# Patient Record
Sex: FEMALE | Race: WHITE | Employment: OTHER | URBAN - METROPOLITAN AREA
[De-identification: names, ages, dates, MRNs, and addresses within clinical notes are randomized per-mention and may not be internally consistent; named-entity substitution may affect disease eponyms.]

---

## 2017-01-01 ENCOUNTER — EPISODE CHANGES (OUTPATIENT)
Dept: CASE MANAGEMENT | Age: 62
End: 2017-01-01

## 2017-01-03 ENCOUNTER — CARE COORDINATION (OUTPATIENT)
Dept: CASE MANAGEMENT | Age: 62
End: 2017-01-03

## 2017-01-05 ENCOUNTER — CARE COORDINATION (OUTPATIENT)
Dept: CASE MANAGEMENT | Age: 62
End: 2017-01-05

## 2017-01-06 ENCOUNTER — CARE COORDINATION (OUTPATIENT)
Dept: CASE MANAGEMENT | Age: 62
End: 2017-01-06

## 2017-01-09 ENCOUNTER — CARE COORDINATION (OUTPATIENT)
Dept: CASE MANAGEMENT | Age: 62
End: 2017-01-09

## 2017-01-12 ENCOUNTER — TELEPHONE (OUTPATIENT)
Dept: INTERNAL MEDICINE | Age: 62
End: 2017-01-12

## 2017-01-19 ENCOUNTER — CARE COORDINATION (OUTPATIENT)
Dept: INTERNAL MEDICINE | Age: 62
End: 2017-01-19

## 2017-01-23 ENCOUNTER — CARE COORDINATOR VISIT (OUTPATIENT)
Dept: INTERNAL MEDICINE | Age: 62
End: 2017-01-23

## 2017-01-23 ENCOUNTER — OFFICE VISIT (OUTPATIENT)
Dept: INTERNAL MEDICINE | Age: 62
End: 2017-01-23

## 2017-01-23 VITALS — BODY MASS INDEX: 24.37 KG/M2 | DIASTOLIC BLOOD PRESSURE: 70 MMHG | WEIGHT: 165 LBS | SYSTOLIC BLOOD PRESSURE: 110 MMHG

## 2017-01-23 DIAGNOSIS — M15.9 GENERALIZED OSTEOARTHROSIS, INVOLVING MULTIPLE SITES: ICD-10-CM

## 2017-01-23 DIAGNOSIS — E10.9 TYPE 1 DIABETES MELLITUS WITHOUT COMPLICATION (HCC): Primary | ICD-10-CM

## 2017-01-23 DIAGNOSIS — I50.23 ACUTE ON CHRONIC SYSTOLIC CONGESTIVE HEART FAILURE (HCC): ICD-10-CM

## 2017-01-23 DIAGNOSIS — J44.9 CHRONIC OBSTRUCTIVE PULMONARY DISEASE, UNSPECIFIED COPD TYPE (HCC): ICD-10-CM

## 2017-01-23 DIAGNOSIS — I10 ESSENTIAL HYPERTENSION: ICD-10-CM

## 2017-01-23 DIAGNOSIS — M79.7 FIBROMYALGIA: ICD-10-CM

## 2017-01-23 DIAGNOSIS — F41.1 GENERALIZED ANXIETY DISORDER: ICD-10-CM

## 2017-01-23 DIAGNOSIS — K58.2 IRRITABLE BOWEL SYNDROME WITH BOTH CONSTIPATION AND DIARRHEA: Chronic | ICD-10-CM

## 2017-01-23 DIAGNOSIS — I50.22 HEART FAILURE, SYSTOLIC, CHRONIC (HCC): ICD-10-CM

## 2017-01-23 DIAGNOSIS — E03.9 ACQUIRED HYPOTHYROIDISM: ICD-10-CM

## 2017-01-23 DIAGNOSIS — I42.9 CARDIOMYOPATHY (HCC): ICD-10-CM

## 2017-01-23 DIAGNOSIS — R56.9 NEW ONSET SEIZURE (HCC): ICD-10-CM

## 2017-01-23 DIAGNOSIS — J41.8 MIXED SIMPLE AND MUCOPURULENT CHRONIC BRONCHITIS (HCC): ICD-10-CM

## 2017-01-23 PROCEDURE — 3023F SPIROM DOC REV: CPT | Performed by: INTERNAL MEDICINE

## 2017-01-23 PROCEDURE — G8598 ASA/ANTIPLAT THER USED: HCPCS | Performed by: INTERNAL MEDICINE

## 2017-01-23 PROCEDURE — 99214 OFFICE O/P EST MOD 30 MIN: CPT | Performed by: INTERNAL MEDICINE

## 2017-01-23 PROCEDURE — 1036F TOBACCO NON-USER: CPT | Performed by: INTERNAL MEDICINE

## 2017-01-23 PROCEDURE — G8420 CALC BMI NORM PARAMETERS: HCPCS | Performed by: INTERNAL MEDICINE

## 2017-01-23 PROCEDURE — 3045F PR MOST RECENT HEMOGLOBIN A1C LEVEL 7.0-9.0%: CPT | Performed by: INTERNAL MEDICINE

## 2017-01-23 PROCEDURE — G8926 SPIRO NO PERF OR DOC: HCPCS | Performed by: INTERNAL MEDICINE

## 2017-01-23 PROCEDURE — G8484 FLU IMMUNIZE NO ADMIN: HCPCS | Performed by: INTERNAL MEDICINE

## 2017-01-23 PROCEDURE — G8427 DOCREV CUR MEDS BY ELIG CLIN: HCPCS | Performed by: INTERNAL MEDICINE

## 2017-01-23 PROCEDURE — 1111F DSCHRG MED/CURRENT MED MERGE: CPT | Performed by: INTERNAL MEDICINE

## 2017-01-23 PROCEDURE — 3017F COLORECTAL CA SCREEN DOC REV: CPT | Performed by: INTERNAL MEDICINE

## 2017-01-23 PROCEDURE — 3014F SCREEN MAMMO DOC REV: CPT | Performed by: INTERNAL MEDICINE

## 2017-01-23 RX ORDER — LORAZEPAM 2 MG/1
TABLET ORAL
Qty: 180 TABLET | Refills: 0 | OUTPATIENT
Start: 2017-01-23 | End: 2017-04-18 | Stop reason: SDUPTHER

## 2017-01-23 ASSESSMENT — ENCOUNTER SYMPTOMS
BACK PAIN: 1
WHEEZING: 0
NAUSEA: 0
CHEST TIGHTNESS: 0
DIARRHEA: 0
CONSTIPATION: 0
VOMITING: 0
SHORTNESS OF BREATH: 0
COUGH: 0
SINUS PRESSURE: 0
ABDOMINAL PAIN: 0

## 2017-01-23 ASSESSMENT — PATIENT HEALTH QUESTIONNAIRE - PHQ9
2. FEELING DOWN, DEPRESSED OR HOPELESS: 0
SUM OF ALL RESPONSES TO PHQ9 QUESTIONS 1 & 2: 0
SUM OF ALL RESPONSES TO PHQ QUESTIONS 1-9: 0
1. LITTLE INTEREST OR PLEASURE IN DOING THINGS: 0

## 2017-01-24 LAB — LDL CHOLESTEROL DIRECT: 86 MG/DL

## 2017-01-25 ENCOUNTER — TELEPHONE (OUTPATIENT)
Dept: INTERNAL MEDICINE | Age: 62
End: 2017-01-25

## 2017-02-06 ENCOUNTER — TELEPHONE (OUTPATIENT)
Dept: PHARMACY | Facility: CLINIC | Age: 62
End: 2017-02-06

## 2017-02-09 RX ORDER — SODIUM CHLORIDE FOR INHALATION 3 %
15 VIAL, NEBULIZER (ML) INHALATION PRN
Status: ON HOLD | COMMUNITY
End: 2017-08-29 | Stop reason: DRUGHIGH

## 2017-02-09 RX ORDER — GABAPENTIN 400 MG/1
800 CAPSULE ORAL 3 TIMES DAILY
Status: ON HOLD | COMMUNITY
End: 2017-08-29 | Stop reason: SDUPTHER

## 2017-02-09 RX ORDER — RANITIDINE 150 MG/1
150 TABLET ORAL 2 TIMES DAILY
Status: ON HOLD | COMMUNITY
Start: 2016-12-30 | End: 2017-08-29 | Stop reason: ALTCHOICE

## 2017-02-14 ENCOUNTER — TELEPHONE (OUTPATIENT)
Dept: INTERNAL MEDICINE | Age: 62
End: 2017-02-14

## 2017-02-14 RX ORDER — NITROFURANTOIN 25; 75 MG/1; MG/1
100 CAPSULE ORAL 2 TIMES DAILY
Qty: 14 CAPSULE | Refills: 0 | Status: SHIPPED | OUTPATIENT
Start: 2017-02-14 | End: 2017-06-28

## 2017-02-23 ENCOUNTER — TELEPHONE (OUTPATIENT)
Dept: CARDIOLOGY CLINIC | Age: 62
End: 2017-02-23

## 2017-02-23 ENCOUNTER — CARE COORDINATION (OUTPATIENT)
Dept: INTERNAL MEDICINE | Age: 62
End: 2017-02-23

## 2017-02-23 ENCOUNTER — TELEPHONE (OUTPATIENT)
Dept: INTERNAL MEDICINE | Age: 62
End: 2017-02-23

## 2017-02-23 RX ORDER — SIMVASTATIN 80 MG
TABLET ORAL
Qty: 90 TABLET | Refills: 0 | Status: SHIPPED | OUTPATIENT
Start: 2017-02-23 | End: 2017-08-02 | Stop reason: SDUPTHER

## 2017-02-23 RX ORDER — LOSARTAN POTASSIUM 25 MG/1
25 TABLET ORAL DAILY
Qty: 90 TABLET | Refills: 3 | Status: SHIPPED | OUTPATIENT
Start: 2017-02-23 | End: 2018-01-03 | Stop reason: SDUPTHER

## 2017-03-01 RX ORDER — ROPINIROLE 0.5 MG/1
TABLET, FILM COATED ORAL
Qty: 90 TABLET | Refills: 6 | Status: SHIPPED | OUTPATIENT
Start: 2017-03-01 | End: 2017-11-01 | Stop reason: SDUPTHER

## 2017-03-01 RX ORDER — CARVEDILOL 3.12 MG/1
TABLET ORAL
Qty: 60 TABLET | Refills: 5 | Status: SHIPPED | OUTPATIENT
Start: 2017-03-01 | End: 2017-09-06 | Stop reason: SDUPTHER

## 2017-03-06 ENCOUNTER — TELEPHONE (OUTPATIENT)
Dept: CARDIOLOGY CLINIC | Age: 62
End: 2017-03-06

## 2017-03-24 ENCOUNTER — CARE COORDINATION (OUTPATIENT)
Dept: INTERNAL MEDICINE | Age: 62
End: 2017-03-24

## 2017-03-30 ENCOUNTER — CARE COORDINATOR VISIT (OUTPATIENT)
Dept: INTERNAL MEDICINE | Age: 62
End: 2017-03-30

## 2017-03-30 ENCOUNTER — OFFICE VISIT (OUTPATIENT)
Dept: INTERNAL MEDICINE | Age: 62
End: 2017-03-30

## 2017-03-30 VITALS
BODY MASS INDEX: 25 KG/M2 | TEMPERATURE: 97.7 F | SYSTOLIC BLOOD PRESSURE: 100 MMHG | DIASTOLIC BLOOD PRESSURE: 60 MMHG | HEIGHT: 69 IN | WEIGHT: 168.8 LBS

## 2017-03-30 DIAGNOSIS — J47.9 BRONCHIECTASIS WITHOUT COMPLICATION (HCC): ICD-10-CM

## 2017-03-30 DIAGNOSIS — K57.32 DIVERTICULITIS OF LARGE INTESTINE WITHOUT PERFORATION OR ABSCESS WITHOUT BLEEDING: ICD-10-CM

## 2017-03-30 DIAGNOSIS — F41.1 GENERALIZED ANXIETY DISORDER: ICD-10-CM

## 2017-03-30 DIAGNOSIS — R56.9 NEW ONSET SEIZURE (HCC): ICD-10-CM

## 2017-03-30 DIAGNOSIS — E10.9 TYPE 1 DIABETES MELLITUS WITHOUT COMPLICATION (HCC): ICD-10-CM

## 2017-03-30 DIAGNOSIS — I10 ESSENTIAL HYPERTENSION: ICD-10-CM

## 2017-03-30 DIAGNOSIS — J39.8 TRACHEOBRONCHOMALACIA: ICD-10-CM

## 2017-03-30 DIAGNOSIS — Z00.00 MEDICARE ANNUAL WELLNESS VISIT, SUBSEQUENT: Primary | ICD-10-CM

## 2017-03-30 PROCEDURE — G0439 PPPS, SUBSEQ VISIT: HCPCS | Performed by: INTERNAL MEDICINE

## 2017-03-30 RX ORDER — LEVOTHYROXINE SODIUM 0.07 MG/1
TABLET ORAL
Qty: 30 TABLET | Refills: 3 | Status: SHIPPED | OUTPATIENT
Start: 2017-03-30 | End: 2017-04-18 | Stop reason: SDUPTHER

## 2017-03-30 RX ORDER — AMOXICILLIN AND CLAVULANATE POTASSIUM 875; 125 MG/1; MG/1
1 TABLET, FILM COATED ORAL 2 TIMES DAILY
Qty: 20 TABLET | Refills: 0 | Status: SHIPPED | OUTPATIENT
Start: 2017-03-30 | End: 2017-04-09

## 2017-03-30 ASSESSMENT — ENCOUNTER SYMPTOMS
COUGH: 0
SINUS PRESSURE: 0
CONSTIPATION: 0
WHEEZING: 0
ABDOMINAL PAIN: 0
CHEST TIGHTNESS: 0
VOMITING: 0
SHORTNESS OF BREATH: 0
DIARRHEA: 0
NAUSEA: 0
BACK PAIN: 1

## 2017-03-31 ENCOUNTER — HOSPITAL ENCOUNTER (OUTPATIENT)
Dept: OTHER | Age: 62
Discharge: HOME | End: 2017-03-31
Attending: INTERNAL MEDICINE | Admitting: INTERNAL MEDICINE

## 2017-03-31 LAB
A/G RATIO: 1.1 (ref 1.1–2.2)
ALBUMIN SERPL-MCNC: 4.6 G/DL (ref 3.4–5)
ALP BLD-CCNC: 135 U/L (ref 40–129)
ALT SERPL-CCNC: 32 U/L (ref 10–40)
ANION GAP SERPL CALCULATED.3IONS-SCNC: 18 MMOL/L (ref 3–16)
AST SERPL-CCNC: 110 U/L (ref 15–37)
BASOPHILS ABSOLUTE: 0.1 K/UL (ref 0–0.2)
BASOPHILS RELATIVE PERCENT: 1 %
BILIRUB SERPL-MCNC: 0.6 MG/DL (ref 0–1)
BUN BLDV-MCNC: 11 MG/DL (ref 7–20)
CALCIUM SERPL-MCNC: 10 MG/DL (ref 8.3–10.6)
CHLORIDE BLD-SCNC: 98 MMOL/L (ref 99–110)
CHOLESTEROL, TOTAL: 192 MG/DL (ref 0–199)
CO2: 25 MMOL/L (ref 21–32)
CREAT SERPL-MCNC: 0.8 MG/DL (ref 0.6–1.2)
EOSINOPHILS ABSOLUTE: 0.2 K/UL (ref 0–0.6)
EOSINOPHILS RELATIVE PERCENT: 1.3 %
GFR AFRICAN AMERICAN: >60
GFR NON-AFRICAN AMERICAN: >60
GLOBULIN: 4.2 G/DL
GLUCOSE BLD-MCNC: 163 MG/DL (ref 70–99)
HCT VFR BLD CALC: 43.6 % (ref 36–48)
HDLC SERPL-MCNC: 45 MG/DL (ref 40–60)
HEMOGLOBIN: 13.9 G/DL (ref 12–16)
LDL CHOLESTEROL CALCULATED: 93 MG/DL
LYMPHOCYTES ABSOLUTE: 3.6 K/UL (ref 1–5.1)
LYMPHOCYTES RELATIVE PERCENT: 29.5 %
MCH RBC QN AUTO: 26.3 PG (ref 26–34)
MCHC RBC AUTO-ENTMCNC: 32 G/DL (ref 31–36)
MCV RBC AUTO: 82.3 FL (ref 80–100)
MONOCYTES ABSOLUTE: 0.9 K/UL (ref 0–1.3)
MONOCYTES RELATIVE PERCENT: 7.3 %
NEUTROPHILS ABSOLUTE: 7.4 K/UL (ref 1.7–7.7)
NEUTROPHILS RELATIVE PERCENT: 60.9 %
PDW BLD-RTO: 16.3 % (ref 12.4–15.4)
PLATELET # BLD: 377 K/UL (ref 135–450)
PMV BLD AUTO: 8 FL (ref 5–10.5)
POTASSIUM SERPL-SCNC: 3.9 MMOL/L (ref 3.5–5.1)
RBC # BLD: 5.3 M/UL (ref 4–5.2)
SODIUM BLD-SCNC: 141 MMOL/L (ref 136–145)
TOTAL PROTEIN: 8.8 G/DL (ref 6.4–8.2)
TRIGL SERPL-MCNC: 268 MG/DL (ref 0–150)
VLDLC SERPL CALC-MCNC: 54 MG/DL
WBC # BLD: 12.2 K/UL (ref 4–11)

## 2017-04-06 ENCOUNTER — TELEPHONE (OUTPATIENT)
Dept: INTERNAL MEDICINE | Age: 62
End: 2017-04-06

## 2017-04-13 ENCOUNTER — CARE COORDINATION (OUTPATIENT)
Dept: INTERNAL MEDICINE | Age: 62
End: 2017-04-13

## 2017-04-18 ENCOUNTER — OFFICE VISIT (OUTPATIENT)
Dept: INTERNAL MEDICINE | Age: 62
End: 2017-04-18

## 2017-04-18 VITALS
SYSTOLIC BLOOD PRESSURE: 142 MMHG | WEIGHT: 174.2 LBS | DIASTOLIC BLOOD PRESSURE: 80 MMHG | BODY MASS INDEX: 25.8 KG/M2 | HEIGHT: 69 IN

## 2017-04-18 DIAGNOSIS — F41.1 GENERALIZED ANXIETY DISORDER: ICD-10-CM

## 2017-04-18 DIAGNOSIS — E10.9 TYPE 1 DIABETES MELLITUS WITHOUT COMPLICATION (HCC): Primary | ICD-10-CM

## 2017-04-18 PROCEDURE — 3045F PR MOST RECENT HEMOGLOBIN A1C LEVEL 7.0-9.0%: CPT | Performed by: INTERNAL MEDICINE

## 2017-04-18 PROCEDURE — 3014F SCREEN MAMMO DOC REV: CPT | Performed by: INTERNAL MEDICINE

## 2017-04-18 PROCEDURE — 3017F COLORECTAL CA SCREEN DOC REV: CPT | Performed by: INTERNAL MEDICINE

## 2017-04-18 PROCEDURE — G8419 CALC BMI OUT NRM PARAM NOF/U: HCPCS | Performed by: INTERNAL MEDICINE

## 2017-04-18 PROCEDURE — G8598 ASA/ANTIPLAT THER USED: HCPCS | Performed by: INTERNAL MEDICINE

## 2017-04-18 PROCEDURE — 99213 OFFICE O/P EST LOW 20 MIN: CPT | Performed by: INTERNAL MEDICINE

## 2017-04-18 PROCEDURE — 1036F TOBACCO NON-USER: CPT | Performed by: INTERNAL MEDICINE

## 2017-04-18 PROCEDURE — G8427 DOCREV CUR MEDS BY ELIG CLIN: HCPCS | Performed by: INTERNAL MEDICINE

## 2017-04-18 RX ORDER — LEVOTHYROXINE SODIUM 0.07 MG/1
TABLET ORAL
Qty: 90 TABLET | Refills: 3 | Status: SHIPPED | OUTPATIENT
Start: 2017-04-18 | End: 2018-07-12

## 2017-04-18 RX ORDER — LORAZEPAM 2 MG/1
TABLET ORAL
Qty: 180 TABLET | Refills: 0 | Status: SHIPPED | OUTPATIENT
Start: 2017-04-18 | End: 2017-07-18 | Stop reason: SDUPTHER

## 2017-04-18 ASSESSMENT — ENCOUNTER SYMPTOMS
COUGH: 0
DIARRHEA: 0
CHEST TIGHTNESS: 0
CONSTIPATION: 0
WHEEZING: 0
ABDOMINAL PAIN: 0

## 2017-05-02 DIAGNOSIS — E55.9 VITAMIN D DEFICIENCY: ICD-10-CM

## 2017-05-02 DIAGNOSIS — E03.9 UNSPECIFIED HYPOTHYROIDISM: ICD-10-CM

## 2017-06-02 ENCOUNTER — CARE COORDINATION (OUTPATIENT)
Dept: INTERNAL MEDICINE | Age: 62
End: 2017-06-02

## 2017-07-17 ENCOUNTER — CARE COORDINATION (OUTPATIENT)
Dept: INTERNAL MEDICINE | Age: 62
End: 2017-07-17

## 2017-07-18 ENCOUNTER — OFFICE VISIT (OUTPATIENT)
Dept: INTERNAL MEDICINE | Age: 62
End: 2017-07-18

## 2017-07-18 VITALS
WEIGHT: 169 LBS | DIASTOLIC BLOOD PRESSURE: 90 MMHG | SYSTOLIC BLOOD PRESSURE: 146 MMHG | BODY MASS INDEX: 25.03 KG/M2 | HEIGHT: 69 IN

## 2017-07-18 DIAGNOSIS — F41.1 GENERALIZED ANXIETY DISORDER: ICD-10-CM

## 2017-07-18 DIAGNOSIS — H66.92 OTITIS, LEFT: Primary | ICD-10-CM

## 2017-07-18 PROCEDURE — G8598 ASA/ANTIPLAT THER USED: HCPCS | Performed by: INTERNAL MEDICINE

## 2017-07-18 PROCEDURE — G8420 CALC BMI NORM PARAMETERS: HCPCS | Performed by: INTERNAL MEDICINE

## 2017-07-18 PROCEDURE — G8427 DOCREV CUR MEDS BY ELIG CLIN: HCPCS | Performed by: INTERNAL MEDICINE

## 2017-07-18 PROCEDURE — 3014F SCREEN MAMMO DOC REV: CPT | Performed by: INTERNAL MEDICINE

## 2017-07-18 PROCEDURE — 99213 OFFICE O/P EST LOW 20 MIN: CPT | Performed by: INTERNAL MEDICINE

## 2017-07-18 PROCEDURE — 1036F TOBACCO NON-USER: CPT | Performed by: INTERNAL MEDICINE

## 2017-07-18 PROCEDURE — 3017F COLORECTAL CA SCREEN DOC REV: CPT | Performed by: INTERNAL MEDICINE

## 2017-07-18 RX ORDER — AMOXICILLIN 500 MG/1
500 CAPSULE ORAL 3 TIMES DAILY
Qty: 30 CAPSULE | Refills: 0 | Status: SHIPPED | OUTPATIENT
Start: 2017-07-18 | End: 2017-07-28

## 2017-07-18 RX ORDER — LORAZEPAM 2 MG/1
TABLET ORAL
Qty: 180 TABLET | Refills: 0 | OUTPATIENT
Start: 2017-07-18 | End: 2017-10-18 | Stop reason: SDUPTHER

## 2017-08-01 ENCOUNTER — TELEPHONE (OUTPATIENT)
Dept: INTERNAL MEDICINE | Age: 62
End: 2017-08-01

## 2017-08-01 RX ORDER — CIPROFLOXACIN 500 MG/1
500 TABLET, FILM COATED ORAL 2 TIMES DAILY
Qty: 14 TABLET | Refills: 0 | Status: SHIPPED | OUTPATIENT
Start: 2017-08-01 | End: 2017-11-06 | Stop reason: SDUPTHER

## 2017-08-02 RX ORDER — SIMVASTATIN 80 MG
TABLET ORAL
Qty: 90 TABLET | Refills: 0 | Status: SHIPPED | OUTPATIENT
Start: 2017-08-02 | End: 2017-09-06 | Stop reason: SDUPTHER

## 2017-08-21 ENCOUNTER — CARE COORDINATION (OUTPATIENT)
Dept: INTERNAL MEDICINE | Age: 62
End: 2017-08-21

## 2017-08-25 ENCOUNTER — CARE COORDINATION (OUTPATIENT)
Dept: CARE COORDINATION | Age: 62
End: 2017-08-25

## 2017-08-29 PROBLEM — E86.0 DEHYDRATION: Status: ACTIVE | Noted: 2017-08-29

## 2017-08-29 PROBLEM — R11.0 NAUSEA: Status: ACTIVE | Noted: 2017-08-29

## 2017-09-01 ENCOUNTER — CARE COORDINATION (OUTPATIENT)
Dept: CASE MANAGEMENT | Age: 62
End: 2017-09-01

## 2017-09-06 RX ORDER — SIMVASTATIN 80 MG
TABLET ORAL
Qty: 90 TABLET | Refills: 3 | Status: SHIPPED | OUTPATIENT
Start: 2017-09-06 | End: 2018-02-16 | Stop reason: SDUPTHER

## 2017-09-06 RX ORDER — CARVEDILOL 3.12 MG/1
TABLET ORAL
Qty: 60 TABLET | Refills: 0 | Status: SHIPPED | OUTPATIENT
Start: 2017-09-06 | End: 2018-01-03 | Stop reason: SDUPTHER

## 2017-09-08 ENCOUNTER — CARE COORDINATION (OUTPATIENT)
Dept: CASE MANAGEMENT | Age: 62
End: 2017-09-08

## 2017-09-12 ENCOUNTER — CARE COORDINATION (OUTPATIENT)
Dept: CASE MANAGEMENT | Age: 62
End: 2017-09-12

## 2017-09-19 ENCOUNTER — CARE COORDINATION (OUTPATIENT)
Dept: INTERNAL MEDICINE | Age: 62
End: 2017-09-19

## 2017-09-20 ENCOUNTER — TELEPHONE (OUTPATIENT)
Dept: INTERNAL MEDICINE | Age: 62
End: 2017-09-20

## 2017-09-20 DIAGNOSIS — R79.89 ABNORMAL TSH: Primary | ICD-10-CM

## 2017-09-20 RX ORDER — LEVOTHYROXINE SODIUM 0.1 MG/1
100 TABLET ORAL DAILY
Qty: 30 TABLET | Refills: 3 | Status: SHIPPED | OUTPATIENT
Start: 2017-09-20 | End: 2018-02-03 | Stop reason: SDUPTHER

## 2017-10-10 ENCOUNTER — TELEPHONE (OUTPATIENT)
Dept: PHARMACY | Facility: CLINIC | Age: 62
End: 2017-10-10

## 2017-10-10 NOTE — TELEPHONE ENCOUNTER
CLINICAL PHARMACY NOTE - Medication Review    Smita Harris is a 60 y.o. female referred to a clinical pharmacy specialist given history of COPD and/or HF and number of home medications. Reached patient by telephone for medication review. Politely declines complete review of medications - sts her list should be up to date from ED/hospital visit/s and that now is not a good time to review. Offered to call at a more convenient time, but again declines, stating she'll review her list prior to appt to be sure. Denies any medication questions or concerns.      Isabella Banks, PharmD, [address]  Direct: [phone]  Department, toll free: [phone], option 7     =======================================================   For Pharmacy Admin Tracking Only    PHSO: Yes  Time Spent (min): 5

## 2017-10-16 ENCOUNTER — HOSPITAL ENCOUNTER (OUTPATIENT)
Dept: OTHER | Age: 62
Discharge: HOME | End: 2017-10-16
Attending: INTERNAL MEDICINE | Admitting: INTERNAL MEDICINE

## 2017-10-17 LAB — TSH SERPL DL<=0.05 MIU/L-ACNC: 0.49 UIU/ML (ref 0.27–4.2)

## 2017-10-18 ENCOUNTER — CARE COORDINATOR VISIT (OUTPATIENT)
Dept: INTERNAL MEDICINE | Age: 62
End: 2017-10-18

## 2017-10-18 ENCOUNTER — OFFICE VISIT (OUTPATIENT)
Dept: INTERNAL MEDICINE | Age: 62
End: 2017-10-18

## 2017-10-18 VITALS — BODY MASS INDEX: 25.52 KG/M2 | WEIGHT: 172.8 LBS | DIASTOLIC BLOOD PRESSURE: 70 MMHG | SYSTOLIC BLOOD PRESSURE: 140 MMHG

## 2017-10-18 DIAGNOSIS — I10 ESSENTIAL HYPERTENSION: ICD-10-CM

## 2017-10-18 DIAGNOSIS — F41.1 ANXIETY STATE: ICD-10-CM

## 2017-10-18 DIAGNOSIS — F41.9 ANXIETY: ICD-10-CM

## 2017-10-18 DIAGNOSIS — R21 RASH: ICD-10-CM

## 2017-10-18 DIAGNOSIS — Z79.4 TYPE 2 DIABETES MELLITUS WITHOUT COMPLICATION, WITH LONG-TERM CURRENT USE OF INSULIN (HCC): ICD-10-CM

## 2017-10-18 DIAGNOSIS — Z23 NEED FOR INFLUENZA VACCINATION: Primary | ICD-10-CM

## 2017-10-18 DIAGNOSIS — F41.1 GENERALIZED ANXIETY DISORDER: ICD-10-CM

## 2017-10-18 DIAGNOSIS — E11.9 TYPE 2 DIABETES MELLITUS WITHOUT COMPLICATION, WITH LONG-TERM CURRENT USE OF INSULIN (HCC): ICD-10-CM

## 2017-10-18 LAB
A/G RATIO: 1.4 (ref 1.1–2.2)
ALBUMIN SERPL-MCNC: 4.5 G/DL (ref 3.4–5)
ALP BLD-CCNC: 125 U/L (ref 40–129)
ALT SERPL-CCNC: 21 U/L (ref 10–40)
ANION GAP SERPL CALCULATED.3IONS-SCNC: 21 MMOL/L (ref 3–16)
AST SERPL-CCNC: 47 U/L (ref 15–37)
BASOPHILS ABSOLUTE: 0.1 K/UL (ref 0–0.2)
BASOPHILS RELATIVE PERCENT: 0.9 %
BILIRUB SERPL-MCNC: <0.2 MG/DL (ref 0–1)
BUN BLDV-MCNC: 5 MG/DL (ref 7–20)
CALCIUM SERPL-MCNC: 9.5 MG/DL (ref 8.3–10.6)
CHLORIDE BLD-SCNC: 98 MMOL/L (ref 99–110)
CHOLESTEROL, TOTAL: 221 MG/DL (ref 0–199)
CO2: 22 MMOL/L (ref 21–32)
CREAT SERPL-MCNC: 0.6 MG/DL (ref 0.6–1.2)
EOSINOPHILS ABSOLUTE: 0.1 K/UL (ref 0–0.6)
EOSINOPHILS RELATIVE PERCENT: 1.4 %
GFR AFRICAN AMERICAN: >60
GFR NON-AFRICAN AMERICAN: >60
GLOBULIN: 3.2 G/DL
GLUCOSE BLD-MCNC: 222 MG/DL (ref 70–99)
HCT VFR BLD CALC: 38.5 % (ref 36–48)
HDLC SERPL-MCNC: 44 MG/DL (ref 40–60)
HEMOGLOBIN: 12.6 G/DL (ref 12–16)
LDL CHOLESTEROL CALCULATED: ABNORMAL MG/DL
LDL CHOLESTEROL DIRECT: 136 MG/DL
LYMPHOCYTES ABSOLUTE: 2.2 K/UL (ref 1–5.1)
LYMPHOCYTES RELATIVE PERCENT: 25.7 %
MCH RBC QN AUTO: 27.4 PG (ref 26–34)
MCHC RBC AUTO-ENTMCNC: 32.8 G/DL (ref 31–36)
MCV RBC AUTO: 83.7 FL (ref 80–100)
MONOCYTES ABSOLUTE: 0.6 K/UL (ref 0–1.3)
MONOCYTES RELATIVE PERCENT: 6.7 %
NEUTROPHILS ABSOLUTE: 5.6 K/UL (ref 1.7–7.7)
NEUTROPHILS RELATIVE PERCENT: 65.3 %
PDW BLD-RTO: 16.4 % (ref 12.4–15.4)
PLATELET # BLD: 297 K/UL (ref 135–450)
PMV BLD AUTO: 8.5 FL (ref 5–10.5)
POTASSIUM SERPL-SCNC: 3.9 MMOL/L (ref 3.5–5.1)
RBC # BLD: 4.6 M/UL (ref 4–5.2)
SODIUM BLD-SCNC: 141 MMOL/L (ref 136–145)
TOTAL PROTEIN: 7.7 G/DL (ref 6.4–8.2)
TRIGL SERPL-MCNC: 306 MG/DL (ref 0–150)
VLDLC SERPL CALC-MCNC: ABNORMAL MG/DL
WBC # BLD: 8.6 K/UL (ref 4–11)

## 2017-10-18 PROCEDURE — G8417 CALC BMI ABV UP PARAM F/U: HCPCS | Performed by: INTERNAL MEDICINE

## 2017-10-18 PROCEDURE — 90630 INFLUENZA, QUADV, 18-64 YRS, ID, PF, MICRO INJ, 0.1ML (FLUZONE QUADV, PF): CPT | Performed by: INTERNAL MEDICINE

## 2017-10-18 PROCEDURE — G8484 FLU IMMUNIZE NO ADMIN: HCPCS | Performed by: INTERNAL MEDICINE

## 2017-10-18 PROCEDURE — 3014F SCREEN MAMMO DOC REV: CPT | Performed by: INTERNAL MEDICINE

## 2017-10-18 PROCEDURE — G0008 ADMIN INFLUENZA VIRUS VAC: HCPCS | Performed by: INTERNAL MEDICINE

## 2017-10-18 PROCEDURE — 99214 OFFICE O/P EST MOD 30 MIN: CPT | Performed by: INTERNAL MEDICINE

## 2017-10-18 PROCEDURE — 1036F TOBACCO NON-USER: CPT | Performed by: INTERNAL MEDICINE

## 2017-10-18 PROCEDURE — G8598 ASA/ANTIPLAT THER USED: HCPCS | Performed by: INTERNAL MEDICINE

## 2017-10-18 PROCEDURE — G8427 DOCREV CUR MEDS BY ELIG CLIN: HCPCS | Performed by: INTERNAL MEDICINE

## 2017-10-18 PROCEDURE — 3046F HEMOGLOBIN A1C LEVEL >9.0%: CPT | Performed by: INTERNAL MEDICINE

## 2017-10-18 PROCEDURE — 3017F COLORECTAL CA SCREEN DOC REV: CPT | Performed by: INTERNAL MEDICINE

## 2017-10-18 RX ORDER — LORAZEPAM 2 MG/1
TABLET ORAL
Qty: 180 TABLET | Refills: 0 | OUTPATIENT
Start: 2017-10-18 | End: 2018-01-18 | Stop reason: SDUPTHER

## 2017-10-18 NOTE — PROGRESS NOTES
Vaccine Information Sheet, \"Influenza - Inactivated\"  given to Lisa Farah, or parent/legal guardian of  Lisa Farah and verbalized understanding. Patient responses:    Have you ever had a reaction to a flu vaccine? No  Are you able to eat eggs without adverse effects? Yes  Do you have any current illness? No  Have you ever had Guillian [place] Syndrome? No    Flu vaccine given per order. Please see immunization tab.

## 2017-10-18 NOTE — PROGRESS NOTES
Outpatient Follow Up Note    Subjective:   CHIEF COMPLAINT / HPI:     Edith Cook  presents today for check up. She is doing well at this time with only a few problems not controlled. She has a history of Anxiety,Diabetes,Diverticulitis,hyperlipidemia,and generalized osteoarthritis. She has no complaints.        Past Medical History:    Past Medical History:   Diagnosis Date    Anxiety disorder     Chronic pain syndrome     DDD (degenerative disc disease), lumbar     Diabetes (HCC)     Displacement of lumbar intervertebral disc without myelopathy 8/23/2010    Diverticulitis     Fibromyalgia      8/24/2010  8/23/2010    Influenza A 3/10/16    Irritable bowel syndrome 8/23/2010    Other and unspecified hyperlipidemia 8/23/2010    Prosthetic joint implant failure ([facility] [address]     Screening mammogram 12/8/2006    (Both)Negative    Tracheobronchomalacia      8/23/2010    Unspecified essential hypertension 8/24/2010    Unspecified hypothyroidism 8/23/2010       Social History:    History   Smoking Status    Former Smoker    Packs/day: 1.00    Years: 18.00    Types: Cigarettes    Quit date: 12/10/1991   Smokeless Tobacco    Never Used         Family History   Problem Relation Age of Onset    Asthma Mother     Heart Failure Father     Cancer Neg Hx     Diabetes Neg Hx     Emphysema Neg Hx     Hypertension Neg Hx        Current Medications:  Current Outpatient Prescriptions on File Prior to Visit   Medication Sig Dispense Refill    levothyroxine (SYNTHROID) 100 MCG tablet Take 1 tablet by mouth Daily 30 tablet 3    simvastatin (ZOCOR) 80 MG tablet TAKE 1 TABLET BY MOUTH AT BEDTIME 90 tablet 3    carvedilol (COREG) 3.125 MG tablet TAKE 1 TABLET BY MOUTH 2 TIMES DAILY 60 tablet 0    dicyclomine (BENTYL) 10 MG capsule Take 1 capsule by mouth 4 times daily 120 capsule 3    docusate sodium (COLACE) 100 MG capsule Take 1 capsule by mouth daily as needed for Constipation 30 capsule 0    CO2 22 10/18/2017     Lab Results   Component Value Date    TSH 0.49 10/16/2017     Lab Results   Component Value Date    WBC 8.6 10/18/2017    HGB 12.6 10/18/2017    HCT 38.5 10/18/2017    MCV 83.7 10/18/2017     10/18/2017     Assessment Plan :     Essential Hypertension    Blood pressure control has been optimal.  No complaints of headaches. No end organ damage. Encouraged to limit sodium intake and control weight. Hyperlipidemia    Remains on statin  No c/o muscle aches  No chest pain  Last lipid panel was 72  Discussed healthy diet and lifestyle      CongestiveHeart Failure    Denies symptoms of dyspnea or edema  Medications reviewed and remains on medical therapy  Advised to continue meds, low sodium diet and exercise program      Diabetes Type 2    Blood sugar control has been optimal.  Check sugars at home  No complaints of polyuria or polydipsia. Weight control has been stable. Encouraged to check sugar at home, watch weight and exercise. Medications reviewed and refilled  Labs ordered: Renal panel, Hemoglobin A1C.     Max Moore MD  I attest that this note was completed entirely by me

## 2017-10-19 LAB
ESTIMATED AVERAGE GLUCOSE: 151.3 MG/DL
HBA1C MFR BLD: 6.9 %

## 2017-10-19 NOTE — CARE COORDINATION
Ambulatory Care Coordination Note  10/18/2017  CM Risk Score: 8  Vin Mortality Risk Score:      ACC: Dennis Simeon RN     History of Present Illness: COPD, Tracheobronchomalacia, IBS, Osteoarthritis, HTN, Fibromyalgia, Asthma, CHF, CAD, DDD, Arthritis, Anxiety, DM, Pulmonary Nodule,Frequent ED Visits    Summary Note: The pt's blood pressure control has been optimal. No complaints of headaches. The pt denied symptoms of dyspnea or edema; denied any chest pain. Blood sugar control has been good. The pt checks blood sugars regularly at home. No complaints of polyuria or polydipsia. The pt stated her abd pain comes and goes. Pt stated she has been taking the Bentyl, Miralax and Zofran as directed. Pt stated her EGD and Colonoscopy did not reveal anything unusual.  Plan:  RNCC reviewed diet including avoiding foods that trigger abd symptoms and medication adherence to control symptoms. The pt will continue her medication regime for her abd pain. The pt was encouraged to limit sodium intake and control weight. Reinforced healthy diet and lifestyle. The pt was advised to continue all meds and exercise program. Pt was encouraged to continue to check blood sugars and watch carbs. Care Coordination Interventions    Program Enrollment:  Complex Care  Referral from Primary Care Provider:  Yes  Suggested Interventions and Community Resources  Diabetes Education: In Process  Fall Risk Prevention: In Process  Home Health Services:  Completed  Registered Dietician:  Completed  Specialty Services Referral:  In Process  Zone Management Tools: In Process  Other Services or Interventions:  Gastroenterology         Goals Addressed             Most Recent     Conditions and Symptoms   Improving (10/18/2017)             I will schedule office visits, as directed by my provider. I will keep my appointment or reschedule if I have to cancel. I will notify my provider of any barriers to my plan of care.   I will notify my weeks then twice a day for 2 weeks. Historical Provider, MD   albuterol (PROVENTIL) (2.5 MG/3ML) 0.083% nebulizer solution Take 2.5 mg by nebulization as needed for Wheezing    Historical Provider, MD   ondansetron (ZOFRAN ODT) 4 MG disintegrating tablet Take 1 tablet by mouth every 8 hours as needed for Nausea or Vomiting 6/28/17   Trish Bustos NP   sertraline (ZOLOFT) 50 MG tablet TAKE 1 TABLET BY MOUTH DAILY 5/2/17   Marcellus Almonte MD   levothyroxine (SYNTHROID) 75 MCG tablet TAKE 1 TABLET BY MOUTH DAILY 4/18/17 8/29/17  Marcellus Almonte MD   rOPINIRole (REQUIP) 0.5 MG tablet TAKE 1 TABLET BY MOUTH 3 TIMES DAILY. 3/1/17   Marcellus Almonte MD   losartan (COZAAR) 25 MG tablet Take 1 tablet by mouth daily 2/23/17   Jalen Aceves MD   metFORMIN (GLUCOPHAGE) 1000 MG tablet TAKE 1 TABLET BEFORE BREAKFAST AND BEFORE DINNER 2/23/17   Marcellus Almonte MD   montelukast (SINGULAIR) 10 MG tablet Take 10 mg by mouth nightly    Historical Provider, MD   Lancets [facility] Patient test twice day. Dx:E11.9 6/23/16   Marcellus Almonte MD   Glucose Blood (BLOOD GLUCOSE TEST STRIPS) STRP Pt test twice a day dx:E11.9 6/23/16   Marcellus Almonte MD   Blood Glucose Monitoring Suppl [facility] BLOOD GLUCOSE METER) W/DEVICE KIT Use as directed. Dx:E11.9 6/23/16   Marcellus Almonte MD   HYDROcodone-acetaminophen (NORCO) 5-325 MG per tablet Take 1 tablet by mouth every 8 hours as needed for Pain  .     Historical Provider, MD   VENTOLIN  (90 BASE) MCG/ACT inhaler INHALE 2 PUFFS BY MOUTH EVERY 6 HOURS AS NEEDED FOR WHEEZING 12/4/15   Sumanth Fong MD   aspirin 81 MG EC tablet Take 1 tablet by mouth daily 9/24/15   Maty Frye MD   tiZANidine (ZANAFLEX) 2 MG tablet Take 2 mg by mouth 3 times daily as needed     Historical Provider, MD       Future Appointments  Date Time Provider Viky Downs   1/18/2018 9:00 AM Marcellus Almonte MD [facility]     General Assessment    Do you have any symptoms that are causing concern?:  Yes  Progression since Onset:  Intermittent -

## 2017-11-01 DIAGNOSIS — E03.9 HYPOTHYROIDISM: ICD-10-CM

## 2017-11-01 DIAGNOSIS — E55.9 VITAMIN D DEFICIENCY: ICD-10-CM

## 2017-11-01 RX ORDER — ROPINIROLE 0.5 MG/1
TABLET, FILM COATED ORAL
Qty: 90 TABLET | Refills: 6 | Status: SHIPPED | OUTPATIENT
Start: 2017-11-01 | End: 2018-06-25 | Stop reason: SDUPTHER

## 2017-11-06 ENCOUNTER — TELEPHONE (OUTPATIENT)
Dept: INTERNAL MEDICINE | Age: 62
End: 2017-11-06

## 2017-11-06 RX ORDER — CIPROFLOXACIN 500 MG/1
500 TABLET, FILM COATED ORAL 2 TIMES DAILY
Qty: 14 TABLET | Refills: 0 | Status: SHIPPED | OUTPATIENT
Start: 2017-11-06 | End: 2018-02-16 | Stop reason: SDUPTHER

## 2017-11-06 NOTE — TELEPHONE ENCOUNTER
Pt stated has burning when urinate, pain in private area since 11/3/17. Pt stated was in office on Thursday. Pt is requesting something called into pharmacy on file verified.   Please call

## 2017-11-17 ENCOUNTER — CARE COORDINATION (OUTPATIENT)
Dept: CARE COORDINATION | Age: 62
End: 2017-11-17

## 2017-11-18 NOTE — CARE COORDINATION
Ambulatory Care Coordination Note  11/17/2017  CM Risk Score: 8  Vin Mortality Risk Score:      ACC: Anh Small RN     History of Present Illness: COPD, Tracheobronchomalacia, IBS, Osteoarthritis, HTN, Fibromyalgia, Asthma, CHF, CAD, DDD, Arthritis, Anxiety, DM, Pulmonary Nodule,Frequent ED Visits    Summary Note: The pt stated she has been feeling good and has more strength and stamina. The pt stated she has not need her inhaler for quite some time. The pt stated she used her nebulizer once last week but has not used it this week. The pt stated her FBS's have been running 110-120. The pt did not mention any recent abdominal pain. Plan:  RNCC reviewed making healthier choice with holiday eating. The pt will continue to monitor her blood sugars and report any high or too low blood sugars. Handouts mailed to pt:   Managing Your Diabetes Over the Holidays   It's Thankgiving-don't stuff yourself like a [place] ([facility])! Care Coordination Interventions    Program Enrollment:  Complex Care  Referral from Primary Care Provider:  Yes  Suggested Interventions and Community Resources  Diabetes Education: In Process  Fall Risk Prevention: In Process  Home Health Services:  Completed  Registered Dietician:  Completed  Specialty Services Referral:  Completed  Zone Management Tools: In Process  Other Services or Interventions:  Gastroenterology         Goals Addressed             Most Recent     Conditions and Symptoms   On track (11/17/2017)             I will schedule office visits, as directed by my provider. I will keep my appointment or reschedule if I have to cancel. I will notify my provider of any barriers to my plan of care. I will notify my provider of any symptoms that indicate a worsening of my condition.     Barriers: lack of motivation, overwhelmed by complexity of regimen, stress and lack of education  Plan for overcoming my barriers: Provide the pt with education and support  Confidence: 6/10  Anticipated Goal Completion Date: 12/18/17         Self Monitoring   On track (11/17/2017)             Self-Monitored Blood Glucose - I will check my blood sugar Fasting blood sugar  I will notify my provider of any trends of increasing or decreasing blood sugars over a 1 month period. Other Self-Monitoring - I will monitor my abdominal pain and report any bloody stools. - Other: as needed    None Recently Recorded    Barriers: stress  Plan for overcoming my barriers: Assistance from  and family  Confidence: 9/10  Anticipated Goal Completion Date: 08/30/17              Prior to Admission medications    Medication Sig Start Date End Date Taking? Authorizing Provider   triamcinolone (KENALOG) 0.1 % ointment Apply daily to affected area 11/6/17   Anu Carrasquillo MD   sertraline (ZOLOFT) 50 MG tablet TAKE 1 TABLET BY MOUTH DAILY 11/1/17   Anu Carrasquillo MD   rOPINIRole (REQUIP) 0.5 MG tablet TAKE 1 TABLET BY MOUTH 3 TIMES DAILY.  11/1/17   Anu Carrasquillo MD   LORazepam (ATIVAN) 2 MG tablet TAKE 1 TABLET BY MOUTH TWICE DAILY 10/18/17   nAu Carrasquillo MD   levothyroxine (SYNTHROID) 100 MCG tablet Take 1 tablet by mouth Daily 9/20/17   Anu Carrasquillo MD   simvastatin (ZOCOR) 80 MG tablet TAKE 1 TABLET BY MOUTH AT BEDTIME 9/6/17   Anu Carrasquillo MD   carvedilol (COREG) 3.125 MG tablet TAKE 1 TABLET BY MOUTH 2 TIMES DAILY 9/6/17   Aysha Lopez MD   dicyclomine (BENTYL) 10 MG capsule Take 1 capsule by mouth 4 times daily 8/31/17   Robert Esteban CNP   docusate sodium (COLACE) 100 MG capsule Take 1 capsule by mouth daily as needed for Constipation 8/31/17   Robert Esteban CNP   gabapentin (NEURONTIN) 800 MG tablet Take 800 mg by mouth 3 times daily 8/2/17   Historical Provider, MD   omeprazole (PRILOSEC) 40 MG delayed release capsule Take 40 mg by mouth 2 times daily 8/2/17   Historical Provider, MD   loteprednol (LOTEMAX) 0.5 % ophthalmic suspension Place 1 drop into both eyes See Admin Instructions Started on 8/1/17- 1 medications?:  Yes  Does the patient have a nebulizer?:  No  Does the patient use a space with inhaled medications?:  No     No patient-reported symptoms         Symptoms:         ,   Congestive Heart Failure Assessment    Are you currently restricting fluids?:  No Restriction  Do you understand a low sodium diet?:  Yes  Do you understand how to read food labels?:  Yes  How many restaurant meals do you eat per week?:  3-4  Do you salt your food before tasting it?:  No         Symptoms:         ,   Diabetes Assessment    Medic Alert ID:  No  Meal Planning:  Carb counting, Plate Method   How often do you test your blood sugar?:  Daily   Do you have barriers with adherence to non-pharmacologic self-management interventions?  (Nutrition/Exercise/Self-Monitoring):  No   Have you ever had to go to the ED for symptoms of low blood sugar?:  No       No patient-reported symptoms      ,

## 2017-12-04 ENCOUNTER — TELEPHONE (OUTPATIENT)
Dept: CARDIOLOGY CLINIC | Age: 62
End: 2017-12-04

## 2017-12-11 ENCOUNTER — CARE COORDINATION (OUTPATIENT)
Dept: CARE COORDINATION | Age: 62
End: 2017-12-11

## 2017-12-21 ENCOUNTER — CARE COORDINATION (OUTPATIENT)
Dept: CARE COORDINATION | Age: 62
End: 2017-12-21

## 2018-01-03 RX ORDER — LOSARTAN POTASSIUM 25 MG/1
25 TABLET ORAL DAILY
Qty: 90 TABLET | Refills: 0 | Status: SHIPPED | OUTPATIENT
Start: 2018-01-03 | End: 2018-02-16 | Stop reason: SDUPTHER

## 2018-01-03 RX ORDER — CARVEDILOL 3.12 MG/1
TABLET ORAL
Qty: 60 TABLET | Refills: 0 | Status: SHIPPED | OUTPATIENT
Start: 2018-01-03 | End: 2018-02-06 | Stop reason: SDUPTHER

## 2018-01-03 NOTE — TELEPHONE ENCOUNTER
Last ov-12/9/16 with [facility]  Next ov- not scheduled, was due 6/2017  Last labs-10/18/17 CBC, LDL, Lipid, HgbA1c, CMP, TSH  Last EKG-9/1/17

## 2018-01-04 ENCOUNTER — TELEPHONE (OUTPATIENT)
Dept: PHARMACY | Facility: CLINIC | Age: 63
End: 2018-01-04

## 2018-01-09 ENCOUNTER — TELEPHONE (OUTPATIENT)
Dept: INTERNAL MEDICINE | Age: 63
End: 2018-01-09

## 2018-01-11 RX ORDER — LANCETS 30 GAUGE
EACH MISCELLANEOUS
Qty: 200 EACH | Refills: 3 | Status: SHIPPED | OUTPATIENT
Start: 2018-01-11 | End: 2018-11-30 | Stop reason: SDUPTHER

## 2018-01-11 RX ORDER — GLUCOSAMINE HCL/CHONDROITIN SU 500-400 MG
CAPSULE ORAL
Qty: 200 STRIP | Refills: 3 | Status: SHIPPED | OUTPATIENT
Start: 2018-01-11 | End: 2018-11-30 | Stop reason: SDUPTHER

## 2018-01-11 NOTE — TELEPHONE ENCOUNTER
Reviewed and agree with PharmD candidate. Appreciate outreach. Will send letter.     Fabiola Calderon, LedaD, R [facility] Pharmacist  Direct: [phone]  Dept: [phone] (toll free [phone], option 7)     CLINICAL PHARMACY CONSULT: MED RECONCILIATION/REVIEW ADDENDUM    For Pharmacy Admin Tracking Only    PHSO: Yes  Time Spent (min): 5

## 2018-01-18 ENCOUNTER — CARE COORDINATION (OUTPATIENT)
Dept: CARE COORDINATION | Age: 63
End: 2018-01-18

## 2018-01-18 ENCOUNTER — OFFICE VISIT (OUTPATIENT)
Dept: INTERNAL MEDICINE | Age: 63
End: 2018-01-18

## 2018-01-18 VITALS — DIASTOLIC BLOOD PRESSURE: 60 MMHG | BODY MASS INDEX: 26.52 KG/M2 | SYSTOLIC BLOOD PRESSURE: 110 MMHG | WEIGHT: 179.6 LBS

## 2018-01-18 DIAGNOSIS — E11.9 TYPE 2 DIABETES MELLITUS WITHOUT COMPLICATION, WITH LONG-TERM CURRENT USE OF INSULIN (HCC): Primary | ICD-10-CM

## 2018-01-18 DIAGNOSIS — I10 ESSENTIAL HYPERTENSION: ICD-10-CM

## 2018-01-18 DIAGNOSIS — J41.0 SIMPLE CHRONIC BRONCHITIS (HCC): ICD-10-CM

## 2018-01-18 DIAGNOSIS — I50.22 HEART FAILURE, SYSTOLIC, CHRONIC (HCC): ICD-10-CM

## 2018-01-18 DIAGNOSIS — F41.1 GENERALIZED ANXIETY DISORDER: ICD-10-CM

## 2018-01-18 DIAGNOSIS — E03.9 ACQUIRED HYPOTHYROIDISM: ICD-10-CM

## 2018-01-18 DIAGNOSIS — R56.9 NEW ONSET SEIZURE (HCC): ICD-10-CM

## 2018-01-18 DIAGNOSIS — Z79.4 TYPE 2 DIABETES MELLITUS WITHOUT COMPLICATION, WITH LONG-TERM CURRENT USE OF INSULIN (HCC): ICD-10-CM

## 2018-01-18 DIAGNOSIS — E11.9 TYPE 2 DIABETES MELLITUS WITHOUT COMPLICATION, WITH LONG-TERM CURRENT USE OF INSULIN (HCC): ICD-10-CM

## 2018-01-18 DIAGNOSIS — G40.909 SEIZURE DISORDER (HCC): ICD-10-CM

## 2018-01-18 DIAGNOSIS — I25.10 CAD IN NATIVE ARTERY: ICD-10-CM

## 2018-01-18 DIAGNOSIS — Z79.4 TYPE 2 DIABETES MELLITUS WITHOUT COMPLICATION, WITH LONG-TERM CURRENT USE OF INSULIN (HCC): Primary | ICD-10-CM

## 2018-01-18 DIAGNOSIS — R41.3 MEMORY CHANGE: ICD-10-CM

## 2018-01-18 PROBLEM — E86.0 DEHYDRATION: Status: RESOLVED | Noted: 2017-08-29 | Resolved: 2018-01-18

## 2018-01-18 LAB
A/G RATIO: 1.3 (ref 1.1–2.2)
ALBUMIN SERPL-MCNC: 4.2 G/DL (ref 3.4–5)
ALP BLD-CCNC: 117 U/L (ref 40–129)
ALT SERPL-CCNC: 15 U/L (ref 10–40)
ANION GAP SERPL CALCULATED.3IONS-SCNC: 12 MMOL/L (ref 3–16)
AST SERPL-CCNC: 30 U/L (ref 15–37)
BASOPHILS ABSOLUTE: 0.1 K/UL (ref 0–0.2)
BASOPHILS RELATIVE PERCENT: 0.6 %
BILIRUB SERPL-MCNC: 0.3 MG/DL (ref 0–1)
BUN BLDV-MCNC: 8 MG/DL (ref 7–20)
CALCIUM SERPL-MCNC: 9.1 MG/DL (ref 8.3–10.6)
CHLORIDE BLD-SCNC: 101 MMOL/L (ref 99–110)
CHOLESTEROL, TOTAL: 157 MG/DL (ref 0–199)
CO2: 29 MMOL/L (ref 21–32)
CREAT SERPL-MCNC: 0.6 MG/DL (ref 0.6–1.2)
EOSINOPHILS ABSOLUTE: 0.2 K/UL (ref 0–0.6)
EOSINOPHILS RELATIVE PERCENT: 1.8 %
GFR AFRICAN AMERICAN: >60
GFR NON-AFRICAN AMERICAN: >60
GLOBULIN: 3.2 G/DL
GLUCOSE BLD-MCNC: 72 MG/DL (ref 70–99)
HCT VFR BLD CALC: 35.2 % (ref 36–48)
HDLC SERPL-MCNC: 49 MG/DL (ref 40–60)
HEMOGLOBIN: 11.7 G/DL (ref 12–16)
LDL CHOLESTEROL CALCULATED: 68 MG/DL
LYMPHOCYTES ABSOLUTE: 3.3 K/UL (ref 1–5.1)
LYMPHOCYTES RELATIVE PERCENT: 35.4 %
MCH RBC QN AUTO: 26.7 PG (ref 26–34)
MCHC RBC AUTO-ENTMCNC: 33.1 G/DL (ref 31–36)
MCV RBC AUTO: 80.7 FL (ref 80–100)
MONOCYTES ABSOLUTE: 0.7 K/UL (ref 0–1.3)
MONOCYTES RELATIVE PERCENT: 7.4 %
NEUTROPHILS ABSOLUTE: 5.1 K/UL (ref 1.7–7.7)
NEUTROPHILS RELATIVE PERCENT: 54.8 %
PDW BLD-RTO: 17.1 % (ref 12.4–15.4)
PLATELET # BLD: 316 K/UL (ref 135–450)
PMV BLD AUTO: 8.7 FL (ref 5–10.5)
POTASSIUM SERPL-SCNC: 4.7 MMOL/L (ref 3.5–5.1)
RBC # BLD: 4.37 M/UL (ref 4–5.2)
SODIUM BLD-SCNC: 142 MMOL/L (ref 136–145)
TOTAL PROTEIN: 7.4 G/DL (ref 6.4–8.2)
TRIGL SERPL-MCNC: 200 MG/DL (ref 0–150)
TSH REFLEX: 1.57 UIU/ML (ref 0.27–4.2)
VLDLC SERPL CALC-MCNC: 40 MG/DL
WBC # BLD: 9.3 K/UL (ref 4–11)

## 2018-01-18 PROCEDURE — G8926 SPIRO NO PERF OR DOC: HCPCS | Performed by: INTERNAL MEDICINE

## 2018-01-18 PROCEDURE — 3017F COLORECTAL CA SCREEN DOC REV: CPT | Performed by: INTERNAL MEDICINE

## 2018-01-18 PROCEDURE — 1036F TOBACCO NON-USER: CPT | Performed by: INTERNAL MEDICINE

## 2018-01-18 PROCEDURE — 3014F SCREEN MAMMO DOC REV: CPT | Performed by: INTERNAL MEDICINE

## 2018-01-18 PROCEDURE — 3045F PR MOST RECENT HEMOGLOBIN A1C LEVEL 7.0-9.0%: CPT | Performed by: INTERNAL MEDICINE

## 2018-01-18 PROCEDURE — G8417 CALC BMI ABV UP PARAM F/U: HCPCS | Performed by: INTERNAL MEDICINE

## 2018-01-18 PROCEDURE — 99214 OFFICE O/P EST MOD 30 MIN: CPT | Performed by: INTERNAL MEDICINE

## 2018-01-18 PROCEDURE — G8427 DOCREV CUR MEDS BY ELIG CLIN: HCPCS | Performed by: INTERNAL MEDICINE

## 2018-01-18 PROCEDURE — G8599 NO ASA/ANTIPLAT THER USE RNG: HCPCS | Performed by: INTERNAL MEDICINE

## 2018-01-18 PROCEDURE — 3023F SPIROM DOC REV: CPT | Performed by: INTERNAL MEDICINE

## 2018-01-18 PROCEDURE — G8484 FLU IMMUNIZE NO ADMIN: HCPCS | Performed by: INTERNAL MEDICINE

## 2018-01-18 RX ORDER — LORAZEPAM 2 MG/1
TABLET ORAL
Qty: 180 TABLET | Refills: 0 | Status: SHIPPED | OUTPATIENT
Start: 2018-01-18 | End: 2018-04-18 | Stop reason: SDUPTHER

## 2018-01-18 NOTE — PROGRESS NOTES
(COREG) 3.125 MG tablet TAKE 1 TABLET BY MOUTH 2 TIMES DAILY 60 tablet 0    metFORMIN (GLUCOPHAGE) 1000 MG tablet TAKE 1 TABLET BEFORE BREAKFAST AND BEFORE DINNER 180 tablet 0    triamcinolone (KENALOG) 0.1 % ointment Apply daily to affected area 1 Tube 1    sertraline (ZOLOFT) 50 MG tablet TAKE 1 TABLET BY MOUTH DAILY 30 tablet 4    rOPINIRole (REQUIP) 0.5 MG tablet TAKE 1 TABLET BY MOUTH 3 TIMES DAILY. 90 tablet 6    LORazepam (ATIVAN) 2 MG tablet TAKE 1 TABLET BY MOUTH TWICE DAILY 180 tablet 0    levothyroxine (SYNTHROID) 100 MCG tablet Take 1 tablet by mouth Daily 30 tablet 3    simvastatin (ZOCOR) 80 MG tablet TAKE 1 TABLET BY MOUTH AT BEDTIME 90 tablet 3    dicyclomine (BENTYL) 10 MG capsule Take 1 capsule by mouth 4 times daily 120 capsule 3    docusate sodium (COLACE) 100 MG capsule Take 1 capsule by mouth daily as needed for Constipation 30 capsule 0    gabapentin (NEURONTIN) 800 MG tablet Take 800 mg by mouth 3 times daily      omeprazole (PRILOSEC) 40 MG delayed release capsule Take 40 mg by mouth 2 times daily      loteprednol (LOTEMAX) 0.5 % ophthalmic suspension Place 1 drop into both eyes See Admin Instructions Started on 8/1/17- 1 drop 4 times a day for 2 weeks then twice a day for 2 weeks.  albuterol (PROVENTIL) (2.5 MG/3ML) 0.083% nebulizer solution Take 2.5 mg by nebulization as needed for Wheezing      ondansetron (ZOFRAN ODT) 4 MG disintegrating tablet Take 1 tablet by mouth every 8 hours as needed for Nausea or Vomiting 10 tablet 0    levothyroxine (SYNTHROID) 75 MCG tablet TAKE 1 TABLET BY MOUTH DAILY 90 tablet 3    montelukast (SINGULAIR) 10 MG tablet Take 10 mg by mouth nightly      Blood Glucose Monitoring Suppl (ACURA BLOOD GLUCOSE METER) W/DEVICE KIT Use as directed. Dx:E11.9 1 kit 0    HYDROcodone-acetaminophen (NORCO) 5-325 MG per tablet Take 1 tablet by mouth every 8 hours as needed for Pain  .       VENTOLIN  (90 BASE) MCG/ACT inhaler INHALE 2 PUFFS BY MOUTH EVERY 6 HOURS AS NEEDED FOR WHEEZING 3 Inhaler 3    aspirin 81 MG EC tablet Take 1 tablet by mouth daily 30 tablet 3    tiZANidine (ZANAFLEX) 2 MG tablet Take 2 mg by mouth 3 times daily as needed        No current facility-administered medications on file prior to visit. Allergies   Allergen Reactions    Sulfa Antibiotics Anaphylaxis    Bactrim     Prednisone      Cannot tolerate oral steriods    Quinidine        REVIEW OF SYSTEMS:      CONSTITUTIONAL: No major weight gain or loss with less fatigue, weakness in recent past.  HEENT: No new vision difficulties or ringing in the ears. No congestion or sore throat. RESPIRATORY: No new SOB, PND, orthopnea or cough. She has chronic Asthma controlled with medication  CARDIOVASCULAR: No chest pain or palpitations or dyspnea on exertion. GI: No nausea, vomiting, diarrhea, constipation, abdominal pain or changes in bowel habits. : No urinary frequency, urgency, incontinence hematuria or dysuria. SKIN: No cyanosis or skin lesions. MUSCULOSKELETAL: No new muscle or joint pain. NEUROLOGICAL: No syncope or TIA-like symptoms. No change in sensation or strength. PSYCHIATRIC: Mild  anxiety,  insomnia without depression    Objective:   PHYSICAL EXAM:        VITALS:  /60   Wt 179 lb 9.6 oz (81.5 kg)   BMI 26.52 kg/m²   CONSTITUTIONAL: Cooperative, no apparent distress, and appears well nourished / developed  NEUROLOGIC:  Awake and orientated to person, place and time. Sensation normal Motor normal. Memory loss developing slowly  PSYCH: Calm affect. SKIN: Warm and dry. Neck: Supple, no elevation of JVP, normal carotid pulses with no bruits and thyroid normal size. LUNGS:  No increased work of breathing and clear to auscultation, no crackles or wheezing today  CARDIOVASCULAR:  Regular rate and rhythm with no murmurs, gallops, rubs, or abnormal heart sounds. JVP is not elevated  ABDOMEN:  Normal bowel sounds, non-distended and non-tender to sent Thyroid panel. TSH 1.57  Kayode Kapoor MD  I attest that this note was completed entirely by me    5. Memory loss       6.  Seizure Disorder          Not on medication    No recurrent seizures            She is planning to be evaluated by Neurologist.

## 2018-01-19 ENCOUNTER — TELEPHONE (OUTPATIENT)
Dept: INTERNAL MEDICINE | Age: 63
End: 2018-01-19

## 2018-01-19 LAB
ESTIMATED AVERAGE GLUCOSE: 159.9 MG/DL
HBA1C MFR BLD: 7.2 %

## 2018-01-19 NOTE — CARE COORDINATION
you have hyperglycemia symptoms?:  No   Do you have hypoglycemia symptoms?:  No   Last Blood Sugar Value:  180   Blood Sugar Monitoring Regimen:  Once a Day   Blood Sugar Trends:  Fluctuating (Comment: Pt had not been taking her Metformin)      ,

## 2018-02-05 RX ORDER — LEVOTHYROXINE SODIUM 0.1 MG/1
100 TABLET ORAL DAILY
Qty: 30 TABLET | Refills: 3 | Status: SHIPPED | OUTPATIENT
Start: 2018-02-05 | End: 2018-02-16 | Stop reason: ALTCHOICE

## 2018-02-06 RX ORDER — CARVEDILOL 3.12 MG/1
TABLET ORAL
Qty: 60 TABLET | Refills: 0 | Status: SHIPPED | OUTPATIENT
Start: 2018-02-06 | End: 2018-02-16 | Stop reason: SDUPTHER

## 2018-02-16 ENCOUNTER — TELEPHONE (OUTPATIENT)
Dept: INTERNAL MEDICINE | Age: 63
End: 2018-02-16

## 2018-02-16 ENCOUNTER — OFFICE VISIT (OUTPATIENT)
Dept: CARDIOLOGY CLINIC | Age: 63
End: 2018-02-16

## 2018-02-16 VITALS
DIASTOLIC BLOOD PRESSURE: 84 MMHG | OXYGEN SATURATION: 97 % | HEART RATE: 81 BPM | HEIGHT: 69 IN | SYSTOLIC BLOOD PRESSURE: 110 MMHG | WEIGHT: 187 LBS | BODY MASS INDEX: 27.7 KG/M2

## 2018-02-16 DIAGNOSIS — I25.10 CAD IN NATIVE ARTERY: ICD-10-CM

## 2018-02-16 DIAGNOSIS — I10 ESSENTIAL HYPERTENSION: Primary | ICD-10-CM

## 2018-02-16 DIAGNOSIS — I50.23 ACUTE ON CHRONIC SYSTOLIC CONGESTIVE HEART FAILURE (HCC): ICD-10-CM

## 2018-02-16 DIAGNOSIS — R42 DIZZINESS: ICD-10-CM

## 2018-02-16 PROCEDURE — 99214 OFFICE O/P EST MOD 30 MIN: CPT | Performed by: INTERNAL MEDICINE

## 2018-02-16 PROCEDURE — G8484 FLU IMMUNIZE NO ADMIN: HCPCS | Performed by: INTERNAL MEDICINE

## 2018-02-16 PROCEDURE — G8427 DOCREV CUR MEDS BY ELIG CLIN: HCPCS | Performed by: INTERNAL MEDICINE

## 2018-02-16 PROCEDURE — 3014F SCREEN MAMMO DOC REV: CPT | Performed by: INTERNAL MEDICINE

## 2018-02-16 PROCEDURE — 1036F TOBACCO NON-USER: CPT | Performed by: INTERNAL MEDICINE

## 2018-02-16 PROCEDURE — 3017F COLORECTAL CA SCREEN DOC REV: CPT | Performed by: INTERNAL MEDICINE

## 2018-02-16 PROCEDURE — G8599 NO ASA/ANTIPLAT THER USE RNG: HCPCS | Performed by: INTERNAL MEDICINE

## 2018-02-16 PROCEDURE — G8417 CALC BMI ABV UP PARAM F/U: HCPCS | Performed by: INTERNAL MEDICINE

## 2018-02-16 RX ORDER — CIPROFLOXACIN 500 MG/1
500 TABLET, FILM COATED ORAL 2 TIMES DAILY
Qty: 14 TABLET | Refills: 0 | Status: SHIPPED | OUTPATIENT
Start: 2018-02-16 | End: 2018-04-02 | Stop reason: SDUPTHER

## 2018-02-16 RX ORDER — SIMVASTATIN 80 MG
TABLET ORAL
Qty: 90 TABLET | Refills: 3 | Status: SHIPPED | OUTPATIENT
Start: 2018-02-16 | End: 2018-11-30 | Stop reason: SDUPTHER

## 2018-02-16 RX ORDER — LOSARTAN POTASSIUM 25 MG/1
25 TABLET ORAL DAILY
Qty: 90 TABLET | Refills: 3 | Status: SHIPPED | OUTPATIENT
Start: 2018-02-16 | End: 2018-11-26 | Stop reason: SDUPTHER

## 2018-02-16 RX ORDER — CARVEDILOL 3.12 MG/1
TABLET ORAL
Qty: 180 TABLET | Refills: 3 | Status: SHIPPED | OUTPATIENT
Start: 2018-02-16 | End: 2018-03-14

## 2018-02-16 NOTE — PROGRESS NOTES
[facility]   Cardiac Followup    Referring Provider:  Jonah Holden MD     Chief Complaint   Patient presents with    Coronary Artery Disease    Congestive Heart Failure        History of Present Illness:    F/U CMP, CAD, HLD, dizziness, SOB. Today she states she has been feeling well. She did have epsidoes of dizziness in December and went to the ER. She has dizziness at times that is unchanged, no syncope. She is tolerating her medications. Her BP has been labile  at home. She has been having some issues with memory loss and will be following with neurology. Denies chest pains and shortness of breath or syncope. She has been under stress at home. Past Medical History:   has a past medical history of Anxiety disorder; Chronic pain syndrome; DDD (degenerative disc disease), lumbar; Diabetes ([address] Displacement of lumbar intervertebral disc without myelopathy; Diverticulitis; Fibromyalgia; Generalized osteoarthrosis, involving multiple sites; GERD (gastroesophageal reflux disease); Impacted cerumen; Influenza A; Irritable bowel syndrome; Other and unspecified hyperlipidemia; Prosthetic joint implant failure ([address] Screening mammogram; Tracheobronchomalacia; Unspecified asthma(493.90); Unspecified essential hypertension; and Unspecified hypothyroidism. Surgical History:   has a past surgical history that includes Hysterectomy; joint replacement (10/92); joint replacement (  12/92); joint replacement (  6/96); joint replacement (  2006); Colonoscopy (2007); Lung surgery (1/2014); and Cardiac catheterization. Social History:   reports that she quit smoking about 26 years ago. Her smoking use included Cigarettes. She has a 18.00 pack-year smoking history. She has never used smokeless tobacco. She reports that she does not drink alcohol or use drugs. Family History:  family history includes Asthma in her mother; Heart Failure in her father.      Home Medications:  Prior to Admission medications Medication Sig Start Date End Date Taking? Authorizing Provider   carvedilol (COREG) 3.125 MG tablet TAKE 1 TABLET BY MOUTH 2 TIMES DAILY 2/6/18  Yes Indu Stubbs MD   LORazepam (ATIVAN) 2 MG tablet TAKE 1 TABLET BY MOUTH TWICE DAILY. 1/18/18 4/18/18 Yes Anmol Sumner MD   losartan (COZAAR) 25 MG tablet TAKE 1 TABLET BY MOUTH DAILY 1/3/18  Yes Indu Stubbs MD   metFORMIN (GLUCOPHAGE) 1000 MG tablet TAKE 1 TABLET BEFORE BREAKFAST AND BEFORE DINNER 12/11/17  Yes Anmol Sumner MD   triamcinolone (KENALOG) 0.1 % ointment Apply daily to affected area 11/6/17  Yes Anmol Sumner MD   sertraline (ZOLOFT) 50 MG tablet TAKE 1 TABLET BY MOUTH DAILY 11/1/17  Yes Anmol Sumner MD   rOPINIRole (REQUIP) 0.5 MG tablet TAKE 1 TABLET BY MOUTH 3 TIMES DAILY. 11/1/17  Yes Anmol Sumner MD   simvastatin (ZOCOR) 80 MG tablet TAKE 1 TABLET BY MOUTH AT BEDTIME 9/6/17  Yes Anmol Sumner MD   dicyclomine (BENTYL) 10 MG capsule Take 1 capsule by mouth 4 times daily 8/31/17  Yes Jose Raul Workman CNP   docusate sodium (COLACE) 100 MG capsule Take 1 capsule by mouth daily as needed for Constipation 8/31/17  Yes Jose Raul Workman CNP   gabapentin (NEURONTIN) 800 MG tablet Take 800 mg by mouth 3 times daily 8/2/17  Yes Historical Provider, MD   omeprazole (PRILOSEC) 40 MG delayed release capsule Take 40 mg by mouth 2 times daily 8/2/17  Yes Historical Provider, MD   loteprednol (LOTEMAX) 0.5 % ophthalmic suspension Place 1 drop into both eyes See Admin Instructions Started on 8/1/17- 1 drop 4 times a day for 2 weeks then twice a day for 2 weeks.    Yes Historical Provider, MD   albuterol (PROVENTIL) (2.5 MG/3ML) 0.083% nebulizer solution Take 2.5 mg by nebulization as needed for Wheezing   Yes Historical Provider, MD   ondansetron (ZOFRAN ODT) 4 MG disintegrating tablet Take 1 tablet by mouth every 8 hours as needed for Nausea or Vomiting 6/28/17  Yes Hui Acuna NP   levothyroxine (SYNTHROID) 75 MCG tablet TAKE 1 TABLET BY MOUTH DAILY 4/18/17 2/16/18 Yes Priscilla Medrano MD   HYDROcodone-acetaminophen (NORCO) 5-325 MG per tablet Take 1 tablet by mouth every 8 hours as needed for Pain  . Yes Historical Provider, MD   aspirin 81 MG EC tablet Take 1 tablet by mouth daily 9/24/15  Yes Rosalia Hollis MD   tiZANidine (ZANAFLEX) 2 MG tablet Take 2 mg by mouth 3 times daily as needed    Yes Historical Provider, MD   Lancets MISC Dispense whatever insurance will cover. Patient test twice day. Dx:E11.9 1/11/18   Priscilla Medrano MD   Glucose Blood (BLOOD GLUCOSE TEST STRIPS) STRP Dispense whatever insurance will cover. Pt test twice a day dx:E11.9 1/11/18   Priscilla Medrano MD   Blood Glucose Monitoring Suppl CATHRYN Dispense whatever insurance will cover. Dx code:E11.9 1/11/18   Priscilla Medrano MD   Blood Glucose Monitoring Suppl [facility] BLOOD GLUCOSE METER) W/DEVICE KIT Use as directed. Dx:E11.9 6/23/16   Priscilla Medrano MD        Allergies:  Sulfa antibiotics; Bactrim; Prednisone; and Quinidine     Review of Systems:   · Constitutional: there has been no unanticipated weight loss. There's been no change in energy level, sleep pattern, or activity level. · Eyes: No visual changes or diplopia. No scleral icterus. · ENT: No Headaches, hearing loss or vertigo. No mouth sores or sore throat. · Cardiovascular: Reviewed in HPI  · Respiratory: No cough or wheezing, no sputum production. No hematemesis. · Gastrointestinal: No abdominal pain, appetite loss, blood in stools. No change in bowel or bladder habits. · Genitourinary: No dysuria, trouble voiding, or hematuria. · Musculoskeletal:  No gait disturbance, weakness or joint complaints. · Integumentary: No rash or pruritis. · Neurological: No headache, diplopia, change in muscle strength, numbness or tingling. No change in gait, balance, coordination, mood, affect, memory, mentation, behavior. · Psychiatric: No anxiety, no depression. · Endocrine: No malaise, fatigue or temperature intolerance.  No excessive thirst, fluid intake, or urination. No tremor. · Hematologic/Lymphatic: No abnormal bruising or bleeding, blood clots or swollen lymph nodes. · Allergic/Immunologic: No nasal congestion or hives. Physical Examination:    Vitals:    02/16/18 0839   BP: 110/84   Pulse: 81   SpO2: 97%        Constitutional and General Appearance: NAD   Respiratory:  · Normal excursion and expansion without use of accessory muscles  · Resp Auscultation: Normal breath sounds without dullness  Cardiovascular:  · The apical impulses not displaced  · Heart tones are crisp and normal  · Cervical veins are not engorged  · The carotid upstroke is normal in amplitude and contour without delay or bruit  · Normal S1S2, No S3, No Murmur  · Peripheral pulses are symmetrical and full  · There is no clubbing, cyanosis of the extremities. · No edema  · Femoral Arteries: 2+ and equal  · Pedal Pulses: 2+ and equal   Abdomen:  · No masses or tenderness  · Liver/Spleen: No Abnormalities Noted  Neurological/Psychiatric:  · Alert and oriented in all spheres  · Moves all extremities well  · Exhibits normal gait balance and coordination  · No abnormalities of mood, affect, memory, mentation, or behavior are noted    Cath 9/2015  LM, LCx, RCA <20%  LAD 30%  LV: mild anterior hypokinesis. EF 40-45%  Normal right heart pressures  Possible Takotsubo cardiomyopathy vs transient ischemic event. LV function improving  Echo 9/2015  There is akinesis of the anteroseptal wall. Ejection fraction is visually estimated to be 35 %. Diastolic filling parameters suggests diastolic dysfunction . Slight calcification of the mitral valve noted. Mild mitral regurgitation is present. Moderately dilated left atrium with increased left atrial volume. Trivial tricuspid regurgitation. Systolic pulmonary artery pressure (SPAP) is normal and estimated at 13   MmHg (RA pressure 3 mmHg). Event monitor 12/2015  Frequent PVCs  Echo 3/2016  - Left ventricle:  The cavity size was normal. Wall thickness

## 2018-02-16 NOTE — COMMUNICATION BODY
[address]   Cardiac Followup    Referring Provider:  José Miguel Cameron MD     Chief Complaint   Patient presents with    Coronary Artery Disease    Congestive Heart Failure        History of Present Illness:    F/U CMP, CAD, HLD, dizziness, SOB. Today she states she has been feeling well. She did have epsidoes of dizziness in December and went to the ER. She has dizziness at times that is unchanged, no syncope. She is tolerating her medications. Her BP has been labile  at home. She has been having some issues with memory loss and will be following with neurology. Denies chest pains and shortness of breath or syncope. She has been under stress at home. Past Medical History:   has a past medical history of Anxiety disorder; Chronic pain syndrome; DDD (degenerative disc disease), lumbar; Diabetes ([address] Displacement of lumbar intervertebral disc without myelopathy; Diverticulitis; Fibromyalgia; Generalized osteoarthrosis, involving multiple sites; GERD (gastroesophageal reflux disease); Impacted cerumen; Influenza A; Irritable bowel syndrome; Other and unspecified hyperlipidemia; Prosthetic joint implant failure ([address] Screening mammogram; Tracheobronchomalacia; Unspecified asthma(493.90); Unspecified essential hypertension; and Unspecified hypothyroidism. Surgical History:   has a past surgical history that includes Hysterectomy; joint replacement (10/92); joint replacement (  12/92); joint replacement (  6/96); joint replacement (  2006); Colonoscopy (2007); Lung surgery (1/2014); and Cardiac catheterization. Social History:   reports that she quit smoking about 26 years ago. Her smoking use included Cigarettes. She has a 18.00 pack-year smoking history. She has never used smokeless tobacco. She reports that she does not drink alcohol or use drugs. Family History:  family history includes Asthma in her mother; Heart Failure in her father.      Home Medications:  Prior to Admission medications Medication Sig Start Date End Date Taking? Authorizing Provider   carvedilol (COREG) 3.125 MG tablet TAKE 1 TABLET BY MOUTH 2 TIMES DAILY 2/6/18  Yes Laurel Delacruz MD   LORazepam (ATIVAN) 2 MG tablet TAKE 1 TABLET BY MOUTH TWICE DAILY. 1/18/18 4/18/18 Yes Maikel Dunaway MD   losartan (COZAAR) 25 MG tablet TAKE 1 TABLET BY MOUTH DAILY 1/3/18  Yes Laurel Dealcruz MD   metFORMIN (GLUCOPHAGE) 1000 MG tablet TAKE 1 TABLET BEFORE BREAKFAST AND BEFORE DINNER 12/11/17  Yes Maikel Dunaway MD   triamcinolone (KENALOG) 0.1 % ointment Apply daily to affected area 11/6/17  Yes Maikel Dunaway MD   sertraline (ZOLOFT) 50 MG tablet TAKE 1 TABLET BY MOUTH DAILY 11/1/17  Yes Maikel Dunaway MD   rOPINIRole (REQUIP) 0.5 MG tablet TAKE 1 TABLET BY MOUTH 3 TIMES DAILY. 11/1/17  Yes Maikel Dunaway MD   simvastatin (ZOCOR) 80 MG tablet TAKE 1 TABLET BY MOUTH AT BEDTIME 9/6/17  Yes Maikel Dunaway MD   dicyclomine (BENTYL) 10 MG capsule Take 1 capsule by mouth 4 times daily 8/31/17  Yes Dolly Siddiqui CNP   docusate sodium (COLACE) 100 MG capsule Take 1 capsule by mouth daily as needed for Constipation 8/31/17  Yes Dolly Siddiqui CNP   gabapentin (NEURONTIN) 800 MG tablet Take 800 mg by mouth 3 times daily 8/2/17  Yes Historical Provider, MD   omeprazole (PRILOSEC) 40 MG delayed release capsule Take 40 mg by mouth 2 times daily 8/2/17  Yes Historical Provider, MD   loteprednol (LOTEMAX) 0.5 % ophthalmic suspension Place 1 drop into both eyes See Admin Instructions Started on 8/1/17- 1 drop 4 times a day for 2 weeks then twice a day for 2 weeks.    Yes Historical Provider, MD   albuterol (PROVENTIL) (2.5 MG/3ML) 0.083% nebulizer solution Take 2.5 mg by nebulization as needed for Wheezing   Yes Historical Provider, MD   ondansetron (ZOFRAN ODT) 4 MG disintegrating tablet Take 1 tablet by mouth every 8 hours as needed for Nausea or Vomiting 6/28/17  Yes Yvette Awad, JOSE FRANCISCO   levothyroxine (SYNTHROID) 75 MCG tablet TAKE 1 TABLET BY MOUTH DAILY 4/18/17 urination. No tremor. · Hematologic/Lymphatic: No abnormal bruising or bleeding, blood clots or swollen lymph nodes. · Allergic/Immunologic: No nasal congestion or hives. Physical Examination:    Vitals:    02/16/18 0839   BP: 110/84   Pulse: 81   SpO2: 97%        Constitutional and General Appearance: NAD   Respiratory:  · Normal excursion and expansion without use of accessory muscles  · Resp Auscultation: Normal breath sounds without dullness  Cardiovascular:  · The apical impulses not displaced  · Heart tones are crisp and normal  · Cervical veins are not engorged  · The carotid upstroke is normal in amplitude and contour without delay or bruit  · Normal S1S2, No S3, No Murmur  · Peripheral pulses are symmetrical and full  · There is no clubbing, cyanosis of the extremities. · No edema  · Femoral Arteries: 2+ and equal  · Pedal Pulses: 2+ and equal   Abdomen:  · No masses or tenderness  · Liver/Spleen: No Abnormalities Noted  Neurological/Psychiatric:  · Alert and oriented in all spheres  · Moves all extremities well  · Exhibits normal gait balance and coordination  · No abnormalities of mood, affect, memory, mentation, or behavior are noted    Cath 9/2015  LM, LCx, RCA <20%  LAD 30%  LV: mild anterior hypokinesis. EF 40-45%  Normal right heart pressures  Possible Takotsubo cardiomyopathy vs transient ischemic event. LV function improving  Echo 9/2015  There is akinesis of the anteroseptal wall. Ejection fraction is visually estimated to be 35 %. Diastolic filling parameters suggests diastolic dysfunction . Slight calcification of the mitral valve noted. Mild mitral regurgitation is present. Moderately dilated left atrium with increased left atrial volume. Trivial tricuspid regurgitation. Systolic pulmonary artery pressure (SPAP) is normal and estimated at 13   MmHg (RA pressure 3 mmHg). Event monitor 12/2015  Frequent PVCs  Echo 3/2016  - Left ventricle:  The cavity size was normal. Wall thickness

## 2018-02-16 NOTE — LETTER
albuterol (PROVENTIL) (2.5 MG/3ML) 0.083% nebulizer solution Take 2.5 mg by nebulization as needed for Wheezing   Yes Historical Provider, MD   ondansetron (ZOFRAN ODT) 4 MG disintegrating tablet Take 1 tablet by mouth every 8 hours as needed for Nausea or Vomiting 6/28/17  Yes [employer], NP   levothyroxine (SYNTHROID) 75 MCG tablet TAKE 1 TABLET BY MOUTH DAILY 4/18/17 2/16/18 Yes Cisco Taylor MD   HYDROcodone-acetaminophen (NORCO) 5-325 MG per tablet Take 1 tablet by mouth every 8 hours as needed for Pain  . Yes Historical Provider, MD   aspirin 81 MG EC tablet Take 1 tablet by mouth daily 9/24/15  Yes Prudy Remedies, MD   tiZANidine (ZANAFLEX) 2 MG tablet Take 2 mg by mouth 3 times daily as needed    Yes Historical Provider, MD   Lancets MISC Dispense whatever insurance will cover. Patient test twice day. Dx:E11.9 1/11/18   Cisco Taylor MD   Glucose Blood (BLOOD GLUCOSE TEST STRIPS) STRP Dispense whatever insurance will cover. Pt test twice a day dx:E11.9 1/11/18   Cisco Tayolr MD   Blood Glucose Monitoring Suppl CATHRYN Dispense whatever insurance will cover. Dx code:E11.9 1/11/18   Cisco Taylor MD   Blood Glucose Monitoring Suppl [facility] BLOOD GLUCOSE METER) W/DEVICE KIT Use as directed. Dx:E11.9 6/23/16   Cisco Taylor MD        Allergies:  Sulfa antibiotics; Bactrim; Prednisone; and Quinidine     Review of Systems:   · Constitutional: there has been no unanticipated weight loss. There's been no change in energy level, sleep pattern, or activity level. · Eyes: No visual changes or diplopia. No scleral icterus. · ENT: No Headaches, hearing loss or vertigo. No mouth sores or sore throat. · Cardiovascular: Reviewed in HPI  · Respiratory: No cough or wheezing, no sputum production. No hematemesis. · Gastrointestinal: No abdominal pain, appetite loss, blood in stools. No change in bowel or bladder habits. · Genitourinary: No dysuria, trouble voiding, or hematuria.

## 2018-02-21 ENCOUNTER — CARE COORDINATION (OUTPATIENT)
Dept: CARE COORDINATION | Age: 63
End: 2018-02-21

## 2018-02-22 NOTE — CARE COORDINATION
twice a day dx:E11.9 1/11/18   Konstantin Mcnair MD   Blood Glucose Monitoring Suppl CATHRYN Dispense whatever insurance will cover. Dx code:E11.9 1/11/18   Konstantin Mcnair MD   metFORMIN (GLUCOPHAGE) 1000 MG tablet TAKE 1 TABLET BEFORE BREAKFAST AND BEFORE DINNER 12/11/17   Konstantin Mcnair MD   triamcinolone (KENALOG) 0.1 % ointment Apply daily to affected area 11/6/17   Konstantin Mcnair MD   sertraline (ZOLOFT) 50 MG tablet TAKE 1 TABLET BY MOUTH DAILY 11/1/17   Konstantin Mcnair MD   rOPINIRole (REQUIP) 0.5 MG tablet TAKE 1 TABLET BY MOUTH 3 TIMES DAILY. 11/1/17   Konstantin Mcnair MD   dicyclomine (BENTYL) 10 MG capsule Take 1 capsule by mouth 4 times daily 8/31/17   Meghana Potter CNP   docusate sodium (COLACE) 100 MG capsule Take 1 capsule by mouth daily as needed for Constipation 8/31/17   Meghana Potter CNP   gabapentin (NEURONTIN) 800 MG tablet Take 800 mg by mouth 3 times daily 8/2/17   Historical Provider, MD   omeprazole (PRILOSEC) 40 MG delayed release capsule Take 40 mg by mouth 2 times daily 8/2/17   Historical Provider, MD   loteprednol (LOTEMAX) 0.5 % ophthalmic suspension Place 1 drop into both eyes See Admin Instructions Started on 8/1/17- 1 drop 4 times a day for 2 weeks then twice a day for 2 weeks. Historical Provider, MD   albuterol (PROVENTIL) (2.5 MG/3ML) 0.083% nebulizer solution Take 2.5 mg by nebulization as needed for Wheezing    Historical Provider, MD   ondansetron (ZOFRAN ODT) 4 MG disintegrating tablet Take 1 tablet by mouth every 8 hours as needed for Nausea or Vomiting 6/28/17   Jason Woods NP   levothyroxine (SYNTHROID) 75 MCG tablet TAKE 1 TABLET BY MOUTH DAILY 4/18/17 2/16/18  Konstantin Mcnair MD   Blood Glucose Monitoring Suppl [facility] BLOOD GLUCOSE METER) W/DEVICE KIT Use as directed. Dx:E11.9 6/23/16   Konstantin Mcnair MD   HYDROcodone-acetaminophen (NORCO) 5-325 MG per tablet Take 1 tablet by mouth every 8 hours as needed for Pain  .     Historical Provider, MD   aspirin 81 MG EC tablet Take 1 tablet by mouth daily 9/24/15   Rosalia Hollis MD   tiZANidine (ZANAFLEX) 2 MG tablet Take 2 mg by mouth 3 times daily as needed     Historical Provider, MD       Future Appointments  Date Time Provider Viky Juani   4/3/2018 2:30 PM Gilberto Meeks DO [facility] [address]   4/18/2018 11:00 AM Priscilla Medrano MD [facility] [address] [facility]   8/22/2018 11:00 AM Dav Mayo NP [facility]     General Assessment    Do you have any symptoms that are causing concern?:  No      and   COPD Assessment    Does the patient understand envrionmental exposure?:  Yes  Is the patient able to verbalize Rescue vs. Long Acting medications?:  Yes  Does the patient have a nebulizer?:  No  Does the patient use a space with inhaled medications?:  No     No patient-reported symptoms         Symptoms:      Have you had a recent diagnosis of pneumonia either by PCP or at a hospital?:  No     ,   Congestive Heart Failure Assessment    Are you currently restricting fluids?:  No Restriction  Do you understand a low sodium diet?:  Yes  Do you understand how to read food labels?:  Yes  How many restaurant meals do you eat per week?:  3-4  Do you salt your food before tasting it?:  No     No patient-reported symptoms      Symptoms:   None:  Yes      Symptom course:  stable  Weight trend:  stable  Salt intake watch compared to last visit:  stable     ,   Diabetes Assessment    Medic Alert ID:  No  Meal Planning:  Carb counting, Plate Method   How often do you test your blood sugar?:  Daily   Do you have barriers with adherence to non-pharmacologic self-management interventions?  (Nutrition/Exercise/Self-Monitoring):  No   Have you ever had to go to the ED for symptoms of low blood sugar?:  No       No patient-reported symptoms      ,

## 2018-03-14 RX ORDER — CARVEDILOL 3.12 MG/1
TABLET ORAL
Qty: 60 TABLET | Refills: 11 | Status: SHIPPED | OUTPATIENT
Start: 2018-03-14 | End: 2018-11-30 | Stop reason: SDUPTHER

## 2018-03-19 ENCOUNTER — CARE COORDINATION (OUTPATIENT)
Dept: CARE COORDINATION | Age: 63
End: 2018-03-19

## 2018-04-02 ENCOUNTER — NURSE ONLY (OUTPATIENT)
Dept: INTERNAL MEDICINE | Age: 63
End: 2018-04-02

## 2018-04-02 DIAGNOSIS — R31.9 URINARY TRACT INFECTION WITH HEMATURIA, SITE UNSPECIFIED: Primary | ICD-10-CM

## 2018-04-02 DIAGNOSIS — N39.0 URINARY TRACT INFECTION WITH HEMATURIA, SITE UNSPECIFIED: Primary | ICD-10-CM

## 2018-04-02 RX ORDER — CIPROFLOXACIN 500 MG/1
500 TABLET, FILM COATED ORAL 2 TIMES DAILY
Qty: 14 TABLET | Refills: 0 | Status: SHIPPED | OUTPATIENT
Start: 2018-04-02 | End: 2019-02-01 | Stop reason: SDUPTHER

## 2018-04-03 ENCOUNTER — OFFICE VISIT (OUTPATIENT)
Dept: DERMATOLOGY | Age: 63
End: 2018-04-03

## 2018-04-03 DIAGNOSIS — R23.3 PETECHIAE: ICD-10-CM

## 2018-04-03 DIAGNOSIS — L85.3 XEROSIS OF SKIN: ICD-10-CM

## 2018-04-03 DIAGNOSIS — Z87.891 FORMER SMOKER: ICD-10-CM

## 2018-04-03 DIAGNOSIS — D48.9 NEOPLASM OF UNCERTAIN BEHAVIOR: Primary | ICD-10-CM

## 2018-04-03 DIAGNOSIS — Z79.2 PROPHYLACTIC ANTIBIOTIC: Primary | ICD-10-CM

## 2018-04-03 PROCEDURE — G8599 NO ASA/ANTIPLAT THER USE RNG: HCPCS | Performed by: DERMATOLOGY

## 2018-04-03 PROCEDURE — 99202 OFFICE O/P NEW SF 15 MIN: CPT | Performed by: DERMATOLOGY

## 2018-04-03 PROCEDURE — 1036F TOBACCO NON-USER: CPT | Performed by: DERMATOLOGY

## 2018-04-03 PROCEDURE — 3017F COLORECTAL CA SCREEN DOC REV: CPT | Performed by: DERMATOLOGY

## 2018-04-03 PROCEDURE — 11100 PR BIOPSY OF SKIN LESION: CPT | Performed by: DERMATOLOGY

## 2018-04-03 PROCEDURE — G8427 DOCREV CUR MEDS BY ELIG CLIN: HCPCS | Performed by: DERMATOLOGY

## 2018-04-03 PROCEDURE — G8420 CALC BMI NORM PARAMETERS: HCPCS | Performed by: DERMATOLOGY

## 2018-04-03 PROCEDURE — 3014F SCREEN MAMMO DOC REV: CPT | Performed by: DERMATOLOGY

## 2018-04-04 LAB — URINE CULTURE, ROUTINE: NORMAL

## 2018-04-09 ENCOUNTER — TELEPHONE (OUTPATIENT)
Dept: DERMATOLOGY | Age: 63
End: 2018-04-09

## 2018-04-09 DIAGNOSIS — E55.9 VITAMIN D DEFICIENCY: ICD-10-CM

## 2018-04-09 DIAGNOSIS — E03.9 HYPOTHYROIDISM: ICD-10-CM

## 2018-04-18 ENCOUNTER — OFFICE VISIT (OUTPATIENT)
Dept: INTERNAL MEDICINE | Age: 63
End: 2018-04-18

## 2018-04-18 ENCOUNTER — CARE COORDINATION (OUTPATIENT)
Dept: CARE COORDINATION | Age: 63
End: 2018-04-18

## 2018-04-18 VITALS
DIASTOLIC BLOOD PRESSURE: 78 MMHG | BODY MASS INDEX: 27.19 KG/M2 | WEIGHT: 183.6 LBS | HEIGHT: 69 IN | SYSTOLIC BLOOD PRESSURE: 130 MMHG

## 2018-04-18 DIAGNOSIS — E11.9 TYPE 2 DIABETES MELLITUS WITHOUT COMPLICATION, WITH LONG-TERM CURRENT USE OF INSULIN (HCC): ICD-10-CM

## 2018-04-18 DIAGNOSIS — E03.9 ACQUIRED HYPOTHYROIDISM: ICD-10-CM

## 2018-04-18 DIAGNOSIS — F41.1 GENERALIZED ANXIETY DISORDER: Primary | ICD-10-CM

## 2018-04-18 DIAGNOSIS — I50.40 COMBINED SYSTOLIC AND DIASTOLIC CONGESTIVE HEART FAILURE, UNSPECIFIED CONGESTIVE HEART FAILURE CHRONICITY: ICD-10-CM

## 2018-04-18 DIAGNOSIS — I25.10 CAD IN NATIVE ARTERY: ICD-10-CM

## 2018-04-18 DIAGNOSIS — Z79.4 TYPE 2 DIABETES MELLITUS WITHOUT COMPLICATION, WITH LONG-TERM CURRENT USE OF INSULIN (HCC): ICD-10-CM

## 2018-04-18 DIAGNOSIS — I10 ESSENTIAL HYPERTENSION: ICD-10-CM

## 2018-04-18 DIAGNOSIS — E78.2 MIXED HYPERLIPIDEMIA: ICD-10-CM

## 2018-04-18 LAB
ANION GAP SERPL CALCULATED.3IONS-SCNC: 16 MMOL/L (ref 3–16)
BUN BLDV-MCNC: 7 MG/DL (ref 7–20)
CALCIUM SERPL-MCNC: 9.3 MG/DL (ref 8.3–10.6)
CHLORIDE BLD-SCNC: 96 MMOL/L (ref 99–110)
CO2: 25 MMOL/L (ref 21–32)
CREAT SERPL-MCNC: 0.7 MG/DL (ref 0.6–1.2)
GFR AFRICAN AMERICAN: >60
GFR NON-AFRICAN AMERICAN: >60
GLUCOSE BLD-MCNC: 127 MG/DL (ref 70–99)
MAGNESIUM: 1.5 MG/DL (ref 1.8–2.4)
POTASSIUM SERPL-SCNC: 4.4 MMOL/L (ref 3.5–5.1)
SODIUM BLD-SCNC: 137 MMOL/L (ref 136–145)

## 2018-04-18 PROCEDURE — 3014F SCREEN MAMMO DOC REV: CPT | Performed by: INTERNAL MEDICINE

## 2018-04-18 PROCEDURE — G8599 NO ASA/ANTIPLAT THER USE RNG: HCPCS | Performed by: INTERNAL MEDICINE

## 2018-04-18 PROCEDURE — 1036F TOBACCO NON-USER: CPT | Performed by: INTERNAL MEDICINE

## 2018-04-18 PROCEDURE — 2022F DILAT RTA XM EVC RTNOPTHY: CPT | Performed by: INTERNAL MEDICINE

## 2018-04-18 PROCEDURE — 3017F COLORECTAL CA SCREEN DOC REV: CPT | Performed by: INTERNAL MEDICINE

## 2018-04-18 PROCEDURE — G8417 CALC BMI ABV UP PARAM F/U: HCPCS | Performed by: INTERNAL MEDICINE

## 2018-04-18 PROCEDURE — 99214 OFFICE O/P EST MOD 30 MIN: CPT | Performed by: INTERNAL MEDICINE

## 2018-04-18 PROCEDURE — 3045F PR MOST RECENT HEMOGLOBIN A1C LEVEL 7.0-9.0%: CPT | Performed by: INTERNAL MEDICINE

## 2018-04-18 PROCEDURE — G8427 DOCREV CUR MEDS BY ELIG CLIN: HCPCS | Performed by: INTERNAL MEDICINE

## 2018-04-18 RX ORDER — LORAZEPAM 2 MG/1
TABLET ORAL
Qty: 180 TABLET | Refills: 0 | OUTPATIENT
Start: 2018-04-18 | End: 2018-07-18 | Stop reason: SDUPTHER

## 2018-04-19 LAB
ESTIMATED AVERAGE GLUCOSE: 168.6 MG/DL
HBA1C MFR BLD: 7.5 %

## 2018-04-26 RX ORDER — LEVOTHYROXINE SODIUM 0.1 MG/1
100 TABLET ORAL DAILY
Qty: 30 TABLET | Refills: 3 | Status: SHIPPED | OUTPATIENT
Start: 2018-04-26 | End: 2018-08-07 | Stop reason: SDUPTHER

## 2018-05-14 ENCOUNTER — CARE COORDINATION (OUTPATIENT)
Dept: CARE COORDINATION | Age: 63
End: 2018-05-14

## 2018-05-29 ENCOUNTER — CARE COORDINATION (OUTPATIENT)
Dept: CARE COORDINATION | Age: 63
End: 2018-05-29

## 2018-06-08 ENCOUNTER — OFFICE VISIT (OUTPATIENT)
Dept: INTERNAL MEDICINE | Age: 63
End: 2018-06-08

## 2018-06-08 VITALS
BODY MASS INDEX: 26.36 KG/M2 | DIASTOLIC BLOOD PRESSURE: 84 MMHG | SYSTOLIC BLOOD PRESSURE: 146 MMHG | WEIGHT: 178 LBS | HEIGHT: 69 IN

## 2018-06-08 DIAGNOSIS — E10.9 TYPE 1 DIABETES MELLITUS WITHOUT COMPLICATION (HCC): ICD-10-CM

## 2018-06-08 DIAGNOSIS — J41.1 MUCOPURULENT CHRONIC BRONCHITIS (HCC): Primary | ICD-10-CM

## 2018-06-08 DIAGNOSIS — K57.32 DIVERTICULITIS OF LARGE INTESTINE WITHOUT PERFORATION OR ABSCESS WITHOUT BLEEDING: ICD-10-CM

## 2018-06-08 DIAGNOSIS — I50.43 ACUTE ON CHRONIC COMBINED SYSTOLIC AND DIASTOLIC HEART FAILURE (HCC): ICD-10-CM

## 2018-06-08 LAB
A/G RATIO: 1.3 (ref 1.1–2.2)
ALBUMIN SERPL-MCNC: 4.9 G/DL (ref 3.4–5)
ALP BLD-CCNC: 150 U/L (ref 40–129)
ALT SERPL-CCNC: 14 U/L (ref 10–40)
ANION GAP SERPL CALCULATED.3IONS-SCNC: 20 MMOL/L (ref 3–16)
AST SERPL-CCNC: 35 U/L (ref 15–37)
BASOPHILS ABSOLUTE: 0.1 K/UL (ref 0–0.2)
BASOPHILS RELATIVE PERCENT: 0.8 %
BILIRUB SERPL-MCNC: 0.4 MG/DL (ref 0–1)
BUN BLDV-MCNC: 8 MG/DL (ref 7–20)
CALCIUM SERPL-MCNC: 10.3 MG/DL (ref 8.3–10.6)
CHLORIDE BLD-SCNC: 92 MMOL/L (ref 99–110)
CO2: 25 MMOL/L (ref 21–32)
CREAT SERPL-MCNC: 0.8 MG/DL (ref 0.6–1.2)
EOSINOPHILS ABSOLUTE: 0.1 K/UL (ref 0–0.6)
EOSINOPHILS RELATIVE PERCENT: 0.6 %
GFR AFRICAN AMERICAN: >60
GFR NON-AFRICAN AMERICAN: >60
GLOBULIN: 3.9 G/DL
GLUCOSE BLD-MCNC: 147 MG/DL (ref 70–99)
HCT VFR BLD CALC: 40.2 % (ref 36–48)
HEMOGLOBIN: 13.2 G/DL (ref 12–16)
LYMPHOCYTES ABSOLUTE: 3.5 K/UL (ref 1–5.1)
LYMPHOCYTES RELATIVE PERCENT: 20.5 %
MCH RBC QN AUTO: 25.5 PG (ref 26–34)
MCHC RBC AUTO-ENTMCNC: 32.8 G/DL (ref 31–36)
MCV RBC AUTO: 77.7 FL (ref 80–100)
MONOCYTES ABSOLUTE: 1.2 K/UL (ref 0–1.3)
MONOCYTES RELATIVE PERCENT: 6.9 %
NEUTROPHILS ABSOLUTE: 12.3 K/UL (ref 1.7–7.7)
NEUTROPHILS RELATIVE PERCENT: 71.2 %
PDW BLD-RTO: 18.3 % (ref 12.4–15.4)
PLATELET # BLD: 395 K/UL (ref 135–450)
PMV BLD AUTO: 9.1 FL (ref 5–10.5)
POTASSIUM SERPL-SCNC: 3.7 MMOL/L (ref 3.5–5.1)
RBC # BLD: 5.17 M/UL (ref 4–5.2)
SODIUM BLD-SCNC: 137 MMOL/L (ref 136–145)
TOTAL PROTEIN: 8.8 G/DL (ref 6.4–8.2)
WBC # BLD: 17.2 K/UL (ref 4–11)

## 2018-06-08 PROCEDURE — 1036F TOBACCO NON-USER: CPT | Performed by: INTERNAL MEDICINE

## 2018-06-08 PROCEDURE — 2022F DILAT RTA XM EVC RTNOPTHY: CPT | Performed by: INTERNAL MEDICINE

## 2018-06-08 PROCEDURE — 3023F SPIROM DOC REV: CPT | Performed by: INTERNAL MEDICINE

## 2018-06-08 PROCEDURE — 3045F PR MOST RECENT HEMOGLOBIN A1C LEVEL 7.0-9.0%: CPT | Performed by: INTERNAL MEDICINE

## 2018-06-08 PROCEDURE — G8926 SPIRO NO PERF OR DOC: HCPCS | Performed by: INTERNAL MEDICINE

## 2018-06-08 PROCEDURE — G8599 NO ASA/ANTIPLAT THER USE RNG: HCPCS | Performed by: INTERNAL MEDICINE

## 2018-06-08 PROCEDURE — G8427 DOCREV CUR MEDS BY ELIG CLIN: HCPCS | Performed by: INTERNAL MEDICINE

## 2018-06-08 PROCEDURE — G8417 CALC BMI ABV UP PARAM F/U: HCPCS | Performed by: INTERNAL MEDICINE

## 2018-06-08 PROCEDURE — 99214 OFFICE O/P EST MOD 30 MIN: CPT | Performed by: INTERNAL MEDICINE

## 2018-06-08 PROCEDURE — 3017F COLORECTAL CA SCREEN DOC REV: CPT | Performed by: INTERNAL MEDICINE

## 2018-06-08 RX ORDER — METRONIDAZOLE 250 MG/1
250 TABLET ORAL 3 TIMES DAILY
Qty: 30 TABLET | Refills: 0 | Status: SHIPPED | OUTPATIENT
Start: 2018-06-08 | End: 2018-06-18

## 2018-06-09 LAB
ESTIMATED AVERAGE GLUCOSE: 182.9 MG/DL
HBA1C MFR BLD: 8 %

## 2018-06-12 ENCOUNTER — TELEPHONE (OUTPATIENT)
Dept: INTERNAL MEDICINE | Age: 63
End: 2018-06-12

## 2018-06-25 RX ORDER — ROPINIROLE 0.5 MG/1
TABLET, FILM COATED ORAL
Qty: 90 TABLET | Refills: 1 | Status: SHIPPED | OUTPATIENT
Start: 2018-06-25 | End: 2018-09-06 | Stop reason: SDUPTHER

## 2018-07-03 ENCOUNTER — CARE COORDINATION (OUTPATIENT)
Dept: CARE COORDINATION | Age: 63
End: 2018-07-03

## 2018-07-05 RX ORDER — GLIPIZIDE 10 MG/1
10 TABLET, FILM COATED, EXTENDED RELEASE ORAL DAILY
Qty: 30 TABLET | Refills: 3 | Status: SHIPPED | OUTPATIENT
Start: 2018-07-05 | End: 2018-09-14 | Stop reason: SDUPTHER

## 2018-07-05 NOTE — PATIENT INSTRUCTIONS
when cooking. · Don't skip meals. Your blood sugar may drop too low if you skip meals and take insulin or certain medicines for diabetes. · Check with your doctor before you drink alcohol. Alcohol can cause your blood sugar to drop too low. Alcohol can also cause a bad reaction if you take certain diabetes medicines. Follow-up care is a key part of your treatment and safety. Be sure to make and go to all appointments, and call your doctor if you are having problems. It's also a good idea to know your test results and keep a list of the medicines you take. Where can you learn more? Go to [email or web address] and sign in to your [employer] account. Enter A329 in the HackMyPic box to learn more about \"Learning About Diabetes Food Guidelines. \"     If you do not have an account, please click on the \"Sign Up Now\" link. Current as of: December 7, 2017  Content Version: 11.6  © 7661-2159 [email or web address]. Care instructions adapted under license by [facility] ([facility]). If you have questions about a medical condition or this instruction, always ask your healthcare professional. [place] [address] any warranty or liability for your use of this information.

## 2018-07-05 NOTE — CARE COORDINATION
tablet by mouth daily 2/16/18   Maral Brandon MD   simvastatin (ZOCOR) 80 MG tablet TAKE 1 TABLET BY MOUTH AT BEDTIME 2/16/18   Maral Brandon MD   Lancets MISC Dispense whatever insurance will cover. Patient test twice day. Dx:E11.9 1/11/18   Karen Ho MD   Glucose Blood (BLOOD GLUCOSE TEST STRIPS) STRP Dispense whatever insurance will cover. Pt test twice a day dx:E11.9 1/11/18   Karen Ho MD   Blood Glucose Monitoring Suppl CATHRYN Dispense whatever insurance will cover. Dx code:E11.9 1/11/18   Karen Ho MD   triamcinolone (KENALOG) 0.1 % ointment Apply daily to affected area 11/6/17   Karen Ho MD   dicyclomine (BENTYL) 10 MG capsule Take 1 capsule by mouth 4 times daily 8/31/17   BRYCE Ortiz CNP   docusate sodium (COLACE) 100 MG capsule Take 1 capsule by mouth daily as needed for Constipation 8/31/17   BRYCE Ortiz CNP   gabapentin (NEURONTIN) 800 MG tablet Take 800 mg by mouth 3 times daily 8/2/17   Historical Provider, MD   omeprazole (PRILOSEC) 40 MG delayed release capsule Take 40 mg by mouth 2 times daily 8/2/17   Historical Provider, MD   loteprednol (LOTEMAX) 0.5 % ophthalmic suspension Place 1 drop into both eyes See Admin Instructions Started on 8/1/17- 1 drop 4 times a day for 2 weeks then twice a day for 2 weeks. Historical Provider, MD   albuterol (PROVENTIL) (2.5 MG/3ML) 0.083% nebulizer solution Take 2.5 mg by nebulization as needed for Wheezing    Historical Provider, MD   levothyroxine (SYNTHROID) 75 MCG tablet TAKE 1 TABLET BY MOUTH DAILY 4/18/17 3/16/18  Karen Ho MD   Blood Glucose Monitoring Suppl [facility] BLOOD GLUCOSE METER) W/DEVICE KIT Use as directed. Dx:E11.9 6/23/16   Karen Ho MD   HYDROcodone-acetaminophen (NORCO) 5-325 MG per tablet Take 1 tablet by mouth every 8 hours as needed for Pain  .     Historical Provider, MD   aspirin 81 MG EC tablet Take 1 tablet by mouth daily 9/24/15   Ashlyn Estrada MD   tiZANidine (ZANAFLEX) 2 MG tablet Take 2 mg

## 2018-07-11 ENCOUNTER — TELEPHONE (OUTPATIENT)
Dept: INTERNAL MEDICINE | Age: 63
End: 2018-07-11

## 2018-07-12 ENCOUNTER — TELEPHONE (OUTPATIENT)
Dept: PHARMACY | Facility: CLINIC | Age: 63
End: 2018-07-12

## 2018-07-12 NOTE — TELEPHONE ENCOUNTER
0.49     Component      Latest Ref Rng & Units 6/8/2018 4/18/2018 3/16/2018 1/18/2018           2:54 PM 11:40 AM 12:44 PM  2:36 PM   Potassium      3.5 - 5.1 mmol/L 3.7 4.4 3.3 (L) 4.7   Creatinine      0.6 - 1.2 mg/dL 0.8 0.7 0.6 0.6   GFR Non-      >60 >60 >60 >60 >60   GFR       >60 >60 >60 >60 >60   ALT      10 - 40 U/L 14  15 15   AST      15 - 37 U/L 35  26 30   Estimated Creatinine Clearance: 81 mL/min (based on SCr of 0.8 mg/dL). Tobacco History:   History   Smoking Status    Former Smoker    Packs/day: 1.00    Years: 18.00    Types: Cigarettes    Quit date: 12/10/1991   Smokeless Tobacco    Never Used     Immunizations:   Most Recent Immunizations   Administered Date(s) Administered    Influenza Virus Vaccine 09/22/2015    Influenza, Intradermal, Quadrivalent, Preservative Free 10/18/2017    Pneumococcal 13-valent Conjugate ([id number]) 09/17/2015    Pneumococcal Polysaccharide ([id number]) 10/13/2014     Spirometry/PFT results:  12/23/13 - Reduction of lung volumes of uncertain clinical significance, may represent an early mild restrictive process, requires clinical correlation. Remainder of pulmonary function study is normal.    Last Echo:  8/3017 - Left ventricular systolic function is normal with the ejection fraction estimated at 55%. ASSESSMENT/PLAN:   - Heart Failure - EF @55% last echo; compensated HF per 2/2018 cardiology note   BP appears to trend toward high/normal - pt reluctant to change medications d/t prev dizziness  o Reviewed per 2/2018 cardiology note: Check BP at home-if it is high, recheck in 1 hour.  If still high can take an extra Coreg   - Renal Dosing: No renal adjustments necessary.  - Medication Interactions: discussed CNS depression/monitoring (hydrocodone-APAP, lorazepam, tizanidine)  - Other Clinical Pharmacy Opportunities Assessed:  · Medication list updated    No further recommendations at this time; closing encounter without routing. Dallas Banks, PharmD, [address] [facility]  Direct: [phone]  Department, toll free: [phone], option 7     =======================================================   For Pharmacy Admin Tracking Only    PHSO: Yes  Total # of Interventions Recommended: 2  - Updated Order #: 1 Updated Order Reason(s):  Other  Total Interventions Accepted: 1  Time Spent (min): 30

## 2018-07-13 RX ORDER — POLYETHYLENE GLYCOL 3350 17 G/17G
17 POWDER, FOR SOLUTION ORAL DAILY PRN
COMMUNITY

## 2018-07-18 ENCOUNTER — OFFICE VISIT (OUTPATIENT)
Dept: INTERNAL MEDICINE | Age: 63
End: 2018-07-18

## 2018-07-18 ENCOUNTER — CARE COORDINATION (OUTPATIENT)
Dept: CARE COORDINATION | Age: 63
End: 2018-07-18

## 2018-07-18 VITALS
HEART RATE: 70 BPM | BODY MASS INDEX: 26.66 KG/M2 | HEIGHT: 69 IN | DIASTOLIC BLOOD PRESSURE: 60 MMHG | SYSTOLIC BLOOD PRESSURE: 104 MMHG | WEIGHT: 180 LBS

## 2018-07-18 DIAGNOSIS — E11.9 TYPE 2 DIABETES MELLITUS WITHOUT COMPLICATION, WITH LONG-TERM CURRENT USE OF INSULIN (HCC): ICD-10-CM

## 2018-07-18 DIAGNOSIS — I10 ESSENTIAL HYPERTENSION: ICD-10-CM

## 2018-07-18 DIAGNOSIS — E11.9 TYPE 2 DIABETES MELLITUS WITHOUT COMPLICATION, WITH LONG-TERM CURRENT USE OF INSULIN (HCC): Primary | ICD-10-CM

## 2018-07-18 DIAGNOSIS — E03.9 ACQUIRED HYPOTHYROIDISM: ICD-10-CM

## 2018-07-18 DIAGNOSIS — Z79.4 TYPE 2 DIABETES MELLITUS WITHOUT COMPLICATION, WITH LONG-TERM CURRENT USE OF INSULIN (HCC): ICD-10-CM

## 2018-07-18 DIAGNOSIS — F41.1 ANXIETY STATE: ICD-10-CM

## 2018-07-18 DIAGNOSIS — I50.23 ACUTE ON CHRONIC SYSTOLIC CONGESTIVE HEART FAILURE (HCC): ICD-10-CM

## 2018-07-18 DIAGNOSIS — F41.1 GENERALIZED ANXIETY DISORDER: ICD-10-CM

## 2018-07-18 DIAGNOSIS — Z79.4 TYPE 2 DIABETES MELLITUS WITHOUT COMPLICATION, WITH LONG-TERM CURRENT USE OF INSULIN (HCC): Primary | ICD-10-CM

## 2018-07-18 LAB
A/G RATIO: 1.3 (ref 1.1–2.2)
ALBUMIN SERPL-MCNC: 3.9 G/DL (ref 3.4–5)
ALP BLD-CCNC: 115 U/L (ref 40–129)
ALT SERPL-CCNC: 13 U/L (ref 10–40)
ANION GAP SERPL CALCULATED.3IONS-SCNC: 15 MMOL/L (ref 3–16)
AST SERPL-CCNC: 36 U/L (ref 15–37)
BASOPHILS ABSOLUTE: 0.1 K/UL (ref 0–0.2)
BASOPHILS RELATIVE PERCENT: 0.7 %
BILIRUB SERPL-MCNC: <0.2 MG/DL (ref 0–1)
BUN BLDV-MCNC: 7 MG/DL (ref 7–20)
CALCIUM SERPL-MCNC: 9.5 MG/DL (ref 8.3–10.6)
CHLORIDE BLD-SCNC: 101 MMOL/L (ref 99–110)
CHOLESTEROL, TOTAL: 152 MG/DL (ref 0–199)
CO2: 25 MMOL/L (ref 21–32)
CREAT SERPL-MCNC: 0.7 MG/DL (ref 0.6–1.2)
EOSINOPHILS ABSOLUTE: 0.2 K/UL (ref 0–0.6)
EOSINOPHILS RELATIVE PERCENT: 2 %
GFR AFRICAN AMERICAN: >60
GFR NON-AFRICAN AMERICAN: >60
GLOBULIN: 3.1 G/DL
GLUCOSE BLD-MCNC: 141 MG/DL (ref 70–99)
HCT VFR BLD CALC: 33.6 % (ref 36–48)
HDLC SERPL-MCNC: 38 MG/DL (ref 40–60)
HEMOGLOBIN: 10.7 G/DL (ref 12–16)
LDL CHOLESTEROL CALCULATED: 72 MG/DL
LYMPHOCYTES ABSOLUTE: 2.8 K/UL (ref 1–5.1)
LYMPHOCYTES RELATIVE PERCENT: 31 %
MCH RBC QN AUTO: 25.9 PG (ref 26–34)
MCHC RBC AUTO-ENTMCNC: 31.9 G/DL (ref 31–36)
MCV RBC AUTO: 81 FL (ref 80–100)
MONOCYTES ABSOLUTE: 0.7 K/UL (ref 0–1.3)
MONOCYTES RELATIVE PERCENT: 7.5 %
NEUTROPHILS ABSOLUTE: 5.4 K/UL (ref 1.7–7.7)
NEUTROPHILS RELATIVE PERCENT: 58.8 %
PDW BLD-RTO: 18.1 % (ref 12.4–15.4)
PLATELET # BLD: 282 K/UL (ref 135–450)
PMV BLD AUTO: 8.2 FL (ref 5–10.5)
POTASSIUM SERPL-SCNC: 4.6 MMOL/L (ref 3.5–5.1)
RBC # BLD: 4.14 M/UL (ref 4–5.2)
SODIUM BLD-SCNC: 141 MMOL/L (ref 136–145)
TOTAL PROTEIN: 7 G/DL (ref 6.4–8.2)
TRIGL SERPL-MCNC: 210 MG/DL (ref 0–150)
TSH REFLEX: 2.05 UIU/ML (ref 0.27–4.2)
VLDLC SERPL CALC-MCNC: 42 MG/DL
WBC # BLD: 9.1 K/UL (ref 4–11)

## 2018-07-18 PROCEDURE — 1036F TOBACCO NON-USER: CPT | Performed by: INTERNAL MEDICINE

## 2018-07-18 PROCEDURE — G8417 CALC BMI ABV UP PARAM F/U: HCPCS | Performed by: INTERNAL MEDICINE

## 2018-07-18 PROCEDURE — G8427 DOCREV CUR MEDS BY ELIG CLIN: HCPCS | Performed by: INTERNAL MEDICINE

## 2018-07-18 PROCEDURE — 3017F COLORECTAL CA SCREEN DOC REV: CPT | Performed by: INTERNAL MEDICINE

## 2018-07-18 PROCEDURE — 2022F DILAT RTA XM EVC RTNOPTHY: CPT | Performed by: INTERNAL MEDICINE

## 2018-07-18 PROCEDURE — 3045F PR MOST RECENT HEMOGLOBIN A1C LEVEL 7.0-9.0%: CPT | Performed by: INTERNAL MEDICINE

## 2018-07-18 PROCEDURE — G8599 NO ASA/ANTIPLAT THER USE RNG: HCPCS | Performed by: INTERNAL MEDICINE

## 2018-07-18 PROCEDURE — 99214 OFFICE O/P EST MOD 30 MIN: CPT | Performed by: INTERNAL MEDICINE

## 2018-07-18 RX ORDER — LORAZEPAM 2 MG/1
TABLET ORAL
Qty: 180 TABLET | Refills: 0 | OUTPATIENT
Start: 2018-07-18 | End: 2018-10-18 | Stop reason: SDUPTHER

## 2018-07-18 ASSESSMENT — ENCOUNTER SYMPTOMS
BACK PAIN: 1
VOMITING: 0
CHEST TIGHTNESS: 0
NAUSEA: 0
CONSTIPATION: 0
SINUS PAIN: 0
WHEEZING: 0
ABDOMINAL PAIN: 0
SINUS PRESSURE: 0
COUGH: 0
DIARRHEA: 0
SHORTNESS OF BREATH: 0

## 2018-07-18 NOTE — PROGRESS NOTES
5-325 MG per tablet Take 1 tablet by mouth every 8 hours as needed for Pain  .  aspirin 81 MG EC tablet Take 1 tablet by mouth daily 30 tablet 3    tiZANidine (ZANAFLEX) 2 MG tablet Take 2 mg by mouth 3 times daily as needed        No current facility-administered medications for this visit. Review of Systems   Constitutional: Negative for activity change, fatigue (mild) and fever. HENT: Negative for hearing loss, sinus pain and sinus pressure. Eyes: Negative for visual disturbance. Respiratory: Negative for cough, chest tightness, shortness of breath and wheezing. Cardiovascular: Negative for chest pain and palpitations. Gastrointestinal: Negative for abdominal pain, constipation, diarrhea, nausea and vomiting. Genitourinary: Negative for dysuria, frequency and urgency. Musculoskeletal: Positive for arthralgias, back pain and myalgias. Negative for joint swelling. Skin: Negative for rash. Neurological: Negative for light-headedness and numbness. Hematological: Negative for adenopathy. Psychiatric/Behavioral: Negative for decreased concentration and dysphoric mood. The patient is not nervous/anxious. All other systems reviewed and are negative. Objective:   Physical Exam   Constitutional: She is oriented to person, place, and time. She appears well-developed. HENT:   Mouth/Throat: Oropharynx is clear and moist.   Eyes: Right eye exhibits no discharge. Left eye exhibits no discharge. Neck: Neck supple. No thyromegaly present. Cardiovascular: Normal rate, regular rhythm and normal heart sounds. No murmur heard. Pulmonary/Chest: Effort normal and breath sounds normal. She has no wheezes. She has no rales. She exhibits no tenderness. Abdominal: Soft. Bowel sounds are normal. There is no tenderness. There is no rebound. Musculoskeletal: Normal range of motion. She exhibits no edema. Neurological: She is alert and oriented to person, place, and time.    Skin:

## 2018-07-18 NOTE — CARE COORDINATION
ACC Note:  The pt was in to see the PCP but left before seeing the [facility]. The [facility] called and left a message for the pt to see if she will be attending the [address] on 7/26/18.

## 2018-07-19 LAB
ESTIMATED AVERAGE GLUCOSE: 174.3 MG/DL
HBA1C MFR BLD: 7.7 %

## 2018-08-03 ENCOUNTER — CARE COORDINATION (OUTPATIENT)
Dept: CARE COORDINATION | Age: 63
End: 2018-08-03

## 2018-08-03 NOTE — CARE COORDINATION
Ambulatory Care Coordination Note  8/3/2018  CM Risk Score: 8  Vin Mortality Risk Score:      ACC: Malcolm Carney RN     History of Present Illness: COPD, Tracheobronchomalacia, IBS, Osteoarthritis, HTN, Fibromyalgia, Asthma, CHF, CAD, DDD, Arthritis, Anxiety, DM, Pulmonary Nodule,Frequent ED Visits       Summary Note: The pt stated she has been feeling good and denied any chest pain except when she was around her sister-in-law who was smoking. The pt stated the smoke made her chest hurt but not like heart pain. The pt stated her breathing was a little more labored after being around the sister-in-law. The pt stated she does occasionally have some wheezing at night. The pt denied any swelling. The pt stated since the pt was switched from Metformin to Glipizide XL all of her GI symptoms have resolved/ The pt stated the nausea, abd pain and diarrhea have all subsided. The pt and her  both stated her memory has improved too. The pt stated her FBS this AM was 137. The pt stated her 2 hour PP's have been < 180. Plan:  The [facility] reviewed the pt's hypoglycemic agent with the pt. The pt will continue to test her BS's fasting and 2 hours PP. The pt and RNCC discussed how the pt could be around the pt's sister-in-law but avoid the smoke. The pt stated they could play cards outside or she could invite her sister-in-law over to her house b/c her sister-in-law is aware that there is no smoking in the pt's house. The [facility] instructed the pt to make an appointment if the SOB or wheezing increases or persists. Care Coordination Interventions    Program Enrollment:  Complex Care  Referral from Primary Care Provider:  Yes  Suggested Interventions and Community Resources  Diabetes Education: In Process  Fall Risk Prevention: In Process  Home Health Services:  Completed  Medi Set or Pill Pack: In Process  Registered Dietician:  Completed  Specialty Services Referral:  In Process  Zone Management Tools:   In 3.125 MG tablet TAKE 1 TABLET BY MOUTH 2 TIMES DAILY 3/14/18   Cherelle Avalos MD   losartan (COZAAR) 25 MG tablet Take 1 tablet by mouth daily 2/16/18   Cherelle Avalos MD   simvastatin (ZOCOR) 80 MG tablet TAKE 1 TABLET BY MOUTH AT BEDTIME 2/16/18   Cherelle Avalos MD   Lancets MISC Dispense whatever insurance will cover. Patient test twice day. Dx:E11.9 1/11/18   Tanya Sarmiento MD   Glucose Blood (BLOOD GLUCOSE TEST STRIPS) STRP Dispense whatever insurance will cover. Pt test twice a day dx:E11.9 1/11/18   Tanya Sarmiento MD   Blood Glucose Monitoring Suppl CATHRYN Dispense whatever insurance will cover. Dx code:E11.9 1/11/18   Tanya Sarmiento MD   dicyclomine (BENTYL) 10 MG capsule Take 1 capsule by mouth 4 times daily 8/31/17   BRYCE Lazaro CNP   gabapentin (NEURONTIN) 800 MG tablet Take 800 mg by mouth 3 times daily 8/2/17   Historical Provider, MD   omeprazole (PRILOSEC) 40 MG delayed release capsule Take 40 mg by mouth 2 times daily 8/2/17   Historical Provider, MD   albuterol (PROVENTIL) (2.5 MG/3ML) 0.083% nebulizer solution Take 2.5 mg by nebulization as needed for Wheezing    Historical Provider, MD   HYDROcodone-acetaminophen (NORCO) 5-325 MG per tablet Take 1 tablet by mouth every 8 hours as needed for Pain  . Historical Provider, MD   aspirin 81 MG EC tablet Take 1 tablet by mouth daily 9/24/15   Daisy Page MD   tiZANidine (ZANAFLEX) 2 MG tablet Take 2 mg by mouth 3 times daily as needed     Historical Provider, MD       Future Appointments  Date Time Provider Viky Downs   8/22/2018 11:00 AM BRYCE Washington CNP [facility]   10/18/2018 1:00 PM [facility]     ,   Diabetes Assessment    Medic Alert ID:  No  Meal Planning:  Carb counting, Plate Method   How often do you test your blood sugar?:  Daily   Do you have barriers with adherence to non-pharmacologic self-management interventions?  (Nutrition/Exercise/Self-Monitoring):  No   Have you ever had to

## 2018-08-07 DIAGNOSIS — E03.9 HYPOTHYROIDISM: ICD-10-CM

## 2018-08-07 DIAGNOSIS — E55.9 VITAMIN D DEFICIENCY: ICD-10-CM

## 2018-08-07 RX ORDER — LEVOTHYROXINE SODIUM 0.1 MG/1
100 TABLET ORAL DAILY
Qty: 30 TABLET | Refills: 3 | Status: SHIPPED | OUTPATIENT
Start: 2018-08-07 | End: 2018-11-30 | Stop reason: SDUPTHER

## 2018-08-22 ENCOUNTER — OFFICE VISIT (OUTPATIENT)
Dept: CARDIOLOGY CLINIC | Age: 63
End: 2018-08-22

## 2018-08-22 VITALS
DIASTOLIC BLOOD PRESSURE: 66 MMHG | HEIGHT: 69 IN | WEIGHT: 183 LBS | OXYGEN SATURATION: 96 % | SYSTOLIC BLOOD PRESSURE: 104 MMHG | BODY MASS INDEX: 27.11 KG/M2 | HEART RATE: 70 BPM

## 2018-08-22 DIAGNOSIS — E11.9 TYPE 2 DIABETES MELLITUS WITHOUT COMPLICATION, WITHOUT LONG-TERM CURRENT USE OF INSULIN (HCC): ICD-10-CM

## 2018-08-22 DIAGNOSIS — I42.0 DILATED CARDIOMYOPATHY (HCC): Primary | ICD-10-CM

## 2018-08-22 DIAGNOSIS — I10 ESSENTIAL HYPERTENSION: ICD-10-CM

## 2018-08-22 DIAGNOSIS — E78.2 MIXED HYPERLIPIDEMIA: ICD-10-CM

## 2018-08-22 PROCEDURE — G8427 DOCREV CUR MEDS BY ELIG CLIN: HCPCS | Performed by: NURSE PRACTITIONER

## 2018-08-22 PROCEDURE — 1036F TOBACCO NON-USER: CPT | Performed by: NURSE PRACTITIONER

## 2018-08-22 PROCEDURE — 3017F COLORECTAL CA SCREEN DOC REV: CPT | Performed by: NURSE PRACTITIONER

## 2018-08-22 PROCEDURE — G8599 NO ASA/ANTIPLAT THER USE RNG: HCPCS | Performed by: NURSE PRACTITIONER

## 2018-08-22 PROCEDURE — 99213 OFFICE O/P EST LOW 20 MIN: CPT | Performed by: NURSE PRACTITIONER

## 2018-08-22 PROCEDURE — G8417 CALC BMI ABV UP PARAM F/U: HCPCS | Performed by: NURSE PRACTITIONER

## 2018-08-22 PROCEDURE — 3045F PR MOST RECENT HEMOGLOBIN A1C LEVEL 7.0-9.0%: CPT | Performed by: NURSE PRACTITIONER

## 2018-08-22 PROCEDURE — 2022F DILAT RTA XM EVC RTNOPTHY: CPT | Performed by: NURSE PRACTITIONER

## 2018-08-22 NOTE — PROGRESS NOTES
she quit smoking about 26 years ago. Her smoking use included Cigarettes. She has a 18.00 pack-year smoking history. She has never used smokeless tobacco. She reports that she does not drink alcohol or use drugs. Family History:   Family History   Problem Relation Age of Onset    Asthma Mother     Heart Failure Father     Cancer Maternal Grandfather         skin cancer on face    Cancer Daughter         skin cancer-BCC    Diabetes Neg Hx     Emphysema Neg Hx     Hypertension Neg Hx        Home Medications:  Prior to Admission medications    Medication Sig Start Date End Date Taking? Authorizing Provider   levothyroxine (SYNTHROID) 100 MCG tablet TAKE 1 TABLET BY MOUTH DAILY 8/7/18  Yes Karen Ho MD   sertraline (ZOLOFT) 50 MG tablet TAKE 1 TABLET BY MOUTH DAILY 8/7/18  Yes Karen Ho MD   LORazepam (ATIVAN) 2 MG tablet TAKE 1 TABLET BY MOUTH TWICE DAILY. 7/18/18 10/16/18 Yes Karen Ho MD   polyethylene glycol [facility]) powder Take 17 g by mouth daily as needed   Yes Historical Provider, MD   glipiZIDE (GLIPIZIDE XL) 10 MG extended release tablet Take 1 tablet by mouth daily 7/5/18  Yes Karen Ho MD   rOPINIRole (REQUIP) 0.5 MG tablet TAKE 1 TABLET BY MOUTH 3 TIMES DAILY. 6/25/18  Yes Karen Ho MD   carvedilol (COREG) 3.125 MG tablet TAKE 1 TABLET BY MOUTH 2 TIMES DAILY 3/14/18  Yes Maral Brandon MD   losartan (COZAAR) 25 MG tablet Take 1 tablet by mouth daily 2/16/18  Yes Maral Brandon MD   simvastatin (ZOCOR) 80 MG tablet TAKE 1 TABLET BY MOUTH AT BEDTIME 2/16/18  Yes Maral Brandon MD   Lancets MISC Dispense whatever insurance will cover. Patient test twice day. Dx:E11.9 1/11/18  Yes Karen Ho MD   Glucose Blood (BLOOD GLUCOSE TEST STRIPS) STRP Dispense whatever insurance will cover. Pt test twice a day dx:E11.9 1/11/18  Yes Karen Ho MD   Blood Glucose Monitoring Suppl CATHRYN Dispense whatever insurance will cover. Dx code:E11.9 1/11/18  Yes Karen Ho MD   dicyclomine (BENTYL) 10 MG abnormal bruising or bleeding, blood clots or swollen lymph nodes. · Allergic/Immunologic: No nasal congestion or hives. Physical Examination:    Vitals:    08/22/18 1040   BP: 104/66   Pulse: 70   SpO2: 96%   Weight: 183 lb (83 kg)   Height: 5' 9\" (1.753 m)        Constitutional and General Appearance: Warm and dry, no apparent distress, normal coloration  HEENT:  Normocephalic, atraumatic  Respiratory:  · Normal excursion and expansion without use of accessory muscles  · Resp Auscultation: Normal breath sounds without dullness  Cardiovascular:  · The apical impulses not displaced  · Heart tones are crisp and normal  · JVP 8 cm H2O  · Regular rate and rhythm, normal S1S2, no m/g/r  · Peripheral pulses are symmetrical and full  · There is no clubbing, cyanosis of the extremities.   · No edema  · Pedal Pulses: 2+ and equal   Abdomen:  · No masses or tenderness  · Liver/Spleen: No Abnormalities Noted  Neurological/Psychiatric:  · Alert and oriented in all spheres  · Moves all extremities well  · Exhibits normal gait balance and coordination  · No abnormalities of mood, affect, memory, mentation, or behavior are noted    Lab Data:    CBC:   Lab Results   Component Value Date    WBC 9.1 07/18/2018    WBC 17.2 06/08/2018    WBC 12.6 03/16/2018    RBC 4.14 07/18/2018    RBC 5.17 06/08/2018    RBC 5.03 03/16/2018    HGB 10.7 07/18/2018    HGB 13.2 06/08/2018    HGB 12.6 03/16/2018    HCT 33.6 07/18/2018    HCT 40.2 06/08/2018    HCT 39.3 03/16/2018    MCV 81.0 07/18/2018    MCV 77.7 06/08/2018    MCV 78.2 03/16/2018    RDW 18.1 07/18/2018    RDW 18.3 06/08/2018    RDW 16.9 03/16/2018     07/18/2018     06/08/2018     03/16/2018     BMP:  Lab Results   Component Value Date     07/18/2018     06/08/2018     04/18/2018    K 4.6 07/18/2018    K 3.7 06/08/2018    K 4.4 04/18/2018    K 3.3 03/16/2018     07/18/2018    CL 92 06/08/2018    CL 96 04/18/2018    CO2 25 07/18/2018    CO2 insulin ([facility] [address]          Plan:   1. Continue current heart medicines  2. Follow up with Dr. Fouzia Montalvo in 6 months       I appreciate the opportunity of cooperating in the care of this individual.    Salome Munoz CNP, 8/22/2018, 10:48 AM    QUALITY MEASURES  1. Tobacco Cessation Counseling: NA  2. Retake of BP if >140/90:   NA  3. Documentation to PCP/referring for new patient:  Sent to PCP at close of office visit  4. CAD patient on anti-platelet: Yes  5. CAD patient on STATIN therapy:  Yes  6.  Patient with CHF and aFib on anticoagulation:  NA

## 2018-09-07 RX ORDER — ROPINIROLE 0.5 MG/1
TABLET, FILM COATED ORAL
Qty: 90 TABLET | Refills: 1 | Status: SHIPPED | OUTPATIENT
Start: 2018-09-07 | End: 2018-10-18 | Stop reason: SDUPTHER

## 2018-09-13 ENCOUNTER — CARE COORDINATION (OUTPATIENT)
Dept: CARE COORDINATION | Age: 63
End: 2018-09-13

## 2018-09-14 RX ORDER — GLIPIZIDE 10 MG/1
TABLET, FILM COATED, EXTENDED RELEASE ORAL
Qty: 60 TABLET | Refills: 3 | Status: SHIPPED | OUTPATIENT
Start: 2018-09-14 | End: 2018-10-18 | Stop reason: SDUPTHER

## 2018-09-14 NOTE — TELEPHONE ENCOUNTER
Tell her to add Glipizide 10 mg to take twice a day once before breakfast and once before dinner   Monitor Glucose and report.

## 2018-09-14 NOTE — CARE COORDINATION
provider of any barriers to my plan of care. I will notify my provider of any symptoms that indicate a worsening of my condition. Barriers: lack of motivation, overwhelmed by complexity of regimen, stress and lack of education  Plan for overcoming my barriers: Provide the pt with education and support  Confidence: 6/10  Anticipated Goal Completion Date: 08/01/18 Extend to 11/30/18       Self Monitoring   On track (9/13/2018)             Self-Monitored Blood Glucose - I will check my blood sugar Fasting blood sugar  I will notify my provider of any trends of increasing or decreasing blood sugars over a 1 month period. Other Self-Monitoring - I will monitor my abdominal pain and report any bloody stools. - Other: as needed    None Recently Recorded    Barriers: stress  Plan for overcoming my barriers: Assistance from  and family  Confidence: 9/10  Anticipated Goal Completion Date: 08/01/18  Extended Date: 12/13/18 9/13/18: Still adjusting her diabetic oral agent              Prior to Admission medications    Medication Sig Start Date End Date Taking? Authorizing Provider   rOPINIRole (REQUIP) 0.5 MG tablet TAKE 1 TABLET BY MOUTH 3 TIMES DAILY. 9/7/18   Cristel Michaels MD   levothyroxine (SYNTHROID) 100 MCG tablet TAKE 1 TABLET BY MOUTH DAILY 8/7/18   Cristel Michaels MD   sertraline (ZOLOFT) 50 MG tablet TAKE 1 TABLET BY MOUTH DAILY 8/7/18   Cristel Michaels MD   LORazepam (ATIVAN) 2 MG tablet TAKE 1 TABLET BY MOUTH TWICE DAILY.  7/18/18 10/16/18  Cristel Michaels MD   polyethylene glycol [facility]) powder Take 17 g by mouth daily as needed    Historical Provider, MD   glipiZIDE (GLIPIZIDE XL) 10 MG extended release tablet Take 1 tablet by mouth daily 7/5/18   Cristel Michaels MD   carvedilol (COREG) 3.125 MG tablet TAKE 1 TABLET BY MOUTH 2 TIMES DAILY 3/14/18   Steve Nascimento MD   losartan (COZAAR) 25 MG tablet Take 1 tablet by mouth daily 2/16/18   Steve Nascimento MD   simvastatin (ZOCOR) 80 MG tablet TAKE 1 TABLET BY MOUTH AT BEDTIME 2/16/18   Sg Ross MD   Lancets MISC Dispense whatever insurance will cover. Patient test twice day. Dx:E11.9 1/11/18   Felipa Fabian MD   Glucose Blood (BLOOD GLUCOSE TEST STRIPS) STRP Dispense whatever insurance will cover. Pt test twice a day dx:E11.9 1/11/18   Felipa Fabian MD   Blood Glucose Monitoring Suppl CATHRYN Dispense whatever insurance will cover. Dx code:E11.9 1/11/18   Felipa Fabian MD   dicyclomine (BENTYL) 10 MG capsule Take 1 capsule by mouth 4 times daily 8/31/17   BRYCE Cueva - CNP   gabapentin (NEURONTIN) 800 MG tablet Take 800 mg by mouth 3 times daily 8/2/17   Historical Provider, MD   omeprazole (PRILOSEC) 40 MG delayed release capsule Take 40 mg by mouth 2 times daily 8/2/17   Historical Provider, MD   albuterol (PROVENTIL) (2.5 MG/3ML) 0.083% nebulizer solution Take 2.5 mg by nebulization as needed for Wheezing    Historical Provider, MD   HYDROcodone-acetaminophen (NORCO) 5-325 MG per tablet Take 1 tablet by mouth every 8 hours as needed for Pain  . Historical Provider, MD   aspirin 81 MG EC tablet Take 1 tablet by mouth daily 9/24/15   Edmundo Flores MD   tiZANidine (ZANAFLEX) 2 MG tablet Take 2 mg by mouth 3 times daily as needed     Historical Provider, MD       Future Appointments  Date Time Provider [facility]   10/18/2018 1:00 PM Felipa Fabian MD [facility]     ,   Diabetes Assessment    Medic Alert ID:  No  Meal Planning:  Carb counting, Plate Method   How often do you test your blood sugar?:  Daily   Do you have barriers with adherence to non-pharmacologic self-management interventions?  (Nutrition/Exercise/Self-Monitoring):  No   Have you ever had to go to the ED for symptoms of low blood sugar?:  No       No patient-reported symptoms      ,   Congestive Heart Failure Assessment    Are you currently restricting fluids?:  No Restriction  Do you understand a low sodium diet?:  Yes  Do you understand how to read food labels?:  Yes  How many restaurant

## 2018-10-05 RX ORDER — SIMVASTATIN 80 MG
TABLET ORAL
Qty: 90 TABLET | Refills: 3 | Status: SHIPPED | OUTPATIENT
Start: 2018-10-05 | End: 2018-10-18 | Stop reason: SDUPTHER

## 2018-10-05 RX ORDER — GLIPIZIDE 10 MG/1
10 TABLET, FILM COATED, EXTENDED RELEASE ORAL DAILY
Qty: 30 TABLET | Refills: 3 | Status: SHIPPED | OUTPATIENT
Start: 2018-10-05 | End: 2020-09-05

## 2018-10-16 ENCOUNTER — TELEPHONE (OUTPATIENT)
Dept: INTERNAL MEDICINE CLINIC | Age: 63
End: 2018-10-16

## 2018-10-18 ENCOUNTER — CARE COORDINATION (OUTPATIENT)
Dept: CARE COORDINATION | Age: 63
End: 2018-10-18

## 2018-10-18 ENCOUNTER — OFFICE VISIT (OUTPATIENT)
Dept: INTERNAL MEDICINE CLINIC | Age: 63
End: 2018-10-18
Payer: MEDICARE

## 2018-10-18 VITALS
BODY MASS INDEX: 27.99 KG/M2 | HEIGHT: 69 IN | SYSTOLIC BLOOD PRESSURE: 122 MMHG | WEIGHT: 189 LBS | DIASTOLIC BLOOD PRESSURE: 60 MMHG

## 2018-10-18 DIAGNOSIS — Z23 NEED FOR INFLUENZA VACCINATION: ICD-10-CM

## 2018-10-18 DIAGNOSIS — F41.1 GENERALIZED ANXIETY DISORDER: ICD-10-CM

## 2018-10-18 DIAGNOSIS — I50.22 CHRONIC SYSTOLIC CONGESTIVE HEART FAILURE (HCC): ICD-10-CM

## 2018-10-18 DIAGNOSIS — E10.9 TYPE 1 DIABETES MELLITUS WITHOUT COMPLICATION (HCC): Primary | ICD-10-CM

## 2018-10-18 DIAGNOSIS — E10.9 TYPE 1 DIABETES MELLITUS WITHOUT COMPLICATION (HCC): ICD-10-CM

## 2018-10-18 LAB
A/G RATIO: 1.3 (ref 1.1–2.2)
ALBUMIN SERPL-MCNC: 4.3 G/DL (ref 3.4–5)
ALP BLD-CCNC: 140 U/L (ref 40–129)
ALT SERPL-CCNC: 12 U/L (ref 10–40)
ANION GAP SERPL CALCULATED.3IONS-SCNC: 18 MMOL/L (ref 3–16)
AST SERPL-CCNC: 37 U/L (ref 15–37)
BASOPHILS ABSOLUTE: 0.1 K/UL (ref 0–0.2)
BASOPHILS RELATIVE PERCENT: 0.4 %
BILIRUB SERPL-MCNC: <0.2 MG/DL (ref 0–1)
BUN BLDV-MCNC: 7 MG/DL (ref 7–20)
CALCIUM SERPL-MCNC: 9.7 MG/DL (ref 8.3–10.6)
CHLORIDE BLD-SCNC: 95 MMOL/L (ref 99–110)
CHOLESTEROL, TOTAL: 156 MG/DL (ref 0–199)
CO2: 24 MMOL/L (ref 21–32)
CREAT SERPL-MCNC: 0.8 MG/DL (ref 0.6–1.2)
EOSINOPHILS ABSOLUTE: 0.3 K/UL (ref 0–0.6)
EOSINOPHILS RELATIVE PERCENT: 2.2 %
GFR AFRICAN AMERICAN: >60
GFR NON-AFRICAN AMERICAN: >60
GLOBULIN: 3.4 G/DL
GLUCOSE BLD-MCNC: 118 MG/DL (ref 70–99)
HCT VFR BLD CALC: 35.5 % (ref 36–48)
HDLC SERPL-MCNC: 39 MG/DL (ref 40–60)
HEMOGLOBIN: 11.1 G/DL (ref 12–16)
LDL CHOLESTEROL CALCULATED: 69 MG/DL
LYMPHOCYTES ABSOLUTE: 3.9 K/UL (ref 1–5.1)
LYMPHOCYTES RELATIVE PERCENT: 31.7 %
MCH RBC QN AUTO: 24.8 PG (ref 26–34)
MCHC RBC AUTO-ENTMCNC: 31.3 G/DL (ref 31–36)
MCV RBC AUTO: 79.1 FL (ref 80–100)
MONOCYTES ABSOLUTE: 1 K/UL (ref 0–1.3)
MONOCYTES RELATIVE PERCENT: 8.1 %
NEUTROPHILS ABSOLUTE: 7 K/UL (ref 1.7–7.7)
NEUTROPHILS RELATIVE PERCENT: 57.6 %
PDW BLD-RTO: 17.1 % (ref 12.4–15.4)
PLATELET # BLD: 335 K/UL (ref 135–450)
PMV BLD AUTO: 8.9 FL (ref 5–10.5)
POTASSIUM SERPL-SCNC: 4.1 MMOL/L (ref 3.5–5.1)
RBC # BLD: 4.49 M/UL (ref 4–5.2)
SODIUM BLD-SCNC: 137 MMOL/L (ref 136–145)
TOTAL PROTEIN: 7.7 G/DL (ref 6.4–8.2)
TRIGL SERPL-MCNC: 242 MG/DL (ref 0–150)
VLDLC SERPL CALC-MCNC: 48 MG/DL
WBC # BLD: 12.2 K/UL (ref 4–11)

## 2018-10-18 PROCEDURE — 99214 OFFICE O/P EST MOD 30 MIN: CPT | Performed by: INTERNAL MEDICINE

## 2018-10-18 PROCEDURE — G0008 ADMIN INFLUENZA VIRUS VAC: HCPCS | Performed by: INTERNAL MEDICINE

## 2018-10-18 PROCEDURE — 90686 IIV4 VACC NO PRSV 0.5 ML IM: CPT | Performed by: INTERNAL MEDICINE

## 2018-10-18 RX ORDER — LORAZEPAM 2 MG/1
TABLET ORAL
Qty: 180 TABLET | Refills: 0 | Status: SHIPPED | OUTPATIENT
Start: 2018-10-18 | End: 2019-01-24 | Stop reason: SDUPTHER

## 2018-10-18 RX ORDER — GLIPIZIDE 10 MG/1
10 TABLET, FILM COATED, EXTENDED RELEASE ORAL DAILY
Qty: 30 TABLET | Refills: 3 | Status: CANCELLED | OUTPATIENT
Start: 2018-10-18

## 2018-10-18 RX ORDER — ROPINIROLE 0.5 MG/1
TABLET, FILM COATED ORAL
Qty: 90 TABLET | Refills: 3 | Status: SHIPPED | OUTPATIENT
Start: 2018-10-18 | End: 2018-11-30 | Stop reason: SDUPTHER

## 2018-10-18 RX ORDER — GLIPIZIDE 10 MG/1
TABLET, FILM COATED, EXTENDED RELEASE ORAL
Qty: 60 TABLET | Refills: 3 | Status: SHIPPED | OUTPATIENT
Start: 2018-10-18 | End: 2018-11-30 | Stop reason: SDUPTHER

## 2018-10-18 RX ORDER — SIMVASTATIN 80 MG
TABLET ORAL
Qty: 90 TABLET | Refills: 3 | Status: SHIPPED | OUTPATIENT
Start: 2018-10-18 | End: 2018-11-30 | Stop reason: SDUPTHER

## 2018-10-18 ASSESSMENT — PATIENT HEALTH QUESTIONNAIRE - PHQ9
SUM OF ALL RESPONSES TO PHQ QUESTIONS 1-9: 0
1. LITTLE INTEREST OR PLEASURE IN DOING THINGS: 0
SUM OF ALL RESPONSES TO PHQ9 QUESTIONS 1 & 2: 0
SUM OF ALL RESPONSES TO PHQ QUESTIONS 1-9: 0
2. FEELING DOWN, DEPRESSED OR HOPELESS: 0

## 2018-10-19 LAB
ESTIMATED AVERAGE GLUCOSE: 191.5 MG/DL
HBA1C MFR BLD: 8.3 %

## 2018-10-19 NOTE — CARE COORDINATION
Neurology Referral         Goals Addressed             Most Recent     Conditions and Symptoms   Improving (10/18/2018)             I will schedule office visits, as directed by my provider. I will keep my appointment or reschedule if I have to cancel. I will notify my provider of any barriers to my plan of care. I will notify my provider of any symptoms that indicate a worsening of my condition. Barriers: lack of motivation, overwhelmed by complexity of regimen, stress and lack of education  Plan for overcoming my barriers: Provide the pt with education and support  Confidence: 6/10  Anticipated Goal Completion Date: 08/01/18 Extend to 11/30/18       Self Monitoring   Improving (10/18/2018)             Self-Monitored Blood Glucose - I will check my blood sugar Fasting blood sugar  I will notify my provider of any trends of increasing or decreasing blood sugars over a 1 month period. Other Self-Monitoring - I will monitor my abdominal pain and report any bloody stools. - Other: as needed    None Recently Recorded    Barriers: stress  Plan for overcoming my barriers: Assistance from  and family  Confidence: 9/10  Anticipated Goal Completion Date: 08/01/18  Extended Date: 12/13/18 9/13/18: Still adjusting her diabetic oral agent              Prior to Admission medications    Medication Sig Start Date End Date Taking? Authorizing Provider   simvastatin (ZOCOR) 80 MG tablet TAKE 1 TABLET BY MOUTH AT BEDTIME 10/18/18   Marcelino Coronel MD   glipiZIDE (GLIPIZIDE XL) 10 MG extended release tablet take twice a day once before breakfast and once before dinner 10/18/18   Marcelino Coronel MD   LORazepam (ATIVAN) 2 MG tablet TAKE 1 TABLET BY MOUTH TWICE DAILY. 10/18/18 1/16/19  Marcelino Coronel MD   rOPINIRole (REQUIP) 0.5 MG tablet TAKE 1 TABLET BY MOUTH 3 TIMES DAILY.  10/18/18   Marcelino Coronel MD   glipiZIDE (GLUCOTROL XL) 10 MG extended release tablet TAKE 1 TABLET BY MOUTH DAILY 10/5/18   Marcelino Coronel MD   levothyroxine (SYNTHROID) 100 MCG tablet TAKE 1 TABLET BY MOUTH DAILY 8/7/18   Gabrielle Lanza MD   sertraline (ZOLOFT) 50 MG tablet TAKE 1 TABLET BY MOUTH DAILY 8/7/18   Gabrielle Lanza MD   polyethylene glycol (MIRALAX) powder Take 17 g by mouth daily as needed    Historical Provider, MD   carvedilol (COREG) 3.125 MG tablet TAKE 1 TABLET BY MOUTH 2 TIMES DAILY 3/14/18   Kerry Marmolejo MD   losartan (COZAAR) 25 MG tablet Take 1 tablet by mouth daily 2/16/18   Kerry Marmolejo MD   simvastatin (ZOCOR) 80 MG tablet TAKE 1 TABLET BY MOUTH AT BEDTIME 2/16/18   Kerry Marmolejo MD   Lancets MISC Dispense whatever insurance will cover. Patient test twice day. Dx:E11.9 1/11/18   Gabrielle Lanza MD   Glucose Blood (BLOOD GLUCOSE TEST STRIPS) STRP Dispense whatever insurance will cover. Pt test twice a day dx:E11.9 1/11/18   Gabrielle Lanza MD   Blood Glucose Monitoring Suppl CATHRYN Dispense whatever insurance will cover. Dx code:E11.9 1/11/18   Gabrielle Lanza MD   dicyclomine (BENTYL) 10 MG capsule Take 1 capsule by mouth 4 times daily 8/31/17   BRYCE Harper - CNP   gabapentin (NEURONTIN) 800 MG tablet Take 800 mg by mouth 3 times daily 8/2/17   Historical Provider, MD   omeprazole (PRILOSEC) 40 MG delayed release capsule Take 40 mg by mouth 2 times daily 8/2/17   Historical Provider, MD   HYDROcodone-acetaminophen (NORCO) 5-325 MG per tablet Take 1 tablet by mouth every 8 hours as needed for Pain  .     Historical Provider, MD   aspirin 81 MG EC tablet Take 1 tablet by mouth daily 9/24/15   Harshil Bond MD   tiZANidine (ZANAFLEX) 2 MG tablet Take 2 mg by mouth 3 times daily as needed     Historical Provider, MD       Future Appointments  Date Time Provider Viky Downs   1/22/2019 2:00 PM MD [facility]     ,   Diabetes Assessment    Medic Alert ID:  No  Meal Planning:  Carb counting, Plate Method   How often do you test your blood sugar?:  Daily   Do you have barriers with adherence to non-pharmacologic self-management interventions?  (Nutrition/Exercise/Self-Monitoring):  No   Have you ever had to go to the ED for symptoms of low blood sugar?:  No       No patient-reported symptoms      ,   Congestive Heart Failure Assessment    Are you currently restricting fluids?:  No Restriction  Do you understand a low sodium diet?:  Yes  Do you understand how to read food labels?:  Yes  How many restaurant meals do you eat per week?:  3-4  Do you salt your food before tasting it?:  No     No patient-reported symptoms      Symptoms:   None:  Yes      Symptom course:  stable  Weight trend:  stable  Salt intake watch compared to last visit:  stable     ,   COPD Assessment    Does the patient understand envrionmental exposure?:  Yes  Is the patient able to verbalize Rescue vs. Long Acting medications?:  Yes  Does the patient have a nebulizer?:  No  Does the patient use a space with inhaled medications?:  No     No patient-reported symptoms         Symptoms:      Have you had a recent diagnosis of pneumonia either by PCP or at a hospital?:  No      and   General Assessment    Do you have any symptoms that are causing concern?:  Yes  Progression since Onset:  Gradually Worsening  Reported Symptoms:  Other, Pain (Comment: Memory Loss, Anxiety and chronic DDD pain)

## 2018-10-25 ENCOUNTER — CARE COORDINATION (OUTPATIENT)
Dept: CARE COORDINATION | Age: 63
End: 2018-10-25

## 2018-10-31 ENCOUNTER — CARE COORDINATION (OUTPATIENT)
Dept: CARE COORDINATION | Age: 63
End: 2018-10-31

## 2018-11-13 ENCOUNTER — TELEPHONE (OUTPATIENT)
Dept: INTERNAL MEDICINE CLINIC | Age: 63
End: 2018-11-13

## 2018-11-14 ENCOUNTER — OFFICE VISIT (OUTPATIENT)
Dept: INTERNAL MEDICINE CLINIC | Age: 63
End: 2018-11-14
Payer: MEDICARE

## 2018-11-14 VITALS
SYSTOLIC BLOOD PRESSURE: 126 MMHG | HEIGHT: 69 IN | DIASTOLIC BLOOD PRESSURE: 70 MMHG | BODY MASS INDEX: 29.18 KG/M2 | WEIGHT: 197 LBS | TEMPERATURE: 98 F

## 2018-11-14 DIAGNOSIS — H66.92 OTITIS OF LEFT EAR: Primary | ICD-10-CM

## 2018-11-14 DIAGNOSIS — E11.9 TYPE 2 DIABETES MELLITUS WITHOUT COMPLICATION, WITHOUT LONG-TERM CURRENT USE OF INSULIN (HCC): ICD-10-CM

## 2018-11-14 PROCEDURE — G8417 CALC BMI ABV UP PARAM F/U: HCPCS | Performed by: INTERNAL MEDICINE

## 2018-11-14 PROCEDURE — 3045F PR MOST RECENT HEMOGLOBIN A1C LEVEL 7.0-9.0%: CPT | Performed by: INTERNAL MEDICINE

## 2018-11-14 PROCEDURE — 1036F TOBACCO NON-USER: CPT | Performed by: INTERNAL MEDICINE

## 2018-11-14 PROCEDURE — G8599 NO ASA/ANTIPLAT THER USE RNG: HCPCS | Performed by: INTERNAL MEDICINE

## 2018-11-14 PROCEDURE — 99213 OFFICE O/P EST LOW 20 MIN: CPT | Performed by: INTERNAL MEDICINE

## 2018-11-14 PROCEDURE — G8510 SCR DEP NEG, NO PLAN REQD: HCPCS | Performed by: INTERNAL MEDICINE

## 2018-11-14 PROCEDURE — G8427 DOCREV CUR MEDS BY ELIG CLIN: HCPCS | Performed by: INTERNAL MEDICINE

## 2018-11-14 PROCEDURE — G8482 FLU IMMUNIZE ORDER/ADMIN: HCPCS | Performed by: INTERNAL MEDICINE

## 2018-11-14 PROCEDURE — 2022F DILAT RTA XM EVC RTNOPTHY: CPT | Performed by: INTERNAL MEDICINE

## 2018-11-14 PROCEDURE — 3017F COLORECTAL CA SCREEN DOC REV: CPT | Performed by: INTERNAL MEDICINE

## 2018-11-14 RX ORDER — AMOXICILLIN 500 MG/1
500 CAPSULE ORAL 3 TIMES DAILY
Qty: 30 CAPSULE | Refills: 0 | Status: SHIPPED | OUTPATIENT
Start: 2018-11-14 | End: 2018-11-24

## 2018-11-14 ASSESSMENT — PATIENT HEALTH QUESTIONNAIRE - PHQ9
SUM OF ALL RESPONSES TO PHQ9 QUESTIONS 1 & 2: 0
SUM OF ALL RESPONSES TO PHQ QUESTIONS 1-9: 0
2. FEELING DOWN, DEPRESSED OR HOPELESS: 0
1. LITTLE INTEREST OR PLEASURE IN DOING THINGS: 0
SUM OF ALL RESPONSES TO PHQ QUESTIONS 1-9: 0

## 2018-11-15 NOTE — PROGRESS NOTES
polydipsia. Weight control has been stable. Encouraged to check sugar at home, watch weight and exercise.         Cinthya Pérez MD  I attest that this note was completed entirely by me

## 2018-11-26 RX ORDER — LOSARTAN POTASSIUM 25 MG/1
TABLET ORAL
Qty: 90 TABLET | Refills: 0 | Status: SHIPPED | OUTPATIENT
Start: 2018-11-26 | End: 2018-11-30 | Stop reason: SDUPTHER

## 2018-11-30 ENCOUNTER — OFFICE VISIT (OUTPATIENT)
Dept: INTERNAL MEDICINE CLINIC | Age: 63
End: 2018-11-30
Payer: MEDICARE

## 2018-11-30 VITALS
BODY MASS INDEX: 27.99 KG/M2 | SYSTOLIC BLOOD PRESSURE: 138 MMHG | DIASTOLIC BLOOD PRESSURE: 78 MMHG | WEIGHT: 189 LBS | HEIGHT: 69 IN

## 2018-11-30 DIAGNOSIS — H60.502 ACUTE OTITIS EXTERNA OF LEFT EAR, UNSPECIFIED TYPE: Primary | ICD-10-CM

## 2018-11-30 DIAGNOSIS — E03.9 ACQUIRED HYPOTHYROIDISM: ICD-10-CM

## 2018-11-30 DIAGNOSIS — I50.22 CHRONIC SYSTOLIC CONGESTIVE HEART FAILURE (HCC): ICD-10-CM

## 2018-11-30 DIAGNOSIS — E11.9 TYPE 2 DIABETES MELLITUS WITHOUT COMPLICATION, WITHOUT LONG-TERM CURRENT USE OF INSULIN (HCC): ICD-10-CM

## 2018-11-30 DIAGNOSIS — F41.1 ANXIETY STATE: ICD-10-CM

## 2018-11-30 LAB
BILIRUBIN, POC: NORMAL
BLOOD URINE, POC: NORMAL
CLARITY, POC: CLEAR
COLOR, POC: YELLOW
GLUCOSE URINE, POC: NORMAL
KETONES, POC: NORMAL
LEUKOCYTE EST, POC: NORMAL
NITRITE, POC: NORMAL
PH, POC: 7
PROTEIN, POC: NORMAL
SPECIFIC GRAVITY, POC: 1015
UROBILINOGEN, POC: 0.2

## 2018-11-30 PROCEDURE — G8482 FLU IMMUNIZE ORDER/ADMIN: HCPCS | Performed by: INTERNAL MEDICINE

## 2018-11-30 PROCEDURE — G8599 NO ASA/ANTIPLAT THER USE RNG: HCPCS | Performed by: INTERNAL MEDICINE

## 2018-11-30 PROCEDURE — 3017F COLORECTAL CA SCREEN DOC REV: CPT | Performed by: INTERNAL MEDICINE

## 2018-11-30 PROCEDURE — G8427 DOCREV CUR MEDS BY ELIG CLIN: HCPCS | Performed by: INTERNAL MEDICINE

## 2018-11-30 PROCEDURE — 3045F PR MOST RECENT HEMOGLOBIN A1C LEVEL 7.0-9.0%: CPT | Performed by: INTERNAL MEDICINE

## 2018-11-30 PROCEDURE — 2022F DILAT RTA XM EVC RTNOPTHY: CPT | Performed by: INTERNAL MEDICINE

## 2018-11-30 PROCEDURE — 99214 OFFICE O/P EST MOD 30 MIN: CPT | Performed by: INTERNAL MEDICINE

## 2018-11-30 PROCEDURE — G8417 CALC BMI ABV UP PARAM F/U: HCPCS | Performed by: INTERNAL MEDICINE

## 2018-11-30 PROCEDURE — 81002 URINALYSIS NONAUTO W/O SCOPE: CPT | Performed by: INTERNAL MEDICINE

## 2018-11-30 PROCEDURE — 4130F TOPICAL PREP RX AOE: CPT | Performed by: INTERNAL MEDICINE

## 2018-11-30 PROCEDURE — 1036F TOBACCO NON-USER: CPT | Performed by: INTERNAL MEDICINE

## 2018-11-30 RX ORDER — GLUCOSAMINE HCL/CHONDROITIN SU 500-400 MG
CAPSULE ORAL
Qty: 200 STRIP | Refills: 3 | Status: SHIPPED | OUTPATIENT
Start: 2018-11-30 | End: 2019-03-11 | Stop reason: SDUPTHER

## 2018-11-30 RX ORDER — LEVOTHYROXINE SODIUM 0.1 MG/1
100 TABLET ORAL DAILY
Qty: 90 TABLET | Refills: 3 | Status: SHIPPED | OUTPATIENT
Start: 2018-11-30 | End: 2019-02-21 | Stop reason: SDUPTHER

## 2018-11-30 RX ORDER — CARVEDILOL 3.12 MG/1
TABLET ORAL
Qty: 180 TABLET | Refills: 3 | Status: SHIPPED | OUTPATIENT
Start: 2018-11-30 | End: 2019-02-19 | Stop reason: SINTOL

## 2018-11-30 RX ORDER — ROPINIROLE 0.5 MG/1
TABLET, FILM COATED ORAL
Qty: 270 TABLET | Refills: 3 | Status: ON HOLD | OUTPATIENT
Start: 2018-11-30 | End: 2022-01-22 | Stop reason: SDUPTHER

## 2018-11-30 RX ORDER — LOSARTAN POTASSIUM 25 MG/1
TABLET ORAL
Qty: 90 TABLET | Refills: 3 | Status: SHIPPED | OUTPATIENT
Start: 2018-11-30 | End: 2019-02-21

## 2018-11-30 RX ORDER — LANCETS 30 GAUGE
EACH MISCELLANEOUS
Qty: 200 EACH | Refills: 3 | Status: SHIPPED | OUTPATIENT
Start: 2018-11-30 | End: 2019-03-11 | Stop reason: SDUPTHER

## 2018-11-30 RX ORDER — GLIPIZIDE 10 MG/1
TABLET, FILM COATED, EXTENDED RELEASE ORAL
Qty: 180 TABLET | Refills: 3 | Status: SHIPPED | OUTPATIENT
Start: 2018-11-30 | End: 2019-01-24 | Stop reason: SDUPTHER

## 2018-11-30 RX ORDER — SIMVASTATIN 80 MG
TABLET ORAL
Qty: 90 TABLET | Refills: 3 | Status: ON HOLD | OUTPATIENT
Start: 2018-11-30 | End: 2021-09-28

## 2018-11-30 RX ORDER — SIMVASTATIN 80 MG
TABLET ORAL
Qty: 90 TABLET | Refills: 3 | Status: SHIPPED | OUTPATIENT
Start: 2018-11-30 | End: 2019-04-26 | Stop reason: CLARIF

## 2018-11-30 ASSESSMENT — PATIENT HEALTH QUESTIONNAIRE - PHQ9
1. LITTLE INTEREST OR PLEASURE IN DOING THINGS: 0
SUM OF ALL RESPONSES TO PHQ9 QUESTIONS 1 & 2: 0
SUM OF ALL RESPONSES TO PHQ QUESTIONS 1-9: 0
SUM OF ALL RESPONSES TO PHQ QUESTIONS 1-9: 0
2. FEELING DOWN, DEPRESSED OR HOPELESS: 0

## 2018-11-30 NOTE — PROGRESS NOTES
BY MOUTH DAILY 30 tablet 3    polyethylene glycol (MIRALAX) powder Take 17 g by mouth daily as needed      Blood Glucose Monitoring Suppl CATHRYN Dispense whatever insurance will cover. Dx code:E11.9 1 Device 0    dicyclomine (BENTYL) 10 MG capsule Take 1 capsule by mouth 4 times daily 120 capsule 3    gabapentin (NEURONTIN) 800 MG tablet Take 800 mg by mouth 3 times daily      omeprazole (PRILOSEC) 40 MG delayed release capsule Take 40 mg by mouth 2 times daily      HYDROcodone-acetaminophen (NORCO) 5-325 MG per tablet Take 1 tablet by mouth every 8 hours as needed for Pain  .  aspirin 81 MG EC tablet Take 1 tablet by mouth daily 30 tablet 3    tiZANidine (ZANAFLEX) 2 MG tablet Take 2 mg by mouth 3 times daily as needed        No current facility-administered medications on file prior to visit. Allergies   Allergen Reactions    Sulfa Antibiotics Anaphylaxis    Bactrim     Prednisone      Cannot tolerate oral steriods    Quinidine        REVIEW OF SYSTEMS:      CONSTITUTIONAL: No major weight gain or loss, fatigue, weakness, night sweats or fever. HEENT: No new vision difficulties or ringing in the ears. No congestion or sore throat. RESPIRATORY: No new SOB, PND, orthopnea or cough. CARDIOVASCULAR: No chest pain or palpitations. GI: No nausea, vomiting, diarrhea, constipation, abdominal pain or changes in bowel habits. : No urinary frequency, urgency, incontinence hematuria or dysuria. SKIN: No cyanosis or skin lesions. MUSCULOSKELETAL: No new muscle or joint pain. NEUROLOGICAL: No syncope or TIA-like symptoms. No change in sensation or strength. PSYCHIATRIC: No anxiety, pain, insomnia or depression    Objective:   PHYSICAL EXAM:        VITALS:  /78   Ht 5' 9\" (1.753 m)   Wt 189 lb (85.7 kg)   BMI 27.91 kg/m²   CONSTITUTIONAL: Cooperative, no apparent distress, and appears well nourished / developed  NEUROLOGIC:  Awake and orientated to person, place and time.  Sensation normal

## 2018-12-11 ENCOUNTER — CARE COORDINATION (OUTPATIENT)
Dept: CARE COORDINATION | Age: 63
End: 2018-12-11

## 2018-12-11 NOTE — CARE COORDINATION
ACC Note:  The RNCC left a voice message for the pt and her  that the nurse will be out of the office for a few weeks. The [facility] directed the pt or her  to call the office directly with any problems or questions.

## 2019-01-24 ENCOUNTER — OFFICE VISIT (OUTPATIENT)
Dept: INTERNAL MEDICINE CLINIC | Age: 64
End: 2019-01-24
Payer: MEDICARE

## 2019-01-24 ENCOUNTER — CARE COORDINATION (OUTPATIENT)
Dept: INTERNAL MEDICINE CLINIC | Age: 64
End: 2019-01-24

## 2019-01-24 VITALS
HEIGHT: 69 IN | DIASTOLIC BLOOD PRESSURE: 80 MMHG | BODY MASS INDEX: 27.76 KG/M2 | WEIGHT: 187.4 LBS | SYSTOLIC BLOOD PRESSURE: 140 MMHG

## 2019-01-24 DIAGNOSIS — Z12.31 SCREENING MAMMOGRAM, ENCOUNTER FOR: ICD-10-CM

## 2019-01-24 DIAGNOSIS — E11.9 TYPE 2 DIABETES MELLITUS WITHOUT COMPLICATION, WITHOUT LONG-TERM CURRENT USE OF INSULIN (HCC): ICD-10-CM

## 2019-01-24 DIAGNOSIS — F41.1 GENERALIZED ANXIETY DISORDER: Primary | ICD-10-CM

## 2019-01-24 PROCEDURE — G8599 NO ASA/ANTIPLAT THER USE RNG: HCPCS | Performed by: INTERNAL MEDICINE

## 2019-01-24 PROCEDURE — 3046F HEMOGLOBIN A1C LEVEL >9.0%: CPT | Performed by: INTERNAL MEDICINE

## 2019-01-24 PROCEDURE — 99213 OFFICE O/P EST LOW 20 MIN: CPT | Performed by: INTERNAL MEDICINE

## 2019-01-24 PROCEDURE — 3017F COLORECTAL CA SCREEN DOC REV: CPT | Performed by: INTERNAL MEDICINE

## 2019-01-24 PROCEDURE — G8427 DOCREV CUR MEDS BY ELIG CLIN: HCPCS | Performed by: INTERNAL MEDICINE

## 2019-01-24 PROCEDURE — 1036F TOBACCO NON-USER: CPT | Performed by: INTERNAL MEDICINE

## 2019-01-24 PROCEDURE — 2022F DILAT RTA XM EVC RTNOPTHY: CPT | Performed by: INTERNAL MEDICINE

## 2019-01-24 PROCEDURE — G8482 FLU IMMUNIZE ORDER/ADMIN: HCPCS | Performed by: INTERNAL MEDICINE

## 2019-01-24 PROCEDURE — G8417 CALC BMI ABV UP PARAM F/U: HCPCS | Performed by: INTERNAL MEDICINE

## 2019-01-24 RX ORDER — LORAZEPAM 2 MG/1
TABLET ORAL
Qty: 180 TABLET | Refills: 0 | Status: SHIPPED | OUTPATIENT
Start: 2019-01-24 | End: 2019-04-26 | Stop reason: ALTCHOICE

## 2019-01-24 RX ORDER — GLIPIZIDE 10 MG/1
TABLET, FILM COATED, EXTENDED RELEASE ORAL
Qty: 270 TABLET | Refills: 3 | Status: SHIPPED | OUTPATIENT
Start: 2019-01-24 | End: 2020-09-05

## 2019-01-30 ENCOUNTER — TELEPHONE (OUTPATIENT)
Dept: FAMILY MEDICINE CLINIC | Age: 64
End: 2019-01-30

## 2019-02-01 ENCOUNTER — APPOINTMENT (OUTPATIENT)
Dept: GENERAL RADIOLOGY | Age: 64
End: 2019-02-01
Payer: MEDICARE

## 2019-02-01 ENCOUNTER — HOSPITAL ENCOUNTER (EMERGENCY)
Age: 64
Discharge: OTHER INSTITUTION | End: 2019-02-01
Attending: EMERGENCY MEDICINE
Payer: MEDICARE

## 2019-02-01 ENCOUNTER — APPOINTMENT (OUTPATIENT)
Dept: CT IMAGING | Age: 64
End: 2019-02-01
Payer: MEDICARE

## 2019-02-01 ENCOUNTER — TELEPHONE (OUTPATIENT)
Dept: INTERNAL MEDICINE CLINIC | Age: 64
End: 2019-02-01

## 2019-02-01 VITALS
DIASTOLIC BLOOD PRESSURE: 63 MMHG | TEMPERATURE: 97.6 F | HEIGHT: 69 IN | OXYGEN SATURATION: 93 % | RESPIRATION RATE: 16 BRPM | HEART RATE: 49 BPM | BODY MASS INDEX: 27.7 KG/M2 | WEIGHT: 187 LBS | SYSTOLIC BLOOD PRESSURE: 109 MMHG

## 2019-02-01 DIAGNOSIS — E86.0 DEHYDRATION: ICD-10-CM

## 2019-02-01 DIAGNOSIS — N30.00 ACUTE CYSTITIS WITHOUT HEMATURIA: Primary | ICD-10-CM

## 2019-02-01 PROBLEM — N39.0 UTI (URINARY TRACT INFECTION): Status: ACTIVE | Noted: 2019-02-01

## 2019-02-01 LAB
ACETAMINOPHEN LEVEL: <5 UG/ML (ref 10–30)
AMPHETAMINE SCREEN, URINE: ABNORMAL
BACTERIA: ABNORMAL /HPF
BARBITURATE SCREEN URINE: ABNORMAL
BENZODIAZEPINE SCREEN, URINE: ABNORMAL
BILIRUBIN URINE: ABNORMAL
BLOOD, URINE: ABNORMAL
CANNABINOID SCREEN URINE: ABNORMAL
CLARITY: CLEAR
COCAINE METABOLITE SCREEN URINE: ABNORMAL
COLOR: ABNORMAL
EPITHELIAL CELLS, UA: ABNORMAL /HPF
ETHANOL: NORMAL MG/DL (ref 0–0.08)
GLUCOSE BLD-MCNC: 297 MG/DL (ref 70–99)
GLUCOSE URINE: ABNORMAL MG/DL
KETONES, URINE: NEGATIVE MG/DL
LACTIC ACID: 1.7 MMOL/L (ref 0.4–2)
LACTIC ACID: 2.3 MMOL/L (ref 0.4–2)
LEUKOCYTE ESTERASE, URINE: ABNORMAL
Lab: ABNORMAL
METHADONE SCREEN, URINE: ABNORMAL
MICROSCOPIC EXAMINATION: YES
NITRITE, URINE: ABNORMAL
OPIATE SCREEN URINE: POSITIVE
OXYCODONE URINE: ABNORMAL
PERFORMED ON: ABNORMAL
PH UA: 5.5
PH UA: 5.5
PHENCYCLIDINE SCREEN URINE: ABNORMAL
PROPOXYPHENE SCREEN: ABNORMAL
PROTEIN UA: ABNORMAL MG/DL
RBC UA: ABNORMAL /HPF (ref 0–2)
SALICYLATE, SERUM: <0.3 MG/DL (ref 15–30)
SPECIFIC GRAVITY UA: <=1.005
TROPONIN: <0.01 NG/ML
URINE REFLEX TO CULTURE: YES
URINE TYPE: ABNORMAL
UROBILINOGEN, URINE: ABNORMAL E.U./DL
WBC UA: ABNORMAL /HPF (ref 0–5)

## 2019-02-01 PROCEDURE — 87186 SC STD MICRODIL/AGAR DIL: CPT

## 2019-02-01 PROCEDURE — 93005 ELECTROCARDIOGRAM TRACING: CPT | Performed by: EMERGENCY MEDICINE

## 2019-02-01 PROCEDURE — 72125 CT NECK SPINE W/O DYE: CPT

## 2019-02-01 PROCEDURE — 70450 CT HEAD/BRAIN W/O DYE: CPT

## 2019-02-01 PROCEDURE — 99285 EMERGENCY DEPT VISIT HI MDM: CPT

## 2019-02-01 PROCEDURE — 2580000003 HC RX 258: Performed by: EMERGENCY MEDICINE

## 2019-02-01 PROCEDURE — G0480 DRUG TEST DEF 1-7 CLASSES: HCPCS

## 2019-02-01 PROCEDURE — 6360000002 HC RX W HCPCS: Performed by: EMERGENCY MEDICINE

## 2019-02-01 PROCEDURE — 87077 CULTURE AEROBIC IDENTIFY: CPT

## 2019-02-01 PROCEDURE — 96374 THER/PROPH/DIAG INJ IV PUSH: CPT

## 2019-02-01 PROCEDURE — 83605 ASSAY OF LACTIC ACID: CPT

## 2019-02-01 PROCEDURE — 80307 DRUG TEST PRSMV CHEM ANLYZR: CPT

## 2019-02-01 PROCEDURE — 84484 ASSAY OF TROPONIN QUANT: CPT

## 2019-02-01 PROCEDURE — 71045 X-RAY EXAM CHEST 1 VIEW: CPT

## 2019-02-01 PROCEDURE — 36415 COLL VENOUS BLD VENIPUNCTURE: CPT

## 2019-02-01 PROCEDURE — 81001 URINALYSIS AUTO W/SCOPE: CPT

## 2019-02-01 PROCEDURE — 87086 URINE CULTURE/COLONY COUNT: CPT

## 2019-02-01 RX ORDER — CIPROFLOXACIN 500 MG/1
500 TABLET, FILM COATED ORAL 2 TIMES DAILY
Qty: 20 TABLET | Refills: 0 | Status: SHIPPED | OUTPATIENT
Start: 2019-02-01 | End: 2019-02-12 | Stop reason: ALTCHOICE

## 2019-02-01 RX ORDER — 0.9 % SODIUM CHLORIDE 0.9 %
1000 INTRAVENOUS SOLUTION INTRAVENOUS ONCE
Status: COMPLETED | OUTPATIENT
Start: 2019-02-01 | End: 2019-02-01

## 2019-02-01 RX ADMIN — SODIUM CHLORIDE 1000 ML: 9 INJECTION, SOLUTION INTRAVENOUS at 21:59

## 2019-02-01 RX ADMIN — SODIUM CHLORIDE 1000 ML: 9 INJECTION, SOLUTION INTRAVENOUS at 22:01

## 2019-02-01 RX ADMIN — CEFAZOLIN 1 G: 1 INJECTION, POWDER, FOR SOLUTION INTRAMUSCULAR; INTRAVENOUS at 22:26

## 2019-02-01 ASSESSMENT — PAIN SCALES - GENERAL: PAINLEVEL_OUTOF10: 8

## 2019-02-01 NOTE — TELEPHONE ENCOUNTER
Pt would like an antibiotic sent to treat a UTI  [address] [place], [place] - [address] - F [phone]

## 2019-02-02 ENCOUNTER — HOSPITAL ENCOUNTER (INPATIENT)
Age: 64
LOS: 1 days | Discharge: HOME | DRG: 872 | End: 2019-02-03
Attending: INTERNAL MEDICINE | Admitting: INTERNAL MEDICINE
Payer: MEDICARE

## 2019-02-02 PROBLEM — A41.9 SEPSIS (HCC): Status: ACTIVE | Noted: 2019-02-02

## 2019-02-02 LAB
ANION GAP SERPL CALCULATED.3IONS-SCNC: 11 MMOL/L (ref 3–16)
BASOPHILS ABSOLUTE: 0.1 K/UL (ref 0–0.2)
BASOPHILS RELATIVE PERCENT: 0.7 %
BUN BLDV-MCNC: 7 MG/DL (ref 7–20)
CALCIUM SERPL-MCNC: 8.2 MG/DL (ref 8.3–10.6)
CHLORIDE BLD-SCNC: 107 MMOL/L (ref 99–110)
CO2: 22 MMOL/L (ref 21–32)
CREAT SERPL-MCNC: 0.7 MG/DL (ref 0.6–1.2)
EKG ATRIAL RATE: 47 BPM
EKG DIAGNOSIS: NORMAL
EKG P AXIS: 28 DEGREES
EKG P-R INTERVAL: 172 MS
EKG Q-T INTERVAL: 570 MS
EKG QRS DURATION: 96 MS
EKG QTC CALCULATION (BAZETT): 504 MS
EKG R AXIS: 15 DEGREES
EKG T AXIS: 4 DEGREES
EKG VENTRICULAR RATE: 47 BPM
EOSINOPHILS ABSOLUTE: 0.2 K/UL (ref 0–0.6)
EOSINOPHILS RELATIVE PERCENT: 2 %
GFR AFRICAN AMERICAN: >60
GFR NON-AFRICAN AMERICAN: >60
GLUCOSE BLD-MCNC: 141 MG/DL (ref 70–99)
HCT VFR BLD CALC: 30.1 % (ref 36–48)
HEMOGLOBIN: 9.5 G/DL (ref 12–16)
LACTIC ACID, SEPSIS: 1.3 MMOL/L (ref 0.4–1.9)
LACTIC ACID: 1.3 MMOL/L (ref 0.4–2)
LYMPHOCYTES ABSOLUTE: 2.3 K/UL (ref 1–5.1)
LYMPHOCYTES RELATIVE PERCENT: 25.5 %
MAGNESIUM: 1.6 MG/DL (ref 1.8–2.4)
MAGNESIUM: 1.7 MG/DL (ref 1.8–2.4)
MCH RBC QN AUTO: 23.6 PG (ref 26–34)
MCHC RBC AUTO-ENTMCNC: 31.6 G/DL (ref 31–36)
MCV RBC AUTO: 74.9 FL (ref 80–100)
MONOCYTES ABSOLUTE: 0.7 K/UL (ref 0–1.3)
MONOCYTES RELATIVE PERCENT: 7.5 %
NEUTROPHILS ABSOLUTE: 5.8 K/UL (ref 1.7–7.7)
NEUTROPHILS RELATIVE PERCENT: 64.3 %
PDW BLD-RTO: 18.3 % (ref 12.4–15.4)
PLATELET # BLD: 216 K/UL (ref 135–450)
PMV BLD AUTO: 8.2 FL (ref 5–10.5)
POTASSIUM REFLEX MAGNESIUM: 3.5 MMOL/L (ref 3.5–5.1)
RBC # BLD: 4.02 M/UL (ref 4–5.2)
SODIUM BLD-SCNC: 140 MMOL/L (ref 136–145)
WBC # BLD: 9 K/UL (ref 4–11)

## 2019-02-02 PROCEDURE — 96366 THER/PROPH/DIAG IV INF ADDON: CPT

## 2019-02-02 PROCEDURE — 6360000002 HC RX W HCPCS: Performed by: NURSE PRACTITIONER

## 2019-02-02 PROCEDURE — 6360000002 HC RX W HCPCS: Performed by: INTERNAL MEDICINE

## 2019-02-02 PROCEDURE — 96365 THER/PROPH/DIAG IV INF INIT: CPT

## 2019-02-02 PROCEDURE — 96372 THER/PROPH/DIAG INJ SC/IM: CPT

## 2019-02-02 PROCEDURE — 36415 COLL VENOUS BLD VENIPUNCTURE: CPT

## 2019-02-02 PROCEDURE — 2580000003 HC RX 258: Performed by: INTERNAL MEDICINE

## 2019-02-02 PROCEDURE — 83605 ASSAY OF LACTIC ACID: CPT

## 2019-02-02 PROCEDURE — 96367 TX/PROPH/DG ADDL SEQ IV INF: CPT

## 2019-02-02 PROCEDURE — 6370000000 HC RX 637 (ALT 250 FOR IP): Performed by: INTERNAL MEDICINE

## 2019-02-02 PROCEDURE — 85025 COMPLETE CBC W/AUTO DIFF WBC: CPT

## 2019-02-02 PROCEDURE — 1200000000 HC SEMI PRIVATE

## 2019-02-02 PROCEDURE — 83735 ASSAY OF MAGNESIUM: CPT

## 2019-02-02 PROCEDURE — 80048 BASIC METABOLIC PNL TOTAL CA: CPT

## 2019-02-02 PROCEDURE — 93010 ELECTROCARDIOGRAM REPORT: CPT | Performed by: INTERNAL MEDICINE

## 2019-02-02 RX ORDER — GABAPENTIN 800 MG/1
800 TABLET ORAL 3 TIMES DAILY
Status: DISCONTINUED | OUTPATIENT
Start: 2019-02-02 | End: 2019-02-02

## 2019-02-02 RX ORDER — SODIUM CHLORIDE 0.9 % (FLUSH) 0.9 %
10 SYRINGE (ML) INJECTION PRN
Status: DISCONTINUED | OUTPATIENT
Start: 2019-02-02 | End: 2019-02-03 | Stop reason: HOSPADM

## 2019-02-02 RX ORDER — SODIUM CHLORIDE 0.9 % (FLUSH) 0.9 %
10 SYRINGE (ML) INJECTION EVERY 12 HOURS SCHEDULED
Status: DISCONTINUED | OUTPATIENT
Start: 2019-02-02 | End: 2019-02-03 | Stop reason: HOSPADM

## 2019-02-02 RX ORDER — TIZANIDINE 4 MG/1
2 TABLET ORAL 3 TIMES DAILY PRN
Status: DISCONTINUED | OUTPATIENT
Start: 2019-02-02 | End: 2019-02-03 | Stop reason: HOSPADM

## 2019-02-02 RX ORDER — HYDROCODONE BITARTRATE AND ACETAMINOPHEN 5; 325 MG/1; MG/1
1 TABLET ORAL EVERY 8 HOURS PRN
Status: DISCONTINUED | OUTPATIENT
Start: 2019-02-02 | End: 2019-02-02

## 2019-02-02 RX ORDER — LEVOTHYROXINE SODIUM 0.1 MG/1
100 TABLET ORAL DAILY
Status: DISCONTINUED | OUTPATIENT
Start: 2019-02-02 | End: 2019-02-03 | Stop reason: HOSPADM

## 2019-02-02 RX ORDER — CARVEDILOL 6.25 MG/1
3.12 TABLET ORAL 2 TIMES DAILY
Status: DISCONTINUED | OUTPATIENT
Start: 2019-02-02 | End: 2019-02-03 | Stop reason: HOSPADM

## 2019-02-02 RX ORDER — ONDANSETRON 2 MG/ML
4 INJECTION INTRAMUSCULAR; INTRAVENOUS EVERY 6 HOURS PRN
Status: DISCONTINUED | OUTPATIENT
Start: 2019-02-02 | End: 2019-02-03 | Stop reason: HOSPADM

## 2019-02-02 RX ORDER — HYDROCODONE BITARTRATE AND ACETAMINOPHEN 5; 325 MG/1; MG/1
1 TABLET ORAL EVERY 6 HOURS PRN
Status: DISCONTINUED | OUTPATIENT
Start: 2019-02-02 | End: 2019-02-03 | Stop reason: HOSPADM

## 2019-02-02 RX ORDER — PHENAZOPYRIDINE HYDROCHLORIDE 100 MG/1
200 TABLET, FILM COATED ORAL
Status: DISCONTINUED | OUTPATIENT
Start: 2019-02-02 | End: 2019-02-03 | Stop reason: HOSPADM

## 2019-02-02 RX ORDER — SODIUM CHLORIDE 9 MG/ML
INJECTION, SOLUTION INTRAVENOUS
Status: DISPENSED
Start: 2019-02-02 | End: 2019-02-02

## 2019-02-02 RX ORDER — LORAZEPAM 1 MG/1
2 TABLET ORAL 2 TIMES DAILY
Status: DISCONTINUED | OUTPATIENT
Start: 2019-02-02 | End: 2019-02-03 | Stop reason: HOSPADM

## 2019-02-02 RX ORDER — MAGNESIUM SULFATE 1 G/100ML
1 INJECTION INTRAVENOUS ONCE
Status: COMPLETED | OUTPATIENT
Start: 2019-02-02 | End: 2019-02-02

## 2019-02-02 RX ORDER — LOSARTAN POTASSIUM 25 MG/1
25 TABLET ORAL DAILY
Status: DISCONTINUED | OUTPATIENT
Start: 2019-02-02 | End: 2019-02-03 | Stop reason: HOSPADM

## 2019-02-02 RX ORDER — POLYETHYLENE GLYCOL 3350 17 G/17G
17 POWDER, FOR SOLUTION ORAL DAILY PRN
Status: DISCONTINUED | OUTPATIENT
Start: 2019-02-02 | End: 2019-02-03 | Stop reason: HOSPADM

## 2019-02-02 RX ORDER — ASPIRIN 81 MG/1
81 TABLET ORAL DAILY
Status: DISCONTINUED | OUTPATIENT
Start: 2019-02-02 | End: 2019-02-03 | Stop reason: HOSPADM

## 2019-02-02 RX ORDER — ROPINIROLE 0.5 MG/1
0.5 TABLET, FILM COATED ORAL 3 TIMES DAILY
Status: DISCONTINUED | OUTPATIENT
Start: 2019-02-02 | End: 2019-02-03 | Stop reason: HOSPADM

## 2019-02-02 RX ORDER — PANTOPRAZOLE SODIUM 40 MG/1
40 TABLET, DELAYED RELEASE ORAL
Status: DISCONTINUED | OUTPATIENT
Start: 2019-02-02 | End: 2019-02-03 | Stop reason: HOSPADM

## 2019-02-02 RX ORDER — MAGNESIUM SULFATE IN WATER 40 MG/ML
2 INJECTION, SOLUTION INTRAVENOUS ONCE
Status: COMPLETED | OUTPATIENT
Start: 2019-02-02 | End: 2019-02-02

## 2019-02-02 RX ORDER — SIMVASTATIN 40 MG
40 TABLET ORAL NIGHTLY
Status: DISCONTINUED | OUTPATIENT
Start: 2019-02-02 | End: 2019-02-03 | Stop reason: HOSPADM

## 2019-02-02 RX ADMIN — SIMVASTATIN 40 MG: 40 TABLET, FILM COATED ORAL at 01:38

## 2019-02-02 RX ADMIN — HYDROCODONE BITARTRATE AND ACETAMINOPHEN 1 TABLET: 5; 325 TABLET ORAL at 21:32

## 2019-02-02 RX ADMIN — PHENAZOPYRIDINE HYDROCHLORIDE 200 MG: 100 TABLET ORAL at 12:19

## 2019-02-02 RX ADMIN — TIZANIDINE 2 MG: 4 TABLET ORAL at 18:55

## 2019-02-02 RX ADMIN — PHENAZOPYRIDINE HYDROCHLORIDE 200 MG: 100 TABLET ORAL at 17:06

## 2019-02-02 RX ADMIN — LORAZEPAM 2 MG: 1 TABLET ORAL at 01:37

## 2019-02-02 RX ADMIN — ROPINIROLE HYDROCHLORIDE 0.5 MG: 0.5 TABLET, FILM COATED ORAL at 08:41

## 2019-02-02 RX ADMIN — SERTRALINE HYDROCHLORIDE 50 MG: 50 TABLET ORAL at 08:41

## 2019-02-02 RX ADMIN — ROPINIROLE HYDROCHLORIDE 0.5 MG: 0.5 TABLET, FILM COATED ORAL at 21:31

## 2019-02-02 RX ADMIN — LOSARTAN POTASSIUM 25 MG: 25 TABLET, FILM COATED ORAL at 15:26

## 2019-02-02 RX ADMIN — GABAPENTIN 800 MG: 100 CAPSULE ORAL at 21:31

## 2019-02-02 RX ADMIN — ENOXAPARIN SODIUM 40 MG: 40 INJECTION SUBCUTANEOUS at 08:41

## 2019-02-02 RX ADMIN — LORAZEPAM 1 MG: 1 TABLET ORAL at 08:41

## 2019-02-02 RX ADMIN — SODIUM CHLORIDE, PRESERVATIVE FREE 10 ML: 5 INJECTION INTRAVENOUS at 08:42

## 2019-02-02 RX ADMIN — LORAZEPAM 2 MG: 1 TABLET ORAL at 21:32

## 2019-02-02 RX ADMIN — ASPIRIN 81 MG: 81 TABLET, COATED ORAL at 08:41

## 2019-02-02 RX ADMIN — MAGNESIUM SULFATE HEPTAHYDRATE 1 G: 1 INJECTION, SOLUTION INTRAVENOUS at 05:20

## 2019-02-02 RX ADMIN — LEVOTHYROXINE SODIUM 100 MCG: 0.1 TABLET ORAL at 05:17

## 2019-02-02 RX ADMIN — CARVEDILOL 3.12 MG: 6.25 TABLET, FILM COATED ORAL at 21:31

## 2019-02-02 RX ADMIN — SODIUM CHLORIDE, PRESERVATIVE FREE 10 ML: 5 INJECTION INTRAVENOUS at 21:32

## 2019-02-02 RX ADMIN — CEFTRIAXONE SODIUM 1 G: 1 INJECTION, POWDER, FOR SOLUTION INTRAMUSCULAR; INTRAVENOUS at 15:26

## 2019-02-02 RX ADMIN — HYDROCODONE BITARTRATE AND ACETAMINOPHEN 1 TABLET: 5; 325 TABLET ORAL at 15:27

## 2019-02-02 RX ADMIN — PANTOPRAZOLE SODIUM 40 MG: 40 TABLET, DELAYED RELEASE ORAL at 05:17

## 2019-02-02 RX ADMIN — ROPINIROLE HYDROCHLORIDE 0.5 MG: 0.5 TABLET, FILM COATED ORAL at 15:26

## 2019-02-02 RX ADMIN — MAGNESIUM SULFATE HEPTAHYDRATE 2 G: 40 INJECTION, SOLUTION INTRAVENOUS at 18:55

## 2019-02-02 RX ADMIN — SIMVASTATIN 40 MG: 40 TABLET, FILM COATED ORAL at 21:31

## 2019-02-02 RX ADMIN — GABAPENTIN 800 MG: 100 CAPSULE ORAL at 15:27

## 2019-02-02 RX ADMIN — CARVEDILOL 3.12 MG: 6.25 TABLET, FILM COATED ORAL at 12:19

## 2019-02-02 RX ADMIN — GABAPENTIN 800 MG: 100 CAPSULE ORAL at 08:40

## 2019-02-02 RX ADMIN — PHENAZOPYRIDINE HYDROCHLORIDE 200 MG: 100 TABLET ORAL at 08:40

## 2019-02-02 ASSESSMENT — PAIN SCALES - GENERAL
PAINLEVEL_OUTOF10: 0
PAINLEVEL_OUTOF10: 2
PAINLEVEL_OUTOF10: 8
PAINLEVEL_OUTOF10: 10

## 2019-02-02 ASSESSMENT — PAIN DESCRIPTION - PAIN TYPE: TYPE: CHRONIC PAIN

## 2019-02-03 VITALS
TEMPERATURE: 98.7 F | SYSTOLIC BLOOD PRESSURE: 173 MMHG | RESPIRATION RATE: 16 BRPM | HEART RATE: 82 BPM | OXYGEN SATURATION: 94 % | WEIGHT: 186.5 LBS | HEIGHT: 69 IN | BODY MASS INDEX: 27.62 KG/M2 | DIASTOLIC BLOOD PRESSURE: 90 MMHG

## 2019-02-03 LAB
ALBUMIN SERPL-MCNC: 3.2 G/DL (ref 3.4–5)
ANION GAP SERPL CALCULATED.3IONS-SCNC: 10 MMOL/L (ref 3–16)
BUN BLDV-MCNC: 5 MG/DL (ref 7–20)
CALCIUM SERPL-MCNC: 8.2 MG/DL (ref 8.3–10.6)
CHLORIDE BLD-SCNC: 106 MMOL/L (ref 99–110)
CO2: 26 MMOL/L (ref 21–32)
CREAT SERPL-MCNC: 0.7 MG/DL (ref 0.6–1.2)
GFR AFRICAN AMERICAN: >60
GFR NON-AFRICAN AMERICAN: >60
GLUCOSE BLD-MCNC: 124 MG/DL (ref 70–99)
HCT VFR BLD CALC: 28.4 % (ref 36–48)
HEMOGLOBIN: 9 G/DL (ref 12–16)
MAGNESIUM: 2 MG/DL (ref 1.8–2.4)
MCH RBC QN AUTO: 23.5 PG (ref 26–34)
MCHC RBC AUTO-ENTMCNC: 31.7 G/DL (ref 31–36)
MCV RBC AUTO: 74.3 FL (ref 80–100)
PDW BLD-RTO: 18.5 % (ref 12.4–15.4)
PHOSPHORUS: 3.8 MG/DL (ref 2.5–4.9)
PLATELET # BLD: 228 K/UL (ref 135–450)
PMV BLD AUTO: 8.1 FL (ref 5–10.5)
POTASSIUM SERPL-SCNC: 3.8 MMOL/L (ref 3.5–5.1)
RBC # BLD: 3.82 M/UL (ref 4–5.2)
SODIUM BLD-SCNC: 142 MMOL/L (ref 136–145)
WBC # BLD: 9.1 K/UL (ref 4–11)

## 2019-02-03 PROCEDURE — 6360000002 HC RX W HCPCS: Performed by: INTERNAL MEDICINE

## 2019-02-03 PROCEDURE — 85027 COMPLETE CBC AUTOMATED: CPT

## 2019-02-03 PROCEDURE — 2580000003 HC RX 258: Performed by: INTERNAL MEDICINE

## 2019-02-03 PROCEDURE — 96366 THER/PROPH/DIAG IV INF ADDON: CPT

## 2019-02-03 PROCEDURE — 83735 ASSAY OF MAGNESIUM: CPT

## 2019-02-03 PROCEDURE — 96372 THER/PROPH/DIAG INJ SC/IM: CPT

## 2019-02-03 PROCEDURE — 6370000000 HC RX 637 (ALT 250 FOR IP): Performed by: INTERNAL MEDICINE

## 2019-02-03 PROCEDURE — 36415 COLL VENOUS BLD VENIPUNCTURE: CPT

## 2019-02-03 PROCEDURE — 80069 RENAL FUNCTION PANEL: CPT

## 2019-02-03 RX ADMIN — GABAPENTIN 800 MG: 100 CAPSULE ORAL at 08:54

## 2019-02-03 RX ADMIN — CARVEDILOL 3.12 MG: 6.25 TABLET, FILM COATED ORAL at 08:54

## 2019-02-03 RX ADMIN — ENOXAPARIN SODIUM 40 MG: 40 INJECTION SUBCUTANEOUS at 10:40

## 2019-02-03 RX ADMIN — SODIUM CHLORIDE, PRESERVATIVE FREE 10 ML: 5 INJECTION INTRAVENOUS at 08:55

## 2019-02-03 RX ADMIN — LEVOTHYROXINE SODIUM 100 MCG: 0.1 TABLET ORAL at 06:27

## 2019-02-03 RX ADMIN — LOSARTAN POTASSIUM 25 MG: 25 TABLET, FILM COATED ORAL at 08:54

## 2019-02-03 RX ADMIN — CEFTRIAXONE SODIUM 1 G: 1 INJECTION, POWDER, FOR SOLUTION INTRAMUSCULAR; INTRAVENOUS at 08:53

## 2019-02-03 RX ADMIN — PHENAZOPYRIDINE HYDROCHLORIDE 200 MG: 100 TABLET ORAL at 08:54

## 2019-02-03 RX ADMIN — SERTRALINE HYDROCHLORIDE 50 MG: 50 TABLET ORAL at 08:54

## 2019-02-03 RX ADMIN — ROPINIROLE HYDROCHLORIDE 0.5 MG: 0.5 TABLET, FILM COATED ORAL at 08:54

## 2019-02-03 RX ADMIN — HYDROCODONE BITARTRATE AND ACETAMINOPHEN 1 TABLET: 5; 325 TABLET ORAL at 06:27

## 2019-02-03 RX ADMIN — ASPIRIN 81 MG: 81 TABLET, COATED ORAL at 08:54

## 2019-02-03 RX ADMIN — PANTOPRAZOLE SODIUM 40 MG: 40 TABLET, DELAYED RELEASE ORAL at 06:27

## 2019-02-03 RX ADMIN — LORAZEPAM 2 MG: 1 TABLET ORAL at 08:54

## 2019-02-03 ASSESSMENT — PAIN SCALES - GENERAL
PAINLEVEL_OUTOF10: 5
PAINLEVEL_OUTOF10: 7

## 2019-02-04 ENCOUNTER — CARE COORDINATION (OUTPATIENT)
Dept: INTERNAL MEDICINE CLINIC | Age: 64
End: 2019-02-04

## 2019-02-04 ENCOUNTER — CARE COORDINATION (OUTPATIENT)
Dept: CASE MANAGEMENT | Age: 64
End: 2019-02-04

## 2019-02-05 ENCOUNTER — TELEPHONE (OUTPATIENT)
Dept: INTERNAL MEDICINE CLINIC | Age: 64
End: 2019-02-05

## 2019-02-05 LAB
ORGANISM: ABNORMAL
URINE CULTURE, ROUTINE: ABNORMAL
URINE CULTURE, ROUTINE: ABNORMAL

## 2019-02-06 ENCOUNTER — CARE COORDINATION (OUTPATIENT)
Dept: CASE MANAGEMENT | Age: 64
End: 2019-02-06

## 2019-02-08 ENCOUNTER — HOSPITAL ENCOUNTER (EMERGENCY)
Age: 64
Discharge: HOME | End: 2019-02-08
Attending: EMERGENCY MEDICINE
Payer: MEDICARE

## 2019-02-08 ENCOUNTER — TELEPHONE (OUTPATIENT)
Dept: OTHER | Facility: CLINIC | Age: 64
End: 2019-02-08

## 2019-02-08 ENCOUNTER — OFFICE VISIT (OUTPATIENT)
Dept: INTERNAL MEDICINE CLINIC | Age: 64
End: 2019-02-08
Payer: MEDICARE

## 2019-02-08 ENCOUNTER — CARE COORDINATION (OUTPATIENT)
Dept: INTERNAL MEDICINE CLINIC | Age: 64
End: 2019-02-08

## 2019-02-08 VITALS
HEART RATE: 61 BPM | DIASTOLIC BLOOD PRESSURE: 70 MMHG | BODY MASS INDEX: 27.7 KG/M2 | RESPIRATION RATE: 15 BRPM | WEIGHT: 187 LBS | SYSTOLIC BLOOD PRESSURE: 129 MMHG | TEMPERATURE: 97.5 F | HEIGHT: 69 IN | OXYGEN SATURATION: 94 %

## 2019-02-08 VITALS — WEIGHT: 195 LBS | DIASTOLIC BLOOD PRESSURE: 60 MMHG | BODY MASS INDEX: 28.8 KG/M2 | SYSTOLIC BLOOD PRESSURE: 98 MMHG

## 2019-02-08 DIAGNOSIS — I50.43 ACUTE ON CHRONIC COMBINED SYSTOLIC AND DIASTOLIC HEART FAILURE (HCC): ICD-10-CM

## 2019-02-08 DIAGNOSIS — E03.9 ACQUIRED HYPOTHYROIDISM: ICD-10-CM

## 2019-02-08 DIAGNOSIS — N39.0 URINARY TRACT INFECTION WITHOUT HEMATURIA, SITE UNSPECIFIED: ICD-10-CM

## 2019-02-08 DIAGNOSIS — J39.8 TRACHEOBRONCHOMALACIA: ICD-10-CM

## 2019-02-08 DIAGNOSIS — D64.9 ANEMIA, UNSPECIFIED TYPE: ICD-10-CM

## 2019-02-08 DIAGNOSIS — E86.0 DEHYDRATION: Primary | ICD-10-CM

## 2019-02-08 DIAGNOSIS — E10.9 TYPE 1 DIABETES MELLITUS WITHOUT COMPLICATION (HCC): Primary | ICD-10-CM

## 2019-02-08 DIAGNOSIS — J47.9 BRONCHIECTASIS WITHOUT COMPLICATION (HCC): ICD-10-CM

## 2019-02-08 LAB
ANION GAP SERPL CALCULATED.3IONS-SCNC: 15 MMOL/L (ref 3–16)
BACTERIA: ABNORMAL /HPF
BASE EXCESS VENOUS: -1 (ref -3–3)
BASOPHILS ABSOLUTE: 0.1 K/UL (ref 0–0.2)
BASOPHILS RELATIVE PERCENT: 0.8 %
BILIRUBIN URINE: NEGATIVE
BLOOD, URINE: NEGATIVE
BUN BLDV-MCNC: 13 MG/DL (ref 7–20)
CALCIUM SERPL-MCNC: 9 MG/DL (ref 8.3–10.6)
CASTS: ABNORMAL /LPF
CHLORIDE BLD-SCNC: 100 MMOL/L (ref 99–110)
CLARITY: ABNORMAL
CO2: 25 MMOL/L (ref 21–32)
COLOR: YELLOW
CREAT SERPL-MCNC: 0.9 MG/DL (ref 0.6–1.2)
EKG ATRIAL RATE: 50 BPM
EKG DIAGNOSIS: NORMAL
EKG P AXIS: 51 DEGREES
EKG P-R INTERVAL: 158 MS
EKG Q-T INTERVAL: 562 MS
EKG QRS DURATION: 88 MS
EKG QTC CALCULATION (BAZETT): 512 MS
EKG R AXIS: 7 DEGREES
EKG T AXIS: 27 DEGREES
EKG VENTRICULAR RATE: 50 BPM
EOSINOPHILS ABSOLUTE: 0.2 K/UL (ref 0–0.6)
EOSINOPHILS RELATIVE PERCENT: 2.1 %
EPITHELIAL CELLS, UA: ABNORMAL /HPF
GFR AFRICAN AMERICAN: >60
GFR NON-AFRICAN AMERICAN: >60
GLUCOSE BLD-MCNC: 268 MG/DL (ref 70–99)
GLUCOSE URINE: >=1000 MG/DL
HCO3 VENOUS: 24.8 MMOL/L (ref 23–29)
HCT VFR BLD CALC: 29.7 % (ref 36–48)
HEMOGLOBIN: 9.3 G/DL (ref 12–16)
KETONES, URINE: NEGATIVE MG/DL
LACTATE: 1.4 MMOL/L (ref 0.4–2)
LEUKOCYTE ESTERASE, URINE: NEGATIVE
LYMPHOCYTES ABSOLUTE: 2.6 K/UL (ref 1–5.1)
LYMPHOCYTES RELATIVE PERCENT: 29.2 %
MCH RBC QN AUTO: 23.2 PG (ref 26–34)
MCHC RBC AUTO-ENTMCNC: 31.5 G/DL (ref 31–36)
MCV RBC AUTO: 73.7 FL (ref 80–100)
MICROSCOPIC EXAMINATION: YES
MONOCYTES ABSOLUTE: 0.8 K/UL (ref 0–1.3)
MONOCYTES RELATIVE PERCENT: 8.5 %
NEUTROPHILS ABSOLUTE: 5.3 K/UL (ref 1.7–7.7)
NEUTROPHILS RELATIVE PERCENT: 59.4 %
NITRITE, URINE: NEGATIVE
O2 SAT, VEN: 25 %
PCO2, VEN: 43.5 MM HG (ref 40–50)
PDW BLD-RTO: 18.6 % (ref 12.4–15.4)
PERFORMED ON: NORMAL
PH UA: 6
PH VENOUS: 7.36 (ref 7.35–7.45)
PLATELET # BLD: 234 K/UL (ref 135–450)
PMV BLD AUTO: 8.4 FL (ref 5–10.5)
PO2, VEN: 18 MM HG
POC SAMPLE TYPE: NORMAL
POTASSIUM REFLEX MAGNESIUM: 4.6 MMOL/L (ref 3.5–5.1)
PRO-BNP: 460 PG/ML (ref 0–124)
PROTEIN UA: ABNORMAL MG/DL
RBC # BLD: 4.03 M/UL (ref 4–5.2)
RBC UA: ABNORMAL /HPF (ref 0–2)
SODIUM BLD-SCNC: 140 MMOL/L (ref 136–145)
SPECIFIC GRAVITY UA: 1.02
TCO2 CALC VENOUS: 26 MMOL/L
TROPONIN: <0.01 NG/ML
URINE TYPE: ABNORMAL
UROBILINOGEN, URINE: 0.2 E.U./DL
WBC # BLD: 9 K/UL (ref 4–11)
WBC UA: ABNORMAL /HPF (ref 0–5)

## 2019-02-08 PROCEDURE — 99285 EMERGENCY DEPT VISIT HI MDM: CPT

## 2019-02-08 PROCEDURE — 1111F DSCHRG MED/CURRENT MED MERGE: CPT | Performed by: INTERNAL MEDICINE

## 2019-02-08 PROCEDURE — 85025 COMPLETE CBC W/AUTO DIFF WBC: CPT

## 2019-02-08 PROCEDURE — 84439 ASSAY OF FREE THYROXINE: CPT

## 2019-02-08 PROCEDURE — 83880 ASSAY OF NATRIURETIC PEPTIDE: CPT

## 2019-02-08 PROCEDURE — 96361 HYDRATE IV INFUSION ADD-ON: CPT

## 2019-02-08 PROCEDURE — 84484 ASSAY OF TROPONIN QUANT: CPT

## 2019-02-08 PROCEDURE — 2580000003 HC RX 258

## 2019-02-08 PROCEDURE — G8510 SCR DEP NEG, NO PLAN REQD: HCPCS | Performed by: INTERNAL MEDICINE

## 2019-02-08 PROCEDURE — 84443 ASSAY THYROID STIM HORMONE: CPT

## 2019-02-08 PROCEDURE — 80048 BASIC METABOLIC PNL TOTAL CA: CPT

## 2019-02-08 PROCEDURE — 93005 ELECTROCARDIOGRAM TRACING: CPT | Performed by: EMERGENCY MEDICINE

## 2019-02-08 PROCEDURE — 96360 HYDRATION IV INFUSION INIT: CPT

## 2019-02-08 PROCEDURE — 99496 TRANSJ CARE MGMT HIGH F2F 7D: CPT | Performed by: INTERNAL MEDICINE

## 2019-02-08 PROCEDURE — 81001 URINALYSIS AUTO W/SCOPE: CPT

## 2019-02-08 PROCEDURE — 83605 ASSAY OF LACTIC ACID: CPT

## 2019-02-08 PROCEDURE — 2580000003 HC RX 258: Performed by: EMERGENCY MEDICINE

## 2019-02-08 PROCEDURE — 82803 BLOOD GASES ANY COMBINATION: CPT

## 2019-02-08 RX ORDER — SODIUM CHLORIDE, SODIUM LACTATE, POTASSIUM CHLORIDE, AND CALCIUM CHLORIDE .6; .31; .03; .02 G/100ML; G/100ML; G/100ML; G/100ML
1000 INJECTION, SOLUTION INTRAVENOUS ONCE
Status: COMPLETED | OUTPATIENT
Start: 2019-02-08 | End: 2019-02-08

## 2019-02-08 RX ORDER — SODIUM CHLORIDE, SODIUM LACTATE, POTASSIUM CHLORIDE, CALCIUM CHLORIDE 600; 310; 30; 20 MG/100ML; MG/100ML; MG/100ML; MG/100ML
INJECTION, SOLUTION INTRAVENOUS
Status: COMPLETED
Start: 2019-02-08 | End: 2019-02-08

## 2019-02-08 RX ORDER — SODIUM CHLORIDE, SODIUM LACTATE, POTASSIUM CHLORIDE, CALCIUM CHLORIDE 600; 310; 30; 20 MG/100ML; MG/100ML; MG/100ML; MG/100ML
1000 INJECTION, SOLUTION INTRAVENOUS ONCE
Status: COMPLETED | OUTPATIENT
Start: 2019-02-08 | End: 2019-02-08

## 2019-02-08 RX ADMIN — SODIUM CHLORIDE, SODIUM LACTATE, POTASSIUM CHLORIDE, AND CALCIUM CHLORIDE 1000 ML: .6; .31; .03; .02 INJECTION, SOLUTION INTRAVENOUS at 14:04

## 2019-02-08 RX ADMIN — SODIUM CHLORIDE, POTASSIUM CHLORIDE, SODIUM LACTATE AND CALCIUM CHLORIDE 1000 ML: 600; 310; 30; 20 INJECTION, SOLUTION INTRAVENOUS at 15:24

## 2019-02-08 RX ADMIN — SODIUM CHLORIDE, POTASSIUM CHLORIDE, SODIUM LACTATE AND CALCIUM CHLORIDE 1000 ML: 600; 310; 30; 20 INJECTION, SOLUTION INTRAVENOUS at 14:04

## 2019-02-08 ASSESSMENT — PAIN SCALES - GENERAL: PAINLEVEL_OUTOF10: 9

## 2019-02-08 ASSESSMENT — PATIENT HEALTH QUESTIONNAIRE - PHQ9
SUM OF ALL RESPONSES TO PHQ QUESTIONS 1-9: 2
2. FEELING DOWN, DEPRESSED OR HOPELESS: 1
1. LITTLE INTEREST OR PLEASURE IN DOING THINGS: 1
SUM OF ALL RESPONSES TO PHQ QUESTIONS 1-9: 2
SUM OF ALL RESPONSES TO PHQ9 QUESTIONS 1 & 2: 2

## 2019-02-08 ASSESSMENT — PAIN DESCRIPTION - FREQUENCY: FREQUENCY: CONTINUOUS

## 2019-02-08 ASSESSMENT — PAIN DESCRIPTION - LOCATION: LOCATION: BACK

## 2019-02-08 ASSESSMENT — PAIN DESCRIPTION - DESCRIPTORS: DESCRIPTORS: ACHING

## 2019-02-08 ASSESSMENT — PAIN DESCRIPTION - PAIN TYPE: TYPE: CHRONIC PAIN

## 2019-02-09 LAB
T4 FREE: 1.4 NG/DL (ref 0.9–1.8)
TSH REFLEX: 10.12 UIU/ML (ref 0.27–4.2)

## 2019-02-11 ENCOUNTER — CARE COORDINATION (OUTPATIENT)
Dept: INTERNAL MEDICINE CLINIC | Age: 64
End: 2019-02-11

## 2019-02-11 ENCOUNTER — TELEPHONE (OUTPATIENT)
Dept: CARDIOLOGY CLINIC | Age: 64
End: 2019-02-11

## 2019-02-11 ENCOUNTER — CARE COORDINATION (OUTPATIENT)
Dept: CASE MANAGEMENT | Age: 64
End: 2019-02-11

## 2019-02-12 ENCOUNTER — CARE COORDINATION (OUTPATIENT)
Dept: INTERNAL MEDICINE CLINIC | Age: 64
End: 2019-02-12

## 2019-02-12 ENCOUNTER — OFFICE VISIT (OUTPATIENT)
Dept: INTERNAL MEDICINE CLINIC | Age: 64
End: 2019-02-12
Payer: MEDICARE

## 2019-02-12 VITALS
HEIGHT: 69 IN | WEIGHT: 197 LBS | DIASTOLIC BLOOD PRESSURE: 70 MMHG | SYSTOLIC BLOOD PRESSURE: 140 MMHG | BODY MASS INDEX: 29.18 KG/M2

## 2019-02-12 DIAGNOSIS — R56.9 NEW ONSET SEIZURE (HCC): ICD-10-CM

## 2019-02-12 DIAGNOSIS — R56.9 CONVULSIONS, UNSPECIFIED CONVULSION TYPE (HCC): ICD-10-CM

## 2019-02-12 DIAGNOSIS — I50.43 ACUTE ON CHRONIC COMBINED SYSTOLIC AND DIASTOLIC HEART FAILURE (HCC): ICD-10-CM

## 2019-02-12 DIAGNOSIS — J47.9 BRONCHIECTASIS WITHOUT COMPLICATION (HCC): ICD-10-CM

## 2019-02-12 DIAGNOSIS — E10.9 TYPE 1 DIABETES MELLITUS WITHOUT COMPLICATION (HCC): Primary | ICD-10-CM

## 2019-02-12 PROCEDURE — 3017F COLORECTAL CA SCREEN DOC REV: CPT | Performed by: INTERNAL MEDICINE

## 2019-02-12 PROCEDURE — G8598 ASA/ANTIPLAT THER USED: HCPCS | Performed by: INTERNAL MEDICINE

## 2019-02-12 PROCEDURE — 2022F DILAT RTA XM EVC RTNOPTHY: CPT | Performed by: INTERNAL MEDICINE

## 2019-02-12 PROCEDURE — G8427 DOCREV CUR MEDS BY ELIG CLIN: HCPCS | Performed by: INTERNAL MEDICINE

## 2019-02-12 PROCEDURE — 99214 OFFICE O/P EST MOD 30 MIN: CPT | Performed by: INTERNAL MEDICINE

## 2019-02-12 PROCEDURE — 3046F HEMOGLOBIN A1C LEVEL >9.0%: CPT | Performed by: INTERNAL MEDICINE

## 2019-02-12 PROCEDURE — G8417 CALC BMI ABV UP PARAM F/U: HCPCS | Performed by: INTERNAL MEDICINE

## 2019-02-12 PROCEDURE — 1036F TOBACCO NON-USER: CPT | Performed by: INTERNAL MEDICINE

## 2019-02-12 PROCEDURE — 1111F DSCHRG MED/CURRENT MED MERGE: CPT | Performed by: INTERNAL MEDICINE

## 2019-02-12 PROCEDURE — G8482 FLU IMMUNIZE ORDER/ADMIN: HCPCS | Performed by: INTERNAL MEDICINE

## 2019-02-12 RX ORDER — LEVOTHYROXINE SODIUM 0.07 MG/1
75 TABLET ORAL DAILY
Qty: 30 TABLET | Refills: 0 | Status: SHIPPED | OUTPATIENT
Start: 2019-02-12 | End: 2019-02-21

## 2019-02-12 ASSESSMENT — PATIENT HEALTH QUESTIONNAIRE - PHQ9
SUM OF ALL RESPONSES TO PHQ QUESTIONS 1-9: 0
1. LITTLE INTEREST OR PLEASURE IN DOING THINGS: 0
SUM OF ALL RESPONSES TO PHQ9 QUESTIONS 1 & 2: 0
2. FEELING DOWN, DEPRESSED OR HOPELESS: 0
SUM OF ALL RESPONSES TO PHQ QUESTIONS 1-9: 0

## 2019-02-19 ENCOUNTER — OFFICE VISIT (OUTPATIENT)
Dept: CARDIOLOGY CLINIC | Age: 64
End: 2019-02-19
Payer: MEDICARE

## 2019-02-19 VITALS
BODY MASS INDEX: 29.56 KG/M2 | OXYGEN SATURATION: 95 % | DIASTOLIC BLOOD PRESSURE: 72 MMHG | HEART RATE: 56 BPM | SYSTOLIC BLOOD PRESSURE: 110 MMHG | HEIGHT: 69 IN | WEIGHT: 199.6 LBS

## 2019-02-19 DIAGNOSIS — I25.10 CAD IN NATIVE ARTERY: ICD-10-CM

## 2019-02-19 DIAGNOSIS — R55 SYNCOPE, UNSPECIFIED SYNCOPE TYPE: ICD-10-CM

## 2019-02-19 DIAGNOSIS — R42 DIZZINESS: ICD-10-CM

## 2019-02-19 DIAGNOSIS — E78.2 MIXED HYPERLIPIDEMIA: Primary | ICD-10-CM

## 2019-02-19 PROCEDURE — 3017F COLORECTAL CA SCREEN DOC REV: CPT | Performed by: INTERNAL MEDICINE

## 2019-02-19 PROCEDURE — G8427 DOCREV CUR MEDS BY ELIG CLIN: HCPCS | Performed by: INTERNAL MEDICINE

## 2019-02-19 PROCEDURE — 1111F DSCHRG MED/CURRENT MED MERGE: CPT | Performed by: INTERNAL MEDICINE

## 2019-02-19 PROCEDURE — 99214 OFFICE O/P EST MOD 30 MIN: CPT | Performed by: INTERNAL MEDICINE

## 2019-02-19 PROCEDURE — G8598 ASA/ANTIPLAT THER USED: HCPCS | Performed by: INTERNAL MEDICINE

## 2019-02-19 PROCEDURE — 1036F TOBACCO NON-USER: CPT | Performed by: INTERNAL MEDICINE

## 2019-02-19 PROCEDURE — G8482 FLU IMMUNIZE ORDER/ADMIN: HCPCS | Performed by: INTERNAL MEDICINE

## 2019-02-19 PROCEDURE — G8417 CALC BMI ABV UP PARAM F/U: HCPCS | Performed by: INTERNAL MEDICINE

## 2019-02-20 ENCOUNTER — CARE COORDINATION (OUTPATIENT)
Dept: CARE COORDINATION | Age: 64
End: 2019-02-20

## 2019-02-21 ENCOUNTER — OFFICE VISIT (OUTPATIENT)
Dept: INTERNAL MEDICINE CLINIC | Age: 64
End: 2019-02-21
Payer: MEDICARE

## 2019-02-21 VITALS
BODY MASS INDEX: 29.39 KG/M2 | WEIGHT: 199 LBS | HEART RATE: 51 BPM | DIASTOLIC BLOOD PRESSURE: 68 MMHG | TEMPERATURE: 97.7 F | SYSTOLIC BLOOD PRESSURE: 102 MMHG

## 2019-02-21 DIAGNOSIS — E03.9 ACQUIRED HYPOTHYROIDISM: ICD-10-CM

## 2019-02-21 DIAGNOSIS — J47.9 BRONCHIECTASIS WITHOUT COMPLICATION (HCC): ICD-10-CM

## 2019-02-21 DIAGNOSIS — D50.9 MICROCYTIC ANEMIA: ICD-10-CM

## 2019-02-21 DIAGNOSIS — E11.8 TYPE 2 DIABETES MELLITUS WITH COMPLICATION, WITHOUT LONG-TERM CURRENT USE OF INSULIN (HCC): ICD-10-CM

## 2019-02-21 DIAGNOSIS — R53.1 WEAKNESS: ICD-10-CM

## 2019-02-21 DIAGNOSIS — R53.1 WEAKNESS: Primary | ICD-10-CM

## 2019-02-21 DIAGNOSIS — I25.10 CAD IN NATIVE ARTERY: ICD-10-CM

## 2019-02-21 DIAGNOSIS — I50.43 ACUTE ON CHRONIC COMBINED SYSTOLIC AND DIASTOLIC HEART FAILURE (HCC): ICD-10-CM

## 2019-02-21 DIAGNOSIS — F41.1 GENERALIZED ANXIETY DISORDER: ICD-10-CM

## 2019-02-21 PROBLEM — E10.9 TYPE 1 DIABETES MELLITUS WITHOUT COMPLICATION (HCC): Status: ACTIVE | Noted: 2019-02-21

## 2019-02-21 LAB
A/G RATIO: 1.2 (ref 1.1–2.2)
ALBUMIN SERPL-MCNC: 3.8 G/DL (ref 3.4–5)
ALP BLD-CCNC: 158 U/L (ref 40–129)
ALT SERPL-CCNC: 21 U/L (ref 10–40)
ANION GAP SERPL CALCULATED.3IONS-SCNC: 16 MMOL/L (ref 3–16)
AST SERPL-CCNC: 46 U/L (ref 15–37)
BASOPHILS ABSOLUTE: 0.1 K/UL (ref 0–0.2)
BASOPHILS RELATIVE PERCENT: 0.8 %
BILIRUB SERPL-MCNC: 0.3 MG/DL (ref 0–1)
BUN BLDV-MCNC: 9 MG/DL (ref 7–20)
CALCIUM SERPL-MCNC: 9.2 MG/DL (ref 8.3–10.6)
CHLORIDE BLD-SCNC: 90 MMOL/L (ref 99–110)
CO2: 23 MMOL/L (ref 21–32)
CREAT SERPL-MCNC: 1 MG/DL (ref 0.6–1.2)
EOSINOPHILS ABSOLUTE: 0.2 K/UL (ref 0–0.6)
EOSINOPHILS RELATIVE PERCENT: 2.3 %
FERRITIN: 19.8 NG/ML (ref 15–150)
GFR AFRICAN AMERICAN: >60
GFR NON-AFRICAN AMERICAN: 56
GLOBULIN: 3.3 G/DL
GLUCOSE BLD-MCNC: 450 MG/DL (ref 70–99)
HCT VFR BLD CALC: 30.3 % (ref 36–48)
HEMOGLOBIN: 9.4 G/DL (ref 12–16)
IRON SATURATION: 9 % (ref 15–50)
IRON: 32 UG/DL (ref 37–145)
LYMPHOCYTES ABSOLUTE: 2.3 K/UL (ref 1–5.1)
LYMPHOCYTES RELATIVE PERCENT: 22.9 %
MCH RBC QN AUTO: 23.3 PG (ref 26–34)
MCHC RBC AUTO-ENTMCNC: 31.2 G/DL (ref 31–36)
MCV RBC AUTO: 74.6 FL (ref 80–100)
MONOCYTES ABSOLUTE: 0.8 K/UL (ref 0–1.3)
MONOCYTES RELATIVE PERCENT: 7.6 %
NEUTROPHILS ABSOLUTE: 6.7 K/UL (ref 1.7–7.7)
NEUTROPHILS RELATIVE PERCENT: 66.4 %
PDW BLD-RTO: 18.7 % (ref 12.4–15.4)
PLATELET # BLD: 291 K/UL (ref 135–450)
PMV BLD AUTO: 9 FL (ref 5–10.5)
POTASSIUM SERPL-SCNC: 4.6 MMOL/L (ref 3.5–5.1)
RBC # BLD: 4.05 M/UL (ref 4–5.2)
SODIUM BLD-SCNC: 129 MMOL/L (ref 136–145)
T4 FREE: 1.3 NG/DL (ref 0.9–1.8)
TOTAL IRON BINDING CAPACITY: 376 UG/DL (ref 260–445)
TOTAL PROTEIN: 7.1 G/DL (ref 6.4–8.2)
TSH REFLEX: 7.98 UIU/ML (ref 0.27–4.2)
WBC # BLD: 10.1 K/UL (ref 4–11)

## 2019-02-21 PROCEDURE — G8598 ASA/ANTIPLAT THER USED: HCPCS | Performed by: INTERNAL MEDICINE

## 2019-02-21 PROCEDURE — G8427 DOCREV CUR MEDS BY ELIG CLIN: HCPCS | Performed by: INTERNAL MEDICINE

## 2019-02-21 PROCEDURE — 2022F DILAT RTA XM EVC RTNOPTHY: CPT | Performed by: INTERNAL MEDICINE

## 2019-02-21 PROCEDURE — 3017F COLORECTAL CA SCREEN DOC REV: CPT | Performed by: INTERNAL MEDICINE

## 2019-02-21 PROCEDURE — 1111F DSCHRG MED/CURRENT MED MERGE: CPT | Performed by: INTERNAL MEDICINE

## 2019-02-21 PROCEDURE — G8482 FLU IMMUNIZE ORDER/ADMIN: HCPCS | Performed by: INTERNAL MEDICINE

## 2019-02-21 PROCEDURE — 99214 OFFICE O/P EST MOD 30 MIN: CPT | Performed by: INTERNAL MEDICINE

## 2019-02-21 PROCEDURE — 1036F TOBACCO NON-USER: CPT | Performed by: INTERNAL MEDICINE

## 2019-02-21 PROCEDURE — 3046F HEMOGLOBIN A1C LEVEL >9.0%: CPT | Performed by: INTERNAL MEDICINE

## 2019-02-21 PROCEDURE — G8417 CALC BMI ABV UP PARAM F/U: HCPCS | Performed by: INTERNAL MEDICINE

## 2019-02-21 RX ORDER — LEVOTHYROXINE SODIUM 112 UG/1
112 TABLET ORAL DAILY
Qty: 30 TABLET | Refills: 1 | Status: ON HOLD | OUTPATIENT
Start: 2019-02-21 | End: 2021-09-28

## 2019-02-21 ASSESSMENT — ENCOUNTER SYMPTOMS
BLOOD IN STOOL: 0
SHORTNESS OF BREATH: 0
CHEST TIGHTNESS: 0
ABDOMINAL PAIN: 0
VOMITING: 0
NAUSEA: 0

## 2019-02-22 DIAGNOSIS — E03.9 HYPOTHYROIDISM, UNSPECIFIED TYPE: ICD-10-CM

## 2019-02-22 DIAGNOSIS — E87.1 HYPONATREMIA: Primary | ICD-10-CM

## 2019-02-22 LAB
ESTIMATED AVERAGE GLUCOSE: 257.5 MG/DL
HBA1C MFR BLD: 10.6 %

## 2019-02-25 ENCOUNTER — HOSPITAL ENCOUNTER (EMERGENCY)
Age: 64
Discharge: HOME | End: 2019-02-25
Attending: EMERGENCY MEDICINE
Payer: MEDICARE

## 2019-02-25 ENCOUNTER — TELEPHONE (OUTPATIENT)
Dept: CARDIOLOGY CLINIC | Age: 64
End: 2019-02-25

## 2019-02-25 ENCOUNTER — TELEPHONE (OUTPATIENT)
Dept: OTHER | Facility: CLINIC | Age: 64
End: 2019-02-25

## 2019-02-25 ENCOUNTER — APPOINTMENT (OUTPATIENT)
Dept: CT IMAGING | Age: 64
End: 2019-02-25
Payer: MEDICARE

## 2019-02-25 VITALS
OXYGEN SATURATION: 95 % | BODY MASS INDEX: 28.14 KG/M2 | WEIGHT: 190 LBS | DIASTOLIC BLOOD PRESSURE: 84 MMHG | RESPIRATION RATE: 17 BRPM | TEMPERATURE: 98.5 F | HEART RATE: 87 BPM | HEIGHT: 69 IN | SYSTOLIC BLOOD PRESSURE: 173 MMHG

## 2019-02-25 DIAGNOSIS — R10.32 ABDOMINAL PAIN, LEFT LOWER QUADRANT: ICD-10-CM

## 2019-02-25 DIAGNOSIS — R11.2 NAUSEA AND VOMITING, INTRACTABILITY OF VOMITING NOT SPECIFIED, UNSPECIFIED VOMITING TYPE: Primary | ICD-10-CM

## 2019-02-25 LAB
A/G RATIO: 1 (ref 1.1–2.2)
ALBUMIN SERPL-MCNC: 3.9 G/DL (ref 3.4–5)
ALP BLD-CCNC: 174 U/L (ref 40–129)
ALT SERPL-CCNC: 13 U/L (ref 10–40)
ANION GAP SERPL CALCULATED.3IONS-SCNC: 14 MMOL/L (ref 3–16)
AST SERPL-CCNC: 27 U/L (ref 15–37)
BASOPHILS ABSOLUTE: 0.1 K/UL (ref 0–0.2)
BASOPHILS RELATIVE PERCENT: 1.2 %
BILIRUB SERPL-MCNC: 0.4 MG/DL (ref 0–1)
BILIRUBIN URINE: NEGATIVE
BLOOD, URINE: NEGATIVE
BUN BLDV-MCNC: 5 MG/DL (ref 7–20)
CALCIUM SERPL-MCNC: 9 MG/DL (ref 8.3–10.6)
CHLORIDE BLD-SCNC: 98 MMOL/L (ref 99–110)
CLARITY: CLEAR
CO2: 22 MMOL/L (ref 21–32)
COLOR: YELLOW
CREAT SERPL-MCNC: 0.6 MG/DL (ref 0.6–1.2)
EOSINOPHILS ABSOLUTE: 0.1 K/UL (ref 0–0.6)
EOSINOPHILS RELATIVE PERCENT: 0.7 %
GFR AFRICAN AMERICAN: >60
GFR NON-AFRICAN AMERICAN: >60
GLOBULIN: 4 G/DL
GLUCOSE BLD-MCNC: 281 MG/DL (ref 70–99)
GLUCOSE URINE: 500 MG/DL
HCT VFR BLD CALC: 33 % (ref 36–48)
HEMOGLOBIN: 10.4 G/DL (ref 12–16)
KETONES, URINE: NEGATIVE MG/DL
LEUKOCYTE ESTERASE, URINE: NEGATIVE
LIPASE: 27 U/L (ref 13–60)
LYMPHOCYTES ABSOLUTE: 2.4 K/UL (ref 1–5.1)
LYMPHOCYTES RELATIVE PERCENT: 20.6 %
MCH RBC QN AUTO: 22.7 PG (ref 26–34)
MCHC RBC AUTO-ENTMCNC: 31.5 G/DL (ref 31–36)
MCV RBC AUTO: 72.3 FL (ref 80–100)
MICROSCOPIC EXAMINATION: ABNORMAL
MONOCYTES ABSOLUTE: 0.9 K/UL (ref 0–1.3)
MONOCYTES RELATIVE PERCENT: 7.6 %
NEUTROPHILS ABSOLUTE: 8.1 K/UL (ref 1.7–7.7)
NEUTROPHILS RELATIVE PERCENT: 69.9 %
NITRITE, URINE: NEGATIVE
OCCULT BLOOD SCREENING: NORMAL
PDW BLD-RTO: 19 % (ref 12.4–15.4)
PH UA: 6.5
PLATELET # BLD: 288 K/UL (ref 135–450)
PMV BLD AUTO: 8 FL (ref 5–10.5)
POTASSIUM REFLEX MAGNESIUM: 3.9 MMOL/L (ref 3.5–5.1)
PROTEIN UA: NEGATIVE MG/DL
RBC # BLD: 4.57 M/UL (ref 4–5.2)
SODIUM BLD-SCNC: 134 MMOL/L (ref 136–145)
SPECIFIC GRAVITY UA: 1.01
SPECIMEN STATUS: NORMAL
TOTAL PROTEIN: 7.9 G/DL (ref 6.4–8.2)
URINE REFLEX TO CULTURE: ABNORMAL
URINE TYPE: ABNORMAL
UROBILINOGEN, URINE: 0.2 E.U./DL
WBC # BLD: 11.5 K/UL (ref 4–11)

## 2019-02-25 PROCEDURE — 81003 URINALYSIS AUTO W/O SCOPE: CPT

## 2019-02-25 PROCEDURE — 6360000004 HC RX CONTRAST MEDICATION: Performed by: NURSE PRACTITIONER

## 2019-02-25 PROCEDURE — 96374 THER/PROPH/DIAG INJ IV PUSH: CPT

## 2019-02-25 PROCEDURE — 96375 TX/PRO/DX INJ NEW DRUG ADDON: CPT

## 2019-02-25 PROCEDURE — 83690 ASSAY OF LIPASE: CPT

## 2019-02-25 PROCEDURE — 99284 EMERGENCY DEPT VISIT MOD MDM: CPT

## 2019-02-25 PROCEDURE — 74177 CT ABD & PELVIS W/CONTRAST: CPT

## 2019-02-25 PROCEDURE — G0328 FECAL BLOOD SCRN IMMUNOASSAY: HCPCS

## 2019-02-25 PROCEDURE — 80053 COMPREHEN METABOLIC PANEL: CPT

## 2019-02-25 PROCEDURE — 85025 COMPLETE CBC W/AUTO DIFF WBC: CPT

## 2019-02-25 PROCEDURE — 2580000003 HC RX 258: Performed by: NURSE PRACTITIONER

## 2019-02-25 PROCEDURE — 96361 HYDRATE IV INFUSION ADD-ON: CPT

## 2019-02-25 PROCEDURE — 6360000002 HC RX W HCPCS: Performed by: NURSE PRACTITIONER

## 2019-02-25 RX ORDER — 0.9 % SODIUM CHLORIDE 0.9 %
1000 INTRAVENOUS SOLUTION INTRAVENOUS ONCE
Status: COMPLETED | OUTPATIENT
Start: 2019-02-25 | End: 2019-02-25

## 2019-02-25 RX ORDER — ONDANSETRON 2 MG/ML
4 INJECTION INTRAMUSCULAR; INTRAVENOUS ONCE
Status: COMPLETED | OUTPATIENT
Start: 2019-02-25 | End: 2019-02-25

## 2019-02-25 RX ORDER — MORPHINE SULFATE 2 MG/ML
2 INJECTION, SOLUTION INTRAMUSCULAR; INTRAVENOUS ONCE
Status: COMPLETED | OUTPATIENT
Start: 2019-02-25 | End: 2019-02-25

## 2019-02-25 RX ADMIN — SODIUM CHLORIDE 1000 ML: 9 INJECTION, SOLUTION INTRAVENOUS at 13:10

## 2019-02-25 RX ADMIN — ONDANSETRON 4 MG: 2 INJECTION INTRAMUSCULAR; INTRAVENOUS at 13:11

## 2019-02-25 RX ADMIN — IOPAMIDOL 75 ML: 755 INJECTION, SOLUTION INTRAVENOUS at 13:32

## 2019-02-25 RX ADMIN — MORPHINE SULFATE 2 MG: 2 INJECTION, SOLUTION INTRAMUSCULAR; INTRAVENOUS at 13:10

## 2019-02-25 ASSESSMENT — PAIN SCALES - GENERAL
PAINLEVEL_OUTOF10: 6
PAINLEVEL_OUTOF10: 7
PAINLEVEL_OUTOF10: 5

## 2019-02-25 ASSESSMENT — PAIN DESCRIPTION - LOCATION
LOCATION: ABDOMEN
LOCATION: ABDOMEN

## 2019-02-25 ASSESSMENT — PAIN DESCRIPTION - PAIN TYPE: TYPE: ACUTE PAIN

## 2019-02-26 ENCOUNTER — TELEPHONE (OUTPATIENT)
Dept: CARDIOLOGY CLINIC | Age: 64
End: 2019-02-26

## 2019-02-26 ENCOUNTER — CARE COORDINATION (OUTPATIENT)
Dept: CARE COORDINATION | Age: 64
End: 2019-02-26

## 2019-03-04 ENCOUNTER — TELEPHONE (OUTPATIENT)
Dept: INTERNAL MEDICINE CLINIC | Age: 64
End: 2019-03-04

## 2019-03-04 PROBLEM — N39.0 UTI (URINARY TRACT INFECTION): Status: RESOLVED | Noted: 2019-02-01 | Resolved: 2019-03-04

## 2019-03-05 ENCOUNTER — CARE COORDINATION (OUTPATIENT)
Dept: CARE COORDINATION | Age: 64
End: 2019-03-05

## 2019-03-06 PROCEDURE — 93224 XTRNL ECG REC UP TO 48 HRS: CPT | Performed by: INTERNAL MEDICINE

## 2019-03-11 ENCOUNTER — TELEPHONE (OUTPATIENT)
Dept: INTERNAL MEDICINE CLINIC | Age: 64
End: 2019-03-11

## 2019-03-11 DIAGNOSIS — E08.42 DIABETES MELLITUS DUE TO UNDERLYING CONDITION WITH DIABETIC POLYNEUROPATHY, WITHOUT LONG-TERM CURRENT USE OF INSULIN (HCC): Primary | ICD-10-CM

## 2019-03-11 RX ORDER — LANCETS 30 GAUGE
EACH MISCELLANEOUS
Qty: 200 EACH | Refills: 3 | Status: ON HOLD | OUTPATIENT
Start: 2019-03-11 | End: 2022-01-21 | Stop reason: HOSPADM

## 2019-03-11 RX ORDER — GLUCOSAMINE HCL/CHONDROITIN SU 500-400 MG
CAPSULE ORAL
Qty: 200 STRIP | Refills: 3 | Status: SHIPPED | OUTPATIENT
Start: 2019-03-11

## 2019-03-13 DIAGNOSIS — R55 SYNCOPE, UNSPECIFIED SYNCOPE TYPE: ICD-10-CM

## 2019-03-13 RX ORDER — AMLODIPINE BESYLATE 2.5 MG/1
2.5 TABLET ORAL DAILY
Qty: 30 TABLET | Refills: 11 | Status: ON HOLD | OUTPATIENT
Start: 2019-03-13 | End: 2021-09-28

## 2019-03-14 ENCOUNTER — CARE COORDINATION (OUTPATIENT)
Dept: CARE COORDINATION | Age: 64
End: 2019-03-14

## 2019-03-15 ENCOUNTER — TELEPHONE (OUTPATIENT)
Dept: INTERNAL MEDICINE CLINIC | Age: 64
End: 2019-03-15

## 2019-03-15 NOTE — CARE COORDINATION
Ambulatory Care Coordination Note  3/14/2019  CM Risk Score: 10  Vin Mortality Risk Score:      ACC: Sharyn Rushing RN     History of Present Illness: COPD, Tracheobronchomalacia, IBS, Osteoarthritis, HTN, Fibromyalgia, Asthma, CHF, CAD, DDD, Arthritis, Anxiety, DM, Pulmonary Nodule,Frequent ED Visits, UTI, Sepsis, Hypotension, Dehydration, Diverticulosis,Fibromyalgia,GERD,Hyperlipidemia, Anemia, Syncope     Summary Note: The pt's  stated he has been trying to obtain the pt's glucometer ordered to go with th pt's lancets and test strips but he hasn't been able to get the request to the PCP's MA. The  stated the pt was not able to test her BS's so he just purchased a new glucometer and the testing supplies to go with it. The  stated the pt's FBS's have been running 190-200. The  stated the pt's BS's had been higher. The  stated the pt was instructed to check her B/P twice a day and report the the results to the PCP. The  stated the pt's B/P's were running high. ( 169/91, 188/97, 201/106, 190/97)  The  stated he was not able to get the results to the PCP so he called the pt's Cardiologist who added Amlodipine 2.5 mg 1 tab daily. The pt also saw her Gastroenterologist and the pt was found to have anemia d/t a gastric polyp. The  stated the polyp was cauterized but the pt's iron level are still low. The pt was started on an iron supplement. The  stated the iron causes the pt to have abdominal pain and constipation. The  stated the pt is scheduled to have repeat blood work next week and may need an iron infusion. The [facility] assisted the pt's  in obtaining the correct glucometer and testing supplies. The [facility] instructed the  to encourage the pt her to take Iron.  The pt's   was reminded to check the pt's B/P twice a day and call with the results.   Plan:  The PCP's MA will reach out to the pt's  to assist him with the pt's BS testing supplies. The MA will discuss with the  how to report the pt's B/P results. The pt is starting the Amlodipine today. Continue to test the pt's FBS's and 2 hr Post Prandial daily. The [address] instructed the pt's  to continue to monitor the pt's B/P. The pt is scheduled to follow up with the Gastroenterologist.  The pt will follow up with her Cardiologist.  The [address] encouraged the pt's  to call the nurse with any questions or concerns. Care Coordination Interventions    Program Enrollment:  Complex Care  Referral from Primary Care Provider:  Yes  Suggested Interventions and Community Resources  Diabetes Education: In Process  Fall Risk Prevention: In Process  Home Health Services:  Completed  Medi Set or Pill Pack: In Process  Registered Dietician:  Completed  Specialty Services Referral:  In Process (Comment: GI)  Zone Management Tools: In Process (Comment: DM zones mailed to pt.)  Other Services or Interventions: Instructed pt to follow up with her Pulmonologist and Cardiologist. GI f/u         Goals Addressed                 This Visit's Progress     Conditions and Symptoms   Improving     I will schedule office visits, as directed by my provider. I will keep my appointment or reschedule if I have to cancel. I will notify my provider of any barriers to my plan of care. I will notify my provider of any symptoms that indicate a worsening of my condition.     Barriers: lack of motivation, overwhelmed by complexity of regimen, stress and lack of education  Plan for overcoming my barriers: Provide the pt with education and support  Confidence: 6/10  Anticipated Goal Completion Date: 08/01/18 Extend to 06/15/19  Reactivated 2/12/19: Recent hospitalization and ED visit       Self Monitoring   Improving     Self-Monitored Blood Glucose - I will check my blood sugar Fasting blood sugar  I will notify my provider of any trends of increasing or decreasing blood sugars over a 1 month period. Other Self-Monitoring - I will monitor my abdominal pain and report any bloody stools. - Other: as needed    None Recently Recorded    Barriers: stress  Plan for overcoming my barriers: Assistance from  and family  Confidence: 9/10  Anticipated Goal Completion Date: 08/01/18  Extended Date: 05/24/19 9/13/18: Still adjusting her diabetic oral agent  1/24/19: FBS's still in 200's Pt instructed to stop drinking sweet tea. Medication adjusted per PCP. Prior to Admission medications    Medication Sig Start Date End Date Taking? Authorizing Provider   amLODIPine (NORVASC) 2.5 MG tablet Take 1 tablet by mouth daily 3/13/19   Abby Sandy MD   Lancets MISC Dispense whatever insurance will cover. Patient test twice day. Dx:E11.9 3/11/19   Tegan Lara MD   blood glucose monitor strips Dispense whatever insurance will cover. Pt test twice a day dx:E11.9 3/11/19   Tegan Lara MD   Insulin Pen Needle 31G X 5 MM MISC 1 each by Does not apply route daily 3/11/19   Tegan Lraa MD   blood glucose test strips (ASCENSIA AUTODISC VI;ONE TOUCH ULTRA TEST VI) strip 1 each by In Vitro route daily As needed. 3/11/19   Tegan Lara MD   levothyroxine (SYNTHROID) 112 MCG tablet Take 1 tablet by mouth Daily 2/21/19   Tegan Lara MD   SITagliptin (JANUVIA) 50 MG tablet Take 1 tablet by mouth daily 2/21/19   Tegan Lara MD   LORazepam (ATIVAN) 2 MG tablet TAKE 1 TABLET BY MOUTH TWICE DAILY. 1/24/19 4/24/19  Devang Zafar MD   glipiZIDE (GLIPIZIDE XL) 10 MG extended release tablet take three times daily once after  breakfast ,lunch and dinner daily 1/24/19   Devang Zafar MD   simvastatin (ZOCOR) 80 MG tablet TAKE 1 TABLET BY MOUTH AT BEDTIME 11/30/18   Devang Zafar MD   simvastatin (ZOCOR) 80 MG tablet TAKE 1 TABLET BY MOUTH AT BEDTIME 11/30/18   Devang Zafar MD   rOPINIRole (REQUIP) 0.5 MG tablet TAKE 1 TABLET BY MOUTH 3 TIMES DAILY.  11/30/18 Perri Vasquez MD   sertraline (ZOLOFT) 50 MG tablet TAKE 1 TABLET BY MOUTH DAILY 11/30/18   Perri Vasquez MD   glipiZIDE (GLUCOTROL XL) 10 MG extended release tablet TAKE 1 TABLET BY MOUTH DAILY 10/5/18   Perri Vasquez MD   polyethylene glycol [facility]) powder Take 17 g by mouth daily as needed    Historical Provider, MD   Blood Glucose Monitoring Suppl CATHRYN Dispense whatever insurance will cover. Dx code:E11.9 1/11/18   Perri Vasquez MD   dicyclomine (BENTYL) 10 MG capsule Take 1 capsule by mouth 4 times daily 8/31/17   [facility] Staff, APRN - CNP   gabapentin (NEURONTIN) 800 MG tablet Take 800 mg by mouth 3 times daily 8/2/17   Historical Provider, MD   omeprazole (PRILOSEC) 40 MG delayed release capsule Take 40 mg by mouth 2 times daily 8/2/17   Historical Provider, MD   HYDROcodone-acetaminophen (NORCO) 5-325 MG per tablet Take 1 tablet by mouth every 8 hours as needed for Pain  . Historical Provider, MD   aspirin 81 MG EC tablet Take 1 tablet by mouth daily 9/24/15   Candi Pradhan MD   tiZANidine (ZANAFLEX) 2 MG tablet Take 2 mg by mouth 3 times daily as needed     Historical Provider, MD       Future Appointments   Date Time Provider Viky Downs   4/12/2019  1:00 PM Katherine Rodriguez MD [facility]   4/24/2019  1:30 PM Alana Tate MD [facility] [address] [facility]   5/21/2019  1:15 PM Jordan Conley MD [facility]     ,   Diabetes Assessment    Medic Alert ID:  No  Meal Planning:  Carb counting, Plate Method   How often do you test your blood sugar?:  Daily, Other (Comment: 2 hr PP)   Do you have barriers with adherence to non-pharmacologic self-management interventions?  (Nutrition/Exercise/Self-Monitoring):  No   Have you ever had to go to the ED for symptoms of low blood sugar?:  No       Increase or Decrease trend in Blood Sugars   Do you have hyperglycemia symptoms?:  No   Do you have hypoglycemia symptoms?:  No   Last Blood Sugar Value:  200   Blood Sugar Monitoring Regimen:  Morning Fasting, 2 Hours Post Meal      ,   Congestive Heart Failure Assessment    Are you currently restricting fluids?:  No Restriction  Do you understand a low sodium diet?:  Yes  Do you understand how to read food labels?:  Yes  How many restaurant meals do you eat per week?:  3-4  Do you salt your food before tasting it?:  No     No patient-reported symptoms      Symptoms:   None:  Yes      Symptom course:  stable  Weight trend:  stable  Salt intake watch compared to last visit:  stable      and   General Assessment    Do you have any symptoms that are causing concern?:  No

## 2019-03-18 NOTE — CARE COORDINATION
ACC Note:  The RNCC called and left a message for the pt's  to call the nurse in r/t the testing supplies and setting up My Chart. The pt stated she will have her  call. Catina Carrasquillo

## 2019-03-25 ENCOUNTER — CARE COORDINATION (OUTPATIENT)
Dept: CARE COORDINATION | Age: 64
End: 2019-03-25

## 2019-04-25 ENCOUNTER — CARE COORDINATION (OUTPATIENT)
Dept: CARE COORDINATION | Age: 64
End: 2019-04-25

## 2019-04-25 NOTE — CARE COORDINATION
ACC Note:  The RNCC has tried to reach the pt and her  several times. Pt may be seeing a new provider. The [facility] mailed the pt a letter of final outreach.

## 2019-04-25 NOTE — LETTER
4/25/2019    [address] [facility]  [address]  [address] have enjoyed working with you to improve your health. I would like to continue to provide you support. However, I have been unable to reach you at, [phone] (home) [phone] (work) . Please let me know if I can help schedule an office visit for you or answer any questions. If you are seeing another provider please let me know. Thomas Mccollum MD is interested in your health and hopes to hear from you soon. If you would like continued access to your Nurse Care Coordinator, Arianen Carroll RN, you can reach me at [phone]. I will wait to hear from you and will no longer reach out to you. Thomas Mccollum MD and his/her team will continue to provide care and be available for questions.           In good health,           Arianne Carroll RN [facility]  Nurse Care Coordinator  Office # [phone]  Cell # [phone]

## 2019-04-25 NOTE — PROGRESS NOTES
TAKE 1 TABLET BY MOUTH DAILY 11/30/18  Yes Barb Carrillo MD   glipiZIDE (GLUCOTROL XL) 10 MG extended release tablet TAKE 1 TABLET BY MOUTH DAILY 10/5/18  Yes Barb Carrillo MD   polyethylene glycol (MIRALAX) powder Take 17 g by mouth daily as needed   Yes Historical Provider, MD   Blood Glucose Monitoring Suppl CATHRYN Dispense whatever insurance will cover. Dx code:E11.9 1/11/18  Yes Barb Carrillo MD   dicyclomine (BENTYL) 10 MG capsule Take 1 capsule by mouth 4 times daily 8/31/17  Yes BRYCE Torres - CNP   gabapentin (NEURONTIN) 800 MG tablet Take 800 mg by mouth 3 times daily 8/2/17  Yes Historical Provider, MD   omeprazole (PRILOSEC) 40 MG delayed release capsule Take 40 mg by mouth 2 times daily 8/2/17  Yes Historical Provider, MD   HYDROcodone-acetaminophen (NORCO) 5-325 MG per tablet Take 1 tablet by mouth every 8 hours as needed for Pain  . Yes Historical Provider, MD   aspirin 81 MG EC tablet Take 1 tablet by mouth daily 9/24/15  Yes Teresa Tanner MD   tiZANidine (ZANAFLEX) 2 MG tablet Take 2 mg by mouth 3 times daily as needed    Yes Historical Provider, MD          REVIEW OF SYSTEMS:      All 14-point review of systems are completed and  pertinent positives are mentioned in the history of present illness. Other  systems are reviewed and are negative. Physical Examination:    /60 (Site: Right Upper Arm, Position: Standing)   Pulse 76   Ht 5' 9\" (1.753 m)   Wt 188 lb (85.3 kg)   SpO2 98%   BMI 27.76 kg/m²      Constitutional and General Appearance:    alert, cooperative, no distress and appears stated age  HEENT:    PERRLA, no cervical lymphadenopathy. No masses palpable.  Normal oral  mucosa  Respiratory:  · Normal excursion and expansion without use of accessory muscles  · Resp Auscultation: Normal breath sounds without dullness or wheezing  Cardiovascular:  · The apical impulse is not displaced  · Heart tones are crisp and normal. regular S1 and S2.  · Jugular venous pulsation Normal  · The carotid upstroke is normal in amplitude and contour without delay or bruit  · Peripheral pulses are symmetrical and full  Abdomen:  · No masses or tenderness  · Bowel sounds present  Extremities:  ·  No Cyanosis or Clubbing  ·  Lower extremity edema: No  · Skin: Warm and dry  Neurological:  · Alert and oriented. · Moves all extremities well  · No abnormalities of mood, affect, memory, mentation, or behavior are noted      DATA:    ECG:  NSR 65 bpm  ECHO 8/30/17:   Left ventricular systolic function is normal with the ejection fraction   estimated at 55%. No regional wall motion abnormalities. Left ventricular diastolic filling pressure is indeterminate. Mildly dilated left atrium. LV Diastolic Dimension: 2.61 cm LV Systolic Dimension: 2.8 cm   LV Septum Diastolic: 0.9 cm   LV PW Diastolic: 5.09 cm        AO Root Dimension: 2.7 cm                                   AV Cusp Separation: 1.7 cm                                   LA Dimension: 3.2 cm                                   LA Area: 21.45 cm2                                   LA volume/Index: 67.5 ml /36 ml/m2       IMPRESSION:    1. Palpitations    2. Syncope, unspecified syncope type    3. Orthostatic hypotension    4. Type 2 diabetes mellitus with complication, unspecified whether long term insulin use (HCC)    - Positive orthostatics on exam    RECOMMENDATIONS:  1. Above the knee compression stockings  2. Endocrinology referral to manage diabetes medications and thyroid issues  3. Drink plenty of water, cut down on sugary drinks  4. Follow up with Jonel Bustamante NP in 3 months    Encouraged to avoid dehydration by increasing fluid intake. Also increase Na intake. Pay close attention to prodromal symptoms. Educated on and how to avert syncope by lying down and elevating legs. Discussed specific techniques to decrease pooling of blood in legs such as foot exercises or tensing leg muscles when standing. Utilize compression stockings.  20-30mmHg compression stockings recommended     QUALITY MEASURES  1. Tobacco Cessation Counseling: NA  2. Retake of BP if >140/90:   NA  3. Documentation to PCP/referring for new patient:  Sent to PCP at close of office visit  4. CAD patient on anti-platelet: Yes  5. CAD patient on STATIN therapy:  Yes  6. Patient with CHF and aFib on anticoagulation:  NA     Scribe's attestation: This note was scribed in the presence of Dr. Chloe Dickerson by Nadya Ugarte, RN    I, Danial Bundy, personally performed the services described in this documentation, as scribed by the above signed scribe in my presence. It is both accurate and complete to my knowledge. I agree with the details independently gathered by the clinical support staff, while the remaining scribed note accurately describes my personal service to the patient.        All questions and concerns were addressed to the patient/family. Alternatives to my treatment were discussed. ED Howard F[facility]  [facility].   [place], [address]  Phone: [phone]  Fax: [phone]

## 2019-04-26 ENCOUNTER — OFFICE VISIT (OUTPATIENT)
Dept: CARDIOLOGY CLINIC | Age: 64
End: 2019-04-26
Payer: MEDICARE

## 2019-04-26 VITALS
SYSTOLIC BLOOD PRESSURE: 102 MMHG | OXYGEN SATURATION: 98 % | BODY MASS INDEX: 27.85 KG/M2 | DIASTOLIC BLOOD PRESSURE: 60 MMHG | HEART RATE: 76 BPM | HEIGHT: 69 IN | WEIGHT: 188 LBS

## 2019-04-26 DIAGNOSIS — R55 SYNCOPE, UNSPECIFIED SYNCOPE TYPE: ICD-10-CM

## 2019-04-26 DIAGNOSIS — R00.2 PALPITATIONS: Primary | ICD-10-CM

## 2019-04-26 DIAGNOSIS — I95.1 ORTHOSTATIC HYPOTENSION: ICD-10-CM

## 2019-04-26 DIAGNOSIS — E11.8 TYPE 2 DIABETES MELLITUS WITH COMPLICATION, UNSPECIFIED WHETHER LONG TERM INSULIN USE: ICD-10-CM

## 2019-04-26 PROCEDURE — G8427 DOCREV CUR MEDS BY ELIG CLIN: HCPCS | Performed by: INTERNAL MEDICINE

## 2019-04-26 PROCEDURE — 93000 ELECTROCARDIOGRAM COMPLETE: CPT | Performed by: INTERNAL MEDICINE

## 2019-04-26 PROCEDURE — 99204 OFFICE O/P NEW MOD 45 MIN: CPT | Performed by: INTERNAL MEDICINE

## 2019-04-26 PROCEDURE — G8417 CALC BMI ABV UP PARAM F/U: HCPCS | Performed by: INTERNAL MEDICINE

## 2019-04-26 PROCEDURE — 2022F DILAT RTA XM EVC RTNOPTHY: CPT | Performed by: INTERNAL MEDICINE

## 2019-04-26 RX ORDER — LOSARTAN POTASSIUM 25 MG/1
25 TABLET ORAL
COMMUNITY
Start: 2016-06-20 | End: 2019-05-21 | Stop reason: SDUPTHER

## 2019-04-26 NOTE — PATIENT INSTRUCTIONS
1. Wear above the knee compression stockings    2. Drink plenty of water    3. Cut back on tea, sugary drinks    4. Endocrinology referral    5.  Follow up in 3 months with Mike Mcgraw

## 2019-05-01 ENCOUNTER — CARE COORDINATION (OUTPATIENT)
Dept: CARE COORDINATION | Age: 64
End: 2019-05-01

## 2019-05-01 NOTE — CARE COORDINATION
ACC Note:  The pt and her  called to let the [facility] know that the pt has switched to a new provider at [facility]. The pt stated the pt has already seen the new physician and had blood work and memory testing. The  stated the pt's FBS has been averaging 135. The  stated he weaned the pt off the Lorazepam. The pt and  stated they called to tell the nurse good-bye and that they appreciated all she has done for them.

## 2019-05-21 ENCOUNTER — OFFICE VISIT (OUTPATIENT)
Dept: CARDIOLOGY CLINIC | Age: 64
End: 2019-05-21
Payer: MEDICARE

## 2019-05-21 VITALS
WEIGHT: 189 LBS | BODY MASS INDEX: 27.99 KG/M2 | HEART RATE: 60 BPM | SYSTOLIC BLOOD PRESSURE: 102 MMHG | HEIGHT: 69 IN | OXYGEN SATURATION: 98 % | DIASTOLIC BLOOD PRESSURE: 64 MMHG

## 2019-05-21 DIAGNOSIS — R55 SYNCOPE, UNSPECIFIED SYNCOPE TYPE: ICD-10-CM

## 2019-05-21 DIAGNOSIS — I25.10 CAD IN NATIVE ARTERY: ICD-10-CM

## 2019-05-21 DIAGNOSIS — E78.2 MIXED HYPERLIPIDEMIA: Primary | ICD-10-CM

## 2019-05-21 PROCEDURE — G8598 ASA/ANTIPLAT THER USED: HCPCS | Performed by: INTERNAL MEDICINE

## 2019-05-21 PROCEDURE — 1036F TOBACCO NON-USER: CPT | Performed by: INTERNAL MEDICINE

## 2019-05-21 PROCEDURE — 3017F COLORECTAL CA SCREEN DOC REV: CPT | Performed by: INTERNAL MEDICINE

## 2019-05-21 PROCEDURE — G8417 CALC BMI ABV UP PARAM F/U: HCPCS | Performed by: INTERNAL MEDICINE

## 2019-05-21 PROCEDURE — G8427 DOCREV CUR MEDS BY ELIG CLIN: HCPCS | Performed by: INTERNAL MEDICINE

## 2019-05-21 PROCEDURE — 99214 OFFICE O/P EST MOD 30 MIN: CPT | Performed by: INTERNAL MEDICINE

## 2019-05-21 RX ORDER — LOSARTAN POTASSIUM 25 MG/1
25 TABLET ORAL DAILY
Qty: 90 TABLET | Refills: 3 | Status: ON HOLD | OUTPATIENT
Start: 2019-05-21 | End: 2022-01-21 | Stop reason: HOSPADM

## 2019-05-21 NOTE — PATIENT INSTRUCTIONS
Plan:  Continue current medications   Check blood pressure at home weekly  Regular exercise and following a healthy diet encouraged   Follow up with NP in 6 months and me in 1 year

## 2019-08-17 DIAGNOSIS — E11.8 TYPE 2 DIABETES MELLITUS WITH COMPLICATION, WITHOUT LONG-TERM CURRENT USE OF INSULIN (HCC): ICD-10-CM

## 2019-08-19 RX ORDER — SITAGLIPTIN 50 MG/1
TABLET, FILM COATED ORAL
Qty: 90 TABLET | Refills: 0 | Status: SHIPPED | OUTPATIENT
Start: 2019-08-19 | End: 2019-11-15 | Stop reason: SDUPTHER

## 2019-11-15 DIAGNOSIS — E11.8 TYPE 2 DIABETES MELLITUS WITH COMPLICATION, WITHOUT LONG-TERM CURRENT USE OF INSULIN (HCC): ICD-10-CM

## 2019-11-18 RX ORDER — SITAGLIPTIN 50 MG/1
TABLET, FILM COATED ORAL
Qty: 90 TABLET | Refills: 1 | Status: ON HOLD | OUTPATIENT
Start: 2019-11-18 | End: 2021-10-28 | Stop reason: HOSPADM

## 2019-12-26 ENCOUNTER — APPOINTMENT (OUTPATIENT)
Dept: GENERAL RADIOLOGY | Age: 64
End: 2019-12-26
Payer: MEDICARE

## 2019-12-26 ENCOUNTER — HOSPITAL ENCOUNTER (EMERGENCY)
Age: 64
Discharge: HOME | End: 2019-12-26
Attending: EMERGENCY MEDICINE
Payer: MEDICARE

## 2019-12-26 VITALS
HEIGHT: 69 IN | DIASTOLIC BLOOD PRESSURE: 75 MMHG | OXYGEN SATURATION: 98 % | HEART RATE: 70 BPM | TEMPERATURE: 97.9 F | SYSTOLIC BLOOD PRESSURE: 150 MMHG | BODY MASS INDEX: 27.85 KG/M2 | RESPIRATION RATE: 14 BRPM | WEIGHT: 188 LBS

## 2019-12-26 DIAGNOSIS — J18.9 PNEUMONIA DUE TO ORGANISM: Primary | ICD-10-CM

## 2019-12-26 PROCEDURE — 6370000000 HC RX 637 (ALT 250 FOR IP): Performed by: EMERGENCY MEDICINE

## 2019-12-26 PROCEDURE — 99285 EMERGENCY DEPT VISIT HI MDM: CPT

## 2019-12-26 PROCEDURE — 71046 X-RAY EXAM CHEST 2 VIEWS: CPT

## 2019-12-26 PROCEDURE — 93005 ELECTROCARDIOGRAM TRACING: CPT

## 2019-12-26 RX ORDER — ALBUTEROL SULFATE 90 UG/1
2 AEROSOL, METERED RESPIRATORY (INHALATION) 4 TIMES DAILY PRN
Qty: 1 INHALER | Refills: 0 | Status: ON HOLD | OUTPATIENT
Start: 2019-12-26 | End: 2022-01-22 | Stop reason: SDUPTHER

## 2019-12-26 RX ORDER — AZITHROMYCIN 250 MG/1
250 TABLET, FILM COATED ORAL SEE ADMIN INSTRUCTIONS
Qty: 6 TABLET | Refills: 0 | Status: SHIPPED | OUTPATIENT
Start: 2019-12-26 | End: 2019-12-31

## 2019-12-26 RX ORDER — IPRATROPIUM BROMIDE AND ALBUTEROL SULFATE 2.5; .5 MG/3ML; MG/3ML
1 SOLUTION RESPIRATORY (INHALATION) ONCE
Status: COMPLETED | OUTPATIENT
Start: 2019-12-26 | End: 2019-12-26

## 2019-12-26 RX ORDER — BENZONATATE 100 MG/1
100 CAPSULE ORAL ONCE
Status: COMPLETED | OUTPATIENT
Start: 2019-12-26 | End: 2019-12-26

## 2019-12-26 RX ORDER — AZITHROMYCIN 250 MG/1
500 TABLET, FILM COATED ORAL ONCE
Status: COMPLETED | OUTPATIENT
Start: 2019-12-26 | End: 2019-12-26

## 2019-12-26 RX ADMIN — AZITHROMYCIN 500 MG: 250 TABLET, FILM COATED ORAL at 19:45

## 2019-12-26 RX ADMIN — IPRATROPIUM BROMIDE AND ALBUTEROL SULFATE 1 AMPULE: .5; 3 SOLUTION RESPIRATORY (INHALATION) at 19:44

## 2019-12-26 RX ADMIN — IPRATROPIUM BROMIDE AND ALBUTEROL SULFATE 1 AMPULE: .5; 2.5 SOLUTION RESPIRATORY (INHALATION) at 20:33

## 2019-12-26 RX ADMIN — BENZONATATE 100 MG: 100 CAPSULE ORAL at 19:44

## 2019-12-26 ASSESSMENT — PAIN DESCRIPTION - ONSET: ONSET: SUDDEN

## 2019-12-26 ASSESSMENT — PAIN DESCRIPTION - PAIN TYPE: TYPE: ACUTE PAIN

## 2019-12-26 ASSESSMENT — PAIN SCALES - GENERAL: PAINLEVEL_OUTOF10: 6

## 2019-12-26 ASSESSMENT — PAIN DESCRIPTION - DESCRIPTORS: DESCRIPTORS: HEAVINESS

## 2019-12-26 ASSESSMENT — PAIN DESCRIPTION - PROGRESSION: CLINICAL_PROGRESSION: NOT CHANGED

## 2019-12-26 ASSESSMENT — PAIN - FUNCTIONAL ASSESSMENT: PAIN_FUNCTIONAL_ASSESSMENT: ACTIVITIES ARE NOT PREVENTED

## 2019-12-26 ASSESSMENT — PAIN DESCRIPTION - FREQUENCY: FREQUENCY: CONTINUOUS

## 2019-12-26 ASSESSMENT — PAIN DESCRIPTION - LOCATION: LOCATION: CHEST

## 2019-12-26 ASSESSMENT — PAIN DESCRIPTION - ORIENTATION: ORIENTATION: RIGHT

## 2019-12-27 LAB
EKG ATRIAL RATE: 70 BPM
EKG DIAGNOSIS: NORMAL
EKG P AXIS: 60 DEGREES
EKG P-R INTERVAL: 152 MS
EKG Q-T INTERVAL: 446 MS
EKG QRS DURATION: 84 MS
EKG QTC CALCULATION (BAZETT): 481 MS
EKG R AXIS: -3 DEGREES
EKG T AXIS: 45 DEGREES
EKG VENTRICULAR RATE: 70 BPM

## 2019-12-27 PROCEDURE — 93010 ELECTROCARDIOGRAM REPORT: CPT | Performed by: INTERNAL MEDICINE

## 2020-02-21 ENCOUNTER — HOSPITAL ENCOUNTER (OUTPATIENT)
Age: 65
Discharge: HOME | End: 2020-02-21
Payer: MEDICARE

## 2020-02-21 LAB — WHITE BLOOD CELLS (WBC), STOOL: NORMAL

## 2020-02-21 PROCEDURE — 87177 OVA AND PARASITES SMEARS: CPT

## 2020-02-21 PROCEDURE — 87336 ENTAMOEB HIST DISPR AG IA: CPT

## 2020-02-21 PROCEDURE — 87329 GIARDIA AG IA: CPT

## 2020-02-21 PROCEDURE — 87328 CRYPTOSPORIDIUM AG IA: CPT

## 2020-02-21 PROCEDURE — 87209 SMEAR COMPLEX STAIN: CPT

## 2020-02-22 LAB
CRYPTOSPORIDIUM ANTIGEN STOOL: NORMAL
E HISTOLYTICA ANTIGEN STOOL: NORMAL
GIARDIA ANTIGEN STOOL: NORMAL

## 2020-02-25 ENCOUNTER — HOSPITAL ENCOUNTER (OUTPATIENT)
Dept: CT IMAGING | Age: 65
Discharge: HOME | End: 2020-02-25
Payer: MEDICARE

## 2020-02-25 PROCEDURE — 74177 CT ABD & PELVIS W/CONTRAST: CPT

## 2020-02-25 PROCEDURE — 6360000004 HC RX CONTRAST MEDICATION: Performed by: NURSE PRACTITIONER

## 2020-02-25 RX ADMIN — IOPAMIDOL 75 ML: 755 INJECTION, SOLUTION INTRAVENOUS at 15:42

## 2020-02-25 RX ADMIN — IOHEXOL 50 ML: 240 INJECTION, SOLUTION INTRATHECAL; INTRAVASCULAR; INTRAVENOUS; ORAL at 15:42

## 2020-06-08 ENCOUNTER — HOSPITAL ENCOUNTER (OUTPATIENT)
Age: 65
Discharge: HOME | End: 2020-06-08
Payer: MEDICARE

## 2020-06-08 LAB
BASOPHILS ABSOLUTE: 0.1 K/UL (ref 0–0.2)
BASOPHILS RELATIVE PERCENT: 1 %
EOSINOPHILS ABSOLUTE: 0.1 K/UL (ref 0–0.6)
EOSINOPHILS RELATIVE PERCENT: 2.3 %
FERRITIN: 83.4 NG/ML (ref 15–150)
HCT VFR BLD CALC: 38.5 % (ref 36–48)
HEMOGLOBIN: 12.9 G/DL (ref 12–16)
IRON SATURATION: 24 % (ref 15–50)
IRON: 62 UG/DL (ref 37–145)
LYMPHOCYTES ABSOLUTE: 2 K/UL (ref 1–5.1)
LYMPHOCYTES RELATIVE PERCENT: 33.5 %
MCH RBC QN AUTO: 29.9 PG (ref 26–34)
MCHC RBC AUTO-ENTMCNC: 33.7 G/DL (ref 31–36)
MCV RBC AUTO: 88.8 FL (ref 80–100)
MONOCYTES ABSOLUTE: 0.5 K/UL (ref 0–1.3)
MONOCYTES RELATIVE PERCENT: 8.1 %
NEUTROPHILS ABSOLUTE: 3.3 K/UL (ref 1.7–7.7)
NEUTROPHILS RELATIVE PERCENT: 55.1 %
PDW BLD-RTO: 14.8 % (ref 12.4–15.4)
PLATELET # BLD: 146 K/UL (ref 135–450)
PMV BLD AUTO: 8.2 FL (ref 5–10.5)
RBC # BLD: 4.33 M/UL (ref 4–5.2)
TOTAL IRON BINDING CAPACITY: 261 UG/DL (ref 260–445)
WBC # BLD: 6 K/UL (ref 4–11)

## 2020-06-08 PROCEDURE — 83540 ASSAY OF IRON: CPT

## 2020-06-08 PROCEDURE — 83550 IRON BINDING TEST: CPT

## 2020-06-08 PROCEDURE — 82728 ASSAY OF FERRITIN: CPT

## 2020-06-08 PROCEDURE — 85025 COMPLETE CBC W/AUTO DIFF WBC: CPT

## 2020-06-08 PROCEDURE — 36415 COLL VENOUS BLD VENIPUNCTURE: CPT

## 2020-09-04 ENCOUNTER — CARE COORDINATION (OUTPATIENT)
Dept: CASE MANAGEMENT | Age: 65
End: 2020-09-04

## 2020-09-04 PROCEDURE — 1111F DSCHRG MED/CURRENT MED MERGE: CPT | Performed by: INTERNAL MEDICINE

## 2020-09-04 NOTE — CARE COORDINATION
[facility] Transitions Initial Follow Up Call    Call within 2 business days of discharge: Yes    Patient: Aysha Newell Patient : 1955   MRN: [id number]  Reason for Admission: LAPAROSCOPIC HAND ASSISTED SIGMOID RESECTION   LEFT URETERAL CATHETER PLACEMENT  Discharge Date: 19 RARS: No data recorded    Last Discharge [address] [address]       Complaint Diagnosis Description Type Department Provider    19 Shortness of Breath Pneumonia due to organism ED ([facility]) [facility] ED Lavern Aguero MD           Spoke with: Patient    Called and spoke with patient. She states she is feeling tired. C/O nausea. Suggested to patient to take pain medications with food. Patient is having some pain and is taking pain medications for that. Patient had BM since discharge. Urinating good amount pink urine. Instructed to increase fluid intake. Patient expresses understanding. Reviewed medications with patient. [id number] sent to PCP. Taking Oxycodone 5 mg every 4 hours as needed for 4 days. Doxycyline 10 mg 3 times a day. Jardiance 10 mg daily. Has F/U appointment 9/15/2020 with specialist.     Final Call.     SANDRA Aguilera [facility] / [address] [place] [address]:     Non-face-to-face services provided:  Scheduled appointment with Specialist-9/15/2020  Obtained and reviewed discharge summary and/or continuity of care documents  Assessment and support for treatment adherence and medication management-9/3/2020    Care Transitions 24 Hour Call    Schedule Follow Up Appointment with PCP:  Completed  Do you have any ongoing symptoms?:  Yes  Patient-reported symptoms:  Pain, Nausea, Fatigue  Do you have a copy of your discharge instructions?:  Yes  Do you have all of your prescriptions and are they filled?:  Yes  Have you been contacted by a [facility] Pharmacist?:  No  Have you scheduled your follow up appointment?:  Yes  How are you going to get to your appointment?:  Car - family

## 2020-09-05 ENCOUNTER — APPOINTMENT (OUTPATIENT)
Dept: CT IMAGING | Age: 65
End: 2020-09-05
Payer: MEDICARE

## 2020-09-05 ENCOUNTER — APPOINTMENT (OUTPATIENT)
Dept: GENERAL RADIOLOGY | Age: 65
End: 2020-09-05
Payer: MEDICARE

## 2020-09-05 ENCOUNTER — HOSPITAL ENCOUNTER (EMERGENCY)
Age: 65
Discharge: ANOTHER HOSPITAL | End: 2020-09-05
Attending: EMERGENCY MEDICINE
Payer: MEDICARE

## 2020-09-05 VITALS
BODY MASS INDEX: 27.25 KG/M2 | TEMPERATURE: 98.1 F | HEIGHT: 69 IN | SYSTOLIC BLOOD PRESSURE: 178 MMHG | OXYGEN SATURATION: 97 % | RESPIRATION RATE: 20 BRPM | DIASTOLIC BLOOD PRESSURE: 88 MMHG | HEART RATE: 100 BPM | WEIGHT: 184 LBS

## 2020-09-05 LAB
A/G RATIO: 0.8 (ref 1.1–2.2)
ALBUMIN SERPL-MCNC: 3.6 G/DL (ref 3.4–5)
ALP BLD-CCNC: 168 U/L (ref 40–129)
ALT SERPL-CCNC: 11 U/L (ref 10–40)
ANION GAP SERPL CALCULATED.3IONS-SCNC: 15 MMOL/L (ref 3–16)
AST SERPL-CCNC: 27 U/L (ref 15–37)
BASOPHILS ABSOLUTE: 0.1 K/UL (ref 0–0.2)
BASOPHILS RELATIVE PERCENT: 0.5 %
BILIRUB SERPL-MCNC: 0.8 MG/DL (ref 0–1)
BUN BLDV-MCNC: 7 MG/DL (ref 7–20)
CALCIUM SERPL-MCNC: 9.9 MG/DL (ref 8.3–10.6)
CHLORIDE BLD-SCNC: 98 MMOL/L (ref 99–110)
CO2: 23 MMOL/L (ref 21–32)
CREAT SERPL-MCNC: 0.7 MG/DL (ref 0.6–1.2)
EKG ATRIAL RATE: 92 BPM
EKG DIAGNOSIS: NORMAL
EKG P AXIS: 61 DEGREES
EKG P-R INTERVAL: 142 MS
EKG Q-T INTERVAL: 392 MS
EKG QRS DURATION: 84 MS
EKG QTC CALCULATION (BAZETT): 484 MS
EKG R AXIS: -10 DEGREES
EKG T AXIS: 82 DEGREES
EKG VENTRICULAR RATE: 92 BPM
EOSINOPHILS ABSOLUTE: 0 K/UL (ref 0–0.6)
EOSINOPHILS RELATIVE PERCENT: 0.3 %
GFR AFRICAN AMERICAN: >60
GFR NON-AFRICAN AMERICAN: >60
GLOBULIN: 4.3 G/DL
GLUCOSE BLD-MCNC: 198 MG/DL (ref 70–99)
HCT VFR BLD CALC: 42.9 % (ref 36–48)
HEMOGLOBIN: 14.3 G/DL (ref 12–16)
LACTIC ACID: 1.8 MMOL/L (ref 0.4–2)
LIPASE: 17 U/L (ref 13–60)
LYMPHOCYTES ABSOLUTE: 1.8 K/UL (ref 1–5.1)
LYMPHOCYTES RELATIVE PERCENT: 10 %
MCH RBC QN AUTO: 29.5 PG (ref 26–34)
MCHC RBC AUTO-ENTMCNC: 33.4 G/DL (ref 31–36)
MCV RBC AUTO: 88.5 FL (ref 80–100)
MONOCYTES ABSOLUTE: 1.4 K/UL (ref 0–1.3)
MONOCYTES RELATIVE PERCENT: 8.2 %
NEUTROPHILS ABSOLUTE: 14.2 K/UL (ref 1.7–7.7)
NEUTROPHILS RELATIVE PERCENT: 81 %
PDW BLD-RTO: 14.1 % (ref 12.4–15.4)
PLATELET # BLD: 322 K/UL (ref 135–450)
PMV BLD AUTO: 8.8 FL (ref 5–10.5)
POTASSIUM REFLEX MAGNESIUM: 3.6 MMOL/L (ref 3.5–5.1)
RBC # BLD: 4.84 M/UL (ref 4–5.2)
SODIUM BLD-SCNC: 136 MMOL/L (ref 136–145)
TOTAL PROTEIN: 7.9 G/DL (ref 6.4–8.2)
TROPONIN: <0.01 NG/ML
WBC # BLD: 17.5 K/UL (ref 4–11)

## 2020-09-05 PROCEDURE — 74018 RADEX ABDOMEN 1 VIEW: CPT

## 2020-09-05 PROCEDURE — 96372 THER/PROPH/DIAG INJ SC/IM: CPT

## 2020-09-05 PROCEDURE — 6360000002 HC RX W HCPCS: Performed by: EMERGENCY MEDICINE

## 2020-09-05 PROCEDURE — 84484 ASSAY OF TROPONIN QUANT: CPT

## 2020-09-05 PROCEDURE — 96367 TX/PROPH/DG ADDL SEQ IV INF: CPT

## 2020-09-05 PROCEDURE — 96365 THER/PROPH/DIAG IV INF INIT: CPT

## 2020-09-05 PROCEDURE — 2500000003 HC RX 250 WO HCPCS: Performed by: EMERGENCY MEDICINE

## 2020-09-05 PROCEDURE — 96376 TX/PRO/DX INJ SAME DRUG ADON: CPT

## 2020-09-05 PROCEDURE — 99285 EMERGENCY DEPT VISIT HI MDM: CPT

## 2020-09-05 PROCEDURE — 36415 COLL VENOUS BLD VENIPUNCTURE: CPT

## 2020-09-05 PROCEDURE — 83690 ASSAY OF LIPASE: CPT

## 2020-09-05 PROCEDURE — 96375 TX/PRO/DX INJ NEW DRUG ADDON: CPT

## 2020-09-05 PROCEDURE — 93010 ELECTROCARDIOGRAM REPORT: CPT | Performed by: INTERNAL MEDICINE

## 2020-09-05 PROCEDURE — 83605 ASSAY OF LACTIC ACID: CPT

## 2020-09-05 PROCEDURE — 85025 COMPLETE CBC W/AUTO DIFF WBC: CPT

## 2020-09-05 PROCEDURE — 93005 ELECTROCARDIOGRAM TRACING: CPT | Performed by: EMERGENCY MEDICINE

## 2020-09-05 PROCEDURE — 2580000003 HC RX 258: Performed by: EMERGENCY MEDICINE

## 2020-09-05 PROCEDURE — 80053 COMPREHEN METABOLIC PANEL: CPT

## 2020-09-05 RX ORDER — M-VIT,TX,IRON,MINS/CALC/FOLIC 27MG-0.4MG
1 TABLET ORAL DAILY
COMMUNITY

## 2020-09-05 RX ORDER — PROMETHAZINE HYDROCHLORIDE 25 MG/ML
12.5 INJECTION, SOLUTION INTRAMUSCULAR; INTRAVENOUS ONCE
Status: COMPLETED | OUTPATIENT
Start: 2020-09-05 | End: 2020-09-05

## 2020-09-05 RX ORDER — 0.9 % SODIUM CHLORIDE 0.9 %
1000 INTRAVENOUS SOLUTION INTRAVENOUS ONCE
Status: COMPLETED | OUTPATIENT
Start: 2020-09-05 | End: 2020-09-05

## 2020-09-05 RX ORDER — FAMOTIDINE 40 MG/1
40 TABLET, FILM COATED ORAL DAILY
Status: ON HOLD | COMMUNITY
End: 2021-09-28

## 2020-09-05 RX ORDER — METOCLOPRAMIDE HYDROCHLORIDE 5 MG/ML
5 INJECTION INTRAMUSCULAR; INTRAVENOUS ONCE
Status: COMPLETED | OUTPATIENT
Start: 2020-09-05 | End: 2020-09-05

## 2020-09-05 RX ORDER — ONDANSETRON 2 MG/ML
4 INJECTION INTRAMUSCULAR; INTRAVENOUS ONCE
Status: COMPLETED | OUTPATIENT
Start: 2020-09-05 | End: 2020-09-05

## 2020-09-05 RX ORDER — PROMETHAZINE HYDROCHLORIDE 25 MG/ML
12.5 INJECTION, SOLUTION INTRAMUSCULAR; INTRAVENOUS ONCE
Status: DISCONTINUED | OUTPATIENT
Start: 2020-09-05 | End: 2020-09-05

## 2020-09-05 RX ORDER — EMPAGLIFLOZIN 10 MG/1
10 TABLET, FILM COATED ORAL DAILY
Status: ON HOLD | COMMUNITY
End: 2021-10-28 | Stop reason: HOSPADM

## 2020-09-05 RX ADMIN — PROMETHAZINE HYDROCHLORIDE 12.5 MG: 25 INJECTION INTRAMUSCULAR; INTRAVENOUS at 08:56

## 2020-09-05 RX ADMIN — SODIUM CHLORIDE 1000 ML: 9 INJECTION, SOLUTION INTRAVENOUS at 08:29

## 2020-09-05 RX ADMIN — ONDANSETRON HYDROCHLORIDE 4 MG: 2 INJECTION, SOLUTION INTRAMUSCULAR; INTRAVENOUS at 08:29

## 2020-09-05 RX ADMIN — HYDROMORPHONE HYDROCHLORIDE 0.5 MG: 1 INJECTION, SOLUTION INTRAMUSCULAR; INTRAVENOUS; SUBCUTANEOUS at 08:29

## 2020-09-05 RX ADMIN — METOCLOPRAMIDE 5 MG: 5 INJECTION, SOLUTION INTRAMUSCULAR; INTRAVENOUS at 09:29

## 2020-09-05 RX ADMIN — HYDROMORPHONE HYDROCHLORIDE 0.5 MG: 1 INJECTION, SOLUTION INTRAMUSCULAR; INTRAVENOUS; SUBCUTANEOUS at 10:02

## 2020-09-05 RX ADMIN — PIPERACILLIN SODIUM AND TAZOBACTAM SODIUM 3.38 G: 3; .375 INJECTION, POWDER, LYOPHILIZED, FOR SOLUTION INTRAVENOUS at 09:18

## 2020-09-05 RX ADMIN — VANCOMYCIN HYDROCHLORIDE 1250 MG: 1 INJECTION, POWDER, LYOPHILIZED, FOR SOLUTION INTRAVENOUS at 09:59

## 2020-09-05 ASSESSMENT — PAIN DESCRIPTION - ORIENTATION: ORIENTATION: RIGHT;LEFT;LOWER;MID;UPPER

## 2020-09-05 ASSESSMENT — PAIN DESCRIPTION - DESCRIPTORS: DESCRIPTORS: CONSTANT;TIGHTNESS

## 2020-09-05 ASSESSMENT — PAIN SCALES - GENERAL: PAINLEVEL_OUTOF10: 10

## 2020-09-05 ASSESSMENT — PAIN DESCRIPTION - LOCATION: LOCATION: ABDOMEN

## 2020-09-05 NOTE — ED NOTES
Pt denies fever or further c/o's. States small BM this AM. Pt noted with approx 200 cc of light green emesis to emesis bag upon arrival to ER. Pt also noted with erythema and localized edema surrounding her LUQ abd surgical incision. No drng noted. Incision scabbed over and intact. Pt states worsening N/V and abd distention since having abdominal surgery 5 days ago @ [facility]. Pt actively vomiting upon arrival to ER.  Cardiac monitor applied, Dr Juana Rollins to bedside     Cj Lugo RN  09/05/20 1012

## 2020-09-05 NOTE — ED NOTES
Danica Bustamante on the phone at this time.  Call transferred to Dr. Lazarus Lust, WOJCIECH  09/05/20 8770

## 2020-09-05 NOTE — ED NOTES
Karen Clark at [facility] contacted for transfer to [facility] ED.      Ming Roberts RN  09/05/20 3594

## 2020-09-05 NOTE — ED NOTES
Attempted to call report again to [facility] RN, RN unavailable and will return call     Shyam Uribe RN  09/05/20 1007

## 2020-09-05 NOTE — ED NOTES
Attempted to call repot, nurse unavailable and will return call.  Pt resting with OU closed, resps even and unlab     Anila Tijerina RN  09/05/20 1007

## 2020-09-05 NOTE — ED NOTES
Pt to ambulance via cot without incident or c/o's. IV Vanc continued per order and without s/s infiltration upon departure from ER. Attempted for a third time to call report to [facility] without success.  [facility] RN will return call     Glenda Acuña RN  09/05/20 3048

## 2020-09-05 NOTE — ED NOTES
Pt unable to void for ordered UA, states \"I will try again later. \" MD made aware IFVB continued per order and without s/s infiltration.  Labs pending     Gianna Cruz RN  09/05/20 1128

## 2020-09-05 NOTE — ED NOTES
Call from resident with patients surgery team at [facility]. States the attending who completed the patient's surgeon wants the patient transferred to [facility] ED.  Call transfer to Dr. Ivana Carpio RN  09/05/20 4819

## 2020-09-05 NOTE — ED NOTES
[facility] here to transport pt. Pt given 0.5 mg IVP Dilaudid for c/o's abd pain.   remains @ bedside     Brenda Domingo RN  09/05/20 0146

## 2020-09-05 NOTE — ED PROVIDER NOTES
MT. [facility] EMERGENCY DEPARTMENT      CHIEF COMPLAINT  Emesis (c/o frequent N/V and abd distention since colon surgery 5 days agfo) and Abdominal Pain       HISTORY OF PRESENT ILLNESS  Severiano Lank is a 76 y.o. female with a past medical history of diabetes and hypertension who presents to the ED complaining of abdominal pain and vomiting. Patient is s/p sigmoidectomy on 8/31 at [facility] for recurrent sigmoid diverticulitis. She states she has been having nausea since the surgery, but has increased within the past few days. She is now having more episodes of emesis. She describes 15 bilious episodes of emesis yesterday. She also had additional episodes this morning. She called her surgeon office, they had prescribed Zofran for her yesterday. She did have a nonbloody bowel movement this morning. She states she has not been passing gas but does not describe symptoms of obstipation. She describes a fever this morning of 101. She has been taking oxycodone for pain. She denies hematuria or dysuria. She denies chest pain, shortness of breath, cough, or respiratory symptoms. No other complaints, modifying factors or associated symptoms. I have reviewed the following from the nursing documentation.     Past Medical History:   Diagnosis Date    Anxiety disorder     Chronic pain syndrome     DDD (degenerative disc disease), lumbar     Diabetes (HCC)     Displacement of lumbar intervertebral disc without myelopathy 8/23/2010    Diverticulitis     Fibromyalgia     Generalized osteoarthrosis, involving multiple sites 8/24/2010    GERD (gastroesophageal reflux disease)     Impacted cerumen 8/23/2010    Influenza A 3/10/16    Irritable bowel syndrome 8/23/2010    Other and unspecified hyperlipidemia 8/23/2010    Prosthetic joint implant failure ([facility] [address]     Screening mammogram 12/8/2006    (Both)Negative    Tracheobronchomalacia     Unspecified asthma(493.90) 8/23/2010    Unspecified essential Not on file     Relationship status: Not on file    Intimate partner violence     Fear of current or ex partner: Not on file     Emotionally abused: Not on file     Physically abused: Not on file     Forced sexual activity: Not on file   Other Topics Concern    Not on file   Social History Narrative    Not on file     Current Facility-Administered Medications   Medication Dose Route Frequency Provider Last Rate Last Dose    iopamidol (ISOVUE-370) 76 % injection 75 mL  75 mL Intravenous ONCE PRN Malgorzata Prasnal, DO        vancomycin (VANCOCIN) 1,250 mg in dextrose 5 % 250 mL IVPB  15 mg/kg Intravenous Once [facility], .7 mL/hr at 09/05/20 0959 1,250 mg at 09/05/20 9830     Current Outpatient Medications   Medication Sig Dispense Refill    empagliflozin (JARDIANCE) 10 MG tablet Take 10 mg by mouth daily      famotidine (PEPCID) 40 MG tablet Take 40 mg by mouth daily      Multiple Vitamins-Minerals (THERAPEUTIC MULTIVITAMIN-MINERALS) tablet Take 1 tablet by mouth daily      albuterol sulfate  (90 Base) MCG/ACT inhaler Inhale 2 puffs into the lungs 4 times daily as needed for Wheezing 1 Inhaler 0    JANUVIA 50 MG tablet TAKE ONE TABLET BY MOUTH DAILY 90 tablet 1    losartan (COZAAR) 25 MG tablet Take 1 tablet by mouth daily 90 tablet 3    Elastic Bandages & Supports (MEDICAL COMPRESSION THIGH HIGH) MISC 1 Units by Does not apply route daily 2 each 2    amLODIPine (NORVASC) 2.5 MG tablet Take 1 tablet by mouth daily 30 tablet 11    Lancets MISC Dispense whatever insurance will cover. Patient test twice day. Dx:E11.9 200 each 3    blood glucose monitor strips Dispense whatever insurance will cover. Pt test twice a day dx:E11.9 200 strip 3    Insulin Pen Needle 31G X 5 MM MISC 1 each by Does not apply route daily 100 each 3    blood glucose test strips (ASCENSIA AUTODISC VI;ONE TOUCH ULTRA TEST VI) strip 1 each by In Vitro route daily As needed.  100 each 3    levothyroxine (SYNTHROID) 112 MCG tablet Take 1 tablet by mouth Daily (Patient taking differently: Take 100 mcg by mouth Daily ) 30 tablet 1    simvastatin (ZOCOR) 80 MG tablet TAKE 1 TABLET BY MOUTH AT BEDTIME (Patient taking differently: Take 40 mg by mouth nightly TAKE 1 TABLET BY MOUTH AT BEDTIME) 90 tablet 3    rOPINIRole (REQUIP) 0.5 MG tablet TAKE 1 TABLET BY MOUTH 3 TIMES DAILY. 270 tablet 3    sertraline (ZOLOFT) 50 MG tablet TAKE 1 TABLET BY MOUTH DAILY 90 tablet 3    polyethylene glycol (MIRALAX) powder Take 17 g by mouth daily as needed      Blood Glucose Monitoring Suppl CATHRYN Dispense whatever insurance will cover. Dx code:E11.9 1 Device 0    dicyclomine (BENTYL) 10 MG capsule Take 1 capsule by mouth 4 times daily 120 capsule 3    gabapentin (NEURONTIN) 800 MG tablet Take 800 mg by mouth 3 times daily      omeprazole (PRILOSEC) 40 MG delayed release capsule Take 40 mg by mouth 2 times daily      HYDROcodone-acetaminophen (NORCO) 5-325 MG per tablet Take 1 tablet by mouth every 8 hours as needed for Pain  .  aspirin 81 MG EC tablet Take 1 tablet by mouth daily 30 tablet 3    tiZANidine (ZANAFLEX) 2 MG tablet Take 2 mg by mouth 3 times daily as needed        Allergies   Allergen Reactions    Sulfa Antibiotics Anaphylaxis    Bactrim     Prednisone      Cannot tolerate oral steriods    Quinidine        REVIEW OF SYSTEMS  10 systems reviewed, pertinent positives per HPI otherwise noted to be negative. PHYSICAL EXAM  BP (!) 178/88   Pulse 100   Temp 98.1 °F (36.7 °C) (Oral)   Resp 20   Ht 5' 9\" (1.753 m)   Wt 184 lb (83.5 kg)   SpO2 97%   BMI 27.17 kg/m²    Physical exam:  General appearance: awake and cooperative. Moderate pain distress. Ill appearing. Skin: Warm and dry. No rashes or lesions. HENT: Normocephalic. Atraumatic. Mucus membranes are dry. Neck: supple  Eyes: JUSTYNA. EOM intact. Heart: tachycardic rate. Regular rhythm. No murmurs. Lungs: Respirations unlabored. CTAB.  No wheezes, rales, or rhonchi. Good air exchange   Abdomen: abdomen distended but soft. Diffusely tender, but most notably in right lower quadrant; guarding is present. Concern for peritoneal abdomen. Bowel sounds hypoactive. Laparoscopic incision on left abdomen has surrounding erythema without induration or fluctuance; right   Musculoskeletal: No extremity edema. Compartments soft. No deformity. No tenderness in the extremities. All extremities neurovascularly intact. Radial, Dp, and PT pulses +2/4 bilaterally  Neurological: Alert and oriented. No focal deficits. No aphasia or dysarthria. Psychiatric: normal mood and affect. LABS  I have reviewed all labs for this visit. Labs Reviewed   CBC WITH AUTO DIFFERENTIAL - Abnormal; Notable for the following components:       Result Value    WBC 17.5 (*)     Neutrophils Absolute 14.2 (*)     Monocytes Absolute 1.4 (*)     All other components within normal limits    Narrative:     Performed at:  [facility]. [facility]  [address] [place] [address]. [place] [address] [employer]   Phone [phone]   COMPREHENSIVE METABOLIC PANEL W/ REFLEX TO MG FOR LOW K - Abnormal; Notable for the following components:    Chloride 98 (*)     Glucose 198 (*)     Albumin/Globulin Ratio 0.8 (*)     Alkaline Phosphatase 168 (*)     All other components within normal limits    Narrative:     Performed at:  [facility]. [facility]  [address] [place] [address]. [place] [facility] [employer]   Phone [phone]   LIPASE    Narrative:     Performed at:  [facility]. [facility]  [address] [place] [address]. [place] [facility] [employer]   Phone [phone]   LACTIC ACID, PLASMA    Narrative:     Performed at:  [facility]. [facility]  [address] [place] [address]. [place] [facility] [employer]   Phone [phone]   TROPONIN    Narrative:     Performed at:  [facility].  [facility]  [address] [place] [address]. covered her with Zosyn additionally. I did perform a KUB here since the patient continued to have intractable vomiting after 2 doses of Zofran and Phenergan. I spoke with Dr. Zaida Quintero at [facility] ED who is aware of the patient's arrival.   The patient had improvement in her symptoms upon leaving here. She is transferred by squad to [facility]. She is hemodynamically stable. During the patient's ED course, the patient was given:  Medications   iopamidol (ISOVUE-370) 76 % injection 75 mL (has no administration in time range)   vancomycin (VANCOCIN) 1,250 mg in dextrose 5 % 250 mL IVPB (1,250 mg Intravenous New Bag 9/5/20 0959)   0.9 % sodium chloride bolus (0 mLs Intravenous Stopped 9/5/20 0922)   HYDROmorphone (DILAUDID) injection 0.5 mg (0.5 mg Intravenous Given 9/5/20 0829)   ondansetron (ZOFRAN) injection 4 mg (4 mg Intravenous Given 9/5/20 0829)   promethazine (PHENERGAN) injection 12.5 mg (12.5 mg Intramuscular Given 9/5/20 0856)   piperacillin-tazobactam (ZOSYN) 3.375 g in sodium chloride 0.9 % 100 mL IVPB (mini-bag) (0 g Intravenous Stopped 9/5/20 0955)   metoclopramide (REGLAN) injection 5 mg (5 mg Intravenous Given 9/5/20 0929)   HYDROmorphone (DILAUDID) injection 0.5 mg (0.5 mg Intravenous Given 9/5/20 1002)        CLINICAL IMPRESSION  1. Intractable vomiting with nausea    2. Postoperative abdominal pain    3. Abdominal distension        Blood pressure (!) 178/88, pulse 100, temperature 98.1 °F (36.7 °C), temperature source Oral, resp. rate 20, height 5' 9\" (1.753 m), weight 184 lb (83.5 kg), SpO2 97 %, not currently breastfeeding. Patient was given scripts for the following medications. I counseled patient how to take these medications. New Prescriptions    No medications on file       Follow-up with:  No follow-up provider specified. DISCLAIMER: This chart was created using Dragon dictation software.   Efforts were made by me to ensure accuracy, however some errors may be present due to limitations of

## 2020-10-31 ENCOUNTER — HOSPITAL ENCOUNTER (OUTPATIENT)
Age: 65
Discharge: HOME | End: 2020-10-31
Payer: MEDICARE

## 2020-10-31 LAB
ALBUMIN SERPL-MCNC: 4 G/DL (ref 3.4–5)
ANION GAP SERPL CALCULATED.3IONS-SCNC: 10 MMOL/L (ref 3–16)
BUN BLDV-MCNC: 7 MG/DL (ref 7–20)
CALCIUM SERPL-MCNC: 9.2 MG/DL (ref 8.3–10.6)
CHLORIDE BLD-SCNC: 103 MMOL/L (ref 99–110)
CO2: 26 MMOL/L (ref 21–32)
CREAT SERPL-MCNC: 0.7 MG/DL (ref 0.6–1.2)
GFR AFRICAN AMERICAN: >60
GFR NON-AFRICAN AMERICAN: >60
GLUCOSE BLD-MCNC: 169 MG/DL (ref 70–99)
HCT VFR BLD CALC: 38.8 % (ref 36–48)
HEMOGLOBIN: 12.7 G/DL (ref 12–16)
MCH RBC QN AUTO: 27.8 PG (ref 26–34)
MCHC RBC AUTO-ENTMCNC: 32.8 G/DL (ref 31–36)
MCV RBC AUTO: 85 FL (ref 80–100)
PDW BLD-RTO: 15.1 % (ref 12.4–15.4)
PHOSPHORUS: 3.5 MG/DL (ref 2.5–4.9)
PLATELET # BLD: 196 K/UL (ref 135–450)
PMV BLD AUTO: 8.5 FL (ref 5–10.5)
POTASSIUM SERPL-SCNC: 3.8 MMOL/L (ref 3.5–5.1)
RBC # BLD: 4.56 M/UL (ref 4–5.2)
SODIUM BLD-SCNC: 139 MMOL/L (ref 136–145)
WBC # BLD: 7.3 K/UL (ref 4–11)

## 2020-10-31 PROCEDURE — 80069 RENAL FUNCTION PANEL: CPT

## 2020-10-31 PROCEDURE — 36415 COLL VENOUS BLD VENIPUNCTURE: CPT

## 2020-10-31 PROCEDURE — 85027 COMPLETE CBC AUTOMATED: CPT

## 2020-11-11 ENCOUNTER — HOSPITAL ENCOUNTER (OUTPATIENT)
Dept: CT IMAGING | Age: 65
Discharge: HOME | End: 2020-11-11
Payer: MEDICARE

## 2020-11-11 PROCEDURE — 6360000004 HC RX CONTRAST MEDICATION: Performed by: INTERNAL MEDICINE

## 2020-11-11 PROCEDURE — 74170 CT ABD WO CNTRST FLWD CNTRST: CPT

## 2020-11-11 RX ADMIN — IOPAMIDOL 75 ML: 755 INJECTION, SOLUTION INTRAVENOUS at 14:12

## 2021-04-08 ENCOUNTER — HOSPITAL ENCOUNTER (OUTPATIENT)
Dept: GENERAL RADIOLOGY | Age: 66
Discharge: HOME | End: 2021-04-08
Payer: MEDICARE

## 2021-04-08 ENCOUNTER — HOSPITAL ENCOUNTER (OUTPATIENT)
Age: 66
Discharge: HOME | End: 2021-04-08
Payer: MEDICARE

## 2021-04-08 DIAGNOSIS — R05.9 COUGH: ICD-10-CM

## 2021-04-08 PROCEDURE — 71046 X-RAY EXAM CHEST 2 VIEWS: CPT

## 2021-07-15 ENCOUNTER — APPOINTMENT (OUTPATIENT)
Dept: GENERAL RADIOLOGY | Age: 66
End: 2021-07-15
Payer: MEDICARE

## 2021-07-15 ENCOUNTER — HOSPITAL ENCOUNTER (EMERGENCY)
Age: 66
Discharge: HOME | End: 2021-07-16
Payer: MEDICARE

## 2021-07-15 ENCOUNTER — APPOINTMENT (OUTPATIENT)
Dept: CT IMAGING | Age: 66
End: 2021-07-15
Payer: MEDICARE

## 2021-07-15 VITALS
HEIGHT: 69 IN | DIASTOLIC BLOOD PRESSURE: 79 MMHG | TEMPERATURE: 99 F | HEART RATE: 87 BPM | RESPIRATION RATE: 16 BRPM | SYSTOLIC BLOOD PRESSURE: 179 MMHG | BODY MASS INDEX: 26.66 KG/M2 | WEIGHT: 180 LBS | OXYGEN SATURATION: 95 %

## 2021-07-15 DIAGNOSIS — M25.512 ACUTE PAIN OF LEFT SHOULDER: ICD-10-CM

## 2021-07-15 DIAGNOSIS — S32.591A CLOSED FRACTURE OF INFERIOR PUBIC RAMUS, RIGHT, INITIAL ENCOUNTER (HCC): Primary | ICD-10-CM
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
## 2021-07-15 DIAGNOSIS — S80.02XA CONTUSION OF LEFT KNEE, INITIAL ENCOUNTER: ICD-10-CM

## 2021-07-15 PROCEDURE — 73502 X-RAY EXAM HIP UNI 2-3 VIEWS: CPT

## 2021-07-15 PROCEDURE — 73030 X-RAY EXAM OF SHOULDER: CPT

## 2021-07-15 PROCEDURE — 73560 X-RAY EXAM OF KNEE 1 OR 2: CPT

## 2021-07-15 PROCEDURE — 96372 THER/PROPH/DIAG INJ SC/IM: CPT

## 2021-07-15 PROCEDURE — 73700 CT LOWER EXTREMITY W/O DYE: CPT

## 2021-07-15 PROCEDURE — 6370000000 HC RX 637 (ALT 250 FOR IP): Performed by: NURSE PRACTITIONER

## 2021-07-15 PROCEDURE — 6360000002 HC RX W HCPCS: Performed by: NURSE PRACTITIONER

## 2021-07-15 PROCEDURE — 99285 EMERGENCY DEPT VISIT HI MDM: CPT

## 2021-07-15 RX ORDER — OXYCODONE HYDROCHLORIDE AND ACETAMINOPHEN 5; 325 MG/1; MG/1
1-2 TABLET ORAL EVERY 6 HOURS PRN
Qty: 20 TABLET | Refills: 0 | Status: SHIPPED | OUTPATIENT
Start: 2021-07-15 | End: 2021-07-15 | Stop reason: ALTCHOICE

## 2021-07-15 RX ORDER — DOXYCYCLINE HYCLATE 100 MG/1
100 CAPSULE ORAL 2 TIMES DAILY
Status: ON HOLD | COMMUNITY
End: 2021-09-28

## 2021-07-15 RX ORDER — HYDROCODONE BITARTRATE AND ACETAMINOPHEN 5; 325 MG/1; MG/1
1-2 TABLET ORAL EVERY 6 HOURS PRN
Qty: 20 TABLET | Refills: 0 | Status: SHIPPED | OUTPATIENT
Start: 2021-07-15 | End: 2021-07-18

## 2021-07-15 RX ORDER — ONDANSETRON 2 MG/ML
4 INJECTION INTRAMUSCULAR; INTRAVENOUS ONCE
Status: COMPLETED | OUTPATIENT
Start: 2021-07-15 | End: 2021-07-15

## 2021-07-15 RX ORDER — OXYCODONE HYDROCHLORIDE AND ACETAMINOPHEN 5; 325 MG/1; MG/1
1 TABLET ORAL ONCE
Status: COMPLETED | OUTPATIENT
Start: 2021-07-15 | End: 2021-07-15

## 2021-07-15 RX ORDER — CYCLOBENZAPRINE HCL 10 MG
10 TABLET ORAL ONCE
Status: COMPLETED | OUTPATIENT
Start: 2021-07-15 | End: 2021-07-15

## 2021-07-15 RX ADMIN — HYDROMORPHONE HYDROCHLORIDE 1 MG: 1 INJECTION, SOLUTION INTRAMUSCULAR; INTRAVENOUS; SUBCUTANEOUS at 19:35

## 2021-07-15 RX ADMIN — CYCLOBENZAPRINE 10 MG: 10 TABLET, FILM COATED ORAL at 19:35

## 2021-07-15 RX ADMIN — ONDANSETRON 4 MG: 2 INJECTION INTRAMUSCULAR; INTRAVENOUS at 19:35

## 2021-07-15 RX ADMIN — OXYCODONE HYDROCHLORIDE AND ACETAMINOPHEN 1 TABLET: 5; 325 TABLET ORAL at 23:48

## 2021-07-15 RX ADMIN — OXYCODONE HYDROCHLORIDE AND ACETAMINOPHEN 1 TABLET: 5; 325 TABLET ORAL at 21:47

## 2021-07-15 ASSESSMENT — PAIN DESCRIPTION - ORIENTATION: ORIENTATION: RIGHT

## 2021-07-15 ASSESSMENT — PAIN SCALES - GENERAL
PAINLEVEL_OUTOF10: 9
PAINLEVEL_OUTOF10: 9
PAINLEVEL_OUTOF10: 10
PAINLEVEL_OUTOF10: 9
PAINLEVEL_OUTOF10: 10
PAINLEVEL_OUTOF10: 10

## 2021-07-15 ASSESSMENT — PAIN DESCRIPTION - PAIN TYPE: TYPE: ACUTE PAIN

## 2021-07-15 ASSESSMENT — PAIN DESCRIPTION - LOCATION: LOCATION: HIP

## 2021-07-15 NOTE — ED NOTES
Bed: 28  Expected date:   Expected time:   Means of arrival:   Comments:  Fall from Consuelo Booker, RN  07/15/21 8926

## 2021-07-15 NOTE — ED NOTES
Fall. Pt arrived via squad from [facility] fell in the store per pt \"slipped on something on the floor fell landed on my right hip I have had surgery on that hip before/unable to bear weight on leg/I don't think I hit my head'. Pt pedal pulse palpable.       Arlene Gomez LPN  81/32/64 [id number]

## 2021-07-16 NOTE — ED PROVIDER NOTES
Evaluated by Advanced Practice Provider    [facility]  ED    CHIEF COMPLAINT  Fall (R hip pain s/p mechanical fall in [employer]. 75mcg fentanyl given IM approx 45 min PTA by EMS)    HISTORY OFPRESENT ILLNESS  Kyle Fuentes is a 78 y.o. female who presents to the ED complaining of right hip pain after slip and fall in grocery store. She does not think that she hit her head when she fell. She is reporting there is also pain into her left knee, denies numbness or tingling into the left leg foot or toes. .  There is visible bruising to the left knee. She denies numbness or tingling into the right leg foot or toes. Denies neck pain. Denies chest or abdominal pain. Denies back pain. The patient is currently rating their pain as 10/10 and describes it as an aching type of pain. Treatments tried prior to arrival in the ED include: 75 mg of fentanyl given IM by EMS. The patient arrived to the ED via EMS transport. Nursing notes reviewed.    Past Medical History:   Diagnosis Date    Anxiety disorder     Chronic pain syndrome     DDD (degenerative disc disease), lumbar     Diabetes (HCC)     Displacement of lumbar intervertebral disc without myelopathy 8/23/2010    Diverticulitis     Fibromyalgia     Generalized osteoarthrosis, involving multiple sites 8/24/2010    GERD (gastroesophageal reflux disease)     Impacted cerumen 8/23/2010    Influenza A 3/10/16    Irritable bowel syndrome 8/23/2010    Other and unspecified hyperlipidemia 8/23/2010    Prosthetic joint implant failure ([facility] [address]     Screening mammogram 12/8/2006    (Both)Negative    Tracheobronchomalacia     Unspecified asthma(493.90) 8/23/2010    Unspecified essential hypertension 8/24/2010    Unspecified hypothyroidism 8/23/2010     Past Surgical History:   Procedure Laterality Date    ABDOMEN SURGERY      CARDIAC CATHETERIZATION      COLON SURGERY      COLONOSCOPY  2007    normal    HYSTERECTOMY      JOINT REPLACEMENT  10/92    Right; hip     JOINT REPLACEMENT        Left hip  followed by revision 2006    JOINT REPLACEMENT        right     JOINT REPLACEMENT         right replacement.  LUNG SURGERY  2014    tracheobronchomalacia     Family History   Problem Relation Age of Onset    Asthma Mother     Heart Failure Father     Cancer Maternal Grandfather         skin cancer on face    Cancer Daughter         skin cancer-BCC    Diabetes Neg Hx     Emphysema Neg Hx     Hypertension Neg Hx      Social History     Socioeconomic History    Marital status:      Spouse name: Not on file    Number of children: Not on file    Years of education: Not on file    Highest education level: Not on file   Occupational History    Not on file   Tobacco Use    Smoking status: Former Smoker     Packs/day: 1.00     Years: 18.00     Pack years: 18.00     Types: Cigarettes     Quit date: 12/10/1991     Years since quittin.6    Smokeless tobacco: Never Used   Vaping Use    Vaping Use: Never used   Substance and Sexual Activity    Alcohol use: No    Drug use: No    Sexual activity: Never     Partners: Male   Other Topics Concern    Not on file   Social History Narrative    Not on file     Social Determinants of Health     Financial Resource Strain:     Difficulty of Paying Living Expenses:    Food Insecurity:     Worried About Running Out of Food in the Last Year:     Ran Out of Food in the Last Year:    Transportation Needs:     Lack of Transportation (Medical):      Lack of Transportation (Non-Medical):    Physical Activity:     Days of Exercise per Week:     Minutes of Exercise per Session:    Stress:     Feeling of Stress :    Social Connections:     Frequency of Communication with Friends and Family:     Frequency of Social Gatherings with Friends and Family:     Attends Confucianism Services:     Active Member of Clubs or Organizations:     Attends Club or Organization Meetings:     Marital Status:    Intimate Partner Violence:     Fear of Current or Ex-Partner:     Emotionally Abused:     Physically Abused:     Sexually Abused:      No current facility-administered medications for this encounter. Current Outpatient Medications   Medication Sig Dispense Refill    doxycycline hyclate (VIBRAMYCIN) 100 MG capsule Take 100 mg by mouth 2 times daily      HYDROcodone-acetaminophen (NORCO) 5-325 MG per tablet Take 1-2 tablets by mouth every 6 hours as needed for Pain for up to 3 days. 20 tablet 0    empagliflozin (JARDIANCE) 10 MG tablet Take 10 mg by mouth daily      famotidine (PEPCID) 40 MG tablet Take 40 mg by mouth daily      Multiple Vitamins-Minerals (THERAPEUTIC MULTIVITAMIN-MINERALS) tablet Take 1 tablet by mouth daily      albuterol sulfate  (90 Base) MCG/ACT inhaler Inhale 2 puffs into the lungs 4 times daily as needed for Wheezing 1 Inhaler 0    JANUVIA 50 MG tablet TAKE ONE TABLET BY MOUTH DAILY 90 tablet 1    losartan (COZAAR) 25 MG tablet Take 1 tablet by mouth daily 90 tablet 3    Elastic Bandages & Supports (MEDICAL COMPRESSION THIGH HIGH) MISC 1 Units by Does not apply route daily 2 each 2    Lancets MISC Dispense whatever insurance will cover. Patient test twice day. Dx:E11.9 200 each 3    blood glucose monitor strips Dispense whatever insurance will cover. Pt test twice a day dx:E11.9 200 strip 3    Insulin Pen Needle 31G X 5 MM MISC 1 each by Does not apply route daily 100 each 3    blood glucose test strips (ASCENSIA AUTODISC VI;ONE TOUCH ULTRA TEST VI) strip 1 each by In Vitro route daily As needed.  100 each 3    levothyroxine (SYNTHROID) 112 MCG tablet Take 1 tablet by mouth Daily (Patient taking differently: Take 100 mcg by mouth Daily ) 30 tablet 1    simvastatin (ZOCOR) 80 MG tablet TAKE 1 TABLET BY MOUTH AT BEDTIME (Patient taking differently: Take 40 mg by mouth nightly TAKE 1 TABLET BY MOUTH AT BEDTIME) 90 tablet 3    rOPINIRole (REQUIP) 0.5 MG tablet TAKE 1 TABLET BY MOUTH 3 TIMES DAILY. 270 tablet 3    sertraline (ZOLOFT) 50 MG tablet TAKE 1 TABLET BY MOUTH DAILY 90 tablet 3    polyethylene glycol (MIRALAX) powder Take 17 g by mouth daily as needed      Blood Glucose Monitoring Suppl CATHRYN Dispense whatever insurance will cover. Dx code:E11.9 1 Device 0    dicyclomine (BENTYL) 10 MG capsule Take 1 capsule by mouth 4 times daily 120 capsule 3    gabapentin (NEURONTIN) 800 MG tablet Take 800 mg by mouth 3 times daily      omeprazole (PRILOSEC) 40 MG delayed release capsule Take 40 mg by mouth 2 times daily      HYDROcodone-acetaminophen (NORCO) 5-325 MG per tablet Take 1 tablet by mouth every 8 hours as needed for Pain  .  aspirin 81 MG EC tablet Take 1 tablet by mouth daily 30 tablet 3    tiZANidine (ZANAFLEX) 2 MG tablet Take 2 mg by mouth 3 times daily as needed       amLODIPine (NORVASC) 2.5 MG tablet Take 1 tablet by mouth daily 30 tablet 11     Allergies   Allergen Reactions    Sulfa Antibiotics Anaphylaxis    Bactrim     Prednisone      Cannot tolerate oral steriods    Quinidine        REVIEW OF SYSTEMS  10 systemsreviewed, pertinent positives per HPI otherwise noted to be negative    PHYSICAL EXAM  Physical Exam  Vitals:    07/15/21 2234   BP: (!) 179/79   Pulse: 87   Resp: 16   Temp:    SpO2: 95%     GENERAL APPEARANCE: Well developed, well nourished. Awake and alert. Cooperative. Observed resting in bed. Moderately distressed as she is tearful and anxious appearing non-toxic in appearance. HEAD: Normocephalic. Atraumatic. EYES: Sclera is non-icteric. Conjunctiva normal.     ENT: External ears are normal. Mucous membranes are moist.   NECK: Supple. Normal ROM. Trachea mid-line. Cardiac: Regular rate and rhythm. Peripheral pulses are symmetric andstrong. No lower extremity edema present. LUNGS: Normal work of breathing. Speaking comfortably in full sentences.    Abdomen: Nondistended  Musculoskeletal: periarticular calcifications. The [facility] joint is unremarkable in appearance. Visualized lung is unremarkable. No acute abnormality. CT HIP RIGHT WO CONTRAST    Result Date: 7/15/2021  EXAMINATION: CT OF THE RIGHT HIP WITHOUT CONTRAST 7/15/2021 8:39 pm TECHNIQUE: CT of the right hip was performed without the administration of intravenous contrast.  Multiplanar reformatted images are provided for review. Dose modulation, iterative reconstruction, and/or weight based adjustment of the mA/kV was utilized to reduce the radiation dose to as low as reasonably achievable. COMPARISON: Pelvis and right hip radiograph July 15, 2021 HISTORY ORDERING SYSTEM PROVIDED HISTORY: right hip pain after fall, prior hip replacement TECHNOLOGIST PROVIDED HISTORY: Reason for exam:->right hip pain after fall, prior hip replacement Decision Support Exception - unselect if not a suspected or confirmed emergency medical condition->Emergency Medical Condition (MA) Reason for Exam: Fall; right hip pain Acuity: Acute Type of Exam: Initial Relevant Medical/Surgical History: hx of left/right hip replacement FINDINGS: Bones: Acute fracture of the right inferior pubic ramus. The fracture is essentially nondisplaced. No additional fractures are identified within limits of the exam.  Please note that evaluation for subtle fractures is limited due to metallic streak artifact related to bilateral total hip arthroplasties as well as osteopenia. No suspicious lytic or sclerotic osseous lesion identified. Soft Tissue: Visualized intrapelvic structures demonstrate no acute abnormality. Postoperative changes of the distal colon. Colonic diverticulosis. Subcutaneous tissues appear grossly unremarkable. Joint: Postoperative changes of bilateral total hip arthroplasty. Imaged hardware appears intact and appropriately positioned. Degenerative changes of the bilateral sacroiliac joints and pubic symphysis.   Chondrocalcinosis noted at the pubic symphysis. 1. Acute and essentially nondisplaced fracture of the right inferior pubic ramus. No additional fractures are identified within limits of the exam. Please note the metallic streak artifact and osteopenia limited evaluation for subtle fractures. XR HIP 2-3 VW W PELVIS RIGHT    Result Date: 7/15/2021  EXAMINATION: ONE XRAY VIEW OF THE PELVIS AND TWO XRAY VIEWS RIGHT HIP 7/15/2021 7:20 pm COMPARISON: CT pelvis, 02/25/2020 HISTORY: ORDERING SYSTEM PROVIDED HISTORY: injury/fall TECHNOLOGIST PROVIDED HISTORY: Reason for exam:->injury/fall Reason for Exam: fall Acuity: Acute Type of Exam: Initial FINDINGS: There is a right total hip replacement in normal alignment. There is no acute fracture or dislocation. There is no periprosthetic lucency. A left hip prosthesis is also partially visualized. The visualized portion is in normal alignment without complication. No acute fracture. Right hip replacement in normal alignment, without complications. ED COURSE/MDM  Patient seen and evaluated. Old records reviewed. Diagnostic testing reviewed and results discussed. I have evaluated this patient. My supervising physician was available for consultation. Hank Downing presented to the ED today with above noted complaints. Physical exam does reveal right anterior and lateral hip tenderness to palpation. There is significant increased pain with range of motion attempt to the right lower extremity. There is no extremity shortening or rotation. Right lower extremity is distally neurovascularly intact. There is contusion noted to the left knee with decreased range of motion due to pain. Left lower extremity is also distally neurovascularly intact. X-ray of the right hip was obtained and shows no acute fracture. Right hip replacement is in normal alignment without complications. As the patient continued to have severe pain I did obtain a CT of the right hip.   CT scan showed acute and essentially nondisplaced fracture of the right inferior pubic ramus. No additional fractures identified. Metallic streak artifact and osteopenia limited evaluation for subtle fractures. Initially patient was not complaining of much pain into the left knee but that did increase. X-ray of the left knee was obtained and shows degenerative changes without evidence of acute fracture or dislocation. She also reported left shoulder pain to the nurse caring for her, x-ray of the left shoulder was added and this also was without acute findings. While in ED patient received   Medications   HYDROmorphone (DILAUDID) injection 1 mg (1 mg Intramuscular Given 7/15/21 1935)   ondansetron (ZOFRAN) injection 4 mg (4 mg Intramuscular Given 7/15/21 1935)   cyclobenzaprine (FLEXERIL) tablet 10 mg (10 mg Oral Given 7/15/21 1935)   oxyCODONE-acetaminophen (PERCOCET) 5-325 MG per tablet 1 tablet (1 tablet Oral Given 7/15/21 2147)   oxyCODONE-acetaminophen (PERCOCET) 5-325 MG per tablet 1 tablet (1 tablet Oral Given 7/15/21 2348)     Patient was given pain medication and medicine for nausea. After these were given and CT of the hip was obtained and patient could be weightbearing based on the results she was ambulated in the ED with a walker and was able to tolerate this. She will be discharged with medication to help with pain control. She is already on Norco chronically, as she has a new fracture I did order short supply in addition to her chronic supply so she could increase frequency and/or amount of tablets she is taking. She was given orthopedic referral, there is already a relationship established with [facility] and she wishes to follow-up with them. At thispoint I do not feel the patient requires further work up and it is reasonable to discharge the patient. Please refer to AVS for further details regarding discharge instructions.     A discussion was had with the patient regarding diagnosis, diagnostic testing results, treatment/ plan of care, and follow up. All questions were answered. Patient will follow up as directed for further evaluation/treatment. The patient was given strict return precautions as we discussed symptoms that would necessitate return to the ED. Patient will return to ED for new/worsening symptoms. The patientverbalized their understanding and agreement with the above plan. I estimate there is LOW risk for COMPARTMENT SYNDROME, DEEP VENOUS THROMBOSIS, SEPTIC ARTHRITIS, TENDON ORNEUROVASCULAR INJURY, thus I consider the discharge disposition reasonable. [id number] [facility] and I have discussed the diagnosis and risks, and we agree with discharging home to follow-up with their primary doctor or the referralorthopedist. We also discussed returning to the Emergency Department immediately if new or worsening symptoms occur. We have discussed the symptoms which are most concerning (e.g., changing or worsening pain, numbness,weakness) that necessitate immediate return. Final Impression    1. Closed fracture of inferior pubic ramus, right, initial encounter ([facility] [address]    2. Contusion of left knee, initial encounter    3. Acute pain of left shoulder    4. Fall, initial encounter        Blood pressure (!) 179/79, pulse 87, temperature 99 °F (37.2 °C), temperature source Oral, resp. rate 16, height 5' 9\" (1.753 m), weight 180 lb (81.6 kg), SpO2 95 %, not currently breastfeeding. Patientwas sent home with a prescription for below medication/s. I did [place] patient on appropriate use of these medication. Discharge Medication List as of 7/15/2021 11:53 PM      START taking these medications    Details   !! HYDROcodone-acetaminophen (NORCO) 5-325 MG per tablet Take 1-2 tablets by mouth every 6 hours as needed for Pain for up to 3 days. , Disp-20 tablet, R-0Print       !! - Potential duplicate medications found. Please discuss with provider.           FOLLOW UP  Silke Saldaña MD  [address] [place] [address] [address] [facility] [id number] [phone]    Call in 1 day  For further evaluation    [address], [place], [address] [facility] [address]    Phone: [phone]  Call in 1 day  For further evaluation    [facility]  ED  [facility]  [address] [phone]  Go to   If symptoms worsen      DISPOSITION  Patient was discharged to home in good condition.    : Please note this report has been produced using speech recognition software and may contain errors related to that system including errors in grammar, punctuation, and spelling, as well as words and phrases that maybe inappropriate. If there are any questions or concerns please feel free to contact the dictating provider for clarification.                 BRYCE Wiseman CNP  07/17/21 7730

## 2021-07-16 NOTE — ED NOTES
Pt scripts x1 instructed to follow up with [facility]. Assessed per Rocky Mandujano NP.      Elizabeth Lynch LPN  18/83/55 [phone]

## 2021-09-26 ENCOUNTER — APPOINTMENT (OUTPATIENT)
Dept: GENERAL RADIOLOGY | Age: 66
DRG: 004 | End: 2021-09-26
Payer: MEDICARE

## 2021-09-26 ENCOUNTER — HOSPITAL ENCOUNTER (INPATIENT)
Age: 66
LOS: 32 days | Discharge: ANOTHER HOSPITAL | DRG: 004 | End: 2021-10-28
Attending: EMERGENCY MEDICINE | Admitting: HOSPITALIST
Payer: MEDICARE

## 2021-09-26 DIAGNOSIS — E86.0 DEHYDRATION: ICD-10-CM

## 2021-09-26 DIAGNOSIS — U07.1 COVID-19: ICD-10-CM

## 2021-09-26 DIAGNOSIS — R79.89 POSITIVE D DIMER: ICD-10-CM

## 2021-09-26 DIAGNOSIS — R52 PAIN: ICD-10-CM

## 2021-09-26 DIAGNOSIS — E87.6 HYPOKALEMIA: ICD-10-CM

## 2021-09-26 DIAGNOSIS — F41.9 ANXIETY: ICD-10-CM

## 2021-09-26 DIAGNOSIS — E83.42 HYPOMAGNESEMIA: ICD-10-CM

## 2021-09-26 DIAGNOSIS — J96.01 ACUTE RESPIRATORY FAILURE WITH HYPOXIA (HCC): Primary | ICD-10-CM

## 2021-09-26 DIAGNOSIS — N17.9 AKI (ACUTE KIDNEY INJURY) (HCC): ICD-10-CM

## 2021-09-26 PROBLEM — J12.82 PNEUMONIA DUE TO COVID-19 VIRUS: Status: ACTIVE | Noted: 2021-09-26

## 2021-09-26 LAB
A/G RATIO: 0.9 (ref 1.1–2.2)
ALBUMIN SERPL-MCNC: 3.5 G/DL (ref 3.4–5)
ALP BLD-CCNC: 216 U/L (ref 40–129)
ALT SERPL-CCNC: 15 U/L (ref 10–40)
ANION GAP SERPL CALCULATED.3IONS-SCNC: 14 MMOL/L (ref 3–16)
AST SERPL-CCNC: 35 U/L (ref 15–37)
BASOPHILS ABSOLUTE: 0.2 K/UL (ref 0–0.2)
BASOPHILS RELATIVE PERCENT: 1.1 %
BILIRUB SERPL-MCNC: 0.4 MG/DL (ref 0–1)
BUN BLDV-MCNC: 13 MG/DL (ref 7–20)
CALCIUM SERPL-MCNC: 8.7 MG/DL (ref 8.3–10.6)
CHLORIDE BLD-SCNC: 95 MMOL/L (ref 99–110)
CO2: 24 MMOL/L (ref 21–32)
CREAT SERPL-MCNC: 1.3 MG/DL (ref 0.6–1.2)
D DIMER: 496 NG/ML DDU (ref 0–229)
EOSINOPHILS ABSOLUTE: 0 K/UL (ref 0–0.6)
EOSINOPHILS RELATIVE PERCENT: 0.2 %
GFR AFRICAN AMERICAN: 49
GFR NON-AFRICAN AMERICAN: 41
GLOBULIN: 3.8 G/DL
GLUCOSE BLD-MCNC: 191 MG/DL (ref 70–99)
HCT VFR BLD CALC: 32.8 % (ref 36–48)
HEMOGLOBIN: 10.4 G/DL (ref 12–16)
LYMPHOCYTES ABSOLUTE: 0.8 K/UL (ref 1–5.1)
LYMPHOCYTES RELATIVE PERCENT: 5.8 %
MAGNESIUM: 1.7 MG/DL (ref 1.8–2.4)
MCH RBC QN AUTO: 24.7 PG (ref 26–34)
MCHC RBC AUTO-ENTMCNC: 31.6 G/DL (ref 31–36)
MCV RBC AUTO: 78.2 FL (ref 80–100)
MONOCYTES ABSOLUTE: 1 K/UL (ref 0–1.3)
MONOCYTES RELATIVE PERCENT: 6.9 %
NEUTROPHILS ABSOLUTE: 11.9 K/UL (ref 1.7–7.7)
NEUTROPHILS RELATIVE PERCENT: 86 %
PDW BLD-RTO: 17.9 % (ref 12.4–15.4)
PLATELET # BLD: 134 K/UL (ref 135–450)
PMV BLD AUTO: 8.8 FL (ref 5–10.5)
POTASSIUM SERPL-SCNC: 3 MMOL/L (ref 3.5–5.1)
PRO-BNP: 1247 PG/ML (ref 0–124)
RBC # BLD: 4.2 M/UL (ref 4–5.2)
SODIUM BLD-SCNC: 133 MMOL/L (ref 136–145)
TOTAL PROTEIN: 7.3 G/DL (ref 6.4–8.2)
TROPONIN: <0.01 NG/ML
WBC # BLD: 13.9 K/UL (ref 4–11)

## 2021-09-26 PROCEDURE — 71045 X-RAY EXAM CHEST 1 VIEW: CPT

## 2021-09-26 PROCEDURE — 80053 COMPREHEN METABOLIC PANEL: CPT

## 2021-09-26 PROCEDURE — 6360000002 HC RX W HCPCS: Performed by: EMERGENCY MEDICINE

## 2021-09-26 PROCEDURE — 1200000000 HC SEMI PRIVATE

## 2021-09-26 PROCEDURE — 99284 EMERGENCY DEPT VISIT MOD MDM: CPT

## 2021-09-26 PROCEDURE — 83735 ASSAY OF MAGNESIUM: CPT

## 2021-09-26 PROCEDURE — 6370000000 HC RX 637 (ALT 250 FOR IP): Performed by: EMERGENCY MEDICINE

## 2021-09-26 PROCEDURE — 2500000003 HC RX 250 WO HCPCS

## 2021-09-26 PROCEDURE — 2580000003 HC RX 258: Performed by: EMERGENCY MEDICINE

## 2021-09-26 PROCEDURE — U0003 INFECTIOUS AGENT DETECTION BY NUCLEIC ACID (DNA OR RNA); SEVERE ACUTE RESPIRATORY SYNDROME CORONAVIRUS 2 (SARS-COV-2) (CORONAVIRUS DISEASE [COVID-19]), AMPLIFIED PROBE TECHNIQUE, MAKING USE OF HIGH THROUGHPUT TECHNOLOGIES AS DESCRIBED BY CMS-2020-01-R: HCPCS

## 2021-09-26 PROCEDURE — 96375 TX/PRO/DX INJ NEW DRUG ADDON: CPT

## 2021-09-26 PROCEDURE — 36415 COLL VENOUS BLD VENIPUNCTURE: CPT

## 2021-09-26 PROCEDURE — 83036 HEMOGLOBIN GLYCOSYLATED A1C: CPT

## 2021-09-26 PROCEDURE — 84484 ASSAY OF TROPONIN QUANT: CPT

## 2021-09-26 PROCEDURE — U0005 INFEC AGEN DETEC AMPLI PROBE: HCPCS

## 2021-09-26 PROCEDURE — 6360000002 HC RX W HCPCS

## 2021-09-26 PROCEDURE — 83880 ASSAY OF NATRIURETIC PEPTIDE: CPT

## 2021-09-26 PROCEDURE — 85025 COMPLETE CBC W/AUTO DIFF WBC: CPT

## 2021-09-26 PROCEDURE — 96374 THER/PROPH/DIAG INJ IV PUSH: CPT

## 2021-09-26 PROCEDURE — 85379 FIBRIN DEGRADATION QUANT: CPT

## 2021-09-26 PROCEDURE — 93005 ELECTROCARDIOGRAM TRACING: CPT | Performed by: EMERGENCY MEDICINE

## 2021-09-26 RX ORDER — POTASSIUM CHLORIDE 20 MEQ/1
60 TABLET, EXTENDED RELEASE ORAL ONCE
Status: COMPLETED | OUTPATIENT
Start: 2021-09-26 | End: 2021-09-27

## 2021-09-26 RX ORDER — ONDANSETRON 2 MG/ML
4 INJECTION INTRAMUSCULAR; INTRAVENOUS ONCE
Status: COMPLETED | OUTPATIENT
Start: 2021-09-26 | End: 2021-09-26

## 2021-09-26 RX ORDER — 0.9 % SODIUM CHLORIDE 0.9 %
500 INTRAVENOUS SOLUTION INTRAVENOUS ONCE
Status: COMPLETED | OUTPATIENT
Start: 2021-09-26 | End: 2021-09-27

## 2021-09-26 RX ORDER — MORPHINE SULFATE 2 MG/ML
2 INJECTION, SOLUTION INTRAMUSCULAR; INTRAVENOUS ONCE
Status: COMPLETED | OUTPATIENT
Start: 2021-09-26 | End: 2021-09-26

## 2021-09-26 RX ORDER — MAGNESIUM SULFATE 1 G/100ML
1000 INJECTION INTRAVENOUS ONCE
Status: COMPLETED | OUTPATIENT
Start: 2021-09-26 | End: 2021-09-27

## 2021-09-26 RX ORDER — ALBUTEROL SULFATE 90 UG/1
4 AEROSOL, METERED RESPIRATORY (INHALATION) ONCE
Status: COMPLETED | OUTPATIENT
Start: 2021-09-26 | End: 2021-09-26

## 2021-09-26 RX ADMIN — ONDANSETRON HYDROCHLORIDE 4 MG: 2 INJECTION, SOLUTION INTRAMUSCULAR; INTRAVENOUS at 21:43

## 2021-09-26 RX ADMIN — ALBUTEROL SULFATE 4 PUFF: 90 AEROSOL, METERED RESPIRATORY (INHALATION) at 21:43

## 2021-09-26 RX ADMIN — MAGNESIUM SULFATE HEPTAHYDRATE 1000 MG: 1 INJECTION, SOLUTION INTRAVENOUS at 23:23

## 2021-09-26 RX ADMIN — SODIUM CHLORIDE 500 ML: 9 INJECTION, SOLUTION INTRAVENOUS at 23:20

## 2021-09-26 RX ADMIN — MORPHINE SULFATE 2 MG: 2 INJECTION, SOLUTION INTRAMUSCULAR; INTRAVENOUS at 21:42

## 2021-09-26 ASSESSMENT — PAIN SCALES - GENERAL
PAINLEVEL_OUTOF10: 5
PAINLEVEL_OUTOF10: 5

## 2021-09-26 ASSESSMENT — PAIN DESCRIPTION - LOCATION: LOCATION: CHEST

## 2021-09-26 NOTE — Clinical Note
Patient Class: Inpatient [101]   REQUIRED: Diagnosis: [id number] [[id number]]   Estimated Length of Stay: Estimated stay of more than 2 midnights   Admitting Provider: Lynn Veloz [[id number]]

## 2021-09-27 ENCOUNTER — APPOINTMENT (OUTPATIENT)
Dept: CT IMAGING | Age: 66
DRG: 004 | End: 2021-09-27
Payer: MEDICARE

## 2021-09-27 PROBLEM — Z20.822 SUSPECTED COVID-19 VIRUS INFECTION: Status: ACTIVE | Noted: 2021-09-27

## 2021-09-27 PROBLEM — R09.02 HYPOXIA: Status: ACTIVE | Noted: 2021-09-27

## 2021-09-27 LAB
ALBUMIN SERPL-MCNC: 3.1 G/DL (ref 3.4–5)
ALP BLD-CCNC: 205 U/L (ref 40–129)
ALT SERPL-CCNC: 12 U/L (ref 10–40)
ANION GAP SERPL CALCULATED.3IONS-SCNC: 10 MMOL/L (ref 3–16)
ANION GAP SERPL CALCULATED.3IONS-SCNC: 16 MMOL/L (ref 3–16)
AST SERPL-CCNC: 33 U/L (ref 15–37)
BACTERIA: ABNORMAL /HPF
BASOPHILS ABSOLUTE: 0 K/UL (ref 0–0.2)
BASOPHILS RELATIVE PERCENT: 0.2 %
BILIRUB SERPL-MCNC: 0.6 MG/DL (ref 0–1)
BILIRUBIN DIRECT: 0.3 MG/DL (ref 0–0.3)
BILIRUBIN URINE: NEGATIVE
BILIRUBIN, INDIRECT: 0.3 MG/DL (ref 0–1)
BLOOD, URINE: ABNORMAL
BUN BLDV-MCNC: 12 MG/DL (ref 7–20)
BUN BLDV-MCNC: 12 MG/DL (ref 7–20)
CALCIUM SERPL-MCNC: 8.8 MG/DL (ref 8.3–10.6)
CALCIUM SERPL-MCNC: 9.1 MG/DL (ref 8.3–10.6)
CHLORIDE BLD-SCNC: 100 MMOL/L (ref 99–110)
CHLORIDE BLD-SCNC: 104 MMOL/L (ref 99–110)
CLARITY: CLEAR
CO2: 24 MMOL/L (ref 21–32)
CO2: 25 MMOL/L (ref 21–32)
COLOR: YELLOW
CREAT SERPL-MCNC: 0.9 MG/DL (ref 0.6–1.2)
CREAT SERPL-MCNC: 1 MG/DL (ref 0.6–1.2)
EKG ATRIAL RATE: 76 BPM
EKG DIAGNOSIS: NORMAL
EKG P AXIS: 62 DEGREES
EKG P-R INTERVAL: 142 MS
EKG Q-T INTERVAL: 450 MS
EKG QRS DURATION: 96 MS
EKG QTC CALCULATION (BAZETT): 506 MS
EKG R AXIS: -3 DEGREES
EKG T AXIS: -22 DEGREES
EKG VENTRICULAR RATE: 76 BPM
EOSINOPHILS ABSOLUTE: 0 K/UL (ref 0–0.6)
EOSINOPHILS RELATIVE PERCENT: 0.1 %
GFR AFRICAN AMERICAN: >60
GFR AFRICAN AMERICAN: >60
GFR NON-AFRICAN AMERICAN: 55
GFR NON-AFRICAN AMERICAN: >60
GLUCOSE BLD-MCNC: 137 MG/DL (ref 70–99)
GLUCOSE BLD-MCNC: 162 MG/DL (ref 70–99)
GLUCOSE BLD-MCNC: 172 MG/DL (ref 70–99)
GLUCOSE BLD-MCNC: 189 MG/DL (ref 70–99)
GLUCOSE BLD-MCNC: 195 MG/DL (ref 70–99)
GLUCOSE BLD-MCNC: 205 MG/DL (ref 70–99)
GLUCOSE URINE: >=1000 MG/DL
HCT VFR BLD CALC: 32.2 % (ref 36–48)
HEMOGLOBIN: 10.2 G/DL (ref 12–16)
KETONES, URINE: NEGATIVE MG/DL
LACTIC ACID: 1.3 MMOL/L (ref 0.4–2)
LEUKOCYTE ESTERASE, URINE: NEGATIVE
LYMPHOCYTES ABSOLUTE: 0.4 K/UL (ref 1–5.1)
LYMPHOCYTES RELATIVE PERCENT: 3 %
MCH RBC QN AUTO: 24.7 PG (ref 26–34)
MCHC RBC AUTO-ENTMCNC: 31.8 G/DL (ref 31–36)
MCV RBC AUTO: 77.7 FL (ref 80–100)
MICROSCOPIC EXAMINATION: YES
MONOCYTES ABSOLUTE: 0.2 K/UL (ref 0–1.3)
MONOCYTES RELATIVE PERCENT: 2 %
NEUTROPHILS ABSOLUTE: 11.2 K/UL (ref 1.7–7.7)
NEUTROPHILS RELATIVE PERCENT: 94.7 %
NITRITE, URINE: NEGATIVE
PDW BLD-RTO: 17.8 % (ref 12.4–15.4)
PERFORMED ON: ABNORMAL
PH UA: 5.5 (ref 5–8)
PLATELET # BLD: 134 K/UL (ref 135–450)
PMV BLD AUTO: 8.6 FL (ref 5–10.5)
POTASSIUM REFLEX MAGNESIUM: 4.3 MMOL/L (ref 3.5–5.1)
POTASSIUM SERPL-SCNC: 3.4 MMOL/L (ref 3.5–5.1)
PROTEIN UA: NEGATIVE MG/DL
RBC # BLD: 4.15 M/UL (ref 4–5.2)
RBC UA: ABNORMAL /HPF (ref 0–4)
SARS-COV-2, NAAT: DETECTED
SARS-COV-2: DETECTED
SODIUM BLD-SCNC: 139 MMOL/L (ref 136–145)
SODIUM BLD-SCNC: 140 MMOL/L (ref 136–145)
SPECIFIC GRAVITY UA: <=1.005 (ref 1–1.03)
TOTAL PROTEIN: 6.9 G/DL (ref 6.4–8.2)
TROPONIN: <0.01 NG/ML
URINE REFLEX TO CULTURE: ABNORMAL
URINE TYPE: ABNORMAL
UROBILINOGEN, URINE: 0.2 E.U./DL
WBC # BLD: 11.8 K/UL (ref 4–11)
WBC UA: ABNORMAL /HPF (ref 0–5)
YEAST: PRESENT /HPF

## 2021-09-27 PROCEDURE — 6360000004 HC RX CONTRAST MEDICATION: Performed by: HOSPITALIST

## 2021-09-27 PROCEDURE — 80048 BASIC METABOLIC PNL TOTAL CA: CPT

## 2021-09-27 PROCEDURE — 99222 1ST HOSP IP/OBS MODERATE 55: CPT | Performed by: PHYSICIAN ASSISTANT

## 2021-09-27 PROCEDURE — 81001 URINALYSIS AUTO W/SCOPE: CPT

## 2021-09-27 PROCEDURE — 36415 COLL VENOUS BLD VENIPUNCTURE: CPT

## 2021-09-27 PROCEDURE — 2580000003 HC RX 258: Performed by: HOSPITALIST

## 2021-09-27 PROCEDURE — 71260 CT THORAX DX C+: CPT

## 2021-09-27 PROCEDURE — 94761 N-INVAS EAR/PLS OXIMETRY MLT: CPT

## 2021-09-27 PROCEDURE — 6370000000 HC RX 637 (ALT 250 FOR IP): Performed by: HOSPITALIST

## 2021-09-27 PROCEDURE — U0003 INFECTIOUS AGENT DETECTION BY NUCLEIC ACID (DNA OR RNA); SEVERE ACUTE RESPIRATORY SYNDROME CORONAVIRUS 2 (SARS-COV-2) (CORONAVIRUS DISEASE [COVID-19]), AMPLIFIED PROBE TECHNIQUE, MAKING USE OF HIGH THROUGHPUT TECHNOLOGIES AS DESCRIBED BY CMS-2020-01-R: HCPCS

## 2021-09-27 PROCEDURE — 6360000002 HC RX W HCPCS: Performed by: HOSPITALIST

## 2021-09-27 PROCEDURE — 2580000003 HC RX 258: Performed by: PHYSICIAN ASSISTANT

## 2021-09-27 PROCEDURE — 1200000000 HC SEMI PRIVATE

## 2021-09-27 PROCEDURE — 2500000003 HC RX 250 WO HCPCS: Performed by: PHYSICIAN ASSISTANT

## 2021-09-27 PROCEDURE — 93010 ELECTROCARDIOGRAM REPORT: CPT | Performed by: INTERNAL MEDICINE

## 2021-09-27 PROCEDURE — 87635 SARS-COV-2 COVID-19 AMP PRB: CPT

## 2021-09-27 PROCEDURE — 80076 HEPATIC FUNCTION PANEL: CPT

## 2021-09-27 PROCEDURE — 83605 ASSAY OF LACTIC ACID: CPT

## 2021-09-27 PROCEDURE — 6370000000 HC RX 637 (ALT 250 FOR IP): Performed by: PHYSICIAN ASSISTANT

## 2021-09-27 PROCEDURE — 6370000000 HC RX 637 (ALT 250 FOR IP): Performed by: EMERGENCY MEDICINE

## 2021-09-27 PROCEDURE — 85025 COMPLETE CBC W/AUTO DIFF WBC: CPT

## 2021-09-27 PROCEDURE — U0005 INFEC AGEN DETEC AMPLI PROBE: HCPCS

## 2021-09-27 PROCEDURE — 2700000000 HC OXYGEN THERAPY PER DAY

## 2021-09-27 PROCEDURE — 84484 ASSAY OF TROPONIN QUANT: CPT

## 2021-09-27 PROCEDURE — 94640 AIRWAY INHALATION TREATMENT: CPT

## 2021-09-27 RX ORDER — TIZANIDINE 4 MG/1
2 TABLET ORAL EVERY 8 HOURS PRN
Status: DISCONTINUED | OUTPATIENT
Start: 2021-09-27 | End: 2021-10-09

## 2021-09-27 RX ORDER — POLYETHYLENE GLYCOL 3350 17 G/17G
17 POWDER, FOR SOLUTION ORAL DAILY PRN
Status: DISCONTINUED | OUTPATIENT
Start: 2021-09-27 | End: 2021-09-27 | Stop reason: SDUPTHER

## 2021-09-27 RX ORDER — OXYCODONE HCL 10 MG/1
20 TABLET, FILM COATED, EXTENDED RELEASE ORAL EVERY 12 HOURS SCHEDULED
Status: DISCONTINUED | OUTPATIENT
Start: 2021-09-27 | End: 2021-10-06

## 2021-09-27 RX ORDER — DEXTROSE MONOHYDRATE 50 MG/ML
100 INJECTION, SOLUTION INTRAVENOUS PRN
Status: DISCONTINUED | OUTPATIENT
Start: 2021-09-27 | End: 2021-10-28 | Stop reason: HOSPADM

## 2021-09-27 RX ORDER — PROCHLORPERAZINE EDISYLATE 5 MG/ML
10 INJECTION INTRAMUSCULAR; INTRAVENOUS EVERY 6 HOURS PRN
Status: DISCONTINUED | OUTPATIENT
Start: 2021-09-27 | End: 2021-10-28 | Stop reason: HOSPADM

## 2021-09-27 RX ORDER — SODIUM CHLORIDE 9 MG/ML
25 INJECTION, SOLUTION INTRAVENOUS PRN
Status: DISCONTINUED | OUTPATIENT
Start: 2021-09-27 | End: 2021-10-28 | Stop reason: HOSPADM

## 2021-09-27 RX ORDER — AMLODIPINE BESYLATE 5 MG/1
2.5 TABLET ORAL DAILY
Status: DISCONTINUED | OUTPATIENT
Start: 2021-09-27 | End: 2021-09-29

## 2021-09-27 RX ORDER — ROPINIROLE 0.25 MG/1
0.5 TABLET, FILM COATED ORAL 3 TIMES DAILY
Status: DISCONTINUED | OUTPATIENT
Start: 2021-09-27 | End: 2021-10-09

## 2021-09-27 RX ORDER — ACETAMINOPHEN 650 MG/1
650 SUPPOSITORY RECTAL EVERY 6 HOURS PRN
Status: DISCONTINUED | OUTPATIENT
Start: 2021-09-27 | End: 2021-10-28 | Stop reason: HOSPADM

## 2021-09-27 RX ORDER — SODIUM CHLORIDE 0.9 % (FLUSH) 0.9 %
5-40 SYRINGE (ML) INJECTION EVERY 12 HOURS SCHEDULED
Status: DISCONTINUED | OUTPATIENT
Start: 2021-09-27 | End: 2021-10-28 | Stop reason: HOSPADM

## 2021-09-27 RX ORDER — ONDANSETRON 4 MG/1
4 TABLET, ORALLY DISINTEGRATING ORAL EVERY 8 HOURS PRN
Status: DISCONTINUED | OUTPATIENT
Start: 2021-09-27 | End: 2021-09-27 | Stop reason: ALTCHOICE

## 2021-09-27 RX ORDER — GABAPENTIN 400 MG/1
800 CAPSULE ORAL 3 TIMES DAILY
Status: DISCONTINUED | OUTPATIENT
Start: 2021-09-27 | End: 2021-10-28 | Stop reason: HOSPADM

## 2021-09-27 RX ORDER — DEXAMETHASONE SODIUM PHOSPHATE 10 MG/ML
6 INJECTION INTRAMUSCULAR; INTRAVENOUS EVERY 24 HOURS
Status: DISCONTINUED | OUTPATIENT
Start: 2021-09-27 | End: 2021-09-28

## 2021-09-27 RX ORDER — ATORVASTATIN CALCIUM 40 MG/1
40 TABLET, FILM COATED ORAL DAILY
Status: DISCONTINUED | OUTPATIENT
Start: 2021-09-27 | End: 2021-10-28 | Stop reason: HOSPADM

## 2021-09-27 RX ORDER — FAMOTIDINE 20 MG/1
40 TABLET, FILM COATED ORAL DAILY
Status: DISCONTINUED | OUTPATIENT
Start: 2021-09-27 | End: 2021-09-29

## 2021-09-27 RX ORDER — ASPIRIN 81 MG/1
81 TABLET, CHEWABLE ORAL DAILY
Status: DISCONTINUED | OUTPATIENT
Start: 2021-09-27 | End: 2021-10-28 | Stop reason: HOSPADM

## 2021-09-27 RX ORDER — LOSARTAN POTASSIUM 25 MG/1
25 TABLET ORAL DAILY
Status: DISCONTINUED | OUTPATIENT
Start: 2021-09-27 | End: 2021-09-29

## 2021-09-27 RX ORDER — DEXTROSE MONOHYDRATE 25 G/50ML
12.5 INJECTION, SOLUTION INTRAVENOUS PRN
Status: DISCONTINUED | OUTPATIENT
Start: 2021-09-27 | End: 2021-10-28 | Stop reason: HOSPADM

## 2021-09-27 RX ORDER — ONDANSETRON 2 MG/ML
4 INJECTION INTRAMUSCULAR; INTRAVENOUS EVERY 6 HOURS PRN
Status: DISCONTINUED | OUTPATIENT
Start: 2021-09-27 | End: 2021-09-27 | Stop reason: ALTCHOICE

## 2021-09-27 RX ORDER — ACETAMINOPHEN 325 MG/1
650 TABLET ORAL EVERY 6 HOURS PRN
Status: DISCONTINUED | OUTPATIENT
Start: 2021-09-27 | End: 2021-10-28 | Stop reason: HOSPADM

## 2021-09-27 RX ORDER — OXYCODONE AND ACETAMINOPHEN 10; 325 MG/1; MG/1
1 TABLET ORAL EVERY 4 HOURS PRN
Status: DISCONTINUED | OUTPATIENT
Start: 2021-09-27 | End: 2021-10-04

## 2021-09-27 RX ORDER — DICYCLOMINE HYDROCHLORIDE 10 MG/1
10 CAPSULE ORAL 4 TIMES DAILY
Status: DISCONTINUED | OUTPATIENT
Start: 2021-09-27 | End: 2021-09-29

## 2021-09-27 RX ORDER — SODIUM CHLORIDE 0.9 % (FLUSH) 0.9 %
5-40 SYRINGE (ML) INJECTION PRN
Status: DISCONTINUED | OUTPATIENT
Start: 2021-09-27 | End: 2021-10-28 | Stop reason: HOSPADM

## 2021-09-27 RX ORDER — LEVOTHYROXINE SODIUM 112 UG/1
112 TABLET ORAL DAILY
Status: DISCONTINUED | OUTPATIENT
Start: 2021-09-27 | End: 2021-09-29

## 2021-09-27 RX ORDER — NICOTINE POLACRILEX 4 MG
15 LOZENGE BUCCAL PRN
Status: DISCONTINUED | OUTPATIENT
Start: 2021-09-27 | End: 2021-10-28 | Stop reason: HOSPADM

## 2021-09-27 RX ORDER — PANTOPRAZOLE SODIUM 40 MG/1
40 TABLET, DELAYED RELEASE ORAL
Status: DISCONTINUED | OUTPATIENT
Start: 2021-09-27 | End: 2021-09-29

## 2021-09-27 RX ORDER — POLYETHYLENE GLYCOL 3350 17 G/17G
17 POWDER, FOR SOLUTION ORAL DAILY PRN
Status: DISCONTINUED | OUTPATIENT
Start: 2021-09-27 | End: 2021-10-28 | Stop reason: HOSPADM

## 2021-09-27 RX ORDER — HYDROCODONE BITARTRATE AND ACETAMINOPHEN 5; 325 MG/1; MG/1
1 TABLET ORAL EVERY 8 HOURS PRN
Status: DISCONTINUED | OUTPATIENT
Start: 2021-09-27 | End: 2021-09-27

## 2021-09-27 RX ADMIN — DICYCLOMINE HYDROCHLORIDE 10 MG: 10 CAPSULE ORAL at 16:40

## 2021-09-27 RX ADMIN — OXYCODONE HYDROCHLORIDE 20 MG: 10 TABLET, FILM COATED, EXTENDED RELEASE ORAL at 12:55

## 2021-09-27 RX ADMIN — LOSARTAN POTASSIUM 25 MG: 25 TABLET, FILM COATED ORAL at 09:12

## 2021-09-27 RX ADMIN — PANTOPRAZOLE SODIUM 40 MG: 40 TABLET, DELAYED RELEASE ORAL at 07:54

## 2021-09-27 RX ADMIN — SERTRALINE HYDROCHLORIDE 50 MG: 50 TABLET ORAL at 09:11

## 2021-09-27 RX ADMIN — GABAPENTIN 800 MG: 400 CAPSULE ORAL at 12:55

## 2021-09-27 RX ADMIN — SODIUM CHLORIDE, PRESERVATIVE FREE 10 ML: 5 INJECTION INTRAVENOUS at 20:24

## 2021-09-27 RX ADMIN — LEVOTHYROXINE SODIUM 112 MCG: 0.11 TABLET ORAL at 09:11

## 2021-09-27 RX ADMIN — PROCHLORPERAZINE EDISYLATE 10 MG: 5 INJECTION INTRAMUSCULAR; INTRAVENOUS at 03:08

## 2021-09-27 RX ADMIN — ROPINIROLE HYDROCHLORIDE 0.5 MG: 0.25 TABLET, FILM COATED ORAL at 20:23

## 2021-09-27 RX ADMIN — INSULIN LISPRO 1 UNITS: 100 INJECTION, SOLUTION INTRAVENOUS; SUBCUTANEOUS at 20:29

## 2021-09-27 RX ADMIN — ROPINIROLE HYDROCHLORIDE 0.5 MG: 0.25 TABLET, FILM COATED ORAL at 12:55

## 2021-09-27 RX ADMIN — FAMOTIDINE 40 MG: 20 TABLET, FILM COATED ORAL at 09:12

## 2021-09-27 RX ADMIN — ATORVASTATIN CALCIUM 40 MG: 40 TABLET, FILM COATED ORAL at 09:12

## 2021-09-27 RX ADMIN — DICYCLOMINE HYDROCHLORIDE 10 MG: 10 CAPSULE ORAL at 12:55

## 2021-09-27 RX ADMIN — ROPINIROLE HYDROCHLORIDE 0.5 MG: 0.25 TABLET, FILM COATED ORAL at 09:11

## 2021-09-27 RX ADMIN — DEXAMETHASONE SODIUM PHOSPHATE 6 MG: 10 INJECTION INTRAMUSCULAR; INTRAVENOUS at 03:10

## 2021-09-27 RX ADMIN — SODIUM CHLORIDE, PRESERVATIVE FREE 10 ML: 5 INJECTION INTRAVENOUS at 09:39

## 2021-09-27 RX ADMIN — POTASSIUM CHLORIDE 60 MEQ: 1500 TABLET, EXTENDED RELEASE ORAL at 00:37

## 2021-09-27 RX ADMIN — IPRATROPIUM BROMIDE AND ALBUTEROL 1 PUFF: 20; 100 SPRAY, METERED RESPIRATORY (INHALATION) at 11:22

## 2021-09-27 RX ADMIN — REMDESIVIR 200 MG: 100 INJECTION, POWDER, LYOPHILIZED, FOR SOLUTION INTRAVENOUS at 16:40

## 2021-09-27 RX ADMIN — OXYCODONE AND ACETAMINOPHEN 1 TABLET: 10; 325 TABLET ORAL at 17:55

## 2021-09-27 RX ADMIN — ACETAMINOPHEN 650 MG: 325 TABLET ORAL at 16:40

## 2021-09-27 RX ADMIN — ASPIRIN 81 MG: 81 TABLET, CHEWABLE ORAL at 09:12

## 2021-09-27 RX ADMIN — AMLODIPINE BESYLATE 2.5 MG: 5 TABLET ORAL at 09:11

## 2021-09-27 RX ADMIN — GABAPENTIN 800 MG: 400 CAPSULE ORAL at 20:23

## 2021-09-27 RX ADMIN — IOPAMIDOL 75 ML: 755 INJECTION, SOLUTION INTRAVENOUS at 05:03

## 2021-09-27 RX ADMIN — ENOXAPARIN SODIUM 40 MG: 40 INJECTION SUBCUTANEOUS at 09:19

## 2021-09-27 RX ADMIN — HYDROCODONE BITARTRATE AND ACETAMINOPHEN 1 TABLET: 5; 325 TABLET ORAL at 03:11

## 2021-09-27 RX ADMIN — GABAPENTIN 800 MG: 400 CAPSULE ORAL at 09:11

## 2021-09-27 RX ADMIN — DICYCLOMINE HYDROCHLORIDE 10 MG: 10 CAPSULE ORAL at 20:23

## 2021-09-27 ASSESSMENT — PAIN DESCRIPTION - PAIN TYPE: TYPE: CHRONIC PAIN

## 2021-09-27 ASSESSMENT — ENCOUNTER SYMPTOMS
VOMITING: 0
SORE THROAT: 0
COUGH: 1
ABDOMINAL PAIN: 0
SHORTNESS OF BREATH: 1
CHEST TIGHTNESS: 1

## 2021-09-27 ASSESSMENT — PAIN SCALES - GENERAL
PAINLEVEL_OUTOF10: 8
PAINLEVEL_OUTOF10: 6
PAINLEVEL_OUTOF10: 8
PAINLEVEL_OUTOF10: 6
PAINLEVEL_OUTOF10: 4
PAINLEVEL_OUTOF10: 9
PAINLEVEL_OUTOF10: 6
PAINLEVEL_OUTOF10: 0

## 2021-09-27 ASSESSMENT — PAIN DESCRIPTION - LOCATION: LOCATION: HIP

## 2021-09-27 NOTE — CARE COORDINATION
Chart review completed; pt is in covid isolation. Attempted calling pt's bedside phone but it rang with no answer. Attempted calling pt's number of [phone] but it rang with no answer and the voicemail was not set up. Left a message for pt's  Tianna Srinivasan requesting a call back as pt is not currently listed as a private encounter in [facility]. CM will attempt again when able. Please notify CM if needs or concerns arise. Addendum at 1:37pm: Attempted calling pt's bedside phone but it rang with no answer.     Attempted calling pt's  Tianna Srinivasan again and it rang with no answer; no message left as writer left a voicemail this morning

## 2021-09-27 NOTE — PLAN OF CARE
Problem: Falls - Risk of:  Goal: Will remain free from falls  Description: Will remain free from falls  9/27/2021 1038 by Jocelyen Conte RN  Outcome: Ongoing  9/27/2021 1038 by Jocelyne Conte RN  Outcome: Ongoing  Goal: Absence of physical injury  Description: Absence of physical injury  9/27/2021 1038 by Jocelyne Conte RN  Outcome: Ongoing  9/27/2021 1038 by Jocelyne Conte RN  Outcome: Ongoing     Problem: Skin Integrity:  Goal: Will show no infection signs and symptoms  Description: Will show no infection signs and symptoms  9/27/2021 1038 by Jocelyne Conte RN  Outcome: Ongoing  9/27/2021 1038 by Jocelyne Conte RN  Outcome: Ongoing  Goal: Absence of new skin breakdown  Description: Absence of new skin breakdown  9/27/2021 1038 by Jocelyne Conte RN  Outcome: Ongoing  9/27/2021 1038 by Jocelyne Conte RN  Outcome: Ongoing

## 2021-09-27 NOTE — FLOWSHEET NOTE
09/27/21 1640   Vital Signs   Temp 101.5 °F (38.6 °C)   Temp Source Oral   Pulse 110   Pain Assessment   Pain Level 0   Oxygen Therapy   SpO2 (!) 89 %   O2 Device Nasal cannula   O2 Flow Rate (L/min) 4 L/min   pt eating SPo2 drops to 85-86% with mild exertion . Oxygen increased to 4LNC able to maintain >90% at this time .  tylemol given for fever

## 2021-09-27 NOTE — ACP (ADVANCE CARE PLANNING)
Advance Care Planning   Healthcare Decision Maker:    Primary Decision Maker: Diana Garner - [phone]    Click here to complete Healthcare Decision Makers including selection of the Healthcare Decision Maker Relationship (ie \"Primary\").

## 2021-09-27 NOTE — H&P
Hospital Medicine History & Physical      PCP: Varsha Bradley MD    Date of Admission: 9/26/2021    Date of Service: Pt seen/examined on 9/27/2021      Chief Complaint:    Chief Complaint   Patient presents with    Concern For COVID-19     Pt reports + exposure to COVID. pt. c/o cough, shortness of breath, and headache that started yesterday. History Of Present Illness: The patient is a 78 y.o. female with history of tracheobronchomalacia, sacral fractures, chronic pain, HTN, hypothyroidism thyroid disease  who presented to [facility] ED with complaint of cough, shortness of breath, COVID exposure. Her daughter, with whom she lives, tested positive for COVID 19 several days ago. The patient developed symptoms of dyspnea, cough productive of brown green sputum, and fevers about 3-4 days ago. Her symptoms slowly worsened and prompted ER eval.  She denies chest pain, hemoptysis, n/v/d.  ER w/u revealed hypoxia and PNA. Strong suspicion for COVID 19, but no ability to test at [facility] ER.   She was admitted to [facility] for further eval.      Past Medical History:        Diagnosis Date    Anxiety disorder     Chronic pain syndrome     DDD (degenerative disc disease), lumbar     Diabetes ([address]     Displacement of lumbar intervertebral disc without myelopathy 8/23/2010    Diverticulitis     Fibromyalgia     Generalized osteoarthrosis, involving multiple sites 8/24/2010    GERD (gastroesophageal reflux disease)     Impacted cerumen 8/23/2010    Influenza A 3/10/16    Irritable bowel syndrome 8/23/2010    Other and unspecified hyperlipidemia 8/23/2010    Prosthetic joint implant failure ([address]     Screening mammogram 12/8/2006    (Both)Negative    Tracheobronchomalacia     Unspecified asthma(493.90) 8/23/2010    Unspecified essential hypertension 8/24/2010    Unspecified hypothyroidism 8/23/2010       Past Surgical History:        Procedure Laterality Date    ABDOMEN SURGERY      CARDIAC CATHETERIZATION      COLON SURGERY      COLONOSCOPY  2007    normal    HYSTERECTOMY      JOINT REPLACEMENT  10/92    Right; hip     JOINT REPLACEMENT    12/92    Left hip  followed by revision 1/2006    JOINT REPLACEMENT    6/96    right     JOINT REPLACEMENT    2006     right replacement.  LUNG SURGERY  1/2014    tracheobronchomalacia       Medications Prior to Admission:    Prior to Admission medications    Medication Sig Start Date End Date Taking? Authorizing Provider   empagliflozin (JARDIANCE) 10 MG tablet Take 10 mg by mouth daily   Yes Historical Provider, MD   Multiple Vitamins-Minerals (THERAPEUTIC MULTIVITAMIN-MINERALS) tablet Take 1 tablet by mouth daily   Yes Historical Provider, MD   JANUVIA 50 MG tablet TAKE ONE TABLET BY MOUTH DAILY 11/18/19  Yes Mela Gutiérrez MD   losartan (COZAAR) 25 MG tablet Take 1 tablet by mouth daily 5/21/19  Yes Kristi Forrest MD   Elastic Bandages & Supports (MEDICAL COMPRESSION THIGH HIGH) MISC 1 Units by Does not apply route daily 4/26/19  Yes Perlita Sarkar MD   amLODIPine (NORVASC) 2.5 MG tablet Take 1 tablet by mouth daily 3/13/19  Yes Kristi Forrest MD   Insulin Pen Needle 31G X 5 MM MISC 1 each by Does not apply route daily 3/11/19  Yes Mela Gutiérrez MD   levothyroxine (SYNTHROID) 112 MCG tablet Take 1 tablet by mouth Daily  Patient taking differently: Take 100 mcg by mouth Daily  2/21/19  Yes Mela Gutiérrez MD   simvastatin (ZOCOR) 80 MG tablet TAKE 1 TABLET BY MOUTH AT BEDTIME  Patient taking differently: Take 40 mg by mouth nightly TAKE 1 TABLET BY MOUTH AT BEDTIME 11/30/18  Yes Chanda Jean-Baptiste MD   rOPINIRole (REQUIP) 0.5 MG tablet TAKE 1 TABLET BY MOUTH 3 TIMES DAILY.  11/30/18  Yes Chanda Jean-Baptiste MD   sertraline (ZOLOFT) 50 MG tablet TAKE 1 TABLET BY MOUTH DAILY 11/30/18  Yes Chanda Jean-Baptiste MD   polyethylene glycol (MIRALAX) powder Take 17 g by mouth daily as needed   Yes Historical Provider, MD   gabapentin (NEURONTIN) 800 MG tablet Take 800 mg by mouth 3 times daily 8/2/17  Yes Historical Provider, MD   omeprazole (PRILOSEC) 40 MG delayed release capsule Take 40 mg by mouth 2 times daily 8/2/17  Yes Historical Provider, MD   HYDROcodone-acetaminophen (NORCO) 5-325 MG per tablet Take 1 tablet by mouth every 8 hours as needed for Pain  . Yes Historical Provider, MD   aspirin 81 MG EC tablet Take 1 tablet by mouth daily 9/24/15  Yes Carlos Davidson MD   tiZANidine (ZANAFLEX) 2 MG tablet Take 2 mg by mouth 3 times daily as needed    Yes Historical Provider, MD   doxycycline hyclate (VIBRAMYCIN) 100 MG capsule Take 100 mg by mouth 2 times daily    Historical Provider, MD   famotidine (PEPCID) 40 MG tablet Take 40 mg by mouth daily    Historical Provider, MD   albuterol sulfate  (90 Base) MCG/ACT inhaler Inhale 2 puffs into the lungs 4 times daily as needed for Wheezing 12/26/19   Jazmine Lewis MD   Lancets MISC Dispense whatever insurance will cover. Patient test twice day. Dx:E11.9 3/11/19   Ana Cristina Magallanes MD   blood glucose monitor strips Dispense whatever insurance will cover. Pt test twice a day dx:E11.9 3/11/19   Ana Cristina Magallanes MD   blood glucose test strips (ASCENSIA AUTODISC VI;ONE TOUCH ULTRA TEST VI) strip 1 each by In Vitro route daily As needed. 3/11/19   Ana Cristina Magallanes MD   Blood Glucose Monitoring Suppl CATHRYN Dispense whatever insurance will cover. Dx code:E11.9 1/11/18   Fawad Prajapati MD   dicyclomine (BENTYL) 10 MG capsule Take 1 capsule by mouth 4 times daily 8/31/17   BRYCE Jean-Baptiste - CNP       Allergies:  Sulfa antibiotics, Bactrim, Prednisone, and Quinidine    Social History:  The patient currently lives at home with her  and her dtr     TOBACCO:   reports that she quit smoking about 29 years ago. Her smoking use included cigarettes. She has a 18.00 pack-year smoking history. She has never used smokeless tobacco.  ETOH:   reports no history of alcohol use.       Family History: Positive as follows:        Problem Relation Age of Onset    Asthma Mother     Heart Failure Father     Cancer Maternal Grandfather         skin cancer on face    Cancer Daughter         skin cancer-BCC    Diabetes Neg Hx     Emphysema Neg Hx     Hypertension Neg Hx        REVIEW OF SYSTEMS:       Constitutional: +for fever   HENT: Negative for sore throat   Eyes: Negative for redness   Respiratory: +for dyspnea, cough   Cardiovascular: Negative for chest pain   Gastrointestinal: Negative for vomiting, diarrhea   Genitourinary: Negative for hematuria   Musculoskeletal: Negative for arthralgias   Skin: Negative for rash   Neurological: Negative for syncope   Hematological: Negative for adenopathy   Psychiatric/Behavorial: Negative for anxiety    PHYSICAL EXAM:    /74   Pulse 71   Temp 99.5 °F (37.5 °C) (Oral)   Resp 20   Ht 5' 9\" (1.753 m)   Wt 185 lb (83.9 kg)   SpO2 95%   BMI 27.32 kg/m²     Gen: No distress. Alert. Eyes: PERRL. No sclera icterus. No conjunctival injection. ENT: No discharge. Pharynx clear. Neck: No JVD. No Carotid Bruit. Trachea midline. Resp: No accessory muscle use. No crackles. No wheezes. + scattered rhonchi. CV: Regular rate. Regular rhythm. No murmur. No rub. No edema. GI: Non-tender. Non-distended. No masses. No organomegaly. Normal bowel sounds. No hernia. Skin: Warm and dry. No nodule on exposed extremities. No rash on exposed extremities. M/S: No cyanosis. No joint deformity. No clubbing. Neuro: Awake. Grossly nonfocal    Psych: Oriented x 3. No anxiety or agitation.      CBC:   Recent Labs     09/26/21 2116 09/27/21  0630   WBC 13.9* 11.8*   HGB 10.4* 10.2*   HCT 32.8* 32.2*   MCV 78.2* 77.7*   * 134*     BMP:   Recent Labs     09/26/21 2116 09/27/21  0328 09/27/21  0630   * 140 139   K 3.0* 3.4* 4.3   CL 95* 100 104   CO2 24 24 25   BUN 13 12 12   CREATININE 1.3* 1.0 0.9     LIVER PROFILE:   Recent Labs     09/26/21 2116 09/27/21  0630   AST 35 33   ALT 15 12   BILIDIR  --  0.3   BILITOT 0.4 0.6   ALKPHOS 216* 205*     PT/INR: No results for input(s): PROTIME, INR in the last 72 hours. APTT: No results for input(s): APTT in the last 72 hours. UA:  Recent Labs     09/27/21  0320   COLORU Yellow   PHUR 5.5   WBCUA 0-2   RBCUA None seen   YEAST Present*   BACTERIA Rare*   CLARITYU Clear   SPECGRAV <=1.005   LEUKOCYTESUR Negative   UROBILINOGEN 0.2   BILIRUBINUR Negative   BLOODU TRACE-INTACT*   GLUCOSEU >=1000*          CARDIAC ENZYMES  Recent Labs     09/26/21  2116   TROPONINI <0.01       U/A:    Lab Results   Component Value Date    NITRITE neg 11/30/2018    COLORU Yellow 09/27/2021    WBCUA 0-2 09/27/2021    RBCUA None seen 09/27/2021    BACTERIA Rare 09/27/2021    CLARITYU Clear 09/27/2021    SPECGRAV <=1.005 09/27/2021    LEUKOCYTESUR Negative 09/27/2021    BLOODU TRACE-INTACT 09/27/2021    GLUCOSEU >=1000 09/27/2021    GLUCOSEU 250 05/11/2011       ABG    Lab Results   Component Value Date    YVE1EWQ 24.4 12/30/2013    BEART 0.5 12/30/2013    K5DMLBTP 97.4 12/30/2013    PHART 7.437 12/30/2013    UIC5NWF 37.0 12/30/2013    PO2ART 93.6 12/30/2013    JUD7LNT 25.5 12/30/2013       CULTURES  COVID 19, NAAT: PENDING   COVID 19, PCR: PENDING    EKG:   Normal sinus rhythm  Left ventricular hypertrophy with repolarization abnormality  Prolonged QT  ST abnormality inferior leads consider ischemia  Abnormal ECG  When compared with ECG of 05-SEP-2020 08:35,  Premature ventricular complexes are no longer Present  T wave inversion now evident in Inferior leads  Confirmed by DANNA CHILDS, REYNALDO      RADIOLOGY  CT CHEST PULMONARY EMBOLISM W CONTRAST   Final Result   No evidence of pulmonary embolism. Scattered peripheral opacities in the left lung and more consolidative area   in the right lung concerning for pneumonia. Previous right thoracotomy and   upper lobectomy.       One mildly enlarged right paratracheal lymph node is present but no priors   for comparison. Nonenlarged but prominent lymph nodes in the upper abdomen   and juxta diaphragmatic region. XR CHEST PORTABLE   Final Result   Mild cardiomegaly without interstitial edema. Metallic surgical clips from prior right thoracotomy. COPD with anterior segment-right upper lobe alveolar pneumonia. Old pleural thickening was noted along the right lateral chest wall. Pleural   thickening and or trace effusion blunts the right costophrenic angle. ASSESSMENT/PLAN:    #Hypoxia  #Pneumonia- suspected COVID 23  - she presents with cough, fever, dyspnea in the setting of household contact testing + COVID 19  - COVID 19 test pending, will start remdesivir and decadron if positive  - supplemental O2, currently on 2L, wean as able    #Hypokalemia  - replaced and resolved    #Elevated creatinine  - suspect dehydration from illness. Improved with IVF    #CAD  - cont ASA, statin  - EKG with inferior T wave abnormality. She denies CP. Trop neg, check repeat. Monitor on tele. Check EKG tomorrow     #HTN  - BP stable, cont norvasc    #Chronic pain   - cont home oxycodone BID and percocet   - cont gabapentin and requip    #DM2  - use ssi     #Hypothyroidism  - cont synthroid       DVT Prophylaxis: Lovenox   Diet: ADULT DIET; Regular; 3 carb choices (45 gm/meal)  Code Status: Full Code      We discussed code status. Swati Fernando is a full code. In the event of life threatening respiratory failure she is agreeable to intubation.   In the event that she cannot make her own decisions she would like her  Kimberlee Villa to be her primary decision maker- his cell phone number is  [phone]    Suella Bamberger PA-C  9/27/2021 2:08 PM

## 2021-09-27 NOTE — ED PROVIDER NOTES
[address] [facility]        Pt Name: Karen Rosado  MRN: [id number]  [place] 1955  Date of evaluation: 9/26/2021  Provider: Thai Echeverria MD  PCP: Karolina Murray MD      CHIEF COMPLAINT       Chief Complaint   Patient presents with    Concern For COVID-19     Pt reports + exposure to COVID. pt. c/o cough, shortness of breath, and headache that started yesterday. HISTORY OFPRESENT ILLNESS   (Location/Symptom, Timing/Onset, Context/Setting, Quality, Duration, Modifying Factors,Severity)  Note limiting factors. Karen Rosado is a 78 y.o. female presenting today due to concern for developing significant shortness of breath with cough starting 2 days ago and progressively worsening while at home. Her daughter who lives with her tested positive for Covid last week. She did not get her vaccine yet. She has not been tested for Covid since the symptoms started. Her  also has similar symptoms. She was febrile at home along with complaining of some chest pain with breathing. She denies any abnormal leg swelling or history of blood clots. Pain is currently 5 out of 10. She denies any significant headache or abdominal pain. She does complain of generalized fatigue and weakness throughout. No focal numbness or weakness on one side of the body per patient. Due to worsening cough with shortness of breath, she came to the ED with her  for further assessment. Her  states that she has a history of dementia and therefore history is somewhat limited. No reported falls or trauma. REVIEW OF SYSTEMS    (2-9 systems for level 4, 10 or more for level 5)     Review of Systems   Constitutional: Positive for activity change, appetite change, chills, fatigue and fever. Negative for diaphoresis. HENT: Positive for congestion. Negative for sore throat. Eyes: Negative for visual disturbance.    Respiratory: Positive for BEDTIME    TIZANIDINE (ZANAFLEX) 2 MG TABLET    Take 2 mg by mouth 3 times daily as needed        ALLERGIES     Sulfa antibiotics, Bactrim, Prednisone, and Quinidine    FAMILY HISTORY       Family History   Problem Relation Age of Onset    Asthma Mother     Heart Failure Father     Cancer Maternal Grandfather         skin cancer on face    Cancer Daughter         skin cancer-BCC    Diabetes Neg Hx     Emphysema Neg Hx     Hypertension Neg Hx           SOCIAL HISTORY       Social History     Socioeconomic History    Marital status:      Spouse name: None    Number of children: None    Years of education: None    Highest education level: None   Occupational History    None   Tobacco Use    Smoking status: Former Smoker     Packs/day: 1.00     Years: 18.00     Pack years: 18.00     Types: Cigarettes     Quit date: 12/10/1991     Years since quittin.8    Smokeless tobacco: Never Used   Vaping Use    Vaping Use: Never used   Substance and Sexual Activity    Alcohol use: No    Drug use: No    Sexual activity: Never     Partners: Male   Other Topics Concern    None   Social History Narrative    None     Social Determinants of Health     Financial Resource Strain:     Difficulty of Paying Living Expenses:    Food Insecurity:     Worried About Running Out of Food in the Last Year:     Ran Out of Food in the Last Year:    Transportation Needs:     Lack of Transportation (Medical):      Lack of Transportation (Non-Medical):    Physical Activity:     Days of Exercise per Week:     Minutes of Exercise per Session:    Stress:     Feeling of Stress :    Social Connections:     Frequency of Communication with Friends and Family:     Frequency of Social Gatherings with Friends and Family:     Attends [employer] Services:     Active Member of Clubs or Organizations:     Attends Club or Organization Meetings:     Marital Status:    Intimate Partner Violence:     Fear of Current or Ex-Partner:     Emotionally Abused:     Physically Abused:     Sexually Abused:        SCREENINGS                PHYSICAL EXAM    (up to 7 for level 4, 8 or more for level 5)     ED Triage Vitals [09/26/21 2008]   BP Temp Temp Source Pulse Resp SpO2 Height Weight   110/61 98.5 °F (36.9 °C) Oral 79 22 93 % 5' 9\" (1.753 m) 180 lb (81.6 kg)       Physical Exam  Vitals and nursing note reviewed. Constitutional:       General: She is awake. She is not in acute distress. Appearance: Normal appearance. She is well-developed, well-groomed and overweight. She is not ill-appearing, toxic-appearing or diaphoretic. Interventions: She is not intubated. HENT:      Head: Normocephalic and atraumatic. Right Ear: External ear normal.      Left Ear: External ear normal.      Nose: Nose normal.      Mouth/Throat:      Mouth: Mucous membranes are dry. Eyes:      General:         Right eye: No discharge. Left eye: No discharge. Pupils: Pupils are equal, round, and reactive to light. Neck:      Trachea: No tracheal deviation. Cardiovascular:      Rate and Rhythm: Normal rate and regular rhythm. Pulses: Normal pulses. Radial pulses are 2+ on the right side and 2+ on the left side. Pulmonary:      Effort: Pulmonary effort is normal. Tachypnea present. No bradypnea, accessory muscle usage, prolonged expiration, respiratory distress or retractions. She is not intubated. Breath sounds: Normal air entry. No stridor. Examination of the right-upper field reveals wheezing and rhonchi. Examination of the left-upper field reveals wheezing and rhonchi. Examination of the right-middle field reveals wheezing and rhonchi. Examination of the left-middle field reveals wheezing and rhonchi. Examination of the right-lower field reveals decreased breath sounds, wheezing and rhonchi. Examination of the left-lower field reveals decreased breath sounds, wheezing and rhonchi.  Decreased breath sounds, wheezing and rhonchi present. No rales. Chest:      Chest wall: No tenderness. Abdominal:      General: Abdomen is flat. Bowel sounds are normal. There is no distension. Palpations: Abdomen is soft. Abdomen is not rigid. Tenderness: There is no abdominal tenderness. There is no guarding or rebound. Negative signs include Amor's sign and McBurney's sign. Musculoskeletal:         General: No tenderness, deformity or signs of injury. Normal range of motion. Cervical back: Full passive range of motion without pain, normal range of motion and neck supple. No edema, erythema or rigidity. Normal range of motion. Right lower leg: No edema. Left lower leg: No edema. Skin:     General: Skin is warm and dry. Coloration: Skin is not jaundiced or pale. Findings: No bruising, erythema or rash. Neurological:      General: No focal deficit present. Mental Status: She is alert and oriented to person, place, and time. Mental status is at baseline. GCS: GCS eye subscore is 4. GCS verbal subscore is 5. GCS motor subscore is 6. Cranial Nerves: No dysarthria. Sensory: Sensation is intact. No sensory deficit. Motor: Motor function is intact. No weakness, tremor, atrophy, abnormal muscle tone or seizure activity. Psychiatric:         Attention and Perception: Attention normal.         Mood and Affect: Affect normal. Mood is anxious. Speech: Speech normal.         Behavior: Behavior normal. Behavior is cooperative. DIAGNOSTIC RESULTS   :    Labs Reviewed   CBC WITH AUTO DIFFERENTIAL - Abnormal; Notable for the following components:       Result Value    WBC 13.9 (*)     Hemoglobin 10.4 (*)     Hematocrit 32.8 (*)     MCV 78.2 (*)     MCH 24.7 (*)     RDW 17.9 (*)     Platelets 905 (*)     Neutrophils Absolute 11.9 (*)     Lymphocytes Absolute 0.8 (*)     All other components within normal limits    Narrative:     Performed at:  [facility]. [facility]  [address] [place], [place] [address]  [place]   Phone [phone]   COMPREHENSIVE METABOLIC PANEL - Abnormal; Notable for the following components:    Sodium 133 (*)     Potassium 3.0 (*)     Chloride 95 (*)     Glucose 191 (*)     CREATININE 1.3 (*)     GFR Non- 41 (*)     GFR African American 49 (*)     Albumin/Globulin Ratio 0.9 (*)     Alkaline Phosphatase 216 (*)     All other components within normal limits    Narrative:     Performed at:  [facility]. [facility]  [address] [place], [place] [address],  [employer]   Phone [phone] - Abnormal; Notable for the following components:    Pro-BNP 1,247 (*)     All other components within normal limits    Narrative:     Performed at:  [facility]. [facility]  [address] [place] [address]  [place]   Phone [phone]   D-DIMER, QUANTITATIVE - Abnormal; Notable for the following components:    D-Dimer, Quant 496 (*)     All other components within normal limits    Narrative:     Performed at:  [facility]. [facility]  [address] [place], [place] [address] [facility]  [place] [place]   Phone [phone]   MAGNESIUM - Abnormal; Notable for the following components:    Magnesium 1.70 (*)     All other components within normal limits    Narrative:     Performed at:  [facility]. [facility]  [address] [place] [address]  [employer]   Phone [phone]   URINE RT REFLEX TO CULTURE - Abnormal; Notable for the following components:    Glucose, Ur >=1000 (*)     Blood, Urine TRACE-INTACT (*)     All other components within normal limits    Narrative:     Performed at:  [facility]. [facility]  [address] [place], [place] [address],  [place]  [employer]   Phone [phone]   BASIC METABOLIC PANEL - Abnormal; Notable for the following components:    Potassium 3.4 (*) Glucose 137 (*)     GFR Non- 55 (*)     All other components within normal limits    Narrative:     Performed at:  [facility]. [facility]  [address] [place] [address]. [place] [facility]    Phone [phone]   MICROSCOPIC URINALYSIS - Abnormal; Notable for the following components:    Bacteria, UA Rare (*)     Yeast, UA Present (*)     All other components within normal limits    Narrative:     Performed at:  [facility]. [facility]  [address] [place] [address]. [place] [facility]    Phone [phone]   TROPONIN    Narrative:     Performed at:  [facility]. [facility]  [address] [place] [address]. [place]    Phone [phone]   HEMOGLOBIN D6Z   BASIC METABOLIC PANEL W/ REFLEX TO MG FOR LOW K   LACTIC ACID, PLASMA   HEPATIC FUNCTION PANEL   CBC WITH AUTO DIFFERENTIAL   POCT GLUCOSE   POCT GLUCOSE   POCT GLUCOSE       All other labs were within normal range or not returned asof this dictation. EKG: All EKG's are interpreted by the Emergency Department Physician who either signs or Co-signs this chart in the absence of a cardiologist.    The Ekg interpreted by me shows  normal sinus rhythm with a rate of 76  Axis is   Normal  QTc is  prolonged  Intervals and Durations are unremarkable. ST Segments: nonspecific changes  No significant change from prior EKG dated - 9/5/20  No STEMI, worsening prolonged QT noted today compared to old EKG           RADIOLOGY:   Non-plain film images such as CT, Ultrasound and MRI are read by the radiologist. Claudene Repress images are visualized and preliminarily interpreted by the  ED Provider with the belowfindings:        Interpretation per the Radiologist below, if available at the time of this note:    CT CHEST PULMONARY EMBOLISM W CONTRAST   Final Result   No evidence of pulmonary embolism.       Scattered peripheral opacities in the left lung and more consolidative area   in the right lung concerning for pneumonia. Previous right thoracotomy and   upper lobectomy. One mildly enlarged right paratracheal lymph node is present but no priors   for comparison. Nonenlarged but prominent lymph nodes in the upper abdomen   and juxta diaphragmatic region. XR CHEST PORTABLE   Final Result   Mild cardiomegaly without interstitial edema. Metallic surgical clips from prior right thoracotomy. COPD with anterior segment-right upper lobe alveolar pneumonia. Old pleural thickening was noted along the right lateral chest wall. Pleural   thickening and or trace effusion blunts the right costophrenic angle. PROCEDURES   Unless otherwise noted below, none     Procedures    CRITICAL CARE TIME   Time: 35 minutes  Includes repeat examinations, speaking with consultants, lab interpretation, charting, treating for acute respiratory failure requiring nasal cannula due to concern for COVID-19  Excludes separate billable procedures. Patient at risk for serious decompensation if not treated for this life-threatening condition. CONSULTS: Spoke with Dr. Nathanael Munoz for admission.     IP CONSULT TO HOSPITALIST  IP CONSULT TO PHARMACY    EMERGENCY DEPARTMENT COURSE and DIFFERENTIAL DIAGNOSIS/MDM:   Vitals:    Vitals:    09/27/21 0230 09/27/21 0300 09/27/21 0400 09/27/21 0445   BP: (!) 129/58 137/68 (!) 111/49 (!) 125/53   Pulse: 86 94 85 80   Resp: 21 20 18 20   Temp:       TempSrc:       SpO2: 96% 98% 98% 94%   Weight:       Height:           Patient was given the following medications:  Medications   dexamethasone (DECADRON) injection 6 mg (6 mg IntraVENous Given 9/27/21 0310)   dicyclomine (BENTYL) capsule 10 mg (has no administration in time range)   albuterol-ipratropium (COMBIVENT RESPIMAT)  MCG/ACT inhaler 1 puff (has no administration in time range)   famotidine (PEPCID) tablet 40 mg (has no administration in time range)   amLODIPine (NORVASC) tablet 2.5 mg (has no administration in time range)   aspirin chewable tablet 81 mg (has no administration in time range)   gabapentin (NEURONTIN) tablet 800 mg (has no administration in time range)   HYDROcodone-acetaminophen (NORCO) 5-325 MG per tablet 1 tablet (1 tablet Oral Given 9/27/21 0311)   levothyroxine (SYNTHROID) tablet 112 mcg (has no administration in time range)   losartan (COZAAR) tablet 25 mg (has no administration in time range)   pantoprazole (PROTONIX) tablet 40 mg (has no administration in time range)   rOPINIRole (REQUIP) tablet 0.5 mg (has no administration in time range)   sertraline (ZOLOFT) tablet 50 mg (has no administration in time range)   atorvastatin (LIPITOR) tablet 40 mg (has no administration in time range)   tiZANidine (ZANAFLEX) tablet 2 mg (has no administration in time range)   glucose (GLUTOSE) 40 % oral gel 15 g (has no administration in time range)   dextrose 50 % IV solution (has no administration in time range)   glucagon (rDNA) injection 1 mg (has no administration in time range)   dextrose 5 % solution (has no administration in time range)   insulin lispro (HUMALOG) injection vial 0-6 Units (has no administration in time range)   insulin lispro (HUMALOG) injection vial 0-3 Units (has no administration in time range)   sodium chloride flush 0.9 % injection 5-40 mL (has no administration in time range)   sodium chloride flush 0.9 % injection 5-40 mL (has no administration in time range)   0.9 % sodium chloride infusion (has no administration in time range)   enoxaparin (LOVENOX) injection 40 mg (has no administration in time range)   polyethylene glycol (GLYCOLAX) packet 17 g (has no administration in time range)   acetaminophen (TYLENOL) tablet 650 mg (has no administration in time range)     Or   acetaminophen (TYLENOL) suppository 650 mg (has no administration in time range)   prochlorperazine (COMPAZINE) injection 10 mg (10 mg IntraVENous Given 9/27/21 0308) failure with hypoxia ([facility] [address]    2. COVID-19    3. CHARLY (acute kidney injury) ([facility] [address]    4. Hypomagnesemia    5. Hypokalemia    6. Dehydration    7. Positive D dimer          DISPOSITION/PLAN   DISPOSITION Admitted 09/27/2021 02:35:47 AM      PATIENT REFERRED TO:  No follow-up provider specified.     DISCHARGEMEDICATIONS:  New Prescriptions    No medications on file       DISCONTINUED MEDICATIONS:  Discontinued Medications    No medications on file              (Please note that portions of this note were completed with a voicerecognition program.  Efforts were made to edit the dictations but occasionally words are mis-transcribed.)    Shannan Owen MD (electronically signed)            Shannna Owen MD  09/27/21 1 Bess Whiteside MD  09/27/21 9020

## 2021-09-27 NOTE — PROGRESS NOTES
Remdesivir Initiation Note    Patient meets criteria for initiation of remdesivir   Known or suspected COVID-19  Severe disease (SpO2 ? 94% on RA, requiring supplemental O2, or requiring invasive mechanical ventilation)  Acceptable renal function  CrCl ? 30 ml/min based on SCr obtained prior to initiation OR   CrCl < 30 ml/min but the potential benefit of remdesivir outweighs the risk  Acceptable hepatic function (ALT within 10 times ULN)        Liver function tests will be monitored daily while on remdesivir.     Ibis Del Cid Pharm D 9/27/20213:19 PM  .

## 2021-09-27 NOTE — PROGRESS NOTES
Patient admitted to room 308 from [facility] ER. Patient oriented to room, call light, bed rails, phone, lights and bathroom. Patient instructed about the schedule of the day including: vital sign frequency, lab draws, possible tests, frequency of MD and staff rounds, daily weights, I &O's and prescribed diet. no bed alarm in place. Pt is caox3 . 36 Telemetry box in place, patient aware of placement and reason. Bed locked, in lowest position, side rails up 2/4, call light within reach. Recliner Assessment  Patient is able to demonstrate the ability to move from a reclining position to an upright position within the recliner. 4 Eyes Skin Assessment     The patient is being assess for   Admission    I agree that 2 RN's have performed a thorough Head to Toe Skin Assessment on the patient. ALL assessment sites listed below have been assessed. Areas assessed for pressure by both nurses:   [x]   Head, Face, and Ears   [x]   Shoulders, Back, and Chest, Abdomen  [x]   Arms, Elbows, and Hands   [x]   Coccyx, Sacrum, and Ischium  [x]   Legs, Feet, and Heels        Skin Assessed Under all Medical Devices by both nurses:  O2 device tubing              All Mepilex Borders were peeled back and area peeked at by both nurses:  No: no wounds                Co-signer eSignature: {Esignature:[id number]}    Does the Patient have Skin Breakdown related to pressure?   No)         Josafat Prevention initiated:  Yes   Wound Care Orders initiated:  No      [facility] nurse consulted for Pressure Injury (Stage 3,4, Unstageable, DTI, NWPT, Complex wounds)and New or Established Ostomies:  No      Primary Nurse eSignature: Electronically signed by Levy Limon RN on 9/27/21 at 10:42 AM EDT

## 2021-09-28 ENCOUNTER — APPOINTMENT (OUTPATIENT)
Dept: INTERVENTIONAL RADIOLOGY/VASCULAR | Age: 66
DRG: 004 | End: 2021-09-28
Payer: MEDICARE

## 2021-09-28 LAB
A/G RATIO: 0.7 (ref 1.1–2.2)
ALBUMIN SERPL-MCNC: 3 G/DL (ref 3.4–5)
ALP BLD-CCNC: 208 U/L (ref 40–129)
ALT SERPL-CCNC: 15 U/L (ref 10–40)
ANION GAP SERPL CALCULATED.3IONS-SCNC: 14 MMOL/L (ref 3–16)
AST SERPL-CCNC: 58 U/L (ref 15–37)
BASE EXCESS ARTERIAL: 3 MMOL/L (ref -3–3)
BASE EXCESS ARTERIAL: 3.2 MMOL/L (ref -3–3)
BASOPHILS ABSOLUTE: 0 K/UL (ref 0–0.2)
BASOPHILS RELATIVE PERCENT: 0.1 %
BILIRUB SERPL-MCNC: 0.6 MG/DL (ref 0–1)
BUN BLDV-MCNC: 12 MG/DL (ref 7–20)
CALCIUM SERPL-MCNC: 9.1 MG/DL (ref 8.3–10.6)
CARBOXYHEMOGLOBIN ARTERIAL: 0 % (ref 0–1.5)
CARBOXYHEMOGLOBIN ARTERIAL: 0.4 % (ref 0–1.5)
CHLORIDE BLD-SCNC: 98 MMOL/L (ref 99–110)
CO2: 22 MMOL/L (ref 21–32)
CREAT SERPL-MCNC: 0.8 MG/DL (ref 0.6–1.2)
EKG ATRIAL RATE: 98 BPM
EKG DIAGNOSIS: NORMAL
EKG P AXIS: 49 DEGREES
EKG P-R INTERVAL: 160 MS
EKG Q-T INTERVAL: 378 MS
EKG QRS DURATION: 88 MS
EKG QTC CALCULATION (BAZETT): 482 MS
EKG R AXIS: -16 DEGREES
EKG T AXIS: 70 DEGREES
EKG VENTRICULAR RATE: 98 BPM
EOSINOPHILS ABSOLUTE: 0 K/UL (ref 0–0.6)
EOSINOPHILS RELATIVE PERCENT: 0 %
ESTIMATED AVERAGE GLUCOSE: 188.6 MG/DL
GFR AFRICAN AMERICAN: >60
GFR NON-AFRICAN AMERICAN: >60
GLOBULIN: 4.2 G/DL
GLUCOSE BLD-MCNC: 165 MG/DL (ref 70–99)
GLUCOSE BLD-MCNC: 169 MG/DL (ref 70–99)
GLUCOSE BLD-MCNC: 176 MG/DL (ref 70–99)
GLUCOSE BLD-MCNC: 196 MG/DL (ref 70–99)
GLUCOSE BLD-MCNC: 220 MG/DL (ref 70–99)
HBA1C MFR BLD: 8.2 %
HCO3 ARTERIAL: 26.6 MMOL/L (ref 21–29)
HCO3 ARTERIAL: 27.8 MMOL/L (ref 21–29)
HCT VFR BLD CALC: 34.5 % (ref 36–48)
HEMOGLOBIN, ART, EXTENDED: 10.6 G/DL (ref 12–16)
HEMOGLOBIN, ART, EXTENDED: 11.6 G/DL (ref 12–16)
HEMOGLOBIN: 10.6 G/DL (ref 12–16)
INR BLD: 1.47 (ref 0.88–1.12)
LYMPHOCYTES ABSOLUTE: 0.4 K/UL (ref 1–5.1)
LYMPHOCYTES RELATIVE PERCENT: 2.8 %
MCH RBC QN AUTO: 24.4 PG (ref 26–34)
MCHC RBC AUTO-ENTMCNC: 30.7 G/DL (ref 31–36)
MCV RBC AUTO: 79.3 FL (ref 80–100)
METHEMOGLOBIN ARTERIAL: 0.3 %
METHEMOGLOBIN ARTERIAL: 0.3 %
MONOCYTES ABSOLUTE: 0.4 K/UL (ref 0–1.3)
MONOCYTES RELATIVE PERCENT: 2.7 %
NEUTROPHILS ABSOLUTE: 12.6 K/UL (ref 1.7–7.7)
NEUTROPHILS RELATIVE PERCENT: 94.4 %
O2 SAT, ARTERIAL: 89.8 %
O2 SAT, ARTERIAL: 95.6 %
O2 THERAPY: ABNORMAL
O2 THERAPY: ABNORMAL
PCO2 ARTERIAL: 36.8 MMHG (ref 35–45)
PCO2 ARTERIAL: 42.4 MMHG (ref 35–45)
PDW BLD-RTO: 18.8 % (ref 12.4–15.4)
PERFORMED ON: ABNORMAL
PH ARTERIAL: 7.43 (ref 7.35–7.45)
PH ARTERIAL: 7.48 (ref 7.35–7.45)
PLATELET # BLD: 161 K/UL (ref 135–450)
PMV BLD AUTO: 9.3 FL (ref 5–10.5)
PO2 ARTERIAL: 55.6 MMHG (ref 75–108)
PO2 ARTERIAL: 72.4 MMHG (ref 75–108)
POTASSIUM REFLEX MAGNESIUM: 4 MMOL/L (ref 3.5–5.1)
PROCALCITONIN: 2.52 NG/ML (ref 0–0.15)
PROTHROMBIN TIME: 16.9 SEC (ref 9.9–12.7)
RBC # BLD: 4.35 M/UL (ref 4–5.2)
SARS-COV-2: DETECTED
SODIUM BLD-SCNC: 134 MMOL/L (ref 136–145)
TCO2 ARTERIAL: 27.7 MMOL/L
TCO2 ARTERIAL: 29.1 MMOL/L
TOTAL PROTEIN: 7.2 G/DL (ref 6.4–8.2)
WBC # BLD: 13.4 K/UL (ref 4–11)

## 2021-09-28 PROCEDURE — 99223 1ST HOSP IP/OBS HIGH 75: CPT | Performed by: INTERNAL MEDICINE

## 2021-09-28 PROCEDURE — 2500000003 HC RX 250 WO HCPCS: Performed by: PHYSICIAN ASSISTANT

## 2021-09-28 PROCEDURE — 94761 N-INVAS EAR/PLS OXIMETRY MLT: CPT

## 2021-09-28 PROCEDURE — 2700000000 HC OXYGEN THERAPY PER DAY

## 2021-09-28 PROCEDURE — 6370000000 HC RX 637 (ALT 250 FOR IP): Performed by: HOSPITALIST

## 2021-09-28 PROCEDURE — 2580000003 HC RX 258: Performed by: HOSPITALIST

## 2021-09-28 PROCEDURE — 36415 COLL VENOUS BLD VENIPUNCTURE: CPT

## 2021-09-28 PROCEDURE — 36600 WITHDRAWAL OF ARTERIAL BLOOD: CPT

## 2021-09-28 PROCEDURE — 94660 CPAP INITIATION&MGMT: CPT

## 2021-09-28 PROCEDURE — C1751 CATH, INF, PER/CENT/MIDLINE: HCPCS

## 2021-09-28 PROCEDURE — 2580000003 HC RX 258: Performed by: PHYSICIAN ASSISTANT

## 2021-09-28 PROCEDURE — 6370000000 HC RX 637 (ALT 250 FOR IP): Performed by: INTERNAL MEDICINE

## 2021-09-28 PROCEDURE — 99233 SBSQ HOSP IP/OBS HIGH 50: CPT | Performed by: INTERNAL MEDICINE

## 2021-09-28 PROCEDURE — 2580000003 HC RX 258: Performed by: INTERNAL MEDICINE

## 2021-09-28 PROCEDURE — 2000000000 HC ICU R&B

## 2021-09-28 PROCEDURE — 93005 ELECTROCARDIOGRAM TRACING: CPT | Performed by: PHYSICIAN ASSISTANT

## 2021-09-28 PROCEDURE — 6370000000 HC RX 637 (ALT 250 FOR IP): Performed by: PHYSICIAN ASSISTANT

## 2021-09-28 PROCEDURE — 85610 PROTHROMBIN TIME: CPT

## 2021-09-28 PROCEDURE — 80053 COMPREHEN METABOLIC PANEL: CPT

## 2021-09-28 PROCEDURE — 05H933Z INSERTION OF INFUSION DEVICE INTO RIGHT BRACHIAL VEIN, PERCUTANEOUS APPROACH: ICD-10-PCS | Performed by: RADIOLOGY

## 2021-09-28 PROCEDURE — 85025 COMPLETE CBC W/AUTO DIFF WBC: CPT

## 2021-09-28 PROCEDURE — 86140 C-REACTIVE PROTEIN: CPT

## 2021-09-28 PROCEDURE — 6360000002 HC RX W HCPCS: Performed by: HOSPITALIST

## 2021-09-28 PROCEDURE — 36573 INSJ PICC RS&I 5 YR+: CPT

## 2021-09-28 PROCEDURE — 6360000002 HC RX W HCPCS: Performed by: INTERNAL MEDICINE

## 2021-09-28 PROCEDURE — 82803 BLOOD GASES ANY COMBINATION: CPT

## 2021-09-28 PROCEDURE — 2500000003 HC RX 250 WO HCPCS: Performed by: INTERNAL MEDICINE

## 2021-09-28 PROCEDURE — 84145 PROCALCITONIN (PCT): CPT

## 2021-09-28 PROCEDURE — 93010 ELECTROCARDIOGRAM REPORT: CPT | Performed by: INTERNAL MEDICINE

## 2021-09-28 RX ORDER — DEXAMETHASONE SODIUM PHOSPHATE 10 MG/ML
6 INJECTION INTRAMUSCULAR; INTRAVENOUS EVERY 24 HOURS
Status: DISCONTINUED | OUTPATIENT
Start: 2021-09-28 | End: 2021-10-14

## 2021-09-28 RX ORDER — SIMVASTATIN 40 MG
40 TABLET ORAL NIGHTLY
Status: ON HOLD | COMMUNITY
End: 2022-01-22 | Stop reason: SDUPTHER

## 2021-09-28 RX ORDER — OXYCODONE HCL 20 MG/1
20 TABLET, FILM COATED, EXTENDED RELEASE ORAL EVERY 12 HOURS
Status: ON HOLD | COMMUNITY
End: 2021-10-28 | Stop reason: HOSPADM

## 2021-09-28 RX ORDER — LABETALOL HYDROCHLORIDE 5 MG/ML
20 INJECTION, SOLUTION INTRAVENOUS EVERY 4 HOURS PRN
Status: DISCONTINUED | OUTPATIENT
Start: 2021-09-28 | End: 2021-10-16

## 2021-09-28 RX ORDER — SODIUM CHLORIDE 9 MG/ML
INJECTION, SOLUTION INTRAVENOUS CONTINUOUS
Status: DISCONTINUED | OUTPATIENT
Start: 2021-09-28 | End: 2021-09-29

## 2021-09-28 RX ORDER — OXYCODONE AND ACETAMINOPHEN 10; 325 MG/1; MG/1
1 TABLET ORAL EVERY 6 HOURS PRN
Status: ON HOLD | COMMUNITY
End: 2021-10-28 | Stop reason: HOSPADM

## 2021-09-28 RX ORDER — LIDOCAINE HYDROCHLORIDE 10 MG/ML
5 INJECTION, SOLUTION EPIDURAL; INFILTRATION; INTRACAUDAL; PERINEURAL ONCE
Status: DISCONTINUED | OUTPATIENT
Start: 2021-09-28 | End: 2021-10-28 | Stop reason: HOSPADM

## 2021-09-28 RX ORDER — ESTRADIOL 0.1 MG/G
CREAM VAGINAL SEE ADMIN INSTRUCTIONS
Status: ON HOLD | COMMUNITY
End: 2022-03-14

## 2021-09-28 RX ORDER — SODIUM CHLORIDE 0.9 % (FLUSH) 0.9 %
5-40 SYRINGE (ML) INJECTION PRN
Status: DISCONTINUED | OUTPATIENT
Start: 2021-09-28 | End: 2021-10-28

## 2021-09-28 RX ORDER — DEXMEDETOMIDINE HYDROCHLORIDE 4 UG/ML
.2-1.4 INJECTION, SOLUTION INTRAVENOUS CONTINUOUS
Status: DISCONTINUED | OUTPATIENT
Start: 2021-09-28 | End: 2021-10-04

## 2021-09-28 RX ORDER — SODIUM CHLORIDE 9 MG/ML
25 INJECTION, SOLUTION INTRAVENOUS PRN
Status: DISCONTINUED | OUTPATIENT
Start: 2021-09-28 | End: 2021-10-28

## 2021-09-28 RX ORDER — LEVOTHYROXINE SODIUM 0.1 MG/1
100 TABLET ORAL DAILY
Status: ON HOLD | COMMUNITY
End: 2022-01-22 | Stop reason: SDUPTHER

## 2021-09-28 RX ORDER — SODIUM CHLORIDE 0.9 % (FLUSH) 0.9 %
5-40 SYRINGE (ML) INJECTION EVERY 12 HOURS SCHEDULED
Status: DISCONTINUED | OUTPATIENT
Start: 2021-09-28 | End: 2021-10-28

## 2021-09-28 RX ORDER — GLIPIZIDE 10 MG/1
10 TABLET, FILM COATED, EXTENDED RELEASE ORAL 2 TIMES DAILY
Status: ON HOLD | COMMUNITY
End: 2021-10-28 | Stop reason: HOSPADM

## 2021-09-28 RX ADMIN — ROPINIROLE HYDROCHLORIDE 0.5 MG: 0.25 TABLET, FILM COATED ORAL at 20:23

## 2021-09-28 RX ADMIN — TOCILIZUMAB 648 MG: 180 INJECTION, SOLUTION SUBCUTANEOUS at 13:18

## 2021-09-28 RX ADMIN — SODIUM CHLORIDE, PRESERVATIVE FREE 10 ML: 5 INJECTION INTRAVENOUS at 09:10

## 2021-09-28 RX ADMIN — CEFTRIAXONE 2000 MG: 2 INJECTION, POWDER, FOR SOLUTION INTRAMUSCULAR; INTRAVENOUS at 16:37

## 2021-09-28 RX ADMIN — DEXAMETHASONE SODIUM PHOSPHATE 6 MG: 10 INJECTION INTRAMUSCULAR; INTRAVENOUS at 02:56

## 2021-09-28 RX ADMIN — DEXAMETHASONE SODIUM PHOSPHATE 6 MG: 10 INJECTION INTRAMUSCULAR; INTRAVENOUS at 16:30

## 2021-09-28 RX ADMIN — GABAPENTIN 800 MG: 400 CAPSULE ORAL at 20:23

## 2021-09-28 RX ADMIN — OXYCODONE HYDROCHLORIDE 20 MG: 10 TABLET, FILM COATED, EXTENDED RELEASE ORAL at 10:00

## 2021-09-28 RX ADMIN — MUPIROCIN: 20 OINTMENT TOPICAL at 20:24

## 2021-09-28 RX ADMIN — ENOXAPARIN SODIUM 30 MG: 30 INJECTION SUBCUTANEOUS at 20:24

## 2021-09-28 RX ADMIN — OXYCODONE AND ACETAMINOPHEN 1 TABLET: 10; 325 TABLET ORAL at 02:56

## 2021-09-28 RX ADMIN — OXYCODONE AND ACETAMINOPHEN 1 TABLET: 10; 325 TABLET ORAL at 15:06

## 2021-09-28 RX ADMIN — DICYCLOMINE HYDROCHLORIDE 10 MG: 10 CAPSULE ORAL at 20:24

## 2021-09-28 RX ADMIN — MUPIROCIN: 20 OINTMENT TOPICAL at 16:32

## 2021-09-28 RX ADMIN — ACETAMINOPHEN 650 MG: 325 TABLET ORAL at 11:21

## 2021-09-28 RX ADMIN — PROCHLORPERAZINE EDISYLATE 10 MG: 5 INJECTION INTRAMUSCULAR; INTRAVENOUS at 09:08

## 2021-09-28 RX ADMIN — SODIUM CHLORIDE: 9 INJECTION, SOLUTION INTRAVENOUS at 11:11

## 2021-09-28 RX ADMIN — LABETALOL HYDROCHLORIDE 20 MG: 5 INJECTION, SOLUTION INTRAVENOUS at 16:15

## 2021-09-28 RX ADMIN — Medication 0.2 MCG/KG/HR: at 16:41

## 2021-09-28 RX ADMIN — REMDESIVIR 100 MG: 100 INJECTION, POWDER, LYOPHILIZED, FOR SOLUTION INTRAVENOUS at 22:52

## 2021-09-28 RX ADMIN — DEXTROSE MONOHYDRATE 500 MG: 50 INJECTION, SOLUTION INTRAVENOUS at 16:46

## 2021-09-28 RX ADMIN — OXYCODONE HYDROCHLORIDE 20 MG: 10 TABLET, FILM COATED, EXTENDED RELEASE ORAL at 20:24

## 2021-09-28 ASSESSMENT — PAIN DESCRIPTION - ONSET: ONSET: ON-GOING

## 2021-09-28 ASSESSMENT — PAIN SCALES - GENERAL
PAINLEVEL_OUTOF10: 10
PAINLEVEL_OUTOF10: 0
PAINLEVEL_OUTOF10: 0
PAINLEVEL_OUTOF10: 7
PAINLEVEL_OUTOF10: 9
PAINLEVEL_OUTOF10: 9
PAINLEVEL_OUTOF10: 4
PAINLEVEL_OUTOF10: 0
PAINLEVEL_OUTOF10: 7

## 2021-09-28 ASSESSMENT — PAIN DESCRIPTION - DESCRIPTORS: DESCRIPTORS: ACHING

## 2021-09-28 ASSESSMENT — PAIN DESCRIPTION - PAIN TYPE: TYPE: CHRONIC PAIN

## 2021-09-28 ASSESSMENT — PAIN DESCRIPTION - FREQUENCY: FREQUENCY: CONTINUOUS

## 2021-09-28 ASSESSMENT — PAIN DESCRIPTION - PROGRESSION
CLINICAL_PROGRESSION: NOT CHANGED
CLINICAL_PROGRESSION: NOT CHANGED

## 2021-09-28 ASSESSMENT — PAIN DESCRIPTION - LOCATION: LOCATION: HIP;PELVIS

## 2021-09-28 NOTE — PROGRESS NOTES
Pt Spo2 87% without NRB this RN to BS to replace NRB pt is alert SPO2 recovered to 93% with NRB over her 100% 40 .  Verbal order per Dr Conor Thompson to give\" PO pain meds for comfort

## 2021-09-28 NOTE — PROGRESS NOTES
Pt placed on vapo therm at this time. abg sent to lab.  updated via phone. Will continue to monitor.

## 2021-09-28 NOTE — CONSULTS
Pharmacy to dose actemra per Dr. Aurora Nash  Please give 648 mg actemra today. Please perform crp lap as well. Bun=12  Cr =0.8  plt 161  Alt 15  ast 58  Matty BYRNE Ph.  9/28/2021 11:39 AM

## 2021-09-28 NOTE — FLOWSHEET NOTE
09/28/21 0745   Vital Signs   Temp 98.4 °F (36.9 °C)   Temp Source Axillary   Pulse 97   Heart Rate Source Monitor   Resp 18   BP (!) 147/72   BP Location Right upper arm   Patient Position Semi fowlers   Level of Consciousness Alert (0)   MEWS Score 1   Patient Currently in Pain Denies   Pain Assessment   Pain Assessment 0-10   Pain Level 0   Oxygen Therapy   SpO2 93 %   O2 Device Heated high flow cannula   Skin Assessment Clean, dry, & intact   FiO2  100 %   O2 Flow Rate (L/min) 30 L/min   Patient's vapotherm was increased to 40 LPM at 100 %. NRB was removed at this time. RN will make patient NPO at this time. Oral medications will be held due to increased O2 demands.

## 2021-09-28 NOTE — PROGRESS NOTES
This Rn at [facility] pt took NRB off states \" it hurts my nose\" pt moaning and groaning report pain \" a 10\" .  Pt given scheduled pain meds and contacting  for direction of care

## 2021-09-28 NOTE — PROGRESS NOTES
Perfect serve sent to Dr. Tomma Merlin regarding pt condition. nno at this time. Will continue to monitor.

## 2021-09-28 NOTE — PROGRESS NOTES
IM Progress Note    Admit Date:  9/26/2021    Admitted with COVID-19 pneumonia,  acute hypoxic respiratory failure. Progressive significant worsening hypoxemia overnight. Patient was on 2 L of oxygen on admission yesterday, she is on high flow oxygen now. Subjective:  Ms. Livan Adamson seen. She appears very ill and dyspneic. Complains of shortness of breath. She states that she does not feel well at all  She is on high flow oxygen per Vapotherm max dose 40 L FiO2 100%, with additional 100% nonrebreather. Complains of hip pain,  she had a recent hip fracture repair    Objective:   BP (!) 147/72   Pulse 97   Temp 98.4 °F (36.9 °C) (Axillary)   Resp 18   Ht 5' 9\" (1.753 m)   Wt 191 lb (86.6 kg)   SpO2 90%   BMI 28.21 kg/m²     Intake/Output Summary (Last 24 hours) at 9/28/2021 1004  Last data filed at 9/27/2021 2045  Gross per 24 hour   Intake 222 ml   Output 500 ml   Net -278 ml         Physical Exam:  General:  Awake, alert and well-oriented. Is in moderate distress, appears very ill . Looks extremely fatigued  Skin:  Warm and dry  Neck:  JVD absent. Neck supple  Chest: Very diminished breath sounds, with some rhonchi  Cardiovascular:  RRR ,S1S2 normal  Abdomen:  Soft, non tender, non distended, BS +  Extremities:  No edema. Intact peripheral pulses.  Brisk cap refill, < 2 secs  Neuro: non focal      Medications:   Scheduled Meds:   dexamethasone  6 mg IntraVENous Q24H    dicyclomine  10 mg Oral 4x Daily    famotidine  40 mg Oral Daily    amLODIPine  2.5 mg Oral Daily    aspirin  81 mg Oral Daily    gabapentin  800 mg Oral TID    levothyroxine  112 mcg Oral Daily    losartan  25 mg Oral Daily    pantoprazole  40 mg Oral QAM AC    rOPINIRole  0.5 mg Oral TID    sertraline  50 mg Oral Daily    atorvastatin  40 mg Oral Daily    insulin lispro  0-6 Units SubCUTAneous TID WC    insulin lispro  0-3 Units SubCUTAneous Nightly    sodium chloride flush  5-40 mL IntraVENous 2 times per day    enoxaparin  40 mg SubCUTAneous Daily    oxyCODONE  20 mg Oral 2 times per day    remdesivir IVPB  100 mg IntraVENous Q24H       Continuous Infusions:   dextrose      sodium chloride         Data:  CBC:   Recent Labs     09/26/21 2116 09/27/21 0630 09/28/21  0521   WBC 13.9* 11.8* 13.4*   RBC 4.20 4.15 4.35   HGB 10.4* 10.2* 10.6*   HCT 32.8* 32.2* 34.5*   MCV 78.2* 77.7* 79.3*   RDW 17.9* 17.8* 18.8*   * 134* 161     BMP:   Recent Labs     09/27/21 0328 09/27/21 0630 09/28/21  0521    139 134*   K 3.4* 4.3 4.0    104 98*   CO2 24 25 22   BUN 12 12 12   CREATININE 1.0 0.9 0.8     BNP: No results for input(s): BNP in the last 72 hours. PT/INR: No results for input(s): PROTIME, INR in the last 72 hours. APTT: No results for input(s): APTT in the last 72 hours. CARDIAC ENZYMES:   Recent Labs     09/26/21 2116 09/27/21  1457   TROPONINI <0.01 <0.01     FASTING LIPID PANEL:  Lab Results   Component Value Date    CHOL 156 10/18/2018    HDL 39 (L) 10/18/2018    TRIG 242 (H) 10/18/2018     LIVER PROFILE:   Recent Labs     09/26/21 2116 09/27/21 0630 09/28/21  0521   AST 35 33 58*   ALT 15 12 15   BILIDIR  --  0.3  --    BILITOT 0.4 0.6 0.6   ALKPHOS 216* 205* 208*           CULTURES  COVID 19, NAAT: Detected    EKG:   Normal sinus rhythm  Left ventricular hypertrophy with repolarization abnormality  Prolonged QT  ST abnormality inferior leads consider ischemia  Abnormal ECG  When compared with ECG of 05-SEP-2020 08:35,  Premature ventricular complexes are no longer Present  T wave inversion now evident in Inferior leads  Confirmed by Franklin Cross MD, REYNALDO     Radiology  CT CHEST PULMONARY EMBOLISM W CONTRAST   Final Result   No evidence of pulmonary embolism. Scattered peripheral opacities in the left lung and more consolidative area   in the right lung concerning for pneumonia. Previous right thoracotomy and   upper lobectomy.       One mildly enlarged right paratracheal lymph node is present but no priors   for comparison. Nonenlarged but prominent lymph nodes in the upper abdomen   and juxta diaphragmatic region. XR CHEST PORTABLE   Final Result   Mild cardiomegaly without interstitial edema. Metallic surgical clips from prior right thoracotomy. COPD with anterior segment-right upper lobe alveolar pneumonia. Old pleural thickening was noted along the right lateral chest wall. Pleural   thickening and or trace effusion blunts the right costophrenic angle. Assessment:  Principal Problem:    Suspected COVID-19 virus infection  Active Problems:    Hypothyroidism    Essential hypertension    DM2 (diabetes mellitus, type 2) ([facility] [address]    Hypoxia  Resolved Problems:    * No resolved hospital problems. *      Plan:    #COVID-19 pneumonia  #Acute hypoxic respiratory failure   -Unvaccinated patient  - she presented with cough, fever, dyspnea in the setting of household contact testing + COVID 19  -Her Covid testing came back positive on admission  -Initially on 2 L of oxygen on presentation  -Worsening hypoxemia. Pulmonology consulted  -Patient with significant decline overnight since admission.  -On high flow oxygen per Vapotherm 40 L,FiO2 100% ,with additional nonrebreather . Transfer to ICU as soon as a bed available . -Initiated on Remdesivir , continue day #2   -Initiated on Decadron 6 mg daily . for [facility] protocol I am going to increase her Decadron to 20 mg IV daily .  -We will give 1 dose of Tocilizumab  -Lovenox twice daily    #Hypokalemia  - replaced and resolved     #Elevated creatinine  - suspect dehydration from illness. Improved with IVF  Continue IV fluids.     #CAD  - cont ASA, statin  - EKG with inferior T wave abnormality. She denies CP. Trop neg, check repeat. Monitor on tele.   Check EKG tomorrow      #HTN  - BP stable, cont norvasc     #Chronic pain   History of hip fracture  - cont home oxycodone BID and percocet   - cont gabapentin and requip     #DM2  - use ssi      #Hypothyroidism  - cont synthroid         DVT Prophylaxis: Lovenox   N.p.o.  Code Status: Full Code     Patient critical.  Transfer to ICU     Cris Montalvo MD, MD 9/28/2021 10:04 AM

## 2021-09-28 NOTE — PROGRESS NOTES
Medication Reconciliation    List of medications patient is currently taking is complete. Source of medications in list are Lencho at [address] at [place] [address]. Venkata Walton, and medication dispense report in [facility]. No current facility-administered medications on file prior to encounter. Current Outpatient Medications on File Prior to Encounter   Medication Sig Dispense Refill    levothyroxine (SYNTHROID) 100 MCG tablet Take 100 mcg by mouth Daily      simvastatin (ZOCOR) 40 MG tablet Take 40 mg by mouth nightly      budesonide (PULMICORT FLEXHALER) 180 MCG/ACT AEPB inhaler Inhale 1 puff into the lungs 2 times daily Per Yasmine at [place] [address]. [employer]      oxyCODONE-acetaminophen (PERCOCET)  MG per tablet Take 1 tablet by mouth every 6 hours as needed for Pain. Per Howard Montana at [place] [address]. [employer]      oxyCODONE (OXYCONTIN) 20 MG extended release tablet Take 20 mg by mouth every 12 hours. Per Howard Montana at [place] [address]. [employer]      glipiZIDE (GLUCOTROL XL) 10 MG extended release tablet Take 10 mg by mouth 2 times daily Per Howard Montana at [place] [address]. [employer]      estradiol (ESTRACE) 0.1 MG/GM vaginal cream Place vaginally See Admin Instructions Insert a pea-sized amount vaginally every day for 7 days, then twice weekly thereafter. Per Howard Montana at [place] [address]. Venkata Walton. Last dispensed 5/10/21.  empagliflozin (JARDIANCE) 10 MG tablet Take 10 mg by mouth daily      Multiple Vitamins-Minerals (THERAPEUTIC MULTIVITAMIN-MINERALS) tablet Take 1 tablet by mouth daily      JANUVIA 50 MG tablet TAKE ONE TABLET BY MOUTH DAILY 90 tablet 1    losartan (COZAAR) 25 MG tablet Take 1 tablet by mouth daily 90 tablet 3    Elastic Bandages & Supports (MEDICAL COMPRESSION THIGH HIGH) MISC 1 Units by Does not apply route daily 2 each 2    Insulin Pen Needle 31G X 5 MM MISC 1 each by Does not apply route daily 100 each 3    rOPINIRole (REQUIP) 0.5 MG tablet TAKE 1 TABLET BY MOUTH 3 TIMES DAILY.  270 tablet 3    sertraline (ZOLOFT) 50 MG tablet TAKE 1 TABLET BY MOUTH DAILY 90 tablet 3    polyethylene glycol (MIRALAX) powder Take 17 g by mouth daily as needed      gabapentin (NEURONTIN) 800 MG tablet Take 800 mg by mouth 3 times daily      omeprazole (PRILOSEC) 40 MG delayed release capsule Take 40 mg by mouth 2 times daily      aspirin 81 MG EC tablet Take 1 tablet by mouth daily 30 tablet 3    tiZANidine (ZANAFLEX) 2 MG tablet Take 2 mg by mouth 3 times daily as needed       [DISCONTINUED] doxycycline hyclate (VIBRAMYCIN) 100 MG capsule Take 100 mg by mouth 2 times daily      [DISCONTINUED] famotidine (PEPCID) 40 MG tablet Take 40 mg by mouth daily      albuterol sulfate  (90 Base) MCG/ACT inhaler Inhale 2 puffs into the lungs 4 times daily as needed for Wheezing 1 Inhaler 0    [DISCONTINUED] amLODIPine (NORVASC) 2.5 MG tablet Take 1 tablet by mouth daily 30 tablet 11    Lancets MISC Dispense whatever insurance will cover. Patient test twice day. Dx:E11.9 200 each 3    blood glucose monitor strips Dispense whatever insurance will cover. Pt test twice a day dx:E11.9 200 strip 3    blood glucose test strips (ASCENSIA AUTODISC VI;ONE TOUCH ULTRA TEST VI) strip 1 each by In Vitro route daily As needed. 100 each 3    [DISCONTINUED] levothyroxine (SYNTHROID) 112 MCG tablet Take 1 tablet by mouth Daily (Patient taking differently: Take 100 mcg by mouth Daily ) 30 tablet 1    [DISCONTINUED] simvastatin (ZOCOR) 80 MG tablet TAKE 1 TABLET BY MOUTH AT BEDTIME (Patient taking differently: Take 40 mg by mouth nightly TAKE 1 TABLET BY MOUTH AT BEDTIME) 90 tablet 3    Blood Glucose Monitoring Suppl CATHRYN Dispense whatever insurance will cover. Dx code:E11.9 1 Device 0    [DISCONTINUED] dicyclomine (BENTYL) 10 MG capsule Take 1 capsule by mouth 4 times daily 120 capsule 3    [DISCONTINUED] HYDROcodone-acetaminophen (NORCO) 5-325 MG per tablet Take 1 tablet by mouth every 8 hours as needed for Pain  .          Changes after Medication reconciliation performed  Current Facility-Administered Medications   Medication Dose Route Frequency Provider Last Rate Last Admin    0.9 % sodium chloride infusion   IntraVENous Continuous Elana Juarez MD 75 mL/hr at 09/28/21 1111 New Bag at 09/28/21 1111    enoxaparin (LOVENOX) injection 30 mg  30 mg SubCUTAneous BID Elana Juarez MD        dexamethasone (DECADRON) injection 6 mg  6 mg IntraVENous Q24H Lis Ayers MD        cefTRIAXone (ROCEPHIN) 2000 mg IVPB in D5W 50ml minibag  2,000 mg IntraVENous Q24H Lis Ayers MD        azithromycin (ZITHROMAX) 500 mg in D5W 250ml addavial  500 mg IntraVENous Q24H Lis Ayers MD        mupirocin (BACTROBAN) 2 % ointment   Nasal BID Lis Ayers MD        labetalol (NORMODYNE;TRANDATE) injection 20 mg  20 mg IntraVENous Q4H PRN Lis Ayers MD        tocilizumab (ACTEMRA) 648 mg in sodium chloride 0.9 % 100 mL IVPB  648 mg IntraVENous Once Lis Ayers MD 50 mL/hr at 09/28/21 1318 648 mg at 09/28/21 1318    dexmedetomidine (PRECEDEX) 400 mcg in sodium chloride 0.9 % 100 mL infusion  0.2-1.4 mcg/kg/hr IntraVENous Continuous Lis Ayers MD        lidocaine PF 1 % injection 5 mL  5 mL IntraDERmal Once Lis Ayers MD        sodium chloride flush 0.9 % injection 5-40 mL  5-40 mL IntraVENous 2 times per day Lis Ayers MD        sodium chloride flush 0.9 % injection 5-40 mL  5-40 mL IntraVENous PRN Lis Ayers MD        0.9 % sodium chloride infusion  25 mL IntraVENous PRN Lis Ayers MD        dicyclomine (BENTYL) capsule 10 mg  10 mg Oral 4x Daily Johnnie Bravo MD   10 mg at 09/27/21 2023    famotidine (PEPCID) tablet 40 mg  40 mg Oral Daily Johnnie Bravo MD   40 mg at 09/27/21 0912    amLODIPine (NORVASC) tablet 2.5 mg  2.5 mg Oral Daily Johnnie Bravo MD   2.5 mg at 09/27/21 0911    aspirin chewable tablet 81 mg  81 mg Oral Daily Johnnie Bravo MD   81 mg at 09/27/21 0912    gabapentin (NEURONTIN) capsule 800 mg  800 mg Oral TID Johnnie Bravo MD   800 mg at 09/27/21 2023    levothyroxine (SYNTHROID) tablet 112 mcg  112 mcg Oral Daily Talya Ventura MD   112 mcg at 09/27/21 0911    losartan (COZAAR) tablet 25 mg  25 mg Oral Daily Talya Ventura MD   25 mg at 09/27/21 0912    pantoprazole (PROTONIX) tablet 40 mg  40 mg Oral QAM AC Talya Ventura MD   40 mg at 09/27/21 0754    rOPINIRole (REQUIP) tablet 0.5 mg  0.5 mg Oral TID Talya Ventura MD   0.5 mg at 09/27/21 2023    sertraline (ZOLOFT) tablet 50 mg  50 mg Oral Daily Talya Ventura MD   50 mg at 09/27/21 0911    atorvastatin (LIPITOR) tablet 40 mg  40 mg Oral Daily Talya Ventura MD   40 mg at 09/27/21 0912    tiZANidine (ZANAFLEX) tablet 2 mg  2 mg Oral Q8H PRN Talya Ventura MD        glucose (GLUTOSE) 40 % oral gel 15 g  15 g Oral PRN Talya Ventura MD        dextrose 50 % IV solution  12.5 g IntraVENous PRN Talya Ventura MD        glucagon (rDNA) injection 1 mg  1 mg IntraMUSCular PRN Talya Ventura MD        dextrose 5 % solution  100 mL/hr IntraVENous PRN Talya Ventura MD        insulin lispro (HUMALOG) injection vial 0-6 Units  0-6 Units SubCUTAneous TID WC Talya Ventura MD        insulin lispro (HUMALOG) injection vial 0-3 Units  0-3 Units SubCUTAneous Nightly Talya Ventura MD   1 Units at 09/27/21 2029    sodium chloride flush 0.9 % injection 5-40 mL  5-40 mL IntraVENous 2 times per day Talya Ventura MD   10 mL at 09/28/21 0910    sodium chloride flush 0.9 % injection 5-40 mL  5-40 mL IntraVENous PRN Talya Ventura MD        0.9 % sodium chloride infusion  25 mL IntraVENous PRN Talya Ventura MD        polyethylene glycol [facility]) packet 17 g  17 g Oral Daily PRN Talya Ventura MD        acetaminophen (TYLENOL) tablet 650 mg  650 mg Oral Q6H PRN Talya Ventura MD   650 mg at 09/28/21 1121    Or    acetaminophen (TYLENOL) suppository 650 mg  650 mg Rectal Q6H FRANCIA Ventura MD        prochlorperazine (COMPAZINE) injection 10 mg  10 mg IntraVENous Q6H PRN Lizbeth Palencia MD   10 mg at 09/28/21 0908    oxyCODONE-acetaminophen (PERCOCET)  MG per tablet 1 tablet  1 tablet Oral Q4H PRN Keiry Leavitt PA-C   1 tablet at 09/28/21 1506    oxyCODONE (OXYCONTIN) extended release tablet 20 mg  20 mg Oral 2 times per day Keiry Leavitt PA-C   20 mg at 09/28/21 1000    remdesivir 100 mg in sodium chloride 0.9 % 250 mL IVPB  100 mg IntraVENous Q24H Aysha Parrish PA-C        albuterol-ipratropium (COMBIVENT RESPIMAT)  MCG/ACT inhaler 1 puff  1 puff Inhalation Q4H PRN Lizbeth Palencia MD           Medications discontinued from list  Medications Discontinued During This Encounter   Medication Reason    polyethylene glycol (GLYCOLAX) powder 17 g DUPLICATE    ondansetron (ZOFRAN-ODT) disintegrating tablet 4 mg Alternate therapy    ondansetron (ZOFRAN) injection 4 mg Alternate therapy    HYDROcodone-acetaminophen (NORCO) 5-325 MG per tablet 1 tablet     albuterol-ipratropium (COMBIVENT RESPIMAT)  MCG/ACT inhaler 1 puff     dexamethasone (DECADRON) injection 6 mg     enoxaparin (LOVENOX) injection 40 mg     dexamethasone (DECADRON) 20 mg in sodium chloride 0.9 % IVPB     tocilizumab (ACTEMRA) 648 mg in sodium chloride 0.9 % 100 mL IVPB     amLODIPine (NORVASC) 2.5 MG tablet LIST CLEANUP    dicyclomine (BENTYL) 10 MG capsule LIST CLEANUP    doxycycline hyclate (VIBRAMYCIN) 100 MG capsule LIST CLEANUP    famotidine (PEPCID) 40 MG tablet LIST CLEANUP    HYDROcodone-acetaminophen (NORCO) 5-325 MG per tablet LIST CLEANUP    levothyroxine (SYNTHROID) 112 MCG tablet LIST CLEANUP    simvastatin (ZOCOR) 80 MG tablet LIST CLEANUP       Lajuanda Hefty 9/28/2021 3:11 PM     Below information is from [facility] at [address] [facility] at [facility] [address]. Claudean Plenty, and medication dispense report in [employer] [facility]:    Levothyroxine was ordered as 112 mcg QD at admission.  Levothyroxine is being dispensed as 100 mcg QD at [address] per dispense report. Famotidine 40 mg QD was ordered at admission. Neither pharmacy nor the dispense report had recent dispenses of famotidine. Dicyclomine 10 mg TID was ordered at admission. Neither pharmacy nor the dispense report had recent dispenses of dicyclomine. Amlodipine 2.5 mg was ordered at admission. Neither pharmacy nor the dispense report had recent dispenses of amlodipine. Pulmicort 180 1 puff BID was not ordered at admission and was not on the PTA medication list. Per Howard Montana at [facility]. Venkata Walton this is a current medication and the dispense report states last dispense was 9/24/21. Glipizide ER 10 mg BID was not ordered at admission and was not on the PTA medication list. Per Howard Mnotana at [facility]. Lordeonte Walton this is a current medication and the dispense report states last dispense was 9/1/21.       # of Gold Standard Meds: 19  # of admission discrepancies: 6  # of additional meds at admission: 3

## 2021-09-28 NOTE — PROGRESS NOTES
Orders to transfer pt to ICU bed 6 received this RN at [facility] with ICU manager , pt is alert SPo2 93% with 100% FiO2 100 and NRB , warm to touch temp oral 99.5 gave tylenol feels warmer to touch .  Pt understands need to transfer

## 2021-09-28 NOTE — CONSULTS
Patient is being seen at the request of Yoana Bernardo for a consultation for COVID-19 and respiratory failure    HISTORY OF PRESENT ILLNESS:   78years old with history of tracheobronchomalacia presented with dry cough started Friday. Moderate. Associated with shortness of breath. No sputum. Not sure what makes better or worse. Daughter tested positive for COVID-19 several days ago lives with her. Brown green sputum production. Fever. Duration for 3 to 4 days. Not vaccinated. No smoking. No home O2. No recent hospitalization or steroid use. Patient was transferred to ICU for worsening hypoxemia, placed on BiPAP with Precedex drip. PAST MEDICAL HISTORY:  Past Medical History:   Diagnosis Date    Anxiety disorder     Chronic pain syndrome     DDD (degenerative disc disease), lumbar     Diabetes ([address]     Displacement of lumbar intervertebral disc without myelopathy 8/23/2010    Diverticulitis     Fibromyalgia     Generalized osteoarthrosis, involving multiple sites 8/24/2010    GERD (gastroesophageal reflux disease)     Impacted cerumen 8/23/2010    Influenza A 3/10/16    Irritable bowel syndrome 8/23/2010    Other and unspecified hyperlipidemia 8/23/2010    Prosthetic joint implant failure ([address]     Screening mammogram 12/8/2006    (Both)Negative    Tracheobronchomalacia     Unspecified asthma(493.90) 8/23/2010    Unspecified essential hypertension 8/24/2010    Unspecified hypothyroidism 8/23/2010     PAST SURGICAL HISTORY:  Past Surgical History:   Procedure Laterality Date    ABDOMEN SURGERY      CARDIAC CATHETERIZATION      COLON SURGERY      COLONOSCOPY  2007    normal    HYSTERECTOMY      JOINT REPLACEMENT  10/92    Right; hip     JOINT REPLACEMENT    12/92    Left hip  followed by revision 1/2006    JOINT REPLACEMENT    6/96    right     JOINT REPLACEMENT    2006     right replacement.     LUNG SURGERY  1/2014    tracheobronchomalacia       FAMILY HISTORY:  family history includes Asthma in her mother; Cancer in her daughter and maternal grandfather; Heart Failure in her father. SOCIAL HISTORY:   reports that she quit smoking about 29 years ago. Her smoking use included cigarettes. She has a 18.00 pack-year smoking history. She has never used smokeless tobacco.    Scheduled Meds:   dexamethasone  6 mg IntraVENous Q24H    dicyclomine  10 mg Oral 4x Daily    famotidine  40 mg Oral Daily    amLODIPine  2.5 mg Oral Daily    aspirin  81 mg Oral Daily    gabapentin  800 mg Oral TID    levothyroxine  112 mcg Oral Daily    losartan  25 mg Oral Daily    pantoprazole  40 mg Oral QAM AC    rOPINIRole  0.5 mg Oral TID    sertraline  50 mg Oral Daily    atorvastatin  40 mg Oral Daily    insulin lispro  0-6 Units SubCUTAneous TID WC    insulin lispro  0-3 Units SubCUTAneous Nightly    sodium chloride flush  5-40 mL IntraVENous 2 times per day    enoxaparin  40 mg SubCUTAneous Daily    oxyCODONE  20 mg Oral 2 times per day    remdesivir IVPB  100 mg IntraVENous Q24H     Continuous Infusions:   dextrose      sodium chloride       PRN Meds:  tiZANidine, glucose, dextrose, glucagon (rDNA), dextrose, sodium chloride flush, sodium chloride, polyethylene glycol, acetaminophen **OR** acetaminophen, prochlorperazine, oxyCODONE-acetaminophen, albuterol-ipratropium    ALLERGIES:  Patient is allergic to sulfa antibiotics, bactrim, prednisone, and quinidine.     REVIEW OF SYSTEMS:  Constitutional:+  fever  HENT: Negative for sore throat  Eyes: Negative for redness   Respiratory: + dyspnea, cough  Cardiovascular: Negative for chest pain  Gastrointestinal: Negative for vomiting, diarrhea   Genitourinary: Negative for hematuria   Musculoskeletal: +  arthralgias   Skin: Negative for rash  Neurological: Negative for syncope  Hematological: Negative for adenopathy  Psychiatric/Behavorial: Negative for anxiety    PHYSICAL EXAM:  Blood pressure (!) 153/82, pulse 111, temperature 100.8 °F (38.2 °C), temperature source Oral, resp. rate 20, height 5' 9\" (1.753 m), weight 191 lb (86.6 kg), SpO2 93 %, not currently breastfeeding.' on Vapotherm 100%   Gen: + Distress. Ill-appearing  Eyes: PERRL. No sclera icterus. No conjunctival injection. ENT: No discharge. Pharynx clear. Neck: Trachea midline. No obvious mass. Resp: + Accessory muscle use. Bilateral crackles. No wheezes. No rhonchi. No dullness on percussion. CV: Regular rate. Regular rhythm. No murmur or rub. No edema. GI: Non-tender. Non-distended. No hernia. Skin: Warm and dry. No nodule on exposed extremities. Lymph: No cervical LAD. No supraclavicular LAD. M/S: No cyanosis. No joint deformity. No clubbing. Neuro: Awake. Alert. Moves all four extremities. Psych: Oriented x 3. No anxiety. LABS:  CBC:   Recent Labs     09/26/21 2116 09/27/21 0630 09/28/21  0521   WBC 13.9* 11.8* 13.4*   HGB 10.4* 10.2* 10.6*   HCT 32.8* 32.2* 34.5*   MCV 78.2* 77.7* 79.3*   * 134* 161     BMP:   Recent Labs     09/27/21 0328 09/27/21  0630 09/28/21  0521    139 134*   K 3.4* 4.3 4.0    104 98*   CO2 24 25 22   BUN 12 12 12   CREATININE 1.0 0.9 0.8     LIVER PROFILE:   Recent Labs     09/26/21 2116 09/27/21 0630 09/28/21  0521   AST 35 33 58*   ALT 15 12 15   BILIDIR  --  0.3  --    BILITOT 0.4 0.6 0.6   ALKPHOS 216* 205* 208*     PT/INR: No results for input(s): PROTIME, INR in the last 72 hours. APTT: No results for input(s): APTT in the last 72 hours. UA:  Recent Labs     09/27/21  0320   COLORU Yellow   PHUR 5.5   WBCUA 0-2   RBCUA None seen   YEAST Present*   BACTERIA Rare*   CLARITYU Clear   SPECGRAV <=1.005   LEUKOCYTESUR Negative   UROBILINOGEN 0.2   BILIRUBINUR Negative   BLOODU TRACE-INTACT*   GLUCOSEU >=1000*     Recent Labs     09/28/21  0524   PHART 7.477*   BVZ8EMT 36.8   PO2ART 72.4*       CTPA 9/27 imaging was reviewed by me and showed   No evidence of pulmonary embolism.    Scattered peripheral opacities in the left lung and more consolidative area   in the right lung concerning for pneumonia.  Previous right thoracotomy and   upper lobectomy. One mildly enlarged right paratracheal lymph node is present but no priors   for comparison.  Nonenlarged but prominent lymph nodes in the upper abdomen   and juxta diaphragmatic region      ASSESSMENT:  · Acute hypoxemic respiratory failure  · COVID-19 viral pneumonia  · Transaminitis  · Fever T-max 101.5  · Acute kidney injury  · Uncontrolled HTN   · CAD      PLAN:  Vapotherm for life-threatening acute hypoxemic respiratory failure and titrate to maintain SaO2 >92%  Bilevel non-invasive positive pressure ventilation per my orders-14/8  Discussed with patient the high possibility of intubation/mechanical ventilation if fails to improve  Continuous pulse oximetry  Droplet plus isolation (surgical mask, eye protection, gown, glove)  IV antibiotics to include Ceftriaxone/Zithromax  Blood culture and sputum culture  Procalcitonin  Moved to negative pressure room in case needs aerosolized procedure  Dexamethasone 6 mg IV   Remdesivir 200 mg IV on day 1 followed by 100 mg daily for 5 days. Monitor LFTs, renal and CBC daily while on medication. 1 dose of Tocilizumab  Risks, benefits and side effects of above medications were discussed with patient.   Resume Norvasc and losartan   Labetalol 20 mg IV Q 4 hrs PRN for SBP > 160   Lovenox BID   PICC line placement for limited IV access  MRSA prophylaxis: Bactroban  Patient is full code

## 2021-09-28 NOTE — PROGRESS NOTES
ABG drawn x 1 attempt(s) from right radial artery. Patient had positive modified Alexis's Test.  Patient was on 100% (LPM/Fio2) oxygen per [facility] (device). Pressure held x 10 minutes. No bleeding or bruising noted at puncture site. Patient tolerated procedure WELL.

## 2021-09-28 NOTE — PROGRESS NOTES
Pharmacy Consult for Tocilizumab Initiation per Dr. Juanjose Marin per [facility] P&T Committee  **All criteria need to be met to receive   Tocilizumab for COVID-19 patients**   Age    >22 years old   Yes   Ordering Provider    Restricted to ID, intensivists, or pulmonology  Hospitalist may order in ID absence      Yes   Laboratory Results    Confirmed positive COVID-19  CRP >75 mg/L     C-REACTIVE PROTEIN:  No results found for: CRPHS  pending     Clinical Status       Within 7 days of symptom onset (< 7 days) OR   Within 2 days of hospital admission OR  Within 24 hours of mechanical ventilation       yes   Concomitant Therapy    Receiving systemic steroids for treatment of COVID 19     yes   Oxygen Status     <92% OR requiring supplemental oxygen    Yes     Contraindications    1. Invasive active mycobacterial or fungal infection  2. Platelet <877,619 or active bleeding  4. Significant immunosuppression    ?     None     Dosing Recommendations:  (One dose maximum)    Dose when compounding from IV vial:  800 mg: Patient weight >90 kg x 1 dose   600 mg: Patient weight >65 kg to ? 90 kg x 1 dose   400 mg: Patient weight >40 kg to ? 65 kg x 1 dose   8 mg/kg: Patient weight ? 40 kg x 1 dose     Dose when compounding from SQ vial:  810 mg (5 syringes): Patient weight >90 kg x 1 dose   648 mg (4 syringes): Patient weight >65 kg to ? 90 kg x 1 dose   486 mg (3 syringes): Patient weight >40 kg to ? 65 kg x 1 dose   324mg (2 syringes): Patient weight ? 40 kg x 1 dose     Thank you for the consult,  Deni Willis Pharm D 9/28/202112:26 PM  .

## 2021-09-28 NOTE — PROGRESS NOTES
12/92    Left hip  followed by revision 1/2006    JOINT REPLACEMENT    6/96    right     JOINT REPLACEMENT    2006     right replacement.  LUNG SURGERY  1/2014    tracheobronchomalacia       Level of Consciousness: Alert, Oriented, and Cooperative = 0    Level of Activity: Walking with assistance = 1    Respiratory Pattern: Regular Pattern; RR 8-20 = 0    Breath Sounds: Clear = 0    Sputum   ,  ,    Cough: Strong, spontaneous, non-productive = 0    Vital Signs   BP (!) 141/71   Pulse 100   Temp 101 °F (38.3 °C) (Oral)   Resp 18   Ht 5' 9\" (1.753 m)   Wt 185 lb (83.9 kg)   SpO2 90%   BMI 27.32 kg/m²   SPO2 (COPD values may differ): 88-89% on room air or greater than 92% on FiO2 28- 35% = 2    Peak Flow (asthma only): not applicable = 0    RSI: 5-6 = Q4hr PRN (every four hours as needed) for dyspnea        Plan       Goals: medication delivery and improve oxygenation    Patient/caregiver was educated on the proper method of use for Respiratory Care Devices:  Yes      Level of patient/caregiver understanding able to:   ? Verbalize understanding   ? Demonstrate understanding       ? Teach back        ? Needs reinforcement       ? No available caregiver               ? Other:     Response to education:  Good     Is patient being placed on Home Treatment Regimen? Yes     Does the patient have everything they need prior to discharge? NA     Comments: chart reviewed and patient assessed    Plan of Care: change combivent q6 to prn per assessment    Electronically signed by Daniel Ramos RCP on 9/27/2021 at 9:33 PM    Respiratory Protocol Guidelines     1. Assessment and treatment by Respiratory Therapy will be initiated for medication and therapeutic interventions upon initiation of aerosolized medication. 2. Physician will be contacted for respiratory rate (RR) greater than 35 breaths per minute.  Therapy will be held for heart rate (HR) greater than 140 beats per minute, pending direction from Medications:    1. If the patient lacks prior history of lung disease, is not using inhaled anti-inflammatory medication at home, and lacks wheezing by examination or by history for at least 24 hours, contact physician for possible discontinuation.

## 2021-09-28 NOTE — PROGRESS NOTES
Shift assessment complete, scheduled meds given. Call light and bedside table in reach. Will continue to monitor.

## 2021-09-28 NOTE — CARE COORDINATION
need for care provider to bring portable home O2 tank on day of discharge for nursing to connect prior to leaving:   Not Indicated  Verbalized agreement/Understanding:   Yes    Community Service Affiliation  Dialysis:  No    · Agency:  · Location:  · Dialysis Schedule:  · Phone:   · Fax: Other Community Services: (ex:PT/OT,Mental Health,Wound Clinic, [address])  [facility] Clinic    DISCHARGE PLAN: Explained Case Management role/services. Reviewed chart. Role of discharge planner explained and patient's spouse verbalized understanding. Pt  Was transferred to ICU from PCU d/t O2 requirements. Pt was alert and oriented per Tamara Sherwood RN. Per pt's spouse, pt is unvaccinated. He states that they were going to get the vaccine, on Sunday, but then she became ill. Per spouse, pt is from [place] home with spouse and plans to return. Pt sees  Pain Clinic. Pt is not on home O2. CM provided the ICU phone number to the pt's spouse, Santiago Flores, with verbalized understanding.

## 2021-09-29 ENCOUNTER — APPOINTMENT (OUTPATIENT)
Dept: GENERAL RADIOLOGY | Age: 66
DRG: 004 | End: 2021-09-29
Payer: MEDICARE

## 2021-09-29 LAB
A/G RATIO: 0.8 (ref 1.1–2.2)
ALBUMIN SERPL-MCNC: 2.8 G/DL (ref 3.4–5)
ALP BLD-CCNC: 195 U/L (ref 40–129)
ALT SERPL-CCNC: 14 U/L (ref 10–40)
ANION GAP SERPL CALCULATED.3IONS-SCNC: 12 MMOL/L (ref 3–16)
AST SERPL-CCNC: 66 U/L (ref 15–37)
BASE EXCESS ARTERIAL: -1.9 MMOL/L (ref -3–3)
BASE EXCESS ARTERIAL: -1.9 MMOL/L (ref -3–3)
BASE EXCESS ARTERIAL: 0.8 MMOL/L (ref -3–3)
BASOPHILS ABSOLUTE: 0 K/UL (ref 0–0.2)
BASOPHILS RELATIVE PERCENT: 0.1 %
BILIRUB SERPL-MCNC: 0.3 MG/DL (ref 0–1)
BUN BLDV-MCNC: 22 MG/DL (ref 7–20)
C-REACTIVE PROTEIN: 326.7 MG/L (ref 0–5.1)
CALCIUM SERPL-MCNC: 8.5 MG/DL (ref 8.3–10.6)
CARBOXYHEMOGLOBIN ARTERIAL: 0 % (ref 0–1.5)
CARBOXYHEMOGLOBIN ARTERIAL: 0.3 % (ref 0–1.5)
CARBOXYHEMOGLOBIN ARTERIAL: 0.3 % (ref 0–1.5)
CHLORIDE BLD-SCNC: 102 MMOL/L (ref 99–110)
CO2: 25 MMOL/L (ref 21–32)
CREAT SERPL-MCNC: 0.6 MG/DL (ref 0.6–1.2)
EOSINOPHILS ABSOLUTE: 0 K/UL (ref 0–0.6)
EOSINOPHILS RELATIVE PERCENT: 0 %
GFR AFRICAN AMERICAN: >60
GFR NON-AFRICAN AMERICAN: >60
GLOBULIN: 3.5 G/DL
GLUCOSE BLD-MCNC: 148 MG/DL (ref 70–99)
GLUCOSE BLD-MCNC: 152 MG/DL (ref 70–99)
GLUCOSE BLD-MCNC: 155 MG/DL (ref 70–99)
GLUCOSE BLD-MCNC: 176 MG/DL (ref 70–99)
GLUCOSE BLD-MCNC: 185 MG/DL (ref 70–99)
GLUCOSE BLD-MCNC: 186 MG/DL (ref 70–99)
GLUCOSE BLD-MCNC: 188 MG/DL (ref 70–99)
HCO3 ARTERIAL: 24.6 MMOL/L (ref 21–29)
HCO3 ARTERIAL: 26.7 MMOL/L (ref 21–29)
HCO3 ARTERIAL: 26.8 MMOL/L (ref 21–29)
HCT VFR BLD CALC: 35.6 % (ref 36–48)
HEMOGLOBIN, ART, EXTENDED: 11.6 G/DL (ref 12–16)
HEMOGLOBIN, ART, EXTENDED: 11.7 G/DL (ref 12–16)
HEMOGLOBIN, ART, EXTENDED: 12.2 G/DL (ref 12–16)
HEMOGLOBIN: 11.1 G/DL (ref 12–16)
LYMPHOCYTES ABSOLUTE: 0.6 K/UL (ref 1–5.1)
LYMPHOCYTES RELATIVE PERCENT: 3.5 %
MCH RBC QN AUTO: 24.6 PG (ref 26–34)
MCHC RBC AUTO-ENTMCNC: 31.1 G/DL (ref 31–36)
MCV RBC AUTO: 79.3 FL (ref 80–100)
METHEMOGLOBIN ARTERIAL: 0.3 %
MONOCYTES ABSOLUTE: 0.5 K/UL (ref 0–1.3)
MONOCYTES RELATIVE PERCENT: 3.1 %
NEUTROPHILS ABSOLUTE: 15.3 K/UL (ref 1.7–7.7)
NEUTROPHILS RELATIVE PERCENT: 93.3 %
O2 SAT, ARTERIAL: 94.4 %
O2 SAT, ARTERIAL: 94.5 %
O2 SAT, ARTERIAL: 97.4 %
O2 THERAPY: ABNORMAL
PCO2 ARTERIAL: 48.1 MMHG (ref 35–45)
PCO2 ARTERIAL: 49.7 MMHG (ref 35–45)
PCO2 ARTERIAL: 65.3 MMHG (ref 35–45)
PDW BLD-RTO: 18.9 % (ref 12.4–15.4)
PERFORMED ON: ABNORMAL
PH ARTERIAL: 7.23 (ref 7.35–7.45)
PH ARTERIAL: 7.31 (ref 7.35–7.45)
PH ARTERIAL: 7.36 (ref 7.35–7.45)
PLATELET # BLD: 187 K/UL (ref 135–450)
PMV BLD AUTO: 8.9 FL (ref 5–10.5)
PO2 ARTERIAL: 100.9 MMHG (ref 75–108)
PO2 ARTERIAL: 78 MMHG (ref 75–108)
PO2 ARTERIAL: 86 MMHG (ref 75–108)
POTASSIUM REFLEX MAGNESIUM: 4.3 MMOL/L (ref 3.5–5.1)
RBC # BLD: 4.49 M/UL (ref 4–5.2)
SODIUM BLD-SCNC: 139 MMOL/L (ref 136–145)
TCO2 ARTERIAL: 26.2 MMOL/L
TCO2 ARTERIAL: 28.2 MMOL/L
TCO2 ARTERIAL: 28.8 MMOL/L
TOTAL PROTEIN: 6.3 G/DL (ref 6.4–8.2)
WBC # BLD: 16.4 K/UL (ref 4–11)

## 2021-09-29 PROCEDURE — 6370000000 HC RX 637 (ALT 250 FOR IP): Performed by: HOSPITALIST

## 2021-09-29 PROCEDURE — 99233 SBSQ HOSP IP/OBS HIGH 50: CPT | Performed by: INTERNAL MEDICINE

## 2021-09-29 PROCEDURE — 2500000003 HC RX 250 WO HCPCS: Performed by: INTERNAL MEDICINE

## 2021-09-29 PROCEDURE — 94640 AIRWAY INHALATION TREATMENT: CPT

## 2021-09-29 PROCEDURE — 94761 N-INVAS EAR/PLS OXIMETRY MLT: CPT

## 2021-09-29 PROCEDURE — 82803 BLOOD GASES ANY COMBINATION: CPT

## 2021-09-29 PROCEDURE — 80053 COMPREHEN METABOLIC PANEL: CPT

## 2021-09-29 PROCEDURE — 6360000002 HC RX W HCPCS: Performed by: INTERNAL MEDICINE

## 2021-09-29 PROCEDURE — 2580000003 HC RX 258: Performed by: HOSPITALIST

## 2021-09-29 PROCEDURE — 6370000000 HC RX 637 (ALT 250 FOR IP): Performed by: INTERNAL MEDICINE

## 2021-09-29 PROCEDURE — 99291 CRITICAL CARE FIRST HOUR: CPT | Performed by: INTERNAL MEDICINE

## 2021-09-29 PROCEDURE — 2580000003 HC RX 258: Performed by: PHYSICIAN ASSISTANT

## 2021-09-29 PROCEDURE — 36600 WITHDRAWAL OF ARTERIAL BLOOD: CPT

## 2021-09-29 PROCEDURE — 6360000002 HC RX W HCPCS

## 2021-09-29 PROCEDURE — 85025 COMPLETE CBC W/AUTO DIFF WBC: CPT

## 2021-09-29 PROCEDURE — 2500000003 HC RX 250 WO HCPCS: Performed by: PHYSICIAN ASSISTANT

## 2021-09-29 PROCEDURE — 36415 COLL VENOUS BLD VENIPUNCTURE: CPT

## 2021-09-29 PROCEDURE — C9113 INJ PANTOPRAZOLE SODIUM, VIA: HCPCS | Performed by: INTERNAL MEDICINE

## 2021-09-29 PROCEDURE — 2000000000 HC ICU R&B

## 2021-09-29 PROCEDURE — 2700000000 HC OXYGEN THERAPY PER DAY

## 2021-09-29 PROCEDURE — 2580000003 HC RX 258: Performed by: INTERNAL MEDICINE

## 2021-09-29 PROCEDURE — 71045 X-RAY EXAM CHEST 1 VIEW: CPT

## 2021-09-29 PROCEDURE — 94002 VENT MGMT INPAT INIT DAY: CPT

## 2021-09-29 RX ORDER — PROPOFOL 10 MG/ML
5-50 INJECTION, EMULSION INTRAVENOUS
Status: DISCONTINUED | OUTPATIENT
Start: 2021-09-29 | End: 2021-10-04

## 2021-09-29 RX ORDER — KETAMINE HYDROCHLORIDE 50 MG/ML
100 INJECTION, SOLUTION, CONCENTRATE INTRAMUSCULAR; INTRAVENOUS ONCE
Status: COMPLETED | OUTPATIENT
Start: 2021-09-29 | End: 2021-09-29

## 2021-09-29 RX ORDER — ROCURONIUM BROMIDE 10 MG/ML
50 INJECTION, SOLUTION INTRAVENOUS ONCE
Status: COMPLETED | OUTPATIENT
Start: 2021-09-29 | End: 2021-09-29

## 2021-09-29 RX ORDER — MIDAZOLAM HYDROCHLORIDE 1 MG/ML
2 INJECTION INTRAMUSCULAR; INTRAVENOUS
Status: DISCONTINUED | OUTPATIENT
Start: 2021-09-29 | End: 2021-10-04

## 2021-09-29 RX ORDER — MIDAZOLAM HYDROCHLORIDE 1 MG/ML
2 INJECTION INTRAMUSCULAR; INTRAVENOUS ONCE
Status: COMPLETED | OUTPATIENT
Start: 2021-09-29 | End: 2021-09-29

## 2021-09-29 RX ORDER — FENTANYL CITRATE 50 UG/ML
25 INJECTION, SOLUTION INTRAMUSCULAR; INTRAVENOUS
Status: DISCONTINUED | OUTPATIENT
Start: 2021-09-29 | End: 2021-10-04

## 2021-09-29 RX ORDER — CHLORHEXIDINE GLUCONATE 0.12 MG/ML
15 RINSE ORAL 2 TIMES DAILY
Status: DISCONTINUED | OUTPATIENT
Start: 2021-09-29 | End: 2021-10-04

## 2021-09-29 RX ORDER — PROPOFOL 10 MG/ML
INJECTION, EMULSION INTRAVENOUS
Status: COMPLETED
Start: 2021-09-29 | End: 2021-09-29

## 2021-09-29 RX ORDER — PANTOPRAZOLE SODIUM 40 MG/10ML
40 INJECTION, POWDER, LYOPHILIZED, FOR SOLUTION INTRAVENOUS DAILY
Status: DISCONTINUED | OUTPATIENT
Start: 2021-09-29 | End: 2021-10-28 | Stop reason: HOSPADM

## 2021-09-29 RX ORDER — LEVOTHYROXINE SODIUM 0.1 MG/1
100 TABLET ORAL DAILY
Status: DISCONTINUED | OUTPATIENT
Start: 2021-09-30 | End: 2021-10-28 | Stop reason: HOSPADM

## 2021-09-29 RX ORDER — IPRATROPIUM BROMIDE AND ALBUTEROL SULFATE 2.5; .5 MG/3ML; MG/3ML
1 SOLUTION RESPIRATORY (INHALATION) EVERY 4 HOURS
Status: DISCONTINUED | OUTPATIENT
Start: 2021-09-29 | End: 2021-10-04

## 2021-09-29 RX ORDER — FENTANYL CITRATE 50 UG/ML
75 INJECTION, SOLUTION INTRAMUSCULAR; INTRAVENOUS ONCE
Status: COMPLETED | OUTPATIENT
Start: 2021-09-29 | End: 2021-09-29

## 2021-09-29 RX ADMIN — INSULIN LISPRO 1 UNITS: 100 INJECTION, SOLUTION INTRAVENOUS; SUBCUTANEOUS at 23:16

## 2021-09-29 RX ADMIN — MIDAZOLAM HYDROCHLORIDE 2 MG: 1 INJECTION, SOLUTION INTRAMUSCULAR; INTRAVENOUS at 01:44

## 2021-09-29 RX ADMIN — PROPOFOL 50 MCG/KG/MIN: 10 INJECTION, EMULSION INTRAVENOUS at 11:47

## 2021-09-29 RX ADMIN — PROPOFOL 50 MCG/KG/MIN: 10 INJECTION, EMULSION INTRAVENOUS at 04:35

## 2021-09-29 RX ADMIN — PROPOFOL 10 MCG/KG/MIN: 10 INJECTION, EMULSION INTRAVENOUS at 01:10

## 2021-09-29 RX ADMIN — DEXTROSE MONOHYDRATE 10 MCG/MIN: 50 INJECTION, SOLUTION INTRAVENOUS at 02:04

## 2021-09-29 RX ADMIN — CISATRACURIUM BESYLATE 0.5 MCG/KG/MIN: 10 INJECTION, SOLUTION INTRAVENOUS at 11:27

## 2021-09-29 RX ADMIN — Medication 200 MCG/HR: at 06:17

## 2021-09-29 RX ADMIN — SODIUM CHLORIDE, PRESERVATIVE FREE 10 ML: 5 INJECTION INTRAVENOUS at 15:24

## 2021-09-29 RX ADMIN — MIDAZOLAM HYDROCHLORIDE 2 MG/HR: 5 INJECTION, SOLUTION INTRAMUSCULAR; INTRAVENOUS at 17:18

## 2021-09-29 RX ADMIN — PROPOFOL 50 MCG/KG/MIN: 10 INJECTION, EMULSION INTRAVENOUS at 08:04

## 2021-09-29 RX ADMIN — ROCURONIUM BROMIDE 50 MG: 10 INJECTION, SOLUTION INTRAVENOUS at 01:48

## 2021-09-29 RX ADMIN — MIDAZOLAM HYDROCHLORIDE 2 MG: 1 INJECTION, SOLUTION INTRAMUSCULAR; INTRAVENOUS at 15:56

## 2021-09-29 RX ADMIN — Medication 200 MCG/HR: at 17:09

## 2021-09-29 RX ADMIN — CEFTRIAXONE 2000 MG: 2 INJECTION, POWDER, FOR SOLUTION INTRAMUSCULAR; INTRAVENOUS at 14:01

## 2021-09-29 RX ADMIN — PROPOFOL 50 MCG/KG/MIN: 10 INJECTION, EMULSION INTRAVENOUS at 20:02

## 2021-09-29 RX ADMIN — Medication 200 MCG/HR: at 23:01

## 2021-09-29 RX ADMIN — IPRATROPIUM BROMIDE AND ALBUTEROL SULFATE 1 AMPULE: .5; 2.5 SOLUTION RESPIRATORY (INHALATION) at 08:21

## 2021-09-29 RX ADMIN — REMDESIVIR 100 MG: 100 INJECTION, POWDER, LYOPHILIZED, FOR SOLUTION INTRAVENOUS at 23:11

## 2021-09-29 RX ADMIN — Medication 50 MCG/HR: at 01:16

## 2021-09-29 RX ADMIN — PANTOPRAZOLE SODIUM 40 MG: 40 INJECTION, POWDER, FOR SOLUTION INTRAVENOUS at 13:24

## 2021-09-29 RX ADMIN — FENTANYL CITRATE 75 MCG: 0.05 INJECTION, SOLUTION INTRAMUSCULAR; INTRAVENOUS at 01:40

## 2021-09-29 RX ADMIN — IPRATROPIUM BROMIDE AND ALBUTEROL SULFATE 1 AMPULE: .5; 2.5 SOLUTION RESPIRATORY (INHALATION) at 16:34

## 2021-09-29 RX ADMIN — ENOXAPARIN SODIUM 30 MG: 30 INJECTION SUBCUTANEOUS at 20:01

## 2021-09-29 RX ADMIN — MIDAZOLAM HYDROCHLORIDE 2 MG: 1 INJECTION, SOLUTION INTRAMUSCULAR; INTRAVENOUS at 17:10

## 2021-09-29 RX ADMIN — IPRATROPIUM BROMIDE AND ALBUTEROL SULFATE 1 AMPULE: .5; 2.5 SOLUTION RESPIRATORY (INHALATION) at 23:11

## 2021-09-29 RX ADMIN — FENTANYL CITRATE 25 MCG: 0.05 INJECTION, SOLUTION INTRAMUSCULAR; INTRAVENOUS at 01:35

## 2021-09-29 RX ADMIN — MIDAZOLAM 2 MG: 1 INJECTION INTRAMUSCULAR; INTRAVENOUS at 00:57

## 2021-09-29 RX ADMIN — DEXAMETHASONE SODIUM PHOSPHATE 6 MG: 10 INJECTION INTRAMUSCULAR; INTRAVENOUS at 13:24

## 2021-09-29 RX ADMIN — VECURONIUM BROMIDE 0.75 MCG/KG/MIN: 1 INJECTION, POWDER, LYOPHILIZED, FOR SOLUTION INTRAVENOUS at 02:54

## 2021-09-29 RX ADMIN — DEXTROSE MONOHYDRATE 500 MG: 50 INJECTION, SOLUTION INTRAVENOUS at 16:07

## 2021-09-29 RX ADMIN — PROPOFOL 50 MCG/KG/MIN: 10 INJECTION, EMULSION INTRAVENOUS at 22:59

## 2021-09-29 RX ADMIN — CHLORHEXIDINE GLUCONATE 0.12% ORAL RINSE 15 ML: 1.2 LIQUID ORAL at 20:01

## 2021-09-29 RX ADMIN — MUPIROCIN: 20 OINTMENT TOPICAL at 20:04

## 2021-09-29 RX ADMIN — SODIUM CHLORIDE, PRESERVATIVE FREE 10 ML: 5 INJECTION INTRAVENOUS at 20:01

## 2021-09-29 RX ADMIN — Medication 200 MCG/HR: at 11:37

## 2021-09-29 RX ADMIN — SERTRALINE HYDROCHLORIDE 50 MG: 50 TABLET ORAL at 14:02

## 2021-09-29 RX ADMIN — MUPIROCIN: 20 OINTMENT TOPICAL at 14:03

## 2021-09-29 RX ADMIN — ENOXAPARIN SODIUM 30 MG: 30 INJECTION SUBCUTANEOUS at 13:23

## 2021-09-29 RX ADMIN — IPRATROPIUM BROMIDE AND ALBUTEROL SULFATE 1 AMPULE: .5; 2.5 SOLUTION RESPIRATORY (INHALATION) at 12:42

## 2021-09-29 RX ADMIN — KETAMINE HYDROCHLORIDE 100 MG: 50 INJECTION INTRAMUSCULAR; INTRAVENOUS at 00:53

## 2021-09-29 RX ADMIN — ROCURONIUM BROMIDE 50 MG: 10 INJECTION, SOLUTION INTRAVENOUS at 00:57

## 2021-09-29 RX ADMIN — ASPIRIN 81 MG: 81 TABLET, CHEWABLE ORAL at 14:02

## 2021-09-29 RX ADMIN — ATORVASTATIN CALCIUM 40 MG: 40 TABLET, FILM COATED ORAL at 14:02

## 2021-09-29 RX ADMIN — IPRATROPIUM BROMIDE AND ALBUTEROL SULFATE 1 AMPULE: .5; 2.5 SOLUTION RESPIRATORY (INHALATION) at 19:35

## 2021-09-29 RX ADMIN — CHLORHEXIDINE GLUCONATE 0.12% ORAL RINSE 15 ML: 1.2 LIQUID ORAL at 11:29

## 2021-09-29 ASSESSMENT — PAIN SCALES - GENERAL
PAINLEVEL_OUTOF10: 0
PAINLEVEL_OUTOF10: 10
PAINLEVEL_OUTOF10: 10
PAINLEVEL_OUTOF10: 0

## 2021-09-29 ASSESSMENT — PULMONARY FUNCTION TESTS
PIF_VALUE: 37
PIF_VALUE: 35
PIF_VALUE: 39
PIF_VALUE: 33
PIF_VALUE: 40
PIF_VALUE: 34

## 2021-09-29 ASSESSMENT — PAIN DESCRIPTION - PROGRESSION: CLINICAL_PROGRESSION: NOT CHANGED

## 2021-09-29 NOTE — PROGRESS NOTES
Patient falling asleep and oxygen saturations dropping to 83%. Went in patients room, she woke up and said she feels ok. RT called to room to adjust settings on Bi-Pap at this time.      Electronically signed by Jeanie Bosworth, RN on 9/28/2021 at 9:29 PM

## 2021-09-29 NOTE — PROGRESS NOTES
Blood pressure (!) 105/51, pulse 66, temperature 98.4 °F (36.9 °C), temperature source Axillary, resp. rate 24, height 5' 9\" (1.753 m), weight 191 lb (86.6 kg), SpO2 91 %, not currently breastfeeding. Patient on BiPAP with s a setting of 14/12 and 100% FiO2. Patient saturations staying between 89-91% with mask on. No complaints of coughing at this time. Lung sounds noted with crackles on inspiratory. Abdomen soft non tender. BS + x 4 quads. No swelling noted at this time.      Electronically signed by Kennedi Khalil RN on 9/28/2021 at 8:29 PM

## 2021-09-29 NOTE — PROGRESS NOTES
Comprehensive Nutrition Assessment    Type and Reason for Visit:  Initial, Consult (consult for TF order and management)    Nutrition Recommendations/Plan:   1. Continue NPO status until pt is medically cleared to receive nutrition therapy while intubated. 2. TF recommendations - recommend ADULT TUBE FEEDING; Orogastric; Peptide-Based formula - Vital AF 1.2 with a goal rate of 45 ml/hr x 20 hours. Start with 20 ml/hr and increase by 15 ml every 4 hours, as tolerated by patient, until goal rate can be achieved and maintained. Water flushes, 30 ml every 3 hours or per MD guidance. Please administer one proteinex P2Go once daily via feeding tube. If trophic TF requested, recommend ADULT TUBE FEEDING; Orogastric; Peptide-Based - Vital AF 1.2 with a trophic rate of 10 ml/hr x 20 hours or per MD guidance based on need for prone/supine positioning. Water flushes, 30 ml every 3 hours or per MD guidance  3. Monitor vent status, sedation type\ amount (propofol 50 mcg x 24 hours = 673 kcals from lipids), and plan of care  4. Monitor nutrition-related labs, bowel functions, and weight trends    Nutrition Assessment:  pt is nutritionally compromised AEB diagnosis of COVID-19 virus and decreased appetite/po intake PTA; pt is at risk for further compromise d\t increased nutrition needs r\t COVID-19 virus and need for prone\supine positioning for respiratory status + need for EN as sole source of nutrition while intubated; will continue NPO status and provide EN recommendations    Malnutrition Assessment:  Malnutrition Status:   At risk for malnutrition    Context:  Acute Illness     Findings of the 6 clinical characteristics of malnutrition:  Energy Intake:  No significant decrease in energy intake (last meal po 9/27/21)  Weight Loss:  No significant weight loss     Body Fat Loss:  Unable to assess (COVID 19 droplet + precaustions)     Muscle Mass Loss:  Unable to assess (COVID 19 droplet + precaustions)    Fluid Accumulation: will tolerate Vital AF 1.2 at goal rate of 45 ml/hr x 20 hours without GI distress, without s/s of aspiration, and without additional lab/fluid disturbances       Nutrition Monitoring and Evaluation:   Behavioral-Environmental Outcomes:  None Identified   Food/Nutrient Intake Outcomes:  Enteral Nutrition Intake/Tolerance  Physical Signs/Symptoms Outcomes:  Biochemical Data, Weight, Skin, GI Status, Fluid Status or Edema, Hemodynamic Status     Discharge Planning:     Too soon to determine     Electronically signed by Clement Lockhart on 9/29/21 at 4:16 PM EDT    Contact: [phone]

## 2021-09-29 NOTE — SIGNIFICANT EVENT
Continued marked hypoxia post intubation. Required removal from vent multiple times for manual ambu bag. Proned pt  Ramped up sedation in order to paralyze pt. Requiring high doses of multiple agents to sedate adequately. Goal RASS -4--5  Hypotensive once adequately sedated. Levophed started, pressures improving. Vecuronium gtt. Multiple vent adjustments made by me at bedside for life threatening hypoxia/respiratory failure 2/2 COVID PNA. Eventually had to settle for increased Vt of 450 w/ a PEEP 16, Rate 14, 100% FiO2.    sats now 89-92%  See ABG below (obtained after proning)  Will continue to monitor this critically ill pt closely. Please see EMR and staff documentation for details of this prolonged and complex encounter. Will update Dr. Katlin Ignacio in am.    Blood gas, arterial [[id number]] (Abnormal)    Collected: 09/29/21 0204    Updated: 09/29/21 0218    Specimen Source: Blood gases     pH, Arterial 7.313Low     pCO2, Arterial 49.7High  mmHg    pO2, Arterial 78.0 mmHg    HCO3, Arterial 24.6 mmol/L    Base Excess, Arterial -1.9 mmol/L    Hemoglobin, Art, Extended 11.7Low  g/dL    O2 Sat, Arterial 94.4 %    Carboxyhgb, Arterial 0.3 %    Comment:      0.0-1.5   (Smokers 1.5-5.0)        Methemoglobin, Arterial 0.3 %    TCO2, Arterial 26.2 mmol/L    O2 Therapy Unknown   Narrative:     Performed at:   [address]   Phone [phone]     XR CHEST PORTABLE [[id number]]    Collected: 09/29/21 0148    Updated: 09/29/21 0153    Narrative:     EXAMINATION:   ONE XRAY VIEW OF THE CHEST     9/29/2021 1:07 am     COMPARISON:   09/26/2021     HISTORY:   ORDERING SYSTEM PROVIDED HISTORY: new intubation   TECHNOLOGIST PROVIDED HISTORY:   Reason for exam:->new intubation   Reason for Exam: ett placement   Acuity: Acute   Type of Exam: Initial     FINDINGS:   Endotracheal tube placement with the tip approximately 4 cm above the saba. Nasogastric tube courses into the stomach and off the exam.  Right PICC line   is acceptable.  Surgical clips over the right hilum are redemonstrated. Increasing multifocal opacities within both lungs.  Denser consolidation in   the right perihilar and infrahilar lung with air bronchograms.  Trace amount   of pleural effusion are suggested at the costophrenic angles.  There is no   pneumothorax.  Cardiac silhouette is mildly enlarged but stable. Impression:     1.  Endotracheal tube, nasogastric tube, and right PICC line placement. 2.  Increasing multifocal opacities with consolidative area in the right   perihilar lung.  Could be multifocal pneumonia with or without edema. Total critical care time caring for this patient with life threatening, unstable organ failure, including direct patient contact, management of life support systems, review of data including imaging and labs, discussions with other team members and physicians is 65 minutes so far today, excluding procedures.

## 2021-09-29 NOTE — PROGRESS NOTES
ABG drawn x 1attempt(s) from right radial artery. Patient had positive modified Alexis's Test.  Patient was on 100% (LPM/Fio2) oxygen per bipap (device). Pressure held x 10 minutes. No bleeding or bruising noted at puncture site. Patient tolerated procedure well.

## 2021-09-29 NOTE — PROGRESS NOTES
09/29/21 0223   Vent Information   Skin Assessment Clean, dry, & intact   Vent Type 980   Vent Mode AC/VC   Vt Ordered 450 mL   Rate Set 14 bmp   Peak Flow 60 L/min   FiO2  100 %   SpO2 94 %   SpO2/FiO2 ratio 94   Sensitivity 3   PEEP/CPAP 16   Humidification Source Heated wire   Humidification Temp 37   Humidification Temp Measured 36.2   Circuit Condensation Not drained   Vent Patient Data   Peak Inspiratory Pressure 39 cmH2O   Mean Airway Pressure 21 cmH20   Rate Measured 14 br/min   Vt Exhaled 461 mL   Minute Volume 6.46 Liters   I:E Ratio 1:4.2   Plateau Pressure 36 AAX30   Static Compliance 23 mL/cmH2O   Dynamic Compliance 20 mL/cmH2O   Cough/Sputum   Sputum How Obtained Endotracheal;Suctioned   Cough Non-productive   Sputum Amount None   Sputum Color None   Tenacity None   Spontaneous Breathing Trial (SBT) RT Doc   Pulse 100   Breath Sounds   Right Upper Lobe Diminished   Right Middle Lobe Diminished   Right Lower Lobe Diminished   Left Upper Lobe Diminished   Left Lower Lobe Diminished   Additional Respiratory  Assessments   Resp 16   Position Prone   Alarm Settings   High Pressure Alarm 45 cmH2O   Low Minute Volume Alarm 4 L/min   High Respiratory Rate 40 br/min   Low Exhaled Vt  250 mL   Patient Observation   Observations ETT SIZE 8.0, SECURED AT 24 LIP LINE. AMBU BAG AT HEAD OF BED. WATER GOOD.

## 2021-09-29 NOTE — PROGRESS NOTES
0205- Pt placed in prone position. All bony prominences w mepliex in place. AC on vent 14/450/+16/100%. Sat 90%. . BP low 60/42- levo initiated.  Electronically signed by Ana Rosa Awad RN on 9/29/2021 at 2:08 AM

## 2021-09-29 NOTE — PROGRESS NOTES
09/29/21 1941   Vent Information   $Ventilation $Initial Day   Skin Assessment Clean, dry, & intact   Vent Type 980   Vent Mode AC/VC   Vt Ordered 400 mL   Rate Set 28 bmp   Peak Flow 60 L/min   FiO2  70 %   SpO2 97 %   SpO2/FiO2 ratio 138.57   Sensitivity 3   PEEP/CPAP 16   Humidification Source Heated wire   Humidification Temp 37   Humidification Temp Measured 36.6   Circuit Condensation Drained   Vent Patient Data   Peak Inspiratory Pressure 34 cmH2O   Mean Airway Pressure 17 cmH20   Rate Measured 28 br/min   Vt Exhaled 406 mL   Minute Volume 11.4 Liters   I:E Ratio 1:2.8   Plateau Pressure 32 ONP86   Static Compliance 25 mL/cmH2O   Dynamic Compliance 23 mL/cmH2O   Cough/Sputum   Sputum How Obtained Endotracheal;Suctioned   Cough Non-productive   Sputum Amount None   Sputum Color None   Tenacity None   Spontaneous Breathing Trial (SBT) RT Doc   Pulse 81   Breath Sounds   Right Upper Lobe Diminished   Right Middle Lobe Diminished   Right Lower Lobe Diminished   Left Upper Lobe Diminished   Left Lower Lobe Diminished   Additional Respiratory  Assessments   Resp 25   Position Prone   Alarm Settings   High Pressure Alarm 45 cmH2O   Low Minute Volume Alarm 4 L/min   High Respiratory Rate 40 br/min   Low Exhaled Vt  250 mL   Patient Observation   Observations ETT SIZE 8.0, SECURED AT 24 LIP LINE. AMBU BAG AT HEAD OF BED. WATER GOOD. ETT (adult)   No Placement Date or Time found.    Tube Size: 8 mm  Secured at: 24 cm  Measured From: Lips   Secured at 24 cm   Measured From [address] [employer] tape   Site Condition Dry

## 2021-09-29 NOTE — PROCEDURES
Pt was emergently intubated secondary increased O2 demand and pt was tiring out. Pt was sedated with 100 mg ketamine and 50 mg rocuronium. Pt was intubated in a single attempt using a Glide scope with a size 3 MAC in place. The tip of a size 8 ETT was inserted between the cords and then advanced off of the stylette into the air way to 23 cm from the lip. Cuff was inflated and secured. Good CO2 color change and bilateral breath sounds auscultated. Vent settings provided to respiratory staff. OG was passed to 68 cm from the lip. CXR ordered and pending to verify tube placement.

## 2021-09-29 NOTE — PROGRESS NOTES
Decision made to intubate by Dr. Yuko Iqbal. Staff to bedside for intubation. 0053- 100mg of ketamine administered     0056- Pt remains awake and blinking eyes    0057- 2mg of IVP versed given, 50mg of marcin administered. Bagged at 100% by RT. O2 sat 91%    0059- Pt intubated with one attempt, color change noted and bilateral breath sounds heard. 8 @ 23 LL    [place] in place    OG placed @ 68     156/81, 105 bpm, 93%    0103- O2 sat dropping to the 70s. RT remains at bedside. 100% Fio2 & +14 PEEP. Bagged for 2 minutes     0108- Not tolerating current vent settings, RT bagging pt at bedside. Sedation started. O2 sat not sustaining over 90% without being bagged. RT adjusting vent setting. Xray at bedside.

## 2021-09-29 NOTE — PROGRESS NOTES
hip     JOINT REPLACEMENT    12/92    Left hip  followed by revision 1/2006    JOINT REPLACEMENT    6/96    right     JOINT REPLACEMENT    2006     right replacement.  LUNG SURGERY  1/2014    tracheobronchomalacia       Level of Consciousness: Comatose = 4    Level of Activity: Bedridden, unresponsive or quadriplegic = 4    Respiratory Pattern: Regular Pattern; RR 8-20 = 0    Breath Sounds: Diminshed bilaterally and/or crackles = 2    Sputum  Sputum Color: None, Tenacity: None, Sputum How Obtained: Endotracheal, Suctioned  Cough: Strong, spontaneous, non-productive = 0    Vital Signs   BP (!) 60/42   Pulse 100   Temp 98.4 °F (36.9 °C) (Axillary)   Resp 16   Ht 5' 9\" (1.753 m)   Wt 191 lb (86.6 kg)   SpO2 94%   BMI 28.21 kg/m²   SPO2 (COPD values may differ): Less than 86% on room air or greater than 92% on FiO2 greater than 50% = 4    Peak Flow (asthma only): not applicable = 0    RSI: 46-48 = Q4 (every four hours)        Plan       Goals: medication delivery    Patient/caregiver was educated on the proper method of use for Respiratory Care Devices:        Level of patient/caregiver understanding able to:   ? Verbalize understanding   ? Demonstrate understanding       ? Teach back        ? Needs reinforcement       ? No available caregiver               ? Other:     Response to education:       Is patient being placed on Home Treatment Regimen? No     Does the patient have everything they need prior to discharge? NA     Comments: Chart reviewed and patient assessed. Plan of Care: Duoneb Q4H. Electronically signed by Lady Lety RCP on 9/29/2021 at 3:56 AM    Respiratory Protocol Guidelines     1. Assessment and treatment by Respiratory Therapy will be initiated for medication and therapeutic interventions upon initiation of aerosolized medication. 2. Physician will be contacted for respiratory rate (RR) greater than 35 breaths per minute.  Therapy will be held for heart rate (HR) greater than 140 beats per minute, pending direction from physician. 3. Bronchodilators will be administered via Metered Dose Inhaler (MDI) with spacer when the following criteria are met:  a. Alert and cooperative     b. HR < 140 bpm  c. RR < 30 bpm                d. Can demonstrate a 2-3 second inspiratory hold  4. Bronchodilators will be administered via Hand Held Nebulizer [facility]) to patients when ANY of the following criteria are met  a. Incognizant or uncooperative          b. Patients treated with HHN at Home        c. Unable to demonstrate proper use of MDI with spacer     d. RR > 30 bpm   5. Bronchodilators will be delivered via Metered Dose Inhaler (MDI), HHN, Aerogen to intubated patients on mechanical ventilation. 6. Inhalation medication orders will be delivered and/or substituted as outlined below. Aerosolized Medications Ordering and Administration Guidelines:    1. All Medications will be ordered by a physician, and their frequency and/or modality will be adjusted as defined by the patients Respiratory Severity Index (RSI) score. 2. If the patient does not have documented COPD, consider discontinuing anticholinergics when RSI is less than 9.  3. If the bronchospasm worsens (increased RSI), then the bronchodilator frequency can be increased to a maximum of every 4 hours. If greater than every 4 hours is required, the physician will be contacted. 4. If the bronchospasm improves, the frequency of the bronchodilator can be decreased, based on the patient's RSI, but not less than home treatment regimen frequency. 5. Bronchodilator(s) will be discontinued if patient has a RSI less than 9 and has received no scheduled or as needed treatment for 72  Hrs. Patients Ordered on a Mucolytic Agent:    1. Must always be administered with a bronchodilator.     2. Discontinue if patient experiences worsened bronchospasm, or secretions have lessened to the point that the patient is able to clear them with a cough.    Anti-inflammatory and Combination Medications:    1. If the patient lacks prior history of lung disease, is not using inhaled anti-inflammatory medication at home, and lacks wheezing by examination or by history for at least 24 hours, contact physician for possible discontinuation.

## 2021-09-29 NOTE — PROGRESS NOTES
No cervical LAD. No supraclavicular LAD. M/S: No cyanosis. No joint deformity. No clubbing. Neuro: Intubated sedated.   Paralyzed   Psych: Noncommunicative unable to obtain      Scheduled Meds:   chlorhexidine  15 mL Mouth/Throat BID    pantoprazole  40 mg IntraVENous Daily    ipratropium-albuterol  1 ampule Inhalation Q4H    enoxaparin  30 mg SubCUTAneous BID    dexamethasone  6 mg IntraVENous Q24H    cefTRIAXone (ROCEPHIN) IV  2,000 mg IntraVENous Q24H    azithromycin  500 mg IntraVENous Q24H    mupirocin   Nasal BID    lidocaine 1 % injection  5 mL IntraDERmal Once    sodium chloride flush  5-40 mL IntraVENous 2 times per day    dicyclomine  10 mg Oral 4x Daily    famotidine  40 mg Oral Daily    amLODIPine  2.5 mg Oral Daily    aspirin  81 mg Oral Daily    gabapentin  800 mg Oral TID    levothyroxine  112 mcg Oral Daily    losartan  25 mg Oral Daily    rOPINIRole  0.5 mg Oral TID    sertraline  50 mg Oral Daily    atorvastatin  40 mg Oral Daily    insulin lispro  0-6 Units SubCUTAneous TID WC    insulin lispro  0-3 Units SubCUTAneous Nightly    sodium chloride flush  5-40 mL IntraVENous 2 times per day    oxyCODONE  20 mg Oral 2 times per day    remdesivir IVPB  100 mg IntraVENous Q24H       Data:  CBC:   Recent Labs     09/27/21  0630 09/28/21  0521 09/29/21  0410   WBC 11.8* 13.4* 16.4*   HGB 10.2* 10.6* 11.1*   HCT 32.2* 34.5* 35.6*   MCV 77.7* 79.3* 79.3*   * 161 187     BMP:   Recent Labs     09/27/21  0630 09/28/21  0521 09/29/21  0410    134* 139   K 4.3 4.0 4.3    98* 102   CO2 25 22 25   BUN 12 12 22*   CREATININE 0.9 0.8 0.6     LIVER PROFILE:   Recent Labs     09/27/21  0630 09/28/21  0521 09/29/21  0410   AST 33 58* 66*   ALT 12 15 14   BILIDIR 0.3  --   --    BILITOT 0.6 0.6 0.3   ALKPHOS 205* 208* 195*       Microbiology:  9/27 COVID-19 detected    Imaging:  Chest x-ray 9/29 imaging reviewed by me and showed  Satisfactory ETT position  Satisfactory PICC line position   Worse bilateral ASD     CTPA 9/27 imaging was reviewed by me and showed   No evidence of pulmonary embolism. Scattered peripheral opacities in the left lung and more consolidative area   in the right lung concerning for pneumonia.  Previous right thoracotomy and   upper lobectomy. One mildly enlarged right paratracheal lymph node is present but no priors   for comparison.  Nonenlarged but prominent lymph nodes in the upper abdomen   and juxta diaphragmatic region        ASSESSMENT:  · Acute hypoxemic respiratory failure  · COVID-19 viral pneumonia  · Hypotension-probably sedation related  · Transaminitis  · Fever T-max 101.5  · Acute kidney injury  · CAD  · Chronic pain syndrome - narcotics and Neurontin         PLAN:  Mechanical ventilation as per my orders. The ventilator was adjusted by me at the bedside for unstable, life threatening respiratory failure  Change RR 28 and  - ABG in 1 hr   Follow ABG and chest x-ray while on the ventilator  IV Propofol for sedation, target RASS -2, with daily spontaneous awakening trial   Follow TG   Paralytics- change to Nimbex   Prone ventilation  Fentanyl and Versed PRN, gtt as needed  Head of bed 30 degrees or higher at all times  Daily spontaneous breathing trial once PEEP less than 8, FiO2 less than 55%  Droplet plus isolation (surgical mask, eye protection, gown, glove)  IV antibiotics to include Ceftriaxone/Zithromax D#2  Tracheal aspirate   IV Levophed to keep MAP > 65 mmHg or SBP>90  Follow-up cultures   Dexamethasone 6 mg IV D#3  Remdesivir day #3. Monitor LFTs, renal and CBC daily while on medication.   D/C NS  Post 1 dose of Tocilizumab  Hold Norvasc and losartan  Hold Narcotics Requip and Neurontin    Lovenox BID   MRSA prophylaxis: Bactroban  Patient is full code         Total critical care time caring for this patient with life threatening, unstable organ failure, including direct patient contact, management of life support systems, review of data including imaging and labs, discussions with other team members and physicians is 33 minutes, excluding procedures.

## 2021-09-29 NOTE — PLAN OF CARE
Nutrition Problem #1: Inadequate oral intake  Intervention: Food and/or Nutrient Delivery: Continue NPO, Start Tube Feeding  Nutritional Goals: patient will tolerate Vital AF 1.2 at goal rate of 45 ml/hr x 20 hours without GI distress, without s/s of aspiration, and without additional lab/fluid disturbances

## 2021-09-29 NOTE — CARE COORDINATION
INTERDISCIPLINARY PLAN OF CARE CONFERENCE    Date/Time: 9/29/2021 10:28 AM  Completed by: Angel Phoenix MSW, LISW. Case Management      Patient Name:  Maurita Denver  YOB: 1955  Admitting Diagnosis: Dehydration [E86.0]  Hypokalemia [E87.6]  Hypomagnesemia [E83.42]  Positive D dimer [R79.89]  CHARLY (acute kidney injury) ([facility] [N17.9]  Acute respiratory failure with hypoxia ([facility] [facility] [J96.01]  Pneumonia due to COVID-19 virus [U07.1, J12.82]  COVID-19 [U07.1]     Admit Date/Time:  9/26/2021  8:29 PM    Chart reviewed. Interdisciplinary team contacted or reviewed plan related to patient progress and discharge plans. Disciplines included Case Management, Nursing, and Dietitian. Current Status:Ongoing. Covid 19. Vent. Proned. PT/OT recommendation for discharge plan of care: TBD    Expected D/C Disposition:  TBD    Discharge Plan Comments: Chart review completed. Pt transferred to the ICU from PCU yesterday. Pt is on a Vent. Pt is from home with her spouse. CM will follow. Please notify CM if needs or concerns arise.      Home O2 in place on admit: No

## 2021-09-29 NOTE — PROGRESS NOTES
DVT COVID Prophylaxis Monitoring    Recent Labs     09/28/21  0521 09/29/21  0410   CREATININE 0.8 0.6     Recent Labs     09/28/21  0521 09/28/21  1205 09/29/21  0410   HGB   < >  --  11.1*   HCT   < >  --  35.6*   PLT   < >  --  187   INR  --  1.47*  --     < > = values in this interval not displayed. Estimated Creatinine Clearance: 106 mL/min (based on SCr of 0.6 mg/dL). Dose of 30 mg BID remains appropriate. Pharmacy will continue to monitor.      NAIMA [facility] 9/29/2021 7:22 AM

## 2021-09-29 NOTE — PROGRESS NOTES
Remdesivir Day # 3    Recent Labs     09/26/21  2116 09/27/21  0328 09/27/21  0630 09/28/21  0521 09/29/21  0410   WBC 13.9*  --  11.8* 13.4* 16.4*   BUN 13 12 12 12 22*   CREATININE 1.3* 1.0 0.9 0.8 0.6   ALT 15  --  12 15 14   AST 35  --  33 58* 66*       Estimated Creatinine Clearance: 106 mL/min (based on SCr of 0.6 mg/dL). Will continue with dose of 100 mg today and for the next 2 days thereafter. Pharmacy will continue to monitor.      Sadia Martin, PharmD, [facility], 9/29/2021 6:53 AM

## 2021-09-29 NOTE — PROGRESS NOTES
09/28/21 2305   NIV Type   Skin Assessment Clean, dry, & intact   Skin Protection for O2 Device Yes   Equipment Type V60   Mode Bilevel   Mask Type Full face mask   Mask Size Small   Settings/Measurements   IPAP 14 cmH20   CPAP/EPAP 8 cmH2O   Rate Ordered 12   Resp 24   FiO2  100 %   Vt Exhaled 682 mL   Mask Leak (lpm) 34 lpm   Comfort Level Good   Using Accessory Muscles No   SpO2 90   Alarm Settings   Alarms On Y   Oxygen Therapy/Pulse Ox   SpO2 (!) 88 %

## 2021-09-29 NOTE — PROGRESS NOTES
Despite increases in sedation & vent settings pt continues to desat even with bagging. Dr. Moose Nolasco at bedside. Preparing to prone.  Electronically signed by Yonatan Archer RN on 9/29/2021 at 1:46 AM

## 2021-09-29 NOTE — PROGRESS NOTES
ICU Progress Note    Admit Date:  9/26/2021    Admitted with COVID-19 pneumonia,  acute hypoxic respiratory failure. Patient not vaccinated  Progressive significant worsening hypoxemia overnight. Patient was on 2 L of oxygen on admission-> switched to high flow oxygen per Vapotherm with additional 100% nonrebreather on 9/28/2021-> transferred to ICU. Worsening hypoxemia overnight. Intubated and placed on mechanical ventilation early this morning 9/29/2021    Subjective:  Ms. Lakisha Alvarado seen. Patient intubated and proned early this morning for worsening hypoxemia. On FiO2 80% PEEP of 16    Objective:   BP (!) 125/58   Pulse 76   Temp 98 °F (36.7 °C) (Axillary)   Resp 28   Ht 5' 9\" (1.753 m) Comment: obtained 9/29/21  Wt 187 lb 6.3 oz (85 kg)   SpO2 97%   BMI 27.67 kg/m²       Intake/Output Summary (Last 24 hours) at 9/29/2021 1816  Last data filed at 9/29/2021 1750  Gross per 24 hour   Intake 2571 ml   Output 2505 ml   Net 66 ml         Physical Exam:  General: Intubated on mechanical ventilation   Prone positioning  Skin:  Warm and dry  Neck:  JVD absent. Neck supple  Chest: Very diminished breath sounds, with some rhonchi  Cardiovascular:  RRR ,S1S2 normal  Abdomen:  Soft, non tender, non distended, BS +  Extremities:  No edema. Intact peripheral pulses.  Brisk cap refill, < 2 secs  Neuro: non focal      Medications:   Scheduled Meds:   chlorhexidine  15 mL Mouth/Throat BID    pantoprazole  40 mg IntraVENous Daily    ipratropium-albuterol  1 ampule Inhalation Q4H    insulin lispro  0-6 Units SubCUTAneous Q4H    [START ON 9/30/2021] levothyroxine  100 mcg Oral Daily    enoxaparin  30 mg SubCUTAneous BID    dexamethasone  6 mg IntraVENous Q24H    cefTRIAXone (ROCEPHIN) IV  2,000 mg IntraVENous Q24H    azithromycin  500 mg IntraVENous Q24H    mupirocin   Nasal BID    lidocaine 1 % injection  5 mL IntraDERmal Once    sodium chloride flush  5-40 mL IntraVENous 2 times per day    aspirin  81 mg Oral Daily    [Held by provider] gabapentin  800 mg Oral TID    [Held by provider] rOPINIRole  0.5 mg Oral TID    sertraline  50 mg Oral Daily    atorvastatin  40 mg Oral Daily    sodium chloride flush  5-40 mL IntraVENous 2 times per day    [Held by provider] oxyCODONE  20 mg Oral 2 times per day    remdesivir IVPB  100 mg IntraVENous Q24H       Continuous Infusions:   propofol 50 mcg/kg/min (09/29/21 1147)    fentaNYL 200 mcg/hr (09/29/21 1709)    norepinephrine Stopped (09/29/21 1131)    cisatracurium (NIMBEX) infusion Stopped (09/29/21 1400)    midazolam 2 mg/hr (09/29/21 1718)    dexmedetomidine HCl in NaCl Stopped (09/29/21 0110)    sodium chloride      dextrose      sodium chloride         Data:  CBC:   Recent Labs     09/27/21 0630 09/28/21 0521 09/29/21  0410   WBC 11.8* 13.4* 16.4*   RBC 4.15 4.35 4.49   HGB 10.2* 10.6* 11.1*   HCT 32.2* 34.5* 35.6*   MCV 77.7* 79.3* 79.3*   RDW 17.8* 18.8* 18.9*   * 161 187     BMP:   Recent Labs     09/27/21 0630 09/28/21 0521 09/29/21  0410    134* 139   K 4.3 4.0 4.3    98* 102   CO2 25 22 25   BUN 12 12 22*   CREATININE 0.9 0.8 0.6     BNP: No results for input(s): BNP in the last 72 hours. PT/INR:   Recent Labs     09/28/21  1205   PROTIME 16.9*   INR 1.47*     APTT: No results for input(s): APTT in the last 72 hours.   CARDIAC ENZYMES:   Recent Labs     09/26/21  2116 09/27/21  1457   TROPONINI <0.01 <0.01     FASTING LIPID PANEL:  Lab Results   Component Value Date    CHOL 156 10/18/2018    HDL 39 (L) 10/18/2018    TRIG 242 (H) 10/18/2018     LIVER PROFILE:   Recent Labs     09/27/21  0630 09/28/21  0521 09/29/21  0410   AST 33 58* 66*   ALT 12 15 14   BILIDIR 0.3  --   --    BILITOT 0.6 0.6 0.3   ALKPHOS 205* 208* 195*           CULTURES  COVID 19, NAAT: Detected    EKG:   Normal sinus rhythm  Left ventricular hypertrophy with repolarization abnormality  Prolonged QT  ST abnormality inferior leads consider ischemia  Abnormal ECG  When compared with ECG of 05-SEP-2020 08:35,  Premature ventricular complexes are no longer Present  T wave inversion now evident in Inferior leads  Confirmed by Angelica Greene MD, [place]     Radiology  XR CHEST PORTABLE   Final Result   1. Endotracheal tube, nasogastric tube, and right PICC line placement. 2.  Increasing multifocal opacities with consolidative area in the right   perihilar lung. Could be multifocal pneumonia with or without edema. IR PICC WO SQ PORT/PUMP > 5 YEARS   Final Result   1. See above. CT CHEST PULMONARY EMBOLISM W CONTRAST   Final Result   No evidence of pulmonary embolism. Scattered peripheral opacities in the left lung and more consolidative area   in the right lung concerning for pneumonia. Previous right thoracotomy and   upper lobectomy. One mildly enlarged right paratracheal lymph node is present but no priors   for comparison. Nonenlarged but prominent lymph nodes in the upper abdomen   and juxta diaphragmatic region. XR CHEST PORTABLE   Final Result   Mild cardiomegaly without interstitial edema. Metallic surgical clips from prior right thoracotomy. COPD with anterior segment-right upper lobe alveolar pneumonia. Old pleural thickening was noted along the right lateral chest wall. Pleural   thickening and or trace effusion blunts the right costophrenic angle. XR CHEST PORTABLE    (Results Pending)         Assessment:  Principal Problem:    Suspected COVID-19 virus infection  Active Problems:    Hypothyroidism    Essential hypertension    DM2 (diabetes mellitus, type 2) (HCC)    Dehydration    Hypoxia    Acute respiratory failure with hypoxia ([address]    COVID-19    Pneumonia due to COVID-19 virus    CHARLY (acute kidney injury) ([address]    Uncontrolled hypertension    Fever    Hypotension  Resolved Problems:    * No resolved hospital problems.  *      Plan:    #COVID-19 pneumonia  #Acute hypoxic respiratory failure -Unvaccinated patient  - she presented with cough, fever, dyspnea in the setting of household contact testing + COVID 19  -Her Covid testing came back positive on admission  -Initially on 2 L of oxygen on presentation  -Worsening hypoxemia. Pulmonology consulted  -Patient with significant decline overnight since admission.  -placed on high flow oxygen per Vapotherm 40 L,FiO2 100% ,with additional nonrebreather . Transferred to ICU on 9/28.  -Intubated early this morning 9/29/2021, placed on mechanical ventilation, prone positioning. FiO2 80% PEEP of 16. Not on paralytics now. -  on Remdesivir , continue day #3  - on Decadron , day #3   - give 1 dose of Tocilizumab  -Lovenox twice daily    #Hypokalemia  - replaced and resolved     #Elevated creatinine  - suspect dehydration from illness. Improved with IVF  Continue IV fluids.     #CAD  - cont ASA, statin  - EKG with inferior T wave abnormality. She denies CP.   Trop neg, -telemetry monitoring    #History of tracheobronchomalacia  Has had surgical treatment for this     #HTN  - BP stable, cont norvasc     #Chronic pain   History of hip fracture  - cont home oxycodone BID and percocet   - cont gabapentin and requip     #DM2  - use ssi      #Hypothyroidism  - cont synthroid         DVT Prophylaxis: Lovenox   Diet NPO  ADULT TUBE FEEDING; Orogastric; Peptide Based; Continuous; 20; Yes; 15; Q 4 hours; 45; 30; Q 3 hours; Protein; one proteinex P2Go once daily via feeding tube  Code Status: Full Code        Juan Lance MD, MD 9/29/2021 6:16 PM

## 2021-09-30 ENCOUNTER — APPOINTMENT (OUTPATIENT)
Dept: GENERAL RADIOLOGY | Age: 66
DRG: 004 | End: 2021-09-30
Payer: MEDICARE

## 2021-09-30 PROBLEM — E44.1 MILD PROTEIN-CALORIE MALNUTRITION (HCC): Status: ACTIVE | Noted: 2021-09-30

## 2021-09-30 LAB
A/G RATIO: 0.6 (ref 1.1–2.2)
ALBUMIN SERPL-MCNC: 2 G/DL (ref 3.4–5)
ALP BLD-CCNC: 148 U/L (ref 40–129)
ALT SERPL-CCNC: <5 U/L (ref 10–40)
ANION GAP SERPL CALCULATED.3IONS-SCNC: 13 MMOL/L (ref 3–16)
AST SERPL-CCNC: <5 U/L (ref 15–37)
BASE EXCESS ARTERIAL: -0.2 MMOL/L (ref -3–3)
BASOPHILS ABSOLUTE: 0 K/UL (ref 0–0.2)
BASOPHILS RELATIVE PERCENT: 0.4 %
BILIRUB SERPL-MCNC: <0.2 MG/DL (ref 0–1)
BUN BLDV-MCNC: 23 MG/DL (ref 7–20)
CALCIUM SERPL-MCNC: 8.4 MG/DL (ref 8.3–10.6)
CARBOXYHEMOGLOBIN ARTERIAL: 0.3 % (ref 0–1.5)
CHLORIDE BLD-SCNC: 102 MMOL/L (ref 99–110)
CO2: 24 MMOL/L (ref 21–32)
CREAT SERPL-MCNC: <0.5 MG/DL (ref 0.6–1.2)
EOSINOPHILS ABSOLUTE: 0 K/UL (ref 0–0.6)
EOSINOPHILS RELATIVE PERCENT: 0.1 %
GFR AFRICAN AMERICAN: >60
GFR NON-AFRICAN AMERICAN: >60
GLOBULIN: 3.6 G/DL
GLUCOSE BLD-MCNC: 163 MG/DL (ref 70–99)
GLUCOSE BLD-MCNC: 165 MG/DL (ref 70–99)
GLUCOSE BLD-MCNC: 182 MG/DL (ref 70–99)
GLUCOSE BLD-MCNC: 183 MG/DL (ref 70–99)
GLUCOSE BLD-MCNC: 186 MG/DL (ref 70–99)
GLUCOSE BLD-MCNC: 215 MG/DL (ref 70–99)
HCO3 ARTERIAL: 24.9 MMOL/L (ref 21–29)
HCT VFR BLD CALC: 30.7 % (ref 36–48)
HEMOGLOBIN, ART, EXTENDED: 11 G/DL (ref 12–16)
HEMOGLOBIN: 10.2 G/DL (ref 12–16)
LYMPHOCYTES ABSOLUTE: 0.3 K/UL (ref 1–5.1)
LYMPHOCYTES RELATIVE PERCENT: 6.1 %
MAGNESIUM: 1.9 MG/DL (ref 1.8–2.4)
MCH RBC QN AUTO: 26.4 PG (ref 26–34)
MCHC RBC AUTO-ENTMCNC: 33.1 G/DL (ref 31–36)
MCV RBC AUTO: 79.7 FL (ref 80–100)
METHEMOGLOBIN ARTERIAL: 0.3 %
MONOCYTES ABSOLUTE: 0.3 K/UL (ref 0–1.3)
MONOCYTES RELATIVE PERCENT: 5.7 %
NEUTROPHILS ABSOLUTE: 4.6 K/UL (ref 1.7–7.7)
NEUTROPHILS RELATIVE PERCENT: 87.7 %
O2 SAT, ARTERIAL: 94.6 %
O2 THERAPY: ABNORMAL
PCO2 ARTERIAL: 42 MMHG (ref 35–45)
PDW BLD-RTO: 18.6 % (ref 12.4–15.4)
PERFORMED ON: ABNORMAL
PH ARTERIAL: 7.39 (ref 7.35–7.45)
PLATELET # BLD: 123 K/UL (ref 135–450)
PMV BLD AUTO: 9.1 FL (ref 5–10.5)
PO2 ARTERIAL: 73.4 MMHG (ref 75–108)
POTASSIUM REFLEX MAGNESIUM: 3.5 MMOL/L (ref 3.5–5.1)
RBC # BLD: 3.85 M/UL (ref 4–5.2)
SODIUM BLD-SCNC: 139 MMOL/L (ref 136–145)
TCO2 ARTERIAL: 26.1 MMOL/L
TOTAL PROTEIN: 5.6 G/DL (ref 6.4–8.2)
WBC # BLD: 5.3 K/UL (ref 4–11)

## 2021-09-30 PROCEDURE — 94003 VENT MGMT INPAT SUBQ DAY: CPT

## 2021-09-30 PROCEDURE — 87077 CULTURE AEROBIC IDENTIFY: CPT

## 2021-09-30 PROCEDURE — 6370000000 HC RX 637 (ALT 250 FOR IP): Performed by: INTERNAL MEDICINE

## 2021-09-30 PROCEDURE — 6370000000 HC RX 637 (ALT 250 FOR IP): Performed by: HOSPITALIST

## 2021-09-30 PROCEDURE — 2700000000 HC OXYGEN THERAPY PER DAY

## 2021-09-30 PROCEDURE — C9113 INJ PANTOPRAZOLE SODIUM, VIA: HCPCS | Performed by: INTERNAL MEDICINE

## 2021-09-30 PROCEDURE — 87205 SMEAR GRAM STAIN: CPT

## 2021-09-30 PROCEDURE — 6360000002 HC RX W HCPCS: Performed by: INTERNAL MEDICINE

## 2021-09-30 PROCEDURE — 94640 AIRWAY INHALATION TREATMENT: CPT

## 2021-09-30 PROCEDURE — 99291 CRITICAL CARE FIRST HOUR: CPT | Performed by: INTERNAL MEDICINE

## 2021-09-30 PROCEDURE — 99233 SBSQ HOSP IP/OBS HIGH 50: CPT | Performed by: INTERNAL MEDICINE

## 2021-09-30 PROCEDURE — 2000000000 HC ICU R&B

## 2021-09-30 PROCEDURE — 94761 N-INVAS EAR/PLS OXIMETRY MLT: CPT

## 2021-09-30 PROCEDURE — 83735 ASSAY OF MAGNESIUM: CPT

## 2021-09-30 PROCEDURE — 2580000003 HC RX 258: Performed by: HOSPITALIST

## 2021-09-30 PROCEDURE — 2500000003 HC RX 250 WO HCPCS: Performed by: INTERNAL MEDICINE

## 2021-09-30 PROCEDURE — 89220 SPUTUM SPECIMEN COLLECTION: CPT

## 2021-09-30 PROCEDURE — 85025 COMPLETE CBC W/AUTO DIFF WBC: CPT

## 2021-09-30 PROCEDURE — 2580000003 HC RX 258: Performed by: INTERNAL MEDICINE

## 2021-09-30 PROCEDURE — 82803 BLOOD GASES ANY COMBINATION: CPT

## 2021-09-30 PROCEDURE — 87186 SC STD MICRODIL/AGAR DIL: CPT

## 2021-09-30 PROCEDURE — 87070 CULTURE OTHR SPECIMN AEROBIC: CPT

## 2021-09-30 PROCEDURE — 71045 X-RAY EXAM CHEST 1 VIEW: CPT

## 2021-09-30 PROCEDURE — 80053 COMPREHEN METABOLIC PANEL: CPT

## 2021-09-30 RX ADMIN — ENOXAPARIN SODIUM 30 MG: 30 INJECTION SUBCUTANEOUS at 08:44

## 2021-09-30 RX ADMIN — INSULIN LISPRO 1 UNITS: 100 INJECTION, SOLUTION INTRAVENOUS; SUBCUTANEOUS at 12:54

## 2021-09-30 RX ADMIN — MUPIROCIN: 20 OINTMENT TOPICAL at 20:11

## 2021-09-30 RX ADMIN — ATORVASTATIN CALCIUM 40 MG: 40 TABLET, FILM COATED ORAL at 08:44

## 2021-09-30 RX ADMIN — SODIUM CHLORIDE, PRESERVATIVE FREE 10 ML: 5 INJECTION INTRAVENOUS at 08:49

## 2021-09-30 RX ADMIN — DEXTROSE MONOHYDRATE 500 MG: 50 INJECTION, SOLUTION INTRAVENOUS at 11:30

## 2021-09-30 RX ADMIN — MUPIROCIN: 20 OINTMENT TOPICAL at 08:49

## 2021-09-30 RX ADMIN — PROPOFOL 50 MCG/KG/MIN: 10 INJECTION, EMULSION INTRAVENOUS at 18:45

## 2021-09-30 RX ADMIN — LEVOTHYROXINE SODIUM 100 MCG: 100 TABLET ORAL at 08:44

## 2021-09-30 RX ADMIN — PROPOFOL 50 MCG/KG/MIN: 10 INJECTION, EMULSION INTRAVENOUS at 22:05

## 2021-09-30 RX ADMIN — IPRATROPIUM BROMIDE AND ALBUTEROL SULFATE 1 AMPULE: .5; 2.5 SOLUTION RESPIRATORY (INHALATION) at 19:39

## 2021-09-30 RX ADMIN — IPRATROPIUM BROMIDE AND ALBUTEROL SULFATE 1 AMPULE: .5; 2.5 SOLUTION RESPIRATORY (INHALATION) at 10:18

## 2021-09-30 RX ADMIN — INSULIN LISPRO 1 UNITS: 100 INJECTION, SOLUTION INTRAVENOUS; SUBCUTANEOUS at 04:49

## 2021-09-30 RX ADMIN — MIDAZOLAM HYDROCHLORIDE 4 MG/HR: 5 INJECTION, SOLUTION INTRAMUSCULAR; INTRAVENOUS at 20:57

## 2021-09-30 RX ADMIN — POTASSIUM BICARBONATE 40 MEQ: 391 TABLET, EFFERVESCENT ORAL at 12:48

## 2021-09-30 RX ADMIN — CEFTRIAXONE 2000 MG: 2 INJECTION, POWDER, FOR SOLUTION INTRAMUSCULAR; INTRAVENOUS at 13:01

## 2021-09-30 RX ADMIN — PROPOFOL 50 MCG/KG/MIN: 10 INJECTION, EMULSION INTRAVENOUS at 03:17

## 2021-09-30 RX ADMIN — IPRATROPIUM BROMIDE AND ALBUTEROL SULFATE 1 AMPULE: .5; 2.5 SOLUTION RESPIRATORY (INHALATION) at 03:23

## 2021-09-30 RX ADMIN — INSULIN LISPRO 2 UNITS: 100 INJECTION, SOLUTION INTRAVENOUS; SUBCUTANEOUS at 16:26

## 2021-09-30 RX ADMIN — SODIUM CHLORIDE, PRESERVATIVE FREE 10 ML: 5 INJECTION INTRAVENOUS at 08:50

## 2021-09-30 RX ADMIN — PROPOFOL 50 MCG/KG/MIN: 10 INJECTION, EMULSION INTRAVENOUS at 10:24

## 2021-09-30 RX ADMIN — DEXAMETHASONE SODIUM PHOSPHATE 6 MG: 10 INJECTION INTRAMUSCULAR; INTRAVENOUS at 12:49

## 2021-09-30 RX ADMIN — CHLORHEXIDINE GLUCONATE 0.12% ORAL RINSE 15 ML: 1.2 LIQUID ORAL at 20:11

## 2021-09-30 RX ADMIN — Medication 200 MCG/HR: at 22:59

## 2021-09-30 RX ADMIN — INSULIN LISPRO 1 UNITS: 100 INJECTION, SOLUTION INTRAVENOUS; SUBCUTANEOUS at 08:45

## 2021-09-30 RX ADMIN — IPRATROPIUM BROMIDE AND ALBUTEROL SULFATE 1 AMPULE: .5; 2.5 SOLUTION RESPIRATORY (INHALATION) at 23:54

## 2021-09-30 RX ADMIN — Medication 200 MCG/HR: at 04:47

## 2021-09-30 RX ADMIN — INSULIN LISPRO 1 UNITS: 100 INJECTION, SOLUTION INTRAVENOUS; SUBCUTANEOUS at 20:12

## 2021-09-30 RX ADMIN — IPRATROPIUM BROMIDE AND ALBUTEROL SULFATE 1 AMPULE: .5; 2.5 SOLUTION RESPIRATORY (INHALATION) at 15:08

## 2021-09-30 RX ADMIN — CHLORHEXIDINE GLUCONATE 0.12% ORAL RINSE 15 ML: 1.2 LIQUID ORAL at 08:48

## 2021-09-30 RX ADMIN — Medication 200 MCG/HR: at 16:48

## 2021-09-30 RX ADMIN — SODIUM CHLORIDE, PRESERVATIVE FREE 10 ML: 5 INJECTION INTRAVENOUS at 20:10

## 2021-09-30 RX ADMIN — PROPOFOL 50 MCG/KG/MIN: 10 INJECTION, EMULSION INTRAVENOUS at 14:26

## 2021-09-30 RX ADMIN — SODIUM CHLORIDE, PRESERVATIVE FREE 10 ML: 5 INJECTION INTRAVENOUS at 20:12

## 2021-09-30 RX ADMIN — ENOXAPARIN SODIUM 30 MG: 30 INJECTION SUBCUTANEOUS at 20:11

## 2021-09-30 RX ADMIN — IPRATROPIUM BROMIDE AND ALBUTEROL SULFATE 1 AMPULE: .5; 2.5 SOLUTION RESPIRATORY (INHALATION) at 07:46

## 2021-09-30 RX ADMIN — SERTRALINE HYDROCHLORIDE 50 MG: 50 TABLET ORAL at 08:44

## 2021-09-30 RX ADMIN — Medication 200 MCG/HR: at 11:30

## 2021-09-30 RX ADMIN — PANTOPRAZOLE SODIUM 40 MG: 40 INJECTION, POWDER, FOR SOLUTION INTRAVENOUS at 08:44

## 2021-09-30 RX ADMIN — ASPIRIN 81 MG: 81 TABLET, CHEWABLE ORAL at 08:44

## 2021-09-30 ASSESSMENT — PAIN SCALES - GENERAL
PAINLEVEL_OUTOF10: 0

## 2021-09-30 ASSESSMENT — PULMONARY FUNCTION TESTS
PIF_VALUE: 27
PIF_VALUE: 36
PIF_VALUE: 22
PIF_VALUE: 23
PIF_VALUE: 21
PIF_VALUE: 26
PIF_VALUE: 26

## 2021-09-30 NOTE — PROGRESS NOTES
09/30/21 0325   Vent Information   Vent Type 980   Vent Mode AC/VC   Vt Ordered 400 mL   Rate Set 28 bmp   Peak Flow 60 L/min   FiO2  50 %   SpO2 95 %   SpO2/FiO2 ratio 190   Sensitivity 3   PEEP/CPAP 16   Humidification Source Heated wire   Humidification Temp 37   Humidification Temp Measured 36.4   Circuit Condensation Drained   Vent Patient Data   Peak Inspiratory Pressure 36 cmH2O   Mean Airway Pressure 22 cmH20   Rate Measured 28 br/min   Vt Exhaled 406 mL   Minute Volume 11.3 Liters   I:E Ratio 1:2.0   Plateau Pressure 32 QNT33   Cough/Sputum   Sputum How Obtained Endotracheal;Suctioned   Cough Productive   Sputum Amount Small   Sputum Color Creamy   Tenacity Thin   Spontaneous Breathing Trial (SBT) RT Doc   Pulse 67   Breath Sounds   Right Upper Lobe Diminished   Right Middle Lobe Diminished   Right Lower Lobe Diminished   Left Upper Lobe Diminished   Left Lower Lobe Diminished   Additional Respiratory  Assessments   Resp 28   Position Prone   Alarm Settings   High Pressure Alarm 45 cmH2O   Low Minute Volume Alarm 4 L/min   High Respiratory Rate 40 br/min   Low Exhaled Vt  250 mL   Patient Observation   Observations ETT SIZE 8.0, SECURED AT 25 LIP LINE. AMBU BAG AT HEAD OF BED. WATER GOOD. ETT (adult)   No Placement Date or Time found.    Tube Size: 8 mm  Secured at: 24 cm  Measured From: Lips   Secured at 25 cm   Measured From [address] By [employer] tape   Site Condition Dry

## 2021-09-30 NOTE — PROGRESS NOTES
09/29/21 2313   Vent Information   Vent Type 980   Vent Mode AC/VC   Vt Ordered 400 mL   Rate Set 28 bmp   Peak Flow 60 L/min   FiO2  60 %   SpO2 96 %   SpO2/FiO2 ratio 160   Sensitivity 3   PEEP/CPAP 16   Humidification Source Heated wire   Humidification Temp 37   Humidification Temp Measured 37   Circuit Condensation Drained   Vent Patient Data   Peak Inspiratory Pressure 33 cmH2O   Mean Airway Pressure 23 cmH20   Rate Measured 28 br/min   Vt Exhaled 404 mL   Minute Volume 11.3 Liters   I:E Ratio 1:2.0   Plateau Pressure 33 [id number]   Cough/Sputum   Sputum How Obtained Endotracheal;Suctioned   Spontaneous Breathing Trial (SBT) RT Doc   Pulse 79   Additional Respiratory  Assessments   Resp 28

## 2021-09-30 NOTE — PROGRESS NOTES
Blood pressure (!) 116/51, pulse 67, temperature 98 °F (36.7 °C), temperature source Axillary, resp. rate 28, height 5' 9\" (1.753 m), weight 180 lb 14.4 oz (82.1 kg), SpO2 92 %, not currently breastfeeding. Patient currently intubated with ETT at 24LL. Vent settings are 28/400/+50%/16. Patient tolerating vent well at this time. Propofol infusing at 50 mcg/kg/min. Fentanyl infusing at 200 mcg//h. Versed infusing at 2 mg/h. OG intact. Tube feed running at goal per order at 20 ml/H with no difficulties at this time. Gray catheter intact draining yellow clear urine. Patient continues to be NSR on monitor.      Electronically signed by Berenice Bernal RN on 9/30/2021 at 5:23 AM

## 2021-09-30 NOTE — PROGRESS NOTES
ICU Progress Note    Admit Date:  9/26/2021    Admitted with COVID-19 pneumonia,  acute hypoxic respiratory failure. Patient not vaccinated  Progressive significant worsening hypoxemia overnight. Patient was on 2 L of oxygen on admission-> switched to high flow oxygen per Vapotherm with additional 100% nonrebreather on 9/28/2021-> transferred to ICU. Worsening hypoxemia overnight. Intubated and placed on mechanical ventilation early AM on  9/29/2021. Placed in prone positioning    Subjective:  Ms. Marika Licea seen. Was proned overnight. Placed in supine position during the day today, and back to prone positioning now. .  Sats improved. On FiO2 80%-> 55%. PEEP down from 16-> 10    Weaned off of pressors    Objective:   BP (!) 106/53   Pulse 71   Temp 98.3 °F (36.8 °C) (Oral)   Resp 28   Ht 5' 9\" (1.753 m)   Wt 180 lb 14.4 oz (82.1 kg)   SpO2 91%   BMI 26.71 kg/m²       Intake/Output Summary (Last 24 hours) at 9/30/2021 1643  Last data filed at 9/30/2021 1429  Gross per 24 hour   Intake 3496.97 ml   Output 1385 ml   Net 2111.97 ml         Physical Exam:  General: Intubated, sedated on mechanical ventilation   Prone positioning  Skin:  Warm and dry  Neck:  JVD absent. Neck supple  Chest: diminished breath sounds, improved air entry. No wheezes rales or rhonchi  Cardiovascular:  RRR ,S1S2 normal  Abdomen:  Soft, non tender, non distended, BS +  Extremities:  No edema. Intact peripheral pulses.  Brisk cap refill, < 2 secs  Neuro: non focal      Medications:   Scheduled Meds:   chlorhexidine  15 mL Mouth/Throat BID    pantoprazole  40 mg IntraVENous Daily    ipratropium-albuterol  1 ampule Inhalation Q4H    insulin lispro  0-6 Units SubCUTAneous Q4H    levothyroxine  100 mcg Oral Daily    enoxaparin  30 mg SubCUTAneous BID    dexamethasone  6 mg IntraVENous Q24H    cefTRIAXone (ROCEPHIN) IV  2,000 mg IntraVENous Q24H    azithromycin  500 mg IntraVENous Q24H    mupirocin   Nasal BID    lidocaine 1 % injection  5 mL IntraDERmal Once    sodium chloride flush  5-40 mL IntraVENous 2 times per day    aspirin  81 mg Oral Daily    [Held by provider] gabapentin  800 mg Oral TID    [Held by provider] rOPINIRole  0.5 mg Oral TID    sertraline  50 mg Oral Daily    atorvastatin  40 mg Oral Daily    sodium chloride flush  5-40 mL IntraVENous 2 times per day    [Held by provider] oxyCODONE  20 mg Oral 2 times per day    remdesivir IVPB  100 mg IntraVENous Q24H       Continuous Infusions:   propofol 50 mcg/kg/min (09/30/21 1426)    fentaNYL 200 mcg/hr (09/30/21 1130)    norepinephrine Stopped (09/29/21 1131)    cisatracurium (NIMBEX) infusion Stopped (09/29/21 1400)    midazolam 2 mg/hr (09/29/21 1718)    dexmedetomidine HCl in NaCl Stopped (09/29/21 0110)    sodium chloride      dextrose      sodium chloride         Data:  CBC:   Recent Labs     09/28/21  0521 09/29/21  0410 09/30/21  0450   WBC 13.4* 16.4* 5.3   RBC 4.35 4.49 3.85*   HGB 10.6* 11.1* 10.2*   HCT 34.5* 35.6* 30.7*   MCV 79.3* 79.3* 79.7*   RDW 18.8* 18.9* 18.6*    187 123*     BMP:   Recent Labs     09/28/21  0521 09/29/21  0410 09/30/21  0450   * 139 139   K 4.0 4.3 3.5   CL 98* 102 102   CO2 22 25 24   BUN 12 22* 23*   CREATININE 0.8 0.6 <0.5*     BNP: No results for input(s): BNP in the last 72 hours. PT/INR:   Recent Labs     09/28/21  1205   PROTIME 16.9*   INR 1.47*     APTT: No results for input(s): APTT in the last 72 hours. CARDIAC ENZYMES:   No results for input(s): CKMB, CKMBINDEX, TROPONINI in the last 72 hours.     Invalid input(s): CKTOTAL;3  FASTING LIPID PANEL:  Lab Results   Component Value Date    CHOL 156 10/18/2018    HDL 39 (L) 10/18/2018    TRIG 242 (H) 10/18/2018     LIVER PROFILE:   Recent Labs     09/28/21  0521 09/29/21  0410 09/30/21  0450   AST 58* 66* <5*   ALT 15 14 <5*   BILITOT 0.6 0.3 <0.2   ALKPHOS 208* 195* 148*           CULTURES  COVID 19, NAAT: Detected    EKG:   Normal sinus hypertension    DM2 (diabetes mellitus, type 2) (HCC)    Dehydration    Hypoxia    Acute respiratory failure with hypoxia ([facility] [address]    COVID-19    Pneumonia due to COVID-19 virus    CHARLY (acute kidney injury) ([facility] [address]    Uncontrolled hypertension    Fever    Hypotension    Mild protein-calorie malnutrition ([facility] [address]  Resolved Problems:    * No resolved hospital problems. *      Plan:    #COVID-19 pneumonia  #Acute hypoxic respiratory failure   -Unvaccinated patient  - she presented with cough, fever, dyspnea in the setting of household contact testing + COVID 19  - Her Covid testing came back positive on admission  - Initially on 2 L of oxygen on presentation. She started desaturating quickly . worsening hypoxemia requiring high flow oxygen. - Pulmonology consulted  -placed on high flow oxygen per Vapotherm 40 L,FiO2 100% ,with additional nonrebreather . Transferred to ICU on 9/28. Intubated early AM  9/29/2021, placed on mechanical ventilation, prone positioning. Continue to prone overnight  Sats improving , FiO2 down to 55% , PEEP is down from 16-10 .  -  on Remdesivir , continue day #4  - on Decadron , day # 4  - given 1 dose of Tocilizumab  -Lovenox twice daily    #Hypokalemia  - replaced     #Dehydration  -  Improved with IVF  Creatinine normal     #CAD  - cont ASA, statin  - EKG with inferior T wave abnormality. She denies CP.   Trop neg   -telemetry monitoring    #History of tracheobronchomalacia  Has had surgical treatment for this     #HTN  -Blood pressure low now,  required pressors     #Chronic pain   History of hip fracture  - cont home oxycodone BID and percocet   - cont gabapentin and requip     #DM2  - use ssi      #Hypothyroidism  - cont synthroid         DVT Prophylaxis: Lovenox   Diet NPO  ADULT TUBE FEEDING; Orogastric; Peptide Based; Continuous; 20; Yes; 15; Q 4 hours; 45; 30; Q 3 hours; Protein; one proteinex P2Go once daily via feeding tube  Code Status: Full Code        Faviola Salgado MD, MD 9/30/2021 4:43 PM

## 2021-09-30 NOTE — PROGRESS NOTES
Prone pt with no adverse events noted, pt tolerated well, continue to wean vent as tolerated, all lines and tubes in place.

## 2021-09-30 NOTE — PROGRESS NOTES
Blood pressure (!) 123/57, pulse 86, temperature 99.4 °F (37.4 °C), temperature source Axillary, resp. rate 25, height 5' 9\" (1.753 m), weight 187 lb 6.3 oz (85 kg), SpO2 96 %, not currently breastfeeding. Patient currently intubated with ETT at 24LL. Vent settings are 28/400/+70%/16. Patient is tolerating vent well at this time. Lung sounds diminished. Abdomen soft non tender, BS + x 4 quads. No edema noted at this time. Patient is NSR on monitor. OG in place. Gray catheter intact draining yellow clear urine. Propofol infusing at 50 mcg/kg/min. Fentanyl infusing at 200 mcg//h. Versed infusing at 2 mg/h. BUE soft wrist restraints in place due to attempts of pulling at ETT and lines. No signs of injury noted and PROM performed.      Electronically signed by Jelani Chan RN on 9/29/2021 at 8:14 PM

## 2021-09-30 NOTE — PROGRESS NOTES
Pt was turned supine pt tolerated turn well with no adverse events noted, all line remained intact, as well as ETT.

## 2021-09-30 NOTE — PROGRESS NOTES
09/29/21 2313   Vent Information   Vent Type 980   Vent Mode AC/VC   Vt Ordered 400 mL   Rate Set 28 bmp   Peak Flow 60 L/min   FiO2  60 %   SpO2 96 %   SpO2/FiO2 ratio 160   Sensitivity 3   PEEP/CPAP 16   Humidification Source Heated wire   Humidification Temp 37   Humidification Temp Measured 37   Circuit Condensation Drained   Vent Patient Data   Peak Inspiratory Pressure 33 cmH2O   Mean Airway Pressure 23 cmH20   Rate Measured 28 br/min   Vt Exhaled 404 mL   Minute Volume 11.3 Liters   I:E Ratio 1:2.0   Plateau Pressure 33 [id number]   Cough/Sputum   Sputum How Obtained Endotracheal;Suctioned   Cough Productive   Sputum Amount Small   Sputum Color Creamy   Tenacity Thin   Spontaneous Breathing Trial (SBT) RT Doc   Pulse 79   Breath Sounds   Right Upper Lobe Diminished   Right Middle Lobe Diminished   Right Lower Lobe Diminished   Left Upper Lobe Diminished   Left Lower Lobe Diminished   Additional Respiratory  Assessments   Resp 28   Position Prone   Alarm Settings   High Pressure Alarm 45 cmH2O   Low Minute Volume Alarm 4 L/min   High Respiratory Rate 40 br/min   Low Exhaled Vt  250 mL   Patient Observation   Observations ETT SIZE 8.0, SECURED AT 25 LIP LINE. AMBU BAG AT HEAD OF BED. WATER GOOD. ETT (adult)   No Placement Date or Time found.    Tube Size: 8 mm  Secured at: 24 cm  Measured From: Lips   Secured at 25 cm   Measured From [address] By [employer] tape   Site Condition Dry

## 2021-09-30 NOTE — PLAN OF CARE
Nutrition Problem #1: Inadequate oral intake  Intervention: Food and/or Nutrient Delivery: Continue NPO, Continue Current Tube Feeding  Nutritional Goals: patient will tolerate Vital AF 1.2 at goal rate of 45 ml/hr x 20 hours without GI distress, without s/s of aspiration, and without additional lab/fluid disturbances

## 2021-09-30 NOTE — PROGRESS NOTES
09/29/21 2313   Vent Information   Vent Type 980   Vent Mode AC/VC   Vt Ordered 400 mL   Rate Set 28 bmp   Peak Flow 60 L/min   FiO2  60 %   SpO2 96 %   SpO2/FiO2 ratio 160   Sensitivity 3   PEEP/CPAP 16   Humidification Source Heated wire   Humidification Temp 37   Humidification Temp Measured 37   Circuit Condensation Drained   Vent Patient Data   Peak Inspiratory Pressure 33 cmH2O   Mean Airway Pressure 23 cmH20   Rate Measured 28 br/min   Vt Exhaled 404 mL   Minute Volume 11.3 Liters   I:E Ratio 1:2.0   Plateau Pressure 33 [id number]   Cough/Sputum   Sputum How Obtained Endotracheal;Suctioned   Cough Productive   Sputum Amount Small   Sputum Color Creamy   Tenacity Thin   Spontaneous Breathing Trial (SBT) RT Doc   Pulse 79   Breath Sounds   Right Upper Lobe Diminished   Right Middle Lobe Diminished   Right Lower Lobe Diminished   Left Upper Lobe Diminished   Left Lower Lobe Diminished   Additional Respiratory  Assessments   Resp 28   Position Prone   Alarm Settings   High Pressure Alarm 45 cmH2O   Low Minute Volume Alarm 4 L/min   High Respiratory Rate 45 br/min   Low Exhaled Vt  250 mL   Patient Observation   Observations ETT SIZE 8.0, SECURED AT 25 LIP LINE. AMBU BAG AT HEAD OF BED. WATER GOOD. ETT (adult)   No Placement Date or Time found.    Tube Size: 8 mm  Secured at: 24 cm  Measured From: Lips   Secured at 24 cm   Measured From [address] [employer] tape   Site Condition Dry

## 2021-09-30 NOTE — PROGRESS NOTES
· Ideal Body Weight: 145 lbs; % Ideal Body Weight 124.8 %   · BMI: 26.7  · BMI Categories: Overweight (BMI 25.0-29. 9)       Nutrition Diagnosis:   · Inadequate oral intake related to impaired respiratory function, inadequate protein-energy intake, increase demand for energy/nutrients as evidenced by NPO or clear liquid status due to medical condition, intubation, nutrition support - enteral nutrition, lab values      Nutrition Interventions:   Food and/or Nutrient Delivery:  Continue NPO, Continue Current Tube Feeding  Nutrition Education/Counseling:  No recommendation at this time   Coordination of Nutrition Care:  Continue to monitor while inpatient, Interdisciplinary Rounds    Goals:  patient will tolerate Vital AF 1.2 at goal rate of 45 ml/hr x 20 hours without GI distress, without s/s of aspiration, and without additional lab/fluid disturbances       Nutrition Monitoring and Evaluation:   Behavioral-Environmental Outcomes:  None Identified   Food/Nutrient Intake Outcomes:  Enteral Nutrition Intake/Tolerance  Physical Signs/Symptoms Outcomes:  Biochemical Data, GI Status, Hemodynamic Status, Skin     Discharge Planning:     Too soon to determine     Electronically signed by Sudheer Franco RD, LD on 9/30/21 at 3:19 PM EDT    Contact: [phone]

## 2021-09-30 NOTE — PROGRESS NOTES
Pulmonary & Critical Care Medicine ICU Progress Note    CC: Respiratory failure    Events of Last 24 hours:   Propofol 50 mcg/kg/min   Fentanyl 200 mcg/hr   Versed 2 mg/hr   Off Nimbex   NS off   Levophed off   PEEP 14  FiO2 60%   Plateau pressure 26  PaO2/FiO2 86  Proned overnight         Vascular lines: IV: PICC line     MV:   Vent Mode: AC/VC Rate Set: 28 bmp/Vt Ordered: 400 mL/ /FiO2 : 50 %  Recent Labs     21  1320 21  0458   PHART 7.362 7.390   ILS3XMW 48.1* 42.0   PO2ART 100.9 73.4*       IV:   propofol 50 mcg/kg/min (21 031)    fentaNYL 200 mcg/hr (21 0447)    norepinephrine Stopped (21 1131)    cisatracurium (NIMBEX) infusion Stopped (21 1400)    midazolam 2 mg/hr (21 1718)    dexmedetomidine HCl in NaCl Stopped (21 0110)    sodium chloride      dextrose      sodium chloride         Vitals:  Blood pressure (!) 109/51, pulse 62, temperature 98 °F (36.7 °C), temperature source Axillary, resp. rate 28, height 5' 9\" (1.753 m), weight 180 lb 14.4 oz (82.1 kg), SpO2 93 %, not currently breastfeeding. on AC 14  Temp  Av.1 °F (36.7 °C)  Min: 96.8 °F (36 °C)  Max: 99.4 °F (37.4 °C)    Intake/Output Summary (Last 24 hours) at 2021 0718  Last data filed at 2021 0518  Gross per 24 hour   Intake 2136 ml   Output 1730 ml   Net 406 ml     PE:  EXAM:  General: ill appearing. Intubated sedated. Eyes: PERRL. No sclera icterus. No conjunctival injection. ENT: No discharge. Pharynx clear. ET tube in place  Neck: Trachea midline. Normal thyroid. Resp: No accessory muscle use. Few crackles. No wheezing. Few rhonchi. No dullness on percussion. CV: Regular rate. Regular rhythm. No mumur or rub. No edema. GI: Non-tender. Non-distended. No masses. No organomegaly. Normal bowel sounds. No hernia. Skin: Warm and dry. No nodule on exposed extremities. No rash on exposed extremities. Lymph: No cervical LAD. No supraclavicular LAD.    M/S: No cyanosis. No joint deformity. No clubbing. Neuro: Intubated sedated. Responsive to painful stimuli  Psych: Noncommunicative unable to obtain      Scheduled Meds:   chlorhexidine  15 mL Mouth/Throat BID    pantoprazole  40 mg IntraVENous Daily    ipratropium-albuterol  1 ampule Inhalation Q4H    insulin lispro  0-6 Units SubCUTAneous Q4H    levothyroxine  100 mcg Oral Daily    enoxaparin  30 mg SubCUTAneous BID    dexamethasone  6 mg IntraVENous Q24H    cefTRIAXone (ROCEPHIN) IV  2,000 mg IntraVENous Q24H    azithromycin  500 mg IntraVENous Q24H    mupirocin   Nasal BID    lidocaine 1 % injection  5 mL IntraDERmal Once    sodium chloride flush  5-40 mL IntraVENous 2 times per day    aspirin  81 mg Oral Daily    [Held by provider] gabapentin  800 mg Oral TID    [Held by provider] rOPINIRole  0.5 mg Oral TID    sertraline  50 mg Oral Daily    atorvastatin  40 mg Oral Daily    sodium chloride flush  5-40 mL IntraVENous 2 times per day    [Held by provider] oxyCODONE  20 mg Oral 2 times per day    remdesivir IVPB  100 mg IntraVENous Q24H       Data:  CBC:   Recent Labs     09/28/21  0521 09/29/21  0410 09/30/21  0450   WBC 13.4* 16.4* 5.3   HGB 10.6* 11.1* 10.2*   HCT 34.5* 35.6* 30.7*   MCV 79.3* 79.3* 79.7*    187 123*     BMP:   Recent Labs     09/28/21  0521 09/29/21  0410 09/30/21  0450   * 139 139   K 4.0 4.3 3.5   CL 98* 102 102   CO2 22 25 24   BUN 12 22* 23*   CREATININE 0.8 0.6 <0.5*     LIVER PROFILE:   Recent Labs     09/28/21  0521 09/29/21  0410 09/30/21  0450   AST 58* 66* <5*   ALT 15 14 <5*   BILITOT 0.6 0.3 <0.2   ALKPHOS 208* 195* 148*       Microbiology:  9/27 COVID-19 detected    Imaging:  Chest x-ray 9/30 imaging reviewed by me and showed  Satisfactory ETT position  Satisfactory PICC line position   Bilateral ASD - same     CTPA 9/27 imaging was reviewed by me and showed   No evidence of pulmonary embolism.    Scattered peripheral opacities in the left lung and more consolidative area   in the right lung concerning for pneumonia.  Previous right thoracotomy and   upper lobectomy. One mildly enlarged right paratracheal lymph node is present but no priors   for comparison.  Nonenlarged but prominent lymph nodes in the upper abdomen   and juxta diaphragmatic region        ASSESSMENT:  · Acute hypoxemic respiratory failure  · COVID-19 viral pneumonia  · Hypotension-probably sedation related  · Transaminitis  · Fever T-max 101.5  · Acute kidney injury  · CAD  · Chronic pain syndrome - narcotics and Neurontin         PLAN:  Mechanical ventilation as per my orders. The ventilator was adjusted by me at the bedside for unstable, life threatening respiratory failure  Follow ABG and chest x-ray while on the ventilator  IV Propofol for sedation, target RASS -2, with daily spontaneous awakening trial   Follow TG   Nimbex off   Prone ventilation again today   Fentanyl and Versed PRN, gtt as needed  Head of bed 30 degrees or higher at all times  Daily spontaneous breathing trial once PEEP less than 8, FiO2 less than 55%  Droplet plus isolation   IV antibiotics to include Ceftriaxone/Zithromax D#3  IV Levophed to keep MAP > 65 mmHg or SBP>90  Follow-up cultures   Dexamethasone 6 mg IV D#4  Remdesivir day #4. Monitor LFTs, renal and CBC daily while on medication. Post 1 dose of Tocilizumab  Hold Norvasc and losartan  Hold Narcotics Requip and Neurontin    Lovenox BID   MRSA prophylaxis: Bactroban  Patient is full code           Total critical care time caring for this patient with life threatening, unstable organ failure, including direct patient contact, management of life support systems, review of data including imaging and labs, discussions with other team members and physicians is 32 minutes, excluding procedures.

## 2021-10-01 ENCOUNTER — APPOINTMENT (OUTPATIENT)
Dept: GENERAL RADIOLOGY | Age: 66
DRG: 004 | End: 2021-10-01
Payer: MEDICARE

## 2021-10-01 LAB
A/G RATIO: 0.6 (ref 1.1–2.2)
ALBUMIN SERPL-MCNC: 2.4 G/DL (ref 3.4–5)
ALP BLD-CCNC: 169 U/L (ref 40–129)
ALT SERPL-CCNC: <5 U/L (ref 10–40)
ANION GAP SERPL CALCULATED.3IONS-SCNC: 11 MMOL/L (ref 3–16)
AST SERPL-CCNC: 58 U/L (ref 15–37)
BASE EXCESS ARTERIAL: 3.8 MMOL/L (ref -3–3)
BASOPHILS ABSOLUTE: 0 K/UL (ref 0–0.2)
BASOPHILS RELATIVE PERCENT: 0.1 %
BILIRUB SERPL-MCNC: <0.2 MG/DL (ref 0–1)
BUN BLDV-MCNC: 21 MG/DL (ref 7–20)
CALCIUM SERPL-MCNC: 8.4 MG/DL (ref 8.3–10.6)
CARBOXYHEMOGLOBIN ARTERIAL: 0.3 % (ref 0–1.5)
CHLORIDE BLD-SCNC: 103 MMOL/L (ref 99–110)
CO2: 25 MMOL/L (ref 21–32)
CREAT SERPL-MCNC: <0.5 MG/DL (ref 0.6–1.2)
EOSINOPHILS ABSOLUTE: 0 K/UL (ref 0–0.6)
EOSINOPHILS RELATIVE PERCENT: 0.1 %
GFR AFRICAN AMERICAN: >60
GFR NON-AFRICAN AMERICAN: >60
GLOBULIN: 3.9 G/DL
GLUCOSE BLD-MCNC: 139 MG/DL (ref 70–99)
GLUCOSE BLD-MCNC: 171 MG/DL (ref 70–99)
GLUCOSE BLD-MCNC: 198 MG/DL (ref 70–99)
GLUCOSE BLD-MCNC: 213 MG/DL (ref 70–99)
GLUCOSE BLD-MCNC: 218 MG/DL (ref 70–99)
GLUCOSE BLD-MCNC: 221 MG/DL (ref 70–99)
GLUCOSE BLD-MCNC: 233 MG/DL (ref 70–99)
HCO3 ARTERIAL: 28.6 MMOL/L (ref 21–29)
HCT VFR BLD CALC: 33.5 % (ref 36–48)
HEMOGLOBIN, ART, EXTENDED: 11.4 G/DL (ref 12–16)
HEMOGLOBIN: 10.9 G/DL (ref 12–16)
LYMPHOCYTES ABSOLUTE: 0.4 K/UL (ref 1–5.1)
LYMPHOCYTES RELATIVE PERCENT: 7.7 %
MCH RBC QN AUTO: 25.3 PG (ref 26–34)
MCHC RBC AUTO-ENTMCNC: 32.4 G/DL (ref 31–36)
MCV RBC AUTO: 78 FL (ref 80–100)
METHEMOGLOBIN ARTERIAL: 0.3 %
MONOCYTES ABSOLUTE: 0.4 K/UL (ref 0–1.3)
MONOCYTES RELATIVE PERCENT: 8.8 %
NEUTROPHILS ABSOLUTE: 4.1 K/UL (ref 1.7–7.7)
NEUTROPHILS RELATIVE PERCENT: 83.3 %
O2 SAT, ARTERIAL: 98.2 %
O2 THERAPY: ABNORMAL
PCO2 ARTERIAL: 43.6 MMHG (ref 35–45)
PDW BLD-RTO: 18.2 % (ref 12.4–15.4)
PERFORMED ON: ABNORMAL
PH ARTERIAL: 7.43 (ref 7.35–7.45)
PLATELET # BLD: 147 K/UL (ref 135–450)
PMV BLD AUTO: 9.1 FL (ref 5–10.5)
PO2 ARTERIAL: 112.5 MMHG (ref 75–108)
POTASSIUM REFLEX MAGNESIUM: 3.9 MMOL/L (ref 3.5–5.1)
RBC # BLD: 4.29 M/UL (ref 4–5.2)
SODIUM BLD-SCNC: 139 MMOL/L (ref 136–145)
TCO2 ARTERIAL: 29.9 MMOL/L
TOTAL PROTEIN: 6.3 G/DL (ref 6.4–8.2)
TRIGL SERPL-MCNC: 557 MG/DL (ref 0–150)
WBC # BLD: 4.9 K/UL (ref 4–11)

## 2021-10-01 PROCEDURE — 82803 BLOOD GASES ANY COMBINATION: CPT

## 2021-10-01 PROCEDURE — 2580000003 HC RX 258: Performed by: HOSPITALIST

## 2021-10-01 PROCEDURE — 2000000000 HC ICU R&B

## 2021-10-01 PROCEDURE — 6370000000 HC RX 637 (ALT 250 FOR IP): Performed by: INTERNAL MEDICINE

## 2021-10-01 PROCEDURE — 99291 CRITICAL CARE FIRST HOUR: CPT | Performed by: INTERNAL MEDICINE

## 2021-10-01 PROCEDURE — 2500000003 HC RX 250 WO HCPCS: Performed by: PHYSICIAN ASSISTANT

## 2021-10-01 PROCEDURE — 80053 COMPREHEN METABOLIC PANEL: CPT

## 2021-10-01 PROCEDURE — 6360000002 HC RX W HCPCS: Performed by: INTERNAL MEDICINE

## 2021-10-01 PROCEDURE — 2580000003 HC RX 258: Performed by: PHYSICIAN ASSISTANT

## 2021-10-01 PROCEDURE — 71045 X-RAY EXAM CHEST 1 VIEW: CPT

## 2021-10-01 PROCEDURE — C9113 INJ PANTOPRAZOLE SODIUM, VIA: HCPCS | Performed by: INTERNAL MEDICINE

## 2021-10-01 PROCEDURE — 94640 AIRWAY INHALATION TREATMENT: CPT

## 2021-10-01 PROCEDURE — 2500000003 HC RX 250 WO HCPCS: Performed by: INTERNAL MEDICINE

## 2021-10-01 PROCEDURE — 84478 ASSAY OF TRIGLYCERIDES: CPT

## 2021-10-01 PROCEDURE — 94761 N-INVAS EAR/PLS OXIMETRY MLT: CPT

## 2021-10-01 PROCEDURE — 6370000000 HC RX 637 (ALT 250 FOR IP): Performed by: HOSPITALIST

## 2021-10-01 PROCEDURE — 2700000000 HC OXYGEN THERAPY PER DAY

## 2021-10-01 PROCEDURE — 94003 VENT MGMT INPAT SUBQ DAY: CPT

## 2021-10-01 PROCEDURE — 85025 COMPLETE CBC W/AUTO DIFF WBC: CPT

## 2021-10-01 PROCEDURE — 99233 SBSQ HOSP IP/OBS HIGH 50: CPT | Performed by: INTERNAL MEDICINE

## 2021-10-01 PROCEDURE — 2580000003 HC RX 258: Performed by: INTERNAL MEDICINE

## 2021-10-01 RX ORDER — DIAZEPAM 10 MG/1
10 TABLET ORAL 3 TIMES DAILY
Status: DISCONTINUED | OUTPATIENT
Start: 2021-10-01 | End: 2021-10-04

## 2021-10-01 RX ADMIN — ASPIRIN 81 MG: 81 TABLET, CHEWABLE ORAL at 08:53

## 2021-10-01 RX ADMIN — MUPIROCIN: 20 OINTMENT TOPICAL at 21:24

## 2021-10-01 RX ADMIN — IPRATROPIUM BROMIDE AND ALBUTEROL SULFATE 1 AMPULE: .5; 2.5 SOLUTION RESPIRATORY (INHALATION) at 19:22

## 2021-10-01 RX ADMIN — IPRATROPIUM BROMIDE AND ALBUTEROL SULFATE 1 AMPULE: .5; 2.5 SOLUTION RESPIRATORY (INHALATION) at 11:55

## 2021-10-01 RX ADMIN — IPRATROPIUM BROMIDE AND ALBUTEROL SULFATE 1 AMPULE: .5; 2.5 SOLUTION RESPIRATORY (INHALATION) at 03:52

## 2021-10-01 RX ADMIN — Medication 200 MCG/HR: at 21:49

## 2021-10-01 RX ADMIN — DEXAMETHASONE SODIUM PHOSPHATE 6 MG: 10 INJECTION INTRAMUSCULAR; INTRAVENOUS at 13:49

## 2021-10-01 RX ADMIN — DIAZEPAM 10 MG: 10 TABLET ORAL at 21:24

## 2021-10-01 RX ADMIN — IPRATROPIUM BROMIDE AND ALBUTEROL SULFATE 1 AMPULE: .5; 2.5 SOLUTION RESPIRATORY (INHALATION) at 23:29

## 2021-10-01 RX ADMIN — PROPOFOL 50 MCG/KG/MIN: 10 INJECTION, EMULSION INTRAVENOUS at 21:26

## 2021-10-01 RX ADMIN — PROPOFOL 50 MCG/KG/MIN: 10 INJECTION, EMULSION INTRAVENOUS at 17:14

## 2021-10-01 RX ADMIN — INSULIN LISPRO 1 UNITS: 100 INJECTION, SOLUTION INTRAVENOUS; SUBCUTANEOUS at 08:54

## 2021-10-01 RX ADMIN — DEXTROSE MONOHYDRATE 500 MG: 50 INJECTION, SOLUTION INTRAVENOUS at 13:51

## 2021-10-01 RX ADMIN — IPRATROPIUM BROMIDE AND ALBUTEROL SULFATE 1 AMPULE: .5; 2.5 SOLUTION RESPIRATORY (INHALATION) at 07:55

## 2021-10-01 RX ADMIN — INSULIN LISPRO 2 UNITS: 100 INJECTION, SOLUTION INTRAVENOUS; SUBCUTANEOUS at 18:06

## 2021-10-01 RX ADMIN — PROPOFOL 50 MCG/KG/MIN: 10 INJECTION, EMULSION INTRAVENOUS at 06:25

## 2021-10-01 RX ADMIN — INSULIN LISPRO 2 UNITS: 100 INJECTION, SOLUTION INTRAVENOUS; SUBCUTANEOUS at 21:36

## 2021-10-01 RX ADMIN — CHLORHEXIDINE GLUCONATE 0.12% ORAL RINSE 15 ML: 1.2 LIQUID ORAL at 21:23

## 2021-10-01 RX ADMIN — MIDAZOLAM HYDROCHLORIDE 4 MG/HR: 5 INJECTION, SOLUTION INTRAMUSCULAR; INTRAVENOUS at 17:07

## 2021-10-01 RX ADMIN — PANTOPRAZOLE SODIUM 40 MG: 40 INJECTION, POWDER, FOR SOLUTION INTRAVENOUS at 08:53

## 2021-10-01 RX ADMIN — SODIUM CHLORIDE, PRESERVATIVE FREE 10 ML: 5 INJECTION INTRAVENOUS at 21:24

## 2021-10-01 RX ADMIN — PROPOFOL 50 MCG/KG/MIN: 10 INJECTION, EMULSION INTRAVENOUS at 08:53

## 2021-10-01 RX ADMIN — DIAZEPAM 10 MG: 10 TABLET ORAL at 14:14

## 2021-10-01 RX ADMIN — PROPOFOL 50 MCG/KG/MIN: 10 INJECTION, EMULSION INTRAVENOUS at 01:33

## 2021-10-01 RX ADMIN — SERTRALINE HYDROCHLORIDE 50 MG: 50 TABLET ORAL at 08:53

## 2021-10-01 RX ADMIN — ENOXAPARIN SODIUM 30 MG: 30 INJECTION SUBCUTANEOUS at 08:53

## 2021-10-01 RX ADMIN — LEVOTHYROXINE SODIUM 100 MCG: 100 TABLET ORAL at 06:24

## 2021-10-01 RX ADMIN — Medication 200 MCG/HR: at 11:21

## 2021-10-01 RX ADMIN — ENOXAPARIN SODIUM 30 MG: 30 INJECTION SUBCUTANEOUS at 21:24

## 2021-10-01 RX ADMIN — Medication 200 MCG/HR: at 05:55

## 2021-10-01 RX ADMIN — REMDESIVIR 100 MG: 100 INJECTION, POWDER, LYOPHILIZED, FOR SOLUTION INTRAVENOUS at 00:13

## 2021-10-01 RX ADMIN — IPRATROPIUM BROMIDE AND ALBUTEROL SULFATE 1 AMPULE: .5; 2.5 SOLUTION RESPIRATORY (INHALATION) at 14:51

## 2021-10-01 RX ADMIN — INSULIN LISPRO 1 UNITS: 100 INJECTION, SOLUTION INTRAVENOUS; SUBCUTANEOUS at 05:58

## 2021-10-01 RX ADMIN — INSULIN LISPRO 2 UNITS: 100 INJECTION, SOLUTION INTRAVENOUS; SUBCUTANEOUS at 23:34

## 2021-10-01 RX ADMIN — MIDAZOLAM HYDROCHLORIDE 4 MG/HR: 5 INJECTION, SOLUTION INTRAMUSCULAR; INTRAVENOUS at 21:27

## 2021-10-01 RX ADMIN — CEFTRIAXONE 2000 MG: 2 INJECTION, POWDER, FOR SOLUTION INTRAMUSCULAR; INTRAVENOUS at 13:48

## 2021-10-01 RX ADMIN — CHLORHEXIDINE GLUCONATE 0.12% ORAL RINSE 15 ML: 1.2 LIQUID ORAL at 13:50

## 2021-10-01 RX ADMIN — PROPOFOL 50 MCG/KG/MIN: 10 INJECTION, EMULSION INTRAVENOUS at 14:14

## 2021-10-01 RX ADMIN — INSULIN LISPRO 2 UNITS: 100 INJECTION, SOLUTION INTRAVENOUS; SUBCUTANEOUS at 00:08

## 2021-10-01 RX ADMIN — Medication 200 MCG/HR: at 16:03

## 2021-10-01 ASSESSMENT — PULMONARY FUNCTION TESTS
PIF_VALUE: 23
PIF_VALUE: 22
PIF_VALUE: 21
PIF_VALUE: 22
PIF_VALUE: 25
PIF_VALUE: 23
PIF_VALUE: 25
PIF_VALUE: 23

## 2021-10-01 ASSESSMENT — PAIN SCALES - GENERAL
PAINLEVEL_OUTOF10: 0

## 2021-10-01 NOTE — PROGRESS NOTES
10/01/21 1156   Vent Information   Vent Type 980   Vent Mode AC/VC   Vt Ordered 400 mL   Rate Set 28 bmp   Peak Flow 60 L/min   FiO2  50 %   SpO2 93 %   SpO2/FiO2 ratio 186   Sensitivity 3   PEEP/CPAP 8   Humidification Source Heated wire   Humidification Temp 37   Circuit Condensation Drained   Vent Patient Data   Peak Inspiratory Pressure 23 cmH2O   Mean Airway Pressure 14 cmH20   Rate Measured 28 br/min   Vt Exhaled 406 mL   Minute Volume 11.4 Liters   I:E Ratio 1:2   Cough/Sputum   Cough None   Spontaneous Breathing Trial (SBT) RT Doc   Pulse 70   Breath Sounds   Right Upper Lobe Diminished   Right Middle Lobe Diminished   Right Lower Lobe Crackles   Left Upper Lobe Diminished   Left Lower Lobe Diminished;Crackles   Additional Respiratory  Assessments   Resp 28   Position Prone

## 2021-10-01 NOTE — PROGRESS NOTES
End of shift note. VSS during this shift, telemetry continued SR. All lines remain intact. Patient this afternoon was placed to supine position and was not able to tolerate as FI02 had to be increased to 80%. A chest x-ray was obtained while she was prone. After chest x-ray patient was placed back to prone. Versed, fentanyl, and propofol continue for sedation. Tube feed continues and patient tolerates.

## 2021-10-01 NOTE — PROGRESS NOTES
Pulmonary & Critical Care Medicine ICU Progress Note    CC: Respiratory failure    Events of Last 24 hours:   Propofol 50 mcg/kg/min   Fentanyl 200 mcg/hr   Versed 4 mg/hr   Off Nimbex   Levophed off   PEEP 8  FiO2 50%   Plateau pressure 19  PaO2/FiO2 226  Proned overnight         Vascular lines: IV: PICC line     MV:   Vent Mode: AC/VC Rate Set: 28 bmp/Vt Ordered: 400 mL/ /FiO2 : 50 %  Recent Labs     21  0458 10/01/21  0400   PHART 7.390 7.434   [id number] 42.0 43.6   PO2ART 73.4* 112.5*       IV:   propofol 50 mcg/kg/min (10/01/21 0700)    fentaNYL 200 mcg/hr (10/01/21 0700)    norepinephrine Stopped (21 1401)    cisatracurium (NIMBEX) infusion Stopped (21 1355)    midazolam 4 mg/hr (10/01/21 0700)    dexmedetomidine HCl in NaCl Stopped (21 010)    sodium chloride      dextrose      sodium chloride         Vitals:  Blood pressure 138/60, pulse 70, temperature 97.5 °F (36.4 °C), temperature source Axillary, resp. rate 28, height 5' 9\" (1.753 m), weight 181 lb 6.4 oz (82.3 kg), SpO2 93 %, not currently breastfeeding. on AC 14/  Temp  Av.8 °F (36.6 °C)  Min: 96.6 °F (35.9 °C)  Max: 98.9 °F (37.2 °C)    Intake/Output Summary (Last 24 hours) at 10/1/2021 0706  Last data filed at 10/1/2021 0700  Gross per 24 hour   Intake 3015.1 ml   Output 1655 ml   Net 1360.1 ml     PE:  EXAM:  General: ill appearing. Intubated sedated. Eyes: PERRL. No sclera icterus. No conjunctival injection. ENT: No discharge. Pharynx clear. ET tube in place  Neck: Trachea midline. Normal thyroid. Resp: No accessory muscle use. Few crackles. No wheezing. Few rhonchi. No dullness on percussion. CV: Regular rate. Regular rhythm. No mumur or rub. No edema. GI: Non-tender. Non-distended. No masses. No organomegaly. Normal bowel sounds. No hernia. Skin: Warm and dry. No nodule on exposed extremities. No rash on exposed extremities. Lymph: No cervical LAD. No supraclavicular LAD.    M/S: No cyanosis. No joint deformity. No clubbing. Neuro: Intubated sedated. Responsive to painful stimuli. Not following commands. Psych: Noncommunicative unable to obtain      Scheduled Meds:   chlorhexidine  15 mL Mouth/Throat BID    pantoprazole  40 mg IntraVENous Daily    ipratropium-albuterol  1 ampule Inhalation Q4H    insulin lispro  0-6 Units SubCUTAneous Q4H    levothyroxine  100 mcg Oral Daily    enoxaparin  30 mg SubCUTAneous BID    dexamethasone  6 mg IntraVENous Q24H    cefTRIAXone (ROCEPHIN) IV  2,000 mg IntraVENous Q24H    azithromycin  500 mg IntraVENous Q24H    mupirocin   Nasal BID    lidocaine 1 % injection  5 mL IntraDERmal Once    sodium chloride flush  5-40 mL IntraVENous 2 times per day    aspirin  81 mg Oral Daily    [Held by provider] gabapentin  800 mg Oral TID    [Held by provider] rOPINIRole  0.5 mg Oral TID    sertraline  50 mg Oral Daily    atorvastatin  40 mg Oral Daily    sodium chloride flush  5-40 mL IntraVENous 2 times per day    [Held by provider] oxyCODONE  20 mg Oral 2 times per day    remdesivir IVPB  100 mg IntraVENous Q24H       Data:  CBC:   Recent Labs     09/29/21  0410 09/30/21  0450 10/01/21  0400   WBC 16.4* 5.3 4.9   HGB 11.1* 10.2* 10.9*   HCT 35.6* 30.7* 33.5*   MCV 79.3* 79.7* 78.0*    123* 147     BMP:   Recent Labs     09/29/21  0410 09/30/21  0450 10/01/21  0400    139 139   K 4.3 3.5 3.9    102 103   CO2 25 24 25   BUN 22* 23* 21*   CREATININE 0.6 <0.5* <0.5*     LIVER PROFILE:   Recent Labs     09/29/21  0410 09/30/21  0450 10/01/21  0400   AST 66* <5* 58*   ALT 14 <5* <5*   BILITOT 0.3 <0.2 <0.2   ALKPHOS 195* 148* 169*       Microbiology:  9/27 COVID-19 detected    Imaging:  Chest x-ray 9/30 imaging reviewed by me and showed   Satisfactory ETT position  Satisfactory PICC line position   Bilateral ASD - same     CTPA 9/27 imaging was reviewed by me and showed   No evidence of pulmonary embolism.    Scattered peripheral opacities in the left lung and more consolidative area   in the right lung concerning for pneumonia.  Previous right thoracotomy and   upper lobectomy. One mildly enlarged right paratracheal lymph node is present but no priors   for comparison.  Nonenlarged but prominent lymph nodes in the upper abdomen   and juxta diaphragmatic region        ASSESSMENT:  · Acute hypoxemic respiratory failure  · COVID-19 viral pneumonia  · Hypotension-probably sedation related  · Transaminitis  · Fever T-max 101.5  · Acute kidney injury  · CAD  · Chronic pain syndrome - narcotics and Neurontin         PLAN:  Mechanical ventilation as per my orders. The ventilator was adjusted by me at the bedside for unstable, life threatening respiratory failure  Follow ABG and chest x-ray while on the ventilator  Chest x-ray today when supine  IV Propofol/Versed for sedation, target RASS -2, with daily spontaneous awakening trial   D/C Versed   Precedex and wean off Propofol   Follow TG   Nimbex off   Prone ventilation --> supine today   Fentanyl and Versed PRN, gtt as needed  Trial of Valium 10 mg PO TID   Head of bed 30 degrees or higher at all times  Daily spontaneous breathing trial once PEEP less than 8, FiO2 less than 55%  Droplet plus isolation   IV antibiotics to include Ceftriaxone/Zithromax D#4  IV Levophed to keep MAP > 65 mmHg or SBP>90  Follow-up cultures   Dexamethasone 6 mg IV D#5  Remdesivir day #5/5. Monitor LFTs, renal and CBC daily while on medication. Post 1 dose of Tocilizumab  Hold Norvasc and losartan  Hold Narcotics Requip and Neurontin    Lovenox BID   MRSA prophylaxis: Bactroban  Patient is full code           Total critical care time caring for this patient with life threatening, unstable organ failure, including direct patient contact, management of life support systems, review of data including imaging and labs, discussions with other team members and physicians is 33 minutes, excluding procedures.

## 2021-10-01 NOTE — PROGRESS NOTES
ICU Progress Note    Admit Date:  9/26/2021      Interval history:  Admitted with COVID-19 pneumonia,  acute hypoxic respiratory failure. Patient not vaccinated  Progressive significant worsening hypoxemia overnight. Patient was on 2 L of oxygen on admission-> switched to high flow oxygen per Vapotherm with additional 100% nonrebreather on 9/28/2021-> transferred to ICU. Worsening hypoxemia overnight. Intubated and placed on mechanical ventilation early AM on 9/29/2021. Placed in prone positioning. Did not require paralytics. 10/1 Patient desaturated when a supine position attempted today, back on prone ventilation    Subjective:  Ms. Kiarra Dawn seen. Seen in prone position . Remains intubated, sedated ,on mechanical ventilation. .   On FiO2  55%. PEEP 8  Weaned off of pressors    Objective:   /64   Pulse 70   Temp 97.5 °F (36.4 °C) (Axillary)   Resp 28   Ht 5' 9\" (1.753 m)   Wt 181 lb 6.4 oz (82.3 kg)   SpO2 93%   BMI 26.79 kg/m²       Intake/Output Summary (Last 24 hours) at 10/1/2021 1648  Last data filed at 10/1/2021 0700  Gross per 24 hour   Intake 1654.13 ml   Output 1130 ml   Net 524.13 ml         Physical Exam:  General: Intubated, sedated on mechanical ventilation   Prone positioning  Skin:  Warm and dry  Neck:  JVD absent. Neck supple  Chest: diminished breath sounds, improved air entry. No wheezes rales or rhonchi  Cardiovascular:  RRR ,S1S2 normal  Abdomen:  Soft, non tender, non distended, BS +  Extremities:  No edema. Intact peripheral pulses.  Brisk cap refill, < 2 secs  Neuro: non focal      Medications:   Scheduled Meds:   diazePAM  10 mg Oral TID    chlorhexidine  15 mL Mouth/Throat BID    pantoprazole  40 mg IntraVENous Daily    ipratropium-albuterol  1 ampule Inhalation Q4H    insulin lispro  0-6 Units SubCUTAneous Q4H    levothyroxine  100 mcg Oral Daily    enoxaparin  30 mg SubCUTAneous BID    dexamethasone  6 mg IntraVENous Q24H    cefTRIAXone (ROCEPHIN) IV 2,000 mg IntraVENous Q24H    azithromycin  500 mg IntraVENous Q24H    mupirocin   Nasal BID    lidocaine 1 % injection  5 mL IntraDERmal Once    sodium chloride flush  5-40 mL IntraVENous 2 times per day    aspirin  81 mg Oral Daily    [Held by provider] gabapentin  800 mg Oral TID    [Held by provider] rOPINIRole  0.5 mg Oral TID    sertraline  50 mg Oral Daily    atorvastatin  40 mg Oral Daily    sodium chloride flush  5-40 mL IntraVENous 2 times per day    [Held by provider] oxyCODONE  20 mg Oral 2 times per day    remdesivir IVPB  100 mg IntraVENous Q24H       Continuous Infusions:   propofol 50 mcg/kg/min (10/01/21 1414)    fentaNYL 200 mcg/hr (10/01/21 1603)    norepinephrine Stopped (09/29/21 1401)    dexmedetomidine HCl in NaCl Stopped (09/29/21 0107)    sodium chloride      dextrose      sodium chloride         Data:  CBC:   Recent Labs     09/29/21  0410 09/30/21  0450 10/01/21  0400   WBC 16.4* 5.3 4.9   RBC 4.49 3.85* 4.29   HGB 11.1* 10.2* 10.9*   HCT 35.6* 30.7* 33.5*   MCV 79.3* 79.7* 78.0*   RDW 18.9* 18.6* 18.2*    123* 147     BMP:   Recent Labs     09/29/21  0410 09/30/21  0450 10/01/21  0400    139 139   K 4.3 3.5 3.9    102 103   CO2 25 24 25   BUN 22* 23* 21*   CREATININE 0.6 <0.5* <0.5*     BNP: No results for input(s): BNP in the last 72 hours. PT/INR:   No results for input(s): PROTIME, INR in the last 72 hours. APTT: No results for input(s): APTT in the last 72 hours. CARDIAC ENZYMES:   No results for input(s): CKMB, CKMBINDEX, TROPONINI in the last 72 hours.     Invalid input(s): CKTOTAL;3  FASTING LIPID PANEL:  Lab Results   Component Value Date    CHOL 156 10/18/2018    HDL 39 (L) 10/18/2018    TRIG 557 (H) 10/01/2021     LIVER PROFILE:   Recent Labs     09/29/21  0410 09/30/21  0450 10/01/21  0400   AST 66* <5* 58*   ALT 14 <5* <5*   BILITOT 0.3 <0.2 <0.2   ALKPHOS 195* 148* 169*           CULTURES  COVID 19, NAAT: Detected    EKG:   Normal sinus rhythm  Left ventricular hypertrophy with repolarization abnormality  Prolonged QT  ST abnormality inferior leads consider ischemia  Abnormal ECG  When compared with ECG of 05-SEP-2020 08:35,  Premature ventricular complexes are no longer Present  T wave inversion now evident in Inferior leads  Confirmed by DANNA CHILDS, REYNALDO     Radiology  XR CHEST PORTABLE   Final Result   Endotracheal tube with its tip approximately 4.7 cm from the saba in   satisfactory position. Enteric tube in satisfactory position. Patchy bilateral airspace disease is again noted with improved aeration of   the left mid lung. XR CHEST PORTABLE   Final Result   Some improvement in the appearance of the chest with clearing of some of the   acute airspace disease from the mid left lung. Large amount of pneumonia   remains within the right lung and lower left lung. XR CHEST PORTABLE   Final Result   1. Endotracheal tube, nasogastric tube, and right PICC line placement. 2.  Increasing multifocal opacities with consolidative area in the right   perihilar lung. Could be multifocal pneumonia with or without edema. IR PICC WO SQ PORT/PUMP > 5 YEARS   Final Result   1. See above. CT CHEST PULMONARY EMBOLISM W CONTRAST   Final Result   No evidence of pulmonary embolism. Scattered peripheral opacities in the left lung and more consolidative area   in the right lung concerning for pneumonia. Previous right thoracotomy and   upper lobectomy. One mildly enlarged right paratracheal lymph node is present but no priors   for comparison. Nonenlarged but prominent lymph nodes in the upper abdomen   and juxta diaphragmatic region. XR CHEST PORTABLE   Final Result   Mild cardiomegaly without interstitial edema. Metallic surgical clips from prior right thoracotomy. COPD with anterior segment-right upper lobe alveolar pneumonia.       Old pleural thickening was noted along the right lateral chest wall. Pleural   thickening and or trace effusion blunts the right costophrenic angle. XR CHEST PORTABLE    (Results Pending)         Assessment:  Principal Problem:    Suspected COVID-19 virus infection  Active Problems:    Hypothyroidism    Essential hypertension    DM2 (diabetes mellitus, type 2) (HCC)    Dehydration    Hypoxia    Acute respiratory failure with hypoxia ([facility] [address]    COVID-19    Pneumonia due to COVID-19 virus    CHARLY (acute kidney injury) ([facility] [address]    Uncontrolled hypertension    Fever    Hypotension    Mild protein-calorie malnutrition ([facility] [address]  Resolved Problems:    * No resolved hospital problems. *      Plan:    #COVID-19 pneumonia  #Acute hypoxic respiratory failure   -Unvaccinated patient  - she presented with cough, fever, dyspnea in the setting of household contact testing + COVID 19  - Her Covid testing came back positive on admission  - Initially on 2 L of oxygen on presentation. She started desaturating quickly . worsening hypoxemia requiring high flow oxygen. - Pulmonology consulted  -placed on high flow oxygen per Vapotherm 40 L,FiO2 100% ,with additional nonrebreather . Transferred to ICU on 9/28. Intubated early AM  9/29/2021, placed on mechanical ventilation, prone positioning. Continue prone ventilation  Sats improving , FiO2 down to 55% , PEEP is down from 16-10-8.  -  on Remdesivir , continue day #5  - on Decadron , day # 5  - given 1 dose of Tocilizumab  -Lovenox twice daily    #Possible superimposed bacterial pneumonia  -On Rocephin and Zithromax, day 4    #Hypokalemia  - replaced     #Dehydration  -  Improved with IVF  Creatinine normal     #CAD  - cont ASA, statin  - EKG with inferior T wave abnormality. She denies CP.   Trop neg   -telemetry monitoring    #History of tracheobronchomalacia  Has had surgical treatment for this     #HTN  -Blood pressure low now,  required pressors     #Chronic pain   History of hip fracture  - cont home oxycodone BID and percocet   - cont gabapentin and requip     #DM2  - use ssi      #Hypothyroidism  - cont synthroid         DVT Prophylaxis: Lovenox   Diet NPO  ADULT TUBE FEEDING; Orogastric; Peptide Based; Continuous; 20; Yes; 15; Q 4 hours; 45; 30; Q 3 hours; Protein; one proteinex P2Go once daily via feeding tube  Code Status: Full Code        Lynn Mas MD, MD 10/1/2021 4:48 PM

## 2021-10-01 NOTE — PROGRESS NOTES
09/30/21 2356   Vent Information   Vent Type 980   Vent Mode AC/VC   Vt Ordered 400 mL   Rate Set 28 bmp   Peak Flow 60 L/min   FiO2  50 %   SpO2 93 %   SpO2/FiO2 ratio 186   Sensitivity 3   PEEP/CPAP 8   Humidification Source Heated wire   Humidification Temp 37   Humidification Temp Measured 36.9   Circuit Condensation Drained   Vent Patient Data   Peak Inspiratory Pressure 21 cmH2O   Mean Airway Pressure 13 cmH20   Rate Measured 28 br/min   Vt Exhaled 404 mL   Minute Volume 11.3 Liters   I:E Ratio 1:2.0   Plateau Pressure 19 CPK30   Static Compliance 37 mL/cmH2O   Dynamic Compliance 31 mL/cmH2O   Cough/Sputum   Sputum How Obtained Endotracheal;Suctioned   Cough Productive   Sputum Amount Small   Sputum Color Creamy   Tenacity Thick   Spontaneous Breathing Trial (SBT) RT Doc   Pulse 72   Breath Sounds   Right Upper Lobe Diminished   Right Middle Lobe Diminished   Right Lower Lobe Diminished   Left Upper Lobe Diminished   Left Lower Lobe Diminished   Additional Respiratory  Assessments   Resp 28   Position Prone   Alarm Settings   High Pressure Alarm 45 cmH2O   Low Minute Volume Alarm 4 L/min   High Respiratory Rate 40 br/min   Low Exhaled Vt  250 mL   Patient Observation   Observations ETT SIZE 8.0, SECURED AT 25 LIP LINE. AMBU BAG AT HEAD OF BED. WATER GOOD. ETT (adult)   No Placement Date or Time found.    Tube Size: 8 mm  Secured at: 24 cm  Measured From: Lips   Secured at 25 cm   Measured From [address] [facility]   Site Condition Dry

## 2021-10-01 NOTE — PROGRESS NOTES
This note also relates to the following rows which could not be included:  Pulse - Cannot attach notes to unvalidated device data       10/01/21 1922   Vent Information   $Ventilation $Subsequent Day   Vent Type 980   Vent Mode AC/VC   Vt Ordered 400 mL   Rate Set 28 bmp   FiO2  50 %   SpO2 96 %   SpO2/FiO2 ratio 192   Sensitivity 3   PEEP/CPAP 8   Humidification Source Heated wire   Humidification Temp 37   Humidification Temp Measured 37   Circuit Condensation Drained   Vent Patient Data   Peak Inspiratory Pressure 23 cmH2O   Mean Airway Pressure 13 cmH20   Rate Measured 28 br/min   Vt Exhaled 401 mL   Minute Volume 11.3 Liters   I:E Ratio 1:2.5   Plateau Pressure 21 PMQ17   Static Compliance 31 mL/cmH2O   Dynamic Compliance 27 mL/cmH2O   Cough/Sputum   Sputum How Obtained Endotracheal;Suctioned   Cough Non-productive   Sputum Amount None   Breath Sounds   Right Upper Lobe Diminished   Right Middle Lobe Diminished   Right Lower Lobe Crackles   Left Upper Lobe Diminished   Left Lower Lobe Crackles   Additional Respiratory  Assessments   Resp 28   Position Prone   Alarm Settings   High Pressure Alarm 40 cmH2O   Low Minute Volume Alarm 4 L/min   Apnea (secs) 20 secs   High Respiratory Rate 40 br/min   Low Exhaled Vt  250 mL   ETT (adult)   No Placement Date or Time found.    Tube Size: 8 mm  Secured at: 25 cm  Measured From: Lips   Secured at 24 cm   Measured From Lips   ET Placement Right   Secured By [employer] tube carranza   Site Condition Dry

## 2021-10-01 NOTE — PROGRESS NOTES
10/01/21 0354   Vent Information   Vent Type 980   Vent Mode AC/VC   Vt Ordered 400 mL   Rate Set 28 bmp   Peak Flow 60 L/min   FiO2  50 %   SpO2 91 %   SpO2/FiO2 ratio 182   Sensitivity 3   PEEP/CPAP 8   Humidification Source Heated wire   Humidification Temp 37   Humidification Temp Measured 36.9   Circuit Condensation Drained   Vent Patient Data   Peak Inspiratory Pressure 25 cmH2O   Mean Airway Pressure 14 cmH20   Rate Measured 28 br/min   Vt Exhaled 409 mL   Minute Volume 11.5 Liters   I:E Ratio 1:2.0   Plateau Pressure 21 FQJ16   Cough/Sputum   Sputum How Obtained Endotracheal;Suctioned   Cough Non-productive   Sputum Amount None   Sputum Color None   Tenacity None   Spontaneous Breathing Trial (SBT) RT Doc   Pulse 78   Breath Sounds   Right Upper Lobe Diminished   Right Middle Lobe Diminished   Right Lower Lobe Diminished   Left Upper Lobe Diminished   Left Lower Lobe Diminished   Additional Respiratory  Assessments   Resp 28   Position Prone   Alarm Settings   High Pressure Alarm 45 cmH2O   Low Minute Volume Alarm 4 L/min   High Respiratory Rate 40 br/min   Low Exhaled Vt  250 mL   Patient Observation   Observations ETT SIZE 8.0, SECURED AT 25 LIP LINE. AMBU BAG AT HEAD OF BED. WATER GOOD. ETT (adult)   No Placement Date or Time found.    Tube Size: 8 mm  Secured at: 24 cm  Measured From: Lips   Secured at 25 cm   Measured From Lips   ET Placement Left   Secured By [employer] tape   Site Condition Dry

## 2021-10-01 NOTE — PROGRESS NOTES
Shift assessment is complete. ET/OG are intact with tube feed at 20/hr. PICC is dry/intact with propofol at 50/hr, fentanyl 200 mcg, and versed 4 mg. Gray intact and draining yellow clear. Patient remains in prone position at she was placed to prone at 1630. VSS, telemetry SR. Bilateral soft wrist restraints continued for safety of airway. Droplet + for covid +.

## 2021-10-01 NOTE — PROGRESS NOTES
Pt supined and required immediate increase in FiO2 and peep. Currently on FiO2 80% w peep increased to 10cwp. SpO2 89%           10/01/21 1440   Vent Information   Vent Type 980   Vent Mode AC/VC   Vt Ordered 400 mL   Rate Set 28 bmp   Peak Flow 70 L/min   FiO2  80 %   SpO2 (!) 89 %   SpO2/FiO2 ratio 111.25   Sensitivity 3   PEEP/CPAP 10   Humidification Source Heated wire   Humidification Temp Measured 37   Vent Patient Data   Peak Inspiratory Pressure 25 cmH2O   Mean Airway Pressure 16 cmH20   Rate Measured 30 br/min   Vt Exhaled 408 mL   Minute Volume 11.6 Liters   I:E Ratio 1:2.5   Plateau Pressure 20 FLW69   Static Compliance 40 mL/cmH2O   Cough/Sputum   Sputum How Obtained Endotracheal;Suctioned   Cough Non-productive   Spontaneous Breathing Trial (SBT) RT Doc   Pulse 85   Breath Sounds   Right Upper Lobe Diminished   Right Middle Lobe Diminished   Right Lower Lobe Crackles   Left Upper Lobe Diminished   Left Lower Lobe Diminished;Crackles   Additional Respiratory  Assessments   Resp 30   Position Reverse Trendelenburg   Oral Care Completed? Yes   Oral Care Mouth suctioned   Subglottic Suction Done? Yes   Alarm Settings   High Pressure Alarm 40 cmH2O   Low Minute Volume Alarm 4 L/min   Apnea (secs) 20 secs   High Respiratory Rate 40 br/min   Low Exhaled Vt  250 mL   ETT (adult)   No Placement Date or Time found.    Tube Size: 8 mm  Secured at: 24 cm  Measured From: Lips   Secured at 24 cm   Measured From Lips   ET Placement Right   Secured By [employer] tube carranza   Site Condition Dry

## 2021-10-02 ENCOUNTER — APPOINTMENT (OUTPATIENT)
Dept: GENERAL RADIOLOGY | Age: 66
DRG: 004 | End: 2021-10-02
Payer: MEDICARE

## 2021-10-02 LAB
A/G RATIO: 0.6 (ref 1.1–2.2)
ALBUMIN SERPL-MCNC: 2.2 G/DL (ref 3.4–5)
ALP BLD-CCNC: 148 U/L (ref 40–129)
ALT SERPL-CCNC: <5 U/L (ref 10–40)
ANION GAP SERPL CALCULATED.3IONS-SCNC: 8 MMOL/L (ref 3–16)
AST SERPL-CCNC: <5 U/L (ref 15–37)
BASE EXCESS ARTERIAL: 3.9 MMOL/L (ref -3–3)
BASOPHILS ABSOLUTE: 0 K/UL (ref 0–0.2)
BASOPHILS RELATIVE PERCENT: 1 %
BILIRUB SERPL-MCNC: <0.2 MG/DL (ref 0–1)
BUN BLDV-MCNC: 20 MG/DL (ref 7–20)
CALCIUM SERPL-MCNC: 8.1 MG/DL (ref 8.3–10.6)
CARBOXYHEMOGLOBIN ARTERIAL: 0.3 % (ref 0–1.5)
CHLORIDE BLD-SCNC: 101 MMOL/L (ref 99–110)
CO2: 27 MMOL/L (ref 21–32)
CREAT SERPL-MCNC: <0.5 MG/DL (ref 0.6–1.2)
EOSINOPHILS ABSOLUTE: 0 K/UL (ref 0–0.6)
EOSINOPHILS RELATIVE PERCENT: 0 %
GFR AFRICAN AMERICAN: >60
GFR NON-AFRICAN AMERICAN: >60
GLOBULIN: 3.7 G/DL
GLUCOSE BLD-MCNC: 155 MG/DL (ref 70–99)
GLUCOSE BLD-MCNC: 191 MG/DL (ref 70–99)
GLUCOSE BLD-MCNC: 205 MG/DL (ref 70–99)
GLUCOSE BLD-MCNC: 235 MG/DL (ref 70–99)
HCO3 ARTERIAL: 27.6 MMOL/L (ref 21–29)
HCT VFR BLD CALC: 31.7 % (ref 36–48)
HEMOGLOBIN, ART, EXTENDED: 11.2 G/DL (ref 12–16)
HEMOGLOBIN: 10.7 G/DL (ref 12–16)
LYMPHOCYTES ABSOLUTE: 0.4 K/UL (ref 1–5.1)
LYMPHOCYTES RELATIVE PERCENT: 9.2 %
MCH RBC QN AUTO: 26.1 PG (ref 26–34)
MCHC RBC AUTO-ENTMCNC: 33.7 G/DL (ref 31–36)
MCV RBC AUTO: 77.3 FL (ref 80–100)
METHEMOGLOBIN ARTERIAL: 0.3 %
MONOCYTES ABSOLUTE: 0.3 K/UL (ref 0–1.3)
MONOCYTES RELATIVE PERCENT: 7 %
NEUTROPHILS ABSOLUTE: 3.9 K/UL (ref 1.7–7.7)
NEUTROPHILS RELATIVE PERCENT: 82.8 %
O2 SAT, ARTERIAL: 97.2 %
O2 THERAPY: ABNORMAL
PCO2 ARTERIAL: 38.3 MMHG (ref 35–45)
PDW BLD-RTO: 18.1 % (ref 12.4–15.4)
PERFORMED ON: ABNORMAL
PH ARTERIAL: 7.48 (ref 7.35–7.45)
PLATELET # BLD: 130 K/UL (ref 135–450)
PMV BLD AUTO: 8.7 FL (ref 5–10.5)
PO2 ARTERIAL: 87.1 MMHG (ref 75–108)
POTASSIUM REFLEX MAGNESIUM: 3.9 MMOL/L (ref 3.5–5.1)
RBC # BLD: 4.1 M/UL (ref 4–5.2)
SODIUM BLD-SCNC: 136 MMOL/L (ref 136–145)
TCO2 ARTERIAL: 28.8 MMOL/L
TOTAL PROTEIN: 5.9 G/DL (ref 6.4–8.2)
WBC # BLD: 4.7 K/UL (ref 4–11)

## 2021-10-02 PROCEDURE — 85025 COMPLETE CBC W/AUTO DIFF WBC: CPT

## 2021-10-02 PROCEDURE — 6370000000 HC RX 637 (ALT 250 FOR IP): Performed by: INTERNAL MEDICINE

## 2021-10-02 PROCEDURE — 6370000000 HC RX 637 (ALT 250 FOR IP): Performed by: HOSPITALIST

## 2021-10-02 PROCEDURE — 2500000003 HC RX 250 WO HCPCS: Performed by: INTERNAL MEDICINE

## 2021-10-02 PROCEDURE — 6360000002 HC RX W HCPCS: Performed by: INTERNAL MEDICINE

## 2021-10-02 PROCEDURE — 71045 X-RAY EXAM CHEST 1 VIEW: CPT

## 2021-10-02 PROCEDURE — 2700000000 HC OXYGEN THERAPY PER DAY

## 2021-10-02 PROCEDURE — 99291 CRITICAL CARE FIRST HOUR: CPT | Performed by: INTERNAL MEDICINE

## 2021-10-02 PROCEDURE — 82803 BLOOD GASES ANY COMBINATION: CPT

## 2021-10-02 PROCEDURE — 94761 N-INVAS EAR/PLS OXIMETRY MLT: CPT

## 2021-10-02 PROCEDURE — 6360000002 HC RX W HCPCS

## 2021-10-02 PROCEDURE — 2000000000 HC ICU R&B

## 2021-10-02 PROCEDURE — 2580000003 HC RX 258: Performed by: INTERNAL MEDICINE

## 2021-10-02 PROCEDURE — 94640 AIRWAY INHALATION TREATMENT: CPT

## 2021-10-02 PROCEDURE — C9113 INJ PANTOPRAZOLE SODIUM, VIA: HCPCS | Performed by: INTERNAL MEDICINE

## 2021-10-02 PROCEDURE — 2580000003 HC RX 258: Performed by: HOSPITALIST

## 2021-10-02 PROCEDURE — 80053 COMPREHEN METABOLIC PANEL: CPT

## 2021-10-02 PROCEDURE — 94003 VENT MGMT INPAT SUBQ DAY: CPT

## 2021-10-02 RX ORDER — FUROSEMIDE 10 MG/ML
40 INJECTION INTRAMUSCULAR; INTRAVENOUS ONCE
Status: COMPLETED | OUTPATIENT
Start: 2021-10-02 | End: 2021-10-02

## 2021-10-02 RX ORDER — FUROSEMIDE 10 MG/ML
INJECTION INTRAMUSCULAR; INTRAVENOUS
Status: COMPLETED
Start: 2021-10-02 | End: 2021-10-02

## 2021-10-02 RX ADMIN — ENOXAPARIN SODIUM 30 MG: 30 INJECTION SUBCUTANEOUS at 20:04

## 2021-10-02 RX ADMIN — IPRATROPIUM BROMIDE AND ALBUTEROL SULFATE 1 AMPULE: .5; 2.5 SOLUTION RESPIRATORY (INHALATION) at 15:38

## 2021-10-02 RX ADMIN — PROPOFOL 50 MCG/KG/MIN: 10 INJECTION, EMULSION INTRAVENOUS at 01:13

## 2021-10-02 RX ADMIN — FUROSEMIDE 40 MG: 10 INJECTION INTRAMUSCULAR; INTRAVENOUS at 12:26

## 2021-10-02 RX ADMIN — INSULIN LISPRO 2 UNITS: 100 INJECTION, SOLUTION INTRAVENOUS; SUBCUTANEOUS at 16:39

## 2021-10-02 RX ADMIN — IPRATROPIUM BROMIDE AND ALBUTEROL SULFATE 1 AMPULE: .5; 2.5 SOLUTION RESPIRATORY (INHALATION) at 23:40

## 2021-10-02 RX ADMIN — Medication 150 MCG/HR: at 21:06

## 2021-10-02 RX ADMIN — PIPERACILLIN SODIUM AND TAZOBACTAM SODIUM 3375 MG: 3; .375 INJECTION, POWDER, LYOPHILIZED, FOR SOLUTION INTRAVENOUS at 10:19

## 2021-10-02 RX ADMIN — SODIUM CHLORIDE, PRESERVATIVE FREE 10 ML: 5 INJECTION INTRAVENOUS at 10:17

## 2021-10-02 RX ADMIN — Medication 0.5 MCG/KG/HR: at 13:39

## 2021-10-02 RX ADMIN — Medication 200 MCG/HR: at 03:22

## 2021-10-02 RX ADMIN — Medication 200 MCG/HR: at 10:23

## 2021-10-02 RX ADMIN — SODIUM CHLORIDE, PRESERVATIVE FREE 10 ML: 5 INJECTION INTRAVENOUS at 20:07

## 2021-10-02 RX ADMIN — FUROSEMIDE 40 MG: 10 INJECTION, SOLUTION INTRAMUSCULAR; INTRAVENOUS at 12:26

## 2021-10-02 RX ADMIN — PIPERACILLIN SODIUM AND TAZOBACTAM SODIUM 3375 MG: 3; .375 INJECTION, POWDER, LYOPHILIZED, FOR SOLUTION INTRAVENOUS at 18:31

## 2021-10-02 RX ADMIN — MUPIROCIN: 20 OINTMENT TOPICAL at 20:04

## 2021-10-02 RX ADMIN — CHLORHEXIDINE GLUCONATE 0.12% ORAL RINSE 15 ML: 1.2 LIQUID ORAL at 20:03

## 2021-10-02 RX ADMIN — PANTOPRAZOLE SODIUM 40 MG: 40 INJECTION, POWDER, FOR SOLUTION INTRAVENOUS at 10:16

## 2021-10-02 RX ADMIN — MUPIROCIN: 20 OINTMENT TOPICAL at 10:16

## 2021-10-02 RX ADMIN — ENOXAPARIN SODIUM 30 MG: 30 INJECTION SUBCUTANEOUS at 10:16

## 2021-10-02 RX ADMIN — SODIUM CHLORIDE, PRESERVATIVE FREE 10 ML: 5 INJECTION INTRAVENOUS at 20:06

## 2021-10-02 RX ADMIN — INSULIN LISPRO 1 UNITS: 100 INJECTION, SOLUTION INTRAVENOUS; SUBCUTANEOUS at 05:13

## 2021-10-02 RX ADMIN — INSULIN LISPRO 1 UNITS: 100 INJECTION, SOLUTION INTRAVENOUS; SUBCUTANEOUS at 10:20

## 2021-10-02 RX ADMIN — ASPIRIN 81 MG: 81 TABLET, CHEWABLE ORAL at 10:16

## 2021-10-02 RX ADMIN — PROPOFOL 50 MCG/KG/MIN: 10 INJECTION, EMULSION INTRAVENOUS at 05:55

## 2021-10-02 RX ADMIN — CHLORHEXIDINE GLUCONATE 0.12% ORAL RINSE 15 ML: 1.2 LIQUID ORAL at 10:15

## 2021-10-02 RX ADMIN — SERTRALINE HYDROCHLORIDE 50 MG: 50 TABLET ORAL at 10:16

## 2021-10-02 RX ADMIN — Medication 150 MCG/HR: at 13:41

## 2021-10-02 RX ADMIN — DEXAMETHASONE SODIUM PHOSPHATE 6 MG: 10 INJECTION INTRAMUSCULAR; INTRAVENOUS at 14:21

## 2021-10-02 RX ADMIN — IPRATROPIUM BROMIDE AND ALBUTEROL SULFATE 1 AMPULE: .5; 2.5 SOLUTION RESPIRATORY (INHALATION) at 19:33

## 2021-10-02 RX ADMIN — PROPOFOL 50 MCG/KG/MIN: 10 INJECTION, EMULSION INTRAVENOUS at 10:18

## 2021-10-02 RX ADMIN — Medication 1 MCG/KG/HR: at 19:23

## 2021-10-02 RX ADMIN — LEVOTHYROXINE SODIUM 100 MCG: 100 TABLET ORAL at 05:57

## 2021-10-02 RX ADMIN — DIAZEPAM 10 MG: 10 TABLET ORAL at 14:21

## 2021-10-02 RX ADMIN — INSULIN LISPRO 1 UNITS: 100 INJECTION, SOLUTION INTRAVENOUS; SUBCUTANEOUS at 13:28

## 2021-10-02 RX ADMIN — IPRATROPIUM BROMIDE AND ALBUTEROL SULFATE 1 AMPULE: .5; 2.5 SOLUTION RESPIRATORY (INHALATION) at 08:10

## 2021-10-02 RX ADMIN — DIAZEPAM 10 MG: 10 TABLET ORAL at 10:16

## 2021-10-02 RX ADMIN — INSULIN LISPRO 2 UNITS: 100 INJECTION, SOLUTION INTRAVENOUS; SUBCUTANEOUS at 20:03

## 2021-10-02 RX ADMIN — IPRATROPIUM BROMIDE AND ALBUTEROL SULFATE 1 AMPULE: .5; 2.5 SOLUTION RESPIRATORY (INHALATION) at 03:11

## 2021-10-02 RX ADMIN — IPRATROPIUM BROMIDE AND ALBUTEROL SULFATE 1 AMPULE: .5; 2.5 SOLUTION RESPIRATORY (INHALATION) at 11:44

## 2021-10-02 RX ADMIN — DIAZEPAM 10 MG: 10 TABLET ORAL at 20:04

## 2021-10-02 ASSESSMENT — PAIN SCALES - GENERAL
PAINLEVEL_OUTOF10: 0
PAINLEVEL_OUTOF10: 0

## 2021-10-02 ASSESSMENT — PULMONARY FUNCTION TESTS
PIF_VALUE: 32
PIF_VALUE: 23
PIF_VALUE: 35
PIF_VALUE: 23
PIF_VALUE: 23
PIF_VALUE: 32
PIF_VALUE: 22
PIF_VALUE: 25
PIF_VALUE: 26

## 2021-10-02 NOTE — PROGRESS NOTES
Pulmonary & Critical Care Medicine ICU Progress Note    CC: Respiratory failure    Events of Last 24 hours:   Propofol 50 mcg/kg/min   Fentanyl 200 mcg/hr   Versed 4 mg/hr   Off Nimbex   Levophed off   PEEP 8  FiO2 50%   Plateau pressure 22  PaO2/FiO2 175  Proned overnight - did not tolerate supine yesterday         Vascular lines: IV: PICC line     MV:   Vent Mode: AC/VC Rate Set: 28 bmp/Vt Ordered: 400 mL/ /FiO2 : 50 %  Recent Labs     10/01/21  0400 10/02/21  0318   PHART 7.434 7.476*   GHW0BYA 43.6 38.3   PO2ART 112.5* 87.1       IV:   midazolam 4 mg/hr (10/02/21 0513)    propofol 50 mcg/kg/min (10/02/21 0555)    fentaNYL 200 mcg/hr (10/02/21 0513)    norepinephrine Stopped (21 1401)    dexmedetomidine HCl in NaCl Stopped (21 010)    sodium chloride      dextrose      sodium chloride         Vitals:  Blood pressure (!) 154/75, pulse 59, temperature 98.2 °F (36.8 °C), temperature source Axillary, resp. rate 28, height 5' 9\" (1.753 m), weight 180 lb 14.4 oz (82.1 kg), SpO2 94 %, not currently breastfeeding. on AC 28/400  Temp  Av °F (36.7 °C)  Min: 97.5 °F (36.4 °C)  Max: 98.4 °F (36.9 °C)    Intake/Output Summary (Last 24 hours) at 10/2/2021 0706  Last data filed at 10/2/2021 0700  Gross per 24 hour   Intake 2626.92 ml   Output 1760 ml   Net 866.92 ml     PE:  EXAM: limited due to prone ventilation   General: ill appearing. Intubated sedated. Eyes: PERRL. No sclera icterus. + conjunctival injection. ENT: ET tube in place  Neck: Trachea midline  Resp: No accessory muscle use. Few crackles. No wheezing. Few rhonchi. No dullness on percussion. CV: Regular rate. Regular rhythm. No edema. GI: Proned not able to assess   Skin: Warm and dry. No nodule on exposed extremities. No rash on exposed extremities. Lymph: No cervical LAD. No supraclavicular LAD. M/S: No cyanosis. No joint deformity. No clubbing. Neuro: Intubated sedated. Responsive to painful stimuli.  Not following commands. Psych: Noncommunicative unable to obtain      Scheduled Meds:   diazePAM  10 mg Oral TID    chlorhexidine  15 mL Mouth/Throat BID    pantoprazole  40 mg IntraVENous Daily    ipratropium-albuterol  1 ampule Inhalation Q4H    insulin lispro  0-6 Units SubCUTAneous Q4H    levothyroxine  100 mcg Oral Daily    enoxaparin  30 mg SubCUTAneous BID    dexamethasone  6 mg IntraVENous Q24H    cefTRIAXone (ROCEPHIN) IV  2,000 mg IntraVENous Q24H    azithromycin  500 mg IntraVENous Q24H    mupirocin   Nasal BID    lidocaine 1 % injection  5 mL IntraDERmal Once    sodium chloride flush  5-40 mL IntraVENous 2 times per day    aspirin  81 mg Oral Daily    [Held by provider] gabapentin  800 mg Oral TID    [Held by provider] rOPINIRole  0.5 mg Oral TID    sertraline  50 mg Oral Daily    atorvastatin  40 mg Oral Daily    sodium chloride flush  5-40 mL IntraVENous 2 times per day    [Held by provider] oxyCODONE  20 mg Oral 2 times per day       Data:  CBC:   Recent Labs     09/30/21  0450 10/01/21  0400 10/02/21  0318   WBC 5.3 4.9 4.7   HGB 10.2* 10.9* 10.7*   HCT 30.7* 33.5* 31.7*   MCV 79.7* 78.0* 77.3*   * 147 130*     BMP:   Recent Labs     09/30/21  0450 10/01/21  0400 10/02/21  0318    139 136   K 3.5 3.9 3.9    103 101   CO2 24 25 27   BUN 23* 21* 20   CREATININE <0.5* <0.5* <0.5*     LIVER PROFILE:   Recent Labs     09/30/21  0450 10/01/21  0400 10/02/21  0318   AST <5* 58* <5*   ALT <5* <5* <5*   BILITOT <0.2 <0.2 <0.2   ALKPHOS 148* 169* 148*       Microbiology:  9/27 COVID-19 detected  9/30 Resp gram negative       Imaging:  Chest x-ray 10/2 imaging reviewed by me and showed   Satisfactory ETT position  Satisfactory PICC line position   Bilateral ASD -no changes    CTPA 9/27 imaging was reviewed by me and showed   No evidence of pulmonary embolism. Scattered peripheral opacities in the left lung and more consolidative area   in the right lung concerning for pneumonia.  Previous right thoracotomy and   upper lobectomy. One mildly enlarged right paratracheal lymph node is present but no priors   for comparison.  Nonenlarged but prominent lymph nodes in the upper abdomen   and juxta diaphragmatic region        ASSESSMENT:  · Acute hypoxemic respiratory failure  · COVID-19 viral pneumonia  · Gram negative PNA   · Hypotension-probably sedation related  · Transaminitis  · Fever T-max 101.5  · Acute kidney injury  · CAD  · Chronic pain syndrome - narcotics and Neurontin         PLAN:  Mechanical ventilation as per my orders. The ventilator was adjusted by me at the bedside for unstable, life threatening respiratory failure  Follow ABG and chest x-ray while on the ventilator  Check chest x-ray today when supine  IV Propofol/Versed for sedation, target RASS -2, with daily spontaneous awakening trial   Precedex and wean off Propofol   Follow TG   Nimbex off   Prone ventilation --> supine today   Fentanyl and Versed PRN, gtt as needed  Valium 10 mg PO TID   Head of bed 30 degrees or higher at all times  Daily spontaneous breathing trial once PEEP less than 8, FiO2 less than 55%  Droplet plus isolation   IV antibiotics to include Ceftriaxone/Zithromax D#5--> Zosyn   IV Levophed to keep MAP > 65 mmHg or SBP>90  Follow-up cultures   Dexamethasone 6 mg IV D#6  Completed Remdesivir day #5/5  Post 1 dose of Tocilizumab  Hold Norvasc and losartan  Hold Narcotics Requip and Neurontin    Lasix 40 mg IV x 1   Lovenox BID   MRSA prophylaxis: Bactroban  Patient is full code           Total critical care time caring for this patient with life threatening, unstable organ failure, including direct patient contact, management of life support systems, review of data including imaging and labs, discussions with other team members and physicians is 33 minutes, excluding procedures.

## 2021-10-02 NOTE — PROGRESS NOTES
Shift assessment is complete. ET/OG are intact, patient continues prone. Crackles heard in lower lobes. Fentanyl, propofol, and versed continue for sedation with a RASS -3. Tube feed at 20/hr with minimal residuals.  VSS, telemetry SR.

## 2021-10-02 NOTE — PROGRESS NOTES
RESPIRATORY THERAPY ASSESSMENT    Name:  Remy [facility] Record Number:  [id number]  Age: 78 y.o. Gender: female  : 1955  Today's Date:  10/2/2021  Room:  [address] [place] [address]    Assessment     Is the patient being admitted for a COPD or Asthma exacerbation? No   (If yes the patient will be seen every 4 hours for the first 24 hours and then reassessed)    Patient Admission Diagnosis      Allergies  Allergies   Allergen Reactions    Sulfa Antibiotics Anaphylaxis    Bactrim     Prednisone      Cannot tolerate oral steriods    Quinidine        Minimum Predicted Vital Capacity:               Actual Vital Capacity:                    Pulmonary History:No history  Home Oxygen Therapy:  room air  Home Respiratory Therapy:None   Current Respiratory Therapy:  duoneb q4  Treatment Type: Aerosol generator  Medications: Albuterol/Ipratropium    Respiratory Severity Index(RSI)   Patients with orders for inhalation medications, oxygen, or any therapeutic treatment modality will be placed on Respiratory Protocol. They will be assessed with the first treatment and at least every 72 hours thereafter. The following severity scale will be used to determine frequency of treatment intervention.     Smoking History: Smoking History Less than 1ppd or less than 15 pack year = 1    Social History  Social History     Tobacco Use    Smoking status: Former Smoker     Packs/day: 1.00     Years: 18.00     Pack years: 18.00     Types: Cigarettes     Quit date: 12/10/1991     Years since quittin.8    Smokeless tobacco: Never Used   Vaping Use    Vaping Use: Never used   Substance Use Topics    Alcohol use: No    Drug use: No       Recent Surgical History: None = 0  Past Surgical History  Past Surgical History:   Procedure Laterality Date    ABDOMEN SURGERY      CARDIAC CATHETERIZATION      COLON SURGERY      COLONOSCOPY      normal    HYSTERECTOMY      JOINT REPLACEMENT  10/92    Right; hip     JOINT REPLACEMENT    12/92    Left hip  followed by revision 1/2006    JOINT REPLACEMENT    6/96    right     JOINT REPLACEMENT    2006     right replacement.  LUNG SURGERY  1/2014    tracheobronchomalacia       Level of Consciousness: Comatose = 4    Level of Activity: Bedridden, unresponsive or quadriplegic = 4    Respiratory Pattern: Regular Pattern; RR 8-20 = 0    Breath Sounds: Diminshed bilaterally and/or crackles = 2    Sputum  Sputum Color: Creamy, White, Tenacity: Thick, Sputum How Obtained: Endotracheal, Suctioned  Cough: Strong, productive = 1    Vital Signs   /65   Pulse 59   Temp 98.4 °F (36.9 °C) (Axillary)   Resp 28   Ht 5' 9\" (1.753 m)   Wt 181 lb 6.4 oz (82.3 kg)   SpO2 94%   BMI 26.79 kg/m²   SPO2 (COPD values may differ): 86-87% on room air or greater than 92% on FiO2 35- 50% = 3    Peak Flow (asthma only): not applicable = 0    RSI: 78-59 = Q4WA (every four hours while awake) and Q4hrs PRN        Plan       Goals: mobilize retained secretions, volume expansion and improve oxygenation    Patient/caregiver was educated on the proper method of use for Respiratory Care Devices:  Yes      Level of patient/caregiver understanding able to:   ? Verbalize understanding   ? Demonstrate understanding       ? Teach back        ? Needs reinforcement       ? No available caregiver               ? Other:     Response to education:  on vent     Is patient being placed on Home Treatment Regimen? No     Does the patient have everything they need prior to discharge? NA     Comments: pt assessed and chart reviewed    Plan of Care: leaving duoneb q4    Electronically signed by Jennie Millard RCP on 10/2/2021 at 3:36 AM    Respiratory Protocol Guidelines     1. Assessment and treatment by Respiratory Therapy will be initiated for medication and therapeutic interventions upon initiation of aerosolized medication. 2. Physician will be contacted for respiratory rate (RR) greater than 35 breaths per minute. Therapy will be held for heart rate (HR) greater than 140 beats per minute, pending direction from physician. 3. Bronchodilators will be administered via Metered Dose Inhaler (MDI) with spacer when the following criteria are met:  a. Alert and cooperative     b. HR < 140 bpm  c. RR < 30 bpm                d. Can demonstrate a 2-3 second inspiratory hold  4. Bronchodilators will be administered via Hand Held Nebulizer [facility] to patients when ANY of the following criteria are met  a. Incognizant or uncooperative          b. Patients treated with HHN at Home        c. Unable to demonstrate proper use of MDI with spacer     d. RR > 30 bpm   5. Bronchodilators will be delivered via Metered Dose Inhaler (MDI), HHN, Aerogen to intubated patients on mechanical ventilation. 6. Inhalation medication orders will be delivered and/or substituted as outlined below. Aerosolized Medications Ordering and Administration Guidelines:    1. All Medications will be ordered by a physician, and their frequency and/or modality will be adjusted as defined by the patients Respiratory Severity Index (RSI) score. 2. If the patient does not have documented COPD, consider discontinuing anticholinergics when RSI is less than 9.  3. If the bronchospasm worsens (increased RSI), then the bronchodilator frequency can be increased to a maximum of every 4 hours. If greater than every 4 hours is required, the physician will be contacted. 4. If the bronchospasm improves, the frequency of the bronchodilator can be decreased, based on the patient's RSI, but not less than home treatment regimen frequency. 5. Bronchodilator(s) will be discontinued if patient has a RSI less than 9 and has received no scheduled or as needed treatment for 72  Hrs. Patients Ordered on a Mucolytic Agent:    1. Must always be administered with a bronchodilator.     2. Discontinue if patient experiences worsened bronchospasm, or secretions have lessened to the point that the patient is able to clear them with a cough. Anti-inflammatory and Combination Medications:    1. If the patient lacks prior history of lung disease, is not using inhaled anti-inflammatory medication at home, and lacks wheezing by examination or by history for at least 24 hours, contact physician for possible discontinuation.

## 2021-10-02 NOTE — PROGRESS NOTES
This note also relates to the following rows which could not be included:  Resp - Cannot attach notes to unvalidated device data       10/02/21 1934   Vent Information   $Ventilation $Subsequent Day   Vent Type 980   Vent Mode AC/VC   Vt Ordered 400 mL   Rate Set 28 bmp   Peak Flow 70 L/min   FiO2  60 %   SpO2 91 %   SpO2/FiO2 ratio 151.67   Sensitivity 3   PEEP/CPAP 10   Humidification Source Heated wire   Humidification Temp 37   Humidification Temp Measured 37   Circuit Condensation Drained   Vent Patient Data   Peak Inspiratory Pressure 32 cmH2O   Mean Airway Pressure 16 cmH20   Rate Measured 28 br/min   Vt Exhaled 408 mL   Minute Volume 11.4 Liters   I:E Ratio 1:2.8   Plateau Pressure 22 FII34   Static Compliance 34 mL/cmH2O   Dynamic Compliance 19 mL/cmH2O   Cough/Sputum   Sputum How Obtained Endotracheal;Suctioned   Cough Productive   Sputum Amount Small   Sputum Color Creamy   Tenacity Thick   Spontaneous Breathing Trial (SBT) RT Doc   Pulse 55   Breath Sounds   Right Upper Lobe Diminished   Right Middle Lobe Diminished   Right Lower Lobe Diminished   Left Upper Lobe Diminished   Left Lower Lobe Diminished   Additional Respiratory  Assessments   Position Reverse Trendelenburg   Alarm Settings   High Pressure Alarm 40 cmH2O   Low Minute Volume Alarm 4 L/min   Apnea (secs) 20 secs   High Respiratory Rate 40 br/min   Low Exhaled Vt  250 mL   ETT (adult)   No Placement Date or Time found.    Tube Size: 8 mm  Secured at: 25 cm  Measured From: Lips   Secured at 25 cm   Measured From Lips   ET Placement Right   Secured By Commercial tube carranza   Site Condition Dry

## 2021-10-02 NOTE — PROGRESS NOTES
10/02/21 0311   Vent Information   Vent Type 980   Vent Mode AC/VC   Vt Ordered 400 mL   Rate Set 28 bmp   Peak Flow 70 L/min   FiO2  50 %   SpO2 94 %   SpO2/FiO2 ratio 188   Sensitivity 3   PEEP/CPAP 8   Humidification Source Heated wire   Humidification Temp 37   Humidification Temp Measured 37   Circuit Condensation Drained   Vent Patient Data   Peak Inspiratory Pressure 23 cmH2O   Mean Airway Pressure 13 cmH20   Rate Measured 28 br/min   Vt Exhaled 406 mL   Minute Volume 11.4 Liters   I:E Ratio 1:2.5   Plateau Pressure 26 XRL13   Cough/Sputum   Sputum How Obtained Endotracheal;Suctioned   Cough Non-productive   Sputum Amount None   Spontaneous Breathing Trial (SBT) RT Doc   Pulse 59   Breath Sounds   Right Upper Lobe Diminished   Right Middle Lobe Diminished   Right Lower Lobe Diminished   Left Upper Lobe Diminished   Left Lower Lobe Diminished   Additional Respiratory  Assessments   Resp 28   Position Prone   Alarm Settings   High Pressure Alarm 40 cmH2O   Low Minute Volume Alarm 4 L/min   Apnea (secs) 20 secs   High Respiratory Rate 40 br/min   Low Exhaled Vt  250 mL   ETT (adult)   No Placement Date or Time found.    Tube Size: 8 mm  Secured at: 25 cm  Measured From: Lips   Secured at 25 cm   Measured From Lips   ET Placement Right   Secured By Commercial tube carranza   Site Condition Dry

## 2021-10-02 NOTE — PROGRESS NOTES
End of shift note. VSS during this shift. Pt was placed back to supine at 1230, she took some time to recover, but is tolerating now. Versed is now off along with propofol. Fentanyl continues along with precedex. Pt awakens and opens her eyes, but closes them again and appears calm/comfortable. Tube feed continues with no issues. All lines remain intact.

## 2021-10-02 NOTE — PROGRESS NOTES
This note also relates to the following rows which could not be included:  Pulse - Cannot attach notes to unvalidated device data  Resp - Cannot attach notes to unvalidated device data       10/01/21 2330   Vent Information   Vent Type 980   Vent Mode AC/VC   Vt Ordered 400 mL   Rate Set 28 bmp   Peak Flow 70 L/min   FiO2  50 %   SpO2 95 %   SpO2/FiO2 ratio 190   Sensitivity 3   PEEP/CPAP 8   Humidification Source Heated wire   Humidification Temp 37   Humidification Temp Measured 37   Circuit Condensation Drained   Vent Patient Data   Peak Inspiratory Pressure 23 cmH2O   Mean Airway Pressure 13 cmH20   Rate Measured 28 br/min   Vt Exhaled 406 mL   Minute Volume 1.3 Liters   I:E Ratio 1:2.5   Plateau Pressure 21 ZCS01   Cough/Sputum   Sputum How Obtained Endotracheal;Suctioned   Cough Non-productive   Sputum Amount None   Breath Sounds   Right Upper Lobe Diminished   Right Middle Lobe Diminished   Right Lower Lobe Crackles   Left Upper Lobe Diminished   Left Lower Lobe Crackles   Additional Respiratory  Assessments   Position Prone   Alarm Settings   High Pressure Alarm 40 cmH2O   Low Minute Volume Alarm 4 L/min   Apnea (secs) 20 secs   High Respiratory Rate 40 br/min   Low Exhaled Vt  250 mL   ETT (adult)   No Placement Date or Time found.    Tube Size: 8 mm  Secured at: 25 cm  Measured From: Lips   Secured at 25 cm   Measured From Lips   ET Placement Right   Secured By Commercial tube carranza   Site Condition Dry

## 2021-10-02 NOTE — PROGRESS NOTES
enoxaparin  30 mg SubCUTAneous BID    dexamethasone  6 mg IntraVENous Q24H    mupirocin   Nasal BID    lidocaine 1 % injection  5 mL IntraDERmal Once    sodium chloride flush  5-40 mL IntraVENous 2 times per day    aspirin  81 mg Oral Daily    [Held by provider] gabapentin  800 mg Oral TID    [Held by provider] rOPINIRole  0.5 mg Oral TID    sertraline  50 mg Oral Daily    atorvastatin  40 mg Oral Daily    sodium chloride flush  5-40 mL IntraVENous 2 times per day    [Held by provider] oxyCODONE  20 mg Oral 2 times per day       Continuous Infusions:   midazolam Stopped (10/02/21 1601)    propofol Stopped (10/02/21 1340)    fentaNYL 150 mcg/hr (10/02/21 1341)    norepinephrine Stopped (09/29/21 1401)    dexmedetomidine HCl in NaCl 0.8 mcg/kg/hr (10/02/21 1342)    sodium chloride      dextrose      sodium chloride         Data:  CBC:   Recent Labs     09/30/21  0450 10/01/21  0400 10/02/21  0318   WBC 5.3 4.9 4.7   RBC 3.85* 4.29 4.10   HGB 10.2* 10.9* 10.7*   HCT 30.7* 33.5* 31.7*   MCV 79.7* 78.0* 77.3*   RDW 18.6* 18.2* 18.1*   * 147 130*     BMP:   Recent Labs     09/30/21  0450 10/01/21  0400 10/02/21  0318    139 136   K 3.5 3.9 3.9    103 101   CO2 24 25 27   BUN 23* 21* 20   CREATININE <0.5* <0.5* <0.5*     BNP: No results for input(s): BNP in the last 72 hours. PT/INR:   No results for input(s): PROTIME, INR in the last 72 hours. APTT: No results for input(s): APTT in the last 72 hours. CARDIAC ENZYMES:   No results for input(s): CKMB, CKMBINDEX, TROPONINI in the last 72 hours.     Invalid input(s): CKTOTAL;3  FASTING LIPID PANEL:  Lab Results   Component Value Date    CHOL 156 10/18/2018    HDL 39 (L) 10/18/2018    TRIG 557 (H) 10/01/2021     LIVER PROFILE:   Recent Labs     09/30/21  0450 10/01/21  0400 10/02/21  0318   AST <5* 58* <5*   ALT <5* <5* <5*   BILITOT <0.2 <0.2 <0.2   ALKPHOS 148* 169* 148*           CULTURES  COVID 19, NAAT: Detected    EKG:   Normal sinus rhythm  Left ventricular hypertrophy with repolarization abnormality  Prolonged QT  ST abnormality inferior leads consider ischemia  Abnormal ECG  When compared with ECG of 05-SEP-2020 08:35,  Premature ventricular complexes are no longer Present  T wave inversion now evident in Inferior leads  Confirmed by Amelie Alpers MD, [place]     Radiology  XR CHEST PORTABLE   Final Result   Endotracheal tube and orogastric tube unchanged, adequate position. Slightly increased extensive bilateral pulmonary disease over the past 24   hours this may relate to interstitial edema or diffuse infection or a   combination. XR CHEST PORTABLE   Final Result   Endotracheal tube with its tip approximately 4.7 cm from the saba in   satisfactory position. Enteric tube in satisfactory position. Patchy bilateral airspace disease is again noted with improved aeration of   the left mid lung. XR CHEST PORTABLE   Final Result   Some improvement in the appearance of the chest with clearing of some of the   acute airspace disease from the mid left lung. Large amount of pneumonia   remains within the right lung and lower left lung. XR CHEST PORTABLE   Final Result   1. Endotracheal tube, nasogastric tube, and right PICC line placement. 2.  Increasing multifocal opacities with consolidative area in the right   perihilar lung. Could be multifocal pneumonia with or without edema. IR PICC WO SQ PORT/PUMP > 5 YEARS   Final Result   1. See above. CT CHEST PULMONARY EMBOLISM W CONTRAST   Final Result   No evidence of pulmonary embolism. Scattered peripheral opacities in the left lung and more consolidative area   in the right lung concerning for pneumonia. Previous right thoracotomy and   upper lobectomy. One mildly enlarged right paratracheal lymph node is present but no priors   for comparison.   Nonenlarged but prominent lymph nodes in the upper abdomen   and juxta diaphragmatic region. XR CHEST PORTABLE   Final Result   Mild cardiomegaly without interstitial edema. Metallic surgical clips from prior right thoracotomy. COPD with anterior segment-right upper lobe alveolar pneumonia. Old pleural thickening was noted along the right lateral chest wall. Pleural   thickening and or trace effusion blunts the right costophrenic angle. XR CHEST PORTABLE    (Results Pending)   XR CHEST PORTABLE    (Results Pending)         Assessment:  Principal Problem:    Suspected COVID-19 virus infection  Active Problems:    Hypothyroidism    Essential hypertension    DM2 (diabetes mellitus, type 2) (HCC)    Dehydration    Hypoxia    Acute respiratory failure with hypoxia ([address]    COVID-19    Pneumonia due to COVID-19 virus    CHARLY (acute kidney injury) ([address]    Uncontrolled hypertension    Fever    Hypotension    Mild protein-calorie malnutrition (HCC)    Gram-negative pneumonia ([address]  Resolved Problems:    * No resolved hospital problems. *      Plan:    #COVID-19 pneumonia  #Acute hypoxic respiratory failure   Pseudomonas HCAP. -Unvaccinated patient  - she presented with cough, fever, dyspnea in the setting of household contact testing + COVID 19  - Her Covid testing came back positive on admission  - Initially on 2 L of oxygen on presentation. She started desaturating quickly . worsening hypoxemia requiring high flow oxygen. - Pulmonology consulted  -placed on high flow oxygen per Vapotherm 40 L,FiO2 100% ,with additional nonrebreather . Transferred to ICU on 9/28. Intubated early AM  9/29/2021  - s/p Remdesivir  - on Decadron , day # 6  - given 1 dose of Tocilizumab  -Lovenox twice daily      #Possible superimposed bacterial pneumonia  -On Rocephin and Zithromax,   - changed to zosyn with pseudomonas on resp culture - final report pending.        #Hypokalemia  - replaced       #Dehydration  -  Improved with IVF  Creatinine normal       #CAD  - cont ASA, statin  - EKG with inferior T wave abnormality. She denies CP.   Trop neg   -telemetry monitoring      #History of tracheobronchomalacia  Has had surgical treatment for this       #HTN  -Blood pressure low now,  required pressors       #Chronic pain   History of hip fracture  - cont home oxycodone BID and percocet   - cont gabapentin and requip       #DM2  - use ssi        #Hypothyroidism  - cont synthroid         DVT Prophylaxis: Lovenox   Diet NPO  ADULT TUBE FEEDING; Orogastric; Peptide Based; Continuous; 20; Yes; 15; Q 4 hours; 45; 30; Q 3 hours; Protein; one proteinex P2Go once daily via feeding tube  Code Status: Full Code        Sarah Warner MD, MD 10/2/2021 4:04 PM

## 2021-10-03 ENCOUNTER — APPOINTMENT (OUTPATIENT)
Dept: GENERAL RADIOLOGY | Age: 66
DRG: 004 | End: 2021-10-03
Payer: MEDICARE

## 2021-10-03 LAB
A/G RATIO: 0.8 (ref 1.1–2.2)
ALBUMIN SERPL-MCNC: 2.8 G/DL (ref 3.4–5)
ALP BLD-CCNC: 162 U/L (ref 40–129)
ALT SERPL-CCNC: 26 U/L (ref 10–40)
ANION GAP SERPL CALCULATED.3IONS-SCNC: 10 MMOL/L (ref 3–16)
AST SERPL-CCNC: 60 U/L (ref 15–37)
BASE EXCESS ARTERIAL: 2.7 MMOL/L (ref -3–3)
BASOPHILS ABSOLUTE: 0 K/UL (ref 0–0.2)
BASOPHILS RELATIVE PERCENT: 0.3 %
BILIRUB SERPL-MCNC: 0.4 MG/DL (ref 0–1)
BUN BLDV-MCNC: 27 MG/DL (ref 7–20)
CALCIUM SERPL-MCNC: 8.4 MG/DL (ref 8.3–10.6)
CARBOXYHEMOGLOBIN ARTERIAL: 0.3 % (ref 0–1.5)
CHLORIDE BLD-SCNC: 100 MMOL/L (ref 99–110)
CO2: 26 MMOL/L (ref 21–32)
CREAT SERPL-MCNC: <0.5 MG/DL (ref 0.6–1.2)
CULTURE, RESPIRATORY: ABNORMAL
CULTURE, RESPIRATORY: ABNORMAL
EOSINOPHILS ABSOLUTE: 0 K/UL (ref 0–0.6)
EOSINOPHILS RELATIVE PERCENT: 0.1 %
GFR AFRICAN AMERICAN: >60
GFR NON-AFRICAN AMERICAN: >60
GLOBULIN: 3.5 G/DL
GLUCOSE BLD-MCNC: 216 MG/DL (ref 70–99)
GLUCOSE BLD-MCNC: 219 MG/DL (ref 70–99)
GLUCOSE BLD-MCNC: 236 MG/DL (ref 70–99)
GLUCOSE BLD-MCNC: 242 MG/DL (ref 70–99)
GLUCOSE BLD-MCNC: 245 MG/DL (ref 70–99)
GLUCOSE BLD-MCNC: 266 MG/DL (ref 70–99)
GRAM STAIN RESULT: ABNORMAL
HCO3 ARTERIAL: 27.2 MMOL/L (ref 21–29)
HCT VFR BLD CALC: 33.5 % (ref 36–48)
HEMOGLOBIN, ART, EXTENDED: 11.9 G/DL (ref 12–16)
HEMOGLOBIN: 10.8 G/DL (ref 12–16)
LYMPHOCYTES ABSOLUTE: 0.6 K/UL (ref 1–5.1)
LYMPHOCYTES RELATIVE PERCENT: 9.9 %
MCH RBC QN AUTO: 24.8 PG (ref 26–34)
MCHC RBC AUTO-ENTMCNC: 32.1 G/DL (ref 31–36)
MCV RBC AUTO: 77.3 FL (ref 80–100)
METHEMOGLOBIN ARTERIAL: 0.3 %
MONOCYTES ABSOLUTE: 0.4 K/UL (ref 0–1.3)
MONOCYTES RELATIVE PERCENT: 7.2 %
NEUTROPHILS ABSOLUTE: 4.6 K/UL (ref 1.7–7.7)
NEUTROPHILS RELATIVE PERCENT: 82.5 %
O2 SAT, ARTERIAL: 96.8 %
O2 THERAPY: ABNORMAL
ORGANISM: ABNORMAL
PCO2 ARTERIAL: 41.2 MMHG (ref 35–45)
PDW BLD-RTO: 18 % (ref 12.4–15.4)
PERFORMED ON: ABNORMAL
PH ARTERIAL: 7.44 (ref 7.35–7.45)
PLATELET # BLD: 133 K/UL (ref 135–450)
PMV BLD AUTO: 8.9 FL (ref 5–10.5)
PO2 ARTERIAL: 86.7 MMHG (ref 75–108)
POTASSIUM REFLEX MAGNESIUM: 3.6 MMOL/L (ref 3.5–5.1)
RBC # BLD: 4.34 M/UL (ref 4–5.2)
SODIUM BLD-SCNC: 136 MMOL/L (ref 136–145)
TCO2 ARTERIAL: 28.4 MMOL/L
TOTAL PROTEIN: 6.3 G/DL (ref 6.4–8.2)
WBC # BLD: 5.6 K/UL (ref 4–11)

## 2021-10-03 PROCEDURE — 80053 COMPREHEN METABOLIC PANEL: CPT

## 2021-10-03 PROCEDURE — 94640 AIRWAY INHALATION TREATMENT: CPT

## 2021-10-03 PROCEDURE — C9113 INJ PANTOPRAZOLE SODIUM, VIA: HCPCS | Performed by: INTERNAL MEDICINE

## 2021-10-03 PROCEDURE — 94761 N-INVAS EAR/PLS OXIMETRY MLT: CPT

## 2021-10-03 PROCEDURE — 85025 COMPLETE CBC W/AUTO DIFF WBC: CPT

## 2021-10-03 PROCEDURE — 2500000003 HC RX 250 WO HCPCS: Performed by: INTERNAL MEDICINE

## 2021-10-03 PROCEDURE — 2700000000 HC OXYGEN THERAPY PER DAY

## 2021-10-03 PROCEDURE — 6360000002 HC RX W HCPCS: Performed by: INTERNAL MEDICINE

## 2021-10-03 PROCEDURE — 6370000000 HC RX 637 (ALT 250 FOR IP): Performed by: INTERNAL MEDICINE

## 2021-10-03 PROCEDURE — 99291 CRITICAL CARE FIRST HOUR: CPT | Performed by: INTERNAL MEDICINE

## 2021-10-03 PROCEDURE — 94003 VENT MGMT INPAT SUBQ DAY: CPT

## 2021-10-03 PROCEDURE — 82803 BLOOD GASES ANY COMBINATION: CPT

## 2021-10-03 PROCEDURE — 71045 X-RAY EXAM CHEST 1 VIEW: CPT

## 2021-10-03 PROCEDURE — 2580000003 HC RX 258: Performed by: HOSPITALIST

## 2021-10-03 PROCEDURE — 6370000000 HC RX 637 (ALT 250 FOR IP): Performed by: HOSPITALIST

## 2021-10-03 PROCEDURE — 2580000003 HC RX 258: Performed by: INTERNAL MEDICINE

## 2021-10-03 PROCEDURE — 2000000000 HC ICU R&B

## 2021-10-03 RX ORDER — FUROSEMIDE 10 MG/ML
40 INJECTION INTRAMUSCULAR; INTRAVENOUS ONCE
Status: COMPLETED | OUTPATIENT
Start: 2021-10-03 | End: 2021-10-03

## 2021-10-03 RX ORDER — POTASSIUM CHLORIDE 29.8 MG/ML
20 INJECTION INTRAVENOUS
Status: COMPLETED | OUTPATIENT
Start: 2021-10-03 | End: 2021-10-03

## 2021-10-03 RX ADMIN — ASPIRIN 81 MG: 81 TABLET, CHEWABLE ORAL at 07:42

## 2021-10-03 RX ADMIN — PIPERACILLIN SODIUM AND TAZOBACTAM SODIUM 3375 MG: 3; .375 INJECTION, POWDER, LYOPHILIZED, FOR SOLUTION INTRAVENOUS at 18:07

## 2021-10-03 RX ADMIN — INSULIN LISPRO 2 UNITS: 100 INJECTION, SOLUTION INTRAVENOUS; SUBCUTANEOUS at 04:46

## 2021-10-03 RX ADMIN — Medication 150 MCG/HR: at 12:05

## 2021-10-03 RX ADMIN — CHLORHEXIDINE GLUCONATE 0.12% ORAL RINSE 15 ML: 1.2 LIQUID ORAL at 20:43

## 2021-10-03 RX ADMIN — PIPERACILLIN SODIUM AND TAZOBACTAM SODIUM 3375 MG: 3; .375 INJECTION, POWDER, LYOPHILIZED, FOR SOLUTION INTRAVENOUS at 13:53

## 2021-10-03 RX ADMIN — Medication 1.2 MCG/KG/HR: at 13:55

## 2021-10-03 RX ADMIN — SODIUM CHLORIDE, PRESERVATIVE FREE 10 ML: 5 INJECTION INTRAVENOUS at 20:44

## 2021-10-03 RX ADMIN — SODIUM CHLORIDE, PRESERVATIVE FREE 10 ML: 5 INJECTION INTRAVENOUS at 20:43

## 2021-10-03 RX ADMIN — CHLORHEXIDINE GLUCONATE 0.12% ORAL RINSE 15 ML: 1.2 LIQUID ORAL at 07:42

## 2021-10-03 RX ADMIN — INSULIN LISPRO 2 UNITS: 100 INJECTION, SOLUTION INTRAVENOUS; SUBCUTANEOUS at 00:10

## 2021-10-03 RX ADMIN — FUROSEMIDE 40 MG: 10 INJECTION, SOLUTION INTRAMUSCULAR; INTRAVENOUS at 10:47

## 2021-10-03 RX ADMIN — SODIUM CHLORIDE, PRESERVATIVE FREE 10 ML: 5 INJECTION INTRAVENOUS at 07:42

## 2021-10-03 RX ADMIN — Medication 150 MCG/HR: at 04:49

## 2021-10-03 RX ADMIN — DEXAMETHASONE SODIUM PHOSPHATE 6 MG: 10 INJECTION INTRAMUSCULAR; INTRAVENOUS at 13:53

## 2021-10-03 RX ADMIN — IPRATROPIUM BROMIDE AND ALBUTEROL SULFATE 1 AMPULE: .5; 2.5 SOLUTION RESPIRATORY (INHALATION) at 19:30

## 2021-10-03 RX ADMIN — INSULIN LISPRO 2 UNITS: 100 INJECTION, SOLUTION INTRAVENOUS; SUBCUTANEOUS at 12:04

## 2021-10-03 RX ADMIN — ATORVASTATIN CALCIUM 40 MG: 40 TABLET, FILM COATED ORAL at 07:42

## 2021-10-03 RX ADMIN — POTASSIUM CHLORIDE 20 MEQ: 400 INJECTION, SOLUTION INTRAVENOUS at 10:46

## 2021-10-03 RX ADMIN — SODIUM CHLORIDE, PRESERVATIVE FREE 10 ML: 5 INJECTION INTRAVENOUS at 07:43

## 2021-10-03 RX ADMIN — PANTOPRAZOLE SODIUM 40 MG: 40 INJECTION, POWDER, FOR SOLUTION INTRAVENOUS at 07:42

## 2021-10-03 RX ADMIN — IPRATROPIUM BROMIDE AND ALBUTEROL SULFATE 1 AMPULE: .5; 2.5 SOLUTION RESPIRATORY (INHALATION) at 02:57

## 2021-10-03 RX ADMIN — POTASSIUM CHLORIDE 20 MEQ: 400 INJECTION, SOLUTION INTRAVENOUS at 11:58

## 2021-10-03 RX ADMIN — LEVOTHYROXINE SODIUM 100 MCG: 100 TABLET ORAL at 07:42

## 2021-10-03 RX ADMIN — DIAZEPAM 10 MG: 10 TABLET ORAL at 20:42

## 2021-10-03 RX ADMIN — ENOXAPARIN SODIUM 30 MG: 30 INJECTION SUBCUTANEOUS at 20:42

## 2021-10-03 RX ADMIN — IPRATROPIUM BROMIDE AND ALBUTEROL SULFATE 1 AMPULE: .5; 2.5 SOLUTION RESPIRATORY (INHALATION) at 23:42

## 2021-10-03 RX ADMIN — Medication 1.2 MCG/KG/HR: at 22:39

## 2021-10-03 RX ADMIN — INSULIN LISPRO 2 UNITS: 100 INJECTION, SOLUTION INTRAVENOUS; SUBCUTANEOUS at 07:49

## 2021-10-03 RX ADMIN — Medication 1 MCG/KG/HR: at 05:19

## 2021-10-03 RX ADMIN — INSULIN LISPRO 3 UNITS: 100 INJECTION, SOLUTION INTRAVENOUS; SUBCUTANEOUS at 19:40

## 2021-10-03 RX ADMIN — DIAZEPAM 10 MG: 10 TABLET ORAL at 07:42

## 2021-10-03 RX ADMIN — SERTRALINE HYDROCHLORIDE 50 MG: 50 TABLET ORAL at 07:42

## 2021-10-03 RX ADMIN — Medication 150 MCG/HR: at 18:14

## 2021-10-03 RX ADMIN — Medication 1 MCG/KG/HR: at 00:21

## 2021-10-03 RX ADMIN — IPRATROPIUM BROMIDE AND ALBUTEROL SULFATE 1 AMPULE: .5; 2.5 SOLUTION RESPIRATORY (INHALATION) at 07:42

## 2021-10-03 RX ADMIN — INSULIN LISPRO 2 UNITS: 100 INJECTION, SOLUTION INTRAVENOUS; SUBCUTANEOUS at 18:09

## 2021-10-03 RX ADMIN — IPRATROPIUM BROMIDE AND ALBUTEROL SULFATE 1 AMPULE: .5; 2.5 SOLUTION RESPIRATORY (INHALATION) at 11:28

## 2021-10-03 RX ADMIN — IPRATROPIUM BROMIDE AND ALBUTEROL SULFATE 1 AMPULE: .5; 2.5 SOLUTION RESPIRATORY (INHALATION) at 15:38

## 2021-10-03 RX ADMIN — MIDAZOLAM HYDROCHLORIDE 2 MG: 1 INJECTION, SOLUTION INTRAMUSCULAR; INTRAVENOUS at 07:53

## 2021-10-03 RX ADMIN — DIAZEPAM 10 MG: 10 TABLET ORAL at 13:54

## 2021-10-03 RX ADMIN — Medication 1.2 MCG/KG/HR: at 18:08

## 2021-10-03 RX ADMIN — PIPERACILLIN SODIUM AND TAZOBACTAM SODIUM 3375 MG: 3; .375 INJECTION, POWDER, LYOPHILIZED, FOR SOLUTION INTRAVENOUS at 02:21

## 2021-10-03 RX ADMIN — ENOXAPARIN SODIUM 30 MG: 30 INJECTION SUBCUTANEOUS at 07:42

## 2021-10-03 ASSESSMENT — PULMONARY FUNCTION TESTS
PIF_VALUE: 30
PIF_VALUE: 26
PIF_VALUE: 29
PIF_VALUE: 23
PIF_VALUE: 26
PIF_VALUE: 21

## 2021-10-03 ASSESSMENT — PAIN SCALES - GENERAL: PAINLEVEL_OUTOF10: 0

## 2021-10-03 NOTE — PROGRESS NOTES
FiO2 decreased to 55%       10/03/21 0751   Vent Information   Vent Type 840   Vent Mode AC/VC   Vt Ordered 400 mL   Rate Set 28 bmp   Peak Flow 70 L/min   FiO2  55 %   SpO2 100 %   SpO2/FiO2 ratio 181.82   Sensitivity 3   PEEP/CPAP 10   Humidification Source Heated wire   Humidification Temp Measured 37   Vent Patient Data   Peak Inspiratory Pressure 26 cmH2O   Mean Airway Pressure 12 cmH20   Rate Measured 30 br/min   Vt Exhaled 329 mL   Minute Volume 11.9 Liters   I:E Ratio 1:2.5   Plateau Pressure 22 RBP23   Cough/Sputum   Sputum How Obtained Endotracheal;Suctioned   Cough Productive   Sputum Amount Small   Sputum Color Creamy   Spontaneous Breathing Trial (SBT) RT Doc   Pulse 60   Breath Sounds   Right Upper Lobe Diminished   Right Middle Lobe Diminished   Right Lower Lobe Diminished   Left Upper Lobe Diminished   Left Lower Lobe Diminished   Additional Respiratory  Assessments   Resp 28   Position Reverse Trendelenburg   Oral Care Completed? Yes   Oral Care Mouth suctioned   Cuff Pressure (cm H2O) 18 cm H2O   Alarm Settings   High Pressure Alarm 40 cmH2O   Low Minute Volume Alarm 4 L/min   Apnea (secs) 20 secs   High Respiratory Rate 40 br/min   Low Exhaled Vt  250 mL   ETT (adult)   No Placement Date or Time found.    Tube Size: 8 mm  Secured at: 25 cm  Measured From: Lips   Secured at 25 cm   Measured From Lips   ET Placement Right   Secured By Commercial tube carranza

## 2021-10-03 NOTE — PROGRESS NOTES
10/03/21 0257   Vent Information   Vent Type 980   Vent Mode AC/VC   Vt Ordered 400 mL   Rate Set 28 bmp   Peak Flow 70 L/min   FiO2  60 %   SpO2 (!) 89 %   SpO2/FiO2 ratio 148.33   Sensitivity 3   PEEP/CPAP 10   Humidification Source Heated wire   Humidification Temp 37   Humidification Temp Measured 37   Circuit Condensation Drained   Vent Patient Data   Peak Inspiratory Pressure 26 cmH2O   Mean Airway Pressure 16 cmH20   Rate Measured 28 br/min   Vt Exhaled 442 mL   Minute Volume 11.5 Liters   I:E Ratio 1:2.5   Plateau Pressure 25 RSW13   Cough/Sputum   Sputum How Obtained Endotracheal;Suctioned   Cough Productive   Sputum Amount Small   Sputum Color Creamy   Tenacity Thick   Spontaneous Breathing Trial (SBT) RT Doc   Pulse 59   Breath Sounds   Right Upper Lobe Diminished   Right Middle Lobe Diminished   Right Lower Lobe Diminished   Left Upper Lobe Diminished   Left Lower Lobe Diminished   Additional Respiratory  Assessments   Resp 28   Position Prone   ETT (adult)   No Placement Date or Time found.    Tube Size: 8 mm  Secured at: 25 cm  Measured From: Lips   Secured at 25 cm   Measured From Lips   ET Placement Left   Secured By Commercial tube carranza   Site Condition Dry

## 2021-10-03 NOTE — PROGRESS NOTES
End of shift note. VSS during this shift, telemetry SR. All lines remain intact. Sedation continues with fentanyl and precedex. Lasix was again given today with increased urine output. Fi02 and peep were decreased this shift.

## 2021-10-03 NOTE — PROGRESS NOTES
Psych: Noncommunicative unable to obtain      Scheduled Meds:   piperacillin-tazobactam  3,375 mg IntraVENous Q8H    diazePAM  10 mg Oral TID    chlorhexidine  15 mL Mouth/Throat BID    pantoprazole  40 mg IntraVENous Daily    ipratropium-albuterol  1 ampule Inhalation Q4H    insulin lispro  0-6 Units SubCUTAneous Q4H    levothyroxine  100 mcg Oral Daily    enoxaparin  30 mg SubCUTAneous BID    dexamethasone  6 mg IntraVENous Q24H    mupirocin   Nasal BID    lidocaine 1 % injection  5 mL IntraDERmal Once    sodium chloride flush  5-40 mL IntraVENous 2 times per day    aspirin  81 mg Oral Daily    [Held by provider] gabapentin  800 mg Oral TID    [Held by provider] rOPINIRole  0.5 mg Oral TID    sertraline  50 mg Oral Daily    atorvastatin  40 mg Oral Daily    sodium chloride flush  5-40 mL IntraVENous 2 times per day    [Held by provider] oxyCODONE  20 mg Oral 2 times per day       Data:  CBC:   Recent Labs     10/01/21  0400 10/02/21  0318 10/03/21  0429   WBC 4.9 4.7 5.6   HGB 10.9* 10.7* 10.8*   HCT 33.5* 31.7* 33.5*   MCV 78.0* 77.3* 77.3*    130* 133*     BMP:   Recent Labs     10/01/21  0400 10/02/21  0318 10/03/21  0429    136 136   K 3.9 3.9 3.6    101 100   CO2 25 27 26   BUN 21* 20 27*   CREATININE <0.5* <0.5* <0.5*     LIVER PROFILE:   Recent Labs     10/01/21  0400 10/02/21  0318 10/03/21  0429   AST 58* <5* 60*   ALT <5* <5* 26   BILITOT <0.2 <0.2 0.4   ALKPHOS 169* 148* 162*       Microbiology:  9/27 COVID-19 detected  9/30 Resp Pseudomonas fluorescensAbnormal       Imaging:  Chest x-ray 10/3 imaging reviewed by me and showed   Satisfactory ETT position  Satisfactory PICC line position   Bilateral ASD - better     CTPA 9/27 imaging was reviewed by me and showed   No evidence of pulmonary embolism.    Scattered peripheral opacities in the left lung and more consolidative area   in the right lung concerning for pneumonia.  Previous right thoracotomy and   upper lobectomy. One mildly enlarged right paratracheal lymph node is present but no priors   for comparison.  Nonenlarged but prominent lymph nodes in the upper abdomen   and juxta diaphragmatic region        ASSESSMENT:  · Acute hypoxemic respiratory failure  · COVID-19 viral pneumonia  · Pseudomonas PNA   · Hypotension-probably sedation related  · Transaminitis  · Fever T-max 101.5  · Acute kidney injury  · CAD  · Chronic pain syndrome - narcotics and Neurontin         PLAN:  Mechanical ventilation as per my orders. The ventilator was adjusted by me at the bedside for unstable, life threatening respiratory failure  Follow ABG and chest x-ray while on the ventilator  IV precededx for sedation, target RASS -2, with daily spontaneous awakening trial   Nimbex off   Prone ventilation - off   Fentanyl and Versed PRN, gtt as needed  Valium 10 mg PO TID   Head of bed 30 degrees or higher at all times  Daily spontaneous breathing trial once PEEP less than 8, FiO2 less than 55%  Droplet plus isolation   IV antibiotics to include Zosyn D#2. Completed 5 days of Ceftriaxone/Zithromax   IV Levophed to keep MAP > 65 mmHg or SBP>90  Follow-up cultures   Dexamethasone 6 mg IV D#7  Completed Remdesivir day #5/5  Post 1 dose of Tocilizumab  Hold Norvasc and losartan  Hold Narcotics Requip and Neurontin    Repeat Lasix 40 mg IV x 1   KCL  Lovenox BID   MRSA prophylaxis: Bactroban  Patient is full code           Total critical care time caring for this patient with life threatening, unstable organ failure, including direct patient contact, management of life support systems, review of data including imaging and labs, discussions with other team members and physicians is 32 minutes, excluding procedures.

## 2021-10-03 NOTE — PROGRESS NOTES
Peep decraesed to 8cwp         10/03/21 1541   Vent Information   Vent Type 980   Vent Mode AC/VC   Vt Ordered 400 mL   Rate Set 28 bmp   Peak Flow 70 L/min   FiO2  50 %   SpO2 94 %   SpO2/FiO2 ratio 188   Sensitivity 3   PEEP/CPAP 8   Humidification Source Heated wire   Humidification Temp Measured 37   Vent Patient Data   Peak Inspiratory Pressure 23 cmH2O   Mean Airway Pressure 13 cmH20   Rate Measured 31 br/min   Vt Exhaled 406 mL   Minute Volume 14 Liters   I:E Ratio 1:2.5   Cough/Sputum   Sputum How Obtained Endotracheal;Suctioned   Cough Non-productive   Spontaneous Breathing Trial (SBT) RT Doc   Pulse 62   Breath Sounds   Right Upper Lobe Diminished   Right Middle Lobe Diminished   Right Lower Lobe Diminished   Left Upper Lobe Diminished   Left Lower Lobe Diminished   Additional Respiratory  Assessments   Resp 27   Position Reverse Trendelenburg   Oral Care Completed? Yes   Oral Care Mouth suctioned   Subglottic Suction Done? Yes   Alarm Settings   High Pressure Alarm 40 cmH2O   Low Minute Volume Alarm 4 L/min   Apnea (secs) 20 secs   High Respiratory Rate 40 br/min   Low Exhaled Vt  250 mL   ETT (adult)   No Placement Date or Time found.    Tube Size: 8 mm  Secured at: 25 cm  Measured From: Lips   Secured at 25 cm   Measured From [address] [employer] tube carranza   Site Condition Dry

## 2021-10-03 NOTE — PROGRESS NOTES
Shift assessment is complete. ET/OG are intact. DENISE PICC is dry/intact. Gray intact and draining yellow/clear. Precedex and fentanyl continue for sedation, RASS -2, patient easily awakens and follows commands. VSS, telemetry SR. Bilateral wrist restraints continue for safety of airway.

## 2021-10-03 NOTE — PROGRESS NOTES
10/03/21 1931   Vent Information   $Ventilation $Subsequent Day   Vent Type 980   Vent Mode AC/VC   Vt Ordered 400 mL   Rate Set 28 bmp   Peak Flow 70 L/min   FiO2  50 %   SpO2 90 %   SpO2/FiO2 ratio 180   Sensitivity 3   PEEP/CPAP 8   Humidification Source Heated wire   Humidification Temp 37   Humidification Temp Measured 37   Circuit Condensation Drained   Vent Patient Data   Peak Inspiratory Pressure 21 cmH2O   Mean Airway Pressure 13 cmH20   Rate Measured 30 br/min   Vt Exhaled 413 mL   Minute Volume 11.4 Liters   I:E Ratio 1:2.3   Plateau Pressure 19 USB03   Static Compliance 38 mL/cmH2O   Dynamic Compliance 32 mL/cmH2O   Cough/Sputum   Sputum How Obtained Endotracheal;Suctioned   Cough Productive   Sputum Amount Small   Sputum Color Creamy   Tenacity Thick   Spontaneous Breathing Trial (SBT) RT Doc   Pulse 59   Breath Sounds   Right Upper Lobe Diminished   Right Middle Lobe Diminished   Right Lower Lobe Diminished   Left Upper Lobe Diminished   Left Lower Lobe Diminished   Additional Respiratory  Assessments   Resp 28   Position Semi-Jesus's   Alarm Settings   High Pressure Alarm 40 cmH2O   Low Minute Volume Alarm 4 L/min   Apnea (secs) 20 secs   High Respiratory Rate 40 br/min   Low Exhaled Vt  250 mL   ETT (adult)   No Placement Date or Time found.    Tube Size: 8 mm  Secured at: 25 cm  Measured From: Lips   Secured at 25 cm   Measured From Lips   ET Placement Left   Secured By Commercial tube carranza   Site Condition Dry

## 2021-10-03 NOTE — PROGRESS NOTES
FiO2 decreased to 50%           10/03/21 1130   Vent Information   Skin Assessment Clean, dry, & intact   Vent Type 980   Vent Mode AC/VC   Vt Ordered 400 mL   Rate Set 28 bmp   Peak Flow 70 L/min   FiO2  50 %   SpO2 95 %   SpO2/FiO2 ratio 190   Sensitivity 3   PEEP/CPAP 10   Humidification Source Heated wire   Humidification Temp Measured 37   Vent Patient Data   Peak Inspiratory Pressure 30 cmH2O   Mean Airway Pressure 17 cmH20   Rate Measured 32 br/min   Vt Exhaled 415 mL   Minute Volume 14 Liters   I:E Ratio 1:2   Cough/Sputum   Sputum How Obtained Endotracheal;Suctioned   Cough Productive   Sputum Amount Moderate   Sputum Color Creamy; Tan   Tenacity Thick   Breath Sounds   Right Upper Lobe Diminished   Right Middle Lobe Diminished   Right Lower Lobe Diminished   Left Upper Lobe Diminished   Left Lower Lobe Diminished   Additional Respiratory  Assessments   Position Reverse Trendelenburg   Oral Care Completed? Yes   Oral Care Mouth suctioned   Subglottic Suction Done? Yes   Alarm Settings   High Pressure Alarm 40 cmH2O   Low Minute Volume Alarm 4 L/min   Apnea (secs) 20 secs   High Respiratory Rate 40 br/min   Low Exhaled Vt  250 mL   ETT (adult)   No Placement Date or Time found.    Tube Size: 8 mm  Secured at: 25 cm  Measured From: Lips   Secured at 25 cm   Measured From [address] By [employer] tube carranza   Site Condition Dry

## 2021-10-03 NOTE — PROGRESS NOTES
10/02/21 2340   Vent Information   Vent Type 980   Vent Mode AC/VC   Vt Ordered 400 mL   Rate Set 28 bmp   Peak Flow 70 L/min   FiO2  60 %   SpO2 95 %   SpO2/FiO2 ratio 158.33   Sensitivity 3   PEEP/CPAP 10   Humidification Source Heated wire   Humidification Temp 37   Humidification Temp Measured 36.9   Circuit Condensation Drained   Vent Patient Data   Peak Inspiratory Pressure 35 cmH2O   Mean Airway Pressure 17 cmH20   Rate Measured 27 br/min   Vt Exhaled 406 mL   Minute Volume 10.9 Liters   I:E Ratio 1:2.5   Plateau Pressure 23 [id number]   Cough/Sputum   Sputum How Obtained Suctioned;Endotracheal   Cough Productive   Sputum Amount Small   Sputum Color Creamy   Tenacity Thick   Spontaneous Breathing Trial (SBT) RT Doc   Pulse 55   Breath Sounds   Right Upper Lobe Diminished   Right Middle Lobe Diminished   Right Lower Lobe Diminished   Left Upper Lobe Diminished   Left Lower Lobe Diminished   Additional Respiratory  Assessments   Resp 28   Position Semi-Jesus's   Oral Care Completed? Yes   Oral Care Mouth suctioned   Subglottic Suction Done? Yes   Alarm Settings   High Pressure Alarm 40 cmH2O   Low Minute Volume Alarm 4 L/min   Apnea (secs) 20 secs   High Respiratory Rate 40 br/min   Low Exhaled Vt  250 mL   ETT (adult)   No Placement Date or Time found.    Tube Size: 8 mm  Secured at: 25 cm  Measured From: Lips   Secured at 25 cm   Measured From Lips   ET Placement Left   Secured By [facility] tube carranza   Site Condition Dry        10/02/21 2340   Vent Information   Vent Type 980   Vent Mode AC/VC   Vt Ordered 400 mL   Rate Set 28 bmp   Peak Flow 70 L/min   FiO2  60 %   SpO2 95 %   SpO2/FiO2 ratio 158.33   Sensitivity 3   PEEP/CPAP 10   Humidification Source Heated wire   Humidification Temp 37   Humidification Temp Measured 36.9   Circuit Condensation Drained   Vent Patient Data   Peak Inspiratory Pressure 35 cmH2O   Mean Airway Pressure 17 cmH20   Rate Measured 27 br/min   Vt Exhaled 406 mL   Minute Volume 10.9 Liters   I:E Ratio 1:2.5   Plateau Pressure 23 [id number]   Cough/Sputum   Sputum How Obtained Suctioned;Endotracheal   Cough Productive   Sputum Amount Small   Sputum Color Creamy   Tenacity Thick   Spontaneous Breathing Trial (SBT) RT Doc   Pulse 55   Breath Sounds   Right Upper Lobe Diminished   Right Middle Lobe Diminished   Right Lower Lobe Diminished   Left Upper Lobe Diminished   Left Lower Lobe Diminished   Additional Respiratory  Assessments   Resp 28   Position Semi-Jesus's   Oral Care Completed? Yes   Oral Care Mouth suctioned   Subglottic Suction Done? Yes   Alarm Settings   High Pressure Alarm 40 cmH2O   Low Minute Volume Alarm 4 L/min   Apnea (secs) 20 secs   High Respiratory Rate 40 br/min   Low Exhaled Vt  250 mL   ETT (adult)   No Placement Date or Time found.    Tube Size: 8 mm  Secured at: 25 cm  Measured From: Lips   Secured at 25 cm   Measured From Lips   ET Placement Left   Secured By [employer] tube carranza   Site Condition Dry

## 2021-10-03 NOTE — PROGRESS NOTES
ICU Progress Note    Admit Date:  9/26/2021      Interval history:  Admitted with COVID-19 pneumonia,  acute hypoxic respiratory failure. Patient not vaccinated  Progressive significant worsening hypoxemia overnight. Patient was on 2 L of oxygen on admission-> switched to high flow oxygen per Vapotherm with additional 100% nonrebreather on 9/28/2021-> transferred to ICU. Worsening hypoxemia overnight. Intubated and placed on mechanical ventilation early AM on 9/29/2021. Placed in prone positioning. Did not require paralytics. 10/1 Patient desaturated when a supine position attempted today, back on prone ventilation    Ms. Lucas seen. Seen in prone position . Remains intubated, sedated ,on mechanical ventilation. .   On FiO2  55%. PEEP 8  Weaned off of pressors      10/2  resp culture growing pseudomonas - started zosyn. 10/3  PEEP 10, FiO2 50%. RASS -1 - she wakes up to voice and able to follow commands and responds appropriately on the vent today. She is on fentanyl precedex and Zosyn infusing. Objective:   /64   Pulse 75   Temp 98.2 °F (36.8 °C) (Axillary)   Resp 25   Ht 5' 9\" (1.753 m)   Wt 180 lb 14.4 oz (82.1 kg)   SpO2 91%   BMI 26.71 kg/m²       Intake/Output Summary (Last 24 hours) at 10/3/2021 1832  Last data filed at 10/3/2021 1823  Gross per 24 hour   Intake 2621.76 ml   Output 3275 ml   Net -653.24 ml         Physical Exam:  General: Intubated, sedated on mechanical ventilation   Prone positioning  Skin:  Warm and dry  Neck:  JVD absent. Neck supple  Chest: diminished breath sounds, improved air entry. No wheezes rales or rhonchi  Cardiovascular:  RRR ,S1S2 normal  Abdomen:  Soft, non tender, non distended, BS +  Extremities:  No edema. Intact peripheral pulses.  Brisk cap refill, < 2 secs  Neuro: non focal      Medications:   Scheduled Meds:   piperacillin-tazobactam  3,375 mg IntraVENous Q8H    diazePAM  10 mg Oral TID    chlorhexidine  15 mL of pulmonary embolism. Scattered peripheral opacities in the left lung and more consolidative area   in the right lung concerning for pneumonia. Previous right thoracotomy and   upper lobectomy. One mildly enlarged right paratracheal lymph node is present but no priors   for comparison. Nonenlarged but prominent lymph nodes in the upper abdomen   and juxta diaphragmatic region. XR CHEST PORTABLE   Final Result   Mild cardiomegaly without interstitial edema. Metallic surgical clips from prior right thoracotomy. COPD with anterior segment-right upper lobe alveolar pneumonia. Old pleural thickening was noted along the right lateral chest wall. Pleural   thickening and or trace effusion blunts the right costophrenic angle. XR CHEST PORTABLE    (Results Pending)   XR CHEST PORTABLE    (Results Pending)         Assessment:  Principal Problem:    Suspected COVID-19 virus infection  Active Problems:    Hypothyroidism    Essential hypertension    DM2 (diabetes mellitus, type 2) (HCC)    Dehydration    Hypoxia    Acute respiratory failure with hypoxia ([address]    COVID-19    Pneumonia due to COVID-19 virus    CHARLY (acute kidney injury) ([address]    Uncontrolled hypertension    Fever    Hypotension    Mild protein-calorie malnutrition (HCC)    Gram-negative pneumonia ([address]  Resolved Problems:    * No resolved hospital problems. *      Plan:    #COVID-19 pneumonia  #Acute hypoxic respiratory failure   Pseudomonas HCAP. -Unvaccinated patient  - she presented with cough, fever, dyspnea in the setting of household contact testing + COVID 19  - Her Covid testing came back positive on admission  - Initially on 2 L of oxygen on presentation. She started desaturating quickly . worsening hypoxemia requiring high flow oxygen. - Pulmonology consulted  -placed on high flow oxygen per Vapotherm 40 L,FiO2 100% ,with additional nonrebreather . Transferred to ICU on 9/28.  Intubated early AM 9/29/2021  - s/p Remdesivir  - on Decadron , day # 7  - given 1 dose of Tocilizumab  -Lovenox twice daily      #Possible superimposed bacterial pneumonia  -On Rocephin and Zithromax,   - changed to zosyn on 10/2 with pseudomonas on resp culture - final report pending. #Hypokalemia  - replaced       #Dehydration  -  Improved with IVF  Creatinine normal       #CAD  - cont ASA, statin  - EKG with inferior T wave abnormality. She denies CP.   Trop neg   -telemetry monitoring      #History of tracheobronchomalacia  Has had surgical treatment for this       #HTN  -Blood pressure low now,  required pressors       #Chronic pain   History of hip fracture  - cont home oxycodone BID and percocet   - cont gabapentin and requip       #DM2  - use ssi        #Hypothyroidism  - cont synthroid         DVT Prophylaxis: Lovenox   Diet NPO  ADULT TUBE FEEDING; Orogastric; Peptide Based; Continuous; 20; Yes; 15; Q 4 hours; 45; 30; Q 3 hours; Protein; one proteinex P2Go once daily via feeding tube  Code Status: Full Code        Luz Maria Garcia MD, MD 10/3/2021 6:32 PM

## 2021-10-04 ENCOUNTER — APPOINTMENT (OUTPATIENT)
Dept: GENERAL RADIOLOGY | Age: 66
DRG: 004 | End: 2021-10-04
Payer: MEDICARE

## 2021-10-04 LAB
A/G RATIO: 0.9 (ref 1.1–2.2)
ALBUMIN SERPL-MCNC: 3 G/DL (ref 3.4–5)
ALP BLD-CCNC: 156 U/L (ref 40–129)
ALT SERPL-CCNC: 33 U/L (ref 10–40)
ANION GAP SERPL CALCULATED.3IONS-SCNC: 10 MMOL/L (ref 3–16)
AST SERPL-CCNC: 61 U/L (ref 15–37)
BASE EXCESS ARTERIAL: 4.5 MMOL/L (ref -3–3)
BASE EXCESS ARTERIAL: 5.8 MMOL/L (ref -3–3)
BASOPHILS ABSOLUTE: 0 K/UL (ref 0–0.2)
BASOPHILS RELATIVE PERCENT: 0.7 %
BILIRUB SERPL-MCNC: 0.3 MG/DL (ref 0–1)
BUN BLDV-MCNC: 28 MG/DL (ref 7–20)
CALCIUM SERPL-MCNC: 8.5 MG/DL (ref 8.3–10.6)
CARBOXYHEMOGLOBIN ARTERIAL: 0.1 % (ref 0–1.5)
CARBOXYHEMOGLOBIN ARTERIAL: 0.1 % (ref 0–1.5)
CHLORIDE BLD-SCNC: 100 MMOL/L (ref 99–110)
CO2: 26 MMOL/L (ref 21–32)
CREAT SERPL-MCNC: <0.5 MG/DL (ref 0.6–1.2)
EOSINOPHILS ABSOLUTE: 0 K/UL (ref 0–0.6)
EOSINOPHILS RELATIVE PERCENT: 0.3 %
GFR AFRICAN AMERICAN: >60
GFR NON-AFRICAN AMERICAN: >60
GLOBULIN: 3.3 G/DL
GLUCOSE BLD-MCNC: 162 MG/DL (ref 70–99)
GLUCOSE BLD-MCNC: 179 MG/DL (ref 70–99)
GLUCOSE BLD-MCNC: 188 MG/DL (ref 70–99)
GLUCOSE BLD-MCNC: 194 MG/DL (ref 70–99)
GLUCOSE BLD-MCNC: 200 MG/DL (ref 70–99)
GLUCOSE BLD-MCNC: 201 MG/DL (ref 70–99)
GLUCOSE BLD-MCNC: 226 MG/DL (ref 70–99)
HCO3 ARTERIAL: 28.2 MMOL/L (ref 21–29)
HCO3 ARTERIAL: 28.8 MMOL/L (ref 21–29)
HCT VFR BLD CALC: 33.1 % (ref 36–48)
HEMOGLOBIN, ART, EXTENDED: 11.7 G/DL (ref 12–16)
HEMOGLOBIN, ART, EXTENDED: 12.2 G/DL (ref 12–16)
HEMOGLOBIN: 10.9 G/DL (ref 12–16)
LYMPHOCYTES ABSOLUTE: 1.1 K/UL (ref 1–5.1)
LYMPHOCYTES RELATIVE PERCENT: 17.2 %
MCH RBC QN AUTO: 25.1 PG (ref 26–34)
MCHC RBC AUTO-ENTMCNC: 32.8 G/DL (ref 31–36)
MCV RBC AUTO: 76.7 FL (ref 80–100)
METHEMOGLOBIN ARTERIAL: 0.3 %
METHEMOGLOBIN ARTERIAL: 0.3 %
MONOCYTES ABSOLUTE: 0.6 K/UL (ref 0–1.3)
MONOCYTES RELATIVE PERCENT: 9.9 %
NEUTROPHILS ABSOLUTE: 4.6 K/UL (ref 1.7–7.7)
NEUTROPHILS RELATIVE PERCENT: 71.9 %
O2 SAT, ARTERIAL: 92.2 %
O2 SAT, ARTERIAL: 96.1 %
O2 THERAPY: ABNORMAL
O2 THERAPY: ABNORMAL
PCO2 ARTERIAL: 36.1 MMHG (ref 35–45)
PCO2 ARTERIAL: 38.8 MMHG (ref 35–45)
PDW BLD-RTO: 18.3 % (ref 12.4–15.4)
PERFORMED ON: ABNORMAL
PH ARTERIAL: 7.48 (ref 7.35–7.45)
PH ARTERIAL: 7.52 (ref 7.35–7.45)
PLATELET # BLD: 135 K/UL (ref 135–450)
PMV BLD AUTO: 8.6 FL (ref 5–10.5)
PO2 ARTERIAL: 56.3 MMHG (ref 75–108)
PO2 ARTERIAL: 76.3 MMHG (ref 75–108)
POTASSIUM REFLEX MAGNESIUM: 4.1 MMOL/L (ref 3.5–5.1)
RBC # BLD: 4.32 M/UL (ref 4–5.2)
SODIUM BLD-SCNC: 136 MMOL/L (ref 136–145)
TCO2 ARTERIAL: 29.4 MMOL/L
TCO2 ARTERIAL: 29.9 MMOL/L
TOTAL PROTEIN: 6.3 G/DL (ref 6.4–8.2)
TRIGL SERPL-MCNC: 380 MG/DL (ref 0–150)
WBC # BLD: 6.4 K/UL (ref 4–11)

## 2021-10-04 PROCEDURE — 84478 ASSAY OF TRIGLYCERIDES: CPT

## 2021-10-04 PROCEDURE — 2700000000 HC OXYGEN THERAPY PER DAY

## 2021-10-04 PROCEDURE — 6370000000 HC RX 637 (ALT 250 FOR IP): Performed by: HOSPITALIST

## 2021-10-04 PROCEDURE — 2580000003 HC RX 258: Performed by: HOSPITALIST

## 2021-10-04 PROCEDURE — 6370000000 HC RX 637 (ALT 250 FOR IP): Performed by: INTERNAL MEDICINE

## 2021-10-04 PROCEDURE — 2500000003 HC RX 250 WO HCPCS: Performed by: INTERNAL MEDICINE

## 2021-10-04 PROCEDURE — 2580000003 HC RX 258: Performed by: INTERNAL MEDICINE

## 2021-10-04 PROCEDURE — 6360000002 HC RX W HCPCS: Performed by: INTERNAL MEDICINE

## 2021-10-04 PROCEDURE — 82803 BLOOD GASES ANY COMBINATION: CPT

## 2021-10-04 PROCEDURE — 2000000000 HC ICU R&B

## 2021-10-04 PROCEDURE — C9113 INJ PANTOPRAZOLE SODIUM, VIA: HCPCS | Performed by: INTERNAL MEDICINE

## 2021-10-04 PROCEDURE — 94640 AIRWAY INHALATION TREATMENT: CPT

## 2021-10-04 PROCEDURE — 99233 SBSQ HOSP IP/OBS HIGH 50: CPT | Performed by: INTERNAL MEDICINE

## 2021-10-04 PROCEDURE — 99291 CRITICAL CARE FIRST HOUR: CPT | Performed by: INTERNAL MEDICINE

## 2021-10-04 PROCEDURE — 80053 COMPREHEN METABOLIC PANEL: CPT

## 2021-10-04 PROCEDURE — 94660 CPAP INITIATION&MGMT: CPT

## 2021-10-04 PROCEDURE — 85025 COMPLETE CBC W/AUTO DIFF WBC: CPT

## 2021-10-04 PROCEDURE — 71045 X-RAY EXAM CHEST 1 VIEW: CPT

## 2021-10-04 PROCEDURE — 94761 N-INVAS EAR/PLS OXIMETRY MLT: CPT

## 2021-10-04 RX ORDER — FUROSEMIDE 10 MG/ML
40 INJECTION INTRAMUSCULAR; INTRAVENOUS ONCE
Status: COMPLETED | OUTPATIENT
Start: 2021-10-04 | End: 2021-10-04

## 2021-10-04 RX ORDER — MORPHINE SULFATE 4 MG/ML
4 INJECTION, SOLUTION INTRAMUSCULAR; INTRAVENOUS EVERY 6 HOURS PRN
Status: DISCONTINUED | OUTPATIENT
Start: 2021-10-04 | End: 2021-10-05

## 2021-10-04 RX ORDER — DEXMEDETOMIDINE HYDROCHLORIDE 4 UG/ML
.2-1.4 INJECTION, SOLUTION INTRAVENOUS CONTINUOUS
Status: DISCONTINUED | OUTPATIENT
Start: 2021-10-04 | End: 2021-10-17

## 2021-10-04 RX ORDER — AMLODIPINE BESYLATE 2.5 MG/1
2.5 TABLET ORAL DAILY
Status: DISCONTINUED | OUTPATIENT
Start: 2021-10-04 | End: 2021-10-16

## 2021-10-04 RX ORDER — LOSARTAN POTASSIUM 25 MG/1
25 TABLET ORAL DAILY
Status: DISCONTINUED | OUTPATIENT
Start: 2021-10-04 | End: 2021-10-16

## 2021-10-04 RX ADMIN — AMLODIPINE BESYLATE 2.5 MG: 2.5 TABLET ORAL at 10:25

## 2021-10-04 RX ADMIN — SODIUM CHLORIDE, PRESERVATIVE FREE 10 ML: 5 INJECTION INTRAVENOUS at 20:51

## 2021-10-04 RX ADMIN — IPRATROPIUM BROMIDE AND ALBUTEROL 1 PUFF: 20; 100 SPRAY, METERED RESPIRATORY (INHALATION) at 20:17

## 2021-10-04 RX ADMIN — CHLORHEXIDINE GLUCONATE 0.12% ORAL RINSE 15 ML: 1.2 LIQUID ORAL at 07:47

## 2021-10-04 RX ADMIN — SERTRALINE HYDROCHLORIDE 50 MG: 50 TABLET ORAL at 07:47

## 2021-10-04 RX ADMIN — FUROSEMIDE 40 MG: 10 INJECTION, SOLUTION INTRAMUSCULAR; INTRAVENOUS at 09:27

## 2021-10-04 RX ADMIN — ENOXAPARIN SODIUM 30 MG: 30 INJECTION SUBCUTANEOUS at 07:47

## 2021-10-04 RX ADMIN — ASPIRIN 81 MG: 81 TABLET, CHEWABLE ORAL at 07:47

## 2021-10-04 RX ADMIN — DIAZEPAM 10 MG: 10 TABLET ORAL at 07:47

## 2021-10-04 RX ADMIN — LOSARTAN POTASSIUM 25 MG: 25 TABLET, FILM COATED ORAL at 10:25

## 2021-10-04 RX ADMIN — Medication 150 MCG/HR: at 08:14

## 2021-10-04 RX ADMIN — INSULIN LISPRO 2 UNITS: 100 INJECTION, SOLUTION INTRAVENOUS; SUBCUTANEOUS at 18:08

## 2021-10-04 RX ADMIN — ATORVASTATIN CALCIUM 40 MG: 40 TABLET, FILM COATED ORAL at 07:48

## 2021-10-04 RX ADMIN — Medication 1.3 MCG/KG/HR: at 03:40

## 2021-10-04 RX ADMIN — SODIUM CHLORIDE, PRESERVATIVE FREE 10 ML: 5 INJECTION INTRAVENOUS at 07:48

## 2021-10-04 RX ADMIN — LEVOTHYROXINE SODIUM 100 MCG: 100 TABLET ORAL at 07:47

## 2021-10-04 RX ADMIN — SODIUM CHLORIDE, PRESERVATIVE FREE 10 ML: 5 INJECTION INTRAVENOUS at 10:33

## 2021-10-04 RX ADMIN — MIDAZOLAM HYDROCHLORIDE 2 MG: 1 INJECTION, SOLUTION INTRAMUSCULAR; INTRAVENOUS at 02:43

## 2021-10-04 RX ADMIN — PIPERACILLIN SODIUM AND TAZOBACTAM SODIUM 3375 MG: 3; .375 INJECTION, POWDER, LYOPHILIZED, FOR SOLUTION INTRAVENOUS at 01:27

## 2021-10-04 RX ADMIN — IPRATROPIUM BROMIDE AND ALBUTEROL SULFATE 1 AMPULE: .5; 2.5 SOLUTION RESPIRATORY (INHALATION) at 03:35

## 2021-10-04 RX ADMIN — DEXAMETHASONE SODIUM PHOSPHATE 6 MG: 10 INJECTION INTRAMUSCULAR; INTRAVENOUS at 13:40

## 2021-10-04 RX ADMIN — PANTOPRAZOLE SODIUM 40 MG: 40 INJECTION, POWDER, FOR SOLUTION INTRAVENOUS at 07:48

## 2021-10-04 RX ADMIN — INSULIN LISPRO 2 UNITS: 100 INJECTION, SOLUTION INTRAVENOUS; SUBCUTANEOUS at 00:05

## 2021-10-04 RX ADMIN — INSULIN LISPRO 1 UNITS: 100 INJECTION, SOLUTION INTRAVENOUS; SUBCUTANEOUS at 03:59

## 2021-10-04 RX ADMIN — MORPHINE SULFATE 4 MG: 4 INJECTION INTRAVENOUS at 13:40

## 2021-10-04 RX ADMIN — Medication 0.8 MCG/KG/HR: at 20:59

## 2021-10-04 RX ADMIN — ENOXAPARIN SODIUM 30 MG: 30 INJECTION SUBCUTANEOUS at 20:50

## 2021-10-04 RX ADMIN — Medication 150 MCG/HR: at 01:25

## 2021-10-04 RX ADMIN — MORPHINE SULFATE 4 MG: 4 INJECTION INTRAVENOUS at 22:23

## 2021-10-04 RX ADMIN — PIPERACILLIN SODIUM AND TAZOBACTAM SODIUM 3375 MG: 3; .375 INJECTION, POWDER, LYOPHILIZED, FOR SOLUTION INTRAVENOUS at 10:26

## 2021-10-04 RX ADMIN — Medication 0.6 MCG/KG/HR: at 15:00

## 2021-10-04 RX ADMIN — INSULIN LISPRO 1 UNITS: 100 INJECTION, SOLUTION INTRAVENOUS; SUBCUTANEOUS at 08:00

## 2021-10-04 RX ADMIN — IPRATROPIUM BROMIDE AND ALBUTEROL SULFATE 1 AMPULE: .5; 2.5 SOLUTION RESPIRATORY (INHALATION) at 08:04

## 2021-10-04 RX ADMIN — PIPERACILLIN SODIUM AND TAZOBACTAM SODIUM 3375 MG: 3; .375 INJECTION, POWDER, LYOPHILIZED, FOR SOLUTION INTRAVENOUS at 18:38

## 2021-10-04 RX ADMIN — Medication 1.3 MCG/KG/HR: at 07:18

## 2021-10-04 RX ADMIN — INSULIN LISPRO 1 UNITS: 100 INJECTION, SOLUTION INTRAVENOUS; SUBCUTANEOUS at 11:57

## 2021-10-04 RX ADMIN — SODIUM CHLORIDE, PRESERVATIVE FREE 10 ML: 5 INJECTION INTRAVENOUS at 20:50

## 2021-10-04 ASSESSMENT — PULMONARY FUNCTION TESTS
PIF_VALUE: 28
PIF_VALUE: 19

## 2021-10-04 ASSESSMENT — PAIN SCALES - GENERAL
PAINLEVEL_OUTOF10: 10
PAINLEVEL_OUTOF10: 10

## 2021-10-04 NOTE — PROGRESS NOTES
This note also relates to the following rows which could not be included:  Pulse - Cannot attach notes to unvalidated device data       10/03/21 2342   Vent Information   Vent Type 980   Vent Mode AC/VC   Vt Ordered 400 mL   Rate Set 28 bmp   Peak Flow 70 L/min   FiO2  50 %   SpO2 92 %   SpO2/FiO2 ratio 184   Sensitivity 3   PEEP/CPAP 8   Humidification Source Heated wire   Humidification Temp 37   Humidification Temp Measured 37   Circuit Condensation Drained   Vent Patient Data   Peak Inspiratory Pressure 29 cmH2O   Mean Airway Pressure 12 cmH20   Rate Measured 29 br/min   Vt Exhaled 379 mL   Minute Volume 11.6 Liters   I:E Ratio 1:2.5   Plateau Pressure 19 [id number]   Breath Sounds   Right Upper Lobe Diminished   Right Middle Lobe Diminished   Right Lower Lobe Diminished   Left Upper Lobe Diminished   Left Lower Lobe Diminished   Additional Respiratory  Assessments   Resp 29   Position Semi-Jesus's   Alarm Settings   High Pressure Alarm 40 cmH2O   Low Minute Volume Alarm 4 L/min   Apnea (secs) 20 secs   High Respiratory Rate 40 br/min   Low Exhaled Vt  250 mL   ETT (adult)   No Placement Date or Time found.    Tube Size: 8 mm  Secured at: 25 cm  Measured From: Lips   Secured at 25 cm   Measured From Lips   ET Placement Right  (moved to right)   Site Condition Dry

## 2021-10-04 NOTE — PROGRESS NOTES
Pt is awake, calm and following commands. Placed on SBT of 5/5, 60%, per MD and is doing well. sp02 is 90%. Continuing to monitor pt.

## 2021-10-04 NOTE — PROGRESS NOTES
10/04/21 0339   Vent Information   Vent Type 980   Vent Mode AC/VC   Vt Ordered 400 mL   Rate Set 28 bmp   Peak Flow 70 L/min   FiO2  50 %  (increased to 60)   SpO2 (!) 87 %   SpO2/FiO2 ratio 174   Sensitivity 3   PEEP/CPAP 8   Humidification Source Heated wire   Humidification Temp 37   Humidification Temp Measured 37   Circuit Condensation Drained   Vent Patient Data   Peak Inspiratory Pressure 28 cmH2O   Mean Airway Pressure 12 cmH20   Rate Measured 28 br/min   Vt Exhaled 408 mL   Minute Volume 11.4 Liters   I:E Ratio 1:2.5   Plateau Pressure 19 [id number]   Cough/Sputum   Sputum How Obtained Endotracheal;Suctioned   Cough None   Sputum Amount None   Spontaneous Breathing Trial (SBT) RT Doc   Pulse 63   Breath Sounds   Right Upper Lobe Diminished   Right Middle Lobe Diminished   Right Lower Lobe Diminished   Left Upper Lobe Diminished   Left Lower Lobe Diminished   Additional Respiratory  Assessments   Resp 20   Position Semi-Jesus's   Alarm Settings   High Pressure Alarm 40 cmH2O   Low Minute Volume Alarm 4 L/min   Apnea (secs) 20 secs   High Respiratory Rate 40 br/min   Low Exhaled Vt  250 mL   ETT (adult)   No Placement Date or Time found.    Tube Size: 8 mm  Secured at: 25 cm  Measured From: Lips   Secured at 25 cm   Measured From Lips   ET Placement Right   Secured By [employer] tube carranza   Site Condition Dry

## 2021-10-04 NOTE — PROGRESS NOTES
ICU Progress Note    Admit Date:  9/26/2021      Interval history:  Admitted with COVID-19 pneumonia,  acute hypoxic respiratory failure. Patient not vaccinated  Progressive significant worsening hypoxemia overnight. Patient was on 2 L of oxygen on admission-> switched to high flow oxygen per Vapotherm with additional 100% nonrebreather on 9/28/2021-> transferred to ICU. Worsening hypoxemia overnight. Intubated and placed on mechanical ventilation early AM on 9/29/2021. Placed in prone positioning. Did not require paralytics. 10/1 Patient desaturated when a supine position attempted today, back on prone ventilation    Ms. Lucas seen. Seen in prone position . Remains intubated, sedated ,on mechanical ventilation. .   On FiO2  55%. PEEP 8  Weaned off of pressors      10/2  resp culture growing pseudomonas - started zosyn. 10/3  PEEP 10, FiO2 50%. RASS -1 - she wakes up to voice and able to follow commands and responds appropriately on the vent today. She is on fentanyl precedex and Zosyn infusing. 10/4-This is my first encounter with the patient. Chart reviewed briefly. Patient is extubated. She is on 100% Vapotherm. Answers questions appropriately. She is hungry. Objective:   /60   Pulse 56   Temp 98.8 °F (37.1 °C) (Axillary)   Resp 25   Ht 5' 9\" (1.753 m)   Wt 180 lb 14.4 oz (82.1 kg)   SpO2 90%   BMI 26.71 kg/m²       Intake/Output Summary (Last 24 hours) at 10/4/2021 1443  Last data filed at 10/4/2021 0800  Gross per 24 hour   Intake 2491.47 ml   Output 3810 ml   Net -1318.53 ml         Physical Exam:  General: She is awake and alert. She is extubated. On Vapotherm. Ill-appearing. Overall improved and better. Skin:  Warm and dry  Neck:  JVD absent. Neck supple  Chest: diminished breath sounds, improved air entry.    No wheezes rales or rhonchi  Cardiovascular:  RRR ,S1S2 normal  Abdomen:  Soft, non tender, non distended, BS +  Extremities:  No edema.  Intact peripheral pulses. Brisk cap refill, < 2 secs  Neuro: non focal      Medications:   Scheduled Meds:   amLODIPine  2.5 mg Oral Daily    losartan  25 mg Oral Daily    albuterol-ipratropium  1 puff Inhalation BID    piperacillin-tazobactam  3,375 mg IntraVENous Q8H    diazePAM  10 mg Oral TID    pantoprazole  40 mg IntraVENous Daily    insulin lispro  0-6 Units SubCUTAneous Q4H    levothyroxine  100 mcg Oral Daily    enoxaparin  30 mg SubCUTAneous BID    dexamethasone  6 mg IntraVENous Q24H    lidocaine 1 % injection  5 mL IntraDERmal Once    sodium chloride flush  5-40 mL IntraVENous 2 times per day    aspirin  81 mg Oral Daily    [Held by provider] gabapentin  800 mg Oral TID    [Held by provider] rOPINIRole  0.5 mg Oral TID    sertraline  50 mg Oral Daily    atorvastatin  40 mg Oral Daily    sodium chloride flush  5-40 mL IntraVENous 2 times per day    [Held by provider] oxyCODONE  20 mg Oral 2 times per day       Continuous Infusions:   dexmedetomidine HCl in NaCl 0.6 mcg/kg/hr (10/04/21 1111)    sodium chloride      dextrose      sodium chloride         Data:  CBC:   Recent Labs     10/02/21  0318 10/03/21  0429 10/04/21  0350   WBC 4.7 5.6 6.4   RBC 4.10 4.34 4.32   HGB 10.7* 10.8* 10.9*   HCT 31.7* 33.5* 33.1*   MCV 77.3* 77.3* 76.7*   RDW 18.1* 18.0* 18.3*   * 133* 135     BMP:   Recent Labs     10/02/21  0318 10/03/21  0429 10/04/21  0350    136 136   K 3.9 3.6 4.1    100 100   CO2 27 26 26   BUN 20 27* 28*   CREATININE <0.5* <0.5* <0.5*     BNP: No results for input(s): BNP in the last 72 hours. PT/INR:   No results for input(s): PROTIME, INR in the last 72 hours. APTT: No results for input(s): APTT in the last 72 hours. CARDIAC ENZYMES:   No results for input(s): CKMB, CKMBINDEX, TROPONINI in the last 72 hours.     Invalid input(s): CKTOTAL;3  FASTING LIPID PANEL:  Lab Results   Component Value Date    CHOL 156 10/18/2018    HDL 39 (L) 10/18/2018 TRIG 557 (H) 10/01/2021     LIVER PROFILE:   Recent Labs     10/02/21  0318 10/03/21  0429 10/04/21  0350   AST <5* 60* 61*   ALT <5* 26 33   BILITOT <0.2 0.4 0.3   ALKPHOS 148* 162* 156*           CULTURES  COVID 19, NAAT: Detected    Susceptibility    Pseudomonas fluorescens (1)    Antibiotic Interpretation AMANDA Status    cefepime Sensitive <=2 mcg/mL     ciprofloxacin Sensitive <=1 mcg/mL     gentamicin Sensitive <=4 mcg/mL     meropenem Intermediate 8 mcg/mL     piperacillin-tazobactam Sensitive <=16 mcg/mL     tobramycin Sensitive <=4 mcg/           EKG:   Normal sinus rhythm  Left ventricular hypertrophy with repolarization abnormality  Prolonged QT  ST abnormality inferior leads consider ischemia  Abnormal ECG  When compared with ECG of 05-SEP-2020 08:35,  Premature ventricular complexes are no longer Present  T wave inversion now evident in Inferior leads  Confirmed by DANNA CHILDS, [place]     Radiology  XR CHEST PORTABLE   Final Result   No significant interval change in small right pleural effusion or bilateral   heterogeneous opacity which can reflect pulmonary edema or pneumonia. XR CHEST PORTABLE   Final Result   Mild improvement from prior comparison. Mild-to-moderate congestion and/or   infiltrates identified in the lungs. ET tube terminates 7 cm superior to the saba. XR CHEST PORTABLE   Final Result   Endotracheal tube and orogastric tube unchanged, adequate position. Slightly increased extensive bilateral pulmonary disease over the past 24   hours this may relate to interstitial edema or diffuse infection or a   combination. XR CHEST PORTABLE   Final Result   Endotracheal tube with its tip approximately 4.7 cm from the saba in   satisfactory position. Enteric tube in satisfactory position. Patchy bilateral airspace disease is again noted with improved aeration of   the left mid lung.          XR CHEST PORTABLE   Final Result   Some improvement in the appearance of the chest with clearing of some of the   acute airspace disease from the mid left lung. Large amount of pneumonia   remains within the right lung and lower left lung. XR CHEST PORTABLE   Final Result   1. Endotracheal tube, nasogastric tube, and right PICC line placement. 2.  Increasing multifocal opacities with consolidative area in the right   perihilar lung. Could be multifocal pneumonia with or without edema. IR PICC WO SQ PORT/PUMP > 5 YEARS   Final Result   1. See above. CT CHEST PULMONARY EMBOLISM W CONTRAST   Final Result   No evidence of pulmonary embolism. Scattered peripheral opacities in the left lung and more consolidative area   in the right lung concerning for pneumonia. Previous right thoracotomy and   upper lobectomy. One mildly enlarged right paratracheal lymph node is present but no priors   for comparison. Nonenlarged but prominent lymph nodes in the upper abdomen   and juxta diaphragmatic region. XR CHEST PORTABLE   Final Result   Mild cardiomegaly without interstitial edema. Metallic surgical clips from prior right thoracotomy. COPD with anterior segment-right upper lobe alveolar pneumonia. Old pleural thickening was noted along the right lateral chest wall. Pleural   thickening and or trace effusion blunts the right costophrenic angle. XR CHEST PORTABLE    (Results Pending)         Assessment:  Principal Problem:    Suspected COVID-19 virus infection  Active Problems:    Hypothyroidism    Essential hypertension    DM2 (diabetes mellitus, type 2) (HCC)    Dehydration    Hypoxia    Acute respiratory failure with hypoxia ([address]    COVID-19    Pneumonia due to COVID-19 virus    CHARLY (acute kidney injury) ([address]    Uncontrolled hypertension    Fever    Hypotension    Mild protein-calorie malnutrition (HCC)    Gram-negative pneumonia ([address]  Resolved Problems:    * No resolved hospital problems. *      Plan:    #COVID-19 pneumonia  #Acute hypoxic respiratory failure   Pseudomonas HCAP. -Unvaccinated patient  - she presented with cough, fever, dyspnea in the setting of household contact testing + COVID 19  - Her Covid testing came back positive on admission  - Initially on 2 L of oxygen on presentation. She started desaturating quickly . worsening hypoxemia requiring high flow oxygen. - Pulmonology consulted  -placed on high flow oxygen per Vapotherm 40 L,FiO2 100% ,with additional nonrebreather . Transferred to ICU on 9/28. Intubated early AM  9/29/2021. Extubated on 10/4/2021. She is on Vapotherm. Gradually wean oxygen. She was proned and was on Nimbex previously. - s/p Remdesivir  - on Decadron, day # 8  - given 1 dose of Tocilizumab  -Lovenox twice daily      #Possible superimposed bacterial pneumonia  -On Rocephin and Zithromax,   - changed to zosyn on 10/2 with pseudomonas on resp culture -      #Hypokalemia  - replaced       #Dehydration  -  Improved with IVF  Creatinine normal       #CAD  - cont ASA, statin  - EKG with inferior T wave abnormality. She denies CP. Trop neg   -telemetry monitoring      #History of tracheobronchomalacia  Has had surgical treatment for this       #HTN-blood pressure is low and she was Levophed. This will be discontinued.   Resume home medications-Norvasc and losartan.         #Chronic pain   History of hip fracture  - cont home oxycodone BID and percocet   - cont gabapentin and requip       #DM2  - use ssi        #Hypothyroidism  - cont synthroid         DVT Prophylaxis: Lovenox   Diet NPO  ADULT TUBE FEEDING; Orogastric; Peptide Based; Continuous; 20; Yes; 15; Q 4 hours; 45; 30; Q 3 hours; Protein; one proteinex P2Go once daily via feeding tube  Code Status: Full Code        Aashish Bower MD, MD 10/4/2021 2:43 PM

## 2021-10-04 NOTE — PROGRESS NOTES
Shift assessment is complete. ET/OG are intact. Tube feeding and fentanyl stopped for respiratory trial, precedex continues. STEFANO PICC dry/inact. Gray intact and draining yellow/clear. VSS, telemetry SR. Bilateral soft wrist restraints continue for safety of airway.

## 2021-10-04 NOTE — PROGRESS NOTES
End of shift note. Patient was extubated at noon to vapo-therm, oxygen saturations 90% at best. She complained of back pain and morphine prn was given. An hour later, she became very anxious with oxygen saturations in the 70's, moaning. Precedex restarted for agitation.

## 2021-10-04 NOTE — PROGRESS NOTES
Shift assessment completed as documented on flowsheet. Pt sedated RASS -1 and mechanically ventilated, tolerating well. VSS, IV infusing per md order. NSR on monitor, no ectopy, no signs of cardiac discomfort. Abd round soft and nontender. Tube feeding infusing, gastric residual 0. Gray patent to bsd, clear yellow urine. mau wrist restraints in place. No concerns at this time Will continue to monitor.

## 2021-10-04 NOTE — PROGRESS NOTES
Comprehensive Nutrition Assessment    Type and Reason for Visit:  Reassess    Nutrition Recommendations/Plan:   1. Continue TF order - ADULT TUBE FEEDING; Orogastric; Peptide-Based formula - Vital AF 1.2 with a goal rate of 45 ml/hr x 20 hours. Start with 20 ml/hr and increase by 15 ml every 4 hours, as tolerated by patient, until goal rate can be achieved and maintained. Water flushes, 30 ml every 3 hours or per MD guidance. Please administer one proteinex P2Go once daily via feeding tube. 2. Monitor TF rate, intake, and tolerance + water flushes + administration of one proteinex P2Go once daily. 3. Monitor vent status and plan of care. 4. Please obtain an updated weight for this patient - last weight was obtained on 10/2/21.   5. Monitor nutrition-related labs, bowel function, and weight trends.       Nutrition Assessment:  patient remains unchanged from a nutritional standpoint since last RD assessment; patient remains at risk for further compromise d/t increased nutrition needs r/t COVID-19 virus, altered nutrition-related labs, and patient is not receiving adequate nutrition intake at this time; will continue Vital AF 1.2 with a goal rate of 45 ml/hr x 20 hours + 30 ml water flushes every 3 hours + one proteinex P2Go once daily via feeding tube    Malnutrition Assessment:  Malnutrition Status:  Mild malnutrition    Context:  Acute Illness     Findings of the 6 clinical characteristics of malnutrition:  Energy Intake:  7 - 50% or less of estimated energy requirements for 5 or more days  Weight Loss:  7 - Greater than 2% over 1 week (- 7# or 3.5% weight loss x < 1 week)     Body Fat Loss:  Unable to assess (COVID-19 +)     Muscle Mass Loss:  Unable to assess (COVID-19 +)    Fluid Accumulation:  No significant fluid accumulation     Strength:  Not Performed    Estimated Daily Nutrient Needs:  Energy (kcal):  2662-9613 kcals based on 20-23kcal\kg\CBW; Weight Used for Energy Requirements:  Current (BMI 25.0-29. 9)       Nutrition Diagnosis:   · Inadequate oral intake related to impaired respiratory function, inadequate protein-energy intake, increase demand for energy/nutrients as evidenced by NPO or clear liquid status due to medical condition, intubation, nutrition support - enteral nutrition, lab values      Nutrition Interventions:   Food and/or Nutrient Delivery:  Continue NPO, Continue Current Tube Feeding  Nutrition Education/Counseling:  No recommendation at this time   Coordination of Nutrition Care:  Continue to monitor while inpatient, Interdisciplinary Rounds    Goals:  patient will tolerate Vital AF 1.2 at goal rate of 45 ml/hr x 20 hours without GI distress, without s/s of aspiration, and without additional lab/fluid disturbances       Nutrition Monitoring and Evaluation:   Behavioral-Environmental Outcomes:  None Identified   Food/Nutrient Intake Outcomes:  Enteral Nutrition Intake/Tolerance  Physical Signs/Symptoms Outcomes:  Biochemical Data, GI Status, Hemodynamic Status, Weight, Skin     Discharge Planning:     Too soon to determine     Electronically signed by Artem Canela RD, LD on 10/4/21 at 10:39 AM EDT    Contact: [phone]

## 2021-10-04 NOTE — PROGRESS NOTES
RT Inhaler-Nebulizer Bronchodilator Protocol Note    There is a bronchodilator order in the chart from a provider indicating to follow the RT Bronchodilator Protocol and there is an Initiate RT Inhaler-Nebulizer Bronchodilator Protocol order as well (see protocol at bottom of note). CXR Findings:  XR CHEST PORTABLE    Result Date: 10/4/2021  No significant interval change in small right pleural effusion or bilateral heterogeneous opacity which can reflect pulmonary edema or pneumonia. XR CHEST PORTABLE    Result Date: 10/3/2021  Mild improvement from prior comparison. Mild-to-moderate congestion and/or infiltrates identified in the lungs. ET tube terminates 7 cm superior to the saba. The findings from the last RT Protocol Assessment were as follows:   History Pulmonary Disease: (P) Smoker 15 pack years or more  Respiratory Pattern: (P) Dyspnea on exertion or RR 21-25 bpm  Breath Sounds: (P) Slightly diminished and/or crackles  Cough: (P) Strong, spontaneous, non-productive  Indication for Bronchodilator Therapy: (P) Unable to speak in sentences, On home bronchodilators  Bronchodilator Assessment Score: (P) 5    Aerosolized bronchodilator medication orders have been revised according to the RT Inhaler-Nebulizer Bronchodilator Protocol below. Respiratory Therapist to perform RT Therapy Protocol Assessment initially then follow the protocol. Repeat RT Therapy Protocol Assessment PRN for score 0-3 or on second treatment, BID, and PRN for scores above 3. No Indications - adjust the frequency to every 6 hours PRN wheezing or bronchospasm, if no treatments needed after 48 hours then discontinue using Per Protocol order mode. If indication present, adjust the RT bronchodilator orders based on the Bronchodilator Assessment Score as indicated below.   Use Inhaler orders unless patient has one or more of the following: on home nebulizer, not able to hold breath for 10 seconds, is not alert and oriented, cannot activate and use MDI correctly, or respiratory rate 25 breaths per minute or more, then use the equivalent nebulizer order(s) with same Frequency and PRN reasons based on the score. If a patient is on this medication at home then do not decrease Frequency below that used at home. 0-3 - enter or revise RT bronchodilator order(s) to equivalent RT Bronchodilator order with Frequency of every 4 hours PRN for wheezing or increased work of breathing using Per Protocol order mode. 4-6 - enter or revise RT Bronchodilator order(s) to two equivalent RT bronchodilator orders with one order with BID Frequency and one order with Frequency of every 4 hours PRN wheezing or increased work of breathing using Per Protocol order mode. 7-10 - enter or revise RT Bronchodilator order(s) to two equivalent RT bronchodilator orders with one order with TID Frequency and one order with Frequency of every 4 hours PRN wheezing or increased work of breathing using Per Protocol order mode. 11-13 - enter or revise RT Bronchodilator order(s) to one equivalent RT bronchodilator order with QID Frequency and an Albuterol order with Frequency of every 4 hours PRN wheezing or increased work of breathing using Per Protocol order mode. Greater than 13 - enter or revise RT Bronchodilator order(s) to one equivalent RT bronchodilator order with every 4 hours Frequency and an Albuterol order with Frequency of every 2 hours PRN wheezing or increased work of breathing using Per Protocol order mode. RT to enter RT Home Evaluation for COPD & MDI Assessment order using Per Protocol order mode.     Electronically signed by Irina Field RCP on 10/4/2021 at 1:32 PM

## 2021-10-04 NOTE — PROGRESS NOTES
90 ml fentanyl wasted in destroyer    90ml of Fentanyl wasted from Fentanyl gtt in destroyer with JANAE Stacy RN.  Electronically signed by Billie Ibanez RN on 10/4/2021 at 6:40 PM

## 2021-10-04 NOTE — PROGRESS NOTES
Patient complains of back pain rating 10/10, and says \"it keeps me from taking deep breaths\". Morphine given IV per prn md orders for pain. Repositioned patient in the bed for better patient comfort and placed a surgical mask over her vapotherm and mouth.

## 2021-10-04 NOTE — PROGRESS NOTES
Pt extubated and placed on Vapotherm 40L/100%. Pt is 89-91%, but tolerating well at this time. Continuing to monitor pt.

## 2021-10-04 NOTE — PROGRESS NOTES
Patient is crying out and anxious, oxygen saturations are 76%. Precedex restarted and oxygen saturations are 94%.

## 2021-10-04 NOTE — CARE COORDINATION
INTERDISCIPLINARY PLAN OF CARE CONFERENCE    Date/Time: 10/4/2021 11:08 AM  Completed by: Nahomy Carrera RN, Case Management      Patient Name:  Bernadette Solis  YOB: 1955  Admitting Diagnosis: Dehydration [E86.0]  Hypokalemia [E87.6]  Hypomagnesemia [E83.42]  Positive D dimer [R79.89]  CHARLY (acute kidney injury) ([facility] [N17.9]  Acute respiratory failure with hypoxia ([facility] [J96.01]  Pneumonia due to COVID-19 virus [U07.1, J12.82]  COVID-19 [U07.1]     Admit Date/Time:  9/26/2021  8:29 PM    Chart reviewed. Interdisciplinary team contacted or reviewed plan related to patient progress and discharge plans. Disciplines included Case Management, Nursing, and Dietitian. Current Status:inpt,ICU LOC  PT/OT recommendation for discharge plan of care: tbd    Expected D/C Disposition:  tbd    Discharge Plan Comments: Reviewed chart. Pt cont in ICU on Ventilator,C-19+. Pt from ran home with spouse. CM following for d/c needs.     Home O2 in place on admit: No  Pt informed of need to bring portable home O2 tank on day of discharge for nursing to connect prior to leaving:  Not Indicated  Verbalized agreement/Understanding:  Not Indicated

## 2021-10-05 ENCOUNTER — APPOINTMENT (OUTPATIENT)
Dept: GENERAL RADIOLOGY | Age: 66
DRG: 004 | End: 2021-10-05
Payer: MEDICARE

## 2021-10-05 LAB
A/G RATIO: 0.9 (ref 1.1–2.2)
ALBUMIN SERPL-MCNC: 3 G/DL (ref 3.4–5)
ALP BLD-CCNC: 141 U/L (ref 40–129)
ALT SERPL-CCNC: 33 U/L (ref 10–40)
ANION GAP SERPL CALCULATED.3IONS-SCNC: 11 MMOL/L (ref 3–16)
AST SERPL-CCNC: 77 U/L (ref 15–37)
BASE EXCESS ARTERIAL: 3.1 MMOL/L (ref -3–3)
BASOPHILS ABSOLUTE: 0.1 K/UL (ref 0–0.2)
BASOPHILS RELATIVE PERCENT: 0.9 %
BILIRUB SERPL-MCNC: 0.6 MG/DL (ref 0–1)
BUN BLDV-MCNC: 26 MG/DL (ref 7–20)
CALCIUM SERPL-MCNC: 8.6 MG/DL (ref 8.3–10.6)
CARBOXYHEMOGLOBIN ARTERIAL: 0.5 % (ref 0–1.5)
CHLORIDE BLD-SCNC: 102 MMOL/L (ref 99–110)
CO2: 27 MMOL/L (ref 21–32)
CREAT SERPL-MCNC: <0.5 MG/DL (ref 0.6–1.2)
EOSINOPHILS ABSOLUTE: 0.1 K/UL (ref 0–0.6)
EOSINOPHILS RELATIVE PERCENT: 0.7 %
GFR AFRICAN AMERICAN: >60
GFR NON-AFRICAN AMERICAN: >60
GLOBULIN: 3.5 G/DL
GLUCOSE BLD-MCNC: 119 MG/DL (ref 70–99)
GLUCOSE BLD-MCNC: 125 MG/DL (ref 70–99)
GLUCOSE BLD-MCNC: 138 MG/DL (ref 70–99)
GLUCOSE BLD-MCNC: 162 MG/DL (ref 70–99)
GLUCOSE BLD-MCNC: 94 MG/DL (ref 70–99)
GLUCOSE BLD-MCNC: 95 MG/DL (ref 70–99)
GLUCOSE BLD-MCNC: 96 MG/DL (ref 70–99)
HCO3 ARTERIAL: 26.2 MMOL/L (ref 21–29)
HCT VFR BLD CALC: 33 % (ref 36–48)
HEMOGLOBIN, ART, EXTENDED: 11.7 G/DL (ref 12–16)
HEMOGLOBIN: 10.8 G/DL (ref 12–16)
LYMPHOCYTES ABSOLUTE: 1.6 K/UL (ref 1–5.1)
LYMPHOCYTES RELATIVE PERCENT: 15.9 %
MAGNESIUM: 1.7 MG/DL (ref 1.8–2.4)
MCH RBC QN AUTO: 25 PG (ref 26–34)
MCHC RBC AUTO-ENTMCNC: 32.9 G/DL (ref 31–36)
MCV RBC AUTO: 76 FL (ref 80–100)
METHEMOGLOBIN ARTERIAL: 0.3 %
MONOCYTES ABSOLUTE: 0.6 K/UL (ref 0–1.3)
MONOCYTES RELATIVE PERCENT: 5.9 %
NEUTROPHILS ABSOLUTE: 7.5 K/UL (ref 1.7–7.7)
NEUTROPHILS RELATIVE PERCENT: 76.6 %
O2 CONTENT ARTERIAL: 16 ML/DL
O2 SAT, ARTERIAL: 96.6 %
O2 THERAPY: ABNORMAL
PCO2 ARTERIAL: 34.8 MMHG (ref 35–45)
PDW BLD-RTO: 18.1 % (ref 12.4–15.4)
PERFORMED ON: ABNORMAL
PERFORMED ON: NORMAL
PERFORMED ON: NORMAL
PH ARTERIAL: 7.49 (ref 7.35–7.45)
PLATELET # BLD: 136 K/UL (ref 135–450)
PMV BLD AUTO: 8.8 FL (ref 5–10.5)
PO2 ARTERIAL: 78.6 MMHG (ref 75–108)
POTASSIUM REFLEX MAGNESIUM: 3.3 MMOL/L (ref 3.5–5.1)
RBC # BLD: 4.34 M/UL (ref 4–5.2)
SODIUM BLD-SCNC: 140 MMOL/L (ref 136–145)
TCO2 ARTERIAL: 27.2 MMOL/L
TOTAL PROTEIN: 6.5 G/DL (ref 6.4–8.2)
WBC # BLD: 9.8 K/UL (ref 4–11)

## 2021-10-05 PROCEDURE — 2580000003 HC RX 258: Performed by: INTERNAL MEDICINE

## 2021-10-05 PROCEDURE — 6360000002 HC RX W HCPCS: Performed by: INTERNAL MEDICINE

## 2021-10-05 PROCEDURE — 85025 COMPLETE CBC W/AUTO DIFF WBC: CPT

## 2021-10-05 PROCEDURE — 6370000000 HC RX 637 (ALT 250 FOR IP): Performed by: PHYSICIAN ASSISTANT

## 2021-10-05 PROCEDURE — 71045 X-RAY EXAM CHEST 1 VIEW: CPT

## 2021-10-05 PROCEDURE — 2500000003 HC RX 250 WO HCPCS: Performed by: INTERNAL MEDICINE

## 2021-10-05 PROCEDURE — 99233 SBSQ HOSP IP/OBS HIGH 50: CPT | Performed by: INTERNAL MEDICINE

## 2021-10-05 PROCEDURE — 2700000000 HC OXYGEN THERAPY PER DAY

## 2021-10-05 PROCEDURE — 2000000000 HC ICU R&B

## 2021-10-05 PROCEDURE — 6370000000 HC RX 637 (ALT 250 FOR IP): Performed by: HOSPITALIST

## 2021-10-05 PROCEDURE — 2580000003 HC RX 258: Performed by: HOSPITALIST

## 2021-10-05 PROCEDURE — 82803 BLOOD GASES ANY COMBINATION: CPT

## 2021-10-05 PROCEDURE — 83735 ASSAY OF MAGNESIUM: CPT

## 2021-10-05 PROCEDURE — 94640 AIRWAY INHALATION TREATMENT: CPT

## 2021-10-05 PROCEDURE — 80053 COMPREHEN METABOLIC PANEL: CPT

## 2021-10-05 PROCEDURE — 6360000002 HC RX W HCPCS: Performed by: HOSPITALIST

## 2021-10-05 PROCEDURE — 94761 N-INVAS EAR/PLS OXIMETRY MLT: CPT

## 2021-10-05 PROCEDURE — 6370000000 HC RX 637 (ALT 250 FOR IP): Performed by: INTERNAL MEDICINE

## 2021-10-05 PROCEDURE — 99291 CRITICAL CARE FIRST HOUR: CPT | Performed by: INTERNAL MEDICINE

## 2021-10-05 RX ORDER — FUROSEMIDE 10 MG/ML
40 INJECTION INTRAMUSCULAR; INTRAVENOUS ONCE
Status: COMPLETED | OUTPATIENT
Start: 2021-10-05 | End: 2021-10-05

## 2021-10-05 RX ORDER — POTASSIUM CHLORIDE 29.8 MG/ML
20 INJECTION INTRAVENOUS
Status: COMPLETED | OUTPATIENT
Start: 2021-10-05 | End: 2021-10-05

## 2021-10-05 RX ORDER — OXYCODONE HCL 10 MG/1
20 TABLET, FILM COATED, EXTENDED RELEASE ORAL ONCE
Status: COMPLETED | OUTPATIENT
Start: 2021-10-05 | End: 2021-10-05

## 2021-10-05 RX ADMIN — ENOXAPARIN SODIUM 30 MG: 30 INJECTION SUBCUTANEOUS at 20:10

## 2021-10-05 RX ADMIN — FUROSEMIDE 40 MG: 10 INJECTION, SOLUTION INTRAMUSCULAR; INTRAVENOUS at 10:48

## 2021-10-05 RX ADMIN — GABAPENTIN 800 MG: 400 CAPSULE ORAL at 20:09

## 2021-10-05 RX ADMIN — Medication 1.1 MCG/KG/HR: at 15:37

## 2021-10-05 RX ADMIN — SODIUM CHLORIDE, PRESERVATIVE FREE 10 ML: 5 INJECTION INTRAVENOUS at 20:11

## 2021-10-05 RX ADMIN — PIPERACILLIN SODIUM AND TAZOBACTAM SODIUM 3375 MG: 3; .375 INJECTION, POWDER, LYOPHILIZED, FOR SOLUTION INTRAVENOUS at 01:47

## 2021-10-05 RX ADMIN — Medication 1.1 MCG/KG/HR: at 03:06

## 2021-10-05 RX ADMIN — IPRATROPIUM BROMIDE AND ALBUTEROL 1 PUFF: 20; 100 SPRAY, METERED RESPIRATORY (INHALATION) at 19:14

## 2021-10-05 RX ADMIN — Medication 20 MEQ: at 13:11

## 2021-10-05 RX ADMIN — INSULIN LISPRO 1 UNITS: 100 INJECTION, SOLUTION INTRAVENOUS; SUBCUTANEOUS at 20:22

## 2021-10-05 RX ADMIN — OXYCODONE HYDROCHLORIDE 20 MG: 10 TABLET, FILM COATED, EXTENDED RELEASE ORAL at 20:09

## 2021-10-05 RX ADMIN — TIZANIDINE 2 MG: 4 TABLET ORAL at 15:38

## 2021-10-05 RX ADMIN — DEXAMETHASONE SODIUM PHOSPHATE 6 MG: 10 INJECTION INTRAMUSCULAR; INTRAVENOUS at 18:11

## 2021-10-05 RX ADMIN — ENOXAPARIN SODIUM 30 MG: 30 INJECTION SUBCUTANEOUS at 13:15

## 2021-10-05 RX ADMIN — MORPHINE SULFATE 4 MG: 4 INJECTION INTRAVENOUS at 04:37

## 2021-10-05 RX ADMIN — Medication 1.1 MCG/KG/HR: at 13:19

## 2021-10-05 RX ADMIN — PROCHLORPERAZINE EDISYLATE 10 MG: 5 INJECTION INTRAMUSCULAR; INTRAVENOUS at 21:16

## 2021-10-05 RX ADMIN — OXYCODONE HYDROCHLORIDE 20 MG: 10 TABLET, FILM COATED, EXTENDED RELEASE ORAL at 10:49

## 2021-10-05 RX ADMIN — Medication 1.4 MCG/KG/HR: at 18:50

## 2021-10-05 RX ADMIN — SODIUM CHLORIDE, PRESERVATIVE FREE 10 ML: 5 INJECTION INTRAVENOUS at 20:10

## 2021-10-05 RX ADMIN — Medication 1.4 MCG/KG/HR: at 22:33

## 2021-10-05 RX ADMIN — PIPERACILLIN SODIUM AND TAZOBACTAM SODIUM 3375 MG: 3; .375 INJECTION, POWDER, LYOPHILIZED, FOR SOLUTION INTRAVENOUS at 13:15

## 2021-10-05 RX ADMIN — Medication 20 MEQ: at 10:48

## 2021-10-05 RX ADMIN — Medication 1.1 MCG/KG/HR: at 07:38

## 2021-10-05 RX ADMIN — PIPERACILLIN SODIUM AND TAZOBACTAM SODIUM 3375 MG: 3; .375 INJECTION, POWDER, LYOPHILIZED, FOR SOLUTION INTRAVENOUS at 18:12

## 2021-10-05 RX ADMIN — GABAPENTIN 800 MG: 400 CAPSULE ORAL at 15:38

## 2021-10-05 ASSESSMENT — PAIN SCALES - GENERAL
PAINLEVEL_OUTOF10: 10
PAINLEVEL_OUTOF10: 10
PAINLEVEL_OUTOF10: 9

## 2021-10-05 NOTE — PROGRESS NOTES
End of shift note. Patient is now prone and was placed prone at around 1000 am. Before placing her prone she was given her oral medications along with pain medication with applesauce. Patient has remained prone since, tolerating with precedex. Vapo-therm 100% continues with oxygen saturations in the 90's. Lasix was given this shift with good output.

## 2021-10-05 NOTE — PROGRESS NOTES
Pulmonary & Critical Care Medicine ICU Progress Note    CC: Respiratory failure    Events of Last 24 hours:   Extubated yesterday  BiPAP  100% FiO2   More awake   Precedex 1.1 mcg/kg/hr for BiPAP             Vascular lines: IV: PICC line     MV: -10/4  Vent Mode: AC/VC Rate Set: 28 bmp/Vt Ordered: 400 mL/ /FiO2 : 100 %  Recent Labs     10/04/21  0555 10/04/21  1130   PHART 7.480* 7.520*   WLA9HIP 38.8 36.1   PO2ART 76.3 56.3*       IV:   dexmedetomidine HCl in NaCl 1.1 mcg/kg/hr (10/05/21 0306)    sodium chloride      dextrose      sodium chloride         Vitals:  Blood pressure (!) 154/62, pulse 55, temperature 98.7 °F (37.1 °C), temperature source Axillary, resp. rate 22, height 5' 9\" (1.753 m), weight 180 lb 14.4 oz (82.1 kg), SpO2 96 %, not currently breastfeeding. on Vapotherm/BiPAP   Temp  Av.5 °F (36.9 °C)  Min: 98.3 °F (36.8 °C)  Max: 98.7 °F (37.1 °C)    Intake/Output Summary (Last 24 hours) at 10/5/2021 0711  Last data filed at 10/5/2021 0600  Gross per 24 hour   Intake 386.65 ml   Output 3135 ml   Net -2748.35 ml     Gen: + distress. Eyes: PERRL. No sclera icterus. No conjunctival injection. ENT: No discharge. Pharynx clear. Neck: Trachea midline. No obvious mass. Resp: + accessory muscle use. Few crackles. No wheezes. No rhonchi. No dullness on percussion. Good air entry. CV: Regular rate. Regular rhythm. No murmur or rub. No edema. GI: Non-tender. Non-distended. No hernia. Skin: Warm and dry. No nodule on exposed extremities. Lymph: No cervical LAD. No supraclavicular LAD. M/S: No cyanosis. No joint deformity. No clubbing. Neuro: Awake. Alert. Moves all four extremities. Psych: Oriented x 3. No anxiety.            Scheduled Meds:   amLODIPine  2.5 mg Oral Daily    losartan  25 mg Oral Daily    albuterol-ipratropium  1 puff Inhalation BID    piperacillin-tazobactam  3,375 mg IntraVENous Q8H    pantoprazole  40 mg IntraVENous Daily    insulin lispro  0-6 Units SubCUTAneous Q4H    levothyroxine  100 mcg Oral Daily    enoxaparin  30 mg SubCUTAneous BID    dexamethasone  6 mg IntraVENous Q24H    lidocaine 1 % injection  5 mL IntraDERmal Once    sodium chloride flush  5-40 mL IntraVENous 2 times per day    aspirin  81 mg Oral Daily    [Held by provider] gabapentin  800 mg Oral TID    [Held by provider] rOPINIRole  0.5 mg Oral TID    sertraline  50 mg Oral Daily    atorvastatin  40 mg Oral Daily    sodium chloride flush  5-40 mL IntraVENous 2 times per day    [Held by provider] oxyCODONE  20 mg Oral 2 times per day       Data:  CBC:   Recent Labs     10/03/21  0429 10/04/21  0350 10/05/21  0445   WBC 5.6 6.4 9.8   HGB 10.8* 10.9* 10.8*   HCT 33.5* 33.1* 33.0*   MCV 77.3* 76.7* 76.0*   * 135 136     BMP:   Recent Labs     10/03/21  0429 10/04/21  0350 10/05/21  0445    136 140   K 3.6 4.1 3.3*    100 102   CO2 26 26 27   BUN 27* 28* 26*   CREATININE <0.5* <0.5* <0.5*     LIVER PROFILE:   Recent Labs     10/03/21  0429 10/04/21  0350 10/05/21  0445   AST 60* 61* 77*   ALT 26 33 33   BILITOT 0.4 0.3 0.6   ALKPHOS 162* 156* 141*       Microbiology:  9/27 COVID-19 detected  9/30 Resp Pseudomonas fluorescensAbnormal       Imaging:  Chest x-ray 10/5 imaging reviewed by me and showed   Worse bilateral effusion     CTPA 9/27 imaging was reviewed by me and showed   No evidence of pulmonary embolism. Scattered peripheral opacities in the left lung and more consolidative area   in the right lung concerning for pneumonia.  Previous right thoracotomy and   upper lobectomy.    One mildly enlarged right paratracheal lymph node is present but no priors   for comparison.  Nonenlarged but prominent lymph nodes in the upper abdomen   and juxta diaphragmatic region        ASSESSMENT:  · Acute hypoxemic respiratory failure  · COVID-19 viral pneumonia  · Pseudomonas PNA   · Hypotension-probably sedation related  · Transaminitis  · Fever T-max 101.5  · Acute kidney injury  · Electrolytes disorder   · CAD  · Chronic pain syndrome - narcotics and Neurontin         PLAN:  Vapotherm for life-threatening acute hypoxemic respiratory failure and titrate to maintain SaO2 >92%  Bilevel non-invasive positive pressure ventilation per my orders. The ventilator was adjusted by me at the bedside for unstable, life threatening respiratory failure. Wean oxygen as tolerated. Precedex drip if needed   Self proning today   Droplet plus isolation   IV antibiotics to include Zosyn D#4. Completed 5 days of Ceftriaxone/Zithromax   IV Levophed to keep MAP > 65 mmHg or SBP>90  Follow-up cultures   Dexamethasone 6 mg IV D#9  Completed Remdesivir day #5/5  Post 1 dose of Tocilizumab  Resume home oxy and neurontin    Electrolytes replacement   Lasix 40 mg IV x 1   Lovenox BID   MRSA prophylaxis: Bactroban  Patient is full code    Kathryn Milian [phone]. I called and updated yesterday               Total critical care time caring for this patient with life threatening, unstable organ failure, including direct patient contact, management of life support systems, review of data including imaging and labs, discussions with other team members and physicians is 32 minutes, excluding procedures.

## 2021-10-05 NOTE — PROGRESS NOTES
Patient c/o of pain in her back and hips. PRN morphine given to increase comfort. Repositioned as requested. Precedex gtt titrated for BIPAP compliance. Patient tolerated bipap well. Oxygen consistently > 90. Urine output adequate. Patient resting quietly at this time.

## 2021-10-05 NOTE — PROGRESS NOTES
10/04/21 9035   NIV Type   $NIV $Daily Charge   Equipment Type v60   Mode Bilevel   Mask Type Full face mask   Mask Size Medium   Settings/Measurements   IPAP 16 cmH20   CPAP/EPAP 8 cmH2O   Rate Ordered 14   Resp 21   FiO2  75 %   Vt Exhaled 710 mL   Minute Volume 11.6 Liters   Mask Leak (lpm) 11 lpm   Comfort Level Good   Using Accessory Muscles No   SpO2 93   Alarm Settings   Alarms On Y   Press Low Alarm 8 cmH2O   High Pressure Alarm 40 cmH2O   Delay Alarm 20 sec(s)   Resp Rate Low Alarm 12   High Respiratory Rate 40 br/min   Oxygen Therapy/Pulse Ox   SpO2 95 %

## 2021-10-05 NOTE — PLAN OF CARE
Patient Oxygen saturation 87-90 % on Vapotherm 40L/100% with Nonrebreather on for added oxygenation. Order obtained for bipap.       Patient tolerating bipap with precedex titrated for comfort   O2 Sats >90%

## 2021-10-05 NOTE — PROGRESS NOTES
Shift assessment is complete. Pt is alert/oriented. Pt continues on 100% vapo-therm with oxygen saturation in the 80's. She is agitated and precedex continues. DENISE PICC dry/intact. VSS, telemetry SR. Droplet + for covid.

## 2021-10-05 NOTE — PROGRESS NOTES
ICU Progress Note    Admit Date:  9/26/2021      Interval history:  Admitted with COVID-19 pneumonia,  acute hypoxic respiratory failure. Patient not vaccinated  Progressive significant worsening hypoxemia overnight. Patient was on 2 L of oxygen on admission-> switched to high flow oxygen per Vapotherm with additional 100% nonrebreather on 9/28/2021-> transferred to ICU. Worsening hypoxemia overnight. Intubated and placed on mechanical ventilation early AM on 9/29/2021. Placed in prone positioning. Did not require paralytics. 10/1 Patient desaturated when a supine position attempted today, back on prone ventilation    Ms. Lucas seen. Seen in prone position . Remains intubated, sedated ,on mechanical ventilation. .   On FiO2  55%. PEEP 8  Weaned off of pressors      10/2  resp culture growing pseudomonas - started zosyn. 10/3  PEEP 10, FiO2 50%. RASS -1 - she wakes up to voice and able to follow commands and responds appropriately on the vent today. She is on fentanyl precedex and Zosyn infusing. 10/4-This is my first encounter with the patient. Chart reviewed briefly. Patient is extubated. She is on 100% Vapotherm. Answers questions appropriately. She is hungry. 10/5 patient is oxygen demands have increased and she has been placed in the prone position. She is on 100% Vapotherm. Objective:   BP (!) 118/58   Pulse (!) 47   Temp 97.8 °F (36.6 °C) (Axillary)   Resp 18   Ht 5' 9\" (1.753 m)   Wt 180 lb 14.4 oz (82.1 kg)   SpO2 91%   BMI 26.71 kg/m²        Intake/Output Summary (Last 24 hours) at 10/5/2021 1410  Last data filed at 10/5/2021 0600  Gross per 24 hour   Intake 386.65 ml   Output 3015 ml   Net -2628.35 ml         Physical Exam:  General: Patient seen in prone position. 100% Vapotherm. Ill-appearing. Skin:  Warm and dry  Neck:  JVD absent. Neck supple  Chest: diminished breath sounds, improved air entry. No wheezes or rhonchi.   Bibasilar crackles. Cardiovascular:  RRR ,S1S2 normal  Abdomen:  Soft, non tender, non distended, BS +  Extremities:  No edema. Intact peripheral pulses. Brisk cap refill, < 2 secs  Neuro: DEMETRICE      Medications:   Scheduled Meds:   amLODIPine  2.5 mg Oral Daily    losartan  25 mg Oral Daily    albuterol-ipratropium  1 puff Inhalation BID    piperacillin-tazobactam  3,375 mg IntraVENous Q8H    pantoprazole  40 mg IntraVENous Daily    insulin lispro  0-6 Units SubCUTAneous Q4H    levothyroxine  100 mcg Oral Daily    enoxaparin  30 mg SubCUTAneous BID    dexamethasone  6 mg IntraVENous Q24H    lidocaine 1 % injection  5 mL IntraDERmal Once    sodium chloride flush  5-40 mL IntraVENous 2 times per day    aspirin  81 mg Oral Daily    gabapentin  800 mg Oral TID    [Held by provider] rOPINIRole  0.5 mg Oral TID    sertraline  50 mg Oral Daily    atorvastatin  40 mg Oral Daily    sodium chloride flush  5-40 mL IntraVENous 2 times per day    oxyCODONE  20 mg Oral 2 times per day       Continuous Infusions:   dexmedetomidine HCl in NaCl 1.1 mcg/kg/hr (10/05/21 1319)    sodium chloride      dextrose      sodium chloride         Data:  CBC:   Recent Labs     10/03/21  0429 10/04/21  0350 10/05/21  0445   WBC 5.6 6.4 9.8   RBC 4.34 4.32 4.34   HGB 10.8* 10.9* 10.8*   HCT 33.5* 33.1* 33.0*   MCV 77.3* 76.7* 76.0*   RDW 18.0* 18.3* 18.1*   * 135 136     BMP:   Recent Labs     10/03/21  0429 10/04/21  0350 10/05/21  0445    136 140   K 3.6 4.1 3.3*    100 102   CO2 26 26 27   BUN 27* 28* 26*   CREATININE <0.5* <0.5* <0.5*     BNP: No results for input(s): BNP in the last 72 hours. PT/INR:   No results for input(s): PROTIME, INR in the last 72 hours. APTT: No results for input(s): APTT in the last 72 hours. CARDIAC ENZYMES:   No results for input(s): CKMB, CKMBINDEX, TROPONINI in the last 72 hours.     Invalid input(s): CKTOTAL;3  FASTING LIPID PANEL:  Lab Results   Component Value Date    CHOL 156 10/18/2018    HDL 39 (L) 10/18/2018    TRIG 380 (H) 10/04/2021     LIVER PROFILE:   Recent Labs     10/03/21  0429 10/04/21  0350 10/05/21  0445   AST 60* 61* 77*   ALT 26 33 33   BILITOT 0.4 0.3 0.6   ALKPHOS 162* 156* 141*           CULTURES  COVID 19, NAAT: Detected    Susceptibility    Pseudomonas fluorescens (1)    Antibiotic Interpretation AMANDA Status    cefepime Sensitive <=2 mcg/mL     ciprofloxacin Sensitive <=1 mcg/mL     gentamicin Sensitive <=4 mcg/mL     meropenem Intermediate 8 mcg/mL     piperacillin-tazobactam Sensitive <=16 mcg/mL     tobramycin Sensitive <=4 mcg/           EKG:   Normal sinus rhythm  Left ventricular hypertrophy with repolarization abnormality  Prolonged QT  ST abnormality inferior leads consider ischemia  Abnormal ECG  When compared with ECG of 05-SEP-2020 08:35,  Premature ventricular complexes are no longer Present  T wave inversion now evident in Inferior leads  Confirmed by Kerri Yu MD, [place]     Radiology  XR CHEST PORTABLE   Final Result   1. Interval increased bilateral pulmonary opacities with new consolidative   change at the right lung base. These findings could be secondary to   pulmonary edema or infection. 2. Consolidative change at the right lung base could also be secondary to   atelectasis/mucous plug. XR CHEST PORTABLE   Final Result   No significant interval change in small right pleural effusion or bilateral   heterogeneous opacity which can reflect pulmonary edema or pneumonia. XR CHEST PORTABLE   Final Result   Mild improvement from prior comparison. Mild-to-moderate congestion and/or   infiltrates identified in the lungs. ET tube terminates 7 cm superior to the saba. XR CHEST PORTABLE   Final Result   Endotracheal tube and orogastric tube unchanged, adequate position.       Slightly increased extensive bilateral pulmonary disease over the past 24   hours this may relate to interstitial edema or diffuse infection or a combination. XR CHEST PORTABLE   Final Result   Endotracheal tube with its tip approximately 4.7 cm from the saba in   satisfactory position. Enteric tube in satisfactory position. Patchy bilateral airspace disease is again noted with improved aeration of   the left mid lung. XR CHEST PORTABLE   Final Result   Some improvement in the appearance of the chest with clearing of some of the   acute airspace disease from the mid left lung. Large amount of pneumonia   remains within the right lung and lower left lung. XR CHEST PORTABLE   Final Result   1. Endotracheal tube, nasogastric tube, and right PICC line placement. 2.  Increasing multifocal opacities with consolidative area in the right   perihilar lung. Could be multifocal pneumonia with or without edema. IR PICC WO SQ PORT/PUMP > 5 YEARS   Final Result   1. See above. CT CHEST PULMONARY EMBOLISM W CONTRAST   Final Result   No evidence of pulmonary embolism. Scattered peripheral opacities in the left lung and more consolidative area   in the right lung concerning for pneumonia. Previous right thoracotomy and   upper lobectomy. One mildly enlarged right paratracheal lymph node is present but no priors   for comparison. Nonenlarged but prominent lymph nodes in the upper abdomen   and juxta diaphragmatic region. XR CHEST PORTABLE   Final Result   Mild cardiomegaly without interstitial edema. Metallic surgical clips from prior right thoracotomy. COPD with anterior segment-right upper lobe alveolar pneumonia. Old pleural thickening was noted along the right lateral chest wall. Pleural   thickening and or trace effusion blunts the right costophrenic angle.                Assessment:  Principal Problem:    Suspected COVID-19 virus infection  Active Problems:    Hypothyroidism    Essential hypertension    DM2 (diabetes mellitus, type 2) ([address]    Dehydration    Hypoxia    Acute respiratory failure with hypoxia ([facility]    COVID-19    Pneumonia due to COVID-19 virus    CHARLY (acute kidney injury) ([facility] [address]    Uncontrolled hypertension    Fever    Hypotension    Mild protein-calorie malnutrition (H[facility])    Gram-negative pneumonia ([facility] [address]    Pneumonia due to Pseudomonas species ([facility] [address]    Hypomagnesemia    Hypokalemia  Resolved Problems:    * No resolved hospital problems. *      Plan:    #COVID-19 pneumonia  #Acute hypoxic respiratory failure   Pseudomonas HCAP. -Unvaccinated patient  - she presented with cough, fever, dyspnea in the setting of household contact testing + COVID 19  - Her Covid testing came back positive on admission  - Initially on 2 L of oxygen on presentation. She started desaturating quickly . worsening hypoxemia requiring high flow oxygen. - Pulmonology consulted  -placed on high flow oxygen per Vapotherm 40 L,FiO2 100% ,with additional nonrebreather . Transferred to ICU on 9/28. Intubated early AM  9/29/2021. Extubated on 10/4/2021. She is on Vapotherm. Gradually wean oxygen. She was proned and was on Nimbex previously. Placed back in prone position today  - s/p Remdesivir  - on Decadron, day # 9  - given 1 dose of Tocilizumab  -Lovenox twice daily      #Possible superimposed bacterial pneumonia  -On Rocephin and Zithromax,   - changed to zosyn on 10/2 with pseudomonas on resp culture -      #Hypokalemia  - replaced       #Dehydration  -  Improved with IVF  Creatinine normal       #CAD  - cont ASA, statin  - EKG with inferior T wave abnormality. She denies CP. Trop neg   -telemetry monitoring      #History of tracheobronchomalacia  Has had surgical treatment for this       #HTN-blood pressure is low and she was Levophed. This will be discontinued.   Resume home medications-Norvasc and losartan.         #Chronic pain   History of hip fracture  - cont home oxycodone BID and percocet   - cont gabapentin and requip       #DM2  - use ssi        #Hypothyroidism  - cont synthroid         DVT Prophylaxis: Lovenox   Diet NPO  ADULT TUBE FEEDING; Orogastric; Peptide Based; Continuous; 20; Yes; 15; Q 4 hours; 45; 30; Q 3 hours; Protein; one proteinex P2Go once daily via feeding tube  Code Status: Full Code        Michael Castro MD, MD 10/5/2021 2:10 PM

## 2021-10-05 NOTE — PROGRESS NOTES
RT Inhaler-Nebulizer Bronchodilator Protocol Note    There is a bronchodilator order in the chart from a provider indicating to follow the RT Bronchodilator Protocol and there is an Initiate RT Inhaler-Nebulizer Bronchodilator Protocol order as well (see protocol at bottom of note). CXR Findings:  XR CHEST PORTABLE    Result Date: 10/5/2021  1. Interval increased bilateral pulmonary opacities with new consolidative change at the right lung base. These findings could be secondary to pulmonary edema or infection. 2. Consolidative change at the right lung base could also be secondary to atelectasis/mucous plug. XR CHEST PORTABLE    Result Date: 10/4/2021  No significant interval change in small right pleural effusion or bilateral heterogeneous opacity which can reflect pulmonary edema or pneumonia. The findings from the last RT Protocol Assessment were as follows:   History Pulmonary Disease: (P) None or smoker <15 pack years  Respiratory Pattern: (P) Mild dyspnea at rest, irregular pattern, or RR 21-25 bpm  Breath Sounds: (P) Slightly diminished and/or crackles  Cough: (P) Strong, spontaneous, non-productive  Indication for Bronchodilator Therapy: (P) Decreased or absent breath sounds  Bronchodilator Assessment Score: (P) 6    Aerosolized bronchodilator medication orders have been revised according to the RT Inhaler-Nebulizer Bronchodilator Protocol below. Respiratory Therapist to perform RT Therapy Protocol Assessment initially then follow the protocol. Repeat RT Therapy Protocol Assessment PRN for score 0-3 or on second treatment, BID, and PRN for scores above 3. No Indications - adjust the frequency to every 6 hours PRN wheezing or bronchospasm, if no treatments needed after 48 hours then discontinue using Per Protocol order mode. If indication present, adjust the RT bronchodilator orders based on the Bronchodilator Assessment Score as indicated below.   Use Inhaler orders unless patient has one or more of the following: on home nebulizer, not able to hold breath for 10 seconds, is not alert and oriented, cannot activate and use MDI correctly, or respiratory rate 25 breaths per minute or more, then use the equivalent nebulizer order(s) with same Frequency and PRN reasons based on the score. If a patient is on this medication at home then do not decrease Frequency below that used at home. 0-3 - enter or revise RT bronchodilator order(s) to equivalent RT Bronchodilator order with Frequency of every 4 hours PRN for wheezing or increased work of breathing using Per Protocol order mode. 4-6 - enter or revise RT Bronchodilator order(s) to two equivalent RT bronchodilator orders with one order with BID Frequency and one order with Frequency of every 4 hours PRN wheezing or increased work of breathing using Per Protocol order mode. 7-10 - enter or revise RT Bronchodilator order(s) to two equivalent RT bronchodilator orders with one order with TID Frequency and one order with Frequency of every 4 hours PRN wheezing or increased work of breathing using Per Protocol order mode. 11-13 - enter or revise RT Bronchodilator order(s) to one equivalent RT bronchodilator order with QID Frequency and an Albuterol order with Frequency of every 4 hours PRN wheezing or increased work of breathing using Per Protocol order mode. Greater than 13 - enter or revise RT Bronchodilator order(s) to one equivalent RT bronchodilator order with every 4 hours Frequency and an Albuterol order with Frequency of every 2 hours PRN wheezing or increased work of breathing using Per Protocol order mode. RT to enter RT Home Evaluation for COPD & MDI Assessment order using Per Protocol order mode.     Electronically signed by Ivon Nunez RCP on 10/5/2021 at 7:21 PM

## 2021-10-05 NOTE — PROGRESS NOTES
10/05/21 0255   NIV Type   Skin Protection for O2 Device Yes   Equipment Type v60   Mode Bilevel   Mask Type Full face mask   Mask Size Medium   Settings/Measurements   IPAP 16 cmH20   CPAP/EPAP 8 cmH2O   Rate Ordered 14   Resp 25   FiO2  100 %   Vt Exhaled 553 mL   Minute Volume 12.4 Liters   Mask Leak (lpm) 4 lpm   Comfort Level Good   Using Accessory Muscles No   SpO2 95   Oxygen Therapy/Pulse Ox   SpO2 (!) 88 %

## 2021-10-05 NOTE — PROGRESS NOTES
RT Inhaler-Nebulizer Bronchodilator Protocol Note    There is a bronchodilator order in the chart from a provider indicating to follow the RT Bronchodilator Protocol and there is an Initiate RT Inhaler-Nebulizer Bronchodilator Protocol order as well (see protocol at bottom of note). CXR Findings:  XR CHEST PORTABLE    Result Date: 10/4/2021  No significant interval change in small right pleural effusion or bilateral heterogeneous opacity which can reflect pulmonary edema or pneumonia. XR CHEST PORTABLE    Result Date: 10/3/2021  Mild improvement from prior comparison. Mild-to-moderate congestion and/or infiltrates identified in the lungs. ET tube terminates 7 cm superior to the saba. The findings from the last RT Protocol Assessment were as follows:   History Pulmonary Disease: (P) Smoker 15 pack years or more  Respiratory Pattern: (P) Dyspnea on exertion or RR 21-25 bpm  Breath Sounds: (P) Slightly diminished and/or crackles  Cough: (P) Strong, spontaneous, non-productive  Indication for Bronchodilator Therapy: (P) Decreased or absent breath sounds  Bronchodilator Assessment Score: (P) 5    Aerosolized bronchodilator medication orders have been revised according to the RT Inhaler-Nebulizer Bronchodilator Protocol below. Respiratory Therapist to perform RT Therapy Protocol Assessment initially then follow the protocol. Repeat RT Therapy Protocol Assessment PRN for score 0-3 or on second treatment, BID, and PRN for scores above 3. No Indications - adjust the frequency to every 6 hours PRN wheezing or bronchospasm, if no treatments needed after 48 hours then discontinue using Per Protocol order mode. If indication present, adjust the RT bronchodilator orders based on the Bronchodilator Assessment Score as indicated below.   Use Inhaler orders unless patient has one or more of the following: on home nebulizer, not able to hold breath for 10 seconds, is not alert and oriented, cannot activate and use MDI correctly, or respiratory rate 25 breaths per minute or more, then use the equivalent nebulizer order(s) with same Frequency and PRN reasons based on the score. If a patient is on this medication at home then do not decrease Frequency below that used at home. 0-3 - enter or revise RT bronchodilator order(s) to equivalent RT Bronchodilator order with Frequency of every 4 hours PRN for wheezing or increased work of breathing using Per Protocol order mode. 4-6 - enter or revise RT Bronchodilator order(s) to two equivalent RT bronchodilator orders with one order with BID Frequency and one order with Frequency of every 4 hours PRN wheezing or increased work of breathing using Per Protocol order mode. 7-10 - enter or revise RT Bronchodilator order(s) to two equivalent RT bronchodilator orders with one order with TID Frequency and one order with Frequency of every 4 hours PRN wheezing or increased work of breathing using Per Protocol order mode. 11-13 - enter or revise RT Bronchodilator order(s) to one equivalent RT bronchodilator order with QID Frequency and an Albuterol order with Frequency of every 4 hours PRN wheezing or increased work of breathing using Per Protocol order mode. Greater than 13 - enter or revise RT Bronchodilator order(s) to one equivalent RT bronchodilator order with every 4 hours Frequency and an Albuterol order with Frequency of every 2 hours PRN wheezing or increased work of breathing using Per Protocol order mode. RT to enter RT Home Evaluation for COPD & MDI Assessment order using Per Protocol order mode.     Electronically signed by Cony Long RCP on 10/5/2021 at 4:02 AM

## 2021-10-05 NOTE — PROGRESS NOTES
RT Inhaler-Nebulizer Bronchodilator Protocol Note    There is a bronchodilator order in the chart from a provider indicating to follow the RT Bronchodilator Protocol and there is an Initiate RT Inhaler-Nebulizer Bronchodilator Protocol order as well (see protocol at bottom of note). CXR Findings:  XR CHEST PORTABLE    Result Date: 10/5/2021  1. Interval increased bilateral pulmonary opacities with new consolidative change at the right lung base. These findings could be secondary to pulmonary edema or infection. 2. Consolidative change at the right lung base could also be secondary to atelectasis/mucous plug. XR CHEST PORTABLE    Result Date: 10/4/2021  No significant interval change in small right pleural effusion or bilateral heterogeneous opacity which can reflect pulmonary edema or pneumonia. The findings from the last RT Protocol Assessment were as follows:   History Pulmonary Disease: None or smoker <15 pack years  Respiratory Pattern: Mild dyspnea at rest, irregular pattern, or RR 21-25 bpm  Breath Sounds: Slightly diminished and/or crackles  Cough: Strong, spontaneous, non-productive  Indication for Bronchodilator Therapy: Decreased or absent breath sounds  Bronchodilator Assessment Score: 6    Aerosolized bronchodilator medication orders have been revised according to the RT Inhaler-Nebulizer Bronchodilator Protocol below. Respiratory Therapist to perform RT Therapy Protocol Assessment initially then follow the protocol. Repeat RT Therapy Protocol Assessment PRN for score 0-3 or on second treatment, BID, and PRN for scores above 3. No Indications - adjust the frequency to every 6 hours PRN wheezing or bronchospasm, if no treatments needed after 48 hours then discontinue using Per Protocol order mode. If indication present, adjust the RT bronchodilator orders based on the Bronchodilator Assessment Score as indicated below.   Use Inhaler orders unless patient has one or more of the following: on home nebulizer, not able to hold breath for 10 seconds, is not alert and oriented, cannot activate and use MDI correctly, or respiratory rate 25 breaths per minute or more, then use the equivalent nebulizer order(s) with same Frequency and PRN reasons based on the score. If a patient is on this medication at home then do not decrease Frequency below that used at home. 0-3 - enter or revise RT bronchodilator order(s) to equivalent RT Bronchodilator order with Frequency of every 4 hours PRN for wheezing or increased work of breathing using Per Protocol order mode. 4-6 - enter or revise RT Bronchodilator order(s) to two equivalent RT bronchodilator orders with one order with BID Frequency and one order with Frequency of every 4 hours PRN wheezing or increased work of breathing using Per Protocol order mode. 7-10 - enter or revise RT Bronchodilator order(s) to two equivalent RT bronchodilator orders with one order with TID Frequency and one order with Frequency of every 4 hours PRN wheezing or increased work of breathing using Per Protocol order mode. 11-13 - enter or revise RT Bronchodilator order(s) to one equivalent RT bronchodilator order with QID Frequency and an Albuterol order with Frequency of every 4 hours PRN wheezing or increased work of breathing using Per Protocol order mode. Greater than 13 - enter or revise RT Bronchodilator order(s) to one equivalent RT bronchodilator order with every 4 hours Frequency and an Albuterol order with Frequency of every 2 hours PRN wheezing or increased work of breathing using Per Protocol order mode. RT to enter RT Home Evaluation for COPD & MDI Assessment order using Per Protocol order mode.     Electronically signed by Allen Hough RCP on 10/5/2021 at 5:22 PM

## 2021-10-06 ENCOUNTER — APPOINTMENT (OUTPATIENT)
Dept: GENERAL RADIOLOGY | Age: 66
DRG: 004 | End: 2021-10-06
Payer: MEDICARE

## 2021-10-06 LAB
A/G RATIO: 1 (ref 1.1–2.2)
ALBUMIN SERPL-MCNC: 3.3 G/DL (ref 3.4–5)
ALP BLD-CCNC: 160 U/L (ref 40–129)
ALT SERPL-CCNC: 28 U/L (ref 10–40)
ANION GAP SERPL CALCULATED.3IONS-SCNC: 12 MMOL/L (ref 3–16)
AST SERPL-CCNC: 61 U/L (ref 15–37)
BASE EXCESS ARTERIAL: -0.6 MMOL/L (ref -3–3)
BASE EXCESS ARTERIAL: -2.3 MMOL/L (ref -3–3)
BASE EXCESS ARTERIAL: 2.1 MMOL/L (ref -3–3)
BASOPHILS ABSOLUTE: 0 K/UL (ref 0–0.2)
BASOPHILS RELATIVE PERCENT: 0.3 %
BILIRUB SERPL-MCNC: 0.7 MG/DL (ref 0–1)
BUN BLDV-MCNC: 25 MG/DL (ref 7–20)
CALCIUM SERPL-MCNC: 8.8 MG/DL (ref 8.3–10.6)
CARBOXYHEMOGLOBIN ARTERIAL: 0.3 % (ref 0–1.5)
CARBOXYHEMOGLOBIN ARTERIAL: 0.5 % (ref 0–1.5)
CARBOXYHEMOGLOBIN ARTERIAL: 0.9 % (ref 0–1.5)
CHLORIDE BLD-SCNC: 102 MMOL/L (ref 99–110)
CO2: 24 MMOL/L (ref 21–32)
CREAT SERPL-MCNC: <0.5 MG/DL (ref 0.6–1.2)
EOSINOPHILS ABSOLUTE: 0 K/UL (ref 0–0.6)
EOSINOPHILS RELATIVE PERCENT: 0.1 %
GFR AFRICAN AMERICAN: >60
GFR NON-AFRICAN AMERICAN: >60
GLOBULIN: 3.2 G/DL
GLUCOSE BLD-MCNC: 107 MG/DL (ref 70–99)
GLUCOSE BLD-MCNC: 118 MG/DL (ref 70–99)
GLUCOSE BLD-MCNC: 121 MG/DL (ref 70–99)
GLUCOSE BLD-MCNC: 140 MG/DL (ref 70–99)
GLUCOSE BLD-MCNC: 170 MG/DL (ref 70–99)
GLUCOSE BLD-MCNC: 184 MG/DL (ref 70–99)
GLUCOSE BLD-MCNC: 93 MG/DL (ref 70–99)
HCO3 ARTERIAL: 24.5 MMOL/L (ref 21–29)
HCO3 ARTERIAL: 25.1 MMOL/L (ref 21–29)
HCO3 ARTERIAL: 25.5 MMOL/L (ref 21–29)
HCT VFR BLD CALC: 33.1 % (ref 36–48)
HEMOGLOBIN, ART, EXTENDED: 11.3 G/DL (ref 12–16)
HEMOGLOBIN, ART, EXTENDED: 11.4 G/DL (ref 12–16)
HEMOGLOBIN, ART, EXTENDED: 11.8 G/DL (ref 12–16)
HEMOGLOBIN: 10.7 G/DL (ref 12–16)
LYMPHOCYTES ABSOLUTE: 1.1 K/UL (ref 1–5.1)
LYMPHOCYTES RELATIVE PERCENT: 9.3 %
MCH RBC QN AUTO: 24.8 PG (ref 26–34)
MCHC RBC AUTO-ENTMCNC: 32.3 G/DL (ref 31–36)
MCV RBC AUTO: 76.6 FL (ref 80–100)
METHEMOGLOBIN ARTERIAL: 0.3 %
MONOCYTES ABSOLUTE: 0.5 K/UL (ref 0–1.3)
MONOCYTES RELATIVE PERCENT: 3.9 %
NEUTROPHILS ABSOLUTE: 10.4 K/UL (ref 1.7–7.7)
NEUTROPHILS RELATIVE PERCENT: 86.4 %
O2 SAT, ARTERIAL: 95.6 %
O2 SAT, ARTERIAL: 96.8 %
O2 SAT, ARTERIAL: 97.2 %
O2 THERAPY: ABNORMAL
PCO2 ARTERIAL: 35.6 MMHG (ref 35–45)
PCO2 ARTERIAL: 45.3 MMHG (ref 35–45)
PCO2 ARTERIAL: 51.2 MMHG (ref 35–45)
PDW BLD-RTO: 17.7 % (ref 12.4–15.4)
PERFORMED ON: ABNORMAL
PERFORMED ON: NORMAL
PH ARTERIAL: 7.3 (ref 7.35–7.45)
PH ARTERIAL: 7.36 (ref 7.35–7.45)
PH ARTERIAL: 7.47 (ref 7.35–7.45)
PLATELET # BLD: 140 K/UL (ref 135–450)
PMV BLD AUTO: 9.1 FL (ref 5–10.5)
PO2 ARTERIAL: 105.1 MMHG (ref 75–108)
PO2 ARTERIAL: 72.9 MMHG (ref 75–108)
PO2 ARTERIAL: 93.1 MMHG (ref 75–108)
POTASSIUM REFLEX MAGNESIUM: 3.9 MMOL/L (ref 3.5–5.1)
RBC # BLD: 4.32 M/UL (ref 4–5.2)
SODIUM BLD-SCNC: 138 MMOL/L (ref 136–145)
TCO2 ARTERIAL: 26.1 MMOL/L
TCO2 ARTERIAL: 26.5 MMOL/L
TCO2 ARTERIAL: 26.6 MMOL/L
TOTAL PROTEIN: 6.5 G/DL (ref 6.4–8.2)
WBC # BLD: 12 K/UL (ref 4–11)

## 2021-10-06 PROCEDURE — 85025 COMPLETE CBC W/AUTO DIFF WBC: CPT

## 2021-10-06 PROCEDURE — 94640 AIRWAY INHALATION TREATMENT: CPT

## 2021-10-06 PROCEDURE — 6370000000 HC RX 637 (ALT 250 FOR IP): Performed by: HOSPITALIST

## 2021-10-06 PROCEDURE — 74018 RADEX ABDOMEN 1 VIEW: CPT

## 2021-10-06 PROCEDURE — 6370000000 HC RX 637 (ALT 250 FOR IP): Performed by: INTERNAL MEDICINE

## 2021-10-06 PROCEDURE — 0BH17EZ INSERTION OF ENDOTRACHEAL AIRWAY INTO TRACHEA, VIA NATURAL OR ARTIFICIAL OPENING: ICD-10-PCS | Performed by: INTERNAL MEDICINE

## 2021-10-06 PROCEDURE — 99233 SBSQ HOSP IP/OBS HIGH 50: CPT | Performed by: INTERNAL MEDICINE

## 2021-10-06 PROCEDURE — 80053 COMPREHEN METABOLIC PANEL: CPT

## 2021-10-06 PROCEDURE — 0CJS8ZZ INSPECTION OF LARYNX, VIA NATURAL OR ARTIFICIAL OPENING ENDOSCOPIC: ICD-10-PCS | Performed by: INTERNAL MEDICINE

## 2021-10-06 PROCEDURE — 2700000000 HC OXYGEN THERAPY PER DAY

## 2021-10-06 PROCEDURE — 82803 BLOOD GASES ANY COMBINATION: CPT

## 2021-10-06 PROCEDURE — 5A1955Z RESPIRATORY VENTILATION, GREATER THAN 96 CONSECUTIVE HOURS: ICD-10-PCS | Performed by: INTERNAL MEDICINE

## 2021-10-06 PROCEDURE — 6360000002 HC RX W HCPCS: Performed by: INTERNAL MEDICINE

## 2021-10-06 PROCEDURE — 94003 VENT MGMT INPAT SUBQ DAY: CPT

## 2021-10-06 PROCEDURE — 31500 INSERT EMERGENCY AIRWAY: CPT

## 2021-10-06 PROCEDURE — C9113 INJ PANTOPRAZOLE SODIUM, VIA: HCPCS | Performed by: INTERNAL MEDICINE

## 2021-10-06 PROCEDURE — 71045 X-RAY EXAM CHEST 1 VIEW: CPT

## 2021-10-06 PROCEDURE — 2500000003 HC RX 250 WO HCPCS: Performed by: INTERNAL MEDICINE

## 2021-10-06 PROCEDURE — 99291 CRITICAL CARE FIRST HOUR: CPT | Performed by: INTERNAL MEDICINE

## 2021-10-06 PROCEDURE — 2580000003 HC RX 258: Performed by: INTERNAL MEDICINE

## 2021-10-06 PROCEDURE — 94761 N-INVAS EAR/PLS OXIMETRY MLT: CPT

## 2021-10-06 PROCEDURE — 2000000000 HC ICU R&B

## 2021-10-06 RX ORDER — PROPOFOL 10 MG/ML
10 INJECTION, EMULSION INTRAVENOUS CONTINUOUS
Status: DISCONTINUED | OUTPATIENT
Start: 2021-10-06 | End: 2021-10-06

## 2021-10-06 RX ORDER — FENTANYL CITRATE 50 UG/ML
50 INJECTION, SOLUTION INTRAMUSCULAR; INTRAVENOUS
Status: DISCONTINUED | OUTPATIENT
Start: 2021-10-06 | End: 2021-10-28 | Stop reason: HOSPADM

## 2021-10-06 RX ORDER — CHLORHEXIDINE GLUCONATE 0.12 MG/ML
15 RINSE ORAL 2 TIMES DAILY
Status: DISCONTINUED | OUTPATIENT
Start: 2021-10-06 | End: 2021-10-28 | Stop reason: HOSPADM

## 2021-10-06 RX ORDER — MINERAL OIL AND WHITE PETROLATUM 150; 830 MG/G; MG/G
OINTMENT OPHTHALMIC EVERY 4 HOURS
Status: DISCONTINUED | OUTPATIENT
Start: 2021-10-06 | End: 2021-10-11

## 2021-10-06 RX ORDER — IPRATROPIUM BROMIDE AND ALBUTEROL SULFATE 2.5; .5 MG/3ML; MG/3ML
1 SOLUTION RESPIRATORY (INHALATION) EVERY 4 HOURS
Status: DISCONTINUED | OUTPATIENT
Start: 2021-10-06 | End: 2021-10-28 | Stop reason: HOSPADM

## 2021-10-06 RX ORDER — PROPOFOL 10 MG/ML
10 INJECTION, EMULSION INTRAVENOUS CONTINUOUS
Status: DISCONTINUED | OUTPATIENT
Start: 2021-10-06 | End: 2021-10-27

## 2021-10-06 RX ORDER — MIDAZOLAM HYDROCHLORIDE 1 MG/ML
2 INJECTION INTRAMUSCULAR; INTRAVENOUS
Status: DISCONTINUED | OUTPATIENT
Start: 2021-10-06 | End: 2021-10-28 | Stop reason: HOSPADM

## 2021-10-06 RX ORDER — 0.9 % SODIUM CHLORIDE 0.9 %
500 INTRAVENOUS SOLUTION INTRAVENOUS ONCE
Status: COMPLETED | OUTPATIENT
Start: 2021-10-06 | End: 2021-10-06

## 2021-10-06 RX ORDER — ONDANSETRON 2 MG/ML
4 INJECTION INTRAMUSCULAR; INTRAVENOUS EVERY 6 HOURS PRN
Status: DISCONTINUED | OUTPATIENT
Start: 2021-10-06 | End: 2021-10-08

## 2021-10-06 RX ORDER — IPRATROPIUM BROMIDE AND ALBUTEROL SULFATE 2.5; .5 MG/3ML; MG/3ML
1 SOLUTION RESPIRATORY (INHALATION) EVERY 4 HOURS PRN
Status: DISCONTINUED | OUTPATIENT
Start: 2021-10-06 | End: 2021-10-09

## 2021-10-06 RX ORDER — ALBUTEROL SULFATE 90 UG/1
4 AEROSOL, METERED RESPIRATORY (INHALATION) EVERY 4 HOURS PRN
Status: DISCONTINUED | OUTPATIENT
Start: 2021-10-06 | End: 2021-10-06

## 2021-10-06 RX ORDER — OXYCODONE HYDROCHLORIDE 5 MG/1
10 TABLET ORAL EVERY 6 HOURS
Status: DISCONTINUED | OUTPATIENT
Start: 2021-10-06 | End: 2021-10-28 | Stop reason: HOSPADM

## 2021-10-06 RX ORDER — CARBOXYMETHYLCELLULOSE SODIUM 10 MG/ML
1 GEL OPHTHALMIC EVERY 4 HOURS
Status: DISCONTINUED | OUTPATIENT
Start: 2021-10-06 | End: 2021-10-11

## 2021-10-06 RX ADMIN — ATORVASTATIN CALCIUM 40 MG: 40 TABLET, FILM COATED ORAL at 14:12

## 2021-10-06 RX ADMIN — IPRATROPIUM BROMIDE AND ALBUTEROL SULFATE 1 AMPULE: .5; 2.5 SOLUTION RESPIRATORY (INHALATION) at 23:09

## 2021-10-06 RX ADMIN — IPRATROPIUM BROMIDE AND ALBUTEROL SULFATE 1 AMPULE: .5; 2.5 SOLUTION RESPIRATORY (INHALATION) at 20:35

## 2021-10-06 RX ADMIN — SODIUM CHLORIDE, PRESERVATIVE FREE 10 ML: 5 INJECTION INTRAVENOUS at 10:38

## 2021-10-06 RX ADMIN — PROPOFOL 80 MCG/KG/MIN: 10 INJECTION, EMULSION INTRAVENOUS at 12:21

## 2021-10-06 RX ADMIN — Medication 1.4 MCG/KG/HR: at 02:15

## 2021-10-06 RX ADMIN — WHITE PETROLATUM 57.7 %-MINERAL OIL 31.9 % EYE OINTMENT: at 15:44

## 2021-10-06 RX ADMIN — Medication 1.4 MCG/KG/HR: at 23:00

## 2021-10-06 RX ADMIN — ENOXAPARIN SODIUM 30 MG: 30 INJECTION SUBCUTANEOUS at 20:44

## 2021-10-06 RX ADMIN — DEXAMETHASONE SODIUM PHOSPHATE 6 MG: 10 INJECTION INTRAMUSCULAR; INTRAVENOUS at 13:44

## 2021-10-06 RX ADMIN — WHITE PETROLATUM 57.7 %-MINERAL OIL 31.9 % EYE OINTMENT: at 14:14

## 2021-10-06 RX ADMIN — PIPERACILLIN SODIUM AND TAZOBACTAM SODIUM 3375 MG: 3; .375 INJECTION, POWDER, LYOPHILIZED, FOR SOLUTION INTRAVENOUS at 02:14

## 2021-10-06 RX ADMIN — Medication 1.4 MCG/KG/HR: at 18:16

## 2021-10-06 RX ADMIN — Medication 1.4 MCG/KG/HR: at 14:21

## 2021-10-06 RX ADMIN — CHLORHEXIDINE GLUCONATE 0.12% ORAL RINSE 15 ML: 1.2 LIQUID ORAL at 20:43

## 2021-10-06 RX ADMIN — IPRATROPIUM BROMIDE AND ALBUTEROL 1 PUFF: 20; 100 SPRAY, METERED RESPIRATORY (INHALATION) at 07:29

## 2021-10-06 RX ADMIN — SODIUM CHLORIDE, PRESERVATIVE FREE 10 ML: 5 INJECTION INTRAVENOUS at 20:39

## 2021-10-06 RX ADMIN — SODIUM CHLORIDE 500 ML: 9 INJECTION, SOLUTION INTRAVENOUS at 12:04

## 2021-10-06 RX ADMIN — WHITE PETROLATUM 57.7 %-MINERAL OIL 31.9 % EYE OINTMENT: at 20:43

## 2021-10-06 RX ADMIN — OXYCODONE 10 MG: 5 TABLET ORAL at 14:11

## 2021-10-06 RX ADMIN — PIPERACILLIN SODIUM AND TAZOBACTAM SODIUM 3375 MG: 3; .375 INJECTION, POWDER, LYOPHILIZED, FOR SOLUTION INTRAVENOUS at 18:15

## 2021-10-06 RX ADMIN — Medication 50 MCG/HR: at 13:09

## 2021-10-06 RX ADMIN — TIZANIDINE 2 MG: 4 TABLET ORAL at 02:58

## 2021-10-06 RX ADMIN — CISATRACURIUM BESYLATE 2 MCG/KG/MIN: 10 INJECTION, SOLUTION INTRAVENOUS at 14:06

## 2021-10-06 RX ADMIN — PROPOFOL 80 MCG/KG/MIN: 10 INJECTION, EMULSION INTRAVENOUS at 18:18

## 2021-10-06 RX ADMIN — ASPIRIN 81 MG: 81 TABLET, CHEWABLE ORAL at 14:11

## 2021-10-06 RX ADMIN — PROPOFOL 80 MCG/KG/MIN: 10 INJECTION, EMULSION INTRAVENOUS at 16:24

## 2021-10-06 RX ADMIN — PROPOFOL 80 MCG/KG/MIN: 10 INJECTION, EMULSION INTRAVENOUS at 20:52

## 2021-10-06 RX ADMIN — ONDANSETRON HYDROCHLORIDE 4 MG: 2 INJECTION, SOLUTION INTRAMUSCULAR; INTRAVENOUS at 10:18

## 2021-10-06 RX ADMIN — ENOXAPARIN SODIUM 30 MG: 30 INJECTION SUBCUTANEOUS at 12:07

## 2021-10-06 RX ADMIN — Medication 1.4 MCG/KG/HR: at 06:19

## 2021-10-06 RX ADMIN — MIDAZOLAM HYDROCHLORIDE 2 MG: 1 INJECTION, SOLUTION INTRAMUSCULAR; INTRAVENOUS at 12:51

## 2021-10-06 RX ADMIN — SERTRALINE HYDROCHLORIDE 50 MG: 50 TABLET ORAL at 14:11

## 2021-10-06 RX ADMIN — CHLORHEXIDINE GLUCONATE 0.12% ORAL RINSE 15 ML: 1.2 LIQUID ORAL at 14:14

## 2021-10-06 RX ADMIN — PIPERACILLIN SODIUM AND TAZOBACTAM SODIUM 3375 MG: 3; .375 INJECTION, POWDER, LYOPHILIZED, FOR SOLUTION INTRAVENOUS at 10:20

## 2021-10-06 RX ADMIN — FENTANYL CITRATE 50 MCG: 0.05 INJECTION, SOLUTION INTRAMUSCULAR; INTRAVENOUS at 12:51

## 2021-10-06 RX ADMIN — INSULIN LISPRO 1 UNITS: 100 INJECTION, SOLUTION INTRAVENOUS; SUBCUTANEOUS at 20:43

## 2021-10-06 RX ADMIN — CARBOXYMETHYLCELLULOSE SODIUM 1 DROP: 10 GEL OPHTHALMIC at 14:13

## 2021-10-06 RX ADMIN — PROPOFOL 80 MCG/KG/MIN: 10 INJECTION, EMULSION INTRAVENOUS at 13:44

## 2021-10-06 RX ADMIN — MIDAZOLAM HYDROCHLORIDE 2 MG: 1 INJECTION, SOLUTION INTRAMUSCULAR; INTRAVENOUS at 11:50

## 2021-10-06 RX ADMIN — Medication 1.4 MCG/KG/HR: at 10:27

## 2021-10-06 RX ADMIN — DEXTROSE MONOHYDRATE 2 MCG/MIN: 50 INJECTION, SOLUTION INTRAVENOUS at 13:16

## 2021-10-06 RX ADMIN — FENTANYL CITRATE 50 MCG: 0.05 INJECTION, SOLUTION INTRAMUSCULAR; INTRAVENOUS at 11:42

## 2021-10-06 RX ADMIN — LEVOTHYROXINE SODIUM 100 MCG: 100 TABLET ORAL at 14:12

## 2021-10-06 RX ADMIN — PANTOPRAZOLE SODIUM 40 MG: 40 INJECTION, POWDER, FOR SOLUTION INTRAVENOUS at 10:38

## 2021-10-06 RX ADMIN — GABAPENTIN 800 MG: 400 CAPSULE ORAL at 14:12

## 2021-10-06 ASSESSMENT — PAIN SCALES - GENERAL
PAINLEVEL_OUTOF10: 0
PAINLEVEL_OUTOF10: 4
PAINLEVEL_OUTOF10: 6
PAINLEVEL_OUTOF10: 8

## 2021-10-06 ASSESSMENT — PULMONARY FUNCTION TESTS
PIF_VALUE: 26
PIF_VALUE: 30
PIF_VALUE: 30
PIF_VALUE: 27
PIF_VALUE: 34
PIF_VALUE: 30
PIF_VALUE: 30

## 2021-10-06 NOTE — PROGRESS NOTES
RASS -5  Baseline TOF 4/4 twitches @ 5ma   Pt remains dyssynchronous w./ vent, will start nimbex per order.

## 2021-10-06 NOTE — FLOWSHEET NOTE
10/06/21 0800   Vital Signs   Temp 98.7 °F (37.1 °C)   Temp Source Axillary   Pulse 52   Heart Rate Source Monitor   Resp 24   BP (!) 147/59   MAP (mmHg) 84   Level of Consciousness Alert (0)   MEWS Score 2   Pain Assessment   RASS Score 0   Oxygen Therapy   SpO2 92 %   O2 Device Heated high flow cannula   FiO2  100 %   O2 Flow Rate (L/min) 40 L/min   Pt resting in bed, vitals per doc flow. Assessment complete see doc flow. SB 52 on ICU bedside monitor. SPO2 92% on vaspotherm 40L @ 100% and NRB prone with CSPO2 monitoring. Pt A&O x 4 RASS 0. PERRL. PICC WDL PIV x leaking will remove when supine. All po meds held s/t resp status will address in rounds. Gray secured draining clear yellow urine. Pt repositioned for comfort. Noted possible DTI's to bilateral ears, mepilex applied to both. Will continue to closely monitor.

## 2021-10-06 NOTE — FLOWSHEET NOTE
10/06/21 1600   Vital Signs   Temp 97.5 °F (36.4 °C)   Temp Source Axillary   Pulse 55   Heart Rate Source Monitor   Resp 24   /64   MAP (mmHg) 86   Level of Consciousness Unresponsive (3)  (paralyzed)   MEWS Score 2   Oxygen Therapy   SpO2 98 %   O2 Device Ventilator   FiO2  70 %   Pt reassessed, see doc flow. SB 51 on ICU bedside monitor. #7.5 ETT @ 23LL. Vent: AC  RR:28  Vt: 360 FIO2: 55% PEEP 12 SPO2 92% with CSPO2 monitoring. RASS-5. TOF 4/4 @ 7 ma, pt synchronous w/ vent. PERRL. PICC WDL. OG clamped. Gray secured draining clear yellow urine. Pt repositioned for comfort. Will continue to closely monitor.

## 2021-10-06 NOTE — FLOWSHEET NOTE
10/06/21 1250   Vital Signs   Pulse (!) 45   Resp 23   BP (!) 102/52   MAP (mmHg) 67   Oxygen Therapy   SpO2 92 %   FiO2  70 %   Pt resting in bed, vitals per doc flow. Assessment complete see doc flow. SB 45 on ICU bedside monitor. #7.5 ETT @ 23LL. Vent: AC  RR:24  Vt: 360 FIO2: 70% PEEP 14 SPO2 92% with CSPO2 monitoring. RASS-5 however remains dyssynchronous w./ vent, will give prn's and monitor effectiveness. PERRL. PICC WDL. OG placement checked via gastric contents, clamped. Gray secured draining clear yellow urine. Pt repositioned for comfort. Will continue to closely monitor.

## 2021-10-06 NOTE — PROGRESS NOTES
ABG drawn from L radial artery x 1 attempt. Sight prepped per policy and procedure. Modified Alexis's test positive. Pressure held to sight after procedure for five minutes. No hematoma present. Pulse present after procedure.  Await results

## 2021-10-06 NOTE — CARE COORDINATION
INTERDISCIPLINARY PLAN OF CARE CONFERENCE    Date/Time: 10/6/2021 3:02 PM  Completed by:  HOMA Argueta. Case Management      Patient Name:  Gloria Guerrero  YOB: 1955  Admitting Diagnosis: Dehydration [E86.0]  Hypokalemia [E87.6]  Hypomagnesemia [E83.42]  Positive D dimer [R79.89]  CHARLY (acute kidney injury) ([facility] [N17.9]  Acute respiratory failure with hypoxia ([facility] [J96.01]  Pneumonia due to COVID-19 virus [U07.1, J12.82]  COVID-19 [U07.1]     Admit Date/Time:  9/26/2021  8:29 PM    Chart reviewed. Interdisciplinary team contacted or reviewed plan related to patient progress and discharge plans. Disciplines included Case Management, Nursing, and Dietitian. Current Status:Ongoing. Covid +. Restraints. Reintubated today. Precedex  PT/OT recommendation for discharge plan of care: TBD    Expected D/C Disposition:  TBD    Discharge Plan Comments: Chart review completed. Pt remains in the ICU and is on a Vent. Pt is from home with her spouse. PT/OT will be needed when appropriate. CM will continue to follow and assist. Please notify CM if needs or concerns arise.      Home O2 in place on admit: No

## 2021-10-06 NOTE — PROGRESS NOTES
Dr. Chandler Iba updated on pt and all events this shift. Per MD pt to have abg now. See new orders.

## 2021-10-06 NOTE — PROGRESS NOTES
ICU Progress Note    Admit Date:  9/26/2021      Interval history:  Admitted with COVID-19 pneumonia,  acute hypoxic respiratory failure. Patient not vaccinated  Progressive significant worsening hypoxemia overnight. Patient was on 2 L of oxygen on admission-> switched to high flow oxygen per Vapotherm with additional 100% nonrebreather on 9/28/2021-> transferred to ICU. Worsening hypoxemia overnight. Intubated and placed on mechanical ventilation early AM on 9/29/2021. Placed in prone positioning. Did not require paralytics. 10/1 Patient desaturated when a supine position attempted today, back on prone ventilation    Ms. Lucas seen. Seen in prone position . Remains intubated, sedated ,on mechanical ventilation. .   On FiO2  55%. PEEP 8  Weaned off of pressors      10/2  resp culture growing pseudomonas - started zosyn. 10/3  PEEP 10, FiO2 50%. RASS -1 - she wakes up to voice and able to follow commands and responds appropriately on the vent today. She is on fentanyl precedex and Zosyn infusing. 10/4-This is my first encounter with the patient. Chart reviewed briefly. Patient is extubated. She is on 100% Vapotherm. Answers questions appropriately. She is hungry. 10/5 patient is oxygen demands have increased and she has been placed in the prone position. She is on 100% Vapotherm. 10/6-patient is respiratory status worsened. She was on 100% FiO2 on Vapotherm and 100% nonrebreather in the prone position with saturations in the upper 80s. She is on Precedex. Because of worsening respiratory status she was reintubated on 10/6/2021. When I saw her she was sedated on the ventilator.           Objective:   BP (!) 107/52   Pulse (!) 46   Temp 98.4 °F (36.9 °C) (Axillary)   Resp 16   Ht 5' 9\" (1.753 m)   Wt 180 lb 14.4 oz (82.1 kg)   SpO2 93%   BMI 26.71 kg/m²        Intake/Output Summary (Last 24 hours) at 10/6/2021 1401  Last data filed at 10/6/2021 1257  Gross per 24 hour   Intake 924.61 ml   Output 3080 ml   Net -2155.39 ml         Physical Exam:  General: Sedated on the ventilator. Skin:  Warm and dry  Neck:  JVD absent. Neck supple  Chest: diminished breath sounds, improved air entry. No wheezes or rhonchi. Bibasilar crackles. Cardiovascular:  RRR ,S1S2 normal  Abdomen:  Soft, non tender, non distended, BS +  Extremities:  No edema. Intact peripheral pulses.  Brisk cap refill, < 2 secs  Neuro: DEMETRICE      Medications:   Scheduled Meds:   oxyCODONE  10 mg Oral Q6H    influenza virus vaccine  0.5 mL IntraMUSCular Prior to discharge    carboxymethylcellulose PF  1 drop Both Eyes Q4H    And    artificial tears   Both Eyes Q4H    chlorhexidine  15 mL Mouth/Throat BID    amLODIPine  2.5 mg Oral Daily    losartan  25 mg Oral Daily    albuterol-ipratropium  1 puff Inhalation BID    piperacillin-tazobactam  3,375 mg IntraVENous Q8H    pantoprazole  40 mg IntraVENous Daily    insulin lispro  0-6 Units SubCUTAneous Q4H    levothyroxine  100 mcg Oral Daily    enoxaparin  30 mg SubCUTAneous BID    dexamethasone  6 mg IntraVENous Q24H    lidocaine 1 % injection  5 mL IntraDERmal Once    sodium chloride flush  5-40 mL IntraVENous 2 times per day    aspirin  81 mg Oral Daily    gabapentin  800 mg Oral TID    [Held by provider] rOPINIRole  0.5 mg Oral TID    sertraline  50 mg Oral Daily    atorvastatin  40 mg Oral Daily    sodium chloride flush  5-40 mL IntraVENous 2 times per day       Continuous Infusions:   propofol 80 mcg/kg/min (10/06/21 1344)    norepinephrine 0.5 mcg/min (10/06/21 1346)    fentaNYL 50 mcg/hr (10/06/21 1309)    cisatracurium (NIMBEX) infusion      dexmedetomidine HCl in NaCl 1.4 mcg/kg/hr (10/06/21 1027)    sodium chloride      dextrose      sodium chloride         Data:  CBC:   Recent Labs     10/04/21  0350 10/05/21  0445 10/06/21  0520   WBC 6.4 9.8 12.0*   RBC 4.32 4.34 4.32   HGB 10.9* 10.8* 10.7*   HCT 33.1* 33.0* 33.1*   MCV 76.7* 76.0* 76.6*   RDW 18.3* 18.1* 17.7*    136 140     BMP:   Recent Labs     10/04/21  0350 10/05/21  0445 10/06/21  0520    140 138   K 4.1 3.3* 3.9    102 102   CO2 26 27 24   BUN 28* 26* 25*   CREATININE <0.5* <0.5* <0.5*     BNP: No results for input(s): BNP in the last 72 hours. PT/INR:   No results for input(s): PROTIME, INR in the last 72 hours. APTT: No results for input(s): APTT in the last 72 hours. CARDIAC ENZYMES:   No results for input(s): CKMB, CKMBINDEX, TROPONINI in the last 72 hours. Invalid input(s): CKTOTAL;3  FASTING LIPID PANEL:  Lab Results   Component Value Date    CHOL 156 10/18/2018    HDL 39 (L) 10/18/2018    TRIG 380 (H) 10/04/2021     LIVER PROFILE:   Recent Labs     10/04/21  0350 10/05/21  0445 10/06/21  0520   AST 61* 77* 61*   ALT 33 33 28   BILITOT 0.3 0.6 0.7   ALKPHOS 156* 141* 160*           CULTURES  COVID 19, NAAT: Detected    Susceptibility    Pseudomonas fluorescens (1)    Antibiotic Interpretation AMANDA Status    cefepime Sensitive <=2 mcg/mL     ciprofloxacin Sensitive <=1 mcg/mL     gentamicin Sensitive <=4 mcg/mL     meropenem Intermediate 8 mcg/mL     piperacillin-tazobactam Sensitive <=16 mcg/mL     tobramycin Sensitive <=4 mcg/           EKG:   Normal sinus rhythm  Left ventricular hypertrophy with repolarization abnormality  Prolonged QT  ST abnormality inferior leads consider ischemia  Abnormal ECG  When compared with ECG of 05-SEP-2020 08:35,  Premature ventricular complexes are no longer Present  T wave inversion now evident in Inferior leads  Confirmed by DANNA CHILDS, REYNALDO     Radiology  XR ABDOMEN (KUB) (SINGLE AP VIEW)   Final Result   Enteric catheter tip likely in the distal gastric body. XR CHEST PORTABLE   Final Result   1. Endotracheal tube projects in the appropriate position. 2. Enteric tube extends below the diaphragm and out of the field of view.          XR CHEST PORTABLE   Final Result   Increasing diffuse airspace opacification throughout the lungs. Pattern may   represent pulmonary edema or worsening pneumonitis. XR CHEST PORTABLE   Final Result   1. Interval increased bilateral pulmonary opacities with new consolidative   change at the right lung base. These findings could be secondary to   pulmonary edema or infection. 2. Consolidative change at the right lung base could also be secondary to   atelectasis/mucous plug. XR CHEST PORTABLE   Final Result   No significant interval change in small right pleural effusion or bilateral   heterogeneous opacity which can reflect pulmonary edema or pneumonia. XR CHEST PORTABLE   Final Result   Mild improvement from prior comparison. Mild-to-moderate congestion and/or   infiltrates identified in the lungs. ET tube terminates 7 cm superior to the saba. XR CHEST PORTABLE   Final Result   Endotracheal tube and orogastric tube unchanged, adequate position. Slightly increased extensive bilateral pulmonary disease over the past 24   hours this may relate to interstitial edema or diffuse infection or a   combination. XR CHEST PORTABLE   Final Result   Endotracheal tube with its tip approximately 4.7 cm from the saba in   satisfactory position. Enteric tube in satisfactory position. Patchy bilateral airspace disease is again noted with improved aeration of   the left mid lung. XR CHEST PORTABLE   Final Result   Some improvement in the appearance of the chest with clearing of some of the   acute airspace disease from the mid left lung. Large amount of pneumonia   remains within the right lung and lower left lung. XR CHEST PORTABLE   Final Result   1. Endotracheal tube, nasogastric tube, and right PICC line placement. 2.  Increasing multifocal opacities with consolidative area in the right   perihilar lung. Could be multifocal pneumonia with or without edema.          IR PICC WO SQ PORT/PUMP > 5 YEARS   Final Result   1. See above. CT CHEST PULMONARY EMBOLISM W CONTRAST   Final Result   No evidence of pulmonary embolism. Scattered peripheral opacities in the left lung and more consolidative area   in the right lung concerning for pneumonia. Previous right thoracotomy and   upper lobectomy. One mildly enlarged right paratracheal lymph node is present but no priors   for comparison. Nonenlarged but prominent lymph nodes in the upper abdomen   and juxta diaphragmatic region. XR CHEST PORTABLE   Final Result   Mild cardiomegaly without interstitial edema. Metallic surgical clips from prior right thoracotomy. COPD with anterior segment-right upper lobe alveolar pneumonia. Old pleural thickening was noted along the right lateral chest wall. Pleural   thickening and or trace effusion blunts the right costophrenic angle. XR CHEST PORTABLE    (Results Pending)         Assessment:  Principal Problem:    Suspected COVID-19 virus infection  Active Problems:    Hypothyroidism    Essential hypertension    DM2 (diabetes mellitus, type 2) (HCC)    Dehydration    Hypoxia    Acute respiratory failure with hypoxia ([address]    COVID-19    Pneumonia due to COVID-19 virus    CHARLY (acute kidney injury) ([address]    Uncontrolled hypertension    Fever    Hypotension    Mild protein-calorie malnutrition (HCC)    Gram-negative pneumonia ([address]    Pneumonia due to Pseudomonas species ([address]    Hypomagnesemia    Hypokalemia  Resolved Problems:    * No resolved hospital problems. *      Plan:    #COVID-19 pneumonia  #Acute hypoxic respiratory failure   Pseudomonas HCAP. -Unvaccinated patient  - she presented with cough, fever, dyspnea in the setting of household contact testing + COVID 19  - Her Covid testing came back positive on admission  - Initially on 2 L of oxygen on presentation. She started desaturating quickly . worsening hypoxemia requiring high flow oxygen.    - Pulmonology consulted  -placed on high flow oxygen per Vapotherm 40 L,FiO2 100% ,with additional nonrebreather . Transferred to ICU on 9/28. Intubated early AM  9/29/2021. Extubated on 10/4/2021. She is on Vapotherm. Degeneration got worse and she was reintubated on 10/6/2020  - s/p Remdesivir  - on Decadron, day # 10  - given 1 dose of Tocilizumab  -Lovenox twice daily      #Possible superimposed bacterial pneumonia  -On Rocephin and Zithromax,   - changed to zosyn on 10/2 with pseudomonas on resp culture -      #Hypokalemia  - replaced       #Dehydration  -  Improved with IVF  Creatinine normal       #CAD  - cont ASA, statin  - EKG with inferior T wave abnormality. She denies CP. Trop neg   -telemetry monitoring      #History of tracheobronchomalacia  Has had surgical treatment for this       #HTN-blood pressure is low and she was on Levophed. This has been discontinued.   Resume home medications-Norvasc and losartan.         #Chronic pain   History of hip fracture  - cont home oxycodone BID and percocet   - cont gabapentin and requip       #DM2  - use ssi        #Hypothyroidism  - cont synthroid         DVT Prophylaxis: Lovenox   Diet NPO  ADULT TUBE FEEDING; Orogastric; Peptide Based; Continuous; 20; Yes; 15; Q 4 hours; 45; 30; Q 3 hours; Protein; one proteinex P2Go once daily via feeding tube  Code Status: Full Code        Toma Parker MD, MD 10/6/2021 2:01 PM

## 2021-10-06 NOTE — PROGRESS NOTES
Reviewed w/ Dr. Katy Perez to 28 ABG in AM. RT updated    Results for Rachele Dwyer (MRN [id number]) as of 10/6/2021 16:15   Ref.  Range 10/6/2021 15:55   O2 Therapy Unknown Unknown   Hemoglobin, Art, Extended Latest Ref Range: 12.0 - 16.0 g/dL 11.4 (L)   pH, Arterial Latest Ref Range: 7.350 - 7.450  7.298 (L)   pCO2, Arterial Latest Ref Range: 35.0 - 45.0 mmHg 51.2 (H)   pO2, Arterial Latest Ref Range: 75.0 - 108.0 mmHg 105.1   HCO3, Arterial Latest Ref Range: 21.0 - 29.0 mmol/L 24.5   TCO2 (calc), Art Latest Ref Range: Not Established mmol/L 26.1   Base Excess, Arterial Latest Ref Range: -3.0 - 3.0 mmol/L -2.3   O2 Sat, Arterial Latest Ref Range: >92 % 97.2   Methemoglobin, Arterial Latest Ref Range: <1.5 % 0.3   Carboxyhgb, Arterial Latest Ref Range: 0.0 - 1.5 % 0.5

## 2021-10-06 NOTE — PROGRESS NOTES
FiO2 decraesed to 55%         10/06/21 1642   Vent Information   Vt Ordered 360 mL   Rate Set 28 bmp   Peak Flow 65 L/min   Pressure Support 0 cmH20   FiO2  55 %   SpO2 99 %   SpO2/FiO2 ratio 180   Sensitivity 3   PEEP/CPAP 12   I Time/ I Time % 0 s   Vent Patient Data   High Peep/I Pressure 0   Peak Inspiratory Pressure 34 cmH2O   Mean Airway Pressure 19 cmH20   Rate Measured 28 br/min   Vt Exhaled 359 mL   Minute Volume 10.1 Liters   I:E Ratio 1:2.60   Spontaneous Breathing Trial (SBT) RT Doc   Pulse 56   Additional Respiratory  Assessments   Resp 28   Alarm Settings   High Pressure Alarm 45 cmH2O   Low Minute Volume Alarm 2.04 L/min   High Respiratory Rate 40 br/min

## 2021-10-06 NOTE — PROGRESS NOTES
Pt dyssynchronous w./ vent despite propofol gtt @ 80 mcg/kg/min precedex gtt @ 1.4 mcg/kg/hr and both prns. Message to Dr. Ramesh Olsen to update. [address] Per Dr. Ramesh Olsen pt to start fentanyl gtt and nimbex gtt, see new orders.

## 2021-10-06 NOTE — PROCEDURES
ENDOTRACHEAL INTUBATION    INDICATION: Life threatening respiratory failure    TIME OUT: taken    SEDATION: etomidate and versed, succinylcholine    PROCEDURE: Using video laryngoscopy, the vocal cords were well visualized and a 7.5 mm endotracheal tube was place directly through the cords. Good breath sounds auscultated bilaterally without sounds over abdomen. Good return of CO2 on the monitor. CXR is pending.

## 2021-10-06 NOTE — PROGRESS NOTES
Pulmonary & Critical Care Medicine ICU Progress Note    CC: Respiratory failure    Events of Last 24 hours:   Requiring 100% FiO2 by vapotherm plus 100% non rebreather in prone position and still with sats of 89-90%  Precedex infusion    Vascular lines: IV: PICC line     MV: -10/4  Vent Mode: AC/VC Rate Set: 28 bmp/Vt Ordered: 400 mL/ /FiO2 : 100 %  Recent Labs     10/05/21  0740 10/06/21  05   PHART 7.494* 7.473*   PRU9DER 34.8* 35.6   PO2ART 78.6 72.9*       IV:   dexmedetomidine HCl in NaCl 1.4 mcg/kg/hr (10/06/21 0619)    sodium chloride      dextrose      sodium chloride         Vitals:  Blood pressure (!) 117/55, pulse (!) 48, temperature 97.5 °F (36.4 °C), temperature source Axillary, resp. rate 22, height 5' 9\" (1.753 m), weight 180 lb 14.4 oz (82.1 kg), SpO2 94 %, not currently breastfeeding. on Vapotherm/BiPAP   Temp  Av.1 °F (36.7 °C)  Min: 97.5 °F (36.4 °C)  Max: 99 °F (37.2 °C)    Intake/Output Summary (Last 24 hours) at 10/6/2021 0656  Last data filed at 10/5/2021 1805  Gross per 24 hour   Intake 924.61 ml   Output 1740 ml   Net -815.39 ml     General:  ill appearing    Resp: No crackles. No wheezing. CV: S1, S2. No edema  GI: NT, ND, +BS  Skin: Warm and dry. Neuro: PERRL.  Alert and oriented to name and year    Scheduled Meds:   amLODIPine  2.5 mg Oral Daily    losartan  25 mg Oral Daily    albuterol-ipratropium  1 puff Inhalation BID    piperacillin-tazobactam  3,375 mg IntraVENous Q8H    pantoprazole  40 mg IntraVENous Daily    insulin lispro  0-6 Units SubCUTAneous Q4H    levothyroxine  100 mcg Oral Daily    enoxaparin  30 mg SubCUTAneous BID    dexamethasone  6 mg IntraVENous Q24H    lidocaine 1 % injection  5 mL IntraDERmal Once    sodium chloride flush  5-40 mL IntraVENous 2 times per day    aspirin  81 mg Oral Daily    gabapentin  800 mg Oral TID    [Held by provider] rOPINIRole  0.5 mg Oral TID    sertraline  50 mg Oral Daily    atorvastatin  40 mg Oral Daily    sodium chloride flush  5-40 mL IntraVENous 2 times per day    oxyCODONE  20 mg Oral 2 times per day       Data:  CBC:   Recent Labs     10/04/21  0350 10/05/21  0445 10/06/21  0520   WBC 6.4 9.8 12.0*   HGB 10.9* 10.8* 10.7*   HCT 33.1* 33.0* 33.1*   MCV 76.7* 76.0* 76.6*    136 140     BMP:   Recent Labs     10/04/21  0350 10/05/21  0445 10/06/21  0520    140 138   K 4.1 3.3* 3.9    102 102   CO2 26 27 24   BUN 28* 26* 25*   CREATININE <0.5* <0.5* <0.5*     LIVER PROFILE:   Recent Labs     10/04/21  0350 10/05/21  0445 10/06/21  0520   AST 61* 77* 61*   ALT 33 33 28   BILITOT 0.3 0.6 0.7   ALKPHOS 156* 141* 160*       Microbiology:  9/27 COVID-19 detected  9/30 Resp Pseudomonas fluorescens l       Imaging:  Chest x-ray 10/6/21 bilateral airspace disease; increased on the right    CTPA 9/27/21  No evidence of pulmonary embolism. Scattered peripheral opacities in the left lung and more consolidative area   in the right lung concerning for pneumonia.  Previous right thoracotomy and   upper lobectomy. One mildly enlarged right paratracheal lymph node is present but no priors   for comparison.  Nonenlarged but prominent lymph nodes in the upper abdomen and juxta diaphragmatic region      ASSESSMENT:  · Acute hypoxemic respiratory failure   · COVID-19 viral pneumonia  · Pseudomonas PNA   · Hypotension-probably sedation related  · Transaminitis  · Acute kidney injury  · CAD  · Chronic pain syndrome - on opiates and Neurontin   · Possible early dementia, being worked up outpatient   · H/O bronchiectasis and tracheobronchomalacia/EDAC s/p tracheoplasty in 2014      PLAN:  - COVID-19 isolation, droplet plus  -   Has failed vapotherm/100% NRB; requires urgent intubation  Precedex infusion to tolerate NIPPV   Zosyn D#5.  Completed 5 days of Ceftriaxone/Zithromax   IV Levophed to keep MAP > 65 mmHg or SBP>90  Dexamethasone 6 mg IV D#10  Completed Remdesivir & Tocilizumab  Home oxycodone and neurontin    Lovenox for DVT prophylaxis   [facility] [phone]. Total critical care time caring for this patient with life threatening, unstable organ failure, including direct patient contact, management of life support systems, review of data including imaging and labs, discussions with other team members and physicians is 35 minutes, excluding procedures.

## 2021-10-06 NOTE — PROGRESS NOTES
Pt updated on decision to be intubated A&Ox4 agreeable. Pt  Max Cantor called and updated on decision to intubate, states pt currently being \"worked up for dementia by doctors however no formal dx\". Phone brought to room and placed on speaker so family could talk to pt.

## 2021-10-07 ENCOUNTER — APPOINTMENT (OUTPATIENT)
Dept: GENERAL RADIOLOGY | Age: 66
DRG: 004 | End: 2021-10-07
Payer: MEDICARE

## 2021-10-07 LAB
A/G RATIO: 0.5 (ref 1.1–2.2)
ALBUMIN SERPL-MCNC: 2.2 G/DL (ref 3.4–5)
ALP BLD-CCNC: 148 U/L (ref 40–129)
ALT SERPL-CCNC: <5 U/L (ref 10–40)
ANION GAP SERPL CALCULATED.3IONS-SCNC: 13 MMOL/L (ref 3–16)
AST SERPL-CCNC: <5 U/L (ref 15–37)
BASE EXCESS ARTERIAL: -2.8 MMOL/L (ref -3–3)
BASOPHILS ABSOLUTE: 0 K/UL (ref 0–0.2)
BASOPHILS RELATIVE PERCENT: 0.3 %
BILIRUB SERPL-MCNC: 0.4 MG/DL (ref 0–1)
BUN BLDV-MCNC: 18 MG/DL (ref 7–20)
CALCIUM SERPL-MCNC: 8.9 MG/DL (ref 8.3–10.6)
CARBOXYHEMOGLOBIN ARTERIAL: 0.3 % (ref 0–1.5)
CHLORIDE BLD-SCNC: 102 MMOL/L (ref 99–110)
CO2: 22 MMOL/L (ref 21–32)
CREAT SERPL-MCNC: <0.5 MG/DL (ref 0.6–1.2)
EOSINOPHILS ABSOLUTE: 0 K/UL (ref 0–0.6)
EOSINOPHILS RELATIVE PERCENT: 0 %
GFR AFRICAN AMERICAN: >60
GFR NON-AFRICAN AMERICAN: >60
GLOBULIN: 4.1 G/DL
GLUCOSE BLD-MCNC: 113 MG/DL (ref 70–99)
GLUCOSE BLD-MCNC: 114 MG/DL (ref 70–99)
GLUCOSE BLD-MCNC: 136 MG/DL (ref 70–99)
GLUCOSE BLD-MCNC: 155 MG/DL (ref 70–99)
GLUCOSE BLD-MCNC: 183 MG/DL (ref 70–99)
GLUCOSE BLD-MCNC: 187 MG/DL (ref 70–99)
HCO3 ARTERIAL: 23.1 MMOL/L (ref 21–29)
HCT VFR BLD CALC: 33.3 % (ref 36–48)
HEMOGLOBIN, ART, EXTENDED: 11.3 G/DL (ref 12–16)
HEMOGLOBIN: 11.4 G/DL (ref 12–16)
LYMPHOCYTES ABSOLUTE: 1.1 K/UL (ref 1–5.1)
LYMPHOCYTES RELATIVE PERCENT: 8.8 %
MAGNESIUM: 1.9 MG/DL (ref 1.8–2.4)
MCH RBC QN AUTO: 26.5 PG (ref 26–34)
MCHC RBC AUTO-ENTMCNC: 34.2 G/DL (ref 31–36)
MCV RBC AUTO: 77.4 FL (ref 80–100)
METHEMOGLOBIN ARTERIAL: 0.3 %
MONOCYTES ABSOLUTE: 0.4 K/UL (ref 0–1.3)
MONOCYTES RELATIVE PERCENT: 3.3 %
NEUTROPHILS ABSOLUTE: 10.6 K/UL (ref 1.7–7.7)
NEUTROPHILS RELATIVE PERCENT: 87.6 %
O2 CONTENT ARTERIAL: 15 ML/DL
O2 SAT, ARTERIAL: 96.2 %
O2 THERAPY: ABNORMAL
PCO2 ARTERIAL: 44.5 MMHG (ref 35–45)
PDW BLD-RTO: 17.9 % (ref 12.4–15.4)
PERFORMED ON: ABNORMAL
PH ARTERIAL: 7.33 (ref 7.35–7.45)
PLATELET # BLD: 144 K/UL (ref 135–450)
PMV BLD AUTO: 9.1 FL (ref 5–10.5)
PO2 ARTERIAL: 88.5 MMHG (ref 75–108)
POTASSIUM REFLEX MAGNESIUM: 3.8 MMOL/L (ref 3.5–5.1)
RBC # BLD: 4.3 M/UL (ref 4–5.2)
SODIUM BLD-SCNC: 137 MMOL/L (ref 136–145)
TCO2 ARTERIAL: 24.5 MMOL/L
TOTAL PROTEIN: 6.3 G/DL (ref 6.4–8.2)
WBC # BLD: 12.1 K/UL (ref 4–11)

## 2021-10-07 PROCEDURE — 2500000003 HC RX 250 WO HCPCS: Performed by: INTERNAL MEDICINE

## 2021-10-07 PROCEDURE — 2580000003 HC RX 258: Performed by: INTERNAL MEDICINE

## 2021-10-07 PROCEDURE — 94761 N-INVAS EAR/PLS OXIMETRY MLT: CPT

## 2021-10-07 PROCEDURE — 94003 VENT MGMT INPAT SUBQ DAY: CPT

## 2021-10-07 PROCEDURE — 2000000000 HC ICU R&B

## 2021-10-07 PROCEDURE — 80053 COMPREHEN METABOLIC PANEL: CPT

## 2021-10-07 PROCEDURE — 6360000002 HC RX W HCPCS: Performed by: INTERNAL MEDICINE

## 2021-10-07 PROCEDURE — 87077 CULTURE AEROBIC IDENTIFY: CPT

## 2021-10-07 PROCEDURE — 87070 CULTURE OTHR SPECIMN AEROBIC: CPT

## 2021-10-07 PROCEDURE — 94640 AIRWAY INHALATION TREATMENT: CPT

## 2021-10-07 PROCEDURE — 99291 CRITICAL CARE FIRST HOUR: CPT | Performed by: INTERNAL MEDICINE

## 2021-10-07 PROCEDURE — 99233 SBSQ HOSP IP/OBS HIGH 50: CPT | Performed by: INTERNAL MEDICINE

## 2021-10-07 PROCEDURE — 83735 ASSAY OF MAGNESIUM: CPT

## 2021-10-07 PROCEDURE — 6370000000 HC RX 637 (ALT 250 FOR IP): Performed by: INTERNAL MEDICINE

## 2021-10-07 PROCEDURE — 36415 COLL VENOUS BLD VENIPUNCTURE: CPT

## 2021-10-07 PROCEDURE — 2700000000 HC OXYGEN THERAPY PER DAY

## 2021-10-07 PROCEDURE — 87186 SC STD MICRODIL/AGAR DIL: CPT

## 2021-10-07 PROCEDURE — 85025 COMPLETE CBC W/AUTO DIFF WBC: CPT

## 2021-10-07 PROCEDURE — 82803 BLOOD GASES ANY COMBINATION: CPT

## 2021-10-07 PROCEDURE — 71045 X-RAY EXAM CHEST 1 VIEW: CPT

## 2021-10-07 PROCEDURE — 87205 SMEAR GRAM STAIN: CPT

## 2021-10-07 PROCEDURE — C9113 INJ PANTOPRAZOLE SODIUM, VIA: HCPCS | Performed by: INTERNAL MEDICINE

## 2021-10-07 RX ORDER — LINEZOLID 2 MG/ML
600 INJECTION, SOLUTION INTRAVENOUS EVERY 12 HOURS
Status: DISCONTINUED | OUTPATIENT
Start: 2021-10-07 | End: 2021-10-09

## 2021-10-07 RX ADMIN — GABAPENTIN 800 MG: 400 CAPSULE ORAL at 15:02

## 2021-10-07 RX ADMIN — PROPOFOL 80 MCG/KG/MIN: 10 INJECTION, EMULSION INTRAVENOUS at 04:54

## 2021-10-07 RX ADMIN — WHITE PETROLATUM 57.7 %-MINERAL OIL 31.9 % EYE OINTMENT: at 20:41

## 2021-10-07 RX ADMIN — ENOXAPARIN SODIUM 30 MG: 30 INJECTION SUBCUTANEOUS at 08:17

## 2021-10-07 RX ADMIN — Medication 1.4 MCG/KG/HR: at 11:48

## 2021-10-07 RX ADMIN — IPRATROPIUM BROMIDE AND ALBUTEROL SULFATE 1 AMPULE: .5; 2.5 SOLUTION RESPIRATORY (INHALATION) at 23:40

## 2021-10-07 RX ADMIN — INSULIN LISPRO 1 UNITS: 100 INJECTION, SOLUTION INTRAVENOUS; SUBCUTANEOUS at 21:30

## 2021-10-07 RX ADMIN — CHLORHEXIDINE GLUCONATE 0.12% ORAL RINSE 15 ML: 1.2 LIQUID ORAL at 08:17

## 2021-10-07 RX ADMIN — CARBOXYMETHYLCELLULOSE SODIUM 1 DROP: 10 GEL OPHTHALMIC at 20:41

## 2021-10-07 RX ADMIN — IPRATROPIUM BROMIDE AND ALBUTEROL SULFATE 1 AMPULE: .5; 2.5 SOLUTION RESPIRATORY (INHALATION) at 11:25

## 2021-10-07 RX ADMIN — CARBOXYMETHYLCELLULOSE SODIUM 1 DROP: 10 GEL OPHTHALMIC at 08:17

## 2021-10-07 RX ADMIN — PIPERACILLIN SODIUM AND TAZOBACTAM SODIUM 3375 MG: 3; .375 INJECTION, POWDER, LYOPHILIZED, FOR SOLUTION INTRAVENOUS at 16:49

## 2021-10-07 RX ADMIN — IPRATROPIUM BROMIDE AND ALBUTEROL SULFATE 1 AMPULE: .5; 2.5 SOLUTION RESPIRATORY (INHALATION) at 15:55

## 2021-10-07 RX ADMIN — OXYCODONE 10 MG: 5 TABLET ORAL at 20:42

## 2021-10-07 RX ADMIN — Medication 100 MCG/HR: at 20:38

## 2021-10-07 RX ADMIN — DEXAMETHASONE SODIUM PHOSPHATE 6 MG: 10 INJECTION INTRAMUSCULAR; INTRAVENOUS at 11:46

## 2021-10-07 RX ADMIN — Medication 1.4 MCG/KG/HR: at 01:57

## 2021-10-07 RX ADMIN — PROPOFOL 50 MCG/KG/MIN: 10 INJECTION, EMULSION INTRAVENOUS at 20:35

## 2021-10-07 RX ADMIN — PROPOFOL 80 MCG/KG/MIN: 10 INJECTION, EMULSION INTRAVENOUS at 01:58

## 2021-10-07 RX ADMIN — PROPOFOL 80 MCG/KG/MIN: 10 INJECTION, EMULSION INTRAVENOUS at 06:50

## 2021-10-07 RX ADMIN — PROPOFOL 80 MCG/KG/MIN: 10 INJECTION, EMULSION INTRAVENOUS at 09:28

## 2021-10-07 RX ADMIN — SODIUM CHLORIDE, PRESERVATIVE FREE 10 ML: 5 INJECTION INTRAVENOUS at 20:53

## 2021-10-07 RX ADMIN — WHITE PETROLATUM 57.7 %-MINERAL OIL 31.9 % EYE OINTMENT: at 11:47

## 2021-10-07 RX ADMIN — ASPIRIN 81 MG: 81 TABLET, CHEWABLE ORAL at 08:17

## 2021-10-07 RX ADMIN — CARBOXYMETHYLCELLULOSE SODIUM 1 DROP: 10 GEL OPHTHALMIC at 23:50

## 2021-10-07 RX ADMIN — GABAPENTIN 800 MG: 400 CAPSULE ORAL at 11:25

## 2021-10-07 RX ADMIN — CHLORHEXIDINE GLUCONATE 0.12% ORAL RINSE 15 ML: 1.2 LIQUID ORAL at 20:41

## 2021-10-07 RX ADMIN — IPRATROPIUM BROMIDE AND ALBUTEROL SULFATE 1 AMPULE: .5; 2.5 SOLUTION RESPIRATORY (INHALATION) at 03:21

## 2021-10-07 RX ADMIN — IPRATROPIUM BROMIDE AND ALBUTEROL SULFATE 1 AMPULE: .5; 2.5 SOLUTION RESPIRATORY (INHALATION) at 08:15

## 2021-10-07 RX ADMIN — MICONAZOLE NITRATE: 2 POWDER TOPICAL at 15:02

## 2021-10-07 RX ADMIN — PROPOFOL 50 MCG/KG/MIN: 10 INJECTION, EMULSION INTRAVENOUS at 16:46

## 2021-10-07 RX ADMIN — PIPERACILLIN SODIUM AND TAZOBACTAM SODIUM 3375 MG: 3; .375 INJECTION, POWDER, LYOPHILIZED, FOR SOLUTION INTRAVENOUS at 01:56

## 2021-10-07 RX ADMIN — Medication 1.4 MCG/KG/HR: at 09:27

## 2021-10-07 RX ADMIN — INSULIN LISPRO 1 UNITS: 100 INJECTION, SOLUTION INTRAVENOUS; SUBCUTANEOUS at 16:46

## 2021-10-07 RX ADMIN — ATORVASTATIN CALCIUM 40 MG: 40 TABLET, FILM COATED ORAL at 08:17

## 2021-10-07 RX ADMIN — MICONAZOLE NITRATE: 2 POWDER TOPICAL at 20:40

## 2021-10-07 RX ADMIN — LINEZOLID 600 MG: 600 INJECTION, SOLUTION INTRAVENOUS at 08:16

## 2021-10-07 RX ADMIN — GABAPENTIN 800 MG: 400 CAPSULE ORAL at 20:41

## 2021-10-07 RX ADMIN — CISATRACURIUM BESYLATE 1 MCG/KG/MIN: 10 INJECTION, SOLUTION INTRAVENOUS at 06:02

## 2021-10-07 RX ADMIN — INSULIN LISPRO 1 UNITS: 100 INJECTION, SOLUTION INTRAVENOUS; SUBCUTANEOUS at 00:03

## 2021-10-07 RX ADMIN — IPRATROPIUM BROMIDE AND ALBUTEROL SULFATE 1 AMPULE: .5; 2.5 SOLUTION RESPIRATORY (INHALATION) at 19:49

## 2021-10-07 RX ADMIN — PROPOFOL 80 MCG/KG/MIN: 10 INJECTION, EMULSION INTRAVENOUS at 13:21

## 2021-10-07 RX ADMIN — ENOXAPARIN SODIUM 30 MG: 30 INJECTION SUBCUTANEOUS at 20:39

## 2021-10-07 RX ADMIN — LINEZOLID 600 MG: 600 INJECTION, SOLUTION INTRAVENOUS at 20:51

## 2021-10-07 RX ADMIN — PIPERACILLIN SODIUM AND TAZOBACTAM SODIUM 3375 MG: 3; .375 INJECTION, POWDER, LYOPHILIZED, FOR SOLUTION INTRAVENOUS at 11:49

## 2021-10-07 RX ADMIN — PANTOPRAZOLE SODIUM 40 MG: 40 INJECTION, POWDER, FOR SOLUTION INTRAVENOUS at 08:19

## 2021-10-07 RX ADMIN — PROPOFOL 80 MCG/KG/MIN: 10 INJECTION, EMULSION INTRAVENOUS at 00:03

## 2021-10-07 RX ADMIN — Medication 1.4 MCG/KG/HR: at 04:54

## 2021-10-07 RX ADMIN — Medication 50 MCG/HR: at 06:51

## 2021-10-07 ASSESSMENT — PULMONARY FUNCTION TESTS
PIF_VALUE: 27
PIF_VALUE: 30
PIF_VALUE: 30
PIF_VALUE: 24
PIF_VALUE: 25
PIF_VALUE: 25
PIF_VALUE: 27
PIF_VALUE: 25
PIF_VALUE: 28

## 2021-10-07 ASSESSMENT — PAIN SCALES - GENERAL: PAINLEVEL_OUTOF10: 8

## 2021-10-07 NOTE — PROGRESS NOTES
patient has one or more of the following: on home nebulizer, not able to hold breath for 10 seconds, is not alert and oriented, cannot activate and use MDI correctly, or respiratory rate 25 breaths per minute or more, then use the equivalent nebulizer order(s) with same Frequency and PRN reasons based on the score. If a patient is on this medication at home then do not decrease Frequency below that used at home. 0-3 - enter or revise RT bronchodilator order(s) to equivalent RT Bronchodilator order with Frequency of every 4 hours PRN for wheezing or increased work of breathing using Per Protocol order mode. 4-6 - enter or revise RT Bronchodilator order(s) to two equivalent RT bronchodilator orders with one order with BID Frequency and one order with Frequency of every 4 hours PRN wheezing or increased work of breathing using Per Protocol order mode. 7-10 - enter or revise RT Bronchodilator order(s) to two equivalent RT bronchodilator orders with one order with TID Frequency and one order with Frequency of every 4 hours PRN wheezing or increased work of breathing using Per Protocol order mode. 11-13 - enter or revise RT Bronchodilator order(s) to one equivalent RT bronchodilator order with QID Frequency and an Albuterol order with Frequency of every 4 hours PRN wheezing or increased work of breathing using Per Protocol order mode. Greater than 13 - enter or revise RT Bronchodilator order(s) to one equivalent RT bronchodilator order with every 4 hours Frequency and an Albuterol order with Frequency of every 2 hours PRN wheezing or increased work of breathing using Per Protocol order mode. RT to enter RT Home Evaluation for COPD & MDI Assessment order using Per Protocol order mode.     Electronically signed by Kady Jeong RCP on 10/7/2021 at 5:11 PM

## 2021-10-07 NOTE — PROGRESS NOTES
Patient care assumed, assessment completed as charted. Patient continues on the ventilator, #7.5 ET tube in place. A/C 28, , FiO2 55%, PEEP 12. Patient continues in prone positioning, head and arms repositioned accordingly. Right upper arm PICC in place with Propofol infusing at 80mcg/kg/min, Fentanyl at 50mcg/hr, Precedex at 1.4mcg/kg/hr, and Nimbex at 2.5mcg/kg/min. RASS-5 currently, and patient is compliant with the vent. OG and abdi catheter in place. No further needs assessed at this time. Will monitor.

## 2021-10-07 NOTE — PROGRESS NOTES
10/06/21 2035   Vent Information   Vent Mode AC/VC   Vt Ordered 360 mL   Rate Set 28 bmp   Peak Flow 65 L/min   FiO2  55 %   SpO2 97 %   Sensitivity 3   PEEP/CPAP 12   Vent Patient Data   Peak Inspiratory Pressure 30 cmH2O   Mean Airway Pressure 18 cmH20   Rate Measured 28 br/min   Vt Exhaled 364 mL   Minute Volume 10.2 Liters   I:E Ratio 1:2.60   Plateau Pressure 30 UVS31   Static Compliance 20 mL/cmH2O   Dynamic Compliance 20 mL/cmH2O

## 2021-10-07 NOTE — PROGRESS NOTES
Pulmonary & Critical Care Medicine ICU Progress Note    CC: Respiratory failure    Events of Last 24 hours:   Urgently reintubated yesterday for refractory hypoxemia  Required paralysis and proning  Levophed for hypotension    Vascular lines: IV: PICC line     MV: -10/4, emergently re-intubated 10/6/21 for refractory hypoxemia  Vent Mode: AC/VC Rate Set: 28 bmp/Vt Ordered: 360 mL/ /FiO2 : 45 %  Recent Labs     10/06/21  1555 10/07/21  0445   PHART 7.298* 7.333*   [id number] 51.2* 44.5   PO2ART 105.1 88.5       IV:   norepinephrine Stopped (10/06/21 1525)    fentaNYL 50 mcg/hr (10/07/21 0651)    cisatracurium (NIMBEX) infusion 1 mcg/kg/min (10/07/21 0615)    propofol 80 mcg/kg/min (10/07/21 0650)    dexmedetomidine HCl in NaCl 1.4 mcg/kg/hr (10/07/21 0615)    sodium chloride      dextrose      sodium chloride         Vitals:  Blood pressure (!) 155/65, pulse (!) 42, temperature 97.5 °F (36.4 °C), temperature source Axillary, resp. rate 28, height 5' 9\" (1.753 m), weight 190 lb 14.7 oz (86.6 kg), SpO2 97 %, not currently breastfeeding. on ventilator  Temp  Av.9 °F (36.6 °C)  Min: 97.5 °F (36.4 °C)  Max: 98.7 °F (37.1 °C)    Intake/Output Summary (Last 24 hours) at 10/7/2021 0748  Last data filed at 10/7/2021 0615  Gross per 24 hour   Intake 4502 ml   Output 1625 ml   Net 2877 ml     EXAM (COVID isolation):  General: intubated, ill appearing    ENT:   Pharynx with ETT. Neck: Trachea midline  Resp: No accessory muscle use. CV: Regular rate. Regular rhythm. GI:  Non-distended.     Neuro: Sedated  Psych: Unable to obtain because the patient is non-communicative       Scheduled Meds:   oxyCODONE  10 mg Oral Q6H    influenza virus vaccine  0.5 mL IntraMUSCular Prior to discharge    carboxymethylcellulose PF  1 drop Both Eyes Q4H    And    artificial tears   Both Eyes Q4H    chlorhexidine  15 mL Mouth/Throat BID    ipratropium-albuterol  1 ampule Inhalation Q4H    amLODIPine  2.5 mg Oral Daily    losartan  25 mg Oral Daily    piperacillin-tazobactam  3,375 mg IntraVENous Q8H    pantoprazole  40 mg IntraVENous Daily    insulin lispro  0-6 Units SubCUTAneous Q4H    levothyroxine  100 mcg Oral Daily    enoxaparin  30 mg SubCUTAneous BID    dexamethasone  6 mg IntraVENous Q24H    lidocaine 1 % injection  5 mL IntraDERmal Once    sodium chloride flush  5-40 mL IntraVENous 2 times per day    aspirin  81 mg Oral Daily    gabapentin  800 mg Oral TID    [Held by provider] rOPINIRole  0.5 mg Oral TID    sertraline  50 mg Oral Daily    atorvastatin  40 mg Oral Daily    sodium chloride flush  5-40 mL IntraVENous 2 times per day       Data:  CBC:   Recent Labs     10/05/21  0445 10/06/21  0520 10/07/21  0445   WBC 9.8 12.0* 12.1*   HGB 10.8* 10.7* 11.4*   HCT 33.0* 33.1* 33.3*   MCV 76.0* 76.6* 77.4*    140 144     BMP:   Recent Labs     10/05/21  0445 10/06/21  0520 10/07/21  0445    138 137   K 3.3* 3.9 3.8    102 102   CO2 27 24 22   BUN 26* 25* 18   CREATININE <0.5* <0.5* <0.5*     LIVER PROFILE:   Recent Labs     10/05/21  0445 10/06/21  0520 10/07/21  0445   AST 77* 61* <5*   ALT 33 28 <5*   BILITOT 0.6 0.7 0.4   ALKPHOS 141* 160* 148*       Microbiology:  9/27 COVID-19 detected  9/30 Resp Pseudomonas fluorescens l       Imaging:  Chest x-ray 10/6/21 bilateral airspace disease; increased on the right    CTPA 9/27/21  No evidence of pulmonary embolism. Scattered peripheral opacities in the left lung and more consolidative area   in the right lung concerning for pneumonia.  Previous right thoracotomy and   upper lobectomy.    One mildly enlarged right paratracheal lymph node is present but no priors   for comparison.  Nonenlarged but prominent lymph nodes in the upper abdomen and juxta diaphragmatic region      ASSESSMENT:  · Acute hypoxemic respiratory failure   · COVID-19 viral pneumonia  · Pseudomonas PNA   · Hypotension with intubation and sedation  · Transaminitis  · Acute kidney injury  · CAD  · Chronic pain syndrome - on opiates and Neurontin   · Possible early dementia, being worked up outpatient   · H/O bronchiectasis and tracheobronchomalacia/EDAC s/p tracheoplasty in 2014      PLAN:  - COVID-19 isolation, droplet plus  -   Mechanical ventilation as per my orders. The ventilator was adjusted by me at the bedside for unstable, life threatening respiratory failure. IV Propofol and Precedex for sedation, target RASS -5 while paralyzed, with daily paralytic holiday. Fentanyl gtt as needed  Zosyn D#6. Completed 5 days of Ceftriaxone/Zithromax    Send tracheal aspirate   Add Zyvox (hold zoloft)  IV Levophed to keep MAP > 65 mmHg or SBP>90  Dexamethasone 6 mg IV D#11  Completed Remdesivir & Tocilizumab  Home oxycodone and neurontin    Lovenox for DVT prophylaxis   Amadeo Heal [phone]. Total critical care time caring for this patient with life threatening, unstable organ failure, including direct patient contact, management of life support systems, review of data including imaging and labs, discussions with other team members and physicians is 31 minutes, excluding procedures.

## 2021-10-07 NOTE — PROGRESS NOTES
RT Inhaler-Nebulizer Bronchodilator Protocol Note    There is a bronchodilator order in the chart from a provider indicating to follow the RT Bronchodilator Protocol and there is an Initiate RT Inhaler-Nebulizer Bronchodilator Protocol order as well (see protocol at bottom of note). CXR Findings:  XR CHEST PORTABLE    Result Date: 10/6/2021  1. Endotracheal tube projects in the appropriate position. 2. Enteric tube extends below the diaphragm and out of the field of view. XR CHEST PORTABLE    Result Date: 10/6/2021  Increasing diffuse airspace opacification throughout the lungs. Pattern may represent pulmonary edema or worsening pneumonitis. XR CHEST PORTABLE    Result Date: 10/5/2021  1. Interval increased bilateral pulmonary opacities with new consolidative change at the right lung base. These findings could be secondary to pulmonary edema or infection. 2. Consolidative change at the right lung base could also be secondary to atelectasis/mucous plug. The findings from the last RT Protocol Assessment were as follows:   History Pulmonary Disease: (P) Smoker 15 pack years or more  Respiratory Pattern: (P) Severe use of accessory muscle or RR>30 bpm  Breath Sounds: (P) Slightly diminished and/or crackles  Cough: (P) Strong, productive  Indication for Bronchodilator Therapy: (P) Decreased or absent breath sounds  Bronchodilator Assessment Score: (P) 12    Aerosolized bronchodilator medication orders have been revised according to the RT Inhaler-Nebulizer Bronchodilator Protocol below. Respiratory Therapist to perform RT Therapy Protocol Assessment initially then follow the protocol. Repeat RT Therapy Protocol Assessment PRN for score 0-3 or on second treatment, BID, and PRN for scores above 3. No Indications - adjust the frequency to every 6 hours PRN wheezing or bronchospasm, if no treatments needed after 48 hours then discontinue using Per Protocol order mode.      If indication present, adjust the RT bronchodilator orders based on the Bronchodilator Assessment Score as indicated below. Use Inhaler orders unless patient has one or more of the following: on home nebulizer, not able to hold breath for 10 seconds, is not alert and oriented, cannot activate and use MDI correctly, or respiratory rate 25 breaths per minute or more, then use the equivalent nebulizer order(s) with same Frequency and PRN reasons based on the score. If a patient is on this medication at home then do not decrease Frequency below that used at home. 0-3 - enter or revise RT bronchodilator order(s) to equivalent RT Bronchodilator order with Frequency of every 4 hours PRN for wheezing or increased work of breathing using Per Protocol order mode. 4-6 - enter or revise RT Bronchodilator order(s) to two equivalent RT bronchodilator orders with one order with BID Frequency and one order with Frequency of every 4 hours PRN wheezing or increased work of breathing using Per Protocol order mode. 7-10 - enter or revise RT Bronchodilator order(s) to two equivalent RT bronchodilator orders with one order with TID Frequency and one order with Frequency of every 4 hours PRN wheezing or increased work of breathing using Per Protocol order mode. 11-13 - enter or revise RT Bronchodilator order(s) to one equivalent RT bronchodilator order with QID Frequency and an Albuterol order with Frequency of every 4 hours PRN wheezing or increased work of breathing using Per Protocol order mode. Greater than 13 - enter or revise RT Bronchodilator order(s) to one equivalent RT bronchodilator order with every 4 hours Frequency and an Albuterol order with Frequency of every 2 hours PRN wheezing or increased work of breathing using Per Protocol order mode.      Electronically signed by Maulik Rodriguez RCP on 10/6/2021 at 11:50 PM

## 2021-10-07 NOTE — PROGRESS NOTES
End of shift note. VSS during this shift. Pt continues to tolerate supine position Fi02 45%, 10 peep. , 94% Fi02. Nimbex is now off, fentanyl 100 mcg, precedex 0.6, propofol 50 mcg, RASS -2. Tube feed was started at 10/hr. Gray intact and draining.

## 2021-10-07 NOTE — PLAN OF CARE
Problem: Falls - Risk of:  Goal: Will remain free from falls  Description: Will remain free from falls  Outcome: Ongoing  Note: Fall precautions in place. Goal: Absence of physical injury  Description: Absence of physical injury  Outcome: Ongoing     Problem: Skin Integrity:  Goal: Will show no infection signs and symptoms  Description: Will show no infection signs and symptoms  Outcome: Ongoing  Note: Patient turned/repositioned every 2 hours and as needed to maintain and improve skin integrity. Goal: Absence of new skin breakdown  Description: Absence of new skin breakdown  Outcome: Ongoing     Problem: Airway Clearance - Ineffective  Goal: Achieve or maintain patent airway  Outcome: Ongoing     Problem: Gas Exchange - Impaired  Goal: Absence of hypoxia  Outcome: Ongoing  Goal: Promote optimal lung function  Outcome: Ongoing     Problem: Breathing Pattern - Ineffective  Goal: Ability to achieve and maintain a regular respiratory rate  Outcome: Ongoing     Problem:  Body Temperature -  Risk of, Imbalanced  Goal: Ability to maintain a body temperature within defined limits  Outcome: Ongoing  Goal: Will regain or maintain usual level of consciousness  Outcome: Ongoing  Goal: Complications related to the disease process, condition or treatment will be avoided or minimized  Outcome: Ongoing     Problem: Isolation Precautions - Risk of Spread of Infection  Goal: Prevent transmission of infection  Outcome: Ongoing     Problem: Nutrition Deficits  Goal: Optimize nutritional status  Outcome: Ongoing     Problem: Risk for Fluid Volume Deficit  Goal: Maintain normal heart rhythm  Outcome: Ongoing  Goal: Maintain absence of muscle cramping  Outcome: Ongoing  Goal: Maintain normal serum potassium, sodium, calcium, phosphorus, and pH  Outcome: Ongoing     Problem: Loneliness or Risk for Loneliness  Goal: Demonstrate positive use of time alone when socialization is not possible  Outcome: Ongoing     Problem: Fatigue  Goal: Verbalize increase energy and improved vitality  Outcome: Ongoing     Problem: Patient Education: Go to Patient Education Activity  Goal: Patient/Family Education  Outcome: Ongoing     Problem: Pain:  Goal: Pain level will decrease  Description: Pain level will decrease  Outcome: Ongoing  Note: Patient on continuous Fentanyl drip for pain management.    Goal: Control of acute pain  Description: Control of acute pain  Outcome: Ongoing  Goal: Control of chronic pain  Description: Control of chronic pain  Outcome: Ongoing     Problem: Nutrition  Goal: Optimal nutrition therapy  Outcome: Ongoing  Goal: Understanding of nutritional guidelines  Outcome: Ongoing

## 2021-10-07 NOTE — PROGRESS NOTES
ICU Progress Note    Admit Date:  9/26/2021      Interval history:  Admitted with COVID-19 pneumonia,  acute hypoxic respiratory failure. Patient not vaccinated  Progressive significant worsening hypoxemia overnight. Patient was on 2 L of oxygen on admission-> switched to high flow oxygen per Vapotherm with additional 100% nonrebreather on 9/28/2021-> transferred to ICU. Worsening hypoxemia overnight. Intubated and placed on mechanical ventilation early AM on 9/29/2021. Placed in prone positioning. Did not require paralytics. 10/1 Patient desaturated when a supine position attempted today, back on prone ventilation    Ms. Lucas seen. Seen in prone position . Remains intubated, sedated ,on mechanical ventilation. .   On FiO2  55%. PEEP 8  Weaned off of pressors      10/2  resp culture growing pseudomonas - started zosyn. 10/3  PEEP 10, FiO2 50%. RASS -1 - she wakes up to voice and able to follow commands and responds appropriately on the vent today. She is on fentanyl precedex and Zosyn infusing. 10/4-This is my first encounter with the patient. Chart reviewed briefly. Patient is extubated. She is on 100% Vapotherm. Answers questions appropriately. She is hungry. 10/5 patient is oxygen demands have increased and she has been placed in the prone position. She is on 100% Vapotherm. 10/6-patient is respiratory status worsened. She was on 100% FiO2 on Vapotherm and 100% nonrebreather in the prone position with saturations in the upper 80s. She is on Precedex. Because of worsening respiratory status she was reintubated on 10/6/2021. When I saw her she was sedated on the ventilator. 10/7-oxygenation is improved. She was paralyzed and proned. Hypoxemia is better. FiO2 down to 45%. Plans to turn her supine.       Objective:   BP (!) 135/54   Pulse (!) 43   Temp 97.5 °F (36.4 °C) (Axillary)   Resp 18   Ht 5' 9\" (1.753 m)   Wt 190 lb 14.7 oz (86.6 kg)   SpO2 98% BMI 28.19 kg/m²        Intake/Output Summary (Last 24 hours) at 10/7/2021 1542  Last data filed at 10/7/2021 0615  Gross per 24 hour   Intake 4472 ml   Output 1225 ml   Net 3247 ml         Physical Exam:  General: Sedated on the ventilator. She was prone when I was making rounds but she was being turned over to a supine position   Skin:  Warm and dry  Neck:  JVD absent. Neck supple  Chest: diminished breath sounds, improved air entry. No wheezes or rhonchi. Bibasilar crackles. Cardiovascular:  RRR ,S1S2 normal  Abdomen:  Soft, non tender, non distended, BS +  Extremities:  No edema. Intact peripheral pulses.  Brisk cap refill, < 2 secs  Neuro: DEMETRICE      Medications:   Scheduled Meds:   linezolid  600 mg IntraVENous Q12H    miconazole   Topical BID    oxyCODONE  10 mg Oral Q6H    influenza virus vaccine  0.5 mL IntraMUSCular Prior to discharge    carboxymethylcellulose PF  1 drop Both Eyes Q4H    And    artificial tears   Both Eyes Q4H    chlorhexidine  15 mL Mouth/Throat BID    ipratropium-albuterol  1 ampule Inhalation Q4H    amLODIPine  2.5 mg Oral Daily    losartan  25 mg Oral Daily    piperacillin-tazobactam  3,375 mg IntraVENous Q8H    pantoprazole  40 mg IntraVENous Daily    insulin lispro  0-6 Units SubCUTAneous Q4H    levothyroxine  100 mcg Oral Daily    enoxaparin  30 mg SubCUTAneous BID    dexamethasone  6 mg IntraVENous Q24H    lidocaine 1 % injection  5 mL IntraDERmal Once    sodium chloride flush  5-40 mL IntraVENous 2 times per day    aspirin  81 mg Oral Daily    gabapentin  800 mg Oral TID    [Held by provider] rOPINIRole  0.5 mg Oral TID    atorvastatin  40 mg Oral Daily    sodium chloride flush  5-40 mL IntraVENous 2 times per day       Continuous Infusions:   norepinephrine Stopped (10/06/21 1525)    fentaNYL 50 mcg/hr (10/07/21 0651)    cisatracurium (NIMBEX) infusion Stopped (10/07/21 1230)    propofol 50 mcg/kg/min (10/07/21 1355)    dexmedetomidine HCl in NaCl 0.5 mcg/kg/hr (10/07/21 1355)    sodium chloride      dextrose      sodium chloride         Data:  CBC:   Recent Labs     10/05/21  0445 10/06/21  0520 10/07/21  0445   WBC 9.8 12.0* 12.1*   RBC 4.34 4.32 4.30   HGB 10.8* 10.7* 11.4*   HCT 33.0* 33.1* 33.3*   MCV 76.0* 76.6* 77.4*   RDW 18.1* 17.7* 17.9*    140 144     BMP:   Recent Labs     10/05/21  0445 10/06/21  0520 10/07/21  0445    138 137   K 3.3* 3.9 3.8    102 102   CO2 27 24 22   BUN 26* 25* 18   CREATININE <0.5* <0.5* <0.5*     BNP: No results for input(s): BNP in the last 72 hours. PT/INR:   No results for input(s): PROTIME, INR in the last 72 hours. APTT: No results for input(s): APTT in the last 72 hours. CARDIAC ENZYMES:   No results for input(s): CKMB, CKMBINDEX, TROPONINI in the last 72 hours.     Invalid input(s): CKTOTAL;3  FASTING LIPID PANEL:  Lab Results   Component Value Date    CHOL 156 10/18/2018    HDL 39 (L) 10/18/2018    TRIG 380 (H) 10/04/2021     LIVER PROFILE:   Recent Labs     10/05/21  0445 10/06/21  0520 10/07/21  0445   AST 77* 61* <5*   ALT 33 28 <5*   BILITOT 0.6 0.7 0.4   ALKPHOS 141* 160* 148*           CULTURES  COVID 19, NAAT: Detected    Susceptibility    Pseudomonas fluorescens (1)    Antibiotic Interpretation AMANDA Status    cefepime Sensitive <=2 mcg/mL     ciprofloxacin Sensitive <=1 mcg/mL     gentamicin Sensitive <=4 mcg/mL     meropenem Intermediate 8 mcg/mL     piperacillin-tazobactam Sensitive <=16 mcg/mL     tobramycin Sensitive <=4 mcg/           EKG:   Normal sinus rhythm  Left ventricular hypertrophy with repolarization abnormality  Prolonged QT  ST abnormality inferior leads consider ischemia  Abnormal ECG  When compared with ECG of 05-SEP-2020 08:35,  Premature ventricular complexes are no longer Present  T wave inversion now evident in Inferior leads  Confirmed by Julissa Blanc MD, REYNALDO     Radiology  XR CHEST PORTABLE   Final Result   Improved aeration on the left, possibly improving pulmonary edema. Persistent airspace disease in the right base may represent concurrent   pneumonia         XR ABDOMEN (KUB) (SINGLE AP VIEW)   Final Result   Enteric catheter tip likely in the distal gastric body. XR CHEST PORTABLE   Final Result   1. Endotracheal tube projects in the appropriate position. 2. Enteric tube extends below the diaphragm and out of the field of view. XR CHEST PORTABLE   Final Result   Increasing diffuse airspace opacification throughout the lungs. Pattern may   represent pulmonary edema or worsening pneumonitis. XR CHEST PORTABLE   Final Result   1. Interval increased bilateral pulmonary opacities with new consolidative   change at the right lung base. These findings could be secondary to   pulmonary edema or infection. 2. Consolidative change at the right lung base could also be secondary to   atelectasis/mucous plug. XR CHEST PORTABLE   Final Result   No significant interval change in small right pleural effusion or bilateral   heterogeneous opacity which can reflect pulmonary edema or pneumonia. XR CHEST PORTABLE   Final Result   Mild improvement from prior comparison. Mild-to-moderate congestion and/or   infiltrates identified in the lungs. ET tube terminates 7 cm superior to the saba. XR CHEST PORTABLE   Final Result   Endotracheal tube and orogastric tube unchanged, adequate position. Slightly increased extensive bilateral pulmonary disease over the past 24   hours this may relate to interstitial edema or diffuse infection or a   combination. XR CHEST PORTABLE   Final Result   Endotracheal tube with its tip approximately 4.7 cm from the saba in   satisfactory position. Enteric tube in satisfactory position. Patchy bilateral airspace disease is again noted with improved aeration of   the left mid lung.          XR CHEST PORTABLE   Final Result   Some improvement in the appearance of the chest with clearing of some of the   acute airspace disease from the mid left lung. Large amount of pneumonia   remains within the right lung and lower left lung. XR CHEST PORTABLE   Final Result   1. Endotracheal tube, nasogastric tube, and right PICC line placement. 2.  Increasing multifocal opacities with consolidative area in the right   perihilar lung. Could be multifocal pneumonia with or without edema. IR PICC WO SQ PORT/PUMP > 5 YEARS   Final Result   1. See above. CT CHEST PULMONARY EMBOLISM W CONTRAST   Final Result   No evidence of pulmonary embolism. Scattered peripheral opacities in the left lung and more consolidative area   in the right lung concerning for pneumonia. Previous right thoracotomy and   upper lobectomy. One mildly enlarged right paratracheal lymph node is present but no priors   for comparison. Nonenlarged but prominent lymph nodes in the upper abdomen   and juxta diaphragmatic region. XR CHEST PORTABLE   Final Result   Mild cardiomegaly without interstitial edema. Metallic surgical clips from prior right thoracotomy. COPD with anterior segment-right upper lobe alveolar pneumonia. Old pleural thickening was noted along the right lateral chest wall. Pleural   thickening and or trace effusion blunts the right costophrenic angle.          XR CHEST PORTABLE    (Results Pending)         Assessment:  Principal Problem:    Suspected COVID-19 virus infection  Active Problems:    Hypothyroidism    Essential hypertension    DM2 (diabetes mellitus, type 2) (HCC)    Dehydration    Hypoxia    Acute respiratory failure with hypoxia ([address]    COVID-19    Pneumonia due to COVID-19 virus    CHARLY (acute kidney injury) ([address]    Uncontrolled hypertension    Fever    Hypotension    Mild protein-calorie malnutrition (HCC)    Gram-negative pneumonia ([address]    Pneumonia due to Pseudomonas species ([address]    Hypomagnesemia    Hypokalemia  Resolved Problems:    * MD 10/7/2021 3:42 PM

## 2021-10-07 NOTE — PROGRESS NOTES
Shift assessment is complete. Pt continues prone position. (Patient is cushioned to cheeks and all bony prominences) ET/OG are intact. OG is presently clamped. STEFANO PICC is dry/intact with precedex 1.4, propofol 80, and fentanyl 50 mcg. (RASS -4) Pt is paralyzed with nimbex 2 mcg for vent compliance. Gray intact with good urine output. Droplet + continues for covid +. Yes

## 2021-10-07 NOTE — PROGRESS NOTES
Spouse/Bala and Daughter/Olivier are at bedside in protectant gowns standing bedside patient. The have been updated with plan of care.

## 2021-10-07 NOTE — PROGRESS NOTES
10/06/21 2313   Vent Information   Vent Type 980   Vent Mode AC/VC   Vt Ordered 360 mL   Rate Set 28 bmp   Peak Flow 65 L/min   Pressure Support 0 cmH20   FiO2  55 %  (decreased to 45)   SpO2 97 %   SpO2/FiO2 ratio 176.36   Sensitivity 3   PEEP/CPAP 12  (decreased to 10)   I Time/ I Time % 0 s   Humidification Source Heated wire   Humidification Temp 37   Humidification Temp Measured 37.3   Circuit Condensation Drained   Vent Patient Data   High Peep/I Pressure 0   Peak Inspiratory Pressure 26 cmH2O   Mean Airway Pressure 16 cmH20   Rate Measured 27 br/min   Vt Exhaled 365 mL   Minute Volume 9.88 Liters   I:E Ratio 1:2.60   Plateau Pressure 24 [id number]   Cough/Sputum   Sputum How Obtained Endotracheal;Suctioned   Cough Non-productive   Sputum Amount None   Spontaneous Breathing Trial (SBT) RT Doc   Pulse (!) 48   Breath Sounds   Right Upper Lobe Diminished   Right Middle Lobe Diminished   Right Lower Lobe Diminished   Left Upper Lobe Diminished   Left Lower Lobe Diminished   Additional Respiratory  Assessments   Resp 28   Position Prone   Alarm Settings   High Pressure Alarm 45 cmH2O   Low Minute Volume Alarm 2.04 L/min   Apnea (secs) 20 secs   High Respiratory Rate 40 br/min   Low Exhaled Vt  250 mL   ETT (adult)   Placement Date/Time: 10/06/21 1208   Timeout: Patient  Preoxygenation: Yes  Technique: Rapid sequence  Type: Cuffed  Tube Size: 7.5 mm  Laryngoscope: GlideScope  Secured at: 23 cm  Measured From: Lips   Secured at 23 cm   Measured From Lips   ET Placement Right   Secured By Commercial tube carranza   Site Condition Dry

## 2021-10-07 NOTE — PROGRESS NOTES
Patient report given to Isabel Freitas [place]. Patient has rested comfortably on the ventilator overnight. Nimbex continues at 1mcg/kg/min. Care transferred.

## 2021-10-07 NOTE — PROGRESS NOTES
Patient now supine, Fi02 increased to 65%, peep 10, oxygen saturation 98%. Nimbex now off, beginning to decrease sedation.  VSS, telemetry SB.

## 2021-10-07 NOTE — PROGRESS NOTES
10/07/21 1950   Vent Information   $Ventilation $Subsequent Day   Vent Type 980   Vent Mode AC/VC   Vt Ordered 360 mL   Rate Set 28 bmp   Peak Flow 65 L/min   Pressure Support 0 cmH20   FiO2  55 %   SpO2 91 %   SpO2/FiO2 ratio 165.45   Sensitivity 3   PEEP/CPAP 10   I Time/ I Time % 0 s   Humidification Source Heated wire   Humidification Temp 37   Humidification Temp Measured 36.8   Circuit Condensation Drained   Vent Patient Data   High Peep/I Pressure 0   Peak Inspiratory Pressure 25 cmH2O   Mean Airway Pressure 15 cmH20   Rate Measured 29 br/min   Vt Exhaled 368 mL   Minute Volume 10.7 Liters   I:E Ratio 1:2.60   Plateau Pressure 21 [id number]   Static Compliance 33 mL/cmH2O   Dynamic Compliance 25 mL/cmH2O   Cough/Sputum   Sputum How Obtained Endotracheal;Suctioned   Cough Productive   Sputum Amount Small   Sputum Color Creamy   Tenacity Thick   Spontaneous Breathing Trial (SBT) RT Doc   Pulse 51   Breath Sounds   Right Upper Lobe Diminished   Right Middle Lobe Diminished   Right Lower Lobe Diminished   Left Upper Lobe Diminished   Left Lower Lobe Diminished   Additional Respiratory  Assessments   Resp 29   Position Reverse Trendelenburg   Alarm Settings   High Pressure Alarm 45 cmH2O   Low Minute Volume Alarm 4 L/min   Apnea (secs) 20 secs   High Respiratory Rate 40 br/min   Low Exhaled Vt  250 mL   ETT (adult)   Placement Date/Time: 10/06/21 1208   Timeout: Patient  Preoxygenation: Yes  Technique: Rapid sequence  Type: Cuffed  Tube Size: 7.5 mm  Laryngoscope: GlideScope  Secured at: 23 cm  Measured From: Lips   Secured at 23 cm   Measured From Lips   ET Placement Right   Secured By Commercial tube carranza   Site Condition Dry

## 2021-10-08 ENCOUNTER — APPOINTMENT (OUTPATIENT)
Dept: GENERAL RADIOLOGY | Age: 66
DRG: 004 | End: 2021-10-08
Payer: MEDICARE

## 2021-10-08 LAB
A/G RATIO: 0.9 (ref 1.1–2.2)
ALBUMIN SERPL-MCNC: 2.8 G/DL (ref 3.4–5)
ALP BLD-CCNC: 126 U/L (ref 40–129)
ALT SERPL-CCNC: 20 U/L (ref 10–40)
ANION GAP SERPL CALCULATED.3IONS-SCNC: 11 MMOL/L (ref 3–16)
AST SERPL-CCNC: 28 U/L (ref 15–37)
BASE EXCESS ARTERIAL: -2.2 MMOL/L (ref -3–3)
BASOPHILS ABSOLUTE: 0 K/UL (ref 0–0.2)
BASOPHILS RELATIVE PERCENT: 0.6 %
BILIRUB SERPL-MCNC: 0.3 MG/DL (ref 0–1)
BUN BLDV-MCNC: 14 MG/DL (ref 7–20)
CALCIUM SERPL-MCNC: 8.3 MG/DL (ref 8.3–10.6)
CARBOXYHEMOGLOBIN ARTERIAL: 0.3 % (ref 0–1.5)
CHLORIDE BLD-SCNC: 101 MMOL/L (ref 99–110)
CO2: 23 MMOL/L (ref 21–32)
CREAT SERPL-MCNC: <0.5 MG/DL (ref 0.6–1.2)
EOSINOPHILS ABSOLUTE: 0.1 K/UL (ref 0–0.6)
EOSINOPHILS RELATIVE PERCENT: 0.8 %
GFR AFRICAN AMERICAN: >60
GFR NON-AFRICAN AMERICAN: >60
GLOBULIN: 3 G/DL
GLUCOSE BLD-MCNC: 104 MG/DL (ref 70–99)
GLUCOSE BLD-MCNC: 113 MG/DL (ref 70–99)
GLUCOSE BLD-MCNC: 116 MG/DL (ref 70–99)
GLUCOSE BLD-MCNC: 153 MG/DL (ref 70–99)
GLUCOSE BLD-MCNC: 182 MG/DL (ref 70–99)
GLUCOSE BLD-MCNC: 213 MG/DL (ref 70–99)
GLUCOSE BLD-MCNC: 99 MG/DL (ref 70–99)
HCO3 ARTERIAL: 22.9 MMOL/L (ref 21–29)
HCT VFR BLD CALC: 29.8 % (ref 36–48)
HEMOGLOBIN, ART, EXTENDED: 10.9 G/DL (ref 12–16)
HEMOGLOBIN: 10.3 G/DL (ref 12–16)
LYMPHOCYTES ABSOLUTE: 1 K/UL (ref 1–5.1)
LYMPHOCYTES RELATIVE PERCENT: 12.3 %
MCH RBC QN AUTO: 27.3 PG (ref 26–34)
MCHC RBC AUTO-ENTMCNC: 34.7 G/DL (ref 31–36)
MCV RBC AUTO: 78.6 FL (ref 80–100)
METHEMOGLOBIN ARTERIAL: 0.3 %
MONOCYTES ABSOLUTE: 0.5 K/UL (ref 0–1.3)
MONOCYTES RELATIVE PERCENT: 5.8 %
NEUTROPHILS ABSOLUTE: 6.4 K/UL (ref 1.7–7.7)
NEUTROPHILS RELATIVE PERCENT: 80.5 %
O2 CONTENT ARTERIAL: 14 ML/DL
O2 SAT, ARTERIAL: 89.7 %
O2 THERAPY: ABNORMAL
PCO2 ARTERIAL: 40.2 MMHG (ref 35–45)
PDW BLD-RTO: 17.7 % (ref 12.4–15.4)
PERFORMED ON: ABNORMAL
PERFORMED ON: NORMAL
PH ARTERIAL: 7.37 (ref 7.35–7.45)
PLATELET # BLD: 118 K/UL (ref 135–450)
PMV BLD AUTO: 9.7 FL (ref 5–10.5)
PO2 ARTERIAL: 58.3 MMHG (ref 75–108)
POTASSIUM REFLEX MAGNESIUM: 3.6 MMOL/L (ref 3.5–5.1)
RBC # BLD: 3.79 M/UL (ref 4–5.2)
SODIUM BLD-SCNC: 135 MMOL/L (ref 136–145)
TCO2 ARTERIAL: 24.1 MMOL/L
TOTAL PROTEIN: 5.8 G/DL (ref 6.4–8.2)
TRIGL SERPL-MCNC: 1257 MG/DL (ref 0–150)
WBC # BLD: 8 K/UL (ref 4–11)

## 2021-10-08 PROCEDURE — 2700000000 HC OXYGEN THERAPY PER DAY

## 2021-10-08 PROCEDURE — 2000000000 HC ICU R&B

## 2021-10-08 PROCEDURE — 99291 CRITICAL CARE FIRST HOUR: CPT | Performed by: INTERNAL MEDICINE

## 2021-10-08 PROCEDURE — 6370000000 HC RX 637 (ALT 250 FOR IP): Performed by: INTERNAL MEDICINE

## 2021-10-08 PROCEDURE — 6370000000 HC RX 637 (ALT 250 FOR IP): Performed by: HOSPITALIST

## 2021-10-08 PROCEDURE — 71045 X-RAY EXAM CHEST 1 VIEW: CPT

## 2021-10-08 PROCEDURE — 99233 SBSQ HOSP IP/OBS HIGH 50: CPT | Performed by: INTERNAL MEDICINE

## 2021-10-08 PROCEDURE — 2580000003 HC RX 258: Performed by: INTERNAL MEDICINE

## 2021-10-08 PROCEDURE — 94761 N-INVAS EAR/PLS OXIMETRY MLT: CPT

## 2021-10-08 PROCEDURE — 84450 TRANSFERASE (AST) (SGOT): CPT

## 2021-10-08 PROCEDURE — 6360000002 HC RX W HCPCS: Performed by: INTERNAL MEDICINE

## 2021-10-08 PROCEDURE — 80053 COMPREHEN METABOLIC PANEL: CPT

## 2021-10-08 PROCEDURE — 2500000003 HC RX 250 WO HCPCS: Performed by: INTERNAL MEDICINE

## 2021-10-08 PROCEDURE — 82803 BLOOD GASES ANY COMBINATION: CPT

## 2021-10-08 PROCEDURE — 85025 COMPLETE CBC W/AUTO DIFF WBC: CPT

## 2021-10-08 PROCEDURE — 84132 ASSAY OF SERUM POTASSIUM: CPT

## 2021-10-08 PROCEDURE — 84478 ASSAY OF TRIGLYCERIDES: CPT

## 2021-10-08 PROCEDURE — 94003 VENT MGMT INPAT SUBQ DAY: CPT

## 2021-10-08 PROCEDURE — 94640 AIRWAY INHALATION TREATMENT: CPT

## 2021-10-08 PROCEDURE — C9113 INJ PANTOPRAZOLE SODIUM, VIA: HCPCS | Performed by: INTERNAL MEDICINE

## 2021-10-08 RX ORDER — POTASSIUM BICARBONATE 25 MEQ/1
25 TABLET, EFFERVESCENT ORAL ONCE
Status: COMPLETED | OUTPATIENT
Start: 2021-10-08 | End: 2021-10-08

## 2021-10-08 RX ORDER — CASTOR OIL AND BALSAM, PERU 788; 87 MG/G; MG/G
OINTMENT TOPICAL 2 TIMES DAILY
Status: DISCONTINUED | OUTPATIENT
Start: 2021-10-08 | End: 2021-10-28 | Stop reason: HOSPADM

## 2021-10-08 RX ADMIN — WHITE PETROLATUM 57.7 %-MINERAL OIL 31.9 % EYE OINTMENT: at 08:36

## 2021-10-08 RX ADMIN — PROPOFOL 50 MCG/KG/MIN: 10 INJECTION, EMULSION INTRAVENOUS at 19:41

## 2021-10-08 RX ADMIN — CARBOXYMETHYLCELLULOSE SODIUM 1 DROP: 10 GEL OPHTHALMIC at 03:03

## 2021-10-08 RX ADMIN — OXYCODONE 10 MG: 5 TABLET ORAL at 08:34

## 2021-10-08 RX ADMIN — MICONAZOLE NITRATE: 2 POWDER TOPICAL at 09:51

## 2021-10-08 RX ADMIN — PROPOFOL 50 MCG/KG/MIN: 10 INJECTION, EMULSION INTRAVENOUS at 16:04

## 2021-10-08 RX ADMIN — OXYCODONE 10 MG: 5 TABLET ORAL at 22:24

## 2021-10-08 RX ADMIN — SODIUM CHLORIDE, PRESERVATIVE FREE 10 ML: 5 INJECTION INTRAVENOUS at 08:37

## 2021-10-08 RX ADMIN — CARBOXYMETHYLCELLULOSE SODIUM 1 DROP: 10 GEL OPHTHALMIC at 19:44

## 2021-10-08 RX ADMIN — LINEZOLID 600 MG: 600 INJECTION, SOLUTION INTRAVENOUS at 20:51

## 2021-10-08 RX ADMIN — CASTOR OIL AND BALSAM, PERU: 788; 87 OINTMENT TOPICAL at 20:38

## 2021-10-08 RX ADMIN — PROPOFOL 50 MCG/KG/MIN: 10 INJECTION, EMULSION INTRAVENOUS at 12:10

## 2021-10-08 RX ADMIN — LEVOTHYROXINE SODIUM 100 MCG: 100 TABLET ORAL at 08:41

## 2021-10-08 RX ADMIN — DEXAMETHASONE SODIUM PHOSPHATE 6 MG: 10 INJECTION INTRAMUSCULAR; INTRAVENOUS at 12:16

## 2021-10-08 RX ADMIN — WHITE PETROLATUM 57.7 %-MINERAL OIL 31.9 % EYE OINTMENT: at 13:03

## 2021-10-08 RX ADMIN — SODIUM CHLORIDE, PRESERVATIVE FREE 10 ML: 5 INJECTION INTRAVENOUS at 20:38

## 2021-10-08 RX ADMIN — LINEZOLID 600 MG: 600 INJECTION, SOLUTION INTRAVENOUS at 09:50

## 2021-10-08 RX ADMIN — LOSARTAN POTASSIUM 25 MG: 25 TABLET, FILM COATED ORAL at 08:34

## 2021-10-08 RX ADMIN — Medication 100 MCG/HR: at 06:56

## 2021-10-08 RX ADMIN — ENOXAPARIN SODIUM 30 MG: 30 INJECTION SUBCUTANEOUS at 08:35

## 2021-10-08 RX ADMIN — MIDAZOLAM HYDROCHLORIDE 2 MG: 1 INJECTION, SOLUTION INTRAMUSCULAR; INTRAVENOUS at 15:28

## 2021-10-08 RX ADMIN — IPRATROPIUM BROMIDE AND ALBUTEROL SULFATE 1 AMPULE: .5; 2.5 SOLUTION RESPIRATORY (INHALATION) at 23:13

## 2021-10-08 RX ADMIN — WHITE PETROLATUM 57.7 %-MINERAL OIL 31.9 % EYE OINTMENT: at 18:02

## 2021-10-08 RX ADMIN — INSULIN LISPRO 2 UNITS: 100 INJECTION, SOLUTION INTRAVENOUS; SUBCUTANEOUS at 20:14

## 2021-10-08 RX ADMIN — WHITE PETROLATUM 57.7 %-MINERAL OIL 31.9 % EYE OINTMENT: at 03:58

## 2021-10-08 RX ADMIN — WHITE PETROLATUM 57.7 %-MINERAL OIL 31.9 % EYE OINTMENT: at 02:07

## 2021-10-08 RX ADMIN — PIPERACILLIN SODIUM AND TAZOBACTAM SODIUM 3375 MG: 3; .375 INJECTION, POWDER, LYOPHILIZED, FOR SOLUTION INTRAVENOUS at 03:03

## 2021-10-08 RX ADMIN — CARBOXYMETHYLCELLULOSE SODIUM 1 DROP: 10 GEL OPHTHALMIC at 12:09

## 2021-10-08 RX ADMIN — Medication 0.4 MCG/KG/HR: at 15:24

## 2021-10-08 RX ADMIN — ENOXAPARIN SODIUM 30 MG: 30 INJECTION SUBCUTANEOUS at 20:34

## 2021-10-08 RX ADMIN — GABAPENTIN 800 MG: 400 CAPSULE ORAL at 20:34

## 2021-10-08 RX ADMIN — PANTOPRAZOLE SODIUM 40 MG: 40 INJECTION, POWDER, FOR SOLUTION INTRAVENOUS at 08:34

## 2021-10-08 RX ADMIN — IPRATROPIUM BROMIDE AND ALBUTEROL SULFATE 1 AMPULE: .5; 2.5 SOLUTION RESPIRATORY (INHALATION) at 14:52

## 2021-10-08 RX ADMIN — CHLORHEXIDINE GLUCONATE 0.12% ORAL RINSE 15 ML: 1.2 LIQUID ORAL at 20:34

## 2021-10-08 RX ADMIN — CARBOXYMETHYLCELLULOSE SODIUM 1 DROP: 10 GEL OPHTHALMIC at 15:25

## 2021-10-08 RX ADMIN — FENTANYL CITRATE 50 MCG: 0.05 INJECTION, SOLUTION INTRAMUSCULAR; INTRAVENOUS at 06:13

## 2021-10-08 RX ADMIN — CHLORHEXIDINE GLUCONATE 0.12% ORAL RINSE 15 ML: 1.2 LIQUID ORAL at 08:34

## 2021-10-08 RX ADMIN — GABAPENTIN 800 MG: 400 CAPSULE ORAL at 13:03

## 2021-10-08 RX ADMIN — IPRATROPIUM BROMIDE AND ALBUTEROL SULFATE 1 AMPULE: .5; 2.5 SOLUTION RESPIRATORY (INHALATION) at 11:13

## 2021-10-08 RX ADMIN — OXYCODONE 10 MG: 5 TABLET ORAL at 16:02

## 2021-10-08 RX ADMIN — Medication 0.3 MCG/KG/HR: at 00:43

## 2021-10-08 RX ADMIN — ATORVASTATIN CALCIUM 40 MG: 40 TABLET, FILM COATED ORAL at 08:34

## 2021-10-08 RX ADMIN — SODIUM CHLORIDE, PRESERVATIVE FREE 10 ML: 5 INJECTION INTRAVENOUS at 20:37

## 2021-10-08 RX ADMIN — CARBOXYMETHYLCELLULOSE SODIUM 1 DROP: 10 GEL OPHTHALMIC at 08:35

## 2021-10-08 RX ADMIN — IPRATROPIUM BROMIDE AND ALBUTEROL SULFATE 1 AMPULE: .5; 2.5 SOLUTION RESPIRATORY (INHALATION) at 19:23

## 2021-10-08 RX ADMIN — AMLODIPINE BESYLATE 2.5 MG: 2.5 TABLET ORAL at 08:34

## 2021-10-08 RX ADMIN — PIPERACILLIN SODIUM AND TAZOBACTAM SODIUM 3375 MG: 3; .375 INJECTION, POWDER, LYOPHILIZED, FOR SOLUTION INTRAVENOUS at 18:03

## 2021-10-08 RX ADMIN — PROPOFOL 50 MCG/KG/MIN: 10 INJECTION, EMULSION INTRAVENOUS at 08:59

## 2021-10-08 RX ADMIN — Medication 100 MCG/HR: at 18:00

## 2021-10-08 RX ADMIN — ASPIRIN 81 MG: 81 TABLET, CHEWABLE ORAL at 08:34

## 2021-10-08 RX ADMIN — OXYCODONE 10 MG: 5 TABLET ORAL at 03:53

## 2021-10-08 RX ADMIN — POTASSIUM BICARBONATE 25 MEQ: 977.5 TABLET, EFFERVESCENT ORAL at 10:45

## 2021-10-08 RX ADMIN — MICONAZOLE NITRATE: 2 POWDER TOPICAL at 20:39

## 2021-10-08 RX ADMIN — WHITE PETROLATUM 57.7 %-MINERAL OIL 31.9 % EYE OINTMENT: at 20:35

## 2021-10-08 RX ADMIN — INSULIN LISPRO 1 UNITS: 100 INJECTION, SOLUTION INTRAVENOUS; SUBCUTANEOUS at 17:51

## 2021-10-08 RX ADMIN — IPRATROPIUM BROMIDE AND ALBUTEROL SULFATE 1 AMPULE: .5; 2.5 SOLUTION RESPIRATORY (INHALATION) at 03:29

## 2021-10-08 RX ADMIN — PROPOFOL 50 MCG/KG/MIN: 10 INJECTION, EMULSION INTRAVENOUS at 00:39

## 2021-10-08 RX ADMIN — IPRATROPIUM BROMIDE AND ALBUTEROL SULFATE 1 AMPULE: .5; 2.5 SOLUTION RESPIRATORY (INHALATION) at 07:53

## 2021-10-08 RX ADMIN — PROPOFOL 50 MCG/KG/MIN: 10 INJECTION, EMULSION INTRAVENOUS at 04:50

## 2021-10-08 RX ADMIN — PIPERACILLIN SODIUM AND TAZOBACTAM SODIUM 3375 MG: 3; .375 INJECTION, POWDER, LYOPHILIZED, FOR SOLUTION INTRAVENOUS at 10:52

## 2021-10-08 RX ADMIN — INSULIN LISPRO 1 UNITS: 100 INJECTION, SOLUTION INTRAVENOUS; SUBCUTANEOUS at 00:10

## 2021-10-08 RX ADMIN — GABAPENTIN 800 MG: 400 CAPSULE ORAL at 08:35

## 2021-10-08 ASSESSMENT — PULMONARY FUNCTION TESTS
PIF_VALUE: 27
PIF_VALUE: 29
PIF_VALUE: 28
PIF_VALUE: 27
PIF_VALUE: 29
PIF_VALUE: 26
PIF_VALUE: 30
PIF_VALUE: 27
PIF_VALUE: 29
PIF_VALUE: 30
PIF_VALUE: 30
PIF_VALUE: 24
PIF_VALUE: 25
PIF_VALUE: 26
PIF_VALUE: 28
PIF_VALUE: 28
PIF_VALUE: 36
PIF_VALUE: 22
PIF_VALUE: 27

## 2021-10-08 ASSESSMENT — PAIN SCALES - GENERAL
PAINLEVEL_OUTOF10: 0
PAINLEVEL_OUTOF10: 5
PAINLEVEL_OUTOF10: 0
PAINLEVEL_OUTOF10: 0

## 2021-10-08 NOTE — PROGRESS NOTES
Family at bedside and update given.  No change noted in exam 02 has been decreased to 65% Continue current plan of care Yuko Galvez RN

## 2021-10-08 NOTE — PROGRESS NOTES
20-23kcal\kg\CBW; Weight Used for Energy Requirements:  Current     Protein (g):  86-99 g of protein based on 1.3-1.5g\kg\IBW; Weight Used for Protein Requirements:  Ideal        Fluid (ml/day):  4786-6568 ml; Method Used for Fluid Requirements:  1 ml/kcal      Nutrition Related Findings:  was extubated 10/4 then emergentlly reintubated 10/6; 10/7 was proned and oxygenatation and Hyoxemeia improved; will be supine 10.8; WBC wnl; + Bcaterial pnuemonia in addition to + C19 pnuemonia; TF infsuing      Wounds:  None       Current Nutrition Therapies:    Current Tube Feeding (TF) Orders:  · Feeding Route: Orogastric  · Formula: Peptide Based  · Schedule: Continuous  · Additives/Modulars: Protein (one Proteinex 2GO once daily via feeding tube)  · Water Flushes: 30 ml every 3 hours per MD guidance  · Current TF & Flush Orders Provides: Vital AF 1.2 with a goal rate of 20 ml/hr x 20 hours = 400 ml TV, 480 kcals, 30 g protein, and 324 ml free water + 30 ml water flushes every 3 hours + 26 g protein and 104 kcals from one proteinex P2Go once daily (56 g protein and 584 kcals total)  · Goal TF & Flush Orders Provides: Vital AF 1.2 with a goal rate of 45 ml/hr x 20 hours = 900 ml TV, 1080 kcals, 68 g protein, and 730 ml free water + 30 ml every 3 hours + 26 g protein and 104 kcals from one proteinex P2Go once daily via feeding tube (94 g protein and 1184 kcals total)      Anthropometric Measures:  · Height: 5' 9\" (175.3 cm)  · Current Body Weight: 190 lb 14.7 oz (86.6 kg)   · Admission Body Weight: 187 lb 6.3 oz (85 kg) (obtained on 9/29/21; bed scale)    · Usual Body Weight: 187 lb 6.3 oz (85 kg) (obtained on 9/29/21; actual weight)     · Ideal Body Weight: 145 lbs; % Ideal Body Weight 124.8 %   · BMI: 28.2  · Adjusted Body Weight:  ; No Adjustment   · BMI Categories: Overweight (BMI 25.0-29. 9)       Nutrition Diagnosis:   · Inadequate oral intake related to impaired respiratory function, inadequate protein-energy intake, increase demand for energy/nutrients as evidenced by NPO or clear liquid status due to medical condition, intubation, nutrition support - enteral nutrition, lab values      Nutrition Interventions:   Food and/or Nutrient Delivery:  Continue NPO, Continue Current Tube Feeding  Nutrition Education/Counseling:  No recommendation at this time   Coordination of Nutrition Care:  Continue to monitor while inpatient    Goals:  patient will tolerate Vital AF 1.2 at goal rate of 45 ml/hr x 20 hours without GI distress, without s/s of aspiration, and without additional lab/fluid disturbances       Nutrition Monitoring and Evaluation:   Behavioral-Environmental Outcomes:  None Identified   Food/Nutrient Intake Outcomes:  Enteral Nutrition Intake/Tolerance  Physical Signs/Symptoms Outcomes:  Biochemical Data, GI Status, Hemodynamic Status, Skin, Weight     Discharge Planning:     Too soon to determine     Electronically signed by Skyler Dhaliwal RD, LD on 10/8/21 at 12:06 PM EDT    Contact: [phone]

## 2021-10-08 NOTE — FLOWSHEET NOTE
4975-Resting quietly, no respiratory distress noted. Tolerating mechanical ventilation. Responds to commands, Nods with yes answer.

## 2021-10-08 NOTE — PROGRESS NOTES
RT Inhaler-Nebulizer Bronchodilator Protocol Note    There is a bronchodilator order in the chart from a provider indicating to follow the RT Bronchodilator Protocol and there is an Initiate RT Inhaler-Nebulizer Bronchodilator Protocol order as well (see protocol at bottom of note). CXR Findings:  XR CHEST PORTABLE    Result Date: 10/7/2021  Improved aeration on the left, possibly improving pulmonary edema. Persistent airspace disease in the right base may represent concurrent pneumonia     XR CHEST PORTABLE    Result Date: 10/6/2021  1. Endotracheal tube projects in the appropriate position. 2. Enteric tube extends below the diaphragm and out of the field of view. XR CHEST PORTABLE    Result Date: 10/6/2021  Increasing diffuse airspace opacification throughout the lungs. Pattern may represent pulmonary edema or worsening pneumonitis. The findings from the last RT Protocol Assessment were as follows:   History Pulmonary Disease: (P) Smoker 15 pack years or more  Respiratory Pattern: (P) Use of accessory muscles, prolonged exhalation, or RR 26-30 bpm  Breath Sounds: (P) Slightly diminished and/or crackles  Cough: (P) Strong, productive  Indication for Bronchodilator Therapy: (P) Decreased or absent breath sounds, Unable to speak in sentences  Bronchodilator Assessment Score: (P) 10    Aerosolized bronchodilator medication orders have been revised according to the RT Inhaler-Nebulizer Bronchodilator Protocol below. Respiratory Therapist to perform RT Therapy Protocol Assessment initially then follow the protocol. Repeat RT Therapy Protocol Assessment PRN for score 0-3 or on second treatment, BID, and PRN for scores above 3. No Indications - adjust the frequency to every 6 hours PRN wheezing or bronchospasm, if no treatments needed after 48 hours then discontinue using Per Protocol order mode.      If indication present, adjust the RT bronchodilator orders based on the Bronchodilator Assessment Score as indicated below. Use Inhaler orders unless patient has one or more of the following: on home nebulizer, not able to hold breath for 10 seconds, is not alert and oriented, cannot activate and use MDI correctly, or respiratory rate 25 breaths per minute or more, then use the equivalent nebulizer order(s) with same Frequency and PRN reasons based on the score. If a patient is on this medication at home then do not decrease Frequency below that used at home. 0-3 - enter or revise RT bronchodilator order(s) to equivalent RT Bronchodilator order with Frequency of every 4 hours PRN for wheezing or increased work of breathing using Per Protocol order mode. 4-6 - enter or revise RT Bronchodilator order(s) to two equivalent RT bronchodilator orders with one order with BID Frequency and one order with Frequency of every 4 hours PRN wheezing or increased work of breathing using Per Protocol order mode. 7-10 - enter or revise RT Bronchodilator order(s) to two equivalent RT bronchodilator orders with one order with TID Frequency and one order with Frequency of every 4 hours PRN wheezing or increased work of breathing using Per Protocol order mode. 11-13 - enter or revise RT Bronchodilator order(s) to one equivalent RT bronchodilator order with QID Frequency and an Albuterol order with Frequency of every 4 hours PRN wheezing or increased work of breathing using Per Protocol order mode. Greater than 13 - enter or revise RT Bronchodilator order(s) to one equivalent RT bronchodilator order with every 4 hours Frequency and an Albuterol order with Frequency of every 2 hours PRN wheezing or increased work of breathing using Per Protocol order mode. RT to enter RT Home Evaluation for COPD & MDI Assessment order using Per Protocol order mode.     Electronically signed by Genie Fry RCP on 10/8/2021 at 8:00 AM

## 2021-10-08 NOTE — PROGRESS NOTES
10/07/21 2340   Vent Information   Vent Type 980   Vent Mode AC/VC   Vt Ordered 360 mL   Rate Set 28 bmp   Peak Flow 65 L/min   Pressure Support 0 cmH20   FiO2  55 %   SpO2 91 %   SpO2/FiO2 ratio 165.45   Sensitivity 3   PEEP/CPAP 10   I Time/ I Time % 0 s   Humidification Source Heated wire   Humidification Temp 37   Humidification Temp Measured 37   Circuit Condensation Drained   Vent Patient Data   High Peep/I Pressure 0   Peak Inspiratory Pressure 24 cmH2O   Mean Airway Pressure 15 cmH20   Rate Measured 28 br/min   Vt Exhaled 367 mL   Minute Volume 10.3 Liters   I:E Ratio 1:2.60   Plateau Pressure 25 IFS92   Static Compliance 24 mL/cmH2O   Dynamic Compliance 26 mL/cmH2O   Cough/Sputum   Sputum How Obtained Endotracheal;Suctioned   Cough Productive   Sputum Amount Small   Sputum Color Creamy   Tenacity Thick   Spontaneous Breathing Trial (SBT) RT Doc   Pulse (!) 49   Breath Sounds   Right Upper Lobe Diminished   Right Middle Lobe Diminished   Right Lower Lobe Diminished   Left Upper Lobe Diminished   Left Lower Lobe Diminished   Additional Respiratory  Assessments   Resp 12   Position Semi-Jesus's   Alarm Settings   High Pressure Alarm 45 cmH2O   Low Minute Volume Alarm 4 L/min   High Respiratory Rate 40 br/min   Patient Observation   Observations ETT SIZE 7.5, SECURED AT 23 LIP LINE. AMBU BAG AT HEAD OF BED. WATER GOOD.     ETT (adult)   Placement Date/Time: 10/06/21 1208   Timeout: Patient  Preoxygenation: Yes  Technique: Rapid sequence  Type: Cuffed  Tube Size: 7.5 mm  Laryngoscope: GlideScope  Secured at: 23 cm  Measured From: Lips   Secured at 23 cm   Measured From Lips   ET Placement Right   Secured By [employer] tape   Site Condition Dry        10/07/21 2340   Vent Information   Vent Type 980   Vent Mode AC/VC   Vt Ordered 360 mL   Rate Set 28 bmp   Peak Flow 65 L/min   Pressure Support 0 cmH20   FiO2  55 %   SpO2 91 %   SpO2/FiO2 ratio 165.45   Sensitivity 3   PEEP/CPAP 10   I Time/ I Time % 0 s Humidification Source Heated wire   Humidification Temp 37   Humidification Temp Measured 37   Circuit Condensation Drained   Vent Patient Data   High Peep/I Pressure 0   Peak Inspiratory Pressure 24 cmH2O   Mean Airway Pressure 15 cmH20   Rate Measured 28 br/min   Vt Exhaled 367 mL   Minute Volume 10.3 Liters   I:E Ratio 1:2.60   Plateau Pressure 25 GYT20   Static Compliance 24 mL/cmH2O   Dynamic Compliance 26 mL/cmH2O   Cough/Sputum   Sputum How Obtained Endotracheal;Suctioned   Cough Productive   Sputum Amount Small   Sputum Color Creamy   Tenacity Thick   Spontaneous Breathing Trial (SBT) RT Doc   Pulse (!) 49   Breath Sounds   Right Upper Lobe Diminished   Right Middle Lobe Diminished   Right Lower Lobe Diminished   Left Upper Lobe Diminished   Left Lower Lobe Diminished   Additional Respiratory  Assessments   Resp 12   Position Semi-Jesus's   Alarm Settings   High Pressure Alarm 45 cmH2O   Low Minute Volume Alarm 4 L/min   High Respiratory Rate 40 br/min   Patient Observation   Observations ETT SIZE 7.5, SECURED AT 23 LIP LINE. AMBU BAG AT HEAD OF BED. WATER GOOD.     ETT (adult)   Placement Date/Time: 10/06/21 1208   Timeout: Patient  Preoxygenation: Yes  Technique: Rapid sequence  Type: Cuffed  Tube Size: 7.5 mm  Laryngoscope: GlideScope  Secured at: 23 cm  Measured From: Lips   Secured at 23 cm   Measured From Lips   ET Placement Right   Secured By [employer] tape   Site Condition Dry        10/07/21 2340   Vent Information   Vent Type 980   Vent Mode AC/VC   Vt Ordered 360 mL   Rate Set 28 bmp   Peak Flow 65 L/min   Pressure Support 0 cmH20   FiO2  55 %   SpO2 91 %   SpO2/FiO2 ratio 165.45   Sensitivity 3   PEEP/CPAP 10   I Time/ I Time % 0 s   Humidification Source Heated wire   Humidification Temp 37   Humidification Temp Measured 37   Circuit Condensation Drained   Vent Patient Data   High Peep/I Pressure 0   Peak Inspiratory Pressure 24 cmH2O   Mean Airway Pressure 15 cmH20   Rate Measured 28 br/min Vt Exhaled 367 mL   Minute Volume 10.3 Liters   I:E Ratio 1:2.60   Plateau Pressure 25 [id number]   Static Compliance 24 mL/cmH2O   Dynamic Compliance 26 mL/cmH2O   Cough/Sputum   Sputum How Obtained Endotracheal;Suctioned   Cough Productive   Sputum Amount Small   Sputum Color Creamy   Tenacity Thick   Spontaneous Breathing Trial (SBT) RT Doc   Pulse (!) 49   Breath Sounds   Right Upper Lobe Diminished   Right Middle Lobe Diminished   Right Lower Lobe Diminished   Left Upper Lobe Diminished   Left Lower Lobe Diminished   Additional Respiratory  Assessments   Resp 12   Position Semi-Jesus's   Alarm Settings   High Pressure Alarm 45 cmH2O   Low Minute Volume Alarm 4 L/min   High Respiratory Rate 40 br/min   Patient Observation   Observations ETT SIZE 7.5, SECURED AT 23 LIP LINE. AMBU BAG AT HEAD OF BED. WATER GOOD.     ETT (adult)   Placement Date/Time: 10/06/21 1208   Timeout: Patient  Preoxygenation: Yes  Technique: Rapid sequence  Type: Cuffed  Tube Size: 7.5 mm  Laryngoscope: GlideScope  Secured at: 23 cm  Measured From: Lips   Secured at 23 cm   Measured From Lips   ET Placement Right   Secured By [employer] tape   Site Condition Dry        10/07/21 2340   Vent Information   Vent Type 980   Vent Mode AC/VC   Vt Ordered 360 mL   Rate Set 28 bmp   Peak Flow 65 L/min   Pressure Support 0 cmH20   FiO2  55 %   SpO2 91 %   SpO2/FiO2 ratio 165.45   Sensitivity 3   PEEP/CPAP 10   I Time/ I Time % 0 s   Humidification Source Heated wire   Humidification Temp 37   Humidification Temp Measured 37   Circuit Condensation Drained   Vent Patient Data   High Peep/I Pressure 0   Peak Inspiratory Pressure 24 cmH2O   Mean Airway Pressure 15 cmH20   Rate Measured 28 br/min   Vt Exhaled 367 mL   Minute Volume 10.3 Liters   I:E Ratio 1:2.60   Plateau Pressure 25 RBY18   Static Compliance 24 mL/cmH2O   Dynamic Compliance 26 mL/cmH2O   Cough/Sputum   Sputum How Obtained Endotracheal;Suctioned   Cough Productive   Sputum Amount Small   Sputum Color Creamy   Tenacity Thick   Spontaneous Breathing Trial (SBT) RT Doc   Pulse (!) 49   Breath Sounds   Right Upper Lobe Diminished   Right Middle Lobe Diminished   Right Lower Lobe Diminished   Left Upper Lobe Diminished   Left Lower Lobe Diminished   Additional Respiratory  Assessments   Resp 12   Position Semi-Jesus's   Alarm Settings   High Pressure Alarm 45 cmH2O   Low Minute Volume Alarm 4 L/min   High Respiratory Rate 40 br/min   Patient Observation   Observations ETT SIZE 7.5, SECURED AT 23 LIP LINE. AMBU BAG AT HEAD OF BED. WATER GOOD.     ETT (adult)   Placement Date/Time: 10/06/21 1208   Timeout: Patient  Preoxygenation: Yes  Technique: Rapid sequence  Type: Cuffed  Tube Size: 7.5 mm  Laryngoscope: GlideScope  Secured at: 23 cm  Measured From: Lips   Secured at 23 cm   Measured From Lips   ET Placement Right   Secured By [employer] tape   Site Condition Dry        10/07/21 2340   Vent Information   Vent Type 980   Vent Mode AC/VC   Vt Ordered 360 mL   Rate Set 28 bmp   Peak Flow 65 L/min   Pressure Support 0 cmH20   FiO2  55 %   SpO2 91 %   SpO2/FiO2 ratio 165.45   Sensitivity 3   PEEP/CPAP 10   I Time/ I Time % 0 s   Humidification Source Heated wire   Humidification Temp 37   Humidification Temp Measured 37   Circuit Condensation Drained   Vent Patient Data   High Peep/I Pressure 0   Peak Inspiratory Pressure 24 cmH2O   Mean Airway Pressure 15 cmH20   Rate Measured 28 br/min   Vt Exhaled 367 mL   Minute Volume 10.3 Liters   I:E Ratio 1:2.60   Plateau Pressure 25 RBO81   Static Compliance 24 mL/cmH2O   Dynamic Compliance 26 mL/cmH2O   Cough/Sputum   Sputum How Obtained Endotracheal;Suctioned   Cough Productive   Sputum Amount Small   Sputum Color Creamy   Tenacity Thick   Spontaneous Breathing Trial (SBT) RT Doc   Pulse (!) 49   Breath Sounds   Right Upper Lobe Diminished   Right Middle Lobe Diminished   Right Lower Lobe Diminished   Left Upper Lobe Diminished   Left Lower Lobe Diminished   Additional Respiratory  Assessments   Resp 12   Position Semi-Jesus's   Alarm Settings   High Pressure Alarm 45 cmH2O   Low Minute Volume Alarm 4 L/min   High Respiratory Rate 40 br/min   Patient Observation   Observations ETT SIZE 7.5, SECURED AT 23 LIP LINE. AMBU BAG AT HEAD OF BED. WATER GOOD. ETT (adult)   Placement Date/Time: 10/06/21 1208   Timeout: Patient  Preoxygenation: Yes  Technique: Rapid sequence  Type: Cuffed  Tube Size: 7.5 mm  Laryngoscope: GlideScope  Secured at: 23 cm  Measured From: Lips   Secured at 23 cm   Measured From Lips   ET Placement Right   Secured By [employer] tape   Site Condition Dry        10/07/21 2340   Vent Information   Vent Type 980   Vent Mode AC/VC   Vt Ordered 360 mL   Rate Set 28 bmp   Peak Flow 65 L/min   Pressure Support 0 cmH20   FiO2  55 %   SpO2 91 %   SpO2/FiO2 ratio 165.45   Sensitivity 3   PEEP/CPAP 10   I Time/ I Time % 0 s   Humidification Source Heated wire   Humidification Temp 37   Humidification Temp Measured 37   Circuit Condensation Drained   Vent Patient Data   High Peep/I Pressure 0   Peak Inspiratory Pressure 24 cmH2O   Mean Airway Pressure 15 cmH20   Rate Measured 28 br/min   Vt Exhaled 367 mL   Minute Volume 10.3 Liters   I:E Ratio 1:2.60   Plateau Pressure 25 GRD97   Static Compliance 24 mL/cmH2O   Dynamic Compliance 26 mL/cmH2O   Cough/Sputum   Sputum How Obtained Endotracheal;Suctioned   Cough Productive   Sputum Amount Small   Sputum Color Creamy   Tenacity Thick   Spontaneous Breathing Trial (SBT) RT Doc   Pulse (!) 49   Breath Sounds   Right Upper Lobe Diminished   Right Middle Lobe Diminished   Right Lower Lobe Diminished   Left Upper Lobe Diminished   Left Lower Lobe Diminished   Additional Respiratory  Assessments   Resp 12   Position Semi-Jesus's   Alarm Settings   High Pressure Alarm 45 cmH2O   Low Minute Volume Alarm 4 L/min   High Respiratory Rate 40 br/min   Patient Observation   Observations ETT SIZE 7.5, SECURED AT 23 LIP LINE.   AMBU BAG AT HEAD OF BED. WATER GOOD. ETT (adult)   Placement Date/Time: 10/06/21 1208   Timeout: Patient  Preoxygenation: Yes  Technique: Rapid sequence  Type: Cuffed  Tube Size: 7.5 mm  Laryngoscope: GlideScope  Secured at: 23 cm  Measured From: Lips   Secured at 23 cm   Measured From Lips   ET Placement Right   Secured By [employer] tape   Site Condition Dry        10/07/21 2340   Vent Information   Vent Type 980   Vent Mode AC/VC   Vt Ordered 360 mL   Rate Set 28 bmp   Peak Flow 65 L/min   Pressure Support 0 cmH20   FiO2  55 %   SpO2 91 %   SpO2/FiO2 ratio 165.45   Sensitivity 3   PEEP/CPAP 10   I Time/ I Time % 0 s   Humidification Source Heated wire   Humidification Temp 37   Humidification Temp Measured 37   Circuit Condensation Drained   Vent Patient Data   High Peep/I Pressure 0   Peak Inspiratory Pressure 24 cmH2O   Mean Airway Pressure 15 cmH20   Rate Measured 28 br/min   Vt Exhaled 367 mL   Minute Volume 10.3 Liters   I:E Ratio 1:2.60   Plateau Pressure 25 QFI76   Static Compliance 24 mL/cmH2O   Dynamic Compliance 26 mL/cmH2O   Cough/Sputum   Sputum How Obtained Endotracheal;Suctioned   Cough Productive   Sputum Amount Small   Sputum Color Creamy   Tenacity Thick   Spontaneous Breathing Trial (SBT) RT Doc   Pulse (!) 49   Breath Sounds   Right Upper Lobe Diminished   Right Middle Lobe Diminished   Right Lower Lobe Diminished   Left Upper Lobe Diminished   Left Lower Lobe Diminished   Additional Respiratory  Assessments   Resp 12   Position Semi-Jesus's   Alarm Settings   High Pressure Alarm 45 cmH2O   Low Minute Volume Alarm 4 L/min   High Respiratory Rate 40 br/min   Patient Observation   Observations ETT SIZE 7.5, SECURED AT 23 LIP LINE. AMBU BAG AT HEAD OF BED. WATER GOOD.     ETT (adult)   Placement Date/Time: 10/06/21 1208   Timeout: Patient  Preoxygenation: Yes  Technique: Rapid sequence  Type: Cuffed  Tube Size: 7.5 mm  Laryngoscope: GlideScope  Secured at: 23 cm  Measured From: Lips   Secured at 23 cm   Measured From Lips   ET Placement Right   Secured By [employer] tape   Site Condition Dry        10/07/21 2340   Vent Information   Vent Type 980   Vent Mode AC/VC   Vt Ordered 360 mL   Rate Set 28 bmp   Peak Flow 65 L/min   Pressure Support 0 cmH20   FiO2  55 %   SpO2 91 %   SpO2/FiO2 ratio 165.45   Sensitivity 3   PEEP/CPAP 10   I Time/ I Time % 0 s   Humidification Source Heated wire   Humidification Temp 37   Humidification Temp Measured 37   Circuit Condensation Drained   Vent Patient Data   High Peep/I Pressure 0   Peak Inspiratory Pressure 24 cmH2O   Mean Airway Pressure 15 cmH20   Rate Measured 28 br/min   Vt Exhaled 367 mL   Minute Volume 10.3 Liters   I:E Ratio 1:2.60   Plateau Pressure 25 SUZ55   Static Compliance 24 mL/cmH2O   Dynamic Compliance 26 mL/cmH2O   Cough/Sputum   Sputum How Obtained Endotracheal;Suctioned   Cough Productive   Sputum Amount Small   Sputum Color Creamy   Tenacity Thick   Spontaneous Breathing Trial (SBT) RT Doc   Pulse (!) 49   Breath Sounds   Right Upper Lobe Diminished   Right Middle Lobe Diminished   Right Lower Lobe Diminished   Left Upper Lobe Diminished   Left Lower Lobe Diminished   Additional Respiratory  Assessments   Resp 12   Position Semi-Jesus's   Alarm Settings   High Pressure Alarm 45 cmH2O   Low Minute Volume Alarm 4 L/min   High Respiratory Rate 40 br/min   Patient Observation   Observations ETT SIZE 7.5, SECURED AT 23 LIP LINE. AMBU BAG AT HEAD OF BED. WATER GOOD.     ETT (adult)   Placement Date/Time: 10/06/21 1208   Timeout: Patient  Preoxygenation: Yes  Technique: Rapid sequence  Type: Cuffed  Tube Size: 7.5 mm  Laryngoscope: GlideScope  Secured at: 23 cm  Measured From: Lips   Secured at 23 cm   Measured From Lips   ET Placement Right   Secured By [employer] tape   Site Condition Dry        10/07/21 2340   Vent Information   Vent Type 980   Vent Mode AC/VC   Vt Ordered 360 mL   Rate Set 28 bmp   Peak Flow 65 L/min   Pressure Support 0 cmH20   FiO2  55 %   SpO2 91 % SpO2/FiO2 ratio 165.45   Sensitivity 3   PEEP/CPAP 10   I Time/ I Time % 0 s   Humidification Source Heated wire   Humidification Temp 37   Humidification Temp Measured 37   Circuit Condensation Drained   Vent Patient Data   High Peep/I Pressure 0   Peak Inspiratory Pressure 24 cmH2O   Mean Airway Pressure 15 cmH20   Rate Measured 28 br/min   Vt Exhaled 367 mL   Minute Volume 10.3 Liters   I:E Ratio 1:2.60   Plateau Pressure 25 XGT01   Static Compliance 24 mL/cmH2O   Dynamic Compliance 26 mL/cmH2O   Cough/Sputum   Sputum How Obtained Endotracheal;Suctioned   Cough Productive   Sputum Amount Small   Sputum Color Creamy   Tenacity Thick   Spontaneous Breathing Trial (SBT) RT Doc   Pulse (!) 49   Breath Sounds   Right Upper Lobe Diminished   Right Middle Lobe Diminished   Right Lower Lobe Diminished   Left Upper Lobe Diminished   Left Lower Lobe Diminished   Additional Respiratory  Assessments   Resp 12   Position Semi-Jesus's   Alarm Settings   High Pressure Alarm 45 cmH2O   Low Minute Volume Alarm 4 L/min   High Respiratory Rate 40 br/min   Patient Observation   Observations ETT SIZE 7.5, SECURED AT 23 LIP LINE. AMBU BAG AT HEAD OF BED. WATER GOOD.     ETT (adult)   Placement Date/Time: 10/06/21 1208   Timeout: Patient  Preoxygenation: Yes  Technique: Rapid sequence  Type: Cuffed  Tube Size: 7.5 mm  Laryngoscope: GlideScope  Secured at: 23 cm  Measured From: Lips   Secured at 23 cm   Measured From Lips   ET Placement Right   Secured By [employer] tape   Site Condition Dry        10/07/21 2340   Vent Information   Vent Type 980   Vent Mode AC/VC   Vt Ordered 360 mL   Rate Set 28 bmp   Peak Flow 65 L/min   Pressure Support 0 cmH20   FiO2  55 %   SpO2 91 %   SpO2/FiO2 ratio 165.45   Sensitivity 3   PEEP/CPAP 10   I Time/ I Time % 0 s   Humidification Source Heated wire   Humidification Temp 37   Humidification Temp Measured 37   Circuit Condensation Drained   Vent Patient Data   High Peep/I Pressure 0   Peak Inspiratory Pressure 24 cmH2O   Mean Airway Pressure 15 cmH20   Rate Measured 28 br/min   Vt Exhaled 367 mL   Minute Volume 10.3 Liters   I:E Ratio 1:2.60   Plateau Pressure 25 MGJ23   Static Compliance 24 mL/cmH2O   Dynamic Compliance 26 mL/cmH2O   Cough/Sputum   Sputum How Obtained Endotracheal;Suctioned   Cough Productive   Sputum Amount Small   Sputum Color Creamy   Tenacity Thick   Spontaneous Breathing Trial (SBT) RT Doc   Pulse (!) 49   Breath Sounds   Right Upper Lobe Diminished   Right Middle Lobe Diminished   Right Lower Lobe Diminished   Left Upper Lobe Diminished   Left Lower Lobe Diminished   Additional Respiratory  Assessments   Resp 12   Position Semi-Jesus's   Alarm Settings   High Pressure Alarm 45 cmH2O   Low Minute Volume Alarm 4 L/min   High Respiratory Rate 40 br/min   Patient Observation   Observations ETT SIZE 7.5, SECURED AT 23 LIP LINE. AMBU BAG AT HEAD OF BED. WATER GOOD.     ETT (adult)   Placement Date/Time: 10/06/21 1208   Timeout: Patient  Preoxygenation: Yes  Technique: Rapid sequence  Type: Cuffed  Tube Size: 7.5 mm  Laryngoscope: GlideScope  Secured at: 23 cm  Measured From: Lips   Secured at 23 cm   Measured From Lips   ET Placement Right   Secured By [employer] tape   Site Condition Dry        10/07/21 2340   Vent Information   Vent Type 980   Vent Mode AC/VC   Vt Ordered 360 mL   Rate Set 28 bmp   Peak Flow 65 L/min   Pressure Support 0 cmH20   FiO2  55 %   SpO2 91 %   SpO2/FiO2 ratio 165.45   Sensitivity 3   PEEP/CPAP 10   I Time/ I Time % 0 s   Humidification Source Heated wire   Humidification Temp 37   Humidification Temp Measured 37   Circuit Condensation Drained   Vent Patient Data   High Peep/I Pressure 0   Peak Inspiratory Pressure 24 cmH2O   Mean Airway Pressure 15 cmH20   Rate Measured 28 br/min   Vt Exhaled 367 mL   Minute Volume 10.3 Liters   I:E Ratio 1:2.60   Plateau Pressure 25 SNM49   Static Compliance 24 mL/cmH2O   Dynamic Compliance 26 mL/cmH2O   Cough/Sputum   Sputum How Obtained Endotracheal;Suctioned   Cough Productive   Sputum Amount Small   Sputum Color Creamy   Tenacity Thick   Spontaneous Breathing Trial (SBT) RT Doc   Pulse (!) 49   Breath Sounds   Right Upper Lobe Diminished   Right Middle Lobe Diminished   Right Lower Lobe Diminished   Left Upper Lobe Diminished   Left Lower Lobe Diminished   Additional Respiratory  Assessments   Resp 12   Position Semi-Jesus's   Alarm Settings   High Pressure Alarm 45 cmH2O   Low Minute Volume Alarm 4 L/min   High Respiratory Rate 40 br/min   Patient Observation   Observations ETT SIZE 7.5, SECURED AT 23 LIP LINE. AMBU BAG AT HEAD OF BED. WATER GOOD.     ETT (adult)   Placement Date/Time: 10/06/21 1208   Timeout: Patient  Preoxygenation: Yes  Technique: Rapid sequence  Type: Cuffed  Tube Size: 7.5 mm  Laryngoscope: GlideScope  Secured at: 23 cm  Measured From: Lips   Secured at 23 cm   Measured From Lips   ET Placement Right   Secured By [employer] tape   Site Condition Dry        10/07/21 2340   Vent Information   Vent Type 980   Vent Mode AC/VC   Vt Ordered 360 mL   Rate Set 28 bmp   Peak Flow 65 L/min   Pressure Support 0 cmH20   FiO2  55 %   SpO2 91 %   SpO2/FiO2 ratio 165.45   Sensitivity 3   PEEP/CPAP 10   I Time/ I Time % 0 s   Humidification Source Heated wire   Humidification Temp 37   Humidification Temp Measured 37   Circuit Condensation Drained   Vent Patient Data   High Peep/I Pressure 0   Peak Inspiratory Pressure 24 cmH2O   Mean Airway Pressure 15 cmH20   Rate Measured 28 br/min   Vt Exhaled 367 mL   Minute Volume 10.3 Liters   I:E Ratio 1:2.60   Plateau Pressure 25 [id number]   Static Compliance 24 mL/cmH2O   Dynamic Compliance 26 mL/cmH2O   Cough/Sputum   Sputum How Obtained Endotracheal;Suctioned   Cough Productive   Sputum Amount Small   Sputum Color Creamy   Tenacity Thick   Spontaneous Breathing Trial (SBT) RT Doc   Pulse (!) 49   Breath Sounds   Right Upper Lobe Diminished   Right Middle Lobe Diminished   Right Lower Lobe Diminished   Left Upper Lobe Diminished   Left Lower Lobe Diminished   Additional Respiratory  Assessments   Resp 12   Position Semi-Jesus's   Alarm Settings   High Pressure Alarm 45 cmH2O   Low Minute Volume Alarm 4 L/min   High Respiratory Rate 40 br/min   Patient Observation   Observations ETT SIZE 7.5, SECURED AT 23 LIP LINE. AMBU BAG AT HEAD OF BED. WATER GOOD.     ETT (adult)   Placement Date/Time: 10/06/21 1208   Timeout: Patient  Preoxygenation: Yes  Technique: Rapid sequence  Type: Cuffed  Tube Size: 7.5 mm  Laryngoscope: GlideScope  Secured at: 23 cm  Measured From: Lips   Secured at 23 cm   Measured From Lips   ET Placement Right   Secured By [employer] tape   Site Condition Dry        10/07/21 2340   Vent Information   Vent Type 980   Vent Mode AC/VC   Vt Ordered 360 mL   Rate Set 28 bmp   Peak Flow 65 L/min   Pressure Support 0 cmH20   FiO2  55 %   SpO2 91 %   SpO2/FiO2 ratio 165.45   Sensitivity 3   PEEP/CPAP 10   I Time/ I Time % 0 s   Humidification Source Heated wire   Humidification Temp 37   Humidification Temp Measured 37   Circuit Condensation Drained   Vent Patient Data   High Peep/I Pressure 0   Peak Inspiratory Pressure 24 cmH2O   Mean Airway Pressure 15 cmH20   Rate Measured 28 br/min   Vt Exhaled 367 mL   Minute Volume 10.3 Liters   I:E Ratio 1:2.60   Plateau Pressure 25 HCZ31   Static Compliance 24 mL/cmH2O   Dynamic Compliance 26 mL/cmH2O   Cough/Sputum   Sputum How Obtained Endotracheal;Suctioned   Cough Productive   Sputum Amount Small   Sputum Color Creamy   Tenacity Thick   Spontaneous Breathing Trial (SBT) RT Doc   Pulse (!) 49   Breath Sounds   Right Upper Lobe Diminished   Right Middle Lobe Diminished   Right Lower Lobe Diminished   Left Upper Lobe Diminished   Left Lower Lobe Diminished   Additional Respiratory  Assessments   Resp 12   Position Semi-Jesus's   Alarm Settings   High Pressure Alarm 45 cmH2O   Low Minute Volume Alarm 4 L/min   High Respiratory Rate 40 br/min   Patient Observation   Observations ETT SIZE 7.5, SECURED AT 23 LIP LINE. AMBU BAG AT HEAD OF BED. WATER GOOD. ETT (adult)   Placement Date/Time: 10/06/21 1208   Timeout: Patient  Preoxygenation: Yes  Technique: Rapid sequence  Type: Cuffed  Tube Size: 7.5 mm  Laryngoscope: GlideScope  Secured at: 23 cm  Measured From: Lips   Secured at 23 cm   Measured From Lips   ET Placement Right   Secured By [employer] tape   Site Condition Dry        10/07/21 2340   Vent Information   Vent Type 980   Vent Mode AC/VC   Vt Ordered 360 mL   Rate Set 28 bmp   Peak Flow 65 L/min   Pressure Support 0 cmH20   FiO2  55 %   SpO2 91 %   SpO2/FiO2 ratio 165.45   Sensitivity 3   PEEP/CPAP 10   I Time/ I Time % 0 s   Humidification Source Heated wire   Humidification Temp 37   Humidification Temp Measured 37   Circuit Condensation Drained   Vent Patient Data   High Peep/I Pressure 0   Peak Inspiratory Pressure 24 cmH2O   Mean Airway Pressure 15 cmH20   Rate Measured 28 br/min   Vt Exhaled 367 mL   Minute Volume 10.3 Liters   I:E Ratio 1:2.60   Plateau Pressure 25 [id number]   Static Compliance 24 mL/cmH2O   Dynamic Compliance 26 mL/cmH2O   Cough/Sputum   Sputum How Obtained Endotracheal;Suctioned   Cough Productive   Sputum Amount Small   Sputum Color Creamy   Tenacity Thick   Spontaneous Breathing Trial (SBT) RT Doc   Pulse (!) 49   Breath Sounds   Right Upper Lobe Diminished   Right Middle Lobe Diminished   Right Lower Lobe Diminished   Left Upper Lobe Diminished   Left Lower Lobe Diminished   Additional Respiratory  Assessments   Resp 12   Position Semi-Jesus's   Alarm Settings   High Pressure Alarm 45 cmH2O   Low Minute Volume Alarm 4 L/min   High Respiratory Rate 40 br/min   Patient Observation   Observations ETT SIZE 7.5, SECURED AT 23 LIP LINE. AMBU BAG AT HEAD OF BED. WATER GOOD.     ETT (adult)   Placement Date/Time: 10/06/21 1208   Timeout: Patient  Preoxygenation: Yes  Technique: Rapid sequence  Type: Cuffed  Tube Size: 7.5 mm Laryngoscope: GlideScope  Secured at: 23 cm  Measured From: Lips   Secured at 23 cm   Measured From Lips   ET Placement Right   Secured By [email or web address] tape   Site Condition Dry

## 2021-10-08 NOTE — PROGRESS NOTES
Pulmonary & Critical Care Medicine ICU Progress Note    CC: Respiratory failure    Events of Last 24 hours:   Proned yesterday, returned to supine  Slightly negative fluid balance    Vascular lines: IV: PICC line     MV: -10/4, emergently re-intubated 10/6/21 for refractory hypoxemia  Vent Mode: AC/VC Rate Set: 28 bmp/Vt Ordered: 360 mL/ /FiO2 : 55 %  Recent Labs     10/07/21  0445 10/08/21  0450   PHART 7.333* 7.373   FHG2YPE 44.5 40.2   PO2ART 88.5 58.3*       IV:   norepinephrine Stopped (10/06/21 1525)    fentaNYL 100 mcg/hr (10/08/21 0656)    cisatracurium (NIMBEX) infusion Stopped (10/07/21 123)    propofol 50 mcg/kg/min (10/08/21 0450)    dexmedetomidine HCl in NaCl 0.3 mcg/kg/hr (10/08/21 0043)    sodium chloride      dextrose      sodium chloride         Vitals:  Blood pressure (!) 114/52, pulse (!) 47, temperature 97.8 °F (36.6 °C), temperature source Oral, resp. rate 28, height 5' 9\" (1.753 m), weight 190 lb 14.7 oz (86.6 kg), SpO2 (!) 89 %, not currently breastfeeding. on ventilator  Temp  Av.9 °F (35.5 °C)  Min: 95 °F (35 °C)  Max: 97.8 °F (36.6 °C)    Intake/Output Summary (Last 24 hours) at 10/8/2021 0735  Last data filed at 10/8/2021 0600  Gross per 24 hour   Intake 1906.6 ml   Output 2125 ml   Net -218.4 ml     General: intubated, ill appearing    ENT: Pharynx with ETT. Resp: No crackles. No wheezing. CV: S1, S2. Trace edema  GI: NT, ND, +BS  Skin: Warm and dry. Neuro: PERRL. OETV and following commands. Answers y/n questions.   Patellar reflexes are symmetric     Scheduled Meds:   linezolid  600 mg IntraVENous Q12H    miconazole   Topical BID    oxyCODONE  10 mg Oral Q6H    influenza virus vaccine  0.5 mL IntraMUSCular Prior to discharge    carboxymethylcellulose PF  1 drop Both Eyes Q4H    And    artificial tears   Both Eyes Q4H    chlorhexidine  15 mL Mouth/Throat BID    ipratropium-albuterol  1 ampule Inhalation Q4H    amLODIPine  2.5 mg Oral Daily    losartan  25 mg Oral Daily    piperacillin-tazobactam  3,375 mg IntraVENous Q8H    pantoprazole  40 mg IntraVENous Daily    insulin lispro  0-6 Units SubCUTAneous Q4H    levothyroxine  100 mcg Oral Daily    enoxaparin  30 mg SubCUTAneous BID    dexamethasone  6 mg IntraVENous Q24H    lidocaine 1 % injection  5 mL IntraDERmal Once    sodium chloride flush  5-40 mL IntraVENous 2 times per day    aspirin  81 mg Oral Daily    gabapentin  800 mg Oral TID    [Held by provider] rOPINIRole  0.5 mg Oral TID    atorvastatin  40 mg Oral Daily    sodium chloride flush  5-40 mL IntraVENous 2 times per day       Data:  CBC:   Recent Labs     10/06/21  0520 10/07/21  0445 10/08/21  0450   WBC 12.0* 12.1* 8.0   HGB 10.7* 11.4* 10.3*   HCT 33.1* 33.3* 29.8*   MCV 76.6* 77.4* 78.6*    144 118*     BMP:   Recent Labs     10/06/21  0520 10/07/21  0445 10/08/21  0450 10/08/21  0630    137 135*  --    K 3.9 3.8  --  3.6    102 101  --    CO2 24 22 23  --    BUN 25* 18 14  --    CREATININE <0.5* <0.5* <0.5*  --      LIVER PROFILE:   Recent Labs     10/06/21  0520 10/07/21  0445 10/08/21  0450 10/08/21  0630   AST 61* <5*  --  28   ALT 28 <5* 20  --    BILITOT 0.7 0.4 0.3  --    ALKPHOS 160* 148* 126  --        Microbiology:  9/27 COVID-19 detected  9/30 Resp Pseudomonas fluorescens   10/7/2021 tracheal aspirate sent    Imaging:  Chest x-ray 10/7/21 bilateral airspace disease     CTPA 9/27/21  No evidence of pulmonary embolism. Scattered peripheral opacities in the left lung and more consolidative area in the right lung concerning for pneumonia.  Previous right thoracotomy and upper lobectomy.    One mildly enlarged right paratracheal lymph node is present but no priors for comparison.  Nonenlarged but prominent lymph nodes in the upper abdomen and juxta diaphragmatic region      ASSESSMENT:  · Acute hypoxemic respiratory failure   · COVID-19 viral pneumonia  · Pseudomonas PNA   · Hypotension with intubation and sedation  · Transaminitis  · Acute kidney injury  · CAD  · Chronic pain syndrome - on opiates and Neurontin   · Possible early dementia, being worked up outpatient   · H/O bronchiectasis and tracheobronchomalacia/EDAC s/p tracheoplasty in 2014      PLAN:  - COVID-19 isolation, droplet plus  -   Mechanical ventilation as per my orders. The ventilator was adjusted by me at the bedside for unstable, life threatening respiratory failure. Proned 10/6/21-10/7/21   Paralyzed 10/6/21 - 10/7/21  IV Propofol and Precedex for sedation, target RASS -5 while paralyzed, with daily paralytic holiday. Fentanyl gtt    Zosyn D#7, Zyvox D#2. Completed 5 days of Ceftriaxone/Zithromax   Holding zoloft while on zyvox  Dexamethasone 6 mg IV D#12  Completed Remdesivir & Tocilizumab  Home oxycodone and neurontin    Lovenox for DVT prophylaxis   Pinky Lisandra [phone]. Total critical care time caring for this patient with life threatening, unstable organ failure, including direct patient contact, management of life support systems, review of data including imaging and labs, discussions with other team members and physicians is 34  minutes, excluding procedures.

## 2021-10-08 NOTE — CARE COORDINATION
INTERDISCIPLINARY PLAN OF CARE CONFERENCE    Date/Time: 10/8/2021 11:00 AM  Completed by: Myranda Anderson RN, Case Management      Patient Name:  Lex Rowley  YOB: 1955  Admitting Diagnosis: Dehydration [E86.0]  Hypokalemia [E87.6]  Hypomagnesemia [E83.42]  Positive D dimer [R79.89]  CHARLY (acute kidney injury) ([facility] [address] [N17.9]  Acute respiratory failure with hypoxia ([facility] [address] [J96.01]  Pneumonia due to COVID-19 virus [U07.1, J12.82]  COVID-19 [U07.1]     Admit Date/Time:  9/26/2021  8:29 PM    Chart reviewed. Interdisciplinary team contacted or reviewed plan related to patient progress and discharge plans. Disciplines included Case Management, Nursing, and Dietitian. Current Status:ICU C19+ on vent  PT/OT recommendation for discharge plan of care: TBD    Expected D/C Disposition:  TBD    Discharge Plan Comments: Chart reviewed. Pt remains in ICU on vent. Pt is from home with her spouse. PT/OT will be needed when appropriate. Following.     Home O2 in place on admit: No

## 2021-10-08 NOTE — PROGRESS NOTES
10/08/21 0329   Vent Information   Vent Type 980   Vent Mode AC/VC   Vt Ordered 360 mL   Rate Set 28 bmp   Peak Flow 65 L/min   Pressure Support 0 cmH20   FiO2  55 %   SpO2 92 %   SpO2/FiO2 ratio 167.27   Sensitivity 3   PEEP/CPAP 10   I Time/ I Time % 0 s   Humidification Source Heated wire   Humidification Temp 37   Humidification Temp Measured 37   Circuit Condensation Drained   Vent Patient Data   High Peep/I Pressure 0   Peak Inspiratory Pressure 28 cmH2O   Mean Airway Pressure 16 cmH20   Rate Measured 28 br/min   Vt Exhaled 365 mL   Minute Volume 10.2 Liters   I:E Ratio 1:2.60   Plateau Pressure 26 LSF35   Spontaneous Breathing Trial (SBT) RT Doc   Pulse 51   Breath Sounds   Right Upper Lobe Diminished   Right Middle Lobe Diminished   Right Lower Lobe Diminished   Left Upper Lobe Diminished   Left Lower Lobe Diminished   Additional Respiratory  Assessments   Resp 28   Position Semi-Jesus's   Alarm Settings   High Pressure Alarm 45 cmH2O   Low Minute Volume Alarm 4 L/min   Apnea (secs) 20 secs   High Respiratory Rate 40 br/min   Low Exhaled Vt  250 mL   ETT (adult)   Placement Date/Time: 10/06/21 1208   Timeout: Patient  Preoxygenation: Yes  Technique: Rapid sequence  Type: Cuffed  Tube Size: 7.5 mm  Laryngoscope: GlideScope  Secured at: 23 cm  Measured From: Lips   Secured at 23 cm   Measured From Lips   ET Placement Right   Secured By Commercial tube carranza   Site Condition Dry

## 2021-10-08 NOTE — PROGRESS NOTES
Care rounds completed with Dr Jessica Friend and multidisciplinary team.  Reviewed labs, meds, VS (temp/HR/RR), I/O's, assessment, & plan of care for today. See progress note & new orders for details.  Mary Juárez RN

## 2021-10-08 NOTE — PROGRESS NOTES
10/08/21 1923   Vent Information   $Ventilation $Subsequent Day   Skin Assessment Clean, dry, & intact   Vent Type 980   Vent Mode AC/VC   Vt Ordered 390 mL   Rate Set 24 bmp   Peak Flow 65 L/min   Pressure Support 0 cmH20   FiO2  55 %   SpO2 92 %   SpO2/FiO2 ratio 167.27   Sensitivity 3   PEEP/CPAP 12   I Time/ I Time % 0 s   Humidification Source Heated wire   Humidification Temp Measured 37   Circuit Condensation Drained   Vent Patient Data   High Peep/I Pressure 0   Peak Inspiratory Pressure 28 cmH2O   Mean Airway Pressure 18 cmH20   Rate Measured 29 br/min   Vt Exhaled 387 mL   Minute Volume 11.4 Liters   I:E Ratio 1:2.80   Plateau Pressure 27 JSM52   Static Compliance 26 mL/cmH2O   Dynamic Compliance 23 mL/cmH2O   Cough/Sputum   Sputum How Obtained Endotracheal   Cough Productive   Sputum Amount Small   Sputum Color Creamy   Tenacity Thick   Spontaneous Breathing Trial (SBT) RT Doc   Pulse 55   Breath Sounds   Right Upper Lobe Diminished   Right Middle Lobe Diminished   Right Lower Lobe Diminished   Left Upper Lobe Diminished   Left Lower Lobe Diminished   Additional Respiratory  Assessments   Resp 26   Alarm Settings   High Pressure Alarm 45 cmH2O   Low Minute Volume Alarm 4 L/min   Apnea (secs) 20 secs   High Respiratory Rate 40 br/min   Low Exhaled Vt  210 mL   Patient Observation   Observations 7.5 ett 23 at lip

## 2021-10-08 NOTE — PROGRESS NOTES
ICU Progress Note    Admit Date:  9/26/2021      Interval history:  Admitted with COVID-19 pneumonia,  acute hypoxic respiratory failure. Patient not vaccinated  Progressive significant worsening hypoxemia overnight. Patient was on 2 L of oxygen on admission-> switched to high flow oxygen per Vapotherm with additional 100% nonrebreather on 9/28/2021-> transferred to ICU. Worsening hypoxemia overnight. Intubated and placed on mechanical ventilation early AM on 9/29/2021. Placed in prone positioning. Did not require paralytics. 10/1 Patient desaturated when a supine position attempted today, back on prone ventilation    Ms. Lucas seen. Seen in prone position . Remains intubated, sedated ,on mechanical ventilation. .   On FiO2  55%. PEEP 8  Weaned off of pressors      10/2  resp culture growing pseudomonas - started zosyn. 10/3  PEEP 10, FiO2 50%. RASS -1 - she wakes up to voice and able to follow commands and responds appropriately on the vent today. She is on fentanyl precedex and Zosyn infusing. 10/4-This is my first encounter with the patient. Chart reviewed briefly. Patient is extubated. She is on 100% Vapotherm. Answers questions appropriately. She is hungry. 10/5 patient is oxygen demands have increased and she has been placed in the prone position. She is on 100% Vapotherm. 10/6-patient is respiratory status worsened. She was on 100% FiO2 on Vapotherm and 100% nonrebreather in the prone position with saturations in the upper 80s. She is on Precedex. Because of worsening respiratory status she was reintubated on 10/6/2021. When I saw her she was sedated on the ventilator. 10/7-oxygenation is improved. She was paralyzed and proned. Hypoxemia is better. FiO2 down to 45%. Plans to turn her supine. 10/8-patient is in supine position. FiO2 up slightly to 55%. Plans to prone her if her oxygen demands continue to be high. On light sedation.   Can follow simple commands. Objective:   BP (!) 96/45   Pulse (!) 48   Temp 97.6 °F (36.4 °C) (Axillary)   Resp 24   Ht 5' 9\" (1.753 m)   Wt 190 lb 14.7 oz (86.6 kg)   SpO2 90%   BMI 28.19 kg/m²        Intake/Output Summary (Last 24 hours) at 10/8/2021 1408  Last data filed at 10/8/2021 1400  Gross per 24 hour   Intake 2981.6 ml   Output 2400 ml   Net 581.6 ml         Physical Exam:  General: Can follow simple commands. Seen in prone position   skin:  Warm and dry  Neck:  JVD absent. Neck supple  Chest: diminished breath sounds, improved air entry. No wheezes or rhonchi. Bibasilar crackles. Cardiovascular:  RRR ,S1S2 normal  Abdomen:  Soft, non tender, non distended, BS +  Extremities:  No edema. Intact peripheral pulses. Brisk cap refill, < 2 secs  Neuro:  Follows simple commands and moves all 4 extremities      Medications:   Scheduled Meds:   linezolid  600 mg IntraVENous Q12H    miconazole   Topical BID    oxyCODONE  10 mg Oral Q6H    influenza virus vaccine  0.5 mL IntraMUSCular Prior to discharge    carboxymethylcellulose PF  1 drop Both Eyes Q4H    And    artificial tears   Both Eyes Q4H    chlorhexidine  15 mL Mouth/Throat BID    ipratropium-albuterol  1 ampule Inhalation Q4H    amLODIPine  2.5 mg Oral Daily    losartan  25 mg Oral Daily    piperacillin-tazobactam  3,375 mg IntraVENous Q8H    pantoprazole  40 mg IntraVENous Daily    insulin lispro  0-6 Units SubCUTAneous Q4H    levothyroxine  100 mcg Oral Daily    enoxaparin  30 mg SubCUTAneous BID    dexamethasone  6 mg IntraVENous Q24H    lidocaine 1 % injection  5 mL IntraDERmal Once    sodium chloride flush  5-40 mL IntraVENous 2 times per day    aspirin  81 mg Oral Daily    gabapentin  800 mg Oral TID    [Held by provider] rOPINIRole  0.5 mg Oral TID    atorvastatin  40 mg Oral Daily    sodium chloride flush  5-40 mL IntraVENous 2 times per day       Continuous Infusions:   fentaNYL 100 mcg/hr (10/08/21 1400)    propofol 50 mcg/kg/min (10/08/21 1400)    dexmedetomidine HCl in NaCl 0.3 mcg/kg/hr (10/08/21 1400)    sodium chloride      dextrose      sodium chloride         Data:  CBC:   Recent Labs     10/06/21  0520 10/07/21  0445 10/08/21  0450   WBC 12.0* 12.1* 8.0   RBC 4.32 4.30 3.79*   HGB 10.7* 11.4* 10.3*   HCT 33.1* 33.3* 29.8*   MCV 76.6* 77.4* 78.6*   RDW 17.7* 17.9* 17.7*    144 118*     BMP:   Recent Labs     10/06/21  0520 10/07/21  0445 10/08/21  0450 10/08/21  0630    137 135*  --    K 3.9 3.8  --  3.6    102 101  --    CO2 24 22 23  --    BUN 25* 18 14  --    CREATININE <0.5* <0.5* <0.5*  --      BNP: No results for input(s): BNP in the last 72 hours. PT/INR:   No results for input(s): PROTIME, INR in the last 72 hours. APTT: No results for input(s): APTT in the last 72 hours. CARDIAC ENZYMES:   No results for input(s): CKMB, CKMBINDEX, TROPONINI in the last 72 hours.     Invalid input(s): CKTOTAL;3  FASTING LIPID PANEL:  Lab Results   Component Value Date    CHOL 156 10/18/2018    HDL 39 (L) 10/18/2018    TRIG 380 (H) 10/04/2021     LIVER PROFILE:   Recent Labs     10/06/21  0520 10/07/21  0445 10/08/21  0450 10/08/21  0630   AST 61* <5*  --  28   ALT 28 <5* 20  --    BILITOT 0.7 0.4 0.3  --    ALKPHOS 160* 148* 126  --            CULTURES  COVID 19, NAAT: Detected    Susceptibility    Pseudomonas fluorescens (1)    Antibiotic Interpretation AMANDA Status    cefepime Sensitive <=2 mcg/mL     ciprofloxacin Sensitive <=1 mcg/mL     gentamicin Sensitive <=4 mcg/mL     meropenem Intermediate 8 mcg/mL     piperacillin-tazobactam Sensitive <=16 mcg/mL     tobramycin Sensitive <=4 mcg/           EKG:   Normal sinus rhythm  Left ventricular hypertrophy with repolarization abnormality  Prolonged QT  ST abnormality inferior leads consider ischemia  Abnormal ECG  When compared with ECG of 05-SEP-2020 08:35,  Premature ventricular complexes are no longer Present  T wave inversion now evident in Inferior leads  Confirmed by Glenna Dacosta MD, [place]     Radiology  XR CHEST PORTABLE   Final Result   Stable appearance of the chest.  Diffuse ground-glass opacities on the right   and mild left basilar opacities. XR CHEST PORTABLE   Final Result   Improved aeration on the left, possibly improving pulmonary edema. Persistent airspace disease in the right base may represent concurrent   pneumonia         XR ABDOMEN (KUB) (SINGLE AP VIEW)   Final Result   Enteric catheter tip likely in the distal gastric body. XR CHEST PORTABLE   Final Result   1. Endotracheal tube projects in the appropriate position. 2. Enteric tube extends below the diaphragm and out of the field of view. XR CHEST PORTABLE   Final Result   Increasing diffuse airspace opacification throughout the lungs. Pattern may   represent pulmonary edema or worsening pneumonitis. XR CHEST PORTABLE   Final Result   1. Interval increased bilateral pulmonary opacities with new consolidative   change at the right lung base. These findings could be secondary to   pulmonary edema or infection. 2. Consolidative change at the right lung base could also be secondary to   atelectasis/mucous plug. XR CHEST PORTABLE   Final Result   No significant interval change in small right pleural effusion or bilateral   heterogeneous opacity which can reflect pulmonary edema or pneumonia. XR CHEST PORTABLE   Final Result   Mild improvement from prior comparison. Mild-to-moderate congestion and/or   infiltrates identified in the lungs. ET tube terminates 7 cm superior to the saba. XR CHEST PORTABLE   Final Result   Endotracheal tube and orogastric tube unchanged, adequate position. Slightly increased extensive bilateral pulmonary disease over the past 24   hours this may relate to interstitial edema or diffuse infection or a   combination.          XR CHEST PORTABLE   Final Result   Endotracheal tube with its tip approximately 4.7 cm from the saba in   satisfactory position. Enteric tube in satisfactory position. Patchy bilateral airspace disease is again noted with improved aeration of   the left mid lung. XR CHEST PORTABLE   Final Result   Some improvement in the appearance of the chest with clearing of some of the   acute airspace disease from the mid left lung. Large amount of pneumonia   remains within the right lung and lower left lung. XR CHEST PORTABLE   Final Result   1. Endotracheal tube, nasogastric tube, and right PICC line placement. 2.  Increasing multifocal opacities with consolidative area in the right   perihilar lung. Could be multifocal pneumonia with or without edema. IR PICC WO SQ PORT/PUMP > 5 YEARS   Final Result   1. See above. CT CHEST PULMONARY EMBOLISM W CONTRAST   Final Result   No evidence of pulmonary embolism. Scattered peripheral opacities in the left lung and more consolidative area   in the right lung concerning for pneumonia. Previous right thoracotomy and   upper lobectomy. One mildly enlarged right paratracheal lymph node is present but no priors   for comparison. Nonenlarged but prominent lymph nodes in the upper abdomen   and juxta diaphragmatic region. XR CHEST PORTABLE   Final Result   Mild cardiomegaly without interstitial edema. Metallic surgical clips from prior right thoracotomy. COPD with anterior segment-right upper lobe alveolar pneumonia. Old pleural thickening was noted along the right lateral chest wall. Pleural   thickening and or trace effusion blunts the right costophrenic angle.          XR CHEST PORTABLE    (Results Pending)         Assessment:  Principal Problem:    Suspected COVID-19 virus infection  Active Problems:    Hypothyroidism    Essential hypertension    DM2 (diabetes mellitus, type 2) ([address]    Dehydration    Hypoxia    Acute hypoxemic respiratory failure ([address]    COVID-19 Pneumonia due to COVID-19 virus    CHARLY (acute kidney injury) ([facility] [place] [address]    Uncontrolled hypertension    Fever    Hypotension    Mild protein-calorie malnutrition (HCC)    Gram-negative pneumonia ([facility] [place] [address]    Pneumonia due to Pseudomonas species ([facility] [place] [address]    Hypomagnesemia    Hypokalemia  Resolved Problems:    * No resolved hospital problems. *      Plan:    #COVID-19 pneumonia  #Acute hypoxic respiratory failure   Pseudomonas HCAP. -Unvaccinated patient  - she presented with cough, fever, dyspnea in the setting of household contact testing + COVID 19  - Her Covid testing came back positive on admission  - Initially on 2 L of oxygen on presentation. She started desaturating quickly . worsening hypoxemia requiring high flow oxygen. - Pulmonology consulted  -placed on high flow oxygen per Vapotherm 40 L,FiO2 100% ,with additional nonrebreather . Transferred to ICU on 9/28. Intubated early AM  9/29/2021. Extubated on 10/4/2021. She was on Vapotherm. Pulmonary status got worse and she was reintubated on 10/6/2020.   -Seen in supine position today. FiO2 is up slightly. Will be reproned if oxygen demands continue to increase.  - s/p Remdesivir  - on Decadron, day # 12  - given 1 dose of Tocilizumab  -Lovenox twice daily      #Possible superimposed bacterial pneumonia  -On Rocephin and Zithromax,   - changed to zosyn on 10/2 with pseudomonas on resp culture. Pulmonary has started Zyvox. Hold Zoloft when patient is getting Zyvox   Day #2      #Hypokalemia  - replaced       #Dehydration  -  Improved with IVF  Creatinine normal       #CAD  - cont ASA, statin  - EKG with inferior T wave abnormality. She denies CP. Trop neg   -telemetry monitoring      #History of tracheobronchomalacia  Has had surgical treatment for this       #HTN-blood pressure is low and she was on Levophed. This has been discontinued.   Resumed home medications-Norvasc and losartan.         #Chronic pain   History of hip fracture  - cont home oxycodone BID and percocet   - cont gabapentin and requip       #DM2  - use ssi        #Hypothyroidism  - cont synthroid         DVT Prophylaxis: Lovenox   Diet NPO  ADULT TUBE FEEDING; Orogastric; Peptide Based; Continuous; 20; Yes; 15; Q 4 hours; 45; 30; Q 3 hours; Protein; one proteinex P2Go once daily via feeding tube  Code Status: Full Code        Iris Blum MD, MD 10/8/2021 2:08 PM

## 2021-10-08 NOTE — PROGRESS NOTES
Patient is not able to demonstrate the ability to move from a reclining position to an upright position within the recliner due to Pt on vent and bedrest. Suad Virgen RN

## 2021-10-08 NOTE — CONSULTS
[facility] Wound Ostomy Continence Nurse  Consult Note       NAME:  Wiley Rinne [facility]  MEDICAL RECORD NUMBER:  [id number]  AGE: 78 y.o. GENDER: female  : 1955  TODAY'S DATE:  10/8/2021    Subjective Pt is sedated and intubated in the ICU setting   Reason for WOCN Evaluation and Assessment: left ear lobe      Bree Hernandez is a 78 y.o. female referred by:   [x] Physician  [] Nursing  [] Other:     Wound Identification:  Wound Type: pressure  Contributing Factors: chronic pressure and decreased mobility           PAST MEDICAL HISTORY        Diagnosis Date    Anxiety disorder     Chronic pain syndrome     COVID-19 2021    DDD (degenerative disc disease), lumbar     Diabetes ([address]     Displacement of lumbar intervertebral disc without myelopathy 2010    Diverticulitis     Fibromyalgia     Generalized osteoarthrosis, involving multiple sites 2010    GERD (gastroesophageal reflux disease)     Impacted cerumen 2010    Influenza A 03/10/2016    Irritable bowel syndrome 2010    Other and unspecified hyperlipidemia 2010    Prosthetic joint implant failure ([address]     Screening mammogram 2006    (Both)Negative    Tracheobronchomalacia     Unspecified asthma(493.90) 2010    Unspecified essential hypertension 2010    Unspecified hypothyroidism 2010       PAST SURGICAL HISTORY    Past Surgical History:   Procedure Laterality Date    ABDOMEN SURGERY      CARDIAC CATHETERIZATION      COLON SURGERY      COLONOSCOPY      normal    HYSTERECTOMY      JOINT REPLACEMENT  10/92    Right; hip     JOINT REPLACEMENT        Left hip  followed by revision 2006    JOINT REPLACEMENT        right     JOINT REPLACEMENT         right replacement.     LUNG SURGERY  2014    tracheobronchomalacia       FAMILY HISTORY    Family History   Problem Relation Age of Onset    Asthma Mother     Heart Failure Father     Cancer Maternal Grandfather         skin cancer on face    Cancer Daughter         skin cancer-BCC    Diabetes Neg Hx     Emphysema Neg Hx     Hypertension Neg Hx        SOCIAL HISTORY    Social History     Tobacco Use    Smoking status: Former Smoker     Packs/day: 1.00     Years: 18.00     Pack years: 18.00     Types: Cigarettes     Quit date: 12/10/1991     Years since quittin.8    Smokeless tobacco: Never Used   Vaping Use    Vaping Use: Never used   Substance Use Topics    Alcohol use: No    Drug use: No       ALLERGIES    Allergies   Allergen Reactions    Quinidine Anaphylaxis and Nausea And Vomiting    Sulfa Antibiotics Anaphylaxis and Nausea And Vomiting    Prednisone Other (See Comments)     Cannot tolerate oral steriods  Cannot tolerate oral steroids - causes Avascular Necrosis of joints. Cannot tolerate oral steriods    Bactrim     No Known Allergies Rash    Sulfamethoxazole-Trimethoprim Rash       MEDICATIONS    No current facility-administered medications on file prior to encounter. Current Outpatient Medications on File Prior to Encounter   Medication Sig Dispense Refill    levothyroxine (SYNTHROID) 100 MCG tablet Take 100 mcg by mouth Daily      simvastatin (ZOCOR) 40 MG tablet Take 40 mg by mouth nightly      budesonide (PULMICORT FLEXHALER) 180 MCG/ACT AEPB inhaler Inhale 1 puff into the lungs 2 times daily Per [place] [address]. [employer]      oxyCODONE-acetaminophen (PERCOCET)  MG per tablet Take 1 tablet by mouth every 6 hours as needed for Pain. Per [place] [address]. [employer]      oxyCODONE (OXYCONTIN) 20 MG extended release tablet Take 20 mg by mouth every 12 hours. Per [facility] [address] [employer]      glipiZIDE (GLUCOTROL XL) 10 MG extended release tablet Take 10 mg by mouth 2 times daily Per [place] [address].  [employer]      estradiol (ESTRACE) 0.1 MG/GM vaginal cream Place vaginally See Admin Instructions Insert a pea-sized amount vaginally every day for 7 days, then twice weekly thereafter. Per Liudmila Ramos at [place]. Sushil Mathew. Last dispensed 5/10/21.  empagliflozin (JARDIANCE) 10 MG tablet Take 10 mg by mouth daily      Multiple Vitamins-Minerals (THERAPEUTIC MULTIVITAMIN-MINERALS) tablet Take 1 tablet by mouth daily      JANUVIA 50 MG tablet TAKE ONE TABLET BY MOUTH DAILY 90 tablet 1    losartan (COZAAR) 25 MG tablet Take 1 tablet by mouth daily 90 tablet 3    Elastic Bandages & Supports (MEDICAL COMPRESSION THIGH HIGH) MISC 1 Units by Does not apply route daily 2 each 2    Insulin Pen Needle 31G X 5 MM MISC 1 each by Does not apply route daily 100 each 3    rOPINIRole (REQUIP) 0.5 MG tablet TAKE 1 TABLET BY MOUTH 3 TIMES DAILY. 270 tablet 3    sertraline (ZOLOFT) 50 MG tablet TAKE 1 TABLET BY MOUTH DAILY 90 tablet 3    polyethylene glycol (MIRALAX) powder Take 17 g by mouth daily as needed      gabapentin (NEURONTIN) 800 MG tablet Take 800 mg by mouth 3 times daily      omeprazole (PRILOSEC) 40 MG delayed release capsule Take 40 mg by mouth 2 times daily      aspirin 81 MG EC tablet Take 1 tablet by mouth daily 30 tablet 3    tiZANidine (ZANAFLEX) 2 MG tablet Take 2 mg by mouth 3 times daily as needed       albuterol sulfate  (90 Base) MCG/ACT inhaler Inhale 2 puffs into the lungs 4 times daily as needed for Wheezing 1 Inhaler 0    Lancets MISC Dispense whatever insurance will cover. Patient test twice day. Dx:E11.9 200 each 3    blood glucose monitor strips Dispense whatever insurance will cover. Pt test twice a day dx:E11.9 200 strip 3    blood glucose test strips (ASCENSIA AUTODISC VI;ONE TOUCH ULTRA TEST VI) strip 1 each by In Vitro route daily As needed. 100 each 3    Blood Glucose Monitoring Suppl CATHRYN Dispense whatever insurance will cover. Dx code:E11.9 1 Device 0       Objective    BP (!) 121/52   Pulse 57   Temp 98 °F (36.7 °C) (Axillary)   Resp 27   Ht 5' 9\" (1.753 m)   Wt 190 lb 14.7 oz (86.6 kg)   SpO2 94%   BMI 28.19 kg/m²     LABS:  WBC:    Lab Results   Component Value Date    WBC 8.0 10/08/2021     H/H:    Lab Results   Component Value Date    HGB 10.3 10/08/2021    HCT 29.8 10/08/2021     PTT:    Lab Results   Component Value Date    APTT 29.2 12/22/2016   [APTT}  PT/INR:    Lab Results   Component Value Date    PROTIME 16.9 09/28/2021    INR 1.47 09/28/2021     HgBA1c:    Lab Results   Component Value Date    LABA1C 8.2 09/26/2021       Assessment   Josafat Risk Score: Josafat Scale Score: 14    Patient Active Problem List   Diagnosis Code    Displacement of lumbar intervertebral disc without myelopathy M51.26    Hypothyroidism E03.9    Mixed hyperlipidemia E78.2    Anxiety state F41.1    Generalized osteoarthrosis, involving multiple sites M15.9    Essential hypertension I10    Shoulder arthritis M19.019    Vitamin D deficiency E55.9    Chronic pain syndrome G89.4    Fibromyalgia M79.7    DDD (degenerative disc disease), lumbar M51.36    Anxiety disorder F41.9    Prosthetic joint implant failure ([facility]) T84.019A    Pulmonary nodule R91.1    Tracheobronchomalacia J39.8    Bronchiectasis ([facility]) J47.9    DM2 (diabetes mellitus, type 2) ([facility]) A93.5    Systolic congestive heart failure ([facility]) I50.20    Chest pain R07.9    S/P cardiac catheterization Z98.890    PVC (premature ventricular contraction) I49.3    Palpitations R00.2    Syncope and collapse R55    Convulsion ([facility]) R56.9    New onset seizure ([facility]) R56.9    CAD in native artery I25.10    Nausea R11.0    Dehydration E86.0    Memory change R41.3    Acquired hypothyroidism E03.9    Sepsis ([facility]) A41.9    Type 1 diabetes mellitus without complication ([facility]) Y56.5    Orthostatic hypotension I95.1    Suspected COVID-19 virus infection Z20.822    Hypoxia R09.02    Acute respiratory failure with hypoxia ([facility]) J96.01    COVID-19 U07.1    Pneumonia due to COVID-19 virus U07.1, J12.82    CHARLY (acute kidney injury) ([facility] N17.9    Uncontrolled hypertension I10    Fever R50.9    Hypotension I95.9    Mild protein-calorie malnutrition (HCC) E44.1    Gram-negative pneumonia (HCC) J15.6    Pneumonia due to Pseudomonas species ([facility]) J15.1    Hypomagnesemia E83.42    Hypokalemia E87.6    Acute hypoxemic respiratory failure ([facility]) J96.01       left ear lobe:  100% purple discoloration, nonblanchable. Response to treatment:  Well tolerated by patient.          Plan  Venelex to right ear lobe   Plan of Care:      Specialty Bed Required : N/A   [] Low Air Loss   [] Pressure Redistribution  [] Fluid Immersion  [] Bariatric  [] Total Pressure Relief  [] Other:     Current Diet: Diet NPO  ADULT TUBE FEEDING; Orogastric; Peptide Based; Continuous; 20; Yes; 15; Q 4 hours; 45; 30; Q 3 hours; Protein; one proteinex P2Go once daily via feeding tube  Dietician consult:  N/A    Discharge Plan:  Placement for patient upon discharge: TBD  Patient appropriate for Outpatient [address]: No    Referrals:  [x]   [] [facility]  [] Supplies  [] Other    Patient/Caregiver Teaching:  Level of patient/caregiver understanding able to:   [] Indicates understanding       [] Needs reinforcement  [] Unsuccessful      [] Verbal Understanding  [] Demonstrated understanding       [] No evidence of learning  [] Refused teaching         [x] N/A       Electronically signed by Kerri Shah RN, Rushie Gambles on 10/8/2021 at 5:35 PM

## 2021-10-09 ENCOUNTER — APPOINTMENT (OUTPATIENT)
Dept: GENERAL RADIOLOGY | Age: 66
DRG: 004 | End: 2021-10-09
Payer: MEDICARE

## 2021-10-09 LAB
A/G RATIO: 1.1 (ref 1.1–2.2)
ALBUMIN SERPL-MCNC: 3.1 G/DL (ref 3.4–5)
ALP BLD-CCNC: 134 U/L (ref 40–129)
ALT SERPL-CCNC: 23 U/L (ref 10–40)
ANION GAP SERPL CALCULATED.3IONS-SCNC: 8 MMOL/L (ref 3–16)
AST SERPL-CCNC: 31 U/L (ref 15–37)
BASE EXCESS ARTERIAL: 3.6 MMOL/L (ref -3–3)
BASOPHILS ABSOLUTE: 0 K/UL (ref 0–0.2)
BASOPHILS RELATIVE PERCENT: 0.6 %
BILIRUB SERPL-MCNC: 0.3 MG/DL (ref 0–1)
BUN BLDV-MCNC: 17 MG/DL (ref 7–20)
CALCIUM SERPL-MCNC: 8.3 MG/DL (ref 8.3–10.6)
CARBOXYHEMOGLOBIN ARTERIAL: 0.3 % (ref 0–1.5)
CHLORIDE BLD-SCNC: 102 MMOL/L (ref 99–110)
CO2: 25 MMOL/L (ref 21–32)
CREAT SERPL-MCNC: <0.5 MG/DL (ref 0.6–1.2)
CULTURE, RESPIRATORY: ABNORMAL
CULTURE, RESPIRATORY: ABNORMAL
EOSINOPHILS ABSOLUTE: 0.1 K/UL (ref 0–0.6)
EOSINOPHILS RELATIVE PERCENT: 1.6 %
GFR AFRICAN AMERICAN: >60
GFR NON-AFRICAN AMERICAN: >60
GLOBULIN: 2.7 G/DL
GLUCOSE BLD-MCNC: 133 MG/DL (ref 70–99)
GLUCOSE BLD-MCNC: 160 MG/DL (ref 70–99)
GLUCOSE BLD-MCNC: 167 MG/DL (ref 70–99)
GLUCOSE BLD-MCNC: 167 MG/DL (ref 70–99)
GLUCOSE BLD-MCNC: 224 MG/DL (ref 70–99)
GLUCOSE BLD-MCNC: 230 MG/DL (ref 70–99)
GRAM STAIN RESULT: ABNORMAL
HCO3 ARTERIAL: 28.6 MMOL/L (ref 21–29)
HCT VFR BLD CALC: 30.2 % (ref 36–48)
HEMOGLOBIN, ART, EXTENDED: 10.5 G/DL (ref 12–16)
HEMOGLOBIN: 9.7 G/DL (ref 12–16)
LYMPHOCYTES ABSOLUTE: 1.6 K/UL (ref 1–5.1)
LYMPHOCYTES RELATIVE PERCENT: 22.2 %
MAGNESIUM: 1.9 MG/DL (ref 1.8–2.4)
MCH RBC QN AUTO: 25 PG (ref 26–34)
MCHC RBC AUTO-ENTMCNC: 32.1 G/DL (ref 31–36)
MCV RBC AUTO: 77.7 FL (ref 80–100)
METHEMOGLOBIN ARTERIAL: 0.3 %
MONOCYTES ABSOLUTE: 0.6 K/UL (ref 0–1.3)
MONOCYTES RELATIVE PERCENT: 8.4 %
NEUTROPHILS ABSOLUTE: 4.8 K/UL (ref 1.7–7.7)
NEUTROPHILS RELATIVE PERCENT: 67.2 %
O2 SAT, ARTERIAL: 91.9 %
O2 THERAPY: ABNORMAL
ORGANISM: ABNORMAL
PCO2 ARTERIAL: 45.6 MMHG (ref 35–45)
PDW BLD-RTO: 18.1 % (ref 12.4–15.4)
PERFORMED ON: ABNORMAL
PH ARTERIAL: 7.42 (ref 7.35–7.45)
PLATELET # BLD: 129 K/UL (ref 135–450)
PMV BLD AUTO: 9.2 FL (ref 5–10.5)
PO2 ARTERIAL: 61.9 MMHG (ref 75–108)
POTASSIUM REFLEX MAGNESIUM: 3.5 MMOL/L (ref 3.5–5.1)
RBC # BLD: 3.88 M/UL (ref 4–5.2)
SODIUM BLD-SCNC: 135 MMOL/L (ref 136–145)
TCO2 ARTERIAL: 30 MMOL/L
TOTAL PROTEIN: 5.8 G/DL (ref 6.4–8.2)
WBC # BLD: 7.1 K/UL (ref 4–11)

## 2021-10-09 PROCEDURE — 6370000000 HC RX 637 (ALT 250 FOR IP): Performed by: INTERNAL MEDICINE

## 2021-10-09 PROCEDURE — 6360000002 HC RX W HCPCS: Performed by: INTERNAL MEDICINE

## 2021-10-09 PROCEDURE — C9113 INJ PANTOPRAZOLE SODIUM, VIA: HCPCS | Performed by: INTERNAL MEDICINE

## 2021-10-09 PROCEDURE — 85025 COMPLETE CBC W/AUTO DIFF WBC: CPT

## 2021-10-09 PROCEDURE — 2700000000 HC OXYGEN THERAPY PER DAY

## 2021-10-09 PROCEDURE — 99291 CRITICAL CARE FIRST HOUR: CPT | Performed by: INTERNAL MEDICINE

## 2021-10-09 PROCEDURE — 94761 N-INVAS EAR/PLS OXIMETRY MLT: CPT

## 2021-10-09 PROCEDURE — 80053 COMPREHEN METABOLIC PANEL: CPT

## 2021-10-09 PROCEDURE — 2500000003 HC RX 250 WO HCPCS: Performed by: INTERNAL MEDICINE

## 2021-10-09 PROCEDURE — 2580000003 HC RX 258: Performed by: INTERNAL MEDICINE

## 2021-10-09 PROCEDURE — 94003 VENT MGMT INPAT SUBQ DAY: CPT

## 2021-10-09 PROCEDURE — 94640 AIRWAY INHALATION TREATMENT: CPT

## 2021-10-09 PROCEDURE — 83735 ASSAY OF MAGNESIUM: CPT

## 2021-10-09 PROCEDURE — 71045 X-RAY EXAM CHEST 1 VIEW: CPT

## 2021-10-09 PROCEDURE — 82803 BLOOD GASES ANY COMBINATION: CPT

## 2021-10-09 PROCEDURE — 2000000000 HC ICU R&B

## 2021-10-09 RX ORDER — CIPROFLOXACIN 2 MG/ML
400 INJECTION, SOLUTION INTRAVENOUS EVERY 12 HOURS
Status: COMPLETED | OUTPATIENT
Start: 2021-10-09 | End: 2021-10-15

## 2021-10-09 RX ORDER — POTASSIUM BICARBONATE 25 MEQ/1
50 TABLET, EFFERVESCENT ORAL ONCE
Status: COMPLETED | OUTPATIENT
Start: 2021-10-09 | End: 2021-10-09

## 2021-10-09 RX ADMIN — AMLODIPINE BESYLATE 2.5 MG: 2.5 TABLET ORAL at 07:56

## 2021-10-09 RX ADMIN — SODIUM CHLORIDE, PRESERVATIVE FREE 10 ML: 5 INJECTION INTRAVENOUS at 21:28

## 2021-10-09 RX ADMIN — PROPOFOL 40 MCG/KG/MIN: 10 INJECTION, EMULSION INTRAVENOUS at 07:30

## 2021-10-09 RX ADMIN — CASTOR OIL AND BALSAM, PERU: 788; 87 OINTMENT TOPICAL at 21:27

## 2021-10-09 RX ADMIN — IPRATROPIUM BROMIDE AND ALBUTEROL SULFATE 1 AMPULE: .5; 2.5 SOLUTION RESPIRATORY (INHALATION) at 15:42

## 2021-10-09 RX ADMIN — CIPROFLOXACIN 400 MG: 2 INJECTION, SOLUTION INTRAVENOUS at 21:37

## 2021-10-09 RX ADMIN — Medication 100 MCG/HR: at 16:35

## 2021-10-09 RX ADMIN — WHITE PETROLATUM 57.7 %-MINERAL OIL 31.9 % EYE OINTMENT: at 16:43

## 2021-10-09 RX ADMIN — CASTOR OIL AND BALSAM, PERU: 788; 87 OINTMENT TOPICAL at 08:11

## 2021-10-09 RX ADMIN — PROPOFOL 50 MCG/KG/MIN: 10 INJECTION, EMULSION INTRAVENOUS at 10:38

## 2021-10-09 RX ADMIN — WHITE PETROLATUM 57.7 %-MINERAL OIL 31.9 % EYE OINTMENT: at 00:23

## 2021-10-09 RX ADMIN — MIDAZOLAM HYDROCHLORIDE 2 MG: 1 INJECTION, SOLUTION INTRAMUSCULAR; INTRAVENOUS at 15:28

## 2021-10-09 RX ADMIN — GABAPENTIN 800 MG: 400 CAPSULE ORAL at 21:26

## 2021-10-09 RX ADMIN — IPRATROPIUM BROMIDE AND ALBUTEROL SULFATE 1 AMPULE: .5; 2.5 SOLUTION RESPIRATORY (INHALATION) at 23:16

## 2021-10-09 RX ADMIN — PROPOFOL 30 MCG/KG/MIN: 10 INJECTION, EMULSION INTRAVENOUS at 01:07

## 2021-10-09 RX ADMIN — POTASSIUM BICARBONATE 50 MEQ: 977.5 TABLET, EFFERVESCENT ORAL at 10:32

## 2021-10-09 RX ADMIN — WHITE PETROLATUM 57.7 %-MINERAL OIL 31.9 % EYE OINTMENT: at 06:04

## 2021-10-09 RX ADMIN — INSULIN LISPRO 1 UNITS: 100 INJECTION, SOLUTION INTRAVENOUS; SUBCUTANEOUS at 00:25

## 2021-10-09 RX ADMIN — ENOXAPARIN SODIUM 30 MG: 30 INJECTION SUBCUTANEOUS at 21:26

## 2021-10-09 RX ADMIN — LINEZOLID 600 MG: 600 INJECTION, SOLUTION INTRAVENOUS at 07:58

## 2021-10-09 RX ADMIN — INSULIN LISPRO 1 UNITS: 100 INJECTION, SOLUTION INTRAVENOUS; SUBCUTANEOUS at 03:46

## 2021-10-09 RX ADMIN — INSULIN LISPRO 2 UNITS: 100 INJECTION, SOLUTION INTRAVENOUS; SUBCUTANEOUS at 21:35

## 2021-10-09 RX ADMIN — WHITE PETROLATUM 57.7 %-MINERAL OIL 31.9 % EYE OINTMENT: at 12:52

## 2021-10-09 RX ADMIN — CARBOXYMETHYLCELLULOSE SODIUM 1 DROP: 10 GEL OPHTHALMIC at 03:47

## 2021-10-09 RX ADMIN — MICONAZOLE NITRATE: 2 POWDER TOPICAL at 08:11

## 2021-10-09 RX ADMIN — PANTOPRAZOLE SODIUM 40 MG: 40 INJECTION, POWDER, FOR SOLUTION INTRAVENOUS at 07:56

## 2021-10-09 RX ADMIN — Medication 0.3 MCG/KG/HR: at 19:56

## 2021-10-09 RX ADMIN — IPRATROPIUM BROMIDE AND ALBUTEROL SULFATE 1 AMPULE: .5; 2.5 SOLUTION RESPIRATORY (INHALATION) at 11:33

## 2021-10-09 RX ADMIN — IPRATROPIUM BROMIDE AND ALBUTEROL SULFATE 1 AMPULE: .5; 2.5 SOLUTION RESPIRATORY (INHALATION) at 09:01

## 2021-10-09 RX ADMIN — ASPIRIN 81 MG: 81 TABLET, CHEWABLE ORAL at 07:56

## 2021-10-09 RX ADMIN — CIPROFLOXACIN 400 MG: 2 INJECTION, SOLUTION INTRAVENOUS at 10:34

## 2021-10-09 RX ADMIN — CARBOXYMETHYLCELLULOSE SODIUM 1 DROP: 10 GEL OPHTHALMIC at 07:56

## 2021-10-09 RX ADMIN — Medication 100 MCG/HR: at 06:04

## 2021-10-09 RX ADMIN — LEVOTHYROXINE SODIUM 100 MCG: 100 TABLET ORAL at 07:56

## 2021-10-09 RX ADMIN — PROPOFOL 40 MCG/KG/MIN: 10 INJECTION, EMULSION INTRAVENOUS at 16:50

## 2021-10-09 RX ADMIN — ATORVASTATIN CALCIUM 40 MG: 40 TABLET, FILM COATED ORAL at 07:56

## 2021-10-09 RX ADMIN — CARBOXYMETHYLCELLULOSE SODIUM 1 DROP: 10 GEL OPHTHALMIC at 21:50

## 2021-10-09 RX ADMIN — IPRATROPIUM BROMIDE AND ALBUTEROL SULFATE 1 AMPULE: .5; 2.5 SOLUTION RESPIRATORY (INHALATION) at 03:06

## 2021-10-09 RX ADMIN — SODIUM CHLORIDE, PRESERVATIVE FREE 10 ML: 5 INJECTION INTRAVENOUS at 07:57

## 2021-10-09 RX ADMIN — Medication 0.3 MCG/KG/HR: at 03:17

## 2021-10-09 RX ADMIN — OXYCODONE 10 MG: 5 TABLET ORAL at 10:31

## 2021-10-09 RX ADMIN — DEXAMETHASONE SODIUM PHOSPHATE 6 MG: 10 INJECTION INTRAMUSCULAR; INTRAVENOUS at 12:52

## 2021-10-09 RX ADMIN — CHLORHEXIDINE GLUCONATE 0.12% ORAL RINSE 15 ML: 1.2 LIQUID ORAL at 21:27

## 2021-10-09 RX ADMIN — CARBOXYMETHYLCELLULOSE SODIUM 1 DROP: 10 GEL OPHTHALMIC at 15:24

## 2021-10-09 RX ADMIN — OXYCODONE 10 MG: 5 TABLET ORAL at 21:26

## 2021-10-09 RX ADMIN — OXYCODONE 10 MG: 5 TABLET ORAL at 03:42

## 2021-10-09 RX ADMIN — CARBOXYMETHYLCELLULOSE SODIUM 1 DROP: 10 GEL OPHTHALMIC at 23:51

## 2021-10-09 RX ADMIN — INSULIN LISPRO 2 UNITS: 100 INJECTION, SOLUTION INTRAVENOUS; SUBCUTANEOUS at 16:39

## 2021-10-09 RX ADMIN — GABAPENTIN 800 MG: 400 CAPSULE ORAL at 13:25

## 2021-10-09 RX ADMIN — CHLORHEXIDINE GLUCONATE 0.12% ORAL RINSE 15 ML: 1.2 LIQUID ORAL at 07:55

## 2021-10-09 RX ADMIN — INSULIN LISPRO 1 UNITS: 100 INJECTION, SOLUTION INTRAVENOUS; SUBCUTANEOUS at 13:00

## 2021-10-09 RX ADMIN — MICONAZOLE NITRATE: 2 POWDER TOPICAL at 21:28

## 2021-10-09 RX ADMIN — PROPOFOL 40 MCG/KG/MIN: 10 INJECTION, EMULSION INTRAVENOUS at 11:33

## 2021-10-09 RX ADMIN — OXYCODONE 10 MG: 5 TABLET ORAL at 15:29

## 2021-10-09 RX ADMIN — IPRATROPIUM BROMIDE AND ALBUTEROL SULFATE 1 AMPULE: .5; 2.5 SOLUTION RESPIRATORY (INHALATION) at 20:27

## 2021-10-09 RX ADMIN — PROPOFOL 40 MCG/KG/MIN: 10 INJECTION, EMULSION INTRAVENOUS at 21:29

## 2021-10-09 RX ADMIN — GABAPENTIN 800 MG: 400 CAPSULE ORAL at 07:55

## 2021-10-09 RX ADMIN — SODIUM CHLORIDE, PRESERVATIVE FREE 10 ML: 5 INJECTION INTRAVENOUS at 21:27

## 2021-10-09 RX ADMIN — CARBOXYMETHYLCELLULOSE SODIUM 1 DROP: 10 GEL OPHTHALMIC at 00:24

## 2021-10-09 RX ADMIN — WHITE PETROLATUM 57.7 %-MINERAL OIL 31.9 % EYE OINTMENT: at 21:27

## 2021-10-09 RX ADMIN — SODIUM CHLORIDE, PRESERVATIVE FREE 10 ML: 5 INJECTION INTRAVENOUS at 08:11

## 2021-10-09 RX ADMIN — LOSARTAN POTASSIUM 25 MG: 25 TABLET, FILM COATED ORAL at 07:55

## 2021-10-09 RX ADMIN — PIPERACILLIN SODIUM AND TAZOBACTAM SODIUM 3375 MG: 3; .375 INJECTION, POWDER, LYOPHILIZED, FOR SOLUTION INTRAVENOUS at 02:23

## 2021-10-09 RX ADMIN — ENOXAPARIN SODIUM 30 MG: 30 INJECTION SUBCUTANEOUS at 07:56

## 2021-10-09 ASSESSMENT — PULMONARY FUNCTION TESTS
PIF_VALUE: 29
PIF_VALUE: 30
PIF_VALUE: 27
PIF_VALUE: 37
PIF_VALUE: 28
PIF_VALUE: 29
PIF_VALUE: 28
PIF_VALUE: 29
PIF_VALUE: 29
PIF_VALUE: 31
PIF_VALUE: 29
PIF_VALUE: 30
PIF_VALUE: 29
PIF_VALUE: 31
PIF_VALUE: 31
PIF_VALUE: 30
PIF_VALUE: 31
PIF_VALUE: 31
PIF_VALUE: 30

## 2021-10-09 ASSESSMENT — PAIN SCALES - GENERAL
PAINLEVEL_OUTOF10: 5
PAINLEVEL_OUTOF10: 0
PAINLEVEL_OUTOF10: 5

## 2021-10-09 NOTE — PROGRESS NOTES
FiO2 increased to 65%         10/09/21 0903   Vent Information   Vt Ordered 390 mL   Rate Set 24 bmp   Peak Flow 65 L/min   Pressure Support 0 cmH20   FiO2  65 %   SpO2 (!) 89 %   SpO2/FiO2 ratio 136.92   Sensitivity 3   PEEP/CPAP 12   I Time/ I Time % 0 s   Humidification Source Heated wire   Humidification Temp Measured 37   Vent Patient Data   High Peep/I Pressure 0   Peak Inspiratory Pressure 30 cmH2O   Mean Airway Pressure 17 cmH20   Rate Measured 30 br/min   Vt Exhaled 414 mL   Minute Volume 11.4 Liters   I:E Ratio 1:2.80   Plateau Pressure 28 NEB35   Static Compliance 27 mL/cmH2O   Total PEEP 12 cmH20   Cough/Sputum   Sputum How Obtained None   Spontaneous Breathing Trial (SBT) RT Doc   Pulse 56   Breath Sounds   Right Upper Lobe Diminished   Right Middle Lobe Diminished   Right Lower Lobe Diminished   Left Upper Lobe Diminished   Left Lower Lobe Diminished   Additional Respiratory  Assessments   Resp 27   Position Semi-Jesus's   Oral Care Completed? Yes   Oral Care Mouth suctioned   Subglottic Suction Done?  Yes   Cuff Pressure (cm H2O) 27 cm H2O   Alarm Settings   High Pressure Alarm 45 cmH2O   Low Minute Volume Alarm 4 L/min   High Respiratory Rate 40 br/min   ETT (adult)   Placement Date/Time: 10/06/21 1208   Timeout: Patient  Preoxygenation: Yes  Technique: Rapid sequence  Type: Cuffed  Tube Size: 7.5 mm  Laryngoscope: GlideScope  Secured at: 23 cm  Measured From: Lips   Secured at 23 cm   Measured From [address]ranza   Site Condition Dry

## 2021-10-09 NOTE — PROGRESS NOTES
10/09/21 1135   Vent Information   Vt Ordered 390 mL   Rate Set 24 bmp   Peak Flow 65 L/min   Pressure Support 0 cmH20   FiO2  65 %   SpO2 91 %   SpO2/FiO2 ratio 140   Sensitivity 3   PEEP/CPAP 12   I Time/ I Time % 0 s   Humidification Source Heated wire   Humidification Temp Measured 37   Vent Patient Data   High Peep/I Pressure 0   Peak Inspiratory Pressure 31 cmH2O   Mean Airway Pressure 18 cmH20   Rate Measured 24 br/min   Vt Exhaled 398 mL   Minute Volume 9.55 Liters   I:E Ratio 1:2.80   Spontaneous Breathing Trial (SBT) RT Doc   Pulse 55   Breath Sounds   Right Upper Lobe Diminished   Right Middle Lobe Diminished   Right Lower Lobe Diminished   Left Upper Lobe Diminished   Left Lower Lobe Diminished   Additional Respiratory  Assessments   Resp 25   Position Semi-Jesus's   Oral Care Completed? Yes   Oral Care Mouth suctioned   Subglottic Suction Done?  Yes   Cuff Pressure (cm H2O) 27 cm H2O   Alarm Settings   High Pressure Alarm 45 cmH2O   Low Minute Volume Alarm 4 L/min   High Respiratory Rate 40 br/min   ETT (adult)   Placement Date/Time: 10/06/21 1208   Timeout: Patient  Preoxygenation: Yes  Technique: Rapid sequence  Type: Cuffed  Tube Size: 7.5 mm  Laryngoscope: GlideScope  Secured at: 23 cm  Measured From: Lips   Secured at 23 cm   Measured From [address]   Site Condition Dry

## 2021-10-09 NOTE — PROGRESS NOTES
Initial exam completed- See doc flowsheet for assessment findings. Pt resting quietly in bed and denies any complaints of pain or shortness of breath. She wakes easily and follows commands. All lines and monitoring devices are in place . Vitals and SpO2 stable. Call light is within easy reach. Plan of care and goals reviewed.  Sukhwinder Glover RN

## 2021-10-09 NOTE — PROGRESS NOTES
10/09/21 0500   RT Protocol   History Pulmonary Disease 1   Respiratory pattern 6   Breath sounds 2   Cough 1   Bronchodilator Assessment Score 10   RT Inhaler-Nebulizer Bronchodilator Protocol Note    There is a bronchodilator order in the chart from a provider indicating to follow the RT Bronchodilator Protocol and there is an Initiate RT Inhaler-Nebulizer Bronchodilator Protocol order as well (see protocol at bottom of note). CXR Findings:  XR CHEST PORTABLE    Result Date: 10/8/2021  Stable appearance of the chest.  Diffuse ground-glass opacities on the right and mild left basilar opacities. XR CHEST PORTABLE    Result Date: 10/7/2021  Improved aeration on the left, possibly improving pulmonary edema. Persistent airspace disease in the right base may represent concurrent pneumonia       The findings from the last RT Protocol Assessment were as follows:   History Pulmonary Disease: Smoker 15 pack years or more  Respiratory Pattern: Use of accessory muscles, prolonged exhalation, or RR 26-30 bpm  Breath Sounds: Slightly diminished and/or crackles  Cough: Strong, productive  Indication for Bronchodilator Therapy: Decreased or absent breath sounds, Unable to speak in sentences  Bronchodilator Assessment Score: 10    Aerosolized bronchodilator medication orders have been revised according to the RT Inhaler-Nebulizer Bronchodilator Protocol below. Respiratory Therapist to perform RT Therapy Protocol Assessment initially then follow the protocol. Repeat RT Therapy Protocol Assessment PRN for score 0-3 or on second treatment, BID, and PRN for scores above 3. No Indications - adjust the frequency to every 6 hours PRN wheezing or bronchospasm, if no treatments needed after 48 hours then discontinue using Per Protocol order mode. If indication present, adjust the RT bronchodilator orders based on the Bronchodilator Assessment Score as indicated below.   Use Inhaler orders unless patient has one or more of the following: on home nebulizer, not able to hold breath for 10 seconds, is not alert and oriented, cannot activate and use MDI correctly, or respiratory rate 25 breaths per minute or more, then use the equivalent nebulizer order(s) with same Frequency and PRN reasons based on the score. If a patient is on this medication at home then do not decrease Frequency below that used at home. 0-3 - enter or revise RT bronchodilator order(s) to equivalent RT Bronchodilator order with Frequency of every 4 hours PRN for wheezing or increased work of breathing using Per Protocol order mode. 4-6 - enter or revise RT Bronchodilator order(s) to two equivalent RT bronchodilator orders with one order with BID Frequency and one order with Frequency of every 4 hours PRN wheezing or increased work of breathing using Per Protocol order mode. 7-10 - enter or revise RT Bronchodilator order(s) to two equivalent RT bronchodilator orders with one order with TID Frequency and one order with Frequency of every 4 hours PRN wheezing or increased work of breathing using Per Protocol order mode. 11-13 - enter or revise RT Bronchodilator order(s) to one equivalent RT bronchodilator order with QID Frequency and an Albuterol order with Frequency of every 4 hours PRN wheezing or increased work of breathing using Per Protocol order mode. Greater than 13 - enter or revise RT Bronchodilator order(s) to one equivalent RT bronchodilator order with every 4 hours Frequency and an Albuterol order with Frequency of every 2 hours PRN wheezing or increased work of breathing using Per Protocol order mode.          Electronically signed by Rafael Fuller RCP on 10/9/2021 at 5:12 AM

## 2021-10-09 NOTE — PROGRESS NOTES
10/08/21 2314   Vent Information   Vent Type 980   Vent Mode AC/VC   Vt Ordered 390 mL   Rate Set 24 bmp   Peak Flow 65 L/min   Pressure Support 0 cmH20   FiO2  55 %   SpO2 92 %   SpO2/FiO2 ratio 167.27   Sensitivity 3   PEEP/CPAP 12   I Time/ I Time % 0 s   Humidification Source Heated wire   Humidification Temp Measured 37   Circuit Condensation Drained   Vent Patient Data   High Peep/I Pressure 0   Peak Inspiratory Pressure 29 cmH2O   Mean Airway Pressure 18 cmH20   Rate Measured 25 br/min   Vt Exhaled 524 mL   Minute Volume 9.33 Liters   I:E Ratio 1:2.80   Cough/Sputum   Sputum How Obtained Endotracheal   Cough Productive   Sputum Amount Small   Sputum Color Creamy   Tenacity Thick   Spontaneous Breathing Trial (SBT) RT Doc   Pulse 50   Breath Sounds   Right Upper Lobe Diminished   Right Middle Lobe Diminished   Right Lower Lobe Diminished   Left Upper Lobe Diminished   Left Lower Lobe Diminished   Additional Respiratory  Assessments   Resp 24   Alarm Settings   High Pressure Alarm 45 cmH2O   Low Minute Volume Alarm 4 L/min   Apnea (secs) 20 secs   High Respiratory Rate 40 br/min   Low Exhaled Vt  210 mL   Patient Observation   Observations 7.5 ett 23 at lip

## 2021-10-09 NOTE — PROGRESS NOTES
Patient is not able to demonstrate the ability to move from a reclining position to an upright position within the recliner due to Pt on bedrest and vent .  Marquise Hansen RN

## 2021-10-09 NOTE — PROGRESS NOTES
ICU Progress Note    Admit Date:  9/26/2021      Interval history:  Admitted with COVID-19 pneumonia,  acute hypoxic respiratory failure. Patient not vaccinated. Progressive significant worsening hypoxemia overnight. Patient was on 2 L of oxygen on admission-> switched to high flow oxygen per Vapotherm with additional 100% nonrebreather on 9/28/2021-> transferred to ICU. Worsening hypoxemia overnight. Intubated and placed on mechanical ventilation early AM on 9/29/2021. Placed in prone positioning. 10/2 Resp culture growing pseudomonas - started zosyn. 10/4 Patient  extubated. on 100% Vapotherm. Answers questions appropriately. 10/5 oxygen demands have increased and she has been placed in the prone position. She is on 100% Vapotherm. 10/6-patient is respiratory status worsened. She was on 100% FiO2 on Vapotherm and 100% nonrebreather in the prone position with saturations in the upper 80s. She is on Precedex. Because of worsening respiratory status she was reintubated on 10/6/2021.      10/7-oxygenation is improved. She was paralyzed and proned. Hypoxemia is better. FiO2 down to 45%. Plans to turn her supine. 10/8-patient is in supine position. FiO2 up slightly to 55%. Plans to prone her if her oxygen demands continue to be high. On light sedation. Can follow simple commands. Patient was extubated on 10/4/2021, reintubated on 10/6/2021. Persistent hypoxemia    Subjective     10/9  Ms. Shayy seen. Patient is currently on intubated, on mechanical ventilation. FiO2 65% PEEP of 12. .  She is on sedatives but awake responding well and in no distress. Stable in supine position.  .        Objective:   BP (!) 123/50   Pulse 69   Temp 98 °F (36.7 °C) (Axillary)   Resp 18   Ht 5' 9\" (1.753 m)   Wt 190 lb 14.7 oz (86.6 kg)   SpO2 92%   BMI 28.19 kg/m²        Intake/Output Summary (Last 24 hours) at 10/9/2021 1530  Last data filed at 10/9/2021 1400  Gross per 24 hour 17.9* 17.7* 18.1*    118* 129*     BMP:   Recent Labs     10/07/21  0445 10/08/21  0450 10/08/21  0630 10/09/21  0556    135*  --  135*   K 3.8  --  3.6 3.5    101  --  102   CO2 22 23  --  25   BUN 18 14  --  17   CREATININE <0.5* <0.5*  --  <0.5*     BNP: No results for input(s): BNP in the last 72 hours. PT/INR:   No results for input(s): PROTIME, INR in the last 72 hours. APTT: No results for input(s): APTT in the last 72 hours. CARDIAC ENZYMES:   No results for input(s): CKMB, CKMBINDEX, TROPONINI in the last 72 hours. Invalid input(s): CKTOTAL;3  FASTING LIPID PANEL:  Lab Results   Component Value Date    CHOL 156 10/18/2018    HDL 39 (L) 10/18/2018    TRIG 1,257 (H) 10/08/2021     LIVER PROFILE:   Recent Labs     10/07/21  0445 10/08/21  0450 10/08/21  0630 10/09/21  0556   AST <5*  --  28 31   ALT <5* 20  --  23   BILITOT 0.4 0.3  --  0.3   ALKPHOS 148* 126  --  134*           CULTURES  COVID 19, NAAT: Detected    Susceptibility    Pseudomonas fluorescens (1)    Antibiotic Interpretation AMANDA Status    cefepime Sensitive <=2 mcg/mL     ciprofloxacin Sensitive <=1 mcg/mL     gentamicin Sensitive <=4 mcg/mL     meropenem Intermediate 8 mcg/mL     piperacillin-tazobactam Sensitive <=16 mcg/mL     tobramycin Sensitive <=4 mcg/       Respiratory cultures from 10/7/2021  -Light growth Klebsiella    EKG:   Normal sinus rhythm  Left ventricular hypertrophy with repolarization abnormality  Prolonged QT  ST abnormality inferior leads consider ischemia  Abnormal ECG  When compared with ECG of 05-SEP-2020 08:35,  Premature ventricular complexes are no longer Present  T wave inversion now evident in Inferior leads  Confirmed by Patel Valderrama MD, REYNALDO     Radiology  XR CHEST PORTABLE   Final Result   Supportive tubing is in stable position. Stable pattern of bilateral ground-glass opacities and basilar consolidations.          XR CHEST PORTABLE   Final Result   Stable appearance of the chest. airspace disease is again noted with improved aeration of   the left mid lung. XR CHEST PORTABLE   Final Result   Some improvement in the appearance of the chest with clearing of some of the   acute airspace disease from the mid left lung. Large amount of pneumonia   remains within the right lung and lower left lung. XR CHEST PORTABLE   Final Result   1. Endotracheal tube, nasogastric tube, and right PICC line placement. 2.  Increasing multifocal opacities with consolidative area in the right   perihilar lung. Could be multifocal pneumonia with or without edema. IR PICC WO SQ PORT/PUMP > 5 YEARS   Final Result   1. See above. CT CHEST PULMONARY EMBOLISM W CONTRAST   Final Result   No evidence of pulmonary embolism. Scattered peripheral opacities in the left lung and more consolidative area   in the right lung concerning for pneumonia. Previous right thoracotomy and   upper lobectomy. One mildly enlarged right paratracheal lymph node is present but no priors   for comparison. Nonenlarged but prominent lymph nodes in the upper abdomen   and juxta diaphragmatic region. XR CHEST PORTABLE   Final Result   Mild cardiomegaly without interstitial edema. Metallic surgical clips from prior right thoracotomy. COPD with anterior segment-right upper lobe alveolar pneumonia. Old pleural thickening was noted along the right lateral chest wall. Pleural   thickening and or trace effusion blunts the right costophrenic angle.          XR CHEST PORTABLE    (Results Pending)         Assessment:  Principal Problem:    Suspected COVID-19 virus infection  Active Problems:    Hypothyroidism    Essential hypertension    DM2 (diabetes mellitus, type 2) (HCC)    Dehydration    Hypoxia    Acute respiratory failure with hypoxia ([address]    COVID-19    Pneumonia due to COVID-19 virus    CHARLY (acute kidney injury) ([address]    Uncontrolled hypertension    Fever    Hypotension Mild protein-calorie malnutrition (HCC)    Gram-negative pneumonia ([address]    Pneumonia due to Pseudomonas species ([facility]    Hypomagnesemia    Hypokalemia    Acute hypoxemic respiratory failure (HCC)  Resolved Problems:    * No resolved hospital problems. *      Plan:    #COVID-19 pneumonia  #Acute hypoxic respiratory failure   Pseudomonas HCAP. -Unvaccinated patient  - she presented with cough, fever, dyspnea in the setting of household contact testing + COVID 19  - Her Covid testing came back positive on admission  - Initially on 2 L of oxygen on presentation. She started desaturating quickly . worsening hypoxemia requiring high flow oxygen. - Pulmonology consulted  -placed on high flow oxygen per Vapotherm 40 L,FiO2 100% ,with additional nonrebreather . Transferred to ICU on 9/28. Intubated early AM  9/29/2021. Extubated on 10/4/2021. She was on Vapotherm. Pulmonary status got worse and she was reintubated on 10/6/2021.   -Continue mechanical ventilation. Wean O2 as tolerated. .  Currently FiO2 down to 65% on 12 PEEP. S/p prone ventilation again. - s/p Remdesivir  - on Decadron, day # 13  - given 1 dose of Tocilizumab  -Lovenox twice daily    #Possible superimposed bacterial pneumonia  Gram-negative infection  -Was on Rocephin and Zithromax day 5 of 5; - changed to zosyn on 10/2 with pseudomonas on resp culture. Pulmonary has started Zyvox. Hold Zoloft when patient is getting Zyvox . Completed Zosyn day 8 of 8. Continue Zyvox day #3  Respiratory cultures now growing Klebsiella, initiated on Cipro day #1    #Hypokalemia  - replaced    #Dehydration  -  Improved with IVF  Creatinine normal     #CAD  - cont ASA, statin  - EKG with inferior T wave abnormality. She denies CP. Trop neg   -telemetry monitoring    #History of tracheobronchomalacia  - Has had surgical treatment for this     #HTN  -blood pressure is low and she was on Levophed. This has been discontinued.   Resumed home medications-Norvasc and losartan.     #Chronic pain   History of hip fracture  - cont home oxycodone BID and percocet   - cont gabapentin and requip    #DM2  - use ssi     #Hypothyroidism  - cont synthroid         DVT Prophylaxis: Lovenox   Diet NPO  ADULT TUBE FEEDING; Orogastric; Peptide Based; Continuous; 20; Yes; 15; Q 4 hours; 45; 30; Q 3 hours; Protein; one proteinex P2Go once daily via feeding tube  Code Status: Full Code        Faisal Gottlieb MD, MD 10/9/2021 3:30 PM

## 2021-10-09 NOTE — FLOWSHEET NOTE
10/09/21 1456   Encounter Summary   Services provided to: Patient not available   Referral/Consult From: Rounding   Continue Visiting   (10/9 attempted call hb, left VM)   Complexity of Encounter Low   Length of Encounter 15 minutes

## 2021-10-09 NOTE — PROGRESS NOTES
Patient is not able to demonstrate the ability to move from a reclining position to an upright position within the recliner due to Pt on vent and bedrest .

## 2021-10-09 NOTE — PROGRESS NOTES
Pulmonary & Critical Care Medicine ICU Progress Note    CC: Respiratory failure    Events of Last 24 hours: Tolerating supine    Vascular lines: IV: PICC line     MV: -10/4, emergently re-intubated 10/6/21 for refractory hypoxemia  Vent Mode: AC/VC Rate Set: 24 bmp/Vt Ordered: 390 mL/ /FiO2 : 60 %  Recent Labs     10/08/21  0450 10/09/21  0556   PHART 7.373 7.416   GTP5AHO 40.2 45.6*   PO2ART 58.3* 61.9*       IV:   fentaNYL 100 mcg/hr (10/09/21 0605)    propofol 40 mcg/kg/min (10/09/21 0730)    dexmedetomidine HCl in NaCl 0.3 mcg/kg/hr (10/09/21 0605)    sodium chloride      dextrose      sodium chloride         Vitals:  Blood pressure (!) 108/55, pulse 56, temperature 98.4 °F (36.9 °C), temperature source Oral, resp. rate 20, height 5' 9\" (1.753 m), weight 190 lb 14.7 oz (86.6 kg), SpO2 92 %, not currently breastfeeding. on ventilator  Temp  Av.5 °F (36.9 °C)  Min: 98 °F (36.7 °C)  Max: 98.7 °F (37.1 °C)    Intake/Output Summary (Last 24 hours) at 10/9/2021 0851  Last data filed at 10/9/2021 1200  Gross per 24 hour   Intake 3344.34 ml   Output 2105 ml   Net 1239.34 ml     General: intubated, ill appearing    ENT: Pharynx with ETT. Resp: No crackles. No wheezing. CV: S1, S2. Trace edema  GI: NT, ND, +BS  Skin: Warm and dry. Neuro: PERRL. OETV and following commands. Answers y/n questions.    Scheduled Meds:   Venelex   Topical BID    linezolid  600 mg IntraVENous Q12H    miconazole   Topical BID    oxyCODONE  10 mg Oral Q6H    influenza virus vaccine  0.5 mL IntraMUSCular Prior to discharge    carboxymethylcellulose PF  1 drop Both Eyes Q4H    And    artificial tears   Both Eyes Q4H    chlorhexidine  15 mL Mouth/Throat BID    ipratropium-albuterol  1 ampule Inhalation Q4H    amLODIPine  2.5 mg Oral Daily    losartan  25 mg Oral Daily    piperacillin-tazobactam  3,375 mg IntraVENous Q8H    pantoprazole  40 mg IntraVENous Daily    insulin lispro  0-6 Units SubCUTAneous Q4H  levothyroxine  100 mcg Oral Daily    enoxaparin  30 mg SubCUTAneous BID    dexamethasone  6 mg IntraVENous Q24H    lidocaine 1 % injection  5 mL IntraDERmal Once    sodium chloride flush  5-40 mL IntraVENous 2 times per day    aspirin  81 mg Oral Daily    gabapentin  800 mg Oral TID    [Held by provider] rOPINIRole  0.5 mg Oral TID    atorvastatin  40 mg Oral Daily    sodium chloride flush  5-40 mL IntraVENous 2 times per day       Data:  CBC:   Recent Labs     10/07/21  0445 10/08/21  0450 10/09/21  0556   WBC 12.1* 8.0 7.1   HGB 11.4* 10.3* 9.7*   HCT 33.3* 29.8* 30.2*   MCV 77.4* 78.6* 77.7*    118* 129*     BMP:   Recent Labs     10/07/21  0445 10/08/21  0450 10/08/21  0630 10/09/21  0556    135*  --  135*   K 3.8  --  3.6 3.5    101  --  102   CO2 22 23  --  25   BUN 18 14  --  17   CREATININE <0.5* <0.5*  --  <0.5*     LIVER PROFILE:   Recent Labs     10/07/21  0445 10/08/21  0450 10/08/21  0630 10/09/21  0556   AST <5*  --  28 31   ALT <5* 20  --  23   BILITOT 0.4 0.3  --  0.3   ALKPHOS 148* 126  --  134*       Microbiology:  9/27 COVID-19 detected  9/30 Resp Pseudomonas fluorescens   10/7/2021 tracheal aspirate: Klebsiella intermediate to zosyn    Imaging:  Chest x-ray 10/7/21 bilateral airspace disease     CTPA 9/27/21  No evidence of pulmonary embolism. Scattered peripheral opacities in the left lung and more consolidative area in the right lung concerning for pneumonia.  Previous right thoracotomy and upper lobectomy.    One mildly enlarged right paratracheal lymph node is present but no priors for comparison.  Nonenlarged but prominent lymph nodes in the upper abdomen and juxta diaphragmatic region      ASSESSMENT:  · Acute hypoxemic respiratory failure   · COVID-19 viral pneumonia  · Pseudomonas PNA   · Hypotension with intubation and sedation  · Transaminitis  · Acute kidney injury  · CAD  · Chronic pain syndrome - on opiates and Neurontin   · Possible early dementia, being worked up outpatient   · H/O bronchiectasis and tracheobronchomalacia/EDAC s/p tracheoplasty in 2014      PLAN:  - COVID-19 isolation, droplet plus  -   Mechanical ventilation as per my orders. The ventilator was adjusted by me at the bedside for unstable, life threatening respiratory failure. Proned 10/6/21-10/7/21   Paralyzed 10/6/21 - 10/7/21  IV Propofol and Precedex for sedation, target RASS -5 while paralyzed, with daily paralytic holiday. Fentanyl gtt    Stop Zosyn D#8, Zyvox D#3. Completed 5 days of Ceftriaxone/Zithromax   Start Cipro for Klebsiella  Holding zoloft while on zyvox  Dexamethasone 6 mg IV D#13  Completed Remdesivir & Tocilizumab  Home oxycodone and neurontin    Lovenox for DVT prophylaxis   Flo Patient [phone]. Total critical care time caring for this patient with life threatening, unstable organ failure, including direct patient contact, management of life support systems, review of data including imaging and labs, discussions with other team members and physicians is 34  minutes, excluding procedures.

## 2021-10-09 NOTE — PROGRESS NOTES
RT Nebulizer Bronchodilator Protocol Note    There is a bronchodilator order in the chart from a provider indicating to follow the RT Bronchodilator Protocol and there is an Initiate RT Bronchodilator Protocol order as well (see protocol at bottom of note). CXR Findings:  XR CHEST PORTABLE    Result Date: 10/9/2021  Supportive tubing is in stable position. Stable pattern of bilateral ground-glass opacities and basilar consolidations. XR CHEST PORTABLE    Result Date: 10/8/2021  Stable appearance of the chest.  Diffuse ground-glass opacities on the right and mild left basilar opacities. The findings from the last RT Protocol Assessment were as follows:  Smoking: (P) Smoker 15 pack years or more  Respiratory Pattern: (P) Dyspnea on exertion or RR 21-25 bpm  Breath Sounds: (P) Slightly diminished and/or crackles  Cough: (P) Weak, productive  Indication for Bronchodilator Therapy: Decreased or absent breath sounds, Unable to speak in sentences  Bronchodilator Assessment Score: (P) 7    Aerosolized bronchodilator medication orders have been revised according to the RT Nebulizer Bronchodilator Protocol below. Respiratory Therapist to perform RT Therapy Protocol Assessment initially then follow the protocol. Repeat RT Therapy Protocol Assessment PRN for score 0-3 or on second treatment, BID, and PRN for scores above 3. No Indications - adjust the frequency to every 6 hours PRN wheezing or bronchospasm, if no treatments needed after 48 hours then discontinue using Per Protocol order mode. If indication present, adjust the RT bronchodilator orders based on the Bronchodilator Assessment Score as indicated below. If a patient is on this medication at home then do not decrease Frequency below that used at home.     0-3 - enter or revise RT bronchodilator order(s) to equivalent RT Bronchodilator order with Frequency of every 4 hours PRN for wheezing or increased work of breathing using Per Protocol order mode. 4-6 - enter or revise RT Bronchodilator order(s) to two equivalent RT bronchodilator orders with one order with BID Frequency and one order with Frequency of every 4 hours PRN wheezing or increased work of breathing using Per Protocol order mode. 7-10 - enter or revise RT Bronchodilator order(s) to two equivalent RT bronchodilator orders with one order with TID Frequency and one order with Frequency of every 4 hours PRN wheezing or increased work of breathing using Per Protocol order mode. 11-13 - enter or revise RT Bronchodilator order(s) to one equivalent RT bronchodilator order with QID Frequency and an Albuterol order with Frequency of every 4 hours PRN wheezing or increased work of breathing using Per Protocol order mode. Greater than 13 - enter or revise RT Bronchodilator order(s) to one equivalent RT bronchodilator order with every 4 hours Frequency and an Albuterol order with Frequency of every 2 hours PRN wheezing or increased work of breathing using Per Protocol order mode. RT to enter RT Home Evaluation for COPD & MDI Assessment order using Per Protocol order mode.     Electronically signed by Justin Borjas RCP on 10/9/2021 at 1:22 PM

## 2021-10-09 NOTE — PROGRESS NOTES
10/09/21 0306   Vent Information   Vent Type 980   Vent Mode AC/VC   Vt Ordered 390 mL   Rate Set 24 bmp   Peak Flow 65 L/min   Pressure Support 0 cmH20   FiO2  55 %   SpO2 90 %   SpO2/FiO2 ratio 163.64   Sensitivity 3   PEEP/CPAP 12   I Time/ I Time % 0 s   Humidification Source Heated wire   Humidification Temp Measured 37   Circuit Condensation Drained   Vent Patient Data   High Peep/I Pressure 0   Peak Inspiratory Pressure 28 cmH2O   Mean Airway Pressure 18 cmH20   Rate Measured 26 br/min   Vt Exhaled 406 mL   Minute Volume 10 Liters   I:E Ratio 1:2.80   Cough/Sputum   Sputum How Obtained Endotracheal   Cough Productive   Sputum Amount Small   Sputum Color Creamy   Tenacity Thick   Spontaneous Breathing Trial (SBT) RT Doc   Pulse 55   Breath Sounds   Right Upper Lobe Diminished   Right Middle Lobe Diminished   Right Lower Lobe Diminished   Left Upper Lobe Diminished   Left Lower Lobe Diminished   Additional Respiratory  Assessments   Resp 19   Alarm Settings   High Pressure Alarm 45 cmH2O   Low Minute Volume Alarm 4 L/min   Apnea (secs) 20 secs   High Respiratory Rate 40 br/min   Low Exhaled Vt  210 mL   Patient Observation   Observations 7.5 ett 23 at lip

## 2021-10-09 NOTE — PROGRESS NOTES
Care rounds completed with Dr Dyer and multidisciplinary team.  Reviewed labs, meds, VS (temp/HR/RR), I/O's, assessment, & plan of care for today. See progress note & new orders for details.  Yuko Galvez RN

## 2021-10-10 ENCOUNTER — APPOINTMENT (OUTPATIENT)
Dept: GENERAL RADIOLOGY | Age: 66
DRG: 004 | End: 2021-10-10
Payer: MEDICARE

## 2021-10-10 LAB
A/G RATIO: 1.2 (ref 1.1–2.2)
ALBUMIN SERPL-MCNC: 3.2 G/DL (ref 3.4–5)
ALP BLD-CCNC: 147 U/L (ref 40–129)
ALT SERPL-CCNC: 23 U/L (ref 10–40)
ANION GAP SERPL CALCULATED.3IONS-SCNC: 10 MMOL/L (ref 3–16)
AST SERPL-CCNC: 31 U/L (ref 15–37)
BASE EXCESS ARTERIAL: 3.4 MMOL/L (ref -3–3)
BASOPHILS ABSOLUTE: 0 K/UL (ref 0–0.2)
BASOPHILS RELATIVE PERCENT: 0.5 %
BILIRUB SERPL-MCNC: 0.3 MG/DL (ref 0–1)
BUN BLDV-MCNC: 14 MG/DL (ref 7–20)
CALCIUM SERPL-MCNC: 8.7 MG/DL (ref 8.3–10.6)
CARBOXYHEMOGLOBIN ARTERIAL: 0.1 % (ref 0–1.5)
CHLORIDE BLD-SCNC: 105 MMOL/L (ref 99–110)
CO2: 28 MMOL/L (ref 21–32)
CREAT SERPL-MCNC: <0.5 MG/DL (ref 0.6–1.2)
EOSINOPHILS ABSOLUTE: 0.1 K/UL (ref 0–0.6)
EOSINOPHILS RELATIVE PERCENT: 1.4 %
GFR AFRICAN AMERICAN: >60
GFR NON-AFRICAN AMERICAN: >60
GLOBULIN: 2.6 G/DL
GLUCOSE BLD-MCNC: 127 MG/DL (ref 70–99)
GLUCOSE BLD-MCNC: 127 MG/DL (ref 70–99)
GLUCOSE BLD-MCNC: 135 MG/DL (ref 70–99)
GLUCOSE BLD-MCNC: 155 MG/DL (ref 70–99)
GLUCOSE BLD-MCNC: 235 MG/DL (ref 70–99)
GLUCOSE BLD-MCNC: 248 MG/DL (ref 70–99)
HCO3 ARTERIAL: 29.1 MMOL/L (ref 21–29)
HCT VFR BLD CALC: 31.2 % (ref 36–48)
HEMOGLOBIN, ART, EXTENDED: 12.9 G/DL (ref 12–16)
HEMOGLOBIN: 9.9 G/DL (ref 12–16)
LYMPHOCYTES ABSOLUTE: 1.2 K/UL (ref 1–5.1)
LYMPHOCYTES RELATIVE PERCENT: 19.7 %
MCH RBC QN AUTO: 25 PG (ref 26–34)
MCHC RBC AUTO-ENTMCNC: 31.8 G/DL (ref 31–36)
MCV RBC AUTO: 78.6 FL (ref 80–100)
METHEMOGLOBIN ARTERIAL: 0.3 %
MONOCYTES ABSOLUTE: 0.4 K/UL (ref 0–1.3)
MONOCYTES RELATIVE PERCENT: 7 %
NEUTROPHILS ABSOLUTE: 4.5 K/UL (ref 1.7–7.7)
NEUTROPHILS RELATIVE PERCENT: 71.4 %
O2 SAT, ARTERIAL: 93 %
O2 THERAPY: ABNORMAL
PCO2 ARTERIAL: 49 MMHG (ref 35–45)
PDW BLD-RTO: 18.4 % (ref 12.4–15.4)
PERFORMED ON: ABNORMAL
PH ARTERIAL: 7.39 (ref 7.35–7.45)
PLATELET # BLD: 122 K/UL (ref 135–450)
PMV BLD AUTO: 9.4 FL (ref 5–10.5)
PO2 ARTERIAL: 67 MMHG (ref 75–108)
POTASSIUM REFLEX MAGNESIUM: 4 MMOL/L (ref 3.5–5.1)
RBC # BLD: 3.98 M/UL (ref 4–5.2)
SODIUM BLD-SCNC: 143 MMOL/L (ref 136–145)
TCO2 ARTERIAL: 30.6 MMOL/L
TOTAL PROTEIN: 5.8 G/DL (ref 6.4–8.2)
WBC # BLD: 6.3 K/UL (ref 4–11)

## 2021-10-10 PROCEDURE — 6360000002 HC RX W HCPCS: Performed by: INTERNAL MEDICINE

## 2021-10-10 PROCEDURE — 82803 BLOOD GASES ANY COMBINATION: CPT

## 2021-10-10 PROCEDURE — C9113 INJ PANTOPRAZOLE SODIUM, VIA: HCPCS | Performed by: INTERNAL MEDICINE

## 2021-10-10 PROCEDURE — 2500000003 HC RX 250 WO HCPCS: Performed by: INTERNAL MEDICINE

## 2021-10-10 PROCEDURE — 6370000000 HC RX 637 (ALT 250 FOR IP): Performed by: INTERNAL MEDICINE

## 2021-10-10 PROCEDURE — 2000000000 HC ICU R&B

## 2021-10-10 PROCEDURE — 94761 N-INVAS EAR/PLS OXIMETRY MLT: CPT

## 2021-10-10 PROCEDURE — 80053 COMPREHEN METABOLIC PANEL: CPT

## 2021-10-10 PROCEDURE — 94640 AIRWAY INHALATION TREATMENT: CPT

## 2021-10-10 PROCEDURE — 2580000003 HC RX 258: Performed by: INTERNAL MEDICINE

## 2021-10-10 PROCEDURE — 85025 COMPLETE CBC W/AUTO DIFF WBC: CPT

## 2021-10-10 PROCEDURE — 94003 VENT MGMT INPAT SUBQ DAY: CPT

## 2021-10-10 PROCEDURE — 2700000000 HC OXYGEN THERAPY PER DAY

## 2021-10-10 PROCEDURE — 99291 CRITICAL CARE FIRST HOUR: CPT | Performed by: INTERNAL MEDICINE

## 2021-10-10 PROCEDURE — 71045 X-RAY EXAM CHEST 1 VIEW: CPT

## 2021-10-10 RX ADMIN — CARBOXYMETHYLCELLULOSE SODIUM 1 DROP: 10 GEL OPHTHALMIC at 15:53

## 2021-10-10 RX ADMIN — Medication 100 MCG/HR: at 21:34

## 2021-10-10 RX ADMIN — WHITE PETROLATUM 57.7 %-MINERAL OIL 31.9 % EYE OINTMENT: at 20:06

## 2021-10-10 RX ADMIN — CARBOXYMETHYLCELLULOSE SODIUM 1 DROP: 10 GEL OPHTHALMIC at 08:52

## 2021-10-10 RX ADMIN — OXYCODONE 10 MG: 5 TABLET ORAL at 10:42

## 2021-10-10 RX ADMIN — CARBOXYMETHYLCELLULOSE SODIUM 1 DROP: 10 GEL OPHTHALMIC at 20:07

## 2021-10-10 RX ADMIN — GABAPENTIN 800 MG: 400 CAPSULE ORAL at 14:15

## 2021-10-10 RX ADMIN — CIPROFLOXACIN 400 MG: 2 INJECTION, SOLUTION INTRAVENOUS at 10:44

## 2021-10-10 RX ADMIN — CHLORHEXIDINE GLUCONATE 0.12% ORAL RINSE 15 ML: 1.2 LIQUID ORAL at 20:06

## 2021-10-10 RX ADMIN — Medication 0.3 MCG/KG/HR: at 13:41

## 2021-10-10 RX ADMIN — PANTOPRAZOLE SODIUM 40 MG: 40 INJECTION, POWDER, FOR SOLUTION INTRAVENOUS at 08:52

## 2021-10-10 RX ADMIN — WHITE PETROLATUM 57.7 %-MINERAL OIL 31.9 % EYE OINTMENT: at 04:21

## 2021-10-10 RX ADMIN — WHITE PETROLATUM 57.7 %-MINERAL OIL 31.9 % EYE OINTMENT: at 12:58

## 2021-10-10 RX ADMIN — MICONAZOLE NITRATE: 2 POWDER TOPICAL at 08:53

## 2021-10-10 RX ADMIN — INSULIN LISPRO 2 UNITS: 100 INJECTION, SOLUTION INTRAVENOUS; SUBCUTANEOUS at 20:11

## 2021-10-10 RX ADMIN — ASPIRIN 81 MG: 81 TABLET, CHEWABLE ORAL at 08:51

## 2021-10-10 RX ADMIN — WHITE PETROLATUM 57.7 %-MINERAL OIL 31.9 % EYE OINTMENT: at 17:11

## 2021-10-10 RX ADMIN — CASTOR OIL AND BALSAM, PERU: 788; 87 OINTMENT TOPICAL at 08:54

## 2021-10-10 RX ADMIN — SODIUM CHLORIDE, PRESERVATIVE FREE 10 ML: 5 INJECTION INTRAVENOUS at 08:54

## 2021-10-10 RX ADMIN — PROPOFOL 40 MCG/KG/MIN: 10 INJECTION, EMULSION INTRAVENOUS at 01:53

## 2021-10-10 RX ADMIN — Medication 100 MCG/HR: at 13:32

## 2021-10-10 RX ADMIN — PROPOFOL 40 MCG/KG/MIN: 10 INJECTION, EMULSION INTRAVENOUS at 12:08

## 2021-10-10 RX ADMIN — CASTOR OIL AND BALSAM, PERU: 788; 87 OINTMENT TOPICAL at 20:07

## 2021-10-10 RX ADMIN — MIDAZOLAM HYDROCHLORIDE 2 MG: 1 INJECTION, SOLUTION INTRAMUSCULAR; INTRAVENOUS at 17:30

## 2021-10-10 RX ADMIN — CARBOXYMETHYLCELLULOSE SODIUM 1 DROP: 10 GEL OPHTHALMIC at 04:20

## 2021-10-10 RX ADMIN — PROPOFOL 40 MCG/KG/MIN: 10 INJECTION, EMULSION INTRAVENOUS at 21:11

## 2021-10-10 RX ADMIN — SODIUM CHLORIDE, PRESERVATIVE FREE 10 ML: 5 INJECTION INTRAVENOUS at 20:08

## 2021-10-10 RX ADMIN — OXYCODONE 10 MG: 5 TABLET ORAL at 16:02

## 2021-10-10 RX ADMIN — GABAPENTIN 800 MG: 400 CAPSULE ORAL at 20:06

## 2021-10-10 RX ADMIN — CIPROFLOXACIN 400 MG: 2 INJECTION, SOLUTION INTRAVENOUS at 21:37

## 2021-10-10 RX ADMIN — OXYCODONE 10 MG: 5 TABLET ORAL at 20:06

## 2021-10-10 RX ADMIN — MICONAZOLE NITRATE: 2 POWDER TOPICAL at 20:08

## 2021-10-10 RX ADMIN — ENOXAPARIN SODIUM 30 MG: 30 INJECTION SUBCUTANEOUS at 08:52

## 2021-10-10 RX ADMIN — PROPOFOL 40 MCG/KG/MIN: 10 INJECTION, EMULSION INTRAVENOUS at 06:37

## 2021-10-10 RX ADMIN — IPRATROPIUM BROMIDE AND ALBUTEROL SULFATE 1 AMPULE: .5; 2.5 SOLUTION RESPIRATORY (INHALATION) at 03:18

## 2021-10-10 RX ADMIN — ENOXAPARIN SODIUM 30 MG: 30 INJECTION SUBCUTANEOUS at 20:06

## 2021-10-10 RX ADMIN — LEVOTHYROXINE SODIUM 100 MCG: 100 TABLET ORAL at 05:08

## 2021-10-10 RX ADMIN — AMLODIPINE BESYLATE 2.5 MG: 2.5 TABLET ORAL at 08:52

## 2021-10-10 RX ADMIN — WHITE PETROLATUM 57.7 %-MINERAL OIL 31.9 % EYE OINTMENT: at 01:32

## 2021-10-10 RX ADMIN — ATORVASTATIN CALCIUM 40 MG: 40 TABLET, FILM COATED ORAL at 08:51

## 2021-10-10 RX ADMIN — OXYCODONE 10 MG: 5 TABLET ORAL at 04:30

## 2021-10-10 RX ADMIN — WHITE PETROLATUM 57.7 %-MINERAL OIL 31.9 % EYE OINTMENT: at 08:53

## 2021-10-10 RX ADMIN — CHLORHEXIDINE GLUCONATE 0.12% ORAL RINSE 15 ML: 1.2 LIQUID ORAL at 08:52

## 2021-10-10 RX ADMIN — IPRATROPIUM BROMIDE AND ALBUTEROL SULFATE 1 AMPULE: .5; 2.5 SOLUTION RESPIRATORY (INHALATION) at 19:06

## 2021-10-10 RX ADMIN — GABAPENTIN 800 MG: 400 CAPSULE ORAL at 08:51

## 2021-10-10 RX ADMIN — Medication 100 MCG/HR: at 02:36

## 2021-10-10 RX ADMIN — PROPOFOL 40 MCG/KG/MIN: 10 INJECTION, EMULSION INTRAVENOUS at 17:08

## 2021-10-10 RX ADMIN — LOSARTAN POTASSIUM 25 MG: 25 TABLET, FILM COATED ORAL at 08:51

## 2021-10-10 RX ADMIN — SODIUM CHLORIDE, PRESERVATIVE FREE 10 ML: 5 INJECTION INTRAVENOUS at 08:53

## 2021-10-10 RX ADMIN — IPRATROPIUM BROMIDE AND ALBUTEROL SULFATE 1 AMPULE: .5; 2.5 SOLUTION RESPIRATORY (INHALATION) at 07:10

## 2021-10-10 RX ADMIN — SODIUM CHLORIDE, PRESERVATIVE FREE 10 ML: 5 INJECTION INTRAVENOUS at 20:07

## 2021-10-10 RX ADMIN — IPRATROPIUM BROMIDE AND ALBUTEROL SULFATE 1 AMPULE: .5; 2.5 SOLUTION RESPIRATORY (INHALATION) at 10:46

## 2021-10-10 RX ADMIN — INSULIN LISPRO 2 UNITS: 100 INJECTION, SOLUTION INTRAVENOUS; SUBCUTANEOUS at 15:56

## 2021-10-10 RX ADMIN — IPRATROPIUM BROMIDE AND ALBUTEROL SULFATE 1 AMPULE: .5; 2.5 SOLUTION RESPIRATORY (INHALATION) at 23:03

## 2021-10-10 RX ADMIN — DEXAMETHASONE SODIUM PHOSPHATE 6 MG: 10 INJECTION INTRAMUSCULAR; INTRAVENOUS at 12:57

## 2021-10-10 RX ADMIN — INSULIN LISPRO 1 UNITS: 100 INJECTION, SOLUTION INTRAVENOUS; SUBCUTANEOUS at 13:05

## 2021-10-10 RX ADMIN — IPRATROPIUM BROMIDE AND ALBUTEROL SULFATE 1 AMPULE: .5; 2.5 SOLUTION RESPIRATORY (INHALATION) at 15:32

## 2021-10-10 ASSESSMENT — PULMONARY FUNCTION TESTS
PIF_VALUE: 37
PIF_VALUE: 36
PIF_VALUE: 34
PIF_VALUE: 45
PIF_VALUE: 32
PIF_VALUE: 35
PIF_VALUE: 31
PIF_VALUE: 31
PIF_VALUE: 28
PIF_VALUE: 38
PIF_VALUE: 34
PIF_VALUE: 37
PIF_VALUE: 35
PIF_VALUE: 37
PIF_VALUE: 31
PIF_VALUE: 38

## 2021-10-10 ASSESSMENT — PAIN SCALES - GENERAL
PAINLEVEL_OUTOF10: 0
PAINLEVEL_OUTOF10: 0

## 2021-10-10 NOTE — PROGRESS NOTES
FiO2 decreased to 60%         10/10/21 0711   Vent Information   Vt Ordered 390 mL   Rate Set 24 bmp   Peak Flow 65 L/min   Pressure Support 0 cmH20   FiO2  60 %   SpO2 94 %   SpO2/FiO2 ratio 156.67   Sensitivity 3   PEEP/CPAP 12   I Time/ I Time % 0 s   Humidification Source Heated wire   Humidification Temp Measured 37   Vent Patient Data   High Peep/I Pressure 0   Peak Inspiratory Pressure 38 cmH2O   Mean Airway Pressure 19 cmH20   Rate Measured 24 br/min   Vt Exhaled 400 mL   Minute Volume 9.6 Liters   I:E Ratio 1:2.80   Plateau Pressure 35 ILV30   Static Compliance 17 mL/cmH2O   Cough/Sputum   Sputum How Obtained Endotracheal;Suctioned   Cough Productive   Sputum Amount Large   Sputum Color Creamy   Tenacity Thick   Spontaneous Breathing Trial (SBT) RT Doc   Pulse 51   Breath Sounds   Right Upper Lobe Clear   Right Middle Lobe Diminished   Right Lower Lobe Diminished   Left Upper Lobe Clear   Left Lower Lobe Diminished   Additional Respiratory  Assessments   Resp 24   Position Semi-Jesus's   Oral Care Completed? Yes   Oral Care Mouth suctioned   Subglottic Suction Done?  Yes   Alarm Settings   High Pressure Alarm 45 cmH2O   Low Minute Volume Alarm 4 L/min   High Respiratory Rate 40 br/min   ETT (adult)   Placement Date/Time: 10/06/21 1208   Timeout: Patient  Preoxygenation: Yes  Technique: Rapid sequence  Type: Cuffed  Tube Size: 7.5 mm  Laryngoscope: GlideScope  Secured at: 23 cm  Measured From: Lips   Secured at 23 cm   Measured From Lips   ET Placement Right   Secured By Commercial tube carranza

## 2021-10-10 NOTE — PROGRESS NOTES
Care rounds completed with Dr Dyer and multidisciplinary team.  Reviewed labs, meds, VS (temp/HR/RR), I/O's, assessment, & plan of care for today. See progress note & new orders for details.  Suad Virgen RN

## 2021-10-10 NOTE — PROGRESS NOTES
10/10/21 0319   Vent Information   Vent Type 980   Vent Mode AC/VC   Vt Ordered 390 mL   Rate Set 24 bmp   Peak Flow 65 L/min   Pressure Support 0 cmH20   FiO2  65 %   SpO2 93 %   SpO2/FiO2 ratio 143.08   Sensitivity 3   PEEP/CPAP 12   I Time/ I Time % 0 s   Humidification Source Heated wire   Humidification Temp Measured 37   Circuit Condensation Drained   Vent Patient Data   High Peep/I Pressure 0   Peak Inspiratory Pressure 38 cmH2O   Mean Airway Pressure 19 cmH20   Rate Measured 24 br/min   Vt Exhaled 400 mL   Minute Volume 9.59 Liters   I:E Ratio 1:2.80   Cough/Sputum   Sputum How Obtained Endotracheal   Cough Productive   Sputum Amount Small   Sputum Color Creamy   Tenacity Thick   Spontaneous Breathing Trial (SBT) RT Doc   Pulse (!) 48   Breath Sounds   Right Upper Lobe Diminished   Right Middle Lobe Diminished   Right Lower Lobe Diminished   Left Upper Lobe Diminished   Left Lower Lobe Diminished   Additional Respiratory  Assessments   Resp 24   Alarm Settings   High Pressure Alarm 45 cmH2O   Low Minute Volume Alarm 4 L/min   Apnea (secs) 20 secs   High Respiratory Rate 40 br/min   Low Exhaled Vt  210 mL   Patient Observation   Observations 7.5 ett 23 at lip

## 2021-10-10 NOTE — PROGRESS NOTES
10/10/21 1907   Vent Information   $Ventilation $Subsequent Day   Skin Assessment Clean, dry, & intact   Vent Type 980   Vent Mode AC/VC   Vt Ordered 390 mL   Rate Set 24 bmp   Peak Flow 65 L/min   Pressure Support 0 cmH20   FiO2  60 %   SpO2 93 %   SpO2/FiO2 ratio 155   Sensitivity 3   PEEP/CPAP 12   I Time/ I Time % 0 s   Humidification Source Heated wire   Humidification Temp Measured 37   Circuit Condensation Drained   Vent Patient Data   High Peep/I Pressure 0   Peak Inspiratory Pressure 36 cmH2O   Mean Airway Pressure 19 cmH20   Rate Measured 24 br/min   Vt Exhaled 390 mL   Minute Volume 9.36 Liters   I:E Ratio 1:2.80   Plateau Pressure 34 LDE56   Static Compliance 18 mL/cmH2O   Dynamic Compliance 16 mL/cmH2O   Cough/Sputum   Sputum How Obtained Endotracheal   Cough Productive   Sputum Amount Small   Sputum Color Creamy   Tenacity Thick   Spontaneous Breathing Trial (SBT) RT Doc   Pulse 51   Breath Sounds   Right Upper Lobe Diminished   Right Middle Lobe Diminished   Right Lower Lobe Diminished   Left Upper Lobe Diminished   Left Lower Lobe Diminished   Additional Respiratory  Assessments   Resp 24   Alarm Settings   High Pressure Alarm 45 cmH2O   Low Minute Volume Alarm 4 L/min   High Respiratory Rate 40 br/min   Low Exhaled Vt  210 mL   Patient Observation   Observations 7.5 ett 23 at lip

## 2021-10-10 NOTE — PROGRESS NOTES
ICU Progress Note    Admit Date:  9/26/2021      Interval history:  Admitted with COVID-19 pneumonia,  acute hypoxic respiratory failure. Patient not vaccinated. Progressive significant worsening hypoxemia overnight. Patient was on 2 L of oxygen on admission-> switched to high flow oxygen per Vapotherm with additional 100% nonrebreather on 9/28/2021-> transferred to ICU. Worsening hypoxemia overnight. Intubated and placed on mechanical ventilation early AM on 9/29/2021. Placed in prone positioning. 10/2 Resp culture growing pseudomonas - started zosyn. 10/4 Patient  extubated. on 100% Vapotherm. Answers questions appropriately. 10/5 oxygen demands have increased and she has been placed in the prone position. She is on 100% Vapotherm. 10/6-patient is respiratory status worsened. She was on 100% FiO2 on Vapotherm and 100% nonrebreather in the prone position with saturations in the upper 80s. She is on Precedex. Because of worsening respiratory status she was reintubated on 10/6/2021.      10/7-oxygenation is improved. She was paralyzed and proned. Hypoxemia is better. FiO2 down to 45%. Plans to turn her supine. 10/8-patient is in supine position. FiO2 up slightly to 55%. Plans to prone her if her oxygen demands continue to be high. On light sedation. Can follow simple commands. Patient was extubated on 10/4/2021, reintubated on 10/6/2021. Persistent hypoxemia    Subjective     10/10  Ms. Shayy seen. She is doing about the same  Patient is currently on intubated, on mechanical ventilation. Remains on FiO2 65% PEEP of 12. .  She is on sedatives but awake responding well and in no distress. Stable in supine position.  .        Objective:   BP (!) 112/58   Pulse 69   Temp 98 °F (36.7 °C) (Oral)   Resp 13   Ht 5' 9\" (1.753 m)   Wt 190 lb 14.7 oz (86.6 kg)   SpO2 93%   BMI 28.19 kg/m²        Intake/Output Summary (Last 24 hours) at 10/10/2021 3806  Last data filed at 10/10/2021 1343  Gross per 24 hour   Intake 1873.37 ml   Output 3860 ml   Net -1986.63 ml         Physical Exam:  General: Awake , responding well , intubated on mechanical ventilation . Supine position . Skin:  Warm and dry  Neck:  JVD absent. Neck supple  Chest: diminished breath sounds, improved air entry. No wheezes or rhonchi. Bibasilar crackles. Cardiovascular:  RRR ,S1S2 normal  Abdomen:  Soft, non tender, non distended, BS +  Extremities:  No edema. Intact peripheral pulses. Brisk cap refill, < 2 secs  Neuro:  Follows simple commands and moves all 4 extremities      Medications:   Scheduled Meds:   ciprofloxacin  400 mg IntraVENous Q12H    Venelex   Topical BID    miconazole   Topical BID    oxyCODONE  10 mg Oral Q6H    influenza virus vaccine  0.5 mL IntraMUSCular Prior to discharge    carboxymethylcellulose PF  1 drop Both Eyes Q4H    And    artificial tears   Both Eyes Q4H    chlorhexidine  15 mL Mouth/Throat BID    ipratropium-albuterol  1 ampule Inhalation Q4H    amLODIPine  2.5 mg Oral Daily    losartan  25 mg Oral Daily    pantoprazole  40 mg IntraVENous Daily    insulin lispro  0-6 Units SubCUTAneous Q4H    levothyroxine  100 mcg Oral Daily    enoxaparin  30 mg SubCUTAneous BID    dexamethasone  6 mg IntraVENous Q24H    lidocaine 1 % injection  5 mL IntraDERmal Once    sodium chloride flush  5-40 mL IntraVENous 2 times per day    aspirin  81 mg Oral Daily    gabapentin  800 mg Oral TID    atorvastatin  40 mg Oral Daily    sodium chloride flush  5-40 mL IntraVENous 2 times per day       Continuous Infusions:   fentaNYL 100 mcg/hr (10/10/21 1343)    propofol 40 mcg/kg/min (10/10/21 1343)    dexmedetomidine HCl in NaCl 0.3 mcg/kg/hr (10/10/21 1343)    sodium chloride      dextrose      sodium chloride         Data:  CBC:   Recent Labs     10/08/21  0450 10/09/21  0556 10/10/21  0520   WBC 8.0 7.1 6.3   RBC 3.79* 3.88* 3.98*   HGB 10.3* 9.7* 9.9*   HCT 29.8* 30.2* 31.2* MCV 78.6* 77.7* 78.6*   RDW 17.7* 18.1* 18.4*   * 129* 122*     BMP:   Recent Labs     10/08/21  0450 10/08/21  0630 10/09/21  0556 10/10/21  0520   *  --  135* 143   K  --  3.6 3.5 4.0     --  102 105   CO2 23  --  25 28   BUN 14  --  17 14   CREATININE <0.5*  --  <0.5* <0.5*     BNP: No results for input(s): BNP in the last 72 hours. PT/INR:   No results for input(s): PROTIME, INR in the last 72 hours. APTT: No results for input(s): APTT in the last 72 hours. CARDIAC ENZYMES:   No results for input(s): CKMB, CKMBINDEX, TROPONINI in the last 72 hours. Invalid input(s): CKTOTAL;3  FASTING LIPID PANEL:  Lab Results   Component Value Date    CHOL 156 10/18/2018    HDL 39 (L) 10/18/2018    TRIG 1,257 (H) 10/08/2021     LIVER PROFILE:   Recent Labs     10/08/21  0450 10/08/21  0630 10/09/21  0556 10/10/21  0520   AST  --  28 31 31   ALT 20  --  23 23   BILITOT 0.3  --  0.3 0.3   ALKPHOS 126  --  134* 147*           CULTURES  COVID 19, NAAT: Detected    Susceptibility    Pseudomonas fluorescens (1)    Antibiotic Interpretation AMANDA Status    cefepime Sensitive <=2 mcg/mL     ciprofloxacin Sensitive <=1 mcg/mL     gentamicin Sensitive <=4 mcg/mL     meropenem Intermediate 8 mcg/mL     piperacillin-tazobactam Sensitive <=16 mcg/mL     tobramycin Sensitive <=4 mcg/       Respiratory cultures from 10/7/2021  -Light growth Klebsiella    EKG:   Normal sinus rhythm  Left ventricular hypertrophy with repolarization abnormality  Prolonged QT  ST abnormality inferior leads consider ischemia  Abnormal ECG  When compared with ECG of 05-SEP-2020 08:35,  Premature ventricular complexes are no longer Present  T wave inversion now evident in Inferior leads  Confirmed by DANNA CHILDS, REYNALDO     Radiology  XR CHEST PORTABLE   Final Result   Improving aeration of the right lung with grossly stable left basilar   airspace opacities. XR CHEST PORTABLE   Final Result   Supportive tubing is in stable position. Stable pattern of bilateral ground-glass opacities and basilar consolidations. XR CHEST PORTABLE   Final Result   Stable appearance of the chest.  Diffuse ground-glass opacities on the right   and mild left basilar opacities. XR CHEST PORTABLE   Final Result   Improved aeration on the left, possibly improving pulmonary edema. Persistent airspace disease in the right base may represent concurrent   pneumonia         XR ABDOMEN (KUB) (SINGLE AP VIEW)   Final Result   Enteric catheter tip likely in the distal gastric body. XR CHEST PORTABLE   Final Result   1. Endotracheal tube projects in the appropriate position. 2. Enteric tube extends below the diaphragm and out of the field of view. XR CHEST PORTABLE   Final Result   Increasing diffuse airspace opacification throughout the lungs. Pattern may   represent pulmonary edema or worsening pneumonitis. XR CHEST PORTABLE   Final Result   1. Interval increased bilateral pulmonary opacities with new consolidative   change at the right lung base. These findings could be secondary to   pulmonary edema or infection. 2. Consolidative change at the right lung base could also be secondary to   atelectasis/mucous plug. XR CHEST PORTABLE   Final Result   No significant interval change in small right pleural effusion or bilateral   heterogeneous opacity which can reflect pulmonary edema or pneumonia. XR CHEST PORTABLE   Final Result   Mild improvement from prior comparison. Mild-to-moderate congestion and/or   infiltrates identified in the lungs. ET tube terminates 7 cm superior to the saba. XR CHEST PORTABLE   Final Result   Endotracheal tube and orogastric tube unchanged, adequate position. Slightly increased extensive bilateral pulmonary disease over the past 24   hours this may relate to interstitial edema or diffuse infection or a   combination.          XR CHEST PORTABLE   Final Result Endotracheal tube with its tip approximately 4.7 cm from the saba in   satisfactory position. Enteric tube in satisfactory position. Patchy bilateral airspace disease is again noted with improved aeration of   the left mid lung. XR CHEST PORTABLE   Final Result   Some improvement in the appearance of the chest with clearing of some of the   acute airspace disease from the mid left lung. Large amount of pneumonia   remains within the right lung and lower left lung. XR CHEST PORTABLE   Final Result   1. Endotracheal tube, nasogastric tube, and right PICC line placement. 2.  Increasing multifocal opacities with consolidative area in the right   perihilar lung. Could be multifocal pneumonia with or without edema. IR PICC WO SQ PORT/PUMP > 5 YEARS   Final Result   1. See above. CT CHEST PULMONARY EMBOLISM W CONTRAST   Final Result   No evidence of pulmonary embolism. Scattered peripheral opacities in the left lung and more consolidative area   in the right lung concerning for pneumonia. Previous right thoracotomy and   upper lobectomy. One mildly enlarged right paratracheal lymph node is present but no priors   for comparison. Nonenlarged but prominent lymph nodes in the upper abdomen   and juxta diaphragmatic region. XR CHEST PORTABLE   Final Result   Mild cardiomegaly without interstitial edema. Metallic surgical clips from prior right thoracotomy. COPD with anterior segment-right upper lobe alveolar pneumonia. Old pleural thickening was noted along the right lateral chest wall. Pleural   thickening and or trace effusion blunts the right costophrenic angle.          XR CHEST PORTABLE    (Results Pending)         Assessment:  Principal Problem:    COVID-19  Active Problems:    Acute respiratory failure with hypoxia (HCC)    Hypothyroidism    Essential hypertension    DM2 (diabetes mellitus, type 2) (HCC)    Dehydration    Suspected COVID-19 virus infection    Hypoxia    Pneumonia due to COVID-19 virus    CHARLY (acute kidney injury) ([facility] [address]    Uncontrolled hypertension    Fever    Hypotension    Mild protein-calorie malnutrition (HCC)    Gram-negative pneumonia ([facility] [address]    Pneumonia due to Pseudomonas species ([facility] [address]    Hypomagnesemia    Hypokalemia    Acute hypoxemic respiratory failure (HCC)  Resolved Problems:    * No resolved hospital problems. *      Plan:    #COVID-19 pneumonia  #Acute hypoxic respiratory failure   Pseudomonas HCAP. -Unvaccinated patient  - she presented with cough, fever, dyspnea in the setting of household contact testing + COVID 19  - Her Covid testing came back positive on admission  - Initially on 2 L of oxygen on presentation. She started desaturating quickly . worsening hypoxemia requiring high flow oxygen. - Pulmonology consulted  -placed on high flow oxygen per Vapotherm 40 L,FiO2 100% ,with additional nonrebreather . Transferred to ICU on 9/28. Intubated early AM  9/29/2021. Extubated on 10/4/2021. She was on Vapotherm. Pulmonary status got worse and she was reintubated on 10/6/2021.   -Continue mechanical ventilation. Wean O2 as tolerated. .  Currently FiO2 down to 65% on 12 PEEP. S/p prone ventilation again. - s/p Remdesivir  - on Decadron, day # 14.   - given 1 dose of Tocilizumab  -Lovenox twice daily    #Possible superimposed bacterial pneumonia  Gram-negative infection  -Was on Rocephin and Zithromax day 5 of 5; - changed to zosyn on 10/2 with pseudomonas on resp culture. Pulmonary has started Zyvox. Hold Zoloft when patient is getting Zyvox . Completed Zosyn day 8 of 8. Continue Zyvox day #4  Respiratory cultures now growing Klebsiella, initiated on Cipro day #2    #Hypokalemia  - replaced    #Dehydration  -  Improved with IVF  Creatinine normal     #CAD  - cont ASA, statin  - EKG with inferior T wave abnormality. She denies CP.   Trop neg   -telemetry monitoring    #History of tracheobronchomalacia  - Has had surgical treatment for this     #HTN  -blood pressure is low and she was on Levophed. This has been discontinued. Resumed home medications-Norvasc and losartan.     #Chronic pain   History of hip fracture  - cont home oxycodone BID and percocet   - cont gabapentin and requip    #DM2  - use ssi     #Hypothyroidism  - cont synthroid         DVT Prophylaxis: Lovenox   Diet NPO  ADULT TUBE FEEDING; Orogastric; Peptide Based; Continuous; 20; Yes; 15; Q 4 hours; 45; 30; Q 3 hours; Protein; one proteinex P2Go once daily via feeding tube  Code Status: Full Code        Margarita Barrett MD, MD 10/10/2021 4:49 PM

## 2021-10-10 NOTE — PROGRESS NOTES
Initial exam completed- See doc flowsheet for assessment findings. Pt resting quietly in bed wakes easily and shakes head no when asked if she is having any pain or shortness of breath. All lines and monitoring devices are in place . Vitals and SpO2 stable. Call light is within easy reach. Plan of care and goals reviewed.  Odin Krause RN

## 2021-10-10 NOTE — PROGRESS NOTES
10/09/21 2028   Vent Information   $Ventilation $Subsequent Day   Skin Assessment Clean, dry, & intact   Vent Type 980   Vt Ordered 390 mL   Rate Set 24 bmp   Peak Flow 65 L/min   Pressure Support 0 cmH20   FiO2  65 %   SpO2 90 %   SpO2/FiO2 ratio 138.46   Sensitivity 3   PEEP/CPAP 12   I Time/ I Time % 0 s   Humidification Source Heated wire   Humidification Temp Measured 37   Circuit Condensation Drained   Vent Patient Data   High Peep/I Pressure 0   Peak Inspiratory Pressure 31 cmH2O   Mean Airway Pressure 18 cmH20   Rate Measured 24 br/min   Vt Exhaled 395 mL   Minute Volume 9.45 Liters   I:E Ratio 1:2.80   Plateau Pressure 28 [id number]   Static Compliance 24 mL/cmH2O   Dynamic Compliance 21 mL/cmH2O   Cough/Sputum   Sputum How Obtained Endotracheal   Cough Productive   Sputum Amount Small   Sputum Color Creamy   Tenacity Thick   Spontaneous Breathing Trial (SBT) RT Doc   Pulse 58   Breath Sounds   Right Upper Lobe Diminished   Right Middle Lobe Diminished   Right Lower Lobe Diminished   Left Upper Lobe Diminished   Left Lower Lobe Diminished   Additional Respiratory  Assessments   Resp 24   Alarm Settings   High Pressure Alarm 45 cmH2O   Low Minute Volume Alarm 4 L/min   Apnea (secs) 20 secs   High Respiratory Rate 40 br/min   Low Exhaled Vt  210 mL   Patient Observation   Observations 7.5 ett 23 at lip

## 2021-10-10 NOTE — PROGRESS NOTES
10/10/21 0100   RT Protocol   History Pulmonary Disease 1   Respiratory pattern 2   Breath sounds 2   Cough 2   Indications for Bronchodilator Therapy Decreased or absent breath sounds   Bronchodilator Assessment Score 7   RT Inhaler-Nebulizer Bronchodilator Protocol Note    There is a bronchodilator order in the chart from a provider indicating to follow the RT Bronchodilator Protocol and there is an Initiate RT Inhaler-Nebulizer Bronchodilator Protocol order as well (see protocol at bottom of note). CXR Findings:  XR CHEST PORTABLE    Result Date: 10/9/2021  Supportive tubing is in stable position. Stable pattern of bilateral ground-glass opacities and basilar consolidations. XR CHEST PORTABLE    Result Date: 10/8/2021  Stable appearance of the chest.  Diffuse ground-glass opacities on the right and mild left basilar opacities. The findings from the last RT Protocol Assessment were as follows:   History Pulmonary Disease: Smoker 15 pack years or more  Respiratory Pattern: Dyspnea on exertion or RR 21-25 bpm  Breath Sounds: Slightly diminished and/or crackles  Cough: Weak, productive  Indication for Bronchodilator Therapy: Decreased or absent breath sounds  Bronchodilator Assessment Score: 7    Aerosolized bronchodilator medication orders have been revised according to the RT Inhaler-Nebulizer Bronchodilator Protocol below. Respiratory Therapist to perform RT Therapy Protocol Assessment initially then follow the protocol. Repeat RT Therapy Protocol Assessment PRN for score 0-3 or on second treatment, BID, and PRN for scores above 3. No Indications - adjust the frequency to every 6 hours PRN wheezing or bronchospasm, if no treatments needed after 48 hours then discontinue using Per Protocol order mode. If indication present, adjust the RT bronchodilator orders based on the Bronchodilator Assessment Score as indicated below.   Use Inhaler orders unless patient has one or more of the following: on home nebulizer, not able to hold breath for 10 seconds, is not alert and oriented, cannot activate and use MDI correctly, or respiratory rate 25 breaths per minute or more, then use the equivalent nebulizer order(s) with same Frequency and PRN reasons based on the score. If a patient is on this medication at home then do not decrease Frequency below that used at home. 0-3 - enter or revise RT bronchodilator order(s) to equivalent RT Bronchodilator order with Frequency of every 4 hours PRN for wheezing or increased work of breathing using Per Protocol order mode. 4-6 - enter or revise RT Bronchodilator order(s) to two equivalent RT bronchodilator orders with one order with BID Frequency and one order with Frequency of every 4 hours PRN wheezing or increased work of breathing using Per Protocol order mode. 7-10 - enter or revise RT Bronchodilator order(s) to two equivalent RT bronchodilator orders with one order with TID Frequency and one order with Frequency of every 4 hours PRN wheezing or increased work of breathing using Per Protocol order mode. 11-13 - enter or revise RT Bronchodilator order(s) to one equivalent RT bronchodilator order with QID Frequency and an Albuterol order with Frequency of every 4 hours PRN wheezing or increased work of breathing using Per Protocol order mode. Greater than 13 - enter or revise RT Bronchodilator order(s) to one equivalent RT bronchodilator order with every 4 hours Frequency and an Albuterol order with Frequency of every 2 hours PRN wheezing or increased work of breathing using Per Protocol order mode. RT to enter RT Home Evaluation for COPD & MDI Assessment order using Per Protocol order mode.     Electronically signed by Danielle Parrish RCP on 10/10/2021 at 1:37 AM

## 2021-10-10 NOTE — PROGRESS NOTES
10/10/21 1533   Vent Information   Vent Type 980   Vent Mode AC/VC   Vt Ordered 390 mL   Rate Set 24 bmp   Peak Flow 65 L/min   Pressure Support 0 cmH20   FiO2  70 %   SpO2 92 %   SpO2/FiO2 ratio 131.43   Sensitivity 3   PEEP/CPAP 12   I Time/ I Time % 0 s   Humidification Source Heated wire   Humidification Temp 37   Circuit Condensation Drained   Vent Patient Data   High Peep/I Pressure 0   Peak Inspiratory Pressure 37 cmH2O   Mean Airway Pressure 19 cmH20   Rate Measured 24 br/min   Vt Exhaled 399 mL   Minute Volume 9.59 Liters   I:E Ratio 1:2.80   Cough/Sputum   Cough None   Spontaneous Breathing Trial (SBT) RT Doc   Pulse 50   Breath Sounds   Right Upper Lobe Diminished   Right Middle Lobe Diminished   Right Lower Lobe Diminished   Left Upper Lobe Diminished   Left Lower Lobe Diminished   Additional Respiratory  Assessments   Resp 24   Position Semi-Jesus's   Alarm Settings   High Pressure Alarm 45 cmH2O   Low Minute Volume Alarm 4 L/min   Apnea (secs) 20 secs   High Respiratory Rate 40 br/min   ETT (adult)   Placement Date/Time: 10/06/21 1208   Timeout: Patient  Preoxygenation: Yes  Technique: Rapid sequence  Type: Cuffed  Tube Size: 7.5 mm  Laryngoscope: GlideScope  Secured at: 23 cm  Measured From: Lips   Secured at 23 cm   Measured From Lips   Secured By Commercial tube carranza

## 2021-10-10 NOTE — PROGRESS NOTES
Pulmonary & Critical Care Medicine ICU Progress Note    CC: Respiratory failure    Events of Last 24 hours: Tolerating supine  Fent 100  Propofol 40  Precedex 0.3  Vascular lines: IV: PICC line     MV: -10/4, emergently re-intubated 10/6/21 for refractory hypoxemia  Vent Mode: AC/VC Rate Set: 24 bmp/Vt Ordered: 390 mL/ /FiO2 : 60 %  Recent Labs     10/09/21  0556 10/10/21  0520   PHART 7.416 7.392   [id number] 45.6* 49.0*   PO2ART 61.9* 67.0*       IV:   fentaNYL 100 mcg/hr (10/10/21 0236)    propofol 40 mcg/kg/min (10/10/21 2933)    dexmedetomidine HCl in NaCl 0.3 mcg/kg/hr (10/09/21 1956)    sodium chloride      dextrose      sodium chloride         Vitals:  Blood pressure (!) 111/53, pulse 51, temperature 98.4 °F (36.9 °C), temperature source Axillary, resp. rate 24, height 5' 9\" (1.753 m), weight 190 lb 14.7 oz (86.6 kg), SpO2 94 %, not currently breastfeeding. on ventilator  Temp  Av.4 °F (36.9 °C)  Min: 98 °F (36.7 °C)  Max: 98.6 °F (37 °C)    Intake/Output Summary (Last 24 hours) at 10/10/2021 0743  Last data filed at 10/10/2021 0600  Gross per 24 hour   Intake 2088.32 ml   Output 3915 ml   Net -1826.68 ml     EXAM (COVID or R/O):  General: intubated, ill appearing    ENT:   Pharynx with ETT. Neck: Trachea midline  Resp: No accessory muscle use. CV: Regular rate. Regular rhythm. GI:  Non-distended.     Neuro: Sedated  Psych: Unable to obtain because the patient is non-communicative   Scheduled Meds:   ciprofloxacin  400 mg IntraVENous Q12H    Venelex   Topical BID    miconazole   Topical BID    oxyCODONE  10 mg Oral Q6H    influenza virus vaccine  0.5 mL IntraMUSCular Prior to discharge    carboxymethylcellulose PF  1 drop Both Eyes Q4H    And    artificial tears   Both Eyes Q4H    chlorhexidine  15 mL Mouth/Throat BID    ipratropium-albuterol  1 ampule Inhalation Q4H    amLODIPine  2.5 mg Oral Daily    losartan  25 mg Oral Daily    pantoprazole  40 mg IntraVENous Daily  insulin lispro  0-6 Units SubCUTAneous Q4H    levothyroxine  100 mcg Oral Daily    enoxaparin  30 mg SubCUTAneous BID    dexamethasone  6 mg IntraVENous Q24H    lidocaine 1 % injection  5 mL IntraDERmal Once    sodium chloride flush  5-40 mL IntraVENous 2 times per day    aspirin  81 mg Oral Daily    gabapentin  800 mg Oral TID    atorvastatin  40 mg Oral Daily    sodium chloride flush  5-40 mL IntraVENous 2 times per day       Data:  CBC:   Recent Labs     10/08/21  0450 10/09/21  0556 10/10/21  0520   WBC 8.0 7.1 6.3   HGB 10.3* 9.7* 9.9*   HCT 29.8* 30.2* 31.2*   MCV 78.6* 77.7* 78.6*   * 129* 122*     BMP:   Recent Labs     10/08/21  0450 10/08/21  0630 10/09/21  0556 10/10/21  0520   *  --  135* 143   K  --  3.6 3.5 4.0     --  102 105   CO2 23  --  25 28   BUN 14  --  17 14   CREATININE <0.5*  --  <0.5* <0.5*     LIVER PROFILE:   Recent Labs     10/08/21  0450 10/08/21  0630 10/09/21  0556 10/10/21  0520   AST  --  28 31 31   ALT 20  --  23 23   BILITOT 0.3  --  0.3 0.3   ALKPHOS 126  --  134* 147*       Microbiology:  9/27 COVID-19 detected  9/30 Resp Pseudomonas fluorescens   10/7/2021 tracheal aspirate: Klebsiella intermediate to zosyn    Imaging:  Chest x-ray 10/10/21   Improving aeration of the right lung with grossly stable left basilar   airspace opacities. CTPA 9/27/21  No evidence of pulmonary embolism. Scattered peripheral opacities in the left lung and more consolidative area in the right lung concerning for pneumonia.  Previous right thoracotomy and upper lobectomy.    One mildly enlarged right paratracheal lymph node is present but no priors for comparison.  Nonenlarged but prominent lymph nodes in the upper abdomen and juxta diaphragmatic region      ASSESSMENT:  · Acute hypoxemic respiratory failure   · COVID-19 viral pneumonia  · Pseudomonas followed by Klebsiella PNA   · TMP  · Acute kidney injury  · CAD  · Chronic pain syndrome - on opiates and Neurontin · Possible early dementia, being worked up outpatient   · H/O bronchiectasis and tracheobronchomalacia/EDAC s/p tracheoplasty in 2014      PLAN:  COVID-19 isolation, droplet plus    Mechanical ventilation as per my orders. The ventilator was adjusted by me at the bedside for unstable, life threatening respiratory failure. Proned 10/6/21-10/7/21   Paralyzed 10/6/21 - 10/7/21  IV Propofol and Precedex for sedation, target RASS -5 while paralyzed, with daily paralytic holiday. Fentanyl gtt    Completed Zosyn D#8, Zyvox D#3. Completed 5 days of Ceftriaxone/Zithromax   Cipro D#2/7 for Klebsiella in sputum  Holding zoloft while on zyvox  Dexamethasone 6 mg IV D#13  Completed Remdesivir & Tocilizumab  Continuing home oxycodone and neurontin    Lovenox for DVT prophylaxis  Total critical care time caring for this patient with life threatening, unstable organ failure, including direct patient contact, management of life support systems, review of data including imaging and labs, discussions with other team members and physicians is 33  minutes, excluding procedures.

## 2021-10-10 NOTE — PROGRESS NOTES
RT Nebulizer Bronchodilator Protocol Note    There is a bronchodilator order in the chart from a provider indicating to follow the RT Bronchodilator Protocol and there is an Initiate RT Bronchodilator Protocol order as well (see protocol at bottom of note). CXR Findings:  XR CHEST PORTABLE    Result Date: 10/10/2021  Improving aeration of the right lung with grossly stable left basilar airspace opacities. XR CHEST PORTABLE    Result Date: 10/9/2021  Supportive tubing is in stable position. Stable pattern of bilateral ground-glass opacities and basilar consolidations. The findings from the last RT Protocol Assessment were as follows:  Smoking: Smoker 15 pack years or more  Respiratory Pattern: Dyspnea on exertion or RR 21-25 bpm  Breath Sounds: Slightly diminished and/or crackles  Cough: Weak, productive  Indication for Bronchodilator Therapy: Decreased or absent breath sounds  Bronchodilator Assessment Score: 7    Aerosolized bronchodilator medication orders have been revised according to the RT Nebulizer Bronchodilator Protocol below. Respiratory Therapist to perform RT Therapy Protocol Assessment initially then follow the protocol. Repeat RT Therapy Protocol Assessment PRN for score 0-3 or on second treatment, BID, and PRN for scores above 3. No Indications - adjust the frequency to every 6 hours PRN wheezing or bronchospasm, if no treatments needed after 48 hours then discontinue using Per Protocol order mode. If indication present, adjust the RT bronchodilator orders based on the Bronchodilator Assessment Score as indicated below. If a patient is on this medication at home then do not decrease Frequency below that used at home. 0-3 - enter or revise RT bronchodilator order(s) to equivalent RT Bronchodilator order with Frequency of every 4 hours PRN for wheezing or increased work of breathing using Per Protocol order mode.        4-6 - enter or revise RT Bronchodilator order(s) to two equivalent RT bronchodilator orders with one order with BID Frequency and one order with Frequency of every 4 hours PRN wheezing or increased work of breathing using Per Protocol order mode. 7-10 - enter or revise RT Bronchodilator order(s) to two equivalent RT bronchodilator orders with one order with TID Frequency and one order with Frequency of every 4 hours PRN wheezing or increased work of breathing using Per Protocol order mode. 11-13 - enter or revise RT Bronchodilator order(s) to one equivalent RT bronchodilator order with QID Frequency and an Albuterol order with Frequency of every 4 hours PRN wheezing or increased work of breathing using Per Protocol order mode. Greater than 13 - enter or revise RT Bronchodilator order(s) to one equivalent RT bronchodilator order with every 4 hours Frequency and an Albuterol order with Frequency of every 2 hours PRN wheezing or increased work of breathing using Per Protocol order mode. RT to enter RT Home Evaluation for COPD & MDI Assessment order using Per Protocol order mode.     Electronically signed by Shannan Bacon RCP on 10/10/2021 at 1:22 PM

## 2021-10-10 NOTE — PROGRESS NOTES
Patient is not able to demonstrate the ability to move from a reclining position to an upright position within the recliner due to Pt on bedrest and vent .  Darrius Mares RN

## 2021-10-11 ENCOUNTER — APPOINTMENT (OUTPATIENT)
Dept: GENERAL RADIOLOGY | Age: 66
DRG: 004 | End: 2021-10-11
Payer: MEDICARE

## 2021-10-11 LAB
A/G RATIO: 1.1 (ref 1.1–2.2)
ALBUMIN SERPL-MCNC: 3.3 G/DL (ref 3.4–5)
ALP BLD-CCNC: 136 U/L (ref 40–129)
ALT SERPL-CCNC: 23 U/L (ref 10–40)
ANION GAP SERPL CALCULATED.3IONS-SCNC: 9 MMOL/L (ref 3–16)
AST SERPL-CCNC: 30 U/L (ref 15–37)
BASE EXCESS ARTERIAL: 5.3 MMOL/L (ref -3–3)
BASOPHILS ABSOLUTE: 0 K/UL (ref 0–0.2)
BASOPHILS RELATIVE PERCENT: 0.7 %
BILIRUB SERPL-MCNC: 0.3 MG/DL (ref 0–1)
BUN BLDV-MCNC: 14 MG/DL (ref 7–20)
CALCIUM SERPL-MCNC: 9 MG/DL (ref 8.3–10.6)
CARBOXYHEMOGLOBIN ARTERIAL: 0.3 % (ref 0–1.5)
CHLORIDE BLD-SCNC: 104 MMOL/L (ref 99–110)
CO2: 29 MMOL/L (ref 21–32)
CREAT SERPL-MCNC: <0.5 MG/DL (ref 0.6–1.2)
EOSINOPHILS ABSOLUTE: 0.1 K/UL (ref 0–0.6)
EOSINOPHILS RELATIVE PERCENT: 1.8 %
GFR AFRICAN AMERICAN: >60
GFR NON-AFRICAN AMERICAN: >60
GLOBULIN: 2.9 G/DL
GLUCOSE BLD-MCNC: 104 MG/DL (ref 70–99)
GLUCOSE BLD-MCNC: 177 MG/DL (ref 70–99)
GLUCOSE BLD-MCNC: 178 MG/DL (ref 70–99)
GLUCOSE BLD-MCNC: 180 MG/DL (ref 70–99)
GLUCOSE BLD-MCNC: 206 MG/DL (ref 70–99)
GLUCOSE BLD-MCNC: 245 MG/DL (ref 70–99)
GLUCOSE BLD-MCNC: 266 MG/DL (ref 70–99)
HCO3 ARTERIAL: 30.5 MMOL/L (ref 21–29)
HCT VFR BLD CALC: 32.5 % (ref 36–48)
HEMOGLOBIN, ART, EXTENDED: 11 G/DL (ref 12–16)
HEMOGLOBIN: 10.1 G/DL (ref 12–16)
LYMPHOCYTES ABSOLUTE: 1.1 K/UL (ref 1–5.1)
LYMPHOCYTES RELATIVE PERCENT: 19.3 %
MCH RBC QN AUTO: 24.6 PG (ref 26–34)
MCHC RBC AUTO-ENTMCNC: 31 G/DL (ref 31–36)
MCV RBC AUTO: 79.1 FL (ref 80–100)
METHEMOGLOBIN ARTERIAL: 0.3 %
MONOCYTES ABSOLUTE: 0.4 K/UL (ref 0–1.3)
MONOCYTES RELATIVE PERCENT: 7.4 %
NEUTROPHILS ABSOLUTE: 4.2 K/UL (ref 1.7–7.7)
NEUTROPHILS RELATIVE PERCENT: 70.8 %
O2 SAT, ARTERIAL: 91.9 %
O2 THERAPY: ABNORMAL
PCO2 ARTERIAL: 47.6 MMHG (ref 35–45)
PDW BLD-RTO: 18.4 % (ref 12.4–15.4)
PERFORMED ON: ABNORMAL
PH ARTERIAL: 7.42 (ref 7.35–7.45)
PLATELET # BLD: 120 K/UL (ref 135–450)
PMV BLD AUTO: 9.5 FL (ref 5–10.5)
PO2 ARTERIAL: 61.5 MMHG (ref 75–108)
POTASSIUM REFLEX MAGNESIUM: 4 MMOL/L (ref 3.5–5.1)
RBC # BLD: 4.11 M/UL (ref 4–5.2)
SODIUM BLD-SCNC: 142 MMOL/L (ref 136–145)
TCO2 ARTERIAL: 32 MMOL/L
TOTAL PROTEIN: 6.2 G/DL (ref 6.4–8.2)
TRIGL SERPL-MCNC: 368 MG/DL (ref 0–150)
WBC # BLD: 5.9 K/UL (ref 4–11)

## 2021-10-11 PROCEDURE — 99233 SBSQ HOSP IP/OBS HIGH 50: CPT | Performed by: INTERNAL MEDICINE

## 2021-10-11 PROCEDURE — 80053 COMPREHEN METABOLIC PANEL: CPT

## 2021-10-11 PROCEDURE — 2500000003 HC RX 250 WO HCPCS: Performed by: INTERNAL MEDICINE

## 2021-10-11 PROCEDURE — 6370000000 HC RX 637 (ALT 250 FOR IP): Performed by: INTERNAL MEDICINE

## 2021-10-11 PROCEDURE — 71045 X-RAY EXAM CHEST 1 VIEW: CPT

## 2021-10-11 PROCEDURE — 84478 ASSAY OF TRIGLYCERIDES: CPT

## 2021-10-11 PROCEDURE — 2700000000 HC OXYGEN THERAPY PER DAY

## 2021-10-11 PROCEDURE — 94761 N-INVAS EAR/PLS OXIMETRY MLT: CPT

## 2021-10-11 PROCEDURE — 2000000000 HC ICU R&B

## 2021-10-11 PROCEDURE — 82803 BLOOD GASES ANY COMBINATION: CPT

## 2021-10-11 PROCEDURE — 94640 AIRWAY INHALATION TREATMENT: CPT

## 2021-10-11 PROCEDURE — 6360000002 HC RX W HCPCS: Performed by: INTERNAL MEDICINE

## 2021-10-11 PROCEDURE — 2580000003 HC RX 258: Performed by: INTERNAL MEDICINE

## 2021-10-11 PROCEDURE — 85025 COMPLETE CBC W/AUTO DIFF WBC: CPT

## 2021-10-11 PROCEDURE — C9113 INJ PANTOPRAZOLE SODIUM, VIA: HCPCS | Performed by: INTERNAL MEDICINE

## 2021-10-11 PROCEDURE — 99291 CRITICAL CARE FIRST HOUR: CPT | Performed by: INTERNAL MEDICINE

## 2021-10-11 PROCEDURE — 94003 VENT MGMT INPAT SUBQ DAY: CPT

## 2021-10-11 RX ORDER — CARBOXYMETHYLCELLULOSE SODIUM 10 MG/ML
1 GEL OPHTHALMIC EVERY 4 HOURS PRN
Status: DISCONTINUED | OUTPATIENT
Start: 2021-10-11 | End: 2021-10-28 | Stop reason: HOSPADM

## 2021-10-11 RX ORDER — FLUCONAZOLE 100 MG/1
150 TABLET ORAL ONCE
Status: COMPLETED | OUTPATIENT
Start: 2021-10-11 | End: 2021-10-11

## 2021-10-11 RX ORDER — MINERAL OIL AND WHITE PETROLATUM 150; 830 MG/G; MG/G
OINTMENT OPHTHALMIC EVERY 4 HOURS PRN
Status: DISCONTINUED | OUTPATIENT
Start: 2021-10-11 | End: 2021-10-28 | Stop reason: HOSPADM

## 2021-10-11 RX ADMIN — OXYCODONE 10 MG: 5 TABLET ORAL at 10:38

## 2021-10-11 RX ADMIN — CHLORHEXIDINE GLUCONATE 0.12% ORAL RINSE 15 ML: 1.2 LIQUID ORAL at 08:58

## 2021-10-11 RX ADMIN — MIDAZOLAM HYDROCHLORIDE 2 MG: 1 INJECTION, SOLUTION INTRAMUSCULAR; INTRAVENOUS at 20:54

## 2021-10-11 RX ADMIN — IPRATROPIUM BROMIDE AND ALBUTEROL SULFATE 1 AMPULE: .5; 2.5 SOLUTION RESPIRATORY (INHALATION) at 19:29

## 2021-10-11 RX ADMIN — SODIUM CHLORIDE, PRESERVATIVE FREE 10 ML: 5 INJECTION INTRAVENOUS at 20:11

## 2021-10-11 RX ADMIN — PANTOPRAZOLE SODIUM 40 MG: 40 INJECTION, POWDER, FOR SOLUTION INTRAVENOUS at 08:59

## 2021-10-11 RX ADMIN — MICONAZOLE NITRATE: 2 POWDER TOPICAL at 08:58

## 2021-10-11 RX ADMIN — OXYCODONE 10 MG: 5 TABLET ORAL at 20:12

## 2021-10-11 RX ADMIN — Medication 150 MCG/HR: at 18:19

## 2021-10-11 RX ADMIN — IPRATROPIUM BROMIDE AND ALBUTEROL SULFATE 1 AMPULE: .5; 2.5 SOLUTION RESPIRATORY (INHALATION) at 23:03

## 2021-10-11 RX ADMIN — INSULIN LISPRO 2 UNITS: 100 INJECTION, SOLUTION INTRAVENOUS; SUBCUTANEOUS at 00:23

## 2021-10-11 RX ADMIN — CARBOXYMETHYLCELLULOSE SODIUM 1 DROP: 10 GEL OPHTHALMIC at 03:50

## 2021-10-11 RX ADMIN — INSULIN LISPRO 1 UNITS: 100 INJECTION, SOLUTION INTRAVENOUS; SUBCUTANEOUS at 12:47

## 2021-10-11 RX ADMIN — INSULIN LISPRO 1 UNITS: 100 INJECTION, SOLUTION INTRAVENOUS; SUBCUTANEOUS at 09:10

## 2021-10-11 RX ADMIN — IPRATROPIUM BROMIDE AND ALBUTEROL SULFATE 1 AMPULE: .5; 2.5 SOLUTION RESPIRATORY (INHALATION) at 03:16

## 2021-10-11 RX ADMIN — INSULIN LISPRO 3 UNITS: 100 INJECTION, SOLUTION INTRAVENOUS; SUBCUTANEOUS at 20:15

## 2021-10-11 RX ADMIN — SODIUM CHLORIDE, PRESERVATIVE FREE 10 ML: 5 INJECTION INTRAVENOUS at 09:09

## 2021-10-11 RX ADMIN — CIPROFLOXACIN 400 MG: 2 INJECTION, SOLUTION INTRAVENOUS at 10:32

## 2021-10-11 RX ADMIN — GABAPENTIN 800 MG: 400 CAPSULE ORAL at 20:12

## 2021-10-11 RX ADMIN — MIDAZOLAM HYDROCHLORIDE 2 MG: 1 INJECTION, SOLUTION INTRAMUSCULAR; INTRAVENOUS at 15:50

## 2021-10-11 RX ADMIN — PROPOFOL 40 MCG/KG/MIN: 10 INJECTION, EMULSION INTRAVENOUS at 11:34

## 2021-10-11 RX ADMIN — SERTRALINE HYDROCHLORIDE 50 MG: 50 TABLET ORAL at 09:00

## 2021-10-11 RX ADMIN — IPRATROPIUM BROMIDE AND ALBUTEROL SULFATE 1 AMPULE: .5; 2.5 SOLUTION RESPIRATORY (INHALATION) at 12:08

## 2021-10-11 RX ADMIN — CASTOR OIL AND BALSAM, PERU: 788; 87 OINTMENT TOPICAL at 09:16

## 2021-10-11 RX ADMIN — PROPOFOL 50 MCG/KG/MIN: 10 INJECTION, EMULSION INTRAVENOUS at 23:10

## 2021-10-11 RX ADMIN — PROPOFOL 40 MCG/KG/MIN: 10 INJECTION, EMULSION INTRAVENOUS at 06:47

## 2021-10-11 RX ADMIN — PROPOFOL 50 MCG/KG/MIN: 10 INJECTION, EMULSION INTRAVENOUS at 15:30

## 2021-10-11 RX ADMIN — WHITE PETROLATUM 57.7 %-MINERAL OIL 31.9 % EYE OINTMENT: at 05:14

## 2021-10-11 RX ADMIN — ATORVASTATIN CALCIUM 40 MG: 40 TABLET, FILM COATED ORAL at 09:00

## 2021-10-11 RX ADMIN — SODIUM CHLORIDE, PRESERVATIVE FREE 10 ML: 5 INJECTION INTRAVENOUS at 20:58

## 2021-10-11 RX ADMIN — IPRATROPIUM BROMIDE AND ALBUTEROL SULFATE 1 AMPULE: .5; 2.5 SOLUTION RESPIRATORY (INHALATION) at 15:55

## 2021-10-11 RX ADMIN — OXYCODONE 10 MG: 5 TABLET ORAL at 05:13

## 2021-10-11 RX ADMIN — AMLODIPINE BESYLATE 2.5 MG: 2.5 TABLET ORAL at 09:00

## 2021-10-11 RX ADMIN — CHLORHEXIDINE GLUCONATE 0.12% ORAL RINSE 15 ML: 1.2 LIQUID ORAL at 20:55

## 2021-10-11 RX ADMIN — FLUCONAZOLE 150 MG: 100 TABLET ORAL at 10:39

## 2021-10-11 RX ADMIN — LEVOTHYROXINE SODIUM 100 MCG: 100 TABLET ORAL at 05:13

## 2021-10-11 RX ADMIN — ENOXAPARIN SODIUM 30 MG: 30 INJECTION SUBCUTANEOUS at 08:59

## 2021-10-11 RX ADMIN — ASPIRIN 81 MG: 81 TABLET, CHEWABLE ORAL at 08:59

## 2021-10-11 RX ADMIN — MICONAZOLE NITRATE: 2 POWDER TOPICAL at 20:14

## 2021-10-11 RX ADMIN — PROPOFOL 45 MCG/KG/MIN: 10 INJECTION, EMULSION INTRAVENOUS at 01:29

## 2021-10-11 RX ADMIN — CARBOXYMETHYLCELLULOSE SODIUM 1 DROP: 10 GEL OPHTHALMIC at 01:18

## 2021-10-11 RX ADMIN — OXYCODONE 10 MG: 5 TABLET ORAL at 15:52

## 2021-10-11 RX ADMIN — SODIUM CHLORIDE, PRESERVATIVE FREE 10 ML: 5 INJECTION INTRAVENOUS at 09:16

## 2021-10-11 RX ADMIN — Medication 100 MCG/HR: at 07:51

## 2021-10-11 RX ADMIN — IPRATROPIUM BROMIDE AND ALBUTEROL SULFATE 1 AMPULE: .5; 2.5 SOLUTION RESPIRATORY (INHALATION) at 08:52

## 2021-10-11 RX ADMIN — SODIUM CHLORIDE, PRESERVATIVE FREE 10 ML: 5 INJECTION INTRAVENOUS at 09:15

## 2021-10-11 RX ADMIN — SODIUM CHLORIDE, PRESERVATIVE FREE 10 ML: 5 INJECTION INTRAVENOUS at 20:43

## 2021-10-11 RX ADMIN — PROPOFOL 50 MCG/KG/MIN: 10 INJECTION, EMULSION INTRAVENOUS at 19:12

## 2021-10-11 RX ADMIN — CIPROFLOXACIN 400 MG: 2 INJECTION, SOLUTION INTRAVENOUS at 22:47

## 2021-10-11 RX ADMIN — WHITE PETROLATUM 57.7 %-MINERAL OIL 31.9 % EYE OINTMENT: at 01:18

## 2021-10-11 RX ADMIN — GABAPENTIN 800 MG: 400 CAPSULE ORAL at 13:42

## 2021-10-11 RX ADMIN — CASTOR OIL AND BALSAM, PERU: 788; 87 OINTMENT TOPICAL at 20:14

## 2021-10-11 RX ADMIN — DEXAMETHASONE SODIUM PHOSPHATE 6 MG: 10 INJECTION INTRAMUSCULAR; INTRAVENOUS at 12:44

## 2021-10-11 RX ADMIN — Medication 0.3 MCG/KG/HR: at 06:49

## 2021-10-11 RX ADMIN — LOSARTAN POTASSIUM 25 MG: 25 TABLET, FILM COATED ORAL at 08:59

## 2021-10-11 RX ADMIN — ENOXAPARIN SODIUM 30 MG: 30 INJECTION SUBCUTANEOUS at 20:11

## 2021-10-11 RX ADMIN — GABAPENTIN 800 MG: 400 CAPSULE ORAL at 08:59

## 2021-10-11 RX ADMIN — INSULIN LISPRO 2 UNITS: 100 INJECTION, SOLUTION INTRAVENOUS; SUBCUTANEOUS at 16:22

## 2021-10-11 ASSESSMENT — PULMONARY FUNCTION TESTS
PIF_VALUE: 32
PIF_VALUE: 39
PIF_VALUE: 37
PIF_VALUE: 37
PIF_VALUE: 31
PIF_VALUE: 36
PIF_VALUE: 37
PIF_VALUE: 35
PIF_VALUE: 39

## 2021-10-11 ASSESSMENT — PAIN SCALES - GENERAL
PAINLEVEL_OUTOF10: 0
PAINLEVEL_OUTOF10: 0

## 2021-10-11 NOTE — PROGRESS NOTES
10/10/21 2304   Vent Information   Vent Type 980   Vent Mode AC/VC   Vt Ordered 390 mL   Rate Set 24 bmp   Peak Flow 65 L/min   Pressure Support 0 cmH20   FiO2  60 %   SpO2 91 %   SpO2/FiO2 ratio 151.67   Sensitivity 3   PEEP/CPAP 12   I Time/ I Time % 0 s   Humidification Source Heated wire   Humidification Temp Measured 37   Circuit Condensation Drained   Vent Patient Data   High Peep/I Pressure 0   Peak Inspiratory Pressure 37 cmH2O   Mean Airway Pressure 18 cmH20   Rate Measured 24 br/min   Vt Exhaled 396 mL   Minute Volume 9.52 Liters   I:E Ratio 1:2.80   Cough/Sputum   Sputum How Obtained Endotracheal   Cough Productive   Sputum Amount Small   Sputum Color Creamy   Tenacity Thick   Spontaneous Breathing Trial (SBT) RT Doc   Pulse 52   Breath Sounds   Right Upper Lobe Diminished   Right Middle Lobe Diminished   Right Lower Lobe Diminished   Left Upper Lobe Diminished   Left Lower Lobe Diminished   Additional Respiratory  Assessments   Resp 25   Alarm Settings   High Pressure Alarm 45 cmH2O   Low Minute Volume Alarm 4 L/min   Apnea (secs) 20 secs   High Respiratory Rate 40 br/min   Low Exhaled Vt  210 mL   Patient Observation   Observations 7.5 ett 23 at lip

## 2021-10-11 NOTE — PROGRESS NOTES
Pts q4 hour assessment complete, pt requiring increased oxygen requirement, pt awakens easily, but denies pain or SOB, resting with eyes closed upon entering room.

## 2021-10-11 NOTE — PROGRESS NOTES
Pulmonary & Critical Care Medicine ICU Progress Note    CC: Respiratory failure    Events of Last 24 hours: following commands    Fent 100  Propofol 40  Precedex 0.3  Vascular lines: IV: PICC line     MV: -10/4, emergently re-intubated 10/6/21 for refractory hypoxemia  Vent Mode: AC/VC Rate Set: 24 bmp/Vt Ordered: 390 mL/ /FiO2 : 60 %  Recent Labs     10/10/21  0520 10/11/21  0545   PHART 7.392 7.425   MLT9GLC 49.0* 47.6*   PO2ART 67.0* 61.5*       IV:   fentaNYL 100 mcg/hr (10/10/21 2134)    propofol 40 mcg/kg/min (10/11/21 0647)    dexmedetomidine HCl in NaCl 0.3 mcg/kg/hr (10/11/21 0649)    sodium chloride      dextrose      sodium chloride         Vitals:  Blood pressure (!) 122/55, pulse 62, temperature 98.6 °F (37 °C), resp. rate 21, height 5' 9\" (1.753 m), weight 190 lb 14.7 oz (86.6 kg), SpO2 92 %, not currently breastfeeding. on ventilator  Temp  Av.3 °F (36.8 °C)  Min: 98 °F (36.7 °C)  Max: 98.6 °F (37 °C)    Intake/Output Summary (Last 24 hours) at 10/11/2021 0748  Last data filed at 10/11/2021 0700  Gross per 24 hour   Intake 1765.61 ml   Output 2195 ml   Net -429.39 ml     EXAM (COVID or R/O):  General: intubated, ill appearing    ENT:   Pharynx with ETT. Neck: Trachea midline  Resp: No accessory muscle use. CV: Regular rate. Regular rhythm. GI:  Non-distended.     Neuro: Sedated  Psych: Unable to obtain because the patient is non-communicative   Scheduled Meds:   sertraline  50 mg Per NG tube Daily    ciprofloxacin  400 mg IntraVENous Q12H    Venelex   Topical BID    miconazole   Topical BID    oxyCODONE  10 mg Oral Q6H    influenza virus vaccine  0.5 mL IntraMUSCular Prior to discharge    chlorhexidine  15 mL Mouth/Throat BID    ipratropium-albuterol  1 ampule Inhalation Q4H    amLODIPine  2.5 mg Oral Daily    losartan  25 mg Oral Daily    pantoprazole  40 mg IntraVENous Daily    insulin lispro  0-6 Units SubCUTAneous Q4H    levothyroxine  100 mcg Oral Daily    enoxaparin  30 mg SubCUTAneous BID    dexamethasone  6 mg IntraVENous Q24H    lidocaine 1 % injection  5 mL IntraDERmal Once    sodium chloride flush  5-40 mL IntraVENous 2 times per day    aspirin  81 mg Oral Daily    gabapentin  800 mg Oral TID    atorvastatin  40 mg Oral Daily    sodium chloride flush  5-40 mL IntraVENous 2 times per day       Data:  CBC:   Recent Labs     10/09/21  0556 10/10/21  0520 10/11/21  0530   WBC 7.1 6.3 5.9   HGB 9.7* 9.9* 10.1*   HCT 30.2* 31.2* 32.5*   MCV 77.7* 78.6* 79.1*   * 122* 120*     BMP:   Recent Labs     10/09/21  0556 10/10/21  0520 10/11/21  0530   * 143 142   K 3.5 4.0 4.0    105 104   CO2 25 28 29   BUN 17 14 14   CREATININE <0.5* <0.5* <0.5*     LIVER PROFILE:   Recent Labs     10/09/21  0556 10/10/21  0520 10/11/21  0530   AST 31 31 30   ALT 23 23 23   BILITOT 0.3 0.3 0.3   ALKPHOS 134* 147* 136*       Microbiology:  9/27 COVID-19 detected  9/30 Resp Pseudomonas fluorescens   10/7/2021 tracheal aspirate: Klebsiella intermediate to zosyn    Imaging:  Chest x-ray 10/11/21   1. Stable appropriate positions of support apparatus. 2. Slight interval progression of multifocal airspace disease throughout both   lungs, likely a combination of multifocal pneumonia and superimposed   pulmonary edema. 3. Stable cardiomegaly and small bilateral pleural effusions. CTPA 9/27/21  No evidence of pulmonary embolism. Scattered peripheral opacities in the left lung and more consolidative area in the right lung concerning for pneumonia.  Previous right thoracotomy and upper lobectomy.    One mildly enlarged right paratracheal lymph node is present but no priors for comparison.  Nonenlarged but prominent lymph nodes in the upper abdomen and juxta diaphragmatic region      ASSESSMENT:  · Acute hypoxemic respiratory failure   · COVID-19 viral pneumonia  · Pseudomonas followed by Klebsiella PNA   · TMP  · Acute kidney injury  · CAD  · Chronic pain syndrome - on opiates and Neurontin   · Possible early dementia, being worked up outpatient   · H/O bronchiectasis and tracheobronchomalacia/EDAC s/p tracheoplasty in 2014      PLAN:  COVID-19 isolation, droplet plus    Mechanical ventilation as per my orders. The ventilator was adjusted by me at the bedside for unstable, life threatening respiratory failure. Proned 10/6/21-10/7/21   Paralyzed 10/6/21 - 10/7/21  IV Propofol and Precedex for sedation, target RASS -5 while paralyzed, with daily paralytic holiday. Fentanyl gtt    Completed Zosyn D#8, Zyvox D#3. Completed 5 days of Ceftriaxone/Zithromax   Cipro D#3/7 for Klebsiella in sputum  Holding zoloft while on zyvox  Diflucan 150mg x1 for perineal yeast infection  Dexamethasone 6 mg IV D#14, dec to 4mg  Completed Remdesivir & Tocilizumab  Continuing home oxycodone and neurontin    Lovenox for DVT prophylaxis  Total critical care time caring for this patient with life threatening, unstable organ failure, including direct patient contact, management of life support systems, review of data including imaging and labs, discussions with other team members and physicians is 32 minutes, excluding procedures.

## 2021-10-11 NOTE — PROGRESS NOTES
10/11/21 1929   Vent Information   Vent Mode AC/VC   Vt Ordered 390 mL   Rate Set 24 bmp   Peak Flow 65 L/min   FiO2  60 %   SpO2 94 %   Sensitivity 3   PEEP/CPAP 12   Vent Patient Data   Peak Inspiratory Pressure 37 cmH2O   Mean Airway Pressure 19 cmH20   Rate Measured 24 br/min   Vt Exhaled 396 mL   Minute Volume 9.55 Liters   I:E Ratio 1:2.80   Plateau Pressure 33 [id number]   Static Compliance 19 mL/cmH2O   Dynamic Compliance 16 mL/cmH2O   Cough/Sputum   Sputum How Obtained Suctioned;Endotracheal   Cough Productive   Sputum Amount Small   Sputum Color Red;Creamy   Tenacity Thick

## 2021-10-11 NOTE — PROGRESS NOTES
10/11/21 0210   RT Protocol   History Pulmonary Disease 1   Respiratory pattern 2   Breath sounds 2   Cough 2   Indications for Bronchodilator Therapy Decreased or absent breath sounds   Bronchodilator Assessment Score 7   RT Inhaler-Nebulizer Bronchodilator Protocol Note    There is a bronchodilator order in the chart from a provider indicating to follow the RT Bronchodilator Protocol and there is an Initiate RT Inhaler-Nebulizer Bronchodilator Protocol order as well (see protocol at bottom of note). CXR Findings:  XR CHEST PORTABLE    Result Date: 10/10/2021  Improving aeration of the right lung with grossly stable left basilar airspace opacities. XR CHEST PORTABLE    Result Date: 10/9/2021  Supportive tubing is in stable position. Stable pattern of bilateral ground-glass opacities and basilar consolidations. The findings from the last RT Protocol Assessment were as follows:   History Pulmonary Disease: Smoker 15 pack years or more  Respiratory Pattern: Dyspnea on exertion or RR 21-25 bpm  Breath Sounds: Slightly diminished and/or crackles  Cough: Weak, productive  Indication for Bronchodilator Therapy: Decreased or absent breath sounds  Bronchodilator Assessment Score: 7    Aerosolized bronchodilator medication orders have been revised according to the RT Inhaler-Nebulizer Bronchodilator Protocol below. Respiratory Therapist to perform RT Therapy Protocol Assessment initially then follow the protocol. Repeat RT Therapy Protocol Assessment PRN for score 0-3 or on second treatment, BID, and PRN for scores above 3. No Indications - adjust the frequency to every 6 hours PRN wheezing or bronchospasm, if no treatments needed after 48 hours then discontinue using Per Protocol order mode. If indication present, adjust the RT bronchodilator orders based on the Bronchodilator Assessment Score as indicated below.   Use Inhaler orders unless patient has one or more of the following: on home nebulizer, not able to hold breath for 10 seconds, is not alert and oriented, cannot activate and use MDI correctly, or respiratory rate 25 breaths per minute or more, then use the equivalent nebulizer order(s) with same Frequency and PRN reasons based on the score. If a patient is on this medication at home then do not decrease Frequency below that used at home. 0-3 - enter or revise RT bronchodilator order(s) to equivalent RT Bronchodilator order with Frequency of every 4 hours PRN for wheezing or increased work of breathing using Per Protocol order mode. 4-6 - enter or revise RT Bronchodilator order(s) to two equivalent RT bronchodilator orders with one order with BID Frequency and one order with Frequency of every 4 hours PRN wheezing or increased work of breathing using Per Protocol order mode. 7-10 - enter or revise RT Bronchodilator order(s) to two equivalent RT bronchodilator orders with one order with TID Frequency and one order with Frequency of every 4 hours PRN wheezing or increased work of breathing using Per Protocol order mode. 11-13 - enter or revise RT Bronchodilator order(s) to one equivalent RT bronchodilator order with QID Frequency and an Albuterol order with Frequency of every 4 hours PRN wheezing or increased work of breathing using Per Protocol order mode. Greater than 13 - enter or revise RT Bronchodilator order(s) to one equivalent RT bronchodilator order with every 4 hours Frequency and an Albuterol order with Frequency of every 2 hours PRN wheezing or increased work of breathing using Per Protocol order mode.          Electronically signed by Cayden Kong RCP on 10/11/2021 at 2:10 AM

## 2021-10-11 NOTE — PROGRESS NOTES
Pts AM assessment complete, VSS, medications administered, Pt awakens to voice, nods appropriately to questions regarding pain and comfort.

## 2021-10-11 NOTE — CARE COORDINATION
INTERDISCIPLINARY PLAN OF CARE CONFERENCE    Date/Time: 10/11/2021 12:16 PM  Completed by: Dennise Claudio RN, Case Management      Patient Name:  Valarie Castillo  YOB: 1955  Admitting Diagnosis: Dehydration [E86.0]  Hypokalemia [E87.6]  Hypomagnesemia [E83.42]  Positive D dimer [R79.89]  CHARLY (acute kidney injury) ([facility] [address] [N17.9]  Acute respiratory failure with hypoxia ([facility] [address] [J96.01]  Pneumonia due to COVID-19 virus [U07.1, J12.82]  COVID-19 [U07.1]     Admit Date/Time:  9/26/2021  8:29 PM    Chart reviewed. Interdisciplinary team contacted or reviewed plan related to patient progress and discharge plans. Disciplines included Case Management, Nursing, and Dietitian. Current Status:inpt,ICU LOC  PT/OT recommendation for discharge plan of care: tbd    Expected D/C Disposition:  tbd    Discharge Plan Comments: Reviewed chart. Pt in ICU on Ventilator, re-intubated on 10/06/21. Pt from home with spouse. Will need PT/OT when appropriate. Following.     Home O2 in place on admit: No

## 2021-10-11 NOTE — PROGRESS NOTES
10/11/21 0316   Vent Information   Vent Type 980   Vent Mode AC/VC   Vt Ordered 390 mL   Rate Set 24 bmp   Peak Flow 65 L/min   Pressure Support 0 cmH20   FiO2  60 %   SpO2 94 %   SpO2/FiO2 ratio 156.67   Sensitivity 3   PEEP/CPAP 12   I Time/ I Time % 0 s   Humidification Source Heated wire   Humidification Temp 37   Humidification Temp Measured 36.9   Circuit Condensation Drained   Vent Patient Data   High Peep/I Pressure 0   Peak Inspiratory Pressure 36 cmH2O   Mean Airway Pressure 19 cmH20   Rate Measured 25 br/min   Vt Exhaled 400 mL   Minute Volume 10 Liters   I:E Ratio 1:2.80   Plateau Pressure 34 [id number]   Cough/Sputum   Sputum How Obtained Endotracheal;Suctioned   Cough Productive   Sputum Amount Small   Sputum Color Creamy   Tenacity Thick   Spontaneous Breathing Trial (SBT) RT Doc   Pulse 50   Breath Sounds   Right Upper Lobe Diminished   Right Middle Lobe Diminished   Right Lower Lobe Diminished   Left Upper Lobe Diminished   Left Lower Lobe Diminished   Additional Respiratory  Assessments   Resp 25   Position Semi-Jesus's   Alarm Settings   High Pressure Alarm 45 cmH2O   Low Minute Volume Alarm 4 L/min   High Respiratory Rate 40 br/min   Patient Observation   Observations ETT SIZE 7.5, SECURED AT 23 LIP LINE. AMBU BAG AT HEAD OF BED. WATER GOOD.     ETT (adult)   Placement Date/Time: 10/06/21 1208   Timeout: Patient  Preoxygenation: Yes  Technique: Rapid sequence  Type: Cuffed  Tube Size: 7.5 mm  Laryngoscope: GlideScope  Secured at: 23 cm  Measured From: Lips   Secured at 23 cm   Measured From [address]   Site Condition Dry

## 2021-10-12 ENCOUNTER — APPOINTMENT (OUTPATIENT)
Dept: GENERAL RADIOLOGY | Age: 66
DRG: 004 | End: 2021-10-12
Payer: MEDICARE

## 2021-10-12 LAB
A/G RATIO: 1.1 (ref 1.1–2.2)
ALBUMIN SERPL-MCNC: 3.2 G/DL (ref 3.4–5)
ALP BLD-CCNC: 129 U/L (ref 40–129)
ALT SERPL-CCNC: 23 U/L (ref 10–40)
ANION GAP SERPL CALCULATED.3IONS-SCNC: 7 MMOL/L (ref 3–16)
AST SERPL-CCNC: 25 U/L (ref 15–37)
BASE EXCESS ARTERIAL: 7.9 MMOL/L (ref -3–3)
BASOPHILS ABSOLUTE: 0.1 K/UL (ref 0–0.2)
BASOPHILS RELATIVE PERCENT: 0.7 %
BILIRUB SERPL-MCNC: 0.3 MG/DL (ref 0–1)
BUN BLDV-MCNC: 14 MG/DL (ref 7–20)
CALCIUM SERPL-MCNC: 9 MG/DL (ref 8.3–10.6)
CARBOXYHEMOGLOBIN ARTERIAL: 0.2 % (ref 0–1.5)
CHLORIDE BLD-SCNC: 105 MMOL/L (ref 99–110)
CO2: 31 MMOL/L (ref 21–32)
CREAT SERPL-MCNC: <0.5 MG/DL (ref 0.6–1.2)
EOSINOPHILS ABSOLUTE: 0 K/UL (ref 0–0.6)
EOSINOPHILS RELATIVE PERCENT: 0.5 %
GFR AFRICAN AMERICAN: >60
GFR NON-AFRICAN AMERICAN: >60
GLOBULIN: 3 G/DL
GLUCOSE BLD-MCNC: 136 MG/DL (ref 70–99)
GLUCOSE BLD-MCNC: 164 MG/DL (ref 70–99)
GLUCOSE BLD-MCNC: 168 MG/DL (ref 70–99)
GLUCOSE BLD-MCNC: 174 MG/DL (ref 70–99)
GLUCOSE BLD-MCNC: 222 MG/DL (ref 70–99)
GLUCOSE BLD-MCNC: 265 MG/DL (ref 70–99)
GLUCOSE BLD-MCNC: 271 MG/DL (ref 70–99)
HCO3 ARTERIAL: 33.5 MMOL/L (ref 21–29)
HCT VFR BLD CALC: 31.3 % (ref 36–48)
HEMOGLOBIN, ART, EXTENDED: 10 G/DL (ref 12–16)
HEMOGLOBIN: 10.1 G/DL (ref 12–16)
LYMPHOCYTES ABSOLUTE: 1 K/UL (ref 1–5.1)
LYMPHOCYTES RELATIVE PERCENT: 13.5 %
MCH RBC QN AUTO: 25.2 PG (ref 26–34)
MCHC RBC AUTO-ENTMCNC: 32.3 G/DL (ref 31–36)
MCV RBC AUTO: 78.3 FL (ref 80–100)
METHEMOGLOBIN ARTERIAL: 0.3 %
MONOCYTES ABSOLUTE: 0.4 K/UL (ref 0–1.3)
MONOCYTES RELATIVE PERCENT: 4.9 %
NEUTROPHILS ABSOLUTE: 5.8 K/UL (ref 1.7–7.7)
NEUTROPHILS RELATIVE PERCENT: 80.4 %
O2 SAT, ARTERIAL: 95.5 %
O2 THERAPY: ABNORMAL
PCO2 ARTERIAL: 52.2 MMHG (ref 35–45)
PDW BLD-RTO: 19.1 % (ref 12.4–15.4)
PERFORMED ON: ABNORMAL
PH ARTERIAL: 7.42 (ref 7.35–7.45)
PLATELET # BLD: 120 K/UL (ref 135–450)
PMV BLD AUTO: 9.5 FL (ref 5–10.5)
PO2 ARTERIAL: 77.4 MMHG (ref 75–108)
POTASSIUM REFLEX MAGNESIUM: 4.2 MMOL/L (ref 3.5–5.1)
RBC # BLD: 4 M/UL (ref 4–5.2)
SODIUM BLD-SCNC: 143 MMOL/L (ref 136–145)
TCO2 ARTERIAL: 35.1 MMOL/L
TOTAL PROTEIN: 6.2 G/DL (ref 6.4–8.2)
WBC # BLD: 7.2 K/UL (ref 4–11)

## 2021-10-12 PROCEDURE — 6370000000 HC RX 637 (ALT 250 FOR IP): Performed by: INTERNAL MEDICINE

## 2021-10-12 PROCEDURE — 6360000002 HC RX W HCPCS: Performed by: INTERNAL MEDICINE

## 2021-10-12 PROCEDURE — 99233 SBSQ HOSP IP/OBS HIGH 50: CPT | Performed by: INTERNAL MEDICINE

## 2021-10-12 PROCEDURE — 94761 N-INVAS EAR/PLS OXIMETRY MLT: CPT

## 2021-10-12 PROCEDURE — 99291 CRITICAL CARE FIRST HOUR: CPT | Performed by: INTERNAL MEDICINE

## 2021-10-12 PROCEDURE — 2000000000 HC ICU R&B

## 2021-10-12 PROCEDURE — 94003 VENT MGMT INPAT SUBQ DAY: CPT

## 2021-10-12 PROCEDURE — 85025 COMPLETE CBC W/AUTO DIFF WBC: CPT

## 2021-10-12 PROCEDURE — 80053 COMPREHEN METABOLIC PANEL: CPT

## 2021-10-12 PROCEDURE — 2580000003 HC RX 258: Performed by: INTERNAL MEDICINE

## 2021-10-12 PROCEDURE — 2500000003 HC RX 250 WO HCPCS: Performed by: INTERNAL MEDICINE

## 2021-10-12 PROCEDURE — 71045 X-RAY EXAM CHEST 1 VIEW: CPT

## 2021-10-12 PROCEDURE — 2700000000 HC OXYGEN THERAPY PER DAY

## 2021-10-12 PROCEDURE — C9113 INJ PANTOPRAZOLE SODIUM, VIA: HCPCS | Performed by: INTERNAL MEDICINE

## 2021-10-12 PROCEDURE — 94640 AIRWAY INHALATION TREATMENT: CPT

## 2021-10-12 PROCEDURE — 82803 BLOOD GASES ANY COMBINATION: CPT

## 2021-10-12 RX ADMIN — AMLODIPINE BESYLATE 2.5 MG: 2.5 TABLET ORAL at 09:00

## 2021-10-12 RX ADMIN — Medication 0.4 MCG/KG/HR: at 12:23

## 2021-10-12 RX ADMIN — Medication 175 MCG/HR: at 18:39

## 2021-10-12 RX ADMIN — GABAPENTIN 800 MG: 400 CAPSULE ORAL at 21:06

## 2021-10-12 RX ADMIN — ENOXAPARIN SODIUM 30 MG: 30 INJECTION SUBCUTANEOUS at 21:07

## 2021-10-12 RX ADMIN — INSULIN LISPRO 2 UNITS: 100 INJECTION, SOLUTION INTRAVENOUS; SUBCUTANEOUS at 00:48

## 2021-10-12 RX ADMIN — MIDAZOLAM HYDROCHLORIDE 2 MG: 1 INJECTION, SOLUTION INTRAMUSCULAR; INTRAVENOUS at 00:48

## 2021-10-12 RX ADMIN — Medication 175 MCG/HR: at 00:35

## 2021-10-12 RX ADMIN — OXYCODONE 10 MG: 5 TABLET ORAL at 09:00

## 2021-10-12 RX ADMIN — DEXAMETHASONE SODIUM PHOSPHATE 6 MG: 10 INJECTION INTRAMUSCULAR; INTRAVENOUS at 12:24

## 2021-10-12 RX ADMIN — SODIUM CHLORIDE, PRESERVATIVE FREE 10 ML: 5 INJECTION INTRAVENOUS at 09:03

## 2021-10-12 RX ADMIN — ASPIRIN 81 MG: 81 TABLET, CHEWABLE ORAL at 09:00

## 2021-10-12 RX ADMIN — PROPOFOL 50 MCG/KG/MIN: 10 INJECTION, EMULSION INTRAVENOUS at 23:02

## 2021-10-12 RX ADMIN — IPRATROPIUM BROMIDE AND ALBUTEROL SULFATE 1 AMPULE: .5; 2.5 SOLUTION RESPIRATORY (INHALATION) at 03:21

## 2021-10-12 RX ADMIN — CHLORHEXIDINE GLUCONATE 0.12% ORAL RINSE 15 ML: 1.2 LIQUID ORAL at 21:08

## 2021-10-12 RX ADMIN — ENOXAPARIN SODIUM 30 MG: 30 INJECTION SUBCUTANEOUS at 09:00

## 2021-10-12 RX ADMIN — PROPOFOL 50 MCG/KG/MIN: 10 INJECTION, EMULSION INTRAVENOUS at 14:39

## 2021-10-12 RX ADMIN — GABAPENTIN 800 MG: 400 CAPSULE ORAL at 14:33

## 2021-10-12 RX ADMIN — OXYCODONE 10 MG: 5 TABLET ORAL at 03:58

## 2021-10-12 RX ADMIN — OXYCODONE 10 MG: 5 TABLET ORAL at 21:06

## 2021-10-12 RX ADMIN — INSULIN LISPRO 3 UNITS: 100 INJECTION, SOLUTION INTRAVENOUS; SUBCUTANEOUS at 21:04

## 2021-10-12 RX ADMIN — PROPOFOL 50 MCG/KG/MIN: 10 INJECTION, EMULSION INTRAVENOUS at 07:53

## 2021-10-12 RX ADMIN — SODIUM CHLORIDE, PRESERVATIVE FREE 10 ML: 5 INJECTION INTRAVENOUS at 21:09

## 2021-10-12 RX ADMIN — CASTOR OIL AND BALSAM, PERU: 788; 87 OINTMENT TOPICAL at 08:59

## 2021-10-12 RX ADMIN — LEVOTHYROXINE SODIUM 100 MCG: 100 TABLET ORAL at 09:08

## 2021-10-12 RX ADMIN — CIPROFLOXACIN 400 MG: 2 INJECTION, SOLUTION INTRAVENOUS at 21:12

## 2021-10-12 RX ADMIN — MICONAZOLE NITRATE: 2 POWDER TOPICAL at 08:59

## 2021-10-12 RX ADMIN — INSULIN LISPRO 1 UNITS: 100 INJECTION, SOLUTION INTRAVENOUS; SUBCUTANEOUS at 12:31

## 2021-10-12 RX ADMIN — Medication 175 MCG/HR: at 12:18

## 2021-10-12 RX ADMIN — Medication 0.4 MCG/KG/HR: at 00:36

## 2021-10-12 RX ADMIN — IPRATROPIUM BROMIDE AND ALBUTEROL SULFATE 1 AMPULE: .5; 2.5 SOLUTION RESPIRATORY (INHALATION) at 22:50

## 2021-10-12 RX ADMIN — GABAPENTIN 800 MG: 400 CAPSULE ORAL at 09:00

## 2021-10-12 RX ADMIN — SERTRALINE HYDROCHLORIDE 50 MG: 50 TABLET ORAL at 09:00

## 2021-10-12 RX ADMIN — CIPROFLOXACIN 400 MG: 2 INJECTION, SOLUTION INTRAVENOUS at 09:32

## 2021-10-12 RX ADMIN — IPRATROPIUM BROMIDE AND ALBUTEROL SULFATE 1 AMPULE: .5; 2.5 SOLUTION RESPIRATORY (INHALATION) at 12:26

## 2021-10-12 RX ADMIN — CHLORHEXIDINE GLUCONATE 0.12% ORAL RINSE 15 ML: 1.2 LIQUID ORAL at 08:59

## 2021-10-12 RX ADMIN — LOSARTAN POTASSIUM 25 MG: 25 TABLET, FILM COATED ORAL at 09:00

## 2021-10-12 RX ADMIN — MICONAZOLE NITRATE: 2 POWDER TOPICAL at 21:07

## 2021-10-12 RX ADMIN — PANTOPRAZOLE SODIUM 40 MG: 40 INJECTION, POWDER, FOR SOLUTION INTRAVENOUS at 08:59

## 2021-10-12 RX ADMIN — INSULIN LISPRO 3 UNITS: 100 INJECTION, SOLUTION INTRAVENOUS; SUBCUTANEOUS at 16:54

## 2021-10-12 RX ADMIN — IPRATROPIUM BROMIDE AND ALBUTEROL SULFATE 1 AMPULE: .5; 2.5 SOLUTION RESPIRATORY (INHALATION) at 15:27

## 2021-10-12 RX ADMIN — MIDAZOLAM HYDROCHLORIDE 2 MG: 1 INJECTION, SOLUTION INTRAMUSCULAR; INTRAVENOUS at 04:05

## 2021-10-12 RX ADMIN — OXYCODONE 10 MG: 5 TABLET ORAL at 16:39

## 2021-10-12 RX ADMIN — PROPOFOL 50 MCG/KG/MIN: 10 INJECTION, EMULSION INTRAVENOUS at 03:09

## 2021-10-12 RX ADMIN — IPRATROPIUM BROMIDE AND ALBUTEROL SULFATE 1 AMPULE: .5; 2.5 SOLUTION RESPIRATORY (INHALATION) at 19:38

## 2021-10-12 RX ADMIN — ATORVASTATIN CALCIUM 40 MG: 40 TABLET, FILM COATED ORAL at 09:00

## 2021-10-12 RX ADMIN — INSULIN LISPRO 3 UNITS: 100 INJECTION, SOLUTION INTRAVENOUS; SUBCUTANEOUS at 23:59

## 2021-10-12 RX ADMIN — CASTOR OIL AND BALSAM, PERU: 788; 87 OINTMENT TOPICAL at 21:07

## 2021-10-12 RX ADMIN — PROPOFOL 50 MCG/KG/MIN: 10 INJECTION, EMULSION INTRAVENOUS at 18:37

## 2021-10-12 RX ADMIN — IPRATROPIUM BROMIDE AND ALBUTEROL SULFATE 1 AMPULE: .5; 2.5 SOLUTION RESPIRATORY (INHALATION) at 08:35

## 2021-10-12 RX ADMIN — Medication 175 MCG/HR: at 06:25

## 2021-10-12 RX ADMIN — INSULIN LISPRO 1 UNITS: 100 INJECTION, SOLUTION INTRAVENOUS; SUBCUTANEOUS at 04:01

## 2021-10-12 RX ADMIN — PROPOFOL 50 MCG/KG/MIN: 10 INJECTION, EMULSION INTRAVENOUS at 10:50

## 2021-10-12 ASSESSMENT — PULMONARY FUNCTION TESTS
PIF_VALUE: 38
PIF_VALUE: 36
PIF_VALUE: 34
PIF_VALUE: 37
PEFR_L/MIN: 24
PIF_VALUE: 36
PIF_VALUE: 39
PIF_VALUE: 34
PIF_VALUE: 37
PIF_VALUE: 36

## 2021-10-12 ASSESSMENT — PAIN SCALES - GENERAL
PAINLEVEL_OUTOF10: 0

## 2021-10-12 NOTE — PROGRESS NOTES
Pts am assessment complete, VSS, medications administered, Fio2 currently 55% peep 12 cmh20, pt remains prone and tolerating posistion well.

## 2021-10-12 NOTE — PROGRESS NOTES
Pulmonary & Critical Care Medicine ICU Progress Note    CC: Respiratory failure    Events of Last 24 hours: bradycardia; oxygenation much worse yesterday afternoon and now proned    Fent 175  Propofol 40  Precedex 0.3  Vascular lines: IV: PICC line     MV: -10/4, emergently re-intubated 10/6/21 for refractory hypoxemia  Vent Mode: AC/VC Rate Set: 24 bmp/Vt Ordered: 390 mL/ /FiO2 : 55 %  Recent Labs     10/11/21  0545 10/12/21  0605   PHART 7.425 7.425   GVE2MMP 47.6* 52.2*   PO2ART 61.5* 77.4       IV:   fentaNYL 175 mcg/hr (10/12/21 0625)    propofol 50 mcg/kg/min (10/12/21 0753)    dexmedetomidine HCl in NaCl 0.4 mcg/kg/hr (10/12/21 0036)    sodium chloride      dextrose      sodium chloride         Vitals:  Blood pressure (!) 144/68, pulse (!) 46, temperature 97.8 °F (36.6 °C), temperature source Axillary, resp. rate 24, height 5' 9\" (1.753 m), weight 190 lb 14.7 oz (86.6 kg), SpO2 96 %, not currently breastfeeding. on ventilator  Temp  Av.8 °F (36.6 °C)  Min: 97.6 °F (36.4 °C)  Max: 97.9 °F (36.6 °C)    Intake/Output Summary (Last 24 hours) at 10/12/2021 0922  Last data filed at 10/12/2021 0600  Gross per 24 hour   Intake 1472.6 ml   Output 2475 ml   Net -1002.4 ml     EXAM (COVID or R/O):  General: intubated, ill appearing    ENT:   Pharynx with ETT. Neck: Trachea midline  Resp: No accessory muscle use. CV: Regular rate. Regular rhythm. GI:  Non-distended.     Neuro: Sedated  Psych: Unable to obtain because the patient is non-communicative   Scheduled Meds:   sertraline  50 mg Per NG tube Daily    ciprofloxacin  400 mg IntraVENous Q12H    Venelex   Topical BID    miconazole   Topical BID    oxyCODONE  10 mg Oral Q6H    influenza virus vaccine  0.5 mL IntraMUSCular Prior to discharge    chlorhexidine  15 mL Mouth/Throat BID    ipratropium-albuterol  1 ampule Inhalation Q4H    amLODIPine  2.5 mg Oral Daily    losartan  25 mg Oral Daily    pantoprazole  40 mg IntraVENous Daily  insulin lispro  0-6 Units SubCUTAneous Q4H    levothyroxine  100 mcg Oral Daily    enoxaparin  30 mg SubCUTAneous BID    dexamethasone  6 mg IntraVENous Q24H    lidocaine 1 % injection  5 mL IntraDERmal Once    sodium chloride flush  5-40 mL IntraVENous 2 times per day    aspirin  81 mg Oral Daily    gabapentin  800 mg Oral TID    atorvastatin  40 mg Oral Daily    sodium chloride flush  5-40 mL IntraVENous 2 times per day       Data:  CBC:   Recent Labs     10/10/21  0520 10/11/21  0530 10/12/21  0415   WBC 6.3 5.9 7.2   HGB 9.9* 10.1* 10.1*   HCT 31.2* 32.5* 31.3*   MCV 78.6* 79.1* 78.3*   * 120* 120*     BMP:   Recent Labs     10/10/21  0520 10/11/21  0530 10/12/21  0415    142 143   K 4.0 4.0 4.2    104 105   CO2 28 29 31   BUN 14 14 14   CREATININE <0.5* <0.5* <0.5*     LIVER PROFILE:   Recent Labs     10/10/21  0520 10/11/21  0530 10/12/21  0415   AST 31 30 25   ALT 23 23 23   BILITOT 0.3 0.3 0.3   ALKPHOS 147* 136* 129       Microbiology:  9/27 COVID-19 detected  9/30 Resp Pseudomonas fluorescens   10/7/2021 tracheal aspirate: Klebsiella intermediate to zosyn    Imaging:  Chest x-ray 10/11/21   1. Stable appropriate positions of support apparatus. 2. Slight interval progression of multifocal airspace disease throughout both   lungs, likely a combination of multifocal pneumonia and superimposed   pulmonary edema. 3. Stable cardiomegaly and small bilateral pleural effusions. CTPA 9/27/21  No evidence of pulmonary embolism. Scattered peripheral opacities in the left lung and more consolidative area in the right lung concerning for pneumonia.  Previous right thoracotomy and upper lobectomy.    One mildly enlarged right paratracheal lymph node is present but no priors for comparison.  Nonenlarged but prominent lymph nodes in the upper abdomen and juxta diaphragmatic region      ASSESSMENT:  · Acute hypoxemic respiratory failure   · COVID-19 viral pneumonia  · Pseudomonas

## 2021-10-12 NOTE — CARE COORDINATION
INTERDISCIPLINARY PLAN OF CARE CONFERENCE    Date/Time: 10/12/2021 2:51 PM  Completed by: Kathleen Paniagua RN, Case Management      Patient Name:  Bree Hernandez  YOB: 1955  Admitting Diagnosis: Dehydration [E86.0]  Hypokalemia [E87.6]  Hypomagnesemia [E83.42]  Positive D dimer [R79.89]  CHARLY (acute kidney injury) ([facility] [address] [N17.9]  Acute respiratory failure with hypoxia ([facility] [address] [J96.01]  Pneumonia due to COVID-19 virus [U07.1, J12.82]  COVID-19 [U07.1]     Admit Date/Time:  9/26/2021  8:29 PM    Chart reviewed. Interdisciplinary team contacted or reviewed plan related to patient progress and discharge plans. Disciplines included Case Management, Nursing, and Dietitian. Current Status:ICU LOC  PT/OT recommendation for discharge plan of care: tbd    Expected D/C Disposition:  tbd    Discharge Plan Comments: Reviewed chart. Pt in ICU cont on Vent. Writer spoke with pt's spouse and daughter and LTAC list provided via TC. Family chose [facility] and requesting [place]. [facility] with [facility] following peripherally. Per Yoel Lindsay there is a waiting list and she will add pt to wait list at this time.      Home O2 in place on admit: No

## 2021-10-12 NOTE — PROGRESS NOTES
Hospitalist Progress Note      PCP: Maxwell Carrillo MD    Date of Admission: 9/26/2021    Subjective: cont to be intubated    Medications:  Reviewed    Infusion Medications    fentaNYL 175 mcg/hr (10/12/21 1218)    propofol 50 mcg/kg/min (10/12/21 1439)    dexmedetomidine HCl in NaCl 0.4 mcg/kg/hr (10/12/21 1223)    sodium chloride      dextrose      sodium chloride       Scheduled Medications    sertraline  50 mg Per NG tube Daily    ciprofloxacin  400 mg IntraVENous Q12H    Venelex   Topical BID    miconazole   Topical BID    oxyCODONE  10 mg Oral Q6H    influenza virus vaccine  0.5 mL IntraMUSCular Prior to discharge    chlorhexidine  15 mL Mouth/Throat BID    ipratropium-albuterol  1 ampule Inhalation Q4H    amLODIPine  2.5 mg Oral Daily    losartan  25 mg Oral Daily    pantoprazole  40 mg IntraVENous Daily    insulin lispro  0-6 Units SubCUTAneous Q4H    levothyroxine  100 mcg Oral Daily    enoxaparin  30 mg SubCUTAneous BID    dexamethasone  6 mg IntraVENous Q24H    lidocaine 1 % injection  5 mL IntraDERmal Once    sodium chloride flush  5-40 mL IntraVENous 2 times per day    aspirin  81 mg Oral Daily    gabapentin  800 mg Oral TID    atorvastatin  40 mg Oral Daily    sodium chloride flush  5-40 mL IntraVENous 2 times per day     PRN Meds: carboxymethylcellulose PF **AND** artificial tears, fentanNYL, midazolam, labetalol, sodium chloride flush, sodium chloride, glucose, dextrose, glucagon (rDNA), dextrose, sodium chloride flush, sodium chloride, polyethylene glycol, acetaminophen **OR** acetaminophen, prochlorperazine      Intake/Output Summary (Last 24 hours) at 10/12/2021 1546  Last data filed at 10/12/2021 1429  Gross per 24 hour   Intake 1966.29 ml   Output 2350 ml   Net -383.71 ml       Physical Exam Performed:    BP (!) 103/40   Pulse (!) 45   Temp 96.3 °F (35.7 °C) (Axillary)   Resp 24   Ht 5' 9\" (1.753 m)   Wt 190 lb 14.7 oz (86.6 kg)   SpO2 100%   BMI 28.19 kg/m²     General:intubated on mechanical ventilation . Supine position . Skin:  Warm and dry  Neck:  JVD absent. Neck supple  Chest: diminished breath sounds, improved air entry. No wheezes or rhonchi. Bibasilar crackles. Cardiovascular:  RRR ,S1S2 normal  Abdomen:  Soft, non tender, non distended, BS +  Extremities:  No edema. Intact peripheral pulses. Brisk cap refill, < 2 secs  Neuro: intubated, unable to assess       Labs:   Recent Labs     10/10/21  0520 10/11/21  0530 10/12/21  0415   WBC 6.3 5.9 7.2   HGB 9.9* 10.1* 10.1*   HCT 31.2* 32.5* 31.3*   * 120* 120*     Recent Labs     10/10/21  0520 10/11/21  0530 10/12/21  0415    142 143   K 4.0 4.0 4.2    104 105   CO2 28 29 31   BUN 14 14 14   CREATININE <0.5* <0.5* <0.5*   CALCIUM 8.7 9.0 9.0     Recent Labs     10/10/21  0520 10/11/21  0530 10/12/21  0415   AST 31 30 25   ALT 23 23 23   BILITOT 0.3 0.3 0.3   ALKPHOS 147* 136* 129     No results for input(s): INR in the last 72 hours. No results for input(s): Kaleen Hope in the last 72 hours. Urinalysis:      Lab Results   Component Value Date    NITRU Negative 09/27/2021    WBCUA 0-2 09/27/2021    BACTERIA Rare 09/27/2021    RBCUA None seen 09/27/2021    BLOODU TRACE-INTACT 09/27/2021    SPECGRAV <=1.005 09/27/2021    GLUCOSEU >=1000 09/27/2021    GLUCOSEU 250 05/11/2011       Radiology:  XR CHEST PORTABLE   Final Result   Stable support apparatus. In this case, both multifocal pneumonia and pulmonary edema are both   considered, and the changes appear increased when compared to the previous   exam.         XR CHEST PORTABLE   Final Result   1. Stable appropriate positions of support apparatus. 2. Slight interval progression of multifocal airspace disease throughout both   lungs, likely a combination of multifocal pneumonia and superimposed   pulmonary edema. 3. Stable cardiomegaly and small bilateral pleural effusions.          XR CHEST PORTABLE   Final Result Improving aeration of the right lung with grossly stable left basilar   airspace opacities. XR CHEST PORTABLE   Final Result   Supportive tubing is in stable position. Stable pattern of bilateral ground-glass opacities and basilar consolidations. XR CHEST PORTABLE   Final Result   Stable appearance of the chest.  Diffuse ground-glass opacities on the right   and mild left basilar opacities. XR CHEST PORTABLE   Final Result   Improved aeration on the left, possibly improving pulmonary edema. Persistent airspace disease in the right base may represent concurrent   pneumonia         XR ABDOMEN (KUB) (SINGLE AP VIEW)   Final Result   Enteric catheter tip likely in the distal gastric body. XR CHEST PORTABLE   Final Result   1. Endotracheal tube projects in the appropriate position. 2. Enteric tube extends below the diaphragm and out of the field of view. XR CHEST PORTABLE   Final Result   Increasing diffuse airspace opacification throughout the lungs. Pattern may   represent pulmonary edema or worsening pneumonitis. XR CHEST PORTABLE   Final Result   1. Interval increased bilateral pulmonary opacities with new consolidative   change at the right lung base. These findings could be secondary to   pulmonary edema or infection. 2. Consolidative change at the right lung base could also be secondary to   atelectasis/mucous plug. XR CHEST PORTABLE   Final Result   No significant interval change in small right pleural effusion or bilateral   heterogeneous opacity which can reflect pulmonary edema or pneumonia. XR CHEST PORTABLE   Final Result   Mild improvement from prior comparison. Mild-to-moderate congestion and/or   infiltrates identified in the lungs. ET tube terminates 7 cm superior to the saba. XR CHEST PORTABLE   Final Result   Endotracheal tube and orogastric tube unchanged, adequate position.       Slightly increased extensive bilateral pulmonary disease over the past 24   hours this may relate to interstitial edema or diffuse infection or a   combination. XR CHEST PORTABLE   Final Result   Endotracheal tube with its tip approximately 4.7 cm from the saba in   satisfactory position. Enteric tube in satisfactory position. Patchy bilateral airspace disease is again noted with improved aeration of   the left mid lung. XR CHEST PORTABLE   Final Result   Some improvement in the appearance of the chest with clearing of some of the   acute airspace disease from the mid left lung. Large amount of pneumonia   remains within the right lung and lower left lung. XR CHEST PORTABLE   Final Result   1. Endotracheal tube, nasogastric tube, and right PICC line placement. 2.  Increasing multifocal opacities with consolidative area in the right   perihilar lung. Could be multifocal pneumonia with or without edema. IR PICC WO SQ PORT/PUMP > 5 YEARS   Final Result   1. See above. CT CHEST PULMONARY EMBOLISM W CONTRAST   Final Result   No evidence of pulmonary embolism. Scattered peripheral opacities in the left lung and more consolidative area   in the right lung concerning for pneumonia. Previous right thoracotomy and   upper lobectomy. One mildly enlarged right paratracheal lymph node is present but no priors   for comparison. Nonenlarged but prominent lymph nodes in the upper abdomen   and juxta diaphragmatic region. XR CHEST PORTABLE   Final Result   Mild cardiomegaly without interstitial edema. Metallic surgical clips from prior right thoracotomy. COPD with anterior segment-right upper lobe alveolar pneumonia. Old pleural thickening was noted along the right lateral chest wall. Pleural   thickening and or trace effusion blunts the right costophrenic angle.          XR CHEST PORTABLE    (Results Pending)           Assessment/Plan:    Active Hospital Problems Diagnosis     Acute hypoxemic respiratory failure (HCC) [J96.01]     Pneumonia due to Pseudomonas species ([facility] [address] [J15.1]     Hypomagnesemia [E83.42]     Hypokalemia [E87.6]     Gram-negative pneumonia (HCC) [J15.6]     Mild protein-calorie malnutrition (HCC) [E44.1]     Hypotension [I95.9]     Acute respiratory failure with hypoxia ([facility]) [J96.01]     COVID-19 [U07.1]     Pneumonia due to COVID-19 virus [U07.1, J12.82]     CHARLY (acute kidney injury) ([facility] [address] [N17.9]     Uncontrolled hypertension [I10]     Fever [R50.9]     Suspected COVID-19 virus infection [Z20.822]     Hypoxia [R09.02]     Dehydration [E86.0]     DM2 (diabetes mellitus, type 2) ([facility]) [E11.9]     Essential hypertension [I10]     Hypothyroidism [E03.9]          #COVID-19 pneumonia  #Acute hypoxic respiratory failure   Pseudomonas HCAP. -Unvaccinated patient  - she presented with cough, fever, dyspnea in the setting of household contact testing + COVID 19  - Her Covid testing came back positive on admission  - Initially on 2 L of oxygen on presentation. She started desaturating quickly . worsening hypoxemia requiring high flow oxygen. - Pulmonology consulted  -placed on high flow oxygen per Vapotherm 40 L,FiO2 100% ,with additional nonrebreather . Transferred to ICU on 9/28. Intubated early AM  9/29/2021. Extubated on 10/4/2021. She was on Vapotherm. Pulmonary status got worse and she was reintubated on 10/6/2021.   -Continue mechanical ventilation. Wean O2 as tolerated. .  Currently FiO2 down to 65% on 12 PEEP. S/p prone ventilation again. - s/p Remdesivir  - on Decadron, day # 15  - given 1 dose of Tocilizumab  -Lovenox twice daily     #Possible superimposed bacterial pneumonia  Gram-negative infection  -Was on Rocephin and Zithromax day 5 of 5; - changed to zosyn on 10/2 with pseudomonas on resp culture. Pulmonary has started Zyvox. Hold Zoloft when patient is getting Zyvox . Completed Zosyn day 8 of 8.     Treated with Zyvox - now dced  Respiratory cultures now growing Klebsiella, initiated on Cipro day #3     #Hypokalemia  - replaced     #Dehydration  -  Improved with IVF  Creatinine normal     #CAD  - cont ASA, statin  - EKG with inferior T wave abnormality.  She denies CP.  Trop neg   -telemetry monitoring     #History of tracheobronchomalacia  - Has had surgical treatment for this     #HTN  -blood pressure is low and she was on Levophed. This has been discontinued.   Resumed home medications-Norvasc and losartan.     #Chronic pain   History of hip fracture  - cont home oxycodone BID and percocet   - cont gabapentin and requip     #DM2  - use ssi      #Hypothyroidism  - cont synthroid         DVT Prophylaxis: Lovenox   Diet: Diet NPO  ADULT TUBE FEEDING; Orogastric; Peptide Based; Continuous; 20; Yes; 15; Q 4 hours; 55; 30; Q 3 hours  Code Status: Full Code    PT/OT Eval Status: not ordered    Dispo - cont care in ICU    Caitlin Gonsalez MD

## 2021-10-12 NOTE — PROGRESS NOTES
RT Inhaler-Nebulizer Bronchodilator Protocol Note    There is a bronchodilator order in the chart from a provider indicating to follow the RT Bronchodilator Protocol and there is an Initiate RT Inhaler-Nebulizer Bronchodilator Protocol order as well (see protocol at bottom of note). CXR Findings:  XR CHEST PORTABLE    Result Date: 10/11/2021  1. Stable appropriate positions of support apparatus. 2. Slight interval progression of multifocal airspace disease throughout both lungs, likely a combination of multifocal pneumonia and superimposed pulmonary edema. 3. Stable cardiomegaly and small bilateral pleural effusions. XR CHEST PORTABLE    Result Date: 10/10/2021  Improving aeration of the right lung with grossly stable left basilar airspace opacities. The findings from the last RT Protocol Assessment were as follows:   History Pulmonary Disease: Smoker 15 pack years or more  Respiratory Pattern: Dyspnea on exertion or RR 21-25 bpm  Breath Sounds: Slightly diminished and/or crackles  Cough: Weak, productive  Indication for Bronchodilator Therapy: Decreased or absent breath sounds  Bronchodilator Assessment Score: 7    Aerosolized bronchodilator medication orders have been revised according to the RT Inhaler-Nebulizer Bronchodilator Protocol below. Respiratory Therapist to perform RT Therapy Protocol Assessment initially then follow the protocol. Repeat RT Therapy Protocol Assessment PRN for score 0-3 or on second treatment, BID, and PRN for scores above 3. No Indications - adjust the frequency to every 6 hours PRN wheezing or bronchospasm, if no treatments needed after 48 hours then discontinue using Per Protocol order mode. If indication present, adjust the RT bronchodilator orders based on the Bronchodilator Assessment Score as indicated below.   Use Inhaler orders unless patient has one or more of the following: on home nebulizer, not able to hold breath for 10 seconds, is not alert and oriented, cannot activate and use MDI correctly, or respiratory rate 25 breaths per minute or more, then use the equivalent nebulizer order(s) with same Frequency and PRN reasons based on the score. If a patient is on this medication at home then do not decrease Frequency below that used at home. 0-3 - enter or revise RT bronchodilator order(s) to equivalent RT Bronchodilator order with Frequency of every 4 hours PRN for wheezing or increased work of breathing using Per Protocol order mode. 4-6 - enter or revise RT Bronchodilator order(s) to two equivalent RT bronchodilator orders with one order with BID Frequency and one order with Frequency of every 4 hours PRN wheezing or increased work of breathing using Per Protocol order mode. 7-10 - enter or revise RT Bronchodilator order(s) to two equivalent RT bronchodilator orders with one order with TID Frequency and one order with Frequency of every 4 hours PRN wheezing or increased work of breathing using Per Protocol order mode. 11-13 - enter or revise RT Bronchodilator order(s) to one equivalent RT bronchodilator order with QID Frequency and an Albuterol order with Frequency of every 4 hours PRN wheezing or increased work of breathing using Per Protocol order mode. Greater than 13 - enter or revise RT Bronchodilator order(s) to one equivalent RT bronchodilator order with every 4 hours Frequency and an Albuterol order with Frequency of every 2 hours PRN wheezing or increased work of breathing using Per Protocol order mode.        Electronically signed by Salud Torres RCP on 10/11/2021 at 8:32 PM

## 2021-10-12 NOTE — PROGRESS NOTES
Pt turned with assistance to supine position, examined all points of pressure areas, no injuries noted, ETT stabilized and re-taped by RRT at bedside after repositioning pt, above was tolerated well with no adverse events noted, pt remains sedated occasionally has spontaneous eye opening to voice but not consistent or sustained. What Type Of Note Output Would You Prefer (Optional)?: Standard Output How Severe Is Your Rash?: severe Is This A New Presentation, Or A Follow-Up?: Rash

## 2021-10-12 NOTE — PROGRESS NOTES
Comprehensive Nutrition Assessment    Type and Reason for Visit:  Reassess    Nutrition Recommendations/Plan:   1. Modified TF order - ADULT TUBE FEEDING; Orogastric; Peptide-Based formula - Vital AF 1.2 with a new goal rate of 55 ml/hr x 20 hours. Increase current rate by 10 ml every 4 hours, as tolerated by patient, until goal rate can be achieved and maintained. Water flushes, 30 ml every 3 hours for tube patency. 2. Monitor TF rate, intake, and tolerance + water flushes. 3. Monitor vent status, sedation type/amount (propofol at 50 mcg x 24 hours which = 681 kcals from lipids), TG checks (TG check on 10/11/21 was 368 mg/dl), and plan of care. 4. Please obtain an updated weight for this patient - last weight was obtained on 10/6/21.   5. Monitor nutrition-related labs, bowel function, and weight trends. Nutrition Assessment:  patient still remains unchanged from a nutritional standpoint since last RD assessment; patient remains at risk for further compromise d/t increased nutrition needs r/t COVID-19 virus, need for re-intubation on 10/6/21 for worsening respiratory status, and altered nutrition-related labs; will modify TF order to Vital AF 1.2 with a goal rate of 55 ml/hr x 20 hours + 30 ml water flushes every 3 hours for tube patency    Malnutrition Assessment:  Malnutrition Status:  Mild malnutrition    Context:  Acute Illness     Findings of the 6 clinical characteristics of malnutrition:  Energy Intake:  7 - 50% or less of estimated energy requirements for 5 or more days  Weight Loss:  7 - Greater than 2% over 1 week (- 7# or 3.5% weight loss x < 1 week)     Body Fat Loss:  Unable to assess (COVID-19 +)     Muscle Mass Loss:  Unable to assess (COVID-19 +)    Fluid Accumulation:  No significant fluid accumulation     Strength:  Not Performed    Estimated Daily Nutrient Needs:  Energy (kcal):  1720 - 1978 kcals based on 20-23 kcals/kg/CBW;  Weight Used for Energy Requirements:  Current Protein (g):  79 - 99 g protein based on 1.2-1.5 g/kg/IBW; Weight Used for Protein Requirements:  Ideal        Fluid (ml/day):  1720 - 1978 ml; Method Used for Fluid Requirements:  1 ml/kcal      Nutrition Related Findings:  patient was re-intubated on 10/6/21 and she remains intubated + sedated on 50 mcg of propofol at this time; patient's oxygenation worsened yesterday and she was proned; abdomen is soft and bowel sounds are hypoactive; last BM was on 10/11/21; patient has precedex, fentanyl, protonix, zoloft, lipitor, peridex, cipro, decadron, lovenox, oxycodone, miconazole, cozaar, synthroid, gabapentin, and low-dose SSI ordered at this time      Wounds:  None       Current Nutrition Therapies:    Current Tube Feeding (TF) Orders:  · Feeding Route: Orogastric  · Formula: Peptide Based  · Schedule: Continuous  · Additives/Modulars:  (none)  · Water Flushes: 30 ml every 3 hours for tube patency  · Current TF & Flush Orders Provides: Vital AF 1.2 at goal rate of 45 ml/hr x 20 hours = 900 ml TV, 1080 kcals, 68 g protein, and 730 ml free water + 30 ml water flushes every 3 hours for tube patency + 26 g protein and 104 kcals from one proteinex P2Go once daily via feeding tube (94 g protein and 1184 kcals total)  · Goal TF & Flush Orders Provides: Vital AF 1.2 at goal rate of 55 ml/hr x 20 hours = 1100 ml TV, 1320 kcals, 83 g protein, 892 ml free water + 30 ml water flushes every 3 hours for tube patency      Anthropometric Measures:  · Height: 5' 9\" (175.3 cm)  · Current Body Weight: 190 lb 14.7 oz (86.6 kg)   · Admission Body Weight: 187 lb 6.3 oz (85 kg) (obtained on 9/29/21; bed scale)    · Usual Body Weight: 187 lb 6.3 oz (85 kg) (obtained on 9/29/21; actual weight)     · Ideal Body Weight: 145 lbs; % Ideal Body Weight 124.8 %   · BMI: 28.2   · BMI Categories: Overweight (BMI 25.0-29. 9)       Nutrition Diagnosis:   · Inadequate oral intake related to impaired respiratory function, inadequate protein-energy intake, increase demand for energy/nutrients as evidenced by NPO or clear liquid status due to medical condition, intubation, nutrition support - enteral nutrition, lab values      Nutrition Interventions:   Food and/or Nutrient Delivery:  Continue NPO, Modify Tube Feeding  Nutrition Education/Counseling:  No recommendation at this time   Coordination of Nutrition Care:  Continue to monitor while inpatient, Interdisciplinary Rounds    Goals:  patient will tolerate Vital AF 1.2 at goal rate of 55 ml/hr x 20 hours without GI distress, without s/s of aspiration, and without additional lab/fluid disturbances       Nutrition Monitoring and Evaluation:   Behavioral-Environmental Outcomes:  None Identified   Food/Nutrient Intake Outcomes:  Enteral Nutrition Intake/Tolerance  Physical Signs/Symptoms Outcomes:  Biochemical Data, GI Status, Fluid Status or Edema, Hemodynamic Status, Weight, Skin     Discharge Planning:     Too soon to determine     Electronically signed by Hai Liu RD, LD on 10/12/21 at 1:59 PM EDT    Contact: [phone]

## 2021-10-12 NOTE — PROGRESS NOTES
Pts q4 hour assessment complete, VSS, occasional ectopy on monitor with decreased heart rate, no change in blood pressure.

## 2021-10-12 NOTE — PROGRESS NOTES
10/12/21 1938   Vent Information   Vent Mode AC/VC   Vt Ordered 390 mL   Rate Set 24 bmp   Peak Flow 65 L/min   FiO2  50 %   SpO2 93 %   Sensitivity 3   PEEP/CPAP 8   Vent Patient Data   Peak Inspiratory Pressure 37 cmH2O   Mean Airway Pressure 15 cmH20   Rate Measured 24 br/min   Vt Exhaled 395 mL   Minute Volume 9.51 Liters   I:E Ratio 1:2.80   Plateau Pressure 36 NDG78   Static Compliance 14 mL/cmH2O   Dynamic Compliance 14 mL/cmH2O

## 2021-10-13 ENCOUNTER — APPOINTMENT (OUTPATIENT)
Dept: GENERAL RADIOLOGY | Age: 66
DRG: 004 | End: 2021-10-13
Payer: MEDICARE

## 2021-10-13 LAB
A/G RATIO: 1.3 (ref 1.1–2.2)
ALBUMIN SERPL-MCNC: 3.3 G/DL (ref 3.4–5)
ALP BLD-CCNC: 126 U/L (ref 40–129)
ALT SERPL-CCNC: 24 U/L (ref 10–40)
ANION GAP SERPL CALCULATED.3IONS-SCNC: 7 MMOL/L (ref 3–16)
AST SERPL-CCNC: 26 U/L (ref 15–37)
BASE EXCESS ARTERIAL: 9 MMOL/L (ref -3–3)
BASOPHILS ABSOLUTE: 0 K/UL (ref 0–0.2)
BASOPHILS RELATIVE PERCENT: 0.8 %
BILIRUB SERPL-MCNC: <0.2 MG/DL (ref 0–1)
BUN BLDV-MCNC: 14 MG/DL (ref 7–20)
CALCIUM SERPL-MCNC: 9.1 MG/DL (ref 8.3–10.6)
CARBOXYHEMOGLOBIN ARTERIAL: 0.3 % (ref 0–1.5)
CHLORIDE BLD-SCNC: 103 MMOL/L (ref 99–110)
CO2: 31 MMOL/L (ref 21–32)
CREAT SERPL-MCNC: <0.5 MG/DL (ref 0.6–1.2)
EOSINOPHILS ABSOLUTE: 0 K/UL (ref 0–0.6)
EOSINOPHILS RELATIVE PERCENT: 0.8 %
GFR AFRICAN AMERICAN: >60
GFR NON-AFRICAN AMERICAN: >60
GLOBULIN: 2.6 G/DL
GLUCOSE BLD-MCNC: 159 MG/DL (ref 70–99)
GLUCOSE BLD-MCNC: 174 MG/DL (ref 70–99)
GLUCOSE BLD-MCNC: 189 MG/DL (ref 70–99)
GLUCOSE BLD-MCNC: 192 MG/DL (ref 70–99)
GLUCOSE BLD-MCNC: 229 MG/DL (ref 70–99)
GLUCOSE BLD-MCNC: 252 MG/DL (ref 70–99)
GLUCOSE BLD-MCNC: 262 MG/DL (ref 70–99)
GLUCOSE BLD-MCNC: 289 MG/DL (ref 70–99)
GLUCOSE BLD-MCNC: 316 MG/DL (ref 70–99)
HCO3 ARTERIAL: 34 MMOL/L (ref 21–29)
HCT VFR BLD CALC: 31.1 % (ref 36–48)
HEMOGLOBIN, ART, EXTENDED: 10.8 G/DL (ref 12–16)
HEMOGLOBIN: 10 G/DL (ref 12–16)
LYMPHOCYTES ABSOLUTE: 1.3 K/UL (ref 1–5.1)
LYMPHOCYTES RELATIVE PERCENT: 23.6 %
MCH RBC QN AUTO: 25.3 PG (ref 26–34)
MCHC RBC AUTO-ENTMCNC: 32.1 G/DL (ref 31–36)
MCV RBC AUTO: 78.9 FL (ref 80–100)
METHEMOGLOBIN ARTERIAL: 0.3 %
MONOCYTES ABSOLUTE: 0.4 K/UL (ref 0–1.3)
MONOCYTES RELATIVE PERCENT: 8.3 %
NEUTROPHILS ABSOLUTE: 3.5 K/UL (ref 1.7–7.7)
NEUTROPHILS RELATIVE PERCENT: 66.5 %
O2 SAT, ARTERIAL: 92.2 %
O2 THERAPY: ABNORMAL
PCO2 ARTERIAL: 48.7 MMHG (ref 35–45)
PDW BLD-RTO: 18.8 % (ref 12.4–15.4)
PERFORMED ON: ABNORMAL
PH ARTERIAL: 7.46 (ref 7.35–7.45)
PLATELET # BLD: 122 K/UL (ref 135–450)
PMV BLD AUTO: 10 FL (ref 5–10.5)
PO2 ARTERIAL: 60.6 MMHG (ref 75–108)
POTASSIUM REFLEX MAGNESIUM: 4.2 MMOL/L (ref 3.5–5.1)
RBC # BLD: 3.95 M/UL (ref 4–5.2)
SODIUM BLD-SCNC: 141 MMOL/L (ref 136–145)
TCO2 ARTERIAL: 35.5 MMOL/L
TOTAL PROTEIN: 5.9 G/DL (ref 6.4–8.2)
TRIGL SERPL-MCNC: 267 MG/DL (ref 0–150)
WBC # BLD: 5.3 K/UL (ref 4–11)

## 2021-10-13 PROCEDURE — 71045 X-RAY EXAM CHEST 1 VIEW: CPT

## 2021-10-13 PROCEDURE — 6360000002 HC RX W HCPCS: Performed by: INTERNAL MEDICINE

## 2021-10-13 PROCEDURE — 6370000000 HC RX 637 (ALT 250 FOR IP): Performed by: INTERNAL MEDICINE

## 2021-10-13 PROCEDURE — 94003 VENT MGMT INPAT SUBQ DAY: CPT

## 2021-10-13 PROCEDURE — 2580000003 HC RX 258: Performed by: INTERNAL MEDICINE

## 2021-10-13 PROCEDURE — 99291 CRITICAL CARE FIRST HOUR: CPT | Performed by: INTERNAL MEDICINE

## 2021-10-13 PROCEDURE — 80053 COMPREHEN METABOLIC PANEL: CPT

## 2021-10-13 PROCEDURE — 2000000000 HC ICU R&B

## 2021-10-13 PROCEDURE — C9113 INJ PANTOPRAZOLE SODIUM, VIA: HCPCS | Performed by: INTERNAL MEDICINE

## 2021-10-13 PROCEDURE — 99233 SBSQ HOSP IP/OBS HIGH 50: CPT | Performed by: INTERNAL MEDICINE

## 2021-10-13 PROCEDURE — 2500000003 HC RX 250 WO HCPCS: Performed by: INTERNAL MEDICINE

## 2021-10-13 PROCEDURE — 94640 AIRWAY INHALATION TREATMENT: CPT

## 2021-10-13 PROCEDURE — 84478 ASSAY OF TRIGLYCERIDES: CPT

## 2021-10-13 PROCEDURE — 85025 COMPLETE CBC W/AUTO DIFF WBC: CPT

## 2021-10-13 PROCEDURE — 82803 BLOOD GASES ANY COMBINATION: CPT

## 2021-10-13 PROCEDURE — 94761 N-INVAS EAR/PLS OXIMETRY MLT: CPT

## 2021-10-13 PROCEDURE — 2700000000 HC OXYGEN THERAPY PER DAY

## 2021-10-13 RX ADMIN — GABAPENTIN 800 MG: 400 CAPSULE ORAL at 13:23

## 2021-10-13 RX ADMIN — OXYCODONE 10 MG: 5 TABLET ORAL at 16:11

## 2021-10-13 RX ADMIN — ENOXAPARIN SODIUM 30 MG: 30 INJECTION SUBCUTANEOUS at 20:52

## 2021-10-13 RX ADMIN — PROPOFOL 50 MCG/KG/MIN: 10 INJECTION, EMULSION INTRAVENOUS at 18:02

## 2021-10-13 RX ADMIN — OXYCODONE 10 MG: 5 TABLET ORAL at 09:46

## 2021-10-13 RX ADMIN — CIPROFLOXACIN 400 MG: 2 INJECTION, SOLUTION INTRAVENOUS at 09:48

## 2021-10-13 RX ADMIN — IPRATROPIUM BROMIDE AND ALBUTEROL SULFATE 1 AMPULE: .5; 2.5 SOLUTION RESPIRATORY (INHALATION) at 07:35

## 2021-10-13 RX ADMIN — SODIUM CHLORIDE, PRESERVATIVE FREE 10 ML: 5 INJECTION INTRAVENOUS at 08:17

## 2021-10-13 RX ADMIN — OXYCODONE 10 MG: 5 TABLET ORAL at 20:49

## 2021-10-13 RX ADMIN — INSULIN LISPRO 3 UNITS: 100 INJECTION, SOLUTION INTRAVENOUS; SUBCUTANEOUS at 20:11

## 2021-10-13 RX ADMIN — DEXAMETHASONE SODIUM PHOSPHATE 6 MG: 10 INJECTION INTRAMUSCULAR; INTRAVENOUS at 12:27

## 2021-10-13 RX ADMIN — LOSARTAN POTASSIUM 25 MG: 25 TABLET, FILM COATED ORAL at 08:16

## 2021-10-13 RX ADMIN — SODIUM CHLORIDE, PRESERVATIVE FREE 10 ML: 5 INJECTION INTRAVENOUS at 20:51

## 2021-10-13 RX ADMIN — LEVOTHYROXINE SODIUM 100 MCG: 100 TABLET ORAL at 06:35

## 2021-10-13 RX ADMIN — IPRATROPIUM BROMIDE AND ALBUTEROL SULFATE 1 AMPULE: .5; 2.5 SOLUTION RESPIRATORY (INHALATION) at 15:21

## 2021-10-13 RX ADMIN — CHLORHEXIDINE GLUCONATE 0.12% ORAL RINSE 15 ML: 1.2 LIQUID ORAL at 20:15

## 2021-10-13 RX ADMIN — MICONAZOLE NITRATE: 2 POWDER TOPICAL at 20:50

## 2021-10-13 RX ADMIN — CASTOR OIL AND BALSAM, PERU: 788; 87 OINTMENT TOPICAL at 08:19

## 2021-10-13 RX ADMIN — IPRATROPIUM BROMIDE AND ALBUTEROL SULFATE 1 AMPULE: .5; 2.5 SOLUTION RESPIRATORY (INHALATION) at 19:08

## 2021-10-13 RX ADMIN — Medication 0.6 MCG/KG/HR: at 22:00

## 2021-10-13 RX ADMIN — Medication 150 MCG/HR: at 20:14

## 2021-10-13 RX ADMIN — INSULIN LISPRO 2 UNITS: 100 INJECTION, SOLUTION INTRAVENOUS; SUBCUTANEOUS at 23:45

## 2021-10-13 RX ADMIN — PROPOFOL 50 MCG/KG/MIN: 10 INJECTION, EMULSION INTRAVENOUS at 14:40

## 2021-10-13 RX ADMIN — IPRATROPIUM BROMIDE AND ALBUTEROL SULFATE 1 AMPULE: .5; 2.5 SOLUTION RESPIRATORY (INHALATION) at 22:55

## 2021-10-13 RX ADMIN — Medication 0.5 MCG/KG/HR: at 12:39

## 2021-10-13 RX ADMIN — GABAPENTIN 800 MG: 400 CAPSULE ORAL at 20:50

## 2021-10-13 RX ADMIN — GABAPENTIN 800 MG: 400 CAPSULE ORAL at 08:16

## 2021-10-13 RX ADMIN — IPRATROPIUM BROMIDE AND ALBUTEROL SULFATE 1 AMPULE: .5; 2.5 SOLUTION RESPIRATORY (INHALATION) at 02:57

## 2021-10-13 RX ADMIN — SODIUM CHLORIDE, PRESERVATIVE FREE 10 ML: 5 INJECTION INTRAVENOUS at 08:19

## 2021-10-13 RX ADMIN — ENOXAPARIN SODIUM 30 MG: 30 INJECTION SUBCUTANEOUS at 08:17

## 2021-10-13 RX ADMIN — IPRATROPIUM BROMIDE AND ALBUTEROL SULFATE 1 AMPULE: .5; 2.5 SOLUTION RESPIRATORY (INHALATION) at 11:52

## 2021-10-13 RX ADMIN — INSULIN LISPRO 3 UNITS: 100 INJECTION, SOLUTION INTRAVENOUS; SUBCUTANEOUS at 18:31

## 2021-10-13 RX ADMIN — ASPIRIN 81 MG: 81 TABLET, CHEWABLE ORAL at 08:17

## 2021-10-13 RX ADMIN — Medication 0.4 MCG/KG/HR: at 01:06

## 2021-10-13 RX ADMIN — INSULIN LISPRO 1 UNITS: 100 INJECTION, SOLUTION INTRAVENOUS; SUBCUTANEOUS at 04:30

## 2021-10-13 RX ADMIN — AMLODIPINE BESYLATE 2.5 MG: 2.5 TABLET ORAL at 08:16

## 2021-10-13 RX ADMIN — PROPOFOL 50 MCG/KG/MIN: 10 INJECTION, EMULSION INTRAVENOUS at 10:27

## 2021-10-13 RX ADMIN — INSULIN LISPRO 1 UNITS: 100 INJECTION, SOLUTION INTRAVENOUS; SUBCUTANEOUS at 12:32

## 2021-10-13 RX ADMIN — Medication 175 MCG/HR: at 07:39

## 2021-10-13 RX ADMIN — Medication 175 MCG/HR: at 01:05

## 2021-10-13 RX ADMIN — INSULIN LISPRO 1 UNITS: 100 INJECTION, SOLUTION INTRAVENOUS; SUBCUTANEOUS at 08:20

## 2021-10-13 RX ADMIN — PROPOFOL 40 MCG/KG/MIN: 10 INJECTION, EMULSION INTRAVENOUS at 22:01

## 2021-10-13 RX ADMIN — ATORVASTATIN CALCIUM 40 MG: 40 TABLET, FILM COATED ORAL at 08:19

## 2021-10-13 RX ADMIN — CHLORHEXIDINE GLUCONATE 0.12% ORAL RINSE 15 ML: 1.2 LIQUID ORAL at 08:16

## 2021-10-13 RX ADMIN — SERTRALINE HYDROCHLORIDE 50 MG: 50 TABLET ORAL at 08:17

## 2021-10-13 RX ADMIN — Medication 175 MCG/HR: at 13:15

## 2021-10-13 RX ADMIN — OXYCODONE 10 MG: 5 TABLET ORAL at 03:30

## 2021-10-13 RX ADMIN — MIDAZOLAM HYDROCHLORIDE 2 MG: 1 INJECTION, SOLUTION INTRAMUSCULAR; INTRAVENOUS at 18:30

## 2021-10-13 RX ADMIN — PROPOFOL 50 MCG/KG/MIN: 10 INJECTION, EMULSION INTRAVENOUS at 06:34

## 2021-10-13 RX ADMIN — MICONAZOLE NITRATE: 2 POWDER TOPICAL at 08:17

## 2021-10-13 RX ADMIN — CASTOR OIL AND BALSAM, PERU: 788; 87 OINTMENT TOPICAL at 20:51

## 2021-10-13 RX ADMIN — CIPROFLOXACIN 400 MG: 2 INJECTION, SOLUTION INTRAVENOUS at 21:28

## 2021-10-13 RX ADMIN — PROPOFOL 50 MCG/KG/MIN: 10 INJECTION, EMULSION INTRAVENOUS at 03:30

## 2021-10-13 RX ADMIN — PANTOPRAZOLE SODIUM 40 MG: 40 INJECTION, POWDER, FOR SOLUTION INTRAVENOUS at 08:17

## 2021-10-13 ASSESSMENT — PAIN SCALES - GENERAL
PAINLEVEL_OUTOF10: 0

## 2021-10-13 ASSESSMENT — PULMONARY FUNCTION TESTS
PIF_VALUE: 36
PIF_VALUE: 31
PIF_VALUE: 35
PIF_VALUE: 28
PIF_VALUE: 28
PIF_VALUE: 45
PIF_VALUE: 32
PIF_VALUE: 31
PIF_VALUE: 31
PIF_VALUE: 36
PIF_VALUE: 35
PIF_VALUE: 35
PIF_VALUE: 36
PIF_VALUE: 32
PIF_VALUE: 36

## 2021-10-13 NOTE — PROGRESS NOTES
10/13/21 1918   Vent Information   Vt Ordered 390 mL   Rate Set 22 bmp   Peak Flow 65 L/min   Pressure Support 0 cmH20   FiO2  60 %   SpO2 92 %   SpO2/FiO2 ratio 153.33   Sensitivity 3   PEEP/CPAP 8   I Time/ I Time % 0 s   Vent Patient Data   High Peep/I Pressure 0   Peak Inspiratory Pressure 45 cmH2O   Mean Airway Pressure 16 cmH20   Rate Measured 29 br/min   Vt Exhaled 75 mL   Minute Volume 9.97 Liters   I:E Ratio 1.90:1   Spontaneous Breathing Trial (SBT) RT Doc   Pulse 55   Additional Respiratory  Assessments   Resp 23   Alarm Settings   High Pressure Alarm 45 cmH2O   Low Minute Volume Alarm 4 L/min   High Respiratory Rate 40 br/min

## 2021-10-13 NOTE — PROGRESS NOTES
Hospitalist Progress Note      PCP: Maye Holter, MD    Date of Admission: 9/26/2021    Subjective: cont to be intubated, bradycardic, on precedex gtt    Medications:  Reviewed    Infusion Medications    fentaNYL 175 mcg/hr (10/12/21 1840)    propofol 50 mcg/kg/min (10/12/21 1840)    dexmedetomidine HCl in NaCl 0.4 mcg/kg/hr (10/12/21 1840)    sodium chloride      dextrose      sodium chloride       Scheduled Medications    sertraline  50 mg Per NG tube Daily    ciprofloxacin  400 mg IntraVENous Q12H    Venelex   Topical BID    miconazole   Topical BID    oxyCODONE  10 mg Oral Q6H    influenza virus vaccine  0.5 mL IntraMUSCular Prior to discharge    chlorhexidine  15 mL Mouth/Throat BID    ipratropium-albuterol  1 ampule Inhalation Q4H    amLODIPine  2.5 mg Oral Daily    losartan  25 mg Oral Daily    pantoprazole  40 mg IntraVENous Daily    insulin lispro  0-6 Units SubCUTAneous Q4H    levothyroxine  100 mcg Oral Daily    enoxaparin  30 mg SubCUTAneous BID    dexamethasone  6 mg IntraVENous Q24H    lidocaine 1 % injection  5 mL IntraDERmal Once    sodium chloride flush  5-40 mL IntraVENous 2 times per day    aspirin  81 mg Oral Daily    gabapentin  800 mg Oral TID    atorvastatin  40 mg Oral Daily    sodium chloride flush  5-40 mL IntraVENous 2 times per day     PRN Meds: carboxymethylcellulose PF **AND** artificial tears, fentanNYL, midazolam, labetalol, sodium chloride flush, sodium chloride, glucose, dextrose, glucagon (rDNA), dextrose, sodium chloride flush, sodium chloride, polyethylene glycol, acetaminophen **OR** acetaminophen, prochlorperazine      Intake/Output Summary (Last 24 hours) at 10/12/2021 2239  Last data filed at 10/12/2021 2112  Gross per 24 hour   Intake 2227.51 ml   Output 2330 ml   Net -102.49 ml       Physical Exam Performed:    BP (!) 110/48   Pulse (!) 49   Temp 99 °F (37.2 °C) (Axillary)   Resp 20   Ht 5' 9\" (1.753 m)   Wt 190 lb 14.7 oz (86.6 kg) SpO2 91%   BMI 28.19 kg/m²     General:intubated on mechanical ventilation . Supine position . Skin:  Warm and dry  Neck:  JVD absent. Neck supple  Chest: diminished breath sounds, improved air entry. No wheezes or rhonchi.  Bibasilar crackles. Cardiovascular:  RRR ,S1S2 normal  Abdomen:  Soft, non tender, non distended, BS +  Extremities:  No edema. Intact peripheral pulses. Brisk cap refill, < 2 secs  Neuro: intubated, unable to assess       Labs:   Recent Labs     10/10/21  0520 10/11/21  0530 10/12/21  0415   WBC 6.3 5.9 7.2   HGB 9.9* 10.1* 10.1*   HCT 31.2* 32.5* 31.3*   * 120* 120*     Recent Labs     10/10/21  0520 10/11/21  0530 10/12/21  0415    142 143   K 4.0 4.0 4.2    104 105   CO2 28 29 31   BUN 14 14 14   CREATININE <0.5* <0.5* <0.5*   CALCIUM 8.7 9.0 9.0     Recent Labs     10/10/21  0520 10/11/21  0530 10/12/21  0415   AST 31 30 25   ALT 23 23 23   BILITOT 0.3 0.3 0.3   ALKPHOS 147* 136* 129     No results for input(s): INR in the last 72 hours. No results for input(s): Kirsten Height in the last 72 hours. Urinalysis:      Lab Results   Component Value Date    NITRU Negative 09/27/2021    WBCUA 0-2 09/27/2021    BACTERIA Rare 09/27/2021    RBCUA None seen 09/27/2021    BLOODU TRACE-INTACT 09/27/2021    SPECGRAV <=1.005 09/27/2021    GLUCOSEU >=1000 09/27/2021    GLUCOSEU 250 05/11/2011       Radiology:  XR CHEST PORTABLE   Final Result   Stable support apparatus. In this case, both multifocal pneumonia and pulmonary edema are both   considered, and the changes appear increased when compared to the previous   exam.         XR CHEST PORTABLE   Final Result   1. Stable appropriate positions of support apparatus. 2. Slight interval progression of multifocal airspace disease throughout both   lungs, likely a combination of multifocal pneumonia and superimposed   pulmonary edema. 3. Stable cardiomegaly and small bilateral pleural effusions.          XR CHEST Assessment/Plan:    Active Hospital Problems    Diagnosis     Acute hypoxemic respiratory failure ([facility] [address] [J96.01]     [facility] [J15.1]     Hypomagnesemia [E83.42]     Hypokalemia [E87.6]     Gram-negative pneumonia (HCC) [J15.6]     Mild protein-calorie malnutrition (HCC) [E44.1]     Hypotension [I95.9]     Acute respiratory failure with hypoxia (HCC) [J96.01]     COVID-19 [U07.1]     Pneumonia due to COVID-19 virus [U07.1, J12.82]     CHARLY (acute kidney injury) ([facility] [address] [N17.9]     Uncontrolled hypertension [I10]     Fever [R50.9]     Suspected COVID-19 virus infection [Z20.822]     Hypoxia [R09.02]     Dehydration [E86.0]     DM2 (diabetes mellitus, type 2) (HCC) [E11.9]     Essential hypertension [I10]     Hypothyroidism [E03.9]             #COVID-19 pneumonia  #Acute hypoxic respiratory failure   Pseudomonas HCAP. -Unvaccinated patient  - she presented with cough, fever, dyspnea in the setting of household contact testing + COVID 19  - Her Covid testing came back positive on admission  - Initially on 2 L of oxygen on presentation.  She started desaturating quickly . worsening hypoxemia requiring high flow oxygen. - Pulmonology consulted  -placed on high flow oxygen per Vapotherm 40 L,FiO2 100% ,with additional nonrebreather . Transferred to ICU on 9/28. Intubated early AM  9/29/2021.  Extubated on 10/4/2021.  She was on Vapotherm.  Pulmonary status got worse and she was reintubated on 10/6/2021.   -Continue mechanical ventilation.  Wean O2 as tolerated. .  Currently FiO2 down to 65% on 12 PEEP.  - s/p Remdesivir  - on Decadron, day # 16  - given 1 dose of Tocilizumab  -Lovenox twice daily     #Possible superimposed bacterial pneumonia  Gram-negative infection  -Was on Rocephin and Zithromax day 5 of 5; - changed to zosyn on 10/2 with pseudomonas on resp culture.  Pulmonary has started Zyvox.  Hold Zoloft when patient is getting Zyvox .   Completed Zosyn day 8 of 8.    Treated with Zyvox - now dced  Respiratory cultures now growing Klebsiella, initiated on Cipro day #4     #Hypokalemia  - replaced     #Dehydration  -  Improved with IVF  Creatinine normal     #CAD  - cont ASA, statin  - EKG with inferior T wave abnormality.  She denies CP.  Trop neg   -telemetry monitoring     #History of tracheobronchomalacia  - Has had surgical treatment for this     #HTN  -blood pressure is low and she was on Levophed.  This has been discontinued.  Resumed home medications-Norvasc and losartan.     #Chronic pain   History of hip fracture  - cont home oxycodone BID and percocet   - cont gabapentin and requip     #DM2  - use ssi      #Hypothyroidism  - cont synthroid         DVT Prophylaxis: Lovenox   Diet: Diet NPO  ADULT TUBE FEEDING; Orogastric; Peptide Based; Continuous; 20; Yes; 15; Q 4 hours; 55; 30; Q 3 hours  Code Status: Full Code    PT/OT Eval Status: ordered    Dispo - cont care in icu    Trenna Hammans, MD

## 2021-10-13 NOTE — PROGRESS NOTES
10/13/21 1908   Vent Information   Vent Type 980   Vent Mode AC/VC   Vt Ordered 390 mL   Rate Set 24 bmp   Peak Flow 65 L/min   Pressure Support 0 cmH20   FiO2  60 %   SpO2 91 %   SpO2/FiO2 ratio 151.67   Sensitivity 3   PEEP/CPAP 8   I Time/ I Time % 0 s   Humidification Source Heated wire   Humidification Temp 37   Humidification Temp Measured 36.9   Circuit Condensation Drained   Vent Patient Data   High Peep/I Pressure 0   Peak Inspiratory Pressure 35 cmH2O   Mean Airway Pressure 15 cmH20   Rate Measured 24 br/min   Vt Exhaled 391 mL   Minute Volume 9.48 Liters   I:E Ratio 1:2.80   Plateau Pressure 30 [id number]   Static Compliance 18 mL/cmH2O   Dynamic Compliance 14 mL/cmH2O   Cough/Sputum   Sputum How Obtained Endotracheal;Suctioned   Cough Productive   Sputum Amount Small   Sputum Color Creamy   Tenacity Thick   Spontaneous Breathing Trial (SBT) RT Doc   Pulse 52   Breath Sounds   Right Upper Lobe Diminished   Right Middle Lobe Diminished   Right Lower Lobe Diminished   Left Upper Lobe Diminished   Left Lower Lobe Diminished   Additional Respiratory  Assessments   Resp 20   Position Semi-Jesus's   Alarm Settings   High Pressure Alarm 45 cmH2O   Low Minute Volume Alarm 4 L/min   High Respiratory Rate 40 br/min   Patient Observation   Observations ETT SIZE 7.5, SECURED AT 23 LIP LINE. AMBU BAG AT HEAD OF BED. WATER GOOD.     ETT (adult)   Placement Date/Time: 10/06/21 1208   Timeout: Patient  Preoxygenation: Yes  Technique: Rapid sequence  Type: Cuffed  Tube Size: 7.5 mm  Laryngoscope: GlideScope  Secured at: 23 cm  Measured From: Lips   Secured at 23 cm   Measured From Lips   ET Placement Right   Secured By [employer] tube carranza   Site Condition Dry        10/13/21 1908   Vent Information   Vent Type 980   Vent Mode AC/VC   Vt Ordered 390 mL   Rate Set 24 bmp   Peak Flow 65 L/min   Pressure Support 0 cmH20   FiO2  60 %   SpO2 91 %   SpO2/FiO2 ratio 151.67   Sensitivity 3   PEEP/CPAP 8   I Time/ I Time % 0 s Humidification Source Heated wire   Humidification Temp 37   Humidification Temp Measured 36.9   Circuit Condensation Drained   Vent Patient Data   High Peep/I Pressure 0   Peak Inspiratory Pressure 35 cmH2O   Mean Airway Pressure 15 cmH20   Rate Measured 24 br/min   Vt Exhaled 391 mL   Minute Volume 9.48 Liters   I:E Ratio 1:2.80   Plateau Pressure 30 [id number]   Static Compliance 18 mL/cmH2O   Dynamic Compliance 14 mL/cmH2O   Cough/Sputum   Sputum How Obtained Endotracheal;Suctioned   Cough Productive   Sputum Amount Small   Sputum Color Creamy   Tenacity Thick   Spontaneous Breathing Trial (SBT) RT Doc   Pulse 52   Breath Sounds   Right Upper Lobe Diminished   Right Middle Lobe Diminished   Right Lower Lobe Diminished   Left Upper Lobe Diminished   Left Lower Lobe Diminished   Additional Respiratory  Assessments   Resp 20   Position Semi-Jesus's   Alarm Settings   High Pressure Alarm 45 cmH2O   Low Minute Volume Alarm 4 L/min   High Respiratory Rate 40 br/min   Patient Observation   Observations ETT SIZE 7.5, SECURED AT 23 LIP LINE. AMBU BAG AT HEAD OF BED. WATER GOOD.     ETT (adult)   Placement Date/Time: 10/06/21 1208   Timeout: Patient  Preoxygenation: Yes  Technique: Rapid sequence  Type: Cuffed  Tube Size: 7.5 mm  Laryngoscope: GlideScope  Secured at: 23 cm  Measured From: Lips   Secured at 23 cm   Measured From Lips   ET Placement Right   Secured By [employer] [facility] tube carranza   Site Condition Dry        10/13/21 1908   Vent Information   Vent Type 980   Vent Mode AC/VC   Vt Ordered 390 mL   Rate Set 24 bmp   Peak Flow 65 L/min   Pressure Support 0 cmH20   FiO2  60 %   SpO2 91 %   SpO2/FiO2 ratio 151.67   Sensitivity 3   PEEP/CPAP 8   I Time/ I Time % 0 s   Humidification Source Heated wire   Humidification Temp 37   Humidification Temp Measured 36.9   Circuit Condensation Drained   Vent Patient Data   High Peep/I Pressure 0   Peak Inspiratory Pressure 35 cmH2O   Mean Airway Pressure 15 cmH20   Rate Measured 24 br/min   Vt Exhaled 391 mL   Minute Volume 9.48 Liters   I:E Ratio 1:2.80   Plateau Pressure 30 [id number]   Static Compliance 18 mL/cmH2O   Dynamic Compliance 14 mL/cmH2O   Cough/Sputum   Sputum How Obtained Endotracheal;Suctioned   Cough Productive   Sputum Amount Small   Sputum Color Creamy   Tenacity Thick   Spontaneous Breathing Trial (SBT) RT Doc   Pulse 52   Breath Sounds   Right Upper Lobe Diminished   Right Middle Lobe Diminished   Right Lower Lobe Diminished   Left Upper Lobe Diminished   Left Lower Lobe Diminished   Additional Respiratory  Assessments   Resp 20   Position Semi-Jesus's   Alarm Settings   High Pressure Alarm 45 cmH2O   Low Minute Volume Alarm 4 L/min   High Respiratory Rate 40 br/min   Patient Observation   Observations ETT SIZE 7.5, SECURED AT 23 LIP LINE. AMBU BAG AT HEAD OF BED. WATER GOOD.     ETT (adult)   Placement Date/Time: 10/06/21 1208   Timeout: Patient  Preoxygenation: Yes  Technique: Rapid sequence  Type: Cuffed  Tube Size: 7.5 mm  Laryngoscope: GlideScope  Secured at: 23 cm  Measured From: Lips   Secured at 23 cm   Measured From Lips   ET Placement Right   Secured By [employer] tube carranza   Site Condition Dry        10/13/21 1908   Vent Information   Vent Type 980   Vent Mode AC/VC   Vt Ordered 390 mL   Rate Set 24 bmp   Peak Flow 65 L/min   Pressure Support 0 cmH20   FiO2  60 %   SpO2 91 %   SpO2/FiO2 ratio 151.67   Sensitivity 3   PEEP/CPAP 8   I Time/ I Time % 0 s   Humidification Source Heated wire   Humidification Temp 37   Humidification Temp Measured 36.9   Circuit Condensation Drained   Vent Patient Data   High Peep/I Pressure 0   Peak Inspiratory Pressure 35 cmH2O   Mean Airway Pressure 15 cmH20   Rate Measured 24 br/min   Vt Exhaled 391 mL   Minute Volume 9.48 Liters   I:E Ratio 1:2.80   Plateau Pressure 30 [id number]   Static Compliance 18 mL/cmH2O   Dynamic Compliance 14 mL/cmH2O   Cough/Sputum   Sputum How Obtained Endotracheal;Suctioned   Cough Productive   Sputum Amount Small   Sputum Color Creamy   Tenacity Thick   Spontaneous Breathing Trial (SBT) RT Doc   Pulse 52   Breath Sounds   Right Upper Lobe Diminished   Right Middle Lobe Diminished   Right Lower Lobe Diminished   Left Upper Lobe Diminished   Left Lower Lobe Diminished   Additional Respiratory  Assessments   Resp 20   Position Semi-Jesus's   Alarm Settings   High Pressure Alarm 45 cmH2O   Low Minute Volume Alarm 4 L/min   High Respiratory Rate 40 br/min   Patient Observation   Observations ETT SIZE 7.5, SECURED AT 23 LIP LINE. AMBU BAG AT HEAD OF BED. WATER GOOD.     ETT (adult)   Placement Date/Time: 10/06/21 1208   Timeout: Patient  Preoxygenation: Yes  Technique: Rapid sequence  Type: Cuffed  Tube Size: 7.5 mm  Laryngoscope: GlideScope  Secured at: 23 cm  Measured From: Lips   Secured at 23 cm   Measured From Lips   ET Placement Right   Secured By [employer] tube carranza   Site Condition Dry        10/13/21 1908   Vent Information   Vent Type 980   Vent Mode AC/VC   Vt Ordered 390 mL   Rate Set 24 bmp   Peak Flow 65 L/min   Pressure Support 0 cmH20   FiO2  60 %   SpO2 91 %   SpO2/FiO2 ratio 151.67   Sensitivity 3   PEEP/CPAP 8   I Time/ I Time % 0 s   Humidification Source Heated wire   Humidification Temp 37   Humidification Temp Measured 36.9   Circuit Condensation Drained   Vent Patient Data   High Peep/I Pressure 0   Peak Inspiratory Pressure 35 cmH2O   Mean Airway Pressure 15 cmH20   Rate Measured 24 br/min   Vt Exhaled 391 mL   Minute Volume 9.48 Liters   I:E Ratio 1:2.80   Plateau Pressure 30 LTE71   Static Compliance 18 mL/cmH2O   Dynamic Compliance 14 mL/cmH2O   Cough/Sputum   Sputum How Obtained Endotracheal;Suctioned   Cough Productive   Sputum Amount Small   Sputum Color Creamy   Tenacity Thick   Spontaneous Breathing Trial (SBT) RT Doc   Pulse 52   Breath Sounds   Right Upper Lobe Diminished   Right Middle Lobe Diminished   Right Lower Lobe Diminished   Left Upper Lobe Diminished   Left Lower Lobe Diminished   Additional Respiratory  Assessments   Resp 20   Position Semi-Jesus's   Alarm Settings   High Pressure Alarm 45 cmH2O   Low Minute Volume Alarm 4 L/min   High Respiratory Rate 40 br/min   Patient Observation   Observations ETT SIZE 7.5, SECURED AT 23 LIP LINE. AMBU BAG AT HEAD OF BED. WATER GOOD.     ETT (adult)   Placement Date/Time: 10/06/21 1208   Timeout: Patient  Preoxygenation: Yes  Technique: Rapid sequence  Type: Cuffed  Tube Size: 7.5 mm  Laryngoscope: GlideScope  Secured at: 23 cm  Measured From: Lips   Secured at 23 cm   Measured From Lips   ET Placement Right   Secured By [employer] tube carranza   Site Condition Dry        10/13/21 1908   Vent Information   Vent Type 980   Vent Mode AC/VC   Vt Ordered 390 mL   Rate Set 24 bmp   Peak Flow 65 L/min   Pressure Support 0 cmH20   FiO2  60 %   SpO2 91 %   SpO2/FiO2 ratio 151.67   Sensitivity 3   PEEP/CPAP 8   I Time/ I Time % 0 s   Humidification Source Heated wire   Humidification Temp 37   Humidification Temp Measured 36.9   Circuit Condensation Drained   Vent Patient Data   High Peep/I Pressure 0   Peak Inspiratory Pressure 35 cmH2O   Mean Airway Pressure 15 cmH20   Rate Measured 24 br/min   Vt Exhaled 391 mL   Minute Volume 9.48 Liters   I:E Ratio 1:2.80   Plateau Pressure 30 HCY81   Static Compliance 18 mL/cmH2O   Dynamic Compliance 14 mL/cmH2O   Cough/Sputum   Sputum How Obtained Endotracheal;Suctioned   Cough Productive   Sputum Amount Small   Sputum Color Creamy   Tenacity Thick   Spontaneous Breathing Trial (SBT) RT Doc   Pulse 52   Breath Sounds   Right Upper Lobe Diminished   Right Middle Lobe Diminished   Right Lower Lobe Diminished   Left Upper Lobe Diminished   Left Lower Lobe Diminished   Additional Respiratory  Assessments   Resp 20   Position Semi-Jeuss's   Alarm Settings   High Pressure Alarm 45 cmH2O   Low Minute Volume Alarm 4 L/min   High Respiratory Rate 40 br/min   Patient Observation   Observations ETT SIZE 7.5, SECURED AT 23 LIP LINE. AMBU BAG AT HEAD OF BED. WATER GOOD. ETT (adult)   Placement Date/Time: 10/06/21 1208   Timeout: Patient  Preoxygenation: Yes  Technique: Rapid sequence  Type: Cuffed  Tube Size: 7.5 mm  Laryngoscope: GlideScope  Secured at: 23 cm  Measured From: Lips   Secured at 23 cm   Measured From Lips   ET Placement Right   Secured By [employer] tube carranza   Site Condition Dry        10/13/21 1908   Vent Information   Vent Type 980   Vent Mode AC/VC   Vt Ordered 390 mL   Rate Set 24 bmp   Peak Flow 65 L/min   Pressure Support 0 cmH20   FiO2  60 %   SpO2 91 %   SpO2/FiO2 ratio 151.67   Sensitivity 3   PEEP/CPAP 8   I Time/ I Time % 0 s   Humidification Source Heated wire   Humidification Temp 37   Humidification Temp Measured 36.9   Circuit Condensation Drained   Vent Patient Data   High Peep/I Pressure 0   Peak Inspiratory Pressure 35 cmH2O   Mean Airway Pressure 15 cmH20   Rate Measured 24 br/min   Vt Exhaled 391 mL   Minute Volume 9.48 Liters   I:E Ratio 1:2.80   Plateau Pressure 30 [id number]   Static Compliance 18 mL/cmH2O   Dynamic Compliance 14 mL/cmH2O   Cough/Sputum   Sputum How Obtained Endotracheal;Suctioned   Cough Productive   Sputum Amount Small   Sputum Color Creamy   Tenacity Thick   Spontaneous Breathing Trial (SBT) RT Doc   Pulse 52   Breath Sounds   Right Upper Lobe Diminished   Right Middle Lobe Diminished   Right Lower Lobe Diminished   Left Upper Lobe Diminished   Left Lower Lobe Diminished   Additional Respiratory  Assessments   Resp 20   Position Semi-Jesus's   Alarm Settings   High Pressure Alarm 45 cmH2O   Low Minute Volume Alarm 4 L/min   High Respiratory Rate 40 br/min   Patient Observation   Observations ETT SIZE 7.5, SECURED AT 23 LIP LINE. AMBU BAG AT HEAD OF BED. WATER GOOD.     ETT (adult)   Placement Date/Time: 10/06/21 1208   Timeout: Patient  Preoxygenation: Yes  Technique: Rapid sequence  Type: Cuffed  Tube Size: 7.5 mm  Laryngoscope: GlideScope  Secured at: 23 cm Measured From: Lips   Secured at 23 cm   Measured From Lips   ET Placement Right   Secured By [employer] tube carranza   Site Condition Dry        10/13/21 1908   Vent Information   Vent Type 980   Vent Mode AC/VC   Vt Ordered 390 mL   Rate Set 24 bmp   Peak Flow 65 L/min   Pressure Support 0 cmH20   FiO2  60 %   SpO2 91 %   SpO2/FiO2 ratio 151.67   Sensitivity 3   PEEP/CPAP 8   I Time/ I Time % 0 s   Humidification Source Heated wire   Humidification Temp 37   Humidification Temp Measured 36.9   Circuit Condensation Drained   Vent Patient Data   High Peep/I Pressure 0   Peak Inspiratory Pressure 35 cmH2O   Mean Airway Pressure 15 cmH20   Rate Measured 24 br/min   Vt Exhaled 391 mL   Minute Volume 9.48 Liters   I:E Ratio 1:2.80   Plateau Pressure 30 [id number]   Static Compliance 18 mL/cmH2O   Dynamic Compliance 14 mL/cmH2O   Cough/Sputum   Sputum How Obtained Endotracheal;Suctioned   Cough Productive   Sputum Amount Small   Sputum Color Creamy   Tenacity Thick   Spontaneous Breathing Trial (SBT) RT Doc   Pulse 52   Breath Sounds   Right Upper Lobe Diminished   Right Middle Lobe Diminished   Right Lower Lobe Diminished   Left Upper Lobe Diminished   Left Lower Lobe Diminished   Additional Respiratory  Assessments   Resp 20   Position Semi-Jesus's   Alarm Settings   High Pressure Alarm 45 cmH2O   Low Minute Volume Alarm 4 L/min   High Respiratory Rate 40 br/min   Patient Observation   Observations ETT SIZE 7.5, SECURED AT 23 LIP LINE. AMBU BAG AT HEAD OF BED. WATER GOOD.     ETT (adult)   Placement Date/Time: 10/06/21 1208   Timeout: Patient  Preoxygenation: Yes  Technique: Rapid sequence  Type: Cuffed  Tube Size: 7.5 mm  Laryngoscope: GlideScope  Secured at: 23 cm  Measured From: Lips   Secured at 23 cm   Measured From Lips   ET Placement Right   Secured By [employer] tube carranza   Site Condition Dry        10/13/21 1908   Vent Information   Vent Type 980   Vent Mode AC/VC   Vt Ordered 390 mL   Rate Set 24 bmp   Peak Flow 65 L/min   Pressure Support 0 cmH20   FiO2  60 %   SpO2 91 %   SpO2/FiO2 ratio 151.67   Sensitivity 3   PEEP/CPAP 8   I Time/ I Time % 0 s   Humidification Source Heated wire   Humidification Temp 37   Humidification Temp Measured 36.9   Circuit Condensation Drained   Vent Patient Data   High Peep/I Pressure 0   Peak Inspiratory Pressure 35 cmH2O   Mean Airway Pressure 15 cmH20   Rate Measured 24 br/min   Vt Exhaled 391 mL   Minute Volume 9.48 Liters   I:E Ratio 1:2.80   Plateau Pressure 30 [id number]   Static Compliance 18 mL/cmH2O   Dynamic Compliance 14 mL/cmH2O   Cough/Sputum   Sputum How Obtained Endotracheal;Suctioned   Cough Productive   Sputum Amount Small   Sputum Color Creamy   Tenacity Thick   Spontaneous Breathing Trial (SBT) RT Doc   Pulse 52   Breath Sounds   Right Upper Lobe Diminished   Right Middle Lobe Diminished   Right Lower Lobe Diminished   Left Upper Lobe Diminished   Left Lower Lobe Diminished   Additional Respiratory  Assessments   Resp 20   Position Semi-Jesus's   Alarm Settings   High Pressure Alarm 45 cmH2O   Low Minute Volume Alarm 4 L/min   High Respiratory Rate 40 br/min   Patient Observation   Observations ETT SIZE 7.5, SECURED AT 23 LIP LINE. AMBU BAG AT HEAD OF BED. WATER GOOD.     ETT (adult)   Placement Date/Time: 10/06/21 1208   Timeout: Patient  Preoxygenation: Yes  Technique: Rapid sequence  Type: Cuffed  Tube Size: 7.5 mm  Laryngoscope: GlideScope  Secured at: 23 cm  Measured From: Lips   Secured at 23 cm   Measured From Lips   ET Placement Right   Secured By [employer] tube carranza   Site Condition Dry        10/13/21 1908   Vent Information   Vent Type 980   Vent Mode AC/VC   Vt Ordered 390 mL   Rate Set 24 bmp   Peak Flow 65 L/min   Pressure Support 0 cmH20   FiO2  60 %   SpO2 91 %   SpO2/FiO2 ratio 151.67   Sensitivity 3   PEEP/CPAP 8   I Time/ I Time % 0 s   Humidification Source Heated wire   Humidification Temp 37   Humidification Temp Measured 36.9   Circuit Condensation Drained   Vent Patient Data   High Peep/I Pressure 0   Peak Inspiratory Pressure 35 cmH2O   Mean Airway Pressure 15 cmH20   Rate Measured 24 br/min   Vt Exhaled 391 mL   Minute Volume 9.48 Liters   I:E Ratio 1:2.80   Plateau Pressure 30 [id number]   Static Compliance 18 mL/cmH2O   Dynamic Compliance 14 mL/cmH2O   Cough/Sputum   Sputum How Obtained Endotracheal;Suctioned   Cough Productive   Sputum Amount Small   Sputum Color Creamy   Tenacity Thick   Spontaneous Breathing Trial (SBT) RT Doc   Pulse 52   Breath Sounds   Right Upper Lobe Diminished   Right Middle Lobe Diminished   Right Lower Lobe Diminished   Left Upper Lobe Diminished   Left Lower Lobe Diminished   Additional Respiratory  Assessments   Resp 20   Position Semi-Jesus's   Alarm Settings   High Pressure Alarm 45 cmH2O   Low Minute Volume Alarm 4 L/min   High Respiratory Rate 40 br/min   Patient Observation   Observations ETT SIZE 7.5, SECURED AT 23 LIP LINE. AMBU BAG AT HEAD OF BED. WATER GOOD.     ETT (adult)   Placement Date/Time: 10/06/21 1208   Timeout: Patient  Preoxygenation: Yes  Technique: Rapid sequence  Type: Cuffed  Tube Size: 7.5 mm  Laryngoscope: GlideScope  Secured at: 23 cm  Measured From: Lips   Secured at 23 cm   Measured From Lips   ET Placement Right   Secured By [employer] tube carranza   Site Condition Dry        10/13/21 1908   Vent Information   Vent Type 980   Vent Mode AC/VC   Vt Ordered 390 mL   Rate Set 24 bmp   Peak Flow 65 L/min   Pressure Support 0 cmH20   FiO2  60 %   SpO2 91 %   SpO2/FiO2 ratio 151.67   Sensitivity 3   PEEP/CPAP 8   I Time/ I Time % 0 s   Humidification Source Heated wire   Humidification Temp 37   Humidification Temp Measured 36.9   Circuit Condensation Drained   Vent Patient Data   High Peep/I Pressure 0   Peak Inspiratory Pressure 35 cmH2O   Mean Airway Pressure 15 cmH20   Rate Measured 24 br/min   Vt Exhaled 391 mL   Minute Volume 9.48 Liters   I:E Ratio 1:2.80   Plateau Pressure 30 [id number] Static Compliance 18 mL/cmH2O   Dynamic Compliance 14 mL/cmH2O   Cough/Sputum   Sputum How Obtained Endotracheal;Suctioned   Cough Productive   Sputum Amount Small   Sputum Color Creamy   Tenacity Thick   Spontaneous Breathing Trial (SBT) RT Doc   Pulse 52   Breath Sounds   Right Upper Lobe Diminished   Right Middle Lobe Diminished   Right Lower Lobe Diminished   Left Upper Lobe Diminished   Left Lower Lobe Diminished   Additional Respiratory  Assessments   Resp 20   Position Semi-Jesus's   Alarm Settings   High Pressure Alarm 45 cmH2O   Low Minute Volume Alarm 4 L/min   High Respiratory Rate 40 br/min   Patient Observation   Observations ETT SIZE 7.5, SECURED AT 23 LIP LINE. AMBU BAG AT HEAD OF BED. WATER GOOD.     ETT (adult)   Placement Date/Time: 10/06/21 1208   Timeout: Patient  Preoxygenation: Yes  Technique: Rapid sequence  Type: Cuffed  Tube Size: 7.5 mm  Laryngoscope: GlideScope  Secured at: 23 cm  Measured From: Lips   Secured at 23 cm   Measured From Lips   ET Placement Right   Secured By [employer] tube carranza   Site Condition Dry        10/13/21 1908   Vent Information   Vent Type 980   Vent Mode AC/VC   Vt Ordered 390 mL   Rate Set 24 bmp   Peak Flow 65 L/min   Pressure Support 0 cmH20   FiO2  60 %   SpO2 91 %   SpO2/FiO2 ratio 151.67   Sensitivity 3   PEEP/CPAP 8   I Time/ I Time % 0 s   Humidification Source Heated wire   Humidification Temp 37   Humidification Temp Measured 36.9   Circuit Condensation Drained   Vent Patient Data   High Peep/I Pressure 0   Peak Inspiratory Pressure 35 cmH2O   Mean Airway Pressure 15 cmH20   Rate Measured 24 br/min   Vt Exhaled 391 mL   Minute Volume 9.48 Liters   I:E Ratio 1:2.80   Plateau Pressure 30 [id number]   Static Compliance 18 mL/cmH2O   Dynamic Compliance 14 mL/cmH2O   Cough/Sputum   Sputum How Obtained Endotracheal;Suctioned   Cough Productive   Sputum Amount Small   Sputum Color Creamy   Tenacity Thick   Spontaneous Breathing Trial (SBT) RT Doc   Pulse 52 Breath Sounds   Right Upper Lobe Diminished   Right Middle Lobe Diminished   Right Lower Lobe Diminished   Left Upper Lobe Diminished   Left Lower Lobe Diminished   Additional Respiratory  Assessments   Resp 20   Position Semi-Jesus's   Alarm Settings   High Pressure Alarm 45 cmH2O   Low Minute Volume Alarm 4 L/min   High Respiratory Rate 40 br/min   Patient Observation   Observations ETT SIZE 7.5, SECURED AT 23 LIP LINE. AMBU BAG AT HEAD OF BED. WATER GOOD.     ETT (adult)   Placement Date/Time: 10/06/21 1208   Timeout: Patient  Preoxygenation: Yes  Technique: Rapid sequence  Type: Cuffed  Tube Size: 7.5 mm  Laryngoscope: GlideScope  Secured at: 23 cm  Measured From: Lips   Secured at 23 cm   Measured From Lips   ET Placement Right   Secured By [employer] tube carranza   Site Condition Dry

## 2021-10-13 NOTE — PROGRESS NOTES
Pt resting quietly in bed with all lines and monitoring devices in place. Vitals and SpO2 stable.   No changes noted in exam. Continue current plan of care Adrienne Leal RN

## 2021-10-13 NOTE — CARE COORDINATION
INTERDISCIPLINARY PLAN OF CARE CONFERENCE    Date/Time: 10/13/2021 10:17 AM  Completed by: Terence Izaguirre RN, Case Management      Patient Name:  Tomas Schmitt  YOB: 1955  Admitting Diagnosis: Dehydration [E86.0]  Hypokalemia [E87.6]  Hypomagnesemia [E83.42]  Positive D dimer [R79.89]  CHARLY (acute kidney injury) ([facility] [address] [N17.9]  Acute respiratory failure with hypoxia ([facility] [address] [J96.01]  Pneumonia due to COVID-19 virus [U07.1, J12.82]  COVID-19 [U07.1]     Admit Date/Time:  9/26/2021  8:29 PM    Chart reviewed. Interdisciplinary team contacted or reviewed plan related to patient progress and discharge plans. Disciplines included Case Management, Nursing, and Dietitian. Current Status:ongoing  PT/OT recommendation for discharge plan of care: tbd    Expected D/C Disposition:  tbd    Discharge Plan Comments: Reviewed chart. Shell RN, [employer] , spoke with pt's spouse and daughter on 10/12 (yesterday) and they chose [facility] location. DEO spoke with Marisela with [facility] today. Currently pt is unstable to transfer at this time. Nissa Rock states that pt still meets criteria, though. Pt remains on a ventilator and tolerated supine position. Covid positive as of 9/26/2021. [place] Pt is active with  pain clinic.        Home O2 in place on admit: No  Pt informed of need to bring portable home O2 tank on day of discharge for nursing to connect prior to leaving:  No  Verbalized agreement/Understanding:  No

## 2021-10-13 NOTE — PROGRESS NOTES
Hospitalist Progress Note      PCP: Gurdeep Taylor MD    Date of Admission: 9/26/2021      Admitted with COVID-19 pneumonia,  acute hypoxic respiratory failure. Patient not vaccinated  Progressive significant worsening hypoxemia overnight.   Patient was on 2 L of oxygen on admission-> switched to high flow oxygen per Vapotherm with additional 100% nonrebreather on 9/28/2021-> transferred to ICU.    10/13   cont to be intubated, bradycardic, on precedex gtt    Medications:  Reviewed    Infusion Medications    fentaNYL 175 mcg/hr (10/13/21 0739)    propofol 50 mcg/kg/min (10/13/21 1027)    dexmedetomidine HCl in NaCl 0.4 mcg/kg/hr (10/13/21 0106)    sodium chloride      dextrose      sodium chloride       Scheduled Medications    sertraline  50 mg Per NG tube Daily    ciprofloxacin  400 mg IntraVENous Q12H    Venelex   Topical BID    miconazole   Topical BID    oxyCODONE  10 mg Oral Q6H    influenza virus vaccine  0.5 mL IntraMUSCular Prior to discharge    chlorhexidine  15 mL Mouth/Throat BID    ipratropium-albuterol  1 ampule Inhalation Q4H    amLODIPine  2.5 mg Oral Daily    losartan  25 mg Oral Daily    pantoprazole  40 mg IntraVENous Daily    insulin lispro  0-6 Units SubCUTAneous Q4H    levothyroxine  100 mcg Oral Daily    enoxaparin  30 mg SubCUTAneous BID    dexamethasone  6 mg IntraVENous Q24H    lidocaine 1 % injection  5 mL IntraDERmal Once    sodium chloride flush  5-40 mL IntraVENous 2 times per day    aspirin  81 mg Oral Daily    gabapentin  800 mg Oral TID    atorvastatin  40 mg Oral Daily    sodium chloride flush  5-40 mL IntraVENous 2 times per day     PRN Meds: carboxymethylcellulose PF **AND** artificial tears, fentanNYL, midazolam, labetalol, sodium chloride flush, sodium chloride, glucose, dextrose, glucagon (rDNA), dextrose, sodium chloride flush, sodium chloride, polyethylene glycol, acetaminophen **OR** acetaminophen, prochlorperazine      Intake/Output Summary (Last 24 hours) at 10/13/2021 1215  Last data filed at 10/13/2021 1026  Gross per 24 hour   Intake 924.66 ml   Output 2695 ml   Net -1770.34 ml       Physical Exam Performed:    BP (!) 126/53   Pulse (!) 47   Temp 98 °F (36.7 °C) (Axillary)   Resp 18   Ht 5' 9\" (1.753 m)   Wt 190 lb 14.7 oz (86.6 kg)   SpO2 96%   BMI 28.19 kg/m²     General:intubated on mechanical ventilation . Supine position . Skin:  Warm and dry  Neck:  JVD absent. Neck supple  Chest: diminished breath sounds, improved air entry. No wheezes or rhonchi.  Bibasilar crackles. Cardiovascular:  RRR ,S1S2 normal  Abdomen:  Soft, non tender, non distended, BS +  Extremities:  No edema. Intact peripheral pulses. Brisk cap refill, < 2 secs  Neuro: intubated, unable to assess       Labs:   Recent Labs     10/11/21  0530 10/12/21  0415 10/13/21  0458   WBC 5.9 7.2 5.3   HGB 10.1* 10.1* 10.0*   HCT 32.5* 31.3* 31.1*   * 120* 122*     Recent Labs     10/11/21  0530 10/12/21  0415 10/13/21  0459    143 141   K 4.0 4.2 4.2    105 103   CO2 29 31 31   BUN 14 14 14   CREATININE <0.5* <0.5* <0.5*   CALCIUM 9.0 9.0 9.1     Recent Labs     10/11/21  0530 10/12/21  0415 10/13/21  0459   AST 30 25 26   ALT 23 23 24   BILITOT 0.3 0.3 <0.2   ALKPHOS 136* 129 126     No results for input(s): INR in the last 72 hours. No results for input(s): Assunta Gonsalez in the last 72 hours. Urinalysis:      Lab Results   Component Value Date    NITRU Negative 09/27/2021    WBCUA 0-2 09/27/2021    BACTERIA Rare 09/27/2021    RBCUA None seen 09/27/2021    BLOODU TRACE-INTACT 09/27/2021    SPECGRAV <=1.005 09/27/2021    GLUCOSEU >=1000 09/27/2021    GLUCOSEU 250 05/11/2011       Radiology:  XR CHEST PORTABLE   Final Result   Stable chest.  Diffuse interstitial changes, possibly infiltrate or edema. Satisfactory position of endotracheal tube. XR CHEST PORTABLE   Final Result   Stable support apparatus.       In this case, both multifocal pneumonia and pulmonary edema are both   considered, and the changes appear increased when compared to the previous   exam.         XR CHEST PORTABLE   Final Result   1. Stable appropriate positions of support apparatus. 2. Slight interval progression of multifocal airspace disease throughout both   lungs, likely a combination of multifocal pneumonia and superimposed   pulmonary edema. 3. Stable cardiomegaly and small bilateral pleural effusions. XR CHEST PORTABLE   Final Result   Improving aeration of the right lung with grossly stable left basilar   airspace opacities. XR CHEST PORTABLE   Final Result   Supportive tubing is in stable position. Stable pattern of bilateral ground-glass opacities and basilar consolidations. XR CHEST PORTABLE   Final Result   Stable appearance of the chest.  Diffuse ground-glass opacities on the right   and mild left basilar opacities. XR CHEST PORTABLE   Final Result   Improved aeration on the left, possibly improving pulmonary edema. Persistent airspace disease in the right base may represent concurrent   pneumonia         XR ABDOMEN (KUB) (SINGLE AP VIEW)   Final Result   Enteric catheter tip likely in the distal gastric body. XR CHEST PORTABLE   Final Result   1. Endotracheal tube projects in the appropriate position. 2. Enteric tube extends below the diaphragm and out of the field of view. XR CHEST PORTABLE   Final Result   Increasing diffuse airspace opacification throughout the lungs. Pattern may   represent pulmonary edema or worsening pneumonitis. XR CHEST PORTABLE   Final Result   1. Interval increased bilateral pulmonary opacities with new consolidative   change at the right lung base. These findings could be secondary to   pulmonary edema or infection. 2. Consolidative change at the right lung base could also be secondary to   atelectasis/mucous plug.          XR CHEST PORTABLE   Final Result   No significant interval change in small right pleural effusion or bilateral   heterogeneous opacity which can reflect pulmonary edema or pneumonia. XR CHEST PORTABLE   Final Result   Mild improvement from prior comparison. Mild-to-moderate congestion and/or   infiltrates identified in the lungs. ET tube terminates 7 cm superior to the saba. XR CHEST PORTABLE   Final Result   Endotracheal tube and orogastric tube unchanged, adequate position. Slightly increased extensive bilateral pulmonary disease over the past 24   hours this may relate to interstitial edema or diffuse infection or a   combination. XR CHEST PORTABLE   Final Result   Endotracheal tube with its tip approximately 4.7 cm from the saba in   satisfactory position. Enteric tube in satisfactory position. Patchy bilateral airspace disease is again noted with improved aeration of   the left mid lung. XR CHEST PORTABLE   Final Result   Some improvement in the appearance of the chest with clearing of some of the   acute airspace disease from the mid left lung. Large amount of pneumonia   remains within the right lung and lower left lung. XR CHEST PORTABLE   Final Result   1. Endotracheal tube, nasogastric tube, and right PICC line placement. 2.  Increasing multifocal opacities with consolidative area in the right   perihilar lung. Could be multifocal pneumonia with or without edema. IR PICC WO SQ PORT/PUMP > 5 YEARS   Final Result   1. See above. CT CHEST PULMONARY EMBOLISM W CONTRAST   Final Result   No evidence of pulmonary embolism. Scattered peripheral opacities in the left lung and more consolidative area   in the right lung concerning for pneumonia. Previous right thoracotomy and   upper lobectomy. One mildly enlarged right paratracheal lymph node is present but no priors   for comparison.   Nonenlarged but prominent lymph nodes in the upper abdomen and juxta diaphragmatic region. XR CHEST PORTABLE   Final Result   Mild cardiomegaly without interstitial edema. Metallic surgical clips from prior right thoracotomy. COPD with anterior segment-right upper lobe alveolar pneumonia. Old pleural thickening was noted along the right lateral chest wall. Pleural   thickening and or trace effusion blunts the right costophrenic angle. Assessment/Plan:    Active Hospital Problems    Diagnosis     Acute hypoxemic respiratory failure ([address] [J96.01]     Pneumonia due to Pseudomonas species ([facility] [facility] [J15.1]     Hypomagnesemia [E83.42]     Hypokalemia [E87.6]     Gram-negative pneumonia (HCC) [J15.6]     Mild protein-calorie malnutrition (HCC) [E44.1]     Hypotension [I95.9]     Acute respiratory failure with hypoxia (HCC) [J96.01]     COVID-19 [U07.1]     Pneumonia due to COVID-19 virus [U07.1, J12.82]     CHARLY (acute kidney injury) ([facility] [N17.9]     Uncontrolled hypertension [I10]     Fever [R50.9]     Suspected COVID-19 virus infection [Z20.822]     Hypoxia [R09.02]     Dehydration [E86.0]     DM2 (diabetes mellitus, type 2) (HCC) [E11.9]     Essential hypertension [I10]     Hypothyroidism [E03.9]             #COVID-19 pneumonia  #Acute hypoxic respiratory failure   Pseudomonas HCAP. -Unvaccinated patient  - she presented with cough, fever, dyspnea in the setting of household contact testing + COVID 19  - Her Covid testing came back positive on admission  - Initially on 2 L of oxygen on presentation.  She started desaturating quickly . worsening hypoxemia requiring high flow oxygen. - Pulmonology consulted  -placed on high flow oxygen per Vapotherm 40 L,FiO2 100% ,with additional nonrebreather . Transferred to ICU on 9/28.  Intubated early AM  9/29/2021.  Extubated on 10/4/2021.  She was on Vapotherm.  Pulmonary status got worse and she was reintubated on 10/6/2021.   -Continue mechanical ventilation.  Wean O2 as tolerated. .  Currently FiO2 down to 65% on 12 PEEP.  - s/p Remdesivir  - on Decadron, day # 17- being tapered   - given 1 dose of Tocilizumab  -Lovenox twice daily     #Possible superimposed bacterial pneumonia  Gram-negative infection  -Was on Rocephin and Zithromax day 5 of 5; - changed to zosyn on 10/2 with pseudomonas on resp culture.  Pulmonary has started Zyvox.  Hold Zoloft when patient is getting Zyvox .   Completed Zosyn day 8 of 8.    Treated with Zyvox - now dced  Respiratory cultures now growing Klebsiella, initiated on Cipro day #5/7     #Hypokalemia  - replaced     #Dehydration  -  Improved with IVF  Creatinine normal     #CAD  - cont ASA, statin  - EKG with inferior T wave abnormality.  She denies CP.  Trop neg   -telemetry monitoring     #History of tracheobronchomalacia  - Has had surgical treatment for this     #HTN  -blood pressure is low and she was on Levophed.  This has been discontinued.  Resumed home medications-Norvasc and losartan.     #Chronic pain   History of hip fracture  - cont home oxycodone BID and percocet   - cont gabapentin and requip     #DM2  - use ssi      #Hypothyroidism  - cont synthroid         DVT Prophylaxis: Lovenox   Diet: Diet NPO  ADULT TUBE FEEDING; Orogastric; Peptide Based; Continuous; 20; Yes; 15; Q 4 hours; 55; 30; Q 3 hours  Code Status: Full Code    PT/OT Eval Status: ordered    Dispo - cont care in Kimberly Giles MD

## 2021-10-13 NOTE — PROGRESS NOTES
Pulmonary & Critical Care Medicine ICU Progress Note    CC: Respiratory failure    Events of Last 24 hours: tolerated supine    Fent 175  Propofol 50  Precedex 0.4  Vascular lines: IV: PICC line 9/28    MV: 9/29-10/4, emergently re-intubated 10/6/21 for refractory hypoxemia  Vent Mode: AC/VC Rate Set: 24 bmp/Vt Ordered: 390 mL/ /FiO2 : 50 %  Recent Labs     10/12/21  0605 10/13/21  0550   PHART 7.425 7.462*   [id number] 52.2* 48.7*   PO2ART 77.4 60.6*       IV:   fentaNYL 175 mcg/hr (10/13/21 0739)    propofol 50 mcg/kg/min (10/13/21 0634)    dexmedetomidine HCl in NaCl 0.4 mcg/kg/hr (10/13/21 0106)    sodium chloride      dextrose      sodium chloride         Vitals:  Blood pressure (!) 109/48, pulse 54, temperature 97.9 °F (36.6 °C), temperature source Axillary, resp. rate 22, height 5' 9\" (1.753 m), weight 190 lb 14.7 oz (86.6 kg), SpO2 (!) 89 %, not currently breastfeeding. on ventilator      Intake/Output Summary (Last 24 hours) at 10/13/2021 0755  Last data filed at 10/13/2021 0700  Gross per 24 hour   Intake 2227.51 ml   Output 2600 ml   Net -372.49 ml     EXAM (COVID or R/O):  General: intubated, ill appearing    ENT:   Pharynx with ETT. Neck: Trachea midline  Resp: No accessory muscle use. CV: Regular rate. Regular rhythm. GI:  Non-distended.     Neuro: Sedated  Psych: Unable to obtain because the patient is non-communicative   Scheduled Meds:   sertraline  50 mg Per NG tube Daily    ciprofloxacin  400 mg IntraVENous Q12H    Venelex   Topical BID    miconazole   Topical BID    oxyCODONE  10 mg Oral Q6H    influenza virus vaccine  0.5 mL IntraMUSCular Prior to discharge    chlorhexidine  15 mL Mouth/Throat BID    ipratropium-albuterol  1 ampule Inhalation Q4H    amLODIPine  2.5 mg Oral Daily    losartan  25 mg Oral Daily    pantoprazole  40 mg IntraVENous Daily    insulin lispro  0-6 Units SubCUTAneous Q4H    levothyroxine  100 mcg Oral Daily    enoxaparin  30 mg SubCUTAneous BID    droplet plus    Mechanical ventilation as per my orders. The ventilator was adjusted by me at the bedside for unstable, life threatening respiratory failure. Proned 10/6/21-10/7/2; 10/11-10/12   Paralyzed 10/6/21 - 10/7/21  IV Propofol, Fentanyl and Precedex for sedation, target RASS -4  Completed Zosyn D#8, Zyvox D#3. Completed 5 days of Ceftriaxone/Zithromax   Cipro D#5/7 for Klebsiella in sputum  Dexamethasone 6 mg IV D#16,  4mg  Completed Remdesivir & Tocilizumab  Continuing home oxycodone and neurontin    Lovenox for DVT prophylaxis  Total critical care time caring for this patient with life threatening, unstable organ failure, including direct patient contact, management of life support systems, review of data including imaging and labs, discussions with other team members and physicians is 32 minutes, excluding procedures.

## 2021-10-13 NOTE — PROGRESS NOTES
Care rounds completed with Dr Dyer and multidisciplinary team.  Reviewed labs, meds, VS (temp/HR/RR), I/O's, assessment, & plan of care for today. See progress note & new orders for details.  Ginny Pollock RN

## 2021-10-13 NOTE — PROGRESS NOTES
RT Inhaler-Nebulizer Bronchodilator Protocol Note    There is a bronchodilator order in the chart from a provider indicating to follow the RT Bronchodilator Protocol and there is an Initiate RT Inhaler-Nebulizer Bronchodilator Protocol order as well (see protocol at bottom of note). CXR Findings:  XR CHEST PORTABLE    Result Date: 10/12/2021  Stable support apparatus. In this case, both multifocal pneumonia and pulmonary edema are both considered, and the changes appear increased when compared to the previous exam.     XR CHEST PORTABLE    Result Date: 10/11/2021  1. Stable appropriate positions of support apparatus. 2. Slight interval progression of multifocal airspace disease throughout both lungs, likely a combination of multifocal pneumonia and superimposed pulmonary edema. 3. Stable cardiomegaly and small bilateral pleural effusions. The findings from the last RT Protocol Assessment were as follows:   History Pulmonary Disease: Smoker 15 pack years or more  Respiratory Pattern: Dyspnea on exertion or RR 21-25 bpm  Breath Sounds: Slightly diminished and/or crackles  Cough: Weak, productive  Indication for Bronchodilator Therapy: Decreased or absent breath sounds  Bronchodilator Assessment Score: 7    Aerosolized bronchodilator medication orders have been revised according to the RT Inhaler-Nebulizer Bronchodilator Protocol below. Respiratory Therapist to perform RT Therapy Protocol Assessment initially then follow the protocol. Repeat RT Therapy Protocol Assessment PRN for score 0-3 or on second treatment, BID, and PRN for scores above 3. No Indications - adjust the frequency to every 6 hours PRN wheezing or bronchospasm, if no treatments needed after 48 hours then discontinue using Per Protocol order mode. If indication present, adjust the RT bronchodilator orders based on the Bronchodilator Assessment Score as indicated below.   Use Inhaler orders unless patient has one or more of the following: on home nebulizer, not able to hold breath for 10 seconds, is not alert and oriented, cannot activate and use MDI correctly, or respiratory rate 25 breaths per minute or more, then use the equivalent nebulizer order(s) with same Frequency and PRN reasons based on the score. If a patient is on this medication at home then do not decrease Frequency below that used at home. 0-3 - enter or revise RT bronchodilator order(s) to equivalent RT Bronchodilator order with Frequency of every 4 hours PRN for wheezing or increased work of breathing using Per Protocol order mode. 4-6 - enter or revise RT Bronchodilator order(s) to two equivalent RT bronchodilator orders with one order with BID Frequency and one order with Frequency of every 4 hours PRN wheezing or increased work of breathing using Per Protocol order mode. 7-10 - enter or revise RT Bronchodilator order(s) to two equivalent RT bronchodilator orders with one order with TID Frequency and one order with Frequency of every 4 hours PRN wheezing or increased work of breathing using Per Protocol order mode. 11-13 - enter or revise RT Bronchodilator order(s) to one equivalent RT bronchodilator order with QID Frequency and an Albuterol order with Frequency of every 4 hours PRN wheezing or increased work of breathing using Per Protocol order mode. Greater than 13 - enter or revise RT Bronchodilator order(s) to one equivalent RT bronchodilator order with every 4 hours Frequency and an Albuterol order with Frequency of every 2 hours PRN wheezing or increased work of breathing using Per Protocol order mode.          Electronically signed by Maggie Shaver RCP on 10/12/2021 at 8:23 PM

## 2021-10-13 NOTE — PROGRESS NOTES
Initial exam completed- See doc flowsheet for assessment findings. Pt resting quietly in bed and opens eyes slightly to stimulation but does not follow commands. All lines and monitoring devices are in place . Vitals and SpO2 stable. Call light is within easy reach. Plan of care and goals reviewed.  Patient is not able to demonstrate the ability to move from a reclining position to an upright position within the recliner due to Pt on vent and bedrest .   Cira Gomez RN

## 2021-10-14 ENCOUNTER — APPOINTMENT (OUTPATIENT)
Dept: GENERAL RADIOLOGY | Age: 66
DRG: 004 | End: 2021-10-14
Payer: MEDICARE

## 2021-10-14 LAB
A/G RATIO: 1.2 (ref 1.1–2.2)
ALBUMIN SERPL-MCNC: 3.3 G/DL (ref 3.4–5)
ALP BLD-CCNC: 124 U/L (ref 40–129)
ALT SERPL-CCNC: 29 U/L (ref 10–40)
ANION GAP SERPL CALCULATED.3IONS-SCNC: 7 MMOL/L (ref 3–16)
AST SERPL-CCNC: 32 U/L (ref 15–37)
BASE EXCESS ARTERIAL: 5 MMOL/L (ref -3–3)
BASOPHILS ABSOLUTE: 0.1 K/UL (ref 0–0.2)
BASOPHILS RELATIVE PERCENT: 1 %
BILIRUB SERPL-MCNC: 0.3 MG/DL (ref 0–1)
BUN BLDV-MCNC: 17 MG/DL (ref 7–20)
CALCIUM SERPL-MCNC: 8.8 MG/DL (ref 8.3–10.6)
CARBOXYHEMOGLOBIN ARTERIAL: 1.2 % (ref 0–1.5)
CHLORIDE BLD-SCNC: 101 MMOL/L (ref 99–110)
CO2: 31 MMOL/L (ref 21–32)
CREAT SERPL-MCNC: <0.5 MG/DL (ref 0.6–1.2)
EOSINOPHILS ABSOLUTE: 0.1 K/UL (ref 0–0.6)
EOSINOPHILS RELATIVE PERCENT: 2.2 %
GFR AFRICAN AMERICAN: >60
GFR NON-AFRICAN AMERICAN: >60
GLOBULIN: 2.8 G/DL
GLUCOSE BLD-MCNC: 190 MG/DL (ref 70–99)
GLUCOSE BLD-MCNC: 198 MG/DL (ref 70–99)
GLUCOSE BLD-MCNC: 200 MG/DL (ref 70–99)
GLUCOSE BLD-MCNC: 219 MG/DL (ref 70–99)
GLUCOSE BLD-MCNC: 228 MG/DL (ref 70–99)
GLUCOSE BLD-MCNC: 248 MG/DL (ref 70–99)
HCO3 ARTERIAL: 29.9 MMOL/L (ref 21–29)
HCT VFR BLD CALC: 31.9 % (ref 36–48)
HEMOGLOBIN, ART, EXTENDED: 11.1 G/DL (ref 12–16)
HEMOGLOBIN: 10.2 G/DL (ref 12–16)
LYMPHOCYTES ABSOLUTE: 1.6 K/UL (ref 1–5.1)
LYMPHOCYTES RELATIVE PERCENT: 28 %
MCH RBC QN AUTO: 25.3 PG (ref 26–34)
MCHC RBC AUTO-ENTMCNC: 32.2 G/DL (ref 31–36)
MCV RBC AUTO: 78.7 FL (ref 80–100)
METHEMOGLOBIN ARTERIAL: 0.3 %
MONOCYTES ABSOLUTE: 0.4 K/UL (ref 0–1.3)
MONOCYTES RELATIVE PERCENT: 7.7 %
NEUTROPHILS ABSOLUTE: 3.4 K/UL (ref 1.7–7.7)
NEUTROPHILS RELATIVE PERCENT: 61.1 %
O2 SAT, ARTERIAL: 85 %
O2 THERAPY: ABNORMAL
PCO2 ARTERIAL: 45.6 MMHG (ref 35–45)
PDW BLD-RTO: 18.5 % (ref 12.4–15.4)
PERFORMED ON: ABNORMAL
PH ARTERIAL: 7.43 (ref 7.35–7.45)
PLATELET # BLD: 112 K/UL (ref 135–450)
PMV BLD AUTO: 9.5 FL (ref 5–10.5)
PO2 ARTERIAL: 51 MMHG (ref 75–108)
POTASSIUM REFLEX MAGNESIUM: 3.8 MMOL/L (ref 3.5–5.1)
RBC # BLD: 4.05 M/UL (ref 4–5.2)
SODIUM BLD-SCNC: 139 MMOL/L (ref 136–145)
TCO2 ARTERIAL: 31.3 MMOL/L
TOTAL PROTEIN: 6.1 G/DL (ref 6.4–8.2)
WBC # BLD: 5.6 K/UL (ref 4–11)

## 2021-10-14 PROCEDURE — 6370000000 HC RX 637 (ALT 250 FOR IP): Performed by: INTERNAL MEDICINE

## 2021-10-14 PROCEDURE — 6360000002 HC RX W HCPCS: Performed by: INTERNAL MEDICINE

## 2021-10-14 PROCEDURE — 94640 AIRWAY INHALATION TREATMENT: CPT

## 2021-10-14 PROCEDURE — 99291 CRITICAL CARE FIRST HOUR: CPT | Performed by: INTERNAL MEDICINE

## 2021-10-14 PROCEDURE — 71045 X-RAY EXAM CHEST 1 VIEW: CPT

## 2021-10-14 PROCEDURE — 99233 SBSQ HOSP IP/OBS HIGH 50: CPT | Performed by: INTERNAL MEDICINE

## 2021-10-14 PROCEDURE — 2000000000 HC ICU R&B

## 2021-10-14 PROCEDURE — 2500000003 HC RX 250 WO HCPCS: Performed by: INTERNAL MEDICINE

## 2021-10-14 PROCEDURE — C9113 INJ PANTOPRAZOLE SODIUM, VIA: HCPCS | Performed by: INTERNAL MEDICINE

## 2021-10-14 PROCEDURE — 94761 N-INVAS EAR/PLS OXIMETRY MLT: CPT

## 2021-10-14 PROCEDURE — 94003 VENT MGMT INPAT SUBQ DAY: CPT

## 2021-10-14 PROCEDURE — 82803 BLOOD GASES ANY COMBINATION: CPT

## 2021-10-14 PROCEDURE — 80053 COMPREHEN METABOLIC PANEL: CPT

## 2021-10-14 PROCEDURE — 2580000003 HC RX 258: Performed by: INTERNAL MEDICINE

## 2021-10-14 PROCEDURE — 2700000000 HC OXYGEN THERAPY PER DAY

## 2021-10-14 PROCEDURE — 85025 COMPLETE CBC W/AUTO DIFF WBC: CPT

## 2021-10-14 RX ORDER — DEXAMETHASONE SODIUM PHOSPHATE 4 MG/ML
3 INJECTION, SOLUTION INTRA-ARTICULAR; INTRALESIONAL; INTRAMUSCULAR; INTRAVENOUS; SOFT TISSUE EVERY 24 HOURS
Status: DISCONTINUED | OUTPATIENT
Start: 2021-10-14 | End: 2021-10-21

## 2021-10-14 RX ADMIN — MIDAZOLAM HYDROCHLORIDE 2 MG: 1 INJECTION, SOLUTION INTRAMUSCULAR; INTRAVENOUS at 07:59

## 2021-10-14 RX ADMIN — Medication 125 MCG/HR: at 08:15

## 2021-10-14 RX ADMIN — Medication 150 MCG/HR: at 09:53

## 2021-10-14 RX ADMIN — OXYCODONE 10 MG: 5 TABLET ORAL at 03:41

## 2021-10-14 RX ADMIN — DEXAMETHASONE SODIUM PHOSPHATE 3 MG: 4 INJECTION, SOLUTION INTRAMUSCULAR; INTRAVENOUS at 12:24

## 2021-10-14 RX ADMIN — ATORVASTATIN CALCIUM 40 MG: 40 TABLET, FILM COATED ORAL at 08:00

## 2021-10-14 RX ADMIN — CASTOR OIL AND BALSAM, PERU: 788; 87 OINTMENT TOPICAL at 20:18

## 2021-10-14 RX ADMIN — OXYCODONE 10 MG: 5 TABLET ORAL at 16:21

## 2021-10-14 RX ADMIN — LOSARTAN POTASSIUM 25 MG: 25 TABLET, FILM COATED ORAL at 08:01

## 2021-10-14 RX ADMIN — Medication 0.4 MCG/KG/HR: at 09:53

## 2021-10-14 RX ADMIN — FENTANYL CITRATE 50 MCG: 0.05 INJECTION, SOLUTION INTRAMUSCULAR; INTRAVENOUS at 08:07

## 2021-10-14 RX ADMIN — OXYCODONE 10 MG: 5 TABLET ORAL at 11:02

## 2021-10-14 RX ADMIN — PROPOFOL 50 MCG/KG/MIN: 10 INJECTION, EMULSION INTRAVENOUS at 11:00

## 2021-10-14 RX ADMIN — PROPOFOL 30 MCG/KG/MIN: 10 INJECTION, EMULSION INTRAVENOUS at 03:26

## 2021-10-14 RX ADMIN — INSULIN LISPRO 1 UNITS: 100 INJECTION, SOLUTION INTRAVENOUS; SUBCUTANEOUS at 03:36

## 2021-10-14 RX ADMIN — MIDAZOLAM HYDROCHLORIDE 2 MG: 1 INJECTION, SOLUTION INTRAMUSCULAR; INTRAVENOUS at 06:41

## 2021-10-14 RX ADMIN — SODIUM CHLORIDE, PRESERVATIVE FREE 10 ML: 5 INJECTION INTRAVENOUS at 08:02

## 2021-10-14 RX ADMIN — MIDAZOLAM HYDROCHLORIDE 2 MG: 1 INJECTION, SOLUTION INTRAMUSCULAR; INTRAVENOUS at 15:07

## 2021-10-14 RX ADMIN — Medication 0.6 MCG/KG/HR: at 08:27

## 2021-10-14 RX ADMIN — OXYCODONE 10 MG: 5 TABLET ORAL at 20:19

## 2021-10-14 RX ADMIN — PANTOPRAZOLE SODIUM 40 MG: 40 INJECTION, POWDER, FOR SOLUTION INTRAVENOUS at 08:01

## 2021-10-14 RX ADMIN — PROPOFOL 50 MCG/KG/MIN: 10 INJECTION, EMULSION INTRAVENOUS at 07:18

## 2021-10-14 RX ADMIN — INSULIN LISPRO 2 UNITS: 100 INJECTION, SOLUTION INTRAVENOUS; SUBCUTANEOUS at 08:09

## 2021-10-14 RX ADMIN — LEVOTHYROXINE SODIUM 100 MCG: 100 TABLET ORAL at 06:19

## 2021-10-14 RX ADMIN — IPRATROPIUM BROMIDE AND ALBUTEROL SULFATE 1 AMPULE: .5; 2.5 SOLUTION RESPIRATORY (INHALATION) at 11:26

## 2021-10-14 RX ADMIN — SODIUM CHLORIDE, PRESERVATIVE FREE 10 ML: 5 INJECTION INTRAVENOUS at 08:03

## 2021-10-14 RX ADMIN — Medication 175 MCG/HR: at 12:13

## 2021-10-14 RX ADMIN — ASPIRIN 81 MG: 81 TABLET, CHEWABLE ORAL at 08:01

## 2021-10-14 RX ADMIN — MICONAZOLE NITRATE: 2 POWDER TOPICAL at 08:02

## 2021-10-14 RX ADMIN — CASTOR OIL AND BALSAM, PERU: 788; 87 OINTMENT TOPICAL at 08:02

## 2021-10-14 RX ADMIN — GABAPENTIN 800 MG: 400 CAPSULE ORAL at 08:00

## 2021-10-14 RX ADMIN — CHLORHEXIDINE GLUCONATE 0.12% ORAL RINSE 15 ML: 1.2 LIQUID ORAL at 20:18

## 2021-10-14 RX ADMIN — AMLODIPINE BESYLATE 2.5 MG: 2.5 TABLET ORAL at 08:00

## 2021-10-14 RX ADMIN — SODIUM CHLORIDE, PRESERVATIVE FREE 10 ML: 5 INJECTION INTRAVENOUS at 20:18

## 2021-10-14 RX ADMIN — PROPOFOL 50 MCG/KG/MIN: 10 INJECTION, EMULSION INTRAVENOUS at 15:20

## 2021-10-14 RX ADMIN — GABAPENTIN 800 MG: 400 CAPSULE ORAL at 20:19

## 2021-10-14 RX ADMIN — CHLORHEXIDINE GLUCONATE 0.12% ORAL RINSE 15 ML: 1.2 LIQUID ORAL at 08:01

## 2021-10-14 RX ADMIN — ENOXAPARIN SODIUM 30 MG: 30 INJECTION SUBCUTANEOUS at 20:18

## 2021-10-14 RX ADMIN — CIPROFLOXACIN 400 MG: 2 INJECTION, SOLUTION INTRAVENOUS at 11:01

## 2021-10-14 RX ADMIN — SERTRALINE HYDROCHLORIDE 50 MG: 50 TABLET ORAL at 08:01

## 2021-10-14 RX ADMIN — IPRATROPIUM BROMIDE AND ALBUTEROL SULFATE 1 AMPULE: .5; 2.5 SOLUTION RESPIRATORY (INHALATION) at 19:34

## 2021-10-14 RX ADMIN — MICONAZOLE NITRATE: 2 POWDER TOPICAL at 20:17

## 2021-10-14 RX ADMIN — Medication 0.3 MCG/KG/HR: at 10:59

## 2021-10-14 RX ADMIN — IPRATROPIUM BROMIDE AND ALBUTEROL SULFATE 1 AMPULE: .5; 2.5 SOLUTION RESPIRATORY (INHALATION) at 02:58

## 2021-10-14 RX ADMIN — GABAPENTIN 800 MG: 400 CAPSULE ORAL at 14:30

## 2021-10-14 RX ADMIN — IPRATROPIUM BROMIDE AND ALBUTEROL SULFATE 1 AMPULE: .5; 2.5 SOLUTION RESPIRATORY (INHALATION) at 23:29

## 2021-10-14 RX ADMIN — SODIUM CHLORIDE, PRESERVATIVE FREE 10 ML: 5 INJECTION INTRAVENOUS at 20:17

## 2021-10-14 RX ADMIN — Medication 175 MCG/HR: at 18:17

## 2021-10-14 RX ADMIN — PROPOFOL 50.04 MCG/KG/MIN: 10 INJECTION, EMULSION INTRAVENOUS at 22:15

## 2021-10-14 RX ADMIN — CIPROFLOXACIN 400 MG: 2 INJECTION, SOLUTION INTRAVENOUS at 21:21

## 2021-10-14 RX ADMIN — Medication 1.4 MCG/KG/HR: at 04:48

## 2021-10-14 RX ADMIN — IPRATROPIUM BROMIDE AND ALBUTEROL SULFATE 1 AMPULE: .5; 2.5 SOLUTION RESPIRATORY (INHALATION) at 07:38

## 2021-10-14 RX ADMIN — MIDAZOLAM HYDROCHLORIDE 2 MG: 1 INJECTION, SOLUTION INTRAMUSCULAR; INTRAVENOUS at 04:45

## 2021-10-14 RX ADMIN — IPRATROPIUM BROMIDE AND ALBUTEROL SULFATE 1 AMPULE: .5; 2.5 SOLUTION RESPIRATORY (INHALATION) at 14:29

## 2021-10-14 RX ADMIN — Medication 75 MCG/HR: at 03:16

## 2021-10-14 RX ADMIN — PROPOFOL 50 MCG/KG/MIN: 10 INJECTION, EMULSION INTRAVENOUS at 18:15

## 2021-10-14 RX ADMIN — ENOXAPARIN SODIUM 30 MG: 30 INJECTION SUBCUTANEOUS at 08:01

## 2021-10-14 ASSESSMENT — PAIN SCALES - GENERAL
PAINLEVEL_OUTOF10: 0

## 2021-10-14 ASSESSMENT — PULMONARY FUNCTION TESTS
PIF_VALUE: 35
PIF_VALUE: 37
PIF_VALUE: 38
PIF_VALUE: 33
PIF_VALUE: 43
PIF_VALUE: 50
PIF_VALUE: 34
PIF_VALUE: 33
PIF_VALUE: 35
PIF_VALUE: 50
PIF_VALUE: 29
PIF_VALUE: 31
PIF_VALUE: 37
PIF_VALUE: 35
PIF_VALUE: 34
PIF_VALUE: 26

## 2021-10-14 NOTE — PROGRESS NOTES
Pulmonary & Critical Care Medicine ICU Progress Note    CC: Respiratory failure    Events of Last 24 hours: tolerating supine. Requiring increased sedation for vent synchrony    Fent 100  Propofol 50  Precedex 0.8  Vascular lines: IV: PICC line 9/28    MV: 9/29-10/4, emergently re-intubated 10/6/21 for refractory hypoxemia  Vent Mode: AC/VC Rate Set: 22 bmp/Vt Ordered: 390 mL/ /FiO2 : 70 %  Recent Labs     10/13/21  0550 10/14/21  0600   PHART 7.462* 7.434   [id number] 48.7* 45.6*   PO2ART 60.6* 51.0*       IV:   fentaNYL 100 mcg/hr (10/14/21 0545)    propofol 50 mcg/kg/min (10/14/21 0718)    dexmedetomidine HCl in NaCl 0.8 mcg/kg/hr (10/14/21 1707)    sodium chloride      dextrose      sodium chloride         Vitals:  Blood pressure (!) 148/62, pulse (!) 46, temperature 98.9 °F (37.2 °C), temperature source Axillary, resp. rate 16, height 5' 9\" (1.753 m), weight 190 lb 14.7 oz (86.6 kg), SpO2 92 %, not currently breastfeeding. on ventilator      Intake/Output Summary (Last 24 hours) at 10/14/2021 0734  Last data filed at 10/14/2021 0601  Gross per 24 hour   Intake 2869.03 ml   Output 2445 ml   Net 424.03 ml     EXAM (COVID or R/O):  General: intubated, ill appearing    ENT:   Pharynx with ETT. Neck: Trachea midline  Resp: No accessory muscle use. CV: Regular rate. Regular rhythm. GI:  Non-distended.     Neuro: Sedated  Psych: Unable to obtain because the patient is non-communicative   Scheduled Meds:   sertraline  50 mg Per NG tube Daily    ciprofloxacin  400 mg IntraVENous Q12H    Venelex   Topical BID    miconazole   Topical BID    oxyCODONE  10 mg Oral Q6H    influenza virus vaccine  0.5 mL IntraMUSCular Prior to discharge    chlorhexidine  15 mL Mouth/Throat BID    ipratropium-albuterol  1 ampule Inhalation Q4H    amLODIPine  2.5 mg Oral Daily    losartan  25 mg Oral Daily    pantoprazole  40 mg IntraVENous Daily    insulin lispro  0-6 Units SubCUTAneous Q4H    levothyroxine  100 mcg Oral Daily    enoxaparin  30 mg SubCUTAneous BID    dexamethasone  6 mg IntraVENous Q24H    lidocaine 1 % injection  5 mL IntraDERmal Once    sodium chloride flush  5-40 mL IntraVENous 2 times per day    aspirin  81 mg Oral Daily    gabapentin  800 mg Oral TID    atorvastatin  40 mg Oral Daily    sodium chloride flush  5-40 mL IntraVENous 2 times per day       Data:  CBC:   Recent Labs     10/12/21  0415 10/13/21  0458 10/14/21  0555   WBC 7.2 5.3 5.6   HGB 10.1* 10.0* 10.2*   HCT 31.3* 31.1* 31.9*   MCV 78.3* 78.9* 78.7*   * 122* 112*     BMP:   Recent Labs     10/12/21  0415 10/13/21  0459 10/14/21  0555    141 139   K 4.2 4.2 3.8    103 101   CO2 31 31 31   BUN 14 14 17   CREATININE <0.5* <0.5* <0.5*     LIVER PROFILE:   Recent Labs     10/12/21  0415 10/13/21  0459 10/14/21  0555   AST 25 26 32   ALT 23 24 29   BILITOT 0.3 <0.2 0.3   ALKPHOS [phone]       Microbiology:  9/27 COVID-19 detected  9/30 Resp Pseudomonas fluorescens   10/7/2021 tracheal aspirate: Klebsiella intermediate to zosyn    Imaging:  Chest x-ray 10/13/21   Stable chest.  Diffuse interstitial changes, possibly infiltrate or edema.       Satisfactory position of endotracheal tube. CTPA 9/27/21  No evidence of pulmonary embolism. Scattered peripheral opacities in the left lung and more consolidative area in the right lung concerning for pneumonia.  Previous right thoracotomy and upper lobectomy.    One mildly enlarged right paratracheal lymph node is present but no priors for comparison.  Nonenlarged but prominent lymph nodes in the upper abdomen and juxta diaphragmatic region      ASSESSMENT:  · Acute hypoxemic respiratory failure   · COVID-19 viral pneumonia  · Pseudomonas followed by Klebsiella PNA   · TMP  · Acute kidney injury  · CAD  · Chronic pain syndrome - on opiates and Neurontin   · Possible early dementia, being worked up outpatient   · H/O bronchiectasis and tracheobronchomalacia/EDAC s/p

## 2021-10-14 NOTE — PROGRESS NOTES
10/14/21 0258   Vent Information   Vent Type 980   Vent Mode AC/VC   Vt Ordered 390 mL   Rate Set 22 bmp   Peak Flow 65 L/min   Pressure Support 0 cmH20   FiO2  60 %   SpO2 91 %   SpO2/FiO2 ratio 151.67   Sensitivity 3   PEEP/CPAP 8   I Time/ I Time % 0 s   Humidification Source Heated wire   Humidification Temp 37   Humidification Temp Measured 36.9   Circuit Condensation Drained   Vent Patient Data   High Peep/I Pressure 0   Peak Inspiratory Pressure 34 cmH2O   Mean Airway Pressure 15 cmH20   Rate Measured 22 br/min   Vt Exhaled 404 mL   Minute Volume 8.85 Liters   I:E Ratio 1:3.10   Plateau Pressure 30 KYN39   Cough/Sputum   Sputum How Obtained Endotracheal;Suctioned   Cough Productive   Sputum Amount Small   Sputum Color Creamy   Tenacity Thick   Spontaneous Breathing Trial (SBT) RT Doc   Pulse 51   Breath Sounds   Right Upper Lobe Diminished   Right Middle Lobe Diminished   Right Lower Lobe Diminished   Left Upper Lobe Diminished   Left Lower Lobe Diminished   Additional Respiratory  Assessments   Resp 21   Position Semi-Jesus's   Alarm Settings   High Pressure Alarm 45 cmH2O   Low Minute Volume Alarm 4 L/min   High Respiratory Rate 40 br/min   Patient Observation   Observations ETT SIZE 7.5, SECURED AT 23 LIP LINE. AMBU BAG AT HEAD OF BED. WATER GOOD.     ETT (adult)   Placement Date/Time: 10/06/21 1208   Timeout: Patient  Preoxygenation: Yes  Technique: Rapid sequence  Type: Cuffed  Tube Size: 7.5 mm  Laryngoscope: GlideScope  Secured at: 23 cm  Measured From: Lips   Secured at 23 cm   Measured From [address] [employer] tube carranza   Site Condition Dry

## 2021-10-14 NOTE — PROGRESS NOTES
Pt resting quietly in bed with all lines and monitoring devices in place. Vitals and SpO2 stable.  O2 has been titrated down to 55% and she is not stacking vent breaths at present   No changes noted in exam. Continue current plan of care Syed Marino RN

## 2021-10-14 NOTE — PROGRESS NOTES
10/13/21 2255   Vent Information   Vent Type 980   Vent Mode AC/VC   Vt Ordered 390 mL   Rate Set 22 bmp   Peak Flow 65 L/min   Pressure Support 0 cmH20   FiO2  60 %   SpO2 91 %   SpO2/FiO2 ratio 151.67   Sensitivity 3   PEEP/CPAP 8   I Time/ I Time % 0 s   Humidification Source Heated wire   Humidification Temp 37   Humidification Temp Measured 36.9   Circuit Condensation Drained   Vent Patient Data   High Peep/I Pressure 0   Peak Inspiratory Pressure 28 cmH2O   Mean Airway Pressure 14 cmH20   Rate Measured 23 br/min   Vt Exhaled 396 mL   Minute Volume 9.14 Liters   I:E Ratio 1:1.90   Plateau Pressure 21 [id number]   Cough/Sputum   Sputum How Obtained Endotracheal;Suctioned   Cough Productive   Sputum Amount Moderate   Sputum Color Creamy   Tenacity Thick   Spontaneous Breathing Trial (SBT) RT Doc   Pulse 58   Breath Sounds   Right Upper Lobe Diminished   Right Middle Lobe Diminished   Right Lower Lobe Diminished   Left Upper Lobe Diminished   Left Lower Lobe Diminished   Additional Respiratory  Assessments   Resp 19   Position Semi-Jesus's   Alarm Settings   High Pressure Alarm 45 cmH2O   Low Minute Volume Alarm 4 L/min   High Respiratory Rate 40 br/min   Patient Observation   Observations ETT SIZE 7.5, SECURED AT 23 LIP LINE. AMBU BAG AT HEAD OF BED. WATER GOOD.     ETT (adult)   Placement Date/Time: 10/06/21 1208   Timeout: Patient  Preoxygenation: Yes  Technique: Rapid sequence  Type: Cuffed  Tube Size: 7.5 mm  Laryngoscope: GlideScope  Secured at: 23 cm  Measured From: Lips   Secured at 23 cm   Measured From Lips   ET Placement Left   Secured By Commercial tube carranza   Site Condition Dry

## 2021-10-14 NOTE — PROGRESS NOTES
Hospitalist Progress Note      PCP: Lauren Powell MD    Date of Admission: 9/26/2021      Admitted with COVID-19 pneumonia,  acute hypoxic respiratory failure. Patient not vaccinated  Progressive significant worsening hypoxemia overnight.   Patient was on 2 L of oxygen on admission-> switched to high flow oxygen per Vapotherm with additional 100% nonrebreather on 9/28/2021-> transferred to ICU.    10/13   cont to be intubated, bradycardic, on precedex gtt    10/14  Increasing oxygen requirement on the vent today  Bradycardic, on Precedex drip    Medications:  Reviewed    Infusion Medications    fentaNYL 175 mcg/hr (10/14/21 1213)    propofol 50 mcg/kg/min (10/14/21 1100)    dexmedetomidine HCl in NaCl 0.3 mcg/kg/hr (10/14/21 1059)    sodium chloride      dextrose      sodium chloride       Scheduled Medications    dexamethasone  3 mg IntraVENous Q24H    insulin lispro  0-12 Units SubCUTAneous Q4H    sertraline  50 mg Per NG tube Daily    ciprofloxacin  400 mg IntraVENous Q12H    Venelex   Topical BID    miconazole   Topical BID    oxyCODONE  10 mg Oral Q6H    influenza virus vaccine  0.5 mL IntraMUSCular Prior to discharge    chlorhexidine  15 mL Mouth/Throat BID    ipratropium-albuterol  1 ampule Inhalation Q4H    amLODIPine  2.5 mg Oral Daily    losartan  25 mg Oral Daily    pantoprazole  40 mg IntraVENous Daily    levothyroxine  100 mcg Oral Daily    enoxaparin  30 mg SubCUTAneous BID    lidocaine 1 % injection  5 mL IntraDERmal Once    sodium chloride flush  5-40 mL IntraVENous 2 times per day    aspirin  81 mg Oral Daily    gabapentin  800 mg Oral TID    atorvastatin  40 mg Oral Daily    sodium chloride flush  5-40 mL IntraVENous 2 times per day     PRN Meds: carboxymethylcellulose PF **AND** artificial tears, fentanNYL, midazolam, labetalol, sodium chloride flush, sodium chloride, glucose, dextrose, glucagon (rDNA), dextrose, sodium chloride flush, sodium chloride, polyethylene glycol, acetaminophen **OR** acetaminophen, prochlorperazine      Intake/Output Summary (Last 24 hours) at 10/14/2021 1237  Last data filed at 10/14/2021 1213  Gross per 24 hour   Intake 2869.03 ml   Output 2050 ml   Net 819.03 ml       Physical Exam Performed:    BP (!) 109/55   Pulse (!) 49   Temp 96.4 °F (35.8 °C) (Axillary)   Resp 21   Ht 5' 9\" (1.753 m)   Wt 190 lb 14.7 oz (86.6 kg)   SpO2 92%   BMI 28.19 kg/m²     General:intubated on mechanical ventilation . Supine position . Skin:  Warm and dry  Neck:  JVD absent. Neck supple  Chest: diminished breath sounds, improved air entry. No wheezes or rhonchi.  Bibasilar crackles. Cardiovascular:  RRR ,S1S2 normal  Abdomen:  Soft, non tender, non distended, BS +  Extremities:  No edema. Intact peripheral pulses. Brisk cap refill, < 2 secs  Neuro: intubated, unable to assess       Labs:   Recent Labs     10/12/21  0415 10/13/21  0458 10/14/21  0555   WBC 7.2 5.3 5.6   HGB 10.1* 10.0* 10.2*   HCT 31.3* 31.1* 31.9*   * 122* 112*     Recent Labs     10/12/21  0415 10/13/21  0459 10/14/21  0555    141 139   K 4.2 4.2 3.8    103 101   CO2 31 31 31   BUN 14 14 17   CREATININE <0.5* <0.5* <0.5*   CALCIUM 9.0 9.1 8.8     Recent Labs     10/12/21  0415 10/13/21  0459 10/14/21  0555   AST 25 26 32   ALT 23 24 29   BILITOT 0.3 <0.2 0.3   ALKPHOS 129 126 124     No results for input(s): INR in the last 72 hours. No results for input(s): Arabella Shanks in the last 72 hours. Urinalysis:      Lab Results   Component Value Date    NITRU Negative 09/27/2021    WBCUA 0-2 09/27/2021    BACTERIA Rare 09/27/2021    RBCUA None seen 09/27/2021    BLOODU TRACE-INTACT 09/27/2021    SPECGRAV <=1.005 09/27/2021    GLUCOSEU >=1000 09/27/2021    GLUCOSEU 250 05/11/2011       Radiology:  XR CHEST PORTABLE   Final Result   Line and tube as above. Bilateral interstitial opacities are worsened when compared to prior study.          XR CHEST PORTABLE   Final Result   Stable chest.  Diffuse interstitial changes, possibly infiltrate or edema. Satisfactory position of endotracheal tube. XR CHEST PORTABLE   Final Result   Stable support apparatus. In this case, both multifocal pneumonia and pulmonary edema are both   considered, and the changes appear increased when compared to the previous   exam.         XR CHEST PORTABLE   Final Result   1. Stable appropriate positions of support apparatus. 2. Slight interval progression of multifocal airspace disease throughout both   lungs, likely a combination of multifocal pneumonia and superimposed   pulmonary edema. 3. Stable cardiomegaly and small bilateral pleural effusions. XR CHEST PORTABLE   Final Result   Improving aeration of the right lung with grossly stable left basilar   airspace opacities. XR CHEST PORTABLE   Final Result   Supportive tubing is in stable position. Stable pattern of bilateral ground-glass opacities and basilar consolidations. XR CHEST PORTABLE   Final Result   Stable appearance of the chest.  Diffuse ground-glass opacities on the right   and mild left basilar opacities. XR CHEST PORTABLE   Final Result   Improved aeration on the left, possibly improving pulmonary edema. Persistent airspace disease in the right base may represent concurrent   pneumonia         XR ABDOMEN (KUB) (SINGLE AP VIEW)   Final Result   Enteric catheter tip likely in the distal gastric body. XR CHEST PORTABLE   Final Result   1. Endotracheal tube projects in the appropriate position. 2. Enteric tube extends below the diaphragm and out of the field of view. XR CHEST PORTABLE   Final Result   Increasing diffuse airspace opacification throughout the lungs. Pattern may   represent pulmonary edema or worsening pneumonitis. XR CHEST PORTABLE   Final Result   1.  Interval increased bilateral pulmonary opacities with new consolidative change at the right lung base. These findings could be secondary to   pulmonary edema or infection. 2. Consolidative change at the right lung base could also be secondary to   atelectasis/mucous plug. XR CHEST PORTABLE   Final Result   No significant interval change in small right pleural effusion or bilateral   heterogeneous opacity which can reflect pulmonary edema or pneumonia. XR CHEST PORTABLE   Final Result   Mild improvement from prior comparison. Mild-to-moderate congestion and/or   infiltrates identified in the lungs. ET tube terminates 7 cm superior to the saba. XR CHEST PORTABLE   Final Result   Endotracheal tube and orogastric tube unchanged, adequate position. Slightly increased extensive bilateral pulmonary disease over the past 24   hours this may relate to interstitial edema or diffuse infection or a   combination. XR CHEST PORTABLE   Final Result   Endotracheal tube with its tip approximately 4.7 cm from the saba in   satisfactory position. Enteric tube in satisfactory position. Patchy bilateral airspace disease is again noted with improved aeration of   the left mid lung. XR CHEST PORTABLE   Final Result   Some improvement in the appearance of the chest with clearing of some of the   acute airspace disease from the mid left lung. Large amount of pneumonia   remains within the right lung and lower left lung. XR CHEST PORTABLE   Final Result   1. Endotracheal tube, nasogastric tube, and right PICC line placement. 2.  Increasing multifocal opacities with consolidative area in the right   perihilar lung. Could be multifocal pneumonia with or without edema. IR PICC WO SQ PORT/PUMP > 5 YEARS   Final Result   1. See above. CT CHEST PULMONARY EMBOLISM W CONTRAST   Final Result   No evidence of pulmonary embolism.       Scattered peripheral opacities in the left lung and more consolidative area   in the right lung concerning for pneumonia. Previous right thoracotomy and   upper lobectomy. One mildly enlarged right paratracheal lymph node is present but no priors   for comparison. Nonenlarged but prominent lymph nodes in the upper abdomen   and juxta diaphragmatic region. XR CHEST PORTABLE   Final Result   Mild cardiomegaly without interstitial edema. Metallic surgical clips from prior right thoracotomy. COPD with anterior segment-right upper lobe alveolar pneumonia. Old pleural thickening was noted along the right lateral chest wall. Pleural   thickening and or trace effusion blunts the right costophrenic angle. Assessment/Plan:    Active Hospital Problems    Diagnosis     Acute hypoxemic respiratory failure ([address] [J96.01]     Pneumonia due to Pseudomonas species ([address] [J15.1]     Hypomagnesemia [E83.42]     Hypokalemia [E87.6]     Gram-negative pneumonia (HCC) [J15.6]     Mild protein-calorie malnutrition (HCC) [E44.1]     Hypotension [I95.9]     Acute respiratory failure with hypoxia (HCC) [J96.01]     COVID-19 [U07.1]     Pneumonia due to COVID-19 virus [U07.1, J12.82]     CHARLY (acute kidney injury) ([address] [N17.9]     Uncontrolled hypertension [I10]     Fever [R50.9]     Suspected COVID-19 virus infection [Z20.822]     Hypoxia [R09.02]     Dehydration [E86.0]     DM2 (diabetes mellitus, type 2) (HCC) [E11.9]     Essential hypertension [I10]     Hypothyroidism [E03.9]             #COVID-19 pneumonia  #Acute hypoxic respiratory failure   Pseudomonas HCAP. -Unvaccinated patient  - she presented with cough, fever, dyspnea in the setting of household contact testing + COVID 19  - Her Covid testing came back positive on admission  - Initially on 2 L of oxygen on presentation.  She started desaturating quickly . worsening hypoxemia requiring high flow oxygen.    - Pulmonology consulted  -placed on high flow oxygen per Vapotherm 40 L,FiO2 100% ,with additional nonrebreather . Transferred to ICU on 9/28. Intubated early AM  9/29/2021.  Extubated on 10/4/2021.  She was on Vapotherm.  Pulmonary status got worse and she was reintubated on 10/6/2021.   -Continue mechanical ventilation.  Wean O2 as tolerated. .  - s/p Remdesivir  - on Decadron, day # 18- being tapered   - given 1 dose of Tocilizumab  -Lovenox twice daily  Increasing oxygen requirement on the vent     #Possible superimposed bacterial pneumonia  Gram-negative infection  -Was on Rocephin and Zithromax day 5 of 5; - changed to zosyn on 10/2 with pseudomonas on resp culture.  Pulmonary has started Zyvox.  Hold Zoloft when patient is getting Zyvox .   Completed Zosyn day 8 of 8.    Treated with Zyvox - now dced  Respiratory cultures now growing Klebsiella, initiated on Cipro day #6/7     #Hypokalemia  - replaced     #Dehydration  -  Improved with IVF  Creatinine normal     #CAD  - cont ASA, statin  - EKG with inferior T wave abnormality.  She denies CP.  Trop neg   -telemetry monitoring     #History of tracheobronchomalacia  - Has had surgical treatment for this     #HTN  -blood pressure is low and she was on Levophed.  This has been discontinued.  Resumed home medications-Norvasc and losartan.     #Chronic pain   History of hip fracture  - cont home oxycodone BID and percocet   - cont gabapentin and requip     #DM2  - use ssi      #Hypothyroidism  - cont synthroid         DVT Prophylaxis: Lovenox   Diet: Diet NPO  ADULT TUBE FEEDING; Orogastric; Peptide Based; Continuous; 20; Yes; 15; Q 4 hours; 55; 30; Q 3 hours  Code Status: Full Code    PT/OT Eval Status: ordered    Dispo - cont care in Estiven Gould MD

## 2021-10-14 NOTE — PROGRESS NOTES
RT Inhaler-Nebulizer Bronchodilator Protocol Note    There is a bronchodilator order in the chart from a provider indicating to follow the RT Bronchodilator Protocol and there is an Initiate RT Inhaler-Nebulizer Bronchodilator Protocol order as well (see protocol at bottom of note). CXR Findings:  XR CHEST PORTABLE    Result Date: 10/13/2021  Stable chest.  Diffuse interstitial changes, possibly infiltrate or edema. Satisfactory position of endotracheal tube. XR CHEST PORTABLE    Result Date: 10/12/2021  Stable support apparatus. In this case, both multifocal pneumonia and pulmonary edema are both considered, and the changes appear increased when compared to the previous exam.       The findings from the last RT Protocol Assessment were as follows:   History Pulmonary Disease: Chronic pulmonary disease  Respiratory Pattern: Dyspnea on exertion or RR 21-25 bpm  Breath Sounds: Slightly diminished and/or crackles  Cough: Weak, productive  Indication for Bronchodilator Therapy: Unable to speak in sentences, On home bronchodilators  Bronchodilator Assessment Score: 8    Aerosolized bronchodilator medication orders have been revised according to the RT Inhaler-Nebulizer Bronchodilator Protocol below. Respiratory Therapist to perform RT Therapy Protocol Assessment initially then follow the protocol. Repeat RT Therapy Protocol Assessment PRN for score 0-3 or on second treatment, BID, and PRN for scores above 3. No Indications - adjust the frequency to every 6 hours PRN wheezing or bronchospasm, if no treatments needed after 48 hours then discontinue using Per Protocol order mode. If indication present, adjust the RT bronchodilator orders based on the Bronchodilator Assessment Score as indicated below.   Use Inhaler orders unless patient has one or more of the following: on home nebulizer, not able to hold breath for 10 seconds, is not alert and oriented, cannot activate and use MDI correctly, or

## 2021-10-14 NOTE — PROGRESS NOTES
RT Inhaler-Nebulizer Bronchodilator Protocol Note    There is a bronchodilator order in the chart from a provider indicating to follow the RT Bronchodilator Protocol and there is an Initiate RT Inhaler-Nebulizer Bronchodilator Protocol order as well (see protocol at bottom of note). CXR Findings:  XR CHEST PORTABLE    Result Date: 10/13/2021  Stable chest.  Diffuse interstitial changes, possibly infiltrate or edema. Satisfactory position of endotracheal tube. XR CHEST PORTABLE    Result Date: 10/12/2021  Stable support apparatus. In this case, both multifocal pneumonia and pulmonary edema are both considered, and the changes appear increased when compared to the previous exam.       The findings from the last RT Protocol Assessment were as follows:   History Pulmonary Disease: Chronic pulmonary disease  Respiratory Pattern: Dyspnea on exertion or RR 21-25 bpm  Breath Sounds: Slightly diminished and/or crackles  Cough: Strong, productive  Indication for Bronchodilator Therapy: Decreased or absent breath sounds  Bronchodilator Assessment Score: 7    Aerosolized bronchodilator medication orders have been revised according to the RT Inhaler-Nebulizer Bronchodilator Protocol below. Respiratory Therapist to perform RT Therapy Protocol Assessment initially then follow the protocol. Repeat RT Therapy Protocol Assessment PRN for score 0-3 or on second treatment, BID, and PRN for scores above 3. No Indications - adjust the frequency to every 6 hours PRN wheezing or bronchospasm, if no treatments needed after 48 hours then discontinue using Per Protocol order mode. If indication present, adjust the RT bronchodilator orders based on the Bronchodilator Assessment Score as indicated below.   Use Inhaler orders unless patient has one or more of the following: on home nebulizer, not able to hold breath for 10 seconds, is not alert and oriented, cannot activate and use MDI correctly, or respiratory rate 25 breaths per minute or more, then use the equivalent nebulizer order(s) with same Frequency and PRN reasons based on the score. If a patient is on this medication at home then do not decrease Frequency below that used at home. 0-3 - enter or revise RT bronchodilator order(s) to equivalent RT Bronchodilator order with Frequency of every 4 hours PRN for wheezing or increased work of breathing using Per Protocol order mode. 4-6 - enter or revise RT Bronchodilator order(s) to two equivalent RT bronchodilator orders with one order with BID Frequency and one order with Frequency of every 4 hours PRN wheezing or increased work of breathing using Per Protocol order mode. 7-10 - enter or revise RT Bronchodilator order(s) to two equivalent RT bronchodilator orders with one order with TID Frequency and one order with Frequency of every 4 hours PRN wheezing or increased work of breathing using Per Protocol order mode. 11-13 - enter or revise RT Bronchodilator order(s) to one equivalent RT bronchodilator order with QID Frequency and an Albuterol order with Frequency of every 4 hours PRN wheezing or increased work of breathing using Per Protocol order mode. Greater than 13 - enter or revise RT Bronchodilator order(s) to one equivalent RT bronchodilator order with every 4 hours Frequency and an Albuterol order with Frequency of every 2 hours PRN wheezing or increased work of breathing using Per Protocol order mode. RT to enter RT Home Evaluation for COPD & MDI Assessment order using Per Protocol order mode.     Electronically signed by Kiran Anderson RCP on 10/14/2021 at 8:17 AM

## 2021-10-14 NOTE — PROGRESS NOTES
10/14/21 1939   Vent Information   Vent Type 980   Vent Mode AC/VC   Vt Ordered 390 mL   Rate Set 22 bmp   Peak Flow 55 L/min   Pressure Support 0 cmH20   FiO2  55 %   SpO2 90 %   SpO2/FiO2 ratio 163.64   Sensitivity 3   PEEP/CPAP 10   I Time/ I Time % 0 s   Humidification Source Heated wire   Humidification Temp 37   Humidification Temp Measured 36.5   Circuit Condensation Drained   Vent Patient Data   High Peep/I Pressure 0   Peak Inspiratory Pressure 37 cmH2O   Mean Airway Pressure 19 cmH20   Rate Measured 27 br/min   Vt Exhaled 492 mL   Minute Volume 9.18 Liters   I:E Ratio 1:5.90   Plateau Pressure 36 [id number]   Static Compliance 19 mL/cmH2O   Dynamic Compliance 18 mL/cmH2O   Cough/Sputum   Sputum How Obtained Endotracheal;Suctioned   Cough Productive   Sputum Amount Small   Sputum Color Creamy   Tenacity Thick   Spontaneous Breathing Trial (SBT) RT Doc   Pulse 59   Breath Sounds   Right Upper Lobe Diminished   Right Middle Lobe Diminished   Right Lower Lobe Diminished   Left Upper Lobe Diminished   Left Lower Lobe Diminished   Additional Respiratory  Assessments   Resp 16   Position Semi-Jesus's   Alarm Settings   High Pressure Alarm 50 cmH2O   Low Minute Volume Alarm 4 L/min   High Respiratory Rate 40 br/min   Patient Observation   Observations ETT SIZE 7.5, SECURED AT 23 LIP LINE. AMBU BAG AT HAD OF BED. WATER GOOD.     ETT (adult)   Placement Date/Time: 10/06/21 1208   Timeout: Patient  Preoxygenation: Yes  Technique: Rapid sequence  Type: Cuffed  Tube Size: 7.5 mm  Laryngoscope: GlideScope  Secured at: 23 cm  Measured From: Lips   Secured at 23 cm   Measured From [address] [employer] tube carranza   Site Condition Dry        10/14/21 1939   Vent Information   Vent Type 980   Vent Mode AC/VC   Vt Ordered 390 mL   Rate Set 22 bmp   Peak Flow 55 L/min   Pressure Support 0 cmH20   FiO2  55 %   SpO2 90 %   SpO2/FiO2 ratio 163.64   Sensitivity 3   PEEP/CPAP 10   I Time/ I Time % 0 s Humidification Source Heated wire   Humidification Temp 37   Humidification Temp Measured 36.5   Circuit Condensation Drained   Vent Patient Data   High Peep/I Pressure 0   Peak Inspiratory Pressure 37 cmH2O   Mean Airway Pressure 19 cmH20   Rate Measured 27 br/min   Vt Exhaled 492 mL   Minute Volume 9.18 Liters   I:E Ratio 1:5.90   Plateau Pressure 36 [id number]   Static Compliance 19 mL/cmH2O   Dynamic Compliance 18 mL/cmH2O   Cough/Sputum   Sputum How Obtained Endotracheal;Suctioned   Cough Productive   Sputum Amount Small   Sputum Color Creamy   Tenacity Thick   Spontaneous Breathing Trial (SBT) RT Doc   Pulse 59   Breath Sounds   Right Upper Lobe Diminished   Right Middle Lobe Diminished   Right Lower Lobe Diminished   Left Upper Lobe Diminished   Left Lower Lobe Diminished   Additional Respiratory  Assessments   Resp 16   Position Semi-Jesus's   Alarm Settings   High Pressure Alarm 50 cmH2O   Low Minute Volume Alarm 4 L/min   High Respiratory Rate 40 br/min   Patient Observation   Observations ETT SIZE 7.5, SECURED AT 23 LIP LINE. AMBU BAG AT HAD OF BED. WATER GOOD.     ETT (adult)   Placement Date/Time: 10/06/21 1208   Timeout: Patient  Preoxygenation: Yes  Technique: Rapid sequence  Type: Cuffed  Tube Size: 7.5 mm  Laryngoscope: GlideScope  Secured at: 23 cm  Measured From: Lips   Secured at 23 cm   Measured From [address] By Commercial tube carranza   Site Condition Dry        10/14/21 1939   Vent Information   Vent Type 980   Vent Mode AC/VC   Vt Ordered 390 mL   Rate Set 22 bmp   Peak Flow 55 L/min   Pressure Support 0 cmH20   FiO2  55 %   SpO2 90 %   SpO2/FiO2 ratio 163.64   Sensitivity 3   PEEP/CPAP 10   I Time/ I Time % 0 s   Humidification Source Heated wire   Humidification Temp 37   Humidification Temp Measured 36.5   Circuit Condensation Drained   Vent Patient Data   High Peep/I Pressure 0   Peak Inspiratory Pressure 37 cmH2O   Mean Airway Pressure 19 cmH20   Rate Measured 27 br/min   Vt Exhaled 492 mL   Minute Volume 9.18 Liters   I:E Ratio 1:5.90   Plateau Pressure 36 [id number]   Static Compliance 19 mL/cmH2O   Dynamic Compliance 18 mL/cmH2O   Cough/Sputum   Sputum How Obtained Endotracheal;Suctioned   Cough Productive   Sputum Amount Small   Sputum Color Creamy   Tenacity Thick   Spontaneous Breathing Trial (SBT) RT Doc   Pulse 59   Breath Sounds   Right Upper Lobe Diminished   Right Middle Lobe Diminished   Right Lower Lobe Diminished   Left Upper Lobe Diminished   Left Lower Lobe Diminished   Additional Respiratory  Assessments   Resp 16   Position Semi-Jesus's   Alarm Settings   High Pressure Alarm 50 cmH2O   Low Minute Volume Alarm 4 L/min   High Respiratory Rate 40 br/min   Patient Observation   Observations ETT SIZE 7.5, SECURED AT 23 LIP LINE. AMBU BAG AT HAD OF BED. WATER GOOD.     ETT (adult)   Placement Date/Time: 10/06/21 1208   Timeout: Patient  Preoxygenation: Yes  Technique: Rapid sequence  Type: Cuffed  Tube Size: 7.5 mm  Laryngoscope: GlideScope  Secured at: 23 cm  Measured From: Lips   Secured at 23 cm   Measured From [address] By [employer] tube carranza   Site Condition Dry        10/14/21 1939   Vent Information   Vent Type 980   Vent Mode AC/VC   Vt Ordered 390 mL   Rate Set 22 bmp   Peak Flow 55 L/min   Pressure Support 0 cmH20   FiO2  55 %   SpO2 90 %   SpO2/FiO2 ratio 163.64   Sensitivity 3   PEEP/CPAP 10   I Time/ I Time % 0 s   Humidification Source Heated wire   Humidification Temp 37   Humidification Temp Measured 36.5   Circuit Condensation Drained   Vent Patient Data   High Peep/I Pressure 0   Peak Inspiratory Pressure 37 cmH2O   Mean Airway Pressure 19 cmH20   Rate Measured 27 br/min   Vt Exhaled 492 mL   Minute Volume 9.18 Liters   I:E Ratio 1:5.90   Plateau Pressure 36 [id number]   Static Compliance 19 mL/cmH2O   Dynamic Compliance 18 mL/cmH2O   Cough/Sputum   Sputum How Obtained Endotracheal;Suctioned   Cough Productive   Sputum Amount Small   Sputum Color Creamy   Tenacity Thick   Spontaneous Breathing Trial (SBT) RT Doc   Pulse 59   Breath Sounds   Right Upper Lobe Diminished   Right Middle Lobe Diminished   Right Lower Lobe Diminished   Left Upper Lobe Diminished   Left Lower Lobe Diminished   Additional Respiratory  Assessments   Resp 16   Position Semi-Jesus's   Alarm Settings   High Pressure Alarm 50 cmH2O   Low Minute Volume Alarm 4 L/min   High Respiratory Rate 40 br/min   Patient Observation   Observations ETT SIZE 7.5, SECURED AT 23 LIP LINE. AMBU BAG AT HAD OF BED. WATER GOOD.     ETT (adult)   Placement Date/Time: 10/06/21 1208   Timeout: Patient  Preoxygenation: Yes  Technique: Rapid sequence  Type: Cuffed  Tube Size: 7.5 mm  Laryngoscope: GlideScope  Secured at: 23 cm  Measured From: Lips   Secured at 23 cm   Measured From [address] By [employer] tube carranza   Site Condition Dry        10/14/21 1939   Vent Information   Vent Type 980   Vent Mode AC/VC   Vt Ordered 390 mL   Rate Set 22 bmp   Peak Flow 55 L/min   Pressure Support 0 cmH20   FiO2  55 %   SpO2 90 %   SpO2/FiO2 ratio 163.64   Sensitivity 3   PEEP/CPAP 10   I Time/ I Time % 0 s   Humidification Source Heated wire   Humidification Temp 37   Humidification Temp Measured 36.5   Circuit Condensation Drained   Vent Patient Data   High Peep/I Pressure 0   Peak Inspiratory Pressure 37 cmH2O   Mean Airway Pressure 19 cmH20   Rate Measured 27 br/min   Vt Exhaled 492 mL   Minute Volume 9.18 Liters   I:E Ratio 1:5.90   Plateau Pressure 36 [id number]   Static Compliance 19 mL/cmH2O   Dynamic Compliance 18 mL/cmH2O   Cough/Sputum   Sputum How Obtained Endotracheal;Suctioned   Cough Productive   Sputum Amount Small   Sputum Color Creamy   Tenacity Thick   Spontaneous Breathing Trial (SBT) RT Doc   Pulse 59   Breath Sounds   Right Upper Lobe Diminished   Right Middle Lobe Diminished   Right Lower Lobe Diminished   Left Upper Lobe Diminished   Left Lower Lobe Diminished   Additional Respiratory  Assessments   Resp 16   Position Semi-Jesus's   Alarm Settings   High Pressure Alarm 50 cmH2O   Low Minute Volume Alarm 4 L/min   High Respiratory Rate 40 br/min   Patient Observation   Observations ETT SIZE 7.5, SECURED AT 23 LIP LINE. AMBU BAG AT HAD OF BED. WATER GOOD.     ETT (adult)   Placement Date/Time: 10/06/21 1208   Timeout: Patient  Preoxygenation: Yes  Technique: Rapid sequence  Type: Cuffed  Tube Size: 7.5 mm  Laryngoscope: GlideScope  Secured at: 23 cm  Measured From: Lips   Secured at 23 cm   Measured From [address] By [employer] tube carranza   Site Condition Dry        10/14/21 1939   Vent Information   Vent Type 980   Vent Mode AC/VC   Vt Ordered 390 mL   Rate Set 22 bmp   Peak Flow 55 L/min   Pressure Support 0 cmH20   FiO2  55 %   SpO2 90 %   SpO2/FiO2 ratio 163.64   Sensitivity 3   PEEP/CPAP 10   I Time/ I Time % 0 s   Humidification Source Heated wire   Humidification Temp 37   Humidification Temp Measured 36.5   Circuit Condensation Drained   Vent Patient Data   High Peep/I Pressure 0   Peak Inspiratory Pressure 37 cmH2O   Mean Airway Pressure 19 cmH20   Rate Measured 27 br/min   Vt Exhaled 492 mL   Minute Volume 9.18 Liters   I:E Ratio 1:5.90   Plateau Pressure 36 [id number]   Static Compliance 19 mL/cmH2O   Dynamic Compliance 18 mL/cmH2O   Cough/Sputum   Sputum How Obtained Endotracheal;Suctioned   Cough Productive   Sputum Amount Small   Sputum Color Creamy   Tenacity Thick   Spontaneous Breathing Trial (SBT) RT Doc   Pulse 59   Breath Sounds   Right Upper Lobe Diminished   Right Middle Lobe Diminished   Right Lower Lobe Diminished   Left Upper Lobe Diminished   Left Lower Lobe Diminished   Additional Respiratory  Assessments   Resp 16   Position Semi-Jesus's   Alarm Settings   High Pressure Alarm 50 cmH2O   Low Minute Volume Alarm 4 L/min   High Respiratory Rate 40 br/min   Patient Observation   Observations ETT SIZE 7.5, SECURED AT 23 LIP LINE. AMBU BAG AT HAD OF BED. WATER GOOD. ETT (adult)   Placement Date/Time: 10/06/21 1208   Timeout: Patient  Preoxygenation: Yes  Technique: Rapid sequence  Type: Cuffed  Tube Size: 7.5 mm  Laryngoscope: GlideScope  Secured at: 23 cm  Measured From: Lips   Secured at 23 cm   Measured From [address] [employer] tube carranza   Site Condition Dry        10/14/21 1939   Vent Information   Vent Type 980   Vent Mode AC/VC   Vt Ordered 390 mL   Rate Set 22 bmp   Peak Flow 55 L/min   Pressure Support 0 cmH20   FiO2  55 %   SpO2 90 %   SpO2/FiO2 ratio 163.64   Sensitivity 3   PEEP/CPAP 10   I Time/ I Time % 0 s   Humidification Source Heated wire   Humidification Temp 37   Humidification Temp Measured 36.5   Circuit Condensation Drained   Vent Patient Data   High Peep/I Pressure 0   Peak Inspiratory Pressure 37 cmH2O   Mean Airway Pressure 19 cmH20   Rate Measured 27 br/min   Vt Exhaled 492 mL   Minute Volume 9.18 Liters   I:E Ratio 1:5.90   Plateau Pressure 36 [id number]   Static Compliance 19 mL/cmH2O   Dynamic Compliance 18 mL/cmH2O   Cough/Sputum   Sputum How Obtained Endotracheal;Suctioned   Cough Productive   Sputum Amount Small   Sputum Color Creamy   Tenacity Thick   Spontaneous Breathing Trial (SBT) RT Doc   Pulse 59   Breath Sounds   Right Upper Lobe Diminished   Right Middle Lobe Diminished   Right Lower Lobe Diminished   Left Upper Lobe Diminished   Left Lower Lobe Diminished   Additional Respiratory  Assessments   Resp 16   Position Semi-Jesus's   Alarm Settings   High Pressure Alarm 50 cmH2O   Low Minute Volume Alarm 4 L/min   High Respiratory Rate 40 br/min   Patient Observation   Observations ETT SIZE 7.5, SECURED AT 23 LIP LINE. AMBU BAG AT HAD OF BED. WATER GOOD.     ETT (adult)   Placement Date/Time: 10/06/21 1208   Timeout: Patient  Preoxygenation: Yes  Technique: Rapid sequence  Type: Cuffed  Tube Size: 7.5 mm  Laryngoscope: GlideScope  Secured at: 23 cm Measured From: Lips   Secured at 23 cm   Measured From [address] [employer] tube carranza   Site Condition Dry        10/14/21 1939   Vent Information   Vent Type 980   Vent Mode AC/VC   Vt Ordered 390 mL   Rate Set 22 bmp   Peak Flow 55 L/min   Pressure Support 0 cmH20   FiO2  55 %   SpO2 90 %   SpO2/FiO2 ratio 163.64   Sensitivity 3   PEEP/CPAP 10   I Time/ I Time % 0 s   Humidification Source Heated wire   Humidification Temp 37   Humidification Temp Measured 36.5   Circuit Condensation Drained   Vent Patient Data   High Peep/I Pressure 0   Peak Inspiratory Pressure 37 cmH2O   Mean Airway Pressure 19 cmH20   Rate Measured 27 br/min   Vt Exhaled 492 mL   Minute Volume 9.18 Liters   I:E Ratio 1:5.90   Plateau Pressure 36 [id number]   Static Compliance 19 mL/cmH2O   Dynamic Compliance 18 mL/cmH2O   Cough/Sputum   Sputum How Obtained Endotracheal;Suctioned   Cough Productive   Sputum Amount Small   Sputum Color Creamy   Tenacity Thick   Spontaneous Breathing Trial (SBT) RT Doc   Pulse 59   Breath Sounds   Right Upper Lobe Diminished   Right Middle Lobe Diminished   Right Lower Lobe Diminished   Left Upper Lobe Diminished   Left Lower Lobe Diminished   Additional Respiratory  Assessments   Resp 16   Position Semi-Jesus's   Alarm Settings   High Pressure Alarm 50 cmH2O   Low Minute Volume Alarm 4 L/min   High Respiratory Rate 40 br/min   Patient Observation   Observations ETT SIZE 7.5, SECURED AT 23 LIP LINE. AMBU BAG AT HAD OF BED. WATER GOOD.     ETT (adult)   Placement Date/Time: 10/06/21 1208   Timeout: Patient  Preoxygenation: Yes  Technique: Rapid sequence  Type: Cuffed  Tube Size: 7.5 mm  Laryngoscope: GlideScope  Secured at: 23 cm  Measured From: Lips   Secured at 23 cm   Measured From [address] [employer] tube carranza   Site Condition Dry        10/14/21 1939   Vent Information   Vent Type 980   Vent Mode AC/VC   Vt Ordered 390 mL   Rate Set 22 bmp   Peak Flow 55 L/min   Pressure Support 0 cmH20   FiO2  55 %   SpO2 90 %   SpO2/FiO2 ratio 163.64   Sensitivity 3   PEEP/CPAP 10   I Time/ I Time % 0 s   Humidification Source Heated wire   Humidification Temp 37   Humidification Temp Measured 36.5   Circuit Condensation Drained   Vent Patient Data   High Peep/I Pressure 0   Peak Inspiratory Pressure 37 cmH2O   Mean Airway Pressure 19 cmH20   Rate Measured 27 br/min   Vt Exhaled 492 mL   Minute Volume 9.18 Liters   I:E Ratio 1:5.90   Plateau Pressure 36 [id number]   Static Compliance 19 mL/cmH2O   Dynamic Compliance 18 mL/cmH2O   Cough/Sputum   Sputum How Obtained Endotracheal;Suctioned   Cough Productive   Sputum Amount Small   Sputum Color Creamy   Tenacity Thick   Spontaneous Breathing Trial (SBT) RT Doc   Pulse 59   Breath Sounds   Right Upper Lobe Diminished   Right Middle Lobe Diminished   Right Lower Lobe Diminished   Left Upper Lobe Diminished   Left Lower Lobe Diminished   Additional Respiratory  Assessments   Resp 16   Position Semi-Jesus's   Alarm Settings   High Pressure Alarm 50 cmH2O   Low Minute Volume Alarm 4 L/min   High Respiratory Rate 40 br/min   Patient Observation   Observations ETT SIZE 7.5, SECURED AT 23 LIP LINE. AMBU BAG AT HAD OF BED. WATER GOOD.     ETT (adult)   Placement Date/Time: 10/06/21 1208   Timeout: Patient  Preoxygenation: Yes  Technique: Rapid sequence  Type: Cuffed  Tube Size: 7.5 mm  Laryngoscope: GlideScope  Secured at: 23 cm  Measured From: Lips   Secured at 23 cm   Measured From [address] [employer] tube [place]za   Site Condition Dry        10/14/21 1939   Vent Information   Vent Type 980   Vent Mode AC/VC   Vt Ordered 390 mL   Rate Set 22 bmp   Peak Flow 55 L/min   Pressure Support 0 cmH20   FiO2  55 %   SpO2 90 %   SpO2/FiO2 ratio 163.64   Sensitivity 3   PEEP/CPAP 10   I Time/ I Time % 0 s   Humidification Source Heated wire   Humidification Temp 37   Humidification Temp Measured 36.5   Circuit Condensation Drained   Vent Patient Data   High Peep/I Pressure 0   Peak Inspiratory Pressure 37 cmH2O   Mean Airway Pressure 19 cmH20   Rate Measured 27 br/min   Vt Exhaled 492 mL   Minute Volume 9.18 Liters   I:E Ratio 1:5.90   Plateau Pressure 36 [id number]   Static Compliance 19 mL/cmH2O   Dynamic Compliance 18 mL/cmH2O   Cough/Sputum   Sputum How Obtained Endotracheal;Suctioned   Cough Productive   Sputum Amount Small   Sputum Color Creamy   Tenacity Thick   Spontaneous Breathing Trial (SBT) RT Doc   Pulse 59   Breath Sounds   Right Upper Lobe Diminished   Right Middle Lobe Diminished   Right Lower Lobe Diminished   Left Upper Lobe Diminished   Left Lower Lobe Diminished   Additional Respiratory  Assessments   Resp 16   Position Semi-Jesus's   Alarm Settings   High Pressure Alarm 50 cmH2O   Low Minute Volume Alarm 4 L/min   High Respiratory Rate 40 br/min   Patient Observation   Observations ETT SIZE 7.5, SECURED AT 23 LIP LINE. AMBU BAG AT HAD OF BED. WATER GOOD.     ETT (adult)   Placement Date/Time: 10/06/21 1208   Timeout: Patient  Preoxygenation: Yes  Technique: Rapid sequence  Type: Cuffed  Tube Size: 7.5 mm  Laryngoscope: GlideScope  Secured at: 23 cm  Measured From: Lips   Secured at 23 cm   Measured From [address] [employer] tube [place]za   Site Condition Dry        10/14/21 1939   Vent Information   Vent Type 980   Vent Mode AC/VC   Vt Ordered 390 mL   Rate Set 22 bmp   Peak Flow 55 L/min   Pressure Support 0 cmH20   FiO2  55 %   SpO2 90 %   SpO2/FiO2 ratio 163.64   Sensitivity 3   PEEP/CPAP 10   I Time/ I Time % 0 s   Humidification Source Heated wire   Humidification Temp 37   Humidification Temp Measured 36.5   Circuit Condensation Drained   Vent Patient Data   High Peep/I Pressure 0   Peak Inspiratory Pressure 37 cmH2O   Mean Airway Pressure 19 cmH20   Rate Measured 27 br/min   Vt Exhaled 492 mL   Minute Volume 9.18 Liters   I:E Ratio 1:5.90   Plateau Pressure 36 [id number] Static Compliance 19 mL/cmH2O   Dynamic Compliance 18 mL/cmH2O   Cough/Sputum   Sputum How Obtained Endotracheal;Suctioned   Cough Productive   Sputum Amount Small   Sputum Color Creamy   Tenacity Thick   Spontaneous Breathing Trial (SBT) RT Doc   Pulse 59   Breath Sounds   Right Upper Lobe Diminished   Right Middle Lobe Diminished   Right Lower Lobe Diminished   Left Upper Lobe Diminished   Left Lower Lobe Diminished   Additional Respiratory  Assessments   Resp 16   Position Semi-Jesus's   Alarm Settings   High Pressure Alarm 50 cmH2O   Low Minute Volume Alarm 4 L/min   High Respiratory Rate 40 br/min   Patient Observation   Observations ETT SIZE 7.5, SECURED AT 23 LIP LINE. AMBU BAG AT HAD OF BED. WATER GOOD.     ETT (adult)   Placement Date/Time: 10/06/21 1208   Timeout: Patient  Preoxygenation: Yes  Technique: Rapid sequence  Type: Cuffed  Tube Size: 7.5 mm  Laryngoscope: GlideScope  Secured at: 23 cm  Measured From: Lips   Secured at 23 cm   Measured From [address] By [employer] tube [employer]za   Site Condition Dry        10/14/21 1939   Vent Information   Vent Type 980   Vent Mode AC/VC   Vt Ordered 390 mL   Rate Set 22 bmp   Peak Flow 55 L/min   Pressure Support 0 cmH20   FiO2  55 %   SpO2 90 %   SpO2/FiO2 ratio 163.64   Sensitivity 3   PEEP/CPAP 10   I Time/ I Time % 0 s   Humidification Source Heated wire   Humidification Temp 37   Humidification Temp Measured 36.5   Circuit Condensation Drained   Vent Patient Data   High Peep/I Pressure 0   Peak Inspiratory Pressure 37 cmH2O   Mean Airway Pressure 19 cmH20   Rate Measured 27 br/min   Vt Exhaled 492 mL   Minute Volume 9.18 Liters   I:E Ratio 1:5.90   Plateau Pressure 36 SDL90   Static Compliance 19 mL/cmH2O   Dynamic Compliance 18 mL/cmH2O   Cough/Sputum   Sputum How Obtained Endotracheal;Suctioned   Cough Productive   Sputum Amount Small   Sputum Color Creamy   Tenacity Thick   Spontaneous Breathing Trial (SBT) RT Doc   Pulse 59 Breath Sounds   Right Upper Lobe Diminished   Right Middle Lobe Diminished   Right Lower Lobe Diminished   Left Upper Lobe Diminished   Left Lower Lobe Diminished   Additional Respiratory  Assessments   Resp 16   Position Semi-Jesus's   Alarm Settings   High Pressure Alarm 50 cmH2O   Low Minute Volume Alarm 4 L/min   High Respiratory Rate 40 br/min   Patient Observation   Observations ETT SIZE 7.5, SECURED AT 23 LIP LINE. AMBU BAG AT HAD OF BED. WATER GOOD.     ETT (adult)   Placement Date/Time: 10/06/21 1208   Timeout: Patient  Preoxygenation: Yes  Technique: Rapid sequence  Type: Cuffed  Tube Size: 7.5 mm  Laryngoscope: GlideScope  Secured at: 23 cm  Measured From: Lips   Secured at 23 cm   Measured From [address] By [employer] tube carranza   Site Condition Dry        10/14/21 1939   Vent Information   Vent Type 980   Vent Mode AC/VC   Vt Ordered 390 mL   Rate Set 22 bmp   Peak Flow 55 L/min   Pressure Support 0 cmH20   FiO2  55 %   SpO2 90 %   SpO2/FiO2 ratio 163.64   Sensitivity 3   PEEP/CPAP 10   I Time/ I Time % 0 s   Humidification Source Heated wire   Humidification Temp 37   Humidification Temp Measured 36.5   Circuit Condensation Drained   Vent Patient Data   High Peep/I Pressure 0   Peak Inspiratory Pressure 37 cmH2O   Mean Airway Pressure 19 cmH20   Rate Measured 27 br/min   Vt Exhaled 492 mL   Minute Volume 9.18 Liters   I:E Ratio 1:5.90   Plateau Pressure 36 [id number]   Static Compliance 19 mL/cmH2O   Dynamic Compliance 18 mL/cmH2O   Cough/Sputum   Sputum How Obtained Endotracheal;Suctioned   Cough Productive   Sputum Amount Small   Sputum Color Creamy   Tenacity Thick   Spontaneous Breathing Trial (SBT) RT Doc   Pulse 59   Breath Sounds   Right Upper Lobe Diminished   Right Middle Lobe Diminished   Right Lower Lobe Diminished   Left Upper Lobe Diminished   Left Lower Lobe Diminished   Additional Respiratory  Assessments   Resp 16   Position Semi-Jesus's   Alarm Settings High Pressure Alarm 50 cmH2O   Low Minute Volume Alarm 4 L/min   High Respiratory Rate 40 br/min   Patient Observation   Observations ETT SIZE 7.5, SECURED AT 23 LIP LINE. AMBU BAG AT HAD OF BED. WATER GOOD.     ETT (adult)   Placement Date/Time: 10/06/21 1208   Timeout: Patient  Preoxygenation: Yes  Technique: Rapid sequence  Type: Cuffed  Tube Size: 7.5 mm  Laryngoscope: GlideScope  Secured at: 23 cm  Measured From: Lips   Secured at 23 cm   Measured From [address] By [employer] tube carranza   Site Condition Dry

## 2021-10-14 NOTE — PROGRESS NOTES
Initial exam completed- See doc flowsheet for assessment findings. Pt resting quietly in bed and opens eyes to stimulation. She is not synchronous with vent and frequently stacking vent breaths. Pt given PRN versed and fentanyl in attempts to sedate more. Fentanyl gtt increased to 125 mcg. Diprivan gtt at 50 mcg. See MAR  All lines and monitoring devices are in place . Vitals and SpO2 stable. Call light is within easy reach. Plan of care and goals reviewed.  Patient is not able to demonstrate the ability to move from a reclining position to an upright position within the recliner due to Pt on vent and bedrest .  Teagan Crockett RN

## 2021-10-15 LAB
A/G RATIO: 1.2 (ref 1.1–2.2)
ALBUMIN SERPL-MCNC: 3.3 G/DL (ref 3.4–5)
ALP BLD-CCNC: 119 U/L (ref 40–129)
ALT SERPL-CCNC: 26 U/L (ref 10–40)
ANION GAP SERPL CALCULATED.3IONS-SCNC: 6 MMOL/L (ref 3–16)
AST SERPL-CCNC: 26 U/L (ref 15–37)
BASE EXCESS ARTERIAL: 7.7 MMOL/L (ref -3–3)
BASOPHILS ABSOLUTE: 0.1 K/UL (ref 0–0.2)
BASOPHILS RELATIVE PERCENT: 1.4 %
BILIRUB SERPL-MCNC: <0.2 MG/DL (ref 0–1)
BUN BLDV-MCNC: 15 MG/DL (ref 7–20)
CALCIUM SERPL-MCNC: 9.1 MG/DL (ref 8.3–10.6)
CARBOXYHEMOGLOBIN ARTERIAL: 0.3 % (ref 0–1.5)
CHLORIDE BLD-SCNC: 103 MMOL/L (ref 99–110)
CO2: 34 MMOL/L (ref 21–32)
CREAT SERPL-MCNC: <0.5 MG/DL (ref 0.6–1.2)
EOSINOPHILS ABSOLUTE: 0.2 K/UL (ref 0–0.6)
EOSINOPHILS RELATIVE PERCENT: 3.2 %
GFR AFRICAN AMERICAN: >60
GFR NON-AFRICAN AMERICAN: >60
GLOBULIN: 2.7 G/DL
GLUCOSE BLD-MCNC: 137 MG/DL (ref 70–99)
GLUCOSE BLD-MCNC: 162 MG/DL (ref 70–99)
GLUCOSE BLD-MCNC: 173 MG/DL (ref 70–99)
GLUCOSE BLD-MCNC: 177 MG/DL (ref 70–99)
GLUCOSE BLD-MCNC: 180 MG/DL (ref 70–99)
GLUCOSE BLD-MCNC: 184 MG/DL (ref 70–99)
GLUCOSE BLD-MCNC: 186 MG/DL (ref 70–99)
HCO3 ARTERIAL: 34 MMOL/L (ref 21–29)
HCT VFR BLD CALC: 31.6 % (ref 36–48)
HEMOGLOBIN, ART, EXTENDED: 11 G/DL (ref 12–16)
HEMOGLOBIN: 10 G/DL (ref 12–16)
LYMPHOCYTES ABSOLUTE: 1.6 K/UL (ref 1–5.1)
LYMPHOCYTES RELATIVE PERCENT: 27.7 %
MCH RBC QN AUTO: 25.2 PG (ref 26–34)
MCHC RBC AUTO-ENTMCNC: 31.7 G/DL (ref 31–36)
MCV RBC AUTO: 79.6 FL (ref 80–100)
METHEMOGLOBIN ARTERIAL: 0.3 %
MONOCYTES ABSOLUTE: 0.4 K/UL (ref 0–1.3)
MONOCYTES RELATIVE PERCENT: 6.8 %
NEUTROPHILS ABSOLUTE: 3.5 K/UL (ref 1.7–7.7)
NEUTROPHILS RELATIVE PERCENT: 60.9 %
O2 SAT, ARTERIAL: 86.6 %
O2 THERAPY: ABNORMAL
PCO2 ARTERIAL: 56.5 MMHG (ref 35–45)
PDW BLD-RTO: 18.7 % (ref 12.4–15.4)
PERFORMED ON: ABNORMAL
PH ARTERIAL: 7.4 (ref 7.35–7.45)
PLATELET # BLD: 104 K/UL (ref 135–450)
PMV BLD AUTO: 9.7 FL (ref 5–10.5)
PO2 ARTERIAL: 53 MMHG (ref 75–108)
POTASSIUM REFLEX MAGNESIUM: 4.1 MMOL/L (ref 3.5–5.1)
RBC # BLD: 3.96 M/UL (ref 4–5.2)
SODIUM BLD-SCNC: 143 MMOL/L (ref 136–145)
TCO2 ARTERIAL: 35.7 MMOL/L
TOTAL PROTEIN: 6 G/DL (ref 6.4–8.2)
TRIGL SERPL-MCNC: 335 MG/DL (ref 0–150)
WBC # BLD: 5.8 K/UL (ref 4–11)

## 2021-10-15 PROCEDURE — 6360000002 HC RX W HCPCS: Performed by: INTERNAL MEDICINE

## 2021-10-15 PROCEDURE — 94761 N-INVAS EAR/PLS OXIMETRY MLT: CPT

## 2021-10-15 PROCEDURE — 99233 SBSQ HOSP IP/OBS HIGH 50: CPT | Performed by: INTERNAL MEDICINE

## 2021-10-15 PROCEDURE — 6370000000 HC RX 637 (ALT 250 FOR IP): Performed by: INTERNAL MEDICINE

## 2021-10-15 PROCEDURE — 82803 BLOOD GASES ANY COMBINATION: CPT

## 2021-10-15 PROCEDURE — C9113 INJ PANTOPRAZOLE SODIUM, VIA: HCPCS | Performed by: INTERNAL MEDICINE

## 2021-10-15 PROCEDURE — 80053 COMPREHEN METABOLIC PANEL: CPT

## 2021-10-15 PROCEDURE — 2500000003 HC RX 250 WO HCPCS: Performed by: INTERNAL MEDICINE

## 2021-10-15 PROCEDURE — 2580000003 HC RX 258: Performed by: INTERNAL MEDICINE

## 2021-10-15 PROCEDURE — 2000000000 HC ICU R&B

## 2021-10-15 PROCEDURE — 2700000000 HC OXYGEN THERAPY PER DAY

## 2021-10-15 PROCEDURE — 84478 ASSAY OF TRIGLYCERIDES: CPT

## 2021-10-15 PROCEDURE — 85025 COMPLETE CBC W/AUTO DIFF WBC: CPT

## 2021-10-15 PROCEDURE — 94003 VENT MGMT INPAT SUBQ DAY: CPT

## 2021-10-15 PROCEDURE — 99291 CRITICAL CARE FIRST HOUR: CPT | Performed by: INTERNAL MEDICINE

## 2021-10-15 PROCEDURE — 94640 AIRWAY INHALATION TREATMENT: CPT

## 2021-10-15 RX ORDER — 0.9 % SODIUM CHLORIDE 0.9 %
500 INTRAVENOUS SOLUTION INTRAVENOUS ONCE
Status: COMPLETED | OUTPATIENT
Start: 2021-10-15 | End: 2021-10-15

## 2021-10-15 RX ADMIN — MICONAZOLE NITRATE: 2 POWDER TOPICAL at 08:43

## 2021-10-15 RX ADMIN — GABAPENTIN 800 MG: 400 CAPSULE ORAL at 08:41

## 2021-10-15 RX ADMIN — Medication 175 MCG/HR: at 11:59

## 2021-10-15 RX ADMIN — DEXAMETHASONE SODIUM PHOSPHATE 3 MG: 4 INJECTION, SOLUTION INTRAMUSCULAR; INTRAVENOUS at 13:04

## 2021-10-15 RX ADMIN — GABAPENTIN 800 MG: 400 CAPSULE ORAL at 13:16

## 2021-10-15 RX ADMIN — ENOXAPARIN SODIUM 30 MG: 30 INJECTION SUBCUTANEOUS at 08:40

## 2021-10-15 RX ADMIN — SERTRALINE HYDROCHLORIDE 50 MG: 50 TABLET ORAL at 08:41

## 2021-10-15 RX ADMIN — CIPROFLOXACIN 400 MG: 2 INJECTION, SOLUTION INTRAVENOUS at 22:25

## 2021-10-15 RX ADMIN — IPRATROPIUM BROMIDE AND ALBUTEROL SULFATE 1 AMPULE: .5; 2.5 SOLUTION RESPIRATORY (INHALATION) at 15:26

## 2021-10-15 RX ADMIN — OXYCODONE 10 MG: 5 TABLET ORAL at 21:15

## 2021-10-15 RX ADMIN — PROPOFOL 50.04 MCG/KG/MIN: 10 INJECTION, EMULSION INTRAVENOUS at 04:45

## 2021-10-15 RX ADMIN — PANTOPRAZOLE SODIUM 40 MG: 40 INJECTION, POWDER, FOR SOLUTION INTRAVENOUS at 08:41

## 2021-10-15 RX ADMIN — PROPOFOL 50 MCG/KG/MIN: 10 INJECTION, EMULSION INTRAVENOUS at 12:41

## 2021-10-15 RX ADMIN — IPRATROPIUM BROMIDE AND ALBUTEROL SULFATE 1 AMPULE: .5; 2.5 SOLUTION RESPIRATORY (INHALATION) at 23:42

## 2021-10-15 RX ADMIN — CASTOR OIL AND BALSAM, PERU: 788; 87 OINTMENT TOPICAL at 08:41

## 2021-10-15 RX ADMIN — Medication 0.3 MCG/KG/HR: at 04:49

## 2021-10-15 RX ADMIN — SODIUM CHLORIDE 500 ML: 9 INJECTION, SOLUTION INTRAVENOUS at 18:25

## 2021-10-15 RX ADMIN — CHLORHEXIDINE GLUCONATE 0.12% ORAL RINSE 15 ML: 1.2 LIQUID ORAL at 08:40

## 2021-10-15 RX ADMIN — OXYCODONE 10 MG: 5 TABLET ORAL at 04:44

## 2021-10-15 RX ADMIN — PROPOFOL 50.04 MCG/KG/MIN: 10 INJECTION, EMULSION INTRAVENOUS at 01:27

## 2021-10-15 RX ADMIN — Medication 175 MCG/HR: at 05:49

## 2021-10-15 RX ADMIN — SODIUM CHLORIDE, PRESERVATIVE FREE 10 ML: 5 INJECTION INTRAVENOUS at 08:42

## 2021-10-15 RX ADMIN — PROPOFOL 50 MCG/KG/MIN: 10 INJECTION, EMULSION INTRAVENOUS at 22:31

## 2021-10-15 RX ADMIN — CHLORHEXIDINE GLUCONATE 0.12% ORAL RINSE 15 ML: 1.2 LIQUID ORAL at 19:46

## 2021-10-15 RX ADMIN — PROPOFOL 50 MCG/KG/MIN: 10 INJECTION, EMULSION INTRAVENOUS at 09:07

## 2021-10-15 RX ADMIN — NOREPINEPHRINE BITARTRATE 2 MCG/MIN: 1 INJECTION INTRAVENOUS at 18:39

## 2021-10-15 RX ADMIN — ENOXAPARIN SODIUM 30 MG: 30 INJECTION SUBCUTANEOUS at 21:19

## 2021-10-15 RX ADMIN — PROPOFOL 40 MCG/KG/MIN: 10 INJECTION, EMULSION INTRAVENOUS at 18:41

## 2021-10-15 RX ADMIN — LOSARTAN POTASSIUM 25 MG: 25 TABLET, FILM COATED ORAL at 08:41

## 2021-10-15 RX ADMIN — IPRATROPIUM BROMIDE AND ALBUTEROL SULFATE 1 AMPULE: .5; 2.5 SOLUTION RESPIRATORY (INHALATION) at 11:36

## 2021-10-15 RX ADMIN — OXYCODONE 10 MG: 5 TABLET ORAL at 16:05

## 2021-10-15 RX ADMIN — MICONAZOLE NITRATE: 2 POWDER TOPICAL at 21:22

## 2021-10-15 RX ADMIN — Medication 125 MCG/HR: at 20:07

## 2021-10-15 RX ADMIN — OXYCODONE 10 MG: 5 TABLET ORAL at 10:59

## 2021-10-15 RX ADMIN — SODIUM CHLORIDE, PRESERVATIVE FREE 10 ML: 5 INJECTION INTRAVENOUS at 21:20

## 2021-10-15 RX ADMIN — ATORVASTATIN CALCIUM 40 MG: 40 TABLET, FILM COATED ORAL at 08:41

## 2021-10-15 RX ADMIN — LEVOTHYROXINE SODIUM 100 MCG: 100 TABLET ORAL at 05:49

## 2021-10-15 RX ADMIN — IPRATROPIUM BROMIDE AND ALBUTEROL SULFATE 1 AMPULE: .5; 2.5 SOLUTION RESPIRATORY (INHALATION) at 03:25

## 2021-10-15 RX ADMIN — IPRATROPIUM BROMIDE AND ALBUTEROL SULFATE 1 AMPULE: .5; 2.5 SOLUTION RESPIRATORY (INHALATION) at 19:51

## 2021-10-15 RX ADMIN — ASPIRIN 81 MG: 81 TABLET, CHEWABLE ORAL at 08:41

## 2021-10-15 RX ADMIN — GABAPENTIN 800 MG: 400 CAPSULE ORAL at 21:19

## 2021-10-15 RX ADMIN — AMLODIPINE BESYLATE 2.5 MG: 2.5 TABLET ORAL at 08:41

## 2021-10-15 RX ADMIN — CASTOR OIL AND BALSAM, PERU: 788; 87 OINTMENT TOPICAL at 21:22

## 2021-10-15 RX ADMIN — CIPROFLOXACIN 400 MG: 2 INJECTION, SOLUTION INTRAVENOUS at 10:58

## 2021-10-15 RX ADMIN — IPRATROPIUM BROMIDE AND ALBUTEROL SULFATE 1 AMPULE: .5; 2.5 SOLUTION RESPIRATORY (INHALATION) at 07:02

## 2021-10-15 RX ADMIN — Medication 175 MCG/HR: at 00:20

## 2021-10-15 ASSESSMENT — PAIN SCALES - GENERAL
PAINLEVEL_OUTOF10: 0
PAINLEVEL_OUTOF10: 0
PAINLEVEL_OUTOF10: 6
PAINLEVEL_OUTOF10: 0
PAINLEVEL_OUTOF10: 4

## 2021-10-15 ASSESSMENT — PULMONARY FUNCTION TESTS
PIF_VALUE: 33
PIF_VALUE: 46
PIF_VALUE: 36
PIF_VALUE: 36
PIF_VALUE: 38
PIF_VALUE: 35
PIF_VALUE: 47
PIF_VALUE: 35
PIF_VALUE: 27
PIF_VALUE: 37
PIF_VALUE: 26
PIF_VALUE: 31
PIF_VALUE: 38
PIF_VALUE: 39
PIF_VALUE: 48
PIF_VALUE: 45
PIF_VALUE: 35
PIF_VALUE: 20
PIF_VALUE: 35
PIF_VALUE: 39
PIF_VALUE: 33
PIF_VALUE: 37
PIF_VALUE: 33
PIF_VALUE: 34
PIF_VALUE: 24
PIF_VALUE: 41
PIF_VALUE: 48
PIF_VALUE: 12
PIF_VALUE: 44
PIF_VALUE: 32
PIF_VALUE: 37
PIF_VALUE: 50
PIF_VALUE: 34
PIF_VALUE: 44
PIF_VALUE: 43
PIF_VALUE: 37
PIF_VALUE: 22
PIF_VALUE: 34
PIF_VALUE: 42
PIF_VALUE: 31

## 2021-10-15 NOTE — PROGRESS NOTES
10/15/21 1136   Vent Information   Vent Type 980   Vent Mode AC/VC   Vt Ordered 390 mL   Rate Set 22 bmp   Peak Flow 55 L/min   Pressure Support 0 cmH20   FiO2  60 %   SpO2 91 %   SpO2/FiO2 ratio 151.67   Sensitivity 3   PEEP/CPAP 10   I Time/ I Time % 0 s   Humidification Source Heated wire   Humidification Temp 37   Circuit Condensation Drained   Vent Patient Data   High Peep/I Pressure 0   Peak Inspiratory Pressure 38 cmH2O   Mean Airway Pressure 17 cmH20   Rate Measured 22 br/min   Vt Exhaled 371 mL   Minute Volume 8.51 Liters   I:E Ratio 1:2.50   Cough/Sputum   Sputum How Obtained Endotracheal;Suctioned   Sputum Amount Small   Sputum Color Creamy   Tenacity Thick   Spontaneous Breathing Trial (SBT) RT Doc   Pulse 71   Breath Sounds   Right Upper Lobe Diminished   Right Middle Lobe Diminished   Right Lower Lobe Diminished   Left Upper Lobe Diminished   Left Lower Lobe Diminished   Additional Respiratory  Assessments   Resp 21   Position Semi-Jesus's   Alarm Settings   High Pressure Alarm 50 cmH2O   Low Minute Volume Alarm 4 L/min   Apnea (secs) 20 secs   High Respiratory Rate 40 br/min   Patient Observation   Observations ETT SIZE 7.5, SECURED AT 23 LIP LINE. AMBU BAG AT HEAD OF BED. WATER GOOD.     ETT (adult)   Placement Date/Time: 10/06/21 1208   Timeout: Patient  Preoxygenation: Yes  Technique: Rapid sequence  Type: Cuffed  Tube Size: 7.5 mm  Laryngoscope: GlideScope  Secured at: 23 cm  Measured From: Lips   Secured at 23 cm   Measured From Lips   Secured By Commercial tube carranza

## 2021-10-15 NOTE — CARE COORDINATION
INTERDISCIPLINARY PLAN OF CARE CONFERENCE    Date/Time: 10/15/2021 9:36 AM  Completed by:  HOMA Elizabeth. Case Management      Patient Name:  Wilson Mejia  YOB: 1955  Admitting Diagnosis: Dehydration [E86.0]  Hypokalemia [E87.6]  Hypomagnesemia [E83.42]  Positive D dimer [R79.89]  CHARLY (acute kidney injury) ([facility] [address] [N17.9]  Acute respiratory failure with hypoxia ([facility] [address] [J96.01]  Pneumonia due to COVID-19 virus [U07.1, J12.82]  COVID-19 [U07.1]     Admit Date/Time:  9/26/2021  8:29 PM    Chart reviewed. Interdisciplinary team contacted or reviewed plan related to patient progress and discharge plans. Disciplines included Case Management, Nursing, and Dietitian. Current Status:Ongoing. ICU. Covid 19 +. Vent. Restraints. Precedex  PT/OT recommendation for discharge plan of care: TBD    Expected D/C Disposition:  [facility]    Discharge Plan Comments: Chart review completed. Pt remains in the ICU and is on a Vent. Per note from MD on 10/14/2021, pt is requiring more oxygen with the vent    [facility] is following pt. CM will continue to follow and assist. Please notify CM if needs or concerns arise.      Home O2 in place on admit: No

## 2021-10-15 NOTE — PROGRESS NOTES
10/15/21 0325   Vent Information   Vent Type 980   Vent Mode AC/VC   Vt Ordered 390 mL   Rate Set 22 bmp   Peak Flow 55 L/min   Pressure Support 0 cmH20   FiO2  55 %   SpO2 92 %   SpO2/FiO2 ratio 167.27   Sensitivity 3   PEEP/CPAP 10   I Time/ I Time % 0 s   Humidification Source Heated wire   Humidification Temp 37   Humidification Temp Measured 36.7   Circuit Condensation Drained   Vent Patient Data   High Peep/I Pressure 0   Peak Inspiratory Pressure 48 cmH2O   Mean Airway Pressure 20 cmH20   Rate Measured 22 br/min   Vt Exhaled 400 mL   Minute Volume 8.77 Liters   I:E Ratio 1:2.50   Plateau Pressure 34 [id number]   Cough/Sputum   Sputum How Obtained Endotracheal;Suctioned   Cough Productive   Sputum Amount Small   Sputum Color Creamy   Tenacity Thick   Spontaneous Breathing Trial (SBT) RT Doc   Pulse (!) 45   Breath Sounds   Right Upper Lobe Diminished   Right Middle Lobe Diminished   Right Lower Lobe Diminished   Left Upper Lobe Diminished   Left Lower Lobe Diminished   Additional Respiratory  Assessments   Resp 20   Position Semi-Jesus's   Alarm Settings   High Pressure Alarm 50 cmH2O   Low Minute Volume Alarm 4 L/min   High Respiratory Rate 40 br/min   Patient Observation   Observations ETT SIZE 7.5, SECURED AT 23 LIP LINE. AMBU BAG AT HEAD OF BED. WATER GOOD.     ETT (adult)   Placement Date/Time: 10/06/21 1208   Timeout: Patient  Preoxygenation: Yes  Technique: Rapid sequence  Type: Cuffed  Tube Size: 7.5 mm  Laryngoscope: GlideScope  Secured at: 23 cm  Measured From: Lips   Secured at 23 cm   Measured From Lips   ET Placement Left   Secured By Commercial tube carranza   Site Condition Dry

## 2021-10-15 NOTE — PROGRESS NOTES
10/15/21 1951   Vent Information   $Ventilation $Subsequent Day   Skin Assessment Clean, dry, & intact   Vent Type 980   Vent Mode AC/VC   Vt Ordered 360 mL   Rate Set 22 bmp   Peak Flow 44 L/min   Pressure Support 0 cmH20   FiO2  75 %   SpO2 91 %   SpO2/FiO2 ratio 121.33   Sensitivity 3   PEEP/CPAP 10   I Time/ I Time % 0 s   Humidification Source Heated wire   Humidification Temp Measured 37   Circuit Condensation Drained   Vent Patient Data   High Peep/I Pressure 0   Peak Inspiratory Pressure 33 cmH2O   Mean Airway Pressure 17 cmH20   Rate Measured 23 br/min   Vt Exhaled 382 mL   Minute Volume 8.51 Liters   I:E Ratio 1:2.00   Plateau Pressure 29 [id number]   Static Compliance 24 mL/cmH2O   Dynamic Compliance 21 mL/cmH2O   Cough/Sputum   Sputum How Obtained Endotracheal   Cough Productive   Sputum Amount Small   Sputum Color Creamy; Yellow   Tenacity Thick   Spontaneous Breathing Trial (SBT) RT Doc   Pulse 71   Breath Sounds   Right Upper Lobe Diminished   Right Middle Lobe Diminished   Right Lower Lobe Diminished   Left Upper Lobe Diminished   Left Lower Lobe Diminished   Additional Respiratory  Assessments   Resp 23   Alarm Settings   High Pressure Alarm 50 cmH2O   Low Minute Volume Alarm 4 L/min   Apnea (secs) 20 secs   High Respiratory Rate 40 br/min   Low Exhaled Vt  210 mL   Patient Observation   Observations 7.5 ett 23 at lip

## 2021-10-15 NOTE — PROGRESS NOTES
Pt BP 70/33 (46) spoke w/ Dr. Eliza Guevara new order for levophed & 500 cc NS bolus.    Teresa Isabel RN, BSN

## 2021-10-15 NOTE — PROGRESS NOTES
Pulmonary & Critical Care Medicine ICU Progress Note    CC: Respiratory failure    Events of Last 24 hours:   Ventilator dyssynchrony  Net -850 mils  Awake and communicative     Vascular lines: IV: PICC line 9/28    MV: 9/29-10/4, emergently re-intubated 10/6/21 for refractory hypoxemia  Vent Mode: AC/VC Rate Set: 22 bmp/Vt Ordered: 390 mL/ /FiO2 : 55 %  Recent Labs     10/14/21  0600 10/15/21  0345   PHART 7.434 7. 397   UNG4HAJ 45.6* 56.5*   PO2ART 51.0* 53.0*     IV:   fentaNYL 175 mcg/hr (10/15/21 0549)    propofol 50.038 mcg/kg/min (10/15/21 0445)    dexmedetomidine HCl in NaCl 0.3 mcg/kg/hr (10/15/21 0449)    sodium chloride      dextrose      sodium chloride       Vitals:  Blood pressure 137/65, pulse 52, temperature 98.5 °F (36.9 °C), temperature source Axillary, resp. rate 22, height 5' 9\" (1.753 m), weight 189 lb 6 oz (85.9 kg), SpO2 96 %, not currently breastfeeding. on ventilator      Intake/Output Summary (Last 24 hours) at 10/15/2021 0651  Last data filed at 10/15/2021 0600  Gross per 24 hour   Intake 1147.32 ml   Output 1990 ml   Net -842.68 ml     General: intubated, ill appearing    ENT: Pharynx with ETT. Resp: No crackles. No wheezing. CV: S1, S2. + edema  GI: NT, ND, +BS  Skin: Warm and dry. Neuro: PERRL. Sedated, not following commands.  Patellar reflexes are symmetric      Scheduled Meds:   dexamethasone  3 mg IntraVENous Q24H    insulin lispro  0-12 Units SubCUTAneous Q4H    sertraline  50 mg Per NG tube Daily    ciprofloxacin  400 mg IntraVENous Q12H    Venelex   Topical BID    miconazole   Topical BID    oxyCODONE  10 mg Oral Q6H    influenza virus vaccine  0.5 mL IntraMUSCular Prior to discharge    chlorhexidine  15 mL Mouth/Throat BID    ipratropium-albuterol  1 ampule Inhalation Q4H    amLODIPine  2.5 mg Oral Daily    losartan  25 mg Oral Daily    pantoprazole  40 mg IntraVENous Daily    levothyroxine  100 mcg Oral Daily    enoxaparin  30 mg SubCUTAneous BID    lidocaine 1 % injection  5 mL IntraDERmal Once    sodium chloride flush  5-40 mL IntraVENous 2 times per day    aspirin  81 mg Oral Daily    gabapentin  800 mg Oral TID    atorvastatin  40 mg Oral Daily    sodium chloride flush  5-40 mL IntraVENous 2 times per day       Data:  CBC:   Recent Labs     10/13/21  0458 10/14/21  0555 10/15/21  0305   WBC 5.3 5.6 5.8   HGB 10.0* 10.2* 10.0*   HCT 31.1* 31.9* 31.6*   MCV 78.9* 78.7* 79.6*   * 112* 104*     BMP:   Recent Labs     10/13/21  0459 10/14/21  0555 10/15/21  0305    139 143   K 4.2 3.8 4.1    101 103   CO2 31 31 34*   BUN 14 17 15   CREATININE <0.5* <0.5* <0.5*     LIVER PROFILE:   Recent Labs     10/13/21  0459 10/14/21  0555 10/15/21  0305   AST 26 32 26   ALT 24 29 26   BILITOT <0.2 0.3 <0.2   ALKPHOS [phone]       Microbiology:  9/27/21 COVID-19 detected  9/30/21 Resp Pseudomonas fluorescens   10/7/2021 tracheal aspirate: Klebsiella intermediate to zosyn    Imaging:  Chest x-ray 10/14/2021 bilateral airspace disease    CTPA 9/27/21  No evidence of pulmonary embolism. Scattered peripheral opacities in the left lung and more consolidative area in the right lung concerning for pneumonia.  Previous right thoracotomy and upper lobectomy. One mildly enlarged right paratracheal lymph node is present but no priors for comparison.  Nonenlarged but prominent lymph nodes in the upper abdomen and juxta diaphragmatic region      ASSESSMENT:  · Acute hypoxemic respiratory failure   · COVID-19 viral pneumonia  · ARDS  · Pseudomonas followed by Klebsiella PNA   · TMP  · Acute kidney injury  · CAD  · Chronic pain syndrome - on chronic opiates and Neurontin   · Possible early dementia, being worked up outpatient   · H/O bronchiectasis and tracheobronchomalacia/EDAC s/p tracheoplasty in 2014      PLAN:  - COVID-19 isolation, droplet plus  -   Mechanical ventilation as per my orders.   The ventilator was adjusted by me at the bedside for unstable, life threatening respiratory failure. Proned 10/6/21-10/7/2; 10/11-10/12   Paralyzed 10/6/21 - 10/7/21  IV Propofol, Fentanyl and Precedex for sedation, target RASS -4  Completed Zosyn D#8, Zyvox D#3, prior to that 5 days of Ceftriaxone/Zithromax   Cipro D#7/7 for Klebsiella in sputum    Dexamethasone 6 mg IV D#18, @ 3mg  Completed Remdesivir & Tocilizumab  Continuing home oxycodone and neurontin    Lovenox for DVT prophylaxis    Total critical care time caring for this patient with life threatening, unstable organ failure, including direct patient contact, management of life support systems, review of data including imaging and labs, discussions with other team members and physicians is 34 minutes, excluding procedures.

## 2021-10-15 NOTE — PROGRESS NOTES
Pt having low BP 69/34, 70/33, call to Dr Betsy Norris received order for 500ml bolus of NS and stop Precedex, also order for levophed gtt.

## 2021-10-15 NOTE — PROGRESS NOTES
Pts q4 assessment complete, family at bedside, pt occassionally has spontenous eye opening, no needs art present. Writer called [facility] again and spoke to someone who states \"Sirena\" is out sick.  Writer asked who takes care of msgs while Sirena is out and she stated she would check.  Upon return to the phone she stated no resident is allowed to leave to go to a doctor appt.  Writer advised if anyone would keep track of the patients that will eventually need to go back to see the doctors.  Writer advised will send msg back to Kizzy Lira for this update.  Will done it out of inbox.

## 2021-10-15 NOTE — PROGRESS NOTES
Pt is now wide awake moving left arm on bedrail, rass +1, Fentanyl drip to 125 mcg/hr and propofol increased to 40mcg/hr.

## 2021-10-15 NOTE — PROGRESS NOTES
Hospitalist Progress Note      PCP: Dolores Solis MD    Date of Admission: 9/26/2021      Admitted with COVID-19 pneumonia,  acute hypoxic respiratory failure. Patient not vaccinated  Progressive significant worsening hypoxemia overnight. Patient was on 2 L of oxygen on admission-> switched to high flow oxygen per Vapotherm with additional 100% nonrebreather on 9/28/2021-> transferred to ICU.    10/13   cont to be intubated, bradycardic, on precedex gtt    10/14  Increasing oxygen requirement on the vent today  Bradycardic, on Precedex drip    10/15  Ventilator dyssynchrony.     Medications:  Reviewed    Infusion Medications    fentaNYL 175 mcg/hr (10/15/21 1202)    propofol 50 mcg/kg/min (10/15/21 1241)    dexmedetomidine HCl in NaCl 0.3 mcg/kg/hr (10/15/21 1202)    sodium chloride      dextrose      sodium chloride       Scheduled Medications    dexamethasone  3 mg IntraVENous Q24H    insulin lispro  0-12 Units SubCUTAneous Q4H    sertraline  50 mg Per NG tube Daily    ciprofloxacin  400 mg IntraVENous Q12H    Venelex   Topical BID    miconazole   Topical BID    oxyCODONE  10 mg Oral Q6H    influenza virus vaccine  0.5 mL IntraMUSCular Prior to discharge    chlorhexidine  15 mL Mouth/Throat BID    ipratropium-albuterol  1 ampule Inhalation Q4H    amLODIPine  2.5 mg Oral Daily    losartan  25 mg Oral Daily    pantoprazole  40 mg IntraVENous Daily    levothyroxine  100 mcg Oral Daily    enoxaparin  30 mg SubCUTAneous BID    lidocaine 1 % injection  5 mL IntraDERmal Once    sodium chloride flush  5-40 mL IntraVENous 2 times per day    aspirin  81 mg Oral Daily    gabapentin  800 mg Oral TID    atorvastatin  40 mg Oral Daily    sodium chloride flush  5-40 mL IntraVENous 2 times per day     PRN Meds: carboxymethylcellulose PF **AND** artificial tears, fentanNYL, midazolam, labetalol, sodium chloride flush, sodium chloride, glucose, dextrose, glucagon (rDNA), dextrose, sodium chloride flush, sodium chloride, polyethylene glycol, acetaminophen **OR** acetaminophen, prochlorperazine      Intake/Output Summary (Last 24 hours) at 10/15/2021 1243  Last data filed at 10/15/2021 1202  Gross per 24 hour   Intake 2687.62 ml   Output 2565 ml   Net 122.62 ml       Physical Exam Performed:    BP (!) 97/38   Pulse 63   Temp 98 °F (36.7 °C) (Axillary)   Resp 25   Ht 5' 9\" (1.753 m)   Wt 189 lb 6 oz (85.9 kg)   SpO2 (!) 88%   BMI 27.97 kg/m²     General:intubated on mechanical ventilation . Supine position . Skin:  Warm and dry  Neck:  JVD absent. Neck supple  Chest: diminished breath sounds, improved air entry. No wheezes or rhonchi.  Bibasilar crackles. Cardiovascular:  RRR ,S1S2 normal  Abdomen:  Soft, non tender, non distended, BS +  Extremities:  No edema. Intact peripheral pulses. Brisk cap refill, < 2 secs  Neuro: intubated, opens her eyes.       Labs:   Recent Labs     10/13/21  0458 10/14/21  0555 10/15/21  0305   WBC 5.3 5.6 5.8   HGB 10.0* 10.2* 10.0*   HCT 31.1* 31.9* 31.6*   * 112* 104*     Recent Labs     10/13/21  0459 10/14/21  0555 10/15/21  0305    139 143   K 4.2 3.8 4.1    101 103   CO2 31 31 34*   BUN 14 17 15   CREATININE <0.5* <0.5* <0.5*   CALCIUM 9.1 8.8 9.1     Recent Labs     10/13/21  0459 10/14/21  0555 10/15/21  0305   AST 26 32 26   ALT 24 29 26   BILITOT <0.2 0.3 <0.2   ALKPHOS 126 124 119     No results for input(s): INR in the last 72 hours. No results for input(s): Osvaldo Massed in the last 72 hours. Urinalysis:      Lab Results   Component Value Date    NITRU Negative 09/27/2021    WBCUA 0-2 09/27/2021    BACTERIA Rare 09/27/2021    RBCUA None seen 09/27/2021    BLOODU TRACE-INTACT 09/27/2021    SPECGRAV <=1.005 09/27/2021    GLUCOSEU >=1000 09/27/2021    GLUCOSEU 250 05/11/2011       Radiology:  XR CHEST PORTABLE   Final Result   Line and tube as above.       Bilateral interstitial opacities are worsened when compared to prior study. XR CHEST PORTABLE   Final Result   Stable chest.  Diffuse interstitial changes, possibly infiltrate or edema. Satisfactory position of endotracheal tube. XR CHEST PORTABLE   Final Result   Stable support apparatus. In this case, both multifocal pneumonia and pulmonary edema are both   considered, and the changes appear increased when compared to the previous   exam.         XR CHEST PORTABLE   Final Result   1. Stable appropriate positions of support apparatus. 2. Slight interval progression of multifocal airspace disease throughout both   lungs, likely a combination of multifocal pneumonia and superimposed   pulmonary edema. 3. Stable cardiomegaly and small bilateral pleural effusions. XR CHEST PORTABLE   Final Result   Improving aeration of the right lung with grossly stable left basilar   airspace opacities. XR CHEST PORTABLE   Final Result   Supportive tubing is in stable position. Stable pattern of bilateral ground-glass opacities and basilar consolidations. XR CHEST PORTABLE   Final Result   Stable appearance of the chest.  Diffuse ground-glass opacities on the right   and mild left basilar opacities. XR CHEST PORTABLE   Final Result   Improved aeration on the left, possibly improving pulmonary edema. Persistent airspace disease in the right base may represent concurrent   pneumonia         XR ABDOMEN (KUB) (SINGLE AP VIEW)   Final Result   Enteric catheter tip likely in the distal gastric body. XR CHEST PORTABLE   Final Result   1. Endotracheal tube projects in the appropriate position. 2. Enteric tube extends below the diaphragm and out of the field of view. XR CHEST PORTABLE   Final Result   Increasing diffuse airspace opacification throughout the lungs. Pattern may   represent pulmonary edema or worsening pneumonitis. XR CHEST PORTABLE   Final Result   1.  Interval increased bilateral pulmonary opacities with new consolidative   change at the right lung base. These findings could be secondary to   pulmonary edema or infection. 2. Consolidative change at the right lung base could also be secondary to   atelectasis/mucous plug. XR CHEST PORTABLE   Final Result   No significant interval change in small right pleural effusion or bilateral   heterogeneous opacity which can reflect pulmonary edema or pneumonia. XR CHEST PORTABLE   Final Result   Mild improvement from prior comparison. Mild-to-moderate congestion and/or   infiltrates identified in the lungs. ET tube terminates 7 cm superior to the saba. XR CHEST PORTABLE   Final Result   Endotracheal tube and orogastric tube unchanged, adequate position. Slightly increased extensive bilateral pulmonary disease over the past 24   hours this may relate to interstitial edema or diffuse infection or a   combination. XR CHEST PORTABLE   Final Result   Endotracheal tube with its tip approximately 4.7 cm from the saba in   satisfactory position. Enteric tube in satisfactory position. Patchy bilateral airspace disease is again noted with improved aeration of   the left mid lung. XR CHEST PORTABLE   Final Result   Some improvement in the appearance of the chest with clearing of some of the   acute airspace disease from the mid left lung. Large amount of pneumonia   remains within the right lung and lower left lung. XR CHEST PORTABLE   Final Result   1. Endotracheal tube, nasogastric tube, and right PICC line placement. 2.  Increasing multifocal opacities with consolidative area in the right   perihilar lung. Could be multifocal pneumonia with or without edema. IR PICC WO SQ PORT/PUMP > 5 YEARS   Final Result   1. See above. CT CHEST PULMONARY EMBOLISM W CONTRAST   Final Result   No evidence of pulmonary embolism.       Scattered peripheral opacities in the left lung and more consolidative area   in the right lung concerning for pneumonia. Previous right thoracotomy and   upper lobectomy. One mildly enlarged right paratracheal lymph node is present but no priors   for comparison. Nonenlarged but prominent lymph nodes in the upper abdomen   and juxta diaphragmatic region. XR CHEST PORTABLE   Final Result   Mild cardiomegaly without interstitial edema. Metallic surgical clips from prior right thoracotomy. COPD with anterior segment-right upper lobe alveolar pneumonia. Old pleural thickening was noted along the right lateral chest wall. Pleural   thickening and or trace effusion blunts the right costophrenic angle. Assessment/Plan:    Active Hospital Problems    Diagnosis     Acute hypoxemic respiratory failure ([facility] [address] [J96.01]     Pneumonia due to Pseudomonas species ([facility] [address] [J15.1]     Hypomagnesemia [E83.42]     Hypokalemia [E87.6]     Gram-negative pneumonia (HCC) [J15.6]     Mild protein-calorie malnutrition (HCC) [E44.1]     Hypotension [I95.9]     Acute respiratory failure with hypoxia (HCC) [J96.01]     COVID-19 [U07.1]     Pneumonia due to COVID-19 virus [U07.1, J12.82]     CHARLY (acute kidney injury) ([facility] [address] [N17.9]     Uncontrolled hypertension [I10]     Fever [R50.9]     Suspected COVID-19 virus infection [Z20.822]     Hypoxia [R09.02]     Dehydration [E86.0]     DM2 (diabetes mellitus, type 2) (HCC) [E11.9]     Essential hypertension [I10]     Hypothyroidism [E03.9]             #COVID-19 pneumonia  #Acute hypoxic respiratory failure   Pseudomonas HCAP. -Unvaccinated patient  - she presented with cough, fever, dyspnea in the setting of household contact testing + COVID 19  - Her Covid testing came back positive on admission  - Initially on 2 L of oxygen on presentation.  She started desaturating quickly . worsening hypoxemia requiring high flow oxygen.    - Pulmonology consulted  -placed on high flow oxygen per Vapotherm 40 L,FiO2 100% ,with additional nonrebreather . Transferred to ICU on 9/28. Intubated early AM  9/29/2021.  Extubated on 10/4/2021.  She was on Vapotherm.  Pulmonary status got worse and she was reintubated on 10/6/2021.   -Continue mechanical ventilation.  Wean O2 as tolerated. .  - s/p Remdesivir  - on Decadron, day # 18- being tapered   - given 1 dose of Tocilizumab  -Lovenox twice daily  Increasing oxygen requirement on the vent 10/14  Ventilator dyssynchrony.     #Possible superimposed bacterial pneumonia  Gram-negative infection  -Was on Rocephin and Zithromax day 5 of 5; - changed to zosyn on 10/2 with pseudomonas on resp culture.  Pulmonary has started Zyvox.  Hold Zoloft when patient is getting Zyvox .   Completed Zosyn day 8 of 8.    Treated with Zyvox - now dced  Respiratory cultures now growing Klebsiella, initiated on Cipro day #7/7     #Hypokalemia  - replaced     #Dehydration  -  Improved with IVF  Creatinine normal     #CAD  - cont ASA, statin  - EKG with inferior T wave abnormality.  She denies CP.  Trop neg   -telemetry monitoring     #History of tracheobronchomalacia  - Has had surgical treatment for this     #HTN  -blood pressure is low and she was on Levophed.  This has been discontinued.  Resumed home medications-Norvasc and losartan.     #Chronic pain   History of hip fracture  - cont home oxycodone BID and percocet   - cont gabapentin and requip     #DM2  - use ssi      #Hypothyroidism  - cont synthroid         DVT Prophylaxis: Lovenox   Diet: Diet NPO  ADULT TUBE FEEDING; Orogastric; Peptide Based; Continuous; 20; Yes; 15; Q 4 hours; 55; 30; Q 3 hours  Code Status: Full Code    PT/OT Eval Status: ordered    Dispo - cont care in Minoo Rodriguez MD

## 2021-10-15 NOTE — PROGRESS NOTES
Pt having some hypotension BP systolic in the 86D, sao2 86-88, stopped Fentanyl and Propofol as pt was unable to be aroused  With rass -5, family at bedside. pts bp recovered and pt became more awake after 15 min, Fentanyl and Propofol restarted at 75 mcg/hr and 20mcg/hr respectively.

## 2021-10-15 NOTE — PROGRESS NOTES
10/14/21 2334   Vent Information   Vent Type 980   Vent Mode AC/VC   Vt Ordered 390 mL   Rate Set 22 bmp   Peak Flow 55 L/min   Pressure Support 0 cmH20   FiO2  55 %   SpO2 92 %   SpO2/FiO2 ratio 167.27   Sensitivity 3   PEEP/CPAP 10   I Time/ I Time % 0 s   Humidification Source Heated wire   Humidification Temp 37   Humidification Temp Measured 36.9   Circuit Condensation Drained   Vent Patient Data   High Peep/I Pressure 0   Peak Inspiratory Pressure 43 cmH2O   Mean Airway Pressure 18 cmH20   Rate Measured 22 br/min   Vt Exhaled 398 mL   Minute Volume 8.85 Liters   I:E Ratio 1:2.50   Plateau Pressure 37 [id number]   Cough/Sputum   Sputum How Obtained Endotracheal;Suctioned   Cough Productive   Sputum Amount Small   Sputum Color Creamy   Tenacity Thick   Spontaneous Breathing Trial (SBT) RT Doc   Pulse (!) 45   Breath Sounds   Right Upper Lobe Diminished   Right Middle Lobe Diminished   Right Lower Lobe Diminished   Left Upper Lobe Diminished   Left Lower Lobe Diminished   Additional Respiratory  Assessments   Resp 22   Position Semi-Jesus's   Alarm Settings   High Pressure Alarm 50 cmH2O   Low Minute Volume Alarm 4 L/min   High Respiratory Rate 40 br/min   Patient Observation   Observations ETT SIZE 7.5 SECURED AT 23 LIP LINE. AMBU BAG AT HEAD OF BED. WATER GOOD.     ETT (adult)   Placement Date/Time: 10/06/21 1208   Timeout: Patient  Preoxygenation: Yes  Technique: Rapid sequence  Type: Cuffed  Tube Size: 7.5 mm  Laryngoscope: GlideScope  Secured at: 23 cm  Measured From: Lips   Secured at 23 cm   Measured From Lips   ET Placement Right   Secured By Commercial tube carranza   Site Condition Dry

## 2021-10-15 NOTE — PROGRESS NOTES
Pts AM assessment complete, medications passed, pt tolerating current ventilator settings at present.

## 2021-10-16 ENCOUNTER — APPOINTMENT (OUTPATIENT)
Dept: GENERAL RADIOLOGY | Age: 66
DRG: 004 | End: 2021-10-16
Payer: MEDICARE

## 2021-10-16 LAB
A/G RATIO: 1.2 (ref 1.1–2.2)
ALBUMIN SERPL-MCNC: 3.5 G/DL (ref 3.4–5)
ALP BLD-CCNC: 142 U/L (ref 40–129)
ALT SERPL-CCNC: 25 U/L (ref 10–40)
ANION GAP SERPL CALCULATED.3IONS-SCNC: 6 MMOL/L (ref 3–16)
AST SERPL-CCNC: 27 U/L (ref 15–37)
BASE EXCESS ARTERIAL: 8.2 MMOL/L (ref -3–3)
BASOPHILS ABSOLUTE: 0.1 K/UL (ref 0–0.2)
BASOPHILS RELATIVE PERCENT: 1 %
BILIRUB SERPL-MCNC: 0.4 MG/DL (ref 0–1)
BUN BLDV-MCNC: 11 MG/DL (ref 7–20)
CALCIUM SERPL-MCNC: 9.1 MG/DL (ref 8.3–10.6)
CARBOXYHEMOGLOBIN ARTERIAL: 0.2 % (ref 0–1.5)
CHLORIDE BLD-SCNC: 105 MMOL/L (ref 99–110)
CO2: 34 MMOL/L (ref 21–32)
CREAT SERPL-MCNC: <0.5 MG/DL (ref 0.6–1.2)
EOSINOPHILS ABSOLUTE: 0.5 K/UL (ref 0–0.6)
EOSINOPHILS RELATIVE PERCENT: 4.8 %
GFR AFRICAN AMERICAN: >60
GFR NON-AFRICAN AMERICAN: >60
GLOBULIN: 3 G/DL
GLUCOSE BLD-MCNC: 183 MG/DL (ref 70–99)
GLUCOSE BLD-MCNC: 184 MG/DL (ref 70–99)
GLUCOSE BLD-MCNC: 215 MG/DL (ref 70–99)
GLUCOSE BLD-MCNC: 221 MG/DL (ref 70–99)
GLUCOSE BLD-MCNC: 230 MG/DL (ref 70–99)
HCO3 ARTERIAL: 35.4 MMOL/L (ref 21–29)
HCT VFR BLD CALC: 34.8 % (ref 36–48)
HEMOGLOBIN, ART, EXTENDED: 11.9 G/DL (ref 12–16)
HEMOGLOBIN: 11.2 G/DL (ref 12–16)
LYMPHOCYTES ABSOLUTE: 1.4 K/UL (ref 1–5.1)
LYMPHOCYTES RELATIVE PERCENT: 14.4 %
MAGNESIUM: 1.6 MG/DL (ref 1.8–2.4)
MCH RBC QN AUTO: 25.3 PG (ref 26–34)
MCHC RBC AUTO-ENTMCNC: 32.1 G/DL (ref 31–36)
MCV RBC AUTO: 79 FL (ref 80–100)
METHEMOGLOBIN ARTERIAL: 0.3 %
MONOCYTES ABSOLUTE: 0.6 K/UL (ref 0–1.3)
MONOCYTES RELATIVE PERCENT: 6.6 %
NEUTROPHILS ABSOLUTE: 6.9 K/UL (ref 1.7–7.7)
NEUTROPHILS RELATIVE PERCENT: 73.2 %
O2 SAT, ARTERIAL: 95.8 %
O2 THERAPY: ABNORMAL
PCO2 ARTERIAL: 62.5 MMHG (ref 35–45)
PDW BLD-RTO: 19.3 % (ref 12.4–15.4)
PERFORMED ON: ABNORMAL
PH ARTERIAL: 7.37 (ref 7.35–7.45)
PLATELET # BLD: 120 K/UL (ref 135–450)
PMV BLD AUTO: 9.6 FL (ref 5–10.5)
PO2 ARTERIAL: 84.1 MMHG (ref 75–108)
POTASSIUM REFLEX MAGNESIUM: 3.5 MMOL/L (ref 3.5–5.1)
RBC # BLD: 4.4 M/UL (ref 4–5.2)
SODIUM BLD-SCNC: 145 MMOL/L (ref 136–145)
TCO2 ARTERIAL: 37.3 MMOL/L
TOTAL PROTEIN: 6.5 G/DL (ref 6.4–8.2)
WBC # BLD: 9.4 K/UL (ref 4–11)

## 2021-10-16 PROCEDURE — 2580000003 HC RX 258: Performed by: INTERNAL MEDICINE

## 2021-10-16 PROCEDURE — 82803 BLOOD GASES ANY COMBINATION: CPT

## 2021-10-16 PROCEDURE — 6360000002 HC RX W HCPCS: Performed by: INTERNAL MEDICINE

## 2021-10-16 PROCEDURE — 6370000000 HC RX 637 (ALT 250 FOR IP): Performed by: INTERNAL MEDICINE

## 2021-10-16 PROCEDURE — 83735 ASSAY OF MAGNESIUM: CPT

## 2021-10-16 PROCEDURE — 80053 COMPREHEN METABOLIC PANEL: CPT

## 2021-10-16 PROCEDURE — C9113 INJ PANTOPRAZOLE SODIUM, VIA: HCPCS | Performed by: INTERNAL MEDICINE

## 2021-10-16 PROCEDURE — 2500000003 HC RX 250 WO HCPCS: Performed by: INTERNAL MEDICINE

## 2021-10-16 PROCEDURE — 85025 COMPLETE CBC W/AUTO DIFF WBC: CPT

## 2021-10-16 PROCEDURE — 94003 VENT MGMT INPAT SUBQ DAY: CPT

## 2021-10-16 PROCEDURE — 71045 X-RAY EXAM CHEST 1 VIEW: CPT

## 2021-10-16 PROCEDURE — 94761 N-INVAS EAR/PLS OXIMETRY MLT: CPT

## 2021-10-16 PROCEDURE — 2700000000 HC OXYGEN THERAPY PER DAY

## 2021-10-16 PROCEDURE — 94640 AIRWAY INHALATION TREATMENT: CPT

## 2021-10-16 PROCEDURE — 99291 CRITICAL CARE FIRST HOUR: CPT | Performed by: INTERNAL MEDICINE

## 2021-10-16 PROCEDURE — 2000000000 HC ICU R&B

## 2021-10-16 RX ORDER — MAGNESIUM SULFATE IN WATER 40 MG/ML
2000 INJECTION, SOLUTION INTRAVENOUS ONCE
Status: COMPLETED | OUTPATIENT
Start: 2021-10-16 | End: 2021-10-16

## 2021-10-16 RX ORDER — POTASSIUM CHLORIDE 29.8 MG/ML
20 INJECTION INTRAVENOUS PRN
Status: DISCONTINUED | OUTPATIENT
Start: 2021-10-16 | End: 2021-10-28 | Stop reason: HOSPADM

## 2021-10-16 RX ORDER — MAGNESIUM SULFATE 1 G/100ML
1000 INJECTION INTRAVENOUS PRN
Status: DISCONTINUED | OUTPATIENT
Start: 2021-10-16 | End: 2021-10-28 | Stop reason: HOSPADM

## 2021-10-16 RX ORDER — LABETALOL HYDROCHLORIDE 5 MG/ML
10 INJECTION, SOLUTION INTRAVENOUS EVERY 4 HOURS PRN
Status: DISCONTINUED | OUTPATIENT
Start: 2021-10-16 | End: 2021-10-28 | Stop reason: HOSPADM

## 2021-10-16 RX ORDER — LABETALOL HYDROCHLORIDE 5 MG/ML
5 INJECTION, SOLUTION INTRAVENOUS ONCE
Status: COMPLETED | OUTPATIENT
Start: 2021-10-16 | End: 2021-10-16

## 2021-10-16 RX ADMIN — SODIUM CHLORIDE, PRESERVATIVE FREE 10 ML: 5 INJECTION INTRAVENOUS at 19:57

## 2021-10-16 RX ADMIN — MICONAZOLE NITRATE: 2 POWDER TOPICAL at 07:47

## 2021-10-16 RX ADMIN — PROPOFOL 50 MCG/KG/MIN: 10 INJECTION, EMULSION INTRAVENOUS at 09:38

## 2021-10-16 RX ADMIN — DEXAMETHASONE SODIUM PHOSPHATE 3 MG: 4 INJECTION, SOLUTION INTRAMUSCULAR; INTRAVENOUS at 13:37

## 2021-10-16 RX ADMIN — Medication 200 MCG/HR: at 19:56

## 2021-10-16 RX ADMIN — ENOXAPARIN SODIUM 30 MG: 30 INJECTION SUBCUTANEOUS at 19:58

## 2021-10-16 RX ADMIN — LABETALOL HYDROCHLORIDE 5 MG: 5 INJECTION, SOLUTION INTRAVENOUS at 04:44

## 2021-10-16 RX ADMIN — MAGNESIUM SULFATE HEPTAHYDRATE 1000 MG: 1 INJECTION, SOLUTION INTRAVENOUS at 05:44

## 2021-10-16 RX ADMIN — LABETALOL HYDROCHLORIDE 5 MG: 5 INJECTION, SOLUTION INTRAVENOUS at 06:24

## 2021-10-16 RX ADMIN — ENOXAPARIN SODIUM 30 MG: 30 INJECTION SUBCUTANEOUS at 07:48

## 2021-10-16 RX ADMIN — MAGNESIUM SULFATE HEPTAHYDRATE 2000 MG: 40 INJECTION, SOLUTION INTRAVENOUS at 07:57

## 2021-10-16 RX ADMIN — PROPOFOL 50 MCG/KG/MIN: 10 INJECTION, EMULSION INTRAVENOUS at 02:45

## 2021-10-16 RX ADMIN — IPRATROPIUM BROMIDE AND ALBUTEROL SULFATE 1 AMPULE: .5; 2.5 SOLUTION RESPIRATORY (INHALATION) at 11:09

## 2021-10-16 RX ADMIN — OXYCODONE 10 MG: 5 TABLET ORAL at 16:08

## 2021-10-16 RX ADMIN — GABAPENTIN 800 MG: 400 CAPSULE ORAL at 19:57

## 2021-10-16 RX ADMIN — LABETALOL HYDROCHLORIDE 10 MG: 5 INJECTION, SOLUTION INTRAVENOUS at 16:08

## 2021-10-16 RX ADMIN — GABAPENTIN 800 MG: 400 CAPSULE ORAL at 13:37

## 2021-10-16 RX ADMIN — CASTOR OIL AND BALSAM, PERU: 788; 87 OINTMENT TOPICAL at 19:59

## 2021-10-16 RX ADMIN — IPRATROPIUM BROMIDE AND ALBUTEROL SULFATE 1 AMPULE: .5; 2.5 SOLUTION RESPIRATORY (INHALATION) at 15:43

## 2021-10-16 RX ADMIN — SODIUM CHLORIDE, PRESERVATIVE FREE 10 ML: 5 INJECTION INTRAVENOUS at 09:00

## 2021-10-16 RX ADMIN — CHLORHEXIDINE GLUCONATE 0.12% ORAL RINSE 15 ML: 1.2 LIQUID ORAL at 19:58

## 2021-10-16 RX ADMIN — GABAPENTIN 800 MG: 400 CAPSULE ORAL at 07:48

## 2021-10-16 RX ADMIN — SERTRALINE HYDROCHLORIDE 50 MG: 50 TABLET ORAL at 07:48

## 2021-10-16 RX ADMIN — PROPOFOL 50 MCG/KG/MIN: 10 INJECTION, EMULSION INTRAVENOUS at 13:39

## 2021-10-16 RX ADMIN — ASPIRIN 81 MG: 81 TABLET, CHEWABLE ORAL at 07:49

## 2021-10-16 RX ADMIN — IPRATROPIUM BROMIDE AND ALBUTEROL SULFATE 1 AMPULE: .5; 2.5 SOLUTION RESPIRATORY (INHALATION) at 08:32

## 2021-10-16 RX ADMIN — PANTOPRAZOLE SODIUM 40 MG: 40 INJECTION, POWDER, FOR SOLUTION INTRAVENOUS at 07:48

## 2021-10-16 RX ADMIN — OXYCODONE 10 MG: 5 TABLET ORAL at 22:04

## 2021-10-16 RX ADMIN — CHLORHEXIDINE GLUCONATE 0.12% ORAL RINSE 15 ML: 1.2 LIQUID ORAL at 07:47

## 2021-10-16 RX ADMIN — LEVOTHYROXINE SODIUM 100 MCG: 100 TABLET ORAL at 05:42

## 2021-10-16 RX ADMIN — IPRATROPIUM BROMIDE AND ALBUTEROL SULFATE 1 AMPULE: .5; 2.5 SOLUTION RESPIRATORY (INHALATION) at 03:14

## 2021-10-16 RX ADMIN — ATORVASTATIN CALCIUM 40 MG: 40 TABLET, FILM COATED ORAL at 07:48

## 2021-10-16 RX ADMIN — SODIUM CHLORIDE, PRESERVATIVE FREE 10 ML: 5 INJECTION INTRAVENOUS at 13:38

## 2021-10-16 RX ADMIN — PROPOFOL 50 MCG/KG/MIN: 10 INJECTION, EMULSION INTRAVENOUS at 05:30

## 2021-10-16 RX ADMIN — MICONAZOLE NITRATE: 2 POWDER TOPICAL at 19:59

## 2021-10-16 RX ADMIN — IPRATROPIUM BROMIDE AND ALBUTEROL SULFATE 1 AMPULE: .5; 2.5 SOLUTION RESPIRATORY (INHALATION) at 19:17

## 2021-10-16 RX ADMIN — POTASSIUM BICARBONATE 40 MEQ: 391 TABLET, EFFERVESCENT ORAL at 07:49

## 2021-10-16 RX ADMIN — PROPOFOL 50 MCG/KG/MIN: 10 INJECTION, EMULSION INTRAVENOUS at 21:02

## 2021-10-16 RX ADMIN — PROPOFOL 50 MCG/KG/MIN: 10 INJECTION, EMULSION INTRAVENOUS at 17:24

## 2021-10-16 RX ADMIN — MIDAZOLAM HYDROCHLORIDE 2 MG: 1 INJECTION, SOLUTION INTRAMUSCULAR; INTRAVENOUS at 16:08

## 2021-10-16 RX ADMIN — Medication 200 MCG/HR: at 07:58

## 2021-10-16 RX ADMIN — OXYCODONE 10 MG: 5 TABLET ORAL at 04:30

## 2021-10-16 RX ADMIN — Medication 200 MCG/HR: at 14:48

## 2021-10-16 RX ADMIN — CASTOR OIL AND BALSAM, PERU: 788; 87 OINTMENT TOPICAL at 07:47

## 2021-10-16 RX ADMIN — Medication 175 MCG/HR: at 02:45

## 2021-10-16 ASSESSMENT — PULMONARY FUNCTION TESTS
PIF_VALUE: 26
PIF_VALUE: 36
PIF_VALUE: 24
PIF_VALUE: 50
PIF_VALUE: 22
PIF_VALUE: 34
PIF_VALUE: 19
PIF_VALUE: 16
PIF_VALUE: 37
PIF_VALUE: 27
PIF_VALUE: 50
PIF_VALUE: 39
PIF_VALUE: 33
PIF_VALUE: 47
PIF_VALUE: 38
PIF_VALUE: 35
PIF_VALUE: 39
PIF_VALUE: 16
PIF_VALUE: 35
PIF_VALUE: 36
PIF_VALUE: 29
PIF_VALUE: 33
PIF_VALUE: 30
PIF_VALUE: 39
PIF_VALUE: 23
PIF_VALUE: 22
PIF_VALUE: 35
PIF_VALUE: 33
PIF_VALUE: 23
PIF_VALUE: 50
PIF_VALUE: 50
PIF_VALUE: 43
PIF_VALUE: 38
PIF_VALUE: 23
PIF_VALUE: 28
PIF_VALUE: 39
PIF_VALUE: 27
PIF_VALUE: 46
PIF_VALUE: 37
PIF_VALUE: 26
PIF_VALUE: 36
PIF_VALUE: 35
PIF_VALUE: 32
PIF_VALUE: 25
PIF_VALUE: 24
PIF_VALUE: 31
PIF_VALUE: 34
PIF_VALUE: 26
PIF_VALUE: 25
PIF_VALUE: 36
PIF_VALUE: 34
PIF_VALUE: 28
PIF_VALUE: 26
PIF_VALUE: 34
PIF_VALUE: 29
PIF_VALUE: 34

## 2021-10-16 ASSESSMENT — PAIN SCALES - GENERAL
PAINLEVEL_OUTOF10: 0
PAINLEVEL_OUTOF10: 4
PAINLEVEL_OUTOF10: 0
PAINLEVEL_OUTOF10: 10
PAINLEVEL_OUTOF10: 0

## 2021-10-16 NOTE — PROGRESS NOTES
10/16/21 1918   Vent Information   Vent Type 980   Vent Mode AC/VC   Vt Ordered 360 mL   Rate Set 22 bmp   Peak Flow 44 L/min   Pressure Support 0 cmH20   FiO2  75 %   SpO2 94 %   SpO2/FiO2 ratio 125.33   Sensitivity 3   PEEP/CPAP 10   I Time/ I Time % 0 s   Humidification Source Heated wire   Humidification Temp Measured 37   Circuit Condensation Drained   Vent Patient Data   High Peep/I Pressure 0   Peak Inspiratory Pressure 47 cmH2O   Mean Airway Pressure 20 cmH20   Rate Measured 22 br/min   Vt Exhaled 363 mL   Minute Volume 7.97 Liters   I:E Ratio 1:2.00   Plateau Pressure 43 HKN90   Static Compliance 11 mL/cmH2O   Dynamic Compliance 9 mL/cmH2O   Cough/Sputum   Sputum How Obtained Endotracheal   Cough Productive   Sputum Amount Small   Sputum Color Creamy   Tenacity Thick   Spontaneous Breathing Trial (SBT) RT Doc   Pulse 68   Breath Sounds   Right Upper Lobe Diminished   Right Middle Lobe Diminished   Right Lower Lobe Diminished   Left Upper Lobe Diminished   Left Lower Lobe Diminished   Additional Respiratory  Assessments   Resp 20   Alarm Settings   High Pressure Alarm 50 cmH2O   Low Minute Volume Alarm 4 L/min   Apnea (secs) 20 secs   High Respiratory Rate 40 br/min   Low Exhaled Vt  210 mL   Patient Observation   Observations 7.5 ett 23 at lip

## 2021-10-16 NOTE — PROGRESS NOTES
AM assessment completed. AM labs reviewed. Potassium & magnesium being replaced. VSS. Pt off levophed. PICC wnl. Gray in place- strict I/o. Pt in bilateral soft wrist restraints for patient safety.

## 2021-10-16 NOTE — PROGRESS NOTES
Despite increasing patient's sedation. Patient's RASS remains at 0. Following commands, eyes open spontaneously. O2 saturations 90-92%. This RN not comfortable with proning due to patient being too alert and awake. Paged Pulmonology.

## 2021-10-16 NOTE — PROGRESS NOTES
Pt repositioned- VSS. Remains off levo. See MAR for titration of sedatives. Gray in place for strict I/o. In bilateral soft wrist restraints for safety.   Brennan Colvin RN, BSN

## 2021-10-16 NOTE — PROGRESS NOTES
Patient's tube feed stopped. Placed to suction in order to prepare for proning. PO medications given after suctioning out.

## 2021-10-16 NOTE — PROGRESS NOTES
Pulmonary & Critical Care Medicine ICU Progress Note    CC: Respiratory failure    Events of Last 24 hours:   Labile blood pressures  Levophed titrated to off   Awake on high dose sedation  Tolerating TF     Vascular lines: IV: PICC line 9/28    MV: 9/29-10/4, emergently re-intubated 10/6/21 for refractory hypoxemia  Vent Mode: AC/VC Rate Set: 22 bmp/Vt Ordered: 360 mL/ /FiO2 : 75 %  Recent Labs     10/15/21  0345 10/16/21  0530   PHART 7.397 7.371   YNY8LBH 56.5* 62.5*   PO2ART 53.0* 84.1     IV:   norepinephrine Stopped (10/15/21 2100)    fentaNYL 175 mcg/hr (10/16/21 0245)    propofol 50 mcg/kg/min (10/16/21 0530)    dexmedetomidine HCl in NaCl Stopped (10/15/21 1841)    sodium chloride      dextrose      sodium chloride       Vitals:  Blood pressure 138/62, pulse 82, temperature 99 °F (37.2 °C), temperature source Axillary, resp. rate 24, height 5' 9\" (1.753 m), weight 176 lb (79.8 kg), SpO2 94 %, not currently breastfeeding. on ventilator      Intake/Output Summary (Last 24 hours) at 10/16/2021 0653  Last data filed at 10/16/2021 0600  Gross per 24 hour   Intake 3406.14 ml   Output 4200 ml   Net -793.86 ml     EXAM (COVID isolation):  General: intubated, ill appearing    ENT:   Pharynx with ETT. Neck: Trachea midline  Resp: No accessory muscle use. CV: Regular rate. Regular rhythm. GI:  Non-distended.     Neuro: awake despite sedatives, still regards, FC   Psych: Unable to obtain because the patient is poorly communicative        Scheduled Meds:   dexamethasone  3 mg IntraVENous Q24H    insulin lispro  0-12 Units SubCUTAneous Q4H    sertraline  50 mg Per NG tube Daily    Venelex   Topical BID    miconazole   Topical BID    oxyCODONE  10 mg Oral Q6H    influenza virus vaccine  0.5 mL IntraMUSCular Prior to discharge    chlorhexidine  15 mL Mouth/Throat BID    ipratropium-albuterol  1 ampule Inhalation Q4H    amLODIPine  2.5 mg Oral Daily    losartan  25 mg Oral Daily    pantoprazole 40 mg IntraVENous Daily    levothyroxine  100 mcg Oral Daily    enoxaparin  30 mg SubCUTAneous BID    lidocaine 1 % injection  5 mL IntraDERmal Once    sodium chloride flush  5-40 mL IntraVENous 2 times per day    aspirin  81 mg Oral Daily    gabapentin  800 mg Oral TID    atorvastatin  40 mg Oral Daily    sodium chloride flush  5-40 mL IntraVENous 2 times per day       Data:  CBC:   Recent Labs     10/14/21  0555 10/15/21  0305 10/16/21  0400   WBC 5.6 5.8 9.4   HGB 10.2* 10.0* 11.2*   HCT 31.9* 31.6* 34.8*   MCV 78.7* 79.6* 79.0*   * 104* 120*     BMP:   Recent Labs     10/14/21  0555 10/15/21  0305 10/16/21  0400    143 145   K 3.8 4.1 3.5    103 105   CO2 31 34* 34*   BUN 17 15 11   CREATININE <0.5* <0.5* <0.5*     LIVER PROFILE:   Recent Labs     10/14/21  0555 10/15/21  0305 10/16/21  0400   AST 32 26 27   ALT 29 26 25   BILITOT 0.3 <0.2 0.4   ALKPHOS 124 119 142*       Microbiology:  9/27/21 COVID-19 detected  9/30/21 Resp Pseudomonas fluorescens   10/7/2021 tracheal aspirate: Klebsiella intermediate to zosyn    Imaging:  Chest x-ray 10/14/2021 bilateral airspace disease    CTPA 9/27/21  No evidence of pulmonary embolism. Scattered peripheral opacities in the left lung and more consolidative area in the right lung concerning for pneumonia.  Previous right thoracotomy and upper lobectomy.    One mildly enlarged right paratracheal lymph node is present but no priors for comparison.  Nonenlarged but prominent lymph nodes in the upper abdomen and juxta diaphragmatic region      ASSESSMENT:  · Acute hypoxemic respiratory failure   · COVID-19 viral pneumonia  · ARDS  · Pseudomonas followed by Klebsiella PNA   · TMP  · Acute kidney injury  · CAD  · Chronic pain syndrome - on chronic opiates and Neurontin   · Possible early dementia, being worked up outpatient   · H/O bronchiectasis and tracheobronchomalacia/EDAC s/p tracheoplasty in 2014      PLAN:  - COVID-19 isolation, droplet plus  - Mechanical ventilation as per my orders. The ventilator was adjusted by me at the bedside for unstable, life threatening respiratory failure. Proned 10/6/21-10/7/2; 10/11-10/12   Paralyzed 10/6/21 - 10/7/21  IV Propofol, Fentanyl and Precedex for sedation, target RASS -1 to -2  Completed 7 days Cipro, 8 days Zosyn, 3 days Zyvox, prior to that 5 days of Ceftriaxone/Zithromax   Dexamethasone 6 mg IV D#19, @ 3mg  Completed Remdesivir & Tocilizumab  SSI to high dose   Continuing home oxycodone and neurontin    Lovenox for DVT prophylaxis    Total critical care time caring for this patient with life threatening, unstable organ failure, including direct patient contact, management of life support systems, review of data including imaging and labs, discussions with other team members and physicians is 34 minutes, excluding procedures.

## 2021-10-16 NOTE — PLAN OF CARE
Problem: Non-Violent Restraints  Goal: Removal from restraints as soon as assessed to be safe  Outcome: Ongoing  Note: Patient has critical airway, continued use for restraints needed. Patient and spouse educated on purpose of restraints. No evidence of injury. Continued use for restraints. Patients restraints assessed every hour. See Flowsheets.     Goal: No harm/injury to patient while restraints in use  Outcome: Ongoing  Goal: Patient's dignity will be maintained  Outcome: Ongoing

## 2021-10-16 NOTE — PROGRESS NOTES
10/15/21 2342   Vent Information   Vent Type 980   Vent Mode AC/VC   Vt Ordered 360 mL   Rate Set 22 bmp   Peak Flow 44 L/min   Pressure Support 0 cmH20   FiO2  75 %   SpO2 93 %   SpO2/FiO2 ratio 124   Sensitivity 3   PEEP/CPAP 10   I Time/ I Time % 0 s   Humidification Source Heated wire   Humidification Temp Measured 37   Circuit Condensation Drained   Vent Patient Data   High Peep/I Pressure 0   Peak Inspiratory Pressure 32 cmH2O   Mean Airway Pressure 16 cmH20   Rate Measured 22 br/min   Vt Exhaled 362 mL   Minute Volume 8.02 Liters   I:E Ratio 1:2.00   Cough/Sputum   Sputum How Obtained Endotracheal   Cough Productive   Sputum Amount Small   Sputum Color Creamy   Tenacity Thick   Spontaneous Breathing Trial (SBT) RT Doc   Pulse 87   Breath Sounds   Right Upper Lobe Diminished   Right Middle Lobe Diminished   Right Lower Lobe Diminished   Left Upper Lobe Diminished   Left Lower Lobe Diminished   Additional Respiratory  Assessments   Resp 19   Alarm Settings   High Pressure Alarm 50 cmH2O   Low Minute Volume Alarm 4 L/min   Apnea (secs) 20 secs   High Respiratory Rate 40 br/min   Low Exhaled Vt  210 mL   Patient Observation   Observations 7.5 ett 23 at lip

## 2021-10-16 NOTE — PROGRESS NOTES
During CHG bath, patient has small open area above rectum on coccyx. Wound Care consult placed. Sacral Heart in place. Continuing to reposition patient to off load skin.

## 2021-10-16 NOTE — PROGRESS NOTES
Hospitalist Progress Note      PCP: Varsha Bradley MD    Date of Admission: 9/26/2021      Admitted with COVID-19 pneumonia,  acute hypoxic respiratory failure. Patient not vaccinated  Progressive significant worsening hypoxemia overnight. Patient was on 2 L of oxygen on admission-> switched to high flow oxygen per Vapotherm with additional 100% nonrebreather on 9/28/2021-> transferred to ICU.    10/13   cont to be intubated, bradycardic, on precedex gtt    10/14  Increasing oxygen requirement on the vent today  Bradycardic, on Precedex drip    10/15  Ventilator dyssynchrony. 10/16  Remains intubated. Follows commands.          Medications:  Reviewed    Infusion Medications    norepinephrine Stopped (10/15/21 2100)    fentaNYL 200 mcg/hr (10/16/21 1448)    propofol 50 mcg/kg/min (10/16/21 1724)    dexmedetomidine HCl in NaCl Stopped (10/15/21 1841)    sodium chloride      dextrose      sodium chloride       Scheduled Medications    insulin lispro  0-18 Units SubCUTAneous TID WC    insulin lispro  0-9 Units SubCUTAneous Nightly    dexamethasone  3 mg IntraVENous Q24H    sertraline  50 mg Per NG tube Daily    Venelex   Topical BID    miconazole   Topical BID    oxyCODONE  10 mg Oral Q6H    influenza virus vaccine  0.5 mL IntraMUSCular Prior to discharge    chlorhexidine  15 mL Mouth/Throat BID    ipratropium-albuterol  1 ampule Inhalation Q4H    pantoprazole  40 mg IntraVENous Daily    levothyroxine  100 mcg Oral Daily    enoxaparin  30 mg SubCUTAneous BID    lidocaine 1 % injection  5 mL IntraDERmal Once    sodium chloride flush  5-40 mL IntraVENous 2 times per day    aspirin  81 mg Oral Daily    gabapentin  800 mg Oral TID    atorvastatin  40 mg Oral Daily    sodium chloride flush  5-40 mL IntraVENous 2 times per day     PRN Meds: magnesium sulfate, potassium chloride, labetalol, carboxymethylcellulose PF **AND** artificial tears, fentanNYL, midazolam, sodium chloride flush, sodium chloride, glucose, dextrose, glucagon (rDNA), dextrose, sodium chloride flush, sodium chloride, polyethylene glycol, acetaminophen **OR** acetaminophen, prochlorperazine      Intake/Output Summary (Last 24 hours) at 10/16/2021 1812  Last data filed at 10/16/2021 1725  Gross per 24 hour   Intake 1965.5 ml   Output 3550 ml   Net -1584.5 ml       Physical Exam Performed:    BP (!) 167/70   Pulse 89   Temp 98.2 °F (36.8 °C) (Axillary)   Resp 21   Ht 5' 9\" (1.753 m)   Wt 176 lb (79.8 kg)   SpO2 90%   BMI 25.99 kg/m²     General:intubated on mechanical ventilation . Supine position . Skin:  Warm and dry  Neck:  JVD absent. Neck supple  Chest: diminished breath sounds, improved air entry. No wheezes or rhonchi.  Bibasilar crackles. Cardiovascular:  RRR ,S1S2 normal  Abdomen:  Soft, non tender, non distended, BS +  Extremities:  No edema. Intact peripheral pulses. Brisk cap refill, < 2 secs  Neuro: intubated, opens her eyes.       Labs:   Recent Labs     10/14/21  0555 10/15/21  0305 10/16/21  0400   WBC 5.6 5.8 9.4   HGB 10.2* 10.0* 11.2*   HCT 31.9* 31.6* 34.8*   * 104* 120*     Recent Labs     10/14/21  0555 10/15/21  0305 10/16/21  0400    143 145   K 3.8 4.1 3.5    103 105   CO2 31 34* 34*   BUN 17 15 11   CREATININE <0.5* <0.5* <0.5*   CALCIUM 8.8 9.1 9.1     Recent Labs     10/14/21  0555 10/15/21  0305 10/16/21  0400   AST 32 26 27   ALT 29 26 25   BILITOT 0.3 <0.2 0.4   ALKPHOS 124 119 142*     No results for input(s): INR in the last 72 hours. No results for input(s): Camila Highman in the last 72 hours.     Urinalysis:      Lab Results   Component Value Date    NITRU Negative 09/27/2021    WBCUA 0-2 09/27/2021    BACTERIA Rare 09/27/2021    RBCUA None seen 09/27/2021    BLOODU TRACE-INTACT 09/27/2021    SPECGRAV <=1.005 09/27/2021    GLUCOSEU >=1000 09/27/2021    GLUCOSEU 250 05/11/2011       Radiology:  XR CHEST PORTABLE   Final Result   Bilateral pleuroparenchymal disease, with improved aeration of the right lung   compared to prior. XR CHEST PORTABLE   Final Result   Line and tube as above. Bilateral interstitial opacities are worsened when compared to prior study. XR CHEST PORTABLE   Final Result   Stable chest.  Diffuse interstitial changes, possibly infiltrate or edema. Satisfactory position of endotracheal tube. XR CHEST PORTABLE   Final Result   Stable support apparatus. In this case, both multifocal pneumonia and pulmonary edema are both   considered, and the changes appear increased when compared to the previous   exam.         XR CHEST PORTABLE   Final Result   1. Stable appropriate positions of support apparatus. 2. Slight interval progression of multifocal airspace disease throughout both   lungs, likely a combination of multifocal pneumonia and superimposed   pulmonary edema. 3. Stable cardiomegaly and small bilateral pleural effusions. XR CHEST PORTABLE   Final Result   Improving aeration of the right lung with grossly stable left basilar   airspace opacities. XR CHEST PORTABLE   Final Result   Supportive tubing is in stable position. Stable pattern of bilateral ground-glass opacities and basilar consolidations. XR CHEST PORTABLE   Final Result   Stable appearance of the chest.  Diffuse ground-glass opacities on the right   and mild left basilar opacities. XR CHEST PORTABLE   Final Result   Improved aeration on the left, possibly improving pulmonary edema. Persistent airspace disease in the right base may represent concurrent   pneumonia         XR ABDOMEN (KUB) (SINGLE AP VIEW)   Final Result   Enteric catheter tip likely in the distal gastric body. XR CHEST PORTABLE   Final Result   1. Endotracheal tube projects in the appropriate position. 2. Enteric tube extends below the diaphragm and out of the field of view.          XR CHEST PORTABLE   Final Result Increasing diffuse airspace opacification throughout the lungs. Pattern may   represent pulmonary edema or worsening pneumonitis. XR CHEST PORTABLE   Final Result   1. Interval increased bilateral pulmonary opacities with new consolidative   change at the right lung base. These findings could be secondary to   pulmonary edema or infection. 2. Consolidative change at the right lung base could also be secondary to   atelectasis/mucous plug. XR CHEST PORTABLE   Final Result   No significant interval change in small right pleural effusion or bilateral   heterogeneous opacity which can reflect pulmonary edema or pneumonia. XR CHEST PORTABLE   Final Result   Mild improvement from prior comparison. Mild-to-moderate congestion and/or   infiltrates identified in the lungs. ET tube terminates 7 cm superior to the saba. XR CHEST PORTABLE   Final Result   Endotracheal tube and orogastric tube unchanged, adequate position. Slightly increased extensive bilateral pulmonary disease over the past 24   hours this may relate to interstitial edema or diffuse infection or a   combination. XR CHEST PORTABLE   Final Result   Endotracheal tube with its tip approximately 4.7 cm from the saba in   satisfactory position. Enteric tube in satisfactory position. Patchy bilateral airspace disease is again noted with improved aeration of   the left mid lung. XR CHEST PORTABLE   Final Result   Some improvement in the appearance of the chest with clearing of some of the   acute airspace disease from the mid left lung. Large amount of pneumonia   remains within the right lung and lower left lung. XR CHEST PORTABLE   Final Result   1. Endotracheal tube, nasogastric tube, and right PICC line placement. 2.  Increasing multifocal opacities with consolidative area in the right   perihilar lung. Could be multifocal pneumonia with or without edema.          IR PICC WO SQ PORT/PUMP > 5 YEARS   Final Result   1. See above. CT CHEST PULMONARY EMBOLISM W CONTRAST   Final Result   No evidence of pulmonary embolism. Scattered peripheral opacities in the left lung and more consolidative area   in the right lung concerning for pneumonia. Previous right thoracotomy and   upper lobectomy. One mildly enlarged right paratracheal lymph node is present but no priors   for comparison. Nonenlarged but prominent lymph nodes in the upper abdomen   and juxta diaphragmatic region. XR CHEST PORTABLE   Final Result   Mild cardiomegaly without interstitial edema. Metallic surgical clips from prior right thoracotomy. COPD with anterior segment-right upper lobe alveolar pneumonia. Old pleural thickening was noted along the right lateral chest wall. Pleural   thickening and or trace effusion blunts the right costophrenic angle. Assessment/Plan:    Active Hospital Problems    Diagnosis     Acute hypoxemic respiratory failure ([facility] [address] [J96.01]     Pneumonia due to Pseudomonas species ([facility] [address] [J15.1]     Hypomagnesemia [E83.42]     Hypokalemia [E87.6]     Gram-negative pneumonia (HCC) [J15.6]     Mild protein-calorie malnutrition (HCC) [E44.1]     Hypotension [I95.9]     Acute respiratory failure with hypoxia (HCC) [J96.01]     COVID-19 [U07.1]     Pneumonia due to COVID-19 virus [U07.1, J12.82]     CHARLY (acute kidney injury) ([facility] [address] [N17.9]     Uncontrolled hypertension [I10]     Fever [R50.9]     Suspected COVID-19 virus infection [Z20.822]     Hypoxia [R09.02]     Dehydration [E86.0]     DM2 (diabetes mellitus, type 2) (HCC) [E11.9]     Essential hypertension [I10]     Hypothyroidism [E03.9]             #COVID-19 pneumonia  #Acute hypoxic respiratory failure   Pseudomonas HCAP.    -Unvaccinated patient  - she presented with cough, fever, dyspnea in the setting of household contact testing + COVID 19  - Her Covid testing came back positive on admission  - Initially on 2 L of oxygen on presentation.  She started desaturating quickly . worsening hypoxemia requiring high flow oxygen. - Pulmonology consulted  -placed on high flow oxygen per Vapotherm 40 L,FiO2 100% ,with additional nonrebreather . Transferred to ICU on 9/28. Intubated early AM  9/29/2021.  Extubated on 10/4/2021.  She was on Vapotherm.  Pulmonary status got worse and she was reintubated on 10/6/2021.   - s/p Remdesivir  - on Decadron, day # 19- being tapered   - given 1 dose of Tocilizumab  -Lovenox twice daily  Increasing oxygen requirement on the vent 10/14  Ventilator dyssynchrony.       #Possible superimposed bacterial pneumonia  Gram-negative infection  -Was on Rocephin and Zithromax day 5 of 5; - changed to zosyn on 10/2 with pseudomonas on resp culture.  Pulmonary has started Zyvox.  Hold Zoloft when patient is getting Zyvox .   Completed Zosyn day 8 of 8.    Treated with Zyvox - now dced  Respiratory cultures now growing Klebsiella, initiated on Cipro day #7/7       #Hypokalemia  - replaced       #Dehydration  -  Improved with IVF  Creatinine normal       #CAD  - cont ASA, statin  - EKG with inferior T wave abnormality.  She denies CP.  Trop neg   -telemetry monitoring       #History of tracheobronchomalacia  - Has had surgical treatment for this       #HTN  -blood pressure is low and she was on Levophed.  This has been discontinued.  Resumed home medications-Norvasc and losartan.       #Chronic pain   History of hip fracture  - cont home oxycodone BID and percocet   - cont gabapentin and requip       #DM2  - use ssi        #Hypothyroidism  - cont synthroid         DVT Prophylaxis: Lovenox   Diet: Diet NPO  ADULT TUBE FEEDING; Orogastric; Peptide Based; Continuous; 20; Yes; 15; Q 4 hours; 55; 30; Q 3 hours  Code Status: Full Code    PT/OT Eval Status: ordered    Dispo - cont care in icu    Monserrat Nielson MD

## 2021-10-16 NOTE — PROGRESS NOTES
10/16/21 0400   RT Protocol   History Pulmonary Disease 2   Respiratory pattern 2   Breath sounds 2   Cough 1   Indications for Bronchodilator Therapy Decreased or absent breath sounds   Bronchodilator Assessment Score 7   RT Inhaler-Nebulizer Bronchodilator Protocol Note    There is a bronchodilator order in the chart from a provider indicating to follow the RT Bronchodilator Protocol and there is an Initiate RT Inhaler-Nebulizer Bronchodilator Protocol order as well (see protocol at bottom of note). CXR Findings:  XR CHEST PORTABLE    Result Date: 10/14/2021  Line and tube as above. Bilateral interstitial opacities are worsened when compared to prior study. The findings from the last RT Protocol Assessment were as follows:   History Pulmonary Disease: Chronic pulmonary disease  Respiratory Pattern: Dyspnea on exertion or RR 21-25 bpm  Breath Sounds: Slightly diminished and/or crackles  Cough: Strong, productive  Indication for Bronchodilator Therapy: Decreased or absent breath sounds  Bronchodilator Assessment Score: 7    Aerosolized bronchodilator medication orders have been revised according to the RT Inhaler-Nebulizer Bronchodilator Protocol below. Respiratory Therapist to perform RT Therapy Protocol Assessment initially then follow the protocol. Repeat RT Therapy Protocol Assessment PRN for score 0-3 or on second treatment, BID, and PRN for scores above 3. No Indications - adjust the frequency to every 6 hours PRN wheezing or bronchospasm, if no treatments needed after 48 hours then discontinue using Per Protocol order mode. If indication present, adjust the RT bronchodilator orders based on the Bronchodilator Assessment Score as indicated below.   Use Inhaler orders unless patient has one or more of the following: on home nebulizer, not able to hold breath for 10 seconds, is not alert and oriented, cannot activate and use MDI correctly, or respiratory rate 25 breaths per minute or more, then use the equivalent nebulizer order(s) with same Frequency and PRN reasons based on the score. If a patient is on this medication at home then do not decrease Frequency below that used at home. 0-3 - enter or revise RT bronchodilator order(s) to equivalent RT Bronchodilator order with Frequency of every 4 hours PRN for wheezing or increased work of breathing using Per Protocol order mode. 4-6 - enter or revise RT Bronchodilator order(s) to two equivalent RT bronchodilator orders with one order with BID Frequency and one order with Frequency of every 4 hours PRN wheezing or increased work of breathing using Per Protocol order mode. 7-10 - enter or revise RT Bronchodilator order(s) to two equivalent RT bronchodilator orders with one order with TID Frequency and one order with Frequency of every 4 hours PRN wheezing or increased work of breathing using Per Protocol order mode. 11-13 - enter or revise RT Bronchodilator order(s) to one equivalent RT bronchodilator order with QID Frequency and an Albuterol order with Frequency of every 4 hours PRN wheezing or increased work of breathing using Per Protocol order mode. Greater than 13 - enter or revise RT Bronchodilator order(s) to one equivalent RT bronchodilator order with every 4 hours Frequency and an Albuterol order with Frequency of every 2 hours PRN wheezing or increased work of breathing using Per Protocol order mode.      Electronically signed by Yandel Peoples RCP on 10/16/2021 at 4:11 AM

## 2021-10-16 NOTE — PROGRESS NOTES
From report, patient's O2 saturation has been struggling to maintain above goal. Dr. Sammie Keating pulmonology ordered to prone patient. Increased patient's sedation due to patient's current RASS +2 at this time. Notified Respiratory and other RNs for assistance. Plan to prone around 7915-0119. Will place patient's OG to suction to avoid risk of aspiration. Will continue to monitor. Vital signs currently stable.

## 2021-10-16 NOTE — PROGRESS NOTES
10/16/21 0316   Vent Information   Vent Type 980   Vent Mode AC/VC   Vt Ordered 360 mL   Rate Set 22 bmp   Peak Flow 44 L/min   Pressure Support 0 cmH20   FiO2  75 %   SpO2 93 %   SpO2/FiO2 ratio 124   Sensitivity 3   PEEP/CPAP 10   I Time/ I Time % 0 s   Vent Patient Data   High Peep/I Pressure 0   Peak Inspiratory Pressure 35 cmH2O   Mean Airway Pressure 18 cmH20   Rate Measured 26 br/min   Vt Exhaled 311 mL   Minute Volume 9.2 Liters   I:E Ratio 1:1.20   Cough/Sputum   Sputum How Obtained Suctioned;Endotracheal   Sputum Amount Small   Sputum Color Creamy   Tenacity Thick   Spontaneous Breathing Trial (SBT) RT Doc   Pulse 95   Breath Sounds   Right Upper Lobe Diminished   Right Middle Lobe Diminished   Right Lower Lobe Diminished   Left Upper Lobe Diminished   Left Lower Lobe Diminished   Additional Respiratory  Assessments   Resp 23   Alarm Settings   High Pressure Alarm 50 cmH2O   Low Minute Volume Alarm 4 L/min   High Respiratory Rate 40 br/min   ETT (adult)   Placement Date/Time: 10/06/21 1208   Timeout: Patient  Preoxygenation: Yes  Technique: Rapid sequence  Type: Cuffed  Tube Size: 7.5 mm  Laryngoscope: GlideScope  Secured at: 23 cm  Measured From: Lips   ET Placement Left

## 2021-10-16 NOTE — PROGRESS NOTES
RT Inhaler-Nebulizer Bronchodilator Protocol Note    There is a bronchodilator order in the chart from a provider indicating to follow the RT Bronchodilator Protocol and there is an Initiate RT Inhaler-Nebulizer Bronchodilator Protocol order as well (see protocol at bottom of note). CXR Findings:  XR CHEST PORTABLE    Result Date: 10/16/2021  Bilateral pleuroparenchymal disease, with improved aeration of the right lung compared to prior. The findings from the last RT Protocol Assessment were as follows:   History Pulmonary Disease: (P) Chronic pulmonary disease  Respiratory Pattern: (P) Dyspnea on exertion or RR 21-25 bpm  Breath Sounds: (P) Slightly diminished and/or crackles  Cough: (P) Strong, productive  Indication for Bronchodilator Therapy: (P) Decreased or absent breath sounds  Bronchodilator Assessment Score: (P) 7    Aerosolized bronchodilator medication orders have been revised according to the RT Inhaler-Nebulizer Bronchodilator Protocol below. Respiratory Therapist to perform RT Therapy Protocol Assessment initially then follow the protocol. Repeat RT Therapy Protocol Assessment PRN for score 0-3 or on second treatment, BID, and PRN for scores above 3. No Indications - adjust the frequency to every 6 hours PRN wheezing or bronchospasm, if no treatments needed after 48 hours then discontinue using Per Protocol order mode. If indication present, adjust the RT bronchodilator orders based on the Bronchodilator Assessment Score as indicated below. Use Inhaler orders unless patient has one or more of the following: on home nebulizer, not able to hold breath for 10 seconds, is not alert and oriented, cannot activate and use MDI correctly, or respiratory rate 25 breaths per minute or more, then use the equivalent nebulizer order(s) with same Frequency and PRN reasons based on the score.   If a patient is on this medication at home then do not decrease Frequency below that used at home.    0-3 - enter or revise RT bronchodilator order(s) to equivalent RT Bronchodilator order with Frequency of every 4 hours PRN for wheezing or increased work of breathing using Per Protocol order mode. 4-6 - enter or revise RT Bronchodilator order(s) to two equivalent RT bronchodilator orders with one order with BID Frequency and one order with Frequency of every 4 hours PRN wheezing or increased work of breathing using Per Protocol order mode. 7-10 - enter or revise RT Bronchodilator order(s) to two equivalent RT bronchodilator orders with one order with TID Frequency and one order with Frequency of every 4 hours PRN wheezing or increased work of breathing using Per Protocol order mode. 11-13 - enter or revise RT Bronchodilator order(s) to one equivalent RT bronchodilator order with QID Frequency and an Albuterol order with Frequency of every 4 hours PRN wheezing or increased work of breathing using Per Protocol order mode. Greater than 13 - enter or revise RT Bronchodilator order(s) to one equivalent RT bronchodilator order with every 4 hours Frequency and an Albuterol order with Frequency of every 2 hours PRN wheezing or increased work of breathing using Per Protocol order mode. RT to enter RT Home Evaluation for COPD & MDI Assessment order using Per Protocol order mode.     Electronically signed by Mina Ferrer RCP on 10/16/2021 at 11:09 AM

## 2021-10-16 NOTE — PROGRESS NOTES
Patient's HR and blood pressure has been slowly increasing throughout the entire night. Patient's blood pressure increased to above a systolic of 745 (591/00). Heart Rate 110-120 with increased ectopy. PRN labetalol given. Gave patient 5mg to test if patient's vitals would tolerate. Patient's Blood pressure dropped to the 130s and HR became more regular with rate between 70-80s. Did not give the remaining 15mg dose.

## 2021-10-17 ENCOUNTER — APPOINTMENT (OUTPATIENT)
Dept: GENERAL RADIOLOGY | Age: 66
DRG: 004 | End: 2021-10-17
Payer: MEDICARE

## 2021-10-17 LAB
A/G RATIO: 1.3 (ref 1.1–2.2)
ALBUMIN SERPL-MCNC: 3.5 G/DL (ref 3.4–5)
ALP BLD-CCNC: 131 U/L (ref 40–129)
ALT SERPL-CCNC: 22 U/L (ref 10–40)
ANION GAP SERPL CALCULATED.3IONS-SCNC: 6 MMOL/L (ref 3–16)
AST SERPL-CCNC: 26 U/L (ref 15–37)
BASE EXCESS ARTERIAL: 12.2 MMOL/L (ref -3–3)
BASOPHILS ABSOLUTE: 0.1 K/UL (ref 0–0.2)
BASOPHILS RELATIVE PERCENT: 1.3 %
BILIRUB SERPL-MCNC: <0.2 MG/DL (ref 0–1)
BUN BLDV-MCNC: 17 MG/DL (ref 7–20)
CALCIUM SERPL-MCNC: 9.3 MG/DL (ref 8.3–10.6)
CARBOXYHEMOGLOBIN ARTERIAL: 0 % (ref 0–1.5)
CHLORIDE BLD-SCNC: 101 MMOL/L (ref 99–110)
CO2: 35 MMOL/L (ref 21–32)
CREAT SERPL-MCNC: <0.5 MG/DL (ref 0.6–1.2)
EOSINOPHILS ABSOLUTE: 0.4 K/UL (ref 0–0.6)
EOSINOPHILS RELATIVE PERCENT: 4.6 %
GFR AFRICAN AMERICAN: >60
GFR NON-AFRICAN AMERICAN: >60
GLOBULIN: 2.7 G/DL
GLUCOSE BLD-MCNC: 158 MG/DL (ref 70–99)
GLUCOSE BLD-MCNC: 163 MG/DL (ref 70–99)
GLUCOSE BLD-MCNC: 166 MG/DL (ref 70–99)
GLUCOSE BLD-MCNC: 180 MG/DL (ref 70–99)
GLUCOSE BLD-MCNC: 185 MG/DL (ref 70–99)
GLUCOSE BLD-MCNC: 231 MG/DL (ref 70–99)
GLUCOSE BLD-MCNC: 293 MG/DL (ref 70–99)
HCO3 ARTERIAL: 39.4 MMOL/L (ref 21–29)
HCT VFR BLD CALC: 32.4 % (ref 36–48)
HEMOGLOBIN, ART, EXTENDED: 11.2 G/DL (ref 12–16)
HEMOGLOBIN: 10.3 G/DL (ref 12–16)
LYMPHOCYTES ABSOLUTE: 1.9 K/UL (ref 1–5.1)
LYMPHOCYTES RELATIVE PERCENT: 22.6 %
MCH RBC QN AUTO: 25.2 PG (ref 26–34)
MCHC RBC AUTO-ENTMCNC: 31.9 G/DL (ref 31–36)
MCV RBC AUTO: 79 FL (ref 80–100)
METHEMOGLOBIN ARTERIAL: 0.3 %
MONOCYTES ABSOLUTE: 0.6 K/UL (ref 0–1.3)
MONOCYTES RELATIVE PERCENT: 6.7 %
NEUTROPHILS ABSOLUTE: 5.4 K/UL (ref 1.7–7.7)
NEUTROPHILS RELATIVE PERCENT: 64.8 %
O2 SAT, ARTERIAL: 91.2 %
O2 THERAPY: ABNORMAL
PCO2 ARTERIAL: 65.3 MMHG (ref 35–45)
PDW BLD-RTO: 18.6 % (ref 12.4–15.4)
PERFORMED ON: ABNORMAL
PH ARTERIAL: 7.4 (ref 7.35–7.45)
PLATELET # BLD: 98 K/UL (ref 135–450)
PMV BLD AUTO: 9.3 FL (ref 5–10.5)
PO2 ARTERIAL: 62.8 MMHG (ref 75–108)
POTASSIUM REFLEX MAGNESIUM: 3.7 MMOL/L (ref 3.5–5.1)
RBC # BLD: 4.1 M/UL (ref 4–5.2)
SODIUM BLD-SCNC: 142 MMOL/L (ref 136–145)
TCO2 ARTERIAL: 41.4 MMOL/L
TOTAL PROTEIN: 6.2 G/DL (ref 6.4–8.2)
WBC # BLD: 8.3 K/UL (ref 4–11)

## 2021-10-17 PROCEDURE — 6370000000 HC RX 637 (ALT 250 FOR IP): Performed by: INTERNAL MEDICINE

## 2021-10-17 PROCEDURE — 6360000002 HC RX W HCPCS: Performed by: INTERNAL MEDICINE

## 2021-10-17 PROCEDURE — C9113 INJ PANTOPRAZOLE SODIUM, VIA: HCPCS | Performed by: INTERNAL MEDICINE

## 2021-10-17 PROCEDURE — 71045 X-RAY EXAM CHEST 1 VIEW: CPT

## 2021-10-17 PROCEDURE — 2700000000 HC OXYGEN THERAPY PER DAY

## 2021-10-17 PROCEDURE — 94761 N-INVAS EAR/PLS OXIMETRY MLT: CPT

## 2021-10-17 PROCEDURE — 2000000000 HC ICU R&B

## 2021-10-17 PROCEDURE — 80053 COMPREHEN METABOLIC PANEL: CPT

## 2021-10-17 PROCEDURE — 94003 VENT MGMT INPAT SUBQ DAY: CPT

## 2021-10-17 PROCEDURE — 94640 AIRWAY INHALATION TREATMENT: CPT

## 2021-10-17 PROCEDURE — 2580000003 HC RX 258: Performed by: INTERNAL MEDICINE

## 2021-10-17 PROCEDURE — 85025 COMPLETE CBC W/AUTO DIFF WBC: CPT

## 2021-10-17 PROCEDURE — 82803 BLOOD GASES ANY COMBINATION: CPT

## 2021-10-17 PROCEDURE — 2500000003 HC RX 250 WO HCPCS: Performed by: INTERNAL MEDICINE

## 2021-10-17 PROCEDURE — 99291 CRITICAL CARE FIRST HOUR: CPT | Performed by: INTERNAL MEDICINE

## 2021-10-17 RX ORDER — DEXMEDETOMIDINE HYDROCHLORIDE 4 UG/ML
.2-1.4 INJECTION, SOLUTION INTRAVENOUS CONTINUOUS
Status: DISCONTINUED | OUTPATIENT
Start: 2021-10-17 | End: 2021-10-17

## 2021-10-17 RX ADMIN — MIDAZOLAM HYDROCHLORIDE 2 MG: 1 INJECTION, SOLUTION INTRAMUSCULAR; INTRAVENOUS at 05:30

## 2021-10-17 RX ADMIN — FENTANYL CITRATE 50 MCG: 0.05 INJECTION, SOLUTION INTRAMUSCULAR; INTRAVENOUS at 10:12

## 2021-10-17 RX ADMIN — NOREPINEPHRINE BITARTRATE 5 MCG/MIN: 1 INJECTION INTRAVENOUS at 20:37

## 2021-10-17 RX ADMIN — POTASSIUM BICARBONATE 30 MEQ: 782 TABLET, EFFERVESCENT ORAL at 09:40

## 2021-10-17 RX ADMIN — OXYCODONE 10 MG: 5 TABLET ORAL at 04:00

## 2021-10-17 RX ADMIN — IPRATROPIUM BROMIDE AND ALBUTEROL SULFATE 1 AMPULE: .5; 2.5 SOLUTION RESPIRATORY (INHALATION) at 20:16

## 2021-10-17 RX ADMIN — SERTRALINE HYDROCHLORIDE 50 MG: 50 TABLET ORAL at 09:40

## 2021-10-17 RX ADMIN — OXYCODONE 10 MG: 5 TABLET ORAL at 17:59

## 2021-10-17 RX ADMIN — PROPOFOL 40 MCG/KG/MIN: 10 INJECTION, EMULSION INTRAVENOUS at 20:36

## 2021-10-17 RX ADMIN — PROPOFOL 45 MCG/KG/MIN: 10 INJECTION, EMULSION INTRAVENOUS at 11:10

## 2021-10-17 RX ADMIN — PROPOFOL 50 MCG/KG/MIN: 10 INJECTION, EMULSION INTRAVENOUS at 02:13

## 2021-10-17 RX ADMIN — CHLORHEXIDINE GLUCONATE 0.12% ORAL RINSE 15 ML: 1.2 LIQUID ORAL at 20:35

## 2021-10-17 RX ADMIN — Medication 0.2 MCG/KG/HR: at 11:12

## 2021-10-17 RX ADMIN — OXYCODONE 10 MG: 5 TABLET ORAL at 09:40

## 2021-10-17 RX ADMIN — MIDAZOLAM HYDROCHLORIDE 2 MG: 1 INJECTION, SOLUTION INTRAMUSCULAR; INTRAVENOUS at 01:32

## 2021-10-17 RX ADMIN — Medication 150 MCG/HR: at 09:43

## 2021-10-17 RX ADMIN — IPRATROPIUM BROMIDE AND ALBUTEROL SULFATE 1 AMPULE: .5; 2.5 SOLUTION RESPIRATORY (INHALATION) at 14:46

## 2021-10-17 RX ADMIN — Medication 225 MCG/HR: at 19:33

## 2021-10-17 RX ADMIN — MICONAZOLE NITRATE: 2 POWDER TOPICAL at 20:39

## 2021-10-17 RX ADMIN — PANTOPRAZOLE SODIUM 40 MG: 40 INJECTION, POWDER, FOR SOLUTION INTRAVENOUS at 09:40

## 2021-10-17 RX ADMIN — GABAPENTIN 800 MG: 400 CAPSULE ORAL at 20:34

## 2021-10-17 RX ADMIN — Medication 150 MCG/HR: at 02:30

## 2021-10-17 RX ADMIN — GABAPENTIN 800 MG: 400 CAPSULE ORAL at 13:51

## 2021-10-17 RX ADMIN — CHLORHEXIDINE GLUCONATE 0.12% ORAL RINSE 15 ML: 1.2 LIQUID ORAL at 09:40

## 2021-10-17 RX ADMIN — ASPIRIN 81 MG: 81 TABLET, CHEWABLE ORAL at 09:40

## 2021-10-17 RX ADMIN — OXYCODONE 10 MG: 5 TABLET ORAL at 20:34

## 2021-10-17 RX ADMIN — ATORVASTATIN CALCIUM 40 MG: 40 TABLET, FILM COATED ORAL at 09:40

## 2021-10-17 RX ADMIN — ENOXAPARIN SODIUM 30 MG: 30 INJECTION SUBCUTANEOUS at 09:41

## 2021-10-17 RX ADMIN — IPRATROPIUM BROMIDE AND ALBUTEROL SULFATE 1 AMPULE: .5; 2.5 SOLUTION RESPIRATORY (INHALATION) at 10:56

## 2021-10-17 RX ADMIN — NOREPINEPHRINE BITARTRATE 2 MCG/MIN: 1 INJECTION INTRAVENOUS at 12:14

## 2021-10-17 RX ADMIN — IPRATROPIUM BROMIDE AND ALBUTEROL SULFATE 1 AMPULE: .5; 2.5 SOLUTION RESPIRATORY (INHALATION) at 23:36

## 2021-10-17 RX ADMIN — LEVOTHYROXINE SODIUM 100 MCG: 100 TABLET ORAL at 05:30

## 2021-10-17 RX ADMIN — MIDAZOLAM HYDROCHLORIDE 2 MG: 1 INJECTION, SOLUTION INTRAMUSCULAR; INTRAVENOUS at 09:43

## 2021-10-17 RX ADMIN — MIDAZOLAM HYDROCHLORIDE 2 MG: 1 INJECTION, SOLUTION INTRAMUSCULAR; INTRAVENOUS at 04:00

## 2021-10-17 RX ADMIN — IPRATROPIUM BROMIDE AND ALBUTEROL SULFATE 1 AMPULE: .5; 2.5 SOLUTION RESPIRATORY (INHALATION) at 06:59

## 2021-10-17 RX ADMIN — ENOXAPARIN SODIUM 30 MG: 30 INJECTION SUBCUTANEOUS at 20:35

## 2021-10-17 RX ADMIN — IPRATROPIUM BROMIDE AND ALBUTEROL SULFATE 1 AMPULE: .5; 2.5 SOLUTION RESPIRATORY (INHALATION) at 03:29

## 2021-10-17 RX ADMIN — PROPOFOL 50 MCG/KG/MIN: 10 INJECTION, EMULSION INTRAVENOUS at 06:37

## 2021-10-17 RX ADMIN — CASTOR OIL AND BALSAM, PERU: 788; 87 OINTMENT TOPICAL at 20:34

## 2021-10-17 RX ADMIN — DEXAMETHASONE SODIUM PHOSPHATE 3 MG: 4 INJECTION, SOLUTION INTRAMUSCULAR; INTRAVENOUS at 13:51

## 2021-10-17 RX ADMIN — PROPOFOL 40 MCG/KG/MIN: 10 INJECTION, EMULSION INTRAVENOUS at 15:00

## 2021-10-17 RX ADMIN — GABAPENTIN 800 MG: 400 CAPSULE ORAL at 09:40

## 2021-10-17 RX ADMIN — SODIUM CHLORIDE, PRESERVATIVE FREE 10 ML: 5 INJECTION INTRAVENOUS at 20:35

## 2021-10-17 RX ADMIN — Medication 1 MG/HR: at 13:33

## 2021-10-17 ASSESSMENT — PULMONARY FUNCTION TESTS
PIF_VALUE: 37
PIF_VALUE: 39
PIF_VALUE: 13
PIF_VALUE: 37
PIF_VALUE: 37
PIF_VALUE: 38
PIF_VALUE: 43
PIF_VALUE: 50
PIF_VALUE: 42
PIF_VALUE: 44
PIF_VALUE: 28
PIF_VALUE: 48
PIF_VALUE: 29
PIF_VALUE: 35
PIF_VALUE: 24
PIF_VALUE: 39
PIF_VALUE: 37
PIF_VALUE: 43
PIF_VALUE: 38
PIF_VALUE: 39
PIF_VALUE: 39
PIF_VALUE: 42
PIF_VALUE: 38
PIF_VALUE: 36
PIF_VALUE: 50
PIF_VALUE: 15
PIF_VALUE: 16
PIF_VALUE: 50

## 2021-10-17 ASSESSMENT — PAIN SCALES - GENERAL
PAINLEVEL_OUTOF10: 0
PAINLEVEL_OUTOF10: 7
PAINLEVEL_OUTOF10: 6
PAINLEVEL_OUTOF10: 7

## 2021-10-17 NOTE — PROGRESS NOTES
10/17/21 0200   RT Protocol   History Pulmonary Disease 2   Respiratory pattern 2   Breath sounds 2   Cough 1   Indications for Bronchodilator Therapy Decreased or absent breath sounds   Bronchodilator Assessment Score 7   RT Inhaler-Nebulizer Bronchodilator Protocol Note    There is a bronchodilator order in the chart from a provider indicating to follow the RT Bronchodilator Protocol and there is an Initiate RT Inhaler-Nebulizer Bronchodilator Protocol order as well (see protocol at bottom of note). CXR Findings:  XR CHEST PORTABLE    Result Date: 10/16/2021  Bilateral pleuroparenchymal disease, with improved aeration of the right lung compared to prior. The findings from the last RT Protocol Assessment were as follows:   History Pulmonary Disease: Chronic pulmonary disease  Respiratory Pattern: Dyspnea on exertion or RR 21-25 bpm  Breath Sounds: Slightly diminished and/or crackles  Cough: Strong, productive  Indication for Bronchodilator Therapy: Decreased or absent breath sounds  Bronchodilator Assessment Score: 7    Aerosolized bronchodilator medication orders have been revised according to the RT Inhaler-Nebulizer Bronchodilator Protocol below. Respiratory Therapist to perform RT Therapy Protocol Assessment initially then follow the protocol. Repeat RT Therapy Protocol Assessment PRN for score 0-3 or on second treatment, BID, and PRN for scores above 3. No Indications - adjust the frequency to every 6 hours PRN wheezing or bronchospasm, if no treatments needed after 48 hours then discontinue using Per Protocol order mode. If indication present, adjust the RT bronchodilator orders based on the Bronchodilator Assessment Score as indicated below.   Use Inhaler orders unless patient has one or more of the following: on home nebulizer, not able to hold breath for 10 seconds, is not alert and oriented, cannot activate and use MDI correctly, or respiratory rate 25 breaths per minute or more, then use the equivalent nebulizer order(s) with same Frequency and PRN reasons based on the score. If a patient is on this medication at home then do not decrease Frequency below that used at home. 0-3 - enter or revise RT bronchodilator order(s) to equivalent RT Bronchodilator order with Frequency of every 4 hours PRN for wheezing or increased work of breathing using Per Protocol order mode. 4-6 - enter or revise RT Bronchodilator order(s) to two equivalent RT bronchodilator orders with one order with BID Frequency and one order with Frequency of every 4 hours PRN wheezing or increased work of breathing using Per Protocol order mode. 7-10 - enter or revise RT Bronchodilator order(s) to two equivalent RT bronchodilator orders with one order with TID Frequency and one order with Frequency of every 4 hours PRN wheezing or increased work of breathing using Per Protocol order mode. 11-13 - enter or revise RT Bronchodilator order(s) to one equivalent RT bronchodilator order with QID Frequency and an Albuterol order with Frequency of every 4 hours PRN wheezing or increased work of breathing using Per Protocol order mode. Greater than 13 - enter or revise RT Bronchodilator order(s) to one equivalent RT bronchodilator order with every 4 hours Frequency and an Albuterol order with Frequency of every 2 hours PRN wheezing or increased work of breathing using Per Protocol order mode.      Electronically signed by Cesilia Torre RCP on 10/17/2021 at 2:26 AM

## 2021-10-17 NOTE — PROGRESS NOTES
FiO2 increased to 75% for persistant SpO <91%  PiP noted to be higher         10/17/21 1108   Vent Information   Vt Ordered 360 mL   Rate Set 22 bmp   Peak Flow 50 L/min   Pressure Support 0 cmH20   FiO2  75 %   SpO2 91 %   SpO2/FiO2 ratio 121.33   Sensitivity 3   PEEP/CPAP 10   I Time/ I Time % 0 s   Humidification Source Heated wire   Humidification Temp Measured 37   Vent Patient Data   High Peep/I Pressure 0   Peak Inspiratory Pressure 48 cmH2O   Mean Airway Pressure 19 cmH20   Rate Measured 24 br/min   Vt Exhaled 371 mL   Minute Volume 7.91 Liters   I:E Ratio 1:2.50   Plateau Pressure 41 [id number]   Cough/Sputum   Sputum How Obtained Suctioned;Endotracheal   Cough Non-productive   Spontaneous Breathing Trial (SBT) RT Doc   Pulse 75   Breath Sounds   Right Upper Lobe Diminished   Right Middle Lobe Diminished   Right Lower Lobe Diminished   Left Upper Lobe Diminished   Left Lower Lobe Diminished   Additional Respiratory  Assessments   Resp 18   Position Semi-Jesus's   Oral Care Completed?  Yes   Oral Care Mouth suctioned   Alarm Settings   High Pressure Alarm 50 cmH2O   Low Minute Volume Alarm 4 L/min   High Respiratory Rate 40 br/min   ETT (adult)   Placement Date/Time: 10/06/21 1208   Timeout: Patient  Preoxygenation: Yes  Technique: Rapid sequence  Type: Cuffed  Tube Size: 7.5 mm  Laryngoscope: GlideScope  Secured at: 23 cm  Measured From: Lips   Secured at 23 cm   Measured From Lips   ET Placement Right   Secured By Commercial tube carranza   Site Condition Dry

## 2021-10-17 NOTE — PROGRESS NOTES
Pulmonary & Critical Care Medicine ICU Progress Note    CC: Respiratory failure    Events of Last 24 hours:   Ventilator dyssynchrony     Vascular lines: IV: PICC line 9/28    MV: 9/29-10/4, emergently re-intubated 10/6/21 for refractory hypoxemia  Vent Mode: AC/VC Rate Set: 22 bmp/Vt Ordered: 360 mL/ /FiO2 : 75 %  Recent Labs     10/16/21  0530 10/17/21  0545   PHART 7.371 7.398   IYU2HGC 62.5* 65.3*   PO2ART 84.1 62.8*     IV:   norepinephrine Stopped (10/15/21 2100)    fentaNYL 150 mcg/hr (10/17/21 0230)    propofol 50 mcg/kg/min (10/17/21 1498)    dexmedetomidine HCl in NaCl Stopped (10/15/21 1841)    sodium chloride      dextrose      sodium chloride       Vitals:  Blood pressure (!) 163/84, pulse 81, temperature 98.4 °F (36.9 °C), temperature source Oral, resp. rate 24, height 5' 9\" (1.753 m), weight 167 lb 3.2 oz (75.8 kg), SpO2 92 %, not currently breastfeeding. on ventilator      Intake/Output Summary (Last 24 hours) at 10/17/2021 0710  Last data filed at 10/17/2021 0600  Gross per 24 hour   Intake 1690.92 ml   Output 2300 ml   Net -609.08 ml     EXAM (COVID isolation):  General: intubated, ill appearing    ENT:   Pharynx with ETT. Neck: Trachea midline  Resp: No accessory muscle use. CV: Regular rate. Regular rhythm. GI:  Non-distended.     Neuro: anxious       Scheduled Meds:   insulin lispro  0-18 Units SubCUTAneous TID WC    insulin lispro  0-9 Units SubCUTAneous Nightly    dexamethasone  3 mg IntraVENous Q24H    sertraline  50 mg Per NG tube Daily    Venelex   Topical BID    miconazole   Topical BID    oxyCODONE  10 mg Oral Q6H    influenza virus vaccine  0.5 mL IntraMUSCular Prior to discharge    chlorhexidine  15 mL Mouth/Throat BID    ipratropium-albuterol  1 ampule Inhalation Q4H    pantoprazole  40 mg IntraVENous Daily    levothyroxine  100 mcg Oral Daily    enoxaparin  30 mg SubCUTAneous BID    lidocaine 1 % injection  5 mL IntraDERmal Once    sodium chloride flush 5-40 mL IntraVENous 2 times per day    aspirin  81 mg Oral Daily    gabapentin  800 mg Oral TID    atorvastatin  40 mg Oral Daily    sodium chloride flush  5-40 mL IntraVENous 2 times per day       Data:  CBC:   Recent Labs     10/15/21  0305 10/16/21  0400 10/17/21  0540   WBC 5.8 9.4 8.3   HGB 10.0* 11.2* 10.3*   HCT 31.6* 34.8* 32.4*   MCV 79.6* 79.0* 79.0*   * 120* 98*     BMP:   Recent Labs     10/15/21  0305 10/16/21  0400 10/17/21  0540    145 142   K 4.1 3.5 3.7    105 101   CO2 34* 34* 35*   BUN 15 11 17   CREATININE <0.5* <0.5* <0.5*     LIVER PROFILE:   Recent Labs     10/15/21  0305 10/16/21  0400 10/17/21  0540   AST 26 27 26   ALT 26 25 22   BILITOT <0.2 0.4 <0.2   ALKPHOS 119 142* 131*       Microbiology:  9/27/21 COVID-19 detected  9/30/21 Resp Pseudomonas fluorescens   10/7/2021 tracheal aspirate: Klebsiella intermediate to zosyn    Imaging:  Chest x-ray 10/16/2021 bilateral airspace disease    CTPA 9/27/21  No evidence of pulmonary embolism. Scattered peripheral opacities in the left lung and more consolidative area in the right lung concerning for pneumonia.  Previous right thoracotomy and upper lobectomy. One mildly enlarged right paratracheal lymph node is present but no priors for comparison.  Nonenlarged but prominent lymph nodes in the upper abdomen and juxta diaphragmatic region      ASSESSMENT:  · Acute hypoxemic respiratory failure   · COVID-19 viral pneumonia  · ARDS  · Pseudomonas followed by Klebsiella pneumonias  · TMP  · Acute kidney injury  · CAD  · Chronic pain syndrome - on chronic opiates and Neurontin   · Possible early dementia, being worked up outpatient   · H/O bronchiectasis and tracheobronchomalacia/EDAC s/p tracheoplasty in 2014      PLAN:  - COVID-19 isolation, droplet plus  -   Mechanical ventilation as per my orders. The ventilator was adjusted by me at the bedside for unstable, life threatening respiratory failure.    Proned 10/6/21-10/7/2; 10/11-10/12   Paralyzed 10/6/21 - 10/7/21  IV Propofol, Fentanyl for sedation, target RASS -1 to -2  Completed 7 days Cipro, 8 days Zosyn, 3 days Zyvox, prior to that 5 days of Ceftriaxone/Zithromax   Dexamethasone D#20, @ 3mg  Completed Remdesivir & Tocilizumab  H-SSI    Continuing home oxycodone and neurontin    Lovenox for DVT prophylaxis    Total critical care time caring for this patient with life threatening, unstable organ failure, including direct patient contact, management of life support systems, review of data including imaging and labs, discussions with other team members and physicians is 31 minutes, excluding procedures.

## 2021-10-17 NOTE — PROGRESS NOTES
Hospitalist Progress Note      PCP: Maye Holter, MD    Date of Admission: 9/26/2021      Admitted with COVID-19 pneumonia,  acute hypoxic respiratory failure. Patient not vaccinated  Progressive significant worsening hypoxemia overnight. Patient was on 2 L of oxygen on admission-> switched to high flow oxygen per Vapotherm with additional 100% nonrebreather on 9/28/2021-> transferred to ICU.    10/13   cont to be intubated, bradycardic, on precedex gtt    10/14  Increasing oxygen requirement on the vent today  Bradycardic, on Precedex drip    10/15  Ventilator dyssynchrony. 10/16  Remains intubated. Follows commands. 10/17  Intubated, sedated with fentanyl and propofol. Likely looking at tracheostomy and possibly LTAC for slow vent weaning. O2 sats reasonable today.          Medications:  Reviewed    Infusion Medications    midazolam 3 mg/hr (10/17/21 1722)    norepinephrine 6 mcg/min (10/17/21 1722)    fentaNYL 225 mcg/hr (10/17/21 1722)    propofol 40 mcg/kg/min (10/17/21 1722)    sodium chloride      dextrose      sodium chloride       Scheduled Medications    insulin lispro  0-18 Units SubCUTAneous TID WC    insulin lispro  0-9 Units SubCUTAneous Nightly    dexamethasone  3 mg IntraVENous Q24H    sertraline  50 mg Per NG tube Daily    Venelex   Topical BID    miconazole   Topical BID    oxyCODONE  10 mg Oral Q6H    influenza virus vaccine  0.5 mL IntraMUSCular Prior to discharge    chlorhexidine  15 mL Mouth/Throat BID    ipratropium-albuterol  1 ampule Inhalation Q4H    pantoprazole  40 mg IntraVENous Daily    levothyroxine  100 mcg Oral Daily    enoxaparin  30 mg SubCUTAneous BID    lidocaine 1 % injection  5 mL IntraDERmal Once    sodium chloride flush  5-40 mL IntraVENous 2 times per day    aspirin  81 mg Oral Daily    gabapentin  800 mg Oral TID    atorvastatin  40 mg Oral Daily    sodium chloride flush  5-40 mL IntraVENous 2 times per day     PRN Meds: magnesium sulfate, potassium chloride, labetalol, carboxymethylcellulose PF **AND** artificial tears, fentanNYL, midazolam, sodium chloride flush, sodium chloride, glucose, dextrose, glucagon (rDNA), dextrose, sodium chloride flush, sodium chloride, polyethylene glycol, acetaminophen **OR** acetaminophen, prochlorperazine      Intake/Output Summary (Last 24 hours) at 10/17/2021 1828  Last data filed at 10/17/2021 1728  Gross per 24 hour   Intake 2540.96 ml   Output 2400 ml   Net 140.96 ml       Physical Exam Performed:    BP (!) 125/57   Pulse 61   Temp 98.3 °F (36.8 °C) (Axillary)   Resp 24   Ht 5' 9\" (1.753 m)   Wt 167 lb 3.2 oz (75.8 kg)   SpO2 95%   BMI 24.69 kg/m²     General:intubated on mechanical ventilation . Supine position . Skin:  Warm and dry  Neck:  JVD absent. Neck supple  Chest: diminished breath sounds, improved air entry. No wheezes or rhonchi.  Bibasilar crackles. Cardiovascular:  RRR ,S1S2 normal  Abdomen:  Soft, non tender, non distended, BS +  Extremities:  No edema. Intact peripheral pulses. Brisk cap refill, < 2 secs  Neuro: intubated, opens her eyes.       Labs:   Recent Labs     10/15/21  0305 10/16/21  0400 10/17/21  0540   WBC 5.8 9.4 8.3   HGB 10.0* 11.2* 10.3*   HCT 31.6* 34.8* 32.4*   * 120* 98*     Recent Labs     10/15/21  0305 10/16/21  0400 10/17/21  0540    145 142   K 4.1 3.5 3.7    105 101   CO2 34* 34* 35*   BUN 15 11 17   CREATININE <0.5* <0.5* <0.5*   CALCIUM 9.1 9.1 9.3     Recent Labs     10/15/21  0305 10/16/21  0400 10/17/21  0540   AST 26 27 26   ALT 26 25 22   BILITOT <0.2 0.4 <0.2   ALKPHOS 119 142* 131*     No results for input(s): INR in the last 72 hours. No results for input(s): Rick Bennett in the last 72 hours.     Urinalysis:      Lab Results   Component Value Date    NITRU Negative 09/27/2021    WBCUA 0-2 09/27/2021    BACTERIA Rare 09/27/2021    RBCUA None seen 09/27/2021    BLOODU TRACE-INTACT 09/27/2021    SPECGRAV <=1.005 09/27/2021    GLUCOSEU >=1000 09/27/2021    GLUCOSEU 250 05/11/2011       Radiology:  XR CHEST PORTABLE   Final Result   Diffuse bilateral airspace disease appears unchanged on the right and   increased on the left. XR CHEST PORTABLE   Final Result   Bilateral pleuroparenchymal disease, with improved aeration of the right lung   compared to prior. XR CHEST PORTABLE   Final Result   Line and tube as above. Bilateral interstitial opacities are worsened when compared to prior study. XR CHEST PORTABLE   Final Result   Stable chest.  Diffuse interstitial changes, possibly infiltrate or edema. Satisfactory position of endotracheal tube. XR CHEST PORTABLE   Final Result   Stable support apparatus. In this case, both multifocal pneumonia and pulmonary edema are both   considered, and the changes appear increased when compared to the previous   exam.         XR CHEST PORTABLE   Final Result   1. Stable appropriate positions of support apparatus. 2. Slight interval progression of multifocal airspace disease throughout both   lungs, likely a combination of multifocal pneumonia and superimposed   pulmonary edema. 3. Stable cardiomegaly and small bilateral pleural effusions. XR CHEST PORTABLE   Final Result   Improving aeration of the right lung with grossly stable left basilar   airspace opacities. XR CHEST PORTABLE   Final Result   Supportive tubing is in stable position. Stable pattern of bilateral ground-glass opacities and basilar consolidations. XR CHEST PORTABLE   Final Result   Stable appearance of the chest.  Diffuse ground-glass opacities on the right   and mild left basilar opacities. XR CHEST PORTABLE   Final Result   Improved aeration on the left, possibly improving pulmonary edema.    Persistent airspace disease in the right base may represent concurrent   pneumonia         XR ABDOMEN (KUB) (SINGLE AP VIEW)   Final Result Enteric catheter tip likely in the distal gastric body. XR CHEST PORTABLE   Final Result   1. Endotracheal tube projects in the appropriate position. 2. Enteric tube extends below the diaphragm and out of the field of view. XR CHEST PORTABLE   Final Result   Increasing diffuse airspace opacification throughout the lungs. Pattern may   represent pulmonary edema or worsening pneumonitis. XR CHEST PORTABLE   Final Result   1. Interval increased bilateral pulmonary opacities with new consolidative   change at the right lung base. These findings could be secondary to   pulmonary edema or infection. 2. Consolidative change at the right lung base could also be secondary to   atelectasis/mucous plug. XR CHEST PORTABLE   Final Result   No significant interval change in small right pleural effusion or bilateral   heterogeneous opacity which can reflect pulmonary edema or pneumonia. XR CHEST PORTABLE   Final Result   Mild improvement from prior comparison. Mild-to-moderate congestion and/or   infiltrates identified in the lungs. ET tube terminates 7 cm superior to the saba. XR CHEST PORTABLE   Final Result   Endotracheal tube and orogastric tube unchanged, adequate position. Slightly increased extensive bilateral pulmonary disease over the past 24   hours this may relate to interstitial edema or diffuse infection or a   combination. XR CHEST PORTABLE   Final Result   Endotracheal tube with its tip approximately 4.7 cm from the saba in   satisfactory position. Enteric tube in satisfactory position. Patchy bilateral airspace disease is again noted with improved aeration of   the left mid lung. XR CHEST PORTABLE   Final Result   Some improvement in the appearance of the chest with clearing of some of the   acute airspace disease from the mid left lung. Large amount of pneumonia   remains within the right lung and lower left lung. XR CHEST PORTABLE   Final Result   1. Endotracheal tube, nasogastric tube, and right PICC line placement. 2.  Increasing multifocal opacities with consolidative area in the right   perihilar lung. Could be multifocal pneumonia with or without edema. IR PICC WO SQ PORT/PUMP > 5 YEARS   Final Result   1. See above. CT CHEST PULMONARY EMBOLISM W CONTRAST   Final Result   No evidence of pulmonary embolism. Scattered peripheral opacities in the left lung and more consolidative area   in the right lung concerning for pneumonia. Previous right thoracotomy and   upper lobectomy. One mildly enlarged right paratracheal lymph node is present but no priors   for comparison. Nonenlarged but prominent lymph nodes in the upper abdomen   and juxta diaphragmatic region. XR CHEST PORTABLE   Final Result   Mild cardiomegaly without interstitial edema. Metallic surgical clips from prior right thoracotomy. COPD with anterior segment-right upper lobe alveolar pneumonia. Old pleural thickening was noted along the right lateral chest wall. Pleural   thickening and or trace effusion blunts the right costophrenic angle.                  Assessment/Plan:    Active Hospital Problems    Diagnosis     Acute hypoxemic respiratory failure ([address] [J96.01]     Pneumonia due to Pseudomonas species ([address] [J15.1]     Hypomagnesemia [E83.42]     Hypokalemia [E87.6]     Gram-negative pneumonia (HCC) [J15.6]     Mild protein-calorie malnutrition (HCC) [E44.1]     Hypotension [I95.9]     Acute respiratory failure with hypoxia (HCC) [J96.01]     COVID-19 [U07.1]     Pneumonia due to COVID-19 virus [U07.1, J12.82]     CHARLY (acute kidney injury) ([address] [N17.9]     Uncontrolled hypertension [I10]     Fever [R50.9]     Suspected COVID-19 virus infection [Z20.822]     Hypoxia [R09.02]     Dehydration [E86.0]     DM2 (diabetes mellitus, type 2) (HCC) [E11.9]     Essential hypertension [I10]     Hypothyroidism [E03.9]             #COVID-19 pneumonia  #Acute hypoxic respiratory failure   Pseudomonas HCAP. -Unvaccinated patient  - she presented with cough, fever, dyspnea in the setting of household contact testing + COVID 19  - Her Covid testing came back positive on admission  - Initially on 2 L of oxygen on presentation.  She started desaturating quickly . worsening hypoxemia requiring high flow oxygen. - Pulmonology consulted  -placed on high flow oxygen per Vapotherm 40 L,FiO2 100% ,with additional nonrebreather . Transferred to ICU on 9/28. Intubated early AM  9/29/2021.  Extubated on 10/4/2021.  She was on Vapotherm.  Pulmonary status got worse and she was reintubated on 10/6/2021.   - s/p Remdesivir  - on Decadron, day # 19- being tapered   - given 1 dose of Tocilizumab  -Lovenox twice daily  Increasing oxygen requirement on the vent 10/14  Ventilator dyssynchrony.       #Possible superimposed bacterial pneumonia  Gram-negative infection  -Was on Rocephin and Zithromax day 5 of 5; - changed to zosyn on 10/2 with pseudomonas on resp culture.  Pulmonary has started Zyvox.  Hold Zoloft when patient is getting Zyvox .   Completed Zosyn day 8 of 8.    Treated with Zyvox - now dced  Respiratory cultures now growing Klebsiella, initiated on Cipro day #7/7       #Hypokalemia  - replaced       #Dehydration  -  Improved with IVF  Creatinine normal       #CAD  - cont ASA, statin  - EKG with inferior T wave abnormality.  She denies CP.  Trop neg   -telemetry monitoring       #History of tracheobronchomalacia  - Has had surgical treatment for this       #HTN  -blood pressure is low and she was on Levophed.  This has been discontinued.  Resumed home medications-Norvasc and losartan.       #Chronic pain   History of hip fracture  - cont home oxycodone BID and percocet   - cont gabapentin and requip       #DM2  - use ssi        #Hypothyroidism  - cont synthroid         DVT Prophylaxis: Lovenox   Diet: Diet NPO  ADULT TUBE FEEDING; Orogastric; Peptide Based; Continuous; 20; Yes; 15; Q 4 hours; 55; 30; Q 3 hours  Code Status: Full Code    PT/OT Eval Status: ordered    Dispo - cont care in icu    Gris Mondragon MD

## 2021-10-17 NOTE — PROGRESS NOTES
FiO2 decreased to 70%         10/17/21 0700   Vent Information   Vt Ordered 360 mL   Rate Set 22 bmp   Peak Flow 44 L/min   Pressure Support 0 cmH20   FiO2  75 %   SpO2 92 %   SpO2/FiO2 ratio 122.67   Sensitivity 3   PEEP/CPAP 10   I Time/ I Time % 0 s   Humidification Source Heated wire   Humidification Temp Measured 37   Vent Patient Data   High Peep/I Pressure 0   Peak Inspiratory Pressure 24 cmH2O   Mean Airway Pressure 16 cmH20   Rate Measured 23 br/min   Vt Exhaled 404 mL   Minute Volume 8.65 Liters   I:E Ratio 1:2.00   Plateau Pressure 31 BFU91   Static Compliance 12 mL/cmH2O   Cough/Sputum   Sputum How Obtained Suctioned;Endotracheal   Cough Non-productive   Spontaneous Breathing Trial (SBT) RT Doc   Pulse 81   Breath Sounds   Right Upper Lobe Clear;Diminished   Right Middle Lobe Diminished   Right Lower Lobe Diminished   Left Upper Lobe Clear;Diminished   Left Lower Lobe Diminished   Additional Respiratory  Assessments   Resp 24   Position Reverse Trendelenburg   Oral Care Completed? Yes   Oral Care Mouth suctioned   Subglottic Suction Done?  Yes   Alarm Settings   High Pressure Alarm 50 cmH2O   Low Minute Volume Alarm 4 L/min   High Respiratory Rate 40 br/min   ETT (adult)   Placement Date/Time: 10/06/21 1208   Timeout: Patient  Preoxygenation: Yes  Technique: Rapid sequence  Type: Cuffed  Tube Size: 7.5 mm  Laryngoscope: GlideScope  Secured at: 23 cm  Measured From: Lips   Secured at 23 cm   Measured From [facility] [address] [employer] tube carranza   Site Condition Dry

## 2021-10-17 NOTE — PROGRESS NOTES
10/17/21 0332   Vent Information   Vent Type 980   Vent Mode AC/VC   Vt Ordered 360 mL   Rate Set 22 bmp   Peak Flow 44 L/min   Pressure Support 0 cmH20   FiO2  75 %   SpO2 90 %   SpO2/FiO2 ratio 120   Sensitivity 3   PEEP/CPAP 10   I Time/ I Time % 0 s   Vent Patient Data   High Peep/I Pressure 0   Peak Inspiratory Pressure 42 cmH2O   Mean Airway Pressure 18 cmH20   Rate Measured 22 br/min   Vt Exhaled 364 mL   Minute Volume 8.05 Liters   I:E Ratio 1:2.00   Cough/Sputum   Sputum How Obtained Suctioned;Endotracheal   Sputum Amount Small   Sputum Color Creamy   Tenacity Thick   Spontaneous Breathing Trial (SBT) RT Doc   Pulse 73   Breath Sounds   Right Upper Lobe Diminished   Right Middle Lobe Diminished   Right Lower Lobe Diminished   Left Upper Lobe Diminished   Left Lower Lobe Diminished   Additional Respiratory  Assessments   Resp 28   Position Semi-Jesus's   Alarm Settings   High Pressure Alarm 50 cmH2O   Low Minute Volume Alarm 4 L/min   High Respiratory Rate 40 br/min   ETT (adult)   Placement Date/Time: 10/06/21 1208   Timeout: Patient  Preoxygenation: Yes  Technique: Rapid sequence  Type: Cuffed  Tube Size: 7.5 mm  Laryngoscope: GlideScope  Secured at: 23 cm  Measured From: Lips   ET Placement Left  (from Right)

## 2021-10-17 NOTE — PROGRESS NOTES
two equivalent RT bronchodilator orders with one order with BID Frequency and one order with Frequency of every 4 hours PRN wheezing or increased work of breathing using Per Protocol order mode. 7-10 - enter or revise RT Bronchodilator order(s) to two equivalent RT bronchodilator orders with one order with TID Frequency and one order with Frequency of every 4 hours PRN wheezing or increased work of breathing using Per Protocol order mode. 11-13 - enter or revise RT Bronchodilator order(s) to one equivalent RT bronchodilator order with QID Frequency and an Albuterol order with Frequency of every 4 hours PRN wheezing or increased work of breathing using Per Protocol order mode. Greater than 13 - enter or revise RT Bronchodilator order(s) to one equivalent RT bronchodilator order with every 4 hours Frequency and an Albuterol order with Frequency of every 2 hours PRN wheezing or increased work of breathing using Per Protocol order mode. RT to enter RT Home Evaluation for COPD & MDI Assessment order using Per Protocol order mode.     Electronically signed by Fred Swan RCP on 10/17/2021 at 4:23 PM

## 2021-10-18 ENCOUNTER — APPOINTMENT (OUTPATIENT)
Dept: GENERAL RADIOLOGY | Age: 66
DRG: 004 | End: 2021-10-18
Payer: MEDICARE

## 2021-10-18 PROBLEM — E43 SEVERE PROTEIN-CALORIE MALNUTRITION (HCC): Status: ACTIVE | Noted: 2021-09-30

## 2021-10-18 LAB
A/G RATIO: 1.1 (ref 1.1–2.2)
ALBUMIN SERPL-MCNC: 3.5 G/DL (ref 3.4–5)
ALP BLD-CCNC: 130 U/L (ref 40–129)
ALT SERPL-CCNC: 22 U/L (ref 10–40)
ANION GAP SERPL CALCULATED.3IONS-SCNC: 5 MMOL/L (ref 3–16)
AST SERPL-CCNC: 24 U/L (ref 15–37)
BASE EXCESS ARTERIAL: 11.5 MMOL/L (ref -3–3)
BASOPHILS ABSOLUTE: 0.1 K/UL (ref 0–0.2)
BASOPHILS RELATIVE PERCENT: 0.9 %
BILIRUB SERPL-MCNC: <0.2 MG/DL (ref 0–1)
BUN BLDV-MCNC: 15 MG/DL (ref 7–20)
CALCIUM SERPL-MCNC: 9.2 MG/DL (ref 8.3–10.6)
CARBOXYHEMOGLOBIN ARTERIAL: 0.7 % (ref 0–1.5)
CHLORIDE BLD-SCNC: 100 MMOL/L (ref 99–110)
CO2: 36 MMOL/L (ref 21–32)
CREAT SERPL-MCNC: <0.5 MG/DL (ref 0.6–1.2)
EOSINOPHILS ABSOLUTE: 0.3 K/UL (ref 0–0.6)
EOSINOPHILS RELATIVE PERCENT: 4.5 %
GFR AFRICAN AMERICAN: >60
GFR NON-AFRICAN AMERICAN: >60
GLOBULIN: 3.1 G/DL
GLUCOSE BLD-MCNC: 166 MG/DL (ref 70–99)
GLUCOSE BLD-MCNC: 171 MG/DL (ref 70–99)
GLUCOSE BLD-MCNC: 194 MG/DL (ref 70–99)
GLUCOSE BLD-MCNC: 261 MG/DL (ref 70–99)
GLUCOSE BLD-MCNC: 264 MG/DL (ref 70–99)
GLUCOSE BLD-MCNC: 265 MG/DL (ref 70–99)
GLUCOSE BLD-MCNC: 337 MG/DL (ref 70–99)
HCO3 ARTERIAL: 38.6 MMOL/L (ref 21–29)
HCT VFR BLD CALC: 34 % (ref 36–48)
HEMOGLOBIN, ART, EXTENDED: 11.3 G/DL (ref 12–16)
HEMOGLOBIN: 10.4 G/DL (ref 12–16)
LYMPHOCYTES ABSOLUTE: 1.8 K/UL (ref 1–5.1)
LYMPHOCYTES RELATIVE PERCENT: 24 %
MCH RBC QN AUTO: 24.4 PG (ref 26–34)
MCHC RBC AUTO-ENTMCNC: 30.7 G/DL (ref 31–36)
MCV RBC AUTO: 79.6 FL (ref 80–100)
METHEMOGLOBIN ARTERIAL: 0.3 %
MONOCYTES ABSOLUTE: 0.5 K/UL (ref 0–1.3)
MONOCYTES RELATIVE PERCENT: 6.9 %
NEUTROPHILS ABSOLUTE: 4.7 K/UL (ref 1.7–7.7)
NEUTROPHILS RELATIVE PERCENT: 63.7 %
O2 SAT, ARTERIAL: 88.4 %
O2 THERAPY: ABNORMAL
PCO2 ARTERIAL: 64.6 MMHG (ref 35–45)
PDW BLD-RTO: 18.8 % (ref 12.4–15.4)
PERFORMED ON: ABNORMAL
PH ARTERIAL: 7.39 (ref 7.35–7.45)
PLATELET # BLD: 103 K/UL (ref 135–450)
PMV BLD AUTO: 9.3 FL (ref 5–10.5)
PO2 ARTERIAL: 56.7 MMHG (ref 75–108)
POTASSIUM REFLEX MAGNESIUM: 3.9 MMOL/L (ref 3.5–5.1)
RBC # BLD: 4.27 M/UL (ref 4–5.2)
SODIUM BLD-SCNC: 141 MMOL/L (ref 136–145)
TCO2 ARTERIAL: 40.6 MMOL/L
TOTAL PROTEIN: 6.6 G/DL (ref 6.4–8.2)
TRIGL SERPL-MCNC: 239 MG/DL (ref 0–150)
WBC # BLD: 7.4 K/UL (ref 4–11)

## 2021-10-18 PROCEDURE — 2580000003 HC RX 258: Performed by: INTERNAL MEDICINE

## 2021-10-18 PROCEDURE — 2500000003 HC RX 250 WO HCPCS: Performed by: INTERNAL MEDICINE

## 2021-10-18 PROCEDURE — 94640 AIRWAY INHALATION TREATMENT: CPT

## 2021-10-18 PROCEDURE — 2000000000 HC ICU R&B

## 2021-10-18 PROCEDURE — 36592 COLLECT BLOOD FROM PICC: CPT

## 2021-10-18 PROCEDURE — 6370000000 HC RX 637 (ALT 250 FOR IP): Performed by: INTERNAL MEDICINE

## 2021-10-18 PROCEDURE — 99233 SBSQ HOSP IP/OBS HIGH 50: CPT | Performed by: INTERNAL MEDICINE

## 2021-10-18 PROCEDURE — 82803 BLOOD GASES ANY COMBINATION: CPT

## 2021-10-18 PROCEDURE — 71045 X-RAY EXAM CHEST 1 VIEW: CPT

## 2021-10-18 PROCEDURE — 85025 COMPLETE CBC W/AUTO DIFF WBC: CPT

## 2021-10-18 PROCEDURE — 94003 VENT MGMT INPAT SUBQ DAY: CPT

## 2021-10-18 PROCEDURE — 6360000002 HC RX W HCPCS: Performed by: INTERNAL MEDICINE

## 2021-10-18 PROCEDURE — 80053 COMPREHEN METABOLIC PANEL: CPT

## 2021-10-18 PROCEDURE — 84478 ASSAY OF TRIGLYCERIDES: CPT

## 2021-10-18 PROCEDURE — 2700000000 HC OXYGEN THERAPY PER DAY

## 2021-10-18 PROCEDURE — 99291 CRITICAL CARE FIRST HOUR: CPT | Performed by: INTERNAL MEDICINE

## 2021-10-18 PROCEDURE — 94761 N-INVAS EAR/PLS OXIMETRY MLT: CPT

## 2021-10-18 PROCEDURE — C9113 INJ PANTOPRAZOLE SODIUM, VIA: HCPCS | Performed by: INTERNAL MEDICINE

## 2021-10-18 RX ADMIN — CASTOR OIL AND BALSAM, PERU: 788; 87 OINTMENT TOPICAL at 08:10

## 2021-10-18 RX ADMIN — PANTOPRAZOLE SODIUM 40 MG: 40 INJECTION, POWDER, FOR SOLUTION INTRAVENOUS at 08:07

## 2021-10-18 RX ADMIN — PROPOFOL 35 MCG/KG/MIN: 10 INJECTION, EMULSION INTRAVENOUS at 04:53

## 2021-10-18 RX ADMIN — OXYCODONE 10 MG: 5 TABLET ORAL at 20:48

## 2021-10-18 RX ADMIN — SODIUM CHLORIDE, PRESERVATIVE FREE 10 ML: 5 INJECTION INTRAVENOUS at 20:49

## 2021-10-18 RX ADMIN — Medication 200 MCG/HR: at 15:42

## 2021-10-18 RX ADMIN — LEVOTHYROXINE SODIUM 100 MCG: 100 TABLET ORAL at 05:01

## 2021-10-18 RX ADMIN — DEXAMETHASONE SODIUM PHOSPHATE 3 MG: 4 INJECTION, SOLUTION INTRAMUSCULAR; INTRAVENOUS at 12:22

## 2021-10-18 RX ADMIN — IPRATROPIUM BROMIDE AND ALBUTEROL SULFATE 1 AMPULE: .5; 2.5 SOLUTION RESPIRATORY (INHALATION) at 19:18

## 2021-10-18 RX ADMIN — MICONAZOLE NITRATE: 2 POWDER TOPICAL at 20:48

## 2021-10-18 RX ADMIN — IPRATROPIUM BROMIDE AND ALBUTEROL SULFATE 1 AMPULE: .5; 2.5 SOLUTION RESPIRATORY (INHALATION) at 11:32

## 2021-10-18 RX ADMIN — MIDAZOLAM HYDROCHLORIDE 2 MG: 1 INJECTION, SOLUTION INTRAMUSCULAR; INTRAVENOUS at 15:54

## 2021-10-18 RX ADMIN — ENOXAPARIN SODIUM 30 MG: 30 INJECTION SUBCUTANEOUS at 20:49

## 2021-10-18 RX ADMIN — SODIUM CHLORIDE, PRESERVATIVE FREE 10 ML: 5 INJECTION INTRAVENOUS at 08:09

## 2021-10-18 RX ADMIN — OXYCODONE 10 MG: 5 TABLET ORAL at 10:40

## 2021-10-18 RX ADMIN — ASPIRIN 81 MG: 81 TABLET, CHEWABLE ORAL at 08:07

## 2021-10-18 RX ADMIN — Medication 200 MCG/HR: at 21:09

## 2021-10-18 RX ADMIN — PROPOFOL 35 MCG/KG/MIN: 10 INJECTION, EMULSION INTRAVENOUS at 17:42

## 2021-10-18 RX ADMIN — GABAPENTIN 800 MG: 400 CAPSULE ORAL at 08:07

## 2021-10-18 RX ADMIN — OXYCODONE 10 MG: 5 TABLET ORAL at 04:52

## 2021-10-18 RX ADMIN — OXYCODONE 10 MG: 5 TABLET ORAL at 15:27

## 2021-10-18 RX ADMIN — GABAPENTIN 800 MG: 400 CAPSULE ORAL at 20:48

## 2021-10-18 RX ADMIN — ENOXAPARIN SODIUM 30 MG: 30 INJECTION SUBCUTANEOUS at 08:07

## 2021-10-18 RX ADMIN — PROPOFOL 40 MCG/KG/MIN: 10 INJECTION, EMULSION INTRAVENOUS at 00:22

## 2021-10-18 RX ADMIN — MICONAZOLE NITRATE: 2 POWDER TOPICAL at 08:08

## 2021-10-18 RX ADMIN — IPRATROPIUM BROMIDE AND ALBUTEROL SULFATE 1 AMPULE: .5; 2.5 SOLUTION RESPIRATORY (INHALATION) at 03:10

## 2021-10-18 RX ADMIN — PROPOFOL 35 MCG/KG/MIN: 10 INJECTION, EMULSION INTRAVENOUS at 11:02

## 2021-10-18 RX ADMIN — Medication 2 MG/HR: at 21:09

## 2021-10-18 RX ADMIN — CHLORHEXIDINE GLUCONATE 0.12% ORAL RINSE 15 ML: 1.2 LIQUID ORAL at 08:07

## 2021-10-18 RX ADMIN — GABAPENTIN 800 MG: 400 CAPSULE ORAL at 13:23

## 2021-10-18 RX ADMIN — Medication 200 MCG/HR: at 00:23

## 2021-10-18 RX ADMIN — CHLORHEXIDINE GLUCONATE 0.12% ORAL RINSE 15 ML: 1.2 LIQUID ORAL at 20:50

## 2021-10-18 RX ADMIN — Medication 200 MCG/HR: at 05:01

## 2021-10-18 RX ADMIN — IPRATROPIUM BROMIDE AND ALBUTEROL SULFATE 1 AMPULE: .5; 2.5 SOLUTION RESPIRATORY (INHALATION) at 07:37

## 2021-10-18 RX ADMIN — IPRATROPIUM BROMIDE AND ALBUTEROL SULFATE 1 AMPULE: .5; 2.5 SOLUTION RESPIRATORY (INHALATION) at 23:50

## 2021-10-18 RX ADMIN — IPRATROPIUM BROMIDE AND ALBUTEROL SULFATE 1 AMPULE: .5; 2.5 SOLUTION RESPIRATORY (INHALATION) at 15:18

## 2021-10-18 RX ADMIN — ATORVASTATIN CALCIUM 40 MG: 40 TABLET, FILM COATED ORAL at 08:07

## 2021-10-18 RX ADMIN — Medication 200 MCG/HR: at 10:39

## 2021-10-18 RX ADMIN — SERTRALINE HYDROCHLORIDE 50 MG: 50 TABLET ORAL at 08:07

## 2021-10-18 RX ADMIN — CASTOR OIL AND BALSAM, PERU: 788; 87 OINTMENT TOPICAL at 20:48

## 2021-10-18 RX ADMIN — PROPOFOL 45 MCG/KG/MIN: 10 INJECTION, EMULSION INTRAVENOUS at 20:50

## 2021-10-18 ASSESSMENT — PAIN SCALES - GENERAL
PAINLEVEL_OUTOF10: 0

## 2021-10-18 ASSESSMENT — PULMONARY FUNCTION TESTS
PIF_VALUE: 41
PIF_VALUE: 36
PIF_VALUE: 31
PIF_VALUE: 42
PIF_VALUE: 30
PIF_VALUE: 39
PIF_VALUE: 32
PIF_VALUE: 35
PIF_VALUE: 33
PIF_VALUE: 40
PIF_VALUE: 38
PIF_VALUE: 37
PIF_VALUE: 38
PIF_VALUE: 41
PIF_VALUE: 36
PIF_VALUE: 50
PIF_VALUE: 41
PIF_VALUE: 42
PIF_VALUE: 37

## 2021-10-18 NOTE — PROGRESS NOTES
Pulmonary & Critical Care Medicine ICU Progress Note    CC: Respiratory failure    Events of Last 24 hours:    off levophed     Vascular lines: IV: PICC line 9/28    MV: 9/29-10/4, emergently re-intubated 10/6/21 for refractory hypoxemia  Vent Mode: AC/VC Rate Set: 22 bmp/Vt Ordered: 360 mL/ /FiO2 : 65 %  Recent Labs     10/17/21  0545 10/18/21  0500   PHART 7.398 7.394   OHQ6SHT 65.3* 64.6*   PO2ART 62.8* 56.7*     IV:   midazolam 3 mg/hr (10/18/21 0520)    norepinephrine 1 mcg/min (10/18/21 0520)    fentaNYL 200 mcg/hr (10/18/21 0520)    propofol 35 mcg/kg/min (10/18/21 0520)    sodium chloride      dextrose      sodium chloride       Vitals:  Blood pressure (!) 106/57, pulse 70, temperature 98.1 °F (36.7 °C), temperature source Axillary, resp. rate 20, height 5' 9\" (1.753 m), weight 167 lb 1.6 oz (75.8 kg), SpO2 92 %, not currently breastfeeding. on ventilator      Intake/Output Summary (Last 24 hours) at 10/18/2021 0719  Last data filed at 10/18/2021 1120  Gross per 24 hour   Intake 2612.04 ml   Output 2700 ml   Net -87.96 ml     EXAM (COVID isolation):  General: intubated, ill appearing    ENT:   Pharynx with ETT. Neck: Trachea midline  Resp: No accessory muscle use. CV: Regular rate. Regular rhythm. GI:  Non-distended.     Neuro: anxious       Scheduled Meds:   insulin lispro  0-18 Units SubCUTAneous TID WC    insulin lispro  0-9 Units SubCUTAneous Nightly    dexamethasone  3 mg IntraVENous Q24H    sertraline  50 mg Per NG tube Daily    Venelex   Topical BID    miconazole   Topical BID    oxyCODONE  10 mg Oral Q6H    influenza virus vaccine  0.5 mL IntraMUSCular Prior to discharge    chlorhexidine  15 mL Mouth/Throat BID    ipratropium-albuterol  1 ampule Inhalation Q4H    pantoprazole  40 mg IntraVENous Daily    levothyroxine  100 mcg Oral Daily    enoxaparin  30 mg SubCUTAneous BID    lidocaine 1 % injection  5 mL IntraDERmal Once    sodium chloride flush  5-40 mL IntraVENous 2 times per day    aspirin  81 mg Oral Daily    gabapentin  800 mg Oral TID    atorvastatin  40 mg Oral Daily    sodium chloride flush  5-40 mL IntraVENous 2 times per day       Data:  CBC:   Recent Labs     10/16/21  0400 10/17/21  0540 10/18/21  0500   WBC 9.4 8.3 7.4   HGB 11.2* 10.3* 10.4*   HCT 34.8* 32.4* 34.0*   MCV 79.0* 79.0* 79.6*   * 98* 103*     BMP:   Recent Labs     10/16/21  0400 10/17/21  0540 10/18/21  0500    142 141   K 3.5 3.7 3.9    101 100   CO2 34* 35* 36*   BUN 11 17 15   CREATININE <0.5* <0.5* <0.5*     LIVER PROFILE:   Recent Labs     10/16/21  0400 10/17/21  0540 10/18/21  0500   AST 27 26 24   ALT 25 22 22   BILITOT 0.4 <0.2 <0.2   ALKPHOS 142* 131* 130*       Microbiology:  9/27/21 COVID-19 detected  9/30/21 Resp Pseudomonas fluorescens   10/7/2021 tracheal aspirate: Klebsiella intermediate to zosyn    Imaging:  Chest x-ray 10/16/2021 bilateral airspace disease    CTPA 9/27/21  No evidence of pulmonary embolism. Scattered peripheral opacities in the left lung and more consolidative area in the right lung concerning for pneumonia.  Previous right thoracotomy and upper lobectomy. One mildly enlarged right paratracheal lymph node is present but no priors for comparison.  Nonenlarged but prominent lymph nodes in the upper abdomen and juxta diaphragmatic region      ASSESSMENT:  · Acute hypoxemic respiratory failure   · COVID-19 viral pneumonia  · ARDS  · Pseudomonas followed by Klebsiella pneumonias  · Acute kidney injury  · CAD  · Chronic pain syndrome - on chronic opiates and Neurontin   · Possible early dementia, being worked up outpatient   · H/O bronchiectasis and tracheobronchomalacia/EDAC s/p tracheoplasty in 2014   · H/O EDAC (tracheobronchomalacia) s/p tracheoplasty     PLAN:  - COVID-19 isolation, droplet plus  -   Mechanical ventilation as per my orders.   The ventilator was adjusted by me at the bedside for unstable, life threatening respiratory failure. Proned 10/6/21-10/7/2; 10/11-10/12   Paralyzed 10/6/21 - 10/7/21  IV Propofol, Fentanyl & Versed for sedation, target RASS -1 to -2  Completed 7 days Cipro, 8 days Zosyn, 3 days Zyvox, prior to that 5 days of Ceftriaxone/Zithromax   Dexamethasone D#21, @ 3mg  Completed Remdesivir & Tocilizumab  H-SSI    Continuing home oxycodone and neurontin    Lovenox for DVT prophylaxis  I called spouse and discussed planning - he would like to avoid trach if possible due to complicated airway history. If it is necessary, will likely need to be done at [facility]    Total critical care time caring for this patient with life threatening, unstable organ failure, including direct patient contact, management of life support systems, review of data including imaging and labs, discussions with other team members and physicians is 40 minutes, excluding procedures.

## 2021-10-18 NOTE — PROGRESS NOTES
10/18/21 0100   RT Protocol   History Pulmonary Disease 2   Respiratory pattern 2   Breath sounds 2   Cough 1   Indications for Bronchodilator Therapy Decreased or absent breath sounds   Bronchodilator Assessment Score 7   RT Inhaler-Nebulizer Bronchodilator Protocol Note    There is a bronchodilator order in the chart from a provider indicating to follow the RT Bronchodilator Protocol and there is an Initiate RT Inhaler-Nebulizer Bronchodilator Protocol order as well (see protocol at bottom of note). CXR Findings:  XR CHEST PORTABLE    Result Date: 10/17/2021  Diffuse bilateral airspace disease appears unchanged on the right and increased on the left. XR CHEST PORTABLE    Result Date: 10/16/2021  Bilateral pleuroparenchymal disease, with improved aeration of the right lung compared to prior. The findings from the last RT Protocol Assessment were as follows:   History Pulmonary Disease: Chronic pulmonary disease  Respiratory Pattern: Dyspnea on exertion or RR 21-25 bpm  Breath Sounds: Slightly diminished and/or crackles  Cough: Strong, productive  Indication for Bronchodilator Therapy: Decreased or absent breath sounds  Bronchodilator Assessment Score: 7    Aerosolized bronchodilator medication orders have been revised according to the RT Inhaler-Nebulizer Bronchodilator Protocol below. Respiratory Therapist to perform RT Therapy Protocol Assessment initially then follow the protocol. Repeat RT Therapy Protocol Assessment PRN for score 0-3 or on second treatment, BID, and PRN for scores above 3. No Indications - adjust the frequency to every 6 hours PRN wheezing or bronchospasm, if no treatments needed after 48 hours then discontinue using Per Protocol order mode. If indication present, adjust the RT bronchodilator orders based on the Bronchodilator Assessment Score as indicated below.   Use Inhaler orders unless patient has one or more of the following: on home nebulizer, not able to

## 2021-10-18 NOTE — PROGRESS NOTES
RT Inhaler-Nebulizer Bronchodilator Protocol Note    There is a bronchodilator order in the chart from a provider indicating to follow the RT Bronchodilator Protocol and there is an Initiate RT Inhaler-Nebulizer Bronchodilator Protocol order as well (see protocol at bottom of note). CXR Findings:  XR CHEST PORTABLE    Result Date: 10/18/2021  No significant change in the multifocal bilateral infiltrates     XR CHEST PORTABLE    Result Date: 10/17/2021  Diffuse bilateral airspace disease appears unchanged on the right and increased on the left. The findings from the last RT Protocol Assessment were as follows:   History Pulmonary Disease: (P) Chronic pulmonary disease  Respiratory Pattern: (P) Dyspnea on exertion or RR 21-25 bpm  Breath Sounds: (P) Inspiratory and expiratory or bilateral wheezing and/or rhonchi  Cough: (P) Unable to generate effective cough  Indication for Bronchodilator Therapy: Decreased or absent breath sounds  Bronchodilator Assessment Score: (P) 14    Aerosolized bronchodilator medication orders have been revised according to the RT Inhaler-Nebulizer Bronchodilator Protocol below. Respiratory Therapist to perform RT Therapy Protocol Assessment initially then follow the protocol. Repeat RT Therapy Protocol Assessment PRN for score 0-3 or on second treatment, BID, and PRN for scores above 3. No Indications - adjust the frequency to every 6 hours PRN wheezing or bronchospasm, if no treatments needed after 48 hours then discontinue using Per Protocol order mode. If indication present, adjust the RT bronchodilator orders based on the Bronchodilator Assessment Score as indicated below.   Use Inhaler orders unless patient has one or more of the following: on home nebulizer, not able to hold breath for 10 seconds, is not alert and oriented, cannot activate and use MDI correctly, or respiratory rate 25 breaths per minute or more, then use the equivalent nebulizer order(s) with same Frequency and PRN reasons based on the score. If a patient is on this medication at home then do not decrease Frequency below that used at home. 0-3 - enter or revise RT bronchodilator order(s) to equivalent RT Bronchodilator order with Frequency of every 4 hours PRN for wheezing or increased work of breathing using Per Protocol order mode. 4-6 - enter or revise RT Bronchodilator order(s) to two equivalent RT bronchodilator orders with one order with BID Frequency and one order with Frequency of every 4 hours PRN wheezing or increased work of breathing using Per Protocol order mode. 7-10 - enter or revise RT Bronchodilator order(s) to two equivalent RT bronchodilator orders with one order with TID Frequency and one order with Frequency of every 4 hours PRN wheezing or increased work of breathing using Per Protocol order mode. 11-13 - enter or revise RT Bronchodilator order(s) to one equivalent RT bronchodilator order with QID Frequency and an Albuterol order with Frequency of every 4 hours PRN wheezing or increased work of breathing using Per Protocol order mode. Greater than 13 - enter or revise RT Bronchodilator order(s) to one equivalent RT bronchodilator order with every 4 hours Frequency and an Albuterol order with Frequency of every 2 hours PRN wheezing or increased work of breathing using Per Protocol order mode.          Electronically signed by Erick Peterson RCP on 10/18/2021 at 12:00 PM

## 2021-10-18 NOTE — PROGRESS NOTES
10/18/21 0313   Vent Information   Vent Type 980   Vent Mode AC/VC   Vt Ordered 360 mL   Rate Set 22 bmp   Peak Flow 55 L/min   Pressure Support 0 cmH20   FiO2  65 %   SpO2 94 %   SpO2/FiO2 ratio 144.62   Sensitivity 3   PEEP/CPAP 7   I Time/ I Time % 0 s   Vent Patient Data   High Peep/I Pressure 0   Peak Inspiratory Pressure 38 cmH2O   Mean Airway Pressure 14 cmH20   Rate Measured 22 br/min   Vt Exhaled 366 mL   Minute Volume 8.05 Liters   I:E Ratio 1:2.80   Cough/Sputum   Sputum How Obtained Suctioned;Endotracheal   Sputum Amount None   Spontaneous Breathing Trial (SBT) RT Doc   Pulse 52   Breath Sounds   Right Upper Lobe Diminished   Right Middle Lobe Diminished   Right Lower Lobe Diminished   Left Upper Lobe Diminished   Left Lower Lobe Diminished   Additional Respiratory  Assessments   Resp 22   Position Semi-Jesus's   Alarm Settings   High Pressure Alarm 50 cmH2O   Low Minute Volume Alarm 4 L/min   High Respiratory Rate 40 br/min   ETT (adult)   Placement Date/Time: 10/06/21 1208   Timeout: Patient  Preoxygenation: Yes  Technique: Rapid sequence  Type: Cuffed  Tube Size: 7.5 mm  Laryngoscope: GlideScope  Secured at: 23 cm  Measured From: Lips   ET Placement Left

## 2021-10-18 NOTE — PROGRESS NOTES
RT Nebulizer Bronchodilator Protocol Note    There is a bronchodilator order in the chart from a provider indicating to follow the RT Bronchodilator Protocol and there is an Initiate RT Bronchodilator Protocol order as well (see protocol at bottom of note). CXR Findings:  XR CHEST PORTABLE    Result Date: 10/18/2021  No significant change in the multifocal bilateral infiltrates     XR CHEST PORTABLE    Result Date: 10/17/2021  Diffuse bilateral airspace disease appears unchanged on the right and increased on the left. The findings from the last RT Protocol Assessment were as follows:  Smoking: Chronic pulmonary disease  Respiratory Pattern: Dyspnea on exertion or RR 21-25 bpm  Breath Sounds: Inspiratory and expiratory or bilateral wheezing and/or rhonchi  Cough: Unable to generate effective cough  Indication for Bronchodilator Therapy: Decreased or absent breath sounds  Bronchodilator Assessment Score: 14    Aerosolized bronchodilator medication orders have been revised according to the RT Nebulizer Bronchodilator Protocol below. Respiratory Therapist to perform RT Therapy Protocol Assessment initially then follow the protocol. Repeat RT Therapy Protocol Assessment PRN for score 0-3 or on second treatment, BID, and PRN for scores above 3. No Indications - adjust the frequency to every 6 hours PRN wheezing or bronchospasm, if no treatments needed after 48 hours then discontinue using Per Protocol order mode. If indication present, adjust the RT bronchodilator orders based on the Bronchodilator Assessment Score as indicated below. If a patient is on this medication at home then do not decrease Frequency below that used at home. 0-3 - enter or revise RT bronchodilator order(s) to equivalent RT Bronchodilator order with Frequency of every 4 hours PRN for wheezing or increased work of breathing using Per Protocol order mode.        4-6 - enter or revise RT Bronchodilator order(s) to two equivalent RT bronchodilator orders with one order with BID Frequency and one order with Frequency of every 4 hours PRN wheezing or increased work of breathing using Per Protocol order mode. 7-10 - enter or revise RT Bronchodilator order(s) to two equivalent RT bronchodilator orders with one order with TID Frequency and one order with Frequency of every 4 hours PRN wheezing or increased work of breathing using Per Protocol order mode. 11-13 - enter or revise RT Bronchodilator order(s) to one equivalent RT bronchodilator order with QID Frequency and an Albuterol order with Frequency of every 4 hours PRN wheezing or increased work of breathing using Per Protocol order mode. Greater than 13 - enter or revise RT Bronchodilator order(s) to one equivalent RT bronchodilator order with every 4 hours Frequency and an Albuterol order with Frequency of every 2 hours PRN wheezing or increased work of breathing using Per Protocol order mode. RT to enter RT Home Evaluation for COPD & MDI Assessment order using Per Protocol order mode.     Electronically signed by Phoebe Oliver RCP on 10/18/2021 at 7:27 PM

## 2021-10-18 NOTE — PROGRESS NOTES
Comprehensive Nutrition Assessment    Type and Reason for Visit:  Reassess    Nutrition Recommendations/Plan:   1. Modified TF order to ADULT DIET FEEDING; Orogastric; Peptide-Based formula - Vital AF 1.2 with a goal rate of 60 ml/hr x 20 hours. Increase to goal rate within 4 hours, as tolerated by patient, until goal rate can be achieved and maintained. Water flushes, 30 ml every 3 hours for tube patency. 2. Monitor TF rate, intake, and tolerance + water flushes. 3. Monitor vent status, sedation type/amount (propofol at 35 mcg x 24 hours which = 421 kcals from lipids), TG checks (TG check was 335 mg/dl on 10/15/21), and plan of care. 4. Monitor nutrition-related labs, bowel function, and weight trends. Nutrition Assessment:  patient still remains unchanged from a nutritional standpoint since last RD assessment; patient remains at risk for further compromise d/t ongoing intubation and possible trach placement, altered nutrition-related labs, and need for EN as sole source of nutrition while intubated; will modify TF order to Vital AF 1.2 at goal rate of 60 ml/hr x 20 hours + 30 ml water flushes every 3 hours for tube patency    Malnutrition Assessment:  Malnutrition Status:  Severe malnutrition (severe malnutrition in the context of acute illness ro injury < 3 months based on 50% or less of estimated energy requirements for 5 or more days and greater than 5% weight loss over 1 month), per guidelines from Academy of Nutrition and Dietetics (Academy)/American Society for Parenteral and Enteral Nutrition (A.S.P.E.N.) - clinical characteristics that the clinician can  obtain and document to support a diagnosis of malnutrition.    Context:  Acute Illness     Findings of the 6 clinical characteristics of malnutrition:  Energy Intake:  7 - 50% or less of estimated energy requirements for 5 or more days  Weight Loss:  7 - Greater than 5% over 1 month (- 20# or 10.8% weight loss x 1 month)     Body Fat Loss: weight)    · Usual Body Weight: 187 lb 6.3 oz (85 kg) (obtained on 9/29/21; actual weight)     · Ideal Body Weight: 145 lbs; % Ideal Body Weight 115.2 %   · BMI: 24.7  · BMI Categories: Normal Weight (BMI 22.0 to 24.9) age over 72       Nutrition Diagnosis:   · Inadequate oral intake related to impaired respiratory function, inadequate protein-energy intake, increase demand for energy/nutrients as evidenced by NPO or clear liquid status due to medical condition, intubation, nutrition support - enteral nutrition, lab values      Nutrition Interventions:   Food and/or Nutrient Delivery:  Continue NPO, Modify Tube Feeding  Nutrition Education/Counseling:  No recommendation at this time   Coordination of Nutrition Care:  Continue to monitor while inpatient, Interdisciplinary Rounds    Goals:  patient will tolerate Vital AF 1.2 at goal rate of 60 ml/hr x 20 hours without GI distress, without s/s of aspiration, and without additional lab/fluid disturbances       Nutrition Monitoring and Evaluation:   Behavioral-Environmental Outcomes:  None Identified   Food/Nutrient Intake Outcomes:  Enteral Nutrition Intake/Tolerance  Physical Signs/Symptoms Outcomes:  Biochemical Data, Constipation, GI Status, Fluid Status or Edema, Hemodynamic Status, Skin, Weight     Discharge Planning:     Too soon to determine     Electronically signed by Pradip Armendariz RD, LD on 10/18/21 at 3:38 PM EDT    Contact: [phone]

## 2021-10-18 NOTE — PROGRESS NOTES
10/17/21 2337   Vent Information   Vent Type 980   Vent Mode AC/VC   Vt Ordered 360 mL   Rate Set 22 bmp   Peak Flow 55 L/min   Pressure Support 0 cmH20   FiO2  65 %   SpO2 92 %   SpO2/FiO2 ratio 141.54   Sensitivity 3   PEEP/CPAP 7   I Time/ I Time % 0 s   Humidification Source Heated wire   Humidification Temp Measured 37   Circuit Condensation Drained   Vent Patient Data   High Peep/I Pressure 0   Peak Inspiratory Pressure 35 cmH2O   Mean Airway Pressure 14 cmH20   Rate Measured 22 br/min   Vt Exhaled 364 mL   Minute Volume 8 Liters   I:E Ratio 1:2.80   Cough/Sputum   Sputum How Obtained Endotracheal   Cough Productive   Sputum Amount Small   Sputum Color Creamy   Tenacity Thick   Spontaneous Breathing Trial (SBT) RT Doc   Pulse 57   Breath Sounds   Right Upper Lobe Diminished   Right Middle Lobe Diminished   Right Lower Lobe Diminished   Left Upper Lobe Diminished   Left Lower Lobe Diminished   Additional Respiratory  Assessments   Resp 18   Alarm Settings   High Pressure Alarm 50 cmH2O   Low Minute Volume Alarm 4 L/min   Apnea (secs) 20 secs   High Respiratory Rate 40 br/min   Low Exhaled Vt  210 mL   Patient Observation   Observations 7.5 ett 23 at lip

## 2021-10-18 NOTE — PROGRESS NOTES
10/17/21 2017   Vent Information   Vent Type 980   Vent Mode AC/VC   Vt Ordered 360 mL   Rate Set 22 bmp   Peak Flow 55 L/min   Pressure Support 0 cmH20   FiO2  65 %   SpO2 94 %   SpO2/FiO2 ratio 144.62   Sensitivity 3   PEEP/CPAP 7   I Time/ I Time % 0 s   Humidification Source Heated wire   Humidification Temp Measured 37   Circuit Condensation Drained   Vent Patient Data   High Peep/I Pressure 0   Peak Inspiratory Pressure 37 cmH2O   Mean Airway Pressure 14 cmH20   Rate Measured 22 br/min   Vt Exhaled 367 mL   Minute Volume 8.06 Liters   I:E Ratio 1:2.80   Plateau Pressure 34 UGT41   Static Compliance 13 mL/cmH2O   Dynamic Compliance 11 mL/cmH2O   Cough/Sputum   Sputum How Obtained Endotracheal   Cough Productive   Sputum Amount Small   Sputum Color Creamy   Tenacity Thick   Spontaneous Breathing Trial (SBT) RT Doc   Pulse 54   Breath Sounds   Right Upper Lobe Diminished   Right Middle Lobe Diminished   Right Lower Lobe Diminished   Left Upper Lobe Diminished   Left Lower Lobe Diminished   Additional Respiratory  Assessments   Resp 23   Alarm Settings   High Pressure Alarm 50 cmH2O   Low Minute Volume Alarm 4 L/min   Apnea (secs) 20 secs   High Respiratory Rate 40 br/min   Low Exhaled Vt  210 mL   Patient Observation   Observations 7.5 ett 23 at lip

## 2021-10-18 NOTE — CARE COORDINATION
INTERDISCIPLINARY PLAN OF CARE CONFERENCE    Date/Time: 10/18/2021 10:19 AM  Completed by: Madai Larson RN, Case Management      Patient Name:  Puneet Conner  YOB: 1955  Admitting Diagnosis: Dehydration [E86.0]  Hypokalemia [E87.6]  Hypomagnesemia [E83.42]  Positive D dimer [R79.89]  CHARLY (acute kidney injury) ([facility] [address] [N17.9]  Acute respiratory failure with hypoxia ([facility] [address] [J96.01]  Pneumonia due to COVID-19 virus [U07.1, J12.82]  COVID-19 [U07.1]     Admit Date/Time:  9/26/2021  8:29 PM    Chart reviewed. Interdisciplinary team contacted or reviewed plan related to patient progress and discharge plans. Disciplines included Case Management, Nursing, and Dietitian. Current Status:INPT, ICU, Vent, C-19 isolation  PT/OT recommendation for discharge plan of care: TBD    Expected D/C Disposition:  Rehab     Discharge Plan Comments: Pt cont in ICU on vent in Covid-19 isolation. CM spoke with Marisela with [facility] who confirms that she is still following with pt. CM will cont to follow.      Home O2 in place on admit: No  Pt informed of need to bring portable home O2 tank on day of discharge for nursing to connect prior to leaving:  Not Indicated  Verbalized agreement/Understanding:  Not Indicated

## 2021-10-18 NOTE — PLAN OF CARE
Problem: Falls - Risk of:  Goal: Will remain free from falls  Description: Will remain free from falls  Outcome: Ongoing  Note: Fall precautions in place. Goal: Absence of physical injury  Description: Absence of physical injury  Outcome: Ongoing     Problem: Skin Integrity:  Goal: Will show no infection signs and symptoms  Description: Will show no infection signs and symptoms  Outcome: Ongoing  Note: Patient turned/repositioned every 2 hours and as needed to maintain and improve skin integrity. Goal: Absence of new skin breakdown  Description: Absence of new skin breakdown  Outcome: Ongoing     Problem: Airway Clearance - Ineffective  Goal: Achieve or maintain patent airway  Outcome: Ongoing     Problem: Gas Exchange - Impaired  Goal: Absence of hypoxia  Outcome: Ongoing  Goal: Promote optimal lung function  Outcome: Ongoing     Problem: Breathing Pattern - Ineffective  Goal: Ability to achieve and maintain a regular respiratory rate  Outcome: Ongoing     Problem:  Body Temperature -  Risk of, Imbalanced  Goal: Ability to maintain a body temperature within defined limits  Outcome: Ongoing  Goal: Will regain or maintain usual level of consciousness  Outcome: Ongoing  Goal: Complications related to the disease process, condition or treatment will be avoided or minimized  Outcome: Ongoing     Problem: Isolation Precautions - Risk of Spread of Infection  Goal: Prevent transmission of infection  Outcome: Ongoing     Problem: Nutrition Deficits  Goal: Optimize nutritional status  Outcome: Ongoing     Problem: Risk for Fluid Volume Deficit  Goal: Maintain normal heart rhythm  Outcome: Ongoing  Goal: Maintain absence of muscle cramping  Outcome: Ongoing  Goal: Maintain normal serum potassium, sodium, calcium, phosphorus, and pH  Outcome: Ongoing     Problem: Loneliness or Risk for Loneliness  Goal: Demonstrate positive use of time alone when socialization is not possible  Outcome: Ongoing     Problem: Fatigue  Goal: Verbalize increase energy and improved vitality  Outcome: Ongoing     Problem: Patient Education: Go to Patient Education Activity  Goal: Patient/Family Education  Outcome: Ongoing     Problem: Pain:  Goal: Pain level will decrease  Description: Pain level will decrease  Outcome: Ongoing  Goal: Control of acute pain  Description: Control of acute pain  Outcome: Ongoing  Goal: Control of chronic pain  Description: Control of chronic pain  Outcome: Ongoing     Problem: Non-Violent Restraints  Goal: Removal from restraints as soon as assessed to be safe  Outcome: Ongoing  Note: Bilateral soft wrist restraints remain in place for patient safety, and to protect all lines and airways. Goal: No harm/injury to patient while restraints in use  Outcome: Ongoing  Goal: Patient's dignity will be maintained  Outcome: Ongoing     Problem: Nutrition  Goal: Optimal nutrition therapy  Outcome: Ongoing  Note: Patient receiving nutrition via continuous tube feed.   Goal: Understanding of nutritional guidelines  Outcome: Ongoing

## 2021-10-18 NOTE — PLAN OF CARE
Nutrition Problem #1: Inadequate oral intake  Intervention: Food and/or Nutrient Delivery: Continue NPO, Modify Tube Feeding  Nutritional Goals: patient will tolerate Vital AF 1.2 at goal rate of 60 ml/hr x 20 hours without GI distress, without s/s of aspiration, and without additional lab/fluid disturbances

## 2021-10-18 NOTE — PROGRESS NOTES
Hospitalist Progress Note      PCP: Vadim Caputo MD    Date of Admission: 9/26/2021      Admitted with COVID-19 pneumonia,  acute hypoxic respiratory failure. Patient not vaccinated  Progressive significant worsening hypoxemia overnight. Patient was on 2 L of oxygen on admission-> switched to high flow oxygen per Vapotherm with additional 100% nonrebreather on 9/28/2021-> transferred to ICU.    10/13   cont to be intubated, bradycardic, on precedex gtt    10/14  Increasing oxygen requirement on the vent today  Bradycardic, on Precedex drip    10/15  Ventilator dyssynchrony. 10/16  Remains intubated. Follows commands. 10/17  Intubated, sedated with fentanyl and propofol. Likely looking at tracheostomy and possibly LTAC for slow vent weaning. O2 sats reasonable today. 10/18  Intubated and sedated  FiO2 60% and 8 of PEEP. Family at bedside.         Medications:  Reviewed    Infusion Medications    midazolam 2 mg/hr (10/18/21 1235)    norepinephrine Stopped (10/18/21 0616)    fentaNYL 200 mcg/hr (10/18/21 1235)    propofol 35 mcg/kg/min (10/18/21 1235)    sodium chloride      dextrose      sodium chloride       Scheduled Medications    insulin lispro  0-18 Units SubCUTAneous TID WC    insulin lispro  0-9 Units SubCUTAneous Nightly    dexamethasone  3 mg IntraVENous Q24H    sertraline  50 mg Per NG tube Daily    Venelex   Topical BID    miconazole   Topical BID    oxyCODONE  10 mg Oral Q6H    influenza virus vaccine  0.5 mL IntraMUSCular Prior to discharge    chlorhexidine  15 mL Mouth/Throat BID    ipratropium-albuterol  1 ampule Inhalation Q4H    pantoprazole  40 mg IntraVENous Daily    levothyroxine  100 mcg Oral Daily    enoxaparin  30 mg SubCUTAneous BID    lidocaine 1 % injection  5 mL IntraDERmal Once    sodium chloride flush  5-40 mL IntraVENous 2 times per day    aspirin  81 mg Oral Daily    gabapentin  800 mg Oral TID    atorvastatin  40 mg Oral Daily    sodium chloride flush  5-40 mL IntraVENous 2 times per day     PRN Meds: magnesium sulfate, potassium chloride, labetalol, carboxymethylcellulose PF **AND** artificial tears, fentanNYL, midazolam, sodium chloride flush, sodium chloride, glucose, dextrose, glucagon (rDNA), dextrose, sodium chloride flush, sodium chloride, polyethylene glycol, acetaminophen **OR** acetaminophen, prochlorperazine      Intake/Output Summary (Last 24 hours) at 10/18/2021 1412  Last data filed at 10/18/2021 1235  Gross per 24 hour   Intake 2216.92 ml   Output 2500 ml   Net -283.08 ml       Physical Exam Performed:    BP (!) 108/49   Pulse 72   Temp 97.9 °F (36.6 °C) (Axillary)   Resp 21   Ht 5' 9\" (1.753 m)   Wt 167 lb 1.6 oz (75.8 kg)   SpO2 90%   BMI 24.68 kg/m²     General:intubated on mechanical ventilation . Supine position . Skin:  Warm and dry  Neck:  JVD absent. Neck supple  Chest: diminished breath sounds, improved air entry. No wheezes or rhonchi.  Bibasilar crackles. Cardiovascular:  RRR ,S1S2 normal  Abdomen:  Soft, non tender, non distended, BS +  Extremities:  No edema. Intact peripheral pulses. Brisk cap refill, < 2 secs  Neuro: intubated, opens her eyes.       Labs:   Recent Labs     10/16/21  0400 10/17/21  0540 10/18/21  0500   WBC 9.4 8.3 7.4   HGB 11.2* 10.3* 10.4*   HCT 34.8* 32.4* 34.0*   * 98* 103*     Recent Labs     10/16/21  0400 10/17/21  0540 10/18/21  0500    142 141   K 3.5 3.7 3.9    101 100   CO2 34* 35* 36*   BUN 11 17 15   CREATININE <0.5* <0.5* <0.5*   CALCIUM 9.1 9.3 9.2     Recent Labs     10/16/21  0400 10/17/21  0540 10/18/21  0500   AST 27 26 24   ALT 25 22 22   BILITOT 0.4 <0.2 <0.2   ALKPHOS 142* 131* 130*     No results for input(s): INR in the last 72 hours. No results for input(s): Gabbie Horsfall in the last 72 hours.     Urinalysis:      Lab Results   Component Value Date    NITRU Negative 09/27/2021    WBCUA 0-2 09/27/2021    BACTERIA Rare 09/27/2021 RBCUA None seen 09/27/2021    BLOODU TRACE-INTACT 09/27/2021    SPECGRAV <=1.005 09/27/2021    GLUCOSEU >=1000 09/27/2021    GLUCOSEU 250 05/11/2011       Radiology:  XR CHEST PORTABLE   Final Result   No significant change in the multifocal bilateral infiltrates         XR CHEST PORTABLE   Final Result   Diffuse bilateral airspace disease appears unchanged on the right and   increased on the left. XR CHEST PORTABLE   Final Result   Bilateral pleuroparenchymal disease, with improved aeration of the right lung   compared to prior. XR CHEST PORTABLE   Final Result   Line and tube as above. Bilateral interstitial opacities are worsened when compared to prior study. XR CHEST PORTABLE   Final Result   Stable chest.  Diffuse interstitial changes, possibly infiltrate or edema. Satisfactory position of endotracheal tube. XR CHEST PORTABLE   Final Result   Stable support apparatus. In this case, both multifocal pneumonia and pulmonary edema are both   considered, and the changes appear increased when compared to the previous   exam.         XR CHEST PORTABLE   Final Result   1. Stable appropriate positions of support apparatus. 2. Slight interval progression of multifocal airspace disease throughout both   lungs, likely a combination of multifocal pneumonia and superimposed   pulmonary edema. 3. Stable cardiomegaly and small bilateral pleural effusions. XR CHEST PORTABLE   Final Result   Improving aeration of the right lung with grossly stable left basilar   airspace opacities. XR CHEST PORTABLE   Final Result   Supportive tubing is in stable position. Stable pattern of bilateral ground-glass opacities and basilar consolidations. XR CHEST PORTABLE   Final Result   Stable appearance of the chest.  Diffuse ground-glass opacities on the right   and mild left basilar opacities.          XR CHEST PORTABLE   Final Result   Improved aeration on the left, possibly improving pulmonary edema. Persistent airspace disease in the right base may represent concurrent   pneumonia         XR ABDOMEN (KUB) (SINGLE AP VIEW)   Final Result   Enteric catheter tip likely in the distal gastric body. XR CHEST PORTABLE   Final Result   1. Endotracheal tube projects in the appropriate position. 2. Enteric tube extends below the diaphragm and out of the field of view. XR CHEST PORTABLE   Final Result   Increasing diffuse airspace opacification throughout the lungs. Pattern may   represent pulmonary edema or worsening pneumonitis. XR CHEST PORTABLE   Final Result   1. Interval increased bilateral pulmonary opacities with new consolidative   change at the right lung base. These findings could be secondary to   pulmonary edema or infection. 2. Consolidative change at the right lung base could also be secondary to   atelectasis/mucous plug. XR CHEST PORTABLE   Final Result   No significant interval change in small right pleural effusion or bilateral   heterogeneous opacity which can reflect pulmonary edema or pneumonia. XR CHEST PORTABLE   Final Result   Mild improvement from prior comparison. Mild-to-moderate congestion and/or   infiltrates identified in the lungs. ET tube terminates 7 cm superior to the saba. XR CHEST PORTABLE   Final Result   Endotracheal tube and orogastric tube unchanged, adequate position. Slightly increased extensive bilateral pulmonary disease over the past 24   hours this may relate to interstitial edema or diffuse infection or a   combination. XR CHEST PORTABLE   Final Result   Endotracheal tube with its tip approximately 4.7 cm from the saba in   satisfactory position. Enteric tube in satisfactory position. Patchy bilateral airspace disease is again noted with improved aeration of   the left mid lung.          XR CHEST PORTABLE   Final Result   Some improvement in the appearance of the chest with clearing of some of the   acute airspace disease from the mid left lung. Large amount of pneumonia   remains within the right lung and lower left lung. XR CHEST PORTABLE   Final Result   1. Endotracheal tube, nasogastric tube, and right PICC line placement. 2.  Increasing multifocal opacities with consolidative area in the right   perihilar lung. Could be multifocal pneumonia with or without edema. IR PICC WO SQ PORT/PUMP > 5 YEARS   Final Result   1. See above. CT CHEST PULMONARY EMBOLISM W CONTRAST   Final Result   No evidence of pulmonary embolism. Scattered peripheral opacities in the left lung and more consolidative area   in the right lung concerning for pneumonia. Previous right thoracotomy and   upper lobectomy. One mildly enlarged right paratracheal lymph node is present but no priors   for comparison. Nonenlarged but prominent lymph nodes in the upper abdomen   and juxta diaphragmatic region. XR CHEST PORTABLE   Final Result   Mild cardiomegaly without interstitial edema. Metallic surgical clips from prior right thoracotomy. COPD with anterior segment-right upper lobe alveolar pneumonia. Old pleural thickening was noted along the right lateral chest wall. Pleural   thickening and or trace effusion blunts the right costophrenic angle.          XR CHEST PORTABLE    (Results Pending)           Assessment/Plan:    Active Hospital Problems    Diagnosis     Acute hypoxemic respiratory failure ([address] [J96.01]     Pneumonia due to Pseudomonas species ([address] [J15.1]     Hypomagnesemia [E83.42]     Hypokalemia [E87.6]     Gram-negative pneumonia (HCC) [J15.6]     Mild protein-calorie malnutrition (HCC) [E44.1]     Hypotension [I95.9]     Acute respiratory failure with hypoxia ([address] [J96.01]     COVID-19 [U07.1]     Pneumonia due to COVID-19 virus [U07.1, J12.82]     CHARLY (acute kidney injury) ([address] [N17.9]     Uncontrolled hypertension [I10]     Fever [R50.9]     Suspected COVID-19 virus infection [Z20.822]     Hypoxia [R09.02]     Dehydration [E86.0]     DM2 (diabetes mellitus, type 2) [facility] [E11.9]     Essential hypertension [I10]     Hypothyroidism [E03.9]             #COVID-19 pneumonia  #Acute hypoxic respiratory failure   Pseudomonas HCAP. -Unvaccinated patient  - she presented with cough, fever, dyspnea in the setting of household contact testing + COVID 19  - Her Covid testing came back positive on admission  - Initially on 2 L of oxygen on presentation.  She started desaturating quickly . worsening hypoxemia requiring high flow oxygen. - Pulmonology consulted  -placed on high flow oxygen per Vapotherm 40 L,FiO2 100% ,with additional nonrebreather . Transferred to ICU on 9/28. Intubated early AM  9/29/2021.  Extubated on 10/4/2021.  She was on Vapotherm.  Pulmonary status got worse and she was reintubated on 10/6/2021.   - s/p Remdesivir  - on Decadron, day # 21 - being tapered   - given 1 dose of Tocilizumab  -Lovenox twice daily  Increasing oxygen requirement on the vent 10/14  Ventilator dyssynchrony. Continue home oxycodone and Neurontin       #Possible superimposed bacterial pneumonia  Gram-negative infection  -Was on Rocephin and Zithromax day 5 of 5; - changed to zosyn on 10/2 with pseudomonas on resp culture.  Pulmonary has started Zyvox.  Hold Zoloft when patient is getting Zyvox . Completed Zosyn day 8 of 8.    Treated with Zyvox - now dced  Respiratory cultures now growing Klebsiella, initiated on Cipro -completed 7 days.       #Hypokalemia  - replaced       #Dehydration  -  Improved with IVF  Creatinine normal       #CAD  - cont ASA, statin  - EKG with inferior T wave abnormality.  She denies CP.  Trop neg   -telemetry monitoring       #History of tracheobronchomalacia  - Has had surgical treatment for this       #HTN  -blood pressure is low and she was on Levophed.  This has been discontinued.  Resumed home medications-Norvasc and losartan.       #Chronic pain   History of hip fracture  - cont home oxycodone BID and percocet   - cont gabapentin and requip       #DM2  - use ssi        #Hypothyroidism  - cont synthroid         DVT Prophylaxis: Lovenox   Diet: Diet NPO  ADULT TUBE FEEDING; Orogastric; Peptide Based; Continuous; 20; Yes; 15; Q 4 hours; 55; 30; Q 3 hours  Code Status: Full Code    PT/OT Eval Status: ordered  Discussed with family at bedside.  would like to avoid trach if possible due to complicated airway history.   Dispo - cont care in Camila Jean MD

## 2021-10-18 NOTE — PROGRESS NOTES
AM assessment complete, VSS, pts room feels cold, pts skin cool as well, apply warm blankets and adjust room temp for pt comfort.

## 2021-10-18 NOTE — PROGRESS NOTES
10/18/21 0100   RT Protocol   History Pulmonary Disease 2   Respiratory pattern 2   Breath sounds 2   Cough 1   Indications for Bronchodilator Therapy Decreased or absent breath sounds   Bronchodilator Assessment Score 7

## 2021-10-19 ENCOUNTER — APPOINTMENT (OUTPATIENT)
Dept: GENERAL RADIOLOGY | Age: 66
DRG: 004 | End: 2021-10-19
Payer: MEDICARE

## 2021-10-19 LAB
A/G RATIO: 1.1 (ref 1.1–2.2)
ALBUMIN SERPL-MCNC: 3.3 G/DL (ref 3.4–5)
ALP BLD-CCNC: 115 U/L (ref 40–129)
ALT SERPL-CCNC: 19 U/L (ref 10–40)
ANION GAP SERPL CALCULATED.3IONS-SCNC: 4 MMOL/L (ref 3–16)
AST SERPL-CCNC: 22 U/L (ref 15–37)
BASE EXCESS ARTERIAL: 8.8 MMOL/L (ref -3–3)
BASOPHILS ABSOLUTE: 0 K/UL (ref 0–0.2)
BASOPHILS RELATIVE PERCENT: 0.8 %
BILIRUB SERPL-MCNC: <0.2 MG/DL (ref 0–1)
BUN BLDV-MCNC: 16 MG/DL (ref 7–20)
CALCIUM SERPL-MCNC: 9.4 MG/DL (ref 8.3–10.6)
CARBOXYHEMOGLOBIN ARTERIAL: 0.4 % (ref 0–1.5)
CHLORIDE BLD-SCNC: 100 MMOL/L (ref 99–110)
CO2: 38 MMOL/L (ref 21–32)
CREAT SERPL-MCNC: <0.5 MG/DL (ref 0.6–1.2)
EOSINOPHILS ABSOLUTE: 0.3 K/UL (ref 0–0.6)
EOSINOPHILS RELATIVE PERCENT: 5.4 %
GFR AFRICAN AMERICAN: >60
GFR NON-AFRICAN AMERICAN: >60
GLOBULIN: 2.9 G/DL
GLUCOSE BLD-MCNC: 159 MG/DL (ref 70–99)
GLUCOSE BLD-MCNC: 168 MG/DL (ref 70–99)
GLUCOSE BLD-MCNC: 182 MG/DL (ref 70–99)
GLUCOSE BLD-MCNC: 184 MG/DL (ref 70–99)
GLUCOSE BLD-MCNC: 187 MG/DL (ref 70–99)
GLUCOSE BLD-MCNC: 210 MG/DL (ref 70–99)
GLUCOSE BLD-MCNC: 230 MG/DL (ref 70–99)
GLUCOSE BLD-MCNC: 295 MG/DL (ref 70–99)
HCO3 ARTERIAL: 35.6 MMOL/L (ref 21–29)
HCT VFR BLD CALC: 29.4 % (ref 36–48)
HEMOGLOBIN, ART, EXTENDED: 10.3 G/DL (ref 12–16)
HEMOGLOBIN: 9.3 G/DL (ref 12–16)
LYMPHOCYTES ABSOLUTE: 1.5 K/UL (ref 1–5.1)
LYMPHOCYTES RELATIVE PERCENT: 29.6 %
MCH RBC QN AUTO: 25.2 PG (ref 26–34)
MCHC RBC AUTO-ENTMCNC: 31.8 G/DL (ref 31–36)
MCV RBC AUTO: 79.1 FL (ref 80–100)
METHEMOGLOBIN ARTERIAL: 0.3 %
MONOCYTES ABSOLUTE: 0.5 K/UL (ref 0–1.3)
MONOCYTES RELATIVE PERCENT: 9.3 %
NEUTROPHILS ABSOLUTE: 2.8 K/UL (ref 1.7–7.7)
NEUTROPHILS RELATIVE PERCENT: 54.9 %
O2 SAT, ARTERIAL: 89.7 %
O2 THERAPY: ABNORMAL
PCO2 ARTERIAL: 61.9 MMHG (ref 35–45)
PDW BLD-RTO: 18.5 % (ref 12.4–15.4)
PERFORMED ON: ABNORMAL
PH ARTERIAL: 7.38 (ref 7.35–7.45)
PLATELET # BLD: 84 K/UL (ref 135–450)
PMV BLD AUTO: 9.6 FL (ref 5–10.5)
PO2 ARTERIAL: 60 MMHG (ref 75–108)
POTASSIUM REFLEX MAGNESIUM: 3.9 MMOL/L (ref 3.5–5.1)
RBC # BLD: 3.71 M/UL (ref 4–5.2)
SODIUM BLD-SCNC: 142 MMOL/L (ref 136–145)
TCO2 ARTERIAL: 37.5 MMOL/L
TOTAL PROTEIN: 6.2 G/DL (ref 6.4–8.2)
WBC # BLD: 5.1 K/UL (ref 4–11)

## 2021-10-19 PROCEDURE — 94761 N-INVAS EAR/PLS OXIMETRY MLT: CPT

## 2021-10-19 PROCEDURE — 2700000000 HC OXYGEN THERAPY PER DAY

## 2021-10-19 PROCEDURE — 94640 AIRWAY INHALATION TREATMENT: CPT

## 2021-10-19 PROCEDURE — 6370000000 HC RX 637 (ALT 250 FOR IP): Performed by: INTERNAL MEDICINE

## 2021-10-19 PROCEDURE — 2500000003 HC RX 250 WO HCPCS: Performed by: INTERNAL MEDICINE

## 2021-10-19 PROCEDURE — 6360000002 HC RX W HCPCS: Performed by: INTERNAL MEDICINE

## 2021-10-19 PROCEDURE — 2000000000 HC ICU R&B

## 2021-10-19 PROCEDURE — 85025 COMPLETE CBC W/AUTO DIFF WBC: CPT

## 2021-10-19 PROCEDURE — 94003 VENT MGMT INPAT SUBQ DAY: CPT

## 2021-10-19 PROCEDURE — 36592 COLLECT BLOOD FROM PICC: CPT

## 2021-10-19 PROCEDURE — 71045 X-RAY EXAM CHEST 1 VIEW: CPT

## 2021-10-19 PROCEDURE — 80053 COMPREHEN METABOLIC PANEL: CPT

## 2021-10-19 PROCEDURE — 2580000003 HC RX 258: Performed by: INTERNAL MEDICINE

## 2021-10-19 PROCEDURE — C9113 INJ PANTOPRAZOLE SODIUM, VIA: HCPCS | Performed by: INTERNAL MEDICINE

## 2021-10-19 PROCEDURE — 99233 SBSQ HOSP IP/OBS HIGH 50: CPT | Performed by: INTERNAL MEDICINE

## 2021-10-19 PROCEDURE — 82803 BLOOD GASES ANY COMBINATION: CPT

## 2021-10-19 PROCEDURE — 99291 CRITICAL CARE FIRST HOUR: CPT | Performed by: INTERNAL MEDICINE

## 2021-10-19 RX ADMIN — Medication 225 MCG/HR: at 12:21

## 2021-10-19 RX ADMIN — MICONAZOLE NITRATE: 2 POWDER TOPICAL at 08:16

## 2021-10-19 RX ADMIN — IPRATROPIUM BROMIDE AND ALBUTEROL SULFATE 1 AMPULE: .5; 2.5 SOLUTION RESPIRATORY (INHALATION) at 22:35

## 2021-10-19 RX ADMIN — OXYCODONE 10 MG: 5 TABLET ORAL at 21:04

## 2021-10-19 RX ADMIN — SODIUM CHLORIDE, PRESERVATIVE FREE 10 ML: 5 INJECTION INTRAVENOUS at 08:20

## 2021-10-19 RX ADMIN — ENOXAPARIN SODIUM 30 MG: 30 INJECTION SUBCUTANEOUS at 21:05

## 2021-10-19 RX ADMIN — Medication 225 MCG/HR: at 07:42

## 2021-10-19 RX ADMIN — IPRATROPIUM BROMIDE AND ALBUTEROL SULFATE 1 AMPULE: .5; 2.5 SOLUTION RESPIRATORY (INHALATION) at 08:05

## 2021-10-19 RX ADMIN — ENOXAPARIN SODIUM 30 MG: 30 INJECTION SUBCUTANEOUS at 10:26

## 2021-10-19 RX ADMIN — MIDAZOLAM HYDROCHLORIDE 2 MG: 1 INJECTION, SOLUTION INTRAMUSCULAR; INTRAVENOUS at 13:54

## 2021-10-19 RX ADMIN — IPRATROPIUM BROMIDE AND ALBUTEROL SULFATE 1 AMPULE: .5; 2.5 SOLUTION RESPIRATORY (INHALATION) at 03:49

## 2021-10-19 RX ADMIN — OXYCODONE 10 MG: 5 TABLET ORAL at 10:26

## 2021-10-19 RX ADMIN — INSULIN LISPRO 6 UNITS: 100 INJECTION, SOLUTION INTRAVENOUS; SUBCUTANEOUS at 15:32

## 2021-10-19 RX ADMIN — ASPIRIN 81 MG: 81 TABLET, CHEWABLE ORAL at 08:16

## 2021-10-19 RX ADMIN — PROPOFOL 50 MCG/KG/MIN: 10 INJECTION, EMULSION INTRAVENOUS at 21:08

## 2021-10-19 RX ADMIN — LEVOTHYROXINE SODIUM 100 MCG: 100 TABLET ORAL at 05:37

## 2021-10-19 RX ADMIN — MICONAZOLE NITRATE: 2 POWDER TOPICAL at 21:05

## 2021-10-19 RX ADMIN — INSULIN LISPRO 9 UNITS: 100 INJECTION, SOLUTION INTRAVENOUS; SUBCUTANEOUS at 20:15

## 2021-10-19 RX ADMIN — GABAPENTIN 800 MG: 400 CAPSULE ORAL at 21:05

## 2021-10-19 RX ADMIN — DEXAMETHASONE SODIUM PHOSPHATE 3 MG: 4 INJECTION, SOLUTION INTRAMUSCULAR; INTRAVENOUS at 12:00

## 2021-10-19 RX ADMIN — PROPOFOL 40 MCG/KG/MIN: 10 INJECTION, EMULSION INTRAVENOUS at 13:07

## 2021-10-19 RX ADMIN — Medication 225 MCG/HR: at 02:09

## 2021-10-19 RX ADMIN — IPRATROPIUM BROMIDE AND ALBUTEROL SULFATE 1 AMPULE: .5; 2.5 SOLUTION RESPIRATORY (INHALATION) at 19:02

## 2021-10-19 RX ADMIN — MIDAZOLAM HYDROCHLORIDE 2 MG: 1 INJECTION, SOLUTION INTRAMUSCULAR; INTRAVENOUS at 16:47

## 2021-10-19 RX ADMIN — OXYCODONE 10 MG: 5 TABLET ORAL at 15:29

## 2021-10-19 RX ADMIN — GABAPENTIN 800 MG: 400 CAPSULE ORAL at 08:15

## 2021-10-19 RX ADMIN — INSULIN LISPRO 3 UNITS: 100 INJECTION, SOLUTION INTRAVENOUS; SUBCUTANEOUS at 11:58

## 2021-10-19 RX ADMIN — PROPOFOL 40 MCG/KG/MIN: 10 INJECTION, EMULSION INTRAVENOUS at 06:49

## 2021-10-19 RX ADMIN — OXYCODONE 10 MG: 5 TABLET ORAL at 04:24

## 2021-10-19 RX ADMIN — PANTOPRAZOLE SODIUM 40 MG: 40 INJECTION, POWDER, FOR SOLUTION INTRAVENOUS at 08:15

## 2021-10-19 RX ADMIN — GABAPENTIN 800 MG: 400 CAPSULE ORAL at 13:53

## 2021-10-19 RX ADMIN — Medication 225 MCG/HR: at 16:46

## 2021-10-19 RX ADMIN — IPRATROPIUM BROMIDE AND ALBUTEROL SULFATE 1 AMPULE: .5; 2.5 SOLUTION RESPIRATORY (INHALATION) at 11:48

## 2021-10-19 RX ADMIN — CASTOR OIL AND BALSAM, PERU: 788; 87 OINTMENT TOPICAL at 21:05

## 2021-10-19 RX ADMIN — CHLORHEXIDINE GLUCONATE 0.12% ORAL RINSE 15 ML: 1.2 LIQUID ORAL at 20:17

## 2021-10-19 RX ADMIN — Medication 225 MCG/HR: at 21:09

## 2021-10-19 RX ADMIN — SERTRALINE HYDROCHLORIDE 50 MG: 50 TABLET ORAL at 08:16

## 2021-10-19 RX ADMIN — ATORVASTATIN CALCIUM 40 MG: 40 TABLET, FILM COATED ORAL at 08:15

## 2021-10-19 RX ADMIN — IPRATROPIUM BROMIDE AND ALBUTEROL SULFATE 1 AMPULE: .5; 2.5 SOLUTION RESPIRATORY (INHALATION) at 15:06

## 2021-10-19 RX ADMIN — SODIUM CHLORIDE, PRESERVATIVE FREE 10 ML: 5 INJECTION INTRAVENOUS at 08:18

## 2021-10-19 RX ADMIN — CASTOR OIL AND BALSAM, PERU: 788; 87 OINTMENT TOPICAL at 10:43

## 2021-10-19 RX ADMIN — PROPOFOL 50 MCG/KG/MIN: 10 INJECTION, EMULSION INTRAVENOUS at 18:00

## 2021-10-19 RX ADMIN — SODIUM CHLORIDE, PRESERVATIVE FREE 10 ML: 5 INJECTION INTRAVENOUS at 21:04

## 2021-10-19 RX ADMIN — CHLORHEXIDINE GLUCONATE 0.12% ORAL RINSE 15 ML: 1.2 LIQUID ORAL at 08:16

## 2021-10-19 RX ADMIN — PROPOFOL 40 MCG/KG/MIN: 10 INJECTION, EMULSION INTRAVENOUS at 03:22

## 2021-10-19 RX ADMIN — Medication 225 MCG/HR: at 06:51

## 2021-10-19 ASSESSMENT — PULMONARY FUNCTION TESTS
PIF_VALUE: 35
PIF_VALUE: 35
PIF_VALUE: 38
PIF_VALUE: 50
PIF_VALUE: 33
PIF_VALUE: 24
PIF_VALUE: 43
PIF_VALUE: 35
PIF_VALUE: 38
PIF_VALUE: 35
PIF_VALUE: 38
PIF_VALUE: 37
PIF_VALUE: 50
PIF_VALUE: 38
PIF_VALUE: 37
PIF_VALUE: 49
PIF_VALUE: 36
PIF_VALUE: 39
PIF_VALUE: 39
PIF_VALUE: 38
PIF_VALUE: 38
PIF_VALUE: 35
PIF_VALUE: 37
PIF_VALUE: 50
PIF_VALUE: 37
PIF_VALUE: 35

## 2021-10-19 ASSESSMENT — PAIN SCALES - GENERAL
PAINLEVEL_OUTOF10: 0

## 2021-10-19 NOTE — PROGRESS NOTES
Hospitalist Progress Note      PCP: Alma Rosa Bundy MD    Date of Admission: 9/26/2021      Admitted with COVID-19 pneumonia,  acute hypoxic respiratory failure. Patient not vaccinated  Progressive significant worsening hypoxemia overnight. Patient was on 2 L of oxygen on admission-> switched to high flow oxygen per Vapotherm with additional 100% nonrebreather on 9/28/2021-> transferred to ICU. Continues to be on the vent.   60% 8 PEEP  Family not keen on tracheostomy        Medications:  Reviewed    Infusion Medications    midazolam 2 mg/hr (10/19/21 0545)    norepinephrine Stopped (10/18/21 0616)    fentaNYL 225 mcg/hr (10/19/21 1221)    propofol 40 mcg/kg/min (10/19/21 1307)    sodium chloride      dextrose      sodium chloride       Scheduled Medications    insulin lispro  0-18 Units SubCUTAneous Q4H    dexamethasone  3 mg IntraVENous Q24H    sertraline  50 mg Per NG tube Daily    Venelex   Topical BID    miconazole   Topical BID    oxyCODONE  10 mg Oral Q6H    influenza virus vaccine  0.5 mL IntraMUSCular Prior to discharge    chlorhexidine  15 mL Mouth/Throat BID    ipratropium-albuterol  1 ampule Inhalation Q4H    pantoprazole  40 mg IntraVENous Daily    levothyroxine  100 mcg Oral Daily    enoxaparin  30 mg SubCUTAneous BID    lidocaine 1 % injection  5 mL IntraDERmal Once    sodium chloride flush  5-40 mL IntraVENous 2 times per day    aspirin  81 mg Oral Daily    gabapentin  800 mg Oral TID    atorvastatin  40 mg Oral Daily    sodium chloride flush  5-40 mL IntraVENous 2 times per day     PRN Meds: magnesium sulfate, potassium chloride, labetalol, carboxymethylcellulose PF **AND** artificial tears, fentanNYL, midazolam, sodium chloride flush, sodium chloride, glucose, dextrose, glucagon (rDNA), dextrose, sodium chloride flush, sodium chloride, polyethylene glycol, acetaminophen **OR** acetaminophen, prochlorperazine      Intake/Output Summary (Last 24 hours) at 10/19/2021 1448  Last data filed at 10/19/2021 1307  Gross per 24 hour   Intake 1818.42 ml   Output 1630 ml   Net 188.42 ml       Physical Exam Performed:    BP (!) 111/42   Pulse 69   Temp 97.7 °F (36.5 °C) (Axillary)   Resp 24   Ht 5' 9\" (1.753 m)   Wt 168 lb (76.2 kg)   SpO2 (!) 89%   BMI 24.81 kg/m²     General:intubated on mechanical ventilation . Supine position . Skin:  Warm and dry  Neck:  JVD absent. Neck supple  Chest: diminished breath sounds, improved air entry. No wheezes or rhonchi.  Bibasilar crackles. Cardiovascular:  RRR ,S1S2 normal  Abdomen:  Soft, non tender, non distended, BS +  Extremities:  No edema. Intact peripheral pulses. Brisk cap refill, < 2 secs  Neuro: intubated, opens her eyes.       Labs:   Recent Labs     10/17/21  0540 10/18/21  0500 10/19/21  0435   WBC 8.3 7.4 5.1   HGB 10.3* 10.4* 9.3*   HCT 32.4* 34.0* 29.4*   PLT 98* 103* 84*     Recent Labs     10/17/21  0540 10/18/21  0500 10/19/21  0435    141 142   K 3.7 3.9 3.9    100 100   CO2 35* 36* 38*   BUN 17 15 16   CREATININE <0.5* <0.5* <0.5*   CALCIUM 9.3 9.2 9.4     Recent Labs     10/17/21  0540 10/18/21  0500 10/19/21  0435   AST 26 24 22   ALT 22 22 19   BILITOT <0.2 <0.2 <0.2   ALKPHOS 131* 130* 115     No results for input(s): INR in the last 72 hours. No results for input(s): Marisela Holstein in the last 72 hours. Urinalysis:      Lab Results   Component Value Date    NITRU Negative 09/27/2021    WBCUA 0-2 09/27/2021    BACTERIA Rare 09/27/2021    RBCUA None seen 09/27/2021    BLOODU TRACE-INTACT 09/27/2021    SPECGRAV <=1.005 09/27/2021    GLUCOSEU >=1000 09/27/2021    GLUCOSEU 250 05/11/2011       Radiology:  XR CHEST PORTABLE   Final Result   Unchanged multifocal bilateral pneumonia.          XR CHEST PORTABLE   Final Result   No significant change in the multifocal bilateral infiltrates         XR CHEST PORTABLE   Final Result   Diffuse bilateral airspace disease appears unchanged on the right and   increased on the left. XR CHEST PORTABLE   Final Result   Bilateral pleuroparenchymal disease, with improved aeration of the right lung   compared to prior. XR CHEST PORTABLE   Final Result   Line and tube as above. Bilateral interstitial opacities are worsened when compared to prior study. XR CHEST PORTABLE   Final Result   Stable chest.  Diffuse interstitial changes, possibly infiltrate or edema. Satisfactory position of endotracheal tube. XR CHEST PORTABLE   Final Result   Stable support apparatus. In this case, both multifocal pneumonia and pulmonary edema are both   considered, and the changes appear increased when compared to the previous   exam.         XR CHEST PORTABLE   Final Result   1. Stable appropriate positions of support apparatus. 2. Slight interval progression of multifocal airspace disease throughout both   lungs, likely a combination of multifocal pneumonia and superimposed   pulmonary edema. 3. Stable cardiomegaly and small bilateral pleural effusions. XR CHEST PORTABLE   Final Result   Improving aeration of the right lung with grossly stable left basilar   airspace opacities. XR CHEST PORTABLE   Final Result   Supportive tubing is in stable position. Stable pattern of bilateral ground-glass opacities and basilar consolidations. XR CHEST PORTABLE   Final Result   Stable appearance of the chest.  Diffuse ground-glass opacities on the right   and mild left basilar opacities. XR CHEST PORTABLE   Final Result   Improved aeration on the left, possibly improving pulmonary edema. Persistent airspace disease in the right base may represent concurrent   pneumonia         XR ABDOMEN (KUB) (SINGLE AP VIEW)   Final Result   Enteric catheter tip likely in the distal gastric body. XR CHEST PORTABLE   Final Result   1. Endotracheal tube projects in the appropriate position.    2. Enteric tube extends below the diaphragm and out of the field of view. XR CHEST PORTABLE   Final Result   Increasing diffuse airspace opacification throughout the lungs. Pattern may   represent pulmonary edema or worsening pneumonitis. XR CHEST PORTABLE   Final Result   1. Interval increased bilateral pulmonary opacities with new consolidative   change at the right lung base. These findings could be secondary to   pulmonary edema or infection. 2. Consolidative change at the right lung base could also be secondary to   atelectasis/mucous plug. XR CHEST PORTABLE   Final Result   No significant interval change in small right pleural effusion or bilateral   heterogeneous opacity which can reflect pulmonary edema or pneumonia. XR CHEST PORTABLE   Final Result   Mild improvement from prior comparison. Mild-to-moderate congestion and/or   infiltrates identified in the lungs. ET tube terminates 7 cm superior to the saba. XR CHEST PORTABLE   Final Result   Endotracheal tube and orogastric tube unchanged, adequate position. Slightly increased extensive bilateral pulmonary disease over the past 24   hours this may relate to interstitial edema or diffuse infection or a   combination. XR CHEST PORTABLE   Final Result   Endotracheal tube with its tip approximately 4.7 cm from the saba in   satisfactory position. Enteric tube in satisfactory position. Patchy bilateral airspace disease is again noted with improved aeration of   the left mid lung. XR CHEST PORTABLE   Final Result   Some improvement in the appearance of the chest with clearing of some of the   acute airspace disease from the mid left lung. Large amount of pneumonia   remains within the right lung and lower left lung. XR CHEST PORTABLE   Final Result   1. Endotracheal tube, nasogastric tube, and right PICC line placement.       2.  Increasing multifocal opacities with consolidative area in the right perihilar lung. Could be multifocal pneumonia with or without edema. IR PICC WO SQ PORT/PUMP > 5 YEARS   Final Result   1. See above. CT CHEST PULMONARY EMBOLISM W CONTRAST   Final Result   No evidence of pulmonary embolism. Scattered peripheral opacities in the left lung and more consolidative area   in the right lung concerning for pneumonia. Previous right thoracotomy and   upper lobectomy. One mildly enlarged right paratracheal lymph node is present but no priors   for comparison. Nonenlarged but prominent lymph nodes in the upper abdomen   and juxta diaphragmatic region. XR CHEST PORTABLE   Final Result   Mild cardiomegaly without interstitial edema. Metallic surgical clips from prior right thoracotomy. COPD with anterior segment-right upper lobe alveolar pneumonia. Old pleural thickening was noted along the right lateral chest wall. Pleural   thickening and or trace effusion blunts the right costophrenic angle. XR CHEST PORTABLE    (Results Pending)           Assessment/Plan:    Active Hospital Problems    Diagnosis     Acute hypoxemic respiratory failure ([address] [J96.01]     Pneumonia due to Pseudomonas species ([address] [J15.1]     Hypomagnesemia [E83.42]     Hypokalemia [E87.6]     Gram-negative pneumonia (HCC) [J15.6]     Severe protein-calorie malnutrition (HCC) [E43]     Hypotension [I95.9]     Acute respiratory failure with hypoxia (HCC) [J96.01]     COVID-19 [U07.1]     Pneumonia due to COVID-19 virus [U07.1, J12.82]     CHARLY (acute kidney injury) ([address] [N17.9]     Uncontrolled hypertension [I10]     Fever [R50.9]     Suspected COVID-19 virus infection [Z20.822]     Hypoxia [R09.02]     Dehydration [E86.0]     DM2 (diabetes mellitus, type 2) (HCC) [E11.9]     Essential hypertension [I10]     Hypothyroidism [E03.9]             #COVID-19 pneumonia  #Acute hypoxic respiratory failure   Pseudomonas HCAP.    -Unvaccinated patient  -

## 2021-10-19 NOTE — PROGRESS NOTES
Decreased FiO2 60%, SpO2 97%. Pt does double stack breaths often. Increase in peak pressure by 4 and plateau pressures by 2.

## 2021-10-19 NOTE — PROGRESS NOTES
Patient updates given to WOJCIECH Hinkle and Bryan Campoverde RN. Patient resting comfortably on the ventilator at present, Fentanyl continues at 225mcg/hr, Propofol at 40mcg/kg/min, and Versed at 2mg/hr. Care transferred.

## 2021-10-19 NOTE — PROGRESS NOTES
Pulmonary & Critical Care Medicine ICU Progress Note    CC: Respiratory failure    Events of Last 24 hours:    off levophed     Vascular lines: IV: PICC line 9/28    MV: 9/29-10/4, emergently re-intubated 10/6/21 for refractory hypoxemia  Vent Mode: AC/VC Rate Set: 22 bmp/Vt Ordered: 360 mL/ /FiO2 : 60 %  Recent Labs     10/18/21  0500 10/19/21  0435   PHART 7.394 7.378   [id number] 64.6* 61.9*   PO2ART 56.7* 60.0*     IV:   midazolam 2 mg/hr (10/19/21 0545)    norepinephrine Stopped (10/18/21 0616)    fentaNYL 225 mcg/hr (10/19/21 0651)    propofol 40 mcg/kg/min (10/19/21 0649)    sodium chloride      dextrose      sodium chloride       Vitals:  Blood pressure (!) 140/66, pulse 67, temperature 98.9 °F (37.2 °C), temperature source Axillary, resp. rate 20, height 5' 9\" (1.753 m), weight 168 lb (76.2 kg), SpO2 92 %, not currently breastfeeding. on ventilator      Intake/Output Summary (Last 24 hours) at 10/19/2021 0656  Last data filed at 10/19/2021 0545  Gross per 24 hour   Intake 1470.74 ml   Output 1680 ml   Net -209.26 ml     General: intubated, ill appearing    ENT: Pharynx with ETT. Resp: No crackles. No wheezing. CV: S1, S2. Trace edema  GI: NT, ND, +BS  Skin: Warm and dry. Neuro: PERRL. Sedated, not following commands.  Patellar reflexes are symmetric       Scheduled Meds:   insulin lispro  0-18 Units SubCUTAneous TID WC    insulin lispro  0-9 Units SubCUTAneous Nightly    dexamethasone  3 mg IntraVENous Q24H    sertraline  50 mg Per NG tube Daily    Venelex   Topical BID    miconazole   Topical BID    oxyCODONE  10 mg Oral Q6H    influenza virus vaccine  0.5 mL IntraMUSCular Prior to discharge    chlorhexidine  15 mL Mouth/Throat BID    ipratropium-albuterol  1 ampule Inhalation Q4H    pantoprazole  40 mg IntraVENous Daily    levothyroxine  100 mcg Oral Daily    enoxaparin  30 mg SubCUTAneous BID    lidocaine 1 % injection  5 mL IntraDERmal Once    sodium chloride flush  5-40 mL IntraVENous 2 times per day    aspirin  81 mg Oral Daily    gabapentin  800 mg Oral TID    atorvastatin  40 mg Oral Daily    sodium chloride flush  5-40 mL IntraVENous 2 times per day       Data:  CBC:   Recent Labs     10/17/21  0540 10/18/21  0500 10/19/21  0435   WBC 8.3 7.4 5.1   HGB 10.3* 10.4* 9.3*   HCT 32.4* 34.0* 29.4*   MCV 79.0* 79.6* 79.1*   PLT 98* 103* 84*     BMP:   Recent Labs     10/17/21  0540 10/18/21  0500 10/19/21  0435    141 142   K 3.7 3.9 3.9    100 100   CO2 35* 36* 38*   BUN 17 15 16   CREATININE <0.5* <0.5* <0.5*     LIVER PROFILE:   Recent Labs     10/17/21  0540 10/18/21  0500 10/19/21  0435   AST 26 24 22   ALT 22 22 19   BILITOT <0.2 <0.2 <0.2   ALKPHOS 131* 130* 115       Microbiology:  9/27/21 COVID-19 detected  9/30/21 Resp Pseudomonas fluorescens   10/7/2021 tracheal aspirate: Klebsiella intermediate to zosyn    Imaging:  Chest x-ray 10/18/2021 bilateral airspace disease    CTPA 9/27/21  No evidence of pulmonary embolism. Scattered peripheral opacities in the left lung and more consolidative area in the right lung concerning for pneumonia.  Previous right thoracotomy and upper lobectomy. One mildly enlarged right paratracheal lymph node is present but no priors for comparison.  Nonenlarged but prominent lymph nodes in the upper abdomen and juxta diaphragmatic region      ASSESSMENT:  · Acute hypoxemic respiratory failure   · COVID-19 viral pneumonia  · ARDS  · Pseudomonas followed by Klebsiella pneumonias  · Acute kidney injury  · CAD  · Chronic pain syndrome - on chronic opiates and Neurontin   · Possible early dementia, being worked up outpatient   · H/O bronchiectasis and tracheobronchomalacia/EDAC s/p tracheoplasty in 2014   · H/O EDAC (tracheobronchomalacia) s/p tracheoplasty     PLAN:  - COVID-19 isolation, droplet plus  -   Mechanical ventilation as per my orders.   The ventilator was adjusted by me at the bedside for unstable, life threatening respiratory failure. Proned 10/6/21-10/7/2; 10/11-10/12   Paralyzed 10/6/21 - 10/7/21  IV Propofol, Fentanyl & Versed for sedation, target RASS -1 to -2  Completed 7 days Cipro, 8 days Zosyn, 3 days Zyvox, prior to that 5 days of Ceftriaxone/Zithromax   Dexamethasone D#22, @ 2 mg  Completed Remdesivir & Tocilizumab  H-SSI    Continuing home oxycodone and neurontin    Lovenox for DVT prophylaxis  I met with family at bedside     Total critical care time caring for this patient with life threatening, unstable organ failure, including direct patient contact, management of life support systems, review of data including imaging and labs, discussions with other team members and physicians is 35 minutes, excluding procedures.

## 2021-10-19 NOTE — PROGRESS NOTES
Pt increasingly dyschronis  with ventilator, call to Dr Leighton Chen, received orders to titrate Versed gtt per protocol and give prn 2 mg versed as needed and/or increase Propofol gtt to 50 mcg/kg/min.

## 2021-10-19 NOTE — PROGRESS NOTES
ETT advanced two cm per MD request. Tube previously found at [email or web address], now [email or web address].

## 2021-10-19 NOTE — PROGRESS NOTES
Pt has  and daughter visiting, care rounds complete, family speaking with Dr Seb Tamez, no new orders.

## 2021-10-19 NOTE — PROGRESS NOTES
RT Inhaler-Nebulizer Bronchodilator Protocol Note    There is a bronchodilator order in the chart from a provider indicating to follow the RT Bronchodilator Protocol and there is an Initiate RT Inhaler-Nebulizer Bronchodilator Protocol order as well (see protocol at bottom of note). CXR Findings:  XR CHEST PORTABLE    Result Date: 10/19/2021  Unchanged multifocal bilateral pneumonia. XR CHEST PORTABLE    Result Date: 10/18/2021  No significant change in the multifocal bilateral infiltrates       The findings from the last RT Protocol Assessment were as follows:   History Pulmonary Disease: (P) Chronic pulmonary disease  Respiratory Pattern: (P) Dyspnea on exertion or RR 21-25 bpm  Breath Sounds: (P) Slightly diminished and/or crackles  Cough: (P) Unable to generate effective cough  Indication for Bronchodilator Therapy: (P) Decreased or absent breath sounds  Bronchodilator Assessment Score: (P) 10    Aerosolized bronchodilator medication orders have been revised according to the RT Inhaler-Nebulizer Bronchodilator Protocol below. Respiratory Therapist to perform RT Therapy Protocol Assessment initially then follow the protocol. Repeat RT Therapy Protocol Assessment PRN for score 0-3 or on second treatment, BID, and PRN for scores above 3. No Indications - adjust the frequency to every 6 hours PRN wheezing or bronchospasm, if no treatments needed after 48 hours then discontinue using Per Protocol order mode. If indication present, adjust the RT bronchodilator orders based on the Bronchodilator Assessment Score as indicated below. Use Inhaler orders unless patient has one or more of the following: on home nebulizer, not able to hold breath for 10 seconds, is not alert and oriented, cannot activate and use MDI correctly, or respiratory rate 25 breaths per minute or more, then use the equivalent nebulizer order(s) with same Frequency and PRN reasons based on the score.   If a patient is on this medication at home then do not decrease Frequency below that used at home. 0-3 - enter or revise RT bronchodilator order(s) to equivalent RT Bronchodilator order with Frequency of every 4 hours PRN for wheezing or increased work of breathing using Per Protocol order mode. 4-6 - enter or revise RT Bronchodilator order(s) to two equivalent RT bronchodilator orders with one order with BID Frequency and one order with Frequency of every 4 hours PRN wheezing or increased work of breathing using Per Protocol order mode. 7-10 - enter or revise RT Bronchodilator order(s) to two equivalent RT bronchodilator orders with one order with TID Frequency and one order with Frequency of every 4 hours PRN wheezing or increased work of breathing using Per Protocol order mode. 11-13 - enter or revise RT Bronchodilator order(s) to one equivalent RT bronchodilator order with QID Frequency and an Albuterol order with Frequency of every 4 hours PRN wheezing or increased work of breathing using Per Protocol order mode. Greater than 13 - enter or revise RT Bronchodilator order(s) to one equivalent RT bronchodilator order with every 4 hours Frequency and an Albuterol order with Frequency of every 2 hours PRN wheezing or increased work of breathing using Per Protocol order mode. RT to enter RT Home Evaluation for COPD & MDI Assessment order using Per Protocol order mode.     Electronically signed by Ana Anderson RCP on 10/19/2021 at 1:44 PM

## 2021-10-19 NOTE — PLAN OF CARE
Problem: Falls - Risk of:  Goal: Will remain free from falls  Description: Will remain free from falls  10/18/2021 2336 by Claudetta Parkins, RN  Outcome: Ongoing  Note: Fall precautions in place. 10/18/2021 1536 by Steven Carlson RN  Outcome: Met This Shift  Goal: Absence of physical injury  Description: Absence of physical injury  10/18/2021 2336 by Claudetta Parkins, RN  Outcome: Ongoing  10/18/2021 1536 by Steven Carlson RN  Outcome: Met This Shift     Problem: Skin Integrity:  Goal: Will show no infection signs and symptoms  Description: Will show no infection signs and symptoms  10/18/2021 2336 by Claudetta Parkins, RN  Outcome: Ongoing  Note: Patient turned/repositioned every 2 hours and as needed to maintain and improve skin integrity. 10/18/2021 1536 by Steven Carlson RN  Outcome: Met This Shift  Goal: Absence of new skin breakdown  Description: Absence of new skin breakdown  10/18/2021 2336 by Claudetta Parkins, RN  Outcome: Ongoing  10/18/2021 1536 by Steven Carlson RN  Outcome: Met This Shift     Problem: Airway Clearance - Ineffective  Goal: Achieve or maintain patent airway  10/18/2021 2336 by Claudetta Parkins, RN  Outcome: Ongoing  10/18/2021 1536 by Steven Carlson RN  Outcome: Met This Shift     Problem: Gas Exchange - Impaired  Goal: Absence of hypoxia  10/18/2021 2336 by Claudetta Parkins, RN  Outcome: Ongoing  10/18/2021 1536 by Steven Carlson RN  Outcome: Met This Shift  Goal: Promote optimal lung function  10/18/2021 2336 by Claudetta Parkins, RN  Outcome: Ongoing  10/18/2021 1536 by Steven Carlson RN  Outcome: Met This Shift     Problem: Breathing Pattern - Ineffective  Goal: Ability to achieve and maintain a regular respiratory rate  10/18/2021 2336 by Claudetta Parkins, RN  Outcome: Ongoing  10/18/2021 1536 by Steven Carlson RN  Outcome: Met This Shift     Problem:  Body Temperature -  Risk of, Imbalanced  Goal: Ability to maintain a body temperature within defined limits  10/18/2021 2336 by Claudetta Parkins, RN  Outcome: Ongoing  10/18/2021 1536 by Georgina Larios RN  Outcome: Met This Shift  Goal: Will regain or maintain usual level of consciousness  10/18/2021 2336 by Ward French RN  Outcome: Ongoing  10/18/2021 1536 by Georgina Larios RN  Outcome: Met This Shift  Goal: Complications related to the disease process, condition or treatment will be avoided or minimized  10/18/2021 2336 by Ward French RN  Outcome: Ongoing  10/18/2021 1536 by Georgina Larios RN  Outcome: Met This Shift     Problem: Isolation Precautions - Risk of Spread of Infection  Goal: Prevent transmission of infection  10/18/2021 2336 by Ward French RN  Outcome: Ongoing  10/18/2021 1536 by Georgina Larios RN  Outcome: Met This Shift     Problem: Nutrition Deficits  Goal: Optimize nutritional status  10/18/2021 2336 by Ward French RN  Outcome: Ongoing  10/18/2021 1536 by Georgina Larios RN  Outcome: Met This Shift     Problem: Risk for Fluid Volume Deficit  Goal: Maintain normal heart rhythm  10/18/2021 2336 by Ward French RN  Outcome: Ongoing  10/18/2021 1536 by Georgina Larios RN  Outcome: Met This Shift  Goal: Maintain absence of muscle cramping  10/18/2021 2336 by Ward French RN  Outcome: Ongoing  10/18/2021 1536 by Georgina Larios RN  Outcome: Met This Shift  Goal: Maintain normal serum potassium, sodium, calcium, phosphorus, and pH  10/18/2021 2336 by Ward French RN  Outcome: Ongoing  10/18/2021 1536 by Georgina Larios RN  Outcome: Met This Shift     Problem: Loneliness or Risk for Loneliness  Goal: Demonstrate positive use of time alone when socialization is not possible  10/18/2021 2336 by Ward French RN  Outcome: Ongoing  10/18/2021 1536 by Georgina Larios RN  Outcome: Met This Shift     Problem: Fatigue  Goal: Verbalize increase energy and improved vitality  10/18/2021 2336 by Ward French RN  Outcome: Ongoing  10/18/2021 1536 by Georgina Larios RN  Outcome: Met This Shift     Problem: Patient Education: Go to Patient Education Activity  Goal: Patient/Family Education  10/18/2021 2336 by Ilsa Huizar RN  Outcome: Ongoing  10/18/2021 1536 by Ruth Felix RN  Outcome: Met This Shift     Problem: Pain:  Goal: Pain level will decrease  Description: Pain level will decrease  10/18/2021 2336 by Ilsa Huizar RN  Outcome: Ongoing  10/18/2021 1536 by Ruth Felix RN  Outcome: Met This Shift  Goal: Control of acute pain  Description: Control of acute pain  10/18/2021 2336 by Ilsa Huizar RN  Outcome: Ongoing  10/18/2021 1536 by Ruth Felix RN  Outcome: Met This Shift  Goal: Control of chronic pain  Description: Control of chronic pain  10/18/2021 2336 by Ilsa Huizar RN  Outcome: Ongoing  10/18/2021 1536 by Ruth Felix RN  Outcome: Met This Shift     Problem: Non-Violent Restraints  Goal: Removal from restraints as soon as assessed to be safe  10/18/2021 2336 by Ilsa Huizar RN  Outcome: Ongoing  Note: Bilateral soft wrist restraints remain in place for patient safety, and to protect all lines and airways. 10/18/2021 1536 by Ruth Felix RN  Outcome: Met This Shift  Goal: No harm/injury to patient while restraints in use  10/18/2021 2336 by Ilsa Huizar RN  Outcome: Ongoing  10/18/2021 1536 by Ruth Felix RN  Outcome: Met This Shift  Goal: Patient's dignity will be maintained  10/18/2021 2336 by Ilsa Huizar RN  Outcome: Ongoing  10/18/2021 1536 by Ruth Felix RN  Outcome: Met This Shift     Problem: Nutrition  Goal: Optimal nutrition therapy  10/18/2021 2336 by Ilsa Huizar RN  Outcome: Ongoing  Note: Patient receiving nutrition via continuous tube feed.   10/18/2021 1537 by Fish Zuluaga RD, LD  Outcome: Met This Shift  10/18/2021 1536 by Ruth Felix RN  Outcome: Met This Shift  Goal: Understanding of nutritional guidelines  10/18/2021 2336 by Ilsa Huizar RN  Outcome: Ongoing  10/18/2021 1537 by Fish Zuluaga RD, LD  Outcome: Met This Shift  10/18/2021 1536 by Ruth Felix RN  Outcome: Met This Shift

## 2021-10-19 NOTE — PROGRESS NOTES
Pts am assessment complete, VSS, pt awake, able to move feet on command, reposition pt to rt site tolerated move well with no problem, medications administered.

## 2021-10-19 NOTE — PROGRESS NOTES
Patient care assumed, assessment completed as charted. Patient continues on the ventilator, #7.5 at the 23 lip line. A/C 22, , FiO2 65%, PEEP 8. Right upper arm PICC in place with Fentanyl infusing at 200mcg/hr, Propofol at 45mcg/kg/min, and Versed at 2mg/hr. Patient resting comfortably at present. OG in place with tube feed running at 55mL/hr, abdi catheter patent, bilateral soft wrist restraints in place for continued patient safety. No further needs assessed at this time. Will monitor.

## 2021-10-20 ENCOUNTER — APPOINTMENT (OUTPATIENT)
Dept: GENERAL RADIOLOGY | Age: 66
DRG: 004 | End: 2021-10-20
Payer: MEDICARE

## 2021-10-20 LAB
A/G RATIO: 1.2 (ref 1.1–2.2)
ALBUMIN SERPL-MCNC: 3.3 G/DL (ref 3.4–5)
ALP BLD-CCNC: 111 U/L (ref 40–129)
ALT SERPL-CCNC: 18 U/L (ref 10–40)
ANION GAP SERPL CALCULATED.3IONS-SCNC: 4 MMOL/L (ref 3–16)
AST SERPL-CCNC: 22 U/L (ref 15–37)
BASE EXCESS ARTERIAL: 8.6 MMOL/L (ref -3–3)
BASOPHILS ABSOLUTE: 0 K/UL (ref 0–0.2)
BASOPHILS RELATIVE PERCENT: 0.8 %
BILIRUB SERPL-MCNC: <0.2 MG/DL (ref 0–1)
BUN BLDV-MCNC: 15 MG/DL (ref 7–20)
CALCIUM SERPL-MCNC: 9.4 MG/DL (ref 8.3–10.6)
CARBOXYHEMOGLOBIN ARTERIAL: 0.4 % (ref 0–1.5)
CHLORIDE BLD-SCNC: 100 MMOL/L (ref 99–110)
CO2: 39 MMOL/L (ref 21–32)
CREAT SERPL-MCNC: <0.5 MG/DL (ref 0.6–1.2)
EOSINOPHILS ABSOLUTE: 0.3 K/UL (ref 0–0.6)
EOSINOPHILS RELATIVE PERCENT: 7 %
GFR AFRICAN AMERICAN: >60
GFR NON-AFRICAN AMERICAN: >60
GLOBULIN: 2.8 G/DL
GLUCOSE BLD-MCNC: 133 MG/DL (ref 70–99)
GLUCOSE BLD-MCNC: 139 MG/DL (ref 70–99)
GLUCOSE BLD-MCNC: 168 MG/DL (ref 70–99)
GLUCOSE BLD-MCNC: 176 MG/DL (ref 70–99)
GLUCOSE BLD-MCNC: 279 MG/DL (ref 70–99)
GLUCOSE BLD-MCNC: 290 MG/DL (ref 70–99)
HCO3 ARTERIAL: 35.3 MMOL/L (ref 21–29)
HCT VFR BLD CALC: 29.4 % (ref 36–48)
HEMOGLOBIN, ART, EXTENDED: 9.5 G/DL (ref 12–16)
HEMOGLOBIN: 9 G/DL (ref 12–16)
LYMPHOCYTES ABSOLUTE: 1.3 K/UL (ref 1–5.1)
LYMPHOCYTES RELATIVE PERCENT: 27.9 %
MAGNESIUM: 1.7 MG/DL (ref 1.8–2.4)
MCH RBC QN AUTO: 24.5 PG (ref 26–34)
MCHC RBC AUTO-ENTMCNC: 30.5 G/DL (ref 31–36)
MCV RBC AUTO: 80.3 FL (ref 80–100)
METHEMOGLOBIN ARTERIAL: 0.3 %
MONOCYTES ABSOLUTE: 0.4 K/UL (ref 0–1.3)
MONOCYTES RELATIVE PERCENT: 8.9 %
NEUTROPHILS ABSOLUTE: 2.7 K/UL (ref 1.7–7.7)
NEUTROPHILS RELATIVE PERCENT: 55.4 %
O2 SAT, ARTERIAL: 85.5 %
O2 THERAPY: ABNORMAL
PCO2 ARTERIAL: 61.7 MMHG (ref 35–45)
PDW BLD-RTO: 18.6 % (ref 12.4–15.4)
PERFORMED ON: ABNORMAL
PH ARTERIAL: 7.38 (ref 7.35–7.45)
PLATELET # BLD: 79 K/UL (ref 135–450)
PMV BLD AUTO: 9.3 FL (ref 5–10.5)
PO2 ARTERIAL: 52.8 MMHG (ref 75–108)
POTASSIUM REFLEX MAGNESIUM: 3.6 MMOL/L (ref 3.5–5.1)
RBC # BLD: 3.66 M/UL (ref 4–5.2)
SODIUM BLD-SCNC: 143 MMOL/L (ref 136–145)
TCO2 ARTERIAL: 37.2 MMOL/L
TOTAL PROTEIN: 6.1 G/DL (ref 6.4–8.2)
TRIGL SERPL-MCNC: 224 MG/DL (ref 0–150)
WBC # BLD: 4.8 K/UL (ref 4–11)

## 2021-10-20 PROCEDURE — 99233 SBSQ HOSP IP/OBS HIGH 50: CPT | Performed by: INTERNAL MEDICINE

## 2021-10-20 PROCEDURE — 80053 COMPREHEN METABOLIC PANEL: CPT

## 2021-10-20 PROCEDURE — 94761 N-INVAS EAR/PLS OXIMETRY MLT: CPT

## 2021-10-20 PROCEDURE — 6370000000 HC RX 637 (ALT 250 FOR IP): Performed by: INTERNAL MEDICINE

## 2021-10-20 PROCEDURE — 2580000003 HC RX 258: Performed by: INTERNAL MEDICINE

## 2021-10-20 PROCEDURE — 2700000000 HC OXYGEN THERAPY PER DAY

## 2021-10-20 PROCEDURE — 71045 X-RAY EXAM CHEST 1 VIEW: CPT

## 2021-10-20 PROCEDURE — 99291 CRITICAL CARE FIRST HOUR: CPT | Performed by: INTERNAL MEDICINE

## 2021-10-20 PROCEDURE — 94003 VENT MGMT INPAT SUBQ DAY: CPT

## 2021-10-20 PROCEDURE — 6360000002 HC RX W HCPCS: Performed by: INTERNAL MEDICINE

## 2021-10-20 PROCEDURE — C9113 INJ PANTOPRAZOLE SODIUM, VIA: HCPCS | Performed by: INTERNAL MEDICINE

## 2021-10-20 PROCEDURE — 2000000000 HC ICU R&B

## 2021-10-20 PROCEDURE — 2500000003 HC RX 250 WO HCPCS: Performed by: INTERNAL MEDICINE

## 2021-10-20 PROCEDURE — 83735 ASSAY OF MAGNESIUM: CPT

## 2021-10-20 PROCEDURE — 94640 AIRWAY INHALATION TREATMENT: CPT

## 2021-10-20 PROCEDURE — 84478 ASSAY OF TRIGLYCERIDES: CPT

## 2021-10-20 PROCEDURE — 36415 COLL VENOUS BLD VENIPUNCTURE: CPT

## 2021-10-20 PROCEDURE — 82803 BLOOD GASES ANY COMBINATION: CPT

## 2021-10-20 PROCEDURE — 85025 COMPLETE CBC W/AUTO DIFF WBC: CPT

## 2021-10-20 RX ORDER — POTASSIUM BICARBONATE 25 MEQ/1
50 TABLET, EFFERVESCENT ORAL ONCE
Status: COMPLETED | OUTPATIENT
Start: 2021-10-20 | End: 2021-10-20

## 2021-10-20 RX ORDER — MAGNESIUM SULFATE IN WATER 40 MG/ML
2000 INJECTION, SOLUTION INTRAVENOUS ONCE
Status: COMPLETED | OUTPATIENT
Start: 2021-10-20 | End: 2021-10-20

## 2021-10-20 RX ADMIN — IPRATROPIUM BROMIDE AND ALBUTEROL SULFATE 1 AMPULE: .5; 2.5 SOLUTION RESPIRATORY (INHALATION) at 11:07

## 2021-10-20 RX ADMIN — INSULIN LISPRO 9 UNITS: 100 INJECTION, SOLUTION INTRAVENOUS; SUBCUTANEOUS at 21:30

## 2021-10-20 RX ADMIN — SODIUM CHLORIDE, PRESERVATIVE FREE 10 ML: 5 INJECTION INTRAVENOUS at 08:29

## 2021-10-20 RX ADMIN — Medication 200 MCG/HR: at 23:18

## 2021-10-20 RX ADMIN — INSULIN LISPRO 9 UNITS: 100 INJECTION, SOLUTION INTRAVENOUS; SUBCUTANEOUS at 17:29

## 2021-10-20 RX ADMIN — CASTOR OIL AND BALSAM, PERU: 788; 87 OINTMENT TOPICAL at 08:29

## 2021-10-20 RX ADMIN — IPRATROPIUM BROMIDE AND ALBUTEROL SULFATE 1 AMPULE: .5; 2.5 SOLUTION RESPIRATORY (INHALATION) at 02:42

## 2021-10-20 RX ADMIN — OXYCODONE 10 MG: 5 TABLET ORAL at 17:26

## 2021-10-20 RX ADMIN — PROPOFOL 45 MCG/KG/MIN: 10 INJECTION, EMULSION INTRAVENOUS at 08:31

## 2021-10-20 RX ADMIN — IPRATROPIUM BROMIDE AND ALBUTEROL SULFATE 1 AMPULE: .5; 2.5 SOLUTION RESPIRATORY (INHALATION) at 19:25

## 2021-10-20 RX ADMIN — POTASSIUM BICARBONATE 50 MEQ: 977.5 TABLET, EFFERVESCENT ORAL at 09:48

## 2021-10-20 RX ADMIN — IPRATROPIUM BROMIDE AND ALBUTEROL SULFATE 1 AMPULE: .5; 2.5 SOLUTION RESPIRATORY (INHALATION) at 14:56

## 2021-10-20 RX ADMIN — CHLORHEXIDINE GLUCONATE 0.12% ORAL RINSE 15 ML: 1.2 LIQUID ORAL at 08:28

## 2021-10-20 RX ADMIN — PROPOFOL 45 MCG/KG/MIN: 10 INJECTION, EMULSION INTRAVENOUS at 05:12

## 2021-10-20 RX ADMIN — ENOXAPARIN SODIUM 30 MG: 30 INJECTION SUBCUTANEOUS at 21:28

## 2021-10-20 RX ADMIN — PANTOPRAZOLE SODIUM 40 MG: 40 INJECTION, POWDER, FOR SOLUTION INTRAVENOUS at 08:28

## 2021-10-20 RX ADMIN — MICONAZOLE NITRATE: 2 POWDER TOPICAL at 21:29

## 2021-10-20 RX ADMIN — GABAPENTIN 800 MG: 400 CAPSULE ORAL at 14:52

## 2021-10-20 RX ADMIN — Medication 225 MCG/HR: at 02:15

## 2021-10-20 RX ADMIN — SODIUM CHLORIDE, PRESERVATIVE FREE 10 ML: 5 INJECTION INTRAVENOUS at 08:30

## 2021-10-20 RX ADMIN — Medication 200 MCG/HR: at 12:18

## 2021-10-20 RX ADMIN — INSULIN LISPRO 6 UNITS: 100 INJECTION, SOLUTION INTRAVENOUS; SUBCUTANEOUS at 00:00

## 2021-10-20 RX ADMIN — IPRATROPIUM BROMIDE AND ALBUTEROL SULFATE 1 AMPULE: .5; 2.5 SOLUTION RESPIRATORY (INHALATION) at 07:46

## 2021-10-20 RX ADMIN — ATORVASTATIN CALCIUM 40 MG: 40 TABLET, FILM COATED ORAL at 08:29

## 2021-10-20 RX ADMIN — CHLORHEXIDINE GLUCONATE 0.12% ORAL RINSE 15 ML: 1.2 LIQUID ORAL at 21:28

## 2021-10-20 RX ADMIN — Medication 4 MG/HR: at 08:39

## 2021-10-20 RX ADMIN — SERTRALINE HYDROCHLORIDE 50 MG: 50 TABLET ORAL at 08:29

## 2021-10-20 RX ADMIN — OXYCODONE 10 MG: 5 TABLET ORAL at 21:40

## 2021-10-20 RX ADMIN — PROPOFOL 45 MCG/KG/MIN: 10 INJECTION, EMULSION INTRAVENOUS at 13:00

## 2021-10-20 RX ADMIN — MICONAZOLE NITRATE: 2 POWDER TOPICAL at 08:29

## 2021-10-20 RX ADMIN — PROPOFOL 50 MCG/KG/MIN: 10 INJECTION, EMULSION INTRAVENOUS at 01:02

## 2021-10-20 RX ADMIN — GABAPENTIN 800 MG: 400 CAPSULE ORAL at 21:28

## 2021-10-20 RX ADMIN — OXYCODONE 10 MG: 5 TABLET ORAL at 09:48

## 2021-10-20 RX ADMIN — MAGNESIUM SULFATE HEPTAHYDRATE 2000 MG: 40 INJECTION, SOLUTION INTRAVENOUS at 09:51

## 2021-10-20 RX ADMIN — Medication 200 MCG/HR: at 07:24

## 2021-10-20 RX ADMIN — PROPOFOL 40 MCG/KG/MIN: 10 INJECTION, EMULSION INTRAVENOUS at 21:41

## 2021-10-20 RX ADMIN — DEXAMETHASONE SODIUM PHOSPHATE 3 MG: 4 INJECTION, SOLUTION INTRAMUSCULAR; INTRAVENOUS at 12:24

## 2021-10-20 RX ADMIN — ASPIRIN 81 MG: 81 TABLET, CHEWABLE ORAL at 08:28

## 2021-10-20 RX ADMIN — LEVOTHYROXINE SODIUM 100 MCG: 100 TABLET ORAL at 06:04

## 2021-10-20 RX ADMIN — PROPOFOL 45 MCG/KG/MIN: 10 INJECTION, EMULSION INTRAVENOUS at 17:27

## 2021-10-20 RX ADMIN — IPRATROPIUM BROMIDE AND ALBUTEROL SULFATE 1 AMPULE: .5; 2.5 SOLUTION RESPIRATORY (INHALATION) at 23:13

## 2021-10-20 RX ADMIN — GABAPENTIN 800 MG: 400 CAPSULE ORAL at 08:29

## 2021-10-20 RX ADMIN — Medication 200 MCG/HR: at 17:29

## 2021-10-20 RX ADMIN — CASTOR OIL AND BALSAM, PERU: 788; 87 OINTMENT TOPICAL at 21:28

## 2021-10-20 RX ADMIN — ENOXAPARIN SODIUM 30 MG: 30 INJECTION SUBCUTANEOUS at 08:28

## 2021-10-20 RX ADMIN — INSULIN LISPRO 3 UNITS: 100 INJECTION, SOLUTION INTRAVENOUS; SUBCUTANEOUS at 04:15

## 2021-10-20 RX ADMIN — OXYCODONE 10 MG: 5 TABLET ORAL at 04:27

## 2021-10-20 ASSESSMENT — PULMONARY FUNCTION TESTS
PIF_VALUE: 33
PIF_VALUE: 32
PIF_VALUE: 32
PIF_VALUE: 50
PIF_VALUE: 35
PIF_VALUE: 37
PIF_VALUE: 32
PIF_VALUE: 33
PIF_VALUE: 33
PIF_VALUE: 45
PIF_VALUE: 33
PIF_VALUE: 34
PIF_VALUE: 33
PIF_VALUE: 33
PIF_VALUE: 34
PIF_VALUE: 33
PIF_VALUE: 35
PIF_VALUE: 33
PIF_VALUE: 33
PIF_VALUE: 50
PIF_VALUE: 50
PIF_VALUE: 38
PIF_VALUE: 38
PIF_VALUE: 50
PIF_VALUE: 37

## 2021-10-20 ASSESSMENT — PAIN SCALES - GENERAL
PAINLEVEL_OUTOF10: 0

## 2021-10-20 NOTE — PROGRESS NOTES
10/20/21 1926   Vent Information   Vent Mode AC/PC   Pressure Ordered 22   Rate Set 22 bmp   FiO2  55 %   SpO2 92 %   Sensitivity 3   PEEP/CPAP 8   Vent Patient Data   Peak Inspiratory Pressure 33 cmH2O   Mean Airway Pressure 17 cmH20   Rate Measured 24 br/min   Vt Exhaled 355 mL   Minute Volume 7.98 Liters   I:E Ratio 1:2.00   Plateau Pressure 28 BVM83   Static Compliance 18 mL/cmH2O   Dynamic Compliance 14 mL/cmH2O   Cough/Sputum   Sputum How Obtained Suctioned;Endotracheal   Cough Non-productive   Sputum Amount None

## 2021-10-20 NOTE — PROGRESS NOTES
Pt continues to rest quietly and no further changes noted in exam. Vitals and SpO2 stable. All lines and monitoring devices remain in place. Pt repositioned in bed. Continue current plan of care.  Kanchan Alcocer RN

## 2021-10-20 NOTE — PROGRESS NOTES
RT Inhaler-Nebulizer Bronchodilator Protocol Note    There is a bronchodilator order in the chart from a provider indicating to follow the RT Bronchodilator Protocol and there is an Initiate RT Inhaler-Nebulizer Bronchodilator Protocol order as well (see protocol at bottom of note). CXR Findings:  XR CHEST PORTABLE    Result Date: 10/20/2021  Stable support apparatus. Persistent multifocal airspace disease. No substantial change. XR CHEST PORTABLE    Result Date: 10/19/2021  Unchanged multifocal bilateral pneumonia. The findings from the last RT Protocol Assessment were as follows:   History Pulmonary Disease: Chronic pulmonary disease  Respiratory Pattern: Mild dyspnea at rest, irregular pattern, or RR 21-25 bpm  Breath Sounds: Slightly diminished and/or crackles  Cough: Weak, productive  Indication for Bronchodilator Therapy: Decreased or absent breath sounds  Bronchodilator Assessment Score: 10    Aerosolized bronchodilator medication orders have been revised according to the RT Inhaler-Nebulizer Bronchodilator Protocol below. Respiratory Therapist to perform RT Therapy Protocol Assessment initially then follow the protocol. Repeat RT Therapy Protocol Assessment PRN for score 0-3 or on second treatment, BID, and PRN for scores above 3. No Indications - adjust the frequency to every 6 hours PRN wheezing or bronchospasm, if no treatments needed after 48 hours then discontinue using Per Protocol order mode. If indication present, adjust the RT bronchodilator orders based on the Bronchodilator Assessment Score as indicated below. Use Inhaler orders unless patient has one or more of the following: on home nebulizer, not able to hold breath for 10 seconds, is not alert and oriented, cannot activate and use MDI correctly, or respiratory rate 25 breaths per minute or more, then use the equivalent nebulizer order(s) with same Frequency and PRN reasons based on the score.   If a patient is on this medication at home then do not decrease Frequency below that used at home. 0-3 - enter or revise RT bronchodilator order(s) to equivalent RT Bronchodilator order with Frequency of every 4 hours PRN for wheezing or increased work of breathing using Per Protocol order mode. 4-6 - enter or revise RT Bronchodilator order(s) to two equivalent RT bronchodilator orders with one order with BID Frequency and one order with Frequency of every 4 hours PRN wheezing or increased work of breathing using Per Protocol order mode. 7-10 - enter or revise RT Bronchodilator order(s) to two equivalent RT bronchodilator orders with one order with TID Frequency and one order with Frequency of every 4 hours PRN wheezing or increased work of breathing using Per Protocol order mode. 11-13 - enter or revise RT Bronchodilator order(s) to one equivalent RT bronchodilator order with QID Frequency and an Albuterol order with Frequency of every 4 hours PRN wheezing or increased work of breathing using Per Protocol order mode. Greater than 13 - enter or revise RT Bronchodilator order(s) to one equivalent RT bronchodilator order with every 4 hours Frequency and an Albuterol order with Frequency of every 2 hours PRN wheezing or increased work of breathing using Per Protocol order mode. RT to enter RT Home Evaluation for COPD & MDI Assessment order using Per Protocol order mode.     Electronically signed by Yrn Calix RCP on 10/20/2021 at 2:34 PM

## 2021-10-20 NOTE — PROGRESS NOTES
Care rounds completed with Dr Sammie Keating and multidisciplinary team. Bearl Ring changed to Pressure control. Reviewed labs, meds, VS (temp/HR/RR), I/O's, assessment, & plan of care for today. See progress note & new orders for details.  Sukhwinder Glover RN

## 2021-10-20 NOTE — PROGRESS NOTES
Initial exam completed- See doc flowsheet for assessment findings. Pt resting quietly in bed and opens eyes slightly to stimulation but does not follow commands. She is not synchronous with vent and is frequently stacking vent breaths . All lines and monitoring devices are in place . Vitals and SpO2 stable. Call light is within easy reach. Plan of care and goals reviewed.  Patient is not able to demonstrate the ability to move from a reclining position to an upright position within the recliner due to Pt on vent and bedrest .  Dimitri Elias RN

## 2021-10-20 NOTE — PLAN OF CARE
Problem: Non-Violent Restraints  Goal: Removal from restraints as soon as assessed to be safe  Outcome: Ongoing  Note: Patient has critical airway, continued use for restraints needed. Patient and spouse educated on purpose of restraints. No evidence of injury. Continued use for restraints. Patients restraints assessed every hour. See Flowsheets.     Goal: No harm/injury to patient while restraints in use  Outcome: Ongoing

## 2021-10-20 NOTE — CONSULTS
[id number] [facility] Wound Ostomy Continence Nurse  Consult Note       NAME:  James Souza [facility]  MEDICAL RECORD NUMBER:  [id number]  AGE: 78 y.o. GENDER: female  : 1955  TODAY'S DATE:  10/20/2021    Subjective Pt remains intubated and sedated in ICU setting   Reason for WOCN Evaluation and Assessment: follow up on left ear lobe, new consult for sacral DTI      Asher Glover is a 78 y.o. female referred by:   [] Physician  [] Nursing  [] Other:     Wound Identification:  Wound Type: pressure  Contributing Factors: chronic pressure and decreased mobility , COVID +        PAST MEDICAL HISTORY        Diagnosis Date    Anxiety disorder     Chronic pain syndrome     COVID-19 2021    DDD (degenerative disc disease), lumbar     Diabetes [facility] [address] [facility]     Displacement of lumbar intervertebral disc without myelopathy 2010    Diverticulitis     Fibromyalgia     Generalized osteoarthrosis, involving multiple sites 2010    GERD (gastroesophageal reflux disease)     Impacted cerumen 2010    Influenza A 03/10/2016    Irritable bowel syndrome 2010    Other and unspecified hyperlipidemia 2010    Prosthetic joint implant failure ([facility] [address]     Screening mammogram 2006    (Both)Negative    Tracheobronchomalacia     Unspecified asthma(493.90) 2010    Unspecified essential hypertension 2010    Unspecified hypothyroidism 2010       PAST SURGICAL HISTORY    Past Surgical History:   Procedure Laterality Date    ABDOMEN SURGERY      CARDIAC CATHETERIZATION      COLON SURGERY      COLONOSCOPY      normal    HYSTERECTOMY      JOINT REPLACEMENT  10/92    Right; hip     JOINT REPLACEMENT        Left hip  followed by revision 2006    JOINT REPLACEMENT        right     JOINT REPLACEMENT         right replacement.     LUNG SURGERY  2014    tracheobronchomalacia       FAMILY HISTORY    Family History   Problem Relation Age of Onset    Asthma Mother  Heart Failure Father     Cancer Maternal Grandfather         skin cancer on face    Cancer Daughter         skin cancer-BCC    Diabetes Neg Hx     Emphysema Neg Hx     Hypertension Neg Hx        SOCIAL HISTORY    Social History     Tobacco Use    Smoking status: Former Smoker     Packs/day: 1.00     Years: 18.00     Pack years: 18.00     Types: Cigarettes     Quit date: 12/10/1991     Years since quittin.8    Smokeless tobacco: Never Used   Vaping Use    Vaping Use: Never used   Substance Use Topics    Alcohol use: No    Drug use: No       ALLERGIES    Allergies   Allergen Reactions    Quinidine Anaphylaxis and Nausea And Vomiting    Sulfa Antibiotics Anaphylaxis and Nausea And Vomiting    Prednisone Other (See Comments)     Cannot tolerate oral steriods  Cannot tolerate oral steroids - causes Avascular Necrosis of joints. Cannot tolerate oral steriods    Bactrim     No Known Allergies Rash    Sulfamethoxazole-Trimethoprim Rash       MEDICATIONS    No current facility-administered medications on file prior to encounter. Current Outpatient Medications on File Prior to Encounter   Medication Sig Dispense Refill    levothyroxine (SYNTHROID) 100 MCG tablet Take 100 mcg by mouth Daily      simvastatin (ZOCOR) 40 MG tablet Take 40 mg by mouth nightly      budesonide (PULMICORT FLEXHALER) 180 MCG/ACT AEPB inhaler Inhale 1 puff into the lungs 2 times daily Per Yasmine [facility]. [employer]      oxyCODONE-acetaminophen (PERCOCET)  MG per tablet Take 1 tablet by mouth every 6 hours as needed for Pain. Per Marni Hector [facility]. [employer]      oxyCODONE (OXYCONTIN) 20 MG extended release tablet Take 20 mg by mouth every 12 hours. Per Marni Hector [facility]. [employer]      glipiZIDE (GLUCOTROL XL) 10 MG extended release tablet Take 10 mg by mouth 2 times daily Per Marni Hector [facility].  [employer]      estradiol (ESTRACE) 0.1 MG/GM vaginal cream Place vaginally See Admin Instructions Insert a pea-sized amount vaginally every day for 7 days, then twice weekly thereafter. Per Maty Smith at [facility] [address]. Last dispensed 5/10/21.  empagliflozin (JARDIANCE) 10 MG tablet Take 10 mg by mouth daily      Multiple Vitamins-Minerals (THERAPEUTIC MULTIVITAMIN-MINERALS) tablet Take 1 tablet by mouth daily      JANUVIA 50 MG tablet TAKE ONE TABLET BY MOUTH DAILY 90 tablet 1    losartan (COZAAR) 25 MG tablet Take 1 tablet by mouth daily 90 tablet 3    Elastic Bandages & Supports (MEDICAL COMPRESSION THIGH HIGH) MISC 1 Units by Does not apply route daily 2 each 2    Insulin Pen Needle 31G X 5 MM MISC 1 each by Does not apply route daily 100 each 3    rOPINIRole (REQUIP) 0.5 MG tablet TAKE 1 TABLET BY MOUTH 3 TIMES DAILY. 270 tablet 3    sertraline (ZOLOFT) 50 MG tablet TAKE 1 TABLET BY MOUTH DAILY 90 tablet 3    polyethylene glycol (MIRALAX) powder Take 17 g by mouth daily as needed      gabapentin (NEURONTIN) 800 MG tablet Take 800 mg by mouth 3 times daily      omeprazole (PRILOSEC) 40 MG delayed release capsule Take 40 mg by mouth 2 times daily      aspirin 81 MG EC tablet Take 1 tablet by mouth daily 30 tablet 3    tiZANidine (ZANAFLEX) 2 MG tablet Take 2 mg by mouth 3 times daily as needed       albuterol sulfate  (90 Base) MCG/ACT inhaler Inhale 2 puffs into the lungs 4 times daily as needed for Wheezing 1 Inhaler 0    Lancets MISC Dispense whatever insurance will cover. Patient test twice day. Dx:E11.9 200 each 3    blood glucose monitor strips Dispense whatever insurance will cover. Pt test twice a day dx:E11.9 200 strip 3    blood glucose test strips (ASCENSIA AUTODISC VI;ONE TOUCH ULTRA TEST VI) strip 1 each by In Vitro route daily As needed. 100 each 3    Blood Glucose Monitoring Suppl CATHRYN Dispense whatever insurance will cover. Dx code:E11.9 1 Device 0       Objective    BP (!) 114/52   Pulse 75   Temp 98.4 °F (36.9 °C) (Axillary)   Resp 18   Ht 5' 9\" (1.753 m)   Wt 181 lb 3.2 oz (82.2 kg)   SpO2 90% BMI 26.76 kg/m²     LABS:  WBC:    Lab Results   Component Value Date    WBC 4.8 10/20/2021     H/H:    Lab Results   Component Value Date    HGB 9.0 10/20/2021    HCT 29.4 10/20/2021     PTT:    Lab Results   Component Value Date    APTT 29.2 12/22/2016   [APTT}  PT/INR:    Lab Results   Component Value Date    PROTIME 16.9 09/28/2021    INR 1.47 09/28/2021     HgBA1c:    Lab Results   Component Value Date    LABA1C 8.2 09/26/2021       Assessment   Josafat Risk Score: Josafat Scale Score: 14    Patient Active Problem List   Diagnosis Code    Displacement of lumbar intervertebral disc without myelopathy M51.26    Hypothyroidism E03.9    Mixed hyperlipidemia E78.2    Anxiety state F41.1    Generalized osteoarthrosis, involving multiple sites M15.9    Essential hypertension I10    Shoulder arthritis M19.019    Vitamin D deficiency E55.9    Chronic pain syndrome G89.4    Fibromyalgia M79.7    DDD (degenerative disc disease), lumbar M51.36    Anxiety disorder F41.9    Prosthetic joint implant failure ([facility]) T84.019A    Pulmonary nodule R91.1    Tracheobronchomalacia J39.8    Bronchiectasis ([facility]) J47.9    DM2 (diabetes mellitus, type 2) ([facility]) X46.2    Systolic congestive heart failure (HCC) I50.20    Chest pain R07.9    S/P cardiac catheterization Z98.890    PVC (premature ventricular contraction) I49.3    Palpitations R00.2    Syncope and collapse R55    Convulsion ([facility]) R56.9    New onset seizure ([facility]) R56.9    CAD in native artery I25.10    Nausea R11.0    Dehydration E86.0    Memory change R41.3    Acquired hypothyroidism E03.9    Sepsis ([facility]) A41.9    Type 1 diabetes mellitus without complication ([facility]) K18.3    Orthostatic hypotension I95.1    Suspected COVID-19 virus infection Z20.822    Hypoxia R09.02    Acute respiratory failure with hypoxia ([facility]) J96.01    COVID-19 U07.1    Pneumonia due to COVID-19 virus U07.1, J12.82    CHARLY (acute kidney injury) ([facility] [address] N17.9    Uncontrolled hypertension I10    Fever R50.9    Hypotension I95.9    Severe protein-calorie malnutrition (HCC) E43    Gram-negative pneumonia (HCC) J15.6    Pneumonia due to Pseudomonas species ([address] J15.1    Hypomagnesemia E83.42    Hypokalemia E87.6    Acute hypoxemic respiratory failure (HCC) J96.01       Measurements:  Wound 10/19/21 Sacrum Inner Purple discoloration with 2 small blisters to coccyx (Active)   Wound Etiology Deep tissue/Injury 10/20/21 1621   Dressing Status Other (Comment) 10/20/21 1621   Wound Cleansed Soap and water 10/19/21 0435   Dressing/Treatment Foam 10/19/21 0435   Dressing Change Due 10/22/21 10/19/21 0435   Wound Length (cm) 3 cm 10/19/21 0030   Wound Width (cm) 3 cm 10/19/21 0030   Wound Surface Area (cm^2) 9 cm^2 10/19/21 0030   Wound Assessment Dry 10/20/21 1621   Drainage Amount None 10/20/21 1621   Ana-wound Assessment Warm 10/20/21 1621   Number of days: 1     Sacrum:  6x2x0.1cm, 15% pink , moist tissue. 85% dark and pale purple, nonblanchable discoloration. Left ear lobe DTI resolved.              Plan  Venelex to sacrum 2-3 times daily and prn     Plan of Care: Wound 10/19/21 Sacrum Inner Purple discoloration with 2 small blisters to coccyx-Dressing/Treatment: Foam    Specialty Bed Required : Yes on ICU AIDA surface  [] Low Air Loss   [] Pressure Redistribution  [] Fluid Immersion  [] Bariatric  [] Total Pressure Relief  [] Other:     Current Diet: Diet NPO  ADULT TUBE FEEDING; Orogastric; Peptide Based; Continuous; 55; Yes; 5; Q 4 hours; 60; 30; Q 3 hours  Dietician consult:  Yes    Discharge Plan:  Placement for patient upon discharge: TBD    Patient appropriate for Outpatient [address]: TBD    Referrals:  [x]   [] [facility]  [] Supplies  [] Other    Patient/Caregiver Teaching:  Level of patient/caregiver understanding able to:   [] Indicates understanding       [] Needs reinforcement  [] Unsuccessful      [] Verbal Understanding  [] Demonstrated

## 2021-10-20 NOTE — CARE COORDINATION
INTERDISCIPLINARY PLAN OF CARE CONFERENCE    Date/Time: 10/20/2021 11:20 AM  Completed by: Agustin Yin RN, Case Management      Patient Name:  Geroge Mcburney  YOB: 1955  Admitting Diagnosis: Dehydration [E86.0]  Hypokalemia [E87.6]  Hypomagnesemia [E83.42]  Positive D dimer [R79.89]  CHARLY (acute kidney injury) ([facility] [address] [N17.9]  Acute respiratory failure with hypoxia ([facility] [address] [J96.01]  Pneumonia due to COVID-19 virus [U07.1, J12.82]  COVID-19 [U07.1]     Admit Date/Time:  9/26/2021  8:29 PM    Chart reviewed. Interdisciplinary team contacted or reviewed plan related to patient progress and discharge plans. Disciplines included Case Management, Nursing, and Dietitian. Current Status:INPT, ICU, Vent, Covid-19  PT/OT recommendation for discharge plan of care: TBD    Expected D/C Disposition:  TBD     Discharge Plan Comments: CM reviewed chart. Pt cont in ICU on vent an in Covid-19 precautions. CM following. Will need PT/OT eval when appropriate.      Home O2 in place on admit: No

## 2021-10-20 NOTE — PROGRESS NOTES
Pulmonary & Critical Care Medicine ICU Progress Note    CC: Respiratory failure    Events of Last 24 hours:   30 beat run of V. tach    Vascular lines: IV: PICC line 9/28    MV: 9/29-10/4, emergently re-intubated 10/6/21 for refractory hypoxemia  Vent Mode: AC/VC Rate Set: 22 bmp/Vt Ordered: 360 mL/ /FiO2 : 55 %  Recent Labs     10/19/21  0435 10/20/21  0433   PHART 7.378 7.375   [id number] 61.9* 61.7*   PO2ART 60.0* 52.8*     IV:   midazolam 4 mg/hr (10/19/21 1636)    norepinephrine Stopped (10/18/21 0616)    fentaNYL 200 mcg/hr (10/20/21 0219)    propofol 45 mcg/kg/min (10/20/21 0512)    sodium chloride      dextrose      sodium chloride       Vitals:  Blood pressure (!) 110/55, pulse 68, temperature 98.3 °F (36.8 °C), temperature source Axillary, resp. rate 19, height 5' 9\" (1.753 m), weight 181 lb 3.2 oz (82.2 kg), SpO2 92 %, not currently breastfeeding. on ventilator      Intake/Output Summary (Last 24 hours) at 10/20/2021 4408  Last data filed at 10/20/2021 0600  Gross per 24 hour   Intake 2474.36 ml   Output 2550 ml   Net -75.64 ml     General: intubated, ill appearing    ENT: Pharynx with ETT. Resp: No crackles. No wheezing. CV: S1, S2. Trace edema  GI: NT, ND, +BS  Skin: Warm and dry. Neuro: PERRL. Sedated, not following commands.  Patellar reflexes are symmetric       Scheduled Meds:   insulin lispro  0-18 Units SubCUTAneous Q4H    dexamethasone  3 mg IntraVENous Q24H    sertraline  50 mg Per NG tube Daily    Venelex   Topical BID    miconazole   Topical BID    oxyCODONE  10 mg Oral Q6H    influenza virus vaccine  0.5 mL IntraMUSCular Prior to discharge    chlorhexidine  15 mL Mouth/Throat BID    ipratropium-albuterol  1 ampule Inhalation Q4H    pantoprazole  40 mg IntraVENous Daily    levothyroxine  100 mcg Oral Daily    enoxaparin  30 mg SubCUTAneous BID    lidocaine 1 % injection  5 mL IntraDERmal Once    sodium chloride flush  5-40 mL IntraVENous 2 times per day    aspirin 81 mg Oral Daily    gabapentin  800 mg Oral TID    atorvastatin  40 mg Oral Daily    sodium chloride flush  5-40 mL IntraVENous 2 times per day       Data:  CBC:   Recent Labs     10/18/21  0500 10/19/21  0435 10/20/21  0433   WBC 7.4 5.1 4.8   HGB 10.4* 9.3* 9.0*   HCT 34.0* 29.4* 29.4*   MCV 79.6* 79.1* 80.3   * 84* 79*     BMP:   Recent Labs     10/18/21  0500 10/19/21  0435 10/20/21  0433    142 143   K 3.9 3.9 3.6    100 100   CO2 36* 38* 39*   BUN 15 16 15   CREATININE <0.5* <0.5* <0.5*     LIVER PROFILE:   Recent Labs     10/18/21  0500 10/19/21  0435 10/20/21  0433   AST 24 22 22   ALT 22 19 18   BILITOT <0.2 <0.2 <0.2   ALKPHOS 130* 115 111       Microbiology:  9/27/21 COVID-19 detected  9/30/21 Resp Pseudomonas fluorescens   10/7/2021 tracheal aspirate: Klebsiella intermediate to zosyn    Imaging:  Chest x-ray 10/19/2021 bilateral airspace disease    CTPA 9/27/21  No evidence of pulmonary embolism. Scattered peripheral opacities in the left lung and more consolidative area in the right lung concerning for pneumonia.  Previous right thoracotomy and upper lobectomy. One mildly enlarged right paratracheal lymph node is present but no priors for comparison.  Nonenlarged but prominent lymph nodes in the upper abdomen and juxta diaphragmatic region      ASSESSMENT:  · Acute hypoxemic respiratory failure   · COVID-19 viral pneumonia  · ARDS  · Pseudomonas followed by Klebsiella pneumonias  · Acute kidney injury  · CAD  · Chronic pain syndrome - on chronic opiates and Neurontin   · Possible early dementia, being worked up outpatient   · H/O bronchiectasis and tracheobronchomalacia/EDAC s/p tracheoplasty in 2014   · H/O EDAC (tracheobronchomalacia) s/p tracheoplasty     PLAN:  - COVID-19 isolation, droplet plus  -   Mechanical ventilation as per my orders. The ventilator was adjusted by me at the bedside for unstable, life threatening respiratory failure.    Proned 10/6/21-10/7/2; 10/11-10/12   Paralyzed 10/6/21 - 10/7/21  IV Propofol, Fentanyl & Versed for sedation, target RASS -1 to -2  Completed 7 days Cipro, 8 days Zosyn, 3 days Zyvox, prior to that 5 days of Ceftriaxone/Zithromax   Dexamethasone D#23, @ 2 mg  Completed Remdesivir & Tocilizumab  H-SSI    Continuing home oxycodone and neurontin    Lovenox for DVT prophylaxis   If tracheostomy becomes necessary, patient will require transfer to  as she has a history of tracheoplasty and is followed by their airway clinic  I met with family yesterday at bedside. Total critical care time caring for this patient with life threatening, unstable organ failure, including direct patient contact, management of life support systems, review of data including imaging and labs, discussions with other team members and physicians is 32  minutes, excluding procedures.

## 2021-10-20 NOTE — PROGRESS NOTES
Pt continues to rest quietly and no further changes noted in exam. Pt is more synchronous with vent on Pressure control ventilation  Vitals and SpO2 stable. All lines and monitoring devices remain in place. Pt repositioned in bed. Continue current plan of care.  Milagros Sánchez RN

## 2021-10-20 NOTE — PROGRESS NOTES
medication at home then do not decrease Frequency below that used at home. 0-3 - enter or revise RT bronchodilator order(s) to equivalent RT Bronchodilator order with Frequency of every 4 hours PRN for wheezing or increased work of breathing using Per Protocol order mode. 4-6 - enter or revise RT Bronchodilator order(s) to two equivalent RT bronchodilator orders with one order with BID Frequency and one order with Frequency of every 4 hours PRN wheezing or increased work of breathing using Per Protocol order mode. 7-10 - enter or revise RT Bronchodilator order(s) to two equivalent RT bronchodilator orders with one order with TID Frequency and one order with Frequency of every 4 hours PRN wheezing or increased work of breathing using Per Protocol order mode. 11-13 - enter or revise RT Bronchodilator order(s) to one equivalent RT bronchodilator order with QID Frequency and an Albuterol order with Frequency of every 4 hours PRN wheezing or increased work of breathing using Per Protocol order mode. Greater than 13 - enter or revise RT Bronchodilator order(s) to one equivalent RT bronchodilator order with every 4 hours Frequency and an Albuterol order with Frequency of every 2 hours PRN wheezing or increased work of breathing using Per Protocol order mode.          Electronically signed by Lorne Whitney RCP on 10/19/2021 at 8:41 PM

## 2021-10-20 NOTE — PROGRESS NOTES
Hospitalist Progress Note      PCP: Lauren Powell MD    Date of Admission: 9/26/2021      Admitted with COVID-19 pneumonia,  acute hypoxic respiratory failure. Patient not vaccinated  Progressive significant worsening hypoxemia overnight. Patient was on 2 L of oxygen on admission-> switched to high flow oxygen per Vapotherm with additional 100% nonrebreather on 9/28/2021-> transferred to ICU. Ms. Manuel Stanton remains on vent support  No acute issues .  No  fevers  55% 8 PEEP        Medications:  Reviewed    Infusion Medications    midazolam 4 mg/hr (10/19/21 1636)    norepinephrine Stopped (10/18/21 0616)    fentaNYL 200 mcg/hr (10/20/21 0724)    propofol 45 mcg/kg/min (10/20/21 0512)    sodium chloride      dextrose      sodium chloride       Scheduled Medications    insulin lispro  0-18 Units SubCUTAneous Q4H    dexamethasone  3 mg IntraVENous Q24H    sertraline  50 mg Per NG tube Daily    Venelex   Topical BID    miconazole   Topical BID    oxyCODONE  10 mg Oral Q6H    influenza virus vaccine  0.5 mL IntraMUSCular Prior to discharge    chlorhexidine  15 mL Mouth/Throat BID    ipratropium-albuterol  1 ampule Inhalation Q4H    pantoprazole  40 mg IntraVENous Daily    levothyroxine  100 mcg Oral Daily    enoxaparin  30 mg SubCUTAneous BID    lidocaine 1 % injection  5 mL IntraDERmal Once    sodium chloride flush  5-40 mL IntraVENous 2 times per day    aspirin  81 mg Oral Daily    gabapentin  800 mg Oral TID    atorvastatin  40 mg Oral Daily    sodium chloride flush  5-40 mL IntraVENous 2 times per day     PRN Meds: magnesium sulfate, potassium chloride, labetalol, carboxymethylcellulose PF **AND** artificial tears, fentanNYL, midazolam, sodium chloride flush, sodium chloride, glucose, dextrose, glucagon (rDNA), dextrose, sodium chloride flush, sodium chloride, polyethylene glycol, acetaminophen **OR** acetaminophen, prochlorperazine      Intake/Output Summary (Last 24 hours) at 10/20/2021 0728  Last data filed at 10/20/2021 0600  Gross per 24 hour   Intake 2474.36 ml   Output 2550 ml   Net -75.64 ml       Physical Exam Performed:    BP (!) 110/51   Pulse 73   Temp 98.3 °F (36.8 °C) (Axillary)   Resp 22   Ht 5' 9\" (1.753 m)   Wt 181 lb 3.2 oz (82.2 kg)   SpO2 93%   BMI 26.76 kg/m²     General:intubated on mechanical ventilation . Supine position . Skin:  Warm and dry  Neck:  JVD absent. Neck supple  Chest: diminished breath sounds, improved air entry. No wheezes or rhonchi.  Bibasilar crackles. Cardiovascular:  RRR ,S1S2 normal  Abdomen:  Soft, non tender, non distended, BS +  Extremities:  No edema. Intact peripheral pulses. Brisk cap refill, < 2 secs  Neuro: intubated,sedated       Labs:   Recent Labs     10/18/21  0500 10/19/21  0435 10/20/21  0433   WBC 7.4 5.1 4.8   HGB 10.4* 9.3* 9.0*   HCT 34.0* 29.4* 29.4*   * 84* 79*     Recent Labs     10/18/21  0500 10/19/21  0435 10/20/21  0433    142 143   K 3.9 3.9 3.6    100 100   CO2 36* 38* 39*   BUN 15 16 15   CREATININE <0.5* <0.5* <0.5*   CALCIUM 9.2 9.4 9.4     Recent Labs     10/18/21  0500 10/19/21  0435 10/20/21  0433   AST 24 22 22   ALT 22 19 18   BILITOT <0.2 <0.2 <0.2   ALKPHOS 130* 115 111     No results for input(s): INR in the last 72 hours. No results for input(s): Arabella Shanks in the last 72 hours. Urinalysis:      Lab Results   Component Value Date    NITRU Negative 09/27/2021    WBCUA 0-2 09/27/2021    BACTERIA Rare 09/27/2021    RBCUA None seen 09/27/2021    BLOODU TRACE-INTACT 09/27/2021    SPECGRAV <=1.005 09/27/2021    GLUCOSEU >=1000 09/27/2021    GLUCOSEU 250 05/11/2011       Radiology:  XR CHEST PORTABLE   Final Result   Unchanged multifocal bilateral pneumonia.          XR CHEST PORTABLE   Final Result   No significant change in the multifocal bilateral infiltrates         XR CHEST PORTABLE   Final Result   Diffuse bilateral airspace disease appears unchanged on the right and   increased on the left. XR CHEST PORTABLE   Final Result   Bilateral pleuroparenchymal disease, with improved aeration of the right lung   compared to prior. XR CHEST PORTABLE   Final Result   Line and tube as above. Bilateral interstitial opacities are worsened when compared to prior study. XR CHEST PORTABLE   Final Result   Stable chest.  Diffuse interstitial changes, possibly infiltrate or edema. Satisfactory position of endotracheal tube. XR CHEST PORTABLE   Final Result   Stable support apparatus. In this case, both multifocal pneumonia and pulmonary edema are both   considered, and the changes appear increased when compared to the previous   exam.         XR CHEST PORTABLE   Final Result   1. Stable appropriate positions of support apparatus. 2. Slight interval progression of multifocal airspace disease throughout both   lungs, likely a combination of multifocal pneumonia and superimposed   pulmonary edema. 3. Stable cardiomegaly and small bilateral pleural effusions. XR CHEST PORTABLE   Final Result   Improving aeration of the right lung with grossly stable left basilar   airspace opacities. XR CHEST PORTABLE   Final Result   Supportive tubing is in stable position. Stable pattern of bilateral ground-glass opacities and basilar consolidations. XR CHEST PORTABLE   Final Result   Stable appearance of the chest.  Diffuse ground-glass opacities on the right   and mild left basilar opacities. XR CHEST PORTABLE   Final Result   Improved aeration on the left, possibly improving pulmonary edema. Persistent airspace disease in the right base may represent concurrent   pneumonia         XR ABDOMEN (KUB) (SINGLE AP VIEW)   Final Result   Enteric catheter tip likely in the distal gastric body. XR CHEST PORTABLE   Final Result   1. Endotracheal tube projects in the appropriate position.    2. Enteric tube extends below the diaphragm and out of the field of view. XR CHEST PORTABLE   Final Result   Increasing diffuse airspace opacification throughout the lungs. Pattern may   represent pulmonary edema or worsening pneumonitis. XR CHEST PORTABLE   Final Result   1. Interval increased bilateral pulmonary opacities with new consolidative   change at the right lung base. These findings could be secondary to   pulmonary edema or infection. 2. Consolidative change at the right lung base could also be secondary to   atelectasis/mucous plug. XR CHEST PORTABLE   Final Result   No significant interval change in small right pleural effusion or bilateral   heterogeneous opacity which can reflect pulmonary edema or pneumonia. XR CHEST PORTABLE   Final Result   Mild improvement from prior comparison. Mild-to-moderate congestion and/or   infiltrates identified in the lungs. ET tube terminates 7 cm superior to the saba. XR CHEST PORTABLE   Final Result   Endotracheal tube and orogastric tube unchanged, adequate position. Slightly increased extensive bilateral pulmonary disease over the past 24   hours this may relate to interstitial edema or diffuse infection or a   combination. XR CHEST PORTABLE   Final Result   Endotracheal tube with its tip approximately 4.7 cm from the saba in   satisfactory position. Enteric tube in satisfactory position. Patchy bilateral airspace disease is again noted with improved aeration of   the left mid lung. XR CHEST PORTABLE   Final Result   Some improvement in the appearance of the chest with clearing of some of the   acute airspace disease from the mid left lung. Large amount of pneumonia   remains within the right lung and lower left lung. XR CHEST PORTABLE   Final Result   1. Endotracheal tube, nasogastric tube, and right PICC line placement.       2.  Increasing multifocal opacities with consolidative area in the right perihilar lung. Could be multifocal pneumonia with or without edema. IR PICC WO SQ PORT/PUMP > 5 YEARS   Final Result   1. See above. CT CHEST PULMONARY EMBOLISM W CONTRAST   Final Result   No evidence of pulmonary embolism. Scattered peripheral opacities in the left lung and more consolidative area   in the right lung concerning for pneumonia. Previous right thoracotomy and   upper lobectomy. One mildly enlarged right paratracheal lymph node is present but no priors   for comparison. Nonenlarged but prominent lymph nodes in the upper abdomen   and juxta diaphragmatic region. XR CHEST PORTABLE   Final Result   Mild cardiomegaly without interstitial edema. Metallic surgical clips from prior right thoracotomy. COPD with anterior segment-right upper lobe alveolar pneumonia. Old pleural thickening was noted along the right lateral chest wall. Pleural   thickening and or trace effusion blunts the right costophrenic angle. XR CHEST PORTABLE    (Results Pending)   XR CHEST PORTABLE    (Results Pending)         Cultures  Blood- NGTD    Repeat resp - - Klebsiella     Sputum- pseudomonas      Assessment/Plan:        #COVID-19 pneumonia  #Acute hypoxic respiratory failure   Pseudomonas HCAP. -Unvaccinated patient  - she presented with cough, fever, dyspnea in the setting of household contact testing + COVID 19  - Her Covid testing came back positive on admission  - Initially on 2 L of oxygen on presentation. worsening hypoxemia requiring high flow oxygen. And eventually intubated   - Intubated early AM  9/29/2021.  Extubated on 10/4/2021.  She was on Vapotherm.  Pulmonary status got worse and she was reintubated on 10/6/2021.   - s/p Remdesivir  - on Decadron, day # 23 - being tapered   -  -/sp  Tocilizumab  -Lovenox twice daily  - pulmonary managing.  Might need trach      #Possible superimposed bacterial pneumonia  Gram-negative infection    -Was on Rocephin and Zithromax fpr 5 days ; - changed to zosyn on 10/2 with pseudomonas in  resp culture.  Pulmonary has started Zyvox.  Hold Zoloft when patient is getting Zyvox .   Completed Zosyn day 8 of 8.    Respiratory cultures now growing Klebsiella, initiated on Cipro -completed 7 days.       #Hypokalemia  - replaced as needed      #CAD  - cont ASA, statin  - EKG with inferior T wave abnormality.  She denied CP.  Trop neg   -telemetry monitoring       #History of tracheobronchomalacia  - Has had surgical treatment for this       #HTN  Improved hypotension and off levophed  Resumed home medications-Norvasc and losartan.       #Chronic pain   History of hip fracture  - cont home oxycodone BID and percocet   - cont gabapentin and requip       #DM2  - use ssi        #Hypothyroidism  - cont synthroid         DVT Prophylaxis: Lovenox   Diet: Diet NPO  ADULT TUBE FEEDING; Orogastric; Peptide Based; Continuous; 55; Yes; 5; Q 4 hours; 60; 30; Q 3 hours  Code Status: Full Code      Aliyah Reyes MD, 10/20/2021 1:45 PM

## 2021-10-21 ENCOUNTER — APPOINTMENT (OUTPATIENT)
Dept: GENERAL RADIOLOGY | Age: 66
DRG: 004 | End: 2021-10-21
Payer: MEDICARE

## 2021-10-21 LAB
A/G RATIO: 1.3 (ref 1.1–2.2)
ALBUMIN SERPL-MCNC: 3.5 G/DL (ref 3.4–5)
ALP BLD-CCNC: 121 U/L (ref 40–129)
ALT SERPL-CCNC: 19 U/L (ref 10–40)
ANION GAP SERPL CALCULATED.3IONS-SCNC: 5 MMOL/L (ref 3–16)
AST SERPL-CCNC: 23 U/L (ref 15–37)
BASE EXCESS ARTERIAL: 12.2 MMOL/L (ref -3–3)
BASOPHILS ABSOLUTE: 0.1 K/UL (ref 0–0.2)
BASOPHILS RELATIVE PERCENT: 1 %
BILIRUB SERPL-MCNC: 0.3 MG/DL (ref 0–1)
BUN BLDV-MCNC: 18 MG/DL (ref 7–20)
CALCIUM SERPL-MCNC: 9.2 MG/DL (ref 8.3–10.6)
CARBOXYHEMOGLOBIN ARTERIAL: 0.4 % (ref 0–1.5)
CHLORIDE BLD-SCNC: 99 MMOL/L (ref 99–110)
CO2: 38 MMOL/L (ref 21–32)
CREAT SERPL-MCNC: <0.5 MG/DL (ref 0.6–1.2)
EOSINOPHILS ABSOLUTE: 0.4 K/UL (ref 0–0.6)
EOSINOPHILS RELATIVE PERCENT: 6.6 %
GFR AFRICAN AMERICAN: >60
GFR NON-AFRICAN AMERICAN: >60
GLOBULIN: 2.7 G/DL
GLUCOSE BLD-MCNC: 123 MG/DL (ref 70–99)
GLUCOSE BLD-MCNC: 126 MG/DL (ref 70–99)
GLUCOSE BLD-MCNC: 147 MG/DL (ref 70–99)
GLUCOSE BLD-MCNC: 154 MG/DL (ref 70–99)
GLUCOSE BLD-MCNC: 200 MG/DL (ref 70–99)
GLUCOSE BLD-MCNC: 205 MG/DL (ref 70–99)
GLUCOSE BLD-MCNC: 233 MG/DL (ref 70–99)
GLUCOSE BLD-MCNC: 289 MG/DL (ref 70–99)
HCO3 ARTERIAL: 38.6 MMOL/L (ref 21–29)
HCT VFR BLD CALC: 29.8 % (ref 36–48)
HEMOGLOBIN, ART, EXTENDED: 10.3 G/DL (ref 12–16)
HEMOGLOBIN: 9.4 G/DL (ref 12–16)
LYMPHOCYTES ABSOLUTE: 1.4 K/UL (ref 1–5.1)
LYMPHOCYTES RELATIVE PERCENT: 23.1 %
MAGNESIUM: 1.8 MG/DL (ref 1.8–2.4)
MCH RBC QN AUTO: 25.2 PG (ref 26–34)
MCHC RBC AUTO-ENTMCNC: 31.7 G/DL (ref 31–36)
MCV RBC AUTO: 79.6 FL (ref 80–100)
METHEMOGLOBIN ARTERIAL: 0.3 %
MONOCYTES ABSOLUTE: 0.6 K/UL (ref 0–1.3)
MONOCYTES RELATIVE PERCENT: 9.4 %
NEUTROPHILS ABSOLUTE: 3.7 K/UL (ref 1.7–7.7)
NEUTROPHILS RELATIVE PERCENT: 59.9 %
O2 SAT, ARTERIAL: 89.3 %
O2 THERAPY: ABNORMAL
PCO2 ARTERIAL: 61 MMHG (ref 35–45)
PDW BLD-RTO: 18.9 % (ref 12.4–15.4)
PERFORMED ON: ABNORMAL
PH ARTERIAL: 7.42 (ref 7.35–7.45)
PLATELET # BLD: 83 K/UL (ref 135–450)
PMV BLD AUTO: 9.1 FL (ref 5–10.5)
PO2 ARTERIAL: 57 MMHG (ref 75–108)
POTASSIUM REFLEX MAGNESIUM: 3.7 MMOL/L (ref 3.5–5.1)
RBC # BLD: 3.74 M/UL (ref 4–5.2)
SODIUM BLD-SCNC: 142 MMOL/L (ref 136–145)
TCO2 ARTERIAL: 40.5 MMOL/L
TOTAL PROTEIN: 6.2 G/DL (ref 6.4–8.2)
WBC # BLD: 6.2 K/UL (ref 4–11)

## 2021-10-21 PROCEDURE — 2000000000 HC ICU R&B

## 2021-10-21 PROCEDURE — 99233 SBSQ HOSP IP/OBS HIGH 50: CPT | Performed by: INTERNAL MEDICINE

## 2021-10-21 PROCEDURE — 94640 AIRWAY INHALATION TREATMENT: CPT

## 2021-10-21 PROCEDURE — 2500000003 HC RX 250 WO HCPCS: Performed by: INTERNAL MEDICINE

## 2021-10-21 PROCEDURE — 6360000002 HC RX W HCPCS: Performed by: INTERNAL MEDICINE

## 2021-10-21 PROCEDURE — 2580000003 HC RX 258: Performed by: INTERNAL MEDICINE

## 2021-10-21 PROCEDURE — 83735 ASSAY OF MAGNESIUM: CPT

## 2021-10-21 PROCEDURE — 94761 N-INVAS EAR/PLS OXIMETRY MLT: CPT

## 2021-10-21 PROCEDURE — 6370000000 HC RX 637 (ALT 250 FOR IP): Performed by: INTERNAL MEDICINE

## 2021-10-21 PROCEDURE — 94003 VENT MGMT INPAT SUBQ DAY: CPT

## 2021-10-21 PROCEDURE — 71045 X-RAY EXAM CHEST 1 VIEW: CPT

## 2021-10-21 PROCEDURE — 36415 COLL VENOUS BLD VENIPUNCTURE: CPT

## 2021-10-21 PROCEDURE — 82803 BLOOD GASES ANY COMBINATION: CPT

## 2021-10-21 PROCEDURE — 36592 COLLECT BLOOD FROM PICC: CPT

## 2021-10-21 PROCEDURE — C9113 INJ PANTOPRAZOLE SODIUM, VIA: HCPCS | Performed by: INTERNAL MEDICINE

## 2021-10-21 PROCEDURE — 85025 COMPLETE CBC W/AUTO DIFF WBC: CPT

## 2021-10-21 PROCEDURE — 80053 COMPREHEN METABOLIC PANEL: CPT

## 2021-10-21 PROCEDURE — 99291 CRITICAL CARE FIRST HOUR: CPT | Performed by: INTERNAL MEDICINE

## 2021-10-21 PROCEDURE — 2700000000 HC OXYGEN THERAPY PER DAY

## 2021-10-21 RX ORDER — POTASSIUM BICARBONATE 25 MEQ/1
25 TABLET, EFFERVESCENT ORAL ONCE
Status: COMPLETED | OUTPATIENT
Start: 2021-10-21 | End: 2021-10-21

## 2021-10-21 RX ORDER — DEXAMETHASONE SODIUM PHOSPHATE 4 MG/ML
2 INJECTION, SOLUTION INTRA-ARTICULAR; INTRALESIONAL; INTRAMUSCULAR; INTRAVENOUS; SOFT TISSUE EVERY 24 HOURS
Status: DISCONTINUED | OUTPATIENT
Start: 2021-10-21 | End: 2021-10-23

## 2021-10-21 RX ORDER — MAGNESIUM SULFATE 1 G/100ML
1000 INJECTION INTRAVENOUS ONCE
Status: COMPLETED | OUTPATIENT
Start: 2021-10-21 | End: 2021-10-21

## 2021-10-21 RX ADMIN — OXYCODONE 10 MG: 5 TABLET ORAL at 04:20

## 2021-10-21 RX ADMIN — PANTOPRAZOLE SODIUM 40 MG: 40 INJECTION, POWDER, FOR SOLUTION INTRAVENOUS at 08:20

## 2021-10-21 RX ADMIN — SODIUM CHLORIDE, PRESERVATIVE FREE 10 ML: 5 INJECTION INTRAVENOUS at 20:23

## 2021-10-21 RX ADMIN — INSULIN LISPRO 1 UNITS: 100 INJECTION, SOLUTION INTRAVENOUS; SUBCUTANEOUS at 12:00

## 2021-10-21 RX ADMIN — PROPOFOL 35 MCG/KG/MIN: 10 INJECTION, EMULSION INTRAVENOUS at 23:32

## 2021-10-21 RX ADMIN — GABAPENTIN 800 MG: 400 CAPSULE ORAL at 20:30

## 2021-10-21 RX ADMIN — IPRATROPIUM BROMIDE AND ALBUTEROL SULFATE 1 AMPULE: .5; 2.5 SOLUTION RESPIRATORY (INHALATION) at 23:47

## 2021-10-21 RX ADMIN — DEXAMETHASONE SODIUM PHOSPHATE 2 MG: 4 INJECTION, SOLUTION INTRAMUSCULAR; INTRAVENOUS at 13:44

## 2021-10-21 RX ADMIN — OXYCODONE 10 MG: 5 TABLET ORAL at 22:05

## 2021-10-21 RX ADMIN — CASTOR OIL AND BALSAM, PERU: 788; 87 OINTMENT TOPICAL at 08:21

## 2021-10-21 RX ADMIN — PROPOFOL 45 MCG/KG/MIN: 10 INJECTION, EMULSION INTRAVENOUS at 15:04

## 2021-10-21 RX ADMIN — MAGNESIUM SULFATE HEPTAHYDRATE 1000 MG: 1 INJECTION, SOLUTION INTRAVENOUS at 10:39

## 2021-10-21 RX ADMIN — LEVOTHYROXINE SODIUM 100 MCG: 100 TABLET ORAL at 05:29

## 2021-10-21 RX ADMIN — PROPOFOL 45 MCG/KG/MIN: 10 INJECTION, EMULSION INTRAVENOUS at 18:54

## 2021-10-21 RX ADMIN — INSULIN LISPRO 6 UNITS: 100 INJECTION, SOLUTION INTRAVENOUS; SUBCUTANEOUS at 20:18

## 2021-10-21 RX ADMIN — ENOXAPARIN SODIUM 30 MG: 30 INJECTION SUBCUTANEOUS at 20:29

## 2021-10-21 RX ADMIN — GABAPENTIN 800 MG: 400 CAPSULE ORAL at 08:20

## 2021-10-21 RX ADMIN — MICONAZOLE NITRATE: 2 POWDER TOPICAL at 08:22

## 2021-10-21 RX ADMIN — IPRATROPIUM BROMIDE AND ALBUTEROL SULFATE 1 AMPULE: .5; 2.5 SOLUTION RESPIRATORY (INHALATION) at 07:40

## 2021-10-21 RX ADMIN — IPRATROPIUM BROMIDE AND ALBUTEROL SULFATE 1 AMPULE: .5; 2.5 SOLUTION RESPIRATORY (INHALATION) at 11:28

## 2021-10-21 RX ADMIN — SERTRALINE HYDROCHLORIDE 50 MG: 50 TABLET ORAL at 08:20

## 2021-10-21 RX ADMIN — Medication 200 MCG/HR: at 15:06

## 2021-10-21 RX ADMIN — SODIUM CHLORIDE, PRESERVATIVE FREE 10 ML: 5 INJECTION INTRAVENOUS at 08:21

## 2021-10-21 RX ADMIN — Medication 200 MCG/HR: at 10:11

## 2021-10-21 RX ADMIN — CHLORHEXIDINE GLUCONATE 0.12% ORAL RINSE 15 ML: 1.2 LIQUID ORAL at 20:22

## 2021-10-21 RX ADMIN — ASPIRIN 81 MG: 81 TABLET, CHEWABLE ORAL at 08:20

## 2021-10-21 RX ADMIN — ENOXAPARIN SODIUM 30 MG: 30 INJECTION SUBCUTANEOUS at 08:20

## 2021-10-21 RX ADMIN — OXYCODONE 10 MG: 5 TABLET ORAL at 10:31

## 2021-10-21 RX ADMIN — IPRATROPIUM BROMIDE AND ALBUTEROL SULFATE 1 AMPULE: .5; 2.5 SOLUTION RESPIRATORY (INHALATION) at 03:14

## 2021-10-21 RX ADMIN — INSULIN LISPRO 6 UNITS: 100 INJECTION, SOLUTION INTRAVENOUS; SUBCUTANEOUS at 23:27

## 2021-10-21 RX ADMIN — IPRATROPIUM BROMIDE AND ALBUTEROL SULFATE 1 AMPULE: .5; 2.5 SOLUTION RESPIRATORY (INHALATION) at 19:10

## 2021-10-21 RX ADMIN — Medication 150 MCG/HR: at 20:22

## 2021-10-21 RX ADMIN — POTASSIUM BICARBONATE 25 MEQ: 977.5 TABLET, EFFERVESCENT ORAL at 10:31

## 2021-10-21 RX ADMIN — Medication 4 MG/HR: at 12:04

## 2021-10-21 RX ADMIN — CHLORHEXIDINE GLUCONATE 0.12% ORAL RINSE 15 ML: 1.2 LIQUID ORAL at 08:20

## 2021-10-21 RX ADMIN — IPRATROPIUM BROMIDE AND ALBUTEROL SULFATE 1 AMPULE: .5; 2.5 SOLUTION RESPIRATORY (INHALATION) at 15:52

## 2021-10-21 RX ADMIN — INSULIN LISPRO 6 UNITS: 100 INJECTION, SOLUTION INTRAVENOUS; SUBCUTANEOUS at 00:30

## 2021-10-21 RX ADMIN — PROPOFOL 35 MCG/KG/MIN: 10 INJECTION, EMULSION INTRAVENOUS at 01:31

## 2021-10-21 RX ADMIN — GABAPENTIN 800 MG: 400 CAPSULE ORAL at 13:46

## 2021-10-21 RX ADMIN — ATORVASTATIN CALCIUM 40 MG: 40 TABLET, FILM COATED ORAL at 08:20

## 2021-10-21 RX ADMIN — PROPOFOL 45 MCG/KG/MIN: 10 INJECTION, EMULSION INTRAVENOUS at 10:45

## 2021-10-21 RX ADMIN — OXYCODONE 10 MG: 5 TABLET ORAL at 16:51

## 2021-10-21 RX ADMIN — CASTOR OIL AND BALSAM, PERU: 788; 87 OINTMENT TOPICAL at 20:24

## 2021-10-21 RX ADMIN — PROPOFOL 45 MCG/KG/MIN: 10 INJECTION, EMULSION INTRAVENOUS at 06:15

## 2021-10-21 RX ADMIN — INSULIN LISPRO 9 UNITS: 100 INJECTION, SOLUTION INTRAVENOUS; SUBCUTANEOUS at 17:35

## 2021-10-21 RX ADMIN — MICONAZOLE NITRATE: 2 POWDER TOPICAL at 20:22

## 2021-10-21 RX ADMIN — Medication 200 MCG/HR: at 04:53

## 2021-10-21 ASSESSMENT — PULMONARY FUNCTION TESTS
PIF_VALUE: 35
PIF_VALUE: 36
PIF_VALUE: 33
PIF_VALUE: 35
PIF_VALUE: 34
PIF_VALUE: 36
PIF_VALUE: 34
PIF_VALUE: 36
PIF_VALUE: 36
PIF_VALUE: 35
PIF_VALUE: 34
PIF_VALUE: 35
PIF_VALUE: 34
PIF_VALUE: 34
PIF_VALUE: 35
PIF_VALUE: 34
PIF_VALUE: 35
PIF_VALUE: 33
PIF_VALUE: 34
PIF_VALUE: 33
PIF_VALUE: 36
PIF_VALUE: 36
PIF_VALUE: 34
PIF_VALUE: 35
PIF_VALUE: 33
PIF_VALUE: 34

## 2021-10-21 ASSESSMENT — PAIN SCALES - GENERAL
PAINLEVEL_OUTOF10: 0

## 2021-10-21 NOTE — PROGRESS NOTES
9:30 PM  Shift assessment completed, PM meds administered. Intubated on AC/PC 55% & 8 PEEP, SpO2 91%. Sedated with 200 mcg/hr of fentanyl, 4 mg/hr of versed, and 40 mcg/kg/min of propofol. RASS -4 & CPOT 0, occasionally double stacking but not consistently. TF infusing at goal rate, minimal residual.  BP stable, NSR HR in the 60s w/o ectopy, afebrile at 80 F. Pt's  Talat Coto updated. Pt repositioned. 12:45 AM  Reassessment completed. Propofol down to 35 mcg/kg/min, RASS -4. CHG bath & linen change completed, venelex & miconazole administered. Vent settings remain the same. 3:00 AM  Propofol titrated up to 45 mcg/kg/min d/t frequent double stacking. 4:15 AM  Reassessment completed. No big changes. ABG drawn from R radial artery w/o issue. AM labs drawn via PICC w/o issue. 7:08 AM  EOS report given to Mariela Sheth RN. Pt stable at this time.

## 2021-10-21 NOTE — PROGRESS NOTES
Pulmonary & Critical Care Medicine ICU Progress Note    CC: Respiratory failure    Events of Last 24 hours:   Changed to pressure control to improve dyssynchrony  Matched I's and O's    Vascular lines: IV: PICC line 9/28    MV: 9/29-10/4, emergently re-intubated 10/6/21 for refractory hypoxemia  Vent Mode: AC/PC Rate Set: 22 bmp/Vt Ordered: 0 mL/ /FiO2 : 55 %  Recent Labs     10/20/21  0433 10/21/21  0430   PHART 7.375 7.419   GOL5RLI 61.7* 61.0*   PO2ART 52.8* 57.0*     IV:   midazolam 4 mg/hr (10/21/21 0631)    norepinephrine Stopped (10/18/21 0616)    fentaNYL 200 mcg/hr (10/21/21 0631)    propofol 45 mcg/kg/min (10/21/21 0631)    sodium chloride      dextrose      sodium chloride       Vitals:  Blood pressure (!) 105/53, pulse 72, temperature 98.2 °F (36.8 °C), temperature source Axillary, resp. rate 20, height 5' 9\" (1.753 m), weight 175 lb 8 oz (79.6 kg), SpO2 91 %, not currently breastfeeding. on ventilator      Intake/Output Summary (Last 24 hours) at 10/21/2021 0735  Last data filed at 10/21/2021 0631  Gross per 24 hour   Intake 2399.58 ml   Output 2065 ml   Net 334.58 ml     General: intubated, ill appearing    ENT: Pharynx with ETT. Resp: No crackles. No wheezing. CV: S1, S2. Trace edema  GI: NT, ND, +BS  Skin: Warm and dry. Neuro: PERRL. Awake but not following commands.  Patellar reflexes are symmetric       Scheduled Meds:   insulin lispro  0-18 Units SubCUTAneous Q4H    dexamethasone  3 mg IntraVENous Q24H    sertraline  50 mg Per NG tube Daily    Venelex   Topical BID    miconazole   Topical BID    oxyCODONE  10 mg Oral Q6H    influenza virus vaccine  0.5 mL IntraMUSCular Prior to discharge    chlorhexidine  15 mL Mouth/Throat BID    ipratropium-albuterol  1 ampule Inhalation Q4H    pantoprazole  40 mg IntraVENous Daily    levothyroxine  100 mcg Oral Daily    enoxaparin  30 mg SubCUTAneous BID    lidocaine 1 % injection  5 mL IntraDERmal Once    sodium chloride flush 5-40 mL IntraVENous 2 times per day    aspirin  81 mg Oral Daily    gabapentin  800 mg Oral TID    atorvastatin  40 mg Oral Daily    sodium chloride flush  5-40 mL IntraVENous 2 times per day       Data:  CBC:   Recent Labs     10/19/21  0435 10/20/21  0433 10/21/21  0430   WBC 5.1 4.8 6.2   HGB 9.3* 9.0* 9.4*   HCT 29.4* 29.4* 29.8*   MCV 79.1* 80.3 79.6*   PLT 84* 79* 83*     BMP:   Recent Labs     10/19/21  0435 10/20/21  0433 10/21/21  0430    143 142   K 3.9 3.6 3.7    100 99   CO2 38* 39* 38*   BUN 16 15 18   CREATININE <0.5* <0.5* <0.5*     LIVER PROFILE:   Recent Labs     10/19/21  0435 10/20/21  0433 10/21/21  0430   AST 22 22 23   ALT 19 18 19   BILITOT <0.2 <0.2 0.3   ALKPHOS 107 176 943       Microbiology:  9/27/21 COVID-19 detected  9/30/21 Resp Pseudomonas fluorescens   10/7/2021 tracheal aspirate: Klebsiella intermediate to zosyn    Imaging:  Chest x-ray 10/20/2021 bilateral airspace disease    CTPA 9/27/21  No evidence of pulmonary embolism. Scattered peripheral opacities in the left lung and more consolidative area in the right lung concerning for pneumonia.  Previous right thoracotomy and upper lobectomy. One mildly enlarged right paratracheal lymph node is present but no priors for comparison.  Nonenlarged but prominent lymph nodes in the upper abdomen and juxta diaphragmatic region      ASSESSMENT:  · Acute hypoxemic respiratory failure   · COVID-19 viral pneumonia  · ARDS  · Pseudomonas followed by Klebsiella pneumonias  · Acute kidney injury  · CAD  · Chronic pain syndrome - on chronic opiates and Neurontin   · Possible early dementia, being worked up outpatient   · H/O bronchiectasis and tracheobronchomalacia/EDAC s/p tracheoplasty in 2014   · H/O EDAC (tracheobronchomalacia) s/p tracheoplasty     PLAN:  - COVID-19 isolation, droplet plus  -   Mechanical ventilation as per my orders.   The ventilator was adjusted by me at the bedside for unstable, life threatening respiratory failure. Proned 10/6/21-10/7/2; 10/11-10/12   Paralyzed 10/6/21 - 10/7/21  IV Propofol, Fentanyl & Versed for sedation, target RASS -1 to -2  Completed 7 days Cipro, 8 days Zosyn, 3 days Zyvox, prior to that 5 days of Ceftriaxone/Zithromax   Dexamethasone D#24, @ 2 mg  Completed Remdesivir & Tocilizumab  H-SSI    Continuing home oxycodone and neurontin    Lovenox for DVT prophylaxis   If tracheostomy becomes necessary, patient will require transfer to  as she has a history of tracheoplasty and is followed by their airway clinic    Total critical care time caring for this patient with life threatening, unstable organ failure, including direct patient contact, management of life support systems, review of data including imaging and labs, discussions with other team members and physicians is 31  minutes, excluding procedures.

## 2021-10-21 NOTE — PROGRESS NOTES
Care rounds completed with Dr Agueda Min and multidisciplinary team.  is able to update family who are here visiting. Reviewed labs, meds, VS (temp/HR/RR), I/O's, assessment, & plan of care for today. See progress note & new orders for details.  Carlos Mireles RN

## 2021-10-21 NOTE — PROGRESS NOTES
Pt continues to rest quietly and no further changes noted in exam. Vitals and SpO2 stable. SpO2 has improved during the day and SpO2 94-94% currently. All lines and monitoring devices remain in place. Pt repositioned in bed. She wakes with repositioning and nods head to questions. Continue current plan of care.  Odin Krause RN

## 2021-10-21 NOTE — PROGRESS NOTES
RT Inhaler-Nebulizer Bronchodilator Protocol Note    There is a bronchodilator order in the chart from a provider indicating to follow the RT Bronchodilator Protocol and there is an Initiate RT Inhaler-Nebulizer Bronchodilator Protocol order as well (see protocol at bottom of note). CXR Findings:  XR CHEST PORTABLE    Result Date: 10/20/2021  Stable support apparatus. Persistent multifocal airspace disease. No substantial change. XR CHEST PORTABLE    Result Date: 10/19/2021  Unchanged multifocal bilateral pneumonia. The findings from the last RT Protocol Assessment were as follows:   History Pulmonary Disease: Chronic pulmonary disease  Respiratory Pattern: Mild dyspnea at rest, irregular pattern, or RR 21-25 bpm  Breath Sounds: Slightly diminished and/or crackles  Cough: Weak, productive  Indication for Bronchodilator Therapy: Decreased or absent breath sounds, On home bronchodilators  Bronchodilator Assessment Score: 10    Aerosolized bronchodilator medication orders have been revised according to the RT Inhaler-Nebulizer Bronchodilator Protocol below. Respiratory Therapist to perform RT Therapy Protocol Assessment initially then follow the protocol. Repeat RT Therapy Protocol Assessment PRN for score 0-3 or on second treatment, BID, and PRN for scores above 3. No Indications - adjust the frequency to every 6 hours PRN wheezing or bronchospasm, if no treatments needed after 48 hours then discontinue using Per Protocol order mode. If indication present, adjust the RT bronchodilator orders based on the Bronchodilator Assessment Score as indicated below.   Use Inhaler orders unless patient has one or more of the following: on home nebulizer, not able to hold breath for 10 seconds, is not alert and oriented, cannot activate and use MDI correctly, or respiratory rate 25 breaths per minute or more, then use the equivalent nebulizer order(s) with same Frequency and PRN reasons based on the score.  If a patient is on this medication at home then do not decrease Frequency below that used at home. 0-3 - enter or revise RT bronchodilator order(s) to equivalent RT Bronchodilator order with Frequency of every 4 hours PRN for wheezing or increased work of breathing using Per Protocol order mode. 4-6 - enter or revise RT Bronchodilator order(s) to two equivalent RT bronchodilator orders with one order with BID Frequency and one order with Frequency of every 4 hours PRN wheezing or increased work of breathing using Per Protocol order mode. 7-10 - enter or revise RT Bronchodilator order(s) to two equivalent RT bronchodilator orders with one order with TID Frequency and one order with Frequency of every 4 hours PRN wheezing or increased work of breathing using Per Protocol order mode. 11-13 - enter or revise RT Bronchodilator order(s) to one equivalent RT bronchodilator order with QID Frequency and an Albuterol order with Frequency of every 4 hours PRN wheezing or increased work of breathing using Per Protocol order mode. Greater than 13 - enter or revise RT Bronchodilator order(s) to one equivalent RT bronchodilator order with every 4 hours Frequency and an Albuterol order with Frequency of every 2 hours PRN wheezing or increased work of breathing using Per Protocol order mode.          Electronically signed by Priscila Denney RCP on 10/20/2021 at 11:41 PM

## 2021-10-21 NOTE — PROGRESS NOTES
RT Inhaler-Nebulizer Bronchodilator Protocol Note    There is a bronchodilator order in the chart from a provider indicating to follow the RT Bronchodilator Protocol and there is an Initiate RT Inhaler-Nebulizer Bronchodilator Protocol order as well (see protocol at bottom of note). CXR Findings:  XR CHEST PORTABLE    Result Date: 10/21/2021  Multifocal airspace disease with right-sided pleural effusion, similar to prior. No significant change     XR CHEST PORTABLE    Result Date: 10/20/2021  Stable support apparatus. Persistent multifocal airspace disease. No substantial change. The findings from the last RT Protocol Assessment were as follows:   History Pulmonary Disease: (P) Chronic pulmonary disease  Respiratory Pattern: (P) Mild dyspnea at rest, irregular pattern, or RR 21-25 bpm  Breath Sounds: (P) Slightly diminished and/or crackles  Cough: (P) Weak, productive  Indication for Bronchodilator Therapy: (P) Decreased or absent breath sounds  Bronchodilator Assessment Score: (P) 10    Aerosolized bronchodilator medication orders have been revised according to the RT Inhaler-Nebulizer Bronchodilator Protocol below. Respiratory Therapist to perform RT Therapy Protocol Assessment initially then follow the protocol. Repeat RT Therapy Protocol Assessment PRN for score 0-3 or on second treatment, BID, and PRN for scores above 3. No Indications - adjust the frequency to every 6 hours PRN wheezing or bronchospasm, if no treatments needed after 48 hours then discontinue using Per Protocol order mode. If indication present, adjust the RT bronchodilator orders based on the Bronchodilator Assessment Score as indicated below.   Use Inhaler orders unless patient has one or more of the following: on home nebulizer, not able to hold breath for 10 seconds, is not alert and oriented, cannot activate and use MDI correctly, or respiratory rate 25 breaths per minute or more, then use the equivalent nebulizer order(s) with same Frequency and PRN reasons based on the score. If a patient is on this medication at home then do not decrease Frequency below that used at home. 0-3 - enter or revise RT bronchodilator order(s) to equivalent RT Bronchodilator order with Frequency of every 4 hours PRN for wheezing or increased work of breathing using Per Protocol order mode. 4-6 - enter or revise RT Bronchodilator order(s) to two equivalent RT bronchodilator orders with one order with BID Frequency and one order with Frequency of every 4 hours PRN wheezing or increased work of breathing using Per Protocol order mode. 7-10 - enter or revise RT Bronchodilator order(s) to two equivalent RT bronchodilator orders with one order with TID Frequency and one order with Frequency of every 4 hours PRN wheezing or increased work of breathing using Per Protocol order mode. 11-13 - enter or revise RT Bronchodilator order(s) to one equivalent RT bronchodilator order with QID Frequency and an Albuterol order with Frequency of every 4 hours PRN wheezing or increased work of breathing using Per Protocol order mode. Greater than 13 - enter or revise RT Bronchodilator order(s) to one equivalent RT bronchodilator order with every 4 hours Frequency and an Albuterol order with Frequency of every 2 hours PRN wheezing or increased work of breathing using Per Protocol order mode. RT to enter RT Home Evaluation for COPD & MDI Assessment order using Per Protocol order mode.     Electronically signed by Hui Laws RCP on 10/21/2021 at 9:07 AM

## 2021-10-21 NOTE — PROGRESS NOTES
Hospitalist Progress Note      PCP: Dolores Solis MD    Date of Admission: 9/26/2021      Admitted with COVID-19 pneumonia,  acute hypoxic respiratory failure. Patient not vaccinated  Progressive significant worsening hypoxemia overnight. Patient was on 2 L of oxygen on admission-> switched to high flow oxygen per Vapotherm with additional 100% nonrebreather on 9/28/2021-> transferred to ICU. Ms. Vlad Carey remains on vent support  No acute issues .  No  fevers  55% 8 PEEP        Medications:  Reviewed    Infusion Medications    midazolam 4 mg/hr (10/21/21 0631)    norepinephrine Stopped (10/18/21 0616)    fentaNYL 200 mcg/hr (10/21/21 0631)    propofol 45 mcg/kg/min (10/21/21 0631)    sodium chloride      dextrose      sodium chloride       Scheduled Medications    insulin lispro  0-18 Units SubCUTAneous Q4H    dexamethasone  3 mg IntraVENous Q24H    sertraline  50 mg Per NG tube Daily    Venelex   Topical BID    miconazole   Topical BID    oxyCODONE  10 mg Oral Q6H    influenza virus vaccine  0.5 mL IntraMUSCular Prior to discharge    chlorhexidine  15 mL Mouth/Throat BID    ipratropium-albuterol  1 ampule Inhalation Q4H    pantoprazole  40 mg IntraVENous Daily    levothyroxine  100 mcg Oral Daily    enoxaparin  30 mg SubCUTAneous BID    lidocaine 1 % injection  5 mL IntraDERmal Once    sodium chloride flush  5-40 mL IntraVENous 2 times per day    aspirin  81 mg Oral Daily    gabapentin  800 mg Oral TID    atorvastatin  40 mg Oral Daily    sodium chloride flush  5-40 mL IntraVENous 2 times per day     PRN Meds: magnesium sulfate, potassium chloride, labetalol, carboxymethylcellulose PF **AND** artificial tears, fentanNYL, midazolam, sodium chloride flush, sodium chloride, glucose, dextrose, glucagon (rDNA), dextrose, sodium chloride flush, sodium chloride, polyethylene glycol, acetaminophen **OR** acetaminophen, prochlorperazine      Intake/Output Summary (Last 24 hours) at 10/21/2021 0705  Last data filed at 10/21/2021 0631  Gross per 24 hour   Intake 2399.58 ml   Output 2065 ml   Net 334.58 ml       Physical Exam Performed:    BP (!) 108/55   Pulse 70   Temp 98.2 °F (36.8 °C) (Axillary)   Resp 18   Ht 5' 9\" (1.753 m)   Wt 175 lb 8 oz (79.6 kg)   SpO2 90%   BMI 25.92 kg/m²     General:intubated on mechanical ventilation . Supine position . Skin:  Warm and dry  Neck:  JVD absent. Neck supple  Chest: diminished breath sounds, improved air entry. No wheezes or rhonchi.  Bibasilar crackles. Cardiovascular:  RRR ,S1S2 normal  Abdomen:  Soft, non tender, non distended, BS +  Extremities:  No edema. Intact peripheral pulses. Brisk cap refill, < 2 secs  Neuro: intubated,sedated       Labs:   Recent Labs     10/19/21  0435 10/20/21  0433 10/21/21  0430   WBC 5.1 4.8 6.2   HGB 9.3* 9.0* 9.4*   HCT 29.4* 29.4* 29.8*   PLT 84* 79* 83*     Recent Labs     10/19/21  0435 10/20/21  0433 10/21/21  0430    143 142   K 3.9 3.6 3.7    100 99   CO2 38* 39* 38*   BUN 16 15 18   CREATININE <0.5* <0.5* <0.5*   CALCIUM 9.4 9.4 9.2     Recent Labs     10/19/21  0435 10/20/21  0433 10/21/21  0430   AST 22 22 23   ALT 19 18 19   BILITOT <0.2 <0.2 0.3   ALKPHOS 115 111 121     No results for input(s): INR in the last 72 hours. No results for input(s): Kirsten Height in the last 72 hours. Urinalysis:      Lab Results   Component Value Date    NITRU Negative 09/27/2021    WBCUA 0-2 09/27/2021    BACTERIA Rare 09/27/2021    RBCUA None seen 09/27/2021    BLOODU TRACE-INTACT 09/27/2021    SPECGRAV <=1.005 09/27/2021    GLUCOSEU >=1000 09/27/2021    GLUCOSEU 250 05/11/2011       Radiology:  XR CHEST PORTABLE   Final Result   Stable support apparatus. Persistent multifocal airspace disease. No substantial change. XR CHEST PORTABLE   Final Result   Unchanged multifocal bilateral pneumonia.          XR CHEST PORTABLE   Final Result   No significant change in the multifocal bilateral infiltrates         XR CHEST PORTABLE   Final Result   Diffuse bilateral airspace disease appears unchanged on the right and   increased on the left. XR CHEST PORTABLE   Final Result   Bilateral pleuroparenchymal disease, with improved aeration of the right lung   compared to prior. XR CHEST PORTABLE   Final Result   Line and tube as above. Bilateral interstitial opacities are worsened when compared to prior study. XR CHEST PORTABLE   Final Result   Stable chest.  Diffuse interstitial changes, possibly infiltrate or edema. Satisfactory position of endotracheal tube. XR CHEST PORTABLE   Final Result   Stable support apparatus. In this case, both multifocal pneumonia and pulmonary edema are both   considered, and the changes appear increased when compared to the previous   exam.         XR CHEST PORTABLE   Final Result   1. Stable appropriate positions of support apparatus. 2. Slight interval progression of multifocal airspace disease throughout both   lungs, likely a combination of multifocal pneumonia and superimposed   pulmonary edema. 3. Stable cardiomegaly and small bilateral pleural effusions. XR CHEST PORTABLE   Final Result   Improving aeration of the right lung with grossly stable left basilar   airspace opacities. XR CHEST PORTABLE   Final Result   Supportive tubing is in stable position. Stable pattern of bilateral ground-glass opacities and basilar consolidations. XR CHEST PORTABLE   Final Result   Stable appearance of the chest.  Diffuse ground-glass opacities on the right   and mild left basilar opacities. XR CHEST PORTABLE   Final Result   Improved aeration on the left, possibly improving pulmonary edema. Persistent airspace disease in the right base may represent concurrent   pneumonia         XR ABDOMEN (KUB) (SINGLE AP VIEW)   Final Result   Enteric catheter tip likely in the distal gastric body. XR CHEST PORTABLE   Final Result   1. Endotracheal tube projects in the appropriate position. 2. Enteric tube extends below the diaphragm and out of the field of view. XR CHEST PORTABLE   Final Result   Increasing diffuse airspace opacification throughout the lungs. Pattern may   represent pulmonary edema or worsening pneumonitis. XR CHEST PORTABLE   Final Result   1. Interval increased bilateral pulmonary opacities with new consolidative   change at the right lung base. These findings could be secondary to   pulmonary edema or infection. 2. Consolidative change at the right lung base could also be secondary to   atelectasis/mucous plug. XR CHEST PORTABLE   Final Result   No significant interval change in small right pleural effusion or bilateral   heterogeneous opacity which can reflect pulmonary edema or pneumonia. XR CHEST PORTABLE   Final Result   Mild improvement from prior comparison. Mild-to-moderate congestion and/or   infiltrates identified in the lungs. ET tube terminates 7 cm superior to the saba. XR CHEST PORTABLE   Final Result   Endotracheal tube and orogastric tube unchanged, adequate position. Slightly increased extensive bilateral pulmonary disease over the past 24   hours this may relate to interstitial edema or diffuse infection or a   combination. XR CHEST PORTABLE   Final Result   Endotracheal tube with its tip approximately 4.7 cm from the saba in   satisfactory position. Enteric tube in satisfactory position. Patchy bilateral airspace disease is again noted with improved aeration of   the left mid lung. XR CHEST PORTABLE   Final Result   Some improvement in the appearance of the chest with clearing of some of the   acute airspace disease from the mid left lung. Large amount of pneumonia   remains within the right lung and lower left lung. XR CHEST PORTABLE   Final Result   1.   Endotracheal tube, nasogastric tube, and right PICC line placement. 2.  Increasing multifocal opacities with consolidative area in the right   perihilar lung. Could be multifocal pneumonia with or without edema. IR PICC WO SQ PORT/PUMP > 5 YEARS   Final Result   1. See above. CT CHEST PULMONARY EMBOLISM W CONTRAST   Final Result   No evidence of pulmonary embolism. Scattered peripheral opacities in the left lung and more consolidative area   in the right lung concerning for pneumonia. Previous right thoracotomy and   upper lobectomy. One mildly enlarged right paratracheal lymph node is present but no priors   for comparison. Nonenlarged but prominent lymph nodes in the upper abdomen   and juxta diaphragmatic region. XR CHEST PORTABLE   Final Result   Mild cardiomegaly without interstitial edema. Metallic surgical clips from prior right thoracotomy. COPD with anterior segment-right upper lobe alveolar pneumonia. Old pleural thickening was noted along the right lateral chest wall. Pleural   thickening and or trace effusion blunts the right costophrenic angle. XR CHEST PORTABLE    (Results Pending)   XR CHEST PORTABLE    (Results Pending)         Cultures  Blood- NGTD    Repeat resp - - Klebsiella     Sputum- pseudomonas      Assessment/Plan:        #COVID-19 pneumonia  #Acute hypoxic respiratory failure   Pseudomonas HCAP. -Unvaccinated patient  - she presented with cough, fever, dyspnea in the setting of household contact testing + COVID 19  - Her Covid testing came back positive on admission  - Initially on 2 L of oxygen on presentation. worsening hypoxemia requiring high flow oxygen.  And eventually intubated   - Intubated early AM  9/29/2021.  Extubated on 10/4/2021.  She was on Vapotherm.  Pulmonary status got worse and she was reintubated on 10/6/2021.   - s/p Remdesivir  - on Decadron, day # 24 - being tapered   -  sp  Tocilizumab  -Lovenox twice daily  - pulmonary managing. Might need trach      #Possible superimposed bacterial pneumonia  Gram-negative infection    -Was on Rocephin and Zithromax fpr 5 days ; - changed to zosyn on 10/2 with pseudomonas in  resp culture.  Pulmonary has given zyvox for 3 days   Completed Zosyn day 8 of 8.    Respiratory cultures now growing Klebsiella, initiated on Cipro -completed 7 days.  Now off all abx       #Hypokalemia  - replaced as needed      #CAD  - cont ASA, statin  - EKG with inferior T wave abnormality.  She denied CP.  Trop neg   -telemetry monitoring       #History of tracheobronchomalacia  S/p tracheoplasty       #HTN  Improved hypotension and off levophed  Resumed home medications-Norvasc and losartan.       #Chronic pain   History of hip fracture  - cont home oxycodone BID and percocet   - cont gabapentin and requip       #DM2  - use ssi        #Hypothyroidism  - cont synthroid         DVT Prophylaxis: Lovenox   Diet: Diet NPO  ADULT TUBE FEEDING; Orogastric; Peptide Based; Continuous; 55; Yes; 5; Q 4 hours; 60; 30; Q 3 hours  Code Status: Full Code      Julio C Long MD, 10/21/2021 7:05 AM

## 2021-10-21 NOTE — PROGRESS NOTES
Patient is not able to demonstrate the ability to move from a reclining position to an upright position within the recliner due to Pt on bedrest and vent .  Teagan Crockett RN

## 2021-10-22 ENCOUNTER — APPOINTMENT (OUTPATIENT)
Dept: GENERAL RADIOLOGY | Age: 66
DRG: 004 | End: 2021-10-22
Payer: MEDICARE

## 2021-10-22 LAB
A/G RATIO: 1.1 (ref 1.1–2.2)
ALBUMIN SERPL-MCNC: 3.3 G/DL (ref 3.4–5)
ALP BLD-CCNC: 123 U/L (ref 40–129)
ALT SERPL-CCNC: 20 U/L (ref 10–40)
ANION GAP SERPL CALCULATED.3IONS-SCNC: 7 MMOL/L (ref 3–16)
AST SERPL-CCNC: 28 U/L (ref 15–37)
BASE EXCESS ARTERIAL: 11.7 MMOL/L (ref -3–3)
BASOPHILS ABSOLUTE: 0.1 K/UL (ref 0–0.2)
BASOPHILS RELATIVE PERCENT: 1.4 %
BILIRUB SERPL-MCNC: 0.4 MG/DL (ref 0–1)
BUN BLDV-MCNC: 11 MG/DL (ref 7–20)
CALCIUM SERPL-MCNC: 9.5 MG/DL (ref 8.3–10.6)
CARBOXYHEMOGLOBIN ARTERIAL: 0.8 % (ref 0–1.5)
CHLORIDE BLD-SCNC: 100 MMOL/L (ref 99–110)
CO2: 36 MMOL/L (ref 21–32)
CREAT SERPL-MCNC: <0.5 MG/DL (ref 0.6–1.2)
EOSINOPHILS ABSOLUTE: 0.4 K/UL (ref 0–0.6)
EOSINOPHILS RELATIVE PERCENT: 5 %
GFR AFRICAN AMERICAN: >60
GFR NON-AFRICAN AMERICAN: >60
GLOBULIN: 3.1 G/DL
GLUCOSE BLD-MCNC: 112 MG/DL (ref 70–99)
GLUCOSE BLD-MCNC: 117 MG/DL (ref 70–99)
GLUCOSE BLD-MCNC: 154 MG/DL (ref 70–99)
GLUCOSE BLD-MCNC: 181 MG/DL (ref 70–99)
GLUCOSE BLD-MCNC: 227 MG/DL (ref 70–99)
GLUCOSE BLD-MCNC: 276 MG/DL (ref 70–99)
HCO3 ARTERIAL: 36.4 MMOL/L (ref 21–29)
HCT VFR BLD CALC: 30.3 % (ref 36–48)
HEMOGLOBIN, ART, EXTENDED: 10.2 G/DL (ref 12–16)
HEMOGLOBIN: 9.5 G/DL (ref 12–16)
LYMPHOCYTES ABSOLUTE: 1.4 K/UL (ref 1–5.1)
LYMPHOCYTES RELATIVE PERCENT: 18.4 %
MAGNESIUM: 1.7 MG/DL (ref 1.8–2.4)
MCH RBC QN AUTO: 24.6 PG (ref 26–34)
MCHC RBC AUTO-ENTMCNC: 31.3 G/DL (ref 31–36)
MCV RBC AUTO: 78.7 FL (ref 80–100)
METHEMOGLOBIN ARTERIAL: 0.3 %
MONOCYTES ABSOLUTE: 0.7 K/UL (ref 0–1.3)
MONOCYTES RELATIVE PERCENT: 8.9 %
NEUTROPHILS ABSOLUTE: 5.2 K/UL (ref 1.7–7.7)
NEUTROPHILS RELATIVE PERCENT: 66.3 %
O2 SAT, ARTERIAL: 88.5 %
O2 THERAPY: ABNORMAL
PCO2 ARTERIAL: 48.7 MMHG (ref 35–45)
PDW BLD-RTO: 18.6 % (ref 12.4–15.4)
PERFORMED ON: ABNORMAL
PH ARTERIAL: 7.49 (ref 7.35–7.45)
PLATELET # BLD: 84 K/UL (ref 135–450)
PMV BLD AUTO: 9.6 FL (ref 5–10.5)
PO2 ARTERIAL: 51.2 MMHG (ref 75–108)
POTASSIUM REFLEX MAGNESIUM: 3.5 MMOL/L (ref 3.5–5.1)
RBC # BLD: 3.86 M/UL (ref 4–5.2)
SODIUM BLD-SCNC: 143 MMOL/L (ref 136–145)
TCO2 ARTERIAL: 37.9 MMOL/L
TOTAL PROTEIN: 6.4 G/DL (ref 6.4–8.2)
TRIGL SERPL-MCNC: 188 MG/DL (ref 0–150)
WBC # BLD: 7.8 K/UL (ref 4–11)

## 2021-10-22 PROCEDURE — 99233 SBSQ HOSP IP/OBS HIGH 50: CPT | Performed by: INTERNAL MEDICINE

## 2021-10-22 PROCEDURE — 99291 CRITICAL CARE FIRST HOUR: CPT | Performed by: INTERNAL MEDICINE

## 2021-10-22 PROCEDURE — 6370000000 HC RX 637 (ALT 250 FOR IP): Performed by: INTERNAL MEDICINE

## 2021-10-22 PROCEDURE — 2580000003 HC RX 258: Performed by: INTERNAL MEDICINE

## 2021-10-22 PROCEDURE — 6360000002 HC RX W HCPCS: Performed by: INTERNAL MEDICINE

## 2021-10-22 PROCEDURE — 2700000000 HC OXYGEN THERAPY PER DAY

## 2021-10-22 PROCEDURE — 2500000003 HC RX 250 WO HCPCS: Performed by: INTERNAL MEDICINE

## 2021-10-22 PROCEDURE — 94003 VENT MGMT INPAT SUBQ DAY: CPT

## 2021-10-22 PROCEDURE — C9113 INJ PANTOPRAZOLE SODIUM, VIA: HCPCS | Performed by: INTERNAL MEDICINE

## 2021-10-22 PROCEDURE — 85025 COMPLETE CBC W/AUTO DIFF WBC: CPT

## 2021-10-22 PROCEDURE — 84478 ASSAY OF TRIGLYCERIDES: CPT

## 2021-10-22 PROCEDURE — 82803 BLOOD GASES ANY COMBINATION: CPT

## 2021-10-22 PROCEDURE — 2000000000 HC ICU R&B

## 2021-10-22 PROCEDURE — 94761 N-INVAS EAR/PLS OXIMETRY MLT: CPT

## 2021-10-22 PROCEDURE — 80053 COMPREHEN METABOLIC PANEL: CPT

## 2021-10-22 PROCEDURE — 83735 ASSAY OF MAGNESIUM: CPT

## 2021-10-22 PROCEDURE — 71045 X-RAY EXAM CHEST 1 VIEW: CPT

## 2021-10-22 PROCEDURE — 94640 AIRWAY INHALATION TREATMENT: CPT

## 2021-10-22 RX ORDER — MAGNESIUM SULFATE IN WATER 40 MG/ML
2000 INJECTION, SOLUTION INTRAVENOUS ONCE
Status: COMPLETED | OUTPATIENT
Start: 2021-10-22 | End: 2021-10-22

## 2021-10-22 RX ORDER — POTASSIUM BICARBONATE 25 MEQ/1
50 TABLET, EFFERVESCENT ORAL ONCE
Status: COMPLETED | OUTPATIENT
Start: 2021-10-22 | End: 2021-10-22

## 2021-10-22 RX ADMIN — GABAPENTIN 800 MG: 400 CAPSULE ORAL at 08:02

## 2021-10-22 RX ADMIN — CHLORHEXIDINE GLUCONATE 0.12% ORAL RINSE 15 ML: 1.2 LIQUID ORAL at 20:15

## 2021-10-22 RX ADMIN — PROPOFOL 40 MCG/KG/MIN: 10 INJECTION, EMULSION INTRAVENOUS at 14:24

## 2021-10-22 RX ADMIN — SODIUM CHLORIDE, PRESERVATIVE FREE 10 ML: 5 INJECTION INTRAVENOUS at 08:04

## 2021-10-22 RX ADMIN — IPRATROPIUM BROMIDE AND ALBUTEROL SULFATE 1 AMPULE: .5; 2.5 SOLUTION RESPIRATORY (INHALATION) at 07:51

## 2021-10-22 RX ADMIN — CASTOR OIL AND BALSAM, PERU: 788; 87 OINTMENT TOPICAL at 08:02

## 2021-10-22 RX ADMIN — IPRATROPIUM BROMIDE AND ALBUTEROL SULFATE 1 AMPULE: .5; 2.5 SOLUTION RESPIRATORY (INHALATION) at 19:12

## 2021-10-22 RX ADMIN — INSULIN LISPRO 3 UNITS: 100 INJECTION, SOLUTION INTRAVENOUS; SUBCUTANEOUS at 23:15

## 2021-10-22 RX ADMIN — CASTOR OIL AND BALSAM, PERU: 788; 87 OINTMENT TOPICAL at 20:16

## 2021-10-22 RX ADMIN — POTASSIUM BICARBONATE 50 MEQ: 977.5 TABLET, EFFERVESCENT ORAL at 09:59

## 2021-10-22 RX ADMIN — IPRATROPIUM BROMIDE AND ALBUTEROL SULFATE 1 AMPULE: .5; 2.5 SOLUTION RESPIRATORY (INHALATION) at 03:03

## 2021-10-22 RX ADMIN — INSULIN LISPRO 9 UNITS: 100 INJECTION, SOLUTION INTRAVENOUS; SUBCUTANEOUS at 16:54

## 2021-10-22 RX ADMIN — Medication 150 MCG/HR: at 23:35

## 2021-10-22 RX ADMIN — IPRATROPIUM BROMIDE AND ALBUTEROL SULFATE 1 AMPULE: .5; 2.5 SOLUTION RESPIRATORY (INHALATION) at 12:01

## 2021-10-22 RX ADMIN — CHLORHEXIDINE GLUCONATE 0.12% ORAL RINSE 15 ML: 1.2 LIQUID ORAL at 08:02

## 2021-10-22 RX ADMIN — INSULIN LISPRO 6 UNITS: 100 INJECTION, SOLUTION INTRAVENOUS; SUBCUTANEOUS at 19:23

## 2021-10-22 RX ADMIN — OXYCODONE 10 MG: 5 TABLET ORAL at 16:42

## 2021-10-22 RX ADMIN — INSULIN LISPRO 3 UNITS: 100 INJECTION, SOLUTION INTRAVENOUS; SUBCUTANEOUS at 13:16

## 2021-10-22 RX ADMIN — GABAPENTIN 800 MG: 400 CAPSULE ORAL at 13:15

## 2021-10-22 RX ADMIN — SODIUM CHLORIDE, PRESERVATIVE FREE 10 ML: 5 INJECTION INTRAVENOUS at 20:17

## 2021-10-22 RX ADMIN — GABAPENTIN 800 MG: 400 CAPSULE ORAL at 20:16

## 2021-10-22 RX ADMIN — ATORVASTATIN CALCIUM 40 MG: 40 TABLET, FILM COATED ORAL at 08:02

## 2021-10-22 RX ADMIN — PROPOFOL 40 MCG/KG/MIN: 10 INJECTION, EMULSION INTRAVENOUS at 19:21

## 2021-10-22 RX ADMIN — PROPOFOL 40 MCG/KG/MIN: 10 INJECTION, EMULSION INTRAVENOUS at 05:24

## 2021-10-22 RX ADMIN — SERTRALINE HYDROCHLORIDE 50 MG: 50 TABLET ORAL at 08:02

## 2021-10-22 RX ADMIN — Medication 150 MCG/HR: at 11:02

## 2021-10-22 RX ADMIN — Medication 150 MCG/HR: at 03:37

## 2021-10-22 RX ADMIN — MAGNESIUM SULFATE HEPTAHYDRATE 2000 MG: 40 INJECTION, SOLUTION INTRAVENOUS at 10:04

## 2021-10-22 RX ADMIN — OXYCODONE 10 MG: 5 TABLET ORAL at 03:40

## 2021-10-22 RX ADMIN — PROPOFOL 40 MCG/KG/MIN: 10 INJECTION, EMULSION INTRAVENOUS at 09:59

## 2021-10-22 RX ADMIN — OXYCODONE 10 MG: 5 TABLET ORAL at 22:16

## 2021-10-22 RX ADMIN — ENOXAPARIN SODIUM 30 MG: 30 INJECTION SUBCUTANEOUS at 08:02

## 2021-10-22 RX ADMIN — IPRATROPIUM BROMIDE AND ALBUTEROL SULFATE 1 AMPULE: .5; 2.5 SOLUTION RESPIRATORY (INHALATION) at 16:51

## 2021-10-22 RX ADMIN — ENOXAPARIN SODIUM 30 MG: 30 INJECTION SUBCUTANEOUS at 20:15

## 2021-10-22 RX ADMIN — MICONAZOLE NITRATE: 2 POWDER TOPICAL at 08:02

## 2021-10-22 RX ADMIN — Medication 3 MG/HR: at 12:19

## 2021-10-22 RX ADMIN — PANTOPRAZOLE SODIUM 40 MG: 40 INJECTION, POWDER, FOR SOLUTION INTRAVENOUS at 08:02

## 2021-10-22 RX ADMIN — MICONAZOLE NITRATE: 2 POWDER TOPICAL at 20:16

## 2021-10-22 RX ADMIN — IPRATROPIUM BROMIDE AND ALBUTEROL SULFATE 1 AMPULE: .5; 2.5 SOLUTION RESPIRATORY (INHALATION) at 23:23

## 2021-10-22 RX ADMIN — LEVOTHYROXINE SODIUM 100 MCG: 100 TABLET ORAL at 06:06

## 2021-10-22 RX ADMIN — OXYCODONE 10 MG: 5 TABLET ORAL at 09:59

## 2021-10-22 RX ADMIN — SODIUM CHLORIDE, PRESERVATIVE FREE 10 ML: 5 INJECTION INTRAVENOUS at 08:03

## 2021-10-22 RX ADMIN — Medication 150 MCG/HR: at 17:47

## 2021-10-22 RX ADMIN — DEXAMETHASONE SODIUM PHOSPHATE 2 MG: 4 INJECTION, SOLUTION INTRAMUSCULAR; INTRAVENOUS at 12:29

## 2021-10-22 RX ADMIN — ASPIRIN 81 MG: 81 TABLET, CHEWABLE ORAL at 08:02

## 2021-10-22 ASSESSMENT — PULMONARY FUNCTION TESTS
PIF_VALUE: 28
PIF_VALUE: 34
PIF_VALUE: 33
PIF_VALUE: 27
PIF_VALUE: 30
PIF_VALUE: 27
PIF_VALUE: 34
PIF_VALUE: 27
PIF_VALUE: 33
PIF_VALUE: 27
PIF_VALUE: 28
PIF_VALUE: 31
PIF_VALUE: 29
PIF_VALUE: 33
PIF_VALUE: 27

## 2021-10-22 ASSESSMENT — PAIN SCALES - GENERAL
PAINLEVEL_OUTOF10: 0

## 2021-10-22 NOTE — PROGRESS NOTES
Pulmonary & Critical Care Medicine ICU Progress Note    CC: Respiratory failure    Events of Last 24 hours:   Using pressure control 36/18 to improve dyssynchrony  Fent 150  Propofol 40  Versed 3    Vascular lines: IV: PICC line 9/28    MV: 9/29-10/4, emergently re-intubated 10/6/21 for refractory hypoxemia  Vent Mode: AC/PC Rate Set: 22 bmp/Vt Ordered: 0 mL/ /FiO2 : 55 %  Recent Labs     10/21/21  0430 10/22/21  0500   PHART 7.419 7.492*   VFZ5UME 61.0* 48.7*   PO2ART 57.0* 51.2*     IV:   midazolam 3 mg/hr (10/21/21 1932)    norepinephrine Stopped (10/18/21 0616)    fentaNYL 150 mcg/hr (10/22/21 0337)    propofol 40 mcg/kg/min (10/22/21 0524)    sodium chloride      dextrose      sodium chloride       Vitals:  Blood pressure 134/75, pulse 89, temperature 98.5 °F (36.9 °C), temperature source Axillary, resp. rate 23, height 5' 9\" (1.753 m), weight 175 lb 8 oz (79.6 kg), SpO2 93 %, not currently breastfeeding. on ventilator      Intake/Output Summary (Last 24 hours) at 10/22/2021 0753  Last data filed at 10/22/2021 0530  Gross per 24 hour   Intake 1473.43 ml   Output 1865 ml   Net -391.57 ml     General: intubated, ill appearing    ENT: Pharynx with ETT. Resp: No crackles. No wheezing. CV: S1, S2. Trace edema  GI: NT, ND, +BS  Skin: Warm and dry. Neuro: PERRL. Awake but not following commands.        Scheduled Meds:   dexamethasone  2 mg IntraVENous Q24H    insulin lispro  0-18 Units SubCUTAneous Q4H    sertraline  50 mg Per NG tube Daily    Venelex   Topical BID    miconazole   Topical BID    oxyCODONE  10 mg Oral Q6H    influenza virus vaccine  0.5 mL IntraMUSCular Prior to discharge    chlorhexidine  15 mL Mouth/Throat BID    ipratropium-albuterol  1 ampule Inhalation Q4H    pantoprazole  40 mg IntraVENous Daily    levothyroxine  100 mcg Oral Daily    enoxaparin  30 mg SubCUTAneous BID    lidocaine 1 % injection  5 mL IntraDERmal Once    sodium chloride flush  5-40 mL IntraVENous 2 times per day    aspirin  81 mg Oral Daily    gabapentin  800 mg Oral TID    atorvastatin  40 mg Oral Daily    sodium chloride flush  5-40 mL IntraVENous 2 times per day       Data:  CBC:   Recent Labs     10/20/21  0433 10/21/21  0430 10/22/21  0455   WBC 4.8 6.2 7.8   HGB 9.0* 9.4* 9.5*   HCT 29.4* 29.8* 30.3*   MCV 80.3 79.6* 78.7*   PLT 79* 83* 84*     BMP:   Recent Labs     10/20/21  0433 10/21/21  0430 10/22/21  0455    142 143   K 3.6 3.7 3.5    99 100   CO2 39* 38* 36*   BUN 15 18 11   CREATININE <0.5* <0.5* <0.5*     LIVER PROFILE:   Recent Labs     10/20/21  0433 10/21/21  0430 10/22/21  0455   AST 22 23 28   ALT 18 19 20   BILITOT <0.2 0.3 0.4   ALKPHOS [phone]       Microbiology:  9/27/21 COVID-19 detected  9/30/21 Resp Pseudomonas fluorescens   10/7/2021 tracheal aspirate: Klebsiella intermediate to zosyn    Imaging:  Chest x-ray 10/21/2021   Multifocal airspace disease with right-sided pleural effusion, similar to   prior.  No significant change       CTPA 9/27/21  No evidence of pulmonary embolism. Scattered peripheral opacities in the left lung and more consolidative area in the right lung concerning for pneumonia.  Previous right thoracotomy and upper lobectomy. One mildly enlarged right paratracheal lymph node is present but no priors for comparison.  Nonenlarged but prominent lymph nodes in the upper abdomen and juxta diaphragmatic region      ASSESSMENT:  · Acute hypoxemic respiratory failure   · COVID-19 viral pneumonia  · ARDS  · Pseudomonas followed by Klebsiella pneumonias  · Acute kidney injury  · CAD  · Chronic pain syndrome - on chronic opiates and Neurontin   · Possible early dementia, being worked up outpatient   · H/O bronchiectasis and tracheobronchomalacia/EDAC s/p tracheoplasty in 2014   · H/O EDAC (tracheobronchomalacia) s/p tracheoplasty     PLAN:  COVID-19 isolation, droplet plus    Mechanical ventilation as per my orders.   The ventilator was adjusted by me at the bedside for unstable, life threatening respiratory failure. Decrease Driving pressure to decrease TV   Proned 10/6/21-10/7/2; 10/11-10/12   Paralyzed 10/6/21 - 10/7/21  IV Propofol, Fentanyl & Versed for sedation, target RASS -1 to -2  Completed 7 days Cipro, 8 days Zosyn, 3 days Zyvox, prior to that 5 days of Ceftriaxone/Zithromax   Dexamethasone D#25, @ 2 mg  Completed Remdesivir & Tocilizumab  H-SSI    Continuing home oxycodone and neurontin    Lovenox for DVT prophylaxis   D/w family today  If tracheostomy becomes necessary, patient will require ENT or  transfer to  as she has a history of tracheoplasty and is followed by their airway clinic  Total critical care time caring for this patient with life threatening, unstable organ failure, including direct patient contact, management of life support systems, review of data including imaging and labs, discussions with other team members and physicians is 31  minutes, excluding procedures.

## 2021-10-22 NOTE — PROGRESS NOTES
Inspiratory pressure decraesed to 18cwp         10/22/21 0919   Vent Information   Vt Ordered 0 mL   Rate Set 22 bmp   Peak Flow 0 L/min   Pressure Support 0 cmH20   FiO2  55 %   SpO2 91 %   SpO2/FiO2 ratio 165.45   Sensitivity 3   PEEP/CPAP 8   I Time/ I Time % 0 s   Vent Patient Data   High Peep/I Pressure 18   Peak Inspiratory Pressure 28 cmH2O   Mean Airway Pressure 16 cmH20   Rate Measured 24 br/min   Vt Exhaled 460 mL   Minute Volume 9.77 Liters   I:E Ratio 1:2.00   Spontaneous Breathing Trial (SBT) RT Doc   Pulse 94   Additional Respiratory  Assessments   Resp 22   Alarm Settings   High Pressure Alarm 40 cmH2O   Low Minute Volume Alarm 4 L/min   High Respiratory Rate 40 br/min

## 2021-10-22 NOTE — PROGRESS NOTES
Initial exam completed- See doc flowsheet for assessment findings. Pt resting quietly in bed and wakes easy and nods head to questions . All lines and monitoring devices are in place . Vitals and SpO2 stable. Call light is within easy reach. Plan of care and goals reviewed.  Rehana Castelan RN

## 2021-10-22 NOTE — PROGRESS NOTES
RT Inhaler-Nebulizer Bronchodilator Protocol Note    There is a bronchodilator order in the chart from a provider indicating to follow the RT Bronchodilator Protocol and there is an Initiate RT Inhaler-Nebulizer Bronchodilator Protocol order as well (see protocol at bottom of note). CXR Findings:  XR CHEST PORTABLE    Result Date: 10/22/2021  Persistent multifocal bilateral pulmonary opacity, slightly improved at the right base as compared to prior. XR CHEST PORTABLE    Result Date: 10/21/2021  Multifocal airspace disease with right-sided pleural effusion, similar to prior. No significant change       The findings from the last RT Protocol Assessment were as follows:   History Pulmonary Disease: Chronic pulmonary disease  Respiratory Pattern: Mild dyspnea at rest, irregular pattern, or RR 21-25 bpm  Breath Sounds: Slightly diminished and/or crackles  Cough: Weak, productive  Indication for Bronchodilator Therapy: Decreased or absent breath sounds  Bronchodilator Assessment Score: 10    Aerosolized bronchodilator medication orders have been revised according to the RT Inhaler-Nebulizer Bronchodilator Protocol below. Respiratory Therapist to perform RT Therapy Protocol Assessment initially then follow the protocol. Repeat RT Therapy Protocol Assessment PRN for score 0-3 or on second treatment, BID, and PRN for scores above 3. No Indications - adjust the frequency to every 6 hours PRN wheezing or bronchospasm, if no treatments needed after 48 hours then discontinue using Per Protocol order mode. If indication present, adjust the RT bronchodilator orders based on the Bronchodilator Assessment Score as indicated below.   Use Inhaler orders unless patient has one or more of the following: on home nebulizer, not able to hold breath for 10 seconds, is not alert and oriented, cannot activate and use MDI correctly, or respiratory rate 25 breaths per minute or more, then use the equivalent nebulizer order(s) with same Frequency and PRN reasons based on the score. If a patient is on this medication at home then do not decrease Frequency below that used at home. 0-3 - enter or revise RT bronchodilator order(s) to equivalent RT Bronchodilator order with Frequency of every 4 hours PRN for wheezing or increased work of breathing using Per Protocol order mode. 4-6 - enter or revise RT Bronchodilator order(s) to two equivalent RT bronchodilator orders with one order with BID Frequency and one order with Frequency of every 4 hours PRN wheezing or increased work of breathing using Per Protocol order mode. 7-10 - enter or revise RT Bronchodilator order(s) to two equivalent RT bronchodilator orders with one order with TID Frequency and one order with Frequency of every 4 hours PRN wheezing or increased work of breathing using Per Protocol order mode. 11-13 - enter or revise RT Bronchodilator order(s) to one equivalent RT bronchodilator order with QID Frequency and an Albuterol order with Frequency of every 4 hours PRN wheezing or increased work of breathing using Per Protocol order mode. Greater than 13 - enter or revise RT Bronchodilator order(s) to one equivalent RT bronchodilator order with every 4 hours Frequency and an Albuterol order with Frequency of every 2 hours PRN wheezing or increased work of breathing using Per Protocol order mode. RT to enter RT Home Evaluation for COPD & MDI Assessment order using Per Protocol order mode.     Electronically signed by Espinoza Padilla RCP on 10/22/2021 at 7:17 PM

## 2021-10-22 NOTE — CONSULTS
 CARDIAC CATHETERIZATION      COLON SURGERY      COLONOSCOPY      normal    HYSTERECTOMY      JOINT REPLACEMENT  10/92    Right; hip     JOINT REPLACEMENT        Left hip  followed by revision 2006    JOINT REPLACEMENT        right     JOINT REPLACEMENT         right replacement.  LUNG SURGERY  2014    tracheobronchomalacia       Social:   Social History     Tobacco Use    Smoking status: Former Smoker     Packs/day: 1.00     Years: 18.00     Pack years: 18.00     Types: Cigarettes     Quit date: 12/10/1991     Years since quittin.8    Smokeless tobacco: Never Used   Substance Use Topics    Alcohol use: No     Family:   Family History   Problem Relation Age of Onset    Asthma Mother     Heart Failure Father     Cancer Maternal Grandfather         skin cancer on face    Cancer Daughter         skin cancer-BCC    Diabetes Neg Hx     Emphysema Neg Hx     Hypertension Neg Hx      No current facility-administered medications on file prior to encounter. Current Outpatient Medications on File Prior to Encounter   Medication Sig Dispense Refill    levothyroxine (SYNTHROID) 100 MCG tablet Take 100 mcg by mouth Daily      simvastatin (ZOCOR) 40 MG tablet Take 40 mg by mouth nightly      budesonide (PULMICORT FLEXHALER) 180 MCG/ACT AEPB inhaler Inhale 1 puff into the lungs 2 times daily Per Yasmine at [facility]. [employer]      oxyCODONE-acetaminophen (PERCOCET)  MG per tablet Take 1 tablet by mouth every 6 hours as needed for Pain. Per Srinivasa Kearney at [facility]. [employer]      oxyCODONE (OXYCONTIN) 20 MG extended release tablet Take 20 mg by mouth every 12 hours. Per Srinivasa Kearney at [facility]. [employer]      glipiZIDE (GLUCOTROL XL) 10 MG extended release tablet Take 10 mg by mouth 2 times daily Per Srinivasa Kearney at [facility].  [employer]      estradiol (ESTRACE) 0.1 MG/GM vaginal cream Place vaginally See Admin Instructions Insert a pea-sized amount vaginally every day for 7 days, then twice weekly thereafter. Per [employer] at [facility] [address]. Last dispensed 5/10/21.  empagliflozin (JARDIANCE) 10 MG tablet Take 10 mg by mouth daily      Multiple Vitamins-Minerals (THERAPEUTIC MULTIVITAMIN-MINERALS) tablet Take 1 tablet by mouth daily      JANUVIA 50 MG tablet TAKE ONE TABLET BY MOUTH DAILY 90 tablet 1    losartan (COZAAR) 25 MG tablet Take 1 tablet by mouth daily 90 tablet 3    Elastic Bandages & Supports (MEDICAL COMPRESSION THIGH HIGH) MISC 1 Units by Does not apply route daily 2 each 2    Insulin Pen Needle 31G X 5 MM MISC 1 each by Does not apply route daily 100 each 3    rOPINIRole (REQUIP) 0.5 MG tablet TAKE 1 TABLET BY MOUTH 3 TIMES DAILY. 270 tablet 3    sertraline (ZOLOFT) 50 MG tablet TAKE 1 TABLET BY MOUTH DAILY 90 tablet 3    polyethylene glycol (MIRALAX) powder Take 17 g by mouth daily as needed      gabapentin (NEURONTIN) 800 MG tablet Take 800 mg by mouth 3 times daily      omeprazole (PRILOSEC) 40 MG delayed release capsule Take 40 mg by mouth 2 times daily      aspirin 81 MG EC tablet Take 1 tablet by mouth daily 30 tablet 3    tiZANidine (ZANAFLEX) 2 MG tablet Take 2 mg by mouth 3 times daily as needed       albuterol sulfate  (90 Base) MCG/ACT inhaler Inhale 2 puffs into the lungs 4 times daily as needed for Wheezing 1 Inhaler 0    Lancets MISC Dispense whatever insurance will cover. Patient test twice day. Dx:E11.9 200 each 3    blood glucose monitor strips Dispense whatever insurance will cover. Pt test twice a day dx:E11.9 200 strip 3    blood glucose test strips (ASCENSIA AUTODISC VI;ONE TOUCH ULTRA TEST VI) strip 1 each by In Vitro route daily As needed. 100 each 3    Blood Glucose Monitoring Suppl CATHRYN Dispense whatever insurance will cover. Dx code:E11.9 1 Device 0      Infusions:    midazolam 3 mg/hr (10/22/21 1413)    norepinephrine Stopped (10/18/21 0616)    fentaNYL 150 mcg/hr (10/22/21 1413)    propofol 40 mcg/kg/min (10/22/21 1424)    sodium chloride  dextrose      sodium chloride       PRN Medications: magnesium sulfate, potassium chloride, labetalol, carboxymethylcellulose PF **AND** artificial tears, fentanNYL, midazolam, sodium chloride flush, sodium chloride, glucose, dextrose, glucagon (rDNA), dextrose, sodium chloride flush, sodium chloride, polyethylene glycol, acetaminophen **OR** acetaminophen, prochlorperazine  Allergies: Allergies   Allergen Reactions    Quinidine Anaphylaxis and Nausea And Vomiting    Sulfa Antibiotics Anaphylaxis and Nausea And Vomiting    Prednisone Other (See Comments)     Cannot tolerate oral steriods  Cannot tolerate oral steroids - causes Avascular Necrosis of joints. Cannot tolerate oral steriods    Bactrim     No Known Allergies Rash    Sulfamethoxazole-Trimethoprim Rash       ROS: Unobtainable as patient is intubated and sedated. Physical Exam   BP (!) 117/53   Pulse 81   Temp 99.4 °F (37.4 °C) (Oral)   Resp 23   Ht 5' 9\" (1.753 m)   Wt 175 lb 8 oz (79.6 kg)   SpO2 (!) 89%   BMI 25.92 kg/m²         Due to the current efforts to prevent transmission of COVID-19 and also the need to preserve PPE for other caregivers, a face-to-face encounter with the patient was not performed. That being said, all relevant records and diagnostic tests were reviewed, including laboratory results and imaging. Please reference any relevant documentation elsewhere. Care will be coordinated with the primary service.       Lab and Imaging Review   Labs:  CBC:   Recent Labs     10/20/21  0433 10/21/21  0430 10/22/21  0455   WBC 4.8 6.2 7.8   HGB 9.0* 9.4* 9.5*   HCT 29.4* 29.8* 30.3*   MCV 80.3 79.6* 78.7*   PLT 79* 83* 84*     BMP:   Recent Labs     10/20/21  0433 10/21/21  0430 10/22/21  0455    142 143   K 3.6 3.7 3.5    99 100   CO2 39* 38* 36*   BUN 15 18 11   CREATININE <0.5* <0.5* <0.5*     LIVER PROFILE:   Recent Labs     10/20/21  0433 10/21/21  0430 10/22/21  0455   AST 22 23 28   ALT 18 19 20   PROT 6.1* PORTABLE   Final Result   Improving aeration of the right lung with grossly stable left basilar   airspace opacities. XR CHEST PORTABLE   Final Result   Supportive tubing is in stable position. Stable pattern of bilateral ground-glass opacities and basilar consolidations. XR CHEST PORTABLE   Final Result   Stable appearance of the chest.  Diffuse ground-glass opacities on the right   and mild left basilar opacities. XR CHEST PORTABLE   Final Result   Improved aeration on the left, possibly improving pulmonary edema. Persistent airspace disease in the right base may represent concurrent   pneumonia         XR ABDOMEN (KUB) (SINGLE AP VIEW)   Final Result   Enteric catheter tip likely in the distal gastric body. XR CHEST PORTABLE   Final Result   1. Endotracheal tube projects in the appropriate position. 2. Enteric tube extends below the diaphragm and out of the field of view. XR CHEST PORTABLE   Final Result   Increasing diffuse airspace opacification throughout the lungs. Pattern may   represent pulmonary edema or worsening pneumonitis. XR CHEST PORTABLE   Final Result   1. Interval increased bilateral pulmonary opacities with new consolidative   change at the right lung base. These findings could be secondary to   pulmonary edema or infection. 2. Consolidative change at the right lung base could also be secondary to   atelectasis/mucous plug. XR CHEST PORTABLE   Final Result   No significant interval change in small right pleural effusion or bilateral   heterogeneous opacity which can reflect pulmonary edema or pneumonia. XR CHEST PORTABLE   Final Result   Mild improvement from prior comparison. Mild-to-moderate congestion and/or   infiltrates identified in the lungs. ET tube terminates 7 cm superior to the saba. XR CHEST PORTABLE   Final Result   Endotracheal tube and orogastric tube unchanged, adequate position. Slightly increased extensive bilateral pulmonary disease over the past 24   hours this may relate to interstitial edema or diffuse infection or a   combination. XR CHEST PORTABLE   Final Result   Endotracheal tube with its tip approximately 4.7 cm from the saba in   satisfactory position. Enteric tube in satisfactory position. Patchy bilateral airspace disease is again noted with improved aeration of   the left mid lung. XR CHEST PORTABLE   Final Result   Some improvement in the appearance of the chest with clearing of some of the   acute airspace disease from the mid left lung. Large amount of pneumonia   remains within the right lung and lower left lung. XR CHEST PORTABLE   Final Result   1. Endotracheal tube, nasogastric tube, and right PICC line placement. 2.  Increasing multifocal opacities with consolidative area in the right   perihilar lung. Could be multifocal pneumonia with or without edema. IR PICC WO SQ PORT/PUMP > 5 YEARS   Final Result   1. See above. CT CHEST PULMONARY EMBOLISM W CONTRAST   Final Result   No evidence of pulmonary embolism. Scattered peripheral opacities in the left lung and more consolidative area   in the right lung concerning for pneumonia. Previous right thoracotomy and   upper lobectomy. One mildly enlarged right paratracheal lymph node is present but no priors   for comparison. Nonenlarged but prominent lymph nodes in the upper abdomen   and juxta diaphragmatic region. XR CHEST PORTABLE   Final Result   Mild cardiomegaly without interstitial edema. Metallic surgical clips from prior right thoracotomy. COPD with anterior segment-right upper lobe alveolar pneumonia. Old pleural thickening was noted along the right lateral chest wall. Pleural   thickening and or trace effusion blunts the right costophrenic angle.          XR CHEST PORTABLE    (Results Pending)     Attending Supervising [de-identified] Attestation Statement  The patient is a 78 y.o. female. I have performed a history and physical examination of the patient. I discussed the case with my physician assistant Daphne Welsh PA-C    I reviewed the patient's Past Medical History, Past Surgical History, Medications, and Allergies. Physical Exam:  Vitals:    10/22/21 1500 10/22/21 1600 10/22/21 1645 10/22/21 1700   BP: (!) 144/70 (!) 146/64  131/62   Pulse: 97 95  83   Resp: 22 19 22   Temp:   99.4 °F (37.4 °C)    TempSrc:   Axillary    SpO2: 90% 91%  97%   Weight:       Height:           Due to the current efforts to prevent transmission of COVID-19 and also the need to preserve PPE for other caregivers, a face-to-face encounter with the patient was not performed. That being said, all relevant records and diagnostic tests were reviewed, including laboratory results and imaging. Please reference any relevant documentation elsewhere. Care will be coordinated with the primary service. Assessment:     78year old female with a history of anxiety, chronic pain syndrome, diabetes, DDD, fibromyalgia, GERD, OA, HTN, asthma, hypothyroidism, and tracheobronchomalacia admitted with COVID-19 pneumonia c/b respiratory failure requiring mechanical ventilation. Plan:   1. Continue supportive care  2. Continue TFs per OG as tolerated  3. Will tentatively plan for EGD with PEG placement on Monday afternoon  4. ENT tentatively planning tracheostomy placement on Monday as well  5. NPO after midnight on Sunday  6. Will follow peripherally     Altaf Awad PA-C  3:31 PM 10/22/2021                      78year old female with a history of DM, HTN, hypothyroidism, anxiety, chronic pain syndrome, DDD, fibromyalgia, GERD, OA, asthma and tracheobronchomalacia admitted with acute respiratory failure secondary to COVID-19 pneumonia. Hospital course complicated by prolonged mechanical ventilation    Continue supportive care.  Continue broad spectrum antibiotics. Continue TFs at goal rate per OG. Will reassess on Monday and determine timing of EGD with PEG tube.     Kirsty Gomes MD          [phone]  [phone]

## 2021-10-22 NOTE — PROGRESS NOTES
Comprehensive Nutrition Assessment    Type and Reason for Visit:  Reassess    Nutrition Recommendations/Plan:   1. Continue TF order - ADULT TUBE FEEDING; Orogastric; Peptide-Based formula - Vital AF 1.2 with a goal rate of 60 ml/hr x 20 hours + water flushes of 30 ml every 3 hours for tube patency. 2. Monitor TF rate, intake, and tolerance + water flushes. 3. Monitor vent status, sedation type/amount (propofol at 40 mcg x 24 hours which = 507 kcals from lipids), TG checks (TG check on 10/20/21 was 224 mg/dl), plan of care (trach placement is planned for 10/25/21). 4. Monitor nutrition-related labs, bowel function, and weight trends. Nutrition Assessment:  patient continue to improve from a nutritional standpoint AEB she is tolerating TF at goal rate + goal rate was increased since propofol rate is lower, however, patient remains at risk for further compromise d/t inability to successfully wean off vent + plan for trach tube placement on 10/25/21, increased nutrition needs r/t COVID-19 virus, and altered nutrition-related labs; will continue Vital AF 1.2 at goal rate of 60 ml/hr x 20 hours + 30 ml water flushes every 3 hours for tube patency    Malnutrition Assessment:  Malnutrition Status:  Severe malnutrition (severe malnutrition in the context of acute illness or injury < 3 months based on 50% or less of estimated energy requirements for 5 or more days and greater than 5% weight loss over 1 month), per guidelines from Academy of Nutrition and Dietetics (Academy)/American Society for Parenteral and Enteral Nutrition (A.S.P.E.N.) - clinical characteristics that the clinician can  obtain and document to support a diagnosis of malnutrition.    Context:  Acute Illness     Findings of the 6 clinical characteristics of malnutrition:  Energy Intake:  7 - 50% or less of estimated energy requirements for 5 or more days  Weight Loss:  7 - Greater than 5% over 1 month (- 20# or 10.8% weight loss x 1 month) Body Fat Loss:  Unable to assess (COVID-19 +)     Muscle Mass Loss:  Unable to assess (COVID-19 +)    Fluid Accumulation:  No significant fluid accumulation     Strength:  Not Performed    Estimated Daily Nutrient Needs:  Energy (kcal):  1748 - 1900 kcals based on 23-25 kcals/kg/CBW; Weight Used for Energy Requirements:  Current     Protein (g):  91 - 106 g protein based on 1.2-1.4 g/kg/CBW; Weight Used for Protein Requirements:  Current        Fluid (ml/day):  1748 - 1900 ml; Method Used for Fluid Requirements:  1 ml/kcal      Nutrition Related Findings:  patient remains intubated and sedated on 40 mcg propofol at this time; plan is for patient to have trach placed on 10/25/21; patient reponds to pain; TF is infusing at 55 ml/hr and goal rate was changed to 60 ml/hr on 10/18/21; + BM on 10/20/21; abdomen is round, soft, and bowel sounds are active;  Mag and h/h are low; patient has decadron, peridex, lipitor, lovenox, gabapentin, high-dose SSI, synthroid, miconazole, oxycodone, protonix, zoloft, fentanyl, versed in D5, and levo in D5 ordered at this time      Wounds:  Deep Tissue Injury, Pressure Injury (DTI on sacrum; small blisters on coccyx)       Current Nutrition Therapies:    Current Tube Feeding (TF) Orders:  · Feeding Route: Orogastric  · Formula: Peptide Based  · Schedule: Continuous  · Additives/Modulars:  (none)  · Water Flushes: 30 ml every 3 hours for tube patency  · Current TF & Flush Orders Provides: Vital AF 1.2 at goal rate of 55 ml/hr x 20 hours = 1100 ml TV, 1320 kcals, 83 g protein, and 892 ml free water + 30 ml water flushes every 3 hours for tube patency  · Goal TF & Flush Orders Provides: Vital AF 1.2 witha  goal rate of 60 ml/hr x 20 hours = 1200 ml TV, 1440 kcals, 90 g protein, and 973 ml free water + 30 ml water flushes every 3 hours for tube patency      Anthropometric Measures:  · Height: 5' 9\" (175.3 cm)  · Current Body Weight: 175 lb 8 oz (79.6 kg) (obtained on 10/21/21) · Admission Body Weight: 187 lb 6.3 oz (85 kg) (obtained on 9/29/21; actual weight)    · Usual Body Weight: 187 lb 6.3 oz (85 kg) (obtained on 9/29/21; actual weight)     · Ideal Body Weight: 145 lbs; % Ideal Body Weight 121 %   · BMI: 25.9  · BMI Categories: Overweight (BMI 25.0-29. 9)       Nutrition Diagnosis:   · Severe malnutrition related to impaired respiratory function, inadequate protein-energy intake, increase demand for energy/nutrients as evidenced by NPO or clear liquid status due to medical condition, intubation, nutrition support - enteral nutrition, lab values      Nutrition Interventions:   Food and/or Nutrient Delivery:  Continue NPO, Continue Current Tube Feeding  Nutrition Education/Counseling:  No recommendation at this time   Coordination of Nutrition Care:  Continue to monitor while inpatient, Interdisciplinary Rounds    Goals:  patient will tolerate Vital AF 1.2 at goal rate of 60 ml/hr x 20 hours without GI distress, without s/s of aspiration, and without additional lab/fluid disturbances       Nutrition Monitoring and Evaluation:   Behavioral-Environmental Outcomes:  None Identified   Food/Nutrient Intake Outcomes:  Enteral Nutrition Intake/Tolerance  Physical Signs/Symptoms Outcomes:  Biochemical Data, GI Status, Fluid Status or Edema, Hemodynamic Status, Skin, Weight     Discharge Planning:     Too soon to determine     Electronically signed by Susie Koenig RD, LD on 10/22/21 at 1:42 PM EDT    Contact: [phone]

## 2021-10-22 NOTE — PROGRESS NOTES
Care rounds completed with Dr Dyer and multidisciplinary team.  Reviewed labs, meds, VS (temp/HR/RR), I/O's, assessment, & plan of care for today. See progress note & new orders for details.  Bre Chou RN

## 2021-10-22 NOTE — PROGRESS NOTES
10/22/21 0303   Vent Information   Vent Type 980   Vent Mode AC/PC   Pressure Ordered 22   Rate Set 22 bmp   FiO2  55 %   SpO2 94 %   SpO2/FiO2 ratio 170.91   PEEP/CPAP 8   I Time/ I Time % 0.9 s   Humidification Source Heated wire   Humidification Temp 37   Humidification Temp Measured 36   Circuit Condensation Drained   Vent Patient Data   Peak Inspiratory Pressure 33 cmH2O   Mean Airway Pressure 18 cmH20   Rate Measured 23 br/min   Vt Exhaled 484 mL   Minute Volume 10.3 Liters   I:E Ratio 1:2   Plateau Pressure 32 BOD73   Cough/Sputum   Sputum How Obtained Suctioned;Endotracheal   Cough Non-productive   Spontaneous Breathing Trial (SBT) RT Doc   Pulse 85   Breath Sounds   Right Upper Lobe Diminished   Right Middle Lobe Diminished   Right Lower Lobe Diminished   Left Upper Lobe Diminished   Left Lower Lobe Diminished   Additional Respiratory  Assessments   Resp 23   Position Semi-Jesus's   Oral Care Completed? Yes   Oral Care Mouth suctioned   Subglottic Suction Done?  Yes   Alarm Settings   High Pressure Alarm 40 cmH2O   Low Minute Volume Alarm 4 L/min   Apnea (secs) 20 secs   High Respiratory Rate 40 br/min   Low Exhaled Vt  250 mL   ETT (adult)   Placement Date/Time: 10/06/21 1208   Timeout: Patient  Preoxygenation: Yes  Technique: Rapid sequence  Type: Cuffed  Tube Size: 7.5 mm  Laryngoscope: GlideScope  Secured at: 23 cm  Measured From: Lips   Secured at 25 cm   Measured From Lips   ET Placement Right   Secured By Commercial tube carranza   Site Condition Dry

## 2021-10-22 NOTE — PROGRESS NOTES
Dr Pablito Del Cid in unit and updates family at bedside regarding plan for trach and PEG tube placement tentatively on Monday.  Dr Pablito Del Cid aware that Dr Nichelle Coleman has been updated and reviewed chart Suad Virgen RN

## 2021-10-22 NOTE — PROGRESS NOTES
Otolaryngology consult called to office, spoke with [facility], 10/22/21 @ 0928Hallie Salas

## 2021-10-22 NOTE — PLAN OF CARE
Free from any injury. Remains on mechanical ventilation. SPO2 maintained in the 90's. Normothermic. Tube feeds tolerated without N/V/D. Turned and repositioned for comfort and mobility and tho prevent further skin breakdown. Continue witht the current plan of care.

## 2021-10-22 NOTE — PROGRESS NOTES
RT Nebulizer Bronchodilator Protocol Note    There is a bronchodilator order in the chart from a provider indicating to follow the RT Bronchodilator Protocol and there is an Initiate RT Bronchodilator Protocol order as well (see protocol at bottom of note). CXR Findings:  XR CHEST PORTABLE    Result Date: 10/22/2021  Persistent multifocal bilateral pulmonary opacity, slightly improved at the right base as compared to prior. XR CHEST PORTABLE    Result Date: 10/21/2021  Multifocal airspace disease with right-sided pleural effusion, similar to prior. No significant change       The findings from the last RT Protocol Assessment were as follows:  Smoking: (P) Chronic pulmonary disease  Respiratory Pattern: (P) Regular pattern and RR 12-20 bpm  Breath Sounds: (P) Slightly diminished and/or crackles  Cough: (P) Weak, non-productive  Indication for Bronchodilator Therapy: (P) Decreased or absent breath sounds  Bronchodilator Assessment Score: (P) 7    Aerosolized bronchodilator medication orders have been revised according to the RT Nebulizer Bronchodilator Protocol below. Respiratory Therapist to perform RT Therapy Protocol Assessment initially then follow the protocol. Repeat RT Therapy Protocol Assessment PRN for score 0-3 or on second treatment, BID, and PRN for scores above 3. No Indications - adjust the frequency to every 6 hours PRN wheezing or bronchospasm, if no treatments needed after 48 hours then discontinue using Per Protocol order mode. If indication present, adjust the RT bronchodilator orders based on the Bronchodilator Assessment Score as indicated below. If a patient is on this medication at home then do not decrease Frequency below that used at home. 0-3 - enter or revise RT bronchodilator order(s) to equivalent RT Bronchodilator order with Frequency of every 4 hours PRN for wheezing or increased work of breathing using Per Protocol order mode.        4-6 - enter or revise RT Bronchodilator order(s) to two equivalent RT bronchodilator orders with one order with BID Frequency and one order with Frequency of every 4 hours PRN wheezing or increased work of breathing using Per Protocol order mode. 7-10 - enter or revise RT Bronchodilator order(s) to two equivalent RT bronchodilator orders with one order with TID Frequency and one order with Frequency of every 4 hours PRN wheezing or increased work of breathing using Per Protocol order mode. 11-13 - enter or revise RT Bronchodilator order(s) to one equivalent RT bronchodilator order with QID Frequency and an Albuterol order with Frequency of every 4 hours PRN wheezing or increased work of breathing using Per Protocol order mode. Greater than 13 - enter or revise RT Bronchodilator order(s) to one equivalent RT bronchodilator order with every 4 hours Frequency and an Albuterol order with Frequency of every 2 hours PRN wheezing or increased work of breathing using Per Protocol order mode. RT to enter RT Home Evaluation for COPD & MDI Assessment order using Per Protocol order mode.     Electronically signed by Suzi Hicks RCP on 10/22/2021 at 5:22 PM

## 2021-10-22 NOTE — PROGRESS NOTES
[facility]  HEAD AND NECK - ENT  PROGRESS  NOTE    Date of Service: 10/22/2021      ASSESSMENT: Lex Rowley is a 78 y.o. female who was admitted with Covid pneumonia and requiring mechanical ventilation. She has a history of a tracheoplasty and is needing a trach due to prolonged ventilation. Per review of chart it appears patient had tracheoplasty in 2014 and [address]. She has been doing well from a breathing standpoint prior to her Covid infection. CT PE was reviewed and showed normal anatomy in the upper trachea and no contraindications to tracheostomy placement. The area of malacia and repair appears to be lower in the trachea. Family would like to give her several more days to try to wean from the vent. PLAN/RECOMMENDATIONS:   We will tentatively plan for a tracheostomy placement on Monday, October 25. Please have the patient n.p.o. and hold anticoagulation at midnight on Sunday  Please have surgical consent signed by patient's family. I will evaluate the patient on the morning of surgery.     [employer], DO

## 2021-10-22 NOTE — PLAN OF CARE
Nutrition Problem #1: Severe malnutrition  Intervention: Food and/or Nutrient Delivery: Continue NPO, Continue Current Tube Feeding  Nutritional Goals: patient will tolerate Vital AF 1.2 at goal rate of 60 ml/hr x 20 hours without GI distress, without s/s of aspiration, and without additional lab/fluid disturbances

## 2021-10-22 NOTE — PROGRESS NOTES
Patient is not able to demonstrate the ability to move from a reclining position to an upright position within the recliner due to Pt on vent and bedrest . Danial Cranker RN

## 2021-10-22 NOTE — PROGRESS NOTES
10/21/21 2347   Vent Information   Vent Type 980   Vent Mode AC/PC   Pressure Ordered 22   Rate Set 22 bmp   FiO2  55 %   SpO2 93 %   SpO2/FiO2 ratio 169.09   PEEP/CPAP 8   I Time/ I Time % 0.9 s   Humidification Source Heated wire   Humidification Temp 37   Humidification Temp Measured 37   Circuit Condensation Drained   Vent Patient Data   Peak Inspiratory Pressure 33 cmH2O   Mean Airway Pressure 17 cmH20   Rate Measured 24 br/min   Vt Exhaled 422 mL   Minute Volume 8.46 Liters   I:E Ratio 1:2   Plateau Pressure 30 [id number]   Cough/Sputum   Sputum How Obtained Suctioned;Endotracheal   Breath Sounds   Right Upper Lobe Diminished   Right Middle Lobe Diminished   Right Lower Lobe Diminished   Left Upper Lobe Diminished   Left Lower Lobe Diminished   Additional Respiratory  Assessments   Resp 22   Position Semi-Jesus's   Oral Care Completed? Yes   Oral Care Mouth suctioned   Subglottic Suction Done?  Yes   Alarm Settings   High Pressure Alarm 40 cmH2O   Low Minute Volume Alarm 4 L/min   Apnea (secs) 20 secs   High Respiratory Rate 40 br/min   Low Exhaled Vt  250 mL   ETT (adult)   Placement Date/Time: 10/06/21 1208   Timeout: Patient  Preoxygenation: Yes  Technique: Rapid sequence  Type: Cuffed  Tube Size: 7.5 mm  Laryngoscope: GlideScope  Secured at: 23 cm  Measured From: Lips   Secured at 25 cm   Measured From [address] [employer] tube carranza   Site Condition Dry

## 2021-10-22 NOTE — PROGRESS NOTES
RT Inhaler-Nebulizer Bronchodilator Protocol Note    There is a bronchodilator order in the chart from a provider indicating to follow the RT Bronchodilator Protocol and there is an Initiate RT Inhaler-Nebulizer Bronchodilator Protocol order as well (see protocol at bottom of note). CXR Findings:  XR CHEST PORTABLE    Result Date: 10/21/2021  Multifocal airspace disease with right-sided pleural effusion, similar to prior. No significant change     XR CHEST PORTABLE    Result Date: 10/20/2021  Stable support apparatus. Persistent multifocal airspace disease. No substantial change. The findings from the last RT Protocol Assessment were as follows:   History Pulmonary Disease: Chronic pulmonary disease  Respiratory Pattern: Dyspnea on exertion or RR 21-25 bpm  Breath Sounds: Slightly diminished and/or crackles  Cough: Weak, productive  Indication for Bronchodilator Therapy: Decreased or absent breath sounds  Bronchodilator Assessment Score: 8    Aerosolized bronchodilator medication orders have been revised according to the RT Inhaler-Nebulizer Bronchodilator Protocol below. Respiratory Therapist to perform RT Therapy Protocol Assessment initially then follow the protocol. Repeat RT Therapy Protocol Assessment PRN for score 0-3 or on second treatment, BID, and PRN for scores above 3. No Indications - adjust the frequency to every 6 hours PRN wheezing or bronchospasm, if no treatments needed after 48 hours then discontinue using Per Protocol order mode. If indication present, adjust the RT bronchodilator orders based on the Bronchodilator Assessment Score as indicated below.   Use Inhaler orders unless patient has one or more of the following: on home nebulizer, not able to hold breath for 10 seconds, is not alert and oriented, cannot activate and use MDI correctly, or respiratory rate 25 breaths per minute or more, then use the equivalent nebulizer order(s) with same Frequency and PRN reasons based on the score. If a patient is on this medication at home then do not decrease Frequency below that used at home. 0-3 - enter or revise RT bronchodilator order(s) to equivalent RT Bronchodilator order with Frequency of every 4 hours PRN for wheezing or increased work of breathing using Per Protocol order mode. 4-6 - enter or revise RT Bronchodilator order(s) to two equivalent RT bronchodilator orders with one order with BID Frequency and one order with Frequency of every 4 hours PRN wheezing or increased work of breathing using Per Protocol order mode. 7-10 - enter or revise RT Bronchodilator order(s) to two equivalent RT bronchodilator orders with one order with TID Frequency and one order with Frequency of every 4 hours PRN wheezing or increased work of breathing using Per Protocol order mode. 11-13 - enter or revise RT Bronchodilator order(s) to one equivalent RT bronchodilator order with QID Frequency and an Albuterol order with Frequency of every 4 hours PRN wheezing or increased work of breathing using Per Protocol order mode. Greater than 13 - enter or revise RT Bronchodilator order(s) to one equivalent RT bronchodilator order with every 4 hours Frequency and an Albuterol order with Frequency of every 2 hours PRN wheezing or increased work of breathing using Per Protocol order mode. RT to enter RT Home Evaluation for COPD & MDI Assessment order using Per Protocol order mode.     Electronically signed by Sonali Treadwell RCP on 10/22/2021 at 12:25 AM

## 2021-10-22 NOTE — PROGRESS NOTES
Hospitalist Progress Note      PCP: Maye Holter, MD    Date of Admission: 9/26/2021      Admitted with COVID-19 pneumonia,  acute hypoxic respiratory failure. Patient not vaccinated  Progressive significant worsening hypoxemia overnight. Patient was on 2 L of oxygen on admission-> switched to high flow oxygen per Vapotherm with additional 100% nonrebreather on 9/28/2021-> transferred to ICU. Ms. Marika Licea remains on vent support  No acute issues .  No  fevers  55% 8 PEEP    Planning for trach and PEG next week     Medications:  Reviewed    Infusion Medications    midazolam 3 mg/hr (10/21/21 1932)    norepinephrine Stopped (10/18/21 0616)    fentaNYL 150 mcg/hr (10/22/21 0337)    propofol 40 mcg/kg/min (10/22/21 0524)    sodium chloride      dextrose      sodium chloride       Scheduled Medications    dexamethasone  2 mg IntraVENous Q24H    insulin lispro  0-18 Units SubCUTAneous Q4H    sertraline  50 mg Per NG tube Daily    Venelex   Topical BID    miconazole   Topical BID    oxyCODONE  10 mg Oral Q6H    influenza virus vaccine  0.5 mL IntraMUSCular Prior to discharge    chlorhexidine  15 mL Mouth/Throat BID    ipratropium-albuterol  1 ampule Inhalation Q4H    pantoprazole  40 mg IntraVENous Daily    levothyroxine  100 mcg Oral Daily    enoxaparin  30 mg SubCUTAneous BID    lidocaine 1 % injection  5 mL IntraDERmal Once    sodium chloride flush  5-40 mL IntraVENous 2 times per day    aspirin  81 mg Oral Daily    gabapentin  800 mg Oral TID    atorvastatin  40 mg Oral Daily    sodium chloride flush  5-40 mL IntraVENous 2 times per day     PRN Meds: magnesium sulfate, potassium chloride, labetalol, carboxymethylcellulose PF **AND** artificial tears, fentanNYL, midazolam, sodium chloride flush, sodium chloride, glucose, dextrose, glucagon (rDNA), dextrose, sodium chloride flush, sodium chloride, polyethylene glycol, acetaminophen **OR** acetaminophen, prochlorperazine      Intake/Output Summary (Last 24 hours) at 10/22/2021 0701  Last data filed at 10/22/2021 0530  Gross per 24 hour   Intake 1473.43 ml   Output 1865 ml   Net -391.57 ml       Physical Exam Performed:    /60   Pulse 87   Temp 98.5 °F (36.9 °C) (Axillary)   Resp 21   Ht 5' 9\" (1.753 m)   Wt 175 lb 8 oz (79.6 kg)   SpO2 94%   BMI 25.92 kg/m²     General:intubated on mechanical ventilation . Supine position . Skin:  Warm and dry  Neck:  JVD absent. Neck supple  Chest: diminished breath sounds, improved air entry. No wheezes or rhonchi.  Bibasilar crackles. Cardiovascular:  RRR ,S1S2 normal  Abdomen:  Soft, non tender, non distended, BS +  Extremities:  No edema. Intact peripheral pulses. Brisk cap refill, < 2 secs  Neuro: intubated,sedated       Labs:   Recent Labs     10/20/21  0433 10/21/21  0430 10/22/21  0455   WBC 4.8 6.2 7.8   HGB 9.0* 9.4* 9.5*   HCT 29.4* 29.8* 30.3*   PLT 79* 83* 84*     Recent Labs     10/20/21  0433 10/21/21  0430 10/22/21  0455    142 143   K 3.6 3.7 3.5    99 100   CO2 39* 38* 36*   BUN 15 18 11   CREATININE <0.5* <0.5* <0.5*   CALCIUM 9.4 9.2 9.5     Recent Labs     10/20/21  0433 10/21/21  0430 10/22/21  0455   AST 22 23 28   ALT 18 19 20   BILITOT <0.2 0.3 0.4   ALKPHOS 111 121 123     No results for input(s): INR in the last 72 hours. No results for input(s): Curlie Lat in the last 72 hours. Urinalysis:      Lab Results   Component Value Date    NITRU Negative 09/27/2021    WBCUA 0-2 09/27/2021    BACTERIA Rare 09/27/2021    RBCUA None seen 09/27/2021    BLOODU TRACE-INTACT 09/27/2021    SPECGRAV <=1.005 09/27/2021    GLUCOSEU >=1000 09/27/2021    GLUCOSEU 250 05/11/2011       Radiology:  XR CHEST PORTABLE   Final Result   Multifocal airspace disease with right-sided pleural effusion, similar to   prior. No significant change         XR CHEST PORTABLE   Final Result   Stable support apparatus.       Persistent multifocal airspace disease. No substantial change. XR CHEST PORTABLE   Final Result   Unchanged multifocal bilateral pneumonia. XR CHEST PORTABLE   Final Result   No significant change in the multifocal bilateral infiltrates         XR CHEST PORTABLE   Final Result   Diffuse bilateral airspace disease appears unchanged on the right and   increased on the left. XR CHEST PORTABLE   Final Result   Bilateral pleuroparenchymal disease, with improved aeration of the right lung   compared to prior. XR CHEST PORTABLE   Final Result   Line and tube as above. Bilateral interstitial opacities are worsened when compared to prior study. XR CHEST PORTABLE   Final Result   Stable chest.  Diffuse interstitial changes, possibly infiltrate or edema. Satisfactory position of endotracheal tube. XR CHEST PORTABLE   Final Result   Stable support apparatus. In this case, both multifocal pneumonia and pulmonary edema are both   considered, and the changes appear increased when compared to the previous   exam.         XR CHEST PORTABLE   Final Result   1. Stable appropriate positions of support apparatus. 2. Slight interval progression of multifocal airspace disease throughout both   lungs, likely a combination of multifocal pneumonia and superimposed   pulmonary edema. 3. Stable cardiomegaly and small bilateral pleural effusions. XR CHEST PORTABLE   Final Result   Improving aeration of the right lung with grossly stable left basilar   airspace opacities. XR CHEST PORTABLE   Final Result   Supportive tubing is in stable position. Stable pattern of bilateral ground-glass opacities and basilar consolidations. XR CHEST PORTABLE   Final Result   Stable appearance of the chest.  Diffuse ground-glass opacities on the right   and mild left basilar opacities.          XR CHEST PORTABLE   Final Result   Improved aeration on the left, possibly improving pulmonary edema. Persistent airspace disease in the right base may represent concurrent   pneumonia         XR ABDOMEN (KUB) (SINGLE AP VIEW)   Final Result   Enteric catheter tip likely in the distal gastric body. XR CHEST PORTABLE   Final Result   1. Endotracheal tube projects in the appropriate position. 2. Enteric tube extends below the diaphragm and out of the field of view. XR CHEST PORTABLE   Final Result   Increasing diffuse airspace opacification throughout the lungs. Pattern may   represent pulmonary edema or worsening pneumonitis. XR CHEST PORTABLE   Final Result   1. Interval increased bilateral pulmonary opacities with new consolidative   change at the right lung base. These findings could be secondary to   pulmonary edema or infection. 2. Consolidative change at the right lung base could also be secondary to   atelectasis/mucous plug. XR CHEST PORTABLE   Final Result   No significant interval change in small right pleural effusion or bilateral   heterogeneous opacity which can reflect pulmonary edema or pneumonia. XR CHEST PORTABLE   Final Result   Mild improvement from prior comparison. Mild-to-moderate congestion and/or   infiltrates identified in the lungs. ET tube terminates 7 cm superior to the saba. XR CHEST PORTABLE   Final Result   Endotracheal tube and orogastric tube unchanged, adequate position. Slightly increased extensive bilateral pulmonary disease over the past 24   hours this may relate to interstitial edema or diffuse infection or a   combination. XR CHEST PORTABLE   Final Result   Endotracheal tube with its tip approximately 4.7 cm from the saba in   satisfactory position. Enteric tube in satisfactory position. Patchy bilateral airspace disease is again noted with improved aeration of   the left mid lung.          XR CHEST PORTABLE   Final Result   Some improvement in the appearance of the chest with clearing of some of the   acute airspace disease from the mid left lung. Large amount of pneumonia   remains within the right lung and lower left lung. XR CHEST PORTABLE   Final Result   1. Endotracheal tube, nasogastric tube, and right PICC line placement. 2.  Increasing multifocal opacities with consolidative area in the right   perihilar lung. Could be multifocal pneumonia with or without edema. IR PICC WO SQ PORT/PUMP > 5 YEARS   Final Result   1. See above. CT CHEST PULMONARY EMBOLISM W CONTRAST   Final Result   No evidence of pulmonary embolism. Scattered peripheral opacities in the left lung and more consolidative area   in the right lung concerning for pneumonia. Previous right thoracotomy and   upper lobectomy. One mildly enlarged right paratracheal lymph node is present but no priors   for comparison. Nonenlarged but prominent lymph nodes in the upper abdomen   and juxta diaphragmatic region. XR CHEST PORTABLE   Final Result   Mild cardiomegaly without interstitial edema. Metallic surgical clips from prior right thoracotomy. COPD with anterior segment-right upper lobe alveolar pneumonia. Old pleural thickening was noted along the right lateral chest wall. Pleural   thickening and or trace effusion blunts the right costophrenic angle. XR CHEST PORTABLE    (Results Pending)   XR CHEST PORTABLE    (Results Pending)         Cultures  Blood- NGTD    Repeat resp - - Klebsiella     Sputum- pseudomonas      Assessment/Plan:        #COVID-19 pneumonia  #Acute hypoxic respiratory failure   Pseudomonas HCAP. -Unvaccinated patient  - she presented with cough, fever, dyspnea in the setting of household contact testing + COVID 19  - Her Covid testing came back positive on admission  - Initially on 2 L of oxygen on presentation. worsening hypoxemia requiring high flow oxygen.  And eventually intubated   - Intubated early AM  9/29/2021.  Extubated on 10/4/2021De Adrienne was on Vapotherm.  Pulmonary status got worse and she was reintubated on 10/6/2021.   - s/p Remdesivir  - on Decadron, day # 25 - being tapered   -  sp  Tocilizumab  -Lovenox twice daily  - pulmonary managing. Might need trach and PEG . Scheduled next week     #Possible superimposed bacterial pneumonia  Gram-negative infection    -Was on Rocephin and Zithromax fpr 5 days ; - changed to zosyn on 10/2 with pseudomonas in  resp culture.  Pulmonary has given zyvox for 3 days   Completed Zosyn for 8 days  Respiratory cultures now growing Klebsiella, initiated on Cipro -completed 7 days.  Now off all abx       #Hypokalemia  - replaced as needed      #CAD  - cont ASA, statin  - EKG with inferior T wave abnormality.  She denied CP.  Trop neg   -telemetry monitoring       #History of tracheobronchomalacia  S/p tracheoplasty       #HTN  Improved hypotension and off levophed  Resumed home medications-Norvasc and losartan.       #Chronic pain   History of hip fracture  - cont home oxycodone BID and percocet   - cont gabapentin and requip       #DM2  - use ssi        #Hypothyroidism  - cont synthroid         DVT Prophylaxis: Lovenox   Diet: Diet NPO  ADULT TUBE FEEDING; Orogastric; Peptide Based; Continuous; 55; Yes; 5; Q 4 hours; 60; 30; Q 3 hours  Code Status: Full Code      Brittni Collado MD, 10/22/2021 7:01 AM

## 2021-10-22 NOTE — PROGRESS NOTES
Pt continues to rest quietly and no further changes noted in exam. Vitals and SpO2 stable. All lines and monitoring devices remain in place. Pt repositioned in bed. Continue current plan of care.  Jo Ann Vega RN

## 2021-10-23 ENCOUNTER — APPOINTMENT (OUTPATIENT)
Dept: GENERAL RADIOLOGY | Age: 66
DRG: 004 | End: 2021-10-23
Payer: MEDICARE

## 2021-10-23 LAB
A/G RATIO: 1.2 (ref 1.1–2.2)
ALBUMIN SERPL-MCNC: 3.2 G/DL (ref 3.4–5)
ALP BLD-CCNC: 133 U/L (ref 40–129)
ALT SERPL-CCNC: 27 U/L (ref 10–40)
ANION GAP SERPL CALCULATED.3IONS-SCNC: 8 MMOL/L (ref 3–16)
ANISOCYTOSIS: ABNORMAL
AST SERPL-CCNC: 39 U/L (ref 15–37)
ATYPICAL LYMPHOCYTE RELATIVE PERCENT: 3 % (ref 0–6)
BANDED NEUTROPHILS RELATIVE PERCENT: 2 % (ref 0–7)
BASE EXCESS ARTERIAL: 12.2 MMOL/L (ref -3–3)
BASOPHILIC STIPPLING: ABNORMAL
BASOPHILS ABSOLUTE: 0 K/UL (ref 0–0.2)
BASOPHILS RELATIVE PERCENT: 0 %
BILIRUB SERPL-MCNC: 0.3 MG/DL (ref 0–1)
BUN BLDV-MCNC: 13 MG/DL (ref 7–20)
CALCIUM SERPL-MCNC: 9.1 MG/DL (ref 8.3–10.6)
CARBOXYHEMOGLOBIN ARTERIAL: 0.1 % (ref 0–1.5)
CHLORIDE BLD-SCNC: 99 MMOL/L (ref 99–110)
CO2: 35 MMOL/L (ref 21–32)
CREAT SERPL-MCNC: <0.5 MG/DL (ref 0.6–1.2)
EOSINOPHILS ABSOLUTE: 0.2 K/UL (ref 0–0.6)
EOSINOPHILS RELATIVE PERCENT: 2 %
GFR AFRICAN AMERICAN: >60
GFR NON-AFRICAN AMERICAN: >60
GLOBULIN: 2.7 G/DL
GLUCOSE BLD-MCNC: 138 MG/DL (ref 70–99)
GLUCOSE BLD-MCNC: 154 MG/DL (ref 70–99)
GLUCOSE BLD-MCNC: 159 MG/DL (ref 70–99)
GLUCOSE BLD-MCNC: 168 MG/DL (ref 70–99)
GLUCOSE BLD-MCNC: 168 MG/DL (ref 70–99)
GLUCOSE BLD-MCNC: 222 MG/DL (ref 70–99)
GLUCOSE BLD-MCNC: 244 MG/DL (ref 70–99)
HCO3 ARTERIAL: 37.2 MMOL/L (ref 21–29)
HCT VFR BLD CALC: 27.9 % (ref 36–48)
HEMOGLOBIN, ART, EXTENDED: 10.1 G/DL (ref 12–16)
HEMOGLOBIN: 8.9 G/DL (ref 12–16)
LYMPHOCYTES ABSOLUTE: 1.2 K/UL (ref 1–5.1)
LYMPHOCYTES RELATIVE PERCENT: 13 %
MAGNESIUM: 1.6 MG/DL (ref 1.8–2.4)
MCH RBC QN AUTO: 25.3 PG (ref 26–34)
MCHC RBC AUTO-ENTMCNC: 31.8 G/DL (ref 31–36)
MCV RBC AUTO: 79.6 FL (ref 80–100)
METHEMOGLOBIN ARTERIAL: 0.3 %
MONOCYTES ABSOLUTE: 0.7 K/UL (ref 0–1.3)
MONOCYTES RELATIVE PERCENT: 9 %
NEUTROPHILS ABSOLUTE: 5.6 K/UL (ref 1.7–7.7)
NEUTROPHILS RELATIVE PERCENT: 71 %
O2 SAT, ARTERIAL: 87.3 %
O2 THERAPY: ABNORMAL
PCO2 ARTERIAL: 51 MMHG (ref 35–45)
PDW BLD-RTO: 19.1 % (ref 12.4–15.4)
PERFORMED ON: ABNORMAL
PH ARTERIAL: 7.48 (ref 7.35–7.45)
PHOSPHORUS: 3.8 MG/DL (ref 2.5–4.9)
PLATELET # BLD: 95 K/UL (ref 135–450)
PLATELET SLIDE REVIEW: ABNORMAL
PMV BLD AUTO: 9.4 FL (ref 5–10.5)
PO2 ARTERIAL: 49.9 MMHG (ref 75–108)
POIKILOCYTES: ABNORMAL
POLYCHROMASIA: ABNORMAL
POTASSIUM REFLEX MAGNESIUM: 3.5 MMOL/L (ref 3.5–5.1)
RBC # BLD: 3.51 M/UL (ref 4–5.2)
SLIDE REVIEW: ABNORMAL
SODIUM BLD-SCNC: 142 MMOL/L (ref 136–145)
TCO2 ARTERIAL: 38.8 MMOL/L
TOTAL PROTEIN: 5.9 G/DL (ref 6.4–8.2)
WBC # BLD: 7.7 K/UL (ref 4–11)

## 2021-10-23 PROCEDURE — 2580000003 HC RX 258: Performed by: INTERNAL MEDICINE

## 2021-10-23 PROCEDURE — 6370000000 HC RX 637 (ALT 250 FOR IP): Performed by: INTERNAL MEDICINE

## 2021-10-23 PROCEDURE — 85025 COMPLETE CBC W/AUTO DIFF WBC: CPT

## 2021-10-23 PROCEDURE — 84100 ASSAY OF PHOSPHORUS: CPT

## 2021-10-23 PROCEDURE — 2700000000 HC OXYGEN THERAPY PER DAY

## 2021-10-23 PROCEDURE — 94761 N-INVAS EAR/PLS OXIMETRY MLT: CPT

## 2021-10-23 PROCEDURE — 83735 ASSAY OF MAGNESIUM: CPT

## 2021-10-23 PROCEDURE — 6360000002 HC RX W HCPCS: Performed by: INTERNAL MEDICINE

## 2021-10-23 PROCEDURE — 82803 BLOOD GASES ANY COMBINATION: CPT

## 2021-10-23 PROCEDURE — 94640 AIRWAY INHALATION TREATMENT: CPT

## 2021-10-23 PROCEDURE — 99233 SBSQ HOSP IP/OBS HIGH 50: CPT | Performed by: INTERNAL MEDICINE

## 2021-10-23 PROCEDURE — 71045 X-RAY EXAM CHEST 1 VIEW: CPT

## 2021-10-23 PROCEDURE — 80053 COMPREHEN METABOLIC PANEL: CPT

## 2021-10-23 PROCEDURE — C9113 INJ PANTOPRAZOLE SODIUM, VIA: HCPCS | Performed by: INTERNAL MEDICINE

## 2021-10-23 PROCEDURE — 2500000003 HC RX 250 WO HCPCS: Performed by: INTERNAL MEDICINE

## 2021-10-23 PROCEDURE — 99291 CRITICAL CARE FIRST HOUR: CPT | Performed by: INTERNAL MEDICINE

## 2021-10-23 PROCEDURE — 2000000000 HC ICU R&B

## 2021-10-23 PROCEDURE — 94003 VENT MGMT INPAT SUBQ DAY: CPT

## 2021-10-23 RX ORDER — DEXAMETHASONE SODIUM PHOSPHATE 4 MG/ML
1 INJECTION, SOLUTION INTRA-ARTICULAR; INTRALESIONAL; INTRAMUSCULAR; INTRAVENOUS; SOFT TISSUE EVERY 24 HOURS
Status: DISCONTINUED | OUTPATIENT
Start: 2021-10-23 | End: 2021-10-27

## 2021-10-23 RX ADMIN — PROPOFOL 50 MCG/KG/MIN: 10 INJECTION, EMULSION INTRAVENOUS at 18:21

## 2021-10-23 RX ADMIN — Medication 150 MCG/HR: at 06:08

## 2021-10-23 RX ADMIN — SERTRALINE HYDROCHLORIDE 50 MG: 50 TABLET ORAL at 08:04

## 2021-10-23 RX ADMIN — INSULIN LISPRO 6 UNITS: 100 INJECTION, SOLUTION INTRAVENOUS; SUBCUTANEOUS at 17:23

## 2021-10-23 RX ADMIN — ATORVASTATIN CALCIUM 40 MG: 40 TABLET, FILM COATED ORAL at 08:04

## 2021-10-23 RX ADMIN — IPRATROPIUM BROMIDE AND ALBUTEROL SULFATE 1 AMPULE: .5; 2.5 SOLUTION RESPIRATORY (INHALATION) at 15:17

## 2021-10-23 RX ADMIN — OXYCODONE 10 MG: 5 TABLET ORAL at 17:18

## 2021-10-23 RX ADMIN — CARBOXYMETHYLCELLULOSE SODIUM 1 DROP: 10 GEL OPHTHALMIC at 08:05

## 2021-10-23 RX ADMIN — CASTOR OIL AND BALSAM, PERU: 788; 87 OINTMENT TOPICAL at 19:44

## 2021-10-23 RX ADMIN — SODIUM CHLORIDE, PRESERVATIVE FREE 10 ML: 5 INJECTION INTRAVENOUS at 08:05

## 2021-10-23 RX ADMIN — IPRATROPIUM BROMIDE AND ALBUTEROL SULFATE 1 AMPULE: .5; 2.5 SOLUTION RESPIRATORY (INHALATION) at 19:32

## 2021-10-23 RX ADMIN — MICONAZOLE NITRATE: 2 POWDER TOPICAL at 08:05

## 2021-10-23 RX ADMIN — GABAPENTIN 800 MG: 400 CAPSULE ORAL at 17:18

## 2021-10-23 RX ADMIN — IPRATROPIUM BROMIDE AND ALBUTEROL SULFATE 1 AMPULE: .5; 2.5 SOLUTION RESPIRATORY (INHALATION) at 07:19

## 2021-10-23 RX ADMIN — OXYCODONE 10 MG: 5 TABLET ORAL at 12:50

## 2021-10-23 RX ADMIN — OXYCODONE 10 MG: 5 TABLET ORAL at 04:23

## 2021-10-23 RX ADMIN — GABAPENTIN 800 MG: 400 CAPSULE ORAL at 20:49

## 2021-10-23 RX ADMIN — Medication 175 MCG/HR: at 19:31

## 2021-10-23 RX ADMIN — GABAPENTIN 800 MG: 400 CAPSULE ORAL at 08:04

## 2021-10-23 RX ADMIN — CHLORHEXIDINE GLUCONATE 0.12% ORAL RINSE 15 ML: 1.2 LIQUID ORAL at 08:04

## 2021-10-23 RX ADMIN — PROPOFOL 50 MCG/KG/MIN: 10 INJECTION, EMULSION INTRAVENOUS at 14:19

## 2021-10-23 RX ADMIN — INSULIN LISPRO 3 UNITS: 100 INJECTION, SOLUTION INTRAVENOUS; SUBCUTANEOUS at 08:12

## 2021-10-23 RX ADMIN — MINERAL OIL AND WHITE PETROLATUM: 150; 830 OINTMENT OPHTHALMIC at 08:05

## 2021-10-23 RX ADMIN — PROPOFOL 40 MCG/KG/MIN: 10 INJECTION, EMULSION INTRAVENOUS at 21:38

## 2021-10-23 RX ADMIN — SODIUM CHLORIDE, PRESERVATIVE FREE 10 ML: 5 INJECTION INTRAVENOUS at 19:32

## 2021-10-23 RX ADMIN — SODIUM CHLORIDE, PRESERVATIVE FREE 10 ML: 5 INJECTION INTRAVENOUS at 20:49

## 2021-10-23 RX ADMIN — PROPOFOL 40.03 MCG/KG/MIN: 10 INJECTION, EMULSION INTRAVENOUS at 05:36

## 2021-10-23 RX ADMIN — ENOXAPARIN SODIUM 30 MG: 30 INJECTION SUBCUTANEOUS at 08:04

## 2021-10-23 RX ADMIN — IPRATROPIUM BROMIDE AND ALBUTEROL SULFATE 1 AMPULE: .5; 2.5 SOLUTION RESPIRATORY (INHALATION) at 12:12

## 2021-10-23 RX ADMIN — INSULIN LISPRO 3 UNITS: 100 INJECTION, SOLUTION INTRAVENOUS; SUBCUTANEOUS at 12:52

## 2021-10-23 RX ADMIN — LEVOTHYROXINE SODIUM 100 MCG: 100 TABLET ORAL at 05:38

## 2021-10-23 RX ADMIN — PROPOFOL 40 MCG/KG/MIN: 10 INJECTION, EMULSION INTRAVENOUS at 00:27

## 2021-10-23 RX ADMIN — Medication 175 MCG/HR: at 13:28

## 2021-10-23 RX ADMIN — INSULIN LISPRO 6 UNITS: 100 INJECTION, SOLUTION INTRAVENOUS; SUBCUTANEOUS at 20:50

## 2021-10-23 RX ADMIN — Medication 2 MG/HR: at 23:17

## 2021-10-23 RX ADMIN — CHLORHEXIDINE GLUCONATE 0.12% ORAL RINSE 15 ML: 1.2 LIQUID ORAL at 19:44

## 2021-10-23 RX ADMIN — PROPOFOL 50 MCG/KG/MIN: 10 INJECTION, EMULSION INTRAVENOUS at 11:05

## 2021-10-23 RX ADMIN — ENOXAPARIN SODIUM 30 MG: 30 INJECTION SUBCUTANEOUS at 19:43

## 2021-10-23 RX ADMIN — PANTOPRAZOLE SODIUM 40 MG: 40 INJECTION, POWDER, FOR SOLUTION INTRAVENOUS at 08:04

## 2021-10-23 RX ADMIN — IPRATROPIUM BROMIDE AND ALBUTEROL SULFATE 1 AMPULE: .5; 2.5 SOLUTION RESPIRATORY (INHALATION) at 02:57

## 2021-10-23 RX ADMIN — DEXAMETHASONE SODIUM PHOSPHATE 1 MG: 4 INJECTION, SOLUTION INTRAMUSCULAR; INTRAVENOUS at 12:50

## 2021-10-23 RX ADMIN — MICONAZOLE NITRATE: 2 POWDER TOPICAL at 19:44

## 2021-10-23 RX ADMIN — OXYCODONE 10 MG: 5 TABLET ORAL at 21:39

## 2021-10-23 RX ADMIN — CASTOR OIL AND BALSAM, PERU: 788; 87 OINTMENT TOPICAL at 08:05

## 2021-10-23 RX ADMIN — ASPIRIN 81 MG: 81 TABLET, CHEWABLE ORAL at 08:04

## 2021-10-23 RX ADMIN — IPRATROPIUM BROMIDE AND ALBUTEROL SULFATE 1 AMPULE: .5; 2.5 SOLUTION RESPIRATORY (INHALATION) at 22:56

## 2021-10-23 ASSESSMENT — PAIN SCALES - GENERAL
PAINLEVEL_OUTOF10: 0

## 2021-10-23 ASSESSMENT — PULMONARY FUNCTION TESTS
PIF_VALUE: 32
PIF_VALUE: 27
PIF_VALUE: 29
PIF_VALUE: 25
PIF_VALUE: 28
PIF_VALUE: 27
PIF_VALUE: 26
PIF_VALUE: 26
PIF_VALUE: 28
PIF_VALUE: 28

## 2021-10-23 NOTE — PLAN OF CARE
Free from any injury. Safety maintained. SPO2 maintained in the low 90's. Remains intubated and sedated. Turned and repositioned q 2 hrs for comfort and mobility and to prevent further skin breakdown. Tolerating tube feeds without N/V/D. Continue with the current plan of care.

## 2021-10-23 NOTE — PROGRESS NOTES
AM assessment completed. AM labs reviewed. o2 sat 90's. Fentanyl, Propofol, Versed infusing. Gray in place for strict I/o. PICC wnl. AM meds administered. TF at goal. No new orders.   Anil Tillman RN, BSN

## 2021-10-23 NOTE — PROGRESS NOTES
Hospitalist Progress Note      PCP: David Whitfield MD    Date of Admission: 9/26/2021    Admitted with COVID-19 pneumonia,  acute hypoxic respiratory failure. Patient not vaccinated  Progressive significant worsening hypoxemia overnight. Patient was on 2 L of oxygen on admission-> switched to high flow oxygen per Vapotherm with additional 100% nonrebreather on 9/28/2021-> transferred to ICU. Intubated on 9/29/2021 for worsening hypoxemia. Required proning. Extubated on 10/4/2021. Reintubated on 10/6/2021 for worsening hypoxemia.     10/23  Remains on mechanical ventilation  FiO2 55% PEEP of 8  Planning for trach and PEG on Monday    Medications:  Reviewed    Infusion Medications    midazolam 3 mg/hr (10/22/21 1413)    norepinephrine Stopped (10/18/21 0616)    fentaNYL 175 mcg/hr (10/23/21 1328)    propofol 50 mcg/kg/min (10/23/21 1419)    sodium chloride      dextrose      sodium chloride       Scheduled Medications    dexamethasone  1 mg IntraVENous Q24H    insulin lispro  0-18 Units SubCUTAneous Q4H    sertraline  50 mg Per NG tube Daily    Venelex   Topical BID    miconazole   Topical BID    oxyCODONE  10 mg Oral Q6H    influenza virus vaccine  0.5 mL IntraMUSCular Prior to discharge    chlorhexidine  15 mL Mouth/Throat BID    ipratropium-albuterol  1 ampule Inhalation Q4H    pantoprazole  40 mg IntraVENous Daily    levothyroxine  100 mcg Oral Daily    enoxaparin  30 mg SubCUTAneous BID    lidocaine 1 % injection  5 mL IntraDERmal Once    sodium chloride flush  5-40 mL IntraVENous 2 times per day    aspirin  81 mg Oral Daily    gabapentin  800 mg Oral TID    atorvastatin  40 mg Oral Daily    sodium chloride flush  5-40 mL IntraVENous 2 times per day     PRN Meds: magnesium sulfate, potassium chloride, labetalol, carboxymethylcellulose PF **AND** artificial tears, fentanNYL, midazolam, sodium chloride flush, sodium chloride, glucose, dextrose, glucagon (rDNA), dextrose, sodium chloride flush, sodium chloride, polyethylene glycol, acetaminophen **OR** acetaminophen, prochlorperazine      Intake/Output Summary (Last 24 hours) at 10/23/2021 1723  Last data filed at 10/23/2021 1200  Gross per 24 hour   Intake 1227.38 ml   Output 1430 ml   Net -202.62 ml       Physical Exam Performed:    BP (!) 119/57   Pulse 79   Temp 99 °F (37.2 °C) (Axillary)   Resp 22   Ht 5' 9\" (1.753 m)   Wt 175 lb 8 oz (79.6 kg)   SpO2 (!) 89%   BMI 25.92 kg/m²   Patient in droplet plus precautions  General:intubated on mechanical ventilation . Skin:  Warm and dry  Neck:  JVD absent. Neck supple  Chest: diminished breath sounds, improved air entry. No wheezes or rhonchi.  Bibasilar crackles. Cardiovascular:  RRR ,S1S2 normal  Abdomen:  Soft, non tender, non distended, BS +  Extremities:  No edema. Intact peripheral pulses. Brisk cap refill, < 2 secs  Neuro: intubated,sedated       Labs:   Recent Labs     10/21/21  0430 10/22/21  0455 10/23/21  0555   WBC 6.2 7.8 7.7   HGB 9.4* 9.5* 8.9*   HCT 29.8* 30.3* 27.9*   PLT 83* 84* 95*     Recent Labs     10/21/21  0430 10/22/21  0455 10/23/21  0555    143 142   K 3.7 3.5 3.5   CL 99 100 99   CO2 38* 36* 35*   BUN 18 11 13   CREATININE <0.5* <0.5* <0.5*   CALCIUM 9.2 9.5 9.1   PHOS  --   --  3.8     Recent Labs     10/21/21  0430 10/22/21  0455 10/23/21  0555   AST 23 28 39*   ALT 19 20 27   BILITOT 0.3 0.4 0.3   ALKPHOS 121 123 133*     No results for input(s): INR in the last 72 hours. No results for input(s): Patrisha Meigs in the last 72 hours.     Urinalysis:      Lab Results   Component Value Date    NITRU Negative 09/27/2021    WBCUA 0-2 09/27/2021    BACTERIA Rare 09/27/2021    RBCUA None seen 09/27/2021    BLOODU TRACE-INTACT 09/27/2021    SPECGRAV <=1.005 09/27/2021    GLUCOSEU >=1000 09/27/2021    GLUCOSEU 250 05/11/2011     Cultures  Blood- NGTD  Sputum- pseudomonas; Repeat resp Cx Klebsiella       Radiology:  XR CHEST PORTABLE   Final Result Supportive tubing projects in normal position. Bilateral interstitial-alveolar opacities, likely combination of pulmonary   edema pneumonia. Although they appear more prominent this may be secondary   to the rotation on the present study. Continued surveillance is recommended. Suspected small right pleural effusion. XR CHEST PORTABLE   Final Result   Persistent multifocal bilateral pulmonary opacity, slightly improved at the   right base as compared to prior. XR CHEST PORTABLE   Final Result   Multifocal airspace disease with right-sided pleural effusion, similar to   prior. No significant change         XR CHEST PORTABLE   Final Result   Stable support apparatus. Persistent multifocal airspace disease. No substantial change. XR CHEST PORTABLE   Final Result   Unchanged multifocal bilateral pneumonia. XR CHEST PORTABLE   Final Result   No significant change in the multifocal bilateral infiltrates         XR CHEST PORTABLE   Final Result   Diffuse bilateral airspace disease appears unchanged on the right and   increased on the left. XR CHEST PORTABLE   Final Result   Bilateral pleuroparenchymal disease, with improved aeration of the right lung   compared to prior. XR CHEST PORTABLE   Final Result   Line and tube as above. Bilateral interstitial opacities are worsened when compared to prior study. XR CHEST PORTABLE   Final Result   Stable chest.  Diffuse interstitial changes, possibly infiltrate or edema. Satisfactory position of endotracheal tube. XR CHEST PORTABLE   Final Result   Stable support apparatus. In this case, both multifocal pneumonia and pulmonary edema are both   considered, and the changes appear increased when compared to the previous   exam.         XR CHEST PORTABLE   Final Result   1. Stable appropriate positions of support apparatus.    2. Slight interval progression of multifocal airspace disease throughout both   lungs, likely a combination of multifocal pneumonia and superimposed   pulmonary edema. 3. Stable cardiomegaly and small bilateral pleural effusions. XR CHEST PORTABLE   Final Result   Improving aeration of the right lung with grossly stable left basilar   airspace opacities. XR CHEST PORTABLE   Final Result   Supportive tubing is in stable position. Stable pattern of bilateral ground-glass opacities and basilar consolidations. XR CHEST PORTABLE   Final Result   Stable appearance of the chest.  Diffuse ground-glass opacities on the right   and mild left basilar opacities. XR CHEST PORTABLE   Final Result   Improved aeration on the left, possibly improving pulmonary edema. Persistent airspace disease in the right base may represent concurrent   pneumonia         XR ABDOMEN (KUB) (SINGLE AP VIEW)   Final Result   Enteric catheter tip likely in the distal gastric body. XR CHEST PORTABLE   Final Result   1. Endotracheal tube projects in the appropriate position. 2. Enteric tube extends below the diaphragm and out of the field of view. XR CHEST PORTABLE   Final Result   Increasing diffuse airspace opacification throughout the lungs. Pattern may   represent pulmonary edema or worsening pneumonitis. XR CHEST PORTABLE   Final Result   1. Interval increased bilateral pulmonary opacities with new consolidative   change at the right lung base. These findings could be secondary to   pulmonary edema or infection. 2. Consolidative change at the right lung base could also be secondary to   atelectasis/mucous plug. XR CHEST PORTABLE   Final Result   No significant interval change in small right pleural effusion or bilateral   heterogeneous opacity which can reflect pulmonary edema or pneumonia. XR CHEST PORTABLE   Final Result   Mild improvement from prior comparison.   Mild-to-moderate congestion and/or   infiltrates identified in the lungs. ET tube terminates 7 cm superior to the saba. XR CHEST PORTABLE   Final Result   Endotracheal tube and orogastric tube unchanged, adequate position. Slightly increased extensive bilateral pulmonary disease over the past 24   hours this may relate to interstitial edema or diffuse infection or a   combination. XR CHEST PORTABLE   Final Result   Endotracheal tube with its tip approximately 4.7 cm from the saba in   satisfactory position. Enteric tube in satisfactory position. Patchy bilateral airspace disease is again noted with improved aeration of   the left mid lung. XR CHEST PORTABLE   Final Result   Some improvement in the appearance of the chest with clearing of some of the   acute airspace disease from the mid left lung. Large amount of pneumonia   remains within the right lung and lower left lung. XR CHEST PORTABLE   Final Result   1. Endotracheal tube, nasogastric tube, and right PICC line placement. 2.  Increasing multifocal opacities with consolidative area in the right   perihilar lung. Could be multifocal pneumonia with or without edema. IR PICC WO SQ PORT/PUMP > 5 YEARS   Final Result   1. See above. CT CHEST PULMONARY EMBOLISM W CONTRAST   Final Result   No evidence of pulmonary embolism. Scattered peripheral opacities in the left lung and more consolidative area   in the right lung concerning for pneumonia. Previous right thoracotomy and   upper lobectomy. One mildly enlarged right paratracheal lymph node is present but no priors   for comparison. Nonenlarged but prominent lymph nodes in the upper abdomen   and juxta diaphragmatic region. XR CHEST PORTABLE   Final Result   Mild cardiomegaly without interstitial edema. Metallic surgical clips from prior right thoracotomy. COPD with anterior segment-right upper lobe alveolar pneumonia.       Old pleural thickening was noted along the right lateral chest wall. Pleural   thickening and or trace effusion blunts the right costophrenic angle. XR CHEST PORTABLE    (Results Pending)               Assessment/Plan:        #COVID-19 pneumonia  #Acute hypoxic respiratory failure   Pseudomonas HCAP. -Unvaccinated patient  - she presented with cough, fever, dyspnea in the setting of household contact testing + COVID 19  - Her Covid testing came back positive on admission  - Initially on 2 L of oxygen on presentation. worsening hypoxemia requiring high flow oxygen. And eventually intubated   - Intubated early AM  9/29/2021.  Extubated on 10/4/2021.  She was on Vapotherm.  Pulmonary status got worse and she was reintubated on 10/6/2021.   - s/p Remdesivir  - on Decadron, day # 26 - being tapered. On 1mg daily   - s/p  Tocilizumab  - Lovenox twice daily  - pulmonary managing. Plan need trach and PEG  On Monday     #Possible superimposed bacterial pneumonia  Gram-negative infection  -Was on Rocephin and Zithromax for 5 days ; - changed to zosyn on 10/2 with pseudomonas in  resp culture.  Pulmonary has given zyvox for 3 days   Completed Zosyn for 8 days  Respiratory cultures now growing Klebsiella, initiated on Cipro -completed 7 days.  Now off all abx     #Hypokalemia  - replaced as needed    #CAD  - cont ASA, statin  - EKG with inferior T wave abnormality.  She denied CP.  Trop neg   -telemetry monitoring     #History of tracheobronchomalacia  S/p tracheoplasty     #HTN  - Improved hypotension and off levophed  Resumed home medications-Norvasc and losartan.     #Chronic pain   History of hip fracture  - cont home oxycodone BID and percocet   - cont gabapentin and requip     #DM2  - use ssi      #Hypothyroidism  - cont synthroid         DVT Prophylaxis: Lovenox   Diet: Diet NPO  ADULT TUBE FEEDING; Orogastric; Peptide Based; Continuous; 55; Yes; 5; Q 4 hours; 60; 30; Q 3 hours  Code Status: Full Code      Jose Stanley MD, 10/23/2021 5:23 PM

## 2021-10-23 NOTE — PROGRESS NOTES
10/22/21 2323   Vent Information   Vent Type 980   Vent Mode AC/PC   Vt Ordered 0 mL   Pressure Ordered 18   Rate Set 22 bmp   Peak Flow 0 L/min   Pressure Support 0 cmH20   FiO2  55 %   SpO2 92 %   SpO2/FiO2 ratio 167.27   Sensitivity 3   PEEP/CPAP 8   I Time/ I Time % 0 s   Humidification Source Heated wire   Humidification Temp Measured 37   Circuit Condensation Drained   Vent Patient Data   High Peep/I Pressure 18   Peak Inspiratory Pressure 27 cmH2O   Mean Airway Pressure 16 cmH20   Rate Measured 24 br/min   Vt Exhaled 351 mL   Minute Volume 8.91 Liters   I:E Ratio 1:2.00   Cough/Sputum   Sputum How Obtained Endotracheal   Cough Non-productive   Sputum Amount None   Spontaneous Breathing Trial (SBT) RT Doc   Pulse 73   Breath Sounds   Right Upper Lobe Diminished   Right Middle Lobe Diminished   Right Lower Lobe Diminished   Left Upper Lobe Diminished   Left Lower Lobe Diminished   Additional Respiratory  Assessments   Resp 19   Alarm Settings   High Pressure Alarm 40 cmH2O   Low Minute Volume Alarm 4 L/min   Apnea (secs) 20 secs   High Respiratory Rate 40 br/min   Low Exhaled Vt  250 mL   Patient Observation   Observations 7.5 ett 25 at lip

## 2021-10-23 NOTE — PROGRESS NOTES
Pulmonary & Critical Care Medicine ICU Progress Note    CC: Respiratory failure    Events of Last 24 hours:   Using pressure control 36/18 to improve dyssynchrony  Fent 150  Propofol 40  Versed 3    Vascular lines: IV: PICC line 9/28    MV: 9/29-10/4, emergently re-intubated 10/6/21 for refractory hypoxemia  Vent Mode: AC/PC Rate Set: 22 bmp/Vt Ordered: 0 mL/ /FiO2 : 55 %  Recent Labs     10/22/21  0500 10/23/21  0555   PHART 7.492* 7.481*   WKS6WMB 48.7* 51.0*   PO2ART 51.2* 49.9*     IV:   midazolam 3 mg/hr (10/22/21 1413)    norepinephrine Stopped (10/18/21 0616)    fentaNYL 150 mcg/hr (10/23/21 5064)    propofol 40.031 mcg/kg/min (10/23/21 0536)    sodium chloride      dextrose      sodium chloride       Vitals:  Blood pressure 102/60, pulse 83, temperature 99.3 °F (37.4 °C), temperature source Axillary, resp. rate 23, height 5' 9\" (1.753 m), weight 175 lb 8 oz (79.6 kg), SpO2 90 %, not currently breastfeeding. on ventilator      Intake/Output Summary (Last 24 hours) at 10/23/2021 0759  Last data filed at 10/23/2021 0542  Gross per 24 hour   Intake 1657.22 ml   Output 1755 ml   Net -97.78 ml     General: intubated, ill appearing    ENT: Pharynx with ETT. Resp: No crackles. No wheezing. CV: S1, S2. Trace edema  GI: NT, ND, +BS  Skin: Warm and dry. Neuro: PERRL. Awake but not following commands.        Scheduled Meds:   dexamethasone  2 mg IntraVENous Q24H    insulin lispro  0-18 Units SubCUTAneous Q4H    sertraline  50 mg Per NG tube Daily    Venelex   Topical BID    miconazole   Topical BID    oxyCODONE  10 mg Oral Q6H    influenza virus vaccine  0.5 mL IntraMUSCular Prior to discharge    chlorhexidine  15 mL Mouth/Throat BID    ipratropium-albuterol  1 ampule Inhalation Q4H    pantoprazole  40 mg IntraVENous Daily    levothyroxine  100 mcg Oral Daily    enoxaparin  30 mg SubCUTAneous BID    lidocaine 1 % injection  5 mL IntraDERmal Once    sodium chloride flush  5-40 mL IntraVENous 2 times per day    aspirin  81 mg Oral Daily    gabapentin  800 mg Oral TID    atorvastatin  40 mg Oral Daily    sodium chloride flush  5-40 mL IntraVENous 2 times per day       Data:  CBC:   Recent Labs     10/21/21  0430 10/22/21  0455 10/23/21  0555   WBC 6.2 7.8 7.7   HGB 9.4* 9.5* 8.9*   HCT 29.8* 30.3* 27.9*   MCV 79.6* 78.7* 79.6*   PLT 83* 84* 95*     BMP:   Recent Labs     10/21/21  0430 10/22/21  0455 10/23/21  0555    143 142   K 3.7 3.5 3.5   CL 99 100 99   CO2 38* 36* 35*   PHOS  --   --  3.8   BUN 18 11 13   CREATININE <0.5* <0.5* <0.5*     LIVER PROFILE:   Recent Labs     10/21/21  0430 10/22/21  0455 10/23/21  0555   AST 23 28 39*   ALT 19 20 27   BILITOT 0.3 0.4 0.3   ALKPHOS 121 123 133*       Microbiology:  9/27/21 COVID-19 detected  9/30/21 Resp Pseudomonas fluorescens   10/7/2021 tracheal aspirate: Klebsiella intermediate to zosyn    Imaging:  Chest x-ray 10/22/2021   Persistent multifocal bilateral pulmonary opacity, slightly improved at the   right base as compared to prior. CTPA 9/27/21  No evidence of pulmonary embolism. Scattered peripheral opacities in the left lung and more consolidative area in the right lung concerning for pneumonia.  Previous right thoracotomy and upper lobectomy. One mildly enlarged right paratracheal lymph node is present but no priors for comparison.  Nonenlarged but prominent lymph nodes in the upper abdomen and juxta diaphragmatic region      ASSESSMENT:  · Acute hypoxemic respiratory failure   · COVID-19 viral pneumonia  · ARDS  · Pseudomonas followed by Klebsiella pneumonias  · Acute kidney injury  · CAD  · Chronic pain syndrome - on chronic opiates and Neurontin   · Possible early dementia, being worked up outpatient   · H/O bronchiectasis and tracheobronchomalacia/EDAC s/p tracheoplasty in 2014   · H/O EDAC (tracheobronchomalacia) s/p tracheoplasty     PLAN:  COVID-19 isolation, droplet plus    Mechanical ventilation as per my orders. The ventilator was adjusted by me at the bedside for unstable, life threatening respiratory failure. Decrease Driving pressure to decrease TV   Proned 10/6/21-10/7/2; 10/11-10/12   Paralyzed 10/6/21 - 10/7/21  IV Propofol, Fentanyl & Versed for sedation, target RASS -1 to -2  Completed 7 days Cipro, 8 days Zosyn, 3 days Zyvox, prior to that 5 days of Ceftriaxone/Zithromax   Dexamethasone D#26, @ 1 mg  Completed Remdesivir & Tocilizumab  H-SSI    Continuing home oxycodone and neurontin    Lovenox for DVT prophylaxis   D/w family today  If tracheostomy becomes necessary, patient will require ENT or  transfer to  as she has a history of tracheoplasty and is followed by their airway clinic  Total critical care time caring for this patient with life threatening, unstable organ failure, including direct patient contact, management of life support systems, review of data including imaging and labs, discussions with other team members and physicians is 31  minutes, excluding procedures.

## 2021-10-23 NOTE — PROGRESS NOTES
RT Inhaler-Nebulizer Bronchodilator Protocol Note    There is a bronchodilator order in the chart from a provider indicating to follow the RT Bronchodilator Protocol and there is an Initiate RT Inhaler-Nebulizer Bronchodilator Protocol order as well (see protocol at bottom of note). CXR Findings:  XR CHEST PORTABLE    Result Date: 10/23/2021  Supportive tubing projects in normal position. Bilateral interstitial-alveolar opacities, likely combination of pulmonary edema pneumonia. Although they appear more prominent this may be secondary to the rotation on the present study. Continued surveillance is recommended. Suspected small right pleural effusion. XR CHEST PORTABLE    Result Date: 10/22/2021  Persistent multifocal bilateral pulmonary opacity, slightly improved at the right base as compared to prior. The findings from the last RT Protocol Assessment were as follows:   History Pulmonary Disease: Chronic pulmonary disease  Respiratory Pattern: Mild dyspnea at rest, irregular pattern, or RR 21-25 bpm  Breath Sounds: Slightly diminished and/or crackles  Cough: Weak, productive  Indication for Bronchodilator Therapy: Decreased or absent breath sounds  Bronchodilator Assessment Score: 10    Aerosolized bronchodilator medication orders have been revised according to the RT Inhaler-Nebulizer Bronchodilator Protocol below. Respiratory Therapist to perform RT Therapy Protocol Assessment initially then follow the protocol. Repeat RT Therapy Protocol Assessment PRN for score 0-3 or on second treatment, BID, and PRN for scores above 3. No Indications - adjust the frequency to every 6 hours PRN wheezing or bronchospasm, if no treatments needed after 48 hours then discontinue using Per Protocol order mode. If indication present, adjust the RT bronchodilator orders based on the Bronchodilator Assessment Score as indicated below.   Use Inhaler orders unless patient has one or more of the following: on home nebulizer, not able to hold breath for 10 seconds, is not alert and oriented, cannot activate and use MDI correctly, or respiratory rate 25 breaths per minute or more, then use the equivalent nebulizer order(s) with same Frequency and PRN reasons based on the score. If a patient is on this medication at home then do not decrease Frequency below that used at home. 0-3 - enter or revise RT bronchodilator order(s) to equivalent RT Bronchodilator order with Frequency of every 4 hours PRN for wheezing or increased work of breathing using Per Protocol order mode. 4-6 - enter or revise RT Bronchodilator order(s) to two equivalent RT bronchodilator orders with one order with BID Frequency and one order with Frequency of every 4 hours PRN wheezing or increased work of breathing using Per Protocol order mode. 7-10 - enter or revise RT Bronchodilator order(s) to two equivalent RT bronchodilator orders with one order with TID Frequency and one order with Frequency of every 4 hours PRN wheezing or increased work of breathing using Per Protocol order mode. 11-13 - enter or revise RT Bronchodilator order(s) to one equivalent RT bronchodilator order with QID Frequency and an Albuterol order with Frequency of every 4 hours PRN wheezing or increased work of breathing using Per Protocol order mode. Greater than 13 - enter or revise RT Bronchodilator order(s) to one equivalent RT bronchodilator order with every 4 hours Frequency and an Albuterol order with Frequency of every 2 hours PRN wheezing or increased work of breathing using Per Protocol order mode. RT to enter RT Home Evaluation for COPD & MDI Assessment order using Per Protocol order mode.     Electronically signed by Jayme Howard RCP on 10/23/2021 at 5:57 PM

## 2021-10-23 NOTE — PROGRESS NOTES
10/23/21 1933   Vent Information   Vent Type 980   Vent Mode AC/VC   Vt Ordered 0 mL   Rate Set 20 bmp   Peak Flow 0 L/min   Pressure Support 0 cmH20   FiO2  55 %   SpO2 90 %   SpO2/FiO2 ratio 163.64   Sensitivity 3   PEEP/CPAP 8   I Time/ I Time % 0 s   Humidification Source Heated wire   Humidification Temp 37   Humidification Temp Measured 37   Circuit Condensation Drained   Vent Patient Data   High Peep/I Pressure 16   Peak Inspiratory Pressure 26 cmH2O   Mean Airway Pressure 15 cmH20   Rate Measured 23 br/min   Vt Exhaled 282 mL   Minute Volume 6.14 Liters   I:E Ratio 1:2.30   Plateau Pressure 22 ADE49   Static Compliance 20 mL/cmH2O   Dynamic Compliance 16 mL/cmH2O   Cough/Sputum   Sputum How Obtained Endotracheal;Suctioned   Cough Non-productive   Sputum Amount None   Spontaneous Breathing Trial (SBT) RT Doc   Pulse 82   Breath Sounds   Right Upper Lobe Diminished   Right Middle Lobe Diminished   Right Lower Lobe Diminished   Left Upper Lobe Diminished   Left Lower Lobe Diminished   Additional Respiratory  Assessments   Resp 17   Position Semi-Jesus's   Alarm Settings   High Pressure Alarm 40 cmH2O   Low Minute Volume Alarm 4 L/min   Apnea (secs) 20 secs   High Respiratory Rate 40 br/min   Low Exhaled Vt  250 mL   ETT (adult)   Placement Date/Time: 10/06/21 1208   Timeout: Patient  Preoxygenation: Yes  Technique: Rapid sequence  Type: Cuffed  Tube Size: 7.5 mm  Laryngoscope: GlideScope  Secured at: 23 cm  Measured From: Lips   Secured at 25 cm   Measured From Lips   ET Placement Right   Secured By Commercial tube carranza   Site Condition Dry

## 2021-10-23 NOTE — PROGRESS NOTES
High risk vesicant drug infusing:  __________    Multiple incompatible medications infusing:  _________    CVP Monitoring:  _________    Extremely difficult IV access challenge:  ___x_____    Continued need for central line access:  ______x____    Addressed with physician:  _x_______    RIGHT PATIENT, RIGHT TIME, RIGHT LINE

## 2021-10-24 ENCOUNTER — APPOINTMENT (OUTPATIENT)
Dept: GENERAL RADIOLOGY | Age: 66
DRG: 004 | End: 2021-10-24
Payer: MEDICARE

## 2021-10-24 LAB
A/G RATIO: 0.9 (ref 1.1–2.2)
ALBUMIN SERPL-MCNC: 3.2 G/DL (ref 3.4–5)
ALP BLD-CCNC: 159 U/L (ref 40–129)
ALT SERPL-CCNC: 32 U/L (ref 10–40)
ANION GAP SERPL CALCULATED.3IONS-SCNC: 11 MMOL/L (ref 3–16)
AST SERPL-CCNC: 44 U/L (ref 15–37)
BASE EXCESS ARTERIAL: 11.4 MMOL/L (ref -3–3)
BASOPHILS ABSOLUTE: 0.1 K/UL (ref 0–0.2)
BASOPHILS RELATIVE PERCENT: 1 %
BILIRUB SERPL-MCNC: 0.5 MG/DL (ref 0–1)
BUN BLDV-MCNC: 13 MG/DL (ref 7–20)
CALCIUM SERPL-MCNC: 9.3 MG/DL (ref 8.3–10.6)
CARBOXYHEMOGLOBIN ARTERIAL: 0.9 % (ref 0–1.5)
CHLORIDE BLD-SCNC: 98 MMOL/L (ref 99–110)
CO2: 32 MMOL/L (ref 21–32)
CREAT SERPL-MCNC: <0.5 MG/DL (ref 0.6–1.2)
EOSINOPHILS ABSOLUTE: 0.4 K/UL (ref 0–0.6)
EOSINOPHILS RELATIVE PERCENT: 4 %
GFR AFRICAN AMERICAN: >60
GFR NON-AFRICAN AMERICAN: >60
GLOBULIN: 3.4 G/DL
GLUCOSE BLD-MCNC: 135 MG/DL (ref 70–99)
GLUCOSE BLD-MCNC: 152 MG/DL (ref 70–99)
GLUCOSE BLD-MCNC: 195 MG/DL (ref 70–99)
GLUCOSE BLD-MCNC: 210 MG/DL (ref 70–99)
GLUCOSE BLD-MCNC: 217 MG/DL (ref 70–99)
GLUCOSE BLD-MCNC: 224 MG/DL (ref 70–99)
GLUCOSE BLD-MCNC: 227 MG/DL (ref 70–99)
HCO3 ARTERIAL: 37.7 MMOL/L (ref 21–29)
HCT VFR BLD CALC: 30.6 % (ref 36–48)
HEMOGLOBIN, ART, EXTENDED: 10.8 G/DL (ref 12–16)
HEMOGLOBIN: 9.7 G/DL (ref 12–16)
LYMPHOCYTES ABSOLUTE: 1.2 K/UL (ref 1–5.1)
LYMPHOCYTES RELATIVE PERCENT: 12.9 %
MAGNESIUM: 1.4 MG/DL (ref 1.8–2.4)
MCH RBC QN AUTO: 25.1 PG (ref 26–34)
MCHC RBC AUTO-ENTMCNC: 31.7 G/DL (ref 31–36)
MCV RBC AUTO: 79.4 FL (ref 80–100)
METHEMOGLOBIN ARTERIAL: 0.3 %
MONOCYTES ABSOLUTE: 0.6 K/UL (ref 0–1.3)
MONOCYTES RELATIVE PERCENT: 6.7 %
NEUTROPHILS ABSOLUTE: 7.1 K/UL (ref 1.7–7.7)
NEUTROPHILS RELATIVE PERCENT: 75.4 %
O2 SAT, ARTERIAL: 84.3 %
O2 THERAPY: ABNORMAL
PCO2 ARTERIAL: 58.7 MMHG (ref 35–45)
PDW BLD-RTO: 19.9 % (ref 12.4–15.4)
PERFORMED ON: ABNORMAL
PH ARTERIAL: 7.42 (ref 7.35–7.45)
PLATELET # BLD: 111 K/UL (ref 135–450)
PMV BLD AUTO: 9.6 FL (ref 5–10.5)
PO2 ARTERIAL: 48.8 MMHG (ref 75–108)
POTASSIUM REFLEX MAGNESIUM: 3.5 MMOL/L (ref 3.5–5.1)
RBC # BLD: 3.86 M/UL (ref 4–5.2)
SODIUM BLD-SCNC: 141 MMOL/L (ref 136–145)
TCO2 ARTERIAL: 39.5 MMOL/L
TOTAL PROTEIN: 6.6 G/DL (ref 6.4–8.2)
WBC # BLD: 9.4 K/UL (ref 4–11)

## 2021-10-24 PROCEDURE — 6360000002 HC RX W HCPCS: Performed by: INTERNAL MEDICINE

## 2021-10-24 PROCEDURE — 2000000000 HC ICU R&B

## 2021-10-24 PROCEDURE — 6370000000 HC RX 637 (ALT 250 FOR IP): Performed by: INTERNAL MEDICINE

## 2021-10-24 PROCEDURE — 83735 ASSAY OF MAGNESIUM: CPT

## 2021-10-24 PROCEDURE — 94640 AIRWAY INHALATION TREATMENT: CPT

## 2021-10-24 PROCEDURE — 94761 N-INVAS EAR/PLS OXIMETRY MLT: CPT

## 2021-10-24 PROCEDURE — 2580000003 HC RX 258: Performed by: INTERNAL MEDICINE

## 2021-10-24 PROCEDURE — 99233 SBSQ HOSP IP/OBS HIGH 50: CPT | Performed by: INTERNAL MEDICINE

## 2021-10-24 PROCEDURE — 2500000003 HC RX 250 WO HCPCS: Performed by: INTERNAL MEDICINE

## 2021-10-24 PROCEDURE — C9113 INJ PANTOPRAZOLE SODIUM, VIA: HCPCS | Performed by: INTERNAL MEDICINE

## 2021-10-24 PROCEDURE — 2700000000 HC OXYGEN THERAPY PER DAY

## 2021-10-24 PROCEDURE — 82803 BLOOD GASES ANY COMBINATION: CPT

## 2021-10-24 PROCEDURE — 85025 COMPLETE CBC W/AUTO DIFF WBC: CPT

## 2021-10-24 PROCEDURE — 94003 VENT MGMT INPAT SUBQ DAY: CPT

## 2021-10-24 PROCEDURE — 71045 X-RAY EXAM CHEST 1 VIEW: CPT

## 2021-10-24 PROCEDURE — 80053 COMPREHEN METABOLIC PANEL: CPT

## 2021-10-24 PROCEDURE — 99291 CRITICAL CARE FIRST HOUR: CPT | Performed by: INTERNAL MEDICINE

## 2021-10-24 RX ORDER — FUROSEMIDE 10 MG/ML
20 INJECTION INTRAMUSCULAR; INTRAVENOUS ONCE
Status: COMPLETED | OUTPATIENT
Start: 2021-10-24 | End: 2021-10-24

## 2021-10-24 RX ADMIN — DEXAMETHASONE SODIUM PHOSPHATE 1 MG: 4 INJECTION, SOLUTION INTRAMUSCULAR; INTRAVENOUS at 14:01

## 2021-10-24 RX ADMIN — MICONAZOLE NITRATE: 2 POWDER TOPICAL at 07:27

## 2021-10-24 RX ADMIN — FUROSEMIDE 20 MG: 10 INJECTION, SOLUTION INTRAMUSCULAR; INTRAVENOUS at 14:01

## 2021-10-24 RX ADMIN — GABAPENTIN 800 MG: 400 CAPSULE ORAL at 14:01

## 2021-10-24 RX ADMIN — MAGNESIUM SULFATE HEPTAHYDRATE 1000 MG: 1 INJECTION, SOLUTION INTRAVENOUS at 07:39

## 2021-10-24 RX ADMIN — IPRATROPIUM BROMIDE AND ALBUTEROL SULFATE 1 AMPULE: .5; 2.5 SOLUTION RESPIRATORY (INHALATION) at 07:43

## 2021-10-24 RX ADMIN — MICONAZOLE NITRATE: 2 POWDER TOPICAL at 20:19

## 2021-10-24 RX ADMIN — SERTRALINE HYDROCHLORIDE 50 MG: 50 TABLET ORAL at 07:26

## 2021-10-24 RX ADMIN — Medication 175 MCG/HR: at 06:46

## 2021-10-24 RX ADMIN — POLYETHYLENE GLYCOL (3350) 17 G: 17 POWDER, FOR SOLUTION ORAL at 07:35

## 2021-10-24 RX ADMIN — SODIUM CHLORIDE, PRESERVATIVE FREE 10 ML: 5 INJECTION INTRAVENOUS at 07:31

## 2021-10-24 RX ADMIN — INSULIN LISPRO 6 UNITS: 100 INJECTION, SOLUTION INTRAVENOUS; SUBCUTANEOUS at 20:16

## 2021-10-24 RX ADMIN — OXYCODONE 10 MG: 5 TABLET ORAL at 22:41

## 2021-10-24 RX ADMIN — Medication 200 MCG/HR: at 23:48

## 2021-10-24 RX ADMIN — CHLORHEXIDINE GLUCONATE 0.12% ORAL RINSE 15 ML: 1.2 LIQUID ORAL at 07:26

## 2021-10-24 RX ADMIN — SODIUM CHLORIDE, PRESERVATIVE FREE 10 ML: 5 INJECTION INTRAVENOUS at 20:20

## 2021-10-24 RX ADMIN — LEVOTHYROXINE SODIUM 100 MCG: 100 TABLET ORAL at 05:38

## 2021-10-24 RX ADMIN — INSULIN LISPRO 3 UNITS: 100 INJECTION, SOLUTION INTRAVENOUS; SUBCUTANEOUS at 08:00

## 2021-10-24 RX ADMIN — GABAPENTIN 800 MG: 400 CAPSULE ORAL at 07:26

## 2021-10-24 RX ADMIN — ATORVASTATIN CALCIUM 40 MG: 40 TABLET, FILM COATED ORAL at 07:26

## 2021-10-24 RX ADMIN — ENOXAPARIN SODIUM 30 MG: 30 INJECTION SUBCUTANEOUS at 07:26

## 2021-10-24 RX ADMIN — IPRATROPIUM BROMIDE AND ALBUTEROL SULFATE 1 AMPULE: .5; 2.5 SOLUTION RESPIRATORY (INHALATION) at 23:03

## 2021-10-24 RX ADMIN — SODIUM CHLORIDE, PRESERVATIVE FREE 10 ML: 5 INJECTION INTRAVENOUS at 20:21

## 2021-10-24 RX ADMIN — CASTOR OIL AND BALSAM, PERU: 788; 87 OINTMENT TOPICAL at 07:27

## 2021-10-24 RX ADMIN — ASPIRIN 81 MG: 81 TABLET, CHEWABLE ORAL at 07:26

## 2021-10-24 RX ADMIN — MAGNESIUM SULFATE HEPTAHYDRATE 1000 MG: 1 INJECTION, SOLUTION INTRAVENOUS at 08:57

## 2021-10-24 RX ADMIN — OXYCODONE 10 MG: 5 TABLET ORAL at 04:27

## 2021-10-24 RX ADMIN — INSULIN LISPRO 6 UNITS: 100 INJECTION, SOLUTION INTRAVENOUS; SUBCUTANEOUS at 14:03

## 2021-10-24 RX ADMIN — IPRATROPIUM BROMIDE AND ALBUTEROL SULFATE 1 AMPULE: .5; 2.5 SOLUTION RESPIRATORY (INHALATION) at 10:32

## 2021-10-24 RX ADMIN — Medication 175 MCG/HR: at 01:24

## 2021-10-24 RX ADMIN — OXYCODONE 10 MG: 5 TABLET ORAL at 15:58

## 2021-10-24 RX ADMIN — IPRATROPIUM BROMIDE AND ALBUTEROL SULFATE 1 AMPULE: .5; 2.5 SOLUTION RESPIRATORY (INHALATION) at 16:30

## 2021-10-24 RX ADMIN — IPRATROPIUM BROMIDE AND ALBUTEROL SULFATE 1 AMPULE: .5; 2.5 SOLUTION RESPIRATORY (INHALATION) at 19:19

## 2021-10-24 RX ADMIN — GABAPENTIN 800 MG: 400 CAPSULE ORAL at 20:19

## 2021-10-24 RX ADMIN — PROPOFOL 50 MCG/KG/MIN: 10 INJECTION, EMULSION INTRAVENOUS at 19:49

## 2021-10-24 RX ADMIN — MIDAZOLAM HYDROCHLORIDE 2 MG: 1 INJECTION, SOLUTION INTRAMUSCULAR; INTRAVENOUS at 16:14

## 2021-10-24 RX ADMIN — INSULIN LISPRO 3 UNITS: 100 INJECTION, SOLUTION INTRAVENOUS; SUBCUTANEOUS at 00:23

## 2021-10-24 RX ADMIN — PANTOPRAZOLE SODIUM 40 MG: 40 INJECTION, POWDER, FOR SOLUTION INTRAVENOUS at 07:26

## 2021-10-24 RX ADMIN — Medication 175 MCG/HR: at 12:56

## 2021-10-24 RX ADMIN — CHLORHEXIDINE GLUCONATE 0.12% ORAL RINSE 15 ML: 1.2 LIQUID ORAL at 20:20

## 2021-10-24 RX ADMIN — ENOXAPARIN SODIUM 30 MG: 30 INJECTION SUBCUTANEOUS at 20:18

## 2021-10-24 RX ADMIN — PROPOFOL 50 MCG/KG/MIN: 10 INJECTION, EMULSION INTRAVENOUS at 07:28

## 2021-10-24 RX ADMIN — Medication 200 MCG/HR: at 18:29

## 2021-10-24 RX ADMIN — MAGNESIUM SULFATE HEPTAHYDRATE 1000 MG: 1 INJECTION, SOLUTION INTRAVENOUS at 10:27

## 2021-10-24 RX ADMIN — CASTOR OIL AND BALSAM, PERU: 788; 87 OINTMENT TOPICAL at 20:19

## 2021-10-24 RX ADMIN — PROPOFOL 50 MCG/KG/MIN: 10 INJECTION, EMULSION INTRAVENOUS at 11:48

## 2021-10-24 RX ADMIN — PROPOFOL 40 MCG/KG/MIN: 10 INJECTION, EMULSION INTRAVENOUS at 02:43

## 2021-10-24 RX ADMIN — PROPOFOL 50 MCG/KG/MIN: 10 INJECTION, EMULSION INTRAVENOUS at 15:59

## 2021-10-24 RX ADMIN — INSULIN LISPRO 6 UNITS: 100 INJECTION, SOLUTION INTRAVENOUS; SUBCUTANEOUS at 16:14

## 2021-10-24 RX ADMIN — OXYCODONE 10 MG: 5 TABLET ORAL at 10:25

## 2021-10-24 RX ADMIN — POTASSIUM BICARBONATE 20 MEQ: 391 TABLET, EFFERVESCENT ORAL at 10:25

## 2021-10-24 RX ADMIN — PROPOFOL 50 MCG/KG/MIN: 10 INJECTION, EMULSION INTRAVENOUS at 23:51

## 2021-10-24 RX ADMIN — INSULIN LISPRO 6 UNITS: 100 INJECTION, SOLUTION INTRAVENOUS; SUBCUTANEOUS at 23:46

## 2021-10-24 RX ADMIN — IPRATROPIUM BROMIDE AND ALBUTEROL SULFATE 1 AMPULE: .5; 2.5 SOLUTION RESPIRATORY (INHALATION) at 03:18

## 2021-10-24 ASSESSMENT — PULMONARY FUNCTION TESTS
PIF_VALUE: 30
PIF_VALUE: 28
PIF_VALUE: 30
PIF_VALUE: 35
PIF_VALUE: 34
PIF_VALUE: 30
PIF_VALUE: 33
PIF_VALUE: 31
PIF_VALUE: 33
PIF_VALUE: 29
PIF_VALUE: 31
PIF_VALUE: 29
PIF_VALUE: 29
PIF_VALUE: 31

## 2021-10-24 ASSESSMENT — PAIN SCALES - GENERAL
PAINLEVEL_OUTOF10: 0

## 2021-10-24 NOTE — PROGRESS NOTES
10/24/21 0319   Vent Information   $Ventilation $Subsequent Day   Vent Type 980   Vent Mode AC/VC   Vt Ordered 0 mL   Pressure Ordered 20   Rate Set 20 bmp   Peak Flow 0 L/min   Pressure Support 0 cmH20   FiO2  55 %   SpO2 92 %   SpO2/FiO2 ratio 167.27   Sensitivity 3   PEEP/CPAP 8   I Time/ I Time % 0.9 s   Humidification Source Heated wire   Humidification Temp 37   Humidification Temp Measured 37   Circuit Condensation Drained   Vent Patient Data   High Peep/I Pressure 20   Peak Inspiratory Pressure 30 cmH2O   Mean Airway Pressure 16 cmH20   Rate Measured 22 br/min   Vt Exhaled 403 mL   Minute Volume 9.33 Liters   I:E Ratio 1:2.30   Plateau Pressure 22 [id number]   Cough/Sputum   Sputum How Obtained Endotracheal;Suctioned   Cough Productive   Sputum Amount Moderate   Sputum Color Creamy   Tenacity Thick   Spontaneous Breathing Trial (SBT) RT Doc   Pulse 98   Breath Sounds   Right Upper Lobe Diminished   Right Middle Lobe Diminished   Right Lower Lobe Diminished   Left Upper Lobe Diminished   Left Lower Lobe Diminished   Additional Respiratory  Assessments   Resp 19   Position Semi-Jesus's   Alarm Settings   High Pressure Alarm 40 cmH2O   Low Minute Volume Alarm 4 L/min   Apnea (secs) 20 secs   High Respiratory Rate 40 br/min   Low Exhaled Vt  250 mL   ETT (adult)   Placement Date/Time: 10/06/21 1208   Timeout: Patient  Preoxygenation: Yes  Technique: Rapid sequence  Type: Cuffed  Tube Size: 7.5 mm  Laryngoscope: GlideScope  Secured at: 23 cm  Measured From: Lips   Secured at 25 cm   Measured From Lips   ET Placement Right   Secured By Commercial tube carranza   Site Condition Dry

## 2021-10-24 NOTE — PROGRESS NOTES
Assessment unchanged. Pt o2 sat 88-90's on 55% fio2. Pt repositioned q2h- heels elevated in boots. TF at goal- minimal residuals. No new orders. Will continue to monitor.   Elliot Pollock RN, BSN

## 2021-10-24 NOTE — PROGRESS NOTES
Hospitalist Progress Note      PCP: Kory De La Torre MD    Date of Admission: 9/26/2021    Admitted with COVID-19 pneumonia,  acute hypoxic respiratory failure. Patient not vaccinated  Progressive significant worsening hypoxemia overnight. Patient was on 2 L of oxygen on admission-> switched to high flow oxygen per Vapotherm with additional 100% nonrebreather on 9/28/2021-> transferred to ICU. Intubated on 9/29/2021 for worsening hypoxemia. Required proning. Extubated on 10/4/2021. Reintubated on 10/6/2021 for worsening hypoxemia. 10/24  Remains on mechanical ventilation  She is with desaturations all day.   Improved now  FiO2 55% PEEP of 8  Planning for trach and PEG on Monday    Medications:  Reviewed    Infusion Medications    midazolam 3 mg/hr (10/24/21 1446)    norepinephrine Stopped (10/18/21 0616)    fentaNYL 200 mcg/hr (10/24/21 1447)    propofol 50 mcg/kg/min (10/24/21 6329)    sodium chloride      dextrose      sodium chloride       Scheduled Medications    potassium bicarb-citric acid  20 mEq Oral Daily    dexamethasone  1 mg IntraVENous Q24H    insulin lispro  0-18 Units SubCUTAneous Q4H    sertraline  50 mg Per NG tube Daily    Venelex   Topical BID    miconazole   Topical BID    oxyCODONE  10 mg Oral Q6H    influenza virus vaccine  0.5 mL IntraMUSCular Prior to discharge    chlorhexidine  15 mL Mouth/Throat BID    ipratropium-albuterol  1 ampule Inhalation Q4H    pantoprazole  40 mg IntraVENous Daily    levothyroxine  100 mcg Oral Daily    enoxaparin  30 mg SubCUTAneous BID    lidocaine 1 % injection  5 mL IntraDERmal Once    sodium chloride flush  5-40 mL IntraVENous 2 times per day    aspirin  81 mg Oral Daily    gabapentin  800 mg Oral TID    atorvastatin  40 mg Oral Daily    sodium chloride flush  5-40 mL IntraVENous 2 times per day     PRN Meds: magnesium sulfate, potassium chloride, labetalol, carboxymethylcellulose PF **AND** artificial tears, fentanNYL, midazolam, sodium chloride flush, sodium chloride, glucose, dextrose, glucagon (rDNA), dextrose, sodium chloride flush, sodium chloride, polyethylene glycol, acetaminophen **OR** acetaminophen, prochlorperazine      Intake/Output Summary (Last 24 hours) at 10/24/2021 1758  Last data filed at 10/24/2021 1600  Gross per 24 hour   Intake 2889.01 ml   Output 2545 ml   Net 344.01 ml       Physical Exam Performed:    BP (!) 108/51   Pulse 91   Temp 99.6 °F (37.6 °C) (Axillary)   Resp 22   Ht 5' 9\" (1.753 m)   Wt 175 lb 8 oz (79.6 kg)   SpO2 91%   BMI 25.92 kg/m²   Patient in droplet plus precautions  General:intubated on mechanical ventilation . Skin:  Warm and dry  Neck:  JVD absent. Neck supple  Chest: diminished breath sounds, improved air entry. No wheezes or rhonchi.  Bibasilar crackles. Cardiovascular:  RRR ,S1S2 normal  Abdomen:  Soft, non tender, non distended, BS +  Extremities:  No edema. Intact peripheral pulses. Brisk cap refill, < 2 secs  Neuro: intubated,sedated       Labs:   Recent Labs     10/22/21  0455 10/23/21  0555 10/24/21  0501   WBC 7.8 7.7 9.4   HGB 9.5* 8.9* 9.7*   HCT 30.3* 27.9* 30.6*   PLT 84* 95* 111*     Recent Labs     10/22/21  0455 10/23/21  0555 10/24/21  0501    142 141   K 3.5 3.5 3.5    99 98*   CO2 36* 35* 32   BUN 11 13 13   CREATININE <0.5* <0.5* <0.5*   CALCIUM 9.5 9.1 9.3   PHOS  --  3.8  --      Recent Labs     10/22/21  0455 10/23/21  0555 10/24/21  0501   AST 28 39* 44*   ALT 20 27 32   BILITOT 0.4 0.3 0.5   ALKPHOS 123 133* 159*     No results for input(s): INR in the last 72 hours. No results for input(s): Camila Highman in the last 72 hours.     Urinalysis:      Lab Results   Component Value Date    NITRU Negative 09/27/2021    WBCUA 0-2 09/27/2021    BACTERIA Rare 09/27/2021    RBCUA None seen 09/27/2021    BLOODU TRACE-INTACT 09/27/2021    SPECGRAV <=1.005 09/27/2021    GLUCOSEU >=1000 09/27/2021    GLUCOSEU 250 05/11/2011     Cultures  Blood- NGTD  Sputum- pseudomonas; Repeat resp Cx Klebsiella       Radiology:  XR CHEST PORTABLE   Final Result   No significant interval change. XR CHEST PORTABLE   Final Result   Supportive tubing projects in normal position. Bilateral interstitial-alveolar opacities, likely combination of pulmonary   edema pneumonia. Although they appear more prominent this may be secondary   to the rotation on the present study. Continued surveillance is recommended. Suspected small right pleural effusion. XR CHEST PORTABLE   Final Result   Persistent multifocal bilateral pulmonary opacity, slightly improved at the   right base as compared to prior. XR CHEST PORTABLE   Final Result   Multifocal airspace disease with right-sided pleural effusion, similar to   prior. No significant change         XR CHEST PORTABLE   Final Result   Stable support apparatus. Persistent multifocal airspace disease. No substantial change. XR CHEST PORTABLE   Final Result   Unchanged multifocal bilateral pneumonia. XR CHEST PORTABLE   Final Result   No significant change in the multifocal bilateral infiltrates         XR CHEST PORTABLE   Final Result   Diffuse bilateral airspace disease appears unchanged on the right and   increased on the left. XR CHEST PORTABLE   Final Result   Bilateral pleuroparenchymal disease, with improved aeration of the right lung   compared to prior. XR CHEST PORTABLE   Final Result   Line and tube as above. Bilateral interstitial opacities are worsened when compared to prior study. XR CHEST PORTABLE   Final Result   Stable chest.  Diffuse interstitial changes, possibly infiltrate or edema. Satisfactory position of endotracheal tube. XR CHEST PORTABLE   Final Result   Stable support apparatus.       In this case, both multifocal pneumonia and pulmonary edema are both   considered, and the changes appear increased when compared to the previous   exam.         XR CHEST PORTABLE   Final Result   1. Stable appropriate positions of support apparatus. 2. Slight interval progression of multifocal airspace disease throughout both   lungs, likely a combination of multifocal pneumonia and superimposed   pulmonary edema. 3. Stable cardiomegaly and small bilateral pleural effusions. XR CHEST PORTABLE   Final Result   Improving aeration of the right lung with grossly stable left basilar   airspace opacities. XR CHEST PORTABLE   Final Result   Supportive tubing is in stable position. Stable pattern of bilateral ground-glass opacities and basilar consolidations. XR CHEST PORTABLE   Final Result   Stable appearance of the chest.  Diffuse ground-glass opacities on the right   and mild left basilar opacities. XR CHEST PORTABLE   Final Result   Improved aeration on the left, possibly improving pulmonary edema. Persistent airspace disease in the right base may represent concurrent   pneumonia         XR ABDOMEN (KUB) (SINGLE AP VIEW)   Final Result   Enteric catheter tip likely in the distal gastric body. XR CHEST PORTABLE   Final Result   1. Endotracheal tube projects in the appropriate position. 2. Enteric tube extends below the diaphragm and out of the field of view. XR CHEST PORTABLE   Final Result   Increasing diffuse airspace opacification throughout the lungs. Pattern may   represent pulmonary edema or worsening pneumonitis. XR CHEST PORTABLE   Final Result   1. Interval increased bilateral pulmonary opacities with new consolidative   change at the right lung base. These findings could be secondary to   pulmonary edema or infection. 2. Consolidative change at the right lung base could also be secondary to   atelectasis/mucous plug.          XR CHEST PORTABLE   Final Result   No significant interval change in small right pleural effusion or bilateral   heterogeneous opacity which can edema.      Metallic surgical clips from prior right thoracotomy. COPD with anterior segment-right upper lobe alveolar pneumonia. Old pleural thickening was noted along the right lateral chest wall. Pleural   thickening and or trace effusion blunts the right costophrenic angle. XR CHEST PORTABLE    (Results Pending)               Assessment/Plan:        #COVID-19 pneumonia  #Acute hypoxic respiratory failure   Pseudomonas HCAP. -Unvaccinated patient  - she presented with cough, fever, dyspnea in the setting of household contact testing + COVID 19  - Her Covid testing came back positive on admission  - Initially on 2 L of oxygen on presentation. worsening hypoxemia requiring high flow oxygen. And eventually intubated   - Intubated early AM  9/29/2021.  Extubated on 10/4/2021.  She was on Vapotherm.  Pulmonary status got worse and she was reintubated on 10/6/2021. Continued on mechanical ventilation.  - s/p Remdesivir  - on Decadron, day # 27 - being tapered. On 1mg daily   - s/p  Tocilizumab  - Lovenox twice daily  - pulmonary managing. Plan need trach and PEG  On Monday     #Possible superimposed bacterial pneumonia  Gram-negative infection  -Was on Rocephin and Zithromax for 5 days ; - changed to zosyn on 10/2 with pseudomonas in  resp culture.  Pulmonary has given zyvox for 3 days   Completed Zosyn for 8 days  Respiratory cultures now growing Klebsiella, initiated on Cipro -completed 7 days.  Now off all abx     #Hypokalemia  #Hypomagnesemia  - replaced as needed    #CAD  - cont ASA, statin  - EKG with inferior T wave abnormality.  She denied CP.  Trop neg   -telemetry monitoring     #History of tracheobronchomalacia  S/p tracheoplasty     #HTN  - Improved hypotension and off levophed  Resumed home medications-Norvasc and losartan.     #Chronic pain   History of hip fracture  - cont home oxycodone BID and percocet   - cont gabapentin and requip     #DM2  - use ssi      #Hypothyroidism  - cont synthroid         DVT Prophylaxis: Lovenox   Diet: Diet NPO  ADULT TUBE FEEDING; Orogastric; Peptide Based; Continuous; 55; Yes; 5; Q 4 hours; 60; 30; Q 3 hours  Code Status: Full Code      Lynn Mas MD, 10/24/2021 5:58 PM

## 2021-10-24 NOTE — PROGRESS NOTES
10/23/21 2256   Vent Information   Vent Type 980   Vent Mode AC/PC   Pressure Ordered 20   Rate Set 20 bmp   FiO2  55 %   SpO2 91 %   SpO2/FiO2 ratio 165.45   Sensitivity 3   PEEP/CPAP 8   I Time/ I Time % 0.9 s   Humidification Source Heated wire   Humidification Temp 37   Humidification Temp Measured 37   Circuit Condensation Drained   Vent Patient Data   Peak Inspiratory Pressure 29 cmH2O   Mean Airway Pressure 16 cmH20   Rate Measured 22 br/min   Vt Exhaled 433 mL   Minute Volume 7.35 Liters   I:E Ratio 1:2.3   Plateau Pressure 24 [id number]   Cough/Sputum   Sputum How Obtained Suctioned;Endotracheal   Cough Non-productive   Breath Sounds   Right Upper Lobe Diminished   Right Middle Lobe Diminished   Right Lower Lobe Diminished   Left Upper Lobe Diminished   Left Lower Lobe Diminished   Additional Respiratory  Assessments   Resp 24   Position Semi-Jesus's   Oral Care Completed? Yes   Oral Care Mouth suctioned   Subglottic Suction Done?  Yes   Alarm Settings   High Pressure Alarm 40 cmH2O   Low Minute Volume Alarm 4 L/min   Apnea (secs) 20 secs   High Respiratory Rate 40 br/min   Low Exhaled Vt  250 mL   ETT (adult)   Placement Date/Time: 10/06/21 1208   Timeout: Patient  Preoxygenation: Yes  Technique: Rapid sequence  Type: Cuffed  Tube Size: 7.5 mm  Laryngoscope: GlideScope  Secured at: 23 cm  Measured From: Lips   Secured at 25 cm   Measured From Lips   ET Placement Left   Secured By Commercial tube carranza   Site Condition Dry

## 2021-10-24 NOTE — PROGRESS NOTES
10/24/21 1919   Vent Information   Vent Type 980   Vent Mode AC/PC   Pressure Ordered 20   Rate Set 20 bmp   FiO2  70 %   SpO2 91 %   SpO2/FiO2 ratio 130   Sensitivity 3   PEEP/CPAP 8   I Time/ I Time % 1 s   Humidification Source Heated wire   Humidification Temp 37   Humidification Temp Measured 37   Circuit Condensation Drained   Vent Patient Data   Peak Inspiratory Pressure 29 cmH2O   Mean Airway Pressure 16 cmH20   Rate Measured 21 br/min   Vt Exhaled 346 mL   Minute Volume 8.82 Liters   I:E Ratio 1:2   Plateau Pressure 28 [id number]   Static Compliance 17 mL/cmH2O   Dynamic Compliance 17 mL/cmH2O   Cough/Sputum   Sputum How Obtained Suctioned;Endotracheal   Cough Non-productive   Breath Sounds   Right Upper Lobe Diminished   Right Middle Lobe Diminished   Right Lower Lobe Diminished   Left Upper Lobe Diminished   Left Lower Lobe Diminished   Additional Respiratory  Assessments   Resp 21   Position Semi-Jesus's   Oral Care Completed?  Yes   Oral Care Mouth suctioned   Alarm Settings   High Pressure Alarm 40 cmH2O   Low Minute Volume Alarm 4 L/min   Apnea (secs) 20 secs   High Respiratory Rate 40 br/min   Low Exhaled Vt  250 mL   ETT (adult)   Placement Date/Time: 10/06/21 1208   Timeout: Patient  Preoxygenation: Yes  Technique: Rapid sequence  Type: Cuffed  Tube Size: 7.5 mm  Laryngoscope: GlideScope  Secured at: 23 cm  Measured From: Lips   Secured at 24 cm   Measured From Lips   ET Placement Left   Secured By Commercial tube carranza   Site Condition Dry

## 2021-10-24 NOTE — PLAN OF CARE
Free from any falls or injury. Continue on tube feeding with water bolus, tolerating well without N/V/D. Turned and repositioned q 2 hrs for confort and mobility and to prevent further skin breakdown. Sedated, on mechanical ventilation. SPO2 maintained in the 90's. Planned surgery on Monday for PEG and trache. Continue with the current plan of care.

## 2021-10-24 NOTE — PROGRESS NOTES
AM assessment completed. AM labs reviewed- magnesium& potassium replaced. Gray in place for strict I/o. PICC wnl.    Rick Tovar RN, BSN

## 2021-10-24 NOTE — PROGRESS NOTES
Pulmonary & Critical Care Medicine ICU Progress Note    CC: Respiratory failure    Events of Last 24 hours:   Using pressure control 36/18 to improve dyssynchrony  Fent 175  Propofol 50  Versed 2    Vascular lines: IV: PICC line 9/28    MV: 9/29-10/4, emergently re-intubated 10/6/21 for refractory hypoxemia  Vent Mode: AC/VC Rate Set: 20 bmp/Vt Ordered: 0 mL/ /FiO2 : 55 %  Recent Labs     10/23/21  0555 10/24/21  0501   PHART 7.481* 7.425   [id number] 51.0* 58.7*   PO2ART 49.9* 48.8*     IV:   midazolam 2 mg/hr (10/23/21 2317)    norepinephrine Stopped (10/18/21 0616)    fentaNYL 175 mcg/hr (10/24/21 0646)    propofol 50 mcg/kg/min (10/24/21 0728)    sodium chloride      dextrose      sodium chloride       Vitals:  Blood pressure (!) 140/56, pulse 99, temperature 98.4 °F (36.9 °C), temperature source Axillary, resp. rate 17, height 5' 9\" (1.753 m), weight 175 lb 8 oz (79.6 kg), SpO2 (!) 88 %, not currently breastfeeding. on ventilator      Intake/Output Summary (Last 24 hours) at 10/24/2021 0755  Last data filed at 10/24/2021 0530  Gross per 24 hour   Intake 2296.01 ml   Output 1700 ml   Net 596.01 ml     General: intubated, ill appearing    ENT: Pharynx with ETT. Resp: No crackles. No wheezing. CV: S1, S2. Trace edema  GI: NT, ND, +BS  Skin: Warm and dry. Neuro: PERRL. Awake but not following commands.        Scheduled Meds:   dexamethasone  1 mg IntraVENous Q24H    insulin lispro  0-18 Units SubCUTAneous Q4H    sertraline  50 mg Per NG tube Daily    Venelex   Topical BID    miconazole   Topical BID    oxyCODONE  10 mg Oral Q6H    influenza virus vaccine  0.5 mL IntraMUSCular Prior to discharge    chlorhexidine  15 mL Mouth/Throat BID    ipratropium-albuterol  1 ampule Inhalation Q4H    pantoprazole  40 mg IntraVENous Daily    levothyroxine  100 mcg Oral Daily    enoxaparin  30 mg SubCUTAneous BID    lidocaine 1 % injection  5 mL IntraDERmal Once    sodium chloride flush  5-40 mL IntraVENous 2 times per day    aspirin  81 mg Oral Daily    gabapentin  800 mg Oral TID    atorvastatin  40 mg Oral Daily    sodium chloride flush  5-40 mL IntraVENous 2 times per day       Data:  CBC:   Recent Labs     10/22/21  0455 10/23/21  0555 10/24/21  0501   WBC 7.8 7.7 9.4   HGB 9.5* 8.9* 9.7*   HCT 30.3* 27.9* 30.6*   MCV 78.7* 79.6* 79.4*   PLT 84* 95* 111*     BMP:   Recent Labs     10/22/21  0455 10/23/21  0555 10/24/21  0501    142 141   K 3.5 3.5 3.5    99 98*   CO2 36* 35* 32   PHOS  --  3.8  --    BUN 11 13 13   CREATININE <0.5* <0.5* <0.5*     LIVER PROFILE:   Recent Labs     10/22/21  0455 10/23/21  0555 10/24/21  0501   AST 28 39* 44*   ALT 20 27 32   BILITOT 0.4 0.3 0.5   ALKPHOS 123 133* 159*       Microbiology:  9/27/21 COVID-19 detected  9/30/21 Resp Pseudomonas fluorescens   10/7/2021 tracheal aspirate: Klebsiella intermediate to zosyn    Imaging:  Chest x-ray 10/23/2021 stable bilt asd    CTPA 9/27/21  No evidence of pulmonary embolism. Scattered peripheral opacities in the left lung and more consolidative area in the right lung concerning for pneumonia.  Previous right thoracotomy and upper lobectomy. One mildly enlarged right paratracheal lymph node is present but no priors for comparison.  Nonenlarged but prominent lymph nodes in the upper abdomen and juxta diaphragmatic region      ASSESSMENT:  · Acute hypoxemic respiratory failure   · COVID-19 viral pneumonia  · ARDS  · Pseudomonas followed by Klebsiella pneumonias  · Acute kidney injury  · CAD  · Chronic pain syndrome - on chronic opiates and Neurontin   · Possible early dementia, being worked up outpatient   · H/O bronchiectasis and tracheobronchomalacia/EDAC s/p tracheoplasty in 2014   · H/O EDAC (tracheobronchomalacia) s/p tracheoplasty     PLAN:  COVID-19 isolation, droplet plus    Mechanical ventilation as per my orders.   The ventilator was adjusted by me at the bedside for unstable, life threatening respiratory failure. Proned 10/6/21-10/7/2; 10/11-10/12   Paralyzed 10/6/21 - 10/7/21  IV Propofol, Fentanyl & Versed for sedation, target RASS -1 to -2  Completed 7 days Cipro, 8 days Zosyn, 3 days Zyvox, prior to that 5 days of Ceftriaxone/Zithromax   Dexamethasone D#27, @ 1 mg  Completed Remdesivir & Tocilizumab  H-SSI    Continuing home oxycodone and neurontin    Lovenox for DVT prophylaxis   D/w family who desires to continue care with trach/PEG  GI planning PEG on monday  ENT planning trach on Monday. ENT reviewed CT and tracheoplasty was below level of where trach will be placed and will not interfere. Total critical care time caring for this patient with life threatening, unstable organ failure, including direct patient contact, management of life support systems, review of data including imaging and labs, discussions with other team members and physicians is 31 minutes, excluding procedures.

## 2021-10-24 NOTE — PROGRESS NOTES
RT Inhaler-Nebulizer Bronchodilator Protocol Note    There is a bronchodilator order in the chart from a provider indicating to follow the RT Bronchodilator Protocol and there is an Initiate RT Inhaler-Nebulizer Bronchodilator Protocol order as well (see protocol at bottom of note). CXR Findings:  XR CHEST PORTABLE    Result Date: 10/23/2021  Supportive tubing projects in normal position. Bilateral interstitial-alveolar opacities, likely combination of pulmonary edema pneumonia. Although they appear more prominent this may be secondary to the rotation on the present study. Continued surveillance is recommended. Suspected small right pleural effusion. XR CHEST PORTABLE    Result Date: 10/22/2021  Persistent multifocal bilateral pulmonary opacity, slightly improved at the right base as compared to prior. The findings from the last RT Protocol Assessment were as follows:   History Pulmonary Disease: Chronic pulmonary disease  Respiratory Pattern: Mild dyspnea at rest, irregular pattern, or RR 21-25 bpm  Breath Sounds: Slightly diminished and/or crackles  Cough: Weak, productive  Indication for Bronchodilator Therapy: Decreased or absent breath sounds  Bronchodilator Assessment Score: 10    Aerosolized bronchodilator medication orders have been revised according to the RT Inhaler-Nebulizer Bronchodilator Protocol below. Respiratory Therapist to perform RT Therapy Protocol Assessment initially then follow the protocol. Repeat RT Therapy Protocol Assessment PRN for score 0-3 or on second treatment, BID, and PRN for scores above 3. No Indications - adjust the frequency to every 6 hours PRN wheezing or bronchospasm, if no treatments needed after 48 hours then discontinue using Per Protocol order mode. If indication present, adjust the RT bronchodilator orders based on the Bronchodilator Assessment Score as indicated below.   Use Inhaler orders unless patient has one or more of the following: on home nebulizer, not able to hold breath for 10 seconds, is not alert and oriented, cannot activate and use MDI correctly, or respiratory rate 25 breaths per minute or more, then use the equivalent nebulizer order(s) with same Frequency and PRN reasons based on the score. If a patient is on this medication at home then do not decrease Frequency below that used at home. 0-3 - enter or revise RT bronchodilator order(s) to equivalent RT Bronchodilator order with Frequency of every 4 hours PRN for wheezing or increased work of breathing using Per Protocol order mode. 4-6 - enter or revise RT Bronchodilator order(s) to two equivalent RT bronchodilator orders with one order with BID Frequency and one order with Frequency of every 4 hours PRN wheezing or increased work of breathing using Per Protocol order mode. 7-10 - enter or revise RT Bronchodilator order(s) to two equivalent RT bronchodilator orders with one order with TID Frequency and one order with Frequency of every 4 hours PRN wheezing or increased work of breathing using Per Protocol order mode. 11-13 - enter or revise RT Bronchodilator order(s) to one equivalent RT bronchodilator order with QID Frequency and an Albuterol order with Frequency of every 4 hours PRN wheezing or increased work of breathing using Per Protocol order mode. Greater than 13 - enter or revise RT Bronchodilator order(s) to one equivalent RT bronchodilator order with every 4 hours Frequency and an Albuterol order with Frequency of every 2 hours PRN wheezing or increased work of breathing using Per Protocol order mode. RT to enter RT Home Evaluation for COPD & MDI Assessment order using Per Protocol order mode.     Electronically signed by Marichuy Finnegan RCP on 10/24/2021 at 3:39 AM

## 2021-10-25 ENCOUNTER — APPOINTMENT (OUTPATIENT)
Dept: GENERAL RADIOLOGY | Age: 66
DRG: 004 | End: 2021-10-25
Payer: MEDICARE

## 2021-10-25 ENCOUNTER — ANESTHESIA EVENT (OUTPATIENT)
Dept: ENDOSCOPY | Age: 66
DRG: 004 | End: 2021-10-25
Payer: MEDICARE

## 2021-10-25 ENCOUNTER — ANESTHESIA (OUTPATIENT)
Dept: ENDOSCOPY | Age: 66
DRG: 004 | End: 2021-10-25
Payer: MEDICARE

## 2021-10-25 VITALS — SYSTOLIC BLOOD PRESSURE: 131 MMHG | OXYGEN SATURATION: 88 % | DIASTOLIC BLOOD PRESSURE: 57 MMHG

## 2021-10-25 LAB
A/G RATIO: 0.9 (ref 1.1–2.2)
ALBUMIN SERPL-MCNC: 3.1 G/DL (ref 3.4–5)
ALP BLD-CCNC: 153 U/L (ref 40–129)
ALT SERPL-CCNC: 28 U/L (ref 10–40)
ANION GAP SERPL CALCULATED.3IONS-SCNC: 6 MMOL/L (ref 3–16)
AST SERPL-CCNC: 33 U/L (ref 15–37)
BASE EXCESS ARTERIAL: 8.7 MMOL/L (ref -3–3)
BASOPHILS ABSOLUTE: 0 K/UL (ref 0–0.2)
BASOPHILS RELATIVE PERCENT: 0.6 %
BILIRUB SERPL-MCNC: 0.6 MG/DL (ref 0–1)
BUN BLDV-MCNC: 19 MG/DL (ref 7–20)
CALCIUM SERPL-MCNC: 9.4 MG/DL (ref 8.3–10.6)
CARBOXYHEMOGLOBIN ARTERIAL: 0.7 % (ref 0–1.5)
CHLORIDE BLD-SCNC: 95 MMOL/L (ref 99–110)
CO2: 36 MMOL/L (ref 21–32)
CREAT SERPL-MCNC: <0.5 MG/DL (ref 0.6–1.2)
EOSINOPHILS ABSOLUTE: 0.4 K/UL (ref 0–0.6)
EOSINOPHILS RELATIVE PERCENT: 4.6 %
GFR AFRICAN AMERICAN: >60
GFR NON-AFRICAN AMERICAN: >60
GLOBULIN: 3.4 G/DL
GLUCOSE BLD-MCNC: 101 MG/DL (ref 70–99)
GLUCOSE BLD-MCNC: 108 MG/DL (ref 70–99)
GLUCOSE BLD-MCNC: 127 MG/DL (ref 70–99)
GLUCOSE BLD-MCNC: 146 MG/DL (ref 70–99)
GLUCOSE BLD-MCNC: 175 MG/DL (ref 70–99)
GLUCOSE BLD-MCNC: 251 MG/DL (ref 70–99)
HCO3 ARTERIAL: 35.4 MMOL/L (ref 21–29)
HCT VFR BLD CALC: 27 % (ref 36–48)
HEMOGLOBIN, ART, EXTENDED: 9.7 G/DL (ref 12–16)
HEMOGLOBIN: 8.6 G/DL (ref 12–16)
LYMPHOCYTES ABSOLUTE: 1.4 K/UL (ref 1–5.1)
LYMPHOCYTES RELATIVE PERCENT: 15.6 %
MAGNESIUM: 1.7 MG/DL (ref 1.8–2.4)
MCH RBC QN AUTO: 25.4 PG (ref 26–34)
MCHC RBC AUTO-ENTMCNC: 31.8 G/DL (ref 31–36)
MCV RBC AUTO: 79.9 FL (ref 80–100)
METHEMOGLOBIN ARTERIAL: 0.3 %
MONOCYTES ABSOLUTE: 0.8 K/UL (ref 0–1.3)
MONOCYTES RELATIVE PERCENT: 8.9 %
NEUTROPHILS ABSOLUTE: 6.1 K/UL (ref 1.7–7.7)
NEUTROPHILS RELATIVE PERCENT: 70.3 %
O2 SAT, ARTERIAL: 86.9 %
O2 THERAPY: ABNORMAL
PCO2 ARTERIAL: 62.2 MMHG (ref 35–45)
PDW BLD-RTO: 19.6 % (ref 12.4–15.4)
PERFORMED ON: ABNORMAL
PH ARTERIAL: 7.37 (ref 7.35–7.45)
PLATELET # BLD: 130 K/UL (ref 135–450)
PMV BLD AUTO: 10.1 FL (ref 5–10.5)
PO2 ARTERIAL: 55.1 MMHG (ref 75–108)
POTASSIUM REFLEX MAGNESIUM: 3.3 MMOL/L (ref 3.5–5.1)
RBC # BLD: 3.38 M/UL (ref 4–5.2)
SODIUM BLD-SCNC: 137 MMOL/L (ref 136–145)
TCO2 ARTERIAL: 37.3 MMOL/L
TOTAL PROTEIN: 6.5 G/DL (ref 6.4–8.2)
TRIGL SERPL-MCNC: 159 MG/DL (ref 0–150)
WBC # BLD: 8.7 K/UL (ref 4–11)

## 2021-10-25 PROCEDURE — 99291 CRITICAL CARE FIRST HOUR: CPT | Performed by: INTERNAL MEDICINE

## 2021-10-25 PROCEDURE — 80053 COMPREHEN METABOLIC PANEL: CPT

## 2021-10-25 PROCEDURE — 6370000000 HC RX 637 (ALT 250 FOR IP): Performed by: INTERNAL MEDICINE

## 2021-10-25 PROCEDURE — 6360000002 HC RX W HCPCS: Performed by: INTERNAL MEDICINE

## 2021-10-25 PROCEDURE — 2580000003 HC RX 258: Performed by: ANESTHESIOLOGY

## 2021-10-25 PROCEDURE — 2700000000 HC OXYGEN THERAPY PER DAY

## 2021-10-25 PROCEDURE — 0B110F4 BYPASS TRACHEA TO CUTANEOUS WITH TRACHEOSTOMY DEVICE, OPEN APPROACH: ICD-10-PCS | Performed by: STUDENT IN AN ORGANIZED HEALTH CARE EDUCATION/TRAINING PROGRAM

## 2021-10-25 PROCEDURE — 71045 X-RAY EXAM CHEST 1 VIEW: CPT

## 2021-10-25 PROCEDURE — 83735 ASSAY OF MAGNESIUM: CPT

## 2021-10-25 PROCEDURE — 2580000003 HC RX 258: Performed by: STUDENT IN AN ORGANIZED HEALTH CARE EDUCATION/TRAINING PROGRAM

## 2021-10-25 PROCEDURE — 2709999900 HC NON-CHARGEABLE SUPPLY: Performed by: INTERNAL MEDICINE

## 2021-10-25 PROCEDURE — 3600000013 HC SURGERY LEVEL 3 ADDTL 15MIN: Performed by: STUDENT IN AN ORGANIZED HEALTH CARE EDUCATION/TRAINING PROGRAM

## 2021-10-25 PROCEDURE — 3600000003 HC SURGERY LEVEL 3 BASE: Performed by: STUDENT IN AN ORGANIZED HEALTH CARE EDUCATION/TRAINING PROGRAM

## 2021-10-25 PROCEDURE — 0DH63UZ INSERTION OF FEEDING DEVICE INTO STOMACH, PERCUTANEOUS APPROACH: ICD-10-PCS | Performed by: INTERNAL MEDICINE

## 2021-10-25 PROCEDURE — 94761 N-INVAS EAR/PLS OXIMETRY MLT: CPT

## 2021-10-25 PROCEDURE — 84478 ASSAY OF TRIGLYCERIDES: CPT

## 2021-10-25 PROCEDURE — 94640 AIRWAY INHALATION TREATMENT: CPT

## 2021-10-25 PROCEDURE — 99231 SBSQ HOSP IP/OBS SF/LOW 25: CPT | Performed by: STUDENT IN AN ORGANIZED HEALTH CARE EDUCATION/TRAINING PROGRAM

## 2021-10-25 PROCEDURE — 2580000003 HC RX 258: Performed by: NURSE ANESTHETIST, CERTIFIED REGISTERED

## 2021-10-25 PROCEDURE — 3700000001 HC ADD 15 MINUTES (ANESTHESIA): Performed by: INTERNAL MEDICINE

## 2021-10-25 PROCEDURE — 7100000010 HC PHASE II RECOVERY - FIRST 15 MIN: Performed by: INTERNAL MEDICINE

## 2021-10-25 PROCEDURE — 3700000000 HC ANESTHESIA ATTENDED CARE: Performed by: STUDENT IN AN ORGANIZED HEALTH CARE EDUCATION/TRAINING PROGRAM

## 2021-10-25 PROCEDURE — 2580000003 HC RX 258: Performed by: INTERNAL MEDICINE

## 2021-10-25 PROCEDURE — 85025 COMPLETE CBC W/AUTO DIFF WBC: CPT

## 2021-10-25 PROCEDURE — C9113 INJ PANTOPRAZOLE SODIUM, VIA: HCPCS | Performed by: INTERNAL MEDICINE

## 2021-10-25 PROCEDURE — 82803 BLOOD GASES ANY COMBINATION: CPT

## 2021-10-25 PROCEDURE — 6360000002 HC RX W HCPCS: Performed by: ANESTHESIOLOGY

## 2021-10-25 PROCEDURE — 3609013300 HC EGD TUBE PLACEMENT: Performed by: INTERNAL MEDICINE

## 2021-10-25 PROCEDURE — 3700000000 HC ANESTHESIA ATTENDED CARE: Performed by: INTERNAL MEDICINE

## 2021-10-25 PROCEDURE — 2000000000 HC ICU R&B

## 2021-10-25 PROCEDURE — 94003 VENT MGMT INPAT SUBQ DAY: CPT

## 2021-10-25 PROCEDURE — 99233 SBSQ HOSP IP/OBS HIGH 50: CPT | Performed by: INTERNAL MEDICINE

## 2021-10-25 PROCEDURE — 3E0G76Z INTRODUCTION OF NUTRITIONAL SUBSTANCE INTO UPPER GI, VIA NATURAL OR ARTIFICIAL OPENING: ICD-10-PCS | Performed by: INTERNAL MEDICINE

## 2021-10-25 PROCEDURE — 3700000001 HC ADD 15 MINUTES (ANESTHESIA): Performed by: STUDENT IN AN ORGANIZED HEALTH CARE EDUCATION/TRAINING PROGRAM

## 2021-10-25 PROCEDURE — 2709999900 HC NON-CHARGEABLE SUPPLY: Performed by: STUDENT IN AN ORGANIZED HEALTH CARE EDUCATION/TRAINING PROGRAM

## 2021-10-25 PROCEDURE — 31600 PLANNED TRACHEOSTOMY: CPT | Performed by: STUDENT IN AN ORGANIZED HEALTH CARE EDUCATION/TRAINING PROGRAM

## 2021-10-25 PROCEDURE — 2500000003 HC RX 250 WO HCPCS: Performed by: INTERNAL MEDICINE

## 2021-10-25 PROCEDURE — 2500000003 HC RX 250 WO HCPCS: Performed by: NURSE ANESTHETIST, CERTIFIED REGISTERED

## 2021-10-25 RX ORDER — MAGNESIUM HYDROXIDE 1200 MG/15ML
LIQUID ORAL CONTINUOUS PRN
Status: COMPLETED | OUTPATIENT
Start: 2021-10-25 | End: 2021-10-25

## 2021-10-25 RX ORDER — DIAZEPAM 10 MG/1
10 TABLET ORAL EVERY 8 HOURS
Status: DISCONTINUED | OUTPATIENT
Start: 2021-10-25 | End: 2021-10-26

## 2021-10-25 RX ORDER — FUROSEMIDE 10 MG/ML
20 INJECTION INTRAMUSCULAR; INTRAVENOUS ONCE
Status: COMPLETED | OUTPATIENT
Start: 2021-10-25 | End: 2021-10-25

## 2021-10-25 RX ORDER — SODIUM CHLORIDE, SODIUM LACTATE, POTASSIUM CHLORIDE, CALCIUM CHLORIDE 600; 310; 30; 20 MG/100ML; MG/100ML; MG/100ML; MG/100ML
INJECTION, SOLUTION INTRAVENOUS CONTINUOUS PRN
Status: DISCONTINUED | OUTPATIENT
Start: 2021-10-25 | End: 2021-10-25 | Stop reason: SDUPTHER

## 2021-10-25 RX ORDER — CEFAZOLIN SODIUM 1 G/3ML
INJECTION, POWDER, FOR SOLUTION INTRAMUSCULAR; INTRAVENOUS
Status: DISPENSED
Start: 2021-10-25 | End: 2021-10-26

## 2021-10-25 RX ORDER — MAGNESIUM SULFATE IN WATER 40 MG/ML
2000 INJECTION, SOLUTION INTRAVENOUS ONCE
Status: COMPLETED | OUTPATIENT
Start: 2021-10-25 | End: 2021-10-25

## 2021-10-25 RX ORDER — ROCURONIUM BROMIDE 10 MG/ML
INJECTION, SOLUTION INTRAVENOUS PRN
Status: DISCONTINUED | OUTPATIENT
Start: 2021-10-25 | End: 2021-10-25 | Stop reason: SDUPTHER

## 2021-10-25 RX ADMIN — PANTOPRAZOLE SODIUM 40 MG: 40 INJECTION, POWDER, FOR SOLUTION INTRAVENOUS at 08:45

## 2021-10-25 RX ADMIN — Medication 200 MCG/HR: at 15:24

## 2021-10-25 RX ADMIN — DEXTROSE MONOHYDRATE 1000 MG: 5 INJECTION INTRAVENOUS at 20:34

## 2021-10-25 RX ADMIN — MAGNESIUM SULFATE HEPTAHYDRATE 2000 MG: 40 INJECTION, SOLUTION INTRAVENOUS at 12:08

## 2021-10-25 RX ADMIN — LEVOTHYROXINE SODIUM 100 MCG: 100 TABLET ORAL at 06:27

## 2021-10-25 RX ADMIN — FUROSEMIDE 20 MG: 10 INJECTION, SOLUTION INTRAMUSCULAR; INTRAVENOUS at 10:45

## 2021-10-25 RX ADMIN — Medication 200 MCG/HR: at 04:58

## 2021-10-25 RX ADMIN — CHLORHEXIDINE GLUCONATE 0.12% ORAL RINSE 15 ML: 1.2 LIQUID ORAL at 20:35

## 2021-10-25 RX ADMIN — IPRATROPIUM BROMIDE AND ALBUTEROL SULFATE 1 AMPULE: .5; 2.5 SOLUTION RESPIRATORY (INHALATION) at 10:52

## 2021-10-25 RX ADMIN — CHLORHEXIDINE GLUCONATE 0.12% ORAL RINSE 15 ML: 1.2 LIQUID ORAL at 08:45

## 2021-10-25 RX ADMIN — INSULIN LISPRO 3 UNITS: 100 INJECTION, SOLUTION INTRAVENOUS; SUBCUTANEOUS at 20:45

## 2021-10-25 RX ADMIN — CASTOR OIL AND BALSAM, PERU: 788; 87 OINTMENT TOPICAL at 08:45

## 2021-10-25 RX ADMIN — Medication 200 MCG/HR: at 10:49

## 2021-10-25 RX ADMIN — OXYCODONE 10 MG: 5 TABLET ORAL at 16:54

## 2021-10-25 RX ADMIN — ENOXAPARIN SODIUM 30 MG: 30 INJECTION SUBCUTANEOUS at 20:36

## 2021-10-25 RX ADMIN — IPRATROPIUM BROMIDE AND ALBUTEROL SULFATE 1 AMPULE: .5; 2.5 SOLUTION RESPIRATORY (INHALATION) at 03:03

## 2021-10-25 RX ADMIN — IPRATROPIUM BROMIDE AND ALBUTEROL SULFATE 1 AMPULE: .5; 2.5 SOLUTION RESPIRATORY (INHALATION) at 23:12

## 2021-10-25 RX ADMIN — SODIUM CHLORIDE, PRESERVATIVE FREE 10 ML: 5 INJECTION INTRAVENOUS at 08:46

## 2021-10-25 RX ADMIN — Medication 3 MG/HR: at 06:28

## 2021-10-25 RX ADMIN — PROPOFOL 30 MCG/KG/MIN: 10 INJECTION, EMULSION INTRAVENOUS at 21:31

## 2021-10-25 RX ADMIN — POTASSIUM CHLORIDE 20 MEQ: 400 INJECTION, SOLUTION INTRAVENOUS at 08:57

## 2021-10-25 RX ADMIN — IPRATROPIUM BROMIDE AND ALBUTEROL SULFATE 1 AMPULE: .5; 2.5 SOLUTION RESPIRATORY (INHALATION) at 19:20

## 2021-10-25 RX ADMIN — INSULIN LISPRO 9 UNITS: 100 INJECTION, SOLUTION INTRAVENOUS; SUBCUTANEOUS at 16:55

## 2021-10-25 RX ADMIN — PROPOFOL 50 MCG/KG/MIN: 10 INJECTION, EMULSION INTRAVENOUS at 09:56

## 2021-10-25 RX ADMIN — PROPOFOL 40 MCG/KG/MIN: 10 INJECTION, EMULSION INTRAVENOUS at 17:05

## 2021-10-25 RX ADMIN — ROCURONIUM BROMIDE 30 MG: 10 INJECTION, SOLUTION INTRAVENOUS at 13:40

## 2021-10-25 RX ADMIN — CASTOR OIL AND BALSAM, PERU: 788; 87 OINTMENT TOPICAL at 20:35

## 2021-10-25 RX ADMIN — GABAPENTIN 800 MG: 400 CAPSULE ORAL at 20:36

## 2021-10-25 RX ADMIN — OXYCODONE 10 MG: 5 TABLET ORAL at 20:36

## 2021-10-25 RX ADMIN — POTASSIUM CHLORIDE 20 MEQ: 400 INJECTION, SOLUTION INTRAVENOUS at 09:57

## 2021-10-25 RX ADMIN — OXYCODONE 10 MG: 5 TABLET ORAL at 03:34

## 2021-10-25 RX ADMIN — DEXAMETHASONE SODIUM PHOSPHATE 1 MG: 4 INJECTION, SOLUTION INTRAMUSCULAR; INTRAVENOUS at 12:05

## 2021-10-25 RX ADMIN — MICONAZOLE NITRATE: 2 POWDER TOPICAL at 08:45

## 2021-10-25 RX ADMIN — IPRATROPIUM BROMIDE AND ALBUTEROL SULFATE 1 AMPULE: .5; 2.5 SOLUTION RESPIRATORY (INHALATION) at 15:30

## 2021-10-25 RX ADMIN — DIAZEPAM 10 MG: 10 TABLET ORAL at 16:53

## 2021-10-25 RX ADMIN — MICONAZOLE NITRATE: 2 POWDER TOPICAL at 20:36

## 2021-10-25 RX ADMIN — SODIUM CHLORIDE, POTASSIUM CHLORIDE, SODIUM LACTATE AND CALCIUM CHLORIDE: 600; 310; 30; 20 INJECTION, SOLUTION INTRAVENOUS at 13:03

## 2021-10-25 RX ADMIN — PROPOFOL 50 MCG/KG/MIN: 10 INJECTION, EMULSION INTRAVENOUS at 03:25

## 2021-10-25 RX ADMIN — Medication 175 MCG/HR: at 21:25

## 2021-10-25 RX ADMIN — IPRATROPIUM BROMIDE AND ALBUTEROL SULFATE 1 AMPULE: .5; 2.5 SOLUTION RESPIRATORY (INHALATION) at 07:31

## 2021-10-25 RX ADMIN — PROPOFOL 50 MCG/KG/MIN: 10 INJECTION, EMULSION INTRAVENOUS at 06:27

## 2021-10-25 RX ADMIN — ROCURONIUM BROMIDE 50 MG: 10 INJECTION, SOLUTION INTRAVENOUS at 13:24

## 2021-10-25 RX ADMIN — CEFAZOLIN SODIUM 1000 MG: 1 INJECTION, POWDER, FOR SOLUTION INTRAMUSCULAR; INTRAVENOUS at 14:50

## 2021-10-25 ASSESSMENT — PAIN SCALES - GENERAL
PAINLEVEL_OUTOF10: 0

## 2021-10-25 ASSESSMENT — PULMONARY FUNCTION TESTS
PIF_VALUE: 40
PIF_VALUE: 42
PIF_VALUE: 14
PIF_VALUE: 41
PIF_VALUE: 36
PIF_VALUE: 41
PIF_VALUE: 38
PIF_VALUE: 35
PIF_VALUE: 41
PIF_VALUE: 38
PIF_VALUE: 30
PIF_VALUE: 41
PIF_VALUE: 0
PIF_VALUE: 36
PIF_VALUE: 29
PIF_VALUE: 38
PIF_VALUE: 38
PIF_VALUE: 29
PIF_VALUE: 0
PIF_VALUE: 38
PIF_VALUE: 40
PIF_VALUE: 38
PIF_VALUE: 40
PIF_VALUE: 34
PIF_VALUE: 0
PIF_VALUE: 38
PIF_VALUE: 1
PIF_VALUE: 38
PIF_VALUE: 34
PIF_VALUE: 41
PIF_VALUE: 33
PIF_VALUE: 41
PIF_VALUE: 0
PIF_VALUE: 44
PIF_VALUE: 38
PIF_VALUE: 42
PIF_VALUE: 28
PIF_VALUE: 0
PIF_VALUE: 38
PIF_VALUE: 38
PIF_VALUE: 30
PIF_VALUE: 40
PIF_VALUE: 35
PIF_VALUE: 38
PIF_VALUE: 40
PIF_VALUE: 41
PIF_VALUE: 38
PIF_VALUE: 38
PIF_VALUE: 41
PIF_VALUE: 0
PIF_VALUE: 38
PIF_VALUE: 38
PIF_VALUE: 40
PIF_VALUE: 39
PIF_VALUE: 38
PIF_VALUE: 0

## 2021-10-25 ASSESSMENT — ENCOUNTER SYMPTOMS: SHORTNESS OF BREATH: 1

## 2021-10-25 NOTE — PROGRESS NOTES
10/25/21 1921   Vent Information   Vent Mode AC/PC   Pressure Ordered 20   Rate Set 20 bmp   FiO2  70 %   SpO2 94 %   Sensitivity 3   PEEP/CPAP 8   Vent Patient Data   Peak Inspiratory Pressure 33 cmH2O   Mean Airway Pressure 16 cmH20   Rate Measured 22 br/min   Vt Exhaled 331 mL   Minute Volume 7.56 Liters   I:E Ratio 1:1.9   Plateau Pressure 28 [id number]   Static Compliance 17 mL/cmH2O   Dynamic Compliance 13 mL/cmH2O   Cough/Sputum   Sputum How Obtained Suctioned;Tracheal   Cough Productive   Sputum Amount Small   Sputum Color Yellow;Creamy   Tenacity Thick

## 2021-10-25 NOTE — PROGRESS NOTES
Initial exam completed- See doc flowsheet for assessment findings. Pt resting quietly in bed with eyes closed and she briefly opens eyes to stimulation but does not follow commands. All lines and monitoring devices are in place . Vitals and SpO2 stable. Call light is within easy reach. Plan of care and goals reviewed. At 0730 Dr Dolores Don was up to evaluate pt and update given. Carmen Mckoy will be done pending Dr Laurence Soler approval around [id number] today.   Porsha Davis RN

## 2021-10-25 NOTE — ANESTHESIA PRE PROCEDURE
Department of Anesthesiology  Preprocedure Note       Name:  Desiree Botello   Age:  78 y.o.  :  1955                                          MRN:  [id number]         Date:  10/25/2021      Surgeon: Kiesha Perez):  Jah Rivera MD    Procedure: Procedure(s):  EGD/PEG W/ANES. ON VENT, COVID +    Medications prior to admission:   Prior to Admission medications    Medication Sig Start Date End Date Taking? Authorizing Provider   levothyroxine (SYNTHROID) 100 MCG tablet Take 100 mcg by mouth Daily   Yes Historical Provider, MD   simvastatin (ZOCOR) 40 MG tablet Take 40 mg by mouth nightly   Yes Historical Provider, MD   budesonide (PULMICORT FLEXHALER) 180 MCG/ACT AEPB inhaler Inhale 1 puff into the lungs 2 times daily Per [facility]. Ayanna Rivas   Yes Historical Provider, MD   oxyCODONE-acetaminophen (PERCOCET)  MG per tablet Take 1 tablet by mouth every 6 hours as needed for Pain. Per [facility]. Ayanna Rivas   Yes Historical Provider, MD   oxyCODONE (OXYCONTIN) 20 MG extended release tablet Take 20 mg by mouth every 12 hours. Per [facility]. Ayanna Rivas   Yes Historical Provider, MD   glipiZIDE (GLUCOTROL XL) 10 MG extended release tablet Take 10 mg by mouth 2 times daily Per [facility]. Ayanna Rivas   Yes Historical Provider, MD   estradiol (ESTRACE) 0.1 MG/GM vaginal cream Place vaginally See Admin Instructions Insert a pea-sized amount vaginally every day for 7 days, then twice weekly thereafter. Per [facility]. Ayanna Rivas. Last dispensed 5/10/21.    Yes Historical Provider, MD   empagliflozin (JARDIANCE) 10 MG tablet Take 10 mg by mouth daily   Yes Historical Provider, MD   Multiple Vitamins-Minerals (THERAPEUTIC MULTIVITAMIN-MINERALS) tablet Take 1 tablet by mouth daily   Yes Historical Provider, MD   JANUVIA 50 MG tablet TAKE ONE TABLET BY MOUTH DAILY 19  Yes Domingo Naranjo MD   losartan (COZAAR) 25 MG tablet Take 1 tablet by mouth daily 19  Yes Nguyen Velasco MD Sarah Trevino MD 25 mL/hr at 10/25/21 1208 2,000 mg at 10/25/21 1208    potassium bicarb-citric acid (EFFER-K) effervescent tablet 20 mEq  20 mEq Oral Daily Gricelda Dyer MD   20 mEq at 10/24/21 1025    dexamethasone (DECADRON) injection 1 mg  1 mg IntraVENous Q24H Beatrice Villanueva MD   1 mg at 10/25/21 1205    insulin lispro (HUMALOG) injection vial 0-18 Units  0-18 Units SubCUTAneous Q4H Braydon Deleon MD   6 Units at 10/24/21 2346    midazolam (VERSED) 1 mg/mL in D5W infusion  1-10 mg/hr IntraVENous Continuous Braydon Deleon MD 3 mL/hr at 10/25/21 0628 3 mg/hr at 10/25/21 0628    magnesium sulfate 1000 mg in dextrose 5% 100 mL IVPB  1,000 mg IntraVENous PRN Luke Cornejo MD   Stopped at 10/24/21 1104    potassium chloride 20 mEq/50 mL IVPB (Central Line)  20 mEq IntraVENous PRN Luke Cornejo MD 50 mL/hr at 10/25/21 0957 20 mEq at 10/25/21 0957    labetalol (NORMODYNE;TRANDATE) injection 10 mg  10 mg IntraVENous Q4H PRN Braydon Deleon MD   10 mg at 10/16/21 1608    norepinephrine (LEVOPHED) 16 mg in dextrose 5 % 250 mL infusion  2 mcg/min IntraVENous Continuous Braydon Deleon MD   Stopped at 10/18/21 0616    carboxymethylcellulose PF (THERATEARS) 1 % ophthalmic gel 1 drop  1 drop Both Eyes Q4H PRN Avinash Monique MD   1 drop at 10/23/21 0805    And    lubrifresh P.M. (artificial tears) ophthalmic ointment   Both Eyes Q4H PRN Avinash Monique MD   Given at 10/23/21 0805    sertraline (ZOLOFT) tablet 50 mg  50 mg Per NG tube Daily Beatrice Villanueva MD   50 mg at 10/24/21 6195    Venelex ointment   Topical BID Sandee Allen MD   Given at 10/25/21 0845    miconazole (MICOTIN) 2 % powder   Topical BID Braydon Deleon MD   Given at 10/25/21 0845    oxyCODONE (ROXICODONE) immediate release tablet 10 mg  10 mg Oral Q6H Braydon Deleon MD   10 mg at 10/25/21 0334    influenza quadrivalent split vaccine (FLUZONE;FLUARIX;FLULAVAL;AFLURIA) injection 0.5 mL  0.5 mL IntraMUSCular Prior to discharge [place] Danielle Curry MD        fentaNYL (SUBLIMAZE) injection 50 mcg  50 mcg IntraVENous Q1H PRN Jaycob Davidson MD   50 mcg at 10/17/21 1012    chlorhexidine (PERIDEX) 0.12 % solution 15 mL  15 mL Mouth/Throat BID Jaycob Davidson MD   15 mL at 10/25/21 0845    midazolam (VERSED) injection 2 mg  2 mg IntraVENous Q1H PRN Jaycob Davidson MD   2 mg at 10/24/21 1614    fentaNYL (SUBLIMAZE) 1,000 mcg in sodium chloride 0.9% 100 mL infusion  12.5-300 mcg/hr IntraVENous Continuous Jaycob Davidson MD 20 mL/hr at 10/25/21 1049 200 mcg/hr at 10/25/21 1049    ipratropium-albuterol (Raj Kathryn) nebulizer solution 1 ampule  1 ampule Inhalation Q4H Jaycob Davidson MD   1 ampule at 10/25/21 1052    propofol injection  10 mcg/kg/min IntraVENous Continuous Jaycob Davidson MD 26 mL/hr at 10/25/21 0956 50 mcg/kg/min at 10/25/21 0956    pantoprazole (PROTONIX) injection 40 mg  40 mg IntraVENous Daily Litzy Du MD   40 mg at 10/25/21 0845    levothyroxine (SYNTHROID) tablet 100 mcg  100 mcg Oral Daily Rubi Gregory MD   100 mcg at 10/25/21 3632    enoxaparin (LOVENOX) injection 30 mg  30 mg SubCUTAneous BID Meghna Villareal MD   30 mg at 10/24/21 2018    lidocaine PF 1 % injection 5 mL  5 mL IntraDERmal Once Rubi Gregory MD        sodium chloride flush 0.9 % injection 5-40 mL  5-40 mL IntraVENous 2 times per day Rubi Gregory MD   10 mL at 10/25/21 0846    sodium chloride flush 0.9 % injection 5-40 mL  5-40 mL IntraVENous PRN Rubi Gregory MD        0.9 % sodium chloride infusion  25 mL IntraVENous PRN Rubi Gregory MD        aspirin chewable tablet 81 mg  81 mg Oral Daily Meghna Villareal MD   81 mg at 10/24/21 0726    gabapentin (NEURONTIN) capsule 800 mg  800 mg Oral TID Meghna Villareal MD   800 mg at 10/24/21 2019    atorvastatin (LIPITOR) tablet 40 mg  40 mg Oral Daily Meghna Villareal MD   40 mg at 10/24/21 0726    glucose (GLUTOSE) 40 % oral gel 15 g  15 g Oral PRN Meghna Villareal MD        dextrose 50 % IV solution  12.5 g IntraVENous PRN Jose Stanley MD        glucagon (rDNA) injection 1 mg  1 mg IntraMUSCular PRN Jose Stanley MD        dextrose 5 % solution  100 mL/hr IntraVENous PRN Jose Stanley MD        sodium chloride flush 0.9 % injection 5-40 mL  5-40 mL IntraVENous 2 times per day Jose Stanley MD   10 mL at 10/25/21 0846    sodium chloride flush 0.9 % injection 5-40 mL  5-40 mL IntraVENous PRN Jose Stanley MD   10 mL at 10/11/21 0915    0.9 % sodium chloride infusion  25 mL IntraVENous PRN Jose Stanley MD        polyethylene glycol (GLYCOLAX) packet 17 g  17 g Oral Daily PRN Jose Stanley MD   17 g at 10/24/21 0735    acetaminophen (TYLENOL) tablet 650 mg  650 mg Oral Q6H PRN Jose Stanley MD   650 mg at 09/28/21 1121    Or    acetaminophen (TYLENOL) suppository 650 mg  650 mg Rectal Q6H PRN Jose Stanley MD        prochlorperazine (COMPAZINE) injection 10 mg  10 mg IntraVENous Q6H PRN Jose Stanley MD   10 mg at 10/05/21 2116       Allergies: Allergies   Allergen Reactions    Quinidine Anaphylaxis and Nausea And Vomiting    Sulfa Antibiotics Anaphylaxis and Nausea And Vomiting    Prednisone Other (See Comments)     Cannot tolerate oral steriods  Cannot tolerate oral steroids - causes Avascular Necrosis of joints.   Cannot tolerate oral steriods    Bactrim     No Known Allergies Rash    Sulfamethoxazole-Trimethoprim Rash       Problem List:    Patient Active Problem List   Diagnosis Code    Displacement of lumbar intervertebral disc without myelopathy M51.26    Hypothyroidism E03.9    Mixed hyperlipidemia E78.2    Anxiety state F41.1    Generalized osteoarthrosis, involving multiple sites M15.9    Essential hypertension I10    Shoulder arthritis M19.019    Vitamin D deficiency E55.9    Chronic pain syndrome G89.4    Fibromyalgia M79.7    DDD (degenerative disc disease), lumbar M51.36    Anxiety disorder F41.9    Prosthetic joint implant failure ([facility] [address] T84.019A    Pulmonary nodule R91.1  Tracheobronchomalacia J39.8    Bronchiectasis (HCC) J47.9    DM2 (diabetes mellitus, type 2) ([facility]) V49.3    Systolic congestive heart failure (HCC) I50.20    Chest pain R07.9    S/P cardiac catheterization Z98.890    PVC (premature ventricular contraction) I49.3    Palpitations R00.2    Syncope and collapse R55    Convulsion (HCC) R56.9    New onset seizure ([facility]) R56.9    CAD in native artery I25.10    Nausea R11.0    Dehydration E86.0    Memory change R41.3    Acquired hypothyroidism E03.9    Sepsis ([facility]) A41.9    Type 1 diabetes mellitus without complication ([facility]) B48.2    Orthostatic hypotension I95.1    Suspected COVID-19 virus infection Z20.822    Hypoxia R09.02    Acute respiratory failure with hypoxia ([facility]) J96.01    COVID-19 U07.1    Pneumonia due to COVID-19 virus U07.1, J12.82    CHARLY (acute kidney injury) ([facility] [address] N17.9    Uncontrolled hypertension I10    Fever R50.9    Hypotension I95.9    Severe protein-calorie malnutrition ([facility]) E43    Gram-negative pneumonia ([facility]) J15.6    Pneumonia due to Pseudomonas species ([facility]) J15.1    Hypomagnesemia E83.42    Hypokalemia E87.6    Acute hypoxemic respiratory failure ([facility]) J96.01       Past Medical History:        Diagnosis Date    Anxiety disorder     Chronic pain syndrome     COVID-19 09/27/2021    DDD (degenerative disc disease), lumbar     Diabetes ([facility] [address]     Displacement of lumbar intervertebral disc without myelopathy 08/23/2010    Diverticulitis     Fibromyalgia     Generalized osteoarthrosis, involving multiple sites 08/24/2010    GERD (gastroesophageal reflux disease)     Impacted cerumen 08/23/2010    Influenza A 03/10/2016    Irritable bowel syndrome 08/23/2010    Other and unspecified hyperlipidemia 08/23/2010    Prosthetic joint implant failure ([facility] [address]     Screening mammogram 12/08/2006    (Both)Negative    Tracheobronchomalacia     Unspecified asthma(493.90) 08/23/2010    Unspecified essential hypertension 2010    Unspecified hypothyroidism 2010       Past Surgical History:        Procedure Laterality Date    ABDOMEN SURGERY      CARDIAC CATHETERIZATION      COLON SURGERY      COLONOSCOPY      normal    HYSTERECTOMY      JOINT REPLACEMENT  10/92    Right; hip     JOINT REPLACEMENT        Left hip  followed by revision 2006    JOINT REPLACEMENT        right     JOINT REPLACEMENT         right replacement.  LUNG SURGERY  2014    tracheobronchomalacia       Social History:    Social History     Tobacco Use    Smoking status: Former Smoker     Packs/day: 1.00     Years: 18.00     Pack years: 18.00     Types: Cigarettes     Quit date: 12/10/1991     Years since quittin.8    Smokeless tobacco: Never Used   Substance Use Topics    Alcohol use: No                                Counseling given: Not Answered      Vital Signs (Current):   Vitals:    10/25/21 1000 10/25/21 1053 10/25/21 1100 10/25/21 1200   BP: 130/62  (!) 148/70 (!) 148/57   Pulse: 89 101 92 106   Resp:    Temp:   97.4 °F (36.3 °C)    TempSrc:   Axillary    SpO2: 92% 99% 92% 92%   Weight:       Height:                                                  BP Readings from Last 3 Encounters:   10/25/21 (!) 148/57   07/15/21 (!) 179/79   20 (!) 178/88       NPO Status:                                                                                 BMI:   Wt Readings from Last 3 Encounters:   10/21/21 175 lb 8 oz (79.6 kg)   07/15/21 180 lb (81.6 kg)   20 184 lb (83.5 kg)     Body mass index is 25.92 kg/m².     CBC:   Lab Results   Component Value Date    WBC 8.7 10/25/2021    RBC 3.38 10/25/2021    HGB 8.6 10/25/2021    HCT 27.0 10/25/2021    MCV 79.9 10/25/2021    RDW 19.6 10/25/2021     10/25/2021       CMP:   Lab Results   Component Value Date     10/25/2021    K 3.3 10/25/2021    CL 95 10/25/2021    CO2 36 10/25/2021    BUN 19 10/25/2021    CREATININE <0.5 10/25/2021    GFRAA >60 10/25/2021    GFRAA >60 11/05/2012    AGRATIO 0.9 10/25/2021    LABGLOM >60 10/25/2021    GLUCOSE 101 10/25/2021    PROT 6.5 10/25/2021    PROT 7.7 11/05/2012    CALCIUM 9.4 10/25/2021    BILITOT 0.6 10/25/2021    ALKPHOS 153 10/25/2021    AST 33 10/25/2021    ALT 28 10/25/2021       POC Tests:   Recent Labs     10/25/21  1154   POCGLU 146*       Coags:   Lab Results   Component Value Date    PROTIME 16.9 09/28/2021    INR 1.47 09/28/2021    APTT 29.2 12/22/2016       HCG (If Applicable): No results found for: PREGTESTUR, PREGSERUM, HCG, HCGQUANT     ABGs:   Lab Results   Component Value Date    PHART 7.373 10/25/2021    PO2ART 55.1 10/25/2021    PYZ4PVL 62.2 10/25/2021    PQM4VID 35.4 10/25/2021    BEART 8.7 10/25/2021    T3GGORRN 86.9 10/25/2021        Type & Screen (If Applicable):  No results found for: LABABO, LABRH    Drug/Infectious Status (If Applicable):  No results found for: HIV, HEPCAB    COVID-19 Screening (If Applicable):   Lab Results   Component Value Date    COVID19 DETECTED 09/27/2021    COVID19 Detected 09/27/2021           Anesthesia Evaluation  Patient summary reviewed and Nursing notes reviewed no history of anesthetic complications:   Airway: Mallampati: II  TM distance: >3 FB   Neck ROM: full  Mouth opening: > = 3 FB Dental:          Pulmonary:   (+) pneumonia (COVID 19): unresolved,  shortness of breath:                             Cardiovascular:    (+) hypertension:, CAD:, CHF:,                   Neuro/Psych:   (+) seizures:, neuromuscular disease:, psychiatric history:            GI/Hepatic/Renal: Neg GI/Hepatic/Renal ROS  (+) GERD:,      (-) liver disease and no renal disease       Endo/Other:    (+) DiabetesType II DM, , hypothyroidism::., .                 Abdominal:             Vascular: negative vascular ROS. Other Findings: Intubated from the ICU. Anesthesia Plan      general     ASA 4       Induction: intravenous.       Anesthetic plan and risks discussed with Unable to obtain due to emergent nature. Plan discussed with CRNA.                   Dillon Rosas MD   10/25/2021

## 2021-10-25 NOTE — PROGRESS NOTES
Carmen Hooper to take pt belongings home which include 2 rings, cell phone and her walking cane Froilan Corona

## 2021-10-25 NOTE — PROGRESS NOTES
PEG completed and pt tolerated well.  Per GI OK to use tube for meds and flushes today and start tube feeds in am Lucy Barraza RN

## 2021-10-25 NOTE — PROGRESS NOTES
Report given to surgery staff and pt taken to OR by surgery team and RT.  Pt in stable condition Carlos Mireles RN

## 2021-10-25 NOTE — PROGRESS NOTES
10/25/21 0303   Vent Information   Vent Type 980   Vent Mode AC/PC   Pressure Ordered 20   Rate Set 20 bmp   FiO2  70 %   SpO2 92 %   SpO2/FiO2 ratio 131.43   Sensitivity 3   PEEP/CPAP 8   I Time/ I Time % 1 s   Humidification Source Heated wire   Humidification Temp 37   Humidification Temp Measured 37   Circuit Condensation Drained   Vent Patient Data   Peak Inspiratory Pressure 28 cmH2O   Mean Airway Pressure 17 cmH20   Rate Measured 23 br/min   Vt Exhaled 453 mL   Minute Volume 7.62 Liters   I:E Ratio 1:2   Plateau Pressure 32 TXM03   Cough/Sputum   Sputum How Obtained Suctioned;Endotracheal   Cough Productive   Sputum Amount Small   Sputum Color Creamy   Tenacity Thick   Breath Sounds   Right Upper Lobe Diminished   Right Middle Lobe Diminished   Right Lower Lobe Diminished   Left Upper Lobe Diminished   Left Lower Lobe Diminished   Additional Respiratory  Assessments   Position Semi-Jesus's   Oral Care Completed? Yes   Oral Care Mouth suctioned   Subglottic Suction Done?  Yes   Alarm Settings   High Pressure Alarm 40 cmH2O   Low Minute Volume Alarm 4 L/min   Apnea (secs) 20 secs   High Respiratory Rate 40 br/min   Low Exhaled Vt  250 mL   ETT (adult)   Placement Date/Time: 10/06/21 1208   Timeout: Patient  Preoxygenation: Yes  Technique: Rapid sequence  Type: Cuffed  Tube Size: 7.5 mm  Laryngoscope: GlideScope  Secured at: 23 cm  Measured From: Lips   Secured at 24 cm   Measured From [address] [employer] tube carranza   Site Condition Dry

## 2021-10-25 NOTE — BRIEF OP NOTE
Orogastric Right mouth (Removed)   Surrounding Skin Dry; Intact 10/02/21 0400   Securement device Yes 10/02/21 0400   Status Chimney 10/02/21 0400   Placement Verified by External Catheter Length 10/02/21 0400   NG/OG/NJ/NE External Measurement (cm) 65 cm 10/02/21 0400   Drainage Appearance Tan 10/02/21 0400   Tube Feeding High Protein 10/02/21 0400   Tube Feeding Status Continuous 10/02/21 0400   Rate/Schedule 20 mL/hr 10/02/21 0400   Tube Feeding Intake (mL) 1029 ml 10/03/21 1500   Free Water Flush (mL) 211 mL 10/03/21 1500   Free Water Rate 30mL q/4 hours 10/02/21 0400   Residual Volume (ml) 0 ml 10/04/21 0755   Output (mL) 400 ml 09/29/21 1401       [REMOVED] External Urinary Catheter (Removed)   Catheter changed  Yes 09/28/21 1145   Output (mL) 200 mL 09/29/21 0017   Suction 40 mmgHg continuous 09/28/21 1600   Placement Replaced 09/28/21 1400   Skin Assessment No Injury 09/28/21 1600       Findings:  Successful placement of 20F PEG tube. External bolster at 2cm jj. Recommendation  1. Start Meds and valenzuela per PEG in 4h  2. Start TFs per PEG tomorrow AM  3. Keep HOB elevated during PEG use  4. Keep PEG site clean and dry  5. Flush PEG with 60mL free water every 6h and after use  6.  Will follow    Electronically signed by Blake Hays MD on 10/25/2021 at 5:43 PM

## 2021-10-25 NOTE — ANESTHESIA POSTPROCEDURE EVALUATION
Department of Anesthesiology  Postprocedure Note    Patient: Oxana Pain  MRN: [id number]  YOB: 1955  Date of evaluation: 10/25/2021  Time:  5:24 PM     Procedure Summary     Date: 10/25/21 Room / Location: [place] [address] / [facility]; [address] [place] [address]    Anesthesia Start: 8692 Anesthesia Stop: 3414    Procedures:       TRACHEOTOMY (N/A Neck)      EGD/PEG W/ANES. ON VENT, COVID + (N/A ) Diagnosis:       (COVID)      (?)    Surgeons: Carrie Amaya MD; Tyson Christina DO Responsible Provider: Kirsten Novoa MD    Anesthesia Type: general ASA Status: 4          Anesthesia Type: No value filed. Abdi Phase I: Abdi Score: 4    Abdi Phase II: Abdi Score: 4    Last vitals: Reviewed and per EMR flowsheets.        Anesthesia Post Evaluation    Patient location during evaluation: ICU  Patient participation: complete - patient cannot participate  Level of consciousness: sedated and ventilated  Airway patency: patent  Nausea & Vomiting: no nausea  Complications: no  Cardiovascular status: blood pressure returned to baseline  Respiratory status: ventilator  Hydration status: euvolemic

## 2021-10-25 NOTE — PROGRESS NOTES
Pt returns from surgery and report taken. Pt reconnected to ICU monitors and vent. Respirations unlabored but tidal volumes low on previous settings ranging 230- 250 ml RT updated and they will eval patient. Pt remains NPO for PEG tube placement this afternoon.   Darrius Mares RN

## 2021-10-25 NOTE — CONSULTS
 Suspected COVID-19 virus infection    Hypoxia    Acute respiratory failure with hypoxia ([address]    COVID-19    Pneumonia due to COVID-19 virus    CHARLY [facility]acute kidney injury) ([facility]    Uncontrolled hypertension    Fever    Hypotension    Severe protein-calorie malnutrition (HCC)    Gram-negative pneumonia ([facility]    Pneumonia due to Pseudomonas species ([facility]    Hypomagnesemia    Hypokalemia    Acute hypoxemic respiratory failure ([facility]     Past Surgical History:   Procedure Laterality Date    ABDOMEN SURGERY      CARDIAC CATHETERIZATION      COLON SURGERY      COLONOSCOPY      normal    HYSTERECTOMY      JOINT REPLACEMENT  10/92    Right; hip     JOINT REPLACEMENT        Left hip  followed by revision 2006    JOINT REPLACEMENT        right     JOINT REPLACEMENT         right replacement.     LUNG SURGERY  2014    tracheobronchomalacia     Family History   Problem Relation Age of Onset    Asthma Mother     Heart Failure Father     Cancer Maternal Grandfather         skin cancer on face    Cancer Daughter         skin cancer-BCC    Diabetes Neg Hx     Emphysema Neg Hx     Hypertension Neg Hx      Social History     Socioeconomic History    Marital status:      Spouse name: Not on file    Number of children: Not on file    Years of education: Not on file    Highest education level: Not on file   Occupational History    Not on file   Tobacco Use    Smoking status: Former Smoker     Packs/day: 1.00     Years: 18.00     Pack years: 18.00     Types: Cigarettes     Quit date: 12/10/1991     Years since quittin.8    Smokeless tobacco: Never Used   Vaping Use    Vaping Use: Never used   Substance and Sexual Activity    Alcohol use: No    Drug use: No    Sexual activity: Never     Partners: Male   Other Topics Concern    Not on file   Social History Narrative    Not on file     Social Determinants of Health     Financial Resource Strain:     Difficulty of Paying Living Expenses:    Food Insecurity:     Worried About Running Out of Food in the Last Year:     [address] in the Last Year:    Transportation Needs:     Lack of Transportation (Medical):  Lack of Transportation (Non-Medical):    Physical Activity:     Days of Exercise per Week:     Minutes of Exercise per Session:    Stress:     Feeling of Stress :    Social Connections:     Frequency of Communication with Friends and Family:     Frequency of Social Gatherings with Friends and Family:     Attends [employer] Services:     Active Member of Clubs or Organizations:     Attends Club or Organization Meetings:     Marital Status:    Intimate Partner Violence:     Fear of Current or Ex-Partner:     Emotionally Abused:     Physically Abused:     Sexually Abused:        DRUG/FOOD ALLERGIES: Quinidine, Sulfa antibiotics, Prednisone, Bactrim, No known allergies, and Sulfamethoxazole-trimethoprim    CURRENT MEDICATIONS  Prior to Admission medications    Medication Sig Start Date End Date Taking? Authorizing Provider   levothyroxine (SYNTHROID) 100 MCG tablet Take 100 mcg by mouth Daily   Yes Historical Provider, MD   simvastatin (ZOCOR) 40 MG tablet Take 40 mg by mouth nightly   Yes Historical Provider, MD   budesonide (PULMICORT FLEXHALER) 180 MCG/ACT AEPB inhaler Inhale 1 puff into the lungs 2 times daily Per [place] [address]. Stephen Diaz   Yes Historical Provider, MD   oxyCODONE-acetaminophen (PERCOCET)  MG per tablet Take 1 tablet by mouth every 6 hours as needed for Pain. Per [place] [address]. Stephen Diaz   Yes Historical Provider, MD   oxyCODONE (OXYCONTIN) 20 MG extended release tablet Take 20 mg by mouth every 12 hours. Per [place] [address]. Stephen Diaz   Yes Historical Provider, MD   glipiZIDE (GLUCOTROL XL) 10 MG extended release tablet Take 10 mg by mouth 2 times daily Per [place] [address].  Stephen Diaz   Yes Historical Provider, MD   estradiol (ESTRACE) 0.1 MG/GM vaginal cream Place vaginally See Admin Instructions Insert a pea-sized amount vaginally every day for 7 days, then twice weekly thereafter. Per Marcia Martin at [place]. Meenakshi Cooper. Last dispensed 5/10/21. Yes Historical Provider, MD   empagliflozin (JARDIANCE) 10 MG tablet Take 10 mg by mouth daily   Yes Historical Provider, MD   Multiple Vitamins-Minerals (THERAPEUTIC MULTIVITAMIN-MINERALS) tablet Take 1 tablet by mouth daily   Yes Historical Provider, MD   JANUVIA 50 MG tablet TAKE ONE TABLET BY MOUTH DAILY 11/18/19  Yes Pretty Hinson MD   losartan (COZAAR) 25 MG tablet Take 1 tablet by mouth daily 5/21/19  Yes Celso Maria MD   Elastic Bandages & Supports (MEDICAL COMPRESSION THIGH HIGH) MISC 1 Units by Does not apply route daily 4/26/19  Yes Darrin Jonas MD   Insulin Pen Needle 31G X 5 MM MISC 1 each by Does not apply route daily 3/11/19  Yes Pretty Hinson MD   rOPINIRole (REQUIP) 0.5 MG tablet TAKE 1 TABLET BY MOUTH 3 TIMES DAILY. 11/30/18  Yes Sophia French MD   sertraline (ZOLOFT) 50 MG tablet TAKE 1 TABLET BY MOUTH DAILY 11/30/18  Yes Sophia French MD   polyethylene glycol [facility]) powder Take 17 g by mouth daily as needed   Yes Historical Provider, MD   gabapentin (NEURONTIN) 800 MG tablet Take 800 mg by mouth 3 times daily 8/2/17  Yes Historical Provider, MD   omeprazole (PRILOSEC) 40 MG delayed release capsule Take 40 mg by mouth 2 times daily 8/2/17  Yes Historical Provider, MD   aspirin 81 MG EC tablet Take 1 tablet by mouth daily 9/24/15  Yes Candice Dockery MD   tiZANidine (ZANAFLEX) 2 MG tablet Take 2 mg by mouth 3 times daily as needed    Yes Historical Provider, MD   albuterol sulfate  (90 Base) MCG/ACT inhaler Inhale 2 puffs into the lungs 4 times daily as needed for Wheezing 12/26/19   MD Frida Vaca MISC Dispense whatever insurance will cover. Patient test twice day. Dx:E11.9 3/11/19   Pretty Hinson MD   blood glucose monitor strips Dispense whatever insurance will cover. Pt test twice a day dx: E11.9 3/11/19   Keith Stokes MD   blood glucose test strips (ASCENSIA AUTODISC VI;ONE TOUCH ULTRA TEST VI) strip 1 each by In Vitro route daily As needed. 3/11/19   Keith Stokes MD   Blood Glucose Monitoring Suppl CATHRYN Dispense whatever insurance will cover. Dx code:E11.9 1/11/18   Radha Maxwell MD       REVIEW OF SYSTEMS  The following systems were reviewed and revealed the following in addition to any already discussed in the HPI:    Unable to obtain intubated and sedated on vent      PHYSICAL EXAM  /62   Pulse 89   Temp 98.2 °F (36.8 °C) (Axillary)   Resp 20   Ht 5' 9\" (1.753 m)   Wt 175 lb 8 oz (79.6 kg)   SpO2 92%   BMI 25.92 kg/m²     GENERAL: intubated and sedated  EYES: EOMI, Anti-icteric  NOSE: No epistaxis, nasal mucosa within normal limits, no purulent drainage  EARS: Normal external canal appearance, EAC patent bilaterally  ORAL CAVITY: ET tube in pace  NECK: no thyromegaly, trachea is midline, no lymphadenopathy, no neck masses, no crepitus  CHEST: on vent      RADIOLOGY  Summary of findings:  CT chest pulmonary embolism w contrast  No evidence of pulmonary embolism.       Scattered peripheral opacities in the left lung and more consolidative area   in the right lung concerning for pneumonia.  Previous right thoracotomy and   upper lobectomy.       One mildly enlarged right paratracheal lymph node is present but no priors   for comparison.  Nonenlarged but prominent lymph nodes in the upper abdomen   and juxta diaphragmatic region. laryngeal anatomy appear normal    PROCEDURE      ASSESSMENT/PLAN  Nevin Arndt is a very pleasant 78 y.o. female with acute respiratory failure requiring prolonged intubation due to COVID 19. ENT was consulted for trach placement. Risks, benefits, and alteratives to surgery were discussed with patients  and daughter. Risks include but are not limited to loss of airway and life, infection, bleeding, and the risks of general anesthesia.  The family is aware of this and is willing to move forward with tracheostomy placement today. Consent signed at the bedside. She will have a PEG tube placed later in the day by GI. Medical Decision Making:   The following items were considered in medical decision making:  Independent review of images  Review / order clinical lab tests  Review / order radiology tests  Decision to obtain old records

## 2021-10-25 NOTE — PROGRESS NOTES
Hospitalist Progress Note      PCP: Cheko Agudelo MD    Date of Admission: 9/26/2021    Admitted with COVID-19 pneumonia,  acute hypoxic respiratory failure. Patient not vaccinated  Progressive significant worsening hypoxemia overnight. Patient was on 2 L of oxygen on admission-> switched to high flow oxygen per Vapotherm with additional 100% nonrebreather on 9/28/2021-> transferred to ICU. Intubated on 9/29/2021 for worsening hypoxemia. Required proning. Extubated on 10/4/2021. Reintubated on 10/6/2021 for worsening hypoxemia. 10/24  Remains on mechanical ventilation  She is with desaturations all day. Improved now  FiO2 55% PEEP of 8  Planning for trach and PEG on Monday    10/25  S/p tracheostomy and PEG placement today  Currently sedated FiO2 70% PEEP of 8.   tube feeds to be started in a.m.     Medications:  Reviewed    Infusion Medications    midazolam 3 mg/hr (10/25/21 1405)    norepinephrine Stopped (10/18/21 0616)    fentaNYL 200 mcg/hr (10/25/21 1524)    propofol 40 mcg/kg/min (10/25/21 1705)    sodium chloride      dextrose      sodium chloride       Scheduled Medications    diazePAM  10 mg Oral Q8H    ceFAZolin        dextrose        potassium bicarb-citric acid  20 mEq Oral Daily    dexamethasone  1 mg IntraVENous Q24H    insulin lispro  0-18 Units SubCUTAneous Q4H    sertraline  50 mg Per NG tube Daily    Venelex   Topical BID    miconazole   Topical BID    oxyCODONE  10 mg Oral Q6H    influenza virus vaccine  0.5 mL IntraMUSCular Prior to discharge    chlorhexidine  15 mL Mouth/Throat BID    ipratropium-albuterol  1 ampule Inhalation Q4H    pantoprazole  40 mg IntraVENous Daily    levothyroxine  100 mcg Oral Daily    enoxaparin  30 mg SubCUTAneous BID    lidocaine 1 % injection  5 mL IntraDERmal Once    sodium chloride flush  5-40 mL IntraVENous 2 times per day    aspirin  81 mg Oral Daily    gabapentin  800 mg Oral TID    atorvastatin  40 mg Oral Daily    sodium chloride flush  5-40 mL IntraVENous 2 times per day     PRN Meds: magnesium sulfate, potassium chloride, labetalol, carboxymethylcellulose PF **AND** artificial tears, fentanNYL, midazolam, sodium chloride flush, sodium chloride, glucose, dextrose, glucagon (rDNA), dextrose, sodium chloride flush, sodium chloride, polyethylene glycol, acetaminophen **OR** acetaminophen, prochlorperazine      Intake/Output Summary (Last 24 hours) at 10/25/2021 1810  Last data filed at 10/25/2021 1705  Gross per 24 hour   Intake 2428.45 ml   Output 2385 ml   Net 43.45 ml       Physical Exam Performed:    /61   Pulse 97   Temp 100.1 °F (37.8 °C) (Axillary)   Resp 20   Ht 5' 9\" (1.753 m)   Wt 175 lb 8 oz (79.6 kg)   SpO2 96%   BMI 25.92 kg/m²   Patient in droplet plus precautions  General: Sedated, on mechanical ventilation . Tracheostomy+  Skin:  Warm and dry  Neck:   trach +  Chest: diminished breath sounds,   No wheezes or rhonchi. Cardiovascular:  RRR ,S1S2 normal  Abdomen:  Soft, non tender, non distended, BS +. PEG +  Extremities:  No edema. Intact peripheral pulses. Brisk cap refill, < 2 secs  Neuro: sedated       Labs:   Recent Labs     10/23/21  0555 10/24/21  0501 10/25/21  0500   WBC 7.7 9.4 8.7   HGB 8.9* 9.7* 8.6*   HCT 27.9* 30.6* 27.0*   PLT 95* 111* 130*     Recent Labs     10/23/21  0555 10/24/21  0501 10/25/21  0500    141 137   K 3.5 3.5 3.3*   CL 99 98* 95*   CO2 35* 32 36*   BUN 13 13 19   CREATININE <0.5* <0.5* <0.5*   CALCIUM 9.1 9.3 9.4   PHOS 3.8  --   --      Recent Labs     10/23/21  0555 10/24/21  0501 10/25/21  0500   AST 39* 44* 33   ALT 27 32 28   BILITOT 0.3 0.5 0.6   ALKPHOS 133* 159* 153*     No results for input(s): INR in the last 72 hours. No results for input(s): Saloni Dross in the last 72 hours.     Urinalysis:      Lab Results   Component Value Date    NITRU Negative 09/27/2021    WBCUA 0-2 09/27/2021    BACTERIA Rare 09/27/2021    RBCUA None seen 09/27/2021 BLOODU TRACE-INTACT 09/27/2021    SPECGRAV <=1.005 09/27/2021    GLUCOSEU >=1000 09/27/2021    GLUCOSEU 250 05/11/2011     Cultures  Blood- NGTD  Sputum- pseudomonas; Repeat resp Cx Klebsiella       Radiology:  XR CHEST PORTABLE   Final Result   Patchy airspace opacities bilaterally, may be related to pulmonary edema   versus pneumonia. Stable cardiomegaly         XR CHEST PORTABLE   Final Result   No significant interval change. XR CHEST PORTABLE   Final Result   Supportive tubing projects in normal position. Bilateral interstitial-alveolar opacities, likely combination of pulmonary   edema pneumonia. Although they appear more prominent this may be secondary   to the rotation on the present study. Continued surveillance is recommended. Suspected small right pleural effusion. XR CHEST PORTABLE   Final Result   Persistent multifocal bilateral pulmonary opacity, slightly improved at the   right base as compared to prior. XR CHEST PORTABLE   Final Result   Multifocal airspace disease with right-sided pleural effusion, similar to   prior. No significant change         XR CHEST PORTABLE   Final Result   Stable support apparatus. Persistent multifocal airspace disease. No substantial change. XR CHEST PORTABLE   Final Result   Unchanged multifocal bilateral pneumonia. XR CHEST PORTABLE   Final Result   No significant change in the multifocal bilateral infiltrates         XR CHEST PORTABLE   Final Result   Diffuse bilateral airspace disease appears unchanged on the right and   increased on the left. XR CHEST PORTABLE   Final Result   Bilateral pleuroparenchymal disease, with improved aeration of the right lung   compared to prior. XR CHEST PORTABLE   Final Result   Line and tube as above. Bilateral interstitial opacities are worsened when compared to prior study.          XR CHEST PORTABLE   Final Result   Stable chest.  Diffuse interstitial changes, possibly infiltrate or edema. Satisfactory position of endotracheal tube. XR CHEST PORTABLE   Final Result   Stable support apparatus. In this case, both multifocal pneumonia and pulmonary edema are both   considered, and the changes appear increased when compared to the previous   exam.         XR CHEST PORTABLE   Final Result   1. Stable appropriate positions of support apparatus. 2. Slight interval progression of multifocal airspace disease throughout both   lungs, likely a combination of multifocal pneumonia and superimposed   pulmonary edema. 3. Stable cardiomegaly and small bilateral pleural effusions. XR CHEST PORTABLE   Final Result   Improving aeration of the right lung with grossly stable left basilar   airspace opacities. XR CHEST PORTABLE   Final Result   Supportive tubing is in stable position. Stable pattern of bilateral ground-glass opacities and basilar consolidations. XR CHEST PORTABLE   Final Result   Stable appearance of the chest.  Diffuse ground-glass opacities on the right   and mild left basilar opacities. XR CHEST PORTABLE   Final Result   Improved aeration on the left, possibly improving pulmonary edema. Persistent airspace disease in the right base may represent concurrent   pneumonia         XR ABDOMEN (KUB) (SINGLE AP VIEW)   Final Result   Enteric catheter tip likely in the distal gastric body. XR CHEST PORTABLE   Final Result   1. Endotracheal tube projects in the appropriate position. 2. Enteric tube extends below the diaphragm and out of the field of view. XR CHEST PORTABLE   Final Result   Increasing diffuse airspace opacification throughout the lungs. Pattern may   represent pulmonary edema or worsening pneumonitis. XR CHEST PORTABLE   Final Result   1. Interval increased bilateral pulmonary opacities with new consolidative   change at the right lung base.   These findings could be secondary to   pulmonary edema or infection. 2. Consolidative change at the right lung base could also be secondary to   atelectasis/mucous plug. XR CHEST PORTABLE   Final Result   No significant interval change in small right pleural effusion or bilateral   heterogeneous opacity which can reflect pulmonary edema or pneumonia. XR CHEST PORTABLE   Final Result   Mild improvement from prior comparison. Mild-to-moderate congestion and/or   infiltrates identified in the lungs. ET tube terminates 7 cm superior to the saba. XR CHEST PORTABLE   Final Result   Endotracheal tube and orogastric tube unchanged, adequate position. Slightly increased extensive bilateral pulmonary disease over the past 24   hours this may relate to interstitial edema or diffuse infection or a   combination. XR CHEST PORTABLE   Final Result   Endotracheal tube with its tip approximately 4.7 cm from the saba in   satisfactory position. Enteric tube in satisfactory position. Patchy bilateral airspace disease is again noted with improved aeration of   the left mid lung. XR CHEST PORTABLE   Final Result   Some improvement in the appearance of the chest with clearing of some of the   acute airspace disease from the mid left lung. Large amount of pneumonia   remains within the right lung and lower left lung. XR CHEST PORTABLE   Final Result   1. Endotracheal tube, nasogastric tube, and right PICC line placement. 2.  Increasing multifocal opacities with consolidative area in the right   perihilar lung. Could be multifocal pneumonia with or without edema. IR PICC WO SQ PORT/PUMP > 5 YEARS   Final Result   1. See above. CT CHEST PULMONARY EMBOLISM W CONTRAST   Final Result   No evidence of pulmonary embolism. Scattered peripheral opacities in the left lung and more consolidative area   in the right lung concerning for pneumonia.   Previous right thoracotomy and   upper lobectomy. One mildly enlarged right paratracheal lymph node is present but no priors   for comparison. Nonenlarged but prominent lymph nodes in the upper abdomen   and juxta diaphragmatic region. XR CHEST PORTABLE   Final Result   Mild cardiomegaly without interstitial edema. Metallic surgical clips from prior right thoracotomy. COPD with anterior segment-right upper lobe alveolar pneumonia. Old pleural thickening was noted along the right lateral chest wall. Pleural   thickening and or trace effusion blunts the right costophrenic angle. XR CHEST PORTABLE    (Results Pending)               Assessment/Plan:        #COVID-19 pneumonia  #Acute hypoxic respiratory failure   Pseudomonas HCAP. -Unvaccinated patient  - she presented with cough, fever, dyspnea in the setting of household contact testing + COVID 19  - Her Covid testing came back positive on admission  - Initially on 2 L of oxygen on presentation.   -Pulmonology consulted  - worsening hypoxemia requiring high flow oxygen. And eventually intubated   - Intubated early AM  9/29/2021.  Extubated on 10/4/2021.  She was on Vapotherm.  Pulmonary status got worse and she was reintubated on 10/6/2021. Continued on mechanical ventilation. S/p tracheostomy and PEG placement today 10/25/2021  - s/p Remdesivir  - on Decadron, day # 28 - being tapered. On 1mg daily   - s/p  Tocilizumab  - Lovenox twice daily  - pulmonary managing.        #Possible superimposed bacterial pneumonia  Gram-negative infection  -Was on Rocephin and Zithromax for 5 days ; - changed to zosyn on 10/2 with pseudomonas in  resp culture.  Pulmonary has given zyvox for 3 days   - Completed Zosyn for 8 days  - Respiratory cultures now growing Klebsiella, initiated on Cipro. completed 7 days.  Now off all abx.       #Hypokalemia  #Hypomagnesemia  - replaced as needed    #CAD  - cont ASA, statin  - EKG with inferior T wave abnormality. Janki Patelh denied CP.  Trop neg   -telemetry monitoring     #History of tracheobronchomalacia  S/p tracheoplasty     #HTN  - Improved hypotension and off levophed  Resumed home medications-Norvasc and losartan.     #Chronic pain   History of hip fracture  - cont home oxycodone BID and percocet   - cont gabapentin and requip     #DM2  - use ssi      #Hypothyroidism  - cont synthroid         DVT Prophylaxis: Lovenox   Diet: Diet NPO  ADULT TUBE FEEDING; Orogastric; Peptide Based; Continuous; 55; Yes; 5; Q 4 hours; 60; 30; Q 3 hours  Code Status: Full Code      Margarita Barrett MD, 10/25/2021 6:10 PM

## 2021-10-25 NOTE — H&P
History and Physical / Pre-Sedation Assessment    Patient:  Inna Bueno   :   1955     Intended Procedure:  EGD    HPI: 78year old female with a history of DM, HTN, hypothyroidism, anxiety, chronic pain syndrome, DDD, fibromyalgia, GERD, OA, asthma and tracheobronchomalacia admitted with acute respiratory failure secondary to COVID-19 pneumonia. Hospital course complicated by prolonged mechanical ventilation in need of PEG tube for nutritional support    Past Medical History:   Diagnosis Date    Anxiety disorder     Chronic pain syndrome     COVID-19 2021    DDD (degenerative disc disease), lumbar     Diabetes ([address]     Displacement of lumbar intervertebral disc without myelopathy 2010    Diverticulitis     Fibromyalgia     Generalized osteoarthrosis, involving multiple sites 2010    GERD (gastroesophageal reflux disease)     Impacted cerumen 2010    Influenza A 03/10/2016    Irritable bowel syndrome 2010    Other and unspecified hyperlipidemia 2010    [facility]st[facility] joint implant failure [facility] [address]     Screening mammogram 2006    (Both)Negative    Tracheobronchomalacia     Unspecified asthma(493.90) 2010    Unspecified essential hypertension 2010    Unspecified hypothyroidism 2010     Past Surgical History:   Procedure Laterality Date    ABDOMEN SURGERY      CARDIAC CATHETERIZATION      COLON SURGERY      COLONOSCOPY      normal    HYSTERECTOMY      JOINT REPLACEMENT  10/92    Right; hip     JOINT REPLACEMENT        Left hip  followed by revision 2006    JOINT REPLACEMENT        right     JOINT REPLACEMENT         right replacement.  LUNG SURGERY  2014    tracheobronchomalacia       Medications reviewed  Prior to Admission medications    Medication Sig Start Date End Date Taking?  Authorizing Provider   levothyroxine (SYNTHROID) 100 MCG tablet Take 100 mcg by mouth Daily   Yes Historical Provider, MD simvastatin (ZOCOR) 40 MG tablet Take 40 mg by mouth nightly   Yes Historical Provider, MD   budesonide (PULMICORT FLEXHALER) 180 MCG/ACT AEPB inhaler Inhale 1 puff into the lungs 2 times daily Per [place] [address]. [place]   Yes Historical Provider, MD   oxyCODONE-acetaminophen (PERCOCET)  MG per tablet Take 1 tablet by mouth every 6 hours as needed for Pain. Per [place] [address]. [place]   Yes Historical Provider, MD   oxyCODONE (OXYCONTIN) 20 MG extended release tablet Take 20 mg by mouth every 12 hours. Per [place] [address]. [place]   Yes Historical Provider, MD   glipiZIDE (GLUCOTROL XL) 10 MG extended release tablet Take 10 mg by mouth 2 times daily Per [place] [address]. [place]   Yes Historical Provider, MD   estradiol (ESTRACE) 0.1 MG/GM vaginal cream Place vaginally See Admin Instructions Insert a pea-sized amount vaginally every day for 7 days, then twice weekly thereafter. Per [place] [address]. [place]. Last dispensed 5/10/21. Yes Historical Provider, MD   empagliflozin (JARDIANCE) 10 MG tablet Take 10 mg by mouth daily   Yes Historical Provider, MD   Multiple Vitamins-Minerals (THERAPEUTIC MULTIVITAMIN-MINERALS) tablet Take 1 tablet by mouth daily   Yes Historical Provider, MD   JANUVIA 50 MG tablet TAKE ONE TABLET BY MOUTH DAILY 11/18/19  Yes Sameera Mendes MD   losartan (COZAAR) 25 MG tablet Take 1 tablet by mouth daily 5/21/19  Yes Josefa Olivier MD   Elastic Bandages & Supports (MEDICAL COMPRESSION THIGH HIGH) MISC 1 Units by Does not apply route daily 4/26/19  Yes Cong Wright MD   Insulin Pen Needle 31G X 5 MM MISC 1 each by Does not apply route daily 3/11/19  Yes Sameera Mendes MD   rOPINIRole (REQUIP) 0.5 MG tablet TAKE 1 TABLET BY MOUTH 3 TIMES DAILY.  11/30/18  Yes Lisandro Oneil MD   sertraline (ZOLOFT) 50 MG tablet TAKE 1 TABLET BY MOUTH DAILY 11/30/18  Yes Lisandro Oneil MD   polyethylene glycol [place]) powder Take 17 g by mouth daily as needed   Yes Historical Provider, MD gabapentin (NEURONTIN) 800 MG tablet Take 800 mg by mouth 3 times daily 8/2/17  Yes Historical Provider, MD   omeprazole (PRILOSEC) 40 MG delayed release capsule Take 40 mg by mouth 2 times daily 8/2/17  Yes Historical Provider, MD   aspirin 81 MG EC tablet Take 1 tablet by mouth daily 9/24/15  Yes Peter Stubbs MD   tiZANidine (ZANAFLEX) 2 MG tablet Take 2 mg by mouth 3 times daily as needed    Yes Historical Provider, MD   albuterol sulfate  (90 Base) MCG/ACT inhaler Inhale 2 puffs into the lungs 4 times daily as needed for Wheezing 12/26/19   Washington Naranjo MD   Lancets MISC Dispense whatever insurance will cover. Patient test twice day. Dx:E11.9 3/11/19   Augusto Payne MD   blood glucose monitor strips Dispense whatever insurance will cover. Pt test twice a day dx:E11.9 3/11/19   Augusto Payne MD   blood glucose test strips (ASCENSIA AUTODISC VI;ONE TOUCH ULTRA TEST VI) strip 1 each by In Vitro route daily As needed. 3/11/19   Augusto Payne MD   Blood Glucose Monitoring Suppl CATHRYN Dispense whatever insurance will cover. Dx code:E11.9 1/11/18   Daina Oliveros MD        Allergies: Allergies   Allergen Reactions    Quinidine Anaphylaxis and Nausea And Vomiting    Sulfa Antibiotics Anaphylaxis and Nausea And Vomiting    Prednisone Other (See Comments)     Cannot tolerate oral steriods  Cannot tolerate oral steroids - causes Avascular Necrosis of joints. Cannot tolerate oral steriods    Bactrim     No Known Allergies Rash    Sulfamethoxazole-Trimethoprim Rash       Nurses notes reviewed and agreed.     Physical Exam:  Vital Signs: BP (!) 143/62   Pulse 96   Temp 97.4 °F (36.3 °C) (Axillary)   Resp 20   Ht 5' 9\" (1.753 m)   Wt 175 lb 8 oz (79.6 kg)   SpO2 97%   BMI 25.92 kg/m²    Airway: Mallampati: II (soft palate, uvula, fauces visible)  Pulmonary:Normal  Cardiac:Normal  Abdomen:Normal    Pre-Procedure Assessment / Plan:  ASA: Class 3 - A patient with severe systemic disease that limits activity but is not incapacitating  Level of Sedation Plan: Moderate sedation  Post Procedure plan: Return to same level of care    I assessed the patient and find that the patient is in satisfactory condition to proceed with the planned procedure and sedation plan. I have explained the risk, benefits, and alternatives to the procedure; the patient's caregiver understands and agrees to proceed.        Gianna Garcia MD  10/25/2021

## 2021-10-25 NOTE — PROGRESS NOTES
10/24/21 2303   Vent Information   Vent Type 980   Vent Mode AC/PC   Pressure Ordered 20   Rate Set 20 bmp   FiO2  70 %   SpO2 91 %   SpO2/FiO2 ratio 130   Sensitivity 3   PEEP/CPAP 8   Humidification Source Heated wire   Humidification Temp Measured 37   Circuit Condensation Drained   Vent Patient Data   Peak Inspiratory Pressure 30 cmH2O   Mean Airway Pressure 16 cmH20   Rate Measured 21 br/min   Vt Exhaled 438 mL   Minute Volume 7.9 Liters   I:E Ratio 1:2   Cough/Sputum   Sputum How Obtained Endotracheal   Cough Non-productive   Sputum Amount None   Spontaneous Breathing Trial (SBT) RT Doc   Pulse 82   Breath Sounds   Right Upper Lobe Diminished   Right Middle Lobe Diminished   Right Lower Lobe Diminished   Left Upper Lobe Diminished   Left Lower Lobe Diminished   Additional Respiratory  Assessments   Resp 16   Alarm Settings   High Pressure Alarm 40 cmH2O   Low Minute Volume Alarm 4 L/min   Apnea (secs) 20 secs   High Respiratory Rate 40 br/min   Low Exhaled Vt  250 mL   Patient Observation   Observations 7.5 ett 25 at lip

## 2021-10-25 NOTE — PROGRESS NOTES
Pulmonary & Critical Care Medicine ICU Progress Note    CC: Respiratory failure    Events of Last 24 hours:   Using PCV 20/8 to improve dyssynchrony  PEEP 8  FiO2 70%  Fentanyl 200 mcg/hr   Propofol 50 mcg/kg/min   Versed 3 mg/hr       Vascular lines: IV: PICC line 9/28    MV: 9/29-10/4, emergently re-intubated 10/6/21 for refractory hypoxemia      Vent Mode: AC/PC Rate Set: 20 bmp/Vt Ordered: 0 mL/ /FiO2 : 70 %  Recent Labs     10/24/21  0501 10/25/21  0515   PHART 7.425 7.373   SOV8UGW 58.7* 62.2*   PO2ART 48.8* 55.1*     IV:   midazolam 3 mg/hr (10/25/21 0628)    norepinephrine Stopped (10/18/21 0616)    fentaNYL 200 mcg/hr (10/25/21 0458)    propofol 50 mcg/kg/min (10/25/21 0627)    sodium chloride      dextrose      sodium chloride       Vitals:  Blood pressure (!) 115/58, pulse 83, temperature 98.9 °F (37.2 °C), temperature source Oral, resp. rate 9, height 5' 9\" (1.753 m), weight 175 lb 8 oz (79.6 kg), SpO2 91 %, not currently breastfeeding. on ventilator      Intake/Output Summary (Last 24 hours) at 10/25/2021 0738  Last data filed at 10/25/2021 0327  Gross per 24 hour   Intake 1874.98 ml   Output 2815 ml   Net -940.02 ml     EXAM:  General: ill appearing. Intubated sedated. Eyes: PERRL. No sclera icterus. No conjunctival injection. ENT: No discharge. Pharynx clear. ET tube in place  Neck: Trachea midline. Normal thyroid. Resp: No accessory muscle use.+ crackles. No wheezing. No rhonchi. No dullness on percussion. CV: Regular rate. Regular rhythm. No mumur or rub. No edema. GI: Non-tender. Non-distended. No masses. No organomegaly. Normal bowel sounds. No hernia. Skin: Warm and dry. No nodule on exposed extremities. No rash on exposed extremities. Lymph: No cervical LAD. No supraclavicular LAD. M/S: No cyanosis. No joint deformity. No clubbing. Neuro: Intubated sedated. Responsive to painful stimuli. Did not follow commands.    Psych: Noncommunicative unable to obtain Scheduled Meds:   potassium bicarb-citric acid  20 mEq Oral Daily    dexamethasone  1 mg IntraVENous Q24H    insulin lispro  0-18 Units SubCUTAneous Q4H    sertraline  50 mg Per NG tube Daily    Venelex   Topical BID    miconazole   Topical BID    oxyCODONE  10 mg Oral Q6H    influenza virus vaccine  0.5 mL IntraMUSCular Prior to discharge    chlorhexidine  15 mL Mouth/Throat BID    ipratropium-albuterol  1 ampule Inhalation Q4H    pantoprazole  40 mg IntraVENous Daily    levothyroxine  100 mcg Oral Daily    enoxaparin  30 mg SubCUTAneous BID    lidocaine 1 % injection  5 mL IntraDERmal Once    sodium chloride flush  5-40 mL IntraVENous 2 times per day    aspirin  81 mg Oral Daily    gabapentin  800 mg Oral TID    atorvastatin  40 mg Oral Daily    sodium chloride flush  5-40 mL IntraVENous 2 times per day       Data:  CBC:   Recent Labs     10/23/21  0555 10/24/21  0501 10/25/21  0500   WBC 7.7 9.4 8.7   HGB 8.9* 9.7* 8.6*   HCT 27.9* 30.6* 27.0*   MCV 79.6* 79.4* 79.9*   PLT 95* 111* 130*     BMP:   Recent Labs     10/23/21  0555 10/24/21  0501 10/25/21  0500    141 137   K 3.5 3.5 3.3*   CL 99 98* 95*   CO2 35* 32 36*   PHOS 3.8  --   --    BUN 13 13 19   CREATININE <0.5* <0.5* <0.5*     LIVER PROFILE:   Recent Labs     10/23/21  0555 10/24/21  0501 10/25/21  0500   AST 39* 44* 33   ALT 27 32 28   BILITOT 0.3 0.5 0.6   ALKPHOS 133* 159* 153*       Microbiology:  9/27/21 COVID-19 detected  9/30/21 Resp Pseudomonas fluorescens   10/7/2021 tracheal aspirate: Klebsiella intermediate to zosyn    Imaging:  Chest x-ray 10/25/2021  images reviewed by me and showed:   Satisfactory ETT position- 25 cm   Satisfactory PICC line position   Stable bilateral ASD       CTPA 9/27/21  No evidence of pulmonary embolism. Scattered peripheral opacities in the left lung and more consolidative area in the right lung concerning for pneumonia.  Previous right thoracotomy and upper lobectomy.    One mildly enlarged right paratracheal lymph node is present but no priors for comparison.  Nonenlarged but prominent lymph nodes in the upper abdomen and juxta diaphragmatic region      ASSESSMENT:  · Acute hypoxemic respiratory failure  · COVID-19 viral pneumonia  · ARDS  · Pseudomonas followed by Klebsiella pneumonias  · Acute kidney injury  · Electrolytes disorder   · CAD  · Chronic pain syndrome - on chronic opiates and Neurontin   · Possible early dementia, being worked up outpatient   · H/O bronchiectasis and tracheobronchomalacia/EDAC s/p tracheoplasty in 2014   · H/O EDAC (tracheobronchomalacia) s/p tracheoplasty     PLAN:  COVID-19 isolation, droplet plus    Mechanical ventilation as per my orders. The ventilator was adjusted by me at the bedside for unstable, life threatening respiratory failure. Proned 10/6/21-10/7/2; 10/11-10/12   Paralyzed 10/6/21 - 10/7/21  IV Propofol, Fentanyl & Versed for sedation, target RASS -1 to -2  Completed 7 days Cipro, 8 days Zosyn, 3 days Zyvox, prior to that 5 days of Ceftriaxone/Zithromax   Valium 10 TID   Dexamethasone D#28, @ 1 mg  Completed Remdesivir & Tocilizumab  H-SSI    Continuing home oxycodone and neurontin    Electrolytes replacement   Lasix 20 mg IV x 1   Tuber feeds at target   Lovenox for DVT prophylaxis   Plan for Trach and PEG today. Discussed with anesthesia and ENT, okay to move forward with tracheostomy today. ENT reviewed CT and tracheoplasty was below level of where trach will be placed and will not interfere. D/W  [phone]. Multiple good questions were answered. Total critical care time caring for this patient with life threatening, unstable organ failure, including direct patient contact, management of life support systems, review of data including imaging and labs, discussions with other team members and physicians is 32 minutes, excluding procedures.

## 2021-10-25 NOTE — PROGRESS NOTES
Pt tolerated PEG placement without issue. Ancef 1 Gm infused prior to cut. PEG #20 fr at 2 on the skin level. ATB ointment to site and gauze dressing applied. VSS.

## 2021-10-25 NOTE — OP NOTE
Operative Note      Patient: Brennon Santacruz  YOB: 1955  MRN: [id number]    Date of Procedure: 10/25/2021    Pre-Op Diagnosis: Acute respiratory failure with prolonged intubation secondary to Covid    Post-Op Diagnosis: Same       Procedure(s):  Tracheostomy    Surgeon(s):  [employer],     Assistant:   * No surgical staff found *    Anesthesia: None    Estimated Blood Loss (mL): Minimal    Complications: None    Specimens:   * No specimens in log *    Implants:  * No implants in log *      Drains:   NG/OG/NJ/NE Tube Orogastric 16 fr Center mouth (Active)   Surrounding Skin Dry; Intact 10/24/21 2300   Securement device Yes 10/24/21 2300   Status Other (Comment) 10/24/21 2300   Placement Verified by External Catheter Length;by Gastric Contents 10/24/21 2300   NG/OG/NJ/NE External Measurement (cm) 65 cm 10/24/21 2300   Drainage Appearance Milky 10/24/21 2300   Tube Feeding Other Tube Feeding (must specify product in comment) 10/24/21 2300   Tube Feeding Status Continuous 10/24/21 2300   Rate/Schedule 55 mL/hr 10/24/21 2300   Tube Feeding Supplement (Product) Protein Modular 10/24/21 2300   Tube Feeding Supplement Amount (mL) 677 10/23/21 1821   Tube Feeding Intake (mL) 422 ml 10/24/21 2300   Free Water Flush (mL) 50 mL 10/24/21 2300   Free Water Rate 30 mlq3h 10/24/21 2300   Residual Volume (ml) 45 ml 10/22/21 1413   Output (mL) 0 ml 10/24/21 2300       Urethral Catheter 16 fr (Active)   Catheter Indications Need for fluid volume management of the critically ill patient in a critical care setting 10/25/21 1200   Securement Device Date Changed 10/24/21 10/24/21 2300   Site Assessment Pink;Moist 10/25/21 1200   Urine Color Yellow 10/25/21 1200   Urine Appearance Clear 10/25/21 1200   Urine Odor Other (Comment) 10/25/21 1200   Output (mL) 80 mL 10/25/21 1400       [REMOVED] NG/OG/NJ/NE Tube Orogastric Right mouth (Removed)   Surrounding Skin Dry; Intact 10/02/21 0400   Securement device Yes 10/02/21 0400 Status Chimney 10/02/21 0400   Placement Verified by External Catheter Length 10/02/21 0400   NG/OG/NJ/NE External Measurement (cm) 65 cm 10/02/21 0400   Drainage Appearance Tan 10/02/21 0400   Tube Feeding High Protein 10/02/21 0400   Tube Feeding Status Continuous 10/02/21 0400   Rate/Schedule 20 mL/hr 10/02/21 0400   Tube Feeding Intake (mL) 1029 ml 10/03/21 1500   Free Water Flush (mL) 211 mL 10/03/21 1500   Free Water Rate 30mL q/4 hours 10/02/21 0400   Residual Volume (ml) 0 ml 10/04/21 0755   Output (mL) 400 ml 09/29/21 1401       [REMOVED] External Urinary Catheter (Removed)   Catheter changed  Yes 09/28/21 1145   Output (mL) 200 mL 09/29/21 0017   Suction 40 mmgHg continuous 09/28/21 1600   Placement Replaced 09/28/21 1400   Skin Assessment No Injury 09/28/21 1600       Indications:  Lukasz Lane is a now 78 y.o. female with a history of acute respiratory failure with prolonged duration. She is Covid positive. Risk benefits and alternatives procedure were described and patients family was amenable and decided to proceed with tracheostomy. Description:   After verification of informed consent, the patient was brought to the operating room and placed in the supine position. General anesthesia was induced. The cricoid and sternal notch were marked out. A horizontal incision below the cricoid was marked with a marking pen. This was injected with 1% lidocaine with 1:100,000 epinephrine. The patient was prepped and draped in a sterile fashion. A proper timeout was performed. A 15 blade was used to make a horizontal neck incision through the skin to the subcutaneous fat. Bovie electrocautery on coagulation 20 was used to dissect down through the subcutaneous fat and through the platysma. Once through the platysma, vertical oriented dissection plane was made down to the strap muscles. The midline raphe was identified and split using bovie electrocautery. The thyroid was encountered.  The thyroid was split midline using bovie electrocautery. Blunt dissection with Kittner's was used to clean off the trachea. The trachea was identified and palpated. The 2nd and 3rd tracheal rings were identified. A 6-0 Shiley cuffed tracheostomy tube was brought onto the field and tested. An 15-blade was used to make a horizontal incision between the 2nd and 3rd tracheal rings, entering the trachea. At this time, the anesthesia team pulled back on the ET tube so it was above the level of dissection. Tracheal spreaders were introduced into the incision for dilation. The 6-0 Shiley tracheostomy tube was then inserted using the obturator. The obturator was removed and the inner cannula was inserted. The tracheostomy was hooked up to the ventilation circuit. This was found to be in good position. The balloon was inflated. The tracheostomy was sutured in place with 0 Vicryl suture in 4 quadrants. A neck strap was attached to the tracheostomy tube. Care of the patient was turned back over to the anesthesia team. This concluded my portion of the procedure. There were no complications with the procedure. The patient tolerated the procedure well. She was taken back to the ICU and the same condition as prior to the procedure.       Electronically signed by Rick Love DO on 10/25/2021 at 2:39 PM

## 2021-10-25 NOTE — CARE COORDINATION
INTERDISCIPLINARY PLAN OF CARE CONFERENCE    Date/Time: 10/25/2021 11:20 AM  Completed by: Paula Sexton RN, Case Management      Patient Name:  Geroge Mcburney  YOB: 1955  Admitting Diagnosis: Dehydration [E86.0]  Hypokalemia [E87.6]  Hypomagnesemia [E83.42]  Positive D dimer [R79.89]  CHARLY (acute kidney injury) ([facility] [address] [N17.9]  Acute respiratory failure with hypoxia ([facility] [address] [J96.01]  Pneumonia due to COVID-19 virus [U07.1, J12.82]  COVID-19 [U07.1]     Admit Date/Time:  9/26/2021  8:29 PM    Chart reviewed. Interdisciplinary team contacted or reviewed plan related to patient progress and discharge plans. Disciplines included Case Management, Nursing, and Dietitian. Current Status:inpt, ICU LOC  PT/OT recommendation for discharge plan of care: tbd    Expected D/C Disposition:  tbd    Discharge Plan Comments: Reviewed chart. Pt in ICU cont on Ventilator,C-19+. Plan for Trach/PEG today. Following.     Home O2 in place on admit: No  Pt informed of need to bring portable home O2 tank on day of discharge for nursing to connect prior to leaving:  Not Indicated  Verbalized agreement/Understanding:  Not Indicated

## 2021-10-25 NOTE — PROGRESS NOTES
Patient is not able to demonstrate the ability to move from a reclining position to an upright position within the recliner due to Pt on bedrest and vent .  Milagros Sánchez RN

## 2021-10-26 ENCOUNTER — APPOINTMENT (OUTPATIENT)
Dept: GENERAL RADIOLOGY | Age: 66
DRG: 004 | End: 2021-10-26
Payer: MEDICARE

## 2021-10-26 LAB
A/G RATIO: 1.1 (ref 1.1–2.2)
ALBUMIN SERPL-MCNC: 3.3 G/DL (ref 3.4–5)
ALP BLD-CCNC: 170 U/L (ref 40–129)
ALT SERPL-CCNC: 22 U/L (ref 10–40)
ANION GAP SERPL CALCULATED.3IONS-SCNC: 11 MMOL/L (ref 3–16)
AST SERPL-CCNC: 27 U/L (ref 15–37)
BASE EXCESS ARTERIAL: 8.2 MMOL/L (ref -3–3)
BASOPHILS ABSOLUTE: 0.1 K/UL (ref 0–0.2)
BASOPHILS RELATIVE PERCENT: 0.7 %
BILIRUB SERPL-MCNC: 0.6 MG/DL (ref 0–1)
BUN BLDV-MCNC: 13 MG/DL (ref 7–20)
CALCIUM SERPL-MCNC: 9.3 MG/DL (ref 8.3–10.6)
CARBOXYHEMOGLOBIN ARTERIAL: 1 % (ref 0–1.5)
CHLORIDE BLD-SCNC: 97 MMOL/L (ref 99–110)
CO2: 33 MMOL/L (ref 21–32)
CREAT SERPL-MCNC: <0.5 MG/DL (ref 0.6–1.2)
EOSINOPHILS ABSOLUTE: 0.3 K/UL (ref 0–0.6)
EOSINOPHILS RELATIVE PERCENT: 3.2 %
GFR AFRICAN AMERICAN: >60
GFR NON-AFRICAN AMERICAN: >60
GLOBULIN: 3.1 G/DL
GLUCOSE BLD-MCNC: 107 MG/DL (ref 70–99)
GLUCOSE BLD-MCNC: 111 MG/DL (ref 70–99)
GLUCOSE BLD-MCNC: 114 MG/DL (ref 70–99)
GLUCOSE BLD-MCNC: 146 MG/DL (ref 70–99)
GLUCOSE BLD-MCNC: 168 MG/DL (ref 70–99)
GLUCOSE BLD-MCNC: 186 MG/DL (ref 70–99)
HCO3 ARTERIAL: 33.9 MMOL/L (ref 21–29)
HCT VFR BLD CALC: 28.8 % (ref 36–48)
HEMOGLOBIN, ART, EXTENDED: 9.9 G/DL (ref 12–16)
HEMOGLOBIN: 8.9 G/DL (ref 12–16)
LYMPHOCYTES ABSOLUTE: 1.2 K/UL (ref 1–5.1)
LYMPHOCYTES RELATIVE PERCENT: 12.6 %
MAGNESIUM: 1.9 MG/DL (ref 1.8–2.4)
MCH RBC QN AUTO: 24.7 PG (ref 26–34)
MCHC RBC AUTO-ENTMCNC: 31 G/DL (ref 31–36)
MCV RBC AUTO: 79.5 FL (ref 80–100)
METHEMOGLOBIN ARTERIAL: 0.3 %
MONOCYTES ABSOLUTE: 0.7 K/UL (ref 0–1.3)
MONOCYTES RELATIVE PERCENT: 7.6 %
NEUTROPHILS ABSOLUTE: 7.1 K/UL (ref 1.7–7.7)
NEUTROPHILS RELATIVE PERCENT: 75.9 %
O2 SAT, ARTERIAL: 87.6 %
O2 THERAPY: ABNORMAL
PCO2 ARTERIAL: 53.4 MMHG (ref 35–45)
PDW BLD-RTO: 20.2 % (ref 12.4–15.4)
PERFORMED ON: ABNORMAL
PH ARTERIAL: 7.42 (ref 7.35–7.45)
PLATELET # BLD: 152 K/UL (ref 135–450)
PMV BLD AUTO: 9.3 FL (ref 5–10.5)
PO2 ARTERIAL: 53.3 MMHG (ref 75–108)
POTASSIUM REFLEX MAGNESIUM: 3.4 MMOL/L (ref 3.5–5.1)
RBC # BLD: 3.62 M/UL (ref 4–5.2)
SODIUM BLD-SCNC: 141 MMOL/L (ref 136–145)
TCO2 ARTERIAL: 35.5 MMOL/L
TOTAL PROTEIN: 6.4 G/DL (ref 6.4–8.2)
WBC # BLD: 9.3 K/UL (ref 4–11)

## 2021-10-26 PROCEDURE — 6370000000 HC RX 637 (ALT 250 FOR IP): Performed by: INTERNAL MEDICINE

## 2021-10-26 PROCEDURE — C9113 INJ PANTOPRAZOLE SODIUM, VIA: HCPCS | Performed by: INTERNAL MEDICINE

## 2021-10-26 PROCEDURE — 94761 N-INVAS EAR/PLS OXIMETRY MLT: CPT

## 2021-10-26 PROCEDURE — 2580000003 HC RX 258: Performed by: INTERNAL MEDICINE

## 2021-10-26 PROCEDURE — 2500000003 HC RX 250 WO HCPCS: Performed by: INTERNAL MEDICINE

## 2021-10-26 PROCEDURE — 85025 COMPLETE CBC W/AUTO DIFF WBC: CPT

## 2021-10-26 PROCEDURE — 82803 BLOOD GASES ANY COMBINATION: CPT

## 2021-10-26 PROCEDURE — 2000000000 HC ICU R&B

## 2021-10-26 PROCEDURE — 2700000000 HC OXYGEN THERAPY PER DAY

## 2021-10-26 PROCEDURE — 99231 SBSQ HOSP IP/OBS SF/LOW 25: CPT | Performed by: OTOLARYNGOLOGY

## 2021-10-26 PROCEDURE — 6360000002 HC RX W HCPCS: Performed by: INTERNAL MEDICINE

## 2021-10-26 PROCEDURE — 71045 X-RAY EXAM CHEST 1 VIEW: CPT

## 2021-10-26 PROCEDURE — 36592 COLLECT BLOOD FROM PICC: CPT

## 2021-10-26 PROCEDURE — 99233 SBSQ HOSP IP/OBS HIGH 50: CPT | Performed by: INTERNAL MEDICINE

## 2021-10-26 PROCEDURE — 99291 CRITICAL CARE FIRST HOUR: CPT | Performed by: INTERNAL MEDICINE

## 2021-10-26 PROCEDURE — 94003 VENT MGMT INPAT SUBQ DAY: CPT

## 2021-10-26 PROCEDURE — 80053 COMPREHEN METABOLIC PANEL: CPT

## 2021-10-26 PROCEDURE — 83735 ASSAY OF MAGNESIUM: CPT

## 2021-10-26 PROCEDURE — 94640 AIRWAY INHALATION TREATMENT: CPT

## 2021-10-26 RX ORDER — FUROSEMIDE 10 MG/ML
40 INJECTION INTRAMUSCULAR; INTRAVENOUS ONCE
Status: COMPLETED | OUTPATIENT
Start: 2021-10-26 | End: 2021-10-26

## 2021-10-26 RX ORDER — DIAZEPAM 5 MG/1
5 TABLET ORAL ONCE
Status: COMPLETED | OUTPATIENT
Start: 2021-10-26 | End: 2021-10-26

## 2021-10-26 RX ADMIN — CHLORHEXIDINE GLUCONATE 0.12% ORAL RINSE 15 ML: 1.2 LIQUID ORAL at 07:50

## 2021-10-26 RX ADMIN — POTASSIUM CHLORIDE 20 MEQ: 400 INJECTION, SOLUTION INTRAVENOUS at 08:00

## 2021-10-26 RX ADMIN — ATORVASTATIN CALCIUM 40 MG: 40 TABLET, FILM COATED ORAL at 07:50

## 2021-10-26 RX ADMIN — OXYCODONE 10 MG: 5 TABLET ORAL at 21:08

## 2021-10-26 RX ADMIN — GABAPENTIN 800 MG: 400 CAPSULE ORAL at 07:50

## 2021-10-26 RX ADMIN — INSULIN LISPRO 3 UNITS: 100 INJECTION, SOLUTION INTRAVENOUS; SUBCUTANEOUS at 23:53

## 2021-10-26 RX ADMIN — SERTRALINE HYDROCHLORIDE 50 MG: 50 TABLET ORAL at 07:50

## 2021-10-26 RX ADMIN — INSULIN LISPRO 3 UNITS: 100 INJECTION, SOLUTION INTRAVENOUS; SUBCUTANEOUS at 20:11

## 2021-10-26 RX ADMIN — FENTANYL CITRATE 50 MCG: 0.05 INJECTION, SOLUTION INTRAMUSCULAR; INTRAVENOUS at 02:02

## 2021-10-26 RX ADMIN — GABAPENTIN 800 MG: 400 CAPSULE ORAL at 15:06

## 2021-10-26 RX ADMIN — MIDAZOLAM HYDROCHLORIDE 2 MG: 1 INJECTION, SOLUTION INTRAMUSCULAR; INTRAVENOUS at 17:16

## 2021-10-26 RX ADMIN — IPRATROPIUM BROMIDE AND ALBUTEROL SULFATE 1 AMPULE: .5; 2.5 SOLUTION RESPIRATORY (INHALATION) at 12:25

## 2021-10-26 RX ADMIN — Medication 175 MCG/HR: at 10:07

## 2021-10-26 RX ADMIN — SODIUM CHLORIDE, PRESERVATIVE FREE 10 ML: 5 INJECTION INTRAVENOUS at 07:52

## 2021-10-26 RX ADMIN — ASPIRIN 81 MG: 81 TABLET, CHEWABLE ORAL at 07:51

## 2021-10-26 RX ADMIN — CASTOR OIL AND BALSAM, PERU: 788; 87 OINTMENT TOPICAL at 21:09

## 2021-10-26 RX ADMIN — OXYCODONE 10 MG: 5 TABLET ORAL at 04:19

## 2021-10-26 RX ADMIN — CASTOR OIL AND BALSAM, PERU: 788; 87 OINTMENT TOPICAL at 07:49

## 2021-10-26 RX ADMIN — MIDAZOLAM HYDROCHLORIDE 2 MG: 1 INJECTION, SOLUTION INTRAMUSCULAR; INTRAVENOUS at 01:42

## 2021-10-26 RX ADMIN — GABAPENTIN 800 MG: 400 CAPSULE ORAL at 21:09

## 2021-10-26 RX ADMIN — MICONAZOLE NITRATE: 2 POWDER TOPICAL at 21:10

## 2021-10-26 RX ADMIN — MICONAZOLE NITRATE: 2 POWDER TOPICAL at 07:49

## 2021-10-26 RX ADMIN — SODIUM CHLORIDE, PRESERVATIVE FREE 10 ML: 5 INJECTION INTRAVENOUS at 21:10

## 2021-10-26 RX ADMIN — LEVOTHYROXINE SODIUM 100 MCG: 100 TABLET ORAL at 05:36

## 2021-10-26 RX ADMIN — POTASSIUM BICARBONATE 20 MEQ: 391 TABLET, EFFERVESCENT ORAL at 07:51

## 2021-10-26 RX ADMIN — IPRATROPIUM BROMIDE AND ALBUTEROL SULFATE 1 AMPULE: .5; 2.5 SOLUTION RESPIRATORY (INHALATION) at 19:10

## 2021-10-26 RX ADMIN — Medication 175 MCG/HR: at 04:16

## 2021-10-26 RX ADMIN — DIAZEPAM 5 MG: 5 TABLET ORAL at 12:08

## 2021-10-26 RX ADMIN — DIAZEPAM 10 MG: 10 TABLET ORAL at 00:56

## 2021-10-26 RX ADMIN — IPRATROPIUM BROMIDE AND ALBUTEROL SULFATE 1 AMPULE: .5; 2.5 SOLUTION RESPIRATORY (INHALATION) at 15:13

## 2021-10-26 RX ADMIN — ENOXAPARIN SODIUM 30 MG: 30 INJECTION SUBCUTANEOUS at 07:51

## 2021-10-26 RX ADMIN — OXYCODONE 10 MG: 5 TABLET ORAL at 17:41

## 2021-10-26 RX ADMIN — IPRATROPIUM BROMIDE AND ALBUTEROL SULFATE 1 AMPULE: .5; 2.5 SOLUTION RESPIRATORY (INHALATION) at 03:38

## 2021-10-26 RX ADMIN — DIAZEPAM 10 MG: 10 TABLET ORAL at 07:50

## 2021-10-26 RX ADMIN — POTASSIUM CHLORIDE 20 MEQ: 400 INJECTION, SOLUTION INTRAVENOUS at 07:00

## 2021-10-26 RX ADMIN — CHLORHEXIDINE GLUCONATE 0.12% ORAL RINSE 15 ML: 1.2 LIQUID ORAL at 21:08

## 2021-10-26 RX ADMIN — IPRATROPIUM BROMIDE AND ALBUTEROL SULFATE 1 AMPULE: .5; 2.5 SOLUTION RESPIRATORY (INHALATION) at 07:10

## 2021-10-26 RX ADMIN — DIAZEPAM 15 MG: 10 TABLET ORAL at 17:19

## 2021-10-26 RX ADMIN — Medication 175 MCG/HR: at 15:12

## 2021-10-26 RX ADMIN — PROPOFOL 30 MCG/KG/MIN: 10 INJECTION, EMULSION INTRAVENOUS at 03:44

## 2021-10-26 RX ADMIN — ENOXAPARIN SODIUM 30 MG: 30 INJECTION SUBCUTANEOUS at 21:09

## 2021-10-26 RX ADMIN — DEXAMETHASONE SODIUM PHOSPHATE 1 MG: 4 INJECTION, SOLUTION INTRAMUSCULAR; INTRAVENOUS at 15:06

## 2021-10-26 RX ADMIN — INSULIN LISPRO 3 UNITS: 100 INJECTION, SOLUTION INTRAVENOUS; SUBCUTANEOUS at 17:16

## 2021-10-26 RX ADMIN — IPRATROPIUM BROMIDE AND ALBUTEROL SULFATE 1 AMPULE: .5; 2.5 SOLUTION RESPIRATORY (INHALATION) at 22:51

## 2021-10-26 RX ADMIN — PANTOPRAZOLE SODIUM 40 MG: 40 INJECTION, POWDER, FOR SOLUTION INTRAVENOUS at 07:51

## 2021-10-26 RX ADMIN — SODIUM CHLORIDE, PRESERVATIVE FREE 10 ML: 5 INJECTION INTRAVENOUS at 21:09

## 2021-10-26 RX ADMIN — OXYCODONE 10 MG: 5 TABLET ORAL at 12:08

## 2021-10-26 RX ADMIN — Medication 175 MCG/HR: at 21:08

## 2021-10-26 RX ADMIN — FUROSEMIDE 40 MG: 10 INJECTION, SOLUTION INTRAMUSCULAR; INTRAVENOUS at 12:08

## 2021-10-26 ASSESSMENT — PULMONARY FUNCTION TESTS
PIF_VALUE: 32
PIF_VALUE: 29
PIF_VALUE: 32
PIF_VALUE: 29
PIF_VALUE: 32
PIF_VALUE: 29
PIF_VALUE: 33
PIF_VALUE: 28
PIF_VALUE: 29
PIF_VALUE: 30
PIF_VALUE: 29
PIF_VALUE: 31

## 2021-10-26 ASSESSMENT — PAIN SCALES - GENERAL
PAINLEVEL_OUTOF10: 0
PAINLEVEL_OUTOF10: 6

## 2021-10-26 NOTE — PROGRESS NOTES
PROGRESS NOTE  S:66 yrs Patient  admitted on 9/26/2021 with Dehydration [E86.0]  Hypokalemia [E87.6]  Hypomagnesemia [E83.42]  Positive D dimer [R79.89]  CHARLY (acute kidney injury) ([facility] [address] [N17.9]  Acute respiratory failure with hypoxia ([facility] [address] [J96.01]  Pneumonia due to COVID-19 virus [U07.1, J12.82]  COVID-19 [U07.1] . Today she is s/p trach and PEG placement. Nurse reports no signs of bleeding. Exam:   Vitals:    10/26/21 1200   BP: (!) 175/86   Pulse: 102   Resp: 17   Temp:    SpO2: 90%       Due to the current efforts to prevent transmission of COVID-19 and also the need to preserve PPE for other caregivers, a face-to-face encounter with the patient was not performed. That being said, all relevant records and diagnostic tests were reviewed, including laboratory results and imaging. Please reference any relevant documentation elsewhere. Care will be coordinated with the primary service. Medications: Reviewed    Labs:  CBC:   Recent Labs     10/24/21  0501 10/25/21  0500 10/26/21  0536   WBC 9.4 8.7 9.3   HGB 9.7* 8.6* 8.9*   HCT 30.6* 27.0* 28.8*   MCV 79.4* 79.9* 79.5*   * 130* 152     BMP:   Recent Labs     10/24/21  0501 10/25/21  0500 10/26/21  0536    137 141   K 3.5 3.3* 3.4*   CL 98* 95* 97*   CO2 32 36* 33*   BUN 13 19 13   CREATININE <0.5* <0.5* <0.5*     LIVER PROFILE:   Recent Labs     10/24/21  0501 10/25/21  0500 10/26/21  0536   AST 44* 33 27   ALT 32 28 22   PROT 6.6 6.5 6.4   BILITOT 0.5 0.6 0.6   ALKPHOS 159* 153* 170*     PT/INR: No results for input(s): INR in the last 72 hours. Invalid input(s): PT      IMAGING:  XR CHEST PORTABLE   Final Result   Moderate to severe bilateral pneumonias similar in appearance to yesterday. Removal of NG tube. XR CHEST PORTABLE   Final Result   Patchy airspace opacities bilaterally, may be related to pulmonary edema   versus pneumonia.       Stable cardiomegaly         XR CHEST PORTABLE Final Result   No significant interval change. XR CHEST PORTABLE   Final Result   Supportive tubing projects in normal position. Bilateral interstitial-alveolar opacities, likely combination of pulmonary   edema pneumonia. Although they appear more prominent this may be secondary   to the rotation on the present study. Continued surveillance is recommended. Suspected small right pleural effusion. XR CHEST PORTABLE   Final Result   Persistent multifocal bilateral pulmonary opacity, slightly improved at the   right base as compared to prior. XR CHEST PORTABLE   Final Result   Multifocal airspace disease with right-sided pleural effusion, similar to   prior. No significant change         XR CHEST PORTABLE   Final Result   Stable support apparatus. Persistent multifocal airspace disease. No substantial change. XR CHEST PORTABLE   Final Result   Unchanged multifocal bilateral pneumonia. XR CHEST PORTABLE   Final Result   No significant change in the multifocal bilateral infiltrates         XR CHEST PORTABLE   Final Result   Diffuse bilateral airspace disease appears unchanged on the right and   increased on the left. XR CHEST PORTABLE   Final Result   Bilateral pleuroparenchymal disease, with improved aeration of the right lung   compared to prior. XR CHEST PORTABLE   Final Result   Line and tube as above. Bilateral interstitial opacities are worsened when compared to prior study. XR CHEST PORTABLE   Final Result   Stable chest.  Diffuse interstitial changes, possibly infiltrate or edema. Satisfactory position of endotracheal tube. XR CHEST PORTABLE   Final Result   Stable support apparatus. In this case, both multifocal pneumonia and pulmonary edema are both   considered, and the changes appear increased when compared to the previous   exam.         XR CHEST PORTABLE   Final Result   1.  Stable appropriate positions of support apparatus. 2. Slight interval progression of multifocal airspace disease throughout both   lungs, likely a combination of multifocal pneumonia and superimposed   pulmonary edema. 3. Stable cardiomegaly and small bilateral pleural effusions. XR CHEST PORTABLE   Final Result   Improving aeration of the right lung with grossly stable left basilar   airspace opacities. XR CHEST PORTABLE   Final Result   Supportive tubing is in stable position. Stable pattern of bilateral ground-glass opacities and basilar consolidations. XR CHEST PORTABLE   Final Result   Stable appearance of the chest.  Diffuse ground-glass opacities on the right   and mild left basilar opacities. XR CHEST PORTABLE   Final Result   Improved aeration on the left, possibly improving pulmonary edema. Persistent airspace disease in the right base may represent concurrent   pneumonia         XR ABDOMEN (KUB) (SINGLE AP VIEW)   Final Result   Enteric catheter tip likely in the distal gastric body. XR CHEST PORTABLE   Final Result   1. Endotracheal tube projects in the appropriate position. 2. Enteric tube extends below the diaphragm and out of the field of view. XR CHEST PORTABLE   Final Result   Increasing diffuse airspace opacification throughout the lungs. Pattern may   represent pulmonary edema or worsening pneumonitis. XR CHEST PORTABLE   Final Result   1. Interval increased bilateral pulmonary opacities with new consolidative   change at the right lung base. These findings could be secondary to   pulmonary edema or infection. 2. Consolidative change at the right lung base could also be secondary to   atelectasis/mucous plug. XR CHEST PORTABLE   Final Result   No significant interval change in small right pleural effusion or bilateral   heterogeneous opacity which can reflect pulmonary edema or pneumonia.          XR CHEST PORTABLE   Final Result   Mild improvement from prior comparison. Mild-to-moderate congestion and/or   infiltrates identified in the lungs. ET tube terminates 7 cm superior to the saba. XR CHEST PORTABLE   Final Result   Endotracheal tube and orogastric tube unchanged, adequate position. Slightly increased extensive bilateral pulmonary disease over the past 24   hours this may relate to interstitial edema or diffuse infection or a   combination. XR CHEST PORTABLE   Final Result   Endotracheal tube with its tip approximately 4.7 cm from the saba in   satisfactory position. Enteric tube in satisfactory position. Patchy bilateral airspace disease is again noted with improved aeration of   the left mid lung. XR CHEST PORTABLE   Final Result   Some improvement in the appearance of the chest with clearing of some of the   acute airspace disease from the mid left lung. Large amount of pneumonia   remains within the right lung and lower left lung. XR CHEST PORTABLE   Final Result   1. Endotracheal tube, nasogastric tube, and right PICC line placement. 2.  Increasing multifocal opacities with consolidative area in the right   perihilar lung. Could be multifocal pneumonia with or without edema. IR PICC WO SQ PORT/PUMP > 5 YEARS   Final Result   1. See above. CT CHEST PULMONARY EMBOLISM W CONTRAST   Final Result   No evidence of pulmonary embolism. Scattered peripheral opacities in the left lung and more consolidative area   in the right lung concerning for pneumonia. Previous right thoracotomy and   upper lobectomy. One mildly enlarged right paratracheal lymph node is present but no priors   for comparison. Nonenlarged but prominent lymph nodes in the upper abdomen   and juxta diaphragmatic region. XR CHEST PORTABLE   Final Result   Mild cardiomegaly without interstitial edema. Metallic surgical clips from prior right thoracotomy.       COPD with anterior of PEG tube for nutritional support. Recommendation:  1. Continue supportive care  2. Keep PEG site clean and dry  3. Keep HOB elevated during PEG use  4. Flush PEG with 60 ml free water q6h and after use  5. Begin TFs per PEG following dietician recommendations  6. Call with questions     Rosey Enriquez PA-C  12:29 PM 10/26/2021                      78year old female with a history of DM, HTN, Hypothyroidism, anxiety, chronic pain syndrome, DDD, fibromyalgia, GERD, OA, and tracheobronchomalacia admitted with acute respiratory failure secondary to COVID-19 pneumonia. Hospital course complicated by prolonged mechanical ventilation s/p tracheostomy and PEG tube placement    Continue supportive care. Start TFs per PEG. Keep HOB elevated during PEG use. Flush PEG with 60mL free water every 6h and after use.      Daryle Duke, MD          [phone]  [phone]

## 2021-10-26 NOTE — PROGRESS NOTES
10/26/21 1910   Vent Information   Vent Mode AC/PC   Pressure Ordered 20   Rate Set 20 bmp   FiO2  60 %   SpO2 92 %   Sensitivity 3   PEEP/CPAP 8   Vent Patient Data   Peak Inspiratory Pressure 29 cmH2O   Mean Airway Pressure 17 cmH20   Rate Measured 23 br/min   Vt Exhaled 453 mL   Minute Volume 9.6 Liters   I:E Ratio 1:2.30   Plateau Pressure 34 IYT15   Static Compliance 17 mL/cmH2O   Dynamic Compliance 22 mL/cmH2O   Cough/Sputum   Sputum How Obtained Suctioned;Tracheal   Cough Productive   Sputum Amount Small   Sputum Color Creamy   Tenacity Thick

## 2021-10-26 NOTE — PROGRESS NOTES
RT Inhaler-Nebulizer Bronchodilator Protocol Note    There is a bronchodilator order in the chart from a provider indicating to follow the RT Bronchodilator Protocol and there is an Initiate RT Inhaler-Nebulizer Bronchodilator Protocol order as well (see protocol at bottom of note). CXR Findings:  XR CHEST PORTABLE    Result Date: 10/25/2021  Patchy airspace opacities bilaterally, may be related to pulmonary edema versus pneumonia. Stable cardiomegaly     XR CHEST PORTABLE    Result Date: 10/24/2021  No significant interval change. The findings from the last RT Protocol Assessment were as follows:   History Pulmonary Disease: Chronic pulmonary disease  Respiratory Pattern: Mild dyspnea at rest, irregular pattern, or RR 21-25 bpm  Breath Sounds: Slightly diminished and/or crackles  Cough: Weak, productive  Indication for Bronchodilator Therapy: Decreased or absent breath sounds, On home bronchodilators  Bronchodilator Assessment Score: 10    Aerosolized bronchodilator medication orders have been revised according to the RT Inhaler-Nebulizer Bronchodilator Protocol below. Respiratory Therapist to perform RT Therapy Protocol Assessment initially then follow the protocol. Repeat RT Therapy Protocol Assessment PRN for score 0-3 or on second treatment, BID, and PRN for scores above 3. No Indications - adjust the frequency to every 6 hours PRN wheezing or bronchospasm, if no treatments needed after 48 hours then discontinue using Per Protocol order mode. If indication present, adjust the RT bronchodilator orders based on the Bronchodilator Assessment Score as indicated below.   Use Inhaler orders unless patient has one or more of the following: on home nebulizer, not able to hold breath for 10 seconds, is not alert and oriented, cannot activate and use MDI correctly, or respiratory rate 25 breaths per minute or more, then use the equivalent nebulizer order(s) with same Frequency and PRN reasons based on the score. If a patient is on this medication at home then do not decrease Frequency below that used at home. 0-3 - enter or revise RT bronchodilator order(s) to equivalent RT Bronchodilator order with Frequency of every 4 hours PRN for wheezing or increased work of breathing using Per Protocol order mode. 4-6 - enter or revise RT Bronchodilator order(s) to two equivalent RT bronchodilator orders with one order with BID Frequency and one order with Frequency of every 4 hours PRN wheezing or increased work of breathing using Per Protocol order mode. 7-10 - enter or revise RT Bronchodilator order(s) to two equivalent RT bronchodilator orders with one order with TID Frequency and one order with Frequency of every 4 hours PRN wheezing or increased work of breathing using Per Protocol order mode. 11-13 - enter or revise RT Bronchodilator order(s) to one equivalent RT bronchodilator order with QID Frequency and an Albuterol order with Frequency of every 4 hours PRN wheezing or increased work of breathing using Per Protocol order mode. Greater than 13 - enter or revise RT Bronchodilator order(s) to one equivalent RT bronchodilator order with every 4 hours Frequency and an Albuterol order with Frequency of every 2 hours PRN wheezing or increased work of breathing using Per Protocol order mode.        Electronically signed by Lyudmila Montes De Oca RCP on 10/25/2021 at 8:43 PM

## 2021-10-26 NOTE — PROGRESS NOTES
Pulmonary & Critical Care Medicine ICU Progress Note    CC: Respiratory failure    Events of Last 24 hours:   Still on PCV 20/8 in order to improve dyssynchrony  PEEP 8  FiO2 60%  Fentanyl 175 mcg/hr   Propofol 30 mcg/kg/min   Versed off since last night       Vascular lines: IV: PICC line 9/28    MV: 9/29-10/4, emergently re-intubated 10/6/21 for refractory hypoxemia      Vent Mode: AC/PC Rate Set: 20 bmp/Vt Ordered: 0 mL/ /FiO2 : 60 %  Recent Labs     10/25/21  0515 10/26/21  0536   PHART 7.373 7.420   WLE5NKC 62.2* 53.4*   PO2ART 55.1* 53.3*     IV:   midazolam Stopped (10/25/21 2308)    norepinephrine Stopped (10/18/21 0616)    fentaNYL 175 mcg/hr (10/26/21 5503)    propofol 30 mcg/kg/min (10/26/21 2316)    sodium chloride      dextrose      sodium chloride       Vitals:  Blood pressure (!) 150/76, pulse 92, temperature 97.5 °F (36.4 °C), temperature source Axillary, resp. rate 18, height 5' 9\" (1.753 m), weight 175 lb 14.4 oz (79.8 kg), SpO2 92 %, not currently breastfeeding. on ventilator      Intake/Output Summary (Last 24 hours) at 10/26/2021 0717  Last data filed at 10/26/2021 6542  Gross per 24 hour   Intake 1949.47 ml   Output 1915 ml   Net 34.47 ml     EXAM:  General: ill appearing. Intubated sedated. Eyes: PERRL. No sclera icterus. No conjunctival injection. ENT: No discharge. Pharynx clear. ET tube in place  Neck: Trachea midline. Normal thyroid. Resp: No accessory muscle use. Few crackles. No wheezing. No rhonchi. No dullness on percussion. CV: Regular rate. Regular rhythm. No mumur or rub. No edema. GI: Non-tender. Non-distended. No masses. No organomegaly. Normal bowel sounds. No hernia. Skin: Warm and dry. No nodule on exposed extremities. No rash on exposed extremities. Lymph: No cervical LAD. No supraclavicular LAD. M/S: No cyanosis. No joint deformity. No clubbing. Neuro: Intubated sedated. Responsive to painful stimuli. Did not follow commands.    Psych: Noncommunicative unable to obtain         Scheduled Meds:   diazePAM  10 mg Oral Q8H    potassium bicarb-citric acid  20 mEq Oral Daily    dexamethasone  1 mg IntraVENous Q24H    insulin lispro  0-18 Units SubCUTAneous Q4H    sertraline  50 mg Per NG tube Daily    Venelex   Topical BID    miconazole   Topical BID    oxyCODONE  10 mg Oral Q6H    influenza virus vaccine  0.5 mL IntraMUSCular Prior to discharge    chlorhexidine  15 mL Mouth/Throat BID    ipratropium-albuterol  1 ampule Inhalation Q4H    pantoprazole  40 mg IntraVENous Daily    levothyroxine  100 mcg Oral Daily    enoxaparin  30 mg SubCUTAneous BID    lidocaine 1 % injection  5 mL IntraDERmal Once    sodium chloride flush  5-40 mL IntraVENous 2 times per day    aspirin  81 mg Oral Daily    gabapentin  800 mg Oral TID    atorvastatin  40 mg Oral Daily    sodium chloride flush  5-40 mL IntraVENous 2 times per day       Data:  CBC:   Recent Labs     10/24/21  0501 10/25/21  0500 10/26/21  0536   WBC 9.4 8.7 9.3   HGB 9.7* 8.6* 8.9*   HCT 30.6* 27.0* 28.8*   MCV 79.4* 79.9* 79.5*   * 130* 152     BMP:   Recent Labs     10/24/21  0501 10/25/21  0500 10/26/21  0536    137 141   K 3.5 3.3* 3.4*   CL 98* 95* 97*   CO2 32 36* 33*   BUN 13 19 13   CREATININE <0.5* <0.5* <0.5*     LIVER PROFILE:   Recent Labs     10/24/21  0501 10/25/21  0500 10/26/21  0536   AST 44* 33 27   ALT 32 28 22   BILITOT 0.5 0.6 0.6   ALKPHOS 159* 153* 170*       Microbiology:  9/27/21 COVID-19 detected  9/30/21 Resp Pseudomonas fluorescens   10/7/2021 tracheal aspirate: Klebsiella intermediate to zosyn    Imaging:  Chest x-ray 10/26/2021  images reviewed by me and showed:   Iselae Rand in place   Satisfactory PICC line position   Bilateral ASD - worse       CTPA 9/27/21  No evidence of pulmonary embolism.    Scattered peripheral opacities in the left lung and more consolidative area in the right lung concerning for pneumonia.  Previous right thoracotomy and upper lobectomy. One mildly enlarged right paratracheal lymph node is present but no priors for comparison.  Nonenlarged but prominent lymph nodes in the upper abdomen and juxta diaphragmatic region      ASSESSMENT:  · Acute hypoxemic respiratory failure  · COVID-19 viral pneumonia  · ARDS  · Pseudomonas followed by Klebsiella pneumonias  · Acute kidney injury  · Electrolytes disorder   · Tracheostomy Shilet 6 cuffed and PEG tube placement 10/25/2021  · CAD  · Chronic pain syndrome - on chronic opiates and Neurontin   · Possible early dementia, being worked up outpatient   · H/O bronchiectasis and tracheobronchomalacia/EDAC s/p tracheoplasty in 2014   · H/O EDAC (tracheobronchomalacia) s/p tracheoplasty     PLAN:  COVID-19 isolation, droplet plus    Mechanical ventilation as per my orders. The ventilator was adjusted by me at the bedside for unstable, life threatening respiratory failure. Proned 10/6/21-10/7/2; 10/11-10/12  Paralyzed 10/6/21 - 10/7/21  IV Propofol, Fentanyl  for sedation, target RASS -1 to -2  Wean off Propofol   Increase Valium 15 TID. Continuing home oxycodone and neurontin     Completed 7 days Cipro, 8 days Zosyn, 3 days Zyvox, prior to that 5 days of Ceftriaxone/Zithromax   Dexamethasone D#29, @ 1 mg  Completed Remdesivir & Tocilizumab  Electrolytes replacement   Repeat Lasix 20 mg IV x 1   Tuber feeds to restart today   Lovenox for DVT prophylaxis    [id number]. Updated yesterday. Multiple good questions were answered. LTAC evaluation           Total critical care time caring for this patient with life threatening, unstable organ failure, including direct patient contact, management of life support systems, review of data including imaging and labs, discussions with other team members and physicians is 33 minutes, excluding procedures.

## 2021-10-26 NOTE — PROGRESS NOTES
Pt alarming ventialtor- double stacking breaths & pulling low TV. PRN versed given.    Luli Brady RN, BSN

## 2021-10-26 NOTE — PROGRESS NOTES
10/26/21 1513   Vent Information   Vent Type 980   Vent Mode AC/PC   Vt Ordered 0 mL   Rate Set 20 bmp   Peak Flow 0 L/min   Pressure Support 0 cmH20   FiO2  60 %   SpO2 (!) 89 %   SpO2/FiO2 ratio 148.33   Sensitivity 3   PEEP/CPAP 8   I Time/ I Time % 0 s   Vent Patient Data   High Peep/I Pressure 20   Peak Inspiratory Pressure 33 cmH2O   Mean Airway Pressure 19 cmH20   Rate Measured 29 br/min   Vt Exhaled 333 mL   Minute Volume 11.1 Liters   I:E Ratio 1.10:1   Cough/Sputum   Sputum How Obtained Suctioned;Tracheal   Sputum Amount Small   Sputum Color Creamy   Tenacity Thick   Spontaneous Breathing Trial (SBT) RT Doc   Pulse 111   Breath Sounds   Right Upper Lobe Diminished   Right Middle Lobe Diminished   Right Lower Lobe Diminished   Left Upper Lobe Diminished   Left Lower Lobe Diminished   Additional Respiratory  Assessments   Resp 27   Position Semi-Jesus's   Oral Care Mouth suctioned   Alarm Settings   High Pressure Alarm 40 cmH2O   Low Minute Volume Alarm 4 L/min   High Respiratory Rate 40 br/min

## 2021-10-26 NOTE — PROGRESS NOTES
Dr. Stepan See at bedside    -lasix 40 mg  -stop propofol gtt  -increase valium    Called pts daughter Jake Grimes & updated on pt status.     Cammy Apgar RN, BSN

## 2021-10-26 NOTE — PROGRESS NOTES
AM assessment completed. AM labs reviewed. VSS. Pt on Fentanyl & Propofol gtt for sedation. No restraints needed. Pt PEG WNL- AM meds given. PICC WNL. No new orders.    Lloyd Landa RN, BSN

## 2021-10-26 NOTE — PROGRESS NOTES
precautions  General: Sedated, on mechanical ventilation . Tracheostomy+  Skin:  Warm and dry  Neck:   trach +  Chest: diminished breath sounds,   No wheezes or rhonchi. Cardiovascular:  RRR ,S1S2 normal  Abdomen:  Soft, non tender, non distended, BS +. PEG +  Extremities:  No edema. Intact peripheral pulses. Brisk cap refill, < 2 secs  Neuro: sedated    Labs:   Recent Labs     10/24/21  0501 10/25/21  0500 10/26/21  0536   WBC 9.4 8.7 9.3   HGB 9.7* 8.6* 8.9*   HCT 30.6* 27.0* 28.8*   * 130* 152     Recent Labs     10/24/21  0501 10/25/21  0500 10/26/21  0536    137 141   K 3.5 3.3* 3.4*   CL 98* 95* 97*   CO2 32 36* 33*   BUN 13 19 13   CREATININE <0.5* <0.5* <0.5*   CALCIUM 9.3 9.4 9.3     Recent Labs     10/24/21  0501 10/25/21  0500 10/26/21  0536   AST 44* 33 27   ALT 32 28 22   BILITOT 0.5 0.6 0.6   ALKPHOS 159* 153* 170*     No results for input(s): INR in the last 72 hours. No results for input(s): Rick Shorts in the last 72 hours. Urinalysis:      Lab Results   Component Value Date    NITRU Negative 09/27/2021    WBCUA 0-2 09/27/2021    BACTERIA Rare 09/27/2021    RBCUA None seen 09/27/2021    BLOODU TRACE-INTACT 09/27/2021    SPECGRAV <=1.005 09/27/2021    GLUCOSEU >=1000 09/27/2021    GLUCOSEU 250 05/11/2011       Radiology:  XR CHEST PORTABLE   Final Result   Moderate to severe bilateral pneumonias similar in appearance to yesterday. Removal of NG tube. XR CHEST PORTABLE   Final Result   Patchy airspace opacities bilaterally, may be related to pulmonary edema   versus pneumonia. Stable cardiomegaly         XR CHEST PORTABLE   Final Result   No significant interval change. XR CHEST PORTABLE   Final Result   Supportive tubing projects in normal position. Bilateral interstitial-alveolar opacities, likely combination of pulmonary   edema pneumonia. Although they appear more prominent this may be secondary   to the rotation on the present study. Continued surveillance is recommended. Suspected small right pleural effusion. XR CHEST PORTABLE   Final Result   Persistent multifocal bilateral pulmonary opacity, slightly improved at the   right base as compared to prior. XR CHEST PORTABLE   Final Result   Multifocal airspace disease with right-sided pleural effusion, similar to   prior. No significant change         XR CHEST PORTABLE   Final Result   Stable support apparatus. Persistent multifocal airspace disease. No substantial change. XR CHEST PORTABLE   Final Result   Unchanged multifocal bilateral pneumonia. XR CHEST PORTABLE   Final Result   No significant change in the multifocal bilateral infiltrates         XR CHEST PORTABLE   Final Result   Diffuse bilateral airspace disease appears unchanged on the right and   increased on the left. XR CHEST PORTABLE   Final Result   Bilateral pleuroparenchymal disease, with improved aeration of the right lung   compared to prior. XR CHEST PORTABLE   Final Result   Line and tube as above. Bilateral interstitial opacities are worsened when compared to prior study. XR CHEST PORTABLE   Final Result   Stable chest.  Diffuse interstitial changes, possibly infiltrate or edema. Satisfactory position of endotracheal tube. XR CHEST PORTABLE   Final Result   Stable support apparatus. In this case, both multifocal pneumonia and pulmonary edema are both   considered, and the changes appear increased when compared to the previous   exam.         XR CHEST PORTABLE   Final Result   1. Stable appropriate positions of support apparatus. 2. Slight interval progression of multifocal airspace disease throughout both   lungs, likely a combination of multifocal pneumonia and superimposed   pulmonary edema. 3. Stable cardiomegaly and small bilateral pleural effusions.          XR CHEST PORTABLE   Final Result   Improving aeration of the right lung with grossly stable left basilar   airspace opacities. XR CHEST PORTABLE   Final Result   Supportive tubing is in stable position. Stable pattern of bilateral ground-glass opacities and basilar consolidations. XR CHEST PORTABLE   Final Result   Stable appearance of the chest.  Diffuse ground-glass opacities on the right   and mild left basilar opacities. XR CHEST PORTABLE   Final Result   Improved aeration on the left, possibly improving pulmonary edema. Persistent airspace disease in the right base may represent concurrent   pneumonia         XR ABDOMEN (KUB) (SINGLE AP VIEW)   Final Result   Enteric catheter tip likely in the distal gastric body. XR CHEST PORTABLE   Final Result   1. Endotracheal tube projects in the appropriate position. 2. Enteric tube extends below the diaphragm and out of the field of view. XR CHEST PORTABLE   Final Result   Increasing diffuse airspace opacification throughout the lungs. Pattern may   represent pulmonary edema or worsening pneumonitis. XR CHEST PORTABLE   Final Result   1. Interval increased bilateral pulmonary opacities with new consolidative   change at the right lung base. These findings could be secondary to   pulmonary edema or infection. 2. Consolidative change at the right lung base could also be secondary to   atelectasis/mucous plug. XR CHEST PORTABLE   Final Result   No significant interval change in small right pleural effusion or bilateral   heterogeneous opacity which can reflect pulmonary edema or pneumonia. XR CHEST PORTABLE   Final Result   Mild improvement from prior comparison. Mild-to-moderate congestion and/or   infiltrates identified in the lungs. ET tube terminates 7 cm superior to the saba. XR CHEST PORTABLE   Final Result   Endotracheal tube and orogastric tube unchanged, adequate position.       Slightly increased extensive bilateral pulmonary disease over the distress syndrome) ([facility] [J80]     Pneumonia due to Klebsiella pneumoniae ([facility] [J15.0]     Electrolyte disorder [E87.8]     Acute hypoxemic respiratory failure ([facility] [J96.01]     Pneumonia due to Pseudomonas species ([facility] [J15.1]     Hypomagnesemia [E83.42]     Hypokalemia [E87.6]     Gram-negative pneumonia (HCC) [J15.6]     Severe protein-calorie malnutrition (HCC) [E43]     Hypotension [I95.9]     Acute respiratory failure with hypoxia ([facility]) [J96.01]     COVID-19 [U07.1]     Pneumonia due to COVID-19 virus [U07.1, J12.82]     CHARLY (acute kidney injury) ([facility] [N17.9]     Uncontrolled hypertension [I10]     Fever [R50.9]     Suspected COVID-19 virus infection [Z20.822]     Hypoxia [R09.02]     Dehydration [E86.0]     DM2 (diabetes mellitus, type 2) (HCC) [E11.9]     Essential hypertension [I10]     Hypothyroidism [E03.9]        #COVID-19 pneumonia  #Acute hypoxic respiratory failure   Pseudomonas HCAP. -Unvaccinated patient  - she presented with cough, fever, dyspnea in the setting of household contact testing + COVID 19  - Her Covid testing came back positive on admission  - Initially on 2 L of oxygen on presentation.   -Pulmonology consulted  - worsening hypoxemia requiring high flow oxygen. And eventually intubated   - Intubated early AM  9/29/2021.  Extubated on 10/4/2021.  She was on Vapotherm.  Pulmonary status got worse and she was reintubated on 10/6/2021. Continued on mechanical ventilation. S/p tracheostomy and PEG placement 10/25/2021  - s/p Remdesivir  - on Decadron, day # 28 - being tapered.  On 1mg daily   - s/p  Tocilizumab  - Lovenox twice daily  - pulmonary managing.         #Possible superimposed bacterial pneumonia  Gram-negative infection  -Was on Rocephin and Zithromax for 5 days ; - changed to zosyn on 10/2 with pseudomonas in  resp culture.  Pulmonary has given zyvox for 3 days   - Completed Zosyn for 8 days  - Respiratory cultures now growing Klebsiella, initiated on Cipro. completed 7 days. Now off all abx.         #Hypokalemia  #Hypomagnesemia  - replaced as needed     #CAD  - cont ASA, statin  - EKG with inferior T wave abnormality.  She denied CP.  Trop neg   -telemetry monitoring     #History of tracheobronchomalacia  S/p tracheoplasty      #HTN  - Improved hypotension and off levophed  Resumed home medications-Norvasc and losartan.     #Chronic pain   History of hip fracture  - cont home oxycodone BID and percocet   - cont gabapentin and requip     #DM2  - use ssi      #Hypothyroidism  - cont synthroid         DVT Prophylaxis: Lovenox  Diet: Diet NPO  ADULT TUBE FEEDING; Orogastric; Peptide Based; Continuous; 55; Yes; 5; Q 4 hours; 60; 30; Q 3 hours  Code Status: Full Code    PT/OT Eval Status: ordered    Dispo - cont care, s/p trach and PEG on 10/25/21    Lyric Lerma MD

## 2021-10-26 NOTE — PROGRESS NOTES
9:08 PM  Shift assessment completed, PM meds administered. #6 Shiley trach on 70% & 8 PEEP, SpO2 94%. RASS -4, sedated with propofol, fentanyl, and versed - decreasing sedation as pt tolerates, no signs of asynchrony right now. No TF until AM d/t new PEG tube. BP & HR stable, no consistent ectopy - NSR.    9:58 PM   Pt's  Vinicius Garcia updated. 1:19 AM  Reassessment completed. Sedation titrated down to 25 mcg/kg/min of propofol and 150 mcg/hr of fentanyl, versed off. Pt opening eyes and responding to physical stimulation but not making eye contact or following commands. FiO2 down to 60% per RT, SpO2 92%  Pt had medium soft brown BM, CHG bath provided. 2:19 AM   PRN versed and fentanyl administered for frequent double stacking after patient care. Propofol up to 30 and fentanyl up to 175.    4:35 AM  Reassessment completed. Labs collected via PICC. ABG collected via R radial artery. 7:25 AM  EOS report given to WOJCIECH Hinkle. Pt stable at this time.

## 2021-10-26 NOTE — CARE COORDINATION
INTERDISCIPLINARY PLAN OF CARE CONFERENCE    Date/Time: 10/26/2021 3:00 PM  Completed by: Wagner Harkins RN, Case Management      Patient Name:  Lex Rowley  YOB: 1955  Admitting Diagnosis: Dehydration [E86.0]  Hypokalemia [E87.6]  Hypomagnesemia [E83.42]  Positive D dimer [R79.89]  CHARLY (acute kidney injury) ([facility] [N17.9]  Acute respiratory failure with hypoxia ([facility] [J96.01]  Pneumonia due to COVID-19 virus [U07.1, J12.82]  COVID-19 [U07.1]     Admit Date/Time:  9/26/2021  8:29 PM    Chart reviewed. Interdisciplinary team contacted or reviewed plan related to patient progress and discharge plans. Disciplines included Case Management, Nursing, and Dietitian. Current Status:inpt, ICU LOC  PT/OT recommendation for discharge plan of care: tbd    Expected D/C Disposition:  LTACH    Discharge Plan Comments: Reviewed chart. ICU,C-19+,Trach/PEG placement on 10/25/21. Writer left  for [facility] with [facility] r/t bed availability. Following.     Home O2 in place on admit: per LTAC

## 2021-10-26 NOTE — PROGRESS NOTES
ENT Progress Note    Subjective: Patient is postoperative day 1 status post tracheostomy per Dr. Nat corey for acute hypoxic respiratory failure with prolonged sedation in setting of COVID-19.     Objective:  -Size 6-0 cuffed Shiley tracheostomy tube in place with sutures and ties, no active hemorrhage from stoma, Shaun flap stitch inferiorly noted; split gauze replaced under tracheostomy tube flange    Assessment/Plan:  Six 8year-old female postoperative day 1 status post tracheostomy tube, ventilating well without granulation tissue hemorrhage around the tracheostomy tube stoma  -Continue routine tracheostomy tube care  -Keep obturator taped to head of bed  -Place split gauze under tracheostomy tube flange when saturated  -Will cut sutures/change ties on POD#5-7  -Plan for tracheostomy tube changed to uncuffed type once patient can be weaned from mechanical ventilation

## 2021-10-26 NOTE — OP NOTE
[address]                 [address] [facility], [place]                                OPERATIVE REPORT    PATIENT NAME: Tian Desai                    :        1955  MED REC NO:   [id number]                          ROOM:       3006  ACCOUNT NO:   [de-identified]                           ADMIT DATE: 2021  PROVIDER:     Vannessa Price MD    DATE OF PROCEDURE:  10/25/2021    PRE-PROCEDURE DIAGNOSES:  1. Dysphagia. 2.  COVID-19 pneumonia. 3.  Chronic respiratory failure status post tracheostomy placement. PROCEDURE:  EGD with PEG-tube placement. POSTPROCEDURE DIAGNOSES:  1.  Mild esophagitis. 2.  10-cm segment of Mcintyre's. 3.  Otherwise normal EGD. 4.  Successful placement of 20-[employer] EndoVive PEG  tube. 5.  External bolster at 2 cm jj. PROCEDURE INDICATIONS:  This is a 74-year-old female with a history of  diabetes, hypertension, hypothyroidism, anxiety, chronic pain syndrome,  degenerative disease, fibromyalgia, GERD, osteoarthritis, arthritis, and  tracheobronchomalacia, admitted with acute respiratory failure secondary  to COVID-19 pneumonia. Hospital course complicated by prolonged  mechanical ventilation in need of tracheostomy and now PEG-tube for  nutritional support. MEDICATIONS:  The patient already sedated with Versed and fentanyl and  Ancef 1 gm given. PROCEDURE DETAILS:  Informed consent obtained after discussing risks,  benefits, and alternatives with the patient's caregiver. Full history  and physical was performed. The patient was classified as ASA class  III. Medications were given i.e., Ancef was given prior to the  incision. Cardiopulmonary status was continuously monitored throughout  the procedure. The patient was placed in supine position.   Once  adequately sedated, a standard upper gastroscope was inserted in the  mouth and advanced under direct visualization to second portion of the  duodenum. The entire mucosa of the esophagus, stomach (retroflexed and  forward views), duodenum (bulb, sweep, and second portion) were examined  carefully during withdrawal.  The patient tolerated the procedure well  without any difficulties. FINDINGS:  ESOPHAGUS:  There was a 10-cm segment of Mcintyre's noted  circumferentially from 28 to 38 cm from entry. There is a small hiatal  hernia from 38 to 40 cm from entry. Biopsies were not taken at this  time. STOMACH:  The entire stomach appeared normal both on forward and  retroflexed views. A suitable spot for PEG-tube placement was  identified using transillumination method and palpation method. Once  identified, the skin was prepped and cleaned in sterile manner. Once  clean, 5 mL of lidocaine was injected into the subcutaneous tissue for  local anesthesia. A 1-cm incision was made over the site. Finder  trocar loaded with a needle was inserted in the incision site and  observed entering into the gastric lumen. The trocar was grasped using  a snare in the gastric lumen and the needle was exchanged in favor of a  wire. The wire was then grasped using the snare and pulled out of the  patient's mouth. Tip of the PEG-tube was attached to the tip of the  wire. Traction was applied at the abdominal wall near the incision site  and the wire was pulled as it pulled the PEG-tube back into the stomach  and out through the incision site. The external bolster was placed at  2-cm jj. Dressing was applied. DUODENUM:  Examined portion of the duodenum appeared normal.    SUMMARY:  1.  10-cm segment of Mcintyre's. 2.  2-cm hiatal hernia. 3.  Successful placement of 20-[employer]. 4.  External bolster at 2-cm jj. RECOMMENDATIONS:  1. Discharge the patient to home when standard parameters are met. 2.  Continue PPI daily. 3.  Start meds and water flushes in four hours.   Can start tube feeds  tomorrow morning. 4.  Keep the head of the bed elevated during PEG use. 5.  Flush 60 mL of free water every six hours and after use to maintain  patency. 6.  We will follow up tomorrow. ESTIMATED BLOOD LOSS:  Less than 5 mL.         Linette Lambert MD    D: 10/26/2021 11:27:29       T: 10/26/2021 11:31:22     [email or web address]  Job#: [id number]     Doc#: [id number]    CC:  MD Elvis Quispe Md

## 2021-10-27 ENCOUNTER — APPOINTMENT (OUTPATIENT)
Dept: GENERAL RADIOLOGY | Age: 66
DRG: 004 | End: 2021-10-27
Payer: MEDICARE

## 2021-10-27 LAB
A/G RATIO: 1 (ref 1.1–2.2)
ALBUMIN SERPL-MCNC: 3.2 G/DL (ref 3.4–5)
ALP BLD-CCNC: 191 U/L (ref 40–129)
ALT SERPL-CCNC: 19 U/L (ref 10–40)
ANION GAP SERPL CALCULATED.3IONS-SCNC: 9 MMOL/L (ref 3–16)
AST SERPL-CCNC: 29 U/L (ref 15–37)
BASE EXCESS ARTERIAL: 10 MMOL/L (ref -3–3)
BASE EXCESS ARTERIAL: 9.4 MMOL/L (ref -3–3)
BASOPHILS ABSOLUTE: 0.1 K/UL (ref 0–0.2)
BASOPHILS RELATIVE PERCENT: 0.7 %
BILIRUB SERPL-MCNC: 0.7 MG/DL (ref 0–1)
BUN BLDV-MCNC: 10 MG/DL (ref 7–20)
CALCIUM SERPL-MCNC: 9.6 MG/DL (ref 8.3–10.6)
CARBOXYHEMOGLOBIN ARTERIAL: 0.6 % (ref 0–1.5)
CARBOXYHEMOGLOBIN ARTERIAL: 0.7 % (ref 0–1.5)
CHLORIDE BLD-SCNC: 98 MMOL/L (ref 99–110)
CO2: 35 MMOL/L (ref 21–32)
CREAT SERPL-MCNC: <0.5 MG/DL (ref 0.6–1.2)
EOSINOPHILS ABSOLUTE: 0.3 K/UL (ref 0–0.6)
EOSINOPHILS RELATIVE PERCENT: 2.8 %
GFR AFRICAN AMERICAN: >60
GFR NON-AFRICAN AMERICAN: >60
GLOBULIN: 3.3 G/DL
GLUCOSE BLD-MCNC: 116 MG/DL (ref 70–99)
GLUCOSE BLD-MCNC: 118 MG/DL (ref 70–99)
GLUCOSE BLD-MCNC: 148 MG/DL (ref 70–99)
GLUCOSE BLD-MCNC: 173 MG/DL (ref 70–99)
GLUCOSE BLD-MCNC: 178 MG/DL (ref 70–99)
GLUCOSE BLD-MCNC: 197 MG/DL (ref 70–99)
GLUCOSE BLD-MCNC: 235 MG/DL (ref 70–99)
HCO3 ARTERIAL: 34.9 MMOL/L (ref 21–29)
HCO3 ARTERIAL: 37 MMOL/L (ref 21–29)
HCT VFR BLD CALC: 29.7 % (ref 36–48)
HEMOGLOBIN, ART, EXTENDED: 10.3 G/DL (ref 12–16)
HEMOGLOBIN, ART, EXTENDED: 11.7 G/DL (ref 12–16)
HEMOGLOBIN: 9.4 G/DL (ref 12–16)
LYMPHOCYTES ABSOLUTE: 1.4 K/UL (ref 1–5.1)
LYMPHOCYTES RELATIVE PERCENT: 12.4 %
MAGNESIUM: 1.5 MG/DL (ref 1.8–2.4)
MCH RBC QN AUTO: 25.1 PG (ref 26–34)
MCHC RBC AUTO-ENTMCNC: 31.6 G/DL (ref 31–36)
MCV RBC AUTO: 79.4 FL (ref 80–100)
METHEMOGLOBIN ARTERIAL: 0.3 %
METHEMOGLOBIN ARTERIAL: 0.3 %
MONOCYTES ABSOLUTE: 1.1 K/UL (ref 0–1.3)
MONOCYTES RELATIVE PERCENT: 10 %
NEUTROPHILS ABSOLUTE: 8.1 K/UL (ref 1.7–7.7)
NEUTROPHILS RELATIVE PERCENT: 74.1 %
O2 SAT, ARTERIAL: 85.6 %
O2 SAT, ARTERIAL: 88.1 %
O2 THERAPY: ABNORMAL
O2 THERAPY: ABNORMAL
PCO2 ARTERIAL: 49 MMHG (ref 35–45)
PCO2 ARTERIAL: 66.7 MMHG (ref 35–45)
PDW BLD-RTO: 19.6 % (ref 12.4–15.4)
PERFORMED ON: ABNORMAL
PH ARTERIAL: 7.36 (ref 7.35–7.45)
PH ARTERIAL: 7.47 (ref 7.35–7.45)
PLATELET # BLD: 204 K/UL (ref 135–450)
PMV BLD AUTO: 8.9 FL (ref 5–10.5)
PO2 ARTERIAL: 51.4 MMHG (ref 75–108)
PO2 ARTERIAL: 53.9 MMHG (ref 75–108)
POTASSIUM REFLEX MAGNESIUM: 3.2 MMOL/L (ref 3.5–5.1)
RBC # BLD: 3.74 M/UL (ref 4–5.2)
SODIUM BLD-SCNC: 142 MMOL/L (ref 136–145)
TCO2 ARTERIAL: 36.4 MMOL/L
TCO2 ARTERIAL: 39.1 MMOL/L
TOTAL PROTEIN: 6.5 G/DL (ref 6.4–8.2)
TRIGL SERPL-MCNC: 211 MG/DL (ref 0–150)
WBC # BLD: 11 K/UL (ref 4–11)

## 2021-10-27 PROCEDURE — 6370000000 HC RX 637 (ALT 250 FOR IP): Performed by: INTERNAL MEDICINE

## 2021-10-27 PROCEDURE — 6360000002 HC RX W HCPCS: Performed by: INTERNAL MEDICINE

## 2021-10-27 PROCEDURE — 2700000000 HC OXYGEN THERAPY PER DAY

## 2021-10-27 PROCEDURE — 2000000000 HC ICU R&B

## 2021-10-27 PROCEDURE — 2500000003 HC RX 250 WO HCPCS: Performed by: INTERNAL MEDICINE

## 2021-10-27 PROCEDURE — 82803 BLOOD GASES ANY COMBINATION: CPT

## 2021-10-27 PROCEDURE — 80053 COMPREHEN METABOLIC PANEL: CPT

## 2021-10-27 PROCEDURE — 94761 N-INVAS EAR/PLS OXIMETRY MLT: CPT

## 2021-10-27 PROCEDURE — 94640 AIRWAY INHALATION TREATMENT: CPT

## 2021-10-27 PROCEDURE — 85025 COMPLETE CBC W/AUTO DIFF WBC: CPT

## 2021-10-27 PROCEDURE — 2580000003 HC RX 258: Performed by: INTERNAL MEDICINE

## 2021-10-27 PROCEDURE — 99233 SBSQ HOSP IP/OBS HIGH 50: CPT | Performed by: INTERNAL MEDICINE

## 2021-10-27 PROCEDURE — 83735 ASSAY OF MAGNESIUM: CPT

## 2021-10-27 PROCEDURE — 99291 CRITICAL CARE FIRST HOUR: CPT | Performed by: INTERNAL MEDICINE

## 2021-10-27 PROCEDURE — 84478 ASSAY OF TRIGLYCERIDES: CPT

## 2021-10-27 PROCEDURE — C9113 INJ PANTOPRAZOLE SODIUM, VIA: HCPCS | Performed by: INTERNAL MEDICINE

## 2021-10-27 PROCEDURE — 94003 VENT MGMT INPAT SUBQ DAY: CPT

## 2021-10-27 RX ORDER — HYDRALAZINE HYDROCHLORIDE 20 MG/ML
10 INJECTION INTRAMUSCULAR; INTRAVENOUS EVERY 4 HOURS PRN
Status: DISCONTINUED | OUTPATIENT
Start: 2021-10-27 | End: 2021-10-28 | Stop reason: HOSPADM

## 2021-10-27 RX ORDER — FUROSEMIDE 10 MG/ML
40 INJECTION INTRAMUSCULAR; INTRAVENOUS ONCE
Status: COMPLETED | OUTPATIENT
Start: 2021-10-27 | End: 2021-10-27

## 2021-10-27 RX ORDER — DEXAMETHASONE SODIUM PHOSPHATE 4 MG/ML
0.5 INJECTION, SOLUTION INTRA-ARTICULAR; INTRALESIONAL; INTRAMUSCULAR; INTRAVENOUS; SOFT TISSUE EVERY 24 HOURS
Status: DISCONTINUED | OUTPATIENT
Start: 2021-10-27 | End: 2021-10-28

## 2021-10-27 RX ADMIN — ENOXAPARIN SODIUM 30 MG: 30 INJECTION SUBCUTANEOUS at 07:58

## 2021-10-27 RX ADMIN — ASPIRIN 81 MG: 81 TABLET, CHEWABLE ORAL at 07:58

## 2021-10-27 RX ADMIN — MICONAZOLE NITRATE: 2 POWDER TOPICAL at 21:51

## 2021-10-27 RX ADMIN — HYDRALAZINE HYDROCHLORIDE 10 MG: 20 INJECTION, SOLUTION INTRAMUSCULAR; INTRAVENOUS at 10:50

## 2021-10-27 RX ADMIN — PANTOPRAZOLE SODIUM 40 MG: 40 INJECTION, POWDER, FOR SOLUTION INTRAVENOUS at 07:58

## 2021-10-27 RX ADMIN — POTASSIUM BICARBONATE 20 MEQ: 391 TABLET, EFFERVESCENT ORAL at 07:59

## 2021-10-27 RX ADMIN — IPRATROPIUM BROMIDE AND ALBUTEROL SULFATE 1 AMPULE: .5; 2.5 SOLUTION RESPIRATORY (INHALATION) at 07:21

## 2021-10-27 RX ADMIN — SODIUM CHLORIDE, PRESERVATIVE FREE 10 ML: 5 INJECTION INTRAVENOUS at 08:20

## 2021-10-27 RX ADMIN — IPRATROPIUM BROMIDE AND ALBUTEROL SULFATE 1 AMPULE: .5; 2.5 SOLUTION RESPIRATORY (INHALATION) at 23:25

## 2021-10-27 RX ADMIN — INSULIN LISPRO 3 UNITS: 100 INJECTION, SOLUTION INTRAVENOUS; SUBCUTANEOUS at 08:16

## 2021-10-27 RX ADMIN — GABAPENTIN 800 MG: 400 CAPSULE ORAL at 21:49

## 2021-10-27 RX ADMIN — OXYCODONE 10 MG: 5 TABLET ORAL at 04:45

## 2021-10-27 RX ADMIN — POTASSIUM CHLORIDE 20 MEQ: 400 INJECTION, SOLUTION INTRAVENOUS at 09:38

## 2021-10-27 RX ADMIN — METOPROLOL TARTRATE 25 MG: 25 TABLET, FILM COATED ORAL at 21:49

## 2021-10-27 RX ADMIN — LEVOTHYROXINE SODIUM 100 MCG: 100 TABLET ORAL at 06:51

## 2021-10-27 RX ADMIN — IPRATROPIUM BROMIDE AND ALBUTEROL SULFATE 1 AMPULE: .5; 2.5 SOLUTION RESPIRATORY (INHALATION) at 19:10

## 2021-10-27 RX ADMIN — POTASSIUM CHLORIDE 20 MEQ: 400 INJECTION, SOLUTION INTRAVENOUS at 08:23

## 2021-10-27 RX ADMIN — INSULIN LISPRO 3 UNITS: 100 INJECTION, SOLUTION INTRAVENOUS; SUBCUTANEOUS at 16:15

## 2021-10-27 RX ADMIN — MAGNESIUM SULFATE HEPTAHYDRATE 1000 MG: 1 INJECTION, SOLUTION INTRAVENOUS at 09:24

## 2021-10-27 RX ADMIN — CASTOR OIL AND BALSAM, PERU: 788; 87 OINTMENT TOPICAL at 08:21

## 2021-10-27 RX ADMIN — INSULIN LISPRO 6 UNITS: 100 INJECTION, SOLUTION INTRAVENOUS; SUBCUTANEOUS at 12:27

## 2021-10-27 RX ADMIN — METOPROLOL TARTRATE 25 MG: 25 TABLET, FILM COATED ORAL at 10:37

## 2021-10-27 RX ADMIN — ENOXAPARIN SODIUM 30 MG: 30 INJECTION SUBCUTANEOUS at 21:49

## 2021-10-27 RX ADMIN — CHLORHEXIDINE GLUCONATE 0.12% ORAL RINSE 15 ML: 1.2 LIQUID ORAL at 07:58

## 2021-10-27 RX ADMIN — SODIUM CHLORIDE, PRESERVATIVE FREE 10 ML: 5 INJECTION INTRAVENOUS at 21:50

## 2021-10-27 RX ADMIN — Medication 175 MCG/HR: at 08:15

## 2021-10-27 RX ADMIN — INSULIN LISPRO 3 UNITS: 100 INJECTION, SOLUTION INTRAVENOUS; SUBCUTANEOUS at 21:57

## 2021-10-27 RX ADMIN — SODIUM CHLORIDE, PRESERVATIVE FREE 10 ML: 5 INJECTION INTRAVENOUS at 21:51

## 2021-10-27 RX ADMIN — FUROSEMIDE 40 MG: 10 INJECTION, SOLUTION INTRAMUSCULAR; INTRAVENOUS at 10:21

## 2021-10-27 RX ADMIN — IPRATROPIUM BROMIDE AND ALBUTEROL SULFATE 1 AMPULE: .5; 2.5 SOLUTION RESPIRATORY (INHALATION) at 15:54

## 2021-10-27 RX ADMIN — IPRATROPIUM BROMIDE AND ALBUTEROL SULFATE 1 AMPULE: .5; 2.5 SOLUTION RESPIRATORY (INHALATION) at 11:00

## 2021-10-27 RX ADMIN — Medication 175 MCG/HR: at 03:20

## 2021-10-27 RX ADMIN — DIAZEPAM 15 MG: 10 TABLET ORAL at 16:17

## 2021-10-27 RX ADMIN — GABAPENTIN 800 MG: 400 CAPSULE ORAL at 07:58

## 2021-10-27 RX ADMIN — DIAZEPAM 15 MG: 10 TABLET ORAL at 02:04

## 2021-10-27 RX ADMIN — DIAZEPAM 15 MG: 10 TABLET ORAL at 07:58

## 2021-10-27 RX ADMIN — SERTRALINE HYDROCHLORIDE 50 MG: 50 TABLET ORAL at 07:58

## 2021-10-27 RX ADMIN — OXYCODONE 10 MG: 5 TABLET ORAL at 10:37

## 2021-10-27 RX ADMIN — MAGNESIUM SULFATE HEPTAHYDRATE 1000 MG: 1 INJECTION, SOLUTION INTRAVENOUS at 08:29

## 2021-10-27 RX ADMIN — ATORVASTATIN CALCIUM 40 MG: 40 TABLET, FILM COATED ORAL at 08:20

## 2021-10-27 RX ADMIN — DEXAMETHASONE SODIUM PHOSPHATE 0.52 MG: 4 INJECTION, SOLUTION INTRAMUSCULAR; INTRAVENOUS at 13:22

## 2021-10-27 RX ADMIN — MICONAZOLE NITRATE: 2 POWDER TOPICAL at 08:20

## 2021-10-27 RX ADMIN — IPRATROPIUM BROMIDE AND ALBUTEROL SULFATE 1 AMPULE: .5; 2.5 SOLUTION RESPIRATORY (INHALATION) at 03:33

## 2021-10-27 RX ADMIN — LABETALOL HYDROCHLORIDE 10 MG: 5 INJECTION, SOLUTION INTRAVENOUS at 02:03

## 2021-10-27 RX ADMIN — OXYCODONE 10 MG: 5 TABLET ORAL at 15:44

## 2021-10-27 RX ADMIN — CASTOR OIL AND BALSAM, PERU: 788; 87 OINTMENT TOPICAL at 21:52

## 2021-10-27 RX ADMIN — OXYCODONE 10 MG: 5 TABLET ORAL at 21:49

## 2021-10-27 RX ADMIN — HYDRALAZINE HYDROCHLORIDE 10 MG: 20 INJECTION, SOLUTION INTRAMUSCULAR; INTRAVENOUS at 21:48

## 2021-10-27 RX ADMIN — GABAPENTIN 800 MG: 400 CAPSULE ORAL at 13:24

## 2021-10-27 ASSESSMENT — PULMONARY FUNCTION TESTS
PIF_VALUE: 34
PIF_VALUE: 33
PIF_VALUE: 30
PIF_VALUE: 33
PIF_VALUE: 34
PIF_VALUE: 33
PIF_VALUE: 25
PIF_VALUE: 22
PIF_VALUE: 34
PIF_VALUE: 33
PIF_VALUE: 32
PIF_VALUE: 33
PIF_VALUE: 29
PIF_VALUE: 32
PIF_VALUE: 31
PIF_VALUE: 29
PIF_VALUE: 29
PIF_VALUE: 35
PIF_VALUE: 34
PIF_VALUE: 34
PIF_VALUE: 23
PIF_VALUE: 32
PIF_VALUE: 33
PIF_VALUE: 32
PIF_VALUE: 33

## 2021-10-27 ASSESSMENT — PAIN SCALES - GENERAL
PAINLEVEL_OUTOF10: 0

## 2021-10-27 NOTE — PROGRESS NOTES
Pulmonary & Critical Care Medicine ICU Progress Note    CC: Respiratory failure    Events of Last 24 hours:   Still on PCV 20/8 in order to improve dyssynchrony  PEEP 8  FiO2 60%  Fentanyl 125 mcg/hr   Propofol off since yesterday       Vascular lines: IV: PICC line 9/28    MV: 9/29-10/4, emergently re-intubated 10/6/21 for refractory hypoxemia      Vent Mode: AC/PC Rate Set: 20 bmp/Vt Ordered: 0 mL/ /FiO2 : 60 %  Recent Labs     10/26/21  0536 10/27/21  0456   PHART 7.420 7.470*   QZD0DMN 53.4* 49.0*   PO2ART 53.3* 51.4*     IV:   fentaNYL 175 mcg/hr (10/27/21 0600)    propofol Stopped (10/26/21 1053)    sodium chloride      dextrose      sodium chloride       Vitals:  Blood pressure (!) 182/89, pulse 100, temperature 99.5 °F (37.5 °C), temperature source Axillary, resp. rate (!) 32, height 5' 9\" (1.753 m), weight 175 lb 14.4 oz (79.8 kg), SpO2 91 %, not currently breastfeeding. on ventilator      Intake/Output Summary (Last 24 hours) at 10/27/2021 0721  Last data filed at 10/27/2021 0658  Gross per 24 hour   Intake 1403.62 ml   Output 2275 ml   Net -871.38 ml     EXAM:  General: ill appearing. Intubated sedated. Eyes: PERRL. No sclera icterus. No conjunctival injection. ENT: No discharge. Pharynx clear. ET tube in place  Neck: Trachea midline. Normal thyroid. Resp: No accessory muscle use. Few crackles. No wheezing. No rhonchi. No dullness on percussion. CV: Regular rate. Regular rhythm. No mumur or rub. No edema. GI: Non-tender. Non-distended. No masses. No organomegaly. Normal bowel sounds. No hernia. Skin: Warm and dry. No nodule on exposed extremities. No rash on exposed extremities. Lymph: No cervical LAD. No supraclavicular LAD. M/S: No cyanosis. No joint deformity. No clubbing. Neuro: Intubated. Alert and wake. Responsive to painful stimuli.   Profound weakness with LE movements minimal.   Psych: Noncommunicative unable to obtain         Scheduled Meds:   diazePAM  15 mg Oral Q8H    potassium bicarb-citric acid  20 mEq Oral Daily    dexamethasone  1 mg IntraVENous Q24H    insulin lispro  0-18 Units SubCUTAneous Q4H    sertraline  50 mg Per NG tube Daily    Venelex   Topical BID    miconazole   Topical BID    oxyCODONE  10 mg Oral Q6H    influenza virus vaccine  0.5 mL IntraMUSCular Prior to discharge    chlorhexidine  15 mL Mouth/Throat BID    ipratropium-albuterol  1 ampule Inhalation Q4H    pantoprazole  40 mg IntraVENous Daily    levothyroxine  100 mcg Oral Daily    enoxaparin  30 mg SubCUTAneous BID    lidocaine 1 % injection  5 mL IntraDERmal Once    sodium chloride flush  5-40 mL IntraVENous 2 times per day    aspirin  81 mg Oral Daily    gabapentin  800 mg Oral TID    atorvastatin  40 mg Oral Daily    sodium chloride flush  5-40 mL IntraVENous 2 times per day       Data:  CBC:   Recent Labs     10/25/21  0500 10/26/21  0536 10/27/21  0451   WBC 8.7 9.3 11.0   HGB 8.6* 8.9* 9.4*   HCT 27.0* 28.8* 29.7*   MCV 79.9* 79.5* 79.4*   * 152 204     BMP:   Recent Labs     10/25/21  0500 10/26/21  0536 10/27/21  0451    141 142   K 3.3* 3.4* 3.2*   CL 95* 97* 98*   CO2 36* 33* 35*   BUN 19 13 10   CREATININE <0.5* <0.5* <0.5*     LIVER PROFILE:   Recent Labs     10/25/21  0500 10/26/21  0536 10/27/21  0451   AST 33 27 29   ALT 28 22 19   BILITOT 0.6 0.6 0.7   ALKPHOS 153* 170* 191*       Microbiology:  9/27/21 COVID-19 detected  9/30/21 Resp Pseudomonas fluorescens   10/7/2021 tracheal aspirate: Klebsiella intermediate to zosyn    Imaging:  Chest x-ray 10/26/2021  images reviewed by me and showed:   Carmen Mckoy in place   Satisfactory PICC line position   Bilateral ASD - worse       CTPA 9/27/21  No evidence of pulmonary embolism. Scattered peripheral opacities in the left lung and more consolidative area in the right lung concerning for pneumonia.  Previous right thoracotomy and upper lobectomy.    One mildly enlarged right paratracheal lymph node is present but no priors for comparison.  Nonenlarged but prominent lymph nodes in the upper abdomen and juxta diaphragmatic region      ASSESSMENT:  · Acute hypoxemic respiratory failure  · COVID-19 viral pneumonia  · ARDS  · Critical illness neuropathy and myopathy   · Pseudomonas followed by Klebsiella pneumonias  · Acute kidney injury  · Electrolytes disorder   · Tracheostomy Shilet 6 cuffed and PEG tube placement 10/25/2021  · CAD  · Chronic pain syndrome - on chronic opiates and Neurontin   · Possible early dementia, being worked up outpatient   · H/O bronchiectasis and tracheobronchomalacia/EDAC s/p tracheoplasty in 2014   · H/O EDAC (tracheobronchomalacia) s/p tracheoplasty     PLAN:  COVID-19 isolation, droplet plus    Mechanical ventilation as per my orders. The ventilator was adjusted by me at the bedside for unstable, life threatening respiratory failure. Trial of PS 10/5 as tolerated during the day and PCV at night -ABG in 2 hours  Proned 10/6/21-10/7/2; 10/11-10/12  Paralyzed 10/6/21 - 10/7/21  Wynonia Kin off Fentanyl today   Off Propofol   Valium 15 TID. Continuing home oxycodone and neurontin     Completed 7 days Cipro, 8 days Zosyn, 3 days Zyvox, prior to that 5 days of Ceftriaxone/Zithromax   Dexamethasone D#30, @ 0.5 mg  Completed Remdesivir & Tocilizumab  Electrolytes replacement   Repeat Lasix 20 mg IV x 1   Tuber feeds on   Lopressor 25 BID and Hydralazine PRN  Lovenox for DVT prophylaxis    [id number]. LTAC evaluation - on waiting list           Total critical care time caring for this patient with life threatening, unstable organ failure, including direct patient contact, management of life support systems, review of data including imaging and labs, discussions with other team members and physicians is 32 minutes, excluding procedures.

## 2021-10-27 NOTE — PROGRESS NOTES
This note also relates to the following rows which could not be included:  Resp - Cannot attach notes to unvalidated device data       10/27/21 1911   Vent Information   Vent Type 980   Vent Mode AC/PC   Vt Ordered 0 mL   Pressure Ordered 20   Rate Set 20 bmp   Peak Flow 0 L/min   Pressure Support 0 cmH20   FiO2  60 %   SpO2 95 %   SpO2/FiO2 ratio 158.33   Sensitivity 3   PEEP/CPAP 8   I Time/ I Time % 0.9 s   Humidification Source Heated wire   Humidification Temp 37   Humidification Temp Measured 37   Circuit Condensation Drained   Vent Patient Data   High Peep/I Pressure 32   Peak Inspiratory Pressure 33 cmH2O   Mean Airway Pressure 17 cmH20   Rate Measured 25 br/min   Vt Exhaled 437 mL   Minute Volume 10.5 Liters   I:E Ratio 1:1.60   Plateau Pressure 18 TVB14   Static Compliance 44 mL/cmH2O   Dynamic Compliance 17 mL/cmH2O   Cough/Sputum   Sputum How Obtained Suctioned;Tracheal   Cough Productive   Sputum Amount Small   Sputum Color Creamy   Tenacity Thick   Spontaneous Breathing Trial (SBT) RT Doc   Pulse 92   Breath Sounds   Right Upper Lobe Diminished   Right Middle Lobe Diminished   Right Lower Lobe Diminished   Left Upper Lobe Diminished   Left Lower Lobe Diminished   Additional Respiratory  Assessments   Position Semi-Jesus's   Alarm Settings   High Pressure Alarm 40 cmH2O   Low Minute Volume Alarm 4 L/min   Apnea (secs) 20 secs   High Respiratory Rate 40 br/min   Low Exhaled Vt  250 mL   Surgical Airway (trach) Martin Cuffed   Placement Date/Time: 10/25/21 1321   Placed By: In surgery  Surgical Airway Type: Tracheostomy  Brand: Martin  Style: Cuffed  Size (mm): 6   Status Secured   Ties Assessment Clean;Dry

## 2021-10-27 NOTE — PROGRESS NOTES
AM assessment complete, VSS, labs reviewed, pt is awake responds to commands to extend and flex feet, pt does not move her arms at present, arms swollen and bruised, skin warm and dry.

## 2021-10-27 NOTE — PROGRESS NOTES
ABG drawn from rt radial after Allens test done with good collateral flow to ext, pressure held after x 5 min,  all bleeding ceased at site, sample to lab.

## 2021-10-27 NOTE — PROGRESS NOTES
Spoke with Dr Cesar Rivera regarding ABG results, pt to be placed back on previous settings, respiratory Sammiracle Vera) notified.

## 2021-10-27 NOTE — PROGRESS NOTES
Shift change, bedside report given to [facility] RN/Arin RN. Care has been transferred at this time.

## 2021-10-27 NOTE — PROGRESS NOTES
Received call from pts daughter, updated as to the pts status regarding ventilator and sedation being off.

## 2021-10-27 NOTE — PROGRESS NOTES
Pt awake alert, calm, Fentanyl stopped, will reassess. Q4 hour assessment complete, pt appears comfortable. Pt had small BM, cleaned with wipes, protective barrier cream applied to area.

## 2021-10-27 NOTE — PROGRESS NOTES
PROGRESS NOTE  S:66 yrs Patient  admitted on 9/26/2021 with Dehydration [E86.0]  Hypokalemia [E87.6]  Hypomagnesemia [E83.42]  Positive D dimer [R79.89]  CHARLY (acute kidney injury) ([facility] [address] [N17.9]  Acute respiratory failure with hypoxia ([facility] [address] [J96.01]  Pneumonia due to COVID-19 virus [U07.1, J12.82]  COVID-19 [U07.1] . Today nurse reports she is tolerating TFs per PEG. PEG site is clean and dry. Exam:   Vitals:    10/27/21 1600   BP: 134/61   Pulse: 87   Resp:    Temp:    SpO2: 95%       Due to the current efforts to prevent transmission of COVID-19 and also the need to preserve PPE for other caregivers, a face-to-face encounter with the patient was not performed. That being said, all relevant records and diagnostic tests were reviewed, including laboratory results and imaging. Please reference any relevant documentation elsewhere. Care will be coordinated with the primary service. Medications: Reviewed    Labs:  CBC:   Recent Labs     10/25/21  0500 10/26/21  0536 10/27/21  0451   WBC 8.7 9.3 11.0   HGB 8.6* 8.9* 9.4*   HCT 27.0* 28.8* 29.7*   MCV 79.9* 79.5* 79.4*   * 152 204     BMP:   Recent Labs     10/25/21  0500 10/26/21  0536 10/27/21  0451    141 142   K 3.3* 3.4* 3.2*   CL 95* 97* 98*   CO2 36* 33* 35*   BUN 19 13 10   CREATININE <0.5* <0.5* <0.5*     LIVER PROFILE:   Recent Labs     10/25/21  0500 10/26/21  0536 10/27/21  0451   AST 33 27 29   ALT 28 22 19   PROT 6.5 6.4 6.5   BILITOT 0.6 0.6 0.7   ALKPHOS 153* 170* 191*     PT/INR: No results for input(s): INR in the last 72 hours. Invalid input(s): PT    IMAGING:  XR CHEST PORTABLE   Final Result   Moderate to severe bilateral pneumonias similar in appearance to yesterday. Removal of NG tube. XR CHEST PORTABLE   Final Result   Patchy airspace opacities bilaterally, may be related to pulmonary edema   versus pneumonia.       Stable cardiomegaly         XR CHEST PORTABLE   Final Result No significant interval change. XR CHEST PORTABLE   Final Result   Supportive tubing projects in normal position. Bilateral interstitial-alveolar opacities, likely combination of pulmonary   edema pneumonia. Although they appear more prominent this may be secondary   to the rotation on the present study. Continued surveillance is recommended. Suspected small right pleural effusion. XR CHEST PORTABLE   Final Result   Persistent multifocal bilateral pulmonary opacity, slightly improved at the   right base as compared to prior. XR CHEST PORTABLE   Final Result   Multifocal airspace disease with right-sided pleural effusion, similar to   prior. No significant change         XR CHEST PORTABLE   Final Result   Stable support apparatus. Persistent multifocal airspace disease. No substantial change. XR CHEST PORTABLE   Final Result   Unchanged multifocal bilateral pneumonia. XR CHEST PORTABLE   Final Result   No significant change in the multifocal bilateral infiltrates         XR CHEST PORTABLE   Final Result   Diffuse bilateral airspace disease appears unchanged on the right and   increased on the left. XR CHEST PORTABLE   Final Result   Bilateral pleuroparenchymal disease, with improved aeration of the right lung   compared to prior. XR CHEST PORTABLE   Final Result   Line and tube as above. Bilateral interstitial opacities are worsened when compared to prior study. XR CHEST PORTABLE   Final Result   Stable chest.  Diffuse interstitial changes, possibly infiltrate or edema. Satisfactory position of endotracheal tube. XR CHEST PORTABLE   Final Result   Stable support apparatus. In this case, both multifocal pneumonia and pulmonary edema are both   considered, and the changes appear increased when compared to the previous   exam.         XR CHEST PORTABLE   Final Result   1.  Stable appropriate positions of support apparatus. 2. Slight interval progression of multifocal airspace disease throughout both   lungs, likely a combination of multifocal pneumonia and superimposed   pulmonary edema. 3. Stable cardiomegaly and small bilateral pleural effusions. XR CHEST PORTABLE   Final Result   Improving aeration of the right lung with grossly stable left basilar   airspace opacities. XR CHEST PORTABLE   Final Result   Supportive tubing is in stable position. Stable pattern of bilateral ground-glass opacities and basilar consolidations. XR CHEST PORTABLE   Final Result   Stable appearance of the chest.  Diffuse ground-glass opacities on the right   and mild left basilar opacities. XR CHEST PORTABLE   Final Result   Improved aeration on the left, possibly improving pulmonary edema. Persistent airspace disease in the right base may represent concurrent   pneumonia         XR ABDOMEN (KUB) (SINGLE AP VIEW)   Final Result   Enteric catheter tip likely in the distal gastric body. XR CHEST PORTABLE   Final Result   1. Endotracheal tube projects in the appropriate position. 2. Enteric tube extends below the diaphragm and out of the field of view. XR CHEST PORTABLE   Final Result   Increasing diffuse airspace opacification throughout the lungs. Pattern may   represent pulmonary edema or worsening pneumonitis. XR CHEST PORTABLE   Final Result   1. Interval increased bilateral pulmonary opacities with new consolidative   change at the right lung base. These findings could be secondary to   pulmonary edema or infection. 2. Consolidative change at the right lung base could also be secondary to   atelectasis/mucous plug. XR CHEST PORTABLE   Final Result   No significant interval change in small right pleural effusion or bilateral   heterogeneous opacity which can reflect pulmonary edema or pneumonia.          XR CHEST PORTABLE   Final Result   Mild improvement from prior comparison. Mild-to-moderate congestion and/or   infiltrates identified in the lungs. ET tube terminates 7 cm superior to the saba. XR CHEST PORTABLE   Final Result   Endotracheal tube and orogastric tube unchanged, adequate position. Slightly increased extensive bilateral pulmonary disease over the past 24   hours this may relate to interstitial edema or diffuse infection or a   combination. XR CHEST PORTABLE   Final Result   Endotracheal tube with its tip approximately 4.7 cm from the saba in   satisfactory position. Enteric tube in satisfactory position. Patchy bilateral airspace disease is again noted with improved aeration of   the left mid lung. XR CHEST PORTABLE   Final Result   Some improvement in the appearance of the chest with clearing of some of the   acute airspace disease from the mid left lung. Large amount of pneumonia   remains within the right lung and lower left lung. XR CHEST PORTABLE   Final Result   1. Endotracheal tube, nasogastric tube, and right PICC line placement. 2.  Increasing multifocal opacities with consolidative area in the right   perihilar lung. Could be multifocal pneumonia with or without edema. IR PICC WO SQ PORT/PUMP > 5 YEARS   Final Result   1. See above. CT CHEST PULMONARY EMBOLISM W CONTRAST   Final Result   No evidence of pulmonary embolism. Scattered peripheral opacities in the left lung and more consolidative area   in the right lung concerning for pneumonia. Previous right thoracotomy and   upper lobectomy. One mildly enlarged right paratracheal lymph node is present but no priors   for comparison. Nonenlarged but prominent lymph nodes in the upper abdomen   and juxta diaphragmatic region. XR CHEST PORTABLE   Final Result   Mild cardiomegaly without interstitial edema. Metallic surgical clips from prior right thoracotomy.       COPD with anterior segment-right upper lobe alveolar pneumonia. Old pleural thickening was noted along the right lateral chest wall. Pleural   thickening and or trace effusion blunts the right costophrenic angle. XR CHEST PORTABLE    (Results Pending)     Attending Supervising [de-identified] Attestation Statement  The patient is a 78 y.o. female. I have performed a history and physical examination of the patient. I discussed the case with my physician assistant Pradip Howell PA-C    I reviewed the patient's Past Medical History, Past Surgical History, Medications, and Allergies. Physical Exam:  Vitals:    10/27/21 1800 10/27/21 1900 10/27/21 1910 10/27/21 1911   BP: (!) 163/95 (!) 159/79     Pulse: 94 94  92   Resp: 24 26 25    Temp:       TempSrc:       SpO2: 94% 95% 94% 95%   Weight:       Height:         Due to the current efforts to prevent transmission of COVID-19 and also the need to preserve PPE for other caregivers, a face-to-face encounter with the patient was not performed. That being said, all relevant records and diagnostic tests were reviewed, including laboratory results and imaging. Please reference any relevant documentation elsewhere. Care will be coordinated with the primary service. Impression: 78year old female with a history of DM, HTN, Hypothyroidism, anxiety, chronic pain syndrome, DDD, fibromyalgia, GERD, OA, and tracheobronchomalacia admitted with acute respiratory failure secondary to COVID-19 pneumonia. Hospital course complicated by prolonged mechanical ventilation s/p tracheostomy and PEG tube placement. Recommendation:  1. Continue supportive care  2. Keep PEG site clean and dry  3. Keep HOB elevated during PEG use  4. Flush PEG with 60 ml free water q6h and after use  5. Begin TFs per PEG following dietician recommendations  6. Will sign off  7.  Call with questions       Tammy Manzo PA-C  4:30 PM 10/27/2021                      78year old female with a history of DM, HTN, Hypothyroidism, anxiety, chronic pain syndrome, DDD, fibromyalgia, GERD, OA, and tracheobronchomalacia admitted with acute respiratory failure secondary to COVID-19 pneumonia. Hospital course complicated by prolonged mechanical ventilation s/p tracheostomy and PEG tube placement    Continue supportive care. Advance TF to goal rate. Keep HOB elevated during PEG use. Flush PEG every 6h and after use.       Jamila Leal MD          [phone]  [phone]

## 2021-10-27 NOTE — PROGRESS NOTES
RT Inhaler-Nebulizer Bronchodilator Protocol Note    There is a bronchodilator order in the chart from a provider indicating to follow the RT Bronchodilator Protocol and there is an Initiate RT Inhaler-Nebulizer Bronchodilator Protocol order as well (see protocol at bottom of note). CXR Findings:  XR CHEST PORTABLE    Result Date: 10/26/2021  Moderate to severe bilateral pneumonias similar in appearance to yesterday. Removal of NG tube. XR CHEST PORTABLE    Result Date: 10/25/2021  Patchy airspace opacities bilaterally, may be related to pulmonary edema versus pneumonia. Stable cardiomegaly       The findings from the last RT Protocol Assessment were as follows:   History Pulmonary Disease: (P) Chronic pulmonary disease  Respiratory Pattern: (P) Mild dyspnea at rest, irregular pattern, or RR 21-25 bpm  Breath Sounds: (P) Slightly diminished and/or crackles  Cough: (P) Strong, productive  Indication for Bronchodilator Therapy: (P) On home bronchodilators, Decreased or absent breath sounds  Bronchodilator Assessment Score: (P) 9    Aerosolized bronchodilator medication orders have been revised according to the RT Inhaler-Nebulizer Bronchodilator Protocol below. Respiratory Therapist to perform RT Therapy Protocol Assessment initially then follow the protocol. Repeat RT Therapy Protocol Assessment PRN for score 0-3 or on second treatment, BID, and PRN for scores above 3. No Indications - adjust the frequency to every 6 hours PRN wheezing or bronchospasm, if no treatments needed after 48 hours then discontinue using Per Protocol order mode. If indication present, adjust the RT bronchodilator orders based on the Bronchodilator Assessment Score as indicated below.   Use Inhaler orders unless patient has one or more of the following: on home nebulizer, not able to hold breath for 10 seconds, is not alert and oriented, cannot activate and use MDI correctly, or respiratory rate 25 breaths per minute or more, then use the equivalent nebulizer order(s) with same Frequency and PRN reasons based on the score. If a patient is on this medication at home then do not decrease Frequency below that used at home. 0-3 - enter or revise RT bronchodilator order(s) to equivalent RT Bronchodilator order with Frequency of every 4 hours PRN for wheezing or increased work of breathing using Per Protocol order mode. 4-6 - enter or revise RT Bronchodilator order(s) to two equivalent RT bronchodilator orders with one order with BID Frequency and one order with Frequency of every 4 hours PRN wheezing or increased work of breathing using Per Protocol order mode. 7-10 - enter or revise RT Bronchodilator order(s) to two equivalent RT bronchodilator orders with one order with TID Frequency and one order with Frequency of every 4 hours PRN wheezing or increased work of breathing using Per Protocol order mode. 11-13 - enter or revise RT Bronchodilator order(s) to one equivalent RT bronchodilator order with QID Frequency and an Albuterol order with Frequency of every 4 hours PRN wheezing or increased work of breathing using Per Protocol order mode. Greater than 13 - enter or revise RT Bronchodilator order(s) to one equivalent RT bronchodilator order with every 4 hours Frequency and an Albuterol order with Frequency of every 2 hours PRN wheezing or increased work of breathing using Per Protocol order mode.          Electronically signed by Orlando Chase RCP on 10/26/2021 at 9:12 PM

## 2021-10-28 ENCOUNTER — APPOINTMENT (OUTPATIENT)
Dept: GENERAL RADIOLOGY | Age: 66
DRG: 004 | End: 2021-10-28
Payer: MEDICARE

## 2021-10-28 VITALS
WEIGHT: 175.9 LBS | BODY MASS INDEX: 26.05 KG/M2 | DIASTOLIC BLOOD PRESSURE: 81 MMHG | OXYGEN SATURATION: 91 % | SYSTOLIC BLOOD PRESSURE: 156 MMHG | HEIGHT: 69 IN | TEMPERATURE: 98.6 F | HEART RATE: 99 BPM | RESPIRATION RATE: 34 BRPM

## 2021-10-28 LAB
A/G RATIO: 1 (ref 1.1–2.2)
ALBUMIN SERPL-MCNC: 3.4 G/DL (ref 3.4–5)
ALP BLD-CCNC: 210 U/L (ref 40–129)
ALT SERPL-CCNC: 21 U/L (ref 10–40)
ANION GAP SERPL CALCULATED.3IONS-SCNC: 12 MMOL/L (ref 3–16)
AST SERPL-CCNC: 33 U/L (ref 15–37)
BASE EXCESS ARTERIAL: 10.9 MMOL/L (ref -3–3)
BASOPHILS ABSOLUTE: 0 K/UL (ref 0–0.2)
BASOPHILS RELATIVE PERCENT: 0 %
BILIRUB SERPL-MCNC: 0.7 MG/DL (ref 0–1)
BUN BLDV-MCNC: 12 MG/DL (ref 7–20)
CALCIUM SERPL-MCNC: 9.8 MG/DL (ref 8.3–10.6)
CARBOXYHEMOGLOBIN ARTERIAL: 0.5 % (ref 0–1.5)
CHLORIDE BLD-SCNC: 97 MMOL/L (ref 99–110)
CO2: 34 MMOL/L (ref 21–32)
CREAT SERPL-MCNC: <0.5 MG/DL (ref 0.6–1.2)
EOSINOPHILS ABSOLUTE: 0 K/UL (ref 0–0.6)
EOSINOPHILS RELATIVE PERCENT: 0 %
GFR AFRICAN AMERICAN: >60
GFR NON-AFRICAN AMERICAN: >60
GLOBULIN: 3.4 G/DL
GLUCOSE BLD-MCNC: 183 MG/DL (ref 70–99)
GLUCOSE BLD-MCNC: 184 MG/DL (ref 70–99)
GLUCOSE BLD-MCNC: 185 MG/DL (ref 70–99)
GLUCOSE BLD-MCNC: 187 MG/DL (ref 70–99)
GLUCOSE BLD-MCNC: 189 MG/DL (ref 70–99)
GLUCOSE BLD-MCNC: 203 MG/DL (ref 70–99)
HCO3 ARTERIAL: 34.8 MMOL/L (ref 21–29)
HCT VFR BLD CALC: 30.7 % (ref 36–48)
HEMOGLOBIN, ART, EXTENDED: 10.7 G/DL (ref 12–16)
HEMOGLOBIN: 9.5 G/DL (ref 12–16)
LYMPHOCYTES ABSOLUTE: 2 K/UL (ref 1–5.1)
LYMPHOCYTES RELATIVE PERCENT: 18 %
MAGNESIUM: 1.6 MG/DL (ref 1.8–2.4)
MCH RBC QN AUTO: 24.5 PG (ref 26–34)
MCHC RBC AUTO-ENTMCNC: 31.1 G/DL (ref 31–36)
MCV RBC AUTO: 78.8 FL (ref 80–100)
METHEMOGLOBIN ARTERIAL: 0.3 %
MONOCYTES ABSOLUTE: 1.1 K/UL (ref 0–1.3)
MONOCYTES RELATIVE PERCENT: 10 %
NEUTROPHILS ABSOLUTE: 8.1 K/UL (ref 1.7–7.7)
NEUTROPHILS RELATIVE PERCENT: 72 %
O2 SAT, ARTERIAL: 92.3 %
O2 THERAPY: ABNORMAL
PCO2 ARTERIAL: 43.6 MMHG (ref 35–45)
PDW BLD-RTO: 19.8 % (ref 12.4–15.4)
PERFORMED ON: ABNORMAL
PH ARTERIAL: 7.52 (ref 7.35–7.45)
PLATELET # BLD: 255 K/UL (ref 135–450)
PMV BLD AUTO: 8.6 FL (ref 5–10.5)
PO2 ARTERIAL: 57.3 MMHG (ref 75–108)
POTASSIUM REFLEX MAGNESIUM: 3.2 MMOL/L (ref 3.5–5.1)
RBC # BLD: 3.9 M/UL (ref 4–5.2)
SODIUM BLD-SCNC: 143 MMOL/L (ref 136–145)
TCO2 ARTERIAL: 36.1 MMOL/L
TOTAL PROTEIN: 6.8 G/DL (ref 6.4–8.2)
WBC # BLD: 11.2 K/UL (ref 4–11)

## 2021-10-28 PROCEDURE — 80053 COMPREHEN METABOLIC PANEL: CPT

## 2021-10-28 PROCEDURE — 6370000000 HC RX 637 (ALT 250 FOR IP): Performed by: INTERNAL MEDICINE

## 2021-10-28 PROCEDURE — 85025 COMPLETE CBC W/AUTO DIFF WBC: CPT

## 2021-10-28 PROCEDURE — 2580000003 HC RX 258: Performed by: INTERNAL MEDICINE

## 2021-10-28 PROCEDURE — 6360000002 HC RX W HCPCS: Performed by: INTERNAL MEDICINE

## 2021-10-28 PROCEDURE — G0008 ADMIN INFLUENZA VIRUS VAC: HCPCS | Performed by: INTERNAL MEDICINE

## 2021-10-28 PROCEDURE — 2700000000 HC OXYGEN THERAPY PER DAY

## 2021-10-28 PROCEDURE — 90686 IIV4 VACC NO PRSV 0.5 ML IM: CPT | Performed by: INTERNAL MEDICINE

## 2021-10-28 PROCEDURE — C9113 INJ PANTOPRAZOLE SODIUM, VIA: HCPCS | Performed by: INTERNAL MEDICINE

## 2021-10-28 PROCEDURE — 83735 ASSAY OF MAGNESIUM: CPT

## 2021-10-28 PROCEDURE — 71045 X-RAY EXAM CHEST 1 VIEW: CPT

## 2021-10-28 PROCEDURE — 94761 N-INVAS EAR/PLS OXIMETRY MLT: CPT

## 2021-10-28 PROCEDURE — 82803 BLOOD GASES ANY COMBINATION: CPT

## 2021-10-28 PROCEDURE — 99291 CRITICAL CARE FIRST HOUR: CPT | Performed by: INTERNAL MEDICINE

## 2021-10-28 PROCEDURE — 94640 AIRWAY INHALATION TREATMENT: CPT

## 2021-10-28 PROCEDURE — 99239 HOSP IP/OBS DSCHRG MGMT >30: CPT | Performed by: INTERNAL MEDICINE

## 2021-10-28 PROCEDURE — 94003 VENT MGMT INPAT SUBQ DAY: CPT

## 2021-10-28 RX ORDER — NICOTINE POLACRILEX 4 MG
15 LOZENGE BUCCAL PRN
Status: DISCONTINUED | OUTPATIENT
Start: 2021-10-28 | End: 2021-10-28 | Stop reason: SDUPTHER

## 2021-10-28 RX ORDER — HYDRALAZINE HYDROCHLORIDE 20 MG/ML
10 INJECTION INTRAMUSCULAR; INTRAVENOUS EVERY 4 HOURS PRN
Status: ON HOLD | DISCHARGE
Start: 2021-10-28 | End: 2022-01-09

## 2021-10-28 RX ORDER — DEXTROSE MONOHYDRATE 50 MG/ML
100 INJECTION, SOLUTION INTRAVENOUS PRN
Status: DISCONTINUED | OUTPATIENT
Start: 2021-10-28 | End: 2021-10-28 | Stop reason: SDUPTHER

## 2021-10-28 RX ORDER — FUROSEMIDE 10 MG/ML
40 INJECTION INTRAMUSCULAR; INTRAVENOUS ONCE
Status: COMPLETED | OUTPATIENT
Start: 2021-10-28 | End: 2021-10-28

## 2021-10-28 RX ORDER — OXYCODONE HYDROCHLORIDE 10 MG/1
10 TABLET ORAL EVERY 6 HOURS
Qty: 10 TABLET | Refills: 0 | Status: SHIPPED | OUTPATIENT
Start: 2021-10-28 | End: 2021-10-31

## 2021-10-28 RX ORDER — DIAZEPAM 5 MG/1
15 TABLET ORAL EVERY 8 HOURS
Qty: 20 TABLET | Refills: 0 | Status: SHIPPED | OUTPATIENT
Start: 2021-10-28 | End: 2021-10-31

## 2021-10-28 RX ORDER — DEXTROSE MONOHYDRATE 25 G/50ML
12.5 INJECTION, SOLUTION INTRAVENOUS PRN
Status: DISCONTINUED | OUTPATIENT
Start: 2021-10-28 | End: 2021-10-28 | Stop reason: SDUPTHER

## 2021-10-28 RX ORDER — NICOTINE POLACRILEX 4 MG
15 LOZENGE BUCCAL PRN
Refills: 1 | Status: ON HOLD | DISCHARGE
Start: 2021-10-28 | End: 2022-01-09

## 2021-10-28 RX ORDER — CARBOXYMETHYLCELLULOSE SODIUM 10 MG/ML
1 GEL OPHTHALMIC EVERY 4 HOURS PRN
Status: ON HOLD | DISCHARGE
Start: 2021-10-28 | End: 2022-03-14

## 2021-10-28 RX ORDER — IPRATROPIUM BROMIDE AND ALBUTEROL SULFATE 2.5; .5 MG/3ML; MG/3ML
3 SOLUTION RESPIRATORY (INHALATION) EVERY 4 HOURS
DISCHARGE
Start: 2021-10-28

## 2021-10-28 RX ADMIN — MICONAZOLE NITRATE: 2 POWDER TOPICAL at 08:54

## 2021-10-28 RX ADMIN — INSULIN LISPRO 3 UNITS: 100 INJECTION, SOLUTION INTRAVENOUS; SUBCUTANEOUS at 08:32

## 2021-10-28 RX ADMIN — FUROSEMIDE 40 MG: 10 INJECTION, SOLUTION INTRAMUSCULAR; INTRAVENOUS at 11:52

## 2021-10-28 RX ADMIN — OXYCODONE 10 MG: 5 TABLET ORAL at 08:38

## 2021-10-28 RX ADMIN — METOPROLOL TARTRATE 25 MG: 25 TABLET, FILM COATED ORAL at 08:37

## 2021-10-28 RX ADMIN — IPRATROPIUM BROMIDE AND ALBUTEROL SULFATE 1 AMPULE: .5; 2.5 SOLUTION RESPIRATORY (INHALATION) at 03:12

## 2021-10-28 RX ADMIN — POTASSIUM BICARBONATE 20 MEQ: 391 TABLET, EFFERVESCENT ORAL at 08:37

## 2021-10-28 RX ADMIN — DIAZEPAM 15 MG: 10 TABLET ORAL at 08:38

## 2021-10-28 RX ADMIN — INFLUENZA A VIRUS A/VICTORIA/2570/2019 IVR-215 (H1N1) ANTIGEN (PROPIOLACTONE INACTIVATED), INFLUENZA A VIRUS A/CAMBODIA/E0826360/2020 IVR-224 (H3N2) ANTIGEN (PROPIOLACTONE INACTIVATED), INFLUENZA B VIRUS B/VICTORIA/705/2018 BVR-11 ANTIGEN (PROPIOLACTONE INACTIVATED), INFLUENZA B VIRUS B/PHUKET/3073/2013 BVR-1B ANTIGEN (PROPIOLACTONE INACTIVATED) 0.5 ML: 15; 15; 15; 15 INJECTION, SUSPENSION INTRAMUSCULAR at 12:24

## 2021-10-28 RX ADMIN — INSULIN LISPRO 6 UNITS: 100 INJECTION, SOLUTION INTRAVENOUS; SUBCUTANEOUS at 11:53

## 2021-10-28 RX ADMIN — SODIUM CHLORIDE, PRESERVATIVE FREE 10 ML: 5 INJECTION INTRAVENOUS at 09:23

## 2021-10-28 RX ADMIN — ENOXAPARIN SODIUM 30 MG: 30 INJECTION SUBCUTANEOUS at 08:35

## 2021-10-28 RX ADMIN — SODIUM CHLORIDE, PRESERVATIVE FREE 10 ML: 5 INJECTION INTRAVENOUS at 08:54

## 2021-10-28 RX ADMIN — CHLORHEXIDINE GLUCONATE 0.12% ORAL RINSE 15 ML: 1.2 LIQUID ORAL at 00:51

## 2021-10-28 RX ADMIN — MAGNESIUM SULFATE HEPTAHYDRATE 1000 MG: 1 INJECTION, SOLUTION INTRAVENOUS at 09:28

## 2021-10-28 RX ADMIN — POTASSIUM CHLORIDE 20 MEQ: 400 INJECTION, SOLUTION INTRAVENOUS at 09:30

## 2021-10-28 RX ADMIN — GABAPENTIN 800 MG: 400 CAPSULE ORAL at 14:02

## 2021-10-28 RX ADMIN — OXYCODONE 10 MG: 5 TABLET ORAL at 05:03

## 2021-10-28 RX ADMIN — IPRATROPIUM BROMIDE AND ALBUTEROL SULFATE 1 AMPULE: .5; 2.5 SOLUTION RESPIRATORY (INHALATION) at 07:45

## 2021-10-28 RX ADMIN — PANTOPRAZOLE SODIUM 40 MG: 40 INJECTION, POWDER, FOR SOLUTION INTRAVENOUS at 08:37

## 2021-10-28 RX ADMIN — GABAPENTIN 800 MG: 400 CAPSULE ORAL at 08:37

## 2021-10-28 RX ADMIN — CASTOR OIL AND BALSAM, PERU: 788; 87 OINTMENT TOPICAL at 08:55

## 2021-10-28 RX ADMIN — MAGNESIUM SULFATE HEPTAHYDRATE 1000 MG: 1 INJECTION, SOLUTION INTRAVENOUS at 08:30

## 2021-10-28 RX ADMIN — IPRATROPIUM BROMIDE AND ALBUTEROL SULFATE 1 AMPULE: .5; 2.5 SOLUTION RESPIRATORY (INHALATION) at 12:41

## 2021-10-28 RX ADMIN — LEVOTHYROXINE SODIUM 100 MCG: 100 TABLET ORAL at 06:50

## 2021-10-28 RX ADMIN — ASPIRIN 81 MG: 81 TABLET, CHEWABLE ORAL at 08:38

## 2021-10-28 RX ADMIN — DIAZEPAM 15 MG: 10 TABLET ORAL at 00:51

## 2021-10-28 RX ADMIN — SERTRALINE HYDROCHLORIDE 50 MG: 50 TABLET ORAL at 08:39

## 2021-10-28 RX ADMIN — INSULIN LISPRO 3 UNITS: 100 INJECTION, SOLUTION INTRAVENOUS; SUBCUTANEOUS at 00:51

## 2021-10-28 RX ADMIN — CHLORHEXIDINE GLUCONATE 0.12% ORAL RINSE 15 ML: 1.2 LIQUID ORAL at 08:36

## 2021-10-28 RX ADMIN — ATORVASTATIN CALCIUM 40 MG: 40 TABLET, FILM COATED ORAL at 08:38

## 2021-10-28 RX ADMIN — INSULIN LISPRO 3 UNITS: 100 INJECTION, SOLUTION INTRAVENOUS; SUBCUTANEOUS at 05:04

## 2021-10-28 RX ADMIN — POTASSIUM CHLORIDE 20 MEQ: 400 INJECTION, SOLUTION INTRAVENOUS at 08:27

## 2021-10-28 ASSESSMENT — PULMONARY FUNCTION TESTS
PIF_VALUE: 36
PIF_VALUE: 34
PIF_VALUE: 35
PIF_VALUE: 35
PIF_VALUE: 36
PIF_VALUE: 35
PIF_VALUE: 36
PIF_VALUE: 34
PIF_VALUE: 36
PIF_VALUE: 34
PIF_VALUE: 37
PIF_VALUE: 35
PIF_VALUE: 34
PIF_VALUE: 35
PIF_VALUE: 35

## 2021-10-28 ASSESSMENT — PAIN SCALES - GENERAL
PAINLEVEL_OUTOF10: 0
PAINLEVEL_OUTOF10: 0

## 2021-10-28 NOTE — PROGRESS NOTES
RT Inhaler-Nebulizer Bronchodilator Protocol Note    There is a bronchodilator order in the chart from a provider indicating to follow the RT Bronchodilator Protocol and there is an Initiate RT Inhaler-Nebulizer Bronchodilator Protocol order as well (see protocol at bottom of note). CXR Findings:  XR CHEST PORTABLE    Result Date: 10/26/2021  Moderate to severe bilateral pneumonias similar in appearance to yesterday. Removal of NG tube. The findings from the last RT Protocol Assessment were as follows:   History Pulmonary Disease: Chronic pulmonary disease  Respiratory Pattern: Severe use of accessory muscle or RR>30 bpm  Breath Sounds: Slightly diminished and/or crackles  Cough: Strong, productive  Indication for Bronchodilator Therapy: On home bronchodilators  Bronchodilator Assessment Score: 13    Aerosolized bronchodilator medication orders have been revised according to the RT Inhaler-Nebulizer Bronchodilator Protocol below. Respiratory Therapist to perform RT Therapy Protocol Assessment initially then follow the protocol. Repeat RT Therapy Protocol Assessment PRN for score 0-3 or on second treatment, BID, and PRN for scores above 3. No Indications - adjust the frequency to every 6 hours PRN wheezing or bronchospasm, if no treatments needed after 48 hours then discontinue using Per Protocol order mode. If indication present, adjust the RT bronchodilator orders based on the Bronchodilator Assessment Score as indicated below. Use Inhaler orders unless patient has one or more of the following: on home nebulizer, not able to hold breath for 10 seconds, is not alert and oriented, cannot activate and use MDI correctly, or respiratory rate 25 breaths per minute or more, then use the equivalent nebulizer order(s) with same Frequency and PRN reasons based on the score. If a patient is on this medication at home then do not decrease Frequency below that used at home.     0-3 - enter or revise RT bronchodilator order(s) to equivalent RT Bronchodilator order with Frequency of every 4 hours PRN for wheezing or increased work of breathing using Per Protocol order mode. 4-6 - enter or revise RT Bronchodilator order(s) to two equivalent RT bronchodilator orders with one order with BID Frequency and one order with Frequency of every 4 hours PRN wheezing or increased work of breathing using Per Protocol order mode. 7-10 - enter or revise RT Bronchodilator order(s) to two equivalent RT bronchodilator orders with one order with TID Frequency and one order with Frequency of every 4 hours PRN wheezing or increased work of breathing using Per Protocol order mode. 11-13 - enter or revise RT Bronchodilator order(s) to one equivalent RT bronchodilator order with QID Frequency and an Albuterol order with Frequency of every 4 hours PRN wheezing or increased work of breathing using Per Protocol order mode. Greater than 13 - enter or revise RT Bronchodilator order(s) to one equivalent RT bronchodilator order with every 4 hours Frequency and an Albuterol order with Frequency of every 2 hours PRN wheezing or increased work of breathing using Per Protocol order mode.      Electronically signed by Isis Pacheco RCP on 10/28/2021 at 1:29 AM

## 2021-10-28 NOTE — PROGRESS NOTES
Comprehensive Nutrition Assessment    Type and Reason for Visit:  Reassess    Nutrition Recommendations/Plan:   1. Modified TF order to ADULT TUBE FEEDING; PEG tube; Other formula - Glucerna 1.2 with a goal rate of 75 ml/hr x 20 hours. Start with 40 ml/hr and increase by 15 ml every 4 hours, as tolerated by patient, until goal rate can be achieved and maintained. Water flushes, 30 ml every 3 hours for tube patency. 2. Monitor TF formula change, rate, intake, and tolerance + water flushes. 3. Monitor respiratory status, mental status, and plan of care. 4. Please obtain an updated weight for this patient - last weight was obtained on 10/26/21.   5. Monitor nutrition-related labs, bowel function, and weight trends. Nutrition Assessment:  patient remains unchanged from a nutritional standpoint since last RD assessment except that patient had a trach/PEG placed on 10/25/21 so TF is infusing via PEG tube at this time; she remains at risk for further compromise d/t need for trach/PEG placement, need for EN as sole source of nutrition, and altered nutrition-related labs; will modify TF order to Glucerna 1.2 with a goal rate of 75 ml/hr x 20 hours + 30 ml water flushes every 3 hours for tube patency    Malnutrition Assessment:  Malnutrition Status:  Severe malnutrition (severe malnutrition in the context of acute illness or injury < 3 months based on 50% or less of estimated energy requirements for 5 or more days and greater than 5% weight loss over 1 month), per guidelines from Academy of Nutrition and Dietetics (Academy)/American Society for Parenteral and Enteral Nutrition (A.S.P.E.N.) - clinical characteristics that the clinician can  obtain and document to support a diagnosis of malnutrition.    Context:  Acute Illness     Findings of the 6 clinical characteristics of malnutrition:  Energy Intake:  7 - 50% or less of estimated energy requirements for 5 or more days  Weight Loss:  7 - Greater than 5% over 1 month (- 12# or 6.1% weight loss x 1 month (since 9/29/21))     Body Fat Loss:  Unable to assess (COVID-19 +)     Muscle Mass Loss:  Unable to assess (COVID-19 +)    Fluid Accumulation:  No significant fluid accumulation     Strength:  Not Performed    Estimated Daily Nutrient Needs:  Energy (kcal):  1748 - 1900 kcals based on 23-25 kcals/kg/CBW; Weight Used for Energy Requirements:  Current     Protein (g):  92 - 106 g protein based on 1.4-1.6 g/kg/IBW (+ wounds); Weight Used for Protein Requirements:  Current        Fluid (ml/day):  1748 - 1900 ml; Method Used for Fluid Requirements:  1 ml/kcal      Nutrition Related Findings:  patient had trach/PEG placed on 10/25/21; she is alert and responds to commands; + small BM on 10/26/21; TF infusing at 40 ml/hr, per flow sheet data; goal rate fo TF is 60 ml/hr x 20 hours; h/h, Mag, K, and Cl are low; alk phos is elevated; patient has zoloft, protonix, oxycodone, synthroid, high-dose SSI, fentanyl, decadron, lipitor, valium, lovenox, and gabapentin ordered at this time      Wounds:  Deep Tissue Injury, Pressure Injury (DTI on sacrum; small blisters on coccyx)       Current Nutrition Therapies:    Current Tube Feeding (TF) Orders:  · Feeding Route: PEG  · Formula:  Other Tube Feeding (Comment) (Glucerna 1.2)  · Schedule: Continuous  · Additives/Modulars:  (none)  · Water Flushes: 30 ml every 3 hours for tube patency  · Current TF & Flush Orders Provides: Vital AF 1.2 at 40 ml/hr x 20 hours (per flow sheet data) = 800 ml TV, 960 kcals, 60 g protein, and 649 ml free water + 30 ml water flushes every 3 hours for tube patency  · Goal TF & Flush Orders Provides: Glucerna 1.2 with a goal rate of 75 ml/hr x 20 hours = 1500 ml TV, 1800 kcals, 90 g protein, and 1208 ml free water + 30 ml water flushes every 3 hours for tube patency      Anthropometric Measures:  · Height: 5' 9\" (175.3 cm)  · Current Body Weight: 175 lb 14.4 oz (79.8 kg) (obtained on 10/26/21)   · Admission Body Weight: 187 lb 6.3 oz (85 kg) (obtained on 9/29/21; actual weight)    · Usual Body Weight: 187 lb 6.3 oz (85 kg) (obtained on 9/29/21; actual weight)     · Ideal Body Weight: 145 lbs; % Ideal Body Weight 121.3 %   · BMI: 26   · BMI Categories: Overweight (BMI 25.0-29. 9)       Nutrition Diagnosis:   · Severe malnutrition related to impaired respiratory function, inadequate protein-energy intake, increase demand for energy/nutrients as evidenced by NPO or clear liquid status due to medical condition, nutrition support - enteral nutrition, lab values, constipation      Nutrition Interventions:   Food and/or Nutrient Delivery:  Continue NPO, Modify Tube Feeding  Nutrition Education/Counseling:  No recommendation at this time   Coordination of Nutrition Care:  Continue to monitor while inpatient, Interdisciplinary Rounds    Goals:  patient will tolerate Glucerna 1.2 at goal rate of 75 ml/hr x 20 hours without GI distress, without s/s of aspiration, and without additional lab/fluid disturbances       Nutrition Monitoring and Evaluation:   Behavioral-Environmental Outcomes:  None Identified   Food/Nutrient Intake Outcomes:  Enteral Nutrition Intake/Tolerance  Physical Signs/Symptoms Outcomes:  Biochemical Data, Constipation, GI Status, Fluid Status or Edema, Hemodynamic Status, Skin, Weight     Discharge Planning:    Enteral Nutrition     Electronically signed by Kylah Sevilla RD, LD on 10/28/21 at 9:42 AM EDT    Contact: [phone]

## 2021-10-28 NOTE — PLAN OF CARE
Nutrition Problem #1: Severe malnutrition  Intervention: Food and/or Nutrient Delivery: Continue NPO, Modify Tube Feeding  Nutritional Goals: patient will tolerate Glucerna 1.2 at goal rate of 75 ml/hr x 20 hours without GI distress, without s/s of aspiration, and without additional lab/fluid disturbances

## 2021-10-28 NOTE — PROGRESS NOTES
10/28/21 0313   Vent Information   $Ventilation $Subsequent Day   Skin Assessment Clean, dry, & intact   Vent Type 980   Vent Mode AC/PC   Vt Ordered 0 mL   Pressure Ordered 20   Rate Set 20 bmp   Peak Flow 0 L/min   Pressure Support 0 cmH20   FiO2  60 %   SpO2 92 %   SpO2/FiO2 ratio 153.33   Sensitivity 3   PEEP/CPAP 8   I Time/ I Time % 0 s   Humidification Source Heated wire   Humidification Temp Measured 37   Circuit Condensation Drained   Vent Patient Data   High Peep/I Pressure 20   Peak Inspiratory Pressure 35 cmH2O   Mean Airway Pressure 19 cmH20   Rate Measured 33 br/min   Vt Exhaled 436 mL   Minute Volume 13.2 Liters   I:E Ratio 1:1.80   Cough/Sputum   Sputum How Obtained Tracheal   Cough Productive   Sputum Amount Moderate   Sputum Color Creamy   Tenacity Thick   Spontaneous Breathing Trial (SBT) RT Doc   Pulse 103   Breath Sounds   Right Upper Lobe Diminished   Right Middle Lobe Diminished   Right Lower Lobe Diminished   Left Upper Lobe Diminished   Left Lower Lobe Diminished   Additional Respiratory  Assessments   Resp (!) 31   Alarm Settings   High Pressure Alarm 40 cmH2O   Low Minute Volume Alarm 4 L/min   Apnea (secs) 20 secs   High Respiratory Rate 40 br/min   Low Exhaled Vt  250 mL   Patient Observation   Observations madelyn 6 dct

## 2021-10-28 NOTE — PROGRESS NOTES
Pt appears calm , awake at times, no distress noted, family at bedside, reposition pt, no stools evident medication administered.

## 2021-10-28 NOTE — PROGRESS NOTES
Report called to LTAC at [address] [place] [address] Jed Michelet Saravia) spoke with Nurse Nathalia Morin), informed her patient was on their way to her facility, all questions answered and informed her to call for any other information needed.

## 2021-10-28 NOTE — CARE COORDINATION
DISCHARGE ORDER  Date/Time 10/28/2021 10:53 AM  Completed by:  HOMA Bellamy. Case Management    Patient Name: Perry Orozco    : 1955       Admit order Date and Status: 2021 Inpatient   Noted discharge order. (verify MD's last order for status of admission/Traditional Medicare 3 [place] Inpatient qualifying stay required for SNF)    Confirmed discharge plan with:              Patient:  RN will notify pt; pt in Covid isolation and Vent/Trach              When pt confirms DC plan does any support person need to be contacted by CM Yes if yes who pt's  Sameera Arellano and daughter Ronny Fothergill at [phone]                   Discharge to Facility: [address] [place] [address] phone number for staff giving report: [phone]   Pre-certification completed: n/a pt has traditional Medicare  Discharge orders and Continuity of Care faxed to facility:  [phone]      Transportation:               Medical Transport explained with choice list offered to pt/family. Choice:Yes(no preference)  Agency used: [address] [place] [address] for Trach/Vent/Heart Monitor with a Medic via phone call to [facility] at [phone]. [facility] aware Peep is 8, SpO2 is 93% and FiO2 is 60%. [facility] confirmed it was an ALS transport with a Medic; confirmation is today's date   time:   3:00pm      Pt/family/Nursing/Facility aware of  time:  Yes Names: pt's  Jeri Gould, daughter Lachelle Shipman at [address] [place] [address], Dr. Reece Penn, Dr. Mallika Teague RN, Unit [place] in ICU  Ambulance form completed:  yes      Comments: Chart review completed. Scheduled an ALS transport for a Vent/Trach/Heart Monitor with [facility] via [address]. She is aware per WOJCIECH Hinkle pt has no drips, sedatives and her tube feeds will be stopped for transport.  [facility] aware pt has 2 artificial hips and knee with history of a hip fracture per pt's daughter; requested them to transfer pt easy per daughter's request.    Spoke with pt's  Jordan Yin and daughter Arnaud Whittaker via phone call to  whom both are at bedside. They are agreeable to pt transferring to [facility] today and denied needs or questions. Olivier Freeman with [facility] will call pt's  shortly and to ask about visitors, etc.     CM spoke with WOJCIECH Hinkle and asked her to contact Respiratory Dept to be present at time of  for transfer of care when squad comes to pick pt up and they can give report and help with ventilator settings. RN verbalized understanding and states will contact Respiratory Dept. Pt is being d/c'd to [facility] today. Pt's O2 sats are 93% on 60% FiO2 with a Peep of 8. Discharge timeout done with Arin JEFFREY. All discharge needs and concerns addressed. Discharging nurse to complete TYLOR, reconcile AVS, and place final copy with patient's discharge packet. Discharging RN to ensure that written prescriptions for Level II medications are sent with patient to the facility as per protocol. Addendum at 4:00pm: Spoke with Arin JEFFREY who stated transport just arrived. Notified Marisela at [facility] of the delay and she confirmed fax was received.

## 2021-10-28 NOTE — CARE COORDINATION
INTERDISCIPLINARY PLAN OF CARE CONFERENCE    Date/Time: 10/28/2021 9:35 AM  Completed by:  HOMA Ram. Case Management      Patient Name:  Galilea Dunn  YOB: 1955  Admitting Diagnosis: Dehydration [E86.0]  Hypokalemia [E87.6]  Hypomagnesemia [E83.42]  Positive D dimer [R79.89]  CHARLY (acute kidney injury) ([facility] [N17.9]  Acute respiratory failure with hypoxia ([facility] [J96.01]  Pneumonia due to COVID-19 virus [U07.1, J12.82]  COVID-19 [U07.1]     Admit Date/Time:  9/26/2021  8:29 PM    Chart reviewed. Interdisciplinary team contacted or reviewed plan related to patient progress and discharge plans. Disciplines included Case Management, Nursing, and Dietitian. Current Status:Ongoing   PT/OT recommendation for discharge plan of care: n/a    Expected D/C Disposition:  [facility]    Discharge Plan Comments: Chart review completed. Spoke with Marisela with [facility] who stated she is checking bed openings if pt is ready for transfer. Spoke with pt's RN Martín García who stated he spoke with pt's  yesterday as they called for an update. RN stated the CM stated she checks in with pt once a month. RN stated pt is off Fentanyl and they completed a xray today. RN aware [facility] is checking beds. Home O2 in place on admit: No    Addendum at 10:26am: Spoke with Marisela at [facility] who stated pt can move to [facility] today as she is stable on a vent; prefers day shift. Spoke with Dr. Ben Burns who stated pt can move to [facility] today. Notified Dr. Serafin Pineda. Spoke with Arin JEFFREY who stated pt needs a heart monitor and vent transport but no drips or sedatives.

## 2021-10-28 NOTE — PROGRESS NOTES
Hospitalist Progress Note      PCP: Brenton Claros MD    Date of Admission: 9/26/2021    Subjective: more awake, BP uncontrolled    Medications:  Reviewed    Infusion Medications    fentaNYL Stopped (10/27/21 1703)    sodium chloride      dextrose      sodium chloride       Scheduled Medications    dexamethasone  0.52 mg IntraVENous Q24H    metoprolol tartrate  25 mg Oral BID    diazePAM  15 mg Oral Q8H    potassium bicarb-citric acid  20 mEq Oral Daily    insulin lispro  0-18 Units SubCUTAneous Q4H    sertraline  50 mg Per NG tube Daily    Venelex   Topical BID    miconazole   Topical BID    oxyCODONE  10 mg Oral Q6H    influenza virus vaccine  0.5 mL IntraMUSCular Prior to discharge    chlorhexidine  15 mL Mouth/Throat BID    ipratropium-albuterol  1 ampule Inhalation Q4H    pantoprazole  40 mg IntraVENous Daily    levothyroxine  100 mcg Oral Daily    enoxaparin  30 mg SubCUTAneous BID    lidocaine 1 % injection  5 mL IntraDERmal Once    sodium chloride flush  5-40 mL IntraVENous 2 times per day    aspirin  81 mg Oral Daily    gabapentin  800 mg Oral TID    atorvastatin  40 mg Oral Daily    sodium chloride flush  5-40 mL IntraVENous 2 times per day     PRN Meds: hydrALAZINE, magnesium sulfate, potassium chloride, labetalol, carboxymethylcellulose PF **AND** artificial tears, fentanNYL, midazolam, sodium chloride flush, sodium chloride, glucose, dextrose, glucagon (rDNA), dextrose, sodium chloride flush, sodium chloride, polyethylene glycol, acetaminophen **OR** acetaminophen, prochlorperazine      Intake/Output Summary (Last 24 hours) at 10/28/2021 0253  Last data filed at 10/27/2021 2359  Gross per 24 hour   Intake 1661.15 ml   Output 1845 ml   Net -183.85 ml       Physical Exam Performed:    BP (!) 161/74   Pulse 93   Temp 98.8 °F (37.1 °C) (Axillary)   Resp (!) 33   Ht 5' 9\" (1.753 m)   Wt 175 lb 14.4 oz (79.8 kg)   SpO2 93%   BMI 25.98 kg/m²     Patient in droplet plus precautions  General: Sedated, on mechanical ventilation . Tracheostomy+  Skin:  Warm and dry  Neck:   trach +  Chest: diminished breath sounds,   No wheezes or rhonchi. Cardiovascular:  RRR ,S1S2 normal  Abdomen:  Soft, non tender, non distended, BS +. PEG +  Extremities:  No edema. Intact peripheral pulses. Brisk cap refill, < 2 secs  Neuro: sedated    Labs:   Recent Labs     10/25/21  0500 10/26/21  0536 10/27/21  0451   WBC 8.7 9.3 11.0   HGB 8.6* 8.9* 9.4*   HCT 27.0* 28.8* 29.7*   * 152 204     Recent Labs     10/25/21  0500 10/26/21  0536 10/27/21  0451    141 142   K 3.3* 3.4* 3.2*   CL 95* 97* 98*   CO2 36* 33* 35*   BUN 19 13 10   CREATININE <0.5* <0.5* <0.5*   CALCIUM 9.4 9.3 9.6     Recent Labs     10/25/21  0500 10/26/21  0536 10/27/21  0451   AST 33 27 29   ALT 28 22 19   BILITOT 0.6 0.6 0.7   ALKPHOS 153* 170* 191*     No results for input(s): INR in the last 72 hours. No results for input(s): [facility] in the last 72 hours. Urinalysis:      Lab Results   Component Value Date    NITRU Negative 09/27/2021    WBCUA 0-2 09/27/2021    BACTERIA Rare 09/27/2021    RBCUA None seen 09/27/2021    BLOODU TRACE-INTACT 09/27/2021    SPECGRAV <=1.005 09/27/2021    GLUCOSEU >=1000 09/27/2021    GLUCOSEU 250 05/11/2011       Radiology:  XR CHEST PORTABLE   Final Result   Moderate to severe bilateral pneumonias similar in appearance to yesterday. Removal of NG tube. XR CHEST PORTABLE   Final Result   Patchy airspace opacities bilaterally, may be related to pulmonary edema   versus pneumonia. Stable cardiomegaly         XR CHEST PORTABLE   Final Result   No significant interval change. XR CHEST PORTABLE   Final Result   Supportive tubing projects in normal position. Bilateral interstitial-alveolar opacities, likely combination of pulmonary   edema pneumonia. Although they appear more prominent this may be secondary   to the rotation on the present study. with grossly stable left basilar   airspace opacities. XR CHEST PORTABLE   Final Result   Supportive tubing is in stable position. Stable pattern of bilateral ground-glass opacities and basilar consolidations. XR CHEST PORTABLE   Final Result   Stable appearance of the chest.  Diffuse ground-glass opacities on the right   and mild left basilar opacities. XR CHEST PORTABLE   Final Result   Improved aeration on the left, possibly improving pulmonary edema. Persistent airspace disease in the right base may represent concurrent   pneumonia         XR ABDOMEN (KUB) (SINGLE AP VIEW)   Final Result   Enteric catheter tip likely in the distal gastric body. XR CHEST PORTABLE   Final Result   1. Endotracheal tube projects in the appropriate position. 2. Enteric tube extends below the diaphragm and out of the field of view. XR CHEST PORTABLE   Final Result   Increasing diffuse airspace opacification throughout the lungs. Pattern may   represent pulmonary edema or worsening pneumonitis. XR CHEST PORTABLE   Final Result   1. Interval increased bilateral pulmonary opacities with new consolidative   change at the right lung base. These findings could be secondary to   pulmonary edema or infection. 2. Consolidative change at the right lung base could also be secondary to   atelectasis/mucous plug. XR CHEST PORTABLE   Final Result   No significant interval change in small right pleural effusion or bilateral   heterogeneous opacity which can reflect pulmonary edema or pneumonia. XR CHEST PORTABLE   Final Result   Mild improvement from prior comparison. Mild-to-moderate congestion and/or   infiltrates identified in the lungs. ET tube terminates 7 cm superior to the saba. XR CHEST PORTABLE   Final Result   Endotracheal tube and orogastric tube unchanged, adequate position.       Slightly increased extensive bilateral pulmonary disease over the past 24   hours this may relate to interstitial edema or diffuse infection or a   combination. XR CHEST PORTABLE   Final Result   Endotracheal tube with its tip approximately 4.7 cm from the saba in   satisfactory position. Enteric tube in satisfactory position. Patchy bilateral airspace disease is again noted with improved aeration of   the left mid lung. XR CHEST PORTABLE   Final Result   Some improvement in the appearance of the chest with clearing of some of the   acute airspace disease from the mid left lung. Large amount of pneumonia   remains within the right lung and lower left lung. XR CHEST PORTABLE   Final Result   1. Endotracheal tube, nasogastric tube, and right PICC line placement. 2.  Increasing multifocal opacities with consolidative area in the right   perihilar lung. Could be multifocal pneumonia with or without edema. IR PICC WO SQ PORT/PUMP > 5 YEARS   Final Result   1. See above. CT CHEST PULMONARY EMBOLISM W CONTRAST   Final Result   No evidence of pulmonary embolism. Scattered peripheral opacities in the left lung and more consolidative area   in the right lung concerning for pneumonia. Previous right thoracotomy and   upper lobectomy. One mildly enlarged right paratracheal lymph node is present but no priors   for comparison. Nonenlarged but prominent lymph nodes in the upper abdomen   and juxta diaphragmatic region. XR CHEST PORTABLE   Final Result   Mild cardiomegaly without interstitial edema. Metallic surgical clips from prior right thoracotomy. COPD with anterior segment-right upper lobe alveolar pneumonia. Old pleural thickening was noted along the right lateral chest wall. Pleural   thickening and or trace effusion blunts the right costophrenic angle.          XR CHEST PORTABLE    (Results Pending)   XR CHEST PORTABLE    (Results Pending)           Assessment/Plan:    Active Hospital Problems Diagnosis     ARDS (adult respiratory distress syndrome) ([facility]) [J80]     Pneumonia due to Klebsiella pneumoniae ([facility] [J15.0]     Electrolyte disorder [E87.8]     Acute hypoxemic respiratory failure ([facility] [J96.01]     Pneumonia due to Pseudomonas species ([facility] [J15.1]     Hypomagnesemia [E83.42]     Hypokalemia [E87.6]     Gram-negative pneumonia ([facility]) [J15.6]     Severe protein-calorie malnutrition ([facility]) [E43]     Hypotension [I95.9]     Acute respiratory failure with hypoxia ([facility] [J96.01]     COVID-19 [U07.1]     Pneumonia due to COVID-19 virus [U07.1, J12.82]     CHARLY (acute kidney injury) ([facility] [N17.9]     Uncontrolled hypertension [I10]     Fever [R50.9]     Suspected COVID-19 virus infection [Z20.822]     Hypoxia [R09.02]     Dehydration [E86.0]     DM2 (diabetes mellitus, type 2) ([facility]) [E11.9]     Essential hypertension [I10]     Hypothyroidism [E03.9]          #COVID-19 pneumonia  #Acute hypoxic respiratory failure   Pseudomonas HCAP. -Unvaccinated patient  - she presented with cough, fever, dyspnea in the setting of household contact testing + COVID 19  - Her Covid testing came back positive on admission  - Initially on 2 L of oxygen on presentation.   -Pulmonology consulted  - worsening hypoxemia requiring high flow oxygen. And eventually intubated   - Intubated early AM  9/29/2021.  Extubated on 10/4/2021.  She was on Vapotherm.  Pulmonary status got worse and she was reintubated on 10/6/2021.  Continued on mechanical ventilation. S/p tracheostomy and PEG placement 10/25/2021  - s/p Remdesivir  - on Decadron, day # 28 - being tapered.  On 1mg daily   - s/p  Tocilizumab  - Lovenox twice daily  - pulmonary managing.         #Possible superimposed bacterial pneumonia  Gram-negative infection  -Was on Rocephin and Zithromax for 5 days ; - changed to zosyn on 10/2 with pseudomonas in  resp culture.  Pulmonary has given zyvox for 3 days   - Completed Zosyn for 8 days  -The Roberta cultures now growing Klebsiella, initiated on Cipro. completed 7 days. Now off all abx.       #Hypokalemia  #Hypomagnesemia  - replaced as needed     #CAD  - cont ASA, statin  - EKG with inferior T wave abnormality.  She denied CP.  Trop neg   -telemetry monitoring     #History of tracheobronchomalacia  S/p tracheoplasty      #HTN - uncontrolled  - Improved hypotension and off levophed  Resumed home medications-Norvasc and losartan.   Restarting metoprolol     #Chronic pain   History of hip fracture  - cont home oxycodone BID and percocet   - cont gabapentin and requip     #DM2  - use ssi      #Hypothyroidism  - cont synthroid         DVT Prophylaxis: Lovenox  Diet: Diet NPO  ADULT TUBE FEEDING; Orogastric; Peptide Based; Continuous; 55; Yes; 5; Q 4 hours; 60; 30; Q 3 hours  Code Status: Full Code    PT/OT Eval Status: ordered, needs LTAC    Dispo - cont care in ICU    Timothy Barcenas MD

## 2021-10-28 NOTE — DISCHARGE INSTR - COC
Continuity of Care Form    Patient Name: Bebe De La Torre   :  1955  MRN:  [id number]    Admit date:  2021  Discharge date:  10/28/2021    Code Status Order: Full Code   Advance Directives:      Admitting Physician:  Jaquan Wood MD  PCP: Zulma Abbasi MD    Discharging Nurse: Ginny March RN  [address] Unit/Room#: 9431/6891-00  Discharging Unit Phone Number: [phone]    Emergency Contact:   Extended Emergency Contact Information  Primary Emergency Contact: Bala Lucas  Address:  [address] [facility] [address] Phone: [phone]  Mobile Phone: [phone]  Relation: Spouse   needed? No  Secondary Emergency Contact: Jaylin Candelaria  [place] Phone: [phone]  Relation: Child   needed? No    Past Surgical History:  Past Surgical History:   Procedure Laterality Date    ABDOMEN SURGERY      CARDIAC CATHETERIZATION      COLON SURGERY      COLONOSCOPY      normal    HYSTERECTOMY      JOINT REPLACEMENT  10/92    Right; hip     JOINT REPLACEMENT        Left hip  followed by revision 2006    JOINT REPLACEMENT        right     JOINT REPLACEMENT         right replacement.  LUNG SURGERY  2014    tracheobronchomalacia    TRACHEOSTOMY N/A 10/25/2021    TRACHEOTOMY performed by Evangelist Reagan DO at [address] N/A 10/25/2021    EGD/PEG W/ANES.  ON VENT, COVID + performed by Bob Russ MD at [address] [place] [address]       Immunization History:   Immunization History   Administered Date(s) Administered    Influenza Virus Vaccine 09/10/2013, 10/21/2013, 10/21/2014, 2015, 2016, 10/18/2017, 10/18/2018    Influenza Whole 10/13/2014    Influenza, High Dose (Fluzone 65 yrs and older) 2019, 2019, 10/06/2020    Influenza, High-dose, Quadv, 65 yrs +, IM (Fluzone) 10/06/2020    Influenza, Intradermal, Quadrivalent, Preservative Free 2016, 10/18/2017    Influenza, Quadv, IM, PF (6 mo and older Fluzone, Flulaval, Fluarix, and 3 yrs and older Afluria) 10/18/2018    Influenza, Quadv, adjuvanted, 65 yrs +, IM, PF (Fluad) 11/03/2020    Pneumococcal Conjugate 13-valent ([id number]) 09/17/2015    Pneumococcal Conjugate Vaccine 10/21/2013, 10/13/2014, 11/14/2019    Pneumococcal Polysaccharide ([id number]) 10/21/2013, 10/13/2014, 10/13/2014, 11/14/2019    Tdap (Boostrix, Adacel) 04/29/2019       Active Problems:  Patient Active Problem List   Diagnosis Code    Displacement of lumbar intervertebral disc without myelopathy M51.26    Hypothyroidism E03.9    Mixed hyperlipidemia E78.2    Anxiety state F41.1    Generalized osteoarthrosis, involving multiple sites M15.9    Essential hypertension I10    Shoulder arthritis M19.019    Vitamin D deficiency E55.9    Chronic pain syndrome G89.4    Fibromyalgia M79.7    DDD (degenerative disc disease), lumbar M51.36    Anxiety disorder F41.9    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    Pulmonary nodule R91.1    Tracheobronchomalacia J39.8    Bronchiectasis ([facility]) J47.9    DM2 (diabetes mellitus, type 2) ([facility]) T61.2    Systolic congestive heart failure ([facility]) I50.20    Chest pain R07.9    S/P cardiac catheterization Z98.890    PVC (premature ventricular contraction) I49.3    Palpitations R00.2    Syncope and collapse R55    Convulsion ([facility]) R56.9    New onset seizure ([facility]) R56.9    CAD in native artery I25.10    Nausea R11.0    Dehydration E86.0    Memory change R41.3    Acquired hypothyroidism E03.9    Sepsis ([facility]) A41.9    Type 1 diabetes mellitus without complication ([facility]) X43.4    Orthostatic hypotension I95.1    Suspected COVID-19 virus infection Z20.822    Hypoxia R09.02    Acute respiratory failure with hypoxia ([facility]) J96.01    COVID-19 U07.1    Pneumonia due to COVID-19 virus U07.1, J12.82    CHARLY (acute kidney injury) ([facility] N17.9    Uncontrolled hypertension I10 9 cm^2 10/27/21 1624   Change in Wound Size % (l*w) 0 10/27/21 1624   Wound Assessment Dry 10/28/21 0450   Drainage Amount None 10/28/21 0450   Odor None 10/27/21 0400   Ana-wound Assessment Blanchable erythema 10/28/21 0450   Number of days: 9        Elimination:  Continence:   · Bowel: Yes  · Bladder: Yes  Urinary Catheter: 09/29/2021   Colostomy/Ileostomy/Ileal Conduit: No       Date of Last BM: 10/28/2021    Intake/Output Summary (Last 24 hours) at 10/28/2021 1201  Last data filed at 10/28/2021 0820  Gross per 24 hour   Intake 1081.34 ml   Output 1875 ml   Net -793.66 ml     I/O last 3 completed shifts: In: 4415 [I.V.:399; NG/GT:1011; IV Piggyback:100]  Out: 1845 [JFNSJ:6383]    Safety Concerns:      At Risk for Falls    Impairments/Disabilities:      None    Nutrition Therapy:  Current Nutrition Therapy:   - Tube Feedings:  {[facility] Tube Feedings:[id number]}    Routes of Feeding: Gastrostomy Tube  Liquids: No Liquids  Daily Fluid Restriction: no  Last Modified Barium Swallow with Video (Video Swallowing Test): not done    Treatments at the Time of Hospital Discharge:   Respiratory Treatments: ***  Oxygen Therapy:  pt on trach with vent  Ventilator:    - Ventilator Settings:    Vt Ordered: 0 mL  Rate Set: 20 bmp  FiO2 : 60 %    PEEP/CPAP: 8  Pressure Support: 0 cmH20    Rehab Therapies: {THERAPEUTIC INTERVENTION:[id number]}  Weight Bearing Status/Restrictions: No weight bearing restirctions  Other Medical Equipment (for information only, NOT a DME order):  hospital bed  Other Treatments: ***    Patient's personal belongings (please select all that are sent with patient):  None    RN SIGNATURE:  {Esignature:[id number]:::0}    CASE MANAGEMENT/SOCIAL WORK SECTION    Inpatient Status Date: 09/26/2021    Readmission Risk Assessment Score:  Readmission Risk              Risk of Unplanned Readmission:  40           Discharging to Facility/ Agency   · Name: [address] [place] [address]   · Address: [address] [place] [address] [facility] [address]  · Phone: [phone]  · Fax: [phone]    / signature: Electronically signed by Danish Cowan on 10/28/21 at 12:10 PM EDT    PHYSICIAN SECTION    Prognosis: Good    Condition at Discharge: Stable    Rehab Potential (if transferring to Rehab): Good    Recommended Labs or Other Treatments After Discharge:    Diet NPO  ADULT TUBE FEEDING; Orogastric; Other Tube Feeding (specify); Glucerna 1.2; Continuous; 40; Yes; 15; Q 4 hours; 75; 30; Q 3 hours      Physician Certification: I certify the above information and transfer of Tomas Schmitt  is necessary for the continuing treatment of the diagnosis listed and that she requires LTAC for greater 30 days.      Update Admission H&P: Changes in H&P as follows -  see follow up recent paolo notes/ DC summary     PHYSICIAN SIGNATURE:  Electronically signed by Meghna Villareal MD on 10/28/21 at 12:01 PM EDT

## 2021-10-28 NOTE — PROGRESS NOTES
Pt awake alert, calm, Fentanyl still off, . Q4 hour assessment complete, pt appears comfortable. Pt had small BM, cleaned with wipes, protective barrier cream applied to area. VSS,no other needs at present.

## 2021-10-28 NOTE — DISCHARGE SUMMARY
pressures are better today. .  Off of all drips,  off of fentanyl drip since yesterday 10/27/21  Currently on scheduled Valium and oxy . Doing very well    Plan is to  Discharge to [facility] today       #COVID-19 pneumonia  #Acute hypoxic respiratory failure   # Pseudomonas HCAP. -Unvaccinated patient  - she presented with cough, fever, dyspnea in the setting of household contact testing + COVID 19  - Her Covid testing came back positive on admission  - Initially on 2 L of oxygen on presentation.   -Pulmonology consulted  - worsening hypoxemia requiring high flow oxygen. And eventually intubated   - Intubated early AM  9/29/2021.  Extubated on 10/4/2021.  She was on Vapotherm.  Pulmonary status got worse and she was reintubated on 10/6/2021.  Continued on mechanical ventilation. S/p tracheostomy and PEG placement 10/25/2021  - s/p Remdesivir  - on Decadron, day # 29 - being tapered. On 1mg daily . Can DC after dose today   - s/p Tocilizumab  - Lovenox twice daily  - pulmonary managing. Ongoing PS trials      #Possible superimposed bacterial pneumonia  Gram-negative infection  -Was on Rocephin and Zithromax for 5 days ; - changed to zosyn on 10/2 with pseudomonas in  resp culture.  Pulmonary has given zyvox for 3 days   - Completed Zosyn for 8 days  - Respiratory cultures now growing Klebsiella, initiated on Cipro. completed 7 days. Now off all abx.     #Hypokalemia  #Hypomagnesemia  - replaced as needed     #CAD  - cont'd ASA, statin  - EKG with inferior T wave abnormality.  She denied CP.  Trop neg   -telemetry monitoring     #History of tracheobronchomalacia  S/p tracheoplasty      #HTN - uncontrolled  - Improved hypotension and off levophed.  -Resumed home medications-Norvasc and losartan.   Restarted metoprolol     #Chronic pain   History of hip fracture  - cont'd home oxycodone BID and percocet   - cont'd gabapentin and requip     #DM2  - used ssi      #Hypothyroidism  - cont'd synthroid         DVT Prophylaxis: Lovenox  Diet: Diet NPO  ADULT TUBE FEEDING; Orogastric; Other Tube Feeding (specify); Glucerna 1.2; Continuous; 40; Yes; 15; Q 4 hours; 75; 30; Q 3 hours  Code Status: Full Code     PT/OT - continued      DC to LTAC today      Procedures (Please Review Full Report for Details)  Intubation x2    Consults    Pulmonology   Wound care  GI  Otolaryngology       Physical Exam at Discharge:    BP (!) 142/82   Pulse 102   Temp 98.6 °F (37 °C) (Axillary)   Resp (!) 35   Ht 5' 9\" (1.753 m)   Wt 175 lb 14.4 oz (79.8 kg)   SpO2 91%   BMI 25.98 kg/m²   Patient in droplet plus precautions  General:  on mechanical ventilation . Tracheostomy+   She is awake and responding well , she is smiling appears to be oriented to person  Skin:  Warm and dry  Neck:   trach +  Chest: diminished breath sounds,   No wheezes or rhonchi. Cardiovascular:  RRR ,S1S2 normal  Abdomen:  Soft, non tender, non distended, BS +. PEG +  Extremities:  No edema. Intact peripheral pulses.  Brisk cap refill, < 2 secs  Neuro: Nonfocal      CBC: Recent Labs     10/26/21  0536 10/27/21  0451 10/28/21  0449   WBC 9.3 11.0 11.2*   HGB 8.9* 9.4* 9.5*   HCT 28.8* 29.7* 30.7*   MCV 79.5* 79.4* 78.8*    204 255     BMP:   Recent Labs     10/26/21  0536 10/27/21  0451 10/28/21  0449    142 143   K 3.4* 3.2* 3.2*   CL 97* 98* 97*   CO2 33* 35* 34*   BUN 13 10 12   CREATININE <0.5* <0.5* <0.5*     LIVER PROFILE:   Recent Labs     10/26/21  0536 10/27/21  0451 10/28/21  0449   AST 27 29 33   ALT 22 19 21   BILITOT 0.6 0.7 0.7   ALKPHOS 170* 191* 210*       U/A:    Lab Results   Component Value Date    NITRITE neg 11/30/2018    COLORU Yellow 09/27/2021    WBCUA 0-2 09/27/2021    RBCUA None seen 09/27/2021    BACTERIA Rare 09/27/2021    CLARITYU Clear 09/27/2021    SPECGRAV <=1.005 09/27/2021    LEUKOCYTESUR Negative 09/27/2021    BLOODU TRACE-INTACT 09/27/2021    GLUCOSEU >=1000 09/27/2021    GLUCOSEU 250 05/11/2011       ABG    Lab change in the multifocal bilateral infiltrates         XR CHEST PORTABLE   Final Result   Diffuse bilateral airspace disease appears unchanged on the right and   increased on the left. XR CHEST PORTABLE   Final Result   Bilateral pleuroparenchymal disease, with improved aeration of the right lung   compared to prior. XR CHEST PORTABLE   Final Result   Line and tube as above. Bilateral interstitial opacities are worsened when compared to prior study. XR CHEST PORTABLE   Final Result   Stable chest.  Diffuse interstitial changes, possibly infiltrate or edema. Satisfactory position of endotracheal tube. XR CHEST PORTABLE   Final Result   Stable support apparatus. In this case, both multifocal pneumonia and pulmonary edema are both   considered, and the changes appear increased when compared to the previous   exam.         XR CHEST PORTABLE   Final Result   1. Stable appropriate positions of support apparatus. 2. Slight interval progression of multifocal airspace disease throughout both   lungs, likely a combination of multifocal pneumonia and superimposed   pulmonary edema. 3. Stable cardiomegaly and small bilateral pleural effusions. XR CHEST PORTABLE   Final Result   Improving aeration of the right lung with grossly stable left basilar   airspace opacities. XR CHEST PORTABLE   Final Result   Supportive tubing is in stable position. Stable pattern of bilateral ground-glass opacities and basilar consolidations. XR CHEST PORTABLE   Final Result   Stable appearance of the chest.  Diffuse ground-glass opacities on the right   and mild left basilar opacities. XR CHEST PORTABLE   Final Result   Improved aeration on the left, possibly improving pulmonary edema.    Persistent airspace disease in the right base may represent concurrent   pneumonia         XR ABDOMEN (KUB) (SINGLE AP VIEW)   Final Result   Enteric catheter tip likely in the distal gastric body. XR CHEST PORTABLE   Final Result   1. Endotracheal tube projects in the appropriate position. 2. Enteric tube extends below the diaphragm and out of the field of view. XR CHEST PORTABLE   Final Result   Increasing diffuse airspace opacification throughout the lungs. Pattern may   represent pulmonary edema or worsening pneumonitis. XR CHEST PORTABLE   Final Result   1. Interval increased bilateral pulmonary opacities with new consolidative   change at the right lung base. These findings could be secondary to   pulmonary edema or infection. 2. Consolidative change at the right lung base could also be secondary to   atelectasis/mucous plug. XR CHEST PORTABLE   Final Result   No significant interval change in small right pleural effusion or bilateral   heterogeneous opacity which can reflect pulmonary edema or pneumonia. XR CHEST PORTABLE   Final Result   Mild improvement from prior comparison. Mild-to-moderate congestion and/or   infiltrates identified in the lungs. ET tube terminates 7 cm superior to the saba. XR CHEST PORTABLE   Final Result   Endotracheal tube and orogastric tube unchanged, adequate position. Slightly increased extensive bilateral pulmonary disease over the past 24   hours this may relate to interstitial edema or diffuse infection or a   combination. XR CHEST PORTABLE   Final Result   Endotracheal tube with its tip approximately 4.7 cm from the saba in   satisfactory position. Enteric tube in satisfactory position. Patchy bilateral airspace disease is again noted with improved aeration of   the left mid lung. XR CHEST PORTABLE   Final Result   Some improvement in the appearance of the chest with clearing of some of the   acute airspace disease from the mid left lung. Large amount of pneumonia   remains within the right lung and lower left lung.          XR CHEST PORTABLE   Final Result 1.  Endotracheal tube, nasogastric tube, and right PICC line placement. 2.  Increasing multifocal opacities with consolidative area in the right   perihilar lung. Could be multifocal pneumonia with or without edema. IR PICC WO SQ PORT/PUMP > 5 YEARS   Final Result   1. See above. CT CHEST PULMONARY EMBOLISM W CONTRAST   Final Result   No evidence of pulmonary embolism. Scattered peripheral opacities in the left lung and more consolidative area   in the right lung concerning for pneumonia. Previous right thoracotomy and   upper lobectomy. One mildly enlarged right paratracheal lymph node is present but no priors   for comparison. Nonenlarged but prominent lymph nodes in the upper abdomen   and juxta diaphragmatic region. XR CHEST PORTABLE   Final Result   Mild cardiomegaly without interstitial edema. Metallic surgical clips from prior right thoracotomy. COPD with anterior segment-right upper lobe alveolar pneumonia. Old pleural thickening was noted along the right lateral chest wall. Pleural   thickening and or trace effusion blunts the right costophrenic angle. XR CHEST PORTABLE    (Results Pending)         Discharge Medications     Medication List      START taking these medications    carboxymethylcellulose PF 1 % Gel ophthalmic gel  Commonly known as: THERATEARS  Place 1 drop into both eyes every 4 hours as needed for Dry Eyes     diazePAM 5 MG tablet  Commonly known as: VALIUM  Take 3 tablets by mouth every 8 hours for 3 days.      enoxaparin 30 MG/0.3ML injection  Commonly known as: LOVENOX  Inject 0.3 mLs into the skin 2 times daily     glucose 40 % Gel  Commonly known as: GLUTOSE  Take 37.5 mLs by mouth as needed (hypoglycemia)     hydrALAZINE 20 MG/ML injection  Commonly known as: APRESOLINE  Infuse 0.5 mLs intravenously every 4 hours as needed (prn sbp > 160)     insulin lispro 100 UNIT/ML injection vial  Commonly known as: HUMALOG  Inject 0-18 Units into the skin every 4 hours     ipratropium-albuterol 0.5-2.5 (3) MG/3ML Soln nebulizer solution  Commonly known as: DUONEB  Inhale 3 mLs into the lungs every 4 hours     metoprolol tartrate 25 MG tablet  Commonly known as: LOPRESSOR  Take 1 tablet by mouth 2 times daily     Omeprazole Powd  40 mg per PER PEG daily     oxyCODONE HCl 10 MG immediate release tablet  Commonly known as: OXY-IR  Take 1 tablet by mouth every 6 hours for 3 days. Replaces: oxyCODONE 20 MG extended release tablet        CONTINUE taking these medications    albuterol sulfate  (90 Base) MCG/ACT inhaler  Inhale 2 puffs into the lungs 4 times daily as needed for Wheezing     aspirin 81 MG EC tablet  Take 1 tablet by mouth daily     blood glucose monitor kit and supplies  Dispense whatever insurance will cover. Dx code:E11.9     * blood glucose test strips  Dispense whatever insurance will cover. Pt test twice a day dx:E11.9     * blood glucose test strips strip  Commonly known as: ASCENSIA AUTODISC VI;ONE TOUCH ULTRA TEST VI  1 each by In Vitro route daily As needed. estradiol 0.1 MG/GM vaginal cream  Commonly known as: ESTRACE     gabapentin 800 MG tablet  Commonly known as: NEURONTIN     Insulin Pen Needle 31G X 5 MM Misc  1 each by Does not apply route daily     Lancets Misc  Dispense whatever insurance will cover. Patient test twice day. Dx:E11.9     levothyroxine 100 MCG tablet  Commonly known as: SYNTHROID     losartan 25 MG tablet  Commonly known as: COZAAR  Take 1 tablet by mouth daily     Medical Compression Thigh High Misc  1 Units by Does not apply route daily     MiraLax 17 GM/SCOOP powder  Generic drug: polyethylene glycol     Pulmicort Flexhaler 180 MCG/ACT Aepb inhaler  Generic drug: budesonide     rOPINIRole 0.5 MG tablet  Commonly known as: REQUIP  TAKE 1 TABLET BY MOUTH 3 TIMES DAILY.      sertraline 50 MG tablet  Commonly known as: ZOLOFT  TAKE 1 TABLET BY MOUTH DAILY     simvastatin 40 MG tablet  Commonly known as: ZOCOR     therapeutic multivitamin-minerals tablet         * This list has 2 medication(s) that are the same as other medications prescribed for you. Read the directions carefully, and ask your doctor or other care provider to review them with you. STOP taking these medications    glipiZIDE 10 MG extended release tablet  Commonly known as: GLUCOTROL XL     Januvia 50 MG tablet  Generic drug: SITagliptin     Jardiance 10 MG tablet  Generic drug: empagliflozin     omeprazole 40 MG delayed release capsule  Commonly known as: PRILOSEC     oxyCODONE 20 MG extended release tablet  Commonly known as: OXYCONTIN  Replaced by: oxyCODONE HCl 10 MG immediate release tablet     oxyCODONE-acetaminophen  MG per tablet  Commonly known as: PERCOCET     tiZANidine 2 MG tablet  Commonly known as: Reba Pope           Where to Get Your Medications      You can get these medications from any pharmacy    Bring a paper prescription for each of these medications  · diazePAM 5 MG tablet  · oxyCODONE HCl 10 MG immediate release tablet     Information about where to get these medications is not yet available    Ask your nurse or doctor about these medications  · carboxymethylcellulose PF 1 % Gel ophthalmic gel  · enoxaparin 30 MG/0.3ML injection  · glucose 40 % Gel  · hydrALAZINE 20 MG/ML injection  · insulin lispro 100 UNIT/ML injection vial  · ipratropium-albuterol 0.5-2.5 (3) MG/3ML Soln nebulizer solution  · metoprolol tartrate 25 MG tablet  · Omeprazole Powd           Discharged in stable condition to [facility] [place] [facility]  Total time 45 minutes. > 50%  dominated by  coordination of care. Follow Up:   Follow up with MD at [address] [place] [address], MD   10/28/21

## 2021-10-28 NOTE — PROGRESS NOTES
Pulmonary & Critical Care Medicine ICU Progress Note    CC: Respiratory failure    Events of Last 24 hours:   Still on PCV 20/8 in order to improve dyssynchrony  Tolerated 2 hours of pressure support yesterday  PEEP 8  FiO2 60%  Fentanyl and Propofol off       Vascular lines: IV: PICC line 9/28    MV:   9/29-10/4, emergently re-intubated 10/6/21 for refractory hypoxemia  10/25 tracheostomy    Vent Mode: AC/PC Rate Set: 20 bmp/Vt Ordered: 0 mL/ /FiO2 : 60 %  Recent Labs     10/27/21  1133 10/28/21  0453   PHART 7.362 7.520*   HXM3XTH 66.7* 43.6   PO2ART 53.9* 57.3*     IV:   fentaNYL Stopped (10/27/21 1703)    sodium chloride      dextrose      sodium chloride       Vitals:  Blood pressure (!) 166/77, pulse 98, temperature 98.7 °F (37.1 °C), temperature source Axillary, resp. rate 30, height 5' 9\" (1.753 m), weight 175 lb 14.4 oz (79.8 kg), SpO2 93 %, not currently breastfeeding. on ventilator      Intake/Output Summary (Last 24 hours) at 10/28/2021 0713  Last data filed at 10/28/2021 0651  Gross per 24 hour   Intake 1510.02 ml   Output 1845 ml   Net -334.98 ml     EXAM:  General: ill appearing. Intubated sedated. Eyes: PERRL. No sclera icterus. No conjunctival injection. ENT: No discharge. Pharynx clear. ET tube in place  Neck: Trachea midline. Normal thyroid. Resp: No accessory muscle use. Few crackles. No wheezing. Few rhonchi. No dullness on percussion. CV: Regular rate. Regular rhythm. No mumur or rub. No edema. GI: Non-tender. Non-distended. No masses. No organomegaly. Normal bowel sounds. No hernia. Skin: Warm and dry. No nodule on exposed extremities. No rash on exposed extremities. Lymph: No cervical LAD. No supraclavicular LAD. M/S: No cyanosis. No joint deformity. No clubbing. Neuro: Intubated. Alert and wake. Responsive to painful stimuli.   Profound weakness with LE movements minimal.   Psych: Noncommunicative unable to obtain         Scheduled Meds:   dexamethasone  0.52 mg IntraVENous Q24H    metoprolol tartrate  25 mg Oral BID    diazePAM  15 mg Oral Q8H    potassium bicarb-citric acid  20 mEq Oral Daily    insulin lispro  0-18 Units SubCUTAneous Q4H    sertraline  50 mg Per NG tube Daily    Venelex   Topical BID    miconazole   Topical BID    oxyCODONE  10 mg Oral Q6H    influenza virus vaccine  0.5 mL IntraMUSCular Prior to discharge    chlorhexidine  15 mL Mouth/Throat BID    ipratropium-albuterol  1 ampule Inhalation Q4H    pantoprazole  40 mg IntraVENous Daily    levothyroxine  100 mcg Oral Daily    enoxaparin  30 mg SubCUTAneous BID    lidocaine 1 % injection  5 mL IntraDERmal Once    sodium chloride flush  5-40 mL IntraVENous 2 times per day    aspirin  81 mg Oral Daily    gabapentin  800 mg Oral TID    atorvastatin  40 mg Oral Daily    sodium chloride flush  5-40 mL IntraVENous 2 times per day       Data:  CBC:   Recent Labs     10/26/21  0536 10/27/21  0451 10/28/21  0449   WBC 9.3 11.0 11.2*   HGB 8.9* 9.4* 9.5*   HCT 28.8* 29.7* 30.7*   MCV 79.5* 79.4* 78.8*    204 255     BMP:   Recent Labs     10/26/21  0536 10/27/21  0451 10/28/21  0449    142 143   K 3.4* 3.2* 3.2*   CL 97* 98* 97*   CO2 33* 35* 34*   BUN 13 10 12   CREATININE <0.5* <0.5* <0.5*     LIVER PROFILE:   Recent Labs     10/26/21  0536 10/27/21  0451 10/28/21  0449   AST 27 29 33   ALT 22 19 21   BILITOT 0.6 0.7 0.7   ALKPHOS 170* 191* 210*       Microbiology:  9/27/21 COVID-19 detected  9/30/21 Resp Pseudomonas fluorescens   10/7/2021 tracheal aspirate: Klebsiella intermediate to zosyn    Imaging:  Chest x-ray 10/27/2021  images reviewed by me and showed:   Trach in place   Satisfactory PICC line position   Bilateral ASD   ? L PTX- not seen on repeat decubitus xray       CTPA 9/27/21  No evidence of pulmonary embolism.    Scattered peripheral opacities in the left lung and more consolidative area in the right lung concerning for pneumonia.  Previous right thoracotomy and upper

## 2021-11-27 PROBLEM — E86.0 DEHYDRATION: Status: RESOLVED | Noted: 2017-08-29 | Resolved: 2021-11-27

## 2022-01-03 ENCOUNTER — HOSPITAL ENCOUNTER (INPATIENT)
Age: 67
LOS: 19 days | Discharge: HOME HEALTH | DRG: 091 | End: 2022-01-22
Attending: PHYSICAL MEDICINE & REHABILITATION | Admitting: PHYSICAL MEDICINE & REHABILITATION
Payer: MEDICARE

## 2022-01-03 DIAGNOSIS — G72.81 CRITICAL ILLNESS MYOPATHY: Primary | ICD-10-CM

## 2022-01-03 PROBLEM — U09.9 POST COVID-19 CONDITION, UNSPECIFIED: Status: ACTIVE | Noted: 2022-01-03

## 2022-01-03 LAB
GLUCOSE BLD-MCNC: 148 MG/DL (ref 70–99)
GLUCOSE BLD-MCNC: 186 MG/DL (ref 70–99)
PERFORMED ON: ABNORMAL
PERFORMED ON: ABNORMAL

## 2022-01-03 PROCEDURE — 6360000002 HC RX W HCPCS: Performed by: PHYSICAL MEDICINE & REHABILITATION

## 2022-01-03 PROCEDURE — 6370000000 HC RX 637 (ALT 250 FOR IP): Performed by: PHYSICAL MEDICINE & REHABILITATION

## 2022-01-03 PROCEDURE — 2580000003 HC RX 258: Performed by: PHYSICAL MEDICINE & REHABILITATION

## 2022-01-03 PROCEDURE — 2700000000 HC OXYGEN THERAPY PER DAY

## 2022-01-03 PROCEDURE — 94640 AIRWAY INHALATION TREATMENT: CPT

## 2022-01-03 PROCEDURE — 94761 N-INVAS EAR/PLS OXIMETRY MLT: CPT

## 2022-01-03 PROCEDURE — 1280000000 HC REHAB R&B

## 2022-01-03 RX ORDER — IPRATROPIUM BROMIDE AND ALBUTEROL SULFATE 2.5; .5 MG/3ML; MG/3ML
1 SOLUTION RESPIRATORY (INHALATION) 2 TIMES DAILY
Status: DISCONTINUED | OUTPATIENT
Start: 2022-01-04 | End: 2022-01-22 | Stop reason: HOSPADM

## 2022-01-03 RX ORDER — ACETAMINOPHEN 325 MG/1
650 TABLET ORAL EVERY 4 HOURS PRN
Status: DISCONTINUED | OUTPATIENT
Start: 2022-01-03 | End: 2022-01-22 | Stop reason: HOSPADM

## 2022-01-03 RX ORDER — DEXTROSE MONOHYDRATE 25 G/50ML
12.5 INJECTION, SOLUTION INTRAVENOUS PRN
Status: DISCONTINUED | OUTPATIENT
Start: 2022-01-03 | End: 2022-01-22 | Stop reason: HOSPADM

## 2022-01-03 RX ORDER — TRAZODONE HYDROCHLORIDE 50 MG/1
50 TABLET ORAL NIGHTLY PRN
Status: DISCONTINUED | OUTPATIENT
Start: 2022-01-03 | End: 2022-01-22 | Stop reason: HOSPADM

## 2022-01-03 RX ORDER — INSULIN GLARGINE 100 [IU]/ML
7 INJECTION, SOLUTION SUBCUTANEOUS NIGHTLY
Status: DISCONTINUED | OUTPATIENT
Start: 2022-01-03 | End: 2022-01-22 | Stop reason: HOSPADM

## 2022-01-03 RX ORDER — DEXTROSE MONOHYDRATE 50 MG/ML
100 INJECTION, SOLUTION INTRAVENOUS PRN
Status: DISCONTINUED | OUTPATIENT
Start: 2022-01-03 | End: 2022-01-22 | Stop reason: HOSPADM

## 2022-01-03 RX ORDER — SPIRONOLACTONE 25 MG/1
25 TABLET ORAL DAILY
Status: DISCONTINUED | OUTPATIENT
Start: 2022-01-04 | End: 2022-01-22 | Stop reason: HOSPADM

## 2022-01-03 RX ORDER — M-VIT,TX,IRON,MINS/CALC/FOLIC 27MG-0.4MG
1 TABLET ORAL DAILY
Status: DISCONTINUED | OUTPATIENT
Start: 2022-01-04 | End: 2022-01-22 | Stop reason: HOSPADM

## 2022-01-03 RX ORDER — NICOTINE POLACRILEX 4 MG
15 LOZENGE BUCCAL PRN
Status: DISCONTINUED | OUTPATIENT
Start: 2022-01-03 | End: 2022-01-22 | Stop reason: HOSPADM

## 2022-01-03 RX ORDER — IPRATROPIUM BROMIDE AND ALBUTEROL SULFATE 2.5; .5 MG/3ML; MG/3ML
1 SOLUTION RESPIRATORY (INHALATION)
Status: DISCONTINUED | OUTPATIENT
Start: 2022-01-03 | End: 2022-01-03

## 2022-01-03 RX ORDER — HYDRALAZINE HYDROCHLORIDE 25 MG/1
50 TABLET, FILM COATED ORAL EVERY 6 HOURS PRN
Status: DISCONTINUED | OUTPATIENT
Start: 2022-01-03 | End: 2022-01-22 | Stop reason: HOSPADM

## 2022-01-03 RX ORDER — POLYETHYLENE GLYCOL 3350 17 G/17G
17 POWDER, FOR SOLUTION ORAL DAILY PRN
Status: DISCONTINUED | OUTPATIENT
Start: 2022-01-03 | End: 2022-01-22 | Stop reason: HOSPADM

## 2022-01-03 RX ORDER — FUROSEMIDE 40 MG/1
40 TABLET ORAL DAILY
Status: DISCONTINUED | OUTPATIENT
Start: 2022-01-04 | End: 2022-01-22 | Stop reason: HOSPADM

## 2022-01-03 RX ORDER — METOCLOPRAMIDE 10 MG/1
5 TABLET ORAL 2 TIMES DAILY
Status: DISCONTINUED | OUTPATIENT
Start: 2022-01-03 | End: 2022-01-22 | Stop reason: HOSPADM

## 2022-01-03 RX ORDER — ASPIRIN 81 MG/1
81 TABLET, CHEWABLE ORAL DAILY
Status: DISCONTINUED | OUTPATIENT
Start: 2022-01-04 | End: 2022-01-22 | Stop reason: HOSPADM

## 2022-01-03 RX ORDER — CHLORHEXIDINE GLUCONATE 0.12 MG/ML
15 RINSE ORAL 2 TIMES DAILY
Status: DISCONTINUED | OUTPATIENT
Start: 2022-01-03 | End: 2022-01-22 | Stop reason: HOSPADM

## 2022-01-03 RX ORDER — ROPINIROLE 0.5 MG/1
0.5 TABLET, FILM COATED ORAL 3 TIMES DAILY
Status: DISCONTINUED | OUTPATIENT
Start: 2022-01-03 | End: 2022-01-22 | Stop reason: HOSPADM

## 2022-01-03 RX ORDER — LORAZEPAM 0.5 MG/1
0.5 TABLET ORAL EVERY 4 HOURS PRN
Status: DISCONTINUED | OUTPATIENT
Start: 2022-01-03 | End: 2022-01-22 | Stop reason: HOSPADM

## 2022-01-03 RX ORDER — DICYCLOMINE HCL 20 MG
20 TABLET ORAL
Status: DISCONTINUED | OUTPATIENT
Start: 2022-01-03 | End: 2022-01-22 | Stop reason: HOSPADM

## 2022-01-03 RX ORDER — BUDESONIDE 0.5 MG/2ML
0.5 INHALANT ORAL 2 TIMES DAILY
Status: DISCONTINUED | OUTPATIENT
Start: 2022-01-03 | End: 2022-01-22 | Stop reason: HOSPADM

## 2022-01-03 RX ORDER — OXYCODONE HYDROCHLORIDE 5 MG/1
10 TABLET ORAL EVERY 4 HOURS PRN
Status: DISCONTINUED | OUTPATIENT
Start: 2022-01-03 | End: 2022-01-22 | Stop reason: HOSPADM

## 2022-01-03 RX ORDER — OXYCODONE HYDROCHLORIDE 5 MG/1
5 TABLET ORAL EVERY 4 HOURS PRN
Status: DISCONTINUED | OUTPATIENT
Start: 2022-01-03 | End: 2022-01-22 | Stop reason: HOSPADM

## 2022-01-03 RX ORDER — ONDANSETRON 4 MG/1
8 TABLET, ORALLY DISINTEGRATING ORAL EVERY 8 HOURS PRN
Status: DISCONTINUED | OUTPATIENT
Start: 2022-01-03 | End: 2022-01-22 | Stop reason: HOSPADM

## 2022-01-03 RX ORDER — LEVOTHYROXINE SODIUM 0.1 MG/1
100 TABLET ORAL DAILY
Status: DISCONTINUED | OUTPATIENT
Start: 2022-01-04 | End: 2022-01-22 | Stop reason: HOSPADM

## 2022-01-03 RX ADMIN — ROPINIROLE HYDROCHLORIDE 0.5 MG: 0.5 TABLET, FILM COATED ORAL at 16:53

## 2022-01-03 RX ADMIN — INSULIN GLARGINE 7 UNITS: 100 INJECTION, SOLUTION SUBCUTANEOUS at 20:14

## 2022-01-03 RX ADMIN — INSULIN LISPRO 2 UNITS: 100 INJECTION, SOLUTION INTRAVENOUS; SUBCUTANEOUS at 16:51

## 2022-01-03 RX ADMIN — CEFEPIME HYDROCHLORIDE 1000 MG: 1 INJECTION, POWDER, FOR SOLUTION INTRAMUSCULAR; INTRAVENOUS at 17:08

## 2022-01-03 RX ADMIN — DICYCLOMINE HYDROCHLORIDE 20 MG: 20 TABLET ORAL at 16:53

## 2022-01-03 RX ADMIN — ROPINIROLE HYDROCHLORIDE 0.5 MG: 0.5 TABLET, FILM COATED ORAL at 20:06

## 2022-01-03 RX ADMIN — IPRATROPIUM BROMIDE AND ALBUTEROL SULFATE 1 AMPULE: .5; 2.5 SOLUTION RESPIRATORY (INHALATION) at 19:06

## 2022-01-03 RX ADMIN — METOCLOPRAMIDE 5 MG: 10 TABLET ORAL at 20:06

## 2022-01-03 RX ADMIN — BUDESONIDE 500 MCG: 0.5 SUSPENSION RESPIRATORY (INHALATION) at 19:11

## 2022-01-03 RX ADMIN — DICYCLOMINE HYDROCHLORIDE 20 MG: 20 TABLET ORAL at 20:06

## 2022-01-03 RX ADMIN — INSULIN LISPRO 1 UNITS: 100 INJECTION, SOLUTION INTRAVENOUS; SUBCUTANEOUS at 20:13

## 2022-01-03 RX ADMIN — LORAZEPAM 0.5 MG: 0.5 TABLET ORAL at 20:10

## 2022-01-03 RX ADMIN — OXYCODONE 10 MG: 5 TABLET ORAL at 20:06

## 2022-01-03 ASSESSMENT — PAIN SCALES - GENERAL
PAINLEVEL_OUTOF10: 3
PAINLEVEL_OUTOF10: 8
PAINLEVEL_OUTOF10: 6

## 2022-01-03 ASSESSMENT — PAIN DESCRIPTION - LOCATION
LOCATION: SHOULDER
LOCATION: ABDOMEN

## 2022-01-03 ASSESSMENT — PAIN DESCRIPTION - ORIENTATION: ORIENTATION: RIGHT

## 2022-01-03 ASSESSMENT — PAIN DESCRIPTION - PROGRESSION: CLINICAL_PROGRESSION: NOT CHANGED

## 2022-01-03 ASSESSMENT — PAIN DESCRIPTION - PAIN TYPE
TYPE: CHRONIC PAIN
TYPE: ACUTE PAIN

## 2022-01-03 ASSESSMENT — PAIN DESCRIPTION - ONSET: ONSET: ON-GOING

## 2022-01-03 ASSESSMENT — PAIN DESCRIPTION - FREQUENCY: FREQUENCY: CONTINUOUS

## 2022-01-03 ASSESSMENT — PAIN - FUNCTIONAL ASSESSMENT: PAIN_FUNCTIONAL_ASSESSMENT: PREVENTS OR INTERFERES SOME ACTIVE ACTIVITIES AND ADLS

## 2022-01-03 ASSESSMENT — PAIN DESCRIPTION - DESCRIPTORS
DESCRIPTORS: ACHING;SORE
DESCRIPTORS: CRAMPING

## 2022-01-03 NOTE — PLAN OF CARE
ARU PATIENT TREATMENT PLAN  [facility], [address]   [phone]    Zeinab Odom    : 1955  Acct #: [[id number]]  MRN: [id number]   PHYSICIAN:  Bria Hernandez MD  Primary Problem    Patient Active Problem List   Diagnosis    Displacement of lumbar intervertebral disc without myelopathy    Hypothyroidism    Mixed hyperlipidemia    Anxiety state    Generalized osteoarthrosis, involving multiple sites    Essential hypertension    Shoulder arthritis    Vitamin D deficiency    Chronic pain syndrome    Fibromyalgia    DDD (degenerative disc disease), lumbar    Anxiety disorder    Prosthetic joint implant failure (HCC)    Pulmonary nodule    Tracheobronchomalacia    Bronchiectasis ([facility]    DM2 (diabetes mellitus, type 2) ([facility]    Systolic congestive heart failure ([facility])    Chest pain    S/P cardiac catheterization    PVC (premature ventricular contraction)    Palpitations    Syncope and collapse    Convulsion ([facility]    New onset seizure ([address]    CAD in native artery    Nausea    Memory change    Acquired hypothyroidism    Sepsis ([facility]    Type 1 diabetes mellitus without complication ([facility]    Orthostatic hypotension    Suspected COVID-19 virus infection    Hypoxia    Acute respiratory failure with hypoxia ([address]    COVID-19    Pneumonia due to COVID-19 virus    CHARLY (acute kidney injury) ([address]    Uncontrolled hypertension    Fever    Hypotension    Severe protein-calorie malnutrition (HCC)    Gram-negative pneumonia (HCC)    Pneumonia due to Pseudomonas species ([facility]    Hypomagnesemia    Hypokalemia    Acute hypoxemic respiratory failure (HCC)    ARDS (adult respiratory distress syndrome) ([facility]    Pneumonia due to Klebsiella pneumoniae ([facility])    Electrolyte disorder    Critical illness myopathy    Critical illness neuropathy ([address]    Post covid-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 Admitted s/p COVID 19 dxg w/ intubation and proning, trach and peg placement resulting in Decreased functional mobility ; Decreased ADL status; Decreased strength;Decreased safe awareness;Decreased cognition;Decreased endurance;Decreased balance;Decreased coordination;Decreased posture;Decreased fine motor control;Decreased high-level IADLs  Barriers: weakness, balance, endurance     Participation: Good     CARE PLAN     NURSING:  Madison Rodriguez while on this unit will:     [x] Be continent of bowel and bladder     [x] Have an adequate number of bowel movements  [x] Urinate with no urinary retention >300ml in bladder  [] Complete bladder protocol with abdi removal  [x] Maintain O2 SATs at 92___%  [] Have pain managed while on ARU       [x] Be pain free by discharge   [x] Have no skin breakdown while on ARU  [] Have improved skin integrity via wound measurements  [] Have no signs/symptoms of infection at the wound site  [x] Be free from injury during hospitalization   [x] Complete education with patient/family with understanding demonstrated for:  [x] Adjustment   [x] Other:   Nursing interventions may include bowel/bladder training, education for medical assistive devices, medication education, O2 saturation management, energy conservation, stress management techniques, fall prevention, alarms protocol, seating and positioning, skin/wound care, pressure relief instruction,dressing changes,  infection protection, DVT prophylaxis, and/or assistance with in room safety with transfers to bed, toilet, wheelchair, shower as well as bathroom activities and hygiene.      Patient/caregiver education for:   [x] Disease/sustained injury/management      [x] Medication Use   [] Surgical intervention   [x] Safety   [x] Body mechanics and or joint protection   [x] Health maintenance         PHYSICAL THERAPY:  Goals:                  Short term goals  Time Frame for Short term goals: 1/7/2022  Short term goal 1: Pt demo indep bed mobility. Short term goal 2: Pt demo bed<>chair with mod assist of 1 and FWW or SPT. Short term goal 3: Patient demonstrates gait to bathroom 20 feet with assist of 2 and FWW. Short term goal 4: Patient demonstrates up and down 1 step with bilateral rails and assist of 2. Short term goal 5: Patient demonstrates  indep in DBE and 5 minutes of standing activity with spO2 90% or greater to improve endurance for household walking. Long term goals  Time Frame for Long term goals : 1/24/2022  Long term goal 1: Patient demonstrates gait 150 feet with SBA and sPO2 90% or greater able to manage O2 tubing on L/R turns on carpet up to 10 feetwith  safe to assist pt. Long term goal 2: Patient demonstrates up and down 12 steps with SBA and bilateral rails to return to community and home (egress/entry) mobility with  safe to assist pt. Long term goal 3: Patient demonstrates indep bed<>chair, sit<>stand and WC<>car with FWW  Long term goal 4: Patient demonstrates indep WC propulsion 150 feet with L/R turns indep. Long term goal 5: Patient demonstrates 50 feet of walking with L/R turns and carpet HELEN able to indep manage O2 tubing for short distance in home walking. These goals were reviewed with this patient at the time of assessment and Rosita Kumar is in agreement.      Plan of Care: Pt to be seen 5 out of 7 days per week, 60 mins (exact) per day for 21days (exact)                   Current Treatment Recommendations: Strengthening,Neuromuscular Re-education,Home Exercise Program,Cognitive/Perceptual Training,Safety Education & Petty Snuffer Training,Patient/Caregiver Education & Training,Functional Mobility Training,Wheelchair Mobility Training,Gait Training,Transfer Training,Stair training,Equipment Evaluation, Education, & procurement      OCCUPATIONAL THERAPY:  Goals:             Short term goals  Time Frame for Short term goals: 10 days (1/13)  Short term goal 1: Pt will perform at least 3min of standing functional activities with AD PRN. Short term goal 2: Pt will perform functional/toilet t/fs with with CGA and AD PRN. Short term goal 3: Pt will toilet with CGA and AD PRN. Short term goal 4: Pt will LB dress with min A and AE/AD PRN. Short term goal 5: Pt will total body bathe with min A and AD PRN. :  Long term goals  Time Frame for Long term goals : 21 days (1/24)  Long term goal 1: Pt will perform at least 5 min of standing functional activities. Long term goal 2: Pt will total body dress with mod I.  Long term goal 3: Pt will total body bathe with mod I.  Long term goal 4: Pt will perform a simple IADL task with supervision and AD PRN. Long term goal 5: Pt will toilet with mod I. :    These goals were reviewed with this patient at the time of assessment and Serena Boggs is in agreement    Plan of Care:  Pt to be seen 5 out of 7 days per week, 60   mins (exact) per day for 21 days (exact)  Current Treatment Recommendations: Strengthening,Endurance Training,Balance Training,Functional Mobility Training,Safety Education & Training,Equipment Evaluation, Education, & procurement,Self-Care / ADL,Patient/Caregiver Education & Training,Home Management Training      SPEECH THERAPY:   Goals:             Short-term Goals  Timeframe for Short-term Goals: 10 days (01/13/22)  Goal 1: The patient will complete pharyngeal/laryngeal strengthening exercises for improved swallowing function 10/10 given min cues. Dysphagia Goals: The patient will tolerate recommended diet without observed clinical signs of aspiration,The patient/caregiver will demonstrate understanding of compensatory strategies for improved swallowing safety. ,The patient will tolerate thin liquids without signs and symptoms of aspiration 10/10 via cup. Timeframe for Long-term Goals: 14 days (01/17/22)  Goal 1: Pt will tolerate safest and least restrictive diet without any clinical s/s of aspiration. Short-term Goals  Timeframe for Short-term Goals: 14 days (01/17/22)  Goal 1: Pt will complete recall tasks with 80% acc given min cues for use of compensatory strategies. Goal 2: Pt will complete executive function tasks (money, math, time, etc) with 80% acc given min cues. Goal 3: Pt will complete verbal and visual reasoning tasks with 80% acc given min cues. Goal 4: Pt will complete problem solving and thought organization tasks with 80% acc given min cues. Goal 5: Pt will complete vocal strengthening exercises for improved vocal function 10/10 given min cues. Timeframe for Short-term Goals: 14 days (01/17/22)   Timeframe for Long-term Goals: 21 days (01/24/22)  Goal 1: Pt will improve overall cognitive linguistic skills to increase safety and independence to return to OF. Patient/family involved in developing goals and treatment plan: yes, pt and pt's   These goals were reviewed with this patient at the time of assessment and Simin Wolf is in agreement    Plan of Care: Pt to be seen 5 out of 7 days per week, 60 mins (exact) per day for 21days (exact)             Dysphagia: Therapeutic Interventions: Diet tolerance monitoring,Patient/Family education,Therapeutic PO trials with SLP           Speech/Language/Cognition: Compensatory strategy training and carryover, recall/STM, problem solving, reasoning, exec function, thought organization, attention. CASE MANAGEMENT:  Goals:   Assist patient/family with discharge planning, patient/family counseling,   and coordination with insurance during ARU stay.     QIM / IRF ABRAHAM SCORES:  ITEM CURRENT SCORE GOAL   Eating CARE Score: 4 Discharge Goal: Independent   Oral Hygiene CARE Score: 88 Discharge Goal: Independent Toileting Hygiene CARE Score: 4 Discharge Goal: Independent   Shower/Bathe Self CARE Score: 2 Discharge Goal: Independent   Upper Body Dressing CARE Score: 3 Discharge Goal: Independent   Lower Body Dressing CARE Score: 2 Discharge Goal: Independent   On/Off Footwear CARE Score: 2 Discharge Goal: Independent   Roll Left & Right CARE Score: 6 Discharge Goal: Independent   Sit to Lying  CARE Score: 4 Discharge Goal: Independent   Lying to Sitting EOB CARE Score: 4 Discharge Goal: Independent   Sit to Stand CARE Score: 2 Discharge Goal: Independent   Chair/Bed to Chair Transfer CARE Score: 2 Discharge Goal: Independent   Toilet Transfer CARE Score: 2 Discharge Goal: Independent   Car Transfer CARE Score: 2 Discharge Goal: Independent   Walk 10 Feet CARE Score: 1 Discharge Goal: Independent   Walk 50 Feet, 2 Turns CARE Score: 88 Discharge Goal: Independent   Walk 150 Feet CARE Score: 88 Discharge Goal: Supervision or touching assistance   Walk 10 Feet, Uneven Surface CARE Score: 88 Discharge Goal: Independent   1 Step (Curb) CARE Score: 88 Discharge Goal: Supervision or touching assistance   4 Steps CARE Score: 88 Discharge Goal: Supervision or touching assistance   12 Steps CARE Score: 88 Discharge Goal: Supervision or touching assistance    Object CARE Score: 88 Discharge Goal: Independent   Wheel 50 feet, 2 turns CARE Score: 3 Discharge Goal: Independent   Wheel 150 Feet CARE Score: 2 Discharge Goal: Independent          Simin Wolf will be seen a minimum of 3 hours of therapy per day, a minimum of 5 out of 7 days per week. [] In this rare instance due to the nature of this patient's medical involvement, this patient will be seen 15 hours per week (900 minutes within a 7 day period).     Treatments may include therapeutic exercises, gait training, neuromuscular re-ed, transfer training, community reintegration, bed mobility, w/c mobility and training, self care, home mgmt, cognitive training, energy conservation,dysphagia tx, speech/language/communication therapy, group therapy, and patient/family education. In addition, dietician/nutritionist may monitor calorie count as well as intake and collaboratively work with SLP on dietary upgrades. Neuropsychology/Psychology may evaluate and provide necessary support. Medical issues being managed closely and that require 24 hour availability of a physician:   [x] Swallowing Precautions  [x] Bowel/Bladder Fx  [] Weight bearing precautions   [] Wound Care    [x] Pain Mgmt   [x] Infection Protection   [x] DVT Prophylaxis   [x] Fall Precautions  [x] Fluid/Electrolyte/Nutrition Balance   [x] Voice Protection   [x] Respiratory  [] Other:    Medical Prognosis: [x] Good  [] Fair    [] Guarded   Total expected IRF days 21  Anticipated discharge destination:    [] Home Independently   [] Home Modified Independent  [x] Home with supervision    []SNF     [] Other                                           Physician anticipated functional outcomes:  Home w/ supervision and home health services. IPOC brief synthesis: 78year old female admitted to ARU to address strengthening, endurance, balance, gait, ADLs, assistive/adaptive device needs, patient/family training, speech, language, cognition, swallow. This plan has been reviewed with Clarence Lara on 1/4  in a language the patient understands. Clarence Lara has had the opportunity to include input with the therapy team.      I have reviewed this initial plan of care and agree with its contents:    Title   Name    Date    Time    Physician: Olive Cervantes.  Jelani Walters MD 1/5/2022, 8:06 AM     Case Mgmt:Rayna Nikki Meyer     OT: Zhane Plata OTR/L    PT:[id number]    RN: Sandhya Contreras RN BSN    ST: Shaquille Brewer, [facility] SLP     : Janeen Amaro, OTR/L    Other:

## 2022-01-03 NOTE — PROGRESS NOTES
Patient admitted to room 151, oriented to room, bed, call light, schedules, menu. Room safety and fall precautions reviewed. VSS. Meds given per MAR. Family at bed side. Patient will call for assistance, bed alarm on.

## 2022-01-04 PROBLEM — E44.0 MODERATE MALNUTRITION (HCC): Status: ACTIVE | Noted: 2022-01-04

## 2022-01-04 LAB
A/G RATIO: 0.8 (ref 1.1–2.2)
ALBUMIN SERPL-MCNC: 3.7 G/DL (ref 3.4–5)
ALP BLD-CCNC: 198 U/L (ref 40–129)
ALT SERPL-CCNC: 17 U/L (ref 10–40)
ANION GAP SERPL CALCULATED.3IONS-SCNC: 13 MMOL/L (ref 3–16)
AST SERPL-CCNC: 20 U/L (ref 15–37)
BILIRUB SERPL-MCNC: 0.3 MG/DL (ref 0–1)
BUN BLDV-MCNC: 9 MG/DL (ref 7–20)
CALCIUM SERPL-MCNC: 10.7 MG/DL (ref 8.3–10.6)
CHLORIDE BLD-SCNC: 91 MMOL/L (ref 99–110)
CO2: 28 MMOL/L (ref 21–32)
CREAT SERPL-MCNC: <0.5 MG/DL (ref 0.6–1.2)
GFR AFRICAN AMERICAN: >60
GFR NON-AFRICAN AMERICAN: >60
GLUCOSE BLD-MCNC: 123 MG/DL (ref 70–99)
GLUCOSE BLD-MCNC: 154 MG/DL (ref 70–99)
GLUCOSE BLD-MCNC: 204 MG/DL (ref 70–99)
GLUCOSE BLD-MCNC: 209 MG/DL (ref 70–99)
GLUCOSE BLD-MCNC: 219 MG/DL (ref 70–99)
HCT VFR BLD CALC: 35.2 % (ref 36–48)
HEMOGLOBIN: 10.9 G/DL (ref 12–16)
MCH RBC QN AUTO: 24.2 PG (ref 26–34)
MCHC RBC AUTO-ENTMCNC: 31 G/DL (ref 31–36)
MCV RBC AUTO: 78 FL (ref 80–100)
PDW BLD-RTO: 19.2 % (ref 12.4–15.4)
PERFORMED ON: ABNORMAL
PLATELET # BLD: 261 K/UL (ref 135–450)
PMV BLD AUTO: 8.5 FL (ref 5–10.5)
POTASSIUM REFLEX MAGNESIUM: 3.9 MMOL/L (ref 3.5–5.1)
RBC # BLD: 4.51 M/UL (ref 4–5.2)
SODIUM BLD-SCNC: 132 MMOL/L (ref 136–145)
TOTAL PROTEIN: 8.5 G/DL (ref 6.4–8.2)
WBC # BLD: 19.9 K/UL (ref 4–11)

## 2022-01-04 PROCEDURE — 97163 PT EVAL HIGH COMPLEX 45 MIN: CPT

## 2022-01-04 PROCEDURE — 92610 EVALUATE SWALLOWING FUNCTION: CPT

## 2022-01-04 PROCEDURE — 97167 OT EVAL HIGH COMPLEX 60 MIN: CPT

## 2022-01-04 PROCEDURE — 6360000002 HC RX W HCPCS: Performed by: PHYSICAL MEDICINE & REHABILITATION

## 2022-01-04 PROCEDURE — 97116 GAIT TRAINING THERAPY: CPT

## 2022-01-04 PROCEDURE — 92523 SPEECH SOUND LANG COMPREHEN: CPT

## 2022-01-04 PROCEDURE — 80053 COMPREHEN METABOLIC PANEL: CPT

## 2022-01-04 PROCEDURE — 97112 NEUROMUSCULAR REEDUCATION: CPT

## 2022-01-04 PROCEDURE — 92526 ORAL FUNCTION THERAPY: CPT

## 2022-01-04 PROCEDURE — 2580000003 HC RX 258: Performed by: PHYSICAL MEDICINE & REHABILITATION

## 2022-01-04 PROCEDURE — 97535 SELF CARE MNGMENT TRAINING: CPT

## 2022-01-04 PROCEDURE — 1280000000 HC REHAB R&B

## 2022-01-04 PROCEDURE — 94640 AIRWAY INHALATION TREATMENT: CPT

## 2022-01-04 PROCEDURE — 6370000000 HC RX 637 (ALT 250 FOR IP): Performed by: PHYSICAL MEDICINE & REHABILITATION

## 2022-01-04 PROCEDURE — 97530 THERAPEUTIC ACTIVITIES: CPT

## 2022-01-04 PROCEDURE — 94761 N-INVAS EAR/PLS OXIMETRY MLT: CPT

## 2022-01-04 PROCEDURE — 85027 COMPLETE CBC AUTOMATED: CPT

## 2022-01-04 PROCEDURE — 2700000000 HC OXYGEN THERAPY PER DAY

## 2022-01-04 RX ADMIN — INSULIN LISPRO 2 UNITS: 100 INJECTION, SOLUTION INTRAVENOUS; SUBCUTANEOUS at 16:54

## 2022-01-04 RX ADMIN — SPIRONOLACTONE 25 MG: 25 TABLET, FILM COATED ORAL at 09:42

## 2022-01-04 RX ADMIN — LORAZEPAM 0.5 MG: 0.5 TABLET ORAL at 14:22

## 2022-01-04 RX ADMIN — OXYCODONE 10 MG: 5 TABLET ORAL at 20:40

## 2022-01-04 RX ADMIN — CEFEPIME HYDROCHLORIDE 1000 MG: 1 INJECTION, POWDER, FOR SOLUTION INTRAMUSCULAR; INTRAVENOUS at 09:53

## 2022-01-04 RX ADMIN — DICYCLOMINE HYDROCHLORIDE 20 MG: 20 TABLET ORAL at 06:05

## 2022-01-04 RX ADMIN — INSULIN GLARGINE 7 UNITS: 100 INJECTION, SOLUTION SUBCUTANEOUS at 20:43

## 2022-01-04 RX ADMIN — IPRATROPIUM BROMIDE AND ALBUTEROL SULFATE 1 AMPULE: .5; 2.5 SOLUTION RESPIRATORY (INHALATION) at 19:18

## 2022-01-04 RX ADMIN — INSULIN LISPRO 2 UNITS: 100 INJECTION, SOLUTION INTRAVENOUS; SUBCUTANEOUS at 20:42

## 2022-01-04 RX ADMIN — BUDESONIDE 500 MCG: 0.5 SUSPENSION RESPIRATORY (INHALATION) at 19:26

## 2022-01-04 RX ADMIN — OXYCODONE 10 MG: 5 TABLET ORAL at 00:48

## 2022-01-04 RX ADMIN — METOCLOPRAMIDE 5 MG: 10 TABLET ORAL at 20:40

## 2022-01-04 RX ADMIN — LORAZEPAM 0.5 MG: 0.5 TABLET ORAL at 00:48

## 2022-01-04 RX ADMIN — CEFEPIME HYDROCHLORIDE 1000 MG: 1 INJECTION, POWDER, FOR SOLUTION INTRAMUSCULAR; INTRAVENOUS at 17:00

## 2022-01-04 RX ADMIN — METOCLOPRAMIDE 5 MG: 10 TABLET ORAL at 09:42

## 2022-01-04 RX ADMIN — Medication 15 ML: at 20:42

## 2022-01-04 RX ADMIN — FUROSEMIDE 40 MG: 40 TABLET ORAL at 09:42

## 2022-01-04 RX ADMIN — OXYCODONE 10 MG: 5 TABLET ORAL at 14:22

## 2022-01-04 RX ADMIN — ROPINIROLE HYDROCHLORIDE 0.5 MG: 0.5 TABLET, FILM COATED ORAL at 20:40

## 2022-01-04 RX ADMIN — CEFEPIME HYDROCHLORIDE 1000 MG: 1 INJECTION, POWDER, FOR SOLUTION INTRAMUSCULAR; INTRAVENOUS at 00:45

## 2022-01-04 RX ADMIN — DICYCLOMINE HYDROCHLORIDE 20 MG: 20 TABLET ORAL at 20:40

## 2022-01-04 RX ADMIN — DICYCLOMINE HYDROCHLORIDE 20 MG: 20 TABLET ORAL at 11:59

## 2022-01-04 RX ADMIN — LORAZEPAM 0.5 MG: 0.5 TABLET ORAL at 20:40

## 2022-01-04 RX ADMIN — ROPINIROLE HYDROCHLORIDE 0.5 MG: 0.5 TABLET, FILM COATED ORAL at 09:42

## 2022-01-04 RX ADMIN — Medication 15 ML: at 09:43

## 2022-01-04 RX ADMIN — BUDESONIDE 500 MCG: 0.5 SUSPENSION RESPIRATORY (INHALATION) at 07:49

## 2022-01-04 RX ADMIN — ASPIRIN 81 MG 81 MG: 81 TABLET ORAL at 09:42

## 2022-01-04 RX ADMIN — ROPINIROLE HYDROCHLORIDE 0.5 MG: 0.5 TABLET, FILM COATED ORAL at 14:22

## 2022-01-04 RX ADMIN — IPRATROPIUM BROMIDE AND ALBUTEROL SULFATE 1 AMPULE: .5; 2.5 SOLUTION RESPIRATORY (INHALATION) at 07:49

## 2022-01-04 RX ADMIN — OXYCODONE 10 MG: 5 TABLET ORAL at 06:05

## 2022-01-04 RX ADMIN — LEVOTHYROXINE SODIUM 100 MCG: 0.1 TABLET ORAL at 06:05

## 2022-01-04 RX ADMIN — DICYCLOMINE HYDROCHLORIDE 20 MG: 20 TABLET ORAL at 16:52

## 2022-01-04 RX ADMIN — LORAZEPAM 0.5 MG: 0.5 TABLET ORAL at 10:11

## 2022-01-04 RX ADMIN — MULTIPLE VITAMINS W/ MINERALS TAB 1 TABLET: TAB at 09:42

## 2022-01-04 RX ADMIN — LORAZEPAM 0.5 MG: 0.5 TABLET ORAL at 06:05

## 2022-01-04 RX ADMIN — ENOXAPARIN SODIUM 40 MG: 40 INJECTION SUBCUTANEOUS at 09:41

## 2022-01-04 RX ADMIN — OXYCODONE 10 MG: 5 TABLET ORAL at 10:11

## 2022-01-04 RX ADMIN — INSULIN LISPRO 4 UNITS: 100 INJECTION, SOLUTION INTRAVENOUS; SUBCUTANEOUS at 11:59

## 2022-01-04 ASSESSMENT — PAIN DESCRIPTION - ORIENTATION
ORIENTATION: RIGHT

## 2022-01-04 ASSESSMENT — PAIN DESCRIPTION - LOCATION
LOCATION: ABDOMEN;SHOULDER
LOCATION: ABDOMEN
LOCATION: SHOULDER;ABDOMEN

## 2022-01-04 ASSESSMENT — PAIN DESCRIPTION - PAIN TYPE
TYPE: ACUTE PAIN
TYPE: CHRONIC PAIN
TYPE: ACUTE PAIN

## 2022-01-04 ASSESSMENT — PAIN - FUNCTIONAL ASSESSMENT
PAIN_FUNCTIONAL_ASSESSMENT: PREVENTS OR INTERFERES SOME ACTIVE ACTIVITIES AND ADLS
PAIN_FUNCTIONAL_ASSESSMENT: PREVENTS OR INTERFERES SOME ACTIVE ACTIVITIES AND ADLS

## 2022-01-04 ASSESSMENT — PAIN SCALES - GENERAL
PAINLEVEL_OUTOF10: 0
PAINLEVEL_OUTOF10: 0
PAINLEVEL_OUTOF10: 9
PAINLEVEL_OUTOF10: 8
PAINLEVEL_OUTOF10: 9
PAINLEVEL_OUTOF10: 4
PAINLEVEL_OUTOF10: 0
PAINLEVEL_OUTOF10: 9
PAINLEVEL_OUTOF10: 9
PAINLEVEL_OUTOF10: 5
PAINLEVEL_OUTOF10: 8
PAINLEVEL_OUTOF10: 5
PAINLEVEL_OUTOF10: 8

## 2022-01-04 ASSESSMENT — PAIN SCALES - WONG BAKER
WONGBAKER_NUMERICALRESPONSE: 0
WONGBAKER_NUMERICALRESPONSE: 0

## 2022-01-04 ASSESSMENT — PAIN DESCRIPTION - DESCRIPTORS
DESCRIPTORS: ACHING

## 2022-01-04 ASSESSMENT — PAIN DESCRIPTION - ONSET
ONSET: ON-GOING

## 2022-01-04 ASSESSMENT — PAIN DESCRIPTION - FREQUENCY
FREQUENCY: CONTINUOUS

## 2022-01-04 ASSESSMENT — PAIN DESCRIPTION - PROGRESSION
CLINICAL_PROGRESSION: NOT CHANGED
CLINICAL_PROGRESSION: NOT CHANGED

## 2022-01-04 NOTE — PROGRESS NOTES
01/03/22 1915   RT Protocol   History Pulmonary Disease 2   Respiratory pattern 0   Breath sounds 2   Cough 0   Indications for Bronchodilator Therapy None   Bronchodilator Assessment Score 4

## 2022-01-04 NOTE — PATIENT CARE CONFERENCE
[address]  Inpatient Rehabilitation  Weekly Team Conference Note    Date: 2022  Patient Name: Cristian Villarreal        MRN: [id number]    : 1955  (78 y.o.)  Gender: female   Referring Practitioner: Shelton Fonseca MD  Diagnosis: Post COVID 19critical illness myopathy. Interventions to be utilized toward barriers to discharge, per discipline:  [address] observed barriers to dc: Decreased endurance  Nursing interventions:Assist with all ADL's, toileting and medication management  Family Education: yes  Fall Risk:  Yes      Physical therapy observed barriers to dc:    Baseline: indep    Current level: requires assist of 2 with gait and max assist for transfers. Barriers to DC: impaired endurance (cardiorespiratory), LE and core weakness in the presence of 10/2021 pelvic fx and h/o TJR revisions in remote past, impaired indep with bed mobility, transfers, gait, steps, wC propulsion, impaired cognition. Needs in order to achieve dc home/next level of care: decrease burden of care with household mobility to  1 with  able to provide needed assist.       Physical therapy interventions:     Current Treatment Recommendations: Strengthening,Neuromuscular Re-education,Home Exercise Program,Cognitive/Perceptual Training,Safety Education & Monna Fennel Training,Patient/Caregiver Education & Training,Functional Mobility Training,Wheelchair Mobility Training,Gait Training,Transfer Training,Stair training,Equipment Evaluation, Education, & procurement      PHYSICAL THERAPY  PT Equipment Recommendations  Equipment Needed: No  Other: has needed equpiment    Assessment: Per2022 H&P,\"Ms Lucas is a 78year old female admitted from [address] [place] [address]. She initially presented with shortness of breath and was diagnosed with covid pneumonia. She required intubation and proning; extubated 10/4, reintubated 10/6.  She did require tracheostomy and peg placement. She has no baseline oxygen requirement. She endorses exertional dyspnea but denies chest pain. \"  Patient evaluated for mobility with PT and noted to have poor endruance with patient easily hypoxic, sob with tachcardia with minimal activity and requiring supported sitting or supine rest to recover to SpO2 90% or greater on 2.5 L O2 via nc. Patient able to tolerate 10 feet of walking with assist of 2, and extensive (max) assist with transfers, CAG with sup<>sit, indep with rolling and up to total assist due to fatigue with WC propulsion and LE weakness noted. Pt noted to have pelvic Fx 10/2021 with h/o TJR revisions in past.  Pt appeared anxious during tx, engaged throghout session in activities with redirection needed due to confusion and encouragment for participation. Pt with dyspnea with minimal exertion; given DBE and use of IS with mobility. Pt appropriate for skilled PT to progress towards PLOF HELEN (used cane as PLOF). Occupational therapy observed barriers to dc:    Baseline: independent all ADLs and mobility, lives with family   Current level: mod x2 for sit pivots, max A LB ADLs, mod A UB ADLs, dropping O2 sats, HR increasing   Barriers to DC: activity tolerance, weakness, anxiety, limited family assistance   Needs in order to achieve dc home/next level of care: min A for ADLs, and SPV for transfers    Occupational Therapy interventions:  Current Treatment Recommendations: Strengthening,Endurance Training,Balance Training,Functional Mobility Training,Safety Education & Training,Equipment Evaluation, Education, & procurement,Self-Care / ADL,Patient/Caregiver Education & Training,Home Management Training      OCCUPATIONAL THERAPY  Assessment: Pt is a 69yo female with deficits in the areas listed above with post COVID 19 condition. Pt is typically mod I with ADLS/IADLs and functional mobility at baseline.  Today, Pt performed LB ADLS with mod-max A and UB dressing with min A and UB bathing with max A. Pt requiring increased time and assistance for all ADLS today d/t decreased endurance/activity tolerance and increased anxiety. Pt would continue to benefit from skilled OT services to increase endurance, strength, and independence in ADLS/IADLS and functional mobility. Speech therapy observed barriers to dc:    Baseline: pt lives at home with , dtr, and two grandchildren. Manages own finances,  assists with meds. Pt and  share other household tasks, not an active    Current level: mild mod cognitive linguistic deficit, mild pharyngeal dysphagia, moderate dysphonia    Barriers to DC: prolonged intubation affecting vocal quality/respiratory support    Needs in order to achieve dc home/next level of care: may need 24 hour assist, carryover of compensatory strategies, improved vocal quality     Speech Therapy interventions:  Dysphagia: diet tolerance monitoring, pharyngeal/laryngeal strengthening exercises, safe swallow strategies   Speech/Language/Cognition: Compensatory strategy training and carryover, recall/STM, problem solving, reasoning, exec function, thought organization, attention. SPEECH THERAPY:  Pt was administered the SLUMS scoring a 18/30. A score of 27/30 is considered WFL. Pt demonstrated areas of strength with orientation, functional recall, executive functioning, and attention. Pt demonstrated areas of difficulty with short term recall, thought organization, auditory comprehension, and problem solving. Pt would benefit from ongoing ST services to improve overall cognitive linguistic skills to increase safety and independence to return to PLOF. Pt currently edentulous, stating she has dentures at home but has not worn them in years. Pt reported tolerating a regular solid diet with thin liquids PTA without difficulties. Pt arrived to the ARU unit on a regular solid diet with thin liquids via provale cup.    Pt observed with regular and soft solids presenting with adequate mastication, A-P transit, minimal residue in oral cavity that pt was able to clear with use of liquid wash. Pt observed with TL via provale cup presenting with timely swallow initiation, minimally reduced laryngeal elevation, no overt s/s of aspiration. Pt also presents with moderate dysphonia characterized by weak, breathy, and intermittent whisper like vocal quality. Pt may benefit from ENT consult in future. NUTRITION  Weight: 160 lb 12.8 oz (72.9 kg) / Body mass index is 23.75 kg/m². Diet Order: ADULT DIET; Regular  ADULT ORAL NUTRITION SUPPLEMENT; Lunch, Dinner; Low Calorie/High Protein Oral Supplement  PO Meals Eaten (%): 1 - 25%  Education: No recommendation at this time       CASE MANAGEMENT  Assessment: Assessment on going. Patietn with recent prolonged hospital day 32 days and then transition to [facility]. Prior lived at home with spouse. Patient with  and [employer], potential new 02 needs at discharge. Patient is connected with  pain clinic. Interdisciplinary Goals:   1.) Pt will complete toileting with SBA.  2.) Patient demonstrates DBE with emphasis on chest wall mobility post transfers to encourage increased respiratory endurance with transfers. 3.) Pt will tolerate LRD without any clinical s/s of aspiration       Discharge Plan   Estimated discharge date: 1/21/2022  Destination: home health  Pass:No  Services at Discharge: [address], Occupational Therapy, Speech Therapy and [address] at Discharge: TBD pending progress.      Team Members Present at Conference:  : Manuel Eli [place]    Occupational Therapist: Buster Diamond, OTR/L  Physical [address]  Speech Therapist: Delio Bloom, [facility] SLP   Nurse: James Staley, WOJCIECH  Dietician: Ovi Granados, RDN, LD  : Melanie Pringle, OTR/L  Psychiatry: N/A    Family members present at conference: No      I led this team conference and I approve the established interdisciplinary plan of care as documented within the medical record of David Alexis. MD: Gianna Lewis.  Nate Dunlap MD 1/5/2022, 11:20 AM

## 2022-01-04 NOTE — PROGRESS NOTES
4 Eyes Skin Assessment     The patient is being assess for   Admission    I agree that 2 RN's have performed a thorough Head to Toe Skin Assessment on the patient. ALL assessment sites listed below have been assessed. Areas assessed for pressure by both nurses:   [x]   Head, Face, and Ears   [x]   Shoulders, Back, and Chest, Abdomen  [x]   Arms, Elbows, and Hands   [x]   Coccyx, Sacrum, and Ischium  [x]   Legs, Feet, and Heels    Very slow to leilani redness on coccyx/inner buttocks. Skin Assessed Under all Medical Devices by both nurses:  N/A            All Mepilex Borders were peeled back and area peeked at by both nurses:  N/A  Please list where Mepilex Borders are located: N/A             **SHARE this note so that the co-signing nurse is able to place an eSignature**    Co-signer eSignature: Electronically signed by Christine Martel RN on 1/3/22 at 11:11 PM EST    Does the Patient have Skin Breakdown related to pressure?   No              Josafat Prevention initiated:  Yes   Wound Care Orders initiated:  NA      [facility] nurse consulted for Pressure Injury (Stage 3,4, Unstageable, DTI, NWPT, Complex wounds)and New or Established Ostomies:  NA      Primary Nurse eSignature: Electronically signed by Shantal Liang RN on 1/3/22 at 11:09 PM EST

## 2022-01-04 NOTE — PROGRESS NOTES
Speech Language Pathology  Facility/Department: [facility]  Initial Speech/Language/Cognitive Assessment    NAME: Osvaldo Hatch  : 1955   MRN: [id number]  ADMISSION DATE: 1/3/2022  ADMITTING DIAGNOSIS: has Displacement of lumbar intervertebral disc without myelopathy; Hypothyroidism; Mixed hyperlipidemia; Anxiety state; Generalized osteoarthrosis, involving multiple sites; Essential hypertension; Shoulder arthritis; Vitamin D deficiency; Chronic pain syndrome; Fibromyalgia; DDD (degenerative disc disease), lumbar; Anxiety disorder; Prosthetic joint implant failure ([address] Pulmonary nodule; Tracheobronchomalacia; Bronchiectasis ([address] DM2 (diabetes mellitus, type 2) ([address] Systolic congestive heart failure ([address] Chest pain; S/P cardiac catheterization; PVC (premature ventricular contraction); Palpitations; Syncope and collapse; Convulsion ([address] New onset seizure ([address] CAD in native artery; Nausea; Memory change; Acquired hypothyroidism; Sepsis ([address] Type 1 diabetes mellitus without complication ([address] Orthostatic hypotension; Suspected COVID-19 virus infection; Hypoxia; Acute respiratory failure with hypoxia ([address] COVID-19; Pneumonia due to COVID-19 virus; CHARLY (acute kidney injury) ([address] Uncontrolled hypertension; Fever; Hypotension; Severe protein-calorie malnutrition ([address] Gram-negative pneumonia ([address] Pneumonia due to Pseudomonas species ([address] Hypomagnesemia; Hypokalemia; Acute hypoxemic respiratory failure (HCC); ARDS (adult respiratory distress syndrome) ([address] Pneumonia due to Klebsiella pneumoniae ([address]  Electrolyte disorder; Critical illness myopathy; Critical illness neuropathy ([address] and Post covid-19 condition, unspecified on their problem list.  DATE ONSET: 21    Date of Eval: 2022   Evaluating Therapist: LUCHO Nunes    RECENT RESULTS  CT OF HEAD/MRI:N/A    Primary Complaint:  Pt reported previous memory deficits PTA, but increased confusion since being extubated and being in the hospital since Sept.     Pt's goals: \"to walk on my own and go home\"      Pain:  Pain Assessment  Pain Assessment: 0-10  Pain Level: 0    Assessment:  Cognitive Diagnosis: Mild-mod cognitive linguistic deficit  Speech Diagnosis: Moderate Dysphonia   Per H&P: \"Ms Lucas is a 78year old female admitted from [address] [place] [address]. She initially presented with shortness of breath and was diagnosed with covid pneumonia. She required intubation and proning; extubated 10/4, reintubated 10/6. She did require tracheostomy and peg placement. She has no baseline oxygen requirement. She endorses exertional dyspnea but denies chest pain. \"    Pt alert and oriented, cooperative and agreeable to participate in evaluation. Pt lives at home with her , daughter, and two grandchildren. Pt is currently on disability, not an active . Pt manages household finances,  manages her medications. Pt and  share cooking, cleaning, laundry. Pt's  is responsible for grocery shopping. Pt was administered the SLUMS scoring a 18/30. A score of 27/30 is considered WFL. Pt demonstrated areas of strength with orientation, functional recall, executive functioning, and attention. Pt demonstrated areas of difficulty with short term recall, thought organization, auditory comprehension, and problem solving. Pt would benefit from ongoing ST services to improve overall cognitive linguistic skills to increase safety and independence to return to OF. Recommendations:  Requires SLP Intervention: Yes  Duration/Frequency of Treatment: 5x/wk during LOS  D/C Recommendations: To be determined       Plan:   Goals:  Short-term Goals  Timeframe for Short-term Goals: 14 days (01/17/22)  Goal 1: Pt will complete recall tasks with 80% acc given min cues for use of compensatory strategies. Goal 2: Pt will complete executive function tasks (money, math, time, etc) with 80% acc given min cues.   Goal 3: Pt will complete verbal and visual reasoning tasks with 80% acc given min cues. Goal 4: Pt will complete problem solving and thought organization tasks with 80% acc given min cues. Goal 5: Pt will complete vocal strengthening exercises for improved vocal function 10/10 given min cues. Long-term Goals  Timeframe for Long-term Goals: 21 days (01/24/22)  Goal 1: Pt will improve overall cognitive linguistic skills to increase safety and independence to return to PLOF. Patient/family involved in developing goals and treatment plan: yes, pt and pt's     Subjective:  General  Chart Reviewed: Yes  Patient assessed for rehabilitation services?: Yes  Family / Caregiver Present: Yes ( Katherine Nguyen)  Subjective  Subjective: Pt alert and oriented, cooperative and agreeable to participate in evaluation.  present at bedside. 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 Vision  Vision: Impaired  Vision Exceptions: Wears glasses at all times  Hearing  Hearing: Within functional limits           Objective:  Oral/Motor  Oral Motor: Within functional limits    Auditory Comprehension  Comprehension: Within Functional Limits    Expression  Primary Mode of Expression: Verbal    Verbal Expression  Verbal Expression: Within functional limits    Written Expression  Dominant Hand: Right  Written Expression: Within Functional Limits    Motor Speech  Motor Speech:  Within Functional Limits    Pragmatics/Social Functioning  Pragmatics: Within functional limits    Cognition:   Orientation  Overall Orientation Status: Within Normal Limits  Attention  Attention: Within Functional Limits  Memory  Memory: Exceptions to [facility]  Short-term Memory: Moderate  Working Memory: Mild  Problem Solving  Problem Solving: Exceptions to [facility]  Managing Finances: To be assessed in therapy  Verbal Reasoning Skills: Mild  Numeric Reasoning  Numeric Reasoning: Exceptions to [facility]   Calculations: To be assessed in therapy  Money Management: To be assessed in therapy  Time: To be assessed in therapy  Abstract Reasoning  Abstract Reasoning: Within Functional Limits  Safety/Judgement  Safety/Judgement: Within Functional Limits    Additional Assessments:  Voice Evaluation  Vocal Quality: Exceptions to [facility]  Breath Support: Inadequate for speech  Dysphonic: Moderate  Vocal Intensity: Moderately decreased  Maximum Phonation Time: less than 2 seconds          Prognosis:  Speech Therapy Prognosis  Prognosis: Good  Individuals consulted  Consulted and agree with results and recommendations: Patient; Family member  Family member consulted:  Jennifer Gottlieb    Education:  Patient Education: Edu provided re: role of SLP, results of assessment, recommendations  Patient Education Response: Verbalizes understanding;Needs reinforcement  Safety Devices in place: Yes  Type of devices: All fall risk precautions in place; Left in bed;Bed alarm in place;Call light within reach;Nurse notified; Other (comment) ( at bedside)    Therapy Time:   Individual   Time In 1255   Time Out 1330   Minutes Viviane 80 MKeke RODRIGUEZ CCC-SLP  Speech-Language Pathologist  [phone]

## 2022-01-04 NOTE — PLAN OF CARE
Problem: Nutrition  Goal: Optimal nutrition therapy  Outcome: Ongoing  Note: Nutrition Problem #1: Moderate malnutrition  Intervention: Food and/or Nutrient Delivery: Continue Current Diet,Start Oral Nutrition Supplement  Nutritional Goals: Pt will consume greater than 50% of meals and ONS this admission w/o further weight loss

## 2022-01-04 NOTE — PROGRESS NOTES
Occupational Therapy   Occupational Therapy Initial Assessment/Treatment  Date: 2022   Patient Name: Serena Boggs  MRN: [id number]     : 1955    Date of Service: 2022    Discharge Recommendations:  24 hour supervision or assist,Home with Home health OT  OT Equipment Recommendations  Other: CTA: possible sock aid    Assessment   Performance deficits / Impairments: Decreased functional mobility ; Decreased ADL status; Decreased strength;Decreased safe awareness;Decreased cognition;Decreased endurance;Decreased balance;Decreased coordination;Decreased posture;Decreased fine motor control;Decreased high-level IADLs    Assessment: Pt is a 67yo female with deficits in the areas listed above with post COVID 19 condition. Pt is typically mod I with ADLS/IADLs and functional mobility at baseline. Today, Pt performed LB ADLS with mod-max A and UB dressing with min A and UB bathing with max A. Pt requiring increased time and assistance for all ADLS today d/t decreased endurance/activity tolerance and increased anxiety. Pt would continue to benefit from skilled OT services to increase endurance, strength, and independence in ADLS/IADLS and functional mobility. Treatment Diagnosis: weakness, decreased endurance. Prognosis: Good  Decision Making: High Complexity  OT Education: OT Role;Plan of Care;ADL Adaptive Strategies;Transfer Training;Energy Conservation  Patient Education: disease specific: goals, PLB, safety during ADLS and t/fs, energy conservation during ADLS, structure of ARU. Pt verbalized understanding but will need reinforcement. Barriers to Learning: confusion, anxiety. REQUIRES OT FOLLOW UP: Yes  Activity Tolerance  Activity Tolerance: Patient limited by fatigue  Activity Tolerance: Pt with increased fear/anxiety throughourt session requiring increased education, TUS, and time to complete activities. O2 stats decreasing with mobility/activity requiring increased rest time.  Pt decreasing to 74-80% on 2.5L with mobility/activity but increasing back to >90% with rest and PLB. Pt HR increasing with brief activity to 120-124 requiring increased time to recover back to 110-115. Initial: /65, HR 94, O2 92% on 2.5L. Safety Devices  Safety Devices in place: Yes  Type of devices: Nurse notified;Gait belt;Call light within reach; Bed alarm in place; Left in bed           Patient Diagnosis(es): There were no encounter diagnoses. has a past medical history of Anxiety disorder, Chronic pain syndrome, COVID-19, DDD (degenerative disc disease), lumbar, Diabetes ([address] Displacement of lumbar intervertebral disc without myelopathy, Diverticulitis, Fibromyalgia, Generalized osteoarthrosis, involving multiple sites, GERD (gastroesophageal reflux disease), Impacted cerumen, Influenza A, Irritable bowel syndrome, Other and unspecified hyperlipidemia, Prosthetic joint implant failure ([address] Screening mammogram, Tracheobronchomalacia, Unspecified asthma([address]), Unspecified essential hypertension, and Unspecified hypothyroidism. has a past surgical history that includes Hysterectomy; joint replacement (10/92); joint replacement (  12/92); joint replacement (  6/96); joint replacement (  2006); Colonoscopy (2007); Lung surgery (1/2014); Cardiac catheterization; Abdomen surgery; Colon surgery; Upper gastrointestinal endoscopy (N/A, 10/25/2021); and tracheostomy (N/A, 10/25/2021). Treatment Diagnosis: weakness, decreased endurance. Restrictions  Restrictions/Precautions  Restrictions/Precautions: Fall Risk  Position Activity Restriction  Other position/activity restrictions: Notify physician for pulse less than 50 or greater than 120, respiratory rate less than 12 or greater than 25, oral temperature greater than 101.3 F (38.5 C) , urinary output less than 120 mL in four hours, systolic BP less than 90 or greater than 305, diastolic BP less than 50 or greater than 100.  2.5L of O2    Subjective General  Chart Reviewed: Yes  Patient assessed for rehabilitation services?: Yes  Additional Pertinent Hx: Recent pelvic fractures  Response to previous treatment: Patient with no complaints from previous session  Family / Caregiver Present: No  Referring Practitioner: Georgie Aceves MD  Diagnosis: Post Covid 19 condition  Subjective  Subjective: Pt anxious but agreeable to therapy. Patient Currently in Pain: Yes  Pain Assessment  Pain Assessment: 0-10  Pain Level: 8  Pain Type: Acute pain  Pain Location: Shoulder; Abdomen  Pain Orientation: Right  Pain Descriptors: Aching  Vital Signs  Temp: 97.9 °F (36.6 °C)  Temp Source: Oral  Pulse: 84  Heart Rate Source: Monitor  Resp: 20  BP: (!) 186/79  BP Location: Right upper arm  Patient Position: Semi fowlers  Level of Consciousness: Alert (0)  MEWS Score: 1  Patient Currently in Pain: Yes  Oxygen Therapy  SpO2: 93 %  Pulse Oximeter Device Mode: Intermittent  Pulse Oximeter Device Location: Right;Finger  O2 Device: Nasal cannula  O2 Flow Rate (L/min): 2.5 L/min  Social/Functional History  Social/Functional History  Lives With: Spouse,Daughter,Other (comment)  Type of Home: House  Home Layout: One level  Home Access: Stairs to enter with rails  Entrance Stairs - Number of Steps: 1  Entrance Stairs - Rails: None  Bathroom Shower/Tub: Tub/Shower unit  Bathroom Toilet: Handicap height  Bathroom Equipment: Tub transfer bench,Hand-held shower,Grab bars in shower,Grab bars around toilet  Home Equipment: Rolling walker,4 wheeled walker,Cane,Wheelchair-manual,Reacher,Grab bars  ADL Assistance: Independent  Homemaking Assistance: Independent  Homemaking Responsibilities: Yes  Meal Prep Responsibility: Primary  Laundry Responsibility: Primary  Cleaning Responsibility: Primary  Bill Paying/Finance Responsibility: Primary  Shopping Responsibility: Secondary ( completes.)  Ambulation Assistance: Independent (Pt uses cane to walk.)  Transfer Assistance: Independent  Active : No  Patient's  Info:   Occupation: Retired  Type of occupation:  in a store  Leisure & Hobbies: color, crafts. Additional Comments: Pt reports 1 fall in the past 6 months with a pelvic fracture (October of 2021), prior to fall used a cane. Objective   Vision: Impaired  Vision Exceptions: Wears glasses at all times  Hearing: Within functional limits    Orientation  Overall Orientation Status: Within Functional Limits  Observation/Palpation  Posture: Fair  Observation: B/L UE tremors  Balance  Sitting Balance: Contact guard assistance (SBA-CGA. )  Standing Balance: Dependent/Total (mod Ax2 with increased fatigue with cane. Initially mod Ax1 with cane.)  Standing Balance  Time: ~90s, ~1min, ~20-30sx6  Activity: stand pivot t/fs to/from w/c, standing at EOB, and LB dressing. Comment: [employer] initially progressing to cane and HHA. increased fatigue with t/fs and standing activity. O2 stats decreasing, HR increasing. Functional Mobility  Functional - Mobility Device: Other (Cane initially progressing to cane and HHA.)  Activity: Other (side steps to/from shower to w/c)  Assist Level: Dependent/Total  Functional Mobility Comments: mod Ax2, O2 stats decreasing, HR increasing. Toilet Transfers  Toilet - Technique: Stand pivot  Equipment Used: Standard toilet (use of w/c arm to push up.)  Toilet Transfer: Maximum assistance  Shower Transfers  Shower - Transfer From: Other (cane and HHA)  Shower - Transfer Type: To and From  Shower - Transfer To: Transfer tub bench  Shower - Technique: Lateral;To right  Shower Transfers: 2 Person assistance; Moderate assistance  Wheelchair Bed Transfers  Wheelchair/Bed - Technique: Stand pivot  Equipment Used: Wheelchair  Level of Asssistance: Moderate assistance  ADL  Feeding: Setup;Stand by assistance (coughing noted with drinking.)  UE Bathing: Maximum assistance;Verbal cueing; Increased time to complete (seated on TTB.)  LE Bathing: Maximum assistance (seated on TTB.)  UE Dressing: Minimal assistance; Increased time to complete;Verbal cueing (SBA to [facility]. Min A to don shirt.)  LE Dressing: Maximum assistance; Increased time to complete;Verbal cueing (mod A to don socks, total A to doff socks. Total to don pants and brief.)  Toileting: Moderate assistance; Increased time to complete (mod A for balance in stance, CGA while seated.)  Additional Comments: Pt required increased time for all ADL activities this date d/t decreased O2 stats with activity and increased HR (see activity tolerance). Tone RUE  RUE Tone: Normotonic  Tone LUE  LUE Tone: Normotonic  Coordination  Movements Are Fluid And Coordinated: No  Coordination and Movement description: Fine motor impairments;Gross motor impairments;Tremors;Decreased speed;Left UE;Right UE     Bed mobility  Supine to Sit: Stand by assistance (HOB elevated.)  Sit to Supine: Stand by assistance (HOB elevated.)  Scootin Person assistance; Moderate assistance (to scoot up in bed.)  Transfers  Sit to stand: Moderate assistance (with cane.)  Stand to sit: Moderate assistance (with cane)  Transfer Comments: vc's for hand placement and strategies for sit to stand t/f. Cognition  Overall Cognitive Status: Exceptions  Arousal/Alertness: Appropriate responses to stimuli  Following Commands: Follows one step commands with increased time; Follows one step commands with repetition  Attention Span: Attends with cues to redirect  Memory: Decreased short term memory  Safety Judgement: Decreased awareness of need for assistance;Decreased awareness of need for safety  Problem Solving: Decreased awareness of errors;Assistance required to generate solutions  Insights: Decreased awareness of deficits  Initiation: Requires cues for some  Sequencing: Requires cues for some  Perception  Overall Perceptual Status: Impaired  Perseveration: Perseverates during conversation (pt focusing on her confusion throughout session today.) Sensation  Overall Sensation Status: WNL        LUE AROM (degrees)  LUE AROM : WFL  Left Hand AROM (degrees)  Left Hand AROM: WFL  RUE AROM (degrees)  RUE AROM : WFL  Right Hand AROM (degrees)  Right Hand AROM: WFL  LUE Strength  Gross LUE Strength: Exceptions to [facility]  L Elbow Flex: 3+/5  L Hand General: 4+/5  RUE Strength  Gross RUE Strength: Exceptions to [facility]  R Elbow Flex: 4/5  R Hand General: 4+/5                   Plan   Plan  Times per week: 5/7 days a week  Times per day: Daily  Plan weeks: 3 weeks  Current Treatment Recommendations: Strengthening,Endurance Training,Balance Training,Functional Mobility Training,Safety Education & Training,Equipment Evaluation, Education, & procurement,Self-Care / ADL,Patient/Caregiver Education & Training,Home Management Training      Goals  Short term goals  Time Frame for Short term goals: 10 days (1/13)  Short term goal 1: Pt will perform at least 3min of standing functional activities with AD PRN. Short term goal 2: Pt will perform functional/toilet t/fs with with CGA and AD PRN. Short term goal 3: Pt will toilet with CGA and AD PRN. Short term goal 4: Pt will LB dress with min A and AE/AD PRN. Short term goal 5: Pt will total body bathe with min A and AD PRN. Long term goals  Time Frame for Long term goals : 21 days (1/24)  Long term goal 1: Pt will perform at least 5 min of standing functional activities. Long term goal 2: Pt will total body dress with mod I.  Long term goal 3: Pt will total body bathe with mod I.  Long term goal 4: Pt will perform a simple IADL task with supervision and AD PRN. Long term goal 5: Pt will toilet with mod I.   Patient Goals   Patient goals : \"to be able to stand up and walk again\"       Therapy Time   Individual Concurrent Group Co-treatment   Time In 0800         Time Out 0939         Minutes 99         Timed Code Treatment Minutes: 84 Minutes (15min eval)       CHEKO Velasquez/JOSH

## 2022-01-04 NOTE — PROGRESS NOTES
Physical Therapy    Facility/Department: [facility]  Initial Assessment    NAME: Rosita Kumar  : 1955  MRN: [id number]    Date of Service: 2022    Discharge Recommendations:  Patient would benefit from continued therapy after discharge,24 hour supervision or assist   PT Equipment Recommendations  Equipment Needed: No  Other: has needed equpiment    Assessment   Body structures, Functions, Activity limitations: Decreased functional mobility ; Decreased balance;Decreased strength;Decreased safe awareness;Decreased high-level IADLs;Decreased endurance  Assessment: Per2022 H&P,\"Ms Lucas is a 78year old female admitted from [address] [place] [address]. She initially presented with shortness of breath and was diagnosed with covid pneumonia. She required intubation and proning; extubated 10/4, reintubated 10/6. She did require tracheostomy and peg placement. She has no baseline oxygen requirement. She endorses exertional dyspnea but denies chest pain. \"  Patient evaluated for mobility with PT and noted to have poor endruance with patient easily hypoxic, sob with tachcardia with minimal activity and requiring supported sitting or supine rest to recover to SpO2 90% or greater on 2.5 L O2 via nc. Patient able to tolerate 10 feet of walking with assist of 2, and extensive (max) assist with transfers, CAG with sup<>sit, indep with rolling and up to total assist due to fatigue with WC propulsion and LE weakness noted. Pt noted to have pelvic Fx 10/2021 with h/o TJR revisions in past.  Pt appeared anxious during tx, engaged throghout session in activities with redirection needed due to confusion and encouragment for participation. Pt with dyspnea with minimal exertion; given DBE and use of IS with mobility. Pt appropriate for skilled PT to progress towards PLOF HELEN (used cane as PLOF). Prognosis: Excellent  Decision Making: High Complexity  PT Education: Goals; General Safety;Gait Training;PT Role;Plan of Care;Home Exercise Program;Transfer Training;Functional Mobility Training;Disease Specific Education  Patient Education: Educated pt in safety technique for mobility with gait with FWW and transfers including DBE with education on chest wall mobility use of IS and facilitated intercostals with PT hands on anterolateral rib angles with DBE with pt needing redirection but with improved chest wall expansion with education and facilitation and with encouragement increased agreement with mobility. Barriers to Learning: impaired cognition. REQUIRES PT FOLLOW UP: Yes  Activity Tolerance  Activity Tolerance: Other;Patient limited by endurance; Patient limited by fatigue  Activity Tolerance: patient reported confusion and needed redirection with 1 step instruction for mobility tasks with patient tearful at times with patient reporting due to her impaired cognition now as compared to PLOF. Pt stated, \"I get confused, I used to do all kinds of things. \"  Pt offered emotional support, supportive listening and encouragement for current efforts. Pt with sup to sit with SpO2 on2.5 L via nc dropping to from 82 to 84% across transfers with need for supproted seated or supine rest to recover. Pt at end of session supine HOBE call light in reach SpO2 94% on 2.5L O2 via nc and HR 87bpm with pt resting comfortably in bed. Patient Diagnosis(es): There were no encounter diagnoses.      has a past medical history of Anxiety disorder, Chronic pain syndrome, COVID-19, DDD (degenerative disc disease), lumbar, Diabetes ([address] Displacement of lumbar intervertebral disc without myelopathy, Diverticulitis, Fibromyalgia, Generalized osteoarthrosis, involving multiple sites, GERD (gastroesophageal reflux disease), Impacted cerumen, Influenza A, Irritable bowel syndrome, Other and unspecified hyperlipidemia, Prosthetic joint implant failure ([address] Screening mammogram, Tracheobronchomalacia, Unspecified asthma(493[address]90), Unspecified essential hypertension, and Unspecified hypothyroidism. has a past surgical history that includes Hysterectomy; joint replacement (10/92); joint replacement (  12/92); joint replacement (  6/96); joint replacement (  2006); Colonoscopy (2007); Lung surgery (1/2014); Cardiac catheterization; Abdomen surgery; Colon surgery; Upper gastrointestinal endoscopy (N/A, 10/25/2021); and tracheostomy (N/A, 10/25/2021). Restrictions  Restrictions/Precautions  Restrictions/Precautions: Fall Risk  Position Activity Restriction  Other position/activity restrictions: Notify physician for pulse less than 50 or greater than 120, respiratory rate less than 12 or greater than 25, oral temperature greater than 101.3 F (38.5 C) , urinary output less than 120 mL in four hours, systolic BP less than 90 or greater than 436, diastolic BP less than 50 or greater than 100. 2.5L of O2  Vision/Hearing  Vision: Impaired  Vision Exceptions: Wears glasses at all times  Hearing: Within functional limits     Subjective  General  Patient assessed for rehabilitation services?: Yes  Referring Practitioner: Errol Senior MD  Referral Date : 01/03/22  Diagnosis: Post COVID 19critical illness myopathy. Follows Commands: Impaired  Other (Comment): needs frequent redirection  Pain Screening  Patient Currently in Pain: Yes  Pain Assessment  Pain Assessment: 0-10  Pain Level: 5  Pain Location: Shoulder; Abdomen  Pain Orientation: Right  Pain Descriptors: Aching  Vital Signs  Pulse: 84  BP: (!) 186/79  Level of Consciousness: Alert (0)  Patient Currently in Pain: Yes  Oxygen Therapy  SpO2: 93 %  Pulse Oximeter Device Mode: Intermittent  Pulse Oximeter Device Location: Right;Finger  O2 Device: Nasal cannula  O2 Flow Rate (L/min): 2.5 L/min       Orientation  Orientation  Overall Orientation Status: Within Normal Limits  Social/Functional History  Social/Functional History  Lives With: Spouse,Daughter,Other (comment)  Type of Home: House  Home Layout: One level  Home Access: Stairs to enter with rails  Entrance Stairs - Number of Steps: 1  Entrance Stairs - Rails: None  Bathroom Shower/Tub: Tub/Shower unit  Bathroom Toilet: Handicap height  Bathroom Equipment: Tub transfer bench,Hand-held shower,Grab bars in shower,Grab bars around toilet  Home Equipment: Rolling walker,4 wheeled walker,Cane,Wheelchair-manual,Reacher,Grab bars  ADL Assistance: Independent  Homemaking Assistance: Independent  Homemaking Responsibilities: Yes  Meal Prep Responsibility: Primary  Laundry Responsibility: Primary  Cleaning Responsibility: Primary  Bill Paying/Finance Responsibility: Primary  Shopping Responsibility: Secondary ( completes.)  Ambulation Assistance: Independent (Pt uses cane to walk.)  Transfer Assistance: Independent  Active : No  Patient's  Info:   Occupation: Retired  Type of occupation:  in a store  [address] [place] [address] Avenue: [employer]. Additional Comments: Pt reports 1 fall in the past 6 months with a pelvic fracture (October of 2021), prior to fall used a cane. Cognition   Cognition  Overall Cognitive Status: Exceptions  Arousal/Alertness: Appropriate responses to stimuli  Following Commands: Follows one step commands with increased time; Follows one step commands with repetition  Attention Span: Attends with cues to redirect  Memory: Decreased short term memory  Safety Judgement: Decreased awareness of need for assistance;Decreased awareness of need for safety  Problem Solving: Decreased awareness of errors;Assistance required to generate solutions  Insights: Decreased awareness of deficits  Initiation: Requires cues for some  Sequencing: Requires cues for some    Objective     Observation/Palpation  Observation: shallow breathing noted wtih minimal rib exursion.     AROM RLE (degrees)  RLE AROM: WFL  AROM LLE (degrees)  LLE AROM : WFL  Strength RLE  R Hip Flexion: 3/5  R Hip ABduction: NT  R Knee Flexion: 3/5  R Knee Extension: 3/5  R Ankle Dorsiflexion: 4-/5  R Ankle Plantar flexion: 4-/5  R Ankle Inversion: 4-/5  R Ankle Eversion: 4-/5  Strength LLE  Strength LLE: Exception  L Hip Flexion: 3/5  L Hip Extension: NT  L Knee Flexion: 3/5  L Knee Extension: 3/5  L Ankle Dorsiflexion: 4-/5  L Ankle Plantar Flexion: 4-/5  L Ankle Inversion: 4-/5  L Ankle Eversion: 4-/5     Sensation  Overall Sensation Status: WNL  Bed mobility  Rolling to Left: Independent  Rolling to Right: Independent  Supine to Sit: Stand by assistance;Contact guard assistance (variable assist due to fatigue with shakiness and unsteadiness noted with transient hypoxia unsupported sitting and with mobility.)  Sit to Supine: Contact guard assistance;Stand by assistance  Scooting: Stand by assistance;Contact guard assistance  Comment: patient unsteady/shaky with movement with sup<>sit desaturated to 82% on 2.5 L via nc, with cues for DBE and hands on ribs with cues to breath into therapists hands after explaining bucket and pump handle rib action with 5 deep breaths over 30 seconds increased SpO2 to 95-93% on 2.5 L via nc. Transfers  Sit to Stand: Maximum Assistance; Moderate Assistance (sit<>stand from chair, bed and WC with gait belt, pt holding onto therapist elbows and LE blocked and turnk/balance assist throughout mobilty, gait belt low.)  Stand to sit: Maximum Assistance; Moderate Assistance  Bed to Chair: Moderate assistance;Maximum assistance (SpO2 immediate post transfer bed>chair 85% with IS  on IS x5 with cues for chest wall mobility rebound to 92% on 2.5 L O2 via nc in 1 minutes supported sitting rest.)  Car Transfer: Maximum Assistance  Comment: pt required LE blocked lift and lower assist with all transfers with patient holding onto therapist arms and therapist blocking legs with slow rate of transfer to accomodate impaired balance and LE weakness  Ambulation  Ambulation?: Yes  Ambulation 1  Surface: carpet  Device: Rolling Walker  Other Apparatus: Wheelchair follow  Assistance: 2 Person assistance  Quality of Gait: ataxia with heavy WB on UE and mod assist for balance with second person WC follow, short step length with low swing clearance BLE and swing not completely passing stance BLE. Gait Deviations: Slow Marisol; Increased RONAN; Decreased step height  Distance: 10 feet x1 with 1 left turn and pt too fatigued to complete second turn due to hypoxia  Comments: post walk on 3L O2 via nc portable O2 immediate post walk sPO2 85%  bpm and pt visibly sob with cues for DBE and facilitation at lateral rib angles CA by PT rebound to 92% SpO2 in 30 seconds with HR 86bpm  Stairs/Curb  Stairs?: No (unsafe to attempt due to assist of 2 and level of assist with transfers, hypoxia with lower level tasks.)  Wheelchair Activities  Wheelchair Size: 20 inch  Wheelchair Type: Standard  Wheelchair Cushion: Standard  Level of Assistance for pressure relief activities: Independent  Wheelchair Parts Management: Yes  Left Brakes Level of Assistance: Stand by assistance  Right Brakes Level of Assistance: Stand by Assist (cues for both brakes to engage and remove.)  Propulsion 1  Propulsion: Manual  Level: Level Tile  Method: RLE;LLE;LUE;RUE  Description/ Details: patient able to wheel first 90 feet wtih  min assist to steer and SpO2 92% post propulsion with pt sob and stating fatigue for additional 60 feet required total assist.  Distance: 150 feet total     Balance  Posture: Fair (kyphotic posture  with mild forward head in unsupported sitting)  Sitting - Static: Fair;-  Sitting - Dynamic: Fair;-  Standing - Static: Poor  Standing - Dynamic: Poor  Comments: needs sBA to CGA for sitting unsupported        Plan   Plan  Times per week: 5/7 days week  Times per day: Daily  Plan weeks: 21 days  Current Treatment Recommendations: Strengthening,Neuromuscular Re-education,Home Exercise Program,Cognitive/Perceptual Training,Safety Education & Garnette Iha Training,Patient/Caregiver Education & Training,Functional Mobility Training,Wheelchair Mobility Training,Gait Training,Transfer Training,Stair training,Equipment Evaluation, Education, & procurement (will incorporate dual tasking with mobility to progress ability to attend to dynami stimuli with mobility.)  Safety Devices  Type of devices: All fall risk precautions in place,Bed alarm in place,Nurse notified,Call light within reach,Gait belt,Patient at risk for falls,Left in bed (pt requested multiple times during session to return to bed at end of session. PT agrees to use IS x5 throghout remainder of the day.)      Goals  Short term goals  Time Frame for Short term goals: 1/7/2022  Short term goal 1: Pt demo indep bed mobility. Short term goal 2: Pt demo bed<>chair with mod assist of 1 and FWW or SPT. Short term goal 3: Patient demonstrates gait to bathroom 20 feet with assist of 2 and FWW. Short term goal 4: Patient demonstrates up and down 1 step with bilateral rails and assist of 2. Short term goal 5: Patient demonstrates  indep in DBE and 5 minutes of standing activity with spO2 90% or greater to improve endurance for household walking. Long term goals  Time Frame for Long term goals : 1/24/2022  Long term goal 1: Patient demonstrates gait 150 feet with SBA and sPO2 90% or greater able to manage O2 tubing on L/R turns on carpet up to 10 feetwith  safe to assist pt. Long term goal 2: Patient demonstrates up and down 12 steps with SBA and bilateral rails to return to community and home (egress/entry) mobility with  safe to assist pt. Long term goal 3: Patient demonstrates indep bed<>chair, sit<>stand and WC<>car with FWW  Long term goal 4: Patient demonstrates indep WC propulsion 150 feet with L/R turns indep. Long term goal 5: Patient demonstrates 50 feet of walking with L/R turns and carpet HELEN able to indep manage O2 tubing for short distance in home walking.   Patient Goals   Patient goals : \"to walk to the bathroom.        Therapy Time   Individual Concurrent Group Co-treatment   Time In 1002         Time Out 1117         Minutes 75         Timed Code Treatment Minutes: 60 Minutes (+15 minute eval)       Rehana Bacon PT

## 2022-01-04 NOTE — PROGRESS NOTES
Speech Language Pathology  Facility/Department: [facility]   CLINICAL BEDSIDE SWALLOW EVALUATION    NAME: Janet Avalos  : 1955  MRN: [id number]    ADMISSION DATE: 1/3/2022  ADMITTING DIAGNOSIS: has Displacement of lumbar intervertebral disc without myelopathy; Hypothyroidism; Mixed hyperlipidemia; Anxiety state; Generalized osteoarthrosis, involving multiple sites; Essential hypertension; Shoulder arthritis; Vitamin D deficiency; Chronic pain syndrome; Fibromyalgia; DDD (degenerative disc disease), lumbar; Anxiety disorder; Prosthetic joint implant failure ([address] Pulmonary nodule; Tracheobronchomalacia; Bronchiectasis ([address] DM2 (diabetes mellitus, type 2) ([address] Systolic congestive heart failure ([address] Chest pain; S/P cardiac catheterization; PVC (premature ventricular contraction); Palpitations; Syncope and collapse; Convulsion ([address] New onset seizure ([address] CAD in native artery; Nausea; Memory change; Acquired hypothyroidism; Sepsis ([address] Type 1 diabetes mellitus without complication ([address] Orthostatic hypotension; Suspected COVID-19 virus infection; Hypoxia; Acute respiratory failure with hypoxia ([address] COVID-19; Pneumonia due to COVID-19 virus; CHARLY (acute kidney injury) ([address] Uncontrolled hypertension; Fever; Hypotension; Severe protein-calorie malnutrition ([address] Gram-negative pneumonia ([address] Pneumonia due to Pseudomonas species ([address] Hypomagnesemia; Hypokalemia; Acute hypoxemic respiratory failure (HCC); ARDS (adult respiratory distress syndrome) ([address] Pneumonia due to Klebsiella pneumoniae ([address]  Electrolyte disorder; Critical illness myopathy; Critical illness neuropathy ([address] and Post covid-19 condition, unspecified on their problem list.  ONSET DATE: 21    Recent Chest Xray/CT of Chest: 21  Impression  Unchanged multifocal bilateral pneumonia      Date of Eval: 2022  Evaluating Therapist: Laurence Castelan SLP    Current Diet level:  Current Diet : Regular  Current Liquid Diet : Thin (via provale cup)      Primary Complaint  Patient Complaint: Pt with complaints regarding her vocal quality. no complaints related to chewing or swallowing. Pain:  Pain Assessment  Pain Assessment: 0-10  Pain Level: 0    Vitals/labs:   SpO2: 96% on 2.5L O2 via NC  RR: 20  BP: 134/70  HR: 92        Reason for Referral  Jamarcus Macario was referred for a bedside swallow evaluation to assess the efficiency of her swallow function, identify signs and symptoms of aspiration and make recommendations regarding safe dietary consistencies, effective compensatory strategies, and safe eating environment. Impression  Dysphagia Diagnosis: Mild pharyngeal stage dysphagia  Dysphagia Outcome Severity Scale: Level 5: Mild dysphagia- Distant supervision. May need one diet consistency restricted     Pt alert and oriented, cooperative and agreeable to participate in evaluation. Pt's  present at bedside. Pt currently on 2.5L O2 via NC. Per pt and  report, pt was intubated for a very prolonged amount of time, attempted extubation a few times, and had a trach and PEG placed. Very limited information/records available in Epic at time of swallowing evaluation. Pt's  also reported that pt had a FEES completed (no results of FEES in Epic)     Pt currently edentulous, stating she has dentures at home but has not worn them in years. Pt reported tolerating a regular solid diet with thin liquids PTA without difficulties. Pt arrived to the ARU unit on a regular solid diet with thin liquids via provale cup. Pt observed with regular and soft solids presenting with adequate mastication, A-P transit, minimal residue in oral cavity that pt was able to clear with use of liquid wash. Pt observed with TL via provale cup presenting with timely swallow initiation, minimally reduced laryngeal elevation, no overt s/s of aspiration.  Pt also presents with moderate dysphonia characterized by weak, breathy, and intermittent whisper like vocal quality. Pt may benefit from ENT consult in future. SLP recommending pt remain on IDDSI Level 7 regular solids, thin liquids via provale cup, meds whole with small single sips of water, one pill at a time. Treatment Plan  Requires SLP Intervention: Yes  Duration/Frequency of Treatment: 5x/wk during LOS  D/C Recommendations: To be determined       Recommended Diet and Intervention  Diet Solids Recommendation: Regular  Liquid Consistency Recommendation: Thin  Recommended Form of Meds: Whole with water  Recommendations: Dysphagia treatment  Therapeutic Interventions: Diet tolerance monitoring;Patient/Family education; Therapeutic PO trials with SLP    Compensatory Swallowing Strategies  Compensatory Swallowing Strategies: Alternate solids and liquids;Eat/Feed slowly; No straws;Upright as possible for all oral intake;Remain upright for 30-45 minutes after meals;Small bites/sips    Treatment/Goals  Short-term Goals  Timeframe for Short-term Goals: 10 days (01/13/22)  Goal 1: The patient will complete pharyngeal/laryngeal strengthening exercises for improved swallowing function 10/10 given min cues. Long-term Goals  Timeframe for Long-term Goals: 14 days (01/17/22)  Goal 1: Pt will tolerate safest and least restrictive diet without any clinical s/s of aspiration. Dysphagia Goals: The patient will tolerate recommended diet without observed clinical signs of aspiration; The patient/caregiver will demonstrate understanding of compensatory strategies for improved swallowing safety. ;The patient will tolerate thin liquids without signs and symptoms of aspiration 10/10 via cup. General  Chart Reviewed: Yes  Subjective  Subjective: Pt alert and oriented, cooperative and agreeable to participate in evaluation.  at bedside. Behavior/Cognition: Alert; Cooperative;Pleasant mood  Respiratory Status: O2 via nasual cannula  O2 Device: Nasal cannula  Liters of Oxygen:  (2.5)  Communication Observation: Functional  Follows Directions: Simple  Dentition: Edentulous  Patient Positioning: Upright in bed  Baseline Vocal Quality: Dysphonic  Volitional Cough: Weak;Wet  Prior Dysphagia History: Pt presents with a signficant history of dysphagia. Very limited [facility] in [facility]. Per pt and pt's , pt had a FEES completed, was trached multiple times. Will obtain records. Consistencies Administered: Reg solid; Dysphagia Minced and Moist (Dysphagia II); Thin - cup    Vision/Hearing  Vision  Vision: Impaired  Vision Exceptions: Wears glasses at all times  Hearing  Hearing: Within functional limits    Oral Motor Deficits  Oral/Motor  Oral Motor: Within functional limits    Oral Phase Dysfunction  Oral Phase  Oral Phase: WNL     Indicators of Pharyngeal Phase Dysfunction   Pharyngeal Phase  Pharyngeal Phase: Exceptions  Indicators of Pharyngeal Phase Dysfunction  Decreased Laryngeal Elevation: All    Prognosis  Prognosis  Prognosis for safe diet advancement: good  Individuals consulted  Consulted and agree with results and recommendations: Patient; Family member  Family member consulted:  Janel Alcazar    Education  Patient Education: Edu provided re: role of SLP, current diet recs, safe swallow strategies during all meals  Patient Education Response: Verbalizes understanding;Needs reinforcement  Safety Devices in place: Yes  Type of devices: All fall risk precautions in place; Left in bed;Bed alarm in place;Call light within reach;Nurse notified; Other (comment)       Therapy Time  SLP Individual Minutes  Time In: 1230  Time Out: 2729 [facility] [address]  Minutes: 1431 [facility] MKeke RODRIGUEZ [facility]-SLP  Speech-Language Pathologist  [id number]

## 2022-01-04 NOTE — CONSULTS
Comprehensive Nutrition Assessment    Type and Reason for Visit:  Initial,Consult,Positive Nutrition Screen (decreased appetite, wounds, poor dentition)    Nutrition Recommendations/Plan:   1. Continue general diet - monitor need for carb control if blood sugars consistently over 200 mg/dL  2. Add Ensure HP - pt requesting chocolate   3. Encourage PO intakes   4. Allow family to bring in food for pt  5. May require assistance with chopping foods if family not present   6. Monitor nutrition adequacy, pertinent labs, bowel habits, wt changes, and clinical progress    Nutrition Assessment:  79 yo female admitted to the ARU s/p COVID. Pt required trach and PEG placement. Consulted for oral nutrition supplements. Pt nutritionally compromised AEB weight loss and decreased nutrition PTA. Pt eating lunch at time of visit. Pt is a picky eater, family brought in food for lunch. Pt reports appetite decreased. States about 40lb weight loss, RD noted 15lb per EMR. Pt favorable to ONS, will add to promote adequate nutrition and maintain current weight and LBM. Denies any difficulty chewing or swallowing. Will continue to monitor. Malnutrition Assessment:  Malnutrition Status: Moderate malnutrition    Context:  Acute Illness     Findings of the 6 clinical characteristics of malnutrition:  Energy Intake:  1 - 75% or less of estimated energy requirements for 7 or more days  Weight Loss:  7 - Greater than 7.5% over 3 months       Estimated Daily Nutrient Needs:  Energy (kcal):  4424-5767 kcal; Weight Used for Energy Requirements:  Current (73 kg)     Protein (g):  73-88 g; Weight Used for Protein Requirements:  Current (1.0-1.2 g/kg)        Fluid (ml/day):   ; Method Used for Fluid Requirements:  1 ml/kcal      Nutrition Related Findings:  8.6% weight loss in 3 months. Active BS. Last BM on 1/3. A1c of 8.2% on 9/26/21. Current Nutrition Therapies:    ADULT DIET;  Regular    Anthropometric Measures:  · Height: 5' 9\" (175.3 cm)  · Current Body Weight: 160 lb (72.6 kg)   · Usual Body Weight: 175 lb (79.4 kg) (bed scale on 9/26/21)     · Ideal Body Weight: 145 lbs; % Ideal Body Weight 110.3 %   · BMI: 23.6  · BMI Categories: Normal Weight (BMI 18.5-24. 9)       Nutrition Diagnosis:   · Moderate malnutrition related to inadequate protein-energy intake as evidenced by weight loss 7.5% in 3 months,poor intake prior to admission      Nutrition Interventions:   Food and/or Nutrient Delivery:  Continue Current Diet,Start Oral Nutrition Supplement  Nutrition Education/Counseling:  No recommendation at this time   Coordination of Nutrition Care:  Continue to monitor while inpatient    Goals:  Pt will consume greater than 50% of meals and ONS this admission w/o further weight loss       Nutrition Monitoring and Evaluation:   Behavioral-Environmental Outcomes:  None Identified   Food/Nutrient Intake Outcomes:  Food and Nutrient Intake,Supplement Intake  Physical Signs/Symptoms Outcomes:  Nutrition Focused Physical Findings,Weight,Biochemical Data     Discharge Planning:    Continue current diet,Continue Oral Nutrition Supplement     Electronically signed by Moy Corado, MS, RD, LD on 1/4/22 at 3:35 PM EST    Contact: [phone]

## 2022-01-05 LAB
BILIRUBIN URINE: NEGATIVE
BLOOD, URINE: NEGATIVE
CLARITY: CLEAR
COLOR: YELLOW
EPITHELIAL CELLS, UA: ABNORMAL /HPF (ref 0–5)
GLUCOSE BLD-MCNC: 136 MG/DL (ref 70–99)
GLUCOSE BLD-MCNC: 165 MG/DL (ref 70–99)
GLUCOSE BLD-MCNC: 210 MG/DL (ref 70–99)
GLUCOSE BLD-MCNC: 210 MG/DL (ref 70–99)
GLUCOSE URINE: NEGATIVE MG/DL
HYALINE CASTS: ABNORMAL /LPF (ref 0–2)
KETONES, URINE: NEGATIVE MG/DL
LEUKOCYTE ESTERASE, URINE: ABNORMAL
MICROSCOPIC EXAMINATION: YES
MUCUS: ABNORMAL /LPF
NITRITE, URINE: NEGATIVE
PERFORMED ON: ABNORMAL
PH UA: 6 (ref 5–8)
PROTEIN UA: NEGATIVE MG/DL
RBC UA: ABNORMAL /HPF (ref 0–4)
SPECIFIC GRAVITY UA: 1.01 (ref 1–1.03)
URINE TYPE: ABNORMAL
UROBILINOGEN, URINE: 0.2 E.U./DL
WBC UA: ABNORMAL /HPF (ref 0–5)
YEAST: PRESENT /HPF

## 2022-01-05 PROCEDURE — 92507 TX SP LANG VOICE COMM INDIV: CPT

## 2022-01-05 PROCEDURE — 6370000000 HC RX 637 (ALT 250 FOR IP): Performed by: PHYSICAL MEDICINE & REHABILITATION

## 2022-01-05 PROCEDURE — 97530 THERAPEUTIC ACTIVITIES: CPT

## 2022-01-05 PROCEDURE — 97110 THERAPEUTIC EXERCISES: CPT

## 2022-01-05 PROCEDURE — 94761 N-INVAS EAR/PLS OXIMETRY MLT: CPT

## 2022-01-05 PROCEDURE — 87186 SC STD MICRODIL/AGAR DIL: CPT

## 2022-01-05 PROCEDURE — 81001 URINALYSIS AUTO W/SCOPE: CPT

## 2022-01-05 PROCEDURE — 2700000000 HC OXYGEN THERAPY PER DAY

## 2022-01-05 PROCEDURE — 94640 AIRWAY INHALATION TREATMENT: CPT

## 2022-01-05 PROCEDURE — 92526 ORAL FUNCTION THERAPY: CPT

## 2022-01-05 PROCEDURE — 97116 GAIT TRAINING THERAPY: CPT

## 2022-01-05 PROCEDURE — 87077 CULTURE AEROBIC IDENTIFY: CPT

## 2022-01-05 PROCEDURE — 2580000003 HC RX 258: Performed by: PHYSICAL MEDICINE & REHABILITATION

## 2022-01-05 PROCEDURE — 97129 THER IVNTJ 1ST 15 MIN: CPT

## 2022-01-05 PROCEDURE — 6360000002 HC RX W HCPCS: Performed by: PHYSICAL MEDICINE & REHABILITATION

## 2022-01-05 PROCEDURE — 87086 URINE CULTURE/COLONY COUNT: CPT

## 2022-01-05 PROCEDURE — 1280000000 HC REHAB R&B

## 2022-01-05 RX ADMIN — LORAZEPAM 0.5 MG: 0.5 TABLET ORAL at 20:32

## 2022-01-05 RX ADMIN — ROPINIROLE HYDROCHLORIDE 0.5 MG: 0.5 TABLET, FILM COATED ORAL at 20:32

## 2022-01-05 RX ADMIN — MULTIPLE VITAMINS W/ MINERALS TAB 1 TABLET: TAB at 08:23

## 2022-01-05 RX ADMIN — FUROSEMIDE 40 MG: 40 TABLET ORAL at 08:23

## 2022-01-05 RX ADMIN — INSULIN LISPRO 2 UNITS: 100 INJECTION, SOLUTION INTRAVENOUS; SUBCUTANEOUS at 20:30

## 2022-01-05 RX ADMIN — Medication 15 ML: at 20:35

## 2022-01-05 RX ADMIN — OXYCODONE 10 MG: 5 TABLET ORAL at 12:36

## 2022-01-05 RX ADMIN — INSULIN LISPRO 4 UNITS: 100 INJECTION, SOLUTION INTRAVENOUS; SUBCUTANEOUS at 11:43

## 2022-01-05 RX ADMIN — ASPIRIN 81 MG 81 MG: 81 TABLET ORAL at 08:24

## 2022-01-05 RX ADMIN — LEVOTHYROXINE SODIUM 100 MCG: 0.1 TABLET ORAL at 05:47

## 2022-01-05 RX ADMIN — ENOXAPARIN SODIUM 40 MG: 40 INJECTION SUBCUTANEOUS at 08:25

## 2022-01-05 RX ADMIN — CEFEPIME HYDROCHLORIDE 1000 MG: 1 INJECTION, POWDER, FOR SOLUTION INTRAMUSCULAR; INTRAVENOUS at 08:33

## 2022-01-05 RX ADMIN — CEFEPIME HYDROCHLORIDE 1000 MG: 1 INJECTION, POWDER, FOR SOLUTION INTRAMUSCULAR; INTRAVENOUS at 02:06

## 2022-01-05 RX ADMIN — METOCLOPRAMIDE 5 MG: 10 TABLET ORAL at 08:24

## 2022-01-05 RX ADMIN — METOCLOPRAMIDE 5 MG: 10 TABLET ORAL at 20:32

## 2022-01-05 RX ADMIN — ROPINIROLE HYDROCHLORIDE 0.5 MG: 0.5 TABLET, FILM COATED ORAL at 14:16

## 2022-01-05 RX ADMIN — Medication 15 ML: at 08:26

## 2022-01-05 RX ADMIN — OXYCODONE 10 MG: 5 TABLET ORAL at 20:32

## 2022-01-05 RX ADMIN — ONDANSETRON 8 MG: 4 TABLET, ORALLY DISINTEGRATING ORAL at 08:23

## 2022-01-05 RX ADMIN — LORAZEPAM 0.5 MG: 0.5 TABLET ORAL at 12:36

## 2022-01-05 RX ADMIN — DICYCLOMINE HYDROCHLORIDE 20 MG: 20 TABLET ORAL at 20:32

## 2022-01-05 RX ADMIN — INSULIN GLARGINE 7 UNITS: 100 INJECTION, SOLUTION SUBCUTANEOUS at 20:32

## 2022-01-05 RX ADMIN — OXYCODONE 10 MG: 5 TABLET ORAL at 08:23

## 2022-01-05 RX ADMIN — IPRATROPIUM BROMIDE AND ALBUTEROL SULFATE 1 AMPULE: .5; 2.5 SOLUTION RESPIRATORY (INHALATION) at 19:32

## 2022-01-05 RX ADMIN — ROPINIROLE HYDROCHLORIDE 0.5 MG: 0.5 TABLET, FILM COATED ORAL at 08:24

## 2022-01-05 RX ADMIN — IPRATROPIUM BROMIDE AND ALBUTEROL SULFATE 1 AMPULE: .5; 2.5 SOLUTION RESPIRATORY (INHALATION) at 08:17

## 2022-01-05 RX ADMIN — LORAZEPAM 0.5 MG: 0.5 TABLET ORAL at 16:44

## 2022-01-05 RX ADMIN — BUDESONIDE 500 MCG: 0.5 SUSPENSION RESPIRATORY (INHALATION) at 19:39

## 2022-01-05 RX ADMIN — INSULIN LISPRO 2 UNITS: 100 INJECTION, SOLUTION INTRAVENOUS; SUBCUTANEOUS at 17:48

## 2022-01-05 RX ADMIN — CEFEPIME HYDROCHLORIDE 1000 MG: 1 INJECTION, POWDER, FOR SOLUTION INTRAMUSCULAR; INTRAVENOUS at 17:54

## 2022-01-05 RX ADMIN — OXYCODONE 10 MG: 5 TABLET ORAL at 02:08

## 2022-01-05 RX ADMIN — SPIRONOLACTONE 25 MG: 25 TABLET, FILM COATED ORAL at 08:24

## 2022-01-05 RX ADMIN — LORAZEPAM 0.5 MG: 0.5 TABLET ORAL at 08:22

## 2022-01-05 RX ADMIN — DICYCLOMINE HYDROCHLORIDE 20 MG: 20 TABLET ORAL at 11:43

## 2022-01-05 RX ADMIN — OXYCODONE 10 MG: 5 TABLET ORAL at 16:44

## 2022-01-05 RX ADMIN — DICYCLOMINE HYDROCHLORIDE 20 MG: 20 TABLET ORAL at 16:44

## 2022-01-05 RX ADMIN — BUDESONIDE 500 MCG: 0.5 SUSPENSION RESPIRATORY (INHALATION) at 08:17

## 2022-01-05 RX ADMIN — DICYCLOMINE HYDROCHLORIDE 20 MG: 20 TABLET ORAL at 05:46

## 2022-01-05 ASSESSMENT — PAIN SCALES - GENERAL
PAINLEVEL_OUTOF10: 4
PAINLEVEL_OUTOF10: 8
PAINLEVEL_OUTOF10: 9
PAINLEVEL_OUTOF10: 8
PAINLEVEL_OUTOF10: 4
PAINLEVEL_OUTOF10: 0
PAINLEVEL_OUTOF10: 9
PAINLEVEL_OUTOF10: 0
PAINLEVEL_OUTOF10: 0
PAINLEVEL_OUTOF10: 5
PAINLEVEL_OUTOF10: 8
PAINLEVEL_OUTOF10: 9
PAINLEVEL_OUTOF10: 8

## 2022-01-05 ASSESSMENT — PAIN DESCRIPTION - DESCRIPTORS
DESCRIPTORS: ACHING;THROBBING
DESCRIPTORS: ACHING
DESCRIPTORS: ACHING

## 2022-01-05 ASSESSMENT — PAIN DESCRIPTION - ORIENTATION
ORIENTATION: RIGHT

## 2022-01-05 ASSESSMENT — PAIN DESCRIPTION - PAIN TYPE
TYPE: ACUTE PAIN

## 2022-01-05 ASSESSMENT — PAIN DESCRIPTION - LOCATION
LOCATION: SHOULDER
LOCATION: ABDOMEN;SHOULDER
LOCATION: ABDOMEN;SHOULDER
LOCATION: SHOULDER

## 2022-01-05 ASSESSMENT — PAIN DESCRIPTION - FREQUENCY
FREQUENCY: CONTINUOUS
FREQUENCY: INTERMITTENT

## 2022-01-05 NOTE — PROGRESS NOTES
Speech Language Pathology  [facility]: ACUTE REHAB UNIT  SPEECH-LANGUAGE PATHOLOGY      [x] Daily  [] Weekly Care Conference Note  [] Discharge    Patient:Esperanza Nick      YLY:[birth year]  FIU:[id number]  Rehab Dx/Hx: Post covid-19 condition, unspecified [U09.9]    Precautions: falls and aspirations  Home situation: lives at home with , dtr, two grandchildren; not an active ; manages own meds; shares household tasks with ,  manages finances   ST Dx: [] Aphasia  [] Dysarthria  [] Apraxia   [x] Oropharyngeal dysphagia [x] Cognitive Impairment  [x] Other: voice tx   Date of Admit: 1/3/2022  Room #: 0151/0151-01    Current functional status (updated daily):         Pt being seen for : [x] Speech/Language Treatment  [x] Dysphagia Treatment [x] Cognitive Treatment  [] Other:  Communication: []WFL  [] Aphasia  [] Dysarthria  [] Apraxia  [] Pragmatic Impairment [] Non-verbal  [] Hearing Loss  [x] Other: dysphonia  Cognition: [] WFL  [x] Mild  [x] Moderate  [] Severe [] Unable to Assess  [] Other:  Memory: [] WFL  [x] Mild  [x] Moderate  [] Severe [] Unable to Assess  [] Other:  Behavior: [x] Alert  [x] Cooperative  [x]  Pleasant  [] Confused  [] Agitated  [] Uncooperative  [] Distractible [] Motivated  [] Self-Limiting [] Anxious  [] Other:  Endurance:  [x] Adequate for participation in SLP sessions  [] Reduced overall  [] Lethargic  [] Other:  Safety: [x] No concerns at this time  [] Reduced insight into deficits  []  Reduced safety awareness [] Not following call light procedures  [] Unable to Assess  [] Other:    Current Diet Order:ADULT DIET; Regular  ADULT ORAL NUTRITION SUPPLEMENT; Lunch, Dinner; Low Calorie/High Protein Oral Supplement  Swallowing Precautions: Alternate solids and liquids;Eat/Feed slowly; No straws;Upright as possible for all oral intake;Remain upright for 30-45 minutes after meals;Small bites/sips           Date: 1/5/2022      Tx session 1  [id number] Tx session 2  All tx needs met in session 1    Total Timed Code Min 15    Total Treatment Minutes 60    Individual Treatment Minutes 60    Group Treatment Minutes 0 0   Co-Treat Minutes 0 0   Variance/Reason:  0    Pain 9- right shoulder     Pain Intervention [x] RN notified  [x] Repositioned  [] Intervention offered and patient declined  [] N/A  [] Other: [] RN notified  [] Repositioned  [] Intervention offered and patient declined  [] N/A  [] Other:   Subjective     Pt alert and oriented, cooperative and agreeable to participate in therapy. Pt seen sitting upright in bed. Objective:  Goals     Dysphagia Goals:  Short-term Goals  Timeframe for Short-term Goals: 10 days (01/13/22)    Goal 1: The patient will complete pharyngeal/laryngeal strengthening exercises for improved swallowing function 10/10 given min cues. Effortful swallow: x10  Delores: x10 mod cues  Isometric tongue tip hold for 5 secs: x10  Resistive jaw opening hold for 5 secs: x10  Jaw jut hold for 5 secs: x10     Goal 2. The patient will tolerate recommended diet without observed clinical signs of aspiration. Pt observed with TL via regular cup x4: pt presenting with immediate cough and wet vocal quality on 4/4 sips. Pt observed with TL via provale cup x5 tolerating without any overt s/s of aspiration. Pt kindly declined solid trials. Goal 3. The patient/caregiver will demonstrate understanding of compensatory strategies for improved swallowing safety. SLP reviewed and discussed strict use of provale cup, small single sips, upright sitting position, slow rate. SLP provided extensive education regarding risk of aspiration, PNA, etc.     Pt verbalized understanding but will require ongoing education and reinforcement. Goal 4. The patient will tolerate thin liquids without signs and symptoms of aspiration 10/10 via cup. Pt observed with TL via regular cup x4: pt presenting with immediate cough and wet vocal quality on 4/4 sips. Speech/Lang/Cog goals:  Short-term Goals  Timeframe for Short-term Goals: 14 days (01/17/22)    Goal 1: Pt will complete recall tasks with 80% acc given min cues for use of compensatory strategies. Functional recall/word progression task:  -pt given three words and asked to repeat them as they occur in order  -ex: Sept, May, Nov= May, Sept, Nov  -95% acc indep improving to 100% acc given min cues     Goal 2: Pt will complete executive function tasks (money, math, time, etc) with 80% acc given min cues. Did not target. Goal 3: Pt will complete verbal and visual reasoning tasks with 80% acc given min cues. Did not target. Goal 4: Pt will complete problem solving and thought organization tasks with 80% acc given min cues. Did not target. Goal 5: Pt will complete vocal strengthening exercises for improved vocal function 10/10 given min cues. Sustained /ah/: average of 1.2 seconds on five trials; dysphonic vocal quality     Cup bubble (put a straw into glass of water and blow for as long as able): average of 5 seconds on five trials    Diaphragmatic breathing: x10 mod cues       Other areas targeted: N/A    Education:   edu provided re: role of SLP, safe swallow strategies, strict use of provale cup, pharyngeal/laryngeal strengthening exercises, voice therapy       Safety Devices: [x] Call light within reach  [] Chair alarm activated  [x] Bed alarm activated  [] Other: [] Call light within reach  [] Chair alarm activated  [] Bed alarm activated  [] Other:    Assessment: Pt pleasant and cooperative, agreeable to participate. Pt very anxious constantly stating \"I cant do it\" \"I don't know whats going on\" throughout duration of tx session. SLP provided extensive education regarding role of SLP, POC. Pt observed with TL via regular cup presenting with immediate s/s of penetration/aspiration on 4/4 trials. Recommending pt continue with TL via provale cup only. Education provided re risk of aspiration. Introduced pharyngeal/laryngeal strengthening exercises and vocal exercises. Pt with significantly reduced endurance, requiring increased encouragement to complete exercises. Pt completed functional recall task with 95% acc indep, demonstrating appropriate use of repetition and visualization compensatory strategies. Continue goals listed above. Plan: Continue as per plan of care. Additional Information:     Barriers toward progress: Confusion and Cognitive deficit  Discharge recommendations:  [] Home independently  [] Home with assistance [x]  24 hour supervision  [] ECF [] Other:  Continued Tx Upon Discharge: ? [x] Yes [] No [] TBD based on progress while on ARU [] Vital Stim indicated [] Other:   Estimated discharge date: TBD    Interventions used this date:  [x] Speech/Language Treatment  [] Instruction in HEP [] Group [x] Dysphagia Treatment [x] Cognitive Treatment   [] Other: Total Time Breakdown / Charges    Time in Time out Total Time / units   Cognitive Tx 1045 1100 15 min/ 1 unit    Speech Tx 1030 1045 15 min/ 1 unit    Dysphagia Tx 1000 1030 30 min/ 1 unit        Electronically Signed by     Merlyn Arellano. A CCC-SLP  Speech-Language Pathologist  [id number]

## 2022-01-05 NOTE — PROGRESS NOTES
Physical Therapy  Facility/Department: [facility]  Daily Treatment Note  NAME: David Alexis  : 1955  MRN: [id number]    Date of Service: 2022    Discharge Recommendations:  Patient would benefit from continued therapy after discharge,24 hour supervision or assist        Assessment  PAtient seen for split session BLE therex for strengthening and recumbent stepper requireing cues and set up assist as noted. Pt seen for transfer, bed mobility and gait train with fading assist as needed with simple cues, frequent brief rest needed due to hypoxia with mobility with rebound with DBE and assisted with pillow cough on 3L via nc. PT with assist of 2 walked 20 feet and 13 feet this day with max assist of 1 to 2 for transfers and Supervision with household WC propulsion. Activity Tolerance  Activity Tolerance: patient in bed semifowlers hypoxic on 2L via nc titrated up to3L with SpO2 and DBE 90% with transfer bed to WC O2 dropped to 83% with IS and seated rest increased to 96% . HR 6bpm.     Patient Diagnosis(es): There were no encounter diagnoses. has a past medical history of Anxiety disorder, Chronic pain syndrome, COVID-19, DDD (degenerative disc disease), lumbar, Diabetes ([address] Displacement of lumbar intervertebral disc without myelopathy, Diverticulitis, Fibromyalgia, Generalized osteoarthrosis, involving multiple sites, GERD (gastroesophageal reflux disease), Impacted cerumen, Influenza A, Irritable bowel syndrome, Other and unspecified hyperlipidemia, Prosthetic joint implant failure ([address] Screening mammogram, Tracheobronchomalacia, Unspecified asthma(493.90), Unspecified essential hypertension, and Unspecified hypothyroidism. has a past surgical history that includes Hysterectomy; joint replacement (10/92); joint replacement (  ); joint replacement (  ); joint replacement (  ); Colonoscopy (); Lung surgery (2014); Cardiac catheterization; Abdomen surgery;  Colon surgery; Upper gastrointestinal endoscopy (N/A, 10/25/2021); and tracheostomy (N/A, 10/25/2021). Restrictions  Restrictions/Precautions  Restrictions/Precautions: Fall Risk  Position Activity Restriction  Other position/activity restrictions: Notify physician for pulse less than 50 or greater than 120, respiratory rate less than 12 or greater than 25, oral temperature greater than 101.3 F (38.5 C) , urinary output less than 120 mL in four hours, systolic BP less than 90 or greater than 472, diastolic BP less than 50 or greater than 100. 2.5L of O2  Subjective   Pain Screening  Patient Currently in Pain: Yes  Pain Assessment  Pain Level: 8  Pain Type: Acute pain  Pain Location: Shoulder  Vital Signs  Pulse: 88  BP: 122/74  BP Location: Right upper arm  Patient Position: Semi fowlers  Level of Consciousness: Alert (0)  Patient Currently in Pain: Yes  Oxygen Therapy  SpO2: (!) 83 % (increased to 3L with nursing present during titration with after 1 minute DBE and 3L with cough increased to 90%)  Pulse Oximeter Device Mode: Intermittent  Pulse Oximeter Device Location: Right;Finger  O2 Device: Nasal cannula  O2 Flow Rate (L/min): 2 L/min       Orientation  Orientation  Overall Orientation Status: Within Normal Limits  Cognition      Objective    BED MOBILITY SUP>SIT from semirecumbent SBA  TRANSFERS:    Bed>chair Max assist of 1 SPT trunk asssit and slow rate to allow forward weight shift  Sit<> parallel bars from to WC mod to max assist x 5   GAIT in parallel bars  10 feet each forwards and backwards(with assist of 2 WC follow and min asssist 97% 98 bpm post walking on 3L via nc swing not completely passing stance, tremulous BLE, shuffling gait with patient tearful with walking backwards with pt frequently asking for redirection to task. Emotional support and simple cueing added.    WC propulsion 211 feet with dual tasking naming objects and visual scanning pt propelling with BLE   Exercises  Heelslides: x10 BLE multiple cues and tactile cues  Gluteal Sets: x10 hooklying  Hip Abduction: x10 BLE simultaneous wtih cues to redirect  Ankle Pumps: x30 BLE  Comments: cues and encouragement due to pt crying when overwhelmed. Education:   Educated patient in safety with transfers and encouragement with all mobility  with patient verbalizing understanding and requiring max encouragment  And increased assist due to emotional lability and pt reported fatigue in BLE.needs frequent redirection vc, tc with LE therex. Pt appears easily overwhelemed with increased processing time, redirection and simple cues needed. Disposition: with SLP in [place] [address] at end of session on 3L via nc. SECOND SESSION:  Seated at rest in [place] [address] on arrival:    on 2.5 L via nc 95% sPO2 95%, HR 96bpm, BP /82      Subjective:  Pain in right shoulder rates as aching 8/10. THEREX:   Recumbent stepper  Steps/minue Level 1 16 with intermittent HR tested and pulseoximetry across 7.5 minutes with SpO2 3L via nc 96-98% -101 bpm     Transfer WC>recumbent stepper SPT max of 1 and stepper to [place] [address] patient stating, \"I can't do it with  assisting CGA to min assist and  (PT) MAx assist.    BED MOBILITY:  Sup<sit indep  Rolling indep    TRANSFERS:  Sit<>stand with max assist of 1 and SPT WC>bsc>bed max of 2 with nursing total assist to manage clothing and assist with pericare. Sit<>stand from [place] [address] for walking max of 1. All transfers needed to flex knees enough to allow LE WB and assist with lift lower and forward weight shift. GAIT:  Patient walks up to 13 feet with FWW and WC follow second assist with gait noted as shuffling with stance not passing swing. BLE    Pt incontinent of urine during tx with pt noting she does not have knowledge of going or feeling wet.   PT urinated in [facility] at end of session with clean brief and pants put on with max assist of 2 in standing  WC PRopulsion Pt demo S for WC brakes and pt demo WC propulsion 77 feet with BUE and BLE  With s.  Education:   Educated patient in safety with transfers and encouragement with all mobility  with patient verbalizing understanding and requiring max encouragment  And increased assist due to emotional lability and pt reported fatigue in BLE. Pt's  present during tx encouraging and very supportive of his wife. Disposition:Patient with call light in reach and alarm in place at end of session with patient in bed 3L O2 via nc on wall mount O2. Goals  Short term goals  Time Frame for Short term goals: 1/7/2022  Short term goal 1: Pt demo indep bed mobility. Short term goal 2: Pt demo bed<>chair with mod assist of 1 and FWW or SPT. Short term goal 3: Patient demonstrates gait to bathroom 20 feet with assist of 2 and FWW. Short term goal 4: Patient demonstrates up and down 1 step with bilateral rails and assist of 2. Short term goal 5: Patient demonstrates  indep in DBE and 5 minutes of standing activity with spO2 90% or greater to improve endurance for household walking. Long term goals  Time Frame for Long term goals : 1/24/2022  Long term goal 1: Patient demonstrates gait 150 feet with SBA and sPO2 90% or greater able to manage O2 tubing on L/R turns on carpet up to 10 feetwith  safe to assist pt. Long term goal 2: Patient demonstrates up and down 12 steps with SBA and bilateral rails to return to community and home (egress/entry) mobility with  safe to assist pt. Long term goal 3: Patient demonstrates indep bed<>chair, sit<>stand and WC<>car with FWW  Long term goal 4: Patient demonstrates indep WC propulsion 150 feet with L/R turns indep. Long term goal 5: Patient demonstrates 50 feet of walking with L/R turns and carpet HELEN able to indep manage O2 tubing for short distance in home walking. Patient Goals   Patient goals : \"to walk to the bathroom.     Plan    Plan  Times per week: 5/7 days week  Times per day: Daily  Plan weeks: 21 days  Current Treatment Recommendations: Strengthening,Neuromuscular Re-education,Home Exercise Program,Cognitive/Perceptual Training,Safety Education & Training,Balance Training,Endurance Training,Patient/Caregiver Education & Training,Functional Mobility Training,Wheelchair Mobility Training,Gait Training,Transfer Training,Stair training,Equipment Evaluation, Education, & procurement  Safety Devices  Type of devices: All fall risk precautions in place,Bed alarm in place,Nurse notified,Call light within reach,Gait belt,Patient at risk for falls,Left in bed (pt requested multiple times during session to return to bed at end of session.   PT agrees to use IS x5 throghout remainder of the day.)     Therapy Time   Individual Concurrent Group Co-treatment   Time In 0900         Time Out 1000         Minutes 60         Timed Code Treatment Minutes: 60 Minutes    Second Session Therapy Time:   Individual Concurrent Group Co-treatment   Time In 1435         Time Out 1510         Minutes 35           Timed Code Treatment Minutes:  35    Total Treatment Minutes:  95      Dian Barrientos, PT

## 2022-01-05 NOTE — PLAN OF CARE
Problem: Pain:  Goal: Pain level will decrease  Description: Pain level will decrease  1/5/2022 0932 by Asa Doherty RN  Outcome: Ongoing  1/5/2022 0319 by Marvin Corona RN  Outcome: Ongoing

## 2022-01-05 NOTE — PROGRESS NOTES
[id number] [facility]  1/5/2022  [id number]    Chief Complaint: Post covid-19 condition, unspecified    Subjective:   Resting in bed; first day of therapy went well; no new c/o. ROS: No n/v, cp, sob, f/c  Objective:  Patient Vitals for the past 24 hrs:   BP Temp Temp src Pulse Resp SpO2 Height   01/04/22 2030 116/70 98.1 °F (36.7 °C) Oral 94 18 91 %    01/04/22 1918     18 94 %    01/04/22 1521       5' 9\" (1.753 m)   01/04/22 1007 (!) 186/79   84  93 %    01/04/22 0930 (!) 146/86 97.9 °F (36.6 °C) Oral 92 20 92 %      Gen: No distress, pleasant. HEENT: Normocephalic, atraumatic. CV: Regular rate and rhythm. Resp: No respiratory distress. Abd: Soft, nontender   Ext: No edema. Neuro: Alert, oriented, appropriately interactive. Wt Readings from Last 3 Encounters:   01/04/22 160 lb 12.8 oz (72.9 kg)   10/26/21 175 lb 14.4 oz (79.8 kg)   07/15/21 180 lb (81.6 kg)       Laboratory data:   Lab Results   Component Value Date    WBC 19.9 (H) 01/04/2022    HGB 10.9 (L) 01/04/2022    HCT 35.2 (L) 01/04/2022    MCV 78.0 (L) 01/04/2022     01/04/2022       Lab Results   Component Value Date     01/04/2022    K 3.9 01/04/2022    CL 91 01/04/2022    CO2 28 01/04/2022    BUN 9 01/04/2022    CREATININE <0.5 01/04/2022    GLUCOSE 209 01/04/2022    CALCIUM 10.7 01/04/2022        Therapy progress:  PT  Position Activity Restriction  Other position/activity restrictions: Notify physician for pulse less than 50 or greater than 120, respiratory rate less than 12 or greater than 25, oral temperature greater than 101.3 F (38.5 C) , urinary output less than 120 mL in four hours, systolic BP less than 90 or greater than 813, diastolic BP less than 50 or greater than 100.  2.5L of O2  Objective     Sit to Stand: Maximum Assistance,Moderate Assistance (sit<>stand from chair, bed and WC with gait belt, pt holding onto therapist elbows and LE blocked and turnk/balance assist throughout mobilty, gait belt low.)  Stand to sit: Maximum Assistance,Moderate Assistance  Bed to Chair: Moderate assistance,Maximum assistance (SpO2 immediate post transfer bed>chair 85% with IS  on IS x5 with cues for chest wall mobility rebound to 92% on 2.5 L O2 via nc in 1 minutes supported sitting rest.)  Device: Rolling Walker  Other Apparatus: Wheelchair follow  Assistance: 2 Person assistance  Distance: 10 feet x1 with 1 left turn and pt too fatigued to complete second turn due to hypoxia  OT  PT Equipment Recommendations  Equipment Needed: No  Other: has needed equpiment  Toilet - Technique: Stand pivot  Equipment Used: Standard toilet (use of w/c arm to push up.)  Assessment        SLP  Current Diet : Regular  Current Liquid Diet : Thin (via provale cup)  Diet Solids Recommendation: Regular  Liquid Consistency Recommendation: Thin    Body mass index is 23.75 kg/m². Rehabilitation Diagnosis:  Neurologic, 3.8, Neuromuscular Disorders, e.g. Critical Illness Myopathy, Other Myopathy     Assessment and Plan:  S/p covid pneumonia with superimposed bacterial pneumonia  - required trach/peg, proning  - s/p remdesivir, tocilizumab  - remains on cefepime, last day today.     CAD  - asa, statin     HTN  - follow BP       DM  - lantus, SSI     Hypothyroidism  - synthroid    Leukocytosis  - check UA     Bowels: Schedule stool softener. Follow bowel movements. Enema or suppository if needed.      Bladder: Check PVR x 3. [facility] if PVR > 200ml or if any volume is > 500 ml.      Sleep: Trazodone provided prn.      Follow up appointments: PCP  GENEVA: 1/21 home w/ home health  DME: TBD    Interdisciplinary team conference was held today with entire rehab treatment team including PT, OT, SLP, Dietician, RN, and SW. Discussion focused on progress toward rehab goals and discharge planning. Barriers: weakness, balance, endurance. Total treatment time >35 min with greater than 50% spent in care coordination. Tru Simmons MD 1/5/2022, 8:09 AM

## 2022-01-06 ENCOUNTER — APPOINTMENT (OUTPATIENT)
Dept: GENERAL RADIOLOGY | Age: 67
DRG: 091 | End: 2022-01-06
Attending: PHYSICAL MEDICINE & REHABILITATION
Payer: MEDICARE

## 2022-01-06 LAB
ANION GAP SERPL CALCULATED.3IONS-SCNC: 13 MMOL/L (ref 3–16)
BASOPHILS ABSOLUTE: 0.1 K/UL (ref 0–0.2)
BASOPHILS RELATIVE PERCENT: 0.6 %
BUN BLDV-MCNC: 9 MG/DL (ref 7–20)
CALCIUM SERPL-MCNC: 10.4 MG/DL (ref 8.3–10.6)
CHLORIDE BLD-SCNC: 93 MMOL/L (ref 99–110)
CO2: 29 MMOL/L (ref 21–32)
CREAT SERPL-MCNC: <0.5 MG/DL (ref 0.6–1.2)
EOSINOPHILS ABSOLUTE: 0.4 K/UL (ref 0–0.6)
EOSINOPHILS RELATIVE PERCENT: 3.6 %
GFR AFRICAN AMERICAN: >60
GFR NON-AFRICAN AMERICAN: >60
GLUCOSE BLD-MCNC: 118 MG/DL (ref 70–99)
GLUCOSE BLD-MCNC: 129 MG/DL (ref 70–99)
GLUCOSE BLD-MCNC: 135 MG/DL (ref 70–99)
GLUCOSE BLD-MCNC: 218 MG/DL (ref 70–99)
GLUCOSE BLD-MCNC: 241 MG/DL (ref 70–99)
HCT VFR BLD CALC: 31.3 % (ref 36–48)
HEMOGLOBIN: 10.1 G/DL (ref 12–16)
LYMPHOCYTES ABSOLUTE: 1.7 K/UL (ref 1–5.1)
LYMPHOCYTES RELATIVE PERCENT: 17.6 %
MCH RBC QN AUTO: 24.5 PG (ref 26–34)
MCHC RBC AUTO-ENTMCNC: 32.2 G/DL (ref 31–36)
MCV RBC AUTO: 75.9 FL (ref 80–100)
MONOCYTES ABSOLUTE: 0.7 K/UL (ref 0–1.3)
MONOCYTES RELATIVE PERCENT: 7 %
NEUTROPHILS ABSOLUTE: 7.1 K/UL (ref 1.7–7.7)
NEUTROPHILS RELATIVE PERCENT: 71.2 %
PDW BLD-RTO: 19.6 % (ref 12.4–15.4)
PERFORMED ON: ABNORMAL
PLATELET # BLD: 209 K/UL (ref 135–450)
PMV BLD AUTO: 8.5 FL (ref 5–10.5)
POTASSIUM REFLEX MAGNESIUM: 3.8 MMOL/L (ref 3.5–5.1)
RBC # BLD: 4.12 M/UL (ref 4–5.2)
SODIUM BLD-SCNC: 135 MMOL/L (ref 136–145)
WBC # BLD: 9.9 K/UL (ref 4–11)

## 2022-01-06 PROCEDURE — 97116 GAIT TRAINING THERAPY: CPT

## 2022-01-06 PROCEDURE — 71045 X-RAY EXAM CHEST 1 VIEW: CPT

## 2022-01-06 PROCEDURE — 92507 TX SP LANG VOICE COMM INDIV: CPT

## 2022-01-06 PROCEDURE — 97110 THERAPEUTIC EXERCISES: CPT

## 2022-01-06 PROCEDURE — 6360000002 HC RX W HCPCS: Performed by: PHYSICAL MEDICINE & REHABILITATION

## 2022-01-06 PROCEDURE — 2580000003 HC RX 258: Performed by: PHYSICAL MEDICINE & REHABILITATION

## 2022-01-06 PROCEDURE — 85025 COMPLETE CBC W/AUTO DIFF WBC: CPT

## 2022-01-06 PROCEDURE — 94640 AIRWAY INHALATION TREATMENT: CPT

## 2022-01-06 PROCEDURE — 92526 ORAL FUNCTION THERAPY: CPT

## 2022-01-06 PROCEDURE — 80048 BASIC METABOLIC PNL TOTAL CA: CPT

## 2022-01-06 PROCEDURE — 97129 THER IVNTJ 1ST 15 MIN: CPT

## 2022-01-06 PROCEDURE — 36415 COLL VENOUS BLD VENIPUNCTURE: CPT

## 2022-01-06 PROCEDURE — 94761 N-INVAS EAR/PLS OXIMETRY MLT: CPT

## 2022-01-06 PROCEDURE — 97530 THERAPEUTIC ACTIVITIES: CPT

## 2022-01-06 PROCEDURE — 99231 SBSQ HOSP IP/OBS SF/LOW 25: CPT | Performed by: STUDENT IN AN ORGANIZED HEALTH CARE EDUCATION/TRAINING PROGRAM

## 2022-01-06 PROCEDURE — 6370000000 HC RX 637 (ALT 250 FOR IP): Performed by: PHYSICAL MEDICINE & REHABILITATION

## 2022-01-06 PROCEDURE — 1280000000 HC REHAB R&B

## 2022-01-06 PROCEDURE — 0CJS8ZZ INSPECTION OF LARYNX, VIA NATURAL OR ARTIFICIAL OPENING ENDOSCOPIC: ICD-10-PCS | Performed by: STUDENT IN AN ORGANIZED HEALTH CARE EDUCATION/TRAINING PROGRAM

## 2022-01-06 PROCEDURE — 2700000000 HC OXYGEN THERAPY PER DAY

## 2022-01-06 PROCEDURE — 31575 DIAGNOSTIC LARYNGOSCOPY: CPT | Performed by: STUDENT IN AN ORGANIZED HEALTH CARE EDUCATION/TRAINING PROGRAM

## 2022-01-06 RX ADMIN — ROPINIROLE HYDROCHLORIDE 0.5 MG: 0.5 TABLET, FILM COATED ORAL at 07:54

## 2022-01-06 RX ADMIN — LORAZEPAM 0.5 MG: 0.5 TABLET ORAL at 06:19

## 2022-01-06 RX ADMIN — IPRATROPIUM BROMIDE AND ALBUTEROL SULFATE 1 AMPULE: .5; 2.5 SOLUTION RESPIRATORY (INHALATION) at 07:38

## 2022-01-06 RX ADMIN — Medication 15 ML: at 08:03

## 2022-01-06 RX ADMIN — LORAZEPAM 0.5 MG: 0.5 TABLET ORAL at 01:32

## 2022-01-06 RX ADMIN — ROPINIROLE HYDROCHLORIDE 0.5 MG: 0.5 TABLET, FILM COATED ORAL at 22:02

## 2022-01-06 RX ADMIN — OXYCODONE 10 MG: 5 TABLET ORAL at 10:39

## 2022-01-06 RX ADMIN — IPRATROPIUM BROMIDE AND ALBUTEROL SULFATE 1 AMPULE: .5; 2.5 SOLUTION RESPIRATORY (INHALATION) at 20:18

## 2022-01-06 RX ADMIN — METOCLOPRAMIDE 5 MG: 10 TABLET ORAL at 07:57

## 2022-01-06 RX ADMIN — DICYCLOMINE HYDROCHLORIDE 20 MG: 20 TABLET ORAL at 16:46

## 2022-01-06 RX ADMIN — MULTIPLE VITAMINS W/ MINERALS TAB 1 TABLET: TAB at 07:55

## 2022-01-06 RX ADMIN — BUDESONIDE 500 MCG: 0.5 SUSPENSION RESPIRATORY (INHALATION) at 07:38

## 2022-01-06 RX ADMIN — INSULIN LISPRO 4 UNITS: 100 INJECTION, SOLUTION INTRAVENOUS; SUBCUTANEOUS at 16:51

## 2022-01-06 RX ADMIN — CEFEPIME HYDROCHLORIDE 1000 MG: 1 INJECTION, POWDER, FOR SOLUTION INTRAMUSCULAR; INTRAVENOUS at 08:05

## 2022-01-06 RX ADMIN — CEFEPIME HYDROCHLORIDE 1000 MG: 1 INJECTION, POWDER, FOR SOLUTION INTRAMUSCULAR; INTRAVENOUS at 00:17

## 2022-01-06 RX ADMIN — BUDESONIDE 500 MCG: 0.5 SUSPENSION RESPIRATORY (INHALATION) at 20:18

## 2022-01-06 RX ADMIN — INSULIN LISPRO 4 UNITS: 100 INJECTION, SOLUTION INTRAVENOUS; SUBCUTANEOUS at 11:48

## 2022-01-06 RX ADMIN — LEVOTHYROXINE SODIUM 100 MCG: 0.1 TABLET ORAL at 06:19

## 2022-01-06 RX ADMIN — SPIRONOLACTONE 25 MG: 25 TABLET, FILM COATED ORAL at 07:57

## 2022-01-06 RX ADMIN — OXYCODONE 10 MG: 5 TABLET ORAL at 22:01

## 2022-01-06 RX ADMIN — METOCLOPRAMIDE 5 MG: 10 TABLET ORAL at 22:02

## 2022-01-06 RX ADMIN — DICYCLOMINE HYDROCHLORIDE 20 MG: 20 TABLET ORAL at 10:39

## 2022-01-06 RX ADMIN — LORAZEPAM 0.5 MG: 0.5 TABLET ORAL at 16:50

## 2022-01-06 RX ADMIN — LORAZEPAM 0.5 MG: 0.5 TABLET ORAL at 22:02

## 2022-01-06 RX ADMIN — ENOXAPARIN SODIUM 40 MG: 40 INJECTION SUBCUTANEOUS at 07:59

## 2022-01-06 RX ADMIN — DICYCLOMINE HYDROCHLORIDE 20 MG: 20 TABLET ORAL at 07:26

## 2022-01-06 RX ADMIN — OXYCODONE 10 MG: 5 TABLET ORAL at 16:49

## 2022-01-06 RX ADMIN — DICYCLOMINE HYDROCHLORIDE 20 MG: 20 TABLET ORAL at 22:02

## 2022-01-06 RX ADMIN — OXYCODONE 5 MG: 5 TABLET ORAL at 06:19

## 2022-01-06 RX ADMIN — LORAZEPAM 0.5 MG: 0.5 TABLET ORAL at 10:39

## 2022-01-06 RX ADMIN — ROPINIROLE HYDROCHLORIDE 0.5 MG: 0.5 TABLET, FILM COATED ORAL at 13:54

## 2022-01-06 RX ADMIN — ASPIRIN 81 MG 81 MG: 81 TABLET ORAL at 07:58

## 2022-01-06 RX ADMIN — INSULIN GLARGINE 7 UNITS: 100 INJECTION, SOLUTION SUBCUTANEOUS at 22:03

## 2022-01-06 RX ADMIN — FUROSEMIDE 40 MG: 40 TABLET ORAL at 07:56

## 2022-01-06 RX ADMIN — CEFEPIME HYDROCHLORIDE 1000 MG: 1 INJECTION, POWDER, FOR SOLUTION INTRAMUSCULAR; INTRAVENOUS at 16:42

## 2022-01-06 RX ADMIN — OXYCODONE 10 MG: 5 TABLET ORAL at 01:32

## 2022-01-06 ASSESSMENT — PAIN DESCRIPTION - LOCATION
LOCATION: SHOULDER
LOCATION: ABDOMEN;SHOULDER

## 2022-01-06 ASSESSMENT — ENCOUNTER SYMPTOMS
SHORTNESS OF BREATH: 0
VOMITING: 0
RHINORRHEA: 0
EYE PAIN: 0
NAUSEA: 0
COUGH: 0
TROUBLE SWALLOWING: 1
VOICE CHANGE: 1

## 2022-01-06 ASSESSMENT — PAIN DESCRIPTION - PROGRESSION: CLINICAL_PROGRESSION: NOT CHANGED

## 2022-01-06 ASSESSMENT — PAIN DESCRIPTION - PAIN TYPE
TYPE: ACUTE PAIN
TYPE: ACUTE PAIN

## 2022-01-06 ASSESSMENT — PAIN SCALES - GENERAL
PAINLEVEL_OUTOF10: 9
PAINLEVEL_OUTOF10: 8
PAINLEVEL_OUTOF10: 7
PAINLEVEL_OUTOF10: 8
PAINLEVEL_OUTOF10: 9

## 2022-01-06 ASSESSMENT — PAIN - FUNCTIONAL ASSESSMENT: PAIN_FUNCTIONAL_ASSESSMENT: PREVENTS OR INTERFERES SOME ACTIVE ACTIVITIES AND ADLS

## 2022-01-06 ASSESSMENT — PAIN DESCRIPTION - FREQUENCY
FREQUENCY: CONTINUOUS
FREQUENCY: CONTINUOUS

## 2022-01-06 ASSESSMENT — PAIN DESCRIPTION - ORIENTATION
ORIENTATION: RIGHT
ORIENTATION: RIGHT

## 2022-01-06 ASSESSMENT — PAIN DESCRIPTION - DESCRIPTORS
DESCRIPTORS: ACHING
DESCRIPTORS: ACHING;SORE

## 2022-01-06 ASSESSMENT — PAIN DESCRIPTION - ONSET: ONSET: ON-GOING

## 2022-01-06 NOTE — CONSULTS
Gloria      Patient Name: [address] [place] [address] Record Number:  [id number]  Primary Care Physician:  Azeem Arambula MD  Date of Consultation: 1/6/2022    HISTORY OF PRESENT ILLNESS  Emile Waller is a(n) 78 y.o. female who presented to the acute rehab unit after long-term hospitalization for Covid and sequelae. She underwent tracheostomy on October 25th 2021. She has been decannulated for approximately 1 week. 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 ENT has been consulted due to her breathy voice and inability to get complete closure of her vocal folds.       Patient Active Problem List   Diagnosis    Displacement of lumbar intervertebral disc without myelopathy    Hypothyroidism    Mixed hyperlipidemia    Anxiety state    Generalized osteoarthrosis, involving multiple sites    Essential hypertension    Shoulder arthritis    Vitamin D deficiency    Chronic pain syndrome    Fibromyalgia    DDD (degenerative disc disease), lumbar    Anxiety disorder    Prosthetic joint implant failure (HCC)    Pulmonary nodule    Tracheobronchomalacia    Bronchiectasis (HCC)    DM2 (diabetes mellitus, type 2) ([facility])    Systolic congestive heart failure ([facility])    Chest pain    S/P cardiac catheterization    PVC (premature ventricular contraction)    Palpitations    Syncope and collapse    Convulsion ([facility])    New onset seizure ([address]    CAD in native artery    Nausea    Memory change    Acquired hypothyroidism    Sepsis (HCC)    Type 1 diabetes mellitus without complication ([facility])    Orthostatic hypotension    Suspected COVID-19 virus infection    Hypoxia    Acute respiratory failure with hypoxia ([facility])    COVID-19    Pneumonia due to COVID-19 virus    CHARLY (acute kidney injury) ([address]    Uncontrolled hypertension    Fever    Hypotension    Severe protein-calorie malnutrition ([address]    Gram-negative pneumonia ([address]    Pneumonia due to Pseudomonas species ([address]    Hypomagnesemia    Hypokalemia    Acute hypoxemic respiratory failure (HCC)    ARDS (adult respiratory distress syndrome) (HCC)    Pneumonia due to Klebsiella pneumoniae ([address]    Electrolyte disorder    Critical illness myopathy    Critical illness neuropathy ([address]    Post covid-19 condition, unspecified    Moderate malnutrition ([address]     Past Surgical History:   Procedure Laterality Date    ABDOMEN SURGERY      CARDIAC CATHETERIZATION      COLON SURGERY      COLONOSCOPY      normal    HYSTERECTOMY      JOINT REPLACEMENT  10/92    Right; hip     JOINT REPLACEMENT        Left hip  followed by revision 2006    JOINT REPLACEMENT        right     JOINT REPLACEMENT         right replacement.  LUNG SURGERY  2014    tracheobronchomalacia    TRACHEOSTOMY N/A 10/25/2021    TRACHEOTOMY performed by Joaquín Brothers DO at [place] [id number] N/A 10/25/2021    EGD/PEG W/ANES.  ON VENT, COVID + performed by Jared Oneil MD at [facility]     Family History   Problem Relation Age of Onset    Asthma Mother     Heart Failure Father     Cancer Maternal Grandfather         skin cancer on face    Cancer Daughter         skin cancer-BCC    Diabetes Neg Hx     Emphysema Neg Hx     Hypertension Neg Hx      Social History     Socioeconomic History    Marital status:      Spouse name: Not on file    Number of children: Not on file    Years of education: Not on file    Highest education level: Not on file   Occupational History    Not on file   Tobacco Use    Smoking status: Former Smoker     Packs/day: 1.00     Years: 18.00     Pack years: 18.00     Types: Cigarettes     Quit date: 12/10/1991     Years since quittin.0    Smokeless tobacco: Never Used   Vaping Use    Vaping Use: Never used   Substance and Sexual Activity    Alcohol use: No    Drug use: No    Sexual activity: Never     Partners: Male   Other Topics Concern    Not on file   Social History Narrative    Not on file     Social Determinants of Health     Financial Resource Strain:     Difficulty of Paying Living Expenses: Not on file   Food Insecurity:     Worried About Running Out of Food in the Last Year: Not on file    Narayan of Food in the Last Year: Not on file   Transportation Needs:     Lack of Transportation (Medical): Not on file    Lack of Transportation (Non-Medical): Not on file   Physical Activity:     Days of Exercise per Week: Not on file    Minutes of Exercise per Session: Not on file   Stress:     Feeling of Stress : Not on file   Social Connections:     Frequency of Communication with Friends and Family: Not on file    Frequency of Social Gatherings with Friends and Family: Not on file    Attends [employer] Services: Not on file    Active Member of [address] [place] [address] [employer] or Organizations: Not on file    Attends Club or Organization Meetings: Not on file    Marital Status: Not on file   Intimate Partner Violence:     Fear of Current or Ex-Partner: Not on file    Emotionally Abused: Not on file    Physically Abused: Not on file    Sexually Abused: Not on file   Housing Stability:     Unable to Pay for Housing in the Last Year: Not on file    Number of [place] in the Last Year: Not on file    Unstable Housing in the Last Year: Not on file       DRUG/FOOD ALLERGIES: Prednisone, Quinidine, Sulfa antibiotics, Bactrim, Clonidine, and Sulfamethoxazole-trimethoprim    CURRENT MEDICATIONS  Prior to Admission medications    Medication Sig Start Date End Date Taking?  Authorizing Provider   ipratropium-albuterol (DUONEB) 0.5-2.5 (3) MG/3ML SOLN nebulizer solution Inhale 3 mLs into the lungs every 4 hours 10/28/21   Jocelyne Perez MD   enoxaparin (LOVENOX) 30 MG/0.3ML injection Inject 0.3 mLs into the skin 2 times daily 10/28/21   Jocelyne Perez MD   insulin lispro (HUMALOG) 100 UNIT/ML injection vial Inject 0-18 Units into the skin every 4 hours 10/28/21   Aneudy Carlin MD   glucose (GLUTOSE) 40 % GEL Take 37.5 mLs by mouth as needed (hypoglycemia) 10/28/21   Aneudy Carlin MD   hydrALAZINE (APRESOLINE) 20 MG/ML injection Infuse 0.5 mLs intravenously every 4 hours as needed (prn sbp > 160) 10/28/21   Aneudy Carlin MD   metoprolol tartrate (LOPRESSOR) 25 MG tablet Take 1 tablet by mouth 2 times daily 10/28/21   Aneudy Carlin MD   carboxymethylcellulose PF (THERATEARS) 1 % GEL ophthalmic gel Place 1 drop into both eyes every 4 hours as needed for Dry Eyes 10/28/21   Aneudy Carlin MD   Omeprazole POWD 40 mg per PER PEG daily 10/28/21   Aneudy Carlin MD   levothyroxine (SYNTHROID) 100 MCG tablet Take 100 mcg by mouth Daily    Historical Provider, MD   simvastatin (ZOCOR) 40 MG tablet Take 40 mg by mouth nightly    Historical Provider, MD   budesonide (PULMICORT FLEXHALER) 180 MCG/ACT AEPB inhaler Inhale 1 puff into the lungs 2 times daily Per Nicole Barba at [place]. Froilan Manning    Historical Provider, MD   estradiol (ESTRACE) 0.1 MG/GM vaginal cream Place vaginally See Admin Instructions Insert a pea-sized amount vaginally every day for 7 days, then twice weekly thereafter. Per Nicole Barba at [place]. Froilan Manning. Last dispensed 5/10/21. Historical Provider, MD   Multiple Vitamins-Minerals (THERAPEUTIC MULTIVITAMIN-MINERALS) tablet Take 1 tablet by mouth daily    Historical Provider, MD   albuterol sulfate  (90 Base) MCG/ACT inhaler Inhale 2 puffs into the lungs 4 times daily as needed for Wheezing 12/26/19   Peace Felder MD   losartan (COZAAR) 25 MG tablet Take 1 tablet by mouth daily 5/21/19   Chapincito Delcid MD   Elastic Bandages & Supports (MEDICAL COMPRESSION THIGH HIGH) MISC 1 Units by Does not apply route daily 4/26/19   Nanda Betancourt MD   Lancets MISC Dispense whatever insurance will cover. Patient test twice day.  Dx:E11.9 3/11/19   Jean-Paul Michel MD   blood glucose monitor strips Dispense whatever insurance will cover. Pt test twice a day dx:E11.9 3/11/19   Martha Hebert MD   Insulin Pen Needle 31G X 5 MM MISC 1 each by Does not apply route daily 3/11/19   Martha Hebert MD   blood glucose test strips (ASCENSIA AUTODISC VI;ONE TOUCH ULTRA TEST VI) strip 1 each by In Vitro route daily As needed. 3/11/19   Laxmi Gaitan MD   rOPINIRole (REQUIP) 0.5 MG tablet TAKE 1 TABLET BY MOUTH 3 TIMES DAILY. 11/30/18   Ileana Millan MD   sertraline (ZOLOFT) 50 MG tablet TAKE 1 TABLET BY MOUTH DAILY 11/30/18   Ileana Millan MD   polyethylene glycol [facility]) powder Take 17 g by mouth daily as needed    Historical Provider, MD   Blood Glucose Monitoring Suppl CATHRYN Dispense whatever insurance will cover. Dx code:E11.9 1/11/18   Ileana Millan MD   gabapentin (NEURONTIN) 800 MG tablet Take 800 mg by mouth 3 times daily 8/2/17   Historical Provider, MD   aspirin 81 MG EC tablet Take 1 tablet by mouth daily 9/24/15   Marco A Little MD       REVIEW OF SYSTEMS  The following systems were reviewed and revealed the following in addition to any already discussed in the HPI:    Review of Systems   Constitutional: Negative for fatigue and fever. HENT: Positive for trouble swallowing and voice change. Negative for congestion, ear pain, postnasal drip, rhinorrhea and sneezing. Eyes: Negative for pain and visual disturbance. Respiratory: Negative for cough and shortness of breath. Cardiovascular: Negative for chest pain. Gastrointestinal: Negative for nausea and vomiting. Endocrine: Negative. Genitourinary: Negative. Musculoskeletal: Negative for neck pain and neck stiffness. Skin: Negative for rash. Neurological: Negative for dizziness and headaches.           PHYSICAL EXAM  /76   Pulse 99   Temp 97.8 °F (36.6 °C) (Oral)   Resp 20   Ht 5' 9\" (1.753 m)   Wt 160 lb 12.8 oz (72.9 kg)   SpO2 95%   BMI 23.75 kg/m²     GENERAL: No Acute Distress, Alert and Oriented, no hoarseness  EYES: EOMI, Anti-icteric  NOSE: No epistaxis, nasal mucosa within normal limits, no purulent drainage  EARS: Normal external canal appearance, EAC patent bilaterally  FACE: 1/6 House-Brackmann Scale, symmetric, sensation equal bilaterally  ORAL CAVITY: No masses or lesions palpated, uvula is midline, moist mucous membranes,  NECK: Normal range of motion, no thyromegaly, trachea is midline, no lymphadenopathy, no neck masses, no crepitus  CHEST: Normal respiratory effort, no retractions, breathing comfortably  SKIN: No rashes, normal appearing skin, no evidence of skin lesions/tumors    RADIOLOGY  Summary of findings:      PROCEDURE  Flexible Laryngoscopy    Pre op: Hoarseness. Post op: Left vocal fold paralysis  Procedure : Flexible Laryngoscopy  Surgeon: Nupur Astorga DO  Anesthesia: Afrin with 4% lidocaine  Indication: Laryngeal mirror examination was not tolerated due to gag reflex  Description:  After verbal consent was obtained, the scope was passed along the floor of the right naris to the level of the larynx. There was no evidence of concerning masses or lesions of the base of tongue, vallecula, epiglottis, aryepiglottic folds, arytenoids, false vocal folds, true vocal folds, or pyriform sinuses. The right true vocal fold exhibited complete motion however the left true vocal fold was paralyzed in the paramedian position. There is also questionable arytenoid dislocation as it is in the anterior position. The scope was removed. The patient tolerated the procedure without difficulty. There were no complications. Pertinent findings: Left vocal fold paralysis with possible arytenoid dislocation      ASSESSMENT/PLAN  Sweta Anderson is a very pleasant 78 y.o. female with is in the acute rehab unit for therapy after prolonged hospitalization secondary to Covid. She had a tracheostomy removed approximately week ago. Since that point time she has had hoarseness and dysphonia.   She underwent evaluation of swallow and was noted to have a left true vocal fold paralysis. Her voice and swallowing could be improved with an injection laryngoplasty. This was discussed with the patient however she wishes it to be discussed with her . He was not present at the time of the exam.  A phone call was placed to the patient's home but there was no answer. We will try to contact the  again and potentially set up a direct laryngoscopy with vocal fold injection in the near future. Medical Decision Making:   The following items were considered in medical decision making:  Independent review of images  Review / order clinical lab tests  Review / order radiology tests  Decision to obtain old records

## 2022-01-06 NOTE — PROGRESS NOTES
Speech Language Pathology  [facility]: ACUTE REHAB UNIT  SPEECH-LANGUAGE PATHOLOGY      [x] Daily  [] Weekly Care Conference Note  [] Discharge    Patient:Esperanza Jung      [facility]:[id number]/[id number]  [facility]:[id number]  Rehab Dx/Hx: Post covid-19 condition, unspecified [U09.9]    Precautions: falls and aspirations  Home situation: lives at home with , dtr, two grandchildren; not an active ; manages own meds; shares household tasks with ,  manages finances   ST Dx: [] Aphasia  [] Dysarthria  [] Apraxia   [x] Oropharyngeal dysphagia [x] Cognitive Impairment  [x] Other: voice tx   Date of Admit: 1/3/2022  Room #: 0151/0151-01    Current functional status (updated daily):         Pt being seen for : [x] Speech/Language Treatment  [x] Dysphagia Treatment [x] Cognitive Treatment  [] Other:  Communication: []WFL  [] Aphasia  [] Dysarthria  [] Apraxia  [] Pragmatic Impairment [] Non-verbal  [] Hearing Loss  [x] Other: dysphonia  Cognition: [] WFL  [x] Mild  [x] Moderate  [] Severe [] Unable to Assess  [] Other:  Memory: [] WFL  [x] Mild  [x] Moderate  [] Severe [] Unable to Assess  [] Other:  Behavior: [x] Alert  [x] Cooperative  [x]  Pleasant  [] Confused  [] Agitated  [] Uncooperative  [] Distractible [] Motivated  [] Self-Limiting [] Anxious  [] Other:  Endurance:  [x] Adequate for participation in SLP sessions  [] Reduced overall  [] Lethargic  [] Other:  Safety: [x] No concerns at this time  [] Reduced insight into deficits  []  Reduced safety awareness [] Not following call light procedures  [] Unable to Assess  [] Other:    Current Diet Order:ADULT DIET; Regular  ADULT ORAL NUTRITION SUPPLEMENT; Lunch, Dinner; Low Calorie/High Protein Oral Supplement  Swallowing Precautions: Alternate solids and liquids;Eat/Feed slowly; No straws;Upright as possible for all oral intake;Remain upright for 30-45 minutes after meals;Small bites/sips           Date: 1/6/2022      Tx session 1  [id number] Tx session 2  All tx needs met in session 1    Total Timed Code Min 15    Total Treatment Minutes 60    Individual Treatment Minutes 60    Group Treatment Minutes 0 0   Co-Treat Minutes 0 0   Variance/Reason:  0    Pain 9- right shoulder    Pain Intervention [] RN notified  [x] Repositioned  [x] Intervention offered and patient declined  [] N/A  [] Other: [] RN notified  [] Repositioned  [] Intervention offered and patient declined  [] N/A  [] Other:   Subjective     Pt alert and oriented, cooperative and agreeable to participate in therapy. Pt seen sitting upright in bedside chair. Objective:  Goals     Dysphagia Goals:  Short-term Goals  Timeframe for Short-term Goals: 10 days (01/13/22)    Goal 1: The patient will complete pharyngeal/laryngeal strengthening exercises for improved swallowing function 10/10 given min cues. -Effortful swallow: x20  -Delores: x20 min cues  -Isometric tongue tip hold for 5 secs: x20  -Resistive jaw opening hold for 5 secs: x20  -Jaw jut hold for 5 secs: x20     Goal 2. The patient will tolerate recommended diet without observed clinical signs of aspiration. Pt observed with TL via provale cup tolerating without any overt s/s of aspiration. SLP noticed that pt had empty drink containers on her lunch tray that were not poured into her provale cup. SLP informed RN and PCA that ALL drinks must be consumed via this cup. Goal 3. The patient/caregiver will demonstrate understanding of compensatory strategies for improved swallowing safety. See goal 2 above. Discussed strict use of provale cup, small bites/sips, upright sitting position. Pt verbalized understanding but will benefit from ongoing education and reinforcement. Goal 4. The patient will tolerate thin liquids without signs and symptoms of aspiration 10/10 via cup. Did not target.      Speech/Lang/Cog goals:  Short-term Goals  Timeframe for Short-term Goals: 14 days (01/17/22)    Goal 1: Pt will complete recall tasks with 80% acc given min cues for use of compensatory strategies. Functional recall of a picture scene:  -75% acc indep    Short term recall of the same picture scene after a 5 min delay:  -75% acc indep    Recall also indirectly targeted during completion of strengthening exercises. Pt required mod cues to recall in between exercises what to do next. Goal 2: Pt will complete executive function tasks (money, math, time, etc) with 80% acc given min cues. General money questions:  -ex: does five dimes equal one dollar?  -87% acc indep improving to 100% acc given min cues       Goal 3: Pt will complete verbal and visual reasoning tasks with 80% acc given min cues. Did not target. Goal 4: Pt will complete problem solving and thought organization tasks with 80% acc given min cues. Did not target. Goal 5: Pt will complete vocal strengthening exercises for improved vocal function 10/10 given min cues. Sustained /ah/:   -average of 1 second on first five trials  -average of 1 second on second trial  -significant dysphonic and wet vocal quality     Cup bubble (put a straw into glass of water and blow for as long as able):   -average of 5.8 seconds on first five trials  -average of 5.8 seconds on second five trials     Diaphragmatic breathing: x10 mod cues       Other areas targeted: Obtained results of FEES from select:  -\"patient unable to achieve full glottic closure with breath hold and sustained phonation. Patient with decreased mobility of L true vocal fold\"     SLP informed MD of FEES results, MD gave ok to place ENT consult, RN notified and asked to place consult.        Education:   edu provided re: ENT consult, safe swallow strategies, strengthening exercises for voice and swallow      Safety Devices: [x] Call light within reach  [x] Chair alarm activated  [] Bed alarm activated  [] Other: [] Call light within reach  [] Chair alarm activated  [] Bed alarm activated  [] Other:    Assessment: Pt pleasant and cooperative, agreeable to participate. Pt appeared less anxious today, able to complete strengthening exercises with improved accuracy. Pt continues to present with a congested, wet, dysphonic vocal quality. Extensive education provided re: rationale of liquids consumed via provale cup only. ENT consult placed for dysphonia and results of previous FEES stating \"decreased mobility of L true vocal fold\" MD in agreement, RN placed consult. Pt completed pharyngeal/laryngeal and vocal strengthening exercises today. Pt also did well with cognitive tx tasks provided. Pt remains strongly motivated to improve. Continue goals listed above. Plan: Continue as per plan of care. Additional Information:     Barriers toward progress: Confusion and Cognitive deficit  Discharge recommendations:  [] Home independently  [] Home with assistance [x]  24 hour supervision  [] ECF [] Other:  Continued Tx Upon Discharge: ? [x] Yes [] No [] TBD based on progress while on ARU [] Vital Stim indicated [] Other:   Estimated discharge date: 01/21/22    Interventions used this date:  [x] Speech/Language Treatment  [] Instruction in HEP [] Group [x] Dysphagia Treatment [x] Cognitive Treatment   [] Other: Total Time Breakdown / Charges    Time in Time out Total Time / units   Cognitive Tx 1315 1330 15 min/ 1 unit    Speech Tx 1300 1315 15 min/ 1 unit    Dysphagia Tx 1230 1300 30 min/ 1 unit        Electronically Signed by     Carlos Corcoran. A CCC-SLP  Speech-Language Pathologist  [id number]

## 2022-01-06 NOTE — PROGRESS NOTES
Physical Therapy  Facility/Department: [facility]  Daily Treatment Note  NAME: Isidra White  : 1955  MRN: [id number]    Date of Service: 2022    Discharge Recommendations:  Patient would benefit from continued therapy after discharge,24 hour supervision or assist        Assessment  Pt seen for split session therapy therex for LE and core exercises with cues needed for counting and exercise angles and redirection to task/set up assist with resisted. PT seen for theract for progression with bed mobility and transfers with fading assist as appropriate. Pt seen for gait with use of FWW and assist of 2 progression to 60 feet with FWW. See updated goals. Patient  required titration to 4L via nc during session with nursing aware. Limited strength, limited endurance, sob impaire current levels of indep. Activity Tolerance  Activity Tolerance: patient in bed semifowlers  2.5L viaSpO2 and DBE initially 83% after 1 minute DBE SpO2 92%, HR 99bpm, SpO2 117/71. After transfer BSC to bed pt sob wtih RR 36,  bpm SpO2 63% titrated O2 from 2.5 to 3L via nc with 1 minuet seated rest PLB cued 83% SpO2 after 90seconds  SpO2 86% and  bpm, after 2 minutes on 3L via nc LmL597%, increased to 3.5L via nc with spO2 30 seconds later 87% with cues for PLB DBE and hands on patients anterolateral rib angles to cue for increased chest wall expansion , increased to 4L via nc 60 seconds later SpO2 89%  bpm, and 30 seconds after that SpO2 on 4L via nc 90% HR 99bpm     Patient Diagnosis(es): There were no encounter diagnoses.      has a past medical history of Anxiety disorder, Chronic pain syndrome, COVID-19, DDD (degenerative disc disease), lumbar, Diabetes ([facility] [address] Displacement of lumbar intervertebral disc without myelopathy, Diverticulitis, Fibromyalgia, Generalized osteoarthrosis, involving multiple sites, GERD (gastroesophageal reflux disease), Impacted cerumen, Influenza A, Irritable bowel syndrome, Other and unspecified hyperlipidemia, Prosthetic joint implant failure ([facility] [address] Screening mammogram, Tracheobronchomalacia, Unspecified asthma(493.90), Unspecified essential hypertension, and Unspecified hypothyroidism. has a past surgical history that includes Hysterectomy; joint replacement (10/92); joint replacement (  12/92); joint replacement (  6/96); joint replacement (  2006); Colonoscopy (2007); Lung surgery (1/2014); Cardiac catheterization; Abdomen surgery; Colon surgery; Upper gastrointestinal endoscopy (N/A, 10/25/2021); and tracheostomy (N/A, 10/25/2021). Restrictions  Restrictions/Precautions  Restrictions/Precautions: Fall Risk,General Precautions  Position Activity Restriction  Other position/activity restrictions: L PICC, Notify physician for pulse less than 50 or greater than 120, respiratory rate less than 12 or greater than 25, oral temperature greater than 101.3 F (38.5 C) , urinary output less than 120 mL in four hours, systolic BP less than 90 or greater than 092, diastolic BP less than 50 or greater than 100. 4L of O2  Subjective  Patient reports continued shoulder pain. Does not rate pain            Orientation person, place, year, President. Cognition needs cues to redirect, impaired dual tasking and greater than 1 step instruction ability. Needs frequent encouragement and simple 1 step instruction. Objective   Bed mobility  Rolling to Right: Independent  Supine to Sit: Independent  Transfers  Sit to Stand: Maximum Assistance with PT   Stand to sit: Maximum Assistance with PT   Bed to Chair: Maximum assistance (BSC) SPT   Comment: without stedy given boost posteriorly from hips and with trunk support with Patient holding onto therapists forearms.   Patient with stedy required bottom boost from loer surface and CGA with cues to use grab bar from stedy seatd x3 from stedy seat to stand and bed>chair with max assist of 1 for lift and lower with uces for use of grab bar, desaturation of SpO2 to75% after transfers wtih  bpm , after 1 minute seated rest SpO2 90% and Hr 111 bpm.           Exercises  Heelslides: seated yellow band x30 BLE  Gluteal Sets: seated x10 BLE  Knee Long Arc Quad: x10 BLE 0#, 3# x15 BLE pillow under knee to preload quad  Ankle Pumps: ankle DF/PF in sitting x30  Comments: with redirection with occasional cues for Deep breathing with spO2 monitored during exercises with spO2 85% to 95% on 4L via nc seated up in chair  Other exercises  Other exercises?: Yes  Other exercises 1: pillow between knees seated adductor isometric x10  Other exercises 2: seated chest pulls x10 yellow BUE  Other exercises 3: seated lat pulldown elbows extended for core x10 yellow BUE  Other exercises 4: seated rows  for core x10 yellow BUE   Disposition:Patient with call light in reach and alarm in place at end of session with patient in chair on 4L O2 via wall mount      SECOND SESSION:    Subjective:  Endorses Right shoulder pain without rating    BED MOBILITY:  Sup<sit HELEN  Rolling L indep    TRANSFERS:  Sit<>stand with stedy max of 1 to stand and mod to sit, sit<>Stand from WCx2 with return to EOB second trial with max to stand and mod to sit with bottom boost and forward weight shift assist and trunk assist with use of FWW, cues for LE position for sufficient  BLE knee flexion  At rest   HR   GAIT:  Patient walks 35 feet and 60 feet with WC follow second assist and first person min to mod assist to advance WC and for trunk balance increased assist needed with increased fatigue. After 35 feet  bpm , SpO2 4L via nc 96% with seated rest needed due to sob. After 60 feet on 4L O2 via nc  HR 101bpm, SpO2 93% with seated rest due to sob/fatigue with patient returned to bed with call light in reach and bed alarm engaged.      Education:   Educated patient in safety with transfers and faciltiated lift/lower with transfers and trunk stability and walker propulsion with gait and educated in therex during first session for core and LE strength  with patient needing frequent redirection and assist for all transfers and gait due to impaired endurance and functional strength. Goals  Short term goals  Time Frame for Short term goals: 1/7/2022  Short term goal 1: Pt demo indep bed mobility. Short term goal 2: Pt demo bed<>chair with mod assist of 1 and FWW or SPT. Short term goal 3: Patient demonstrates gait to bathroom 20 feet with assist of 2 and FWW. GOAL MET 1/6/2021 pt demo gait 35 to 60 feet with FWW and assist of 2. Short term goal 4: Patient demonstrates up and down 1 step with bilateral rails and assist of 2. Short term goal 5: Patient demonstrates  indep in DBE and 5 minutes of standing activity with spO2 90% or greater to improve endurance for household walking. Long term goals  Time Frame for Long term goals : 1/24/2022  Long term goal 1: Patient demonstrates gait 150 feet with SBA and sPO2 90% or greater able to manage O2 tubing on L/R turns on carpet up to 10 feetwith  safe to assist pt. Long term goal 2: Patient demonstrates up and down 12 steps with SBA and bilateral rails to return to community and home (egress/entry) mobility with  safe to assist pt. Long term goal 3: Patient demonstrates indep bed<>chair, sit<>stand and WC<>car with FWW  Long term goal 4: Patient demonstrates indep WC propulsion 150 feet with L/R turns indep. Long term goal 5: Patient demonstrates 50 feet of walking with L/R turns and carpet HELEN able to indep manage O2 tubing for short distance in home walking. Patient Goals   Patient goals : \"to walk to the bathroom.     Plan    Plan  Times per week: 5/7 days week  Times per day: Daily  Plan weeks: 21 days  Current Treatment Recommendations: Strengthening,Neuromuscular Re-education,Home Exercise Program,Cognitive/Perceptual Training,Safety Education & Training,Balance Training,Endurance Training,Patient/Caregiver Education & Training,Functional Mobility Training,Wheelchair Mobility Training,Gait Training,Transfer Training,Stair training,Equipment Evaluation, Education, & procurement  Safety Devices  Type of devices:  All fall risk precautions in place,Bed alarm in place,Nurse notified,Call light within reach,Gait belt,Patient at risk for falls,Left in bed     Therapy Time   Individual Concurrent Group Co-treatment   Time In 0800         Time Out 0900         Minutes 60         Timed Code Treatment Minutes: [address] Time:   Individual Concurrent Group Co-treatment   Time In 0930         Time Out 1000         Minutes 30           Timed Code Treatment Minutes:  30    Total Treatment Minutes:  90      Brooke Mendoza, PT

## 2022-01-06 NOTE — PROGRESS NOTES
[address] [facility]  1/6/2022  [id number]    Chief Complaint: Post covid-19 condition, unspecified    Subjective:   Resting in bed; family requesting cxr due to cough. ROS: No n/v, cp, sob, f/c  Objective:  Patient Vitals for the past 24 hrs:   BP Temp Temp src Pulse Resp SpO2   01/06/22 0745 124/71 97.8 °F (36.6 °C) Oral 89 20 97 %   01/06/22 0738     20 92 %   01/05/22 2015 125/73 97.9 °F (36.6 °C) Oral 95 20 90 %   01/05/22 1932      95 %   01/05/22 1235 132/76   95  94 %     Gen: No distress, pleasant. HEENT: Normocephalic, atraumatic. CV: Regular rate and rhythm. Resp: No respiratory distress. Abd: Soft, nontender   Ext: No edema. Neuro: Alert, oriented, appropriately interactive. Wt Readings from Last 3 Encounters:   01/04/22 160 lb 12.8 oz (72.9 kg)   10/26/21 175 lb 14.4 oz (79.8 kg)   07/15/21 180 lb (81.6 kg)       Laboratory data:   Lab Results   Component Value Date    WBC 9.9 01/06/2022    HGB 10.1 (L) 01/06/2022    HCT 31.3 (L) 01/06/2022    MCV 75.9 (L) 01/06/2022     01/06/2022       Lab Results   Component Value Date     01/06/2022    K 3.8 01/06/2022    CL 93 01/06/2022    CO2 29 01/06/2022    BUN 9 01/06/2022    CREATININE <0.5 01/06/2022    GLUCOSE 118 01/06/2022    CALCIUM 10.4 01/06/2022        Therapy progress:  PT  Position Activity Restriction  Other position/activity restrictions: L PICC, Notify physician for pulse less than 50 or greater than 120, respiratory rate less than 12 or greater than 25, oral temperature greater than 101.3 F (38.5 C) , urinary output less than 120 mL in four hours, systolic BP less than 90 or greater than 468, diastolic BP less than 50 or greater than 100.  2.5L of O2  Objective     Sit to Stand: Maximum Assistance  Stand to sit: Maximum Assistance  Bed to Chair: Maximum assistance [facility])  Device: Rolling Walker  Other Apparatus: Wheelchair follow  Assistance: 2 Person assistance  Distance: 10 feet x1 with 1 left turn and pt too fatigued to complete second turn due to hypoxia  OT  PT Equipment Recommendations  Equipment Needed: No  Other: has needed equpiment  Toilet - Technique: Stand pivot  Equipment Used: Standard toilet (use of w/c arm to push up.)  Assessment        SLP  Current Diet : Regular  Current Liquid Diet : Thin (via provale cup)  Diet Solids Recommendation: Regular  Liquid Consistency Recommendation: Thin    Body mass index is 23.75 kg/m². Rehabilitation Diagnosis:  Neurologic, 3.8, Neuromuscular Disorders, e.g. Critical Illness Myopathy, Other Myopathy     Assessment and Plan:  S/p covid pneumonia with superimposed bacterial pneumonia  - required trach/peg, proning  - s/p remdesivir, tocilizumab  - completed cefepime     CAD  - asa, statin     HTN  - follow BP       DM  - lantus, SSI     Hypothyroidism  - synthroid    Leukocytosis  - completed cefepime; check cxr, await culture from urine     Reduced L TVF mobility per FEES  - slp    Dysphagia (pharyngeal), dysphonia  - slp    S/p trach  - stoma care    Bowels: Schedule stool softener. Follow bowel movements. Enema or suppository if needed.      Bladder: Check PVR x 3. [address] if PVR > 200ml or if any volume is > 500 ml.      Sleep: Trazodone provided prn.      Follow up appointments: PCP  GENEVA: 1/21 home w/ home health  DME: JACKIE Sharma MD 1/6/2022, 9:32 AM

## 2022-01-06 NOTE — PROGRESS NOTES
Occupational Therapy  Facility/Department: [facility]  Daily Treatment Note  NAME: Mckenzie Ding  : 1955  MRN: [id number]    Date of Service: 2022    Discharge Recommendations:  24 hour supervision or assist,Home with Home health OT,S Level 1       Assessment   Performance deficits / Impairments: Decreased functional mobility ; Decreased ADL status; Decreased strength;Decreased safe awareness;Decreased cognition;Decreased endurance;Decreased balance;Decreased coordination;Decreased posture;Decreased fine motor control;Decreased high-level IADLs  Assessment: Pt agreeable to OT session. Pt performed functional transfers with mod A for sit>stand from SBA/CGA in stance with RW. Pt completed stand pivot without AD and mod A and stand step transfer up to ~10 feet with CGA in stance. Pt completed sit<>stands x4 with mod A each time to stand for 30-45 seconds to place rings on hooks with fair tolerance and O2 sats ranging from 80%-90% after stand on 4L. Pt appeared more SOB this date and required 4L O2 instead of 3L O2 from previous date. Pt completed simple math with good problem solving and BUE ther ex with good strength. Continue POC. Prognosis: Good  OT Education: OT Role;Plan of Care;ADL Adaptive Strategies;Transfer Training;Energy Conservation;Precautions  Patient Education: disease specific: goals, PLB, safety during ADLS and t/fs, energy conservation during ADLS, structure of ARU. Pt verbalized understanding but will need reinforcement. Barriers to Learning: confusion, anxiety. REQUIRES OT FOLLOW UP: Yes  Activity Tolerance  Activity Tolerance: Patient limited by fatigue;Treatment limited secondary to medical complications (free text)  Activity Tolerance: O2 sats dropping to 70s on 3L after stand pivot, recovering to 90% on 4L in less than 1 minute. O2 sats dropping to ~80% 1 time on 4L, but remaining in high 80s after standing and recovering to mid 90s for remainder of session.   Safety Devices  Safety Devices in place: Yes  Type of devices: Nurse notified;Gait belt;Call light within reach; Left in chair;Patient at risk for falls; Chair alarm in place         Patient Diagnosis(es): There were no encounter diagnoses. has a past medical history of Anxiety disorder, Chronic pain syndrome, COVID-19, DDD (degenerative disc disease), lumbar, Diabetes ([facility] [address] Displacement of lumbar intervertebral disc without myelopathy, Diverticulitis, Fibromyalgia, Generalized osteoarthrosis, involving multiple sites, GERD (gastroesophageal reflux disease), Impacted cerumen, Influenza A, Irritable bowel syndrome, Other and unspecified hyperlipidemia, Prosthetic joint implant failure ([facility] [address] Screening mammogram, Tracheobronchomalacia, Unspecified asthma(493.90), Unspecified essential hypertension, and Unspecified hypothyroidism. has a past surgical history that includes Hysterectomy; joint replacement (10/92); joint replacement (  12/92); joint replacement (  6/96); joint replacement (  2006); Colonoscopy (2007); Lung surgery (1/2014); Cardiac catheterization; Abdomen surgery; Colon surgery; Upper gastrointestinal endoscopy (N/A, 10/25/2021); and tracheostomy (N/A, 10/25/2021).     Restrictions  Restrictions/Precautions  Restrictions/Precautions: Fall Risk,General Precautions  Position Activity Restriction  Other position/activity restrictions: L PICC, Notify physician for pulse less than 50 or greater than 120, respiratory rate less than 12 or greater than 25, oral temperature greater than 101.3 F (38.5 C) , urinary output less than 120 mL in four hours, systolic BP less than 90 or greater than 959, diastolic BP less than 50 or greater than 100. 4L of O2  Subjective   General  Chart Reviewed: Yes,Orders,Imaging,Labs  Patient assessed for rehabilitation services?: Yes  Additional Pertinent Hx: Recent pelvic fractures  Response to previous treatment: Patient with no complaints from previous session  Family / Caregiver Present: No  Referring Practitioner: Yo Whitt MD  Diagnosis: Post Covid 19 condition  Subjective  Subjective: Pt in bed, resting, easily arousable, pleasant, agreeable to OT session. Pain Assessment  Pain Assessment: 0-10  Pain Level: 9  Pain Type: Acute pain  Pain Location: Shoulder  Pain Orientation: Right  Pain Descriptors: Aching  Pain Frequency: Continuous  Non-Pharmaceutical Pain Intervention(s): Ambulation/Increased Activity; Emotional support;Repositioned  Response to Pain Intervention: Patient Satisfied  Vital Signs  Pulse: 99  Heart Rate Source: Monitor  BP: 126/76  BP Location: Right upper arm  Patient Position: High fowlers  Patient Currently in Pain: Yes  Oxygen Therapy  SpO2: 95 %  Pulse Oximeter Device Mode: Intermittent  Pulse Oximeter Device Location: Right;Finger  O2 Device: Nasal cannula  O2 Flow Rate (L/min): 4 L/min   Orientation  Orientation  Overall Orientation Status: Within Functional Limits  Objective             Balance  Sitting Balance: Supervision  Standing Balance: Moderate assistance (briefly when standing, SBA once in stance at RW)  Standing Balance  Time: 30 seconds, 30 seconds, 45 seconds, 35 seconds, 35 seconds, 30 seconds  Activity: stand pivot bed>w/c, standing to RW to place rings on hooks  Comment: with RW  Functional Mobility  Functional - Mobility Device: Wheelchair  Activity: To/From therapy gym;Retrieve items;Transport items  Assist Level: Supervision  Bed mobility  Supine to Sit: Modified independent  Transfers  Stand Pivot Transfers: Moderate assistance  Sit to stand: Moderate assistance  Stand to sit: Minimal assistance        Coordination  Gross Motor: good coordination for grasping rings and placing on hooks to complete math problems, good simple addition              Cognition  Overall Cognitive Status: Exceptions  Arousal/Alertness: Appropriate responses to stimuli  Following Commands: Follows one step commands with increased time; Follows one step commands with repetition  Attention Span: Appears intact  Memory: Decreased short term memory  Safety Judgement: Decreased awareness of need for assistance;Decreased awareness of need for safety  Problem Solving: Assistance required to generate solutions;Assistance required to identify errors made;Assistance required to correct errors made  Insights: Decreased awareness of deficits  Initiation: Does not require cues  Sequencing: Requires cues for some     Perception  Overall Perceptual Status: WFL  Unilateral Attention: Appears intact  Initiation: Appears intact  Motor Planning: Appears intact  Perseveration: Not present              Type of ROM/Therapeutic Exercise  Type of ROM/Therapeutic Exercise: Free weights  Comment: 2#  Exercises  Shoulder Flexion: AROM LUE to 120*, RUE to 90* x15  Elbow Flexion: x15 with 2# weight  Elbow Extension: x15 with 2# weight  Supination: x15 with 2# weight  Pronation: x15 with 2# weight  Wrist Flexion: x15 with 2# weight  Wrist Extension: x15 with 2# weight                    Plan   Plan  Times per week: 5/7 days a week  Times per day: Daily  Plan weeks: 3 weeks  Current Treatment Recommendations: Strengthening,Endurance Training,Balance Training,Functional Mobility Training,Safety Education & Training,Equipment Evaluation, Education, & procurement,Self-Care / ADL,Patient/Caregiver Education & Training,Home Management Training,ROM    Goals  Short term goals  Time Frame for Short term goals: 10 days (1/13)  Short term goal 1: Pt will perform at least 3min of standing functional activities with AD PRN. Short term goal 2: Pt will perform functional/toilet t/fs with CGA and AD PRN. Short term goal 3: Pt will toilet with CGA and AD PRN. Short term goal 4: Pt will LB dress with min A and AE/AD PRN. Short term goal 5: Pt will total body bathe with min A and AD PRN.   Long term goals  Time Frame for Long term goals : 21 days (1/24)  Long term goal 1: Pt will perform at least 5 min of standing functional activities. Long term goal 2: Pt will total body dress with mod I.  Long term goal 3: Pt will total body bathe with mod I.  Long term goal 4: Pt will perform a simple IADL task with supervision and AD PRN. Long term goal 5: Pt will toilet with mod I.   Patient Goals   Patient goals : \"to be able to stand up and walk again\"       Therapy Time   Individual Concurrent Group Co-treatment   Time In 1030         Time Out 1130         Minutes 60         Timed Code Treatment Minutes: 16 Nelly Tomlin OT

## 2022-01-07 LAB
GLUCOSE BLD-MCNC: 108 MG/DL (ref 70–99)
GLUCOSE BLD-MCNC: 124 MG/DL (ref 70–99)
GLUCOSE BLD-MCNC: 189 MG/DL (ref 70–99)
GLUCOSE BLD-MCNC: 286 MG/DL (ref 70–99)
PERFORMED ON: ABNORMAL

## 2022-01-07 PROCEDURE — 6360000002 HC RX W HCPCS: Performed by: PHYSICAL MEDICINE & REHABILITATION

## 2022-01-07 PROCEDURE — 6370000000 HC RX 637 (ALT 250 FOR IP): Performed by: PHYSICAL MEDICINE & REHABILITATION

## 2022-01-07 PROCEDURE — 97530 THERAPEUTIC ACTIVITIES: CPT

## 2022-01-07 PROCEDURE — 97116 GAIT TRAINING THERAPY: CPT

## 2022-01-07 PROCEDURE — 2700000000 HC OXYGEN THERAPY PER DAY

## 2022-01-07 PROCEDURE — 2580000003 HC RX 258: Performed by: PHYSICAL MEDICINE & REHABILITATION

## 2022-01-07 PROCEDURE — 92526 ORAL FUNCTION THERAPY: CPT

## 2022-01-07 PROCEDURE — 97129 THER IVNTJ 1ST 15 MIN: CPT

## 2022-01-07 PROCEDURE — 97110 THERAPEUTIC EXERCISES: CPT

## 2022-01-07 PROCEDURE — 94640 AIRWAY INHALATION TREATMENT: CPT

## 2022-01-07 PROCEDURE — 97535 SELF CARE MNGMENT TRAINING: CPT

## 2022-01-07 PROCEDURE — 1280000000 HC REHAB R&B

## 2022-01-07 PROCEDURE — 94761 N-INVAS EAR/PLS OXIMETRY MLT: CPT

## 2022-01-07 PROCEDURE — 92507 TX SP LANG VOICE COMM INDIV: CPT

## 2022-01-07 RX ADMIN — IPRATROPIUM BROMIDE AND ALBUTEROL SULFATE 1 AMPULE: .5; 2.5 SOLUTION RESPIRATORY (INHALATION) at 20:45

## 2022-01-07 RX ADMIN — CEFEPIME HYDROCHLORIDE 1000 MG: 1 INJECTION, POWDER, FOR SOLUTION INTRAMUSCULAR; INTRAVENOUS at 18:14

## 2022-01-07 RX ADMIN — OXYCODONE 10 MG: 5 TABLET ORAL at 09:02

## 2022-01-07 RX ADMIN — INSULIN GLARGINE 7 UNITS: 100 INJECTION, SOLUTION SUBCUTANEOUS at 21:11

## 2022-01-07 RX ADMIN — ONDANSETRON 8 MG: 4 TABLET, ORALLY DISINTEGRATING ORAL at 09:02

## 2022-01-07 RX ADMIN — ROPINIROLE HYDROCHLORIDE 0.5 MG: 0.5 TABLET, FILM COATED ORAL at 09:02

## 2022-01-07 RX ADMIN — LORAZEPAM 0.5 MG: 0.5 TABLET ORAL at 14:10

## 2022-01-07 RX ADMIN — ROPINIROLE HYDROCHLORIDE 0.5 MG: 0.5 TABLET, FILM COATED ORAL at 21:10

## 2022-01-07 RX ADMIN — DICYCLOMINE HYDROCHLORIDE 20 MG: 20 TABLET ORAL at 17:00

## 2022-01-07 RX ADMIN — ROPINIROLE HYDROCHLORIDE 0.5 MG: 0.5 TABLET, FILM COATED ORAL at 14:10

## 2022-01-07 RX ADMIN — DICYCLOMINE HYDROCHLORIDE 20 MG: 20 TABLET ORAL at 21:10

## 2022-01-07 RX ADMIN — LEVOTHYROXINE SODIUM 100 MCG: 0.1 TABLET ORAL at 05:49

## 2022-01-07 RX ADMIN — ENOXAPARIN SODIUM 40 MG: 40 INJECTION SUBCUTANEOUS at 09:03

## 2022-01-07 RX ADMIN — IPRATROPIUM BROMIDE AND ALBUTEROL SULFATE 1 AMPULE: .5; 2.5 SOLUTION RESPIRATORY (INHALATION) at 09:04

## 2022-01-07 RX ADMIN — OXYCODONE 10 MG: 5 TABLET ORAL at 23:45

## 2022-01-07 RX ADMIN — BUDESONIDE 500 MCG: 0.5 SUSPENSION RESPIRATORY (INHALATION) at 20:49

## 2022-01-07 RX ADMIN — MULTIPLE VITAMINS W/ MINERALS TAB 1 TABLET: TAB at 09:03

## 2022-01-07 RX ADMIN — SPIRONOLACTONE 25 MG: 25 TABLET, FILM COATED ORAL at 09:02

## 2022-01-07 RX ADMIN — INSULIN LISPRO 2 UNITS: 100 INJECTION, SOLUTION INTRAVENOUS; SUBCUTANEOUS at 12:23

## 2022-01-07 RX ADMIN — LORAZEPAM 0.5 MG: 0.5 TABLET ORAL at 02:11

## 2022-01-07 RX ADMIN — INSULIN LISPRO 6 UNITS: 100 INJECTION, SOLUTION INTRAVENOUS; SUBCUTANEOUS at 17:01

## 2022-01-07 RX ADMIN — CEFEPIME HYDROCHLORIDE 1000 MG: 1 INJECTION, POWDER, FOR SOLUTION INTRAMUSCULAR; INTRAVENOUS at 09:13

## 2022-01-07 RX ADMIN — LORAZEPAM 0.5 MG: 0.5 TABLET ORAL at 18:18

## 2022-01-07 RX ADMIN — OXYCODONE 10 MG: 5 TABLET ORAL at 02:11

## 2022-01-07 RX ADMIN — ASPIRIN 81 MG 81 MG: 81 TABLET ORAL at 09:02

## 2022-01-07 RX ADMIN — BUDESONIDE 500 MCG: 0.5 SUSPENSION RESPIRATORY (INHALATION) at 09:04

## 2022-01-07 RX ADMIN — DICYCLOMINE HYDROCHLORIDE 20 MG: 20 TABLET ORAL at 12:22

## 2022-01-07 RX ADMIN — DICYCLOMINE HYDROCHLORIDE 20 MG: 20 TABLET ORAL at 05:49

## 2022-01-07 RX ADMIN — OXYCODONE 10 MG: 5 TABLET ORAL at 18:18

## 2022-01-07 RX ADMIN — Medication 15 ML: at 09:06

## 2022-01-07 RX ADMIN — FUROSEMIDE 40 MG: 40 TABLET ORAL at 09:02

## 2022-01-07 RX ADMIN — LORAZEPAM 0.5 MG: 0.5 TABLET ORAL at 09:02

## 2022-01-07 RX ADMIN — CEFEPIME HYDROCHLORIDE 1000 MG: 1 INJECTION, POWDER, FOR SOLUTION INTRAMUSCULAR; INTRAVENOUS at 01:28

## 2022-01-07 RX ADMIN — LORAZEPAM 0.5 MG: 0.5 TABLET ORAL at 23:45

## 2022-01-07 RX ADMIN — METOCLOPRAMIDE 5 MG: 10 TABLET ORAL at 09:02

## 2022-01-07 RX ADMIN — OXYCODONE 10 MG: 5 TABLET ORAL at 14:10

## 2022-01-07 RX ADMIN — METOCLOPRAMIDE 5 MG: 10 TABLET ORAL at 21:10

## 2022-01-07 RX ADMIN — Medication 15 ML: at 21:10

## 2022-01-07 ASSESSMENT — PAIN DESCRIPTION - PROGRESSION
CLINICAL_PROGRESSION: NOT CHANGED
CLINICAL_PROGRESSION: NOT CHANGED
CLINICAL_PROGRESSION_2: NOT CHANGED
CLINICAL_PROGRESSION: NOT CHANGED
CLINICAL_PROGRESSION: NOT CHANGED

## 2022-01-07 ASSESSMENT — PAIN SCALES - GENERAL
PAINLEVEL_OUTOF10: 6
PAINLEVEL_OUTOF10: 9
PAINLEVEL_OUTOF10: 6
PAINLEVEL_OUTOF10: 8
PAINLEVEL_OUTOF10: 7
PAINLEVEL_OUTOF10: 8
PAINLEVEL_OUTOF10: 7
PAINLEVEL_OUTOF10: 10
PAINLEVEL_OUTOF10: 7

## 2022-01-07 ASSESSMENT — PAIN DESCRIPTION - FREQUENCY
FREQUENCY: CONTINUOUS

## 2022-01-07 ASSESSMENT — PAIN DESCRIPTION - PAIN TYPE
TYPE_2: ACUTE PAIN
TYPE: ACUTE PAIN
TYPE: CHRONIC PAIN
TYPE: ACUTE PAIN

## 2022-01-07 ASSESSMENT — PAIN DESCRIPTION - ONSET
ONSET: ON-GOING
ONSET_2: ON-GOING
ONSET: ON-GOING

## 2022-01-07 ASSESSMENT — PAIN DESCRIPTION - LOCATION
LOCATION: SHOULDER
LOCATION: SHOULDER
LOCATION: ABDOMEN
LOCATION_2: SHOULDER
LOCATION: SHOULDER
LOCATION: ABDOMEN
LOCATION: SHOULDER
LOCATION: SHOULDER;ABDOMEN

## 2022-01-07 ASSESSMENT — PAIN DESCRIPTION - DESCRIPTORS
DESCRIPTORS: ACHING
DESCRIPTORS_2: ACHING
DESCRIPTORS: ACHING

## 2022-01-07 ASSESSMENT — PAIN DESCRIPTION - ORIENTATION
ORIENTATION: LOWER
ORIENTATION: RIGHT
ORIENTATION: RIGHT
ORIENTATION_2: RIGHT
ORIENTATION: RIGHT

## 2022-01-07 ASSESSMENT — PAIN DESCRIPTION - INTENSITY: RATING_2: 7

## 2022-01-07 ASSESSMENT — PAIN DESCRIPTION - DURATION: DURATION_2: CONTINUOUS

## 2022-01-07 ASSESSMENT — PAIN - FUNCTIONAL ASSESSMENT
PAIN_FUNCTIONAL_ASSESSMENT: PREVENTS OR INTERFERES SOME ACTIVE ACTIVITIES AND ADLS

## 2022-01-07 NOTE — PLAN OF CARE
Problem: Pain:  Goal: Control of acute pain  Description: Control of acute pain  1/6/2022 2307 by Ana Dove RN  Outcome: Ongoing   Pain is currently being managed with PO medications, see MAR. Staff assist with repositioning when needed and pillow support is provided for comfort.

## 2022-01-07 NOTE — PROGRESS NOTES
Physical Therapy  Facility/Department: [facility]  Daily Treatment Note  NAME: Zeinab Odom  : 1955  MRN: [id number]    Date of Service: 2022    Discharge Recommendations:  Patient would benefit from continued therapy after discharge,24 hour supervision or assist        Assessment     Patient seen for split session to work on gait in parallel bars and in room to improve indep and endurance with progression to in room walking to bathroom with up to assist of 1. Pt however, forgot after first 30 minute session that she had walked to the bathroom and back and was crying, appearing overwhelmed stating, \"I can't walk or get up I just can't. \"  When reviewing goals met in early am session pt became more tearful stating she did not remember despite updated notes on walking to the bathroom on patient's dry erase board that Pt had congratulated Patient during first session as to having met. Pt given emotional support and encouragement with patient able to continue with session. PT requiring variable level of assist with transfers up to total assist due to use of stedy or max with use of hip sling with sit<>stand from chair without stedy. Pt performs LE therex with vc and intermittent task redirection and set up assist for resisted LE therex. Continue therapy with goal of progressing to household indep with walking and transfers. See updated goals. Patient Diagnosis(es): There were no encounter diagnoses.      has a past medical history of Anxiety disorder, Chronic pain syndrome, COVID-19, DDD (degenerative disc disease), lumbar, Diabetes ([address] Displacement of lumbar intervertebral disc without myelopathy, Diverticulitis, Fibromyalgia, Generalized osteoarthrosis, involving multiple sites, GERD (gastroesophageal reflux disease), Impacted cerumen, Influenza A, Irritable bowel syndrome, Other and unspecified hyperlipidemia, Prosthetic joint implant failure ([address] Screening mammogram, Tracheobronchomalacia, Unspecified asthma(493.90), Unspecified essential hypertension, and Unspecified hypothyroidism. has a past surgical history that includes Hysterectomy; joint replacement (10/92); joint replacement (  12/92); joint replacement (  6/96); joint replacement (  2006); Colonoscopy (2007); Lung surgery (1/2014); Cardiac catheterization; Abdomen surgery; Colon surgery; Upper gastrointestinal endoscopy (N/A, 10/25/2021); and tracheostomy (N/A, 10/25/2021). Restrictions  Restrictions/Precautions  Restrictions/Precautions: Fall Risk,General Precautions  Position Activity Restriction  Other position/activity restrictions: L PICC, Notify physician for pulse less than 50 or greater than 120, respiratory rate less than 12 or greater than 25, oral temperature greater than 101.3 F (38.5 C) , urinary output less than 120 mL in four hours, systolic BP less than 90 or greater than 964, diastolic BP less than 50 or greater than 100. 4L of O2  Subjective   Pain Screening  Patient Currently in Pain: Yes  Pain Assessment  Pain Assessment: 0-10  Pain Level: 7  Pain Type: Acute pain  Pain Location: Shoulder; Abdomen  Pain Orientation: Right  Pain Descriptors: Aching  Functional Pain Assessment: Prevents or interferes some active activities and ADLs  Vital Signs  Pulse: 86  BP: 131/75  Patient Position: Semi fowlers  Level of Consciousness: Alert (0)  Patient Currently in Pain: Yes  Oxygen Therapy  SpO2: 95 %  Pulse Oximeter Device Mode: Intermittent  Pulse Oximeter Device Location: Right  O2 Device: Nasal cannula  O2 Flow Rate (L/min): 4 L/min       Orientation person, place     Cognition requiers frequent redirection to task and gets tearful if appears overwhlemed by greater tahn 1 step instruction. Objective   Bed mobility  Rolling to Right: Independent  Supine to Sit: Modified independent (rail)  Sit to Supine: Stand by assistance  Transfers  Sit to Stand:  Moderate Assistance  Stand to sit: Minimal Assistance  Bed to Chair: Moderate assistance  Comment: transfer with FWW bed>stand>toilet with grab bar>stand to bedside chair . Ambulation  Ambulation?: Yes  More Ambulation?: Yes  Ambulation 1  Surface: level tile  Device: Rolling Walker  Assistance: Minimal assistance  Quality of Gait: heavy WB on FWW, shuffling gait  Gait Deviations: Slow Lorna; Increased RONAN; Decreased step height  Distance: 15 feet x2 with L/right turn each  Comments: on 4 L after first walk pt tearful and spO2 87% with rebound with cue for Deep breathing to 91% in 10 seconds, after second walk SpO2 91% andHR 89bpm with pt up in chair Cues given for slow rate to prevent dypsnea, assisted to navigate walker minimally and manage O2 tubing         Exercises  Heelslides: semirecumbent  Gluteal Sets: hooklying x10  Hip Abduction: x30 BLE simultaneous wtih cues to redirect  Ankle Pumps: supine x30 BLE       Education:   Educated patient in leaning forward with transfers and push up from and reaching back to transfer surface with simple 1 step instruction and intermittent redirection required  Disposition:Patient with call light in reach and alarm in place at end of session with patient in chair on 4L O2 via nc wall unit O2. SECOND SESSION:    Subjective:  Pain in right shoulder 8/10   Bed mobility: sit to supine indep. TRANSFERS:  Cues to lean forward and push up reach back to transfer surface with encouragement needed with transfers. Sit<>stand Stedy from chair max assist and to [facility] mod assist  Sit<>stand  From stedy seat 2x5   Sit<> parallel bars with hip sling x3 max to stand and mod to sit. WC PRopulsion with ext x4:  300 feet with 3 rest breaks 1minute or less  due to fatigue with SpO2 on 4L 97-98% HR 97bpm to 100bpm.   GAIT: cued for upright posture with gait, with min to CGA for balance given, slow lorna gait with forward trunk lean and heavy reliance on UE. PT managed O2 tubing.    Patient walks in parallel bars with min assist  25 feet with 5 turns  20 feet  With 2 each L/R turns Post walk sob seated rest  Immediate [id number]%  bpm  20 feet  With 2 each L/R turnsPost walk sob seated rest  Immediate [id number]%  bpm  Needed 2-3 minutes seated rest to recover on 4L O2 before able to trial walk again   Education:   Educated patient in forward lean with transfers and to push up reach back with hands with transfers, monitored vitals with WC propulsion, facilitated balance with gait in FWW as noted above with pt noted to have impaired STM as pt unable to recall entire first session of therapy 30 minutes after completing it. Pt given re-orientation as to recent progress with patient tearful first 1/2 of second session. Disposition:Patient with call light in reach and alarm in place at end of session with patient in bed on4L O2 via nc wall mount. Goals  Short term goals  Time Frame for Short term goals: 1/7/2022  Short term goal 1: Pt demo indep bed mobility. Short term goal 2: Pt demo bed<>chair with mod assist of 1 and FWW or SPT. Short term goal 3: Patient demonstrates gait to bathroom 20 feet with assist of 2 and FWW. GOAL MET 1/6/2021 pt demo gait 35 to 60 feet with FWW and assist of 2. Short term goal 4: Patient demonstrates up and down 1 step with bilateral rails and assist of 2. Short term goal 5: Patient demonstrates  indep in DBE and 5 minutes of standing activity with spO2 90% or greater to improve endurance for household walking. Long term goals  Time Frame for Long term goals : 1/24/2022  Long term goal 1: Patient demonstrates gait 150 feet with SBA and sPO2 90% or greater able to manage O2 tubing on L/R turns on carpet up to 10 feetwith  safe to assist pt. Long term goal 2: Patient demonstrates up and down 12 steps with SBA and bilateral rails to return to community and home (egress/entry) mobility with  safe to assist pt.   Long term goal 3: Patient demonstrates indep bed<>chair, sit<>stand and WC<>car with FWW  Long term goal 4: Patient demonstrates indep WC propulsion 150 feet with L/R turns indep. Long term goal 5: Patient demonstrates 50 feet of walking with L/R turns and carpet HELEN able to indep manage O2 tubing for short distance in home walking. Patient Goals   Patient goals : \"to walk to the bathroom. Plan    Plan  Times per week: 5/7 days week  Times per day: Daily  Plan weeks: 21 days  Current Treatment Recommendations: Strengthening,Neuromuscular Re-education,Home Exercise Program,Cognitive/Perceptual Training,Safety Education & Jaison Buzzard Training,Patient/Caregiver Education & Training,Functional Mobility Training,Wheelchair Mobility Training,Gait Training,Transfer Training,Stair training,Equipment Evaluation, Education, & procurement  Safety Devices  Type of devices:  All fall risk precautions in place,Bed alarm in place,Nurse notified,Call light within reach,Gait belt,Patient at risk for falls,Left in bed     Therapy Time   Individual Concurrent Group Co-treatment   Time In 0800         Time Out 0830         Minutes 30         Timed Code Treatment Minutes: 30 Minutes    Second Session Therapy Time:   Individual Concurrent Group Co-treatment   Time In [phone] (patient getting breathing tx at start of therapy and getting medication at scheduled start of session 9am)         Time Out 1010         Minutes 60           Timed Code Treatment Minutes:  60    Total Treatment Minutes:  1161 Nick Dunlap PT

## 2022-01-07 NOTE — PROGRESS NOTES
[address] [facility]  1/7/2022  [id number]    Chief Complaint: Post covid-19 condition, unspecified    Subjective:   Resting in bed; cxr relatively unchanged; wbc normalized yesterday; no new c/o. ROS: No n/v, cp, sob, f/c  Objective:  Patient Vitals for the past 24 hrs:   BP Temp Temp src Pulse Resp SpO2   01/07/22 0808 131/75   86  95 %   01/06/22 2145 129/77 97.4 °F (36.3 °C) Oral 100 19 93 %   01/06/22 2018      94 %   01/06/22 1035 126/76   99  95 %     Gen: No distress, pleasant. HEENT: Normocephalic, atraumatic. CV: Regular rate and rhythm. Resp: No respiratory distress. Abd: Soft, nontender   Ext: No edema. Neuro: Alert, oriented, appropriately interactive. Wt Readings from Last 3 Encounters:   01/04/22 160 lb 12.8 oz (72.9 kg)   10/26/21 175 lb 14.4 oz (79.8 kg)   07/15/21 180 lb (81.6 kg)       Laboratory data:   Lab Results   Component Value Date    WBC 9.9 01/06/2022    HGB 10.1 (L) 01/06/2022    HCT 31.3 (L) 01/06/2022    MCV 75.9 (L) 01/06/2022     01/06/2022       Lab Results   Component Value Date     01/06/2022    K 3.8 01/06/2022    CL 93 01/06/2022    CO2 29 01/06/2022    BUN 9 01/06/2022    CREATININE <0.5 01/06/2022    GLUCOSE 118 01/06/2022    CALCIUM 10.4 01/06/2022        Therapy progress:  PT  Position Activity Restriction  Other position/activity restrictions: L PICC, Notify physician for pulse less than 50 or greater than 120, respiratory rate less than 12 or greater than 25, oral temperature greater than 101.3 F (38.5 C) , urinary output less than 120 mL in four hours, systolic BP less than 90 or greater than 741, diastolic BP less than 50 or greater than 100. 4L of O2  Objective     Sit to Stand:  Moderate Assistance  Stand to sit: Minimal Assistance  Bed to Chair: Moderate assistance  Device: Rolling Walker  Other Apparatus: Wheelchair follow  Assistance: Minimal assistance  Distance: 15 feet x2 with L/right turn each  OT  PT Equipment Recommendations  Equipment Needed: No  Other: has needed equpiment  Toilet - Technique: Stand pivot  Equipment Used: Standard toilet (use of w/c arm to push up.)  Assessment        SLP  Current Diet : Regular  Current Liquid Diet : Thin (via provale cup)  Diet Solids Recommendation: Regular  Liquid Consistency Recommendation: Thin    Body mass index is 23.75 kg/m². Rehabilitation Diagnosis:  Neurologic, 3.8, Neuromuscular Disorders, e.g. Critical Illness Myopathy, Other Myopathy     Assessment and Plan:  S/p covid pneumonia with superimposed bacterial pneumonia  - required trach/peg, proning  - s/p remdesivir, tocilizumab  - completed cefepime     CAD  - asa, statin     HTN  - follow BP       DM  - lantus, SSI     Hypothyroidism  - synthroid    Leukocytosis  - resolved  - completed cefepime; await culture and sensitivities from urine     Reduced L TVF mobility per FEES  - slp    Dysphagia (pharyngeal), dysphonia  - slp    S/p trach  - stoma care    Bowels: Schedule stool softener. Follow bowel movements. Enema or suppository if needed.      Bladder: Check PVR x 3. [facility] if PVR > 200ml or if any volume is > 500 ml.      Sleep: Trazodone provided prn.      Follow up appointments: PCP  GENEVA: 1/21 home w/ home health  DME: JACKIE Durbin MD 1/7/2022, 9:00 AM

## 2022-01-07 NOTE — PROGRESS NOTES
x        Speech Language Pathology  [facility]: ACUTE REHAB UNIT  SPEECH-LANGUAGE PATHOLOGY      [x] Daily  [] Weekly Care Conference Note  [] Discharge    [address]/[facility]      AKX:9/21/8544  [facility] [id number]  Rehab Dx/Hx: Post covid-19 condition, unspecified [U09.9]    Precautions: falls and aspirations  Home situation: lives at home with , dtr, two grandchildren; not an active ; manages own meds; shares household tasks with ,  manages finances   ST Dx: [] Aphasia  [] Dysarthria  [] Apraxia   [x] Oropharyngeal dysphagia [x] Cognitive Impairment  [x] Other: voice tx   Date of Admit: 1/3/2022  Room #: 0151/0151-01    Current functional status (updated daily):         Pt being seen for : [x] Speech/Language Treatment  [x] Dysphagia Treatment [x] Cognitive Treatment  [] Other:  Communication: []WFL  [] Aphasia  [] Dysarthria  [] Apraxia  [] Pragmatic Impairment [] Non-verbal  [] Hearing Loss  [x] Other: dysphonia  Cognition: [] WFL  [x] Mild  [x] Moderate  [] Severe [] Unable to Assess  [] Other:  Memory: [] WFL  [x] Mild  [x] Moderate  [] Severe [] Unable to Assess  [] Other:  Behavior: [x] Alert  [x] Cooperative  [x]  Pleasant  [] Confused  [] Agitated  [] Uncooperative  [] Distractible [] Motivated  [] Self-Limiting [] Anxious  [] Other:  Endurance:  [x] Adequate for participation in SLP sessions  [] Reduced overall  [] Lethargic  [] Other:  Safety: [x] No concerns at this time  [] Reduced insight into deficits  []  Reduced safety awareness [] Not following call light procedures  [] Unable to Assess  [] Other:    Current Diet Order:ADULT DIET; Regular  ADULT ORAL NUTRITION SUPPLEMENT; Lunch, Dinner; Low Calorie/High Protein Oral Supplement  Swallowing Precautions: Alternate solids and liquids;Eat/Feed slowly; No straws;Upright as possible for all oral intake;Remain upright for 30-45 minutes after meals;Small bites/sips           Date: 1/7/2022      Tx session 1  1280-1730 Tx session 2  All tx needs met in session 1    Total Timed Code Min 15    Total Treatment Minutes 60    Individual Treatment Minutes 60    Group Treatment Minutes 0 0   Co-Treat Minutes 0 0   Variance/Reason:  0    Pain 9- right shoulder    Pain Intervention [] RN notified  [] Repositioned  [x] Intervention offered and patient declined  [] N/A  [] Other: [] RN notified  [] Repositioned  [] Intervention offered and patient declined  [] N/A  [] Other:   Subjective     Seen at bedside, appropriately asked for rest breaks as needed then easily resumed therapeutic tasks      Objective:  Goals     Dysphagia Goals:  Short-term Goals  Timeframe for Short-term Goals: 10 days (01/13/22)    Goal 1: The patient will complete pharyngeal/laryngeal strengthening exercises for improved swallowing function 10/10 given min cues. -Effortful swallow: x25, in sets of 5  -Isometric tongue tip hold for 5 secs: x20 in sets of 10      Goal 2. The patient will tolerate recommended diet without observed clinical signs of aspiration. See goal 3 below      Goal 3. The patient/caregiver will demonstrate understanding of compensatory strategies for improved swallowing safety. Pt with Provale cup at her bedside. Reports she is \"fine with\" using it. She was not able to explain the rationale related to aspiration precautions, only that \"I was told to- and it's ok\"      Goal 4. The patient will tolerate thin liquids without signs and symptoms of aspiration 10/10 via cup. Pt took sips of thin water from Provale cup, cough after 2 of 12 sips. Baseline cough present intermittently, not definitively related to the swallow. Speech/Lang/Cog goals:  Short-term Goals  Timeframe for Short-term Goals: 14 days (01/17/22)         Goal 1: Pt will complete recall tasks with 80% acc given min cues for use of compensatory strategies. Recall indirectly targeted during completion of voice and swallow exercises.  Pt required min-mod cues to recall in between exercises what to do next or to repeat the same exercise. Goal 2: Pt will complete executive function tasks (money, math, time, etc) with 80% acc given min cues. Not addressed this session      Goal 3: Pt will complete verbal and visual reasoning tasks with 80% acc given min cues. Pt followed complex (2-3 step) written directions with 60% acc given min cues, increasing to 80% given mod cues. Goal 4: Pt will complete problem solving and thought organization tasks with 80% acc given min cues. Not directly targeted this session. Goal 5: Pt will complete vocal strengthening exercises for improved vocal function 10/10 given min cues. Min cues and models for gentle voicing provided throughout, completed in 3 sets of 3-5 reps each  Yawn-sigh- gentle voicing achieved with exhalation/sigh  Sustained /a/ at elevated pitch: max of 2 seconds  Gentle pitch glides ascending and descending: severely reduced voicing at higher pitch, achieving 1-2 second breathy voice with descending pitch glide      Other areas targeted:       Education:   edu provided re: safe swallow strategies, strengthening exercises for voice and swallow      Safety Devices: [x] Call light within reach  [x] Chair alarm activated  [] Bed alarm activated  [] Other: [] Call light within reach  [] Chair alarm activated  [] Bed alarm activated  [] Other:    Assessment: Pt pleasant and cooperative, agreeable to participate. Pt continues to present with a congested, wet, dysphonic vocal quality with intermittent aphonia. She is following recommended precaution to use the Provale cup for swallowing thin liquids to limit her bolus size. Pt completed pharyngeal/laryngeal and vocal strengthening exercises today, needing rest breaks to complete them. Pt required moderate cues to comprehend complex written language. Pt continues to demonstrate self-motivation to improve in all areas of ST-related goals. Plan: Continue as per plan of care.       Additional Information:     Barriers toward progress: Confusion and Cognitive deficit  Discharge recommendations:  [] Home independently  [] Home with assistance [x]  24 hour supervision  [] ECF [] Other:  Continued Tx Upon Discharge: ? [x] Yes [] No [] TBD based on progress while on ARU [] Vital Stim indicated [] Other:   Estimated discharge date: 01/21/22    Interventions used this date:  [x] Speech/Language Treatment  [] Instruction in HEP [] Group [x] Dysphagia Treatment [x] Cognitive Treatment   [] Other: Total Time Breakdown / Charges    Time in Time out Total Time / units   Cognitive Tx 1030 1045 15 min/ 1 unit   Speech Tx 1115 1130 15 min/1 unit   Dysphagia Tx 1045 1115 30 min/ 1 unit       Electronically Signed by     Deon Haque.  Hunter HEALY CCC-SLP  Speech Language Pathologist

## 2022-01-07 NOTE — PROGRESS NOTES
Occupational Therapy  Facility/Department[facility]  Daily Treatment Note  NAME: Everett Pride  : 1955  MRN: [id number]    Date of Service: 2022    Discharge Recommendations:  24 hour supervision or assist,Home with Home health OT,S Level 1  OT Equipment Recommendations  Equipment Needed: Yes  Mobility Devices: ADL Assistive Devices    Assessment   Performance deficits / Impairments: Decreased functional mobility ; Decreased ADL status; Decreased strength;Decreased safe awareness;Decreased cognition;Decreased endurance;Decreased balance;Decreased coordination;Decreased posture;Decreased fine motor control;Decreased high-level IADLs  Assessment: Pt agreeable to OT session. Pt performed functional transfers with min/mod A to complete sit>stand but CGA in stance with RW. Pt demonstrated improved standing tolerance to stand up to 3.5 minutes with O2 sats remaining above 90% on 4L. Pt O2 sats dropping to mid 80s with standing task earlier in session, but recovering to 90% in less than 30 seconds. Pt completed BUE ther ex with good strength with increase in weight to 3#. Pt demonstrated good use of a/e and completed donning socks with setup and assist to stand only for pants. Continue POC. Prognosis: Good  OT Education: OT Role;Plan of Care;ADL Adaptive Strategies;Transfer Training;Energy Conservation;Precautions; Equipment  Patient Education: disease specific: goals, PLB, safety during ADLS and t/fs, energy conservation during ADLS, structure of ARU, use of a/e. Pt verbalized understanding but will need reinforcement. Barriers to Learning: confusion, anxiety. REQUIRES OT FOLLOW UP: Yes  Activity Tolerance  Activity Tolerance: Patient limited by fatigue;Treatment limited secondary to medical complications (free text)  Activity Tolerance: O2 sats dropping to mid 80s after mobility on 4L, recovering to 90% within 30 seconds.   Safety Devices  Safety Devices in place: Yes  Type of devices: Nurse notified;Gait belt;Call light within reach; Left in chair;Patient at risk for falls; Chair alarm in place         Patient Diagnosis(es): There were no encounter diagnoses. has a past medical history of Anxiety disorder, Chronic pain syndrome, COVID-19, DDD (degenerative disc disease), lumbar, Diabetes ([facility] [address] Displacement of lumbar intervertebral disc without myelopathy, Diverticulitis, Fibromyalgia, Generalized osteoarthrosis, involving multiple sites, GERD (gastroesophageal reflux disease), Impacted cerumen, Influenza A, Irritable bowel syndrome, Other and unspecified hyperlipidemia, Prosthetic joint implant failure ([facility] [address] Screening mammogram, Tracheobronchomalacia, Unspecified asthma(493.90), Unspecified essential hypertension, and Unspecified hypothyroidism. has a past surgical history that includes Hysterectomy; joint replacement (10/92); joint replacement (  12/92); joint replacement (  6/96); joint replacement (  2006); Colonoscopy (2007); Lung surgery (1/2014); Cardiac catheterization; Abdomen surgery; Colon surgery; Upper gastrointestinal endoscopy (N/A, 10/25/2021); and tracheostomy (N/A, 10/25/2021).     Restrictions  Restrictions/Precautions  Restrictions/Precautions: Fall Risk,General Precautions  Position Activity Restriction  Other position/activity restrictions: L PICC, Notify physician for pulse less than 50 or greater than 120, respiratory rate less than 12 or greater than 25, oral temperature greater than 101.3 F (38.5 C) , urinary output less than 120 mL in four hours, systolic BP less than 90 or greater than 771, diastolic BP less than 50 or greater than 100. 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 Pain Assessment  Pain Assessment: 0-10  Pain Level: 8  Pain Type: Acute pain  Pain Location: Shoulder  Pain Orientation: Right  Pain Descriptors: Aching  Pain Frequency: Continuous  Non-Pharmaceutical Pain Intervention(s): Ambulation/Increased Activity; Emotional support;Repositioned  Response to Pain Intervention: Patient Satisfied  Vital Signs  Pulse: 79  Heart Rate Source: Monitor  BP: 135/78  BP Location: Right upper arm  Patient Position: High fowlers  Patient Currently in Pain: Yes  Oxygen Therapy  SpO2: 100 %  Pulse Oximeter Device Mode: Intermittent  Pulse Oximeter Device Location: Right;Finger  O2 Device: Nasal cannula  O2 Flow Rate (L/min): 4 L/min   Orientation  Orientation  Overall Orientation Status: Within Functional Limits  Objective    ADL  LE Dressing: Minimal assistance (assist to stand, able to thread BLEs into pants with use of reacher and pull over hips in stance with CGA, setup for socks with use of sock aid)  Toileting: Maximum assistance (to change brief in bed, incontinent of urine in brief, max A to change, pt able to complete hygiene in front with setup)        Balance  Sitting Balance: Supervision  Standing Balance: Contact guard assistance (with RW)  Standing Balance  Time: 30 seconds x2, 1:30, 2:25, 3:30, 30 seconds  Activity: LB dressing, stand step transfer bed>w/c, collection and tossing of bean bags x3  Comment: with RW  Functional Mobility  Functional - Mobility Device: Rolling Walker  Activity: Retrieve items  Assist Level: Contact guard assistance  Functional Mobility Comments: anxiety with mobility  Bed mobility  Bridging: Supervision  Supine to Sit: Modified independent  Sit to Supine: Supervision  Transfers  Stand Step Transfers: Moderate assistance  Stand Pivot Transfers: Moderate assistance  Sit to stand:  Moderate assistance  Stand to sit: Contact guard assistance Coordination  Gross Motor: good coordination for use of a/e, good coordination for tossing bean bags to target              Cognition  Overall Cognitive Status: Exceptions  Arousal/Alertness: Appropriate responses to stimuli  Following Commands: Follows one step commands with increased time; Follows one step commands with repetition  Attention Span: Appears intact  Memory: Decreased short term memory  Safety Judgement: Decreased awareness of need for assistance;Decreased awareness of need for safety  Problem Solving: Assistance required to generate solutions;Assistance required to identify errors made;Assistance required to correct errors made  Insights: Decreased awareness of deficits  Initiation: Does not require cues  Sequencing: Requires cues for some     Perception  Overall Perceptual Status: WFL              Type of ROM/Therapeutic Exercise  Type of ROM/Therapeutic Exercise: Free weights  Comment: 3#  Exercises  Shoulder Flexion: 1# LUE to 120*, RUE to 90* x15  Elbow Flexion: x15 3#  Elbow Extension: x15 3#  Supination: x15 3#  Pronation: x15 3#  Wrist Flexion: x15 3#  Wrist Extension: x15 3#                    Plan   Plan  Times per week: 5/7 days a week  Times per day: Daily  Plan weeks: 3 weeks  Current Treatment Recommendations: Strengthening,Endurance Training,Balance Training,Functional Mobility Training,Safety Education & Training,Equipment Evaluation, Education, & procurement,Self-Care / ADL,Patient/Caregiver Education & Training,Home Management Training,ROM    Goals  Short term goals  Time Frame for Short term goals: 10 days (1/13)  Short term goal 1: Pt will perform at least 3min of standing functional activities with AD PRN. GOAL MET 1/07/22 Pt completed 3 minutes of standing tasks with CGA with RW. Short term goal 2: Pt will perform functional/toilet t/fs with CGA and AD PRN. Short term goal 3: Pt will toilet with CGA and AD PRN.   Short term goal 4: Pt will LB dress with min A and AE/AD PRN.  Short term goal 5: Pt will total body bathe with min A and AD PRN. Long term goals  Time Frame for Long term goals : 21 days (1/24)  Long term goal 1: Pt will perform at least 5 min of standing functional activities. Long term goal 2: Pt will total body dress with mod I.  Long term goal 3: Pt will total body bathe with mod I.  Long term goal 4: Pt will perform a simple IADL task with supervision and AD PRN. Long term goal 5: Pt will toilet with mod I.   Patient Goals   Patient goals : \"to be able to stand up and walk again\"       Therapy Time   Individual Concurrent Group Co-treatment   Time In 1230         Time Out 1340         Minutes 70         Timed Code Treatment Minutes: [address] [facility]

## 2022-01-08 LAB
GLUCOSE BLD-MCNC: 133 MG/DL (ref 70–99)
GLUCOSE BLD-MCNC: 140 MG/DL (ref 70–99)
GLUCOSE BLD-MCNC: 157 MG/DL (ref 70–99)
GLUCOSE BLD-MCNC: 282 MG/DL (ref 70–99)
ORGANISM: ABNORMAL
PERFORMED ON: ABNORMAL
URINE CULTURE, ROUTINE: ABNORMAL

## 2022-01-08 PROCEDURE — 92526 ORAL FUNCTION THERAPY: CPT

## 2022-01-08 PROCEDURE — 6360000002 HC RX W HCPCS: Performed by: PHYSICAL MEDICINE & REHABILITATION

## 2022-01-08 PROCEDURE — 94640 AIRWAY INHALATION TREATMENT: CPT

## 2022-01-08 PROCEDURE — 97129 THER IVNTJ 1ST 15 MIN: CPT

## 2022-01-08 PROCEDURE — 6370000000 HC RX 637 (ALT 250 FOR IP): Performed by: PHYSICAL MEDICINE & REHABILITATION

## 2022-01-08 PROCEDURE — 92507 TX SP LANG VOICE COMM INDIV: CPT

## 2022-01-08 PROCEDURE — 97110 THERAPEUTIC EXERCISES: CPT

## 2022-01-08 PROCEDURE — 97530 THERAPEUTIC ACTIVITIES: CPT

## 2022-01-08 PROCEDURE — 2580000003 HC RX 258: Performed by: PHYSICAL MEDICINE & REHABILITATION

## 2022-01-08 PROCEDURE — 94761 N-INVAS EAR/PLS OXIMETRY MLT: CPT

## 2022-01-08 PROCEDURE — 2700000000 HC OXYGEN THERAPY PER DAY

## 2022-01-08 PROCEDURE — 97116 GAIT TRAINING THERAPY: CPT

## 2022-01-08 PROCEDURE — 1280000000 HC REHAB R&B

## 2022-01-08 RX ADMIN — OXYCODONE 10 MG: 5 TABLET ORAL at 18:21

## 2022-01-08 RX ADMIN — METOCLOPRAMIDE 5 MG: 10 TABLET ORAL at 22:38

## 2022-01-08 RX ADMIN — OXYCODONE 10 MG: 5 TABLET ORAL at 22:39

## 2022-01-08 RX ADMIN — LEVOTHYROXINE SODIUM 100 MCG: 0.1 TABLET ORAL at 04:44

## 2022-01-08 RX ADMIN — OXYCODONE 10 MG: 5 TABLET ORAL at 09:39

## 2022-01-08 RX ADMIN — LORAZEPAM 0.5 MG: 0.5 TABLET ORAL at 04:44

## 2022-01-08 RX ADMIN — ASPIRIN 81 MG 81 MG: 81 TABLET ORAL at 07:40

## 2022-01-08 RX ADMIN — LORAZEPAM 0.5 MG: 0.5 TABLET ORAL at 13:40

## 2022-01-08 RX ADMIN — INSULIN LISPRO 1 UNITS: 100 INJECTION, SOLUTION INTRAVENOUS; SUBCUTANEOUS at 22:44

## 2022-01-08 RX ADMIN — IPRATROPIUM BROMIDE AND ALBUTEROL SULFATE 1 AMPULE: .5; 2.5 SOLUTION RESPIRATORY (INHALATION) at 19:39

## 2022-01-08 RX ADMIN — Medication 15 ML: at 07:41

## 2022-01-08 RX ADMIN — METOCLOPRAMIDE 5 MG: 10 TABLET ORAL at 07:39

## 2022-01-08 RX ADMIN — Medication 15 ML: at 22:43

## 2022-01-08 RX ADMIN — INSULIN GLARGINE 7 UNITS: 100 INJECTION, SOLUTION SUBCUTANEOUS at 22:45

## 2022-01-08 RX ADMIN — CEFEPIME HYDROCHLORIDE 1000 MG: 1 INJECTION, POWDER, FOR SOLUTION INTRAMUSCULAR; INTRAVENOUS at 09:34

## 2022-01-08 RX ADMIN — CEFEPIME HYDROCHLORIDE 1000 MG: 1 INJECTION, POWDER, FOR SOLUTION INTRAMUSCULAR; INTRAVENOUS at 17:22

## 2022-01-08 RX ADMIN — OXYCODONE 10 MG: 5 TABLET ORAL at 13:40

## 2022-01-08 RX ADMIN — DICYCLOMINE HYDROCHLORIDE 20 MG: 20 TABLET ORAL at 22:38

## 2022-01-08 RX ADMIN — DICYCLOMINE HYDROCHLORIDE 20 MG: 20 TABLET ORAL at 04:44

## 2022-01-08 RX ADMIN — LORAZEPAM 0.5 MG: 0.5 TABLET ORAL at 18:21

## 2022-01-08 RX ADMIN — DICYCLOMINE HYDROCHLORIDE 20 MG: 20 TABLET ORAL at 11:58

## 2022-01-08 RX ADMIN — MULTIPLE VITAMINS W/ MINERALS TAB 1 TABLET: TAB at 07:39

## 2022-01-08 RX ADMIN — ROPINIROLE HYDROCHLORIDE 0.5 MG: 0.5 TABLET, FILM COATED ORAL at 13:40

## 2022-01-08 RX ADMIN — LORAZEPAM 0.5 MG: 0.5 TABLET ORAL at 09:32

## 2022-01-08 RX ADMIN — INSULIN LISPRO 6 UNITS: 100 INJECTION, SOLUTION INTRAVENOUS; SUBCUTANEOUS at 16:34

## 2022-01-08 RX ADMIN — TRAZODONE HYDROCHLORIDE 50 MG: 50 TABLET ORAL at 22:39

## 2022-01-08 RX ADMIN — BUDESONIDE 500 MCG: 0.5 SUSPENSION RESPIRATORY (INHALATION) at 19:39

## 2022-01-08 RX ADMIN — INSULIN LISPRO 2 UNITS: 100 INJECTION, SOLUTION INTRAVENOUS; SUBCUTANEOUS at 06:32

## 2022-01-08 RX ADMIN — CEFEPIME HYDROCHLORIDE 1000 MG: 1 INJECTION, POWDER, FOR SOLUTION INTRAMUSCULAR; INTRAVENOUS at 01:38

## 2022-01-08 RX ADMIN — ROPINIROLE HYDROCHLORIDE 0.5 MG: 0.5 TABLET, FILM COATED ORAL at 22:39

## 2022-01-08 RX ADMIN — FUROSEMIDE 40 MG: 40 TABLET ORAL at 07:40

## 2022-01-08 RX ADMIN — SPIRONOLACTONE 25 MG: 25 TABLET, FILM COATED ORAL at 07:40

## 2022-01-08 RX ADMIN — ENOXAPARIN SODIUM 40 MG: 40 INJECTION SUBCUTANEOUS at 07:39

## 2022-01-08 RX ADMIN — ROPINIROLE HYDROCHLORIDE 0.5 MG: 0.5 TABLET, FILM COATED ORAL at 07:40

## 2022-01-08 RX ADMIN — OXYCODONE 10 MG: 5 TABLET ORAL at 04:44

## 2022-01-08 RX ADMIN — DICYCLOMINE HYDROCHLORIDE 20 MG: 20 TABLET ORAL at 16:34

## 2022-01-08 ASSESSMENT — PAIN DESCRIPTION - ORIENTATION
ORIENTATION: RIGHT

## 2022-01-08 ASSESSMENT — PAIN SCALES - GENERAL
PAINLEVEL_OUTOF10: 8
PAINLEVEL_OUTOF10: 9
PAINLEVEL_OUTOF10: 8
PAINLEVEL_OUTOF10: 8
PAINLEVEL_OUTOF10: 9
PAINLEVEL_OUTOF10: 8
PAINLEVEL_OUTOF10: 9
PAINLEVEL_OUTOF10: 4
PAINLEVEL_OUTOF10: 5
PAINLEVEL_OUTOF10: 0
PAINLEVEL_OUTOF10: 7
PAINLEVEL_OUTOF10: 0

## 2022-01-08 ASSESSMENT — PAIN DESCRIPTION - DESCRIPTORS
DESCRIPTORS: ACHING

## 2022-01-08 ASSESSMENT — PAIN DESCRIPTION - LOCATION
LOCATION: SHOULDER
LOCATION: ABDOMEN;SHOULDER
LOCATION: SHOULDER
LOCATION: ABDOMEN;SHOULDER
LOCATION: ABDOMEN;SHOULDER

## 2022-01-08 ASSESSMENT — PAIN DESCRIPTION - PAIN TYPE
TYPE: CHRONIC PAIN

## 2022-01-08 NOTE — PROGRESS NOTES
needs met in session 1    Total Timed Code Min 15    Total Treatment Minutes 60    Individual Treatment Minutes 60    Group Treatment Minutes 0 0   Co-Treat Minutes 0 0   Variance/Reason:  0    Pain None reported    Pain Intervention [] RN notified  [] Repositioned  [] Intervention offered and patient declined  [x] N/A  [] Other: [] RN notified  [] Repositioned  [] Intervention offered and patient declined  [] N/A  [] Other:   Subjective     Pt cooperative and agreeable to participate in therapy. Pt seen sitting upright in bedside chair. Objective:  Goals     Dysphagia Goals:  Short-term Goals  Timeframe for Short-term Goals: 10 days (01/13/22)    Goal 1: The patient will complete pharyngeal/laryngeal strengthening exercises for improved swallowing function 10/10 given min cues. -Delores: x20 min cues  -Isometric tongue tip hold for 5 secs: 13x mod cues, pt declined further reps  -Jaw jut hold for 5 secs: x20     -pt declined effortful and resistive jaw opening    Goal 2. The patient will tolerate recommended diet without observed clinical signs of aspiration. Pt observed with TL via provale cup tolerating w/o any overt s/s of aspiration. Pt observed eating lunch brought by pt's  w/o any overt s/s aspiration      Goal 3. The patient/caregiver will demonstrate understanding of compensatory strategies for improved swallowing safety. See goal 2 above. Discussed strict use of provale cup, small bites/sips, upright sitting position. Pt verbalized understanding but will benefit from ongoing education and reinforcement      Goal 4. The patient will tolerate thin liquids without signs and symptoms of aspiration 10/10 via cup. Did not target. Speech/Lang/Cog goals:  Short-term Goals  Timeframe for Short-term Goals: 14 days (01/17/22)    Goal 1: Pt will complete recall tasks with 80% acc given min cues for use of compensatory strategies.    Recall indirectly targeted during completion of strengthening exercises  -pt initially unable to recall swallow strengthening exercises; pt required min cues to recall in between exercises     Recall indirectly targeted during cognitive tasks  -pt often needing min cues to recall which task she is completing, specifically tasks given verbally      Goal 2: Pt will complete executive function tasks (money, math, time, etc) with 80% acc given min cues. Telling time  -pt shown clock w/ time and asked to give time  -100% acc ind      Goal 3: Pt will complete verbal and visual reasoning tasks with 80% acc given min cues. Following written directions  -pt asked to read one direction at a time and complete the task  -90% acc ind        Goal 4: Pt will complete problem solving and thought organization tasks with 80% acc given min cues. Comparing items  -pt given 3 items and asked which is the longest, oldest, etc.  -93% acc ind    Sequencing steps  -given 6 steps, pt asked to put them in order  -90% acc ind    Items in a category  -pt asked to name 3 items in category  -90% acc ind    Making word deductions  -pt given 4 clue words and asked which item is being described  -78% acc ind, inc to 100% acc w/ min-mod cues       Goal 5: Pt will complete vocal strengthening exercises for improved vocal function 10/10 given min cues.    Sustained /ah/:   -average of 1.1 second on first five trials  -average of 1 second on second trial  -significant dysphonic and wet vocal quality     Cup bubble (put a straw into glass of water and blow for as long as able):   -average of 3.7 seconds on first five trials  -average of 3.3 seconds on second five trials       Other areas targeted: N/A    Education:   edu provided re: safe swallow strategies, strengthening exercises for voice and swallow    Safety Devices: [x] Call light within reach  [x] Chair alarm activated  [] Bed alarm activated  [] Other: [] Call light within reach  [] Chair alarm activated  [] Bed alarm activated  [] Other: Assessment: Pt cooperative and agreeable to participate. Pt becoming discrouaged with swallowing exercises, stating she's tired and doesn't want to complete them; SLP able to encourage pt to complete most before pt politely declined further reps. SLP provided edu re: importance of swallow strengthening exercises to improve swallow function. Pt continues to present with a congested, wet, dysphonic vocal quality. Pt had better participation with cognitive tasks given, able to complete most activities independently. Pt remains motivated to improve. Continue goals listed above. Plan: Continue as per plan of care. Additional Information:     Barriers toward progress: Confusion and Cognitive deficit  Discharge recommendations:  [] Home independently  [] Home with assistance [x]  24 hour supervision  [] ECF [] Other:  Continued Tx Upon Discharge: ? [x] Yes [] No [] TBD based on progress while on ARU [] Vital Stim indicated [] Other:   Estimated discharge date: 01/21/22    Interventions used this date:  [x] Speech/Language Treatment  [] Instruction in HEP [] Group [x] Dysphagia Treatment [x] Cognitive Treatment   [] Other: Total Time Breakdown / Charges    Time in Time out Total Time / units   Cognitive Tx 1315 1330 15 min/ 1 unit   Speech Tx 1230 1245 15 min/ 1 unit    Dysphagia Tx 1245 1315 30 min/ 1 unit        Electronically Signed by     Areli Holguin M.A.  [id number] [facility]  Speech Language Pathologist

## 2022-01-08 NOTE — PROGRESS NOTES
Occupational Therapy  Facility/Department: [address] [place] [address]  Daily Treatment Note  NAME: Clarence Lara  : 1955  MRN: [id number]    Date of Service: 2022    Discharge Recommendations:  24 hour supervision or assist,Home with Home health OT,S Level 1  OT Equipment Recommendations  Equipment Needed: Yes  Mobility Devices: ADL Assistive Devices  Other: CTA: possible sock aid    Assessment   Performance deficits / Impairments: Decreased functional mobility ; Decreased ADL status; Decreased strength;Decreased safe awareness;Decreased cognition;Decreased endurance;Decreased balance;Decreased coordination;Decreased posture;Decreased fine motor control;Decreased high-level IADLs    Assessment: Pt agreeable to OT session. Pt performed functional transfers with min/mod A to complete sit>stand but CGA in stance with RW. Pt demonstrated improved standing tolerance to stand up to 6 minutes with O2 sats remaining above 90% on 4L. Pt completed BUE ther ex with good strength with increase in weight to 3# with rest breaks PRN. Pt performing gingerbread craft (addressing [address], following directions, standing balance/endurance, reaching and sequencing) with min-mod vc's for following directions and min-mod A for FM strength. Continue POC. Treatment Diagnosis: weakness, decreased endurance. Prognosis: Good  OT Education: OT Role;Plan of Care;ADL Adaptive Strategies;Transfer Training;Energy Conservation;Precautions  Patient Education: disease specific: goals, PLB, safety during ADLS and t/fs, energy conservation during ADLS, improvement from previous sessions. Pt verbalized understanding but will need reinforcement. Barriers to Learning: confusion, anxiety. REQUIRES OT FOLLOW UP: Yes  Activity Tolerance  Activity Tolerance: Patient Tolerated treatment well;Patient limited by fatigue  Activity Tolerance: /60, HR 86, O2 96% on 4L. Vitals WFL throughout session.  O2 remaining >90% on 4L and HR remaining 105 or less during activity. Pt experiencing SOB and muscle fatigue during session. Safety Devices  Safety Devices in place: Yes  Type of devices: Nurse notified;Gait belt;Call light within reach; Left in chair;Chair alarm in place         Patient Diagnosis(es): There were no encounter diagnoses. has a past medical history of Anxiety disorder, Chronic pain syndrome, COVID-19, DDD (degenerative disc disease), lumbar, Diabetes ([address] Displacement of lumbar intervertebral disc without myelopathy, Diverticulitis, Fibromyalgia, Generalized osteoarthrosis, involving multiple sites, GERD (gastroesophageal reflux disease), Impacted cerumen, Influenza A, Irritable bowel syndrome, Other and unspecified hyperlipidemia, Prosthetic joint implant failure ([facility] [address] Screening mammogram, Tracheobronchomalacia, Unspecified asthma(493.90), Unspecified essential hypertension, and Unspecified hypothyroidism. has a past surgical history that includes Hysterectomy; joint replacement (10/92); joint replacement (  12/92); joint replacement (  6/96); joint replacement (  2006); Colonoscopy (2007); Lung surgery (1/2014); Cardiac catheterization; Abdomen surgery; Colon surgery; Upper gastrointestinal endoscopy (N/A, 10/25/2021); and tracheostomy (N/A, 10/25/2021).     Restrictions  Restrictions/Precautions  Restrictions/Precautions: Fall Risk,General Precautions  Position Activity Restriction  Other position/activity restrictions: L PICC, Notify physician for pulse less than 50 or greater than 120, respiratory rate less than 12 or greater than 25, oral temperature greater than 101.3 F (38.5 C) , urinary output less than 120 mL in four hours, systolic BP less than 90 or greater than 250, diastolic BP less than 50 or greater than 100. 4L of O2  Subjective   General  Chart Reviewed: Yes,Orders,Labs,Progress Notes  Patient assessed for rehabilitation services?: Yes  Additional Pertinent Hx: Recent pelvic fractures  Response to previous treatment: Patient with no complaints from previous session  Family / Caregiver Present: No  Referring Practitioner: Ramos Brandon MD  Diagnosis: Post Covid 19 condition  Subjective  Subjective: Pt up in chair and agreeable to OT. Pain Assessment  Pain Level: 8  Pain Type: Chronic pain  Pain Location: Abdomen; Shoulder  Pain Orientation: Right  Non-Pharmaceutical Pain Intervention(s): Ambulation/Increased Activity;Repositioned;Distraction; Therapeutic presence  Vital Signs  Patient Currently in Pain: Yes   Orientation  Orientation  Overall Orientation Status: Within Functional Limits  Objective             Balance  Sitting Balance: Supervision  Standing Balance: Contact guard assistance (with RW, some shifting in balance but pt correcting with CGA.)  Standing Balance  Time: 30sx2, 6:10, ~4min  Activity: t/f from chair to w/c, standing for craft (following directions, dynamic standing balance, FMC)  Comment: with RW  Functional Mobility  Functional - Mobility Device: Rolling Walker  Activity: Other  Assist Level: Contact guard assistance  Functional Mobility Comments: anxiety with mobility  Bed mobility  Sit to Supine: Unable to assess  Scooting: Unable to assess  Comment: pt started and ended session seated in chair  Transfers  Sit to stand: Moderate assistance  Stand to sit: Contact guard assistance  Transfer Comments: min VCs for hand placement        Coordination  Fine Motor: min A with FM strength during gingerbread house craft. Cognition  Overall Cognitive Status: Exceptions  Arousal/Alertness: Appropriate responses to stimuli  Following Commands: Follows one step commands with increased time; Follows one step commands with repetition  Attention Span: Appears intact  Memory: Decreased short term memory  Safety Judgement: Decreased awareness of need for assistance;Decreased awareness of need for safety  Problem Solving: Assistance required to generate solutions;Assistance required to identify errors made;Assistance required to correct errors made  Insights: Decreased awareness of deficits  Initiation: Does not require cues  Sequencing: Requires cues for some  Cognition Comment: Pt anxious when fatigued, but improved during session and with encouragement. Type of ROM/Therapeutic Exercise  Type of ROM/Therapeutic Exercise: Free weights  Comment: 3# unless stated otherwise, BUE seated  Exercises  Elbow Flexion: x15 3#  Elbow Extension: x15 3#  Wrist Flexion: x15 3#  Wrist Extension: x15 3#  Other: chest press x15 no weights                    Plan   Plan  Times per week: 5/7 days a week  Times per day: Daily  Plan weeks: 3 weeks  Current Treatment Recommendations: Strengthening,Endurance Training,Balance Training,Functional Mobility Training,Safety Education & Training,Equipment Evaluation, Education, & procurement,Self-Care / ADL,Patient/Caregiver Education & Training,Home Management Training,ROM    Goals  Short term goals  Time Frame for Short term goals: 10 days (1/13)  Short term goal 1: Pt will perform at least 3min of standing functional activities with AD PRN. GOAL MET 1/07/22 Pt completed 3 minutes of standing tasks with CGA with RW. Short term goal 2: Pt will perform functional/toilet t/fs with CGA and AD PRN. -Ongoing (1/8) min-mod A for functional t/fs. Short term goal 3: Pt will toilet with CGA and AD PRN. Short term goal 4: Pt will LB dress with min A and AE/AD PRN. Short term goal 5: Pt will total body bathe with min A and AD PRN. Long term goals  Time Frame for Long term goals : 21 days (1/24)  Long term goal 1: Pt will perform at least 5 min of standing functional activities. Long term goal 2: Pt will total body dress with mod I.  Long term goal 3: Pt will total body bathe with mod I.  Long term goal 4: Pt will perform a simple IADL task with supervision and AD PRN. Long term goal 5: Pt will toilet with mod I.   Patient Goals   Patient goals : Glenroy Hawley be able to stand up and walk again\"       Therapy

## 2022-01-08 NOTE — PROGRESS NOTES
Physical Therapy  Facility/Department: [facility]  Daily Treatment Note  NAME: Teo Cobos  : 1955  MRN: [id number]    Date of Service: 2022    Discharge Recommendations:  Patient would benefit from continued therapy after discharge,24 hour supervision or assist   PT Equipment Recommendations  Equipment Needed: No  Other: has needed equpiment    Assessment   Body structures, Functions, Activity limitations: Decreased functional mobility ; Decreased balance;Decreased strength;Decreased safe awareness;Decreased high-level IADLs;Decreased endurance  Assessment: Pt seen in am for PT tx, pt reports chronic shoulder pain but is pleasant and agreeable to PT tx and demonstrates good participation in session. Pt demonstrates overall improved performance with mobility, gait and t/f. Pt completes bed mobility with MI, t/f with Lisa/modA with RW and gait up to 22' with RW with CGA to Lisa for balance. Gait distances limited by fatigue and monitoring of Spo2 to maintain >90%. Pt does have one drop initially at start of session as low as 70% with standing activity and clothing management but is able to recover to >90% with PLB and seated rest break, otherwise SpO2 improved for remaining session mostly staying above 90% with one drop to 85% on last bout of gait. Pt is limited by decreased activity tolerance, decreased strength and balance. Pt will benefit from continued skilled PT in ARU to address above deficits, will continue to progress mobility as tolerated. Treatment Diagnosis: impaired functional mobility and gait  Specific instructions for Next Treatment: Progress mobility as tolerated  Prognosis: Excellent  Decision Making: High Complexity  PT Education: Goals; General Safety;Gait Training;PT Role;Plan of Care;Home Exercise Program;Transfer Training;Functional Mobility Training;Disease Specific Education; Injury Prevention; Energy Conservation  Patient Education: Educated in safety with functional mobility and gait and safe use of AD, educated in monitoring of vitals during session with intemrittent rest breaks. Pt educated on safety and hand placement with mobility. Pt verbalized understanding but will benefit from reinforcement. Barriers to Learning: impaired cognition. REQUIRES PT FOLLOW UP: Yes  Activity Tolerance  Activity Tolerance: Patient Tolerated treatment well;Patient limited by fatigue;Patient limited by endurance  Activity Tolerance: Pt on 4L O2 NC on arrival, SpO2 drops to 70% following intial stand and clothing management and requires 2 minutes to recover to >90% with cues for PLB, otherwise SpO2 maintained grossly >85% throughout session (as noted in gait activities and standing). HR up to 115 with mobility. Patient Diagnosis(es): There were no encounter diagnoses. has a past medical history of Anxiety disorder, Chronic pain syndrome, COVID-19, DDD (degenerative disc disease), lumbar, Diabetes ([address] Displacement of lumbar intervertebral disc without myelopathy, Diverticulitis, Fibromyalgia, Generalized osteoarthrosis, involving multiple sites, GERD (gastroesophageal reflux disease), Impacted cerumen, Influenza A, Irritable bowel syndrome, Other and unspecified hyperlipidemia, Prosthetic joint implant failure ([address] Screening mammogram, Tracheobronchomalacia, Unspecified asthma(493.90), Unspecified essential hypertension, and Unspecified hypothyroidism. has a past surgical history that includes Hysterectomy; joint replacement (10/92); joint replacement (  12/92); joint replacement (  6/96); joint replacement (  2006); Colonoscopy (2007); Lung surgery (1/2014); Cardiac catheterization; Abdomen surgery; Colon surgery; Upper gastrointestinal endoscopy (N/A, 10/25/2021); and tracheostomy (N/A, 10/25/2021).     Restrictions  Restrictions/Precautions  Restrictions/Precautions: Fall Risk,General Precautions  Position Activity Restriction  Other position/activity restrictions: L PICC, Notify physician for pulse less than 50 or greater than 120, respiratory rate less than 12 or greater than 25, oral temperature greater than 101.3 F (38.5 C) , urinary output less than 120 mL in four hours, systolic BP less than 90 or greater than 575, diastolic BP less than 50 or greater than 100. 4L of O2  Subjective   General  Chart Reviewed: Yes  Response To Previous Treatment: Patient with no complaints from previous session. Family / Caregiver Present: No  Referring Practitioner: Starr Bustillos MD  Subjective  Subjective: Pt supine in bed on approach, pleasant and agreeable to PT tx, reports chronic R shoulder pain this am  Pain Screening  Patient Currently in Pain: Yes  Pain Assessment  Pain Assessment: 0-10  Pain Level: 8  Pain Type: Chronic pain  Pain Location: Shoulder  Pain Orientation: Right  Pain Descriptors: Aching  Non-Pharmaceutical Pain Intervention(s): Ambulation/Increased Activity;Repositioned; Therapeutic presence;Distraction  Vital Signs  Pulse: 90  Heart Rate Source: Monitor  BP: 134/64  BP Location: Right upper arm  MAP (mmHg): 92  Patient Position: Semi fowlers  Patient Currently in Pain: Yes  Oxygen Therapy  SpO2: 96 %  O2 Device: Nasal cannula  O2 Flow Rate (L/min): 4 L/min       Orientation  Orientation  Overall Orientation Status: Within Normal Limits    Objective   Bed mobility  Supine to Sit: Modified independent (HOB elevated, use or BR, increased time to complete)  Sit to Supine: Unable to assess     Transfers  Sit to Stand: Moderate Assistance;Minimal Assistance  Stand to sit: Minimal Assistance  Bed to Chair: Moderate assistance (with RW)  Comment: Pt completes multiple t/f with RW and in // bars with grossly Lisa/modA, cues for hand placement and sequence.      Ambulation  Ambulation?: Yes  Ambulation 1  Surface: level tile  Device: Parallel Bars  Other Apparatus: O2 (4L O2 NC)  Assistance: Contact guard assistance;Dependent/Total (CGA for balance, TD d/t use of // bars)  Quality of Gait: Partial step through pattern, B decreased toe clearance with short shuffling gait, B decreased heel strike, forward flexed trunk, increased UE support. Pt mildly unsteady with turns no overt LOB. Gait Deviations: Slow Marisol; Increased RONAN; Decreased step height;Decreased step length;Shuffles  Distance: 12'  Comments: SpO2 maintained >94% on 4L O2 NC, HR up to 114. Seated rest break to recover from fatigue and SOB  Ambulation 2  Surface - 2: level tile  Device 2: Rolling Walker  Other Apparatus 2: O2 (4L O2 NC)  Assistance 2: Contact guard assistance;Minimal assistance  Quality of Gait 2: Partial step through pattern, B decreased toe clearance with short shuffling gait, B decreased heel strike, forward flexed trunk, increased UE support. Pt mildly unsteady with turns no overt LOB. Gait Deviations: Slow Marisol;Decreased step length;Decreased step height;Shuffles  Distance: 22' x 2  Comments: Distances limited by fatigue and SOB, SpO2 maintained >90% on 4L O2 NC for first bout  to 88% following second bout but recovers to >90 within 2 minutes with seated rest, extended seated rest break between bouts to recover form fatigue. Stairs/Curb  Stairs?: No (deferred d/t safety)        Balance  Posture: Fair  Sitting - Static: Fair;-  Sitting - Dynamic: Fair;-  Standing - Static: Poor  Standing - Dynamic: Poor  Comments: CGA to SBA sitting EOB while donning pants. Pt requires up to Lisa for dynamic standing while completing clothing management, pt demonstrates posterior LOB with Lisa to correct. Pt standing for ~1-2 minutes with SOB noted in seated and SpO2 taken at 70%, pt cued on PLB and checked O2 tubing to make sure connections were stable. Pt is able to recover to >90% within 2-3 minutes with seated rest break and PLB.  Pt completes x 1 bout static standing in // bars with monitoring of SpO2 prior to gait, pt maintains x 2 minutes, no LOB with CGA and able to maintain SpO2 >90%, time in standing limited by fatigue. Completed to improve dynamic balance, increase standing tolerance and promote improved safety and performance with functional mobility. Goals  Short term goals  Time Frame for Short term goals: 1/7/2022  Short term goal 1: Pt demo indep bed mobility. -- 1/08: GOAL PARTIALLY MET MI with HOB elevated and use of BR  Short term goal 2: Pt demo bed<>chair with mod assist of 1 and FWW or SPT. -- 1/08: GOAL MET Lisa/modA with RW  Short term goal 3: Patient demonstrates gait to bathroom 20 feet with assist of 2 and FWW. GOAL MET 1/6/2021 pt demo gait 35 to 60 feet with FWW and assist of 2. Short term goal 4: Patient demonstrates up and down 1 step with bilateral rails and assist of 2. -- 1/08: DNT d/t safety  Short term goal 5: Patient demonstrates  indep in DBE and 5 minutes of standing activity with spO2 90% or greater to improve endurance for household walking. -- 1/08: GOAL NOT MET, limited by standing tolerance  Long term goals  Time Frame for Long term goals : 1/24/2022  Long term goal 1: Patient demonstrates gait 150 feet with SBA and sPO2 90% or greater able to manage O2 tubing on L/R turns on carpet up to 10 feetwith  safe to assist pt. Long term goal 2: Patient demonstrates up and down 12 steps with SBA and bilateral rails to return to community and home (egress/entry) mobility with  safe to assist pt. Long term goal 3: Patient demonstrates indep bed<>chair, sit<>stand and WC<>car with FWW  Long term goal 4: Patient demonstrates indep WC propulsion 150 feet with L/R turns indep. Long term goal 5: Patient demonstrates 50 feet of walking with L/R turns and carpet HELEN able to indep manage O2 tubing for short distance in home walking. Patient Goals   Patient goals : \"to walk to the bathroom.     Plan    Plan  Times per week: 5/7 days week  Times per day: Daily  Plan weeks: 21 days  Specific instructions for Next Treatment: Progress mobility as tolerated  Current

## 2022-01-09 LAB
GLUCOSE BLD-MCNC: 131 MG/DL (ref 70–99)
GLUCOSE BLD-MCNC: 193 MG/DL (ref 70–99)
GLUCOSE BLD-MCNC: 212 MG/DL (ref 70–99)
GLUCOSE BLD-MCNC: 238 MG/DL (ref 70–99)
PERFORMED ON: ABNORMAL

## 2022-01-09 PROCEDURE — 94640 AIRWAY INHALATION TREATMENT: CPT

## 2022-01-09 PROCEDURE — 6360000002 HC RX W HCPCS: Performed by: PHYSICAL MEDICINE & REHABILITATION

## 2022-01-09 PROCEDURE — 2700000000 HC OXYGEN THERAPY PER DAY

## 2022-01-09 PROCEDURE — 94761 N-INVAS EAR/PLS OXIMETRY MLT: CPT

## 2022-01-09 PROCEDURE — 1280000000 HC REHAB R&B

## 2022-01-09 PROCEDURE — 6370000000 HC RX 637 (ALT 250 FOR IP): Performed by: PHYSICAL MEDICINE & REHABILITATION

## 2022-01-09 PROCEDURE — 2580000003 HC RX 258: Performed by: PHYSICAL MEDICINE & REHABILITATION

## 2022-01-09 RX ORDER — HYDROCODONE BITARTRATE AND ACETAMINOPHEN 5; 325 MG/1; MG/1
2 TABLET ORAL EVERY 6 HOURS PRN
Status: ON HOLD | COMMUNITY
End: 2022-01-09

## 2022-01-09 RX ORDER — GLIPIZIDE 10 MG/1
10 TABLET, FILM COATED, EXTENDED RELEASE ORAL 2 TIMES DAILY
Status: ON HOLD | COMMUNITY
Start: 2021-03-05 | End: 2022-01-22 | Stop reason: SDUPTHER

## 2022-01-09 RX ORDER — OXYCODONE HYDROCHLORIDE 5 MG/1
5-10 TABLET ORAL
Status: ON HOLD | COMMUNITY
Start: 2021-07-19 | End: 2022-01-22 | Stop reason: SDUPTHER

## 2022-01-09 RX ORDER — OMEPRAZOLE 40 MG/1
40 CAPSULE, DELAYED RELEASE ORAL DAILY
Status: ON HOLD | COMMUNITY
End: 2022-01-22 | Stop reason: SDUPTHER

## 2022-01-09 RX ORDER — TIZANIDINE 2 MG/1
2 TABLET ORAL EVERY 8 HOURS PRN
COMMUNITY
Start: 2021-09-16

## 2022-01-09 RX ADMIN — ROPINIROLE HYDROCHLORIDE 0.5 MG: 0.5 TABLET, FILM COATED ORAL at 14:32

## 2022-01-09 RX ADMIN — DICYCLOMINE HYDROCHLORIDE 20 MG: 20 TABLET ORAL at 21:16

## 2022-01-09 RX ADMIN — INSULIN LISPRO 4 UNITS: 100 INJECTION, SOLUTION INTRAVENOUS; SUBCUTANEOUS at 16:53

## 2022-01-09 RX ADMIN — CEFEPIME HYDROCHLORIDE 1000 MG: 1 INJECTION, POWDER, FOR SOLUTION INTRAMUSCULAR; INTRAVENOUS at 01:24

## 2022-01-09 RX ADMIN — BUDESONIDE 500 MCG: 0.5 SUSPENSION RESPIRATORY (INHALATION) at 20:36

## 2022-01-09 RX ADMIN — BUDESONIDE 500 MCG: 0.5 SUSPENSION RESPIRATORY (INHALATION) at 07:59

## 2022-01-09 RX ADMIN — LORAZEPAM 0.5 MG: 0.5 TABLET ORAL at 14:53

## 2022-01-09 RX ADMIN — OXYCODONE 10 MG: 5 TABLET ORAL at 14:53

## 2022-01-09 RX ADMIN — INSULIN GLARGINE 7 UNITS: 100 INJECTION, SOLUTION SUBCUTANEOUS at 21:17

## 2022-01-09 RX ADMIN — OXYCODONE 5 MG: 5 TABLET ORAL at 19:40

## 2022-01-09 RX ADMIN — Medication 15 ML: at 09:31

## 2022-01-09 RX ADMIN — INSULIN LISPRO 4 UNITS: 100 INJECTION, SOLUTION INTRAVENOUS; SUBCUTANEOUS at 12:00

## 2022-01-09 RX ADMIN — OXYCODONE 10 MG: 5 TABLET ORAL at 09:44

## 2022-01-09 RX ADMIN — CEFEPIME HYDROCHLORIDE 1000 MG: 1 INJECTION, POWDER, FOR SOLUTION INTRAMUSCULAR; INTRAVENOUS at 01:05

## 2022-01-09 RX ADMIN — OXYCODONE 5 MG: 5 TABLET ORAL at 02:56

## 2022-01-09 RX ADMIN — LORAZEPAM 0.5 MG: 0.5 TABLET ORAL at 19:40

## 2022-01-09 RX ADMIN — LORAZEPAM 0.5 MG: 0.5 TABLET ORAL at 09:44

## 2022-01-09 RX ADMIN — DICYCLOMINE HYDROCHLORIDE 20 MG: 20 TABLET ORAL at 11:30

## 2022-01-09 RX ADMIN — LORAZEPAM 0.5 MG: 0.5 TABLET ORAL at 02:56

## 2022-01-09 RX ADMIN — CEFEPIME HYDROCHLORIDE 1000 MG: 1 INJECTION, POWDER, FOR SOLUTION INTRAMUSCULAR; INTRAVENOUS at 16:51

## 2022-01-09 RX ADMIN — LEVOTHYROXINE SODIUM 100 MCG: 0.1 TABLET ORAL at 06:00

## 2022-01-09 RX ADMIN — IPRATROPIUM BROMIDE AND ALBUTEROL SULFATE 1 AMPULE: .5; 2.5 SOLUTION RESPIRATORY (INHALATION) at 07:58

## 2022-01-09 RX ADMIN — DICYCLOMINE HYDROCHLORIDE 20 MG: 20 TABLET ORAL at 06:00

## 2022-01-09 RX ADMIN — FUROSEMIDE 40 MG: 40 TABLET ORAL at 09:29

## 2022-01-09 RX ADMIN — SPIRONOLACTONE 25 MG: 25 TABLET, FILM COATED ORAL at 09:29

## 2022-01-09 RX ADMIN — IPRATROPIUM BROMIDE AND ALBUTEROL SULFATE 1 AMPULE: .5; 2.5 SOLUTION RESPIRATORY (INHALATION) at 20:32

## 2022-01-09 RX ADMIN — METOCLOPRAMIDE 5 MG: 10 TABLET ORAL at 09:30

## 2022-01-09 RX ADMIN — ENOXAPARIN SODIUM 40 MG: 40 INJECTION SUBCUTANEOUS at 09:30

## 2022-01-09 RX ADMIN — ASPIRIN 81 MG 81 MG: 81 TABLET ORAL at 09:29

## 2022-01-09 RX ADMIN — ROPINIROLE HYDROCHLORIDE 0.5 MG: 0.5 TABLET, FILM COATED ORAL at 21:16

## 2022-01-09 RX ADMIN — CEFEPIME HYDROCHLORIDE 1000 MG: 1 INJECTION, POWDER, FOR SOLUTION INTRAMUSCULAR; INTRAVENOUS at 09:38

## 2022-01-09 RX ADMIN — ROPINIROLE HYDROCHLORIDE 0.5 MG: 0.5 TABLET, FILM COATED ORAL at 09:29

## 2022-01-09 RX ADMIN — DICYCLOMINE HYDROCHLORIDE 20 MG: 20 TABLET ORAL at 16:47

## 2022-01-09 RX ADMIN — METOCLOPRAMIDE 5 MG: 10 TABLET ORAL at 21:15

## 2022-01-09 RX ADMIN — MULTIPLE VITAMINS W/ MINERALS TAB 1 TABLET: TAB at 09:30

## 2022-01-09 RX ADMIN — INSULIN LISPRO 1 UNITS: 100 INJECTION, SOLUTION INTRAVENOUS; SUBCUTANEOUS at 21:17

## 2022-01-09 ASSESSMENT — PAIN DESCRIPTION - FREQUENCY
FREQUENCY: INTERMITTENT
FREQUENCY: INTERMITTENT

## 2022-01-09 ASSESSMENT — PAIN DESCRIPTION - ORIENTATION
ORIENTATION: RIGHT
ORIENTATION: RIGHT;LEFT
ORIENTATION: RIGHT;LEFT

## 2022-01-09 ASSESSMENT — PAIN DESCRIPTION - ONSET
ONSET: GRADUAL
ONSET: GRADUAL

## 2022-01-09 ASSESSMENT — PAIN DESCRIPTION - LOCATION
LOCATION: SHOULDER

## 2022-01-09 ASSESSMENT — PAIN SCALES - GENERAL
PAINLEVEL_OUTOF10: 0
PAINLEVEL_OUTOF10: 0
PAINLEVEL_OUTOF10: 7
PAINLEVEL_OUTOF10: 5
PAINLEVEL_OUTOF10: 9
PAINLEVEL_OUTOF10: 9
PAINLEVEL_OUTOF10: 7

## 2022-01-09 ASSESSMENT — PAIN DESCRIPTION - PAIN TYPE
TYPE: CHRONIC PAIN

## 2022-01-09 ASSESSMENT — PAIN DESCRIPTION - DESCRIPTORS
DESCRIPTORS: DISCOMFORT;ACHING
DESCRIPTORS: ACHING;CONSTANT;DISCOMFORT
DESCRIPTORS: DISCOMFORT;ACHING

## 2022-01-09 ASSESSMENT — PAIN DESCRIPTION - PROGRESSION
CLINICAL_PROGRESSION: NOT CHANGED
CLINICAL_PROGRESSION: NOT CHANGED

## 2022-01-10 LAB
ANION GAP SERPL CALCULATED.3IONS-SCNC: 7 MMOL/L (ref 3–16)
BASOPHILS ABSOLUTE: 0.1 K/UL (ref 0–0.2)
BASOPHILS RELATIVE PERCENT: 1 %
BUN BLDV-MCNC: 8 MG/DL (ref 7–20)
CALCIUM SERPL-MCNC: 10.1 MG/DL (ref 8.3–10.6)
CHLORIDE BLD-SCNC: 97 MMOL/L (ref 99–110)
CO2: 32 MMOL/L (ref 21–32)
CREAT SERPL-MCNC: <0.5 MG/DL (ref 0.6–1.2)
EOSINOPHILS ABSOLUTE: 0.3 K/UL (ref 0–0.6)
EOSINOPHILS RELATIVE PERCENT: 4.1 %
GFR AFRICAN AMERICAN: >60
GFR NON-AFRICAN AMERICAN: >60
GLUCOSE BLD-MCNC: 106 MG/DL (ref 70–99)
GLUCOSE BLD-MCNC: 116 MG/DL (ref 70–99)
GLUCOSE BLD-MCNC: 133 MG/DL (ref 70–99)
GLUCOSE BLD-MCNC: 178 MG/DL (ref 70–99)
GLUCOSE BLD-MCNC: 193 MG/DL (ref 70–99)
HCT VFR BLD CALC: 31.6 % (ref 36–48)
HEMOGLOBIN: 10.2 G/DL (ref 12–16)
LYMPHOCYTES ABSOLUTE: 1.5 K/UL (ref 1–5.1)
LYMPHOCYTES RELATIVE PERCENT: 20.8 %
MAGNESIUM: 1.5 MG/DL (ref 1.8–2.4)
MCH RBC QN AUTO: 24.6 PG (ref 26–34)
MCHC RBC AUTO-ENTMCNC: 32.2 G/DL (ref 31–36)
MCV RBC AUTO: 76.6 FL (ref 80–100)
MONOCYTES ABSOLUTE: 0.4 K/UL (ref 0–1.3)
MONOCYTES RELATIVE PERCENT: 5.8 %
NEUTROPHILS ABSOLUTE: 5 K/UL (ref 1.7–7.7)
NEUTROPHILS RELATIVE PERCENT: 68.3 %
PDW BLD-RTO: 19.9 % (ref 12.4–15.4)
PERFORMED ON: ABNORMAL
PLATELET # BLD: 210 K/UL (ref 135–450)
PMV BLD AUTO: 8.8 FL (ref 5–10.5)
POTASSIUM REFLEX MAGNESIUM: 3.5 MMOL/L (ref 3.5–5.1)
RBC # BLD: 4.12 M/UL (ref 4–5.2)
SODIUM BLD-SCNC: 136 MMOL/L (ref 136–145)
WBC # BLD: 7.3 K/UL (ref 4–11)

## 2022-01-10 PROCEDURE — 80048 BASIC METABOLIC PNL TOTAL CA: CPT

## 2022-01-10 PROCEDURE — 6370000000 HC RX 637 (ALT 250 FOR IP): Performed by: PHYSICAL MEDICINE & REHABILITATION

## 2022-01-10 PROCEDURE — 92507 TX SP LANG VOICE COMM INDIV: CPT

## 2022-01-10 PROCEDURE — 92526 ORAL FUNCTION THERAPY: CPT

## 2022-01-10 PROCEDURE — 97110 THERAPEUTIC EXERCISES: CPT

## 2022-01-10 PROCEDURE — 97530 THERAPEUTIC ACTIVITIES: CPT

## 2022-01-10 PROCEDURE — 97116 GAIT TRAINING THERAPY: CPT

## 2022-01-10 PROCEDURE — 1280000000 HC REHAB R&B

## 2022-01-10 PROCEDURE — 2580000003 HC RX 258: Performed by: PHYSICAL MEDICINE & REHABILITATION

## 2022-01-10 PROCEDURE — 36415 COLL VENOUS BLD VENIPUNCTURE: CPT

## 2022-01-10 PROCEDURE — 97129 THER IVNTJ 1ST 15 MIN: CPT

## 2022-01-10 PROCEDURE — 97535 SELF CARE MNGMENT TRAINING: CPT

## 2022-01-10 PROCEDURE — 85025 COMPLETE CBC W/AUTO DIFF WBC: CPT

## 2022-01-10 PROCEDURE — 94640 AIRWAY INHALATION TREATMENT: CPT

## 2022-01-10 PROCEDURE — 6360000002 HC RX W HCPCS: Performed by: PHYSICAL MEDICINE & REHABILITATION

## 2022-01-10 PROCEDURE — 94761 N-INVAS EAR/PLS OXIMETRY MLT: CPT

## 2022-01-10 PROCEDURE — 83735 ASSAY OF MAGNESIUM: CPT

## 2022-01-10 PROCEDURE — 2700000000 HC OXYGEN THERAPY PER DAY

## 2022-01-10 RX ORDER — LANOLIN ALCOHOL/MO/W.PET/CERES
400 CREAM (GRAM) TOPICAL DAILY
Status: DISCONTINUED | OUTPATIENT
Start: 2022-01-10 | End: 2022-01-22 | Stop reason: HOSPADM

## 2022-01-10 RX ORDER — NITROFURANTOIN 25; 75 MG/1; MG/1
100 CAPSULE ORAL EVERY 12 HOURS SCHEDULED
Status: COMPLETED | OUTPATIENT
Start: 2022-01-10 | End: 2022-01-14

## 2022-01-10 RX ADMIN — INSULIN GLARGINE 7 UNITS: 100 INJECTION, SOLUTION SUBCUTANEOUS at 19:50

## 2022-01-10 RX ADMIN — LORAZEPAM 0.5 MG: 0.5 TABLET ORAL at 10:30

## 2022-01-10 RX ADMIN — DICYCLOMINE HYDROCHLORIDE 20 MG: 20 TABLET ORAL at 11:02

## 2022-01-10 RX ADMIN — NITROFURANTOIN (MONOHYDRATE/MACROCRYSTALS) 100 MG: 75; 25 CAPSULE ORAL at 10:30

## 2022-01-10 RX ADMIN — IPRATROPIUM BROMIDE AND ALBUTEROL SULFATE 1 AMPULE: .5; 2.5 SOLUTION RESPIRATORY (INHALATION) at 19:41

## 2022-01-10 RX ADMIN — NITROFURANTOIN (MONOHYDRATE/MACROCRYSTALS) 100 MG: 75; 25 CAPSULE ORAL at 19:49

## 2022-01-10 RX ADMIN — LEVOTHYROXINE SODIUM 100 MCG: 0.1 TABLET ORAL at 06:07

## 2022-01-10 RX ADMIN — ROPINIROLE HYDROCHLORIDE 0.5 MG: 0.5 TABLET, FILM COATED ORAL at 14:28

## 2022-01-10 RX ADMIN — CEFEPIME HYDROCHLORIDE 1000 MG: 1 INJECTION, POWDER, FOR SOLUTION INTRAMUSCULAR; INTRAVENOUS at 09:16

## 2022-01-10 RX ADMIN — Medication 15 ML: at 19:50

## 2022-01-10 RX ADMIN — SPIRONOLACTONE 25 MG: 25 TABLET, FILM COATED ORAL at 09:06

## 2022-01-10 RX ADMIN — DICYCLOMINE HYDROCHLORIDE 20 MG: 20 TABLET ORAL at 06:07

## 2022-01-10 RX ADMIN — OXYCODONE 10 MG: 5 TABLET ORAL at 19:49

## 2022-01-10 RX ADMIN — BUDESONIDE 500 MCG: 0.5 SUSPENSION RESPIRATORY (INHALATION) at 19:46

## 2022-01-10 RX ADMIN — INSULIN LISPRO 1 UNITS: 100 INJECTION, SOLUTION INTRAVENOUS; SUBCUTANEOUS at 19:50

## 2022-01-10 RX ADMIN — METOCLOPRAMIDE 5 MG: 10 TABLET ORAL at 09:07

## 2022-01-10 RX ADMIN — DICYCLOMINE HYDROCHLORIDE 20 MG: 20 TABLET ORAL at 17:01

## 2022-01-10 RX ADMIN — TRAZODONE HYDROCHLORIDE 50 MG: 50 TABLET ORAL at 19:49

## 2022-01-10 RX ADMIN — METOCLOPRAMIDE 5 MG: 10 TABLET ORAL at 19:49

## 2022-01-10 RX ADMIN — Medication 15 ML: at 09:17

## 2022-01-10 RX ADMIN — LORAZEPAM 0.5 MG: 0.5 TABLET ORAL at 19:49

## 2022-01-10 RX ADMIN — OXYCODONE 10 MG: 5 TABLET ORAL at 14:28

## 2022-01-10 RX ADMIN — MULTIPLE VITAMINS W/ MINERALS TAB 1 TABLET: TAB at 09:06

## 2022-01-10 RX ADMIN — DICYCLOMINE HYDROCHLORIDE 20 MG: 20 TABLET ORAL at 19:49

## 2022-01-10 RX ADMIN — ASPIRIN 81 MG 81 MG: 81 TABLET ORAL at 09:06

## 2022-01-10 RX ADMIN — OXYCODONE 10 MG: 5 TABLET ORAL at 10:30

## 2022-01-10 RX ADMIN — INSULIN LISPRO 2 UNITS: 100 INJECTION, SOLUTION INTRAVENOUS; SUBCUTANEOUS at 12:15

## 2022-01-10 RX ADMIN — OXYCODONE 5 MG: 5 TABLET ORAL at 00:54

## 2022-01-10 RX ADMIN — LORAZEPAM 0.5 MG: 0.5 TABLET ORAL at 14:28

## 2022-01-10 RX ADMIN — LORAZEPAM 0.5 MG: 0.5 TABLET ORAL at 00:54

## 2022-01-10 RX ADMIN — ENOXAPARIN SODIUM 40 MG: 40 INJECTION SUBCUTANEOUS at 09:07

## 2022-01-10 RX ADMIN — Medication 400 MG: at 11:02

## 2022-01-10 RX ADMIN — CEFEPIME HYDROCHLORIDE 1000 MG: 1 INJECTION, POWDER, FOR SOLUTION INTRAMUSCULAR; INTRAVENOUS at 00:51

## 2022-01-10 RX ADMIN — FUROSEMIDE 40 MG: 40 TABLET ORAL at 09:07

## 2022-01-10 RX ADMIN — ROPINIROLE HYDROCHLORIDE 0.5 MG: 0.5 TABLET, FILM COATED ORAL at 19:49

## 2022-01-10 RX ADMIN — ROPINIROLE HYDROCHLORIDE 0.5 MG: 0.5 TABLET, FILM COATED ORAL at 09:06

## 2022-01-10 ASSESSMENT — PAIN DESCRIPTION - ORIENTATION
ORIENTATION: RIGHT

## 2022-01-10 ASSESSMENT — PAIN SCALES - GENERAL
PAINLEVEL_OUTOF10: 6
PAINLEVEL_OUTOF10: 9
PAINLEVEL_OUTOF10: 0
PAINLEVEL_OUTOF10: 7
PAINLEVEL_OUTOF10: 7
PAINLEVEL_OUTOF10: 0
PAINLEVEL_OUTOF10: 0
PAINLEVEL_OUTOF10: 8
PAINLEVEL_OUTOF10: 7

## 2022-01-10 ASSESSMENT — PAIN DESCRIPTION - LOCATION
LOCATION: SHOULDER

## 2022-01-10 ASSESSMENT — PAIN DESCRIPTION - DESCRIPTORS
DESCRIPTORS: DISCOMFORT;ACHING
DESCRIPTORS: ACHING
DESCRIPTORS: ACHING
DESCRIPTORS: DISCOMFORT;ACHING

## 2022-01-10 ASSESSMENT — PAIN DESCRIPTION - PROGRESSION
CLINICAL_PROGRESSION: NOT CHANGED
CLINICAL_PROGRESSION: NOT CHANGED

## 2022-01-10 ASSESSMENT — PAIN DESCRIPTION - PAIN TYPE
TYPE: ACUTE PAIN

## 2022-01-10 ASSESSMENT — PAIN DESCRIPTION - ONSET
ONSET: GRADUAL
ONSET: GRADUAL

## 2022-01-10 ASSESSMENT — PAIN DESCRIPTION - FREQUENCY
FREQUENCY: CONTINUOUS
FREQUENCY: INTERMITTENT
FREQUENCY: INTERMITTENT

## 2022-01-10 NOTE — PLAN OF CARE
Problem: Nutrition  Goal: Optimal nutrition therapy  Outcome: Ongoing  Note: Nutrition Problem #1: Moderate malnutrition  Intervention: Food and/or Nutrient Delivery: Continue Current Diet,Continue Oral Nutrition Supplement  Nutritional Goals: Pt will consume greater than 50% of meals and ONS this admission w/o further weight loss

## 2022-01-10 NOTE — PROGRESS NOTES
Occupational Therapy  Facility/Department: [address] [place] [address]  Daily Treatment Note  NAME: Mckenzie Ding  : 1955  MRN: [id number]    Date of Service: 1/10/2022    Discharge Recommendations:  24 hour supervision or assist,Home with Home health OT,S Level 1       Assessment   Performance deficits / Impairments: Decreased functional mobility ; Decreased ADL status; Decreased strength;Decreased safe awareness;Decreased cognition;Decreased endurance;Decreased balance;Decreased coordination;Decreased posture;Decreased fine motor control;Decreased high-level IADLs  Assessment: Pt agreeable to OT session. Pt continues to be anxious with majority of mobility and ADLs this date, but is able to be redirected and calmed down with reassurance and increased time. Pt completed mobility to bathroom with CGA on 3L O2 but O2 sats dropping to 70%, requiring increase in O2 to 4L and ~1 minute to return to 90%. Pt completed bathing with min A to bathe buttocks only and remained seated for entirety of shower. O2 sats dropping to low 80s on 4L after bathing and after dressing, recovering to 90% in ~1 minute. Pt required mod A to stand to manage pants over hips but supervision for remainder of task. Pt completed grooming while seated and good w/c propulsion in/out of bathroom and around unit. Pt completed BUE ther ex with fair strength and continued pain in R shoulder. Pt required assistance for w/c pushups to clear buttocks to assist with sit<>stands. O2 sats remained above 90% with ther ex. Continue POC. Prognosis: Good  OT Education: OT Role;Plan of Care;ADL Adaptive Strategies;Transfer Training;Energy Conservation;Precautions; Equipment  Patient Education: disease specific: goals, PLB, safety during ADLS and t/fs, energy conservation during ADLS, improvement from previous sessions. Pt verbalized understanding but will need reinforcement. Barriers to Learning: confusion, anxiety.   REQUIRES OT FOLLOW UP: Yes  Activity Tolerance  Activity Tolerance: Patient limited by fatigue;Treatment limited secondary to medical complications (free text)  Activity Tolerance: Pt O2 sats dropping to ~70% on 3L when ambulating to bathroom, recovering to 90% in ~1 minute, O2 sats dropping to ~80% on 4L with ADLs and transfers, recovering to 90% in 30-60 seconds. RN notified. Safety Devices  Safety Devices in place: Yes  Type of devices: Nurse notified;Gait belt;Call light within reach; Left in chair;Chair alarm in place         Patient Diagnosis(es): There were no encounter diagnoses. has a past medical history of Anxiety disorder, Chronic pain syndrome, COVID-19, DDD (degenerative disc disease), lumbar, Diabetes ([address] Displacement of lumbar intervertebral disc without myelopathy, Diverticulitis, Fibromyalgia, Generalized osteoarthrosis, involving multiple sites, GERD (gastroesophageal reflux disease), Impacted cerumen, Influenza A, Irritable bowel syndrome, Other and unspecified hyperlipidemia, Prosthetic joint implant failure ([facility] [facility] Screening mammogram, Tracheobronchomalacia, Unspecified asthma(493.90), Unspecified essential hypertension, and Unspecified hypothyroidism. has a past surgical history that includes Hysterectomy; joint replacement (10/92); joint replacement (  12/92); joint replacement (  6/96); joint replacement (  2006); Colonoscopy (2007); Lung surgery (1/2014); Cardiac catheterization; Abdomen surgery; Colon surgery; Upper gastrointestinal endoscopy (N/A, 10/25/2021); and tracheostomy (N/A, 10/25/2021).     Restrictions  Restrictions/Precautions  Restrictions/Precautions: Fall Risk,General Precautions  Position Activity Restriction  Other position/activity restrictions: PEG, L PICC, Notify physician for pulse less than 50 or greater than 120, respiratory rate less than 12 or greater than 25, oral temperature greater than 101.3 F (38.5 C) , urinary output less than 120 mL in four hours, systolic BP less than 90 or greater than 389, diastolic BP of shower to w/c  Comment: with RW  Functional Mobility  Functional - Mobility Device: Rolling Walker  Activity: To/from bathroom  Assist Level: Contact guard assistance  Shower Transfers  Shower - Transfer From: Patric Jeffrey - Transfer Type: To and From  Shower - Transfer To: Transfer tub bench  Shower - Technique: Ambulating  Shower Transfers: Moderate assistance  Bed mobility  Supine to Sit: Modified independent (HOB elevated)  Transfers  Stand Step Transfers: Moderate assistance  Sit to stand: Moderate assistance  Stand to sit: Contact guard assistance        Coordination  Gross Motor: good coordination for ADLs, increased time due to fatigue, SOB, and anxiety              Cognition  Overall Cognitive Status: Exceptions  Arousal/Alertness: Appropriate responses to stimuli  Following Commands: Follows one step commands with increased time; Follows one step commands with repetition  Attention Span: Appears intact  Memory: Decreased short term memory  Safety Judgement: Decreased awareness of need for assistance;Decreased awareness of need for safety  Problem Solving: Assistance required to generate solutions;Assistance required to identify errors made;Assistance required to correct errors made  Insights: Decreased awareness of deficits  Initiation: Does not require cues  Sequencing: Requires cues for some                    Type of ROM/Therapeutic Exercise  Type of ROM/Therapeutic Exercise: Free weights  Comment: 3#  Exercises  Shoulder Flexion: RUE AROM x5 limited due to pain, LUE x10 1#  Chair Push-ups: 2x5 with assist to clear buttocks from chair  Elbow Flexion: x15 3#  Elbow Extension: x15 3#  Supination: x15 3# RUE, x8 LUE limited due to pain  Pronation: x15 3# RUE, x8 LUE limited due to pain  Wrist Flexion: x15 3#  Wrist Extension: x15 3#                    Plan   Plan  Times per week: 5/7 days a week  Times per day: Daily  Plan weeks: 3 weeks  Current Treatment Recommendations: Ayad Schafer Training,Balance Training,Functional Mobility Training,Safety Education & Training,Equipment Evaluation, Education, & procurement,Self-Care / ADL,Patient/Caregiver Education & Training,Home Management Training,ROM    Goals  Short term goals  Time Frame for Short term goals: 10 days (1/13)  Short term goal 1: Pt will perform at least 3min of standing functional activities with AD PRN. GOAL MET 1/07/22 Pt completed 3 minutes of standing tasks with CGA with RW. Short term goal 2: Pt will perform functional/toilet t/fs with CGA and AD PRN. -Ongoing (1/8) min-mod A for functional t/fs. Short term goal 3: Pt will toilet with CGA and AD PRN. Short term goal 4: Pt will LB dress with min A and AE/AD PRN. Short term goal 5: Pt will total body bathe with min A and AD PRN. GOAL MET 1/10/22 Pt performed total body bathing with min A. Long term goals  Time Frame for Long term goals : 21 days (1/24)  Long term goal 1: Pt will perform at least 5 min of standing functional activities. Long term goal 2: Pt will total body dress with mod I.  Long term goal 3: Pt will total body bathe with mod I.  Long term goal 4: Pt will perform a simple IADL task with supervision and AD PRN. Long term goal 5: Pt will toilet with mod I.   Patient Goals   Patient goals : \"to be able to stand up and walk again\"       Therapy Time   Individual Concurrent Group Co-treatment   Time In 0730         Time Out 0900         Minutes 90         Timed Code Treatment Minutes: [address], OT

## 2022-01-10 NOTE — PROGRESS NOTES
Speech Language Pathology  [facility]: ACUTE REH[facility]  SPEECH-LANGUAGE PATHOLOGY      [x] Daily  [] Weekly Care Conference Note  [] Discharge    Patient:Esperanza Maier      HRZ:[id number]  VGB:[id number]  Rehab Dx/Hx: Post covid-19 condition, unspecified [U09.9]    Precautions: falls and aspirations  Home situation: lives at home with , dtr, two grandchildren; not an active ; manages own meds; shares household tasks with ,  manages finances   ST Dx: [] Aphasia  [] Dysarthria  [] Apraxia   [x] Oropharyngeal dysphagia [x] Cognitive Impairment  [x] Other: voice tx   Date of Admit: 1/3/2022  Room #: 0151/0151-01    Current functional status (updated daily):         Pt being seen for : [x] Speech/Language Treatment  [x] Dysphagia Treatment [x] Cognitive Treatment  [] Other:  Communication: []WFL  [] Aphasia  [] Dysarthria  [] Apraxia  [] Pragmatic Impairment [] Non-verbal  [] Hearing Loss  [x] Other: dysphonia  Cognition: [] WFL  [x] Mild  [x] Moderate  [] Severe [] Unable to Assess  [] Other:  Memory: [] WFL  [x] Mild  [x] Moderate  [] Severe [] Unable to Assess  [] Other:  Behavior: [x] Alert  [x] Cooperative  [x]  Pleasant  [] Confused  [] Agitated  [] Uncooperative  [] Distractible [] Motivated  [] Self-Limiting [] Anxious  [] Other:  Endurance:  [x] Adequate for participation in SLP sessions  [] Reduced overall  [] Lethargic  [] Other:  Safety: [x] No concerns at this time  [] Reduced insight into deficits  []  Reduced safety awareness [] Not following call light procedures  [] Unable to Assess  [] Other:    Current Diet Order:ADULT DIET; Regular  ADULT ORAL NUTRITION SUPPLEMENT; Lunch, Dinner; Low Calorie/High Protein Oral Supplement  Swallowing Precautions: Alternate solids and liquids;Eat/Feed slowly; No straws;Upright as possible for all oral intake;Remain upright for 30-45 minutes after meals;Small bites/sips           Date: 1/10/2022      Tx session 1  [id number] Tx session 2  All tx needs met in session 1    Total Timed Code Min 20    Total Treatment Minutes 60    Individual Treatment Minutes 60    Group Treatment Minutes 0 0   Co-Treat Minutes 0 0   Variance/Reason:  0    Pain None reported    Pain Intervention [] RN notified  [] Repositioned  [] Intervention offered and patient declined  [x] N/A  [] Other: [] RN notified  [] Repositioned  [] Intervention offered and patient declined  [] N/A  [] Other:   Subjective     Pt alert and oriented, cooperative and agreeable to participate in therapy. Pt seen sitting upright in bed,  Lakshmi Scott present at bedside. Objective:  Goals     Dysphagia Goals:  Short-term Goals  Timeframe for Short-term Goals: 10 days (01/13/22)    Goal 1: The patient will complete pharyngeal/laryngeal strengthening exercises for improved swallowing function 10/10 given min cues. -Effortful swallow: x10  -Delores: x10  -Isometric tongue tip hold for 5 secs: x10  -Jaw jut hold for 5 secs: x10  -Resistive jaw opening hold for 5 secs: x10        Goal 2. The patient will tolerate recommended diet without observed clinical signs of aspiration. Pt observed with TL via provale cup tolerating without any overt s/s of aspiration. RN did report that pt was coughing a lot when taking morning medications with pills/water via provale cup. SLP observed pt taking one pill whole with water reporting increased difficulty with swallowing, but no s/s of aspiration observed. SLP observed pt taking three small bites one pill at a time with puree tolerating without any difficulties or s/s of aspiration. Pt agreed to take meds whole with puree moving forward. RN notified. Goal 3. The patient/caregiver will demonstrate understanding of compensatory strategies for improved swallowing safety. SLP and pt discussed and reviewed strict use of safe swallow strategies, and provale cup. Pt verbalized understanding but benefits from ongoing education. Goal 4.  The patient will tolerate thin liquids without signs and symptoms of aspiration 10/10 via cup. Did not target. Speech/Lang/Cog goals:  Short-term Goals  Timeframe for Short-term Goals: 14 days (01/17/22)    Goal 1: Pt will complete recall tasks with 80% acc given min cues for use of compensatory strategies. Word list retention task:  -pt given four words and asked a questions about the words  -ex: table, shoe, ice, fire (which is the coldest?)  -66% acc indep improving to 100% acc given mod cues  -pt benefited from repetition and visualization strategies     Goal 2: Pt will complete executive function tasks (money, math, time, etc) with 80% acc given min cues. Did not target. Goal 3: Pt will complete verbal and visual reasoning tasks with 80% acc given min cues. Did not target. Goal 4: Pt will complete problem solving and thought organization tasks with 80% acc given min cues. Did not target. Goal 5: Pt will complete vocal strengthening exercises for improved vocal function 10/10 given min cues. Sustained /ah/:   -average of 1.4 seconds which is improved from last tx session   -significant dysphonic and wet vocal quality     Cup bubble (put a straw into glass of water and blow for as long as able):   -average of 4.8 seconds on first five trials  -average of 5.4 seconds on second five trials   -overall improvement compared to last tx session      Other areas targeted: Discussed ENT consult with pt and pt's , perfect served Dr. Michael Perez who stated he would stop by tomorrow to speak with pt's  and inform him on results/recommendations from his scope/visit on 01/06.        Education:   Edu provided re: safe swallow strategies, rationale for ENT consult, importance of strengthening exercises, compensatory memory strategies      Safety Devices: [x] Call light within reach  [x] Chair alarm activated  [] Bed alarm activated  [x] Other:  and RN at bedside  [] Call light within reach  [] Chair alarm activated  [] Bed alarm activated  [] Other:    Assessment: Pt pleasant and cooperative, agreeable to participate. Pt presents with increased congestion, wet vocal quality and coughing. RN aware. Pt observed with TL via proval cup tolerating without any overt s/s of aspiration. Pt observed with meds whole with water and meds whole with puree, both tolerating without any overt s/s of aspiration, but pt prefers to start taking meds whole with puree. Pt completed pharyngeal/laryngeal strengthening and vocal strengthening exercises, increased rest breaks required d/t reduced endurance. Pt continues to demonstrate increased difficulty with functional recall. Pt's  reported increased confusion/difficulty with memory over the weekend. Pt remains motivated to improve. Continue goals listed above. Plan: Continue as per plan of care. Additional Information:     Barriers toward progress: Confusion and Cognitive deficit  Discharge recommendations:  [] Home independently  [] Home with assistance [x]  24 hour supervision  [] ECF [] Other:  Continued Tx Upon Discharge: ? [x] Yes [] No [] TBD based on progress while on ARU [] Vital Stim indicated [] Other:   Estimated discharge date: 01/21/22    Interventions used this date:  [x] Speech/Language Treatment  [] Instruction in HEP [] Group [x] Dysphagia Treatment [x] Cognitive Treatment   [] Other: Total Time Breakdown / Charges    Time in Time out Total Time / units   Cognitive Tx 1410 1430 20 min/ 1 unit    Speech Tx 1400 1410 10 min/ 1 unit    Dysphagia Tx 1330 1400 30 min/ 1 unit        Electronically Signed by     Gerardo Sims. A CCC-SLP  Speech-Language Pathologist  [id number]

## 2022-01-10 NOTE — PROGRESS NOTES
[id number] [facility]  1/10/2022  [id number]    Chief Complaint: Post covid-19 condition, unspecified    Subjective:   Working with therapy; no issues over the weekend; no c/o. ROS: No n/v, cp, sob, f/c  Objective:  Patient Vitals for the past 24 hrs:   BP Temp Temp src Pulse Resp SpO2   01/10/22 0800    115  (!) 80 %   01/10/22 0735 (!) 171/84   89  93 %   01/09/22 2100 100/62 98 °F (36.7 °C) Oral 80 16 98 %   01/09/22 2034      99 %     Gen: No distress, pleasant. HEENT: Normocephalic, atraumatic. CV: Regular rate and rhythm. Resp: No respiratory distress. Abd: Soft, nontender   Ext: No edema. Neuro: Alert, oriented, appropriately interactive. Wt Readings from Last 3 Encounters:   01/08/22 132 lb 9.6 oz (60.1 kg)   10/26/21 175 lb 14.4 oz (79.8 kg)   07/15/21 180 lb (81.6 kg)       Laboratory data:   Lab Results   Component Value Date    WBC 7.3 01/10/2022    HGB 10.2 (L) 01/10/2022    HCT 31.6 (L) 01/10/2022    MCV 76.6 (L) 01/10/2022     01/10/2022       Lab Results   Component Value Date     01/10/2022    K 3.5 01/10/2022    CL 97 01/10/2022    CO2 32 01/10/2022    BUN 8 01/10/2022    CREATININE <0.5 01/10/2022    GLUCOSE 106 01/10/2022    CALCIUM 10.1 01/10/2022        Therapy progress:  PT  Position Activity Restriction  Other position/activity restrictions: PEG, L PICC, Notify physician for pulse less than 50 or greater than 120, respiratory rate less than 12 or greater than 25, oral temperature greater than 101.3 F (38.5 C) , urinary output less than 120 mL in four hours, systolic BP less than 90 or greater than 582, diastolic BP less than 50 or greater than 100. 2.5-4L of O2  Objective     Sit to Stand:  Moderate Assistance,Minimal Assistance  Stand to sit: Minimal Assistance  Bed to Chair: Moderate assistance (with RW)  Device: Parallel Bars  Other Apparatus: O2 (4L O2 NC)  Assistance: Contact guard assistance,Dependent/Total (CGA for balance, TD d/t use of // bars)  Distance: 12'  OT  PT Equipment Recommendations  Equipment Needed: No  Other: has needed equpiment  Toilet - Technique: Stand pivot  Equipment Used: Standard toilet (use of w/c arm to push up.)  Assessment        SLP  Current Diet : Regular  Current Liquid Diet : Thin (via provale cup)  Diet Solids Recommendation: Regular  Liquid Consistency Recommendation: Thin    Body mass index is 19.58 kg/m². Rehabilitation Diagnosis:  Neurologic, 3.8, Neuromuscular Disorders, e.g. Critical Illness Myopathy, Other Myopathy     Assessment and Plan:  S/p covid pneumonia with superimposed bacterial pneumonia  - required trach/peg, proning  - s/p remdesivir, tocilizumab  - completed cefepime     CAD  - asa, statin     HTN  - follow BP       DM  - lantus, SSI     Hypothyroidism  - synthroid    Leukocytosis  - resolved  - completed cefepime;  - repeat culture + for low growth enterococcus; will add 1 week macrobid     Reduced L TVF mobility per FEES  - slp    Dysphagia (pharyngeal), dysphonia  - slp    S/p trach  - stoma care    Bowels: Schedule stool softener. Follow bowel movements. Enema or suppository if needed.      Bladder: Check PVR x 3. [address] if PVR > 200ml or if any volume is > 500 ml.      Sleep: Trazodone provided prn.      Follow up appointments: PCP  GENEVA: 1/21 home w/ home health  DME: JACKIE Alfredo MD 1/10/2022, 9:29 AM

## 2022-01-10 NOTE — PROGRESS NOTES
Comprehensive Nutrition Assessment    Type and Reason for Visit:  Reassess    Nutrition Recommendations/Plan:   1. Continue general diet   2. Continue Ensure HP with lunch and dinner   3. Encourage PO intakes   4. Assist pt with meals as needed if family not present   5. Consider addition of bowel regimen - MD to advise   6. Monitor nutrition adequacy, pertinent labs, bowel habits, wt changes, and clinical progress    Nutrition Assessment:  Follow up: Pt continues on general diet. PO intakes variable this admission. Family bringing in most foods for pt d/t being a picky eater. Pt continues to complain about decreased appetite. Some c/o nausea and no BM noted x5 days. Weights trending down this admission. Not drinking ONS at this time, pt agreeable to try. Encouraged PO intakes. Will continue to monitor. Malnutrition Assessment:  Malnutrition Status: Moderate malnutrition    Context:  Acute Illness     Findings of the 6 clinical characteristics of malnutrition:  Energy Intake:  1 - 75% or less of estimated energy requirements for 7 or more days  Weight Loss:  7 - Greater than 7.5% over 3 months       Estimated Daily Nutrient Needs:  Energy (kcal):  3885-6477 kcal; Weight Used for Energy Requirements:  Current (73 kg)     Protein (g):  73-88 g; Weight Used for Protein Requirements:  Current (1.0-1.2 g/kg)        Fluid (ml/day):   ; Method Used for Fluid Requirements:  1 ml/kcal      Nutrition Related Findings:  Active BS. No BM recorded this admission. Blood sugars 106-238 mg/dL. Wounds:  None       Current Nutrition Therapies:    ADULT DIET; Regular  ADULT ORAL NUTRITION SUPPLEMENT; Lunch, Dinner;  Low Calorie/High Protein Oral Supplement    Anthropometric Measures:  · Height: 5' 9\" (175.3 cm)  · Current Body Weight: 132 lb (59.9 kg)   · Admission Body Weight: 160 lb (72.6 kg)    · Usual Body Weight: 175 lb (79.4 kg) (bed scale on 9/26/21)     · Ideal Body Weight: 145 lbs; % Ideal Body Weight 110.3 % · BMI: 19.5  · BMI Categories: Underweight (BMI less than 22) age over 72       Nutrition Diagnosis:   · Moderate malnutrition related to inadequate protein-energy intake as evidenced by weight loss 7.5% in 3 months,poor intake prior to admission      Nutrition Interventions:   Food and/or Nutrient Delivery:  Continue Current Diet,Continue Oral Nutrition Supplement  Nutrition Education/Counseling:  No recommendation at this time,Education not appropriate   Coordination of Nutrition Care:  Continue to monitor while inpatient    Goals:  Pt will consume greater than 50% of meals and ONS this admission w/o further weight loss       Nutrition Monitoring and Evaluation:   Behavioral-Environmental Outcomes:  None Identified   Food/Nutrient Intake Outcomes:  Food and Nutrient Intake,Supplement Intake  Physical Signs/Symptoms Outcomes:  Biochemical Data,Constipation,Nutrition Focused Physical Findings,Weight     Discharge Planning:    Continue current diet,Continue Oral Nutrition Supplement     Electronically signed by Jes Posada MS, RD, LD on 1/10/22 at 12:32 PM EST    Contact: [phone]

## 2022-01-10 NOTE — PROGRESS NOTES
Physical Therapy  Facility/Department: [facility]  Daily Treatment Note  NAME: Madison Rodriguez  : 1955  MRN: [id number]    Date of Service: 1/10/2022    Discharge Recommendations:  Patient would benefit from continued therapy after discharge,24 hour supervision or assist        Assessment   Body structures, Functions, Activity limitations: Decreased functional mobility ; Decreased balance;Decreased strength;Decreased safe awareness;Decreased high-level IADLs;Decreased endurance  Assessment: Patient seen for therex for core and LE strength, bed mobility with progression to indep for rolling, sup<>sit. Pt seen for transfer training from bed<>chair with min to mod assist (fading assist during transfer- needs most help getting forward weight shift with sit to stand and eccentric control to sit), Patient demonstrated gait 3 trials with FWW and assist of 1 WC follow CGA to SBA with encouragement with max distance 23 feet. Patient demonstrates increased endurancce with walking but noted severe shoulder pain with request to nursing and nursing administering oral pain medication to patient during PT tx. Pt had IV running during first part of tx-during second part of tx IV capped and patient able to walk. Pt needed cues for managing O2 tubing with gait and transsfers. PT notes goal this week to walk to the door and back. See updated goals. Patient Education: Educated in set up for angling WC, managing O2 tubing and set up assist and vc for therex exercise angles with cues for DB with core exercises, facilitated and assisted for safety with gait and transfers as noted with WC follow needed due to low endurance and encouragement needed as pt tearful during session stating she is tired. Pt requested to return to bed at end of the session and with call light in reach and bed alarm engaged wall O2 at 4L via nc and sPO2 98% HR 88 bpm .     Patient Diagnosis(es): There were no encounter diagnoses.      has a past medical history of Anxiety disorder, Chronic pain syndrome, COVID-19, DDD (degenerative disc disease), lumbar, Diabetes ([facility] [address] Displacement of lumbar intervertebral disc without myelopathy, Diverticulitis, Fibromyalgia, Generalized osteoarthrosis, involving multiple sites, GERD (gastroesophageal reflux disease), Impacted cerumen, Influenza A, Irritable bowel syndrome, Other and unspecified hyperlipidemia, Prosthetic joint implant failure ([facility] [address] Screening mammogram, Tracheobronchomalacia, Unspecified asthma(493.90), Unspecified essential hypertension, and Unspecified hypothyroidism. has a past surgical history that includes Hysterectomy; joint replacement (10/92); joint replacement (  12/92); joint replacement (  6/96); joint replacement (  2006); Colonoscopy (2007); Lung surgery (1/2014); Cardiac catheterization; Abdomen surgery; Colon surgery; Upper gastrointestinal endoscopy (N/A, 10/25/2021); and tracheostomy (N/A, 10/25/2021). Restrictions  Restrictions/Precautions  Restrictions/Precautions: Fall Risk,General Precautions  Position Activity Restriction  Other position/activity restrictions: PEG, L PICC, Notify physician for pulse less than 50 or greater than 120, respiratory rate less than 12 or greater than 25, oral temperature greater than 101.3 F (38.5 C) , urinary output less than 120 mL in four hours, systolic BP less than 90 or greater than 968, diastolic BP less than 50 or greater than 100.  2.5-4L of O2  Subjective   Pain Screening  Patient Currently in Pain: Yes  Pain Assessment  Pain Assessment: 0-10  Pain Level: 9  Pain Type: Acute pain  Pain Location: Shoulder  Pain Orientation: Right  Pain Descriptors: Aching  Vital Signs  Pulse: 109  BP: (!) 160/76  BP Location: Right upper arm  Patient Position: Semi fowlers  Level of Consciousness: Alert (0)  Patient Currently in Pain: Yes  Oxygen Therapy  SpO2: 96 %  Pulse Oximeter Device Mode: Intermittent  Pulse Oximeter Device Location: Right;Finger  O2 Device: Nasal cannula  O2 Flow Rate (L/min): 4 L/min       Orientation  Orientation  Overall Orientation Status:  (oriented to person and place. Pt with impaired STM about previous therapy gains.)  Cognition   Cognition  Overall Cognitive Status: Exceptions  Arousal/Alertness: Appropriate responses to stimuli  Following Commands: Follows one step commands with increased time; Follows one step commands with repetition  Attention Span: Appears intact  Memory: Decreased short term memory  Safety Judgement: Decreased awareness of need for assistance;Decreased awareness of need for safety  Problem Solving: Assistance required to generate solutions;Assistance required to identify errors made;Assistance required to correct errors made  Insights: Decreased awareness of deficits  Initiation: Does not require cues  Sequencing: Requires cues for some  Objective   Bed mobility  Rolling to Left: Independent  Rolling to Right: Independent  Supine to Sit: Independent  Sit to Supine: Independent  Transfers:   cues to manage O2 tubing  Sit to Stand: Moderate Assistance  Stand to sit: Minimal Assistance  Bed to Chair: Moderate assistance;Minimal assistance (chair>bed mod for lift cues for hand placement and set up assist to angle WC, bed>WC cues for hand to reach to arm rest with RUE and min assist from bed>chair, SpO2 post transfer 86% on 4L with 1 minute rest and DBE 97% wiht HR 90 bpm.)  Sit<>stand from WC x3 mod to stand and min to mod to sit fading assist with cues for WC set up for transfer and assist for forward weight shift with use of FWW  Ambulation  Ambulation?: Yes  More Ambulation?: Yes  Ambulation 1  Surface: level tile with SBA to CGA and cues to manage O2 tubing  Device: Rolling Walker  Other Apparatus: O2;Wheelchair follow  Quality of Gait: swing not completely passing stance mild flexion in trunk and hips throguhout the gait cycle.   Patient with swing clearance low due to decreased hip and knee flexion on swing with short step length. Gait Deviations: Slow Marisol; Increased RONAN; Decreased step height;Decreased step length;Shuffles  Distance: 22', 23', 23'  Comments: 92% spO2 HR 94 bpm post 22 feet walking with sob and fatigue limiting distance with WC follow rebound 98% SpO2 HR 90 bpm on 4L via nc. After second walk 23'  spO2 97% HR 99bpm immediate post rest wtih fatigue and sob limiting mobility tolerance, after last walk of 23 feet pt stopped due to fatigue with spO2 97%. Wheelchair Activities  Left Brakes Level of Assistance: Independent  Right Brakes Level of Assistance: Independent  Propulsion: Yes  Propulsion 1  Propulsion: Power  Method: RUE;LUE;RLE;LLE  Level of Assistance: Modified independent  Description/ Details: demo indep  with WC propulsion with exception of set up and angleing for transfer needed cues. Distance: 68 feet. with left right turns and backing up        Exercises  Heelslides: x27 BLE fatigue limits. Seated green resisted heel slides x30 BLE  Hip Abduction: x30 BLE simultaneous wtih cues to redirect in supine. sidelying hip abduction x5 each side partial range. Ankle Pumps: supine x30 BLE  Other exercises  Other exercises 2: seated chest pulls x10 yellow BUE  Other exercises 3: seated lat pulldown elbows extended for core x10 yellow BUE  Other exercises 4: seated rows  for core x10 yellow BUE       Addendum Second Session  Assessment: Pt asleep in bed upon entry of therapist, agreeable to supine exercises. Pt then agreeable to sit EOB and complete seated exercises. Pt sat EOB ~5 mins with SBA. Pt reporting \"usual\" pain in R shoulder and abdomen. Pt returned to supine at end of session. Pt's  present throughout session. Did not attempt further mobility d/t fatigue. Vitals: 97%, 85 bpm, 98/54 in supine with HOB elevated. SPO2 down to 90% with exercises at lowest and returns to 96% with PLB and rest breaks.     Bed mobility: supine>sit with SBA and HOB elevated, sit>supine with SBA and HOB elevated. Pt required max A x2 to finish scoot toward HOB to reposition in supine. Exercises:  -20 BLE ankle pumps  -1x 15 BLE heel slides  -1x 10 BLE supine abduction  -1x 12 seated hip adduction with 3 sec holds against pillow  -1x 15 BLE LAQ  -1x 10 BLE seated marches        Goals  Short term goals  Time Frame for Short term goals: 1/7/2022  Short term goal 1: Pt demo indep bed mobility. -- 1/08: GOAL PARTIALLY MET MI with HOB elevated and use of BR.  1/10/2022 GOAL met pt demo indep in bed mobility sup<>sit and rolling. Short term goal 2: Pt demo bed<>chair with mod assist of 1 and FWW or SPT. -- 1/08: GOAL MET Lisa/modA with RW  Short term goal 3: Patient demonstrates gait to bathroom 20 feet with assist of 2 and FWW. GOAL MET 1/6/2021 pt demo gait 35 to 60 feet with FWW and assist of 2. Short term goal 4: Patient demonstrates up and down 1 step with bilateral rails and assist of 2. -- 1/08: DNT d/t safety  Short term goal 5: Patient demonstrates  indep in DBE and 5 minutes of standing activity with spO2 90% or greater to improve endurance for household walking. -- 1/08: GOAL NOT MET, limited by standing tolerance  Long term goals  Time Frame for Long term goals : 1/24/2022  Long term goal 1: Patient demonstrates gait 150 feet with SBA and sPO2 90% or greater able to manage O2 tubing on L/R turns on carpet up to 10 feetwith  safe to assist pt. Long term goal 2: Patient demonstrates up and down 12 steps with SBA and bilateral rails to return to community and home (egress/entry) mobility with  safe to assist pt. Long term goal 3: Patient demonstrates indep bed<>chair, sit<>stand and WC<>car with FWW  Long term goal 4: Patient demonstrates indep WC propulsion 150 feet with L/R turns indep. Long term goal 5: Patient demonstrates 50 feet of walking with L/R turns and carpet HELEN able to indep manage O2 tubing for short distance in home walking.   Patient Goals   Patient goals : \"to walk to the bathroom. Plan    Plan  Times per week: 5/7 days week  Times per day: Daily  Plan weeks: 21 days  Specific instructions for Next Treatment: Progress mobility as tolerated  Current Treatment Recommendations: Strengthening,Neuromuscular Re-education,Home Exercise Program,Cognitive/Perceptual Training,Safety Education & Tod Curb Training,Patient/Caregiver Education & Training,Functional Mobility Training,Wheelchair Mobility Training,Gait Training,Transfer Training,Stair training,Equipment Evaluation, Education, & procurement  Safety Devices  Type of devices:  All fall risk precautions in place,Nurse notified,Call light within reach,Gait belt,Patient at risk for falls,Left in chair,Chair alarm in place     Therapy Time   Individual Concurrent Group Co-treatment   Time In 0930         Time Out 1042         Minutes 72         Timed Code Treatment Minutes: [address]  [address] Time:   Individual Concurrent Group Co-treatment   Time In 1230         Time Out 1300         Minutes 30           Timed Code Treatment Minutes:  [address]    Howard Lebron, PT

## 2022-01-11 LAB
EKG ATRIAL RATE: 86 BPM
EKG DIAGNOSIS: NORMAL
EKG P AXIS: 37 DEGREES
EKG P-R INTERVAL: 150 MS
EKG Q-T INTERVAL: 402 MS
EKG QRS DURATION: 88 MS
EKG QTC CALCULATION (BAZETT): 481 MS
EKG R AXIS: -18 DEGREES
EKG T AXIS: 65 DEGREES
EKG VENTRICULAR RATE: 86 BPM
GLUCOSE BLD-MCNC: 127 MG/DL (ref 70–99)
GLUCOSE BLD-MCNC: 131 MG/DL (ref 70–99)
GLUCOSE BLD-MCNC: 145 MG/DL (ref 70–99)
GLUCOSE BLD-MCNC: 228 MG/DL (ref 70–99)
PERFORMED ON: ABNORMAL
TROPONIN: <0.01 NG/ML

## 2022-01-11 PROCEDURE — 6370000000 HC RX 637 (ALT 250 FOR IP): Performed by: PHYSICAL MEDICINE & REHABILITATION

## 2022-01-11 PROCEDURE — 97129 THER IVNTJ 1ST 15 MIN: CPT

## 2022-01-11 PROCEDURE — 6360000002 HC RX W HCPCS: Performed by: PHYSICAL MEDICINE & REHABILITATION

## 2022-01-11 PROCEDURE — 2700000000 HC OXYGEN THERAPY PER DAY

## 2022-01-11 PROCEDURE — 97116 GAIT TRAINING THERAPY: CPT

## 2022-01-11 PROCEDURE — 36415 COLL VENOUS BLD VENIPUNCTURE: CPT

## 2022-01-11 PROCEDURE — 94640 AIRWAY INHALATION TREATMENT: CPT

## 2022-01-11 PROCEDURE — 97110 THERAPEUTIC EXERCISES: CPT

## 2022-01-11 PROCEDURE — 93010 ELECTROCARDIOGRAM REPORT: CPT | Performed by: INTERNAL MEDICINE

## 2022-01-11 PROCEDURE — 92526 ORAL FUNCTION THERAPY: CPT

## 2022-01-11 PROCEDURE — 93005 ELECTROCARDIOGRAM TRACING: CPT | Performed by: PHYSICAL MEDICINE & REHABILITATION

## 2022-01-11 PROCEDURE — 94761 N-INVAS EAR/PLS OXIMETRY MLT: CPT

## 2022-01-11 PROCEDURE — 1280000000 HC REHAB R&B

## 2022-01-11 PROCEDURE — 97530 THERAPEUTIC ACTIVITIES: CPT

## 2022-01-11 PROCEDURE — 92507 TX SP LANG VOICE COMM INDIV: CPT

## 2022-01-11 PROCEDURE — 84484 ASSAY OF TROPONIN QUANT: CPT

## 2022-01-11 RX ADMIN — DICYCLOMINE HYDROCHLORIDE 20 MG: 20 TABLET ORAL at 10:46

## 2022-01-11 RX ADMIN — INSULIN LISPRO 4 UNITS: 100 INJECTION, SOLUTION INTRAVENOUS; SUBCUTANEOUS at 11:34

## 2022-01-11 RX ADMIN — INSULIN LISPRO 1 UNITS: 100 INJECTION, SOLUTION INTRAVENOUS; SUBCUTANEOUS at 19:58

## 2022-01-11 RX ADMIN — IPRATROPIUM BROMIDE AND ALBUTEROL SULFATE 1 AMPULE: .5; 2.5 SOLUTION RESPIRATORY (INHALATION) at 19:25

## 2022-01-11 RX ADMIN — ASPIRIN 81 MG 81 MG: 81 TABLET ORAL at 09:06

## 2022-01-11 RX ADMIN — FUROSEMIDE 40 MG: 40 TABLET ORAL at 09:06

## 2022-01-11 RX ADMIN — ENOXAPARIN SODIUM 40 MG: 40 INJECTION SUBCUTANEOUS at 09:07

## 2022-01-11 RX ADMIN — LORAZEPAM 0.5 MG: 0.5 TABLET ORAL at 10:45

## 2022-01-11 RX ADMIN — ACETAMINOPHEN 650 MG: 325 TABLET ORAL at 09:06

## 2022-01-11 RX ADMIN — SPIRONOLACTONE 25 MG: 25 TABLET, FILM COATED ORAL at 09:06

## 2022-01-11 RX ADMIN — METOCLOPRAMIDE 5 MG: 10 TABLET ORAL at 09:06

## 2022-01-11 RX ADMIN — BUDESONIDE 500 MCG: 0.5 SUSPENSION RESPIRATORY (INHALATION) at 07:56

## 2022-01-11 RX ADMIN — LEVOTHYROXINE SODIUM 100 MCG: 0.1 TABLET ORAL at 05:58

## 2022-01-11 RX ADMIN — DICYCLOMINE HYDROCHLORIDE 20 MG: 20 TABLET ORAL at 05:58

## 2022-01-11 RX ADMIN — ROPINIROLE HYDROCHLORIDE 0.5 MG: 0.5 TABLET, FILM COATED ORAL at 19:53

## 2022-01-11 RX ADMIN — ROPINIROLE HYDROCHLORIDE 0.5 MG: 0.5 TABLET, FILM COATED ORAL at 09:06

## 2022-01-11 RX ADMIN — LORAZEPAM 0.5 MG: 0.5 TABLET ORAL at 05:58

## 2022-01-11 RX ADMIN — DICYCLOMINE HYDROCHLORIDE 20 MG: 20 TABLET ORAL at 19:53

## 2022-01-11 RX ADMIN — NITROFURANTOIN (MONOHYDRATE/MACROCRYSTALS) 100 MG: 75; 25 CAPSULE ORAL at 19:53

## 2022-01-11 RX ADMIN — INSULIN GLARGINE 7 UNITS: 100 INJECTION, SOLUTION SUBCUTANEOUS at 19:58

## 2022-01-11 RX ADMIN — LORAZEPAM 0.5 MG: 0.5 TABLET ORAL at 15:33

## 2022-01-11 RX ADMIN — TRAZODONE HYDROCHLORIDE 50 MG: 50 TABLET ORAL at 19:53

## 2022-01-11 RX ADMIN — Medication 400 MG: at 09:06

## 2022-01-11 RX ADMIN — ROPINIROLE HYDROCHLORIDE 0.5 MG: 0.5 TABLET, FILM COATED ORAL at 15:34

## 2022-01-11 RX ADMIN — LORAZEPAM 0.5 MG: 0.5 TABLET ORAL at 19:53

## 2022-01-11 RX ADMIN — BUDESONIDE 500 MCG: 0.5 SUSPENSION RESPIRATORY (INHALATION) at 19:33

## 2022-01-11 RX ADMIN — IPRATROPIUM BROMIDE AND ALBUTEROL SULFATE 1 AMPULE: .5; 2.5 SOLUTION RESPIRATORY (INHALATION) at 07:56

## 2022-01-11 RX ADMIN — OXYCODONE 10 MG: 5 TABLET ORAL at 15:33

## 2022-01-11 RX ADMIN — OXYCODONE 10 MG: 5 TABLET ORAL at 10:46

## 2022-01-11 RX ADMIN — NITROFURANTOIN (MONOHYDRATE/MACROCRYSTALS) 100 MG: 75; 25 CAPSULE ORAL at 09:06

## 2022-01-11 RX ADMIN — OXYCODONE 10 MG: 5 TABLET ORAL at 05:57

## 2022-01-11 RX ADMIN — OXYCODONE 10 MG: 5 TABLET ORAL at 19:53

## 2022-01-11 RX ADMIN — METOCLOPRAMIDE 5 MG: 10 TABLET ORAL at 19:53

## 2022-01-11 RX ADMIN — MULTIPLE VITAMINS W/ MINERALS TAB 1 TABLET: TAB at 09:06

## 2022-01-11 ASSESSMENT — PAIN SCALES - GENERAL
PAINLEVEL_OUTOF10: 0
PAINLEVEL_OUTOF10: 7
PAINLEVEL_OUTOF10: 7
PAINLEVEL_OUTOF10: 8
PAINLEVEL_OUTOF10: 8
PAINLEVEL_OUTOF10: 7
PAINLEVEL_OUTOF10: 8
PAINLEVEL_OUTOF10: 8
PAINLEVEL_OUTOF10: 9
PAINLEVEL_OUTOF10: 8
PAINLEVEL_OUTOF10: 8
PAINLEVEL_OUTOF10: 6

## 2022-01-11 ASSESSMENT — PAIN DESCRIPTION - DESCRIPTORS
DESCRIPTORS: STABBING
DESCRIPTORS: ACHING;CONSTANT;SORE
DESCRIPTORS: DISCOMFORT;ACHING

## 2022-01-11 ASSESSMENT — PAIN DESCRIPTION - FREQUENCY
FREQUENCY: CONTINUOUS
FREQUENCY: INTERMITTENT

## 2022-01-11 ASSESSMENT — PAIN DESCRIPTION - PAIN TYPE
TYPE: CHRONIC PAIN
TYPE: ACUTE PAIN;CHRONIC PAIN
TYPE: ACUTE PAIN
TYPE: ACUTE PAIN

## 2022-01-11 ASSESSMENT — PAIN DESCRIPTION - PROGRESSION: CLINICAL_PROGRESSION: NOT CHANGED

## 2022-01-11 ASSESSMENT — PAIN - FUNCTIONAL ASSESSMENT: PAIN_FUNCTIONAL_ASSESSMENT: PREVENTS OR INTERFERES SOME ACTIVE ACTIVITIES AND ADLS

## 2022-01-11 ASSESSMENT — PAIN DESCRIPTION - LOCATION
LOCATION: ABDOMEN;SHOULDER
LOCATION: SHOULDER
LOCATION: SHOULDER
LOCATION: CHEST

## 2022-01-11 ASSESSMENT — PAIN DESCRIPTION - ONSET: ONSET: ON-GOING

## 2022-01-11 ASSESSMENT — PAIN DESCRIPTION - ORIENTATION
ORIENTATION: RIGHT
ORIENTATION: RIGHT

## 2022-01-11 ASSESSMENT — PAIN SCALES - WONG BAKER: WONGBAKER_NUMERICALRESPONSE: 4

## 2022-01-11 NOTE — PROGRESS NOTES
Physical Therapy  Facility/Department: [facility]  Daily Treatment Note  NAME: Jyothi Falcon  : 1955  MRN: [id number]    Date of Service: 2022    Discharge Recommendations:  Patient would benefit from continued therapy after discharge,24 hour supervision or assist        Assessment  Patient seen for split session of therapy which included theract for transfer training with fading assist, endurance/strength  training with WC propulsion and LE therex , and gait training with monitoring of vitals and WC follow with use of FWW fading assist CGA to min assist with max distance 30 feet. Pt easily sob this date with SpO2 staying 90% or greater on 3.5 L via nc with exception of walking increased distances to 40 feet had O2 desaturation to 80% with rebound in 1 minute with  Cues for DB and post 1 minute spO2 90% and at 1.5 minutes 98%. Patient after first session up to [facility] with call light in reach and chair alarm engaged. After second session pt returned to bed and nursing notified as pt experiencing chest pain with report of stabbing pain when seated at rest at end of second session after walking 20 feet in room. Pt with stable vitals post walk with physician notified. Pt returned to bed with bed alarm engaged and with call light in reach. Pt's  present and asked to speak with physician to see if patient's magnesium and potassium were low. Nursing notified patient during session pt to have ekg and that physician would be by later to speak with them. PT's  voiced concern that patient was doing hip AROM abduction and he noted he was concerned as he reported a h/o RLE hip dislocation in the past and painful hips and knees s/p TJR's.  Explained to patient that patient completing AROM abduction in range as tolerated and has not been abducting in this patient's presence greater than she would when getting OOB with pt moving easily in bed turnign and sup<>sit scooting eob and to hob this dated with patient reporting no pain except in shoulder and chest as noted. Pt's  noted pt's RLE dislocated in remote past with IR. Encouraged pt to let therapists know if any discomfort in LE with mobility. Activity Tolerance  Activity Tolerance: at rest BP semirecumbent /72,  bpm, SpO2 on 3.5L via nc 96%. Shoulder pain 8/10 RUE. Patient Diagnosis(es): There were no encounter diagnoses. has a past medical history of Anxiety disorder, Chronic pain syndrome, COVID-19, DDD (degenerative disc disease), lumbar, Diabetes ([address] Displacement of lumbar intervertebral disc without myelopathy, Diverticulitis, Fibromyalgia, Generalized osteoarthrosis, involving multiple sites, GERD (gastroesophageal reflux disease), Impacted cerumen, Influenza A, Irritable bowel syndrome, Other and unspecified hyperlipidemia, Prosthetic joint implant failure ([facility] [address] Screening mammogram, Tracheobronchomalacia, Unspecified asthma(493.90), Unspecified essential hypertension, and Unspecified hypothyroidism. has a past surgical history that includes Hysterectomy; joint replacement (10/92); joint replacement (  12/92); joint replacement (  6/96); joint replacement (  2006); Colonoscopy (2007); Lung surgery (1/2014); Cardiac catheterization; Abdomen surgery; Colon surgery; Upper gastrointestinal endoscopy (N/A, 10/25/2021); and tracheostomy (N/A, 10/25/2021). Restrictions  Restrictions/Precautions  Restrictions/Precautions: Fall Risk,General Precautions  Position Activity Restriction  Other position/activity restrictions: PEG, L PICC, Notify physician for pulse less than 50 or greater than 120, respiratory rate less than 12 or greater than 25, oral temperature greater than 101.3 F (38.5 C) , urinary output less than 120 mL in four hours, systolic BP less than 90 or greater than 953, diastolic BP less than 50 or greater than 100.  2.5-4L of O2  Subjective   Pain Screening  Patient Currently in Pain: Yes  Pain Assessment  Pain Assessment: 0-10  Pain Level: 8 (notified nursing)  Pain Type: Acute pain  Pain Location: Chest  Pain Descriptors: Stabbing  Vital Signs  Pulse: 94  Resp: 24  BP: 138/66  BP Location: Right upper arm  Patient Position: Sitting  Level of Consciousness: Alert (0)  Patient Currently in Pain: Yes  Oxygen Therapy  SpO2: 98 %  Pulse Oximeter Device Mode: Intermittent  Pulse Oximeter Device Location: Right;Finger  O2 Device: Nasal cannula  O2 Flow Rate (L/min): 3.5 L/min       Orientation  Orientation  Overall Orientation Status: Within Normal Limits  Cognition   Cognition  Overall Cognitive Status: Exceptions  Arousal/Alertness: Appropriate responses to stimuli  Following Commands: Follows one step commands with increased time; Follows one step commands with repetition  Attention Span: Appears intact  Memory: Decreased short term memory  Safety Judgement: Decreased awareness of need for assistance;Decreased awareness of need for safety  Problem Solving: Assistance required to generate solutions;Assistance required to identify errors made;Assistance required to correct errors made  Insights: Decreased awareness of deficits  Initiation: Does not require cues  Sequencing: Requires cues for some  Cognition Comment: . Objective   Bed mobility  Rolling to Left: Independent  Rolling to Right: Independent  Supine to Sit: Independent  Sit to Supine: Independent  Scooting: Independent  Transfers all with set up assist for O2 line management.    Sit to Stand: Stand by assistance (FWW bed to stand)  Stand to sit: Contact guard assistance to  (poor eccentric control with FWW) to SBA fading assist  Sit to Stand:from regular chair SBA with FWW with extra time uses UE heavily  Transfers sit<>stand from WC x 4 fading assist from CGA to SBA   Comment: transfers bike transfer seat [facility] wtih FWW, sit<.stand from WC x5 with CGA FWW no cues needed but noted poor eccentric control and slow with sit to stand to shift weight forward over COG,  Ambulation  Ambulation?: Yes  More Ambulation?: Yes  Ambulation 1  Surface: level tile  Device: Rolling Walker  Other Apparatus: O2;Wheelchair follow  Assistance: Contact guard assistance  Quality of Gait: swing not completely passing stance mild flexion in trunk and hips throguhout the gait cycle. Patient with swing clearance low due to decreased hip and knee flexion on swing with short step length. Gait Deviations: Slow Marisol; Increased RONAN; Decreased step height;Decreased step length;Shuffles  Distance: 20 feet, 30 feet, 22 feet, 12 feet  Comments: immediate post walk SpO2 87%,  bpm after 20 feet rebound with cough and DB after 10 seconds to 104bpm, 92% sPO2. AFter 20 feet SpO2 95% HR 102bpm.  After 30feet SpO2  with 130 seconds rest SpO2 91%, HR 113bpm. After 22feet SpO2 93%and HR 87bpm.  After 12 feet SpO2 94%  bpm.  With each trial of walking, WC follow needed as weakness and sob limit walking distance with 1-2 minutes seated rest to recover. [place]c Propulsion 80 feet HELEN with UE And LE. Exercises: completed with vc and set up assist. Educated in  [address] with goal for activity 4/10 or less. Heelslides: x10 with 3# ankle weights. Gluteal Sets: hooklying x10  Hip Abduction: supine simultaneous x10 AROM as tolerated   Knee Short Arc Quad: 27 reps BLE, 3 reps BLE wtih 3# ankle cuff weight. Other exercises  Other exercises 1: recumbent stepper 20 steps/minute or greater level 1 for  3.5 minute and 1 minute to maintain RPE 4/10 on FADIA scale. PT with spO2 97-98% but pt notes she is sob and needs rest after each bout. Disposition:Patient with call light in reach and alarm in place at end of session with patient in bed. Education:   Educated patient in completing activity as tolerated and with FADIA scale 4/10 or lesswith patient verbalizing understanding. Pt however, will need frequent reminders as has impaired STM.      SECOND SESSION:    Subjective:  Pain in right shoulder 9/10. Pt denies pain elsewhere. However, at tend of the session pt noted she had stabbing chest pain 8/10 with nursing notified and nursing notified physician with STAT EKG ordered   Patient's  present during treatment and notes he is concerned that patient is having to abduct her legs too far and she may dislocate her hip. Her  notes she dislocated her  Right hip 9-10 years ago and had to have it relocated in ER by ortho. Noted it was an internal rotation position. BED MOBILITY:  Sup>sit indep. Sit>sup indep  Scooting in bed indep, rolling to right side indep. TRANSFERS:  Bed>chair SBA  Post transfer 87% spO2 HR 107bpm; after 2 minute seated rest plb 98%, HR 93bpm.   Chair >bed SBA with use of FWW  Sit<>stand from bed, chairx2 with SBA and fWW with set up assist for O2 line management. GAIT:  Patient walked 40 feet with spo2 immediate post walk 80%  bpm with plb on 3.5 L via nc 1 minutes 90% with 1.5 minute with cues for increased chest wall mobility   98% and HR 99bpm.    Patient walked 20 feet with post walk spO2 wall mount O2 3.5 L 90% HR 118bpm noted chest pain within 1 minute pt noted 97% SpO2 and HR 95bpm.  Notified RN immediately. Pt notes she has not had this in the past.   LAQ BLE x15  Seated in chair with cues   Education:   Educated patient in [address] 4/10 or less with activity and encouraged pt to let therapist knwo if any pain with mobility. with patient verbalizing understanding Pt educated in deep breathing and encouraged for increased chest wall mobility to decrease shallow breathing with pt with increased chest wall mobility noted with cues. Disposition:Patient with call light in reach and alarm in place at end of session with patient in adriana and  with patient In bed Supine semirecumbent, HR 94bpm, RR24/minute, BP /66 3.5 L O2 via nc wall mount 98% spO2  Rates chest pain as stabbing 8/10.  Stat EKG ordered and lab for troponin ordered by physician per nursing. Goals  Short term goals  Time Frame for Short term goals: 1/7/2022  Short term goal 1: Pt demo indep bed mobility. -- 1/08: GOAL PARTIALLY MET MI with HOB elevated and use of BR.  1/10/2022 GOAL met pt demo indep in bed mobility sup<>sit and rolling. Short term goal 2: Pt demo bed<>chair with mod assist of 1 and FWW or SPT. -- 1/08: GOAL MET Lisa/modA with RW  Short term goal 3: Patient demonstrates gait to bathroom 20 feet with assist of 2 and FWW. GOAL MET 1/6/2021 pt demo gait 35 to 60 feet with FWW and assist of 2. Short term goal 4: Patient demonstrates up and down 1 step with bilateral rails and assist of 2. -- 1/08: DNT d/t safety  Short term goal 5: Patient demonstrates  indep in DBE and 5 minutes of standing activity with spO2 90% or greater to improve endurance for household walking. -- 1/08: GOAL NOT MET, limited by standing tolerance  Long term goals  Time Frame for Long term goals : 1/24/2022  Long term goal 1: Patient demonstrates gait 150 feet with SBA and sPO2 90% or greater able to manage O2 tubing on L/R turns on carpet up to 10 feetwith  safe to assist pt. Long term goal 2: Patient demonstrates up and down 12 steps with SBA and bilateral rails to return to community and home (egress/entry) mobility with  safe to assist pt. Long term goal 3: Patient demonstrates indep bed<>chair, sit<>stand and WC<>car with FWW  Long term goal 4: Patient demonstrates indep WC propulsion 150 feet with L/R turns indep. Long term goal 5: Patient demonstrates 50 feet of walking with L/R turns and carpet HELEN able to indep manage O2 tubing for short distance in home walking. Patient Goals   Patient goals : \"to walk to the bathroom.     Plan    Plan  Times per week: 5/7 days week  Times per day: Daily  Plan weeks: 21 days  Specific instructions for Next Treatment: Progress mobility as tolerated  Current Treatment Recommendations: Strengthening,Neuromuscular Re-education,Home Exercise Program,Cognitive/Perceptual Training,Safety Education & Training,Balance Training,Endurance Training,Patient/Caregiver Education & Training,Functional Mobility Training,Wheelchair Mobility Training,Gait Training,Transfer Training,Stair training,Equipment Evaluation, Education, & procurement  Safety Devices  Type of devices:  All fall risk precautions in place,Nurse notified,Call light within reach,Gait belt,Patient at risk for falls,Left in chair,Chair alarm in place     Therapy Time   Individual Concurrent Group Co-treatment   Time In 0800         Time Out 0900         Minutes 60         Timed Code Treatment Minutes: [address] Time:   Individual Concurrent Group Co-treatment   Time In [address]         Minutes 45           Timed Code Treatment Minutes:  30    Total Treatment Minutes:  90         Jarett Swain, PT

## 2022-01-11 NOTE — PROGRESS NOTES
Occupational Therapy  Facility/Department: [facility] ARU  Daily Treatment Note  NAME: Rosita Kumar  : 1955  MRN: [id number]    Date of Service: 2022    Discharge Recommendations:  24 hour supervision or assist,Home with Home health OT,S Level 1  OT Equipment Recommendations  Equipment Needed: Yes  Mobility Devices: ADL Assistive Devices  Other: CTA: possible sock aid    Assessment   Performance deficits / Impairments: Decreased functional mobility ; Decreased ADL status; Decreased strength;Decreased safe awareness;Decreased cognition;Decreased endurance;Decreased balance;Decreased coordination;Decreased posture;Decreased fine motor control;Decreased high-level IADLs  Assessment: Pt agreeable to OT session. Pt less anxious with mobility this date. Pt spO2 remained above 90% for the entire session on 4L of O2. Pt complete dynamic standing activity for 3 mins to take laundry from a table and hang the laundry on clothing rack. Pt completed BUE therex wtih fair strength in LUE and continued pain in R shoulder. Pt participate in sit to stand chair pushups 10x to hang item on the correct answer to math problem on number board. Pt participate in connect 4 game in stance for 5 mins needing max cues for playing the game despite knowing the rules. Pt requiring modA progressing to CGA for all STS transfers and CGA for standing balance. Continue per POC. Prognosis: Good  OT Education: OT Role;Plan of Care;ADL Adaptive Strategies;Transfer Training;Energy Conservation;Precautions; Equipment  Patient Education: disease specific: goals, PLB, safety during ADLS and t/fs, energy conservation during ADLS,  Pt verbalized understanding but will need reinforcement. Barriers to Learning: confusion/fatigue?   REQUIRES OT FOLLOW UP: Yes  Activity Tolerance  Activity Tolerance: Patient Tolerated treatment well;Patient limited by fatigue  Activity Tolerance: Pt spO2 maintained above 90% for duration of session on 4L O2; EOB vitals - 85HR, 96% on 4L, /69, pt with complaint of dizziness 1x in gym, VSS /81, pt requiring seated rest breaks throughout session/demo SOB  Safety Devices  Safety Devices in place: Yes  Type of devices: Call light within reach; Left in bed;Bed alarm in place ( in pt room at OT exit)         Patient Diagnosis(es): There were no encounter diagnoses. has a past medical history of Anxiety disorder, Chronic pain syndrome, COVID-19, DDD (degenerative disc disease), lumbar, Diabetes ([address] Displacement of lumbar intervertebral disc without myelopathy, Diverticulitis, Fibromyalgia, Generalized osteoarthrosis, involving multiple sites, GERD (gastroesophageal reflux disease), Impacted cerumen, Influenza A, Irritable bowel syndrome, Other and unspecified hyperlipidemia, Prosthetic joint implant failure ([facility] [address] Screening mammogram, Tracheobronchomalacia, Unspecified asthma(493[address]90), Unspecified essential hypertension, and Unspecified hypothyroidism. has a past surgical history that includes Hysterectomy; joint replacement (10/92); joint replacement (  12/92); joint replacement (  6/96); joint replacement (  2006); Colonoscopy (2007); Lung surgery (1/2014); Cardiac catheterization; Abdomen surgery; Colon surgery; Upper gastrointestinal endoscopy (N/A, 10/25/2021); and tracheostomy (N/A, 10/25/2021). Restrictions  Restrictions/Precautions  Restrictions/Precautions: Fall Risk,General Precautions  Position Activity Restriction  Other position/activity restrictions: PEG, L PICC, Notify physician for pulse less than 50 or greater than 120, respiratory rate less than 12 or greater than 25, oral temperature greater than 101.3 F (38.5 C) , urinary output less than 120 mL in four hours, systolic BP less than 90 or greater than 440, diastolic BP less than 50 or greater than 100.  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  Subjective  Subjective: Pt supine in bed,  present, agreeable to OT session  Pain Assessment  Mcdaniel-Johnson Pain Rating: Hurts little more  Pain Type: Chronic pain  Pain Location: Shoulder  Pain Orientation: Right  Pain Descriptors: Discomfort;Aching  Pain Frequency: Intermittent  Non-Pharmaceutical Pain Intervention(s): Repositioned; Emotional support  Response to Pain Intervention: Patient Satisfied  Pre Treatment Pain Screening  Intervention List: Patient able to continue with treatment  Vital Signs  Patient Currently in Pain: Yes   Orientation  Orientation  Overall Orientation Status: Within Functional Limits  Objective    ADL  Additional Comments: Pt declined need to complete ADLs this date        Balance  Sitting Balance: Supervision  Standing Balance: Contact guard assistance (with RW)  Standing Balance  Time: 15 secons 10x, 3 mins 1x, 5 mins 1x  Activity: STS transfers with math problems to hang a ring on the number board, laundry hanging task, connect 4 game  Comment: with RW  Functional Mobility  Functional - Mobility Device: Wheelchair  Activity: To/From therapy gym  Assist Level: Dependent/Total  Wheelchair Bed Transfers  Wheelchair/Bed - Technique: Stand step  Equipment Used: Wheelchair  Level of Asssistance: Contact guard assistance; Moderate assistance (CGA to mod A)  Bed mobility  Supine to Sit: Modified independent (HOB raised)  Sit to Supine: Independent  Scooting: Independent  Transfers  Stand Step Transfers: Minimal assistance (HHA bed <-> w/c)  Sit to stand:  Moderate assistance;Contact guard assistance (x13, initially modA from EOB and w/c progressing to CGA)  Stand to sit: Contact guard assistance Cognition  Overall Cognitive Status: Exceptions  Arousal/Alertness: Appropriate responses to stimuli  Following Commands: Follows one step commands with increased time; Follows one step commands with repetition  Attention Span: Appears intact  Memory: Decreased short term memory  Safety Judgement: Decreased awareness of need for assistance;Decreased awareness of need for safety  Problem Solving: Assistance required to generate solutions;Assistance required to identify errors made;Assistance required to correct errors made  Insights: Decreased awareness of deficits  Initiation: Does not require cues  Sequencing: Requires cues for some  Cognition Comment: Addition/subtraction activity completed with STS activity, pt with difficulty understanding concept of Connect 4 game despite  talking about how shes played game previously     Perception  Overall Perceptual Status: WFL  Unilateral Attention: Appears intact  Initiation: Appears intact  Motor Planning: Appears intact              Type of ROM/Therapeutic Exercise  Type of ROM/Therapeutic Exercise: Free weights  Comment: 3#  Exercises  Shoulder Flexion: RUE AROM 10x no weight, LUE x10 3#  Chair Push-ups: x10  Elbow Flexion: x10 3#  Elbow Extension: x10 3#  Supination: x10 3#  Pronation: x10 3#  Wrist Flexion: x10 3#  Wrist Extension: x10 3#  Other: chest press x10 RUE no weight, LUE 3#                    Plan   Plan  Times per week: 5/7 days a week  Times per day: Daily  Plan weeks: 3 weeks  Current Treatment Recommendations: Strengthening,Endurance Training,Balance Training,Functional Mobility Training,Safety Education & Training,Equipment Evaluation, Education, & procurement,Self-Care / ADL,Patient/Caregiver Education & Training,Home Management Training,ROM    Goals  Short term goals  Time Frame for Short term goals: 10 days (1/13)  Short term goal 1: Pt will perform at least 3min of standing functional activities with AD PRN.  GOAL MET 1/07/22 Pt completed 3 minutes of standing tasks with CGA with RW. Short term goal 2: Pt will perform functional/toilet t/fs with CGA and AD PRN. -Ongoing (1/11) modA to [address] for functional t/fs  Short term goal 3: Pt will toilet with CGA and AD PRN. Short term goal 4: Pt will LB dress with min A and AE/AD PRN. Short term goal 5: Pt will total body bathe with min A and AD PRN. GOAL MET 1/10/22 Pt performed total body bathing with min A. Long term goals  Time Frame for Long term goals : 21 days (1/24)  Long term goal 1: Pt will perform at least 5 min of standing functional activities. Long term goal 2: Pt will total body dress with mod I.  Long term goal 3: Pt will total body bathe with mod I.  Long term goal 4: Pt will perform a simple IADL task with supervision and AD PRN. Long term goal 5: Pt will toilet with mod I.   Patient Goals   Patient goals : \"to be able to stand up and walk again\"       Therapy Time   Individual Concurrent Group Co-treatment   Time In 1230         Time Out 1330         Minutes 60         Timed Code Treatment Minutes: [facility]

## 2022-01-11 NOTE — PROGRESS NOTES
Speech Language Pathology  [facility]: ACUTE REHAB UNIT  SPEECH-LANGUAGE PATHOLOGY      [x] Daily  [] Weekly Care Conference Note  [] Discharge    Patient:Esperanza Franklin      CVS:[id number]/5636  VFB:[id number]  Rehab Dx/Hx: Post covid-19 condition, unspecified [U09.9]    Precautions: falls and aspirations  Home situation: lives at home with , dtr, two grandchildren; not an active ; manages own meds; shares household tasks with ,  manages finances   ST Dx: [] Aphasia  [] Dysarthria  [] Apraxia   [x] Oropharyngeal dysphagia [x] Cognitive Impairment  [x] Other: voice tx   Date of Admit: 1/3/2022  Room #: 0151/0151-01    Current functional status (updated daily):         Pt being seen for : [x] Speech/Language Treatment  [x] Dysphagia Treatment [x] Cognitive Treatment  [] Other:  Communication: []WFL  [] Aphasia  [] Dysarthria  [] Apraxia  [] Pragmatic Impairment [] Non-verbal  [] Hearing Loss  [x] Other: dysphonia  Cognition: [] WFL  [x] Mild  [x] Moderate  [] Severe [] Unable to Assess  [] Other:  Memory: [] WFL  [x] Mild  [x] Moderate  [] Severe [] Unable to Assess  [] Other:  Behavior: [x] Alert  [x] Cooperative  [x]  Pleasant  [] Confused  [] Agitated  [] Uncooperative  [] Distractible [] Motivated  [] Self-Limiting [] Anxious  [] Other:  Endurance:  [x] Adequate for participation in SLP sessions  [] Reduced overall  [] Lethargic  [] Other:  Safety: [x] No concerns at this time  [] Reduced insight into deficits  []  Reduced safety awareness [] Not following call light procedures  [] Unable to Assess  [] Other:    Current Diet Order:ADULT DIET; Regular  ADULT ORAL NUTRITION SUPPLEMENT; Lunch, Dinner; Low Calorie/High Protein Oral Supplement  Swallowing Precautions: Alternate solids and liquids;Eat/Feed slowly; No straws;Upright as possible for all oral intake;Remain upright for 30-45 minutes after meals;Small bites/sips           Date: 1/11/2022      Tx session 1  [id number] Tx session 2  All tx needs met in session 1    Total Timed Code Min 10    Total Treatment Minutes 60    Individual Treatment Minutes 60    Group Treatment Minutes 0 0   Co-Treat Minutes 0 0   Variance/Reason:  0    Pain None reported    Pain Intervention [] RN notified  [] Repositioned  [] Intervention offered and patient declined  [x] N/A  [] Other: [] RN notified  [] Repositioned  [] Intervention offered and patient declined  [] N/A  [] Other:   Subjective     Pt alert and oriented, cooperative and agreeable to participate in therapy. Pt seen sitting upright in bed,  Sofia Cazares present at bedside. Objective:  Goals     Dysphagia Goals:  Short-term Goals  Timeframe for Short-term Goals: 10 days (01/13/22)    Goal 1: The patient will complete pharyngeal/laryngeal strengthening exercises for improved swallowing function 10/10 given min cues. -Effortful swallow: x10  -Delores: x10  -Isometric tongue tip hold for 5 secs: x10  -Jaw jut hold for 5 secs: x10  -Resistive jaw opening hold for 5 secs: x10        Goal 2. The patient will tolerate recommended diet without observed clinical signs of aspiration. Pt observed with taking meds whole with puree with pill at a time, tolerating without any difficulty or s/s of aspiration. Pt observed with TL via provale cup, occasional delayed coughing noted. Difficult to discern if this was directly related to swallowing or not, pt does present with a baseline congested, wet cough as well. Goal 3. The patient/caregiver will demonstrate understanding of compensatory strategies for improved swallowing safety. SLP reviewed and discussed important use of safe swallow strategies with pt, and pt's . Pt verbalized understanding but will require ongoing reinforcement and education d/t memory deficits. Pt's  verbalized understanding. Goal 4. The patient will tolerate thin liquids without signs and symptoms of aspiration 10/10 via cup. Did not target. Speech/Lang/Cog goals:  Short-term Goals  Timeframe for Short-term Goals: 14 days (01/17/22)    Goal 1: Pt will complete recall tasks with 80% acc given min cues for use of compensatory strategies. Indirectly targeted. Pt required max cues to recall pharyngeal/laryngeal strengthening exercises back to back (ex: completed 2 mare's, when SLP says go again, pt unable to recall what exercise she just completed).  reported concerns regarding increased difficulty with pt's memory, RN in room when pt's  was discussing this. Pt was also having labs drawn at this time as well. Goal 2: Pt will complete executive function tasks (money, math, time, etc) with 80% acc given min cues. Did not target. Goal 3: Pt will complete verbal and visual reasoning tasks with 80% acc given min cues. Did not target. Goal 4: Pt will complete problem solving and thought organization tasks with 80% acc given min cues. Did not target. Goal 5: Pt will complete vocal strengthening exercises for improved vocal function 10/10 given min cues. Sustained /ah/:   -average of 1 second which was less compared to yesterday   -significant dysphonic and wet vocal quality     Cup bubble (put a straw into glass of water and blow for as long as able):   -average of 6.6 seconds on first five trials  -average of 5.8 seconds on second five trials   -overall improvement compared to last tx session    Diaphragmatic breathing exercise: x10      Other areas targeted: Pt's  reported pt was having chest pain upon SLP arrival, EKG had just been performed. RN reported EKG was normal, labs to be drawn. Pt's  asking when PEG can be removed. PEG placed on 10/25/21, MD notified and stated he would pull tomorrow.       Education:   Edu provided re: safe swallow strategies, pharyngeal/laryngeal strengthening and vocal exercises    Safety Devices: [x] Call light within reach  [x] Chair alarm activated  [] Bed alarm activated  [x] Other:  at bedside [] Call light within reach  [] Chair alarm activated  [] Bed alarm activated  [] Other:    Assessment: Pt pleasant and cooperative, agreeable to participate. Pt lethargic with increased anxiousness throughout tx session. Pt's  reported pt was having chest pain prior to ST session, EKG obtained, results were normal per RN. Pt required increased encouragement to participate today but able to follow through, frequent breaks required in between exercises. Pt completed pharyngeal/laryngeal strengthening exercises given min cues. Pt with improved cup bubble vocal exercise compared to yesterday. Pt presenting with significantly reduced working memory, requiring mod-max cues to recall exercises. Pt's  also verbalized concern regarding pt's current memory/mentation status. RN aware. Pt is motivated to improve, continue goals listed above. Plan: Continue as per plan of care. Additional Information:     Barriers toward progress: Confusion and Cognitive deficit  Discharge recommendations:  [] Home independently  [] Home with assistance [x]  24 hour supervision  [] ECF [] Other:  Continued Tx Upon Discharge: ? [x] Yes [] No [] TBD based on progress while on ARU [] Vital Stim indicated [] Other:   Estimated discharge date: 01/21/22    Interventions used this date:  [x] Speech/Language Treatment  [] Instruction in HEP [] Group [x] Dysphagia Treatment [x] Cognitive Treatment   [] Other: Total Time Breakdown / Charges    Time in Time out Total Time / units   Cognitive Tx 1120 1130 10 min/1 unit    Speech Tx 1100 1120 20 min/ 1 unit    Dysphagia Tx 1030 1100 30 min/ 1 unit        Electronically Signed by     Rama Tavaerz. A CCC-SLP  Speech-Language Pathologist  [id number]

## 2022-01-11 NOTE — PROGRESS NOTES
[facility] [facility] - ENT  PROGRESS  NOTE    Date of Service: 1/11/2022    ASSESSMENT/PLAN  Ruth Tam is a very pleasant 78 y.o. female with left true vocal fold paralysis. I discussed vocal fold medialization with the  who is at the bedside today and he is amenable. Risk benefits and alternatives to surgery were discussed. Risks include but not limited to worsening voice, airway compromise, failure to improve in the antecedent risks of general anesthesia. They are amenable to surgery however this will have to wait until she is outpatient from the rehab unit. I will place my postoperative instructions for the patient to review and office information on her chart.     Stacia Mcgee, DO

## 2022-01-11 NOTE — PATIENT CARE CONFERENCE
[facility]  Inpatient Rehabilitation  Weekly Team Conference Note    Date: 2022  Patient Name: Karmen Baron        MRN: [id number]    : 1955  (78 y.o.)  Gender: female   Referring Practitioner: Ivonne Stewart MD  Diagnosis: Post COVID 19critical illness myopathy. Interventions to be utilized toward barriers to discharge, per discipline:  [address] observed barriers to dc: Decreased endurance  Nursing interventions:Assist with ADL's, toileting and medication management  Family Education: no  Fall Risk:  Yes      Physical therapy observed barriers to dc:    Baseline: pt lives at home with , dtr, and two grandchildren,   Sustained pelvic Fx 2021, prior to that used cane independenlty   Current level: requires assist of 1 for gait with FWW up to 40 feet with post walk hypoxia requiring 1 minute rebound to sPO2 90% or greater , transfers FWW SBA to CGA, has not trialed steps, patient with poor STM, has been emotional crying in past week with patient reporting she is fearful about her memory issues. Barriers to DC: poor endurance, h/o fall with PELVIC FX 10/2021, at present would need 24 hour supervision/assist.    Needs in order to achieve dc home/next level of care: FWW, transport WC for communicty distances, family training possible ramped entrance if patient is unable to negotiate steps.        Physical therapy interventions:   Current Treatment Recommendations: Strengthening,Neuromuscular Re-education,Home Exercise Program,Cognitive/Perceptual Training,Safety Education & Training,Balance Training,Endurance Training,Patient/Caregiver Education & Training,Functional Mobility Training,Wheelchair Mobility Training,Gait Training,Transfer Training,Stair training,Equipment Evaluation, Education, & procurement      PHYSICAL THERAPY  PT Equipment Recommendations  Equipment Needed: No  Other: has needed equpiment    Assessment: Patient seen for split session of therapy which included theract for transfer training with fading assist, endurance/strength  training with WC propulsion and LE therex , and gait training with monitoring of vitals and WC follow with use of FWW fading assist CGA to min assist with max distance 30 feet. Pt easily sob this date with SpO2 staying 90% or greater on 3.5 L via nc with exception of walking increased distances to 40 feet had O2 desaturation to 80% with rebound in 1 minute with  Cues for DB and post 1 minute spO2 90% and at 1.5 minutes 98%. Patient after first session up to [facility] with call light in reach and chair alarm engaged. After second session pt returned to bed and nursing notified as pt experiencing chest pain with report of stabbing pain when seated at rest at end of second session after walking 20 feet in room. Pt with stable vitals post walk with physician notified. Pt returned to bed with bed alarm engaged and with call light in reach. Pt's  present and asked to speak with physician to see if patient's magnesium and potassium were low. Nursing notified patient during session pt to have ekg and that physician would be by later to speak with them. PT's  voiced concern that patient was doing hip AROM abduction and he noted he was concerned as he reported a h/o RLE hip dislocation in the past and painful hips and knees s/p TJR's. Explained to patient that patient completing AROM abduction in range as tolerated and has not been abducting in this patient's presence greater than she would when getting OOB with pt moving easily in bed turnign and sup<>sit scooting eob and to hob this dated with patient reporting no pain except in shoulder and chest as noted. Pt's  noted pt's RLE dislocated in remote past with IR. Encouraged pt to let therapists know if any discomfort in LE with mobility.         Occupational therapy observed barriers to dc:    Baseline: independent all ADLs and mobility, lives with family              Current level: iLsa for sit pivots, mod A LB ADLs, supvn UB ADLs, dropping O2 sats, HR increasing              Barriers to DC: activity tolerance, weakness, anxiety, limited family assistance              Needs in order to achieve dc home/next level of care: min A for ADLs, and SPV for transfers    Occupational Therapy interventions:  Current Treatment Recommendations: Strengthening,Endurance Training,Balance Training,Functional Mobility Training,Safety Education & Training,Equipment Evaluation, Education, & procurement,Self-Care / ADL,Patient/Caregiver Education & Training,Home Management Training,ROM      OCCUPATIONAL THERAPY    Assessment: Pt agreeable to OT session. Pt less anxious with mobility this date. Pt spO2 remained above 90% for the entire session on 4L of O2. Pt complete dynamic standing activity for 3 mins to take laundry from a table and hang the laundry on clothing rack. Pt completed BUE therex wtih fair strength in LUE and continued pain in R shoulder. Pt participate in sit to stand chair pushups 10x to hang item on the correct answer to math problem on number board. Pt participate in connect 4 game in stance for 5 mins needing max cues for playing the game despite knowing the rules. Pt requiring modA progressing to CGA for all STS transfers and CGA for standing balance. Continue per POC.           Speech therapy observed barriers to dc:    Baseline: pt lives at home with , dtr, and two grandchildren,  manages finances/household tasks, pt manages own meds, not an active    Current level: mod pharyngeal dysphagia, mod-severe dysphonia, mod-severe cognitive linguistic deficit    Barriers to DC: paralyzed L TVF per ENT, dysphonia, limited carryover, reduced insight    Needs in order to achieve dc home/next level of care: 24 hour assist, carryover of compensatory strategies, tolerance of LRD, improved vocal quality     Speech Therapy interventions:  Dysphagia: Therapeutic Interventions: Diet tolerance monitoring,Patient/Family education,Therapeutic PO trials with SLP  Speech/Language/Cognition: Compensatory strategy training and carryover, recall/STM, problem solving, reasoning, exec function, thought organization, attention. SPEECH THERAPY:  Pt pleasant and cooperative, agreeable to participate. Pt lethargic with increased anxiousness throughout tx session. Pt's  reported pt was having chest pain prior to ST session, EKG obtained, results were normal per RN. Pt required increased encouragement to participate today but able to follow through, frequent breaks required in between exercises. Pt completed pharyngeal/laryngeal strengthening exercises given min cues. Pt with improved cup bubble vocal exercise compared to yesterday. Pt presenting with significantly reduced working memory, requiring mod-max cues to recall exercises. Pt's  also verbalized concern regarding pt's current memory/mentation status. RN aware. Pt is motivated to improve, continue goals listed above. NUTRITION  Weight: 153 lb 1.6 oz (69.4 kg) / Body mass index is 22.61 kg/m². Diet Order: ADULT DIET; Regular  ADULT ORAL NUTRITION SUPPLEMENT; Lunch, Dinner; Low Calorie/High Protein Oral Supplement  PO Meals Eaten (%): 76 - 100%  Education: No recommendation at this time/not appropriate      CASE MANAGEMENT  Assessment: Home with spouse, one step entry PLOF using cane for community distances. Therapy recs for 24 hour assist and home health therapy. Does not need DME at this time. Active with COA: DEO Kunz. Family training on 1/13 @ 1000. DEO will continue to support for discharge planning. Interdisciplinary Goals:   1.) Pt will tolerate LRD without any clinical s/s of aspiration   2.) Pt will complete toileting with Lisa.       Discharge Plan   Estimated discharge date: 1/22/2022  Destination: home health  Pass:No  Services at Discharge: [facility], Occupational Therapy, Speech Therapy and Nursing   Equipment at Discharge: Front wheeled walker, transport chair for community distances. Team Members Present at Conference:  [id number] Alisha Vera RN     Occupational Therapist: Jose Celestin, OTR/L  Physical Therapist:Mami Brothers [id number]  Speech Therapist: Chantel Prasad, [facility] SLP   Nurse: Danial Galaviz RN  Dietician: Teresa Elam RDN, LD  : Allyssa Reid, OTR/L  Psychiatry: N/A    Family members present at conference: No      I led this team conference and I approve the established interdisciplinary plan of care as documented within the medical record of [id number] [facility]. MD: Sarika Vazquez.  Elsa Aceves MD 1/12/2022, 11:01 AM

## 2022-01-11 NOTE — PROGRESS NOTES
[address] [facility]  1/11/2022  [id number]    Chief Complaint: Post covid-19 condition, unspecified    Subjective:   Resting in bed; no issues overnight; bp running a bit high. ROS: No n/v, cp, sob, f/c  Objective:  Patient Vitals for the past 24 hrs:   BP Temp Temp src Pulse Resp SpO2   01/11/22 0757     20 95 %   01/10/22 1949 (!) 158/85 97.6 °F (36.4 °C) Oral 91 18 95 %   01/10/22 1941      94 %   01/10/22 0946 (!) 160/76   109  96 %     Gen: No distress, pleasant. HEENT: Normocephalic, atraumatic. CV: Regular rate and rhythm. Resp: No respiratory distress. Abd: Soft, nontender   Ext: No edema. Neuro: Alert, oriented, appropriately interactive. Wt Readings from Last 3 Encounters:   01/08/22 132 lb 9.6 oz (60.1 kg)   10/26/21 175 lb 14.4 oz (79.8 kg)   07/15/21 180 lb (81.6 kg)       Laboratory data:   Lab Results   Component Value Date    WBC 7.3 01/10/2022    HGB 10.2 (L) 01/10/2022    HCT 31.6 (L) 01/10/2022    MCV 76.6 (L) 01/10/2022     01/10/2022       Lab Results   Component Value Date     01/10/2022    K 3.5 01/10/2022    CL 97 01/10/2022    CO2 32 01/10/2022    BUN 8 01/10/2022    CREATININE <0.5 01/10/2022    GLUCOSE 106 01/10/2022    CALCIUM 10.1 01/10/2022        Therapy progress:  PT  Position Activity Restriction  Other position/activity restrictions: PEG, L PICC, Notify physician for pulse less than 50 or greater than 120, respiratory rate less than 12 or greater than 25, oral temperature greater than 101.3 F (38.5 C) , urinary output less than 120 mL in four hours, systolic BP less than 90 or greater than 245, diastolic BP less than 50 or greater than 100.  2.5-4L of O2  Objective     Sit to Stand: Stand by assistance (FWW bed to stand)  Stand to sit: Contact guard assistance (poor eccentric control with FWW)  Bed to Chair: Moderate assistance,Minimal assistance (chair>bed mod for lift cues for hand placement and set up assist to angle WC, bed>WC cues for hand to

## 2022-01-12 LAB
EKG ATRIAL RATE: 76 BPM
EKG ATRIAL RATE: 95 BPM
EKG DIAGNOSIS: NORMAL
EKG DIAGNOSIS: NORMAL
EKG P AXIS: 31 DEGREES
EKG P AXIS: 40 DEGREES
EKG P-R INTERVAL: 156 MS
EKG P-R INTERVAL: 158 MS
EKG Q-T INTERVAL: 350 MS
EKG Q-T INTERVAL: 414 MS
EKG QRS DURATION: 80 MS
EKG QRS DURATION: 90 MS
EKG QTC CALCULATION (BAZETT): 439 MS
EKG QTC CALCULATION (BAZETT): 465 MS
EKG R AXIS: -1 DEGREES
EKG R AXIS: -19 DEGREES
EKG T AXIS: 50 DEGREES
EKG T AXIS: 73 DEGREES
EKG VENTRICULAR RATE: 76 BPM
EKG VENTRICULAR RATE: 95 BPM
GLUCOSE BLD-MCNC: 112 MG/DL (ref 70–99)
GLUCOSE BLD-MCNC: 123 MG/DL (ref 70–99)
GLUCOSE BLD-MCNC: 167 MG/DL (ref 70–99)
GLUCOSE BLD-MCNC: 185 MG/DL (ref 70–99)
LV EF: 50 %
LVEF MODALITY: NORMAL
PERFORMED ON: ABNORMAL

## 2022-01-12 PROCEDURE — 97110 THERAPEUTIC EXERCISES: CPT

## 2022-01-12 PROCEDURE — 6370000000 HC RX 637 (ALT 250 FOR IP): Performed by: PHYSICAL MEDICINE & REHABILITATION

## 2022-01-12 PROCEDURE — 6360000002 HC RX W HCPCS: Performed by: PHYSICAL MEDICINE & REHABILITATION

## 2022-01-12 PROCEDURE — 93010 ELECTROCARDIOGRAM REPORT: CPT | Performed by: INTERNAL MEDICINE

## 2022-01-12 PROCEDURE — 2700000000 HC OXYGEN THERAPY PER DAY

## 2022-01-12 PROCEDURE — 94640 AIRWAY INHALATION TREATMENT: CPT

## 2022-01-12 PROCEDURE — 93005 ELECTROCARDIOGRAM TRACING: CPT | Performed by: PHYSICAL MEDICINE & REHABILITATION

## 2022-01-12 PROCEDURE — 93005 ELECTROCARDIOGRAM TRACING: CPT | Performed by: INTERNAL MEDICINE

## 2022-01-12 PROCEDURE — 92526 ORAL FUNCTION THERAPY: CPT

## 2022-01-12 PROCEDURE — 6360000004 HC RX CONTRAST MEDICATION: Performed by: INTERNAL MEDICINE

## 2022-01-12 PROCEDURE — 99222 1ST HOSP IP/OBS MODERATE 55: CPT | Performed by: INTERNAL MEDICINE

## 2022-01-12 PROCEDURE — 97535 SELF CARE MNGMENT TRAINING: CPT

## 2022-01-12 PROCEDURE — 92507 TX SP LANG VOICE COMM INDIV: CPT

## 2022-01-12 PROCEDURE — 97530 THERAPEUTIC ACTIVITIES: CPT

## 2022-01-12 PROCEDURE — C8929 TTE W OR WO FOL WCON,DOPPLER: HCPCS

## 2022-01-12 PROCEDURE — 6370000000 HC RX 637 (ALT 250 FOR IP): Performed by: INTERNAL MEDICINE

## 2022-01-12 PROCEDURE — 97116 GAIT TRAINING THERAPY: CPT

## 2022-01-12 PROCEDURE — 97129 THER IVNTJ 1ST 15 MIN: CPT

## 2022-01-12 PROCEDURE — 1280000000 HC REHAB R&B

## 2022-01-12 PROCEDURE — 97130 THER IVNTJ EA ADDL 15 MIN: CPT

## 2022-01-12 PROCEDURE — 94761 N-INVAS EAR/PLS OXIMETRY MLT: CPT

## 2022-01-12 RX ORDER — DIAZEPAM 5 MG/1
5 TABLET ORAL EVERY 6 HOURS PRN
Status: DISCONTINUED | OUTPATIENT
Start: 2022-01-12 | End: 2022-01-22 | Stop reason: HOSPADM

## 2022-01-12 RX ADMIN — METOPROLOL 25 MG: 25 TABLET ORAL at 21:11

## 2022-01-12 RX ADMIN — OXYCODONE 10 MG: 5 TABLET ORAL at 16:30

## 2022-01-12 RX ADMIN — LORAZEPAM 0.5 MG: 0.5 TABLET ORAL at 21:10

## 2022-01-12 RX ADMIN — LORAZEPAM 0.5 MG: 0.5 TABLET ORAL at 05:48

## 2022-01-12 RX ADMIN — IPRATROPIUM BROMIDE AND ALBUTEROL SULFATE 1 AMPULE: .5; 2.5 SOLUTION RESPIRATORY (INHALATION) at 19:33

## 2022-01-12 RX ADMIN — Medication 400 MG: at 09:32

## 2022-01-12 RX ADMIN — INSULIN GLARGINE 7 UNITS: 100 INJECTION, SOLUTION SUBCUTANEOUS at 21:03

## 2022-01-12 RX ADMIN — Medication 15 ML: at 09:35

## 2022-01-12 RX ADMIN — DICYCLOMINE HYDROCHLORIDE 20 MG: 20 TABLET ORAL at 21:11

## 2022-01-12 RX ADMIN — DICYCLOMINE HYDROCHLORIDE 20 MG: 20 TABLET ORAL at 16:30

## 2022-01-12 RX ADMIN — ROPINIROLE HYDROCHLORIDE 0.5 MG: 0.5 TABLET, FILM COATED ORAL at 12:29

## 2022-01-12 RX ADMIN — LEVOTHYROXINE SODIUM 100 MCG: 0.1 TABLET ORAL at 05:48

## 2022-01-12 RX ADMIN — ONDANSETRON 8 MG: 4 TABLET, ORALLY DISINTEGRATING ORAL at 07:41

## 2022-01-12 RX ADMIN — FUROSEMIDE 40 MG: 40 TABLET ORAL at 09:33

## 2022-01-12 RX ADMIN — MULTIPLE VITAMINS W/ MINERALS TAB 1 TABLET: TAB at 09:33

## 2022-01-12 RX ADMIN — BUDESONIDE 500 MCG: 0.5 SUSPENSION RESPIRATORY (INHALATION) at 07:39

## 2022-01-12 RX ADMIN — INSULIN LISPRO 2 UNITS: 100 INJECTION, SOLUTION INTRAVENOUS; SUBCUTANEOUS at 12:30

## 2022-01-12 RX ADMIN — PERFLUTREN 1.65 MG: 6.52 INJECTION, SUSPENSION INTRAVENOUS at 12:15

## 2022-01-12 RX ADMIN — NITROFURANTOIN (MONOHYDRATE/MACROCRYSTALS) 100 MG: 75; 25 CAPSULE ORAL at 09:32

## 2022-01-12 RX ADMIN — Medication 15 ML: at 21:10

## 2022-01-12 RX ADMIN — INSULIN LISPRO 1 UNITS: 100 INJECTION, SOLUTION INTRAVENOUS; SUBCUTANEOUS at 21:02

## 2022-01-12 RX ADMIN — LORAZEPAM 0.5 MG: 0.5 TABLET ORAL at 16:30

## 2022-01-12 RX ADMIN — METOPROLOL 25 MG: 25 TABLET ORAL at 12:29

## 2022-01-12 RX ADMIN — TRAZODONE HYDROCHLORIDE 50 MG: 50 TABLET ORAL at 21:11

## 2022-01-12 RX ADMIN — OXYCODONE 10 MG: 5 TABLET ORAL at 05:47

## 2022-01-12 RX ADMIN — OXYCODONE 10 MG: 5 TABLET ORAL at 09:40

## 2022-01-12 RX ADMIN — ENOXAPARIN SODIUM 40 MG: 40 INJECTION SUBCUTANEOUS at 09:35

## 2022-01-12 RX ADMIN — NITROFURANTOIN (MONOHYDRATE/MACROCRYSTALS) 100 MG: 75; 25 CAPSULE ORAL at 21:11

## 2022-01-12 RX ADMIN — OXYCODONE 10 MG: 5 TABLET ORAL at 21:10

## 2022-01-12 RX ADMIN — ROPINIROLE HYDROCHLORIDE 0.5 MG: 0.5 TABLET, FILM COATED ORAL at 09:33

## 2022-01-12 RX ADMIN — ROPINIROLE HYDROCHLORIDE 0.5 MG: 0.5 TABLET, FILM COATED ORAL at 21:10

## 2022-01-12 RX ADMIN — LORAZEPAM 0.5 MG: 0.5 TABLET ORAL at 09:40

## 2022-01-12 RX ADMIN — METOCLOPRAMIDE 5 MG: 10 TABLET ORAL at 21:11

## 2022-01-12 RX ADMIN — IPRATROPIUM BROMIDE AND ALBUTEROL SULFATE 1 AMPULE: .5; 2.5 SOLUTION RESPIRATORY (INHALATION) at 07:39

## 2022-01-12 RX ADMIN — BUDESONIDE 500 MCG: 0.5 SUSPENSION RESPIRATORY (INHALATION) at 19:40

## 2022-01-12 RX ADMIN — METOCLOPRAMIDE 5 MG: 10 TABLET ORAL at 09:33

## 2022-01-12 RX ADMIN — SPIRONOLACTONE 25 MG: 25 TABLET, FILM COATED ORAL at 09:33

## 2022-01-12 RX ADMIN — ASPIRIN 81 MG 81 MG: 81 TABLET ORAL at 09:33

## 2022-01-12 RX ADMIN — DICYCLOMINE HYDROCHLORIDE 20 MG: 20 TABLET ORAL at 09:41

## 2022-01-12 RX ADMIN — DICYCLOMINE HYDROCHLORIDE 20 MG: 20 TABLET ORAL at 05:47

## 2022-01-12 ASSESSMENT — PAIN SCALES - GENERAL
PAINLEVEL_OUTOF10: 8
PAINLEVEL_OUTOF10: 6
PAINLEVEL_OUTOF10: 8
PAINLEVEL_OUTOF10: 7
PAINLEVEL_OUTOF10: 8

## 2022-01-12 ASSESSMENT — PAIN DESCRIPTION - DESCRIPTORS
DESCRIPTORS: ACHING
DESCRIPTORS: ACHING
DESCRIPTORS: ACHING;CONSTANT

## 2022-01-12 ASSESSMENT — PAIN DESCRIPTION - FREQUENCY
FREQUENCY: CONTINUOUS
FREQUENCY: CONTINUOUS

## 2022-01-12 ASSESSMENT — PAIN DESCRIPTION - ORIENTATION
ORIENTATION: RIGHT

## 2022-01-12 ASSESSMENT — PAIN DESCRIPTION - PAIN TYPE
TYPE: ACUTE PAIN

## 2022-01-12 ASSESSMENT — PAIN DESCRIPTION - LOCATION
LOCATION: SHOULDER
LOCATION: ABDOMEN

## 2022-01-12 ASSESSMENT — PAIN - FUNCTIONAL ASSESSMENT: PAIN_FUNCTIONAL_ASSESSMENT: PREVENTS OR INTERFERES SOME ACTIVE ACTIVITIES AND ADLS

## 2022-01-12 ASSESSMENT — PAIN DESCRIPTION - PROGRESSION: CLINICAL_PROGRESSION: NOT CHANGED

## 2022-01-12 ASSESSMENT — PAIN DESCRIPTION - ONSET: ONSET: ON-GOING

## 2022-01-12 NOTE — PROGRESS NOTES
Occupational Therapy  Facility/Department: [facility]  Daily Treatment Note  NAME: Staci Simpson  : 1955  MRN: [id number]    Date of Service: 2022    Discharge Recommendations:  24 hour supervision or assist,Home with Home health OT,S Level 1       Assessment   Performance deficits / Impairments: Decreased functional mobility ; Decreased ADL status; Decreased strength;Decreased safe awareness;Decreased cognition;Decreased endurance;Decreased balance;Decreased coordination;Decreased posture;Decreased fine motor control;Decreased high-level IADLs  Assessment: Pt agreeable to OT session. Pt demonstrated improved functional transfers, sit<>stands, and mobility with SBA and use of RW. Pt continues to require cues for PLB, rest breaks, and slowing down breathing due to SOB and anxiety. Pt's spouse present and able to assist pt with deep breathing and calming down. Pt completed mobility for ~2 minutes x3 with good tolerance for LEs but O2 sats dropping to 80s each time on 4L. Pt left in bed at end of session with BP stable and no dizziness noted throughout session. Continue POC. Prognosis: Good  OT Education: OT Role;Plan of Care;ADL Adaptive Strategies;Transfer Training;Energy Conservation;Precautions; Equipment  Patient Education: disease specific: goals, PLB, safety during ADLS and t/fs, energy conservation during ADLS,  Pt verbalized understanding but will need reinforcement. Barriers to Learning: confusion/fatigue? REQUIRES OT FOLLOW UP: Yes  Activity Tolerance  Activity Tolerance: Patient Tolerated treatment well;Patient limited by fatigue  Activity Tolerance: O2 sats dropping to mid 80s with mobility on 4L O2, improving to 90% in ~30 seconds of PLB and seated rest break. Safety Devices  Safety Devices in place: Yes  Type of devices: Call light within reach; Left in bed;Bed alarm in place;Gait belt;Nurse notified         Patient Diagnosis(es): There were no encounter diagnoses.       has a past medical history of Anxiety disorder, Chronic pain syndrome, COVID-19, DDD (degenerative disc disease), lumbar, Diabetes ([address] Displacement of lumbar intervertebral disc without myelopathy, Diverticulitis, Fibromyalgia, Generalized osteoarthrosis, involving multiple sites, GERD (gastroesophageal reflux disease), Impacted cerumen, Influenza A, Irritable bowel syndrome, Other and unspecified hyperlipidemia, Prosthetic joint implant failure ([facility] [address] Screening mammogram, Tracheobronchomalacia, Unspecified asthma(493.90), Unspecified essential hypertension, and Unspecified hypothyroidism. has a past surgical history that includes Hysterectomy; joint replacement (10/92); joint replacement (  12/92); joint replacement (  6/96); joint replacement (  2006); Colonoscopy (2007); Lung surgery (1/2014); Cardiac catheterization; Abdomen surgery; Colon surgery; Upper gastrointestinal endoscopy (N/A, 10/25/2021); and tracheostomy (N/A, 10/25/2021). Restrictions  Restrictions/Precautions  Restrictions/Precautions: Fall Risk,General Precautions  Position Activity Restriction  Other position/activity restrictions: PEG, L PICC, Notify physician for pulse less than 50 or greater than 120, respiratory rate less than 12 or greater than 25, oral temperature greater than 101.3 F (38.5 C) , urinary output less than 120 mL in four hours, systolic BP less than 90 or greater than 194, diastolic BP less than 50 or greater than 100. 2.5-4L of O2  Subjective   General  Chart Reviewed: Yes,Orders,Labs,Progress Notes,Imaging  Patient assessed for rehabilitation services?: Yes  Additional Pertinent Hx: Recent pelvic fractures  Response to previous treatment: Patient with no complaints from previous session  Family / Caregiver Present: Yes (spouse)  Referring Practitioner: Bry Lake MD  Diagnosis: Post Covid 19 condition  Subjective  Subjective: Pt in bed, taking medication from RN, feeling well, agreeable to OT session.   Pain Assessment  Pain Assessment: 0-10  Pain Level: 8  Pain Type: Acute pain  Pain Location: Shoulder  Pain Orientation: Right  Pain Descriptors: Aching  Pain Frequency: Continuous  Non-Pharmaceutical Pain Intervention(s): Ambulation/Increased Activity; Emotional support;Repositioned  Response to Pain Intervention: None  Vital Signs  Pulse: 87  Heart Rate Source: Monitor  BP: (!) 123/55  BP Location: Right upper arm  Patient Position: Sitting;Up in chair  Patient Currently in Pain: Yes  Oxygen Therapy  SpO2: 93 %  Pulse Oximeter Device Mode: Intermittent  Pulse Oximeter Device Location: Right;Finger  O2 Device: Nasal cannula  O2 Flow Rate (L/min): 4 L/min   Orientation  Orientation  Overall Orientation Status: Within Functional Limits  Objective             Balance  Sitting Balance: Supervision  Standing Balance: Stand by assistance (with RW)  Standing Balance  Time: 30 seconds, 2:20, 2:00, 2:00, 30 seconds x2  Activity: transfer bed>w/c, collection and tossing of bean bags, mobility across gym, mobility to/from bathroom  Comment: with RW  Functional Mobility  Functional - Mobility Device: Rolling Walker  Activity: To/from bathroom  Assist Level: Stand by assistance  Toilet Transfers  Toilet - Technique: Ambulating  Equipment Used: Drop-arm extra wide bedside commode  Toilet Transfer: Stand by assistance  Toilet Transfers Comments: BSC over toilet  Bed mobility  Supine to Sit: Modified independent  Sit to Supine: Modified independent  Transfers  Stand Step Transfers: Stand by assistance  Sit to stand: Stand by assistance  Stand to sit: Stand by assistance        Coordination  Gross Motor: good coordination for tossing bean bags              Cognition  Overall Cognitive Status: Exceptions  Arousal/Alertness: Appropriate responses to stimuli  Following Commands: Follows one step commands with increased time; Follows one step commands with repetition  Attention Span: Appears intact  Memory: Decreased short term memory  Safety Judgement: Decreased awareness of need for assistance;Decreased awareness of need for safety  Problem Solving: Assistance required to generate solutions;Assistance required to identify errors made;Assistance required to correct errors made  Insights: Decreased awareness of deficits  Initiation: Does not require cues  Sequencing: Requires cues for some                    Type of ROM/Therapeutic Exercise  Type of ROM/Therapeutic Exercise: Free weights  Comment: 3#  Exercises  Elbow Flexion: x15  Elbow Extension: x15  Wrist Extension: x15  Other: sit<>stands x6                    Plan   Plan  Times per week: 5/7 days a week  Times per day: Daily  Plan weeks: 3 weeks  Current Treatment Recommendations: Strengthening,Endurance Training,Balance Training,Functional Mobility Training,Safety Education & Training,Equipment Evaluation, Education, & procurement,Self-Care / ADL,Patient/Caregiver Education & Training,Home Management Training,ROM    Goals  Short term goals  Time Frame for Short term goals: 10 days (1/13)  Short term goal 1: Pt will perform at least 3min of standing functional activities with AD PRN. GOAL MET 1/07/22 Pt completed 3 minutes of standing tasks with CGA with RW. Short term goal 2: Pt will perform functional/toilet t/fs with CGA and AD PRN. -GOAL MET 1/12/22 Pt performed toilet transfers with SBA. Short term goal 3: Pt will toilet with CGA and AD PRN. Short term goal 4: Pt will LB dress with min A and AE/AD PRN. Short term goal 5: Pt will total body bathe with min A and AD PRN. GOAL MET 1/10/22 Pt performed total body bathing with min A. Long term goals  Time Frame for Long term goals : 21 days (1/24)  Long term goal 1: Pt will perform at least 5 min of standing functional activities. Long term goal 2: Pt will total body dress with mod I.  Long term goal 3: Pt will total body bathe with mod I.  Long term goal 4: Pt will perform a simple IADL task with supervision and AD PRN.   Long term goal 5: Pt will toilet with mod I.   Patient Goals   Patient goals : \"to be able to stand up and walk again\"       Therapy Time   Individual Concurrent Group Co-treatment   Time In 1230         Time Out 1330         Minutes 60         Timed Code Treatment Minutes: 16 Nelly Tomlin OT

## 2022-01-12 NOTE — PROGRESS NOTES
Contacted EKG. Stated that they would come down this afternoon to do routine EKG. Contacted ECHO; performing test at present. Awaiting results at this time. Nursing will monitor.

## 2022-01-12 NOTE — PROGRESS NOTES
Results of Echo and EKG available to provider for review. Per Cardiology note from Dr. Marlin Wyatt,     \"No further inpt cv w/u or tx is planned, will sign off, please call with ?s. \"     Nursing will continue to monitor for changes.

## 2022-01-12 NOTE — PROGRESS NOTES
Pt noted with irregular heart rate upon auscultation. Perfect serve secure message sent to Dr. Thais Durbin. New orders received. Cardiology consult placed. [place] Brocks with consult. Cardiology has since placed new orders. Awaiting testing results at this time. Nursing will continue to monitor. Pt VSS; denies needs and discomfort. Bedside table within reach. Call light within reach. Nursing will continue to monitor.

## 2022-01-12 NOTE — CONSULTS
[address] [facility]  [phone]      Attending Physician: Carlos Pollock MD  Reason for Consultation/Chief Complaint: Shortness of breath    Subjective   History of Present Illness:  Benjamin Mckinley is a 78 y.o. patient who presented to the hospital with complaints of shortness of breath, weakness, patient presented to rehab at [facility] on July 3, 2022, she was transferred here for rehabilitation following long hospitalization in October 2021 for COVID-pneumonia during which she had to be intubated and ultimately had to have a tracheostomy and PEG placement. She was ultimately able to have these supportive measures removed and has been rehabilitating well. Currently, she denies chest pain, palpitations, dizziness, lightheaded or loss conscious. She says her shortness of breath is stable. She currently is being supported with oxygen through nasal cannula route. In the course rehabilitation, auscultation revealed irregular heart beat and EKG was obtained which raise concern for ectopy. Patient has a cardiac history which dates back to at least 2015, there was concern for Takotsubo cardiomyopathy at that time, this ultimately was resolved in follow-up. She follows with Dr. Shady Chua in the office. Chronically, she is also had palpitations and has had PVCs and PACs, she saw Dr. Byron Hammonds from her EP service. She has been managed medically/expectantly. Her last office visit was in 2019, she was felt to be stable regarding chronic medical conditions including diabetes, hypertension, hypercholesterolemia.     Past Medical History:   has a past medical history of Anxiety disorder, Chronic pain syndrome, COVID-19, DDD (degenerative disc disease), lumbar, Diabetes ([facility] [address] Displacement of lumbar intervertebral disc without myelopathy, Diverticulitis, Fibromyalgia, Generalized osteoarthrosis, involving multiple sites, GERD (gastroesophageal reflux disease), Impacted cerumen, Influenza A, Irritable bowel syndrome, Other and unspecified hyperlipidemia, Prosthetic joint implant failure ([facility] [address] Screening mammogram, Tracheobronchomalacia, Unspecified asthma(493.90), Unspecified essential hypertension, and Unspecified hypothyroidism. Surgical History:   has a past surgical history that includes Hysterectomy; joint replacement (10/92); joint replacement (  12/92); joint replacement (  6/96); joint replacement (  2006); Colonoscopy (2007); Lung surgery (1/2014); Cardiac catheterization; Abdomen surgery; Colon surgery; Upper gastrointestinal endoscopy (N/A, 10/25/2021); and tracheostomy (N/A, 10/25/2021). Social History:   reports that she quit smoking about 30 years ago. Her smoking use included cigarettes. She has a 18.00 pack-year smoking history. She has never used smokeless tobacco. She reports that she does not drink alcohol and does not use drugs. Family History:  family history includes Asthma in her mother; Cancer in her daughter and maternal grandfather; Heart Failure in her father. Home Medications:  Were reviewed and are listed in nursing record and/or below  Prior to Admission medications    Medication Sig Start Date End Date Taking?  Authorizing Provider   glipiZIDE (GLUCOTROL XL) 10 MG extended release tablet Take 10 mg by mouth 2 times daily 3/5/21  Yes Historical Provider, MD   omeprazole (PRILOSEC) 40 MG delayed release capsule Take 40 mg by mouth daily   Yes Historical Provider, MD   tiZANidine (ZANAFLEX) 2 MG tablet Take 2 mg by mouth every 8 hours as needed 9/16/21  Yes Historical Provider, MD   SITagliptin (JANUVIA) 50 MG tablet Take 50 mg by mouth daily   Yes Historical Provider, MD   oxyCODONE (ROXICODONE) 5 MG immediate release tablet Take 5-10 mg by mouth. 7/19/21  Yes Historical Provider, MD   ipratropium-albuterol (DUONEB) 0.5-2.5 (3) MG/3ML SOLN nebulizer solution Inhale 3 mLs into the lungs every 4 hours 10/28/21  Yes Ke Tijerina MD   metoprolol tartrate (LOPRESSOR) 25 MG tablet Take 1 tablet by mouth 2 times daily 10/28/21  Yes Sweta Dunaway MD   carboxymethylcellulose PF (THERATEARS) 1 % GEL ophthalmic gel Place 1 drop into both eyes every 4 hours as needed for Dry Eyes 10/28/21  Yes Sweta Dunaway MD   levothyroxine (SYNTHROID) 100 MCG tablet Take 100 mcg by mouth Daily   Yes Historical Provider, MD   simvastatin (ZOCOR) 40 MG tablet Take 40 mg by mouth nightly   Yes Historical Provider, MD   budesonide (PULMICORT FLEXHALER) 180 MCG/ACT AEPB inhaler Inhale 1 puff into the lungs 2 times daily Per [place] [facility]. Trista Chambers   Yes Historical Provider, MD   estradiol (ESTRACE) 0.1 MG/GM vaginal cream Place vaginally See Admin Instructions Insert a pea-sized amount vaginally every day for 7 days, then twice weekly thereafter. Per [facility]. Trista Chambers. Last dispensed 5/10/21. Yes Historical Provider, MD   Multiple Vitamins-Minerals (THERAPEUTIC MULTIVITAMIN-MINERALS) tablet Take 1 tablet by mouth daily   Yes Historical Provider, MD   albuterol sulfate  (90 Base) MCG/ACT inhaler Inhale 2 puffs into the lungs 4 times daily as needed for Wheezing 12/26/19  Yes Vanna Medellin MD   losartan (COZAAR) 25 MG tablet Take 1 tablet by mouth daily 5/21/19  Yes Zeb Rand MD   Lancets MISC Dispense whatever insurance will cover. Patient test twice day. Dx:E11.9 3/11/19  Yes Debbie Roy MD   blood glucose monitor strips Dispense whatever insurance will cover. Pt test twice a day dx:E11.9 3/11/19  Yes Debbie Roy MD   blood glucose test strips (ASCENSIA AUTODISC VI;ONE TOUCH ULTRA TEST VI) strip 1 each by In Vitro route daily As needed. 3/11/19  Yes Debbie Roy MD   rOPINIRole (REQUIP) 0.5 MG tablet TAKE 1 TABLET BY MOUTH 3 TIMES DAILY.  11/30/18  Yes James Hernandez MD   sertraline (ZOLOFT) 50 MG tablet TAKE 1 TABLET BY MOUTH DAILY 11/30/18  Yes James Hernandez MD   polyethylene glycol [facility]) powder Take 17 g by mouth daily as needed   Yes Historical Provider, MD   Blood Glucose Monitoring Suppl CATHRYN Dispense whatever insurance will cover. Dx code:E11.9 1/11/18  Yes Salud Gomes MD   gabapentin (NEURONTIN) 800 MG tablet Take 800 mg by mouth 3 times daily 8/2/17  Yes Historical Provider, MD   aspirin 81 MG EC tablet Take 1 tablet by mouth daily 9/24/15  Yes Nicolas Lucia MD        CURRENT Medications:  perflutren lipid microspheres (DEFINITY) injection 1.65 mg, ONCE PRN  nitrofurantoin (macrocrystal-monohydrate) (MACROBID) capsule 100 mg, 2 times per day  magnesium oxide (MAG-OX) tablet 400 mg, Daily  aspirin chewable tablet 81 mg, Daily  budesonide (PULMICORT) nebulizer suspension 500 mcg, BID  chlorhexidine (PERIDEX) 0.12 % solution 15 mL, BID  dicyclomine (BENTYL) tablet 20 mg, 4x Daily AC & HS  furosemide (LASIX) tablet 40 mg, Daily  insulin glargine (LANTUS) injection vial 7 Units, Nightly  insulin lispro (HUMALOG) injection vial 0-12 Units, TID WC  insulin lispro (HUMALOG) injection vial 0-6 Units, Nightly  glucose (GLUTOSE) 40 % oral gel 15 g, PRN  dextrose 50 % IV solution, PRN  glucagon (rDNA) injection 1 mg, PRN  dextrose 5 % solution, PRN  levothyroxine (SYNTHROID) tablet 100 mcg, Daily  LORazepam (ATIVAN) tablet 0.5 mg, Q4H PRN  metoclopramide (REGLAN) tablet 5 mg, BID  therapeutic multivitamin-minerals 1 tablet, Daily  oxyCODONE (ROXICODONE) immediate release tablet 5 mg, Q4H PRN   Or  oxyCODONE (ROXICODONE) immediate release tablet 10 mg, Q4H PRN  rOPINIRole (REQUIP) tablet 0.5 mg, TID  spironolactone (ALDACTONE) tablet 25 mg, Daily  traZODone (DESYREL) tablet 50 mg, Nightly PRN  ondansetron (ZOFRAN-ODT) disintegrating tablet 8 mg, Q8H PRN  hydrALAZINE (APRESOLINE) tablet 50 mg, Q6H PRN  acetaminophen (TYLENOL) tablet 650 mg, Q4H PRN  enoxaparin (LOVENOX) injection 40 mg, Daily  polyethylene glycol (GLYCOLAX) packet 17 g, Daily PRN  ipratropium-albuterol (DUONEB) nebulizer solution 1 ampule, BID        Allergies:  Prednisone, Quinidine, Sulfa antibiotics, Bactrim, Clonidine, and Sulfamethoxazole-trimethoprim     Review of Systems:   A 14 point review of symptoms completed. Pertinent positives identified in the HPI, all other review of symptoms negative as below.       Objective   PHYSICAL EXAM:    Vitals:    01/12/22 0915   BP: 136/73   Pulse: 92   Resp:    Temp:    SpO2:     Weight: 153 lb 1.6 oz (69.4 kg)         General Appearance:  Alert, cooperative, no distress, appears stated age, sitting in chair   Head:  Normocephalic, without obvious abnormality, atraumatic   Eyes:  PERRL, conjunctiva/corneas clear   Nose: Nares normal, no drainage or sinus tenderness   Throat: Lips, mucosa, and tongue normal, voice is hoarse, there is a tracheostomy scar/bandaged   Neck: Supple, symmetrical, trachea midline, no adenopathy, thyroid: not enlarged, symmetric, no tenderness/mass/nodules, no carotid bruit or JVD   Lungs:    Bilateral rhonchi, respirations unlabored   Chest Wall:  No deformity or tenderness   Heart:  Regular rate and rhythm, S1, S2 normal, no murmur, rub or gallop   Abdomen:   Soft, non-tender, bowel sounds active all four quadrants,  no masses, no organomegaly   Extremities: Extremities normal, atraumatic, no cyanosis or edema   Pulses: 2+ and symmetric   Skin: Skin color, texture, turgor normal, no rashes or lesions   Pysch: Normal mood and affect   Neurologic: Normal gross motor and sensory exam.         Labs   CBC:   Lab Results   Component Value Date    WBC 7.3 01/10/2022    RBC 4.12 01/10/2022    HGB 10.2 01/10/2022    HCT 31.6 01/10/2022    MCV 76.6 01/10/2022    RDW 19.9 01/10/2022     01/10/2022     CMP:  Lab Results   Component Value Date     01/10/2022    K 3.5 01/10/2022    CL 97 01/10/2022    CO2 32 01/10/2022    BUN 8 01/10/2022    CREATININE <0.5 01/10/2022    GFRAA >60 01/10/2022    GFRAA >60 11/05/2012    AGRATIO 0.8 01/04/2022    LABGLOM >60 01/10/2022    GLUCOSE 106 01/10/2022    PROT 8.5 01/04/2022 PROT 7.7 11/05/2012    CALCIUM 10.1 01/10/2022    BILITOT 0.3 01/04/2022    ALKPHOS 198 01/04/2022    AST 20 01/04/2022    ALT 17 01/04/2022     PT/INR:  No results found for: PTINR  HgBA1c:  Lab Results   Component Value Date    LABA1C 8.2 09/26/2021     Lab Results   Component Value Date    CKTOTAL 249 (H) 12/22/2016    TROPONINI <0.01 01/11/2022         Cardiac Data     Last EKG: Likely EKG is normal sinus rhythm, there is artifact which precludes full interpretation, EKG was also done earlier during admission and that shows normal sinus rhythm with LVH. Cath 9/2015  LM, LCx, RCA <20%  LAD 30%  LV: mild anterior hypokinesis. EF 40-45%  Normal right heart pressures  Possible Takotsubo cardiomyopathy vs transient ischemic event. LV function improving     Echo 9/2015  There is akinesis of the anteroseptal wall. Ejection fraction is visually estimated to be 35 %. Diastolic filling parameters suggests diastolic dysfunction . Slight calcification of the mitral valve noted. Mild mitral regurgitation is present. Moderately dilated left atrium with increased left atrial volume. Trivial tricuspid regurgitation. Systolic pulmonary artery pressure (SPAP) is normal and estimated at 13   MmHg (RA pressure 3 mmHg).    Event monitor 12/2015  Frequent PVCs     Echo 3/2016  - Left ventricle: The cavity size was normal. Wall thickness was  normal. Systolic function was normal. The estimated ejection  fraction was in the range of 50% to 55%. Regional wall motion  abnormalities cannot be excluded. - Aortic valve: Valve area: 2.13cm^2(VTI). Valve area: 2.28cm^2  (Vmax). - Right ventricle: Systolic function was normal by visual  assessment.   - Pericardium, extracardiac: A trivial pericardial effusion was  identified.      Echo 8/2017   Left ventricular systolic function is normal with the ejection fraction   estimated at 55%.   No regional wall motion abnormalities.   Left ventricular diastolic filling pressure is indeterminate.  Suzi Rivera dilated left atrium.        Holter Monitor 2/19/19  Brief PAT and PVC's     Studies:     cxr       Impression   Asymmetric airspace opacities are relatively stable from prior comparison   study.  Pulmonary edema, recurrent infection or chronic interstitial change   may be considered. I have reviewed labs and imaging/xray/diagnostic testing in this note.     Assessment and Plan        Patient Active Problem List   Diagnosis    Displacement of lumbar intervertebral disc without myelopathy    Hypothyroidism    Mixed hyperlipidemia    Anxiety state    Generalized osteoarthrosis, involving multiple sites    Essential hypertension    Shoulder arthritis    Vitamin D deficiency    Chronic pain syndrome    Fibromyalgia    DDD (degenerative disc disease), lumbar    Anxiety disorder    Prosthetic joint implant failure (HCC)    Pulmonary nodule    Tracheobronchomalacia    [facility]chiectasis ([facility])    DM2 (diabetes mellitus, type 2) ([facility])    Systolic congestive heart failure ([facility])    Chest pain    S/P cardiac catheterization    PVC (premature ventricular contraction)    Palpitations    Syncope and collapse    Convulsion ([facility])    New onset seizure ([address]    CAD in native artery    Nausea    Memory change    Acquired hypothyroidism    Sepsis ([facility])    Type 1 diabetes mellitus without complication ([facility])    Orthostatic hypotension    Suspected COVID-19 virus infection    Hypoxia    Acute respiratory failure with hypoxia ([facility])    COVID-19    Pneumonia due to COVID-19 virus    CHARLY (acute kidney injury) ([address]    Uncontrolled hypertension    Fever    Hypotension    Severe protein-calorie malnutrition ([facility])    Gram-negative pneumonia ([facility])    Pneumonia due to Pseudomonas species ([facility])    Hypomagnesemia    Hypokalemia    Acute hypoxemic respiratory failure ([facility])    ARDS (adult respiratory distress syndrome) ([facility])    Pneumonia due to Klebsiella pneumoniae ([address]    Electrolyte disorder    Critical illness myopathy    Critical illness neuropathy (HCC)    Post covid-19 condition, unspecified    Moderate malnutrition ([facility] [address]       Possible irregular heartbeat, obtain f/u ekg. can obtain echocardiogram either as inpatient or outpatient. Most likely, this is due to PACs/PVCs which would be considered a benign finding. Continue aspirin for primary prevention    Hypertension, resume beta-blocker. Hypercholesterolemia, previously well controlled, continue to manage expectantly. Diabetes, as per primary service    History of COVID-pneumonia with respiratory failure, as per primary service    No further inpt cv w/u or tx is planned, will sign off, please call with ?s. Thank you for allowing us to participate in the care of [address] [place] [address]. Please call me with any questions [phone].     Antoinette Jackson MD, [facility]   Interventional Cardiologist  [address]  [phone] [facility]  [phone] [address]  1/12/2022 10:53 AM

## 2022-01-12 NOTE — PROGRESS NOTES
Physical Therapy  Facility/Department: [facility]  Daily Treatment Note  NAME: Isidra White  : 1955  MRN: [id number]    Date of Service: 2022    Discharge Recommendations:  Patient would benefit from continued therapy after discharge,24 hour supervision or assist        Assessment    Patient seen for split therapy session, tearful at times during first session with pt asking for frequent redirection to staff, aware of location, situation and familiar with therapist but pt frequently seeking redirection as pt forgetting cues for task at hand during session. Pt required SBA to CGA for transfers, CGA for up and down 1 step. Patient walking distances 20 feet with assist needed to attend to and manage O2 tubing with SOB and hypoxia limiting mobility tolerance. Pt able to complete LE strengthening therex, transfers, toileting tasks, gait, steps  with frequent redirection. Pt frequently repeating during tasks , \"Now, what do I do? \"   Family training scheduled for later this week. Patient's poor endurance limits further walking distances. Pt noted to be improving with no longer needing lift and lower assist with transfers from mumultiple surfaces with use of FWW. Activity Tolerance  Activity Tolerance: at rest 3L via nc SpO2 92%, after walking to bathroom 18 feet spO2 80% with no increase after 1 minute with cues for Deep breathing wtih patient shallow breathing tearful in bathroom toileting. Pt asks multiple times, \"here? \" when standing over toilet with cues needed for patient to remove undergarments before toileting. AFter 30 seconds 4L via nc spO2 96%  bpm.   Pt with urinary incontinence and mod assist to don new brief, set up assist for pericare. Pt easily sob, needed extra time and WC to sit to wash hands at sink and wash face with set up assist needed. Patient Diagnosis(es): There were no encounter diagnoses.      has a past medical history of Anxiety disorder, Chronic pain syndrome, COVID-19, DDD (degenerative disc disease), lumbar, Diabetes ([facility] [address] Displacement of lumbar intervertebral disc without myelopathy, Diverticulitis, Fibromyalgia, Generalized osteoarthrosis, involving multiple sites, GERD (gastroesophageal reflux disease), Impacted cerumen, Influenza A, Irritable bowel syndrome, Other and unspecified hyperlipidemia, Prosthetic joint implant failure ([facility] [address] Screening mammogram, Tracheobronchomalacia, Unspecified asthma(493.90), Unspecified essential hypertension, and Unspecified hypothyroidism. has a past surgical history that includes Hysterectomy; joint replacement (10/92); joint replacement (  12/92); joint replacement (  6/96); joint replacement (  2006); Colonoscopy (2007); Lung surgery (1/2014); Cardiac catheterization; Abdomen surgery; Colon surgery; Upper gastrointestinal endoscopy (N/A, 10/25/2021); and tracheostomy (N/A, 10/25/2021). Restrictions  Restrictions/Precautions  Restrictions/Precautions: Fall Risk,General Precautions  Position Activity Restriction  Other position/activity restrictions: PEG, L PICC, Notify physician for pulse less than 50 or greater than 120, respiratory rate less than 12 or greater than 25, oral temperature greater than 101.3 F (38.5 C) , urinary output less than 120 mL in four hours, systolic BP less than 90 or greater than 748, diastolic BP less than 50 or greater than 100.  2.5-4L of O2  Subjective   Pain Screening  Patient Currently in Pain: Yes  Pain Assessment  Pain Assessment: 0-10  Pain Level: 8  Pain Type: Acute pain  Pain Location: Shoulder  Pain Orientation: Right  Pain Descriptors: Aching  Vital Signs  Temp: 97.9 °F (36.6 °C)  Temp Source: Oral  Pulse: 98  Heart Rate Source: Monitor  BP: (!) 145/68  BP Location: Right upper arm  Patient Position: Semi fowlers  Patient Currently in Pain: Yes  Height and Weight  Weight: 153 lb 1.6 oz (69.4 kg)  Weight Method: Actual;Standing scale  BMI (Calculated): 22.7  Oxygen Therapy  SpO2: 92 %  Pulse Oximeter Device Mode: Intermittent  Pulse Oximeter Device Location: Right  O2 Device: Nasal cannula  O2 Flow Rate (L/min): 3 L/min       Orientation person, place, needs redirection to task     Cognition impaired STM with needing frequent redirection to task due to impaired STM and impaired ability to attend to greater than 1 step instruction. Objective   Bed mobility  Rolling to Right: Independent  Supine to Sit: Independent  Transfers  Sit to Stand: Stand by assistance (FWW)  Stand to sit: Stand by assistance (FWW)  Car Transfer: Contact guard assistance (SPT from GIOAVNI Suarez 23)  Ambulation  Ambulation?: Yes  More Ambulation?: Yes  Ambulation 1  Surface: level tile  Device: Rolling Walker  Other Apparatus: O2  Assistance: Stand by assistance  Quality of Gait: needed assist to manage O2 tubing, slow lorna hip and trunk flexion throughout gait cycle relies heavily on walker for UE WB, swing not passing stance completely BLE  Gait Deviations: Slow Lorna; Increased RONAN; Decreased step height;Decreased step length;Shuffles  Distance: 18 feet,  Comments: immediate post walk 80% on 3L titrated up to 4L see activity section. Stairs/Curb  Stairs?: Yes  Stairs  # Steps : 1  Stairs Height: 6\"  Rails: Bilateral  Device: No Device (WC in front of steps)  Assistance: Contact guard assistance  Comment: up with RLE and down with LLE. Post step immediate sitting spO2 on 3L 89% with 15 seconds PLB increased to 93% HR 100bpm, after 1.5 minutes HR 100bpm, SpO2 99% on 3L via nc. Wheelchair Activities  Propulsion: Yes  Propulsion 1  Level: Level Tile (carpet with L/R turns)  Method: RUE;LUE;RLE;LLE  Level of Assistance: Modified independent  Description/ Details: needed 2 brief seated rest,  Distance: 100%, HR 87 bpm.  Gait Deviations  Gait Deviations: Slow Lorna; Increased RONAN; Decreased step height, cued to attend to and assisted with O2 tubing management. Pt requires redirection with dual tasking.        Education:   Educated 1/08: GOAL PARTIALLY MET MI with HOB elevated and use of BR.  1/10/2022 GOAL met pt demo indep in bed mobility sup<>sit and rolling. Short term goal 2: Pt demo bed<>chair with mod assist of 1 and FWW or SPT. -- 1/08: GOAL MET Lisa/modA with RW  Short term goal 3: Patient demonstrates gait to bathroom 20 feet with assist of 2 and FWW. GOAL MET 1/6/2021 pt demo gait 35 to 60 feet with FWW and assist of 2. Short term goal 4: Patient demonstrates up and down 1 step with bilateral rails and assist of 2. -- 1/08: DNT d/t safety 1/12/2022 GOAL Met  pt demo up and down 1 step bilateral rails CGA. Short term goal 5: Patient demonstrates  indep in DBE and 5 minutes of standing activity with spO2 90% or greater to improve endurance for household walking. -- 1/08: GOAL NOT MET, limited by standing tolerance  Long term goals  Time Frame for Long term goals : 1/24/2022  Long term goal 1: Patient demonstrates gait 150 feet with SBA and sPO2 90% or greater able to manage O2 tubing on L/R turns on carpet up to 10 feetwith  safe to assist pt. Long term goal 2: Patient demonstrates up and down 12 steps with SBA and bilateral rails to return to community and home (egress/entry) mobility with  safe to assist pt. Long term goal 3: Patient demonstrates indep bed<>chair, sit<>stand and WC<>car with FWW  Long term goal 4: Patient demonstrates indep WC propulsion 150 feet with L/R turns indep. Long term goal 5: Patient demonstrates 50 feet of walking with L/R turns and carpet HELEN able to indep manage O2 tubing for short distance in home walking. Patient Goals   Patient goals : \"to walk to the bathroom.     Plan    Plan  Times per week: 5/7 days week  Times per day: Daily  Plan weeks: 21 days  Specific instructions for Next Treatment: Progress mobility as tolerated  Current Treatment Recommendations: Strengthening,Neuromuscular Re-education,Home Exercise Program,Cognitive/Perceptual Training,Safety Education & Kade Denniser Training,Patient/Caregiver Education & Training,Functional Mobility Training,Wheelchair Mobility Training,Gait Training,Transfer Training,Stair training,Equipment Evaluation, Education, & procurement  Safety Devices  Type of devices:  All fall risk precautions in place,Nurse notified,Call light within reach,Gait belt,Patient at risk for falls,Left in chair,Chair alarm in place     Therapy Time   Individual Concurrent Group Co-treatment   Time In 0800         Time Out 0900         Minutes 60         Timed Code Treatment Minutes: [address] Time:   Individual Concurrent Group Co-treatment   Time In [facility] 25           Timed Code Treatment Minutes:  25  Second Session split  Therapy Time:   Individual Concurrent Group Co-treatment   Time In 1000         Time Out 1005         Minutes 5           Timed Code Treatment Minutes:  5    Total Treatment Minutes:  90      Total Treatment Minutes:        Sreedhar Guzman PT

## 2022-01-12 NOTE — PROGRESS NOTES
Yanci Lucas  1/12/2022  [id number]    Chief Complaint: Post covid-19 condition, unspecified    Subjective:   Resting in bed; no new c/o this AM.    ROS: No n/v, cp, sob, f/c  Objective:  Patient Vitals for the past 24 hrs:   BP Temp Temp src Pulse Resp SpO2   01/12/22 0805 (!) 145/68 97.9 °F (36.6 °C) Oral 98  92 %   01/12/22 0742      97 %   01/11/22 1945 131/68 97.2 °F (36.2 °C) Oral 83 18 97 %   01/11/22 1011 138/66   94 24 98 %   01/11/22 0900 (!) 162/75 98 °F (36.7 °C) Oral 111 22 92 %     Gen: No distress, pleasant. HEENT: Normocephalic, atraumatic. CV: Regular rate and rhythm. Resp: No respiratory distress. Abd: Soft, nontender   Ext: No edema. Neuro: Alert, oriented, appropriately interactive. Wt Readings from Last 3 Encounters:   01/08/22 132 lb 9.6 oz (60.1 kg)   10/26/21 175 lb 14.4 oz (79.8 kg)   07/15/21 180 lb (81.6 kg)       Laboratory data:   Lab Results   Component Value Date    WBC 7.3 01/10/2022    HGB 10.2 (L) 01/10/2022    HCT 31.6 (L) 01/10/2022    MCV 76.6 (L) 01/10/2022     01/10/2022       Lab Results   Component Value Date     01/10/2022    K 3.5 01/10/2022    CL 97 01/10/2022    CO2 32 01/10/2022    BUN 8 01/10/2022    CREATININE <0.5 01/10/2022    GLUCOSE 106 01/10/2022    CALCIUM 10.1 01/10/2022        Therapy progress:  PT  Position Activity Restriction  Other position/activity restrictions: PEG, L PICC, Notify physician for pulse less than 50 or greater than 120, respiratory rate less than 12 or greater than 25, oral temperature greater than 101.3 F (38.5 C) , urinary output less than 120 mL in four hours, systolic BP less than 90 or greater than 442, diastolic BP less than 50 or greater than 100.  2.5-4L of O2  Objective     Sit to Stand: Stand by assistance (FWW bed to stand)  Stand to sit: Contact guard assistance (poor eccentric control with FWW)  Bed to Chair: Moderate assistance,Minimal assistance (chair>bed mod for lift cues for hand placement and set up assist to angle WC, bed>WC cues for hand to reach to arm rest with RUE and min assist from bed>chair, SpO2 post transfer 86% on 4L with 1 minute rest and DBE 97% wiht HR 90 bpm.)  Device: Rolling Walker  Other Apparatus: O2,Wheelchair follow  Assistance: Contact guard assistance  Distance: 20 feet, 30 feet, 22 feet, 12 feeet  OT  PT Equipment Recommendations  Equipment Needed: No  Other: has needed equpiment  Toilet - Technique: Stand pivot  Equipment Used: Standard toilet (use of w/c arm to push up.)  Assessment        SLP  Current Diet : Regular  Current Liquid Diet : Thin (via provale cup)  Diet Solids Recommendation: Regular  Liquid Consistency Recommendation: Thin    Body mass index is 19.58 kg/m². Rehabilitation Diagnosis:  Neurologic, 3.8, Neuromuscular Disorders, e.g. Critical Illness Myopathy, Other Myopathy     Assessment and Plan:  S/p covid pneumonia with superimposed bacterial pneumonia  - required trach/peg, proning  - s/p remdesivir, tocilizumab  - completed cefepime     CAD  - asa, statin     HTN  - follow BP       DM  - lantus, SSI     Hypothyroidism  - synthroid    Leukocytosis  - resolved  - completed cefepime;  - repeat culture + for low growth enterococcus; plan for 1 week macrobid     Reduced L TVF mobility per FEES  - slp    Dysphagia (pharyngeal), dysphonia  - slp    S/p trach  - stoma care    Bowels: Schedule stool softener. Follow bowel movements. Enema or suppository if needed.      Bladder: Check PVR x 3. [facility] if PVR > 200ml or if any volume is > 500 ml.      Sleep: Trazodone provided prn.      Follow up appointments: PCP  GENEVA: 1/22 home w/ home health  DME: [id number] [facility] wheeled walker, transport chair    Interdisciplinary team conference was held today with entire rehab treatment team including PT, OT, SLP, Dietician, RN, and SW. Discussion focused on progress toward rehab goals and discharge planning. Barriers: weakness, endurance, cognition.  Total treatment time >35 min with greater than 50% spent in care coordination. Tru Mcgee MD 1/12/2022, 8:23 AM

## 2022-01-12 NOTE — PROGRESS NOTES
MA CCC SLP  Tx session 2  [id number]   Total Timed Code Min 0 30   Total Treatment Minutes 30 30   Individual Treatment Minutes 30 30   Group Treatment Minutes 0 0   Co-Treat Minutes 0 0   Variance/Reason:  0 n/a   Pain None reported Denies    Pain Intervention [] RN notified  [] Repositioned  [] Intervention offered and patient declined  [x] N/A  [] Other: [] RN notified  [] Repositioned  [] Intervention offered and patient declined  [x] N/A  [] Other:   Subjective     Pt alert and oriented, cooperative and agreeable to participate in therapy. Pt seen sitting upright in bedside chair,  present at bedside. Pt alert, oriented, and cooperative. Agreeable to tx. Pt upright in bed.  present at bedside. Objective:  Goals     Dysphagia Goals:  Short-term Goals  Timeframe for Short-term Goals: 10 days (01/13/22)    Goal 1: The patient will complete pharyngeal/laryngeal strengthening exercises for improved swallowing function 10/10 given min cues. -Effortful swallow: x10  -Delores: x10  -Isometric tongue tip hold for 5 secs: x10  -Jaw jut hold for 5 secs: x10  -Resistive jaw opening hold for 5 secs: x10     Goal not directly targeted. Goal 2. The patient will tolerate recommended diet without observed clinical signs of aspiration. Pt observed with TL via provale cup, occasional delayed coughing observed. Pt also with head tilted a little farther back when drinking/swallowing. SLP encouraged neutral head positioning, importance of this, and reduced risk of aspiration. Pt observed with further trials of TL via provale cup head in neutral positioning, tolerating without any overt s/s of aspiration. Reviewed and discussed with pt and pt's . Indirectly targeted - pt eating ice chips throughout session. No overt s/s of aspiration. Goal 3. The patient/caregiver will demonstrate understanding of compensatory strategies for improved swallowing safety. See goal 2 above.   Goal not directly targeted. Goal 4. The patient will tolerate thin liquids without signs and symptoms of aspiration 10/10 via cup. Did not target. Goal not directly targeted. Speech/Lang/Cog goals:  Short-term Goals  Timeframe for Short-term Goals: 14 days (01/17/22)    Goal 1: Pt will complete recall tasks with 80% acc given min cues for use of compensatory strategies. Did not target. Mental Manipulation: Ranking  -pt given 3 words; asked to repeat in a particular order  -e.g. alec, plane, fly. Repeat in order from smallest to largest  -pt used recall abilities to complete task with 75%% acc given min-mod cues, improved to 100% acc given max cues    Goal 2: Pt will complete executive function tasks (money, math, time, etc) with 80% acc given min cues. Did not target. Goal not directly targeted. Goal 3: Pt will complete verbal and visual reasoning tasks with 80% acc given min cues. Did not target. Word Deduction Task   -pt given 3 clues that describe an item; asked to name the item being described  -e.g. time, hands, wristband = watch   -pt completed task with 100% acc given min cues      Goal 4: Pt will complete problem solving and thought organization tasks with 80% acc given min cues. Did not target. Mental Manipulation: Ranking  -pt given 3 words; asked to repeat in a particular order  -e.g. alec, plane, fly. Repeat in order from smallest to largest  -pt used thought org abilities to complete task with 25% acc indpe, improved to 100% acc given mod-max cues     Category Members / Thought Organization  -pt given a category and a set of letters; asked to name an item from the category that began with the letters  -e.g. colors that begin with B, F, S, and T  -pt completed task with 63% acc indep, improved to 94% acc given mod cues      Goal 5: Pt will complete vocal strengthening exercises for improved vocal function 10/10 given min cues.    Cup bubble (put a straw into glass of water and blow for as long as able):   -average of 6.6 seconds on first five trials  -average of 6.8 seconds on second five trials   -overall improvement compared to last tx session    Diaphragmatic breathing exercise: x10   Goal not directly targeted. Other areas targeted: N/A n/a   Education:   edu provided re: importance of safe swallow strategies, vocal hygiene strategies SLP edu pt re: rationale of cog tx, recall strategies, thought org strategies    Safety Devices: [x] Call light within reach  [x] Chair alarm activated  [] Bed alarm activated  [x] Other:  at bedside [x] Call light within reach  [] Chair alarm activated  [x] Bed alarm activated  [x] Other:  at bedside   Assessment: Tx session 1: Pt pleasant and cooperative, agreeable to participate. Pt's  present at bedside. Pt completed pharyngeal/laryngeal strengthening exercises given min cues. Pt completed diaphragmatic breathing and cup bubble exercises, overall improvement compared to yesterday. Discussed and reviewed strict importance of safe swallow strategies to reduce risk of aspiration. Tx session 2: Pt alert and cooperative, agreeable to tx. Session focused on cognitive tasks. SLP began with a mental manipulation: ranking task. Pt required mod-max cues to complete task, but appeared to be very anxious. Pt then refused to further complete this task despite doing well when given the mod-max cues. SLP then guided pt in a word deduction task; pt completed with 100% acc given min cues. Pt benefited from mod cues to increase acc when coming a category naming task. Pt with reduced insight into deficits. Continue goals above. Plan: Continue as per plan of care.       Additional Information:     Barriers toward progress: Confusion and Cognitive deficit  Discharge recommendations:  [] Home independently  [] Home with assistance [x]  24 hour supervision  [] ECF [] Other:  Continued Tx Upon Discharge: ? [x] Yes [] No [] TBD based on progress while on ARU [] Vital Stim indicated [] Other:   Estimated discharge date: 01/21/22    Interventions used this date:  [x] Speech/Language Treatment  [] Instruction in HEP [] Group [x] Dysphagia Treatment [x] Cognitive Treatment   [] Other: Total Time Breakdown / Charges    Time in Time out Total Time / units   Cognitive Tx 1330 1400  30 min / 2 units    Speech Tx 1045 1100 15 min/ 1 unit    Dysphagia Tx 1030 1045 15 min/ 1 unit        Electronically Signed by    Tx session 1:   Gerardo Ruiz A CCC-SLP  Speech-Language Pathologist  [id number]    Tx session 2:   Francine Aceves MA [facility]  Speech Language Pathologist

## 2022-01-12 NOTE — PROGRESS NOTES
Pt spouse and pt requesting podiatry consult d/t pt hx of DM; requesting to have pt nails trimmed. Attempted to call consult; office closed. Will pass on in report for day shift to complete consult in AM. Nursing will monitor.

## 2022-01-13 LAB
ANION GAP SERPL CALCULATED.3IONS-SCNC: 11 MMOL/L (ref 3–16)
BASOPHILS ABSOLUTE: 0.1 K/UL (ref 0–0.2)
BASOPHILS RELATIVE PERCENT: 0.7 %
BUN BLDV-MCNC: 8 MG/DL (ref 7–20)
CALCIUM SERPL-MCNC: 10.4 MG/DL (ref 8.3–10.6)
CHLORIDE BLD-SCNC: 92 MMOL/L (ref 99–110)
CO2: 34 MMOL/L (ref 21–32)
CREAT SERPL-MCNC: <0.5 MG/DL (ref 0.6–1.2)
EOSINOPHILS ABSOLUTE: 0.3 K/UL (ref 0–0.6)
EOSINOPHILS RELATIVE PERCENT: 3.1 %
GFR AFRICAN AMERICAN: >60
GFR NON-AFRICAN AMERICAN: >60
GLUCOSE BLD-MCNC: 108 MG/DL (ref 70–99)
GLUCOSE BLD-MCNC: 116 MG/DL (ref 70–99)
GLUCOSE BLD-MCNC: 136 MG/DL (ref 70–99)
GLUCOSE BLD-MCNC: 177 MG/DL (ref 70–99)
GLUCOSE BLD-MCNC: 188 MG/DL (ref 70–99)
HCT VFR BLD CALC: 35 % (ref 36–48)
HEMOGLOBIN: 11.1 G/DL (ref 12–16)
LYMPHOCYTES ABSOLUTE: 1.4 K/UL (ref 1–5.1)
LYMPHOCYTES RELATIVE PERCENT: 14.3 %
MCH RBC QN AUTO: 24.5 PG (ref 26–34)
MCHC RBC AUTO-ENTMCNC: 31.8 G/DL (ref 31–36)
MCV RBC AUTO: 77.1 FL (ref 80–100)
MONOCYTES ABSOLUTE: 0.4 K/UL (ref 0–1.3)
MONOCYTES RELATIVE PERCENT: 4.3 %
NEUTROPHILS ABSOLUTE: 7.7 K/UL (ref 1.7–7.7)
NEUTROPHILS RELATIVE PERCENT: 77.6 %
PDW BLD-RTO: 19.4 % (ref 12.4–15.4)
PERFORMED ON: ABNORMAL
PLATELET # BLD: 250 K/UL (ref 135–450)
PMV BLD AUTO: 8 FL (ref 5–10.5)
POTASSIUM REFLEX MAGNESIUM: 3.9 MMOL/L (ref 3.5–5.1)
RBC # BLD: 4.54 M/UL (ref 4–5.2)
SODIUM BLD-SCNC: 137 MMOL/L (ref 136–145)
WBC # BLD: 9.9 K/UL (ref 4–11)

## 2022-01-13 PROCEDURE — 92526 ORAL FUNCTION THERAPY: CPT

## 2022-01-13 PROCEDURE — 94761 N-INVAS EAR/PLS OXIMETRY MLT: CPT

## 2022-01-13 PROCEDURE — 6370000000 HC RX 637 (ALT 250 FOR IP): Performed by: INTERNAL MEDICINE

## 2022-01-13 PROCEDURE — 1280000000 HC REHAB R&B

## 2022-01-13 PROCEDURE — 97110 THERAPEUTIC EXERCISES: CPT

## 2022-01-13 PROCEDURE — 94640 AIRWAY INHALATION TREATMENT: CPT

## 2022-01-13 PROCEDURE — 6370000000 HC RX 637 (ALT 250 FOR IP): Performed by: PHYSICAL MEDICINE & REHABILITATION

## 2022-01-13 PROCEDURE — 97530 THERAPEUTIC ACTIVITIES: CPT

## 2022-01-13 PROCEDURE — 97535 SELF CARE MNGMENT TRAINING: CPT

## 2022-01-13 PROCEDURE — 6360000002 HC RX W HCPCS: Performed by: PHYSICAL MEDICINE & REHABILITATION

## 2022-01-13 PROCEDURE — 92507 TX SP LANG VOICE COMM INDIV: CPT

## 2022-01-13 PROCEDURE — 97116 GAIT TRAINING THERAPY: CPT

## 2022-01-13 PROCEDURE — 80048 BASIC METABOLIC PNL TOTAL CA: CPT

## 2022-01-13 PROCEDURE — 85025 COMPLETE CBC W/AUTO DIFF WBC: CPT

## 2022-01-13 PROCEDURE — 36415 COLL VENOUS BLD VENIPUNCTURE: CPT

## 2022-01-13 PROCEDURE — 97129 THER IVNTJ 1ST 15 MIN: CPT

## 2022-01-13 PROCEDURE — 2700000000 HC OXYGEN THERAPY PER DAY

## 2022-01-13 RX ADMIN — OXYCODONE 10 MG: 5 TABLET ORAL at 11:18

## 2022-01-13 RX ADMIN — METOPROLOL 25 MG: 25 TABLET ORAL at 09:41

## 2022-01-13 RX ADMIN — ONDANSETRON 8 MG: 4 TABLET, ORALLY DISINTEGRATING ORAL at 09:37

## 2022-01-13 RX ADMIN — METOPROLOL 25 MG: 25 TABLET ORAL at 20:48

## 2022-01-13 RX ADMIN — ROPINIROLE HYDROCHLORIDE 0.5 MG: 0.5 TABLET, FILM COATED ORAL at 14:47

## 2022-01-13 RX ADMIN — Medication 15 ML: at 09:43

## 2022-01-13 RX ADMIN — NITROFURANTOIN (MONOHYDRATE/MACROCRYSTALS) 100 MG: 75; 25 CAPSULE ORAL at 20:48

## 2022-01-13 RX ADMIN — Medication 15 ML: at 20:51

## 2022-01-13 RX ADMIN — FUROSEMIDE 40 MG: 40 TABLET ORAL at 09:42

## 2022-01-13 RX ADMIN — ASPIRIN 81 MG 81 MG: 81 TABLET ORAL at 09:42

## 2022-01-13 RX ADMIN — METOCLOPRAMIDE 5 MG: 10 TABLET ORAL at 20:49

## 2022-01-13 RX ADMIN — DICYCLOMINE HYDROCHLORIDE 20 MG: 20 TABLET ORAL at 05:57

## 2022-01-13 RX ADMIN — DICYCLOMINE HYDROCHLORIDE 20 MG: 20 TABLET ORAL at 20:48

## 2022-01-13 RX ADMIN — DICYCLOMINE HYDROCHLORIDE 20 MG: 20 TABLET ORAL at 09:42

## 2022-01-13 RX ADMIN — ENOXAPARIN SODIUM 40 MG: 40 INJECTION SUBCUTANEOUS at 09:40

## 2022-01-13 RX ADMIN — Medication 400 MG: at 09:41

## 2022-01-13 RX ADMIN — OXYCODONE 5 MG: 5 TABLET ORAL at 20:48

## 2022-01-13 RX ADMIN — SPIRONOLACTONE 25 MG: 25 TABLET, FILM COATED ORAL at 09:42

## 2022-01-13 RX ADMIN — LEVOTHYROXINE SODIUM 100 MCG: 0.1 TABLET ORAL at 05:57

## 2022-01-13 RX ADMIN — IPRATROPIUM BROMIDE AND ALBUTEROL SULFATE 1 AMPULE: .5; 2.5 SOLUTION RESPIRATORY (INHALATION) at 20:29

## 2022-01-13 RX ADMIN — INSULIN LISPRO 2 UNITS: 100 INJECTION, SOLUTION INTRAVENOUS; SUBCUTANEOUS at 12:05

## 2022-01-13 RX ADMIN — LORAZEPAM 0.5 MG: 0.5 TABLET ORAL at 15:37

## 2022-01-13 RX ADMIN — INSULIN GLARGINE 7 UNITS: 100 INJECTION, SOLUTION SUBCUTANEOUS at 20:51

## 2022-01-13 RX ADMIN — NITROFURANTOIN (MONOHYDRATE/MACROCRYSTALS) 100 MG: 75; 25 CAPSULE ORAL at 09:40

## 2022-01-13 RX ADMIN — ROPINIROLE HYDROCHLORIDE 0.5 MG: 0.5 TABLET, FILM COATED ORAL at 09:40

## 2022-01-13 RX ADMIN — DICYCLOMINE HYDROCHLORIDE 20 MG: 20 TABLET ORAL at 16:26

## 2022-01-13 RX ADMIN — BUDESONIDE 500 MCG: 0.5 SUSPENSION RESPIRATORY (INHALATION) at 11:51

## 2022-01-13 RX ADMIN — TRAZODONE HYDROCHLORIDE 50 MG: 50 TABLET ORAL at 20:49

## 2022-01-13 RX ADMIN — ROPINIROLE HYDROCHLORIDE 0.5 MG: 0.5 TABLET, FILM COATED ORAL at 20:48

## 2022-01-13 RX ADMIN — OXYCODONE 10 MG: 5 TABLET ORAL at 15:31

## 2022-01-13 RX ADMIN — BUDESONIDE 500 MCG: 0.5 SUSPENSION RESPIRATORY (INHALATION) at 20:29

## 2022-01-13 RX ADMIN — OXYCODONE 10 MG: 5 TABLET ORAL at 05:57

## 2022-01-13 RX ADMIN — IPRATROPIUM BROMIDE AND ALBUTEROL SULFATE 1 AMPULE: .5; 2.5 SOLUTION RESPIRATORY (INHALATION) at 11:51

## 2022-01-13 RX ADMIN — MULTIPLE VITAMINS W/ MINERALS TAB 1 TABLET: TAB at 09:40

## 2022-01-13 RX ADMIN — METOCLOPRAMIDE 5 MG: 10 TABLET ORAL at 09:41

## 2022-01-13 RX ADMIN — INSULIN LISPRO 2 UNITS: 100 INJECTION, SOLUTION INTRAVENOUS; SUBCUTANEOUS at 16:27

## 2022-01-13 ASSESSMENT — PAIN DESCRIPTION - LOCATION
LOCATION: SHOULDER
LOCATION: SHOULDER

## 2022-01-13 ASSESSMENT — PAIN DESCRIPTION - PROGRESSION: CLINICAL_PROGRESSION: GRADUALLY IMPROVING

## 2022-01-13 ASSESSMENT — PAIN SCALES - GENERAL
PAINLEVEL_OUTOF10: 9
PAINLEVEL_OUTOF10: 0
PAINLEVEL_OUTOF10: 8
PAINLEVEL_OUTOF10: 5
PAINLEVEL_OUTOF10: 5

## 2022-01-13 ASSESSMENT — PAIN DESCRIPTION - FREQUENCY
FREQUENCY: CONTINUOUS
FREQUENCY: CONTINUOUS

## 2022-01-13 ASSESSMENT — PAIN DESCRIPTION - ORIENTATION
ORIENTATION: RIGHT
ORIENTATION: RIGHT

## 2022-01-13 ASSESSMENT — PAIN DESCRIPTION - DESCRIPTORS
DESCRIPTORS: ACHING
DESCRIPTORS: SORE;ACHING

## 2022-01-13 ASSESSMENT — PAIN DESCRIPTION - PAIN TYPE
TYPE: ACUTE PAIN
TYPE: ACUTE PAIN

## 2022-01-13 ASSESSMENT — PAIN DESCRIPTION - ONSET: ONSET: ON-GOING

## 2022-01-13 NOTE — PROGRESS NOTES
[id number] [facility]  1/13/2022  [id number]    Chief Complaint: Post covid-19 condition, unspecified    Subjective:   Patient seen resting in bed this am. She denies current complaints, no acute events overnight. Asking about PEG removal.   Labs reviewed. ROS: No n/v, cp, sob, f/c  Objective:  Patient Vitals for the past 24 hrs:   BP Temp Temp src Pulse Resp SpO2 Weight   01/12/22 2045 101/62 98.1 °F (36.7 °C) Oral 77 18 97 %    01/12/22 1933      97 %    01/12/22 1250 (!) 123/55   87  93 %    01/12/22 1215 111/68   96      01/12/22 1004       153 lb 1.6 oz (69.4 kg)   01/12/22 0915 136/73   92        Gen: No distress, pleasant. HEENT: Normocephalic, atraumatic. CV: Regular rate and rhythm. Resp: No respiratory distress. Abd: Soft, nontender, PEG in place  Ext: No edema. Neuro: Alert, oriented, appropriately interactive. Wt Readings from Last 3 Encounters:   01/12/22 153 lb 1.6 oz (69.4 kg)   10/26/21 175 lb 14.4 oz (79.8 kg)   07/15/21 180 lb (81.6 kg)       Laboratory data:   Lab Results   Component Value Date    WBC 9.9 01/13/2022    HGB 11.1 (L) 01/13/2022    HCT 35.0 (L) 01/13/2022    MCV 77.1 (L) 01/13/2022     01/13/2022       Lab Results   Component Value Date     01/13/2022    K 3.9 01/13/2022    CL 92 01/13/2022    CO2 34 01/13/2022    BUN 8 01/13/2022    CREATININE <0.5 01/13/2022    GLUCOSE 108 01/13/2022    CALCIUM 10.4 01/13/2022        Therapy progress:  PT  Position Activity Restriction  Other position/activity restrictions: PEG, L PICC, Notify physician for pulse less than 50 or greater than 120, respiratory rate less than 12 or greater than 25, oral temperature greater than 101.3 F (38.5 C) , urinary output less than 120 mL in four hours, systolic BP less than 90 or greater than 011, diastolic BP less than 50 or greater than 100.  2.5-4L of O2  Objective     Sit to Stand: Stand by assistance (FWW)  Stand to sit: Stand by assistance (FWW)  Bed to Chair:  (FWW, needed cues to reach back to transfer surface to improve eccentric control as otherwise  \"falls back\"  to transfer surface)  Device: Rolling Walker  Other Apparatus: O2  Assistance: Stand by assistance  Distance: 18 feet x2  OT  PT Equipment Recommendations  Equipment Needed: No  Other: has needed equpiment  Toilet - Technique: Ambulating  Equipment Used: Drop-arm extra wide bedside commode  Toilet Transfers Comments: BSC over toilet  Assessment        SLP  Current Diet : Regular  Current Liquid Diet : Thin (via provale cup)  Diet Solids Recommendation: Regular  Liquid Consistency Recommendation: Thin    Body mass index is 22.61 kg/m². Rehabilitation Diagnosis:  Neurologic, 3.8, Neuromuscular Disorders, e.g. Critical Illness Myopathy, Other Myopathy     Assessment and Plan:  S/p covid pneumonia with superimposed bacterial pneumonia  - required trach/peg, proning  - s/p remdesivir, tocilizumab  - completed cefepime     CAD  - asa, statin     HTN  - follow BP       DM  - lantus, SSI     Hypothyroidism  - synthroid    Leukocytosis  - resolved  - completed cefepime;  - repeat culture + for low growth enterococcus; plan for 1 week macrobid     Reduced L TVF mobility per FEES  - slp    Dysphagia (pharyngeal), dysphonia  - slp    S/p trach  - stoma care    Bowels: Schedule stool softener. Follow bowel movements. Enema or suppository if needed.      Bladder: Check PVR x 3. [address] if PVR > 200ml or if any volume is > 500 ml.      Sleep: Trazodone provided prn.      Follow up appointments: PCP  GENEVA: 1/22 home w/ home health  DME: [id number] [facility] wheeled walker, transport chair    Electronically signed by BRYCE Martin CNP on 1/13/2022 at 8:42 AM     The patient was seen in conjunction with the nurse practitioner with independent history, evaluation and examination. I agree with the note which has been adjusted to reflect my findings.  I have had face to face contact with the patient and performed a substantive portion of the E/M visit. In summary, patient doing well. Making gradual progress with therapies. Respiratory status improving.  updated at the bedside. Patient  feel strongly about PEG removal. Placed 10/30 per reports and her po intake has been sufficient. PEG removed at the bedside today. Tolerated well. Site is clean. Covered with dry dressing. Evans Lares MD 1/13/2022, 1:40 PM

## 2022-01-13 NOTE — PROGRESS NOTES
Occupational Therapy  Facility/Department: [facility] ARU  Daily Treatment Note  NAME: Mckenzie Ding  : 1955  MRN: [id number]    Date of Service: 2022    Discharge Recommendations:  24 hour supervision or assist,Home with Home health OT,S Level 1  OT Equipment Recommendations  Equipment Needed: No    Assessment   Performance deficits / Impairments: Decreased functional mobility ; Decreased ADL status; Decreased strength;Decreased safe awareness;Decreased cognition;Decreased endurance;Decreased balance;Decreased coordination;Decreased posture;Decreased fine motor control;Decreased high-level IADLs  Assessment: First Session: Pt agreeable to OT session. Pt's family present for family training. Pt completed tub transfer with CGA and use of TTB. Pt's spouse assisted with transfer and PLB. Pt's spouse and daughter verbalized and demonstrated understanding for PLB, energy conservation, and safety with transfers. Pt completed toileting with max A with spouse assisting with pants management. Pt completed toilet transfer and mobility to/from bathroom with SBA. Pt's O2 sats dropping after exiting bathroom but spouse able to assist pt with PLB and relaxation after anxiety regarding URBINA. Pt's family able to assist pt as needed and verbalized no concerns for assisting pt at home. Second Session: Pt completed mobility out of room to collect cones and around gym to collect cones in sequence. Pt completed mobility for 1:45, 1:15, 1:25, and 1:50 with O2 sats remaining above 90% on each bout and 1 VC for 6 color sequence. Pt required SBA for all mobility and sit<>stands. Continue POC. Prognosis: Good  OT Education: OT Role;Plan of Care;ADL Adaptive Strategies;Transfer Training;Energy Conservation;Precautions; Equipment; Family Education  Patient Education: disease specific: goals, PLB, safety during ADLS and t/fs, energy conservation during ADLS,  Family verbalized and demonstrated understanding of all recommendations.  Pt verbalized understanding but will need reinforcement. Barriers to Learning: confusion/anxiety/poor memory  REQUIRES OT FOLLOW UP: Yes  Activity Tolerance  Activity Tolerance: Patient Tolerated treatment well;Patient limited by fatigue  Activity Tolerance: O2 sats dropping to ~75% after toileting and mobility out of bathroom, recovering to 90% in less than 1 minute. Safety Devices  Safety Devices in place: Yes  Type of devices: Call light within reach;Gait belt;Nurse notified; Chair alarm in place; Left in chair         Patient Diagnosis(es): There were no encounter diagnoses. has a past medical history of Anxiety disorder, Chronic pain syndrome, COVID-19, DDD (degenerative disc disease), lumbar, Diabetes ([address] Displacement of lumbar intervertebral disc without myelopathy, Diverticulitis, Fibromyalgia, Generalized osteoarthrosis, involving multiple sites, GERD (gastroesophageal reflux disease), Impacted cerumen, Influenza A, Irritable bowel syndrome, Other and unspecified hyperlipidemia, Prosthetic joint implant failure ([address] Screening mammogram, Tracheobronchomalacia, Unspecified asthma(493.90), Unspecified essential hypertension, and Unspecified hypothyroidism. has a past surgical history that includes Hysterectomy; joint replacement (10/92); joint replacement (  12/92); joint replacement (  6/96); joint replacement (  2006); Colonoscopy (2007); Lung surgery (1/2014); Cardiac catheterization; Abdomen surgery; Colon surgery; Upper gastrointestinal endoscopy (N/A, 10/25/2021); and tracheostomy (N/A, 10/25/2021).     Restrictions  Restrictions/Precautions  Restrictions/Precautions: Fall Risk,General Precautions  Position Activity Restriction  Other position/activity restrictions: L PICC, Notify physician for pulse less than 50 or greater than 120, respiratory rate less than 12 or greater than 25, oral temperature greater than 101.3 F (38.5 C) , urinary output less than 120 mL in four hours, systolic BP less than 90 or greater than 817, diastolic BP less than 50 or greater than 100. 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 Pain Assessment  Pain Assessment: 0-10  Pain Level: 9  Pain Type: Acute pain  Pain Location: Shoulder  Pain Orientation: Right  Pain Descriptors: Aching  Pain Frequency: Continuous  Non-Pharmaceutical Pain Intervention(s): Ambulation/Increased Activity; Emotional support;Repositioned  Response to Pain Intervention: Patient Satisfied  Vital Signs  Pulse: 71  Heart Rate Source: Monitor  BP: (!) 101/55  BP Location: Right upper arm  Patient Position: High fowlers  Patient Currently in Pain: Yes  Oxygen Therapy  SpO2: 94 %  Pulse Oximeter Device Mode: Intermittent  Pulse Oximeter Device Location: Right;Finger  O2 Device: Nasal cannula  O2 Flow Rate (L/min): 4 L/min   Orientation  Orientation  Overall Orientation Status: Within Functional Limits  Objective    ADL  Toileting: Maximum assistance (spouse assisting with clothing management, pt completed hygiene while seated)        Balance  Sitting Balance: Supervision  Standing Balance: Contact guard assistance (CGA/SBA, with RW)  Standing Balance  Time: 30 seconds x2, 1-2 minutes x2  Activity: tub transfer, transfer to/from bathroom, toileting  Comment: with RW  Functional Mobility  Functional - Mobility Device: Rolling Walker  Activity: To/from bathroom  Assist Level: Stand by assistance  Functional Mobility Comments: assist for O2 tubing management  Toilet Transfers  Toilet - Technique: Ambulating  Equipment Used: Drop-arm extra wide bedside commode  Toilet Transfer: Stand by assistance  Toilet Transfers Comments: BSC over toilet  Tub Transfers  Tub - Transfer From: Rolling walker  Tub - Transfer Type: To and From  Tub - Transfer To: Transfer tub bench  Tub - Technique: Ambulating  Tub Transfers: Contact guard  Bed mobility  Supine to Sit: Modified independent  Transfers  Stand Step Transfers: Contact guard assistance  Sit to stand: Contact guard assistance  Stand to sit: Stand by assistance        Coordination  Gross Motor: good coordination for grasping of cones              Cognition  Overall Cognitive Status: Exceptions  Arousal/Alertness: Appropriate responses to stimuli  Following Commands: Follows one step commands with increased time; Follows one step commands with repetition  Attention Span: Appears intact  Memory: Decreased short term memory;Decreased recall of recent events  Safety Judgement: Decreased awareness of need for assistance;Decreased awareness of need for safety  Problem Solving: Assistance required to generate solutions;Assistance required to identify errors made;Assistance required to correct errors made  Insights: Decreased awareness of deficits  Initiation: Does not require cues  Sequencing: Requires cues for some                                         Plan   Plan  Times per week: 5/7 days a week  Times per day: Daily  Plan weeks: 3 weeks  Current Treatment Recommendations: Strengthening,Endurance Training,Balance Training,Functional Mobility Training,Safety Education & Training,Equipment Evaluation, Education, & procurement,Self-Care / ADL,Patient/Caregiver Education & Training,Home Management Training,ROM    Goals  Short term goals  Time Frame for Short term goals: 10 days (1/13)  Short term goal 1: Pt will perform at least 3min of standing functional activities with AD PRN. GOAL MET 1/07/22 Pt completed 3 minutes of standing tasks with CGA with RW.   Short term goal 2: Pt will perform functional/toilet t/fs with CGA and AD PRN. -GOAL MET 1/12/22 Pt performed toilet transfers with SBA. Short term goal 3: Pt will toilet with CGA and AD PRN. progressing. Pt continues to require assist for pants management. Short term goal 4: Pt will LB dress with min A and AE/AD PRN. progressing. Pt continues to require mod A at times for pulling pants over hips. Short term goal 5: Pt will total body bathe with min A and AD PRN. GOAL MET 1/10/22 Pt performed total body bathing with min A. Long term goals  Time Frame for Long term goals : 21 days (1/24)  Long term goal 1: Pt will perform at least 5 min of standing functional activities. Long term goal 2: Pt will total body dress with mod I.  Long term goal 3: Pt will total body bathe with mod I.  Long term goal 4: Pt will perform a simple IADL task with supervision and AD PRN. Long term goal 5: Pt will toilet with mod I.   Patient Goals   Patient goals : \"to be able to stand up and walk again\"       Therapy Time   Individual Concurrent Group Co-treatment   Time In 1030         Time Out 1100         Minutes 30         Timed Code Treatment Minutes: 30 Minutes    Second Session Therapy Time:   Individual Concurrent Group Co-treatment   Time In 1230         Time Out 1300         Minutes 30           Timed Code Treatment Minutes:  30 Minutes    Total Treatment Minutes:  60 minutes      Freida Montes De Oca OT

## 2022-01-13 NOTE — PROGRESS NOTES
Speech Language Pathology  [facility]: [facility]  SPEECH-LANGUAGE PATHOLOGY      [x] Daily  [] Weekly Care Conference Note  [] Discharge    Patient:Esperanza Patiño      XVB:[id number]  JIX:[id number]  Rehab Dx/Hx: Post covid-19 condition, unspecified [U09.9]    Precautions: falls and aspirations  Home situation: lives at home with , dtr, two grandchildren; not an active ; manages own meds; shares household tasks with ,  manages finances   ST Dx: [] Aphasia  [] Dysarthria  [] Apraxia   [x] Oropharyngeal dysphagia [x] Cognitive Impairment  [x] Other: voice tx   Date of Admit: 1/3/2022  Room #: 0151/0151-01    Current functional status (updated daily):         Pt being seen for : [x] Speech/Language Treatment  [x] Dysphagia Treatment [x] Cognitive Treatment  [] Other:  Communication: []WFL  [] Aphasia  [] Dysarthria  [] Apraxia  [] Pragmatic Impairment [] Non-verbal  [] Hearing Loss  [x] Other: dysphonia  Cognition: [] WFL  [x] Mild  [x] Moderate  [] Severe [] Unable to Assess  [] Other:  Memory: [] WFL  [x] Mild  [x] Moderate  [] Severe [] Unable to Assess  [] Other:  Behavior: [x] Alert  [x] Cooperative  [x]  Pleasant  [] Confused  [] Agitated  [] Uncooperative  [] Distractible [] Motivated  [] Self-Limiting [] Anxious  [] Other:  Endurance:  [x] Adequate for participation in SLP sessions  [] Reduced overall  [] Lethargic  [] Other:  Safety: [x] No concerns at this time  [] Reduced insight into deficits  []  Reduced safety awareness [] Not following call light procedures  [] Unable to Assess  [] Other:    Current Diet Order:ADULT DIET; Regular  ADULT ORAL NUTRITION SUPPLEMENT; Lunch, Dinner; Low Calorie/High Protein Oral Supplement  Swallowing Precautions: Alternate solids and liquids;Eat/Feed slowly; No straws;Upright as possible for all oral intake;Remain upright for 30-45 minutes after meals;Small bites/sips           Date: 1/13/2022      Tx session 1  [id number] Tx session 2  All tx needs met in session 1    Total Timed Code Min 15    Total Treatment Minutes 60    Individual Treatment Minutes 60    Group Treatment Minutes 0    Co-Treat Minutes 0    Variance/Reason:  0    Pain None reported    Pain Intervention [] RN notified  [] Repositioned  [] Intervention offered and patient declined  [x] N/A  [] Other: [] RN notified  [] Repositioned  [] Intervention offered and patient declined  [] N/A  [] Other:   Subjective     Pt alert and oriented, cooperative and agreeable to participate in therapy. Pt seen sitting upright in bedside chair,  and dtr present at bedside. Objective:  Goals     Dysphagia Goals:  Short-term Goals  Timeframe for Short-term Goals: 10 days (01/13/22)    Goal 1: The patient will complete pharyngeal/laryngeal strengthening exercises for improved swallowing function 10/10 given min cues. -Effortful swallow: x10  -Delores: x10  -Isometric tongue tip hold for 5 secs: x10  -Jaw jut hold for 5 secs: x10  -Resistive jaw opening hold for 5 secs: x10    Extended time required to complete exercises today as pt became increasingly tired, agitated, and anxious when completing. Pt stating consistently \"I'm tired: but with encouragement, able to complete xx10 of each exercise. Goal 2. The patient will tolerate recommended diet without observed clinical signs of aspiration. Pt observed with TL via provale cup, no overt s/s of aspiration. Pt observed with ice chips tolerating without any overt s/s of aspiration. Goal 3. The patient/caregiver will demonstrate understanding of compensatory strategies for improved swallowing safety. Discussed importance of provale cup as pt is demonstrating s/s of penetration/aspiration with TL via cup. Goal 4. The patient will tolerate thin liquids without signs and symptoms of aspiration 10/10 via cup.    TL via cup:  -pt presented with immediate coughing and wet vocal quality on 50% of trials     Speech/Lang/Cog goals:  Short-term Goals  Timeframe for Short-term Goals: 14 days (01/17/22)    Goal 1: Pt will complete recall tasks with 80% acc given min cues for use of compensatory strategies. Functional recall/mental manipulation ranking task:  -pt given three words and asked to rank them in specific order  -string, thread, rope= rank from weakest to strongest  -50% acc given max cues     Goal 2: Pt will complete executive function tasks (money, math, time, etc) with 80% acc given min cues. Did not target. Goal 3: Pt will complete verbal and visual reasoning tasks with 80% acc given min cues. Did not target. Goal 4: Pt will complete problem solving and thought organization tasks with 80% acc given min cues. Did not target. Goal 5: Pt will complete vocal strengthening exercises for improved vocal function 10/10 given min cues. Cup bubble (put a straw into glass of water and blow for as long as able):   -average of 6.8seconds on first five trials  -average of 5.8 seconds on second five trials     Diaphragmatic breathing exercise: x20    Sustained \"ah\"  -1st trial: average of 1 second  -2nd trial: average of 1.5 seconds       Other areas targeted: N/A n/a   Education:   edu provided re: importance of safe swallow strategies, vocal strengthening, compensatory memory strategies, participating in therapy    Safety Devices: [x] Call light within reach  [x] Chair alarm activated  [] Bed alarm activated  [x] Other:  and dtr at bedside [] Call light within reach  [] Chair alarm activated  [] Bed alarm activated  [] Other:    Assessment: Pt pleasant and cooperative, agreeable to participate. Pt's  and dtr present at bedside. Pt did become increasingly agitated, tired, anxious throughout tx session, wanting to give up multiple times. However, with increased encouragement and education, pt pushed through and completed everything that was asked of her.  Pt completed pharyngeal/laryngeal strengthening

## 2022-01-13 NOTE — PROGRESS NOTES
Physical Therapy  Facility/Department: [facility]  Daily Treatment Note  NAME: Osvaldo Hatch  : 1955  MRN: [id number]    Date of Service: 2022    Discharge Recommendations:  Patient would benefit from continued therapy after discharge,24 hour supervision or assist        Assessment   Assessment: Patient seen for split treatment for gait wtih FWW, transfer train from variaed surfaces bed, chair, WC toilet with FWW and SBA. PT required cues with LE strengthen therex and mobility for PLB due to tendencey for rapid shallow breathing with  pt demonstrating emotional lability in the presence of impaired cognition requiring frequent simple task redirection. Pt with abilit to rebound to 90% SpO2 within 1 minute on 4L O2 via nc. Pt pleasant and engaged throguhout session. Patient reports Right shoulder pain worse with mobility. Patient seen for family training wtih daughter Sita and  Katherine Nguyen present for training with education on how to manage patient for gait, transfers from bed/chair/car with use of gait belt with and without FWW and gait training wtih steps with bilateral rails with patients  able to assist pt up and down steps with min assist of 1 and  demo ability to walk and transfer with FWW pt from bed<>chair> WC 5 feet gait  with FWW and SPT WC<>car transfer simulator passenger side and up and down from [address] bilateral rails up and down 3 steps with  safe to assist pt. Educated family on need to assist pt with tubing management. Recommended to family use of gait belt, FWW and transport WC with family verbalizing understanding and  demo ability to safely assist pt with WC, FWW and gait belt.   Pt easily hyupoxic but able to rebound with cues vc and tx for PLB with  indep in cuing to facilitate Deep PLB by patient as pt frequently appears anxious with shallow  Rapid breathing with O2 desaturation with rebound within 1 minute with PLB/DBE to SpO2 on 4L to O2 sats 90% or greater. Treatment Diagnosis: impaired functional mobility and gait  Decision Making: High Complexity  Activity Tolerance  Activity Tolerance: at rest on 4L via nc  SpO2 92%, HR 87bpm RUE BP in semirecumbent 127/60, Right shoulder pain 8/10. Pt crying when on toilet stating her stomach hurts and she doesn't feel good. SpO2 with pt crying and taking short breaths 77% on4L cued to take deep breaths, PLB and given tactile cues for chest wall expansion and to forcefully breath out with rebound to 90% in 1 minute. Patient Diagnosis(es): There were no encounter diagnoses. has a past medical history of Anxiety disorder, Chronic pain syndrome, COVID-19, DDD (degenerative disc disease), lumbar, Diabetes ([address] Displacement of lumbar intervertebral disc without myelopathy, Diverticulitis, Fibromyalgia, Generalized osteoarthrosis, involving multiple sites, GERD (gastroesophageal reflux disease), Impacted cerumen, Influenza A, Irritable bowel syndrome, Other and unspecified hyperlipidemia, Prosthetic joint implant failure ([facility] [address] Screening mammogram, Tracheobronchomalacia, Unspecified asthma(493.90), Unspecified essential hypertension, and Unspecified hypothyroidism. has a past surgical history that includes Hysterectomy; joint replacement (10/92); joint replacement (  12/92); joint replacement (  6/96); joint replacement (  2006); Colonoscopy (2007); Lung surgery (1/2014); Cardiac catheterization; Abdomen surgery; Colon surgery; Upper gastrointestinal endoscopy (N/A, 10/25/2021); and tracheostomy (N/A, 10/25/2021).     Restrictions  Restrictions/Precautions  Restrictions/Precautions: Fall Risk,General Precautions  Position Activity Restriction  Other position/activity restrictions: PEG, L PICC, Notify physician for pulse less than 50 or greater than 120, respiratory rate less than 12 or greater than 25, oral temperature greater than 101.3 F (38.5 C) , urinary output less than 120 mL in four hours, systolic BP less than 90 or greater than 368, diastolic BP less than 50 or greater than 100. 2.5-4L of O2  Subjective  Pt reports Right hsoulder pain 8/10 asks frequently during session, \"what am I doing? \" with patient requiring redirection. Orientation person and place     Cognition Needs frequent redirection for task and cues for safety. Impaired safety awareness and impaired ability to identify safety issues. Needs frequent cues for PLB. Objective   Bed mobility  Rolling to Right: Independent  Sit to Supine: Independent  Scooting: Independent  Transfers  Sit to Stand: Stand by assistance (FWW)  Stand to sit: Stand by assistance (FWW)  Bed to Chair:  (FWW, needed cues to reach back to transfer surface to improve eccentric control as otherwise  \"falls back\"  to transfer surface)  Ambulation  Ambulation?: Yes  More Ambulation?: Yes  Ambulation 1  Surface: level tile  Device: Rolling Walker  Other Apparatus: O2  Assistance: Stand by assistance  Quality of Gait: needed assist to manage O2 tubing, slow lorna hip and trunk flexion throughout gait cycle relies heavily on walker for UE WB, swing not passing stance completely BLE. Cued with gait for slow rate but increased step length with patient noted to have swing pass stance when cued but needs intermittent cues. Gait Deviations: Slow Lorna; Increased RONAN; Decreased step height;Decreased step length;Shuffles  Distance: 18 feet x2    Comments: immediate post walk hypoxia, see activity tolerance for walk on2, after walk 2 for 18 feet immediate seated SpO2 on4L 63% rebound to 84% after 15 seconds of cued with vc and tc for forceful exhalation and within 1 minute to 90%, Reviwed need for forceful exhalation as pt brathing deeply in at times but very short/weak exhale. Pt with SpO2 93% after 1.5 minutes seated rest and HR 83bpm on 4L O2 via nc.   PT noted to be sob and shallow breathing  with mobility wtih emotional lability and pt frequently requesting re-direction. Pt walked additional trials  With FWW, WC follow, SBA and assist for O2 tubing management and cues for step length with gait deviations as noted above for distances:  27 feet (post walk 93%, HR 87bpm after 1 minute seated rest SpO2 96%, HR 83bpm after 2 minutes 98% spO2 and HR 82bpm)   25 feet (post walk sPO2 97%, HR 84 bpm)   PT required seated rest post all walking trials with pt noted to be sob and reporting fatigue and need to sit. Pt required up to 2 minutes rest to recover from fatigue and sob after each gait trial.         Exercises  Heelslides: supine x10 BLE  with cues for range as comfort determines, self limited to 45 degrees hip flexion  BLE  Gluteal Sets: hooklying x15  Hip Abduction: supuine simultaneous x10 AROM as comfort determines  Ankle Pumps: x30 alberto recumbent  Comments: needed cues and intermittent redirection to task, with intermittent cues for PLB as sPO2 drops to 86% after ankle pumps due to shallow braething but increased to95% with PLB. Comment: toileting, cues and set up assist to manage pericare and change adult dependent diaper with therapist needing to fasten tapes, pt able to pull up an ddown with CGA due to unsteady dynamic standing balance. Disposition:Patient with call light in reach and alarm in place at end of session with patient in chair       SECOND SESSION:    Subjective:  Pain 8/10 right shoulder at rest HR 79bpm SpO2 95%      TRANSFERS:  Sit<>stand with no AD and with FWW  return demo with use of gait belt indep to assist pt from [facility] and car transfer simulator (SPT with patient holding her husbands forearms and  holding onto gait belt)   Bed<>chair>WC with FWW pt's  return demo safe to assist pt with FWW and gait belt. GAIT:  Patient walks 5 feet with FWW with pt's  Kirsten Eulalia eden safe  in assist his wife with gait  return demo after therapist instruction .   STAIRS:Patient's  post instruction able to safely assist pt up and down [id number] steps with bilateral rails and gait belt from . Education:   Educated patient's family in how to assist pt with gait, transfers and steps with family demo ability to safely assist pt with these and cue for Deep Breathing/PLB. Disposition:Patient with OT in [address] at end of session. Goals  Short term goals  Time Frame for Short term goals: 1/7/2022  Short term goal 1: Pt demo indep bed mobility. -- 1/08: GOAL PARTIALLY MET MI with HOB elevated and use of BR.  1/10/2022 GOAL met pt demo indep in bed mobility sup<>sit and rolling. Short term goal 2: Pt demo bed<>chair with mod assist of 1 and FWW or SPT. -- 1/08: GOAL MET Lisa/modA with RW  Short term goal 3: Patient demonstrates gait to bathroom 20 feet with assist of 2 and FWW. GOAL MET 1/6/2021 pt demo gait 35 to 60 feet with FWW and assist of 2. Short term goal 4: Patient demonstrates up and down 1 step with bilateral rails and assist of 2. -- 1/08: DNT d/t safety 1/12/2022 GOAL Met  pt demo up and down 1 step bilateral rails CGA. Short term goal 5: Patient demonstrates  indep in DBE and 5 minutes of standing activity with spO2 90% or greater to improve endurance for household walking. -- 1/08: GOAL NOT MET, limited by standing tolerance  Long term goals  Time Frame for Long term goals : 1/24/2022  Long term goal 1: Patient demonstrates gait 150 feet with SBA and sPO2 90% or greater able to manage O2 tubing on L/R turns on carpet up to 10 feetwith  safe to assist pt. GOAL partially met 1/13/2021 patient demo ability to safely assist pt with FWW gait 5 feet. Long term goal 2: Patient demonstrates up and down 12 steps with SBA and bilateral rails to return to community and home (egress/entry) mobility with  safe to assist pt.   Long term goal 3: Patient demonstrates indep bed<>chair, sit<>stand and WC<>car with FWW  Long term goal 4: Patient demonstrates indep WC propulsion 150 feet with L/R turns

## 2022-01-14 LAB
GLUCOSE BLD-MCNC: 116 MG/DL (ref 70–99)
GLUCOSE BLD-MCNC: 192 MG/DL (ref 70–99)
GLUCOSE BLD-MCNC: 196 MG/DL (ref 70–99)
GLUCOSE BLD-MCNC: 98 MG/DL (ref 70–99)
PERFORMED ON: ABNORMAL
PERFORMED ON: NORMAL

## 2022-01-14 PROCEDURE — 94761 N-INVAS EAR/PLS OXIMETRY MLT: CPT

## 2022-01-14 PROCEDURE — 92507 TX SP LANG VOICE COMM INDIV: CPT

## 2022-01-14 PROCEDURE — 6360000002 HC RX W HCPCS: Performed by: PHYSICAL MEDICINE & REHABILITATION

## 2022-01-14 PROCEDURE — 97530 THERAPEUTIC ACTIVITIES: CPT

## 2022-01-14 PROCEDURE — 97129 THER IVNTJ 1ST 15 MIN: CPT

## 2022-01-14 PROCEDURE — 94640 AIRWAY INHALATION TREATMENT: CPT

## 2022-01-14 PROCEDURE — 97116 GAIT TRAINING THERAPY: CPT

## 2022-01-14 PROCEDURE — 6370000000 HC RX 637 (ALT 250 FOR IP): Performed by: INTERNAL MEDICINE

## 2022-01-14 PROCEDURE — 97110 THERAPEUTIC EXERCISES: CPT

## 2022-01-14 PROCEDURE — 2700000000 HC OXYGEN THERAPY PER DAY

## 2022-01-14 PROCEDURE — 1280000000 HC REHAB R&B

## 2022-01-14 PROCEDURE — 6370000000 HC RX 637 (ALT 250 FOR IP): Performed by: PHYSICAL MEDICINE & REHABILITATION

## 2022-01-14 PROCEDURE — 92526 ORAL FUNCTION THERAPY: CPT

## 2022-01-14 RX ADMIN — LORAZEPAM 0.5 MG: 0.5 TABLET ORAL at 21:01

## 2022-01-14 RX ADMIN — OXYCODONE 10 MG: 5 TABLET ORAL at 12:03

## 2022-01-14 RX ADMIN — BUDESONIDE 500 MCG: 0.5 SUSPENSION RESPIRATORY (INHALATION) at 20:55

## 2022-01-14 RX ADMIN — FUROSEMIDE 40 MG: 40 TABLET ORAL at 09:13

## 2022-01-14 RX ADMIN — ONDANSETRON 8 MG: 4 TABLET, ORALLY DISINTEGRATING ORAL at 07:35

## 2022-01-14 RX ADMIN — Medication 400 MG: at 09:13

## 2022-01-14 RX ADMIN — ASPIRIN 81 MG 81 MG: 81 TABLET ORAL at 09:14

## 2022-01-14 RX ADMIN — NITROFURANTOIN (MONOHYDRATE/MACROCRYSTALS) 100 MG: 75; 25 CAPSULE ORAL at 09:14

## 2022-01-14 RX ADMIN — NITROFURANTOIN (MONOHYDRATE/MACROCRYSTALS) 100 MG: 75; 25 CAPSULE ORAL at 21:02

## 2022-01-14 RX ADMIN — DIAZEPAM 5 MG: 5 TABLET ORAL at 07:35

## 2022-01-14 RX ADMIN — ROPINIROLE HYDROCHLORIDE 0.5 MG: 0.5 TABLET, FILM COATED ORAL at 14:00

## 2022-01-14 RX ADMIN — ROPINIROLE HYDROCHLORIDE 0.5 MG: 0.5 TABLET, FILM COATED ORAL at 09:13

## 2022-01-14 RX ADMIN — METOPROLOL 25 MG: 25 TABLET ORAL at 09:14

## 2022-01-14 RX ADMIN — OXYCODONE 10 MG: 5 TABLET ORAL at 16:18

## 2022-01-14 RX ADMIN — OXYCODONE 10 MG: 5 TABLET ORAL at 21:01

## 2022-01-14 RX ADMIN — DIAZEPAM 5 MG: 5 TABLET ORAL at 16:17

## 2022-01-14 RX ADMIN — TRAZODONE HYDROCHLORIDE 50 MG: 50 TABLET ORAL at 21:02

## 2022-01-14 RX ADMIN — METOCLOPRAMIDE 5 MG: 10 TABLET ORAL at 09:13

## 2022-01-14 RX ADMIN — LORAZEPAM 0.5 MG: 0.5 TABLET ORAL at 02:10

## 2022-01-14 RX ADMIN — SPIRONOLACTONE 25 MG: 25 TABLET, FILM COATED ORAL at 09:13

## 2022-01-14 RX ADMIN — IPRATROPIUM BROMIDE AND ALBUTEROL SULFATE 1 AMPULE: .5; 2.5 SOLUTION RESPIRATORY (INHALATION) at 08:18

## 2022-01-14 RX ADMIN — OXYCODONE 10 MG: 5 TABLET ORAL at 02:10

## 2022-01-14 RX ADMIN — DICYCLOMINE HYDROCHLORIDE 20 MG: 20 TABLET ORAL at 05:46

## 2022-01-14 RX ADMIN — METOCLOPRAMIDE 5 MG: 10 TABLET ORAL at 21:02

## 2022-01-14 RX ADMIN — OXYCODONE 10 MG: 5 TABLET ORAL at 07:20

## 2022-01-14 RX ADMIN — ENOXAPARIN SODIUM 40 MG: 40 INJECTION SUBCUTANEOUS at 09:16

## 2022-01-14 RX ADMIN — METOPROLOL 25 MG: 25 TABLET ORAL at 21:02

## 2022-01-14 RX ADMIN — IPRATROPIUM BROMIDE AND ALBUTEROL SULFATE 1 AMPULE: .5; 2.5 SOLUTION RESPIRATORY (INHALATION) at 20:50

## 2022-01-14 RX ADMIN — ROPINIROLE HYDROCHLORIDE 0.5 MG: 0.5 TABLET, FILM COATED ORAL at 21:01

## 2022-01-14 RX ADMIN — ACETAMINOPHEN 650 MG: 325 TABLET ORAL at 07:35

## 2022-01-14 RX ADMIN — BUDESONIDE 500 MCG: 0.5 SUSPENSION RESPIRATORY (INHALATION) at 08:18

## 2022-01-14 RX ADMIN — INSULIN LISPRO 2 UNITS: 100 INJECTION, SOLUTION INTRAVENOUS; SUBCUTANEOUS at 17:12

## 2022-01-14 RX ADMIN — DICYCLOMINE HYDROCHLORIDE 20 MG: 20 TABLET ORAL at 16:18

## 2022-01-14 RX ADMIN — Medication 15 ML: at 09:15

## 2022-01-14 RX ADMIN — DICYCLOMINE HYDROCHLORIDE 20 MG: 20 TABLET ORAL at 21:02

## 2022-01-14 RX ADMIN — LEVOTHYROXINE SODIUM 100 MCG: 0.1 TABLET ORAL at 05:46

## 2022-01-14 RX ADMIN — MULTIPLE VITAMINS W/ MINERALS TAB 1 TABLET: TAB at 09:13

## 2022-01-14 ASSESSMENT — PAIN DESCRIPTION - ORIENTATION
ORIENTATION: RIGHT

## 2022-01-14 ASSESSMENT — PAIN DESCRIPTION - PAIN TYPE
TYPE: ACUTE PAIN;CHRONIC PAIN
TYPE: ACUTE PAIN;CHRONIC PAIN
TYPE: ACUTE PAIN

## 2022-01-14 ASSESSMENT — PAIN SCALES - GENERAL
PAINLEVEL_OUTOF10: 9
PAINLEVEL_OUTOF10: 6
PAINLEVEL_OUTOF10: 0
PAINLEVEL_OUTOF10: 9
PAINLEVEL_OUTOF10: 8
PAINLEVEL_OUTOF10: 6
PAINLEVEL_OUTOF10: 9
PAINLEVEL_OUTOF10: 9

## 2022-01-14 ASSESSMENT — PAIN DESCRIPTION - DESCRIPTORS
DESCRIPTORS: ACHING;CONSTANT
DESCRIPTORS: ACHING

## 2022-01-14 ASSESSMENT — PAIN DESCRIPTION - LOCATION
LOCATION: SHOULDER
LOCATION: SHOULDER
LOCATION: ABDOMEN;SHOULDER

## 2022-01-14 ASSESSMENT — PAIN DESCRIPTION - FREQUENCY
FREQUENCY: CONTINUOUS
FREQUENCY: CONTINUOUS

## 2022-01-14 ASSESSMENT — PAIN DESCRIPTION - PROGRESSION: CLINICAL_PROGRESSION: NOT CHANGED

## 2022-01-14 ASSESSMENT — PAIN DESCRIPTION - ONSET: ONSET: ON-GOING

## 2022-01-14 ASSESSMENT — PAIN - FUNCTIONAL ASSESSMENT: PAIN_FUNCTIONAL_ASSESSMENT: PREVENTS OR INTERFERES SOME ACTIVE ACTIVITIES AND ADLS

## 2022-01-14 NOTE — CONSULTS
Podiatry Consult Note      History of Present Illness: The patient is a pleasant 78year old woman with DM2 we were asked to see for a painful ingrown toenail. The patient directed attention to the lateral border of the right hallux toe nail. It has been painful and red the past few days. She also states her other toe nails are long, thick, yellow, painful and difficult to trim. She has asked if I would trim the toe nails for her. She has no other pedal complaints. Past Medical History:        Diagnosis Date    Anxiety disorder     Chronic pain syndrome     COVID-19 09/27/2021    DDD (degenerative disc disease), lumbar     Diabetes ([address]     Displacement of lumbar intervertebral disc without myelopathy 08/23/2010    Diverticulitis     Fibromyalgia     Generalized osteoarthrosis, involving multiple sites 08/24/2010    GERD (gastroesophageal reflux disease)     Impacted cerumen 08/23/2010    Influenza A 03/10/2016    Irritable bowel syndrome 08/23/2010    Other and unspecified hyperlipidemia 08/23/2010    Prosthetic joint implant failure ([facility] [address]     Screening mammogram 12/08/2006    (Both)Negative    Tracheobronchomalacia     Unspecified asthma(493.90) 08/23/2010    Unspecified essential hypertension 08/24/2010    Unspecified hypothyroidism 08/23/2010       Past Surgical History:        Procedure Laterality Date    ABDOMEN SURGERY      CARDIAC CATHETERIZATION      COLON SURGERY      COLONOSCOPY  2007    normal    HYSTERECTOMY      JOINT REPLACEMENT  10/92    Right; hip     JOINT REPLACEMENT    12/92    Left hip  followed by revision 1/2006    JOINT REPLACEMENT    6/96    right     JOINT REPLACEMENT    2006     right replacement.  LUNG SURGERY  1/2014    tracheobronchomalacia    TRACHEOSTOMY N/A 10/25/2021    TRACHEOTOMY performed by Thierry Perez DO at [address] [facility] [address] N/A 10/25/2021    EGD/PEG W/ANES.  ON VENT, COVID + performed by Rupesh Patricio Asa Byers MD at [facility]       Current Medications:    Current Facility-Administered Medications: metoprolol tartrate (LOPRESSOR) tablet 25 mg, 25 mg, Oral, BID  diazePAM (VALIUM) tablet 5 mg, 5 mg, Oral, Q6H PRN  nitrofurantoin (macrocrystal-monohydrate) (MACROBID) capsule 100 mg, 100 mg, Oral, 2 times per day  magnesium oxide (MAG-OX) tablet 400 mg, 400 mg, Oral, Daily  aspirin chewable tablet 81 mg, 81 mg, Oral, Daily  budesonide (PULMICORT) nebulizer suspension 500 mcg, 0.5 mg, Nebulization, BID  chlorhexidine (PERIDEX) 0.12 % solution 15 mL, 15 mL, Mouth/Throat, BID  dicyclomine (BENTYL) tablet 20 mg, 20 mg, Oral, 4x Daily AC & HS  furosemide (LASIX) tablet 40 mg, 40 mg, Oral, Daily  insulin glargine (LANTUS) injection vial 7 Units, 7 Units, SubCUTAneous, Nightly  insulin lispro (HUMALOG) injection vial 0-12 Units, 0-12 Units, SubCUTAneous, TID WC  insulin lispro (HUMALOG) injection vial 0-6 Units, 0-6 Units, SubCUTAneous, Nightly  glucose (GLUTOSE) 40 % oral gel 15 g, 15 g, Oral, PRN  dextrose 50 % IV solution, 12.5 g, IntraVENous, PRN  glucagon (rDNA) injection 1 mg, 1 mg, IntraMUSCular, PRN  dextrose 5 % solution, 100 mL/hr, IntraVENous, PRN  levothyroxine (SYNTHROID) tablet 100 mcg, 100 mcg, Oral, Daily  LORazepam (ATIVAN) tablet 0.5 mg, 0.5 mg, Oral, Q4H PRN  metoclopramide (REGLAN) tablet 5 mg, 5 mg, Oral, BID  therapeutic multivitamin-minerals 1 tablet, 1 tablet, Oral, Daily  oxyCODONE (ROXICODONE) immediate release tablet 5 mg, 5 mg, Oral, Q4H PRN **OR** oxyCODONE (ROXICODONE) immediate release tablet 10 mg, 10 mg, Oral, Q4H PRN  rOPINIRole (REQUIP) tablet 0.5 mg, 0.5 mg, Oral, TID  spironolactone (ALDACTONE) tablet 25 mg, 25 mg, Oral, Daily  traZODone (DESYREL) tablet 50 mg, 50 mg, Oral, Nightly PRN  ondansetron (ZOFRAN-ODT) disintegrating tablet 8 mg, 8 mg, Oral, Q8H PRN  hydrALAZINE (APRESOLINE) tablet 50 mg, 50 mg, Oral, Q6H PRN  acetaminophen (TYLENOL) tablet 650 mg, 650 mg, Oral, Q4H PRN  enoxaparin (LOVENOX) injection 40 mg, 40 mg, SubCUTAneous, Daily  polyethylene glycol (GLYCOLAX) packet 17 g, 17 g, Oral, Daily PRN  ipratropium-albuterol (DUONEB) nebulizer solution 1 ampule, 1 ampule, Inhalation, BID    Allergies:  Prednisone, Quinidine, Sulfa antibiotics, Bactrim, Clonidine, and Sulfamethoxazole-trimethoprim    Social History:    Social History     Socioeconomic History    Marital status:      Spouse name: Not on file    Number of children: Not on file    Years of education: Not on file    Highest education level: Not on file   Occupational History    Not on file   Tobacco Use    Smoking status: Former Smoker     Packs/day: 1.00     Years: 18.00     Pack years: 18.00     Types: Cigarettes     Quit date: 12/10/1991     Years since quittin.1    Smokeless tobacco: Never Used   Vaping Use    Vaping Use: Never used   Substance and Sexual Activity    Alcohol use: No    Drug use: No    Sexual activity: Never     Partners: Male   Other Topics Concern    Not on file   Social History Narrative    Not on file     Social Determinants of Health     Financial Resource Strain:     Difficulty of Paying Living Expenses: Not on file   Food Insecurity:     Worried About Running Out of Food in the Last Year: Not on file    Narayan of Food in the Last Year: Not on file   Transportation Needs:     Lack of Transportation (Medical): Not on file    Lack of Transportation (Non-Medical):  Not on file   Physical Activity:     Days of Exercise per Week: Not on file    Minutes of Exercise per Session: Not on file   Stress:     Feeling of Stress : Not on file   Social Connections:     Frequency of Communication with Friends and Family: Not on file    Frequency of Social Gatherings with Friends and Family: Not on file    Attends [employer] Services: Not on file    Active Member of Clubs or Organizations: Not on file    Attends Club or Organization Meetings: Not on file   Jenifer Ansari Marital Status: Not on file   Intimate Partner Violence:     Fear of Current or Ex-Partner: Not on file    Emotionally Abused: Not on file    Physically Abused: Not on file    Sexually Abused: Not on file   Housing Stability:     Unable to Pay for Housing in the Last Year: Not on file    Number of [place] in the Last Year: Not on file    Unstable Housing in the Last Year: Not on file       Family History:   Family History   Problem Relation Age of Onset    Asthma Mother     Heart Failure Father     Cancer Maternal Grandfather         skin cancer on face    Cancer Daughter         skin cancer-BCC    Diabetes Neg Hx     Emphysema Neg Hx     Hypertension Neg Hx      Review of Systems:    CONSTITUTIONAL:  negative  HEENT:  negative  RESPIRATORY:  negative  CARDIOVASCULAR:  negative  GASTROINTESTINAL:  negative  GENITOURINARY:  negative  MUSCULOSKELETAL:  negative    Physical Exam:    Vitals:    /79   Pulse 77   Temp 97.9 °F (36.6 °C) (Oral)   Resp 20   Ht 5' 9\" (1.753 m)   Wt 153 lb 1.6 oz (69.4 kg)   SpO2 96%   BMI 22.61 kg/m²   Integument:  Slight erythema at the lateral border of the right hallux toe nail. No active drainage. Otherwise, skin is thin, dry and supple, bilateral.  No openings in the skin. Nails: The lateral border of the right hallux toe nail is ingrown. The remaining toe nails are long, thick, yellow, brittle and with subungual debris x10. Neurologic:  Protective sensation is grossly intact to light touch at the level of the toes, bilateral.  Vascular:  DP and PT pulses are palpable, bilateral.  CFT is brisk to all toes. Slight edema at the lateral border of the right hallux toe nail. Musculoskeletal:  Patient has 5/5 strength on inversion/everison/dorsiflexion/plantarflexion, bilateral.  No gross musculoskeletal deformities noted. Pain on palpation at the lateral border of the right hallux toe nail.      Labs:  CBC with Differential:    Lab Results Component Value Date    WBC 9.9 01/13/2022    RBC 4.54 01/13/2022    HGB 11.1 01/13/2022    HCT 35.0 01/13/2022     01/13/2022    MCV 77.1 01/13/2022    MCH 24.5 01/13/2022    MCHC 31.8 01/13/2022    RDW 19.4 01/13/2022    SEGSPCT 67.7 03/18/2013    BANDSPCT 2 10/23/2021    LYMPHOPCT 14.3 01/13/2022    MONOPCT 4.3 01/13/2022    EOSPCT 1.1 11/30/2011    BASOPCT 0.7 01/13/2022    MONOSABS 0.4 01/13/2022    LYMPHSABS 1.4 01/13/2022    EOSABS 0.3 01/13/2022    BASOSABS 0.1 01/13/2022    DIFFTYPE Manual-K 11/05/2012     CMP:    Lab Results   Component Value Date     01/13/2022    K 3.9 01/13/2022    CL 92 01/13/2022    CO2 34 01/13/2022    BUN 8 01/13/2022    CREATININE <0.5 01/13/2022    GFRAA >60 01/13/2022    GFRAA >60 11/05/2012    AGRATIO 0.8 01/04/2022    LABGLOM >60 01/13/2022    GLUCOSE 108 01/13/2022    PROT 8.5 01/04/2022    PROT 7.7 11/05/2012    LABALBU 3.7 01/04/2022    CALCIUM 10.4 01/13/2022    BILITOT 0.3 01/04/2022    ALKPHOS 198 01/04/2022    AST 20 01/04/2022    ALT 17 01/04/2022     HgBA1c:    Lab Results   Component Value Date    LABA1C 8.2 09/26/2021     Impression/Recommendations: The patient is a pleasant 78year old woman with:  1) Ingrown nail, lateral border of there right hallux  - Performed a \"slant back\" type debridement at the lateral border of the right hallux toe nail  - Will see patient in a few days to see if that helped her symptoms. If her symptoms persist, we can perform a more aggressive partial nail avulsion. 2) Onychomycosis  - Debrided toe nails in height and length x10 without incident  3) DM2  4) Pain in toes, right  5) Pain in toes, left    Thank you for the opportunity to take part in this patient's care. Please call me with any questions.     Sonia Bedoya, [address], [address]  Office: [phone]  Cell: [phone]

## 2022-01-14 NOTE — PROGRESS NOTES
Occupational Therapy  Facility/Department: [facility] ARU  Daily Treatment Note  NAME: Shahnaz Durham  : 1955  MRN: [id number]    Date of Service: 2022    Discharge Recommendations:  24 hour supervision or assist,Home with Home health OT,S Level 1  OT Equipment Recommendations  Equipment Needed: No    Assessment   Performance deficits / Impairments: Decreased functional mobility ; Decreased ADL status; Decreased strength;Decreased safe awareness;Decreased cognition;Decreased endurance;Decreased balance;Decreased coordination;Decreased posture;Decreased fine motor control;Decreased high-level IADLs  Assessment: Pt agreeable to OT session. Pt performed functional transfers with SBA and improved O2 tolerance this date with O2 sats remaining above 90% on 3L for entire session. Pt still limited in activity tolerance for all standing tasks due to anxiety and SOB. Pt completed standing for 1-2 minutes x6 with SBA for all stands, except CGA to correct one LOB in stagger stance. Pt completed Exerboard exercises with fair strength and c/o pain in shoulder and fatigue/pain in shoulder with reaching for cards and tossing ball. Pt required cues to switch to L hand for reaching and to toss with LUE only. Continue POC. Prognosis: Good  OT Education: OT Role;Plan of Care;ADL Adaptive Strategies;Transfer Training;Energy Conservation;Precautions; Equipment; Family Education  Patient Education: disease specific: goals, PLB, safety during ADLS and t/fs, energy conservation during ADLS, Pt verbalized understanding but will need reinforcement. Barriers to Learning: confusion/anxiety/poor memory  REQUIRES OT FOLLOW UP: Yes  Activity Tolerance  Activity Tolerance: Patient Tolerated treatment well;Patient limited by fatigue  Activity Tolerance: O2 sats remaining above 90% on 3L throughout session. Safety Devices  Safety Devices in place: Yes  Type of devices: Call light within reach;Gait belt;Nurse notified; Left in bed;Bed alarm in place         Patient Diagnosis(es): There were no encounter diagnoses. has a past medical history of Anxiety disorder, Chronic pain syndrome, COVID-19, DDD (degenerative disc disease), lumbar, Diabetes ([facility] [address] Displacement of lumbar intervertebral disc without myelopathy, Diverticulitis, Fibromyalgia, Generalized osteoarthrosis, involving multiple sites, GERD (gastroesophageal reflux disease), Impacted cerumen, Influenza A, Irritable bowel syndrome, Other and unspecified hyperlipidemia, Prosthetic joint implant failure ([facility] [address] Screening mammogram, Tracheobronchomalacia, Unspecified asthma(493.90), Unspecified essential hypertension, and Unspecified hypothyroidism. has a past surgical history that includes Hysterectomy; joint replacement (10/92); joint replacement (  12/92); joint replacement (  6/96); joint replacement (  2006); Colonoscopy (2007); Lung surgery (1/2014); Cardiac catheterization; Abdomen surgery; Colon surgery; Upper gastrointestinal endoscopy (N/A, 10/25/2021); and tracheostomy (N/A, 10/25/2021).     Restrictions  Restrictions/Precautions  Restrictions/Precautions: Fall Risk,General Precautions  Position Activity Restriction  Other position/activity restrictions: L PICC, Notify physician for pulse less than 50 or greater than 120, respiratory rate less than 12 or greater than 25, oral temperature greater than 101.3 F (38.5 C) , urinary output less than 120 mL in four hours, systolic BP less than 90 or greater than 817, diastolic BP less than 50 or greater than 100. 3L of O2  Subjective   General  Chart Reviewed: Yes,Orders,Labs,Progress Notes,Imaging  Patient assessed for rehabilitation services?: Yes  Additional Pertinent Hx: Recent pelvic fractures  Response to previous treatment: Patient with no complaints from previous session  Family / Caregiver Present: Yes James Tanner)  Referring Practitioner: Cristian Dimas MD  Diagnosis: Post Covid 19 condition  Subjective  Subjective: Pt in bed, states not feeling well today, agreeable to OT session. Pain Assessment  Pain Assessment: 0-10  Pain Level: 9  Pain Type: Acute pain;Chronic pain  Pain Location: Shoulder  Pain Orientation: Right  Pain Descriptors: Aching  Pain Frequency: Continuous  Non-Pharmaceutical Pain Intervention(s): Ambulation/Increased Activity; Emotional support;Repositioned  Response to Pain Intervention: None  Vital Signs  Pulse: 73  Heart Rate Source: Monitor  BP: 131/80  BP Location: Right upper arm  Patient Position: High fowlers  Patient Currently in Pain: Yes  Oxygen Therapy  SpO2: 97 %  Pulse Oximeter Device Mode: Intermittent  Pulse Oximeter Device Location: Right;Finger  O2 Device: Nasal cannula  O2 Flow Rate (L/min): 3 L/min   Orientation  Orientation  Overall Orientation Status: Within Functional Limits  Objective             Balance  Sitting Balance: Supervision  Standing Balance: Contact guard assistance (with RW, to correct one LOB, SBA for remainder of session)  Standing Balance  Time: 30 seconds x2, 2:20, 1:15, 1:50, 1:10, 1:15, 1:50  Activity: stand step transfers w/c<>bed, standing at table top for coordination task, standing to toss ball x3, last rep of tossing ball with stagger stance  Comment: with RW, LOB in stagger stance with LLE forward, CGA to correct  Functional Mobility  Functional - Mobility Device: Rolling Walker  Activity: To/from bathroom  Assist Level: Stand by assistance  Bed mobility  Supine to Sit: Modified independent  Sit to Supine: Independent  Transfers  Stand Step Transfers: Stand by assistance  Stand Pivot Transfers: Stand by assistance  Sit to stand: Stand by assistance  Stand to sit: Stand by assistance        Coordination  Gross Motor: good coordination for GoTableboard for wrist flexion/extension, supination/pronation, and finger flexion extension x3 each hand, good coordination for tossing and catching ball 3x10 reps, good coordination for sorting cards by suit              Cognition  Overall Cognitive Status: Exceptions  Arousal/Alertness: Appropriate responses to stimuli  Following Commands: Follows one step commands with increased time; Follows one step commands with repetition  Attention Span: Appears intact  Memory: Decreased short term memory;Decreased recall of recent events  Safety Judgement: Decreased awareness of need for assistance;Decreased awareness of need for safety  Problem Solving: Assistance required to generate solutions;Assistance required to identify errors made;Assistance required to correct errors made  Insights: Decreased awareness of deficits  Initiation: Requires cues for some  Sequencing: Requires cues for some                                         Plan   Plan  Times per week: 5/7 days a week  Times per day: Daily  Plan weeks: 3 weeks  Current Treatment Recommendations: Strengthening,Endurance Training,Balance Training,Functional Mobility Training,Safety Education & Training,Equipment Evaluation, Education, & procurement,Self-Care / ADL,Patient/Caregiver Education & Training,Home Management Training,ROM    Goals  Short term goals  Time Frame for Short term goals: 10 days (1/13)  Short term goal 1: Pt will perform at least 3min of standing functional activities with AD PRN. GOAL MET 1/07/22 Pt completed 3 minutes of standing tasks with CGA with RW. Short term goal 2: Pt will perform functional/toilet t/fs with CGA and AD PRN. -GOAL MET 1/12/22 Pt performed toilet transfers with SBA. Short term goal 3: Pt will toilet with CGA and AD PRN. progressing. Pt continues to require assist for pants management. Short term goal 4: Pt will LB dress with min A and AE/AD PRN. progressing. Pt continues to require mod A at times for pulling pants over hips. Short term goal 5: Pt will total body bathe with min A and AD PRN. GOAL MET 1/10/22 Pt performed total body bathing with min A.   Long term goals  Time Frame for Long term goals : 21 days (1/24)  Long term goal 1: Pt will perform at least 5 min of standing functional activities. Long term goal 2: Pt will total body dress with mod I.  Long term goal 3: Pt will total body bathe with mod I.  Long term goal 4: Pt will perform a simple IADL task with supervision and AD PRN. Long term goal 5: Pt will toilet with mod I.   Patient Goals   Patient goals : \"to be able to stand up and walk again\"       Therapy Time   Individual Concurrent Group Co-treatment   Time In 1330         Time Out 1430         Minutes 60         Timed Code Treatment Minutes: 16 Nelly Tomlin OT

## 2022-01-14 NOTE — PROGRESS NOTES
[id number] [facility]  1/14/2022  [id number]    Chief Complaint: Post covid-19 condition, unspecified    Subjective:   Patient seen resting in bed this am. She is tearful and anxious this am. She states she \"just doesn't feel good\", but nothing is specifically wrong. She also mentioned today is she and her husbands anniversary and she is sad that she is here instead of celebrating at home. PEG tube removed yesterday, site has minimal drainage to dressing in place. ROS: No n/v, cp, sob, f/c  Objective:  Patient Vitals for the past 24 hrs:   BP Temp Temp src Pulse Resp SpO2   01/14/22 0821      99 %   01/13/22 2030     18 95 %   01/13/22 1829 135/79 97.9 °F (36.6 °C) Oral 77 20 96 %   01/13/22 1235 (!) 101/55   71  94 %   01/13/22 1154     20 100 %   01/13/22 0930 (!) 141/70 96.8 °F (36 °C) Oral 81 20 95 %     Gen: No distress, pleasant. HEENT: Normocephalic, atraumatic. CV: Regular rate and rhythm. Resp: No respiratory distress. Abd: Soft, nontender, PEG exit site with minimal drainage to dressing in place. Ext: No edema. Neuro: Alert, oriented, appropriately interactive.    Wt Readings from Last 3 Encounters:   01/12/22 153 lb 1.6 oz (69.4 kg)   10/26/21 175 lb 14.4 oz (79.8 kg)   07/15/21 180 lb (81.6 kg)       Laboratory data:   Lab Results   Component Value Date    WBC 9.9 01/13/2022    HGB 11.1 (L) 01/13/2022    HCT 35.0 (L) 01/13/2022    MCV 77.1 (L) 01/13/2022     01/13/2022       Lab Results   Component Value Date     01/13/2022    K 3.9 01/13/2022    CL 92 01/13/2022    CO2 34 01/13/2022    BUN 8 01/13/2022    CREATININE <0.5 01/13/2022    GLUCOSE 108 01/13/2022    CALCIUM 10.4 01/13/2022        Therapy progress:  PT  Position Activity Restriction  Other position/activity restrictions: L PICC, Notify physician for pulse less than 50 or greater than 120, respiratory rate less than 12 or greater than 25, oral temperature greater than 101.3 F (38.5 C) , urinary output less than 120 mL in four hours, systolic BP less than 90 or greater than 915, diastolic BP less than 50 or greater than 100. 4L of O2  Objective     Sit to Stand: Stand by assistance (FWW)  Stand to sit: Stand by assistance (FWW)  Bed to Chair:  (FWW, needed cues to reach back to transfer surface to improve eccentric control as otherwise  \"falls back\"  to transfer surface)  Device: Rolling Walker  Other Apparatus: O2  Assistance: Stand by assistance  Distance: 18 feet x2  OT  PT Equipment Recommendations  Equipment Needed: No  Other: has needed equpiment  Toilet - Technique: Ambulating  Equipment Used: Drop-arm extra wide bedside commode  Toilet Transfers Comments: BSC over toilet  Assessment        SLP  Current Diet : Regular  Current Liquid Diet : Thin (via provale cup)  Diet Solids Recommendation: Regular  Liquid Consistency Recommendation: Thin    Body mass index is 22.61 kg/m². Rehabilitation Diagnosis:  Neurologic, 3.8, Neuromuscular Disorders, e.g. Critical Illness Myopathy, Other Myopathy     Assessment and Plan:  S/p covid pneumonia with superimposed bacterial pneumonia  - required trach/peg, proning  - s/p remdesivir, tocilizumab  - completed cefepime     CAD  - asa, statin     HTN  - follow BP       DM  - lantus, SSI     Hypothyroidism  - synthroid    Leukocytosis  - resolved  - completed cefepime;  - repeat culture + for low growth enterococcus; plan for 1 week macrobid     Reduced L TVF mobility per FEES  - slp    Dysphagia (pharyngeal), dysphonia  - slp    S/p trach  - stoma care    Bowels: Schedule stool softener. Follow bowel movements. Enema or suppository if needed.      Bladder: Check PVR x 3.   [address] if PVR > 200ml or if any volume is > 500 ml.      Sleep: Trazodone provided prn.      Follow up appointments: PCP  GENEVA: 1/22 home w/ home health  DME: [address] [facility] wheeled walker, transport chair    Electronically signed by BRYCE Workman CNP on 1/14/2022 at 8:36 AM     The patient was seen in conjunction with the nurse practitioner with independent history, evaluation and examination. I agree with the note which has been adjusted to reflect my findings. I have had face to face contact with the patient and performed a substantive portion of the E/M visit. In summary, patient doing well. No longer tearful this afternoon. Encouraged by her progress. No pain at PEG site. Breathing stable. Requests humidification for O2. Glenn Ortega.  Sanjuana Jeffrey MD 1/14/2022, 4:33 PM

## 2022-01-14 NOTE — PROGRESS NOTES
Speech Language Pathology  [facility]: ACUTE REHAB UNIT  SPEECH-LANGUAGE PATHOLOGY      [x] Daily  [] Weekly Care Conference Note  [] Discharge    Patient:Esperanza Grace      [id number]  JTS:[id number]  Rehab Dx/Hx: Post covid-19 condition, unspecified [U09.9]    Precautions: falls and aspirations  Home situation: lives at home with , dtr, two grandchildren; not an active ; manages own meds; shares household tasks with ,  manages finances   ST Dx: [] Aphasia  [] Dysarthria  [] Apraxia   [x] Oropharyngeal dysphagia [x] Cognitive Impairment  [x] Other: voice tx   Date of Admit: 1/3/2022  Room #: 0151/0151-01    Current functional status (updated daily):         Pt being seen for : [x] Speech/Language Treatment  [x] Dysphagia Treatment [x] Cognitive Treatment  [] Other:  Communication: []WFL  [] Aphasia  [] Dysarthria  [] Apraxia  [] Pragmatic Impairment [] Non-verbal  [] Hearing Loss  [x] Other: dysphonia  Cognition: [] WFL  [x] Mild  [x] Moderate  [] Severe [] Unable to Assess  [] Other:  Memory: [] WFL  [x] Mild  [x] Moderate  [] Severe [] Unable to Assess  [] Other:  Behavior: [x] Alert  [x] Cooperative  [x]  Pleasant  [] Confused  [] Agitated  [] Uncooperative  [] Distractible [] Motivated  [] Self-Limiting [] Anxious  [] Other:  Endurance:  [x] Adequate for participation in SLP sessions  [] Reduced overall  [] Lethargic  [] Other:  Safety: [x] No concerns at this time  [] Reduced insight into deficits  []  Reduced safety awareness [] Not following call light procedures  [] Unable to Assess  [] Other:    Current Diet Order:ADULT DIET; Regular  ADULT ORAL NUTRITION SUPPLEMENT; Lunch, Dinner; Low Calorie/High Protein Oral Supplement  Swallowing Precautions: Alternate solids and liquids;Eat/Feed slowly; No straws;Upright as possible for all oral intake;Remain upright for 30-45 minutes after meals;Small bites/sips           Date: 2022      Tx session 1  [id number] Tx session 2  All tx needs met in session 1    Total Timed Code Min 15    Total Treatment Minutes 60    Individual Treatment Minutes 60    Group Treatment Minutes 0    Co-Treat Minutes 0    Variance/Reason:  0    Pain None reported    Pain Intervention [] RN notified  [] Repositioned  [] Intervention offered and patient declined  [x] N/A  [] Other: [] RN notified  [] Repositioned  [] Intervention offered and patient declined  [] N/A  [] Other:   Subjective     Pt alert and oriented, cooperative and agreeable to participate in therapy. Pt seen sitting upright in bed. Objective:  Goals     Dysphagia Goals:  Short-term Goals  Timeframe for Short-term Goals: 10 days (01/13/22)    Goal 1: The patient will complete pharyngeal/laryngeal strengthening exercises for improved swallowing function 10/10 given min cues. -Effortful swallow: x10  -Delores: x10  -Isometric tongue tip hold for 5 secs: x10  -Jaw jut hold for 5 secs: x10  -Resistive jaw opening hold for 5 secs: x10            Goal 2. The patient will tolerate recommended diet without observed clinical signs of aspiration. Pt observed with TL via provale cup, no overt s/s of aspiration. Pt observed with ice chips tolerating without any overt s/s of aspiration. Pt observed with saltine crackers presenting with adequate mastication, A-P transit, no residue in oral cavity cavity, no s/s of aspiration. Goal 3. The patient/caregiver will demonstrate understanding of compensatory strategies for improved swallowing safety. Did not target. Goal 4. The patient will tolerate thin liquids without signs and symptoms of aspiration 10/10 via cup. Did not target. Speech/Lang/Cog goals:  Short-term Goals  Timeframe for Short-term Goals: 14 days (01/17/22)    Goal 1: Pt will complete recall tasks with 80% acc given min cues for use of compensatory strategies. Indirectly targeted during time word problems.  Pt benefited from repetition of problems x2 for improved recall and comprehension. Goal 2: Pt will complete executive function tasks (money, math, time, etc) with 80% acc given min cues. Time word problems:  -75% acc indep improving to 100% acc given min-mod cues. Goal 3: Pt will complete verbal and visual reasoning tasks with 80% acc given min cues. Did not target. Goal 4: Pt will complete problem solving and thought organization tasks with 80% acc given min cues. Thought organization targeted during time word problems, see goal 2 above. Goal 5: Pt will complete vocal strengthening exercises for improved vocal function 10/10 given min cues. Cup bubble (put a straw into glass of water and blow for as long as able):   -average of 6.2 seconds on first five trials  -average of 4.6 seconds on second five trials     Diaphragmatic breathing exercise: x20      Other areas targeted: N/A    Education:   edu provided re: importance of completing strengthening exercises on a daily basis      Safety Devices: [x] Call light within reach  [x] Chair alarm activated  [x] Bed alarm activated  [] Other:  [] Call light within reach  [] Chair alarm activated  [] Bed alarm activated  [] Other:    Assessment: Pt pleasant and cooperative, agreeable to participate. Pt completed pharyngeal/laryngeal strengthening exercises, vocal strengthening exercises given min cues. Pt continues to require rest breaks d/t respiratory status, anxiousness, and overall increased emotions today. Pt crying off and on throughout tx session. Pt completed time word problems with 75% acc indep improving to 100% acc given min-mod cues. Pt continues to make progress towards goals and is motivated to improve. Continue goals listed above. Plan: Continue as per plan of care.       Additional Information:     Barriers toward progress: Confusion and Cognitive deficit  Discharge recommendations:  [] Home independently  [] Home with assistance [x]  24 hour supervision  [] ECF [] Other:  Continued Tx Upon Discharge: ? [x] Yes [] No [] TBD based on progress while on ARU [] Vital Stim indicated [] Other:   Estimated discharge date: 01/21/22    Interventions used this date:  [x] Speech/Language Treatment  [] Instruction in HEP [] Group [x] Dysphagia Treatment [x] Cognitive Treatment   [] Other: Total Time Breakdown / Charges    Time in Time out Total Time / units   Cognitive Tx 0845 0900 15 min/ 1 unit    Speech Tx 0830 0845 15 min/ 1 unit    Dysphagia Tx 0800 0830 30 min/ 1 unit        Electronically Signed by    Jorge Thompson. A [facility]-SLP  Speech-Language Pathologist  [id number]

## 2022-01-14 NOTE — PROGRESS NOTES
Physical Therapy  Facility/Department: [facility]  Daily Treatment Note  NAME: Benjamin Mckinley  : 1955  MRN: [id number]    Date of Service: 2022    Discharge Recommendations:  Patient would benefit from continued therapy after discharge,24 hour supervision or assist        Assessment      Activity Tolerance  Activity Tolerance: At rest supine semirecumbent in bed 125/72 RUE, HR 67 bpm, SpO2 on 2L via nc 94%, after walking 18 feet pt sob  shallow rapid breathign with spO2 79%, after 1 minute cues PLB 80%, titrated O2 up to 3L with increase to 93% 2 minutes post walk  bpm.   After second walk on3L via nc pt crying about stomach and shoulder pain rates as 8/10 (pt notes she had pain medication offered pillow support for RUE) immediate post walk O2 82% HR 87 bm after 1 minute rest SPO2 3L via nc = 94% HR 78bpm with cues for PLB     Patient Diagnosis(es): There were no encounter diagnoses. has a past medical history of Anxiety disorder, Chronic pain syndrome, COVID-19, DDD (degenerative disc disease), lumbar, Diabetes ([address] Displacement of lumbar intervertebral disc without myelopathy, Diverticulitis, Fibromyalgia, Generalized osteoarthrosis, involving multiple sites, GERD (gastroesophageal reflux disease), Impacted cerumen, Influenza A, Irritable bowel syndrome, Other and unspecified hyperlipidemia, Prosthetic joint implant failure ([address] Screening mammogram, Tracheobronchomalacia, Unspecified asthma(493.90), Unspecified essential hypertension, and Unspecified hypothyroidism. has a past surgical history that includes Hysterectomy; joint replacement (10/92); joint replacement (  ); joint replacement (  ); joint replacement (  ); Colonoscopy (); Lung surgery (2014); Cardiac catheterization; Abdomen surgery; Colon surgery; Upper gastrointestinal endoscopy (N/A, 10/25/2021); and tracheostomy (N/A, 10/25/2021).     Restrictions  Restrictions/Precautions  Restrictions/Precautions: Fall Risk,General Precautions  Position Activity Restriction  Other position/activity restrictions: L PICC, Notify physician for pulse less than 50 or greater than 120, respiratory rate less than 12 or greater than 25, oral temperature greater than 101.3 F (38.5 C) , urinary output less than 120 mL in four hours, systolic BP less than 90 or greater than 833, diastolic BP less than 50 or greater than 100. 4L of O2  Subjective    Pt reports her right shoulder and abdomen hurt 8/10, offered pillow rUE for seated rest.          Orientation person, time needed cues for Hospital (stated [facility])      Cognition STM impairments noted      Objective    Sup>sit indep      Transfers  Sit to Stand: Stand by assistance (FWW, cues to manage O2 tubing)  Stand to sit: Stand by assistance (FWW, cues to manage O2 tubing)  Bed to Chair: Stand by assistance (FWW, cues to manage O2 tubing)  Ambulation  Ambulation?: Yes  More Ambulation?: Yes  Ambulation 1  Surface: level tile  Device: Rolling Walker  Other Apparatus: O2  Assistance: Stand by assistance  Quality of Gait: FWW, cues to manage O2 tubing , slow ratye, cues for increased step length and increased knee flexion to improve clearance on swing. Gait Deviations: Slow Marisol; Increased RONAN; Decreased step height;Decreased step length;Shuffles  Distance: 18 feet x2  Comments: see activity tolerance need to titrate O2 up to 3L during session with walking   Ambulation   Distances with WC follow cues for increased step length, pacing to manage sob and increased knee flexion on swing with increased swing completely passing stance with cues and monitored for SpO2 post walk with all distances except last distance  On 3L via nc pt demonstrated 90% spO2 or greater, needed seated rest due to sob with increased RR and able to walk after 2 minutes seated rest for next trial.  Last distance of 40 feet immediate post walk 86% with rebound to 90% after 30 seconds cued PLB and 98% after 1 minute seated rest on 3L O2 via nc. Distances with FWW, WC follow SBA with PT managing O2 tubin feet, 45 feet, 36 feet, 40 feet with L/R turns last trial.  Education:   Educated patient in safety with transfers for hand placement, assisted pt with O2 tubing management with gait and transfers and redirected pt to task  with patient verbalizing understanding but needing intermittent redirection to task. Disposition:Patient with call light in reach and alarm in place at end of session with patient in chair  On 2L O2 via nc with SpO2 reading 98% HR 71 bpm after 1 minutes of seated rest on 2L via nc. SECOND SESSION:    Subjective:  Pain Right shoulder 8/10  Pt assisted with set up assist to don pants and shirt at start of session Set up assist for top and threading of LE for pants. Therex:  WC propulsion 400 feet with navigation L/R turns and around the gift shop with dual tasking attending to items in shop and navigating obstacles with rest breaks x3 for up to 1 minute due to fatigue. And therapist SBA to manage O2 tubing with propulsion. BED MOBILITY:  Sup>sit indep    TRANSFERS:  Toilet Sit<>stand with SBA grab bar and FWW unsteady /shsaky LE with fatigue. WC Sit<>stand with  FWW unsteady /shsaky LE with fatigue. GAIT:  Patient walks with WC follow assist to manage O2 tubing SBA  up to 61 feet with spO2 3L via nc 87%, HR 87bpm after 1 minute PLB  96% spO2 HR 82bpm.  Cues given to flex knees to allow increased swing clearance with increased swing clearance with cues. Patient walks with assist to manage O2 tubing SBA  up to 18 feet with spO2 3L via nc 81%, HR 83bpm after 1 minute PLB  98% spO2 HR 78bpm  Cues given to flex knees to allow increased swing clearance with increased swing clearance with cues. Education:   Educated patient in PLB, pacing with activities and redirection to task for transfer , gait and WC propulsion with frequent encouragement.   Notified nursing of patient response to therapy. with patient needing redirection to task at times and at start of session pt tearful, shallow breathing and notes, \"I am just upset because I don't feel like I am doing well. \"  Pt offered encouragement and with PLB and increaseed Oxygen titation to 3L increased at start of session SPO2 68% on 2L wall O2 to 92% on 3L with PLB. Disposition:Patient with call light in reach and alarm in place at end of session with patient in bed wall mount O2 3L via nc. Pt had to use bathroom after walking with pt SBA for sit<>stand from toilet with FWW with assist for O2 tubing and  Cues for PLB post walk, PT  SBA for balance due to shakiness in LE for managing clothing. Goals  Short term goals  Time Frame for Short term goals: 1/7/2022  Short term goal 1: Pt demo indep bed mobility. -- 1/08: GOAL PARTIALLY MET MI with HOB elevated and use of BR.  1/10/2022 GOAL met pt demo indep in bed mobility sup<>sit and rolling. Short term goal 2: Pt demo bed<>chair with mod assist of 1 and FWW or SPT. -- 1/08: GOAL MET Lisa/modA with RW  Short term goal 3: Patient demonstrates gait to bathroom 20 feet with assist of 2 and FWW. GOAL MET 1/6/2021 pt demo gait 35 to 60 feet with FWW and assist of 2. Short term goal 4: Patient demonstrates up and down 1 step with bilateral rails and assist of 2. -- 1/08: DNT d/t safety 1/12/2022 GOAL Met  pt demo up and down 1 step bilateral rails CGA. Short term goal 5: Patient demonstrates  indep in DBE and 5 minutes of standing activity with spO2 90% or greater to improve endurance for household walking. -- 1/08: GOAL NOT MET, limited by standing tolerance  Long term goals  Time Frame for Long term goals : 1/24/2022  Long term goal 1: Patient demonstrates gait 150 feet with SBA and sPO2 90% or greater able to manage O2 tubing on L/R turns on carpet up to 10 feetwith  safe to assist pt. GOAL partially met 1/13/2021 patient demo ability to safely assist pt with FWW gait 5 feet.   Long term goal 2: Patient demonstrates up and down 12 steps with SBA and bilateral rails to return to community and home (egress/entry) mobility with  safe to assist pt. Long term goal 3: Patient demonstrates indep bed<>chair, sit<>stand and WC<>car with FWW  Long term goal 4: Patient demonstrates indep WC propulsion 150 feet with L/R turns indep. Long term goal 5: Patient demonstrates 50 feet of walking with L/R turns and carpet HELEN able to indep manage O2 tubing for short distance in home walking. Patient Goals   Patient goals : \"to walk to the bathroom. Plan    Plan  Times per week: 5/7 days week  Times per day: Daily  Plan weeks: 21 days  Specific instructions for Next Treatment: Progress mobility as tolerated  Current Treatment Recommendations: Strengthening,Neuromuscular Re-education,Home Exercise Program,Cognitive/Perceptual Training,Safety Education & Marta Jackson Training,Patient/Caregiver Education & Training,Functional Mobility Training,Wheelchair Mobility Training,Gait Training,Transfer Training,Stair training,Equipment Evaluation, Education, & procurement  Safety Devices  Type of devices:  All fall risk precautions in place,Nurse notified,Call light within reach,Gait belt,Patient at risk for falls,Left in chair,Chair alarm in place     Therapy Time   Individual Concurrent Group Co-treatment   Time In 0930         Time Out 1025         Minutes 55         Timed Code Treatment Minutes: 54 [address] Time:   Individual Concurrent Group Co-treatment   Time In [address] [place] [address] 45           Timed Code Treatment Minutes:  45    Total Treatment Minutes:  [address] To[address] Gallardo, PT

## 2022-01-15 LAB
GLUCOSE BLD-MCNC: 125 MG/DL (ref 70–99)
GLUCOSE BLD-MCNC: 131 MG/DL (ref 70–99)
GLUCOSE BLD-MCNC: 271 MG/DL (ref 70–99)
GLUCOSE BLD-MCNC: 99 MG/DL (ref 70–99)
PERFORMED ON: ABNORMAL
PERFORMED ON: NORMAL

## 2022-01-15 PROCEDURE — 2700000000 HC OXYGEN THERAPY PER DAY

## 2022-01-15 PROCEDURE — 6370000000 HC RX 637 (ALT 250 FOR IP): Performed by: PHYSICAL MEDICINE & REHABILITATION

## 2022-01-15 PROCEDURE — 6360000002 HC RX W HCPCS: Performed by: PHYSICAL MEDICINE & REHABILITATION

## 2022-01-15 PROCEDURE — 94761 N-INVAS EAR/PLS OXIMETRY MLT: CPT

## 2022-01-15 PROCEDURE — 6370000000 HC RX 637 (ALT 250 FOR IP): Performed by: INTERNAL MEDICINE

## 2022-01-15 PROCEDURE — 1280000000 HC REHAB R&B

## 2022-01-15 PROCEDURE — 94640 AIRWAY INHALATION TREATMENT: CPT

## 2022-01-15 RX ADMIN — DICYCLOMINE HYDROCHLORIDE 20 MG: 20 TABLET ORAL at 20:40

## 2022-01-15 RX ADMIN — ASPIRIN 81 MG 81 MG: 81 TABLET ORAL at 09:05

## 2022-01-15 RX ADMIN — OXYCODONE 10 MG: 5 TABLET ORAL at 16:30

## 2022-01-15 RX ADMIN — OXYCODONE 10 MG: 5 TABLET ORAL at 20:40

## 2022-01-15 RX ADMIN — IPRATROPIUM BROMIDE AND ALBUTEROL SULFATE 1 AMPULE: .5; 2.5 SOLUTION RESPIRATORY (INHALATION) at 20:19

## 2022-01-15 RX ADMIN — ROPINIROLE HYDROCHLORIDE 0.5 MG: 0.5 TABLET, FILM COATED ORAL at 15:00

## 2022-01-15 RX ADMIN — METOCLOPRAMIDE 5 MG: 10 TABLET ORAL at 20:41

## 2022-01-15 RX ADMIN — DICYCLOMINE HYDROCHLORIDE 20 MG: 20 TABLET ORAL at 05:45

## 2022-01-15 RX ADMIN — FUROSEMIDE 40 MG: 40 TABLET ORAL at 09:05

## 2022-01-15 RX ADMIN — OXYCODONE 10 MG: 5 TABLET ORAL at 11:09

## 2022-01-15 RX ADMIN — ROPINIROLE HYDROCHLORIDE 0.5 MG: 0.5 TABLET, FILM COATED ORAL at 09:05

## 2022-01-15 RX ADMIN — DIAZEPAM 5 MG: 5 TABLET ORAL at 20:40

## 2022-01-15 RX ADMIN — Medication 15 ML: at 09:06

## 2022-01-15 RX ADMIN — INSULIN GLARGINE 7 UNITS: 100 INJECTION, SOLUTION SUBCUTANEOUS at 20:42

## 2022-01-15 RX ADMIN — IPRATROPIUM BROMIDE AND ALBUTEROL SULFATE 1 AMPULE: .5; 2.5 SOLUTION RESPIRATORY (INHALATION) at 08:51

## 2022-01-15 RX ADMIN — OXYCODONE 10 MG: 5 TABLET ORAL at 05:45

## 2022-01-15 RX ADMIN — METOPROLOL 25 MG: 25 TABLET ORAL at 20:40

## 2022-01-15 RX ADMIN — METOPROLOL 25 MG: 25 TABLET ORAL at 09:05

## 2022-01-15 RX ADMIN — DICYCLOMINE HYDROCHLORIDE 20 MG: 20 TABLET ORAL at 11:09

## 2022-01-15 RX ADMIN — TRAZODONE HYDROCHLORIDE 50 MG: 50 TABLET ORAL at 20:41

## 2022-01-15 RX ADMIN — MULTIPLE VITAMINS W/ MINERALS TAB 1 TABLET: TAB at 09:05

## 2022-01-15 RX ADMIN — SPIRONOLACTONE 25 MG: 25 TABLET, FILM COATED ORAL at 09:05

## 2022-01-15 RX ADMIN — Medication 400 MG: at 09:05

## 2022-01-15 RX ADMIN — BUDESONIDE 500 MCG: 0.5 SUSPENSION RESPIRATORY (INHALATION) at 08:51

## 2022-01-15 RX ADMIN — ROPINIROLE HYDROCHLORIDE 0.5 MG: 0.5 TABLET, FILM COATED ORAL at 20:40

## 2022-01-15 RX ADMIN — ENOXAPARIN SODIUM 40 MG: 40 INJECTION SUBCUTANEOUS at 09:05

## 2022-01-15 RX ADMIN — DICYCLOMINE HYDROCHLORIDE 20 MG: 20 TABLET ORAL at 16:31

## 2022-01-15 RX ADMIN — BUDESONIDE 500 MCG: 0.5 SUSPENSION RESPIRATORY (INHALATION) at 20:19

## 2022-01-15 RX ADMIN — INSULIN LISPRO 6 UNITS: 100 INJECTION, SOLUTION INTRAVENOUS; SUBCUTANEOUS at 16:32

## 2022-01-15 RX ADMIN — LEVOTHYROXINE SODIUM 100 MCG: 0.1 TABLET ORAL at 05:45

## 2022-01-15 RX ADMIN — METOCLOPRAMIDE 5 MG: 10 TABLET ORAL at 09:05

## 2022-01-15 RX ADMIN — DIAZEPAM 5 MG: 5 TABLET ORAL at 05:45

## 2022-01-15 ASSESSMENT — PAIN DESCRIPTION - FREQUENCY: FREQUENCY: CONTINUOUS

## 2022-01-15 ASSESSMENT — PAIN SCALES - GENERAL
PAINLEVEL_OUTOF10: 7
PAINLEVEL_OUTOF10: 7
PAINLEVEL_OUTOF10: 5
PAINLEVEL_OUTOF10: 5
PAINLEVEL_OUTOF10: 8
PAINLEVEL_OUTOF10: 7

## 2022-01-15 ASSESSMENT — PAIN DESCRIPTION - DESCRIPTORS: DESCRIPTORS: ACHING

## 2022-01-15 ASSESSMENT — PAIN DESCRIPTION - PAIN TYPE
TYPE: ACUTE PAIN;CHRONIC PAIN
TYPE: ACUTE PAIN

## 2022-01-15 ASSESSMENT — PAIN DESCRIPTION - ONSET: ONSET: ON-GOING

## 2022-01-15 ASSESSMENT — PAIN DESCRIPTION - ORIENTATION
ORIENTATION: RIGHT
ORIENTATION: RIGHT

## 2022-01-15 ASSESSMENT — PAIN - FUNCTIONAL ASSESSMENT: PAIN_FUNCTIONAL_ASSESSMENT: PREVENTS OR INTERFERES SOME ACTIVE ACTIVITIES AND ADLS

## 2022-01-15 ASSESSMENT — PAIN DESCRIPTION - LOCATION
LOCATION: ABDOMEN;SHOULDER
LOCATION: SHOULDER

## 2022-01-15 ASSESSMENT — PAIN DESCRIPTION - PROGRESSION: CLINICAL_PROGRESSION: NOT CHANGED

## 2022-01-15 NOTE — PROGRESS NOTES
Podiatry Progress Note  Subjective:   Patient seen at bedside this afternoon. Her ingrown toe nail site is feeling much better since debridement a couple nights ago. She has no other pedal complaints. Objective:   Vitals:  BP (!) 114/57   Pulse 76   Temp 97.3 °F (36.3 °C) (Oral)   Resp 18   Ht 5' 9\" (1.753 m)   Wt 153 lb 1.6 oz (69.4 kg)   SpO2 96%   BMI 22.61 kg/m²   Integument:  Erythema at the lateral border of the right hallux toe nail has resolved. No active drainage. Skin is thin, dry and supple, bilateral.  No openings in the skin. Nails: The lateral border of the right hallux toe nail is no longer ingrown. Neurologic:  Protective sensation is grossly intact to light touch at the level of the toes, bilateral.  Vascular:  DP and PT pulses are palpable, bilateral.  CFT is brisk to all toes. No edema at the lateral border of the right hallux toe nail. Musculoskeletal:  Patient has 5/5 strength on inversion/everison/dorsiflexion/plantarflexion, bilateral.  No gross musculoskeletal deformities noted. No pain on palpation at the lateral border of the right hallux toe nail.        Labs:   CBC with Differential:    Lab Results   Component Value Date    WBC 9.9 01/13/2022    RBC 4.54 01/13/2022    HGB 11.1 01/13/2022    HCT 35.0 01/13/2022     01/13/2022    MCV 77.1 01/13/2022    MCH 24.5 01/13/2022    MCHC 31.8 01/13/2022    RDW 19.4 01/13/2022    SEGSPCT 67.7 03/18/2013    BANDSPCT 2 10/23/2021    LYMPHOPCT 14.3 01/13/2022    MONOPCT 4.3 01/13/2022    EOSPCT 1.1 11/30/2011    BASOPCT 0.7 01/13/2022    MONOSABS 0.4 01/13/2022    LYMPHSABS 1.4 01/13/2022    EOSABS 0.3 01/13/2022    BASOSABS 0.1 01/13/2022    DIFFTYPE Manual-K 11/05/2012     CMP:    Lab Results   Component Value Date     01/13/2022    K 3.9 01/13/2022    CL 92 01/13/2022    CO2 34 01/13/2022    BUN 8 01/13/2022    CREATININE <0.5 01/13/2022    GFRAA >60 01/13/2022    GFRAA >60 11/05/2012    AGRATIO 0.8 01/04/2022 LABGLOM >60 01/13/2022    GLUCOSE 108 01/13/2022    PROT 8.5 01/04/2022    PROT 7.7 11/05/2012    LABALBU 3.7 01/04/2022    CALCIUM 10.4 01/13/2022    BILITOT 0.3 01/04/2022    ALKPHOS 198 01/04/2022    AST 20 01/04/2022    ALT 17 01/04/2022     PT/INR:    Lab Results   Component Value Date    PROTIME 16.9 09/28/2021    INR 1.47 09/28/2021     HgBA1c:    Lab Results   Component Value Date    LABA1C 8.2 09/26/2021       Assessment/Plan:   The patient is a pleasant 78year old woman with:  1) Ingrown nail, lateral border of there right hallux  - Resolved  - Will sign off   2) Onychomycosis  3) DM2  4) Pain in toes, right  5) Pain in toes, left      Chyrl Kayser, DPM, KISHAN, [id number]  Office: [phone]  Cell: [phone]

## 2022-01-16 LAB
GLUCOSE BLD-MCNC: 112 MG/DL (ref 70–99)
GLUCOSE BLD-MCNC: 137 MG/DL (ref 70–99)
GLUCOSE BLD-MCNC: 157 MG/DL (ref 70–99)
GLUCOSE BLD-MCNC: 192 MG/DL (ref 70–99)
PERFORMED ON: ABNORMAL

## 2022-01-16 PROCEDURE — 6360000002 HC RX W HCPCS: Performed by: PHYSICAL MEDICINE & REHABILITATION

## 2022-01-16 PROCEDURE — 2700000000 HC OXYGEN THERAPY PER DAY

## 2022-01-16 PROCEDURE — 6370000000 HC RX 637 (ALT 250 FOR IP): Performed by: PHYSICAL MEDICINE & REHABILITATION

## 2022-01-16 PROCEDURE — 1280000000 HC REHAB R&B

## 2022-01-16 PROCEDURE — 94640 AIRWAY INHALATION TREATMENT: CPT

## 2022-01-16 PROCEDURE — 6370000000 HC RX 637 (ALT 250 FOR IP): Performed by: INTERNAL MEDICINE

## 2022-01-16 PROCEDURE — 94761 N-INVAS EAR/PLS OXIMETRY MLT: CPT

## 2022-01-16 RX ADMIN — INSULIN LISPRO 2 UNITS: 100 INJECTION, SOLUTION INTRAVENOUS; SUBCUTANEOUS at 12:38

## 2022-01-16 RX ADMIN — DICYCLOMINE HYDROCHLORIDE 20 MG: 20 TABLET ORAL at 05:56

## 2022-01-16 RX ADMIN — METOCLOPRAMIDE 5 MG: 10 TABLET ORAL at 08:41

## 2022-01-16 RX ADMIN — SPIRONOLACTONE 25 MG: 25 TABLET, FILM COATED ORAL at 08:42

## 2022-01-16 RX ADMIN — LORAZEPAM 0.5 MG: 0.5 TABLET ORAL at 16:46

## 2022-01-16 RX ADMIN — ASPIRIN 81 MG 81 MG: 81 TABLET ORAL at 08:42

## 2022-01-16 RX ADMIN — MULTIPLE VITAMINS W/ MINERALS TAB 1 TABLET: TAB at 08:41

## 2022-01-16 RX ADMIN — OXYCODONE 10 MG: 5 TABLET ORAL at 12:40

## 2022-01-16 RX ADMIN — METOCLOPRAMIDE 5 MG: 10 TABLET ORAL at 21:19

## 2022-01-16 RX ADMIN — DICYCLOMINE HYDROCHLORIDE 20 MG: 20 TABLET ORAL at 21:19

## 2022-01-16 RX ADMIN — LORAZEPAM 0.5 MG: 0.5 TABLET ORAL at 12:40

## 2022-01-16 RX ADMIN — BUDESONIDE 500 MCG: 0.5 SUSPENSION RESPIRATORY (INHALATION) at 20:20

## 2022-01-16 RX ADMIN — METOPROLOL 25 MG: 25 TABLET ORAL at 21:19

## 2022-01-16 RX ADMIN — OXYCODONE 10 MG: 5 TABLET ORAL at 08:43

## 2022-01-16 RX ADMIN — ROPINIROLE HYDROCHLORIDE 0.5 MG: 0.5 TABLET, FILM COATED ORAL at 14:58

## 2022-01-16 RX ADMIN — TRAZODONE HYDROCHLORIDE 50 MG: 50 TABLET ORAL at 21:22

## 2022-01-16 RX ADMIN — IPRATROPIUM BROMIDE AND ALBUTEROL SULFATE 1 AMPULE: .5; 2.5 SOLUTION RESPIRATORY (INHALATION) at 20:13

## 2022-01-16 RX ADMIN — IPRATROPIUM BROMIDE AND ALBUTEROL SULFATE 1 AMPULE: .5; 2.5 SOLUTION RESPIRATORY (INHALATION) at 07:58

## 2022-01-16 RX ADMIN — INSULIN LISPRO 1 UNITS: 100 INJECTION, SOLUTION INTRAVENOUS; SUBCUTANEOUS at 21:24

## 2022-01-16 RX ADMIN — DICYCLOMINE HYDROCHLORIDE 20 MG: 20 TABLET ORAL at 12:39

## 2022-01-16 RX ADMIN — ROPINIROLE HYDROCHLORIDE 0.5 MG: 0.5 TABLET, FILM COATED ORAL at 08:41

## 2022-01-16 RX ADMIN — ENOXAPARIN SODIUM 40 MG: 40 INJECTION SUBCUTANEOUS at 08:44

## 2022-01-16 RX ADMIN — OXYCODONE 10 MG: 5 TABLET ORAL at 02:35

## 2022-01-16 RX ADMIN — Medication 400 MG: at 08:41

## 2022-01-16 RX ADMIN — FUROSEMIDE 40 MG: 40 TABLET ORAL at 08:42

## 2022-01-16 RX ADMIN — LORAZEPAM 0.5 MG: 0.5 TABLET ORAL at 08:41

## 2022-01-16 RX ADMIN — BUDESONIDE 500 MCG: 0.5 SUSPENSION RESPIRATORY (INHALATION) at 07:58

## 2022-01-16 RX ADMIN — INSULIN GLARGINE 7 UNITS: 100 INJECTION, SOLUTION SUBCUTANEOUS at 21:24

## 2022-01-16 RX ADMIN — Medication 15 ML: at 08:44

## 2022-01-16 RX ADMIN — ROPINIROLE HYDROCHLORIDE 0.5 MG: 0.5 TABLET, FILM COATED ORAL at 21:19

## 2022-01-16 RX ADMIN — OXYCODONE 10 MG: 5 TABLET ORAL at 16:46

## 2022-01-16 RX ADMIN — OXYCODONE 10 MG: 5 TABLET ORAL at 21:19

## 2022-01-16 RX ADMIN — LORAZEPAM 0.5 MG: 0.5 TABLET ORAL at 21:19

## 2022-01-16 RX ADMIN — LEVOTHYROXINE SODIUM 100 MCG: 0.1 TABLET ORAL at 05:56

## 2022-01-16 RX ADMIN — DICYCLOMINE HYDROCHLORIDE 20 MG: 20 TABLET ORAL at 16:46

## 2022-01-16 RX ADMIN — METOPROLOL 25 MG: 25 TABLET ORAL at 08:42

## 2022-01-16 ASSESSMENT — PAIN DESCRIPTION - ORIENTATION
ORIENTATION: RIGHT

## 2022-01-16 ASSESSMENT — PAIN DESCRIPTION - PAIN TYPE
TYPE: CHRONIC PAIN
TYPE: CHRONIC PAIN
TYPE: ACUTE PAIN
TYPE: CHRONIC PAIN

## 2022-01-16 ASSESSMENT — PAIN DESCRIPTION - PROGRESSION
CLINICAL_PROGRESSION: NOT CHANGED
CLINICAL_PROGRESSION: NOT CHANGED

## 2022-01-16 ASSESSMENT — PAIN DESCRIPTION - LOCATION
LOCATION: SHOULDER
LOCATION: BACK;SHOULDER
LOCATION: ABDOMEN
LOCATION: BACK;SHOULDER

## 2022-01-16 ASSESSMENT — PAIN SCALES - GENERAL
PAINLEVEL_OUTOF10: 7
PAINLEVEL_OUTOF10: 8
PAINLEVEL_OUTOF10: 9
PAINLEVEL_OUTOF10: 6
PAINLEVEL_OUTOF10: 7
PAINLEVEL_OUTOF10: 8

## 2022-01-16 ASSESSMENT — PAIN DESCRIPTION - ONSET
ONSET: ON-GOING
ONSET: ON-GOING

## 2022-01-16 ASSESSMENT — PAIN DESCRIPTION - DESCRIPTORS
DESCRIPTORS: ACHING
DESCRIPTORS: ACHING

## 2022-01-16 ASSESSMENT — PAIN DESCRIPTION - FREQUENCY
FREQUENCY: CONTINUOUS
FREQUENCY: CONTINUOUS

## 2022-01-16 NOTE — PROGRESS NOTES
She was incontinent of urine several times earlier in the shift and did not desire to get up to walk to the bathroom but this was finally achieved toward the end of the shift. Pt had one successful prompted toileting event today and remained dry, she ambulated to the bathroom and back to bed, she did not tolerate the activity well, becoming very short of breath and anxious on the way back to the bed. She was able to recover quickly after she was settled into bed.

## 2022-01-17 LAB
ANION GAP SERPL CALCULATED.3IONS-SCNC: 10 MMOL/L (ref 3–16)
BASOPHILS ABSOLUTE: 0.1 K/UL (ref 0–0.2)
BASOPHILS RELATIVE PERCENT: 0.7 %
BUN BLDV-MCNC: 9 MG/DL (ref 7–20)
CALCIUM SERPL-MCNC: 9.9 MG/DL (ref 8.3–10.6)
CHLORIDE BLD-SCNC: 94 MMOL/L (ref 99–110)
CO2: 33 MMOL/L (ref 21–32)
CREAT SERPL-MCNC: <0.5 MG/DL (ref 0.6–1.2)
EOSINOPHILS ABSOLUTE: 0.3 K/UL (ref 0–0.6)
EOSINOPHILS RELATIVE PERCENT: 4.5 %
GFR AFRICAN AMERICAN: >60
GFR NON-AFRICAN AMERICAN: >60
GLUCOSE BLD-MCNC: 102 MG/DL (ref 70–99)
GLUCOSE BLD-MCNC: 107 MG/DL (ref 70–99)
GLUCOSE BLD-MCNC: 130 MG/DL (ref 70–99)
GLUCOSE BLD-MCNC: 157 MG/DL (ref 70–99)
GLUCOSE BLD-MCNC: 173 MG/DL (ref 70–99)
HCT VFR BLD CALC: 32.6 % (ref 36–48)
HEMOGLOBIN: 10.2 G/DL (ref 12–16)
LYMPHOCYTES ABSOLUTE: 1.4 K/UL (ref 1–5.1)
LYMPHOCYTES RELATIVE PERCENT: 17.7 %
MCH RBC QN AUTO: 24.2 PG (ref 26–34)
MCHC RBC AUTO-ENTMCNC: 31.4 G/DL (ref 31–36)
MCV RBC AUTO: 77.3 FL (ref 80–100)
MONOCYTES ABSOLUTE: 0.4 K/UL (ref 0–1.3)
MONOCYTES RELATIVE PERCENT: 5.9 %
NEUTROPHILS ABSOLUTE: 5.5 K/UL (ref 1.7–7.7)
NEUTROPHILS RELATIVE PERCENT: 71.2 %
PDW BLD-RTO: 20.2 % (ref 12.4–15.4)
PERFORMED ON: ABNORMAL
PLATELET # BLD: 206 K/UL (ref 135–450)
PMV BLD AUTO: 8.2 FL (ref 5–10.5)
POTASSIUM REFLEX MAGNESIUM: 3.6 MMOL/L (ref 3.5–5.1)
RBC # BLD: 4.22 M/UL (ref 4–5.2)
SODIUM BLD-SCNC: 137 MMOL/L (ref 136–145)
WBC # BLD: 7.7 K/UL (ref 4–11)

## 2022-01-17 PROCEDURE — 92526 ORAL FUNCTION THERAPY: CPT

## 2022-01-17 PROCEDURE — 80048 BASIC METABOLIC PNL TOTAL CA: CPT

## 2022-01-17 PROCEDURE — 97530 THERAPEUTIC ACTIVITIES: CPT

## 2022-01-17 PROCEDURE — 6360000002 HC RX W HCPCS: Performed by: PHYSICAL MEDICINE & REHABILITATION

## 2022-01-17 PROCEDURE — 94640 AIRWAY INHALATION TREATMENT: CPT

## 2022-01-17 PROCEDURE — 97130 THER IVNTJ EA ADDL 15 MIN: CPT

## 2022-01-17 PROCEDURE — 97110 THERAPEUTIC EXERCISES: CPT

## 2022-01-17 PROCEDURE — 97535 SELF CARE MNGMENT TRAINING: CPT

## 2022-01-17 PROCEDURE — 2700000000 HC OXYGEN THERAPY PER DAY

## 2022-01-17 PROCEDURE — 1280000000 HC REHAB R&B

## 2022-01-17 PROCEDURE — 85025 COMPLETE CBC W/AUTO DIFF WBC: CPT

## 2022-01-17 PROCEDURE — 6370000000 HC RX 637 (ALT 250 FOR IP): Performed by: INTERNAL MEDICINE

## 2022-01-17 PROCEDURE — 97129 THER IVNTJ 1ST 15 MIN: CPT

## 2022-01-17 PROCEDURE — 6370000000 HC RX 637 (ALT 250 FOR IP): Performed by: PHYSICAL MEDICINE & REHABILITATION

## 2022-01-17 PROCEDURE — 92507 TX SP LANG VOICE COMM INDIV: CPT

## 2022-01-17 PROCEDURE — 94761 N-INVAS EAR/PLS OXIMETRY MLT: CPT

## 2022-01-17 PROCEDURE — 36415 COLL VENOUS BLD VENIPUNCTURE: CPT

## 2022-01-17 PROCEDURE — 97116 GAIT TRAINING THERAPY: CPT

## 2022-01-17 RX ADMIN — OXYCODONE 10 MG: 5 TABLET ORAL at 14:09

## 2022-01-17 RX ADMIN — FUROSEMIDE 40 MG: 40 TABLET ORAL at 08:46

## 2022-01-17 RX ADMIN — INSULIN GLARGINE 7 UNITS: 100 INJECTION, SOLUTION SUBCUTANEOUS at 20:32

## 2022-01-17 RX ADMIN — Medication 400 MG: at 08:46

## 2022-01-17 RX ADMIN — ENOXAPARIN SODIUM 40 MG: 40 INJECTION SUBCUTANEOUS at 08:45

## 2022-01-17 RX ADMIN — METOPROLOL 25 MG: 25 TABLET ORAL at 08:46

## 2022-01-17 RX ADMIN — INSULIN LISPRO 1 UNITS: 100 INJECTION, SOLUTION INTRAVENOUS; SUBCUTANEOUS at 20:31

## 2022-01-17 RX ADMIN — LEVOTHYROXINE SODIUM 100 MCG: 0.1 TABLET ORAL at 05:14

## 2022-01-17 RX ADMIN — DICYCLOMINE HYDROCHLORIDE 20 MG: 20 TABLET ORAL at 05:14

## 2022-01-17 RX ADMIN — ROPINIROLE HYDROCHLORIDE 0.5 MG: 0.5 TABLET, FILM COATED ORAL at 08:46

## 2022-01-17 RX ADMIN — DICYCLOMINE HYDROCHLORIDE 20 MG: 20 TABLET ORAL at 20:30

## 2022-01-17 RX ADMIN — LORAZEPAM 0.5 MG: 0.5 TABLET ORAL at 20:30

## 2022-01-17 RX ADMIN — IPRATROPIUM BROMIDE AND ALBUTEROL SULFATE 1 AMPULE: .5; 2.5 SOLUTION RESPIRATORY (INHALATION) at 08:16

## 2022-01-17 RX ADMIN — OXYCODONE 10 MG: 5 TABLET ORAL at 09:03

## 2022-01-17 RX ADMIN — Medication 15 ML: at 20:31

## 2022-01-17 RX ADMIN — ROPINIROLE HYDROCHLORIDE 0.5 MG: 0.5 TABLET, FILM COATED ORAL at 20:30

## 2022-01-17 RX ADMIN — METOCLOPRAMIDE 5 MG: 10 TABLET ORAL at 08:46

## 2022-01-17 RX ADMIN — SPIRONOLACTONE 25 MG: 25 TABLET, FILM COATED ORAL at 08:46

## 2022-01-17 RX ADMIN — METOPROLOL 25 MG: 25 TABLET ORAL at 20:30

## 2022-01-17 RX ADMIN — METOCLOPRAMIDE 5 MG: 10 TABLET ORAL at 20:30

## 2022-01-17 RX ADMIN — DICYCLOMINE HYDROCHLORIDE 20 MG: 20 TABLET ORAL at 16:19

## 2022-01-17 RX ADMIN — BUDESONIDE 500 MCG: 0.5 SUSPENSION RESPIRATORY (INHALATION) at 08:11

## 2022-01-17 RX ADMIN — BUDESONIDE 500 MCG: 0.5 SUSPENSION RESPIRATORY (INHALATION) at 19:41

## 2022-01-17 RX ADMIN — OXYCODONE 10 MG: 5 TABLET ORAL at 20:30

## 2022-01-17 RX ADMIN — IPRATROPIUM BROMIDE AND ALBUTEROL SULFATE 1 AMPULE: .5; 2.5 SOLUTION RESPIRATORY (INHALATION) at 19:33

## 2022-01-17 RX ADMIN — ONDANSETRON 8 MG: 4 TABLET, ORALLY DISINTEGRATING ORAL at 08:43

## 2022-01-17 RX ADMIN — ASPIRIN 81 MG 81 MG: 81 TABLET ORAL at 08:46

## 2022-01-17 RX ADMIN — LORAZEPAM 0.5 MG: 0.5 TABLET ORAL at 05:14

## 2022-01-17 RX ADMIN — DICYCLOMINE HYDROCHLORIDE 20 MG: 20 TABLET ORAL at 11:26

## 2022-01-17 RX ADMIN — MULTIPLE VITAMINS W/ MINERALS TAB 1 TABLET: TAB at 08:46

## 2022-01-17 RX ADMIN — Medication 15 ML: at 08:46

## 2022-01-17 RX ADMIN — OXYCODONE 10 MG: 5 TABLET ORAL at 05:14

## 2022-01-17 RX ADMIN — INSULIN LISPRO 2 UNITS: 100 INJECTION, SOLUTION INTRAVENOUS; SUBCUTANEOUS at 16:20

## 2022-01-17 RX ADMIN — ROPINIROLE HYDROCHLORIDE 0.5 MG: 0.5 TABLET, FILM COATED ORAL at 14:10

## 2022-01-17 ASSESSMENT — PAIN SCALES - GENERAL
PAINLEVEL_OUTOF10: 7
PAINLEVEL_OUTOF10: 9
PAINLEVEL_OUTOF10: 6
PAINLEVEL_OUTOF10: 8
PAINLEVEL_OUTOF10: 8
PAINLEVEL_OUTOF10: 7
PAINLEVEL_OUTOF10: 7
PAINLEVEL_OUTOF10: 9

## 2022-01-17 ASSESSMENT — PAIN DESCRIPTION - LOCATION
LOCATION: SHOULDER
LOCATION: SHOULDER
LOCATION: ABDOMEN;SHOULDER
LOCATION: ABDOMEN;SHOULDER

## 2022-01-17 ASSESSMENT — PAIN DESCRIPTION - FREQUENCY
FREQUENCY: CONTINUOUS

## 2022-01-17 ASSESSMENT — PAIN DESCRIPTION - ORIENTATION
ORIENTATION: RIGHT

## 2022-01-17 ASSESSMENT — PAIN DESCRIPTION - DESCRIPTORS
DESCRIPTORS: ACHING;SORE
DESCRIPTORS: ACHING
DESCRIPTORS: ACHING;SORE
DESCRIPTORS: ACHING;DISCOMFORT

## 2022-01-17 ASSESSMENT — PAIN - FUNCTIONAL ASSESSMENT
PAIN_FUNCTIONAL_ASSESSMENT: PREVENTS OR INTERFERES SOME ACTIVE ACTIVITIES AND ADLS

## 2022-01-17 ASSESSMENT — PAIN DESCRIPTION - ONSET
ONSET: ON-GOING
ONSET: ON-GOING

## 2022-01-17 ASSESSMENT — PAIN DESCRIPTION - PAIN TYPE
TYPE: CHRONIC PAIN
TYPE: ACUTE PAIN

## 2022-01-17 ASSESSMENT — PAIN DESCRIPTION - PROGRESSION: CLINICAL_PROGRESSION: NOT CHANGED

## 2022-01-17 NOTE — PROGRESS NOTES
O2  Assistance: Stand by assistance  Distance: 18 feet x2, 23 feet,  OT  PT Equipment Recommendations  Equipment Needed: No  Other: has needed equpiment  Toilet - Technique: Ambulating  Equipment Used: Drop-arm extra wide bedside commode  Toilet Transfers Comments: BSC over toilet  Assessment        SLP  Current Diet : Regular  Current Liquid Diet : Thin (via provale cup)  Diet Solids Recommendation: Regular  Liquid Consistency Recommendation: Thin    Body mass index is 22.61 kg/m². Rehabilitation Diagnosis:  Neurologic, 3.8, Neuromuscular Disorders, e.g. Critical Illness Myopathy, Other Myopathy     Assessment and Plan:  S/p covid pneumonia with superimposed bacterial pneumonia  - required trach/peg, proning  - s/p remdesivir, tocilizumab  - completed cefepime     CAD  - asa, statin     HTN  - follow BP       DM  - lantus, SSI     Hypothyroidism  - synthroid    Leukocytosis  - resolved  - completed cefepime;  - repeat culture + for low growth enterococcus; completed 1 week macrobid.     Reduced L TVF mobility per FEES  - slp    Dysphagia (pharyngeal), dysphonia  - slp    S/p trach  - stoma care    Bowels: Schedule stool softener. Follow bowel movements. Enema or suppository if needed.      Bladder: Check PVR x 3.   [address] if PVR > 200ml or if any volume is > 500 ml.      Sleep: Trazodone provided prn.      Follow up appointments: PCP  GENEVA: 1/22 home w/ home health  DME: [address] [facility] wheeled walker, transport chair    Electronically signed by Doreen Crenshaw MD on 1/17/2022 at 11:45 AM

## 2022-01-17 NOTE — PROGRESS NOTES
Speech Language Pathology  [facility]: ACUTE REHAB UNIT  SPEECH-LANGUAGE PATHOLOGY      [x] Daily  [] Weekly Care Conference Note  [] Discharge    Patient:Esperanza Spain      MPL:[birth year]  MXY:[id number]  Rehab Dx/Hx: Post covid-19 condition, unspecified [U09.9]    Precautions: falls and aspirations  Home situation: lives at home with , dtr, two grandchildren; not an active ; manages own meds; shares household tasks with ,  manages finances   ST Dx: [] Aphasia  [] Dysarthria  [] Apraxia   [x] Oropharyngeal dysphagia [x] Cognitive Impairment  [x] Other: voice tx   Date of Admit: 1/3/2022  Room #: 0151/0151-01    Current functional status (updated daily):         Pt being seen for : [x] Speech/Language Treatment  [x] Dysphagia Treatment [x] Cognitive Treatment  [] Other:  Communication: []WFL  [] Aphasia  [] Dysarthria  [] Apraxia  [] Pragmatic Impairment [] Non-verbal  [] Hearing Loss  [x] Other: dysphonia  Cognition: [] WFL  [x] Mild  [x] Moderate  [] Severe [] Unable to Assess  [] Other:  Memory: [] WFL  [x] Mild  [x] Moderate  [] Severe [] Unable to Assess  [] Other:  Behavior: [x] Alert  [x] Cooperative  [x]  Pleasant  [] Confused  [] Agitated  [] Uncooperative  [] Distractible [] Motivated  [] Self-Limiting [] Anxious  [] Other:  Endurance:  [x] Adequate for participation in SLP sessions  [] Reduced overall  [] Lethargic  [] Other:  Safety: [x] No concerns at this time  [] Reduced insight into deficits  []  Reduced safety awareness [] Not following call light procedures  [] Unable to Assess  [] Other:    Current Diet Order:ADULT DIET; Regular  ADULT ORAL NUTRITION SUPPLEMENT; Lunch, Dinner; Low Calorie/High Protein Oral Supplement  Swallowing Precautions: Alternate solids and liquids;Eat/Feed slowly; No straws;Upright as possible for all oral intake;Remain upright for 30-45 minutes after meals;Small bites/sips    Date: 1/17/2022      Tx session 1  9597-7544 Tx session 2  5629-7235 Total Timed Code Min 0 30   Total Treatment Minutes 30 30   Individual Treatment Minutes 30 30   Group Treatment Minutes 0 0   Co-Treat Minutes 0 0   Variance/Reason:  0 0   Pain None reported Pt denied   Pain Intervention [] RN notified  [] Repositioned  [] Intervention offered and patient declined  [x] N/A  [] Other: [] RN notified  [] Repositioned  [] Intervention offered and patient declined  [x] N/A  [] Other:   Subjective     Pt alert and oriented, cooperative and agreeable to participate in therapy. .   Pt appears \"down\", reports she's \"just trying to forget\" her time in the hospital and expressing she's not sure if ST is \"doing her any good\" She participated with encouragement and responded well to humor. Objective:  Goals     Dysphagia Goals:  Short-term Goals  Timeframe for Short-term Goals: 10 days (01/13/22)    Goal 1: The patient will complete pharyngeal/laryngeal strengthening exercises for improved swallowing function 10/10 given min cues. -Effortful swallow: x10  -Isometric tongue tip hold for 5 secs: x10    Min cues provided for effort and accuracy            Goal 2. The patient will tolerate recommended diet without observed clinical signs of aspiration. Pt observed self-feeding sips of TL via provale cup, no overt s/s of aspiration. Pt observed swallowing single ice chips across the visits, no overt s/s of aspiration appreciated. Goal 3. The patient/caregiver will demonstrate understanding of compensatory strategies for improved swallowing safety. Pt did not initiate rinsing her Provale cup to allow switching to a new liquid, instead stating she could \"just take small sips\" from the cup  Provided re-education to continued rec to use Provale cup to limit bolus size as per most recent swallow imaging      Goal 4. The patient will tolerate thin liquids without signs and symptoms of aspiration 10/10 via cup. Did not target.      Speech/Lang/Cog goals:  Short-term Goals  Timeframe for Short-term Goals: 14 days (01/17/22)    Goal 1: Pt will complete recall tasks with 80% acc given min cues for use of compensatory strategies. Pt noted to ask for repetitions of questions within reasoning task below nearly 100% of the time. She also used repetition as an apparent self-cue to recall what was asked of her. Pt reports she wrote in journals to promote her recall at home, declined to continue this practice while hospitalized despite encouragement. Goal 2: Pt will complete executive function tasks (money, math, time, etc) with 80% acc given min cues. Not addressed this date   Goal 3: Pt will complete verbal and visual reasoning tasks with 80% acc given min cues. Reading a map: finding paths, planning errands 80% acc with min cues   Goal 4: Pt will complete problem solving and thought organization tasks with 80% acc given min cues. Flexibility of thought: pt generated 2 alternative uses of common household items with 66% acc given min cues   Goal 5: Pt will complete vocal strengthening exercises for improved vocal function 10/10 given min cues. Justen Pop X3 with gentle voicing  Sustained \"ee\" with comfortable high pitch: 1.4-1.8 seconds, X5 trials  Gentle pitch glides up/down: X10 each, without voice breaks in ascending pitch glides in 30% of attempts.  Consistent voice breaks noted in all descending pitch glides, good effort from the pt to maintain voicing without vocal strain given mod cues/models      Other areas targeted: N/A    Education:   edu provided re: importance of completing strengthening exercises on a daily basis in preparation for vocal fold surgery as an OP   Ongoing use of Provale cup, rationale for aspiration precautions   Safety Devices: [x] Call light within reach  [x] Chair alarm activated  [] Bed alarm activated  [] Other:  [x] Call light within reach  [x] Chair alarm activated  [] Bed alarm activated  [] Other:    Assessment: 1st session: Pt completed pharyngeal/laryngeal strengthening exercises, vocal strengthening exercises given min cues. She stated her intention to follow up with ENT for vocal fold injection as an OP    2nd session: Pt with mild difficulty with tasks involving problem solving and organization of thought. Suspect her mood impacting today as pt made comments that she wants to forget her illness, having difficulty seeing the benefit of voice and swallow exercises at this time. She accepted education but may benefit from continued reinforcement. Plan: Continue as per plan of care. Additional Information:     Barriers toward progress: Confusion and Cognitive deficit  Discharge recommendations:  [] Home independently  [] Home with assistance [x]  24 hour supervision  [] ECF [] Other:  Continued Tx Upon Discharge: ? [x] Yes [] No [] TBD based on progress while on ARU [] Vital Stim indicated [] Other:   Estimated discharge date: 01/21/22    Interventions used this date:  [x] Speech/Language Treatment  [] Instruction in HEP [] Group [x] Dysphagia Treatment [x] Cognitive Treatment   [] Other: Total Time Breakdown / Charges    Time in Time out Total Time / units   Cognitive Tx 1200 1230 30 min/ 2 units   Speech Tx 1100 1120 20 min/ 1 unit   Dysphagia Tx 1120 1130 10 min/ 1 unit       Electronically Signed by    Stefan Harrison MS CCC-SLP  Speech Language Pathologist

## 2022-01-17 NOTE — PROGRESS NOTES
Occupational Therapy  Facility/Department: [facility]  Daily Treatment Note  NAME: Yumiko Alfred  : 1955  MRN: [id number]    Date of Service: 2022    Discharge Recommendations:  24 hour supervision or assist,Home with Home health OT,S Level 1  OT Equipment Recommendations  Mobility Devices: ADL Assistive Devices  Assessment   Performance deficits / Impairments: Decreased functional mobility ; Decreased ADL status; Decreased strength;Decreased safe awareness;Decreased cognition;Decreased endurance;Decreased balance;Decreased coordination;Decreased posture;Decreased fine motor control;Decreased high-level IADLs  Assessment: Pt aggreable to OT session, denies shower but willing to complete spongebath on TTB. Pt completed all ADLs (sponge bath, dressing, and toileting) with SBA, increased time, and verbal cuing for sequencing. Fx mobility with SBA and increased time. Pt O2 stan mutliple times throughout ADLs with O2 increase from 3L to 5L. Pt noted to be anxious throuhgout all tasks with heavy, labored SOB and teary-eyed throughout tasks. Pt is making good progress towards goals with O2 stats being barrier to independence. Continue POC. Treatment Diagnosis: weakness, decreased endurance. Prognosis: Good  OT Education: OT Role;Plan of Care;ADL Adaptive Strategies;Transfer Training;Energy Conservation;Precautions; Equipment; Family Education  Patient Education: disease specific: goals, PLB, safety during ADLS and t/fs, energy conservation during ADLS, Pt verbalized understanding but will need reinforcement. education on energy conservation during ADLs tasks and increased time required to complete tasks.   REQUIRES OT FOLLOW UP: Yes  Activity Tolerance  Activity Tolerance: Patient Tolerated treatment well;Patient limited by fatigue  Activity Tolerance: after consult with PT, pt observed that AM sessions more difficult for pt and increased O2 destats in AM. durig toileting O2 destat to mid 70's on 3L, within 1 than 332, diastolic BP less than 50 or greater than 100. 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 Pain Assessment  Pain Assessment: 0-10  Pain Level: 9  Pain Location: Shoulder  Pain Orientation: Right  Pain Descriptors: Aching; Sore  Pain Frequency: Continuous  Functional Pain Assessment: Prevents or interferes some active activities and ADLs  Non-Pharmaceutical Pain Intervention(s): Repositioned;Rest;Other (Comment) (pt reports pain medication helps)  Response to Pain Intervention: Patient Satisfied  Vital Signs  Patient Currently in Pain: Yes   Objective    ADL  Equipment Provided: Reacher;Dressing stick; Sock aid;Long-handled sponge  Feeding: Setup; Other (Comment) (Reg diet TL via provale cup)  Grooming: Supervision (standing at sink to brush hair with walker)  UE Bathing: Verbal cueing; Increased time to complete;Setup;Stand by assistance (2/2 SOB and O2 detstat, completed sponge bath seated in TTB walk in shower, I'ly washed/dried all UE)  LE Bathing: Verbal cueing; Increased time to complete;Stand by assistance;Setup (2/2 SOB and O2 detstat, completed sponge bath seated in TTB walk in shower, I'ly washed/dried all BLE)  UE Dressing: Increased time to complete;Verbal cueing;Setup (with education on O2 line management donning/doffing shirt.)  LE Dressing: Increased time to complete;Verbal cueing;Stand by assistance;Setup (to use reacher to don/doff, use of sock aid for ADLs)  Toileting: Stand by assistance  Additional Comments: Pt declined shower this date but willing to complete sponge bath with shower cap with set-up, increased time and SPV during standing use of grab bars, sock aid, rasied toilet seat with rails, and RW. Balance  Sitting Balance: Supervision  Standing Balance: Stand by assistance  Standing Balance  Time: 30seconds x2 reps, 2 minutes standin at sink for grooming  Comment: with RW and SBA, increased tolerance, o2 destat limiting  Functional Mobility  Functional - Mobility Device: Rolling Walker  Activity: To/from bathroom  Assist Level: Stand by assistance  Functional Mobility Comments: assist for O2 tubing management  Toilet Transfers  Toilet - Technique: Ambulating  Equipment Used: Drop-arm extra wide bedside commode  Toilet Transfer: Stand by assistance  Toilet Transfers Comments: BSC over toilet  Shower Transfers  Shower - Transfer From: Alissa Davies - Transfer Type: To and From  Shower - Transfer To: Transfer tub bench  Shower - Technique: Ambulating  Shower Transfers: Stand by assistance     Transfers  Sit to stand: Supervision  Stand to sit: Supervision                 Plan   Plan  Times per week: 5/7 days a week  Times per day: Daily  Plan weeks: 3 weeks  Current Treatment Recommendations: Strengthening,Endurance Training,Balance Training,Functional Mobility Training,Safety Education & Training,Equipment Evaluation, Education, & procurement,Self-Care / ADL,Patient/Caregiver Education & Training,Home Management Training,ROM    Goals  Short term goals  Time Frame for Short term goals: 10 days (1/13)  Short term goal 1: Pt will perform at least 3min of standing functional activities with AD PRN. GOAL MET 1/07/22 Pt completed 3 minutes of standing tasks with CGA with RW. Short term goal 2: Pt will perform functional/toilet t/fs with CGA and AD PRN. -GOAL MET 1/12/22 Pt performed toilet transfers with SBA. Short term goal 3: Pt will toilet with CGA and AD PRN. progressing. Pt continues to require assist for pants management.  MET 1/17 SBA, AD PRN  Short term goal 4:

## 2022-01-17 NOTE — PROGRESS NOTES
Physical Therapy  Facility/Department: [facility]  Daily Treatment Note  NAME: Marielena Pierre  : 1955  MRN: [id number]    Date of Service: 2022    Discharge Recommendations:  Patient would benefit from continued therapy after discharge,24 hour supervision or assist        Assessment            Patient Diagnosis(es): There were no encounter diagnoses. has a past medical history of Anxiety disorder, Chronic pain syndrome, COVID-19, DDD (degenerative disc disease), lumbar, Diabetes ([facility] [address] Displacement of lumbar intervertebral disc without myelopathy, Diverticulitis, Fibromyalgia, Generalized osteoarthrosis, involving multiple sites, GERD (gastroesophageal reflux disease), Impacted cerumen, Influenza A, Irritable bowel syndrome, Other and unspecified hyperlipidemia, Prosthetic joint implant failure ([facility] [address] Screening mammogram, Tracheobronchomalacia, Unspecified asthma(493.90), Unspecified essential hypertension, and Unspecified hypothyroidism. has a past surgical history that includes Hysterectomy; joint replacement (10/92); joint replacement (  ); joint replacement (  ); joint replacement (  ); Colonoscopy (); Lung surgery (2014); Cardiac catheterization; Abdomen surgery; Colon surgery; Upper gastrointestinal endoscopy (N/A, 10/25/2021); and tracheostomy (N/A, 10/25/2021).     Restrictions  Restrictions/Precautions  Restrictions/Precautions: Fall Risk,General Precautions  Position Activity Restriction  Other position/activity restrictions: L PICC, Notify physician for pulse less than 50 or greater than 120, respiratory rate less than 12 or greater than 25, oral temperature greater than 101.3 F (38.5 C) , urinary output less than 120 mL in four hours, systolic BP less than 90 or greater than 645, diastolic BP less than 50 or greater than 100. 3L of O2  Subjective   Pain Screening  Patient Currently in Pain: Yes  Pain Assessment  Pain Assessment: 0-10  Pain Level: 8  Pain Location: Abdomen; Shoulder  Pain Orientation: Right  Pain Descriptors: Aching  Functional Pain Assessment: Prevents or interferes some active activities and ADLs (pain in right shoulder worse with use of right UE per patient)  Vital Signs  Pulse: 84  Resp: 16  BP: 127/70  BP Location: Right upper arm  Patient Position: Sitting  Patient Currently in Pain: Yes  Oxygen Therapy  SpO2: 98 %  Pulse Oximeter Device Mode: Intermittent  O2 Device: Nasal cannula  O2 Flow Rate (L/min): 5 L/min       Orientation WNL      Cognition  Follows 1 step instruction, needs redirection to activity  Objective   Bed mobility  Rolling to Left: Independent  Rolling to Right: Independent  Supine to Sit: Independent  Sit to Supine: Independent  Scooting: Independent  Transfers  Sit to Stand: Modified independent  Stand to sit: Modified independent  Bed to Chair: Modified independent  Stand Pivot Transfers: Modified independent  Comment: sit<>stand WC x 3 with FWW HELEN, WC<>chair HELEN with FWW and bed<>chair HELEN with FWW. Ambulation  Ambulation?: Yes  More Ambulation?: Yes  Ambulation 1  Surface: level tile  Device: Rolling Walker  Other Apparatus: O2  Assistance: Stand by assistance  Quality of Gait: FWW, cues to manage O2 tubing , slow rate, cues for attending to O2 tubing and to walk closer distance to FWW. WC follow needed due to variable fatigue with reported Sob/fatigue limiting O2  Gait Deviations: Slow Marisol; Increased RONAN; Decreased step height;Decreased step length;Shuffles  Distance: 61 feet, 90 feet  Comments: on5L O2 via nc, immeidate post walk 61feet 98% SPO2 after 90 feet post walk SpO2 100% with patient reporting fatiuge and sob and requesting brief less than 2 minute seated rest before continuing. Pt needed navigational and task redirection walking to gym and towards room. SECOND SESSION:    Subjective:  Pain in right shoulder and abdomen 8/10.   Pt states she fell in August at [facility] and notes she has had pain since then but doesn't want to talk about it as it is part of litigation. .   100% SpO2 4L via nc, [id number].     THEREX:  Pt needs cues and intermittent redirection and set up assist with therex for core and LE strengthening  Heel slides yellow 2x15 BLE   LAQ 3# ankle weights BLE 1x15   Lat pull down unsupported sitting BUE 2x15,   Chest pull AROM with DBE, shoulder abduction with DBE, shoulder flexion with DBE 1x10 bLE   Dynamic Standing balance activity to improve dynamic standing balance and endurance with CGA to SBA fading assist from therapist:   completed side step 2x10 BLE stepping to lateral targets on floor with hands on bilateral rails, standing with hands on rails reaching across midline to move 5 clothespins with BUE L>Rx2 and R>Lx2 over 4 minutes with need for seated rest with SpO2 on 4L 96% and pt reporting need to sit in [facility] behind her due to LE fatigue. TRANSFERS:  Sit<>stand with HELEN to SBA with fading cues to attend tooxygen  tubing  from chair, WC, multiple trials     GAIT:  Gait 77 feet with WC follow and FWW post activity 95% and 95bpm; 90feet FWW with WC follow 92% HR 94 bpm.   WC propulsion HELEN 176 feet and 100 feet  with L/R turns     Education:   Educated patient in pacing self with mobility tasks, cued for O2 tubing line with transfers and gait with WC follow needed due to sob and LE fatigue reported by patient. With patient verbalizing understanding and requiring additional education to achieve goals    Disposition:Patient with nursing at end of session in bathroom on toilet  as pt had to use bathroom. PT incontinent of uring, pt with cues able to remove undergarment, pants. Therapist cleaned up/santitized chair as pt leaked through undergarment to seating. Goals  Short term goals  Time Frame for Short term goals: 1/7/2022  Short term goal 1: Pt demo indep bed mobility.  -- 1/08: GOAL PARTIALLY MET MI with HOB elevated and use of BR.  1/10/2022 GOAL met pt demo indep in bed mobility sup<>sit and rolling. Short term goal 2: Pt demo bed<>chair with mod assist of 1 and FWW or SPT. -- 1/08: GOAL MET Lisa/modA with RW  Short term goal 3: Patient demonstrates gait to bathroom 20 feet with assist of 2 and FWW. GOAL MET 1/6/2021 pt demo gait 35 to 60 feet with FWW and assist of 2. Short term goal 4: Patient demonstrates up and down 1 step with bilateral rails and assist of 2. -- 1/08: DNT d/t safety 1/12/2022 GOAL Met  pt demo up and down 1 step bilateral rails CGA. Short term goal 5: Patient demonstrates  indep in DBE and 5 minutes of standing activity with spO2 90% or greater to improve endurance for household walking. -- 1/08: GOAL NOT MET, limited by standing tolerance  Long term goals  Time Frame for Long term goals : 1/24/2022  Long term goal 1: Patient demonstrates gait 150 feet with SBA and sPO2 90% or greater able to manage O2 tubing on L/R turns on carpet up to 10 feetwith  safe to assist pt. GOAL partially met 1/13/2021 patient demo ability to safely assist pt with FWW gait 5 feet. Long term goal 2: Patient demonstrates up and down 12 steps with SBA and bilateral rails to return to community and home (egress/entry) mobility with  safe to assist pt. Long term goal 3: Patient demonstrates indep bed<>chair, sit<>stand and WC<>car with FWW  Long term goal 4: Patient demonstrates indep WC propulsion 150 feet with L/R turns indep. Long term goal 5: Patient demonstrates 50 feet of walking with L/R turns and carpet HELEN able to indep manage O2 tubing for short distance in home walking. Patient Goals   Patient goals : \"to walk to the bathroom.     Plan    Plan  Times per week: 5/7 days week  Times per day: Daily  Plan weeks: 21 days  Specific instructions for Next Treatment: Progress mobility as tolerated  Current Treatment Recommendations: Strengthening,Neuromuscular Re-education,Home Exercise Program,Cognitive/Perceptual Training,Safety Education & [place] Training,Patient/Caregiver Education & Training,Functional Mobility Training,Wheelchair Mobility Training,Gait Training,Transfer Training,Stair training,Equipment Evaluation, Education, & procurement  Safety Devices  Type of devices:  All fall risk precautions in place,Nurse notified,Call light within reach,Gait belt,Patient at risk for falls,Left in chair,Chair alarm in place     Therapy Time   Individual Concurrent Group Co-treatment   Time In 1030         Time Out 1100         Minutes 30         Timed Code Treatment Minutes: 30 Minutes    Second Session Therapy Time:   Individual Concurrent Group Co-treatment   Time In 1300         Time Out 1400         Minutes 60           Timed Code Treatment Minutes:  60    Total Treatment Minutes:  90      Natacha Bettencourt, PT

## 2022-01-18 ENCOUNTER — APPOINTMENT (OUTPATIENT)
Dept: GENERAL RADIOLOGY | Age: 67
DRG: 091 | End: 2022-01-18
Attending: PHYSICAL MEDICINE & REHABILITATION
Payer: MEDICARE

## 2022-01-18 PROBLEM — R13.12 OROPHARYNGEAL DYSPHAGIA: Status: ACTIVE | Noted: 2022-01-18

## 2022-01-18 PROBLEM — J38.00 VOCAL CORD PARALYSIS: Status: ACTIVE | Noted: 2022-01-18

## 2022-01-18 PROBLEM — I27.20 PULMONARY HTN (HCC): Status: ACTIVE | Noted: 2022-01-18

## 2022-01-18 PROBLEM — R73.9 HYPERGLYCEMIA: Status: ACTIVE | Noted: 2022-01-18

## 2022-01-18 PROBLEM — Z86.16 PERSONAL HISTORY OF COVID-19: Status: ACTIVE | Noted: 2022-01-18

## 2022-01-18 PROBLEM — D72.829 LEUKOCYTOSIS: Status: ACTIVE | Noted: 2022-01-18

## 2022-01-18 PROBLEM — I51.89 DIASTOLIC DYSFUNCTION: Status: ACTIVE | Noted: 2022-01-18

## 2022-01-18 PROBLEM — R91.8 PULMONARY INFILTRATES: Status: ACTIVE | Noted: 2022-01-18

## 2022-01-18 PROBLEM — R09.89 CHEST CONGESTION: Status: ACTIVE | Noted: 2022-01-18

## 2022-01-18 PROBLEM — D50.9 HYPOCHROMIC MICROCYTIC ANEMIA: Status: ACTIVE | Noted: 2022-01-18

## 2022-01-18 PROBLEM — T17.908A ASPIRATION INTO AIRWAY: Status: ACTIVE | Noted: 2022-01-18

## 2022-01-18 LAB
BASOPHILS ABSOLUTE: 0.1 K/UL (ref 0–0.2)
BASOPHILS RELATIVE PERCENT: 0.4 %
BILIRUBIN URINE: NEGATIVE
BLOOD, URINE: NEGATIVE
CLARITY: CLEAR
COLOR: YELLOW
EOSINOPHILS ABSOLUTE: 0.1 K/UL (ref 0–0.6)
EOSINOPHILS RELATIVE PERCENT: 0.5 %
GLUCOSE BLD-MCNC: 107 MG/DL (ref 70–99)
GLUCOSE BLD-MCNC: 121 MG/DL (ref 70–99)
GLUCOSE BLD-MCNC: 150 MG/DL (ref 70–99)
GLUCOSE BLD-MCNC: 171 MG/DL (ref 70–99)
GLUCOSE URINE: NEGATIVE MG/DL
HCT VFR BLD CALC: 33.7 % (ref 36–48)
HEMOGLOBIN: 10.5 G/DL (ref 12–16)
KETONES, URINE: NEGATIVE MG/DL
LEUKOCYTE ESTERASE, URINE: NEGATIVE
LYMPHOCYTES ABSOLUTE: 0.5 K/UL (ref 1–5.1)
LYMPHOCYTES RELATIVE PERCENT: 3.6 %
MCH RBC QN AUTO: 24 PG (ref 26–34)
MCHC RBC AUTO-ENTMCNC: 31.2 G/DL (ref 31–36)
MCV RBC AUTO: 76.8 FL (ref 80–100)
MICROSCOPIC EXAMINATION: NORMAL
MONOCYTES ABSOLUTE: 0.7 K/UL (ref 0–1.3)
MONOCYTES RELATIVE PERCENT: 4.5 %
NEUTROPHILS ABSOLUTE: 13.7 K/UL (ref 1.7–7.7)
NEUTROPHILS RELATIVE PERCENT: 91 %
NITRITE, URINE: NEGATIVE
PDW BLD-RTO: 19.8 % (ref 12.4–15.4)
PERFORMED ON: ABNORMAL
PH UA: 7 (ref 5–8)
PLATELET # BLD: 220 K/UL (ref 135–450)
PMV BLD AUTO: 7.9 FL (ref 5–10.5)
PROTEIN UA: NEGATIVE MG/DL
RBC # BLD: 4.38 M/UL (ref 4–5.2)
SARS-COV-2, NAAT: NOT DETECTED
SPECIFIC GRAVITY UA: 1.01 (ref 1–1.03)
URINE REFLEX TO CULTURE: NORMAL
URINE TYPE: NORMAL
UROBILINOGEN, URINE: 0.2 E.U./DL
WBC # BLD: 15.1 K/UL (ref 4–11)

## 2022-01-18 PROCEDURE — 6370000000 HC RX 637 (ALT 250 FOR IP): Performed by: INTERNAL MEDICINE

## 2022-01-18 PROCEDURE — 2700000000 HC OXYGEN THERAPY PER DAY

## 2022-01-18 PROCEDURE — 92526 ORAL FUNCTION THERAPY: CPT

## 2022-01-18 PROCEDURE — 1280000000 HC REHAB R&B

## 2022-01-18 PROCEDURE — 6370000000 HC RX 637 (ALT 250 FOR IP): Performed by: PHYSICAL MEDICINE & REHABILITATION

## 2022-01-18 PROCEDURE — 6360000002 HC RX W HCPCS: Performed by: PHYSICAL MEDICINE & REHABILITATION

## 2022-01-18 PROCEDURE — 97530 THERAPEUTIC ACTIVITIES: CPT

## 2022-01-18 PROCEDURE — 71045 X-RAY EXAM CHEST 1 VIEW: CPT

## 2022-01-18 PROCEDURE — 97129 THER IVNTJ 1ST 15 MIN: CPT

## 2022-01-18 PROCEDURE — 97110 THERAPEUTIC EXERCISES: CPT

## 2022-01-18 PROCEDURE — 94761 N-INVAS EAR/PLS OXIMETRY MLT: CPT

## 2022-01-18 PROCEDURE — 87635 SARS-COV-2 COVID-19 AMP PRB: CPT

## 2022-01-18 PROCEDURE — 81003 URINALYSIS AUTO W/O SCOPE: CPT

## 2022-01-18 PROCEDURE — 94640 AIRWAY INHALATION TREATMENT: CPT

## 2022-01-18 PROCEDURE — 99223 1ST HOSP IP/OBS HIGH 75: CPT | Performed by: INTERNAL MEDICINE

## 2022-01-18 PROCEDURE — 85025 COMPLETE CBC W/AUTO DIFF WBC: CPT

## 2022-01-18 PROCEDURE — 97116 GAIT TRAINING THERAPY: CPT

## 2022-01-18 PROCEDURE — 36415 COLL VENOUS BLD VENIPUNCTURE: CPT

## 2022-01-18 RX ORDER — CIPROFLOXACIN 500 MG/1
500 TABLET, FILM COATED ORAL EVERY 12 HOURS SCHEDULED
Status: DISCONTINUED | OUTPATIENT
Start: 2022-01-18 | End: 2022-01-22 | Stop reason: HOSPADM

## 2022-01-18 RX ADMIN — IPRATROPIUM BROMIDE AND ALBUTEROL SULFATE 1 AMPULE: .5; 2.5 SOLUTION RESPIRATORY (INHALATION) at 07:35

## 2022-01-18 RX ADMIN — METOCLOPRAMIDE 5 MG: 10 TABLET ORAL at 08:33

## 2022-01-18 RX ADMIN — LORAZEPAM 0.5 MG: 0.5 TABLET ORAL at 17:15

## 2022-01-18 RX ADMIN — Medication 15 ML: at 22:21

## 2022-01-18 RX ADMIN — INSULIN GLARGINE 7 UNITS: 100 INJECTION, SOLUTION SUBCUTANEOUS at 22:26

## 2022-01-18 RX ADMIN — LEVOTHYROXINE SODIUM 100 MCG: 0.1 TABLET ORAL at 06:06

## 2022-01-18 RX ADMIN — FUROSEMIDE 40 MG: 40 TABLET ORAL at 08:33

## 2022-01-18 RX ADMIN — OXYCODONE 10 MG: 5 TABLET ORAL at 02:46

## 2022-01-18 RX ADMIN — IPRATROPIUM BROMIDE AND ALBUTEROL SULFATE 1 AMPULE: .5; 2.5 SOLUTION RESPIRATORY (INHALATION) at 19:55

## 2022-01-18 RX ADMIN — OXYCODONE 10 MG: 5 TABLET ORAL at 12:43

## 2022-01-18 RX ADMIN — OXYCODONE 10 MG: 5 TABLET ORAL at 17:16

## 2022-01-18 RX ADMIN — OXYCODONE 10 MG: 5 TABLET ORAL at 08:33

## 2022-01-18 RX ADMIN — ROPINIROLE HYDROCHLORIDE 0.5 MG: 0.5 TABLET, FILM COATED ORAL at 08:33

## 2022-01-18 RX ADMIN — ACETAMINOPHEN 650 MG: 325 TABLET ORAL at 12:42

## 2022-01-18 RX ADMIN — LORAZEPAM 0.5 MG: 0.5 TABLET ORAL at 22:18

## 2022-01-18 RX ADMIN — ROPINIROLE HYDROCHLORIDE 0.5 MG: 0.5 TABLET, FILM COATED ORAL at 12:43

## 2022-01-18 RX ADMIN — ASPIRIN 81 MG 81 MG: 81 TABLET ORAL at 08:33

## 2022-01-18 RX ADMIN — INSULIN LISPRO 1 UNITS: 100 INJECTION, SOLUTION INTRAVENOUS; SUBCUTANEOUS at 22:27

## 2022-01-18 RX ADMIN — BUDESONIDE 500 MCG: 0.5 SUSPENSION RESPIRATORY (INHALATION) at 07:35

## 2022-01-18 RX ADMIN — SPIRONOLACTONE 25 MG: 25 TABLET, FILM COATED ORAL at 08:33

## 2022-01-18 RX ADMIN — ENOXAPARIN SODIUM 40 MG: 40 INJECTION SUBCUTANEOUS at 08:36

## 2022-01-18 RX ADMIN — OXYCODONE 10 MG: 5 TABLET ORAL at 22:18

## 2022-01-18 RX ADMIN — DICYCLOMINE HYDROCHLORIDE 20 MG: 20 TABLET ORAL at 17:15

## 2022-01-18 RX ADMIN — CIPROFLOXACIN 500 MG: 500 TABLET, FILM COATED ORAL at 22:18

## 2022-01-18 RX ADMIN — BUDESONIDE 500 MCG: 0.5 SUSPENSION RESPIRATORY (INHALATION) at 19:55

## 2022-01-18 RX ADMIN — METOCLOPRAMIDE 5 MG: 10 TABLET ORAL at 22:18

## 2022-01-18 RX ADMIN — LORAZEPAM 0.5 MG: 0.5 TABLET ORAL at 08:33

## 2022-01-18 RX ADMIN — INSULIN LISPRO 2 UNITS: 100 INJECTION, SOLUTION INTRAVENOUS; SUBCUTANEOUS at 17:17

## 2022-01-18 RX ADMIN — ROPINIROLE HYDROCHLORIDE 0.5 MG: 0.5 TABLET, FILM COATED ORAL at 22:18

## 2022-01-18 RX ADMIN — DICYCLOMINE HYDROCHLORIDE 20 MG: 20 TABLET ORAL at 10:55

## 2022-01-18 RX ADMIN — DICYCLOMINE HYDROCHLORIDE 20 MG: 20 TABLET ORAL at 06:06

## 2022-01-18 RX ADMIN — ONDANSETRON 8 MG: 4 TABLET, ORALLY DISINTEGRATING ORAL at 10:55

## 2022-01-18 RX ADMIN — MULTIPLE VITAMINS W/ MINERALS TAB 1 TABLET: TAB at 08:33

## 2022-01-18 RX ADMIN — METOPROLOL 25 MG: 25 TABLET ORAL at 08:33

## 2022-01-18 RX ADMIN — DICYCLOMINE HYDROCHLORIDE 20 MG: 20 TABLET ORAL at 22:18

## 2022-01-18 RX ADMIN — METOPROLOL 25 MG: 25 TABLET ORAL at 22:25

## 2022-01-18 RX ADMIN — Medication 400 MG: at 08:33

## 2022-01-18 ASSESSMENT — PAIN DESCRIPTION - PAIN TYPE
TYPE: CHRONIC PAIN
TYPE: CHRONIC PAIN
TYPE: ACUTE PAIN

## 2022-01-18 ASSESSMENT — PAIN DESCRIPTION - LOCATION
LOCATION: SHOULDER

## 2022-01-18 ASSESSMENT — PAIN DESCRIPTION - ORIENTATION
ORIENTATION: RIGHT

## 2022-01-18 ASSESSMENT — PAIN SCALES - GENERAL
PAINLEVEL_OUTOF10: 7
PAINLEVEL_OUTOF10: 9
PAINLEVEL_OUTOF10: 7
PAINLEVEL_OUTOF10: 6
PAINLEVEL_OUTOF10: 8
PAINLEVEL_OUTOF10: 7
PAINLEVEL_OUTOF10: 7
PAINLEVEL_OUTOF10: 4
PAINLEVEL_OUTOF10: 9

## 2022-01-18 ASSESSMENT — PAIN - FUNCTIONAL ASSESSMENT: PAIN_FUNCTIONAL_ASSESSMENT: PREVENTS OR INTERFERES SOME ACTIVE ACTIVITIES AND ADLS

## 2022-01-18 ASSESSMENT — PAIN DESCRIPTION - DESCRIPTORS: DESCRIPTORS: ACHING

## 2022-01-18 NOTE — PATIENT CARE CONFERENCE
[facility]  Inpatient Rehabilitation  Weekly Team Conference Note    Date: 2022  Patient Name: Osvaldo Hatch        MRN: [id number]    : 1955  (78 y.o.)  Gender: female   Referring Practitioner: Gladis Jauregui MD  Diagnosis: Post COVID 19critical illness myopathy. Interventions to be utilized toward barriers to discharge, per discipline:  [address] observed barriers to dc: Decreased endurance  Nursing interventions:Assist with ADL's, toileting, and medication management  Family Education: yes,   Fall Risk:  Yes      Physical therapy observed barriers to dc:    Baseline: indep with cane    Current level: needs WC follow due to hypoxia vs LE fatigue/sob for household gait short distances    Barriers to DC:  Decreased safety with transfers with FWW with fatigue requiring up to SBA with FWW, low endurance for mobility in home, gait short household distances limited by LE weakness and SOB, needs assist of 1 with steps. Pt's  safe to assist pt with mobility. Needs in order to achieve dc home/next level of care: Recommend transport WC and FWW. Progress as tolerated towards increased endurance walking household distances. Physical therapy interventions:   Current Treatment Recommendations: Strengthening,Neuromuscular Re-education,Home Exercise Program,Cognitive/Perceptual Training,Safety Education & Training,Balance Training,Endurance Training,Patient/Caregiver Education & Training,Functional Mobility Training,Wheelchair Mobility Training,Gait Training,Transfer Training,Stair training,Equipment Evaluation, Education, & procurement      PHYSICAL THERAPY    PT Equipment Recommendations  Equipment Needed: No  Other: has needed equpiment    Assessment: Pt seen for split session transfer train with FWW with variable assist from SBA to HELEN with FWW with decreased indep and safety awareness noted with fatigue.    Pt able to tolerate variable walking distances up to 76 feet with SBA and WC follow on O2 via nc. Pt progressed to 6x4 inch steps with assist of 1 with bilateral rails with cues for  Advancing UE on rails and for step to gait pattern. Pt completing LE therex with cues and set up assist and demo indep in bed mobility. Pt incontinent of urine first session with patient continent during second session. PT will need 24 hour care upond DC largely due to impaired cogintions affect on pt safety with mobility. Occupational therapy observed barriers to dc:       Baseline: independent all ADLs and mobility, lives with family              Current level: CGA for sit pivots, CGA STS, mod A LB ADLs, supvn UB ADLs, dropping O2 sats, HR increasing              Barriers to DC: activity tolerance, weakness, anxiety, limited family assistance              Needs in order to achieve dc home/next level of care: min A for ADLs, and SPV for transfers    Occupational Therapy interventions:  Current Treatment Recommendations: Strengthening,Endurance Training,Balance Training,Functional Mobility Training,Safety Education & Training,Equipment Evaluation, Education, & procurement,Self-Care / ADL,Patient/Caregiver Education & Training,Home Management Training,ROM      OCCUPATIONAL THERAPY    Assessment: Pt aggreable to OT session, denies shower but willing to complete spongebath on TTB. Pt completed all ADLs (sponge bath, dressing, and toileting) with SBA, increased time, and verbal cuing for sequencing. Fx mobility with SBA and increased time. Pt O2 stan mutliple times throughout ADLs with O2 increase from 3L to 5L. Pt noted to be anxious throuhgout all tasks with heavy, labored SOB and teary-eyed throughout tasks. Pt is making good progress towards goals with O2 stats being barrier to independence. Continue POC.         Speech therapy observed barriers to dc:               Baseline: pt lives at home with , dtr, and two grandchildren,  manages finances/household tasks, pt manages own meds, not an active               Current level: mod pharyngeal dysphagia, mod-severe dysphonia, mod-severe cognitive linguistic deficit               Barriers to DC: paralyzed L TVF per ENT, dysphonia, limited carryover, reduced insight               Needs in order to achieve dc home/next level of care: 24 hour assist, carryover of compensatory strategies, tolerance of LRD, improved vocal quality     Speech Therapy interventions:  Dysphagia: Therapeutic Interventions: Diet tolerance monitoring,Patient/Family education,Therapeutic PO trials with SLP  Speech/Language/Cognition: Compensatory strategy training and carryover, recall/STM, problem solving, reasoning, exec function, thought organization, attention, voice exercises       SPEECH THERAPY :  Tx session 1: Pt alert and cooperative, agreeable to tx. RN providing pt with meds upon arrival. Pt requested to use normal cup with thin liquid rather than provale cup. Immediate post-swallow coughing. SLP edu pt re: provale cup and encouraged pt to use it with remainder of pills. Pt then tolerated pills whole with water from provale cup (pills 1 at a time) with no overt s/s of aspiration. Discussed with pt purchasing another provale cup. Pt verbalized agreement. SLP edu pt re: external memory strategies to use at home - pt with poor insight re: rationale for use of these despite education. Pt benefited from min-mod cues to complete a thought organization task. Continue goals above. NUTRITION  Weight: 153 lb 1.6 oz (69.4 kg) / Body mass index is 22.61 kg/m². Diet Order: ADULT DIET; Regular  ADULT ORAL NUTRITION SUPPLEMENT; Lunch, Dinner; Low Calorie/High Protein Oral Supplement  PO Meals Eaten (%): 51 - 75%  Education: No recommendation at this time      CASE MANAGEMENT  Assessment: Patient returning home with [facility] therapy services. Possible new home oxygen>need walk test prior to discharge. Estuardo Sotelo from [employer] aware.  CM will continue to support for discharge needs. Interdisciplinary Goals:   1.) Pt will tolerate LRD without any clinical s/s of aspiration   2.) PT consistently transfers aligning to transfer surfaces with safe technique without cues with FWW.   3.) Pt will complete toileting Luis      Discharge Plan   Estimated discharge date: 1/22/2022  Destination: home with home health and 24 hr supervision  Pass:No  Services at [address], Occupational Therapy, Speech Therapy and [facility] at [address], transport chair for community distances. Home O2. Team Members Present at Conference:  : Fer Henderson    Occupational Therapist: Edilson Gonzalez, OTR/L  Physical Therapist:Mami Garner 2007  Speech Therapist: Mihaela Espinoza, [facility] SLP   Nurse: Rosaura White RN  Dietician: Sunil Luz RDN, LD  : Jacob Killian, OTR/L  Psychiatry: N/A    Family members present at conference: No      I led this team conference and I approve the established interdisciplinary plan of care as documented within the medical record of Geneva Henson. MD: [place]. Thais Durbin MD 1/19/2022, 11:10 AM   MD lead team conference remotely this date; via phone.

## 2022-01-18 NOTE — PROGRESS NOTES
Speech Language Pathology  [facility]: ACUTE REH[facility]  SPEECH-LANGUAGE PATHOLOGY      [x] Daily  [] Weekly Care Conference Note  [] Discharge    Patient:Esperanza Rodriguez      ZPB:9[id number]  BRZ:[id number]  Rehab Dx/Hx: Post covid-19 condition, unspecified [U09.9]    Precautions: falls and aspirations  Home situation: lives at home with , dtr, two grandchildren; not an active ; manages own meds; shares household tasks with ,  manages finances   ST Dx: [] Aphasia  [] Dysarthria  [] Apraxia   [x] Oropharyngeal dysphagia [x] Cognitive Impairment  [x] Other: voice tx   Date of Admit: 1/3/2022  Room #: 0151/0151-01    Current functional status (updated daily):         Pt being seen for : [x] Speech/Language Treatment  [x] Dysphagia Treatment [x] Cognitive Treatment  [] Other:  Communication: []WFL  [] Aphasia  [] Dysarthria  [] Apraxia  [] Pragmatic Impairment [] Non-verbal  [] Hearing Loss  [x] Other: dysphonia  Cognition: [] WFL  [x] Mild  [x] Moderate  [] Severe [] Unable to Assess  [] Other:  Memory: [] WFL  [x] Mild  [x] Moderate  [] Severe [] Unable to Assess  [] Other:  Behavior: [x] Alert  [x] Cooperative  [x]  Pleasant  [] Confused  [] Agitated  [] Uncooperative  [] Distractible [] Motivated  [] Self-Limiting [] Anxious  [] Other:  Endurance:  [x] Adequate for participation in SLP sessions  [] Reduced overall  [] Lethargic  [] Other:  Safety: [x] No concerns at this time  [] Reduced insight into deficits  []  Reduced safety awareness [] Not following call light procedures  [] Unable to Assess  [] Other:    Current Diet Order:ADULT DIET; Regular  ADULT ORAL NUTRITION SUPPLEMENT; Lunch, Dinner; Low Calorie/High Protein Oral Supplement  Swallowing Precautions: Alternate solids and liquids;Eat/Feed slowly; No straws;Upright as possible for all oral intake;Remain upright for 30-45 minutes after meals;Small bites/sips    Date: 1/18/2022      Tx session 1  0830 - 0900  Leo Palma CCC-SLP Tx session 2  [id number]   Isabel Gil MA CCC-SLP   Total Timed Code Min 15 0   Total Treatment Minutes 30 30   Individual Treatment Minutes 30 30   Group Treatment Minutes 0 0   Co-Treat Minutes 0 0   Variance/Reason:  0 30- fever/aches, not feeling week, Med hold per MD   Pain None reported Pt denied   Pain Intervention [] RN notified  [] Repositioned  [] Intervention offered and patient declined  [x] N/A  [] Other: [] RN notified  [] Repositioned  [] Intervention offered and patient declined  [x] N/A  [] Other:   Subjective     Pt alert and cooperative, agreeable to tx. Pt upright in bed for session. Objective:  Goals     Dysphagia Goals:  Short-term Goals  Timeframe for Short-term Goals: 10 days (01/13/22)    Goal 1: The patient will complete pharyngeal/laryngeal strengthening exercises for improved swallowing function 10/10 given min cues. Goal not directly targeted. Goal 2. The patient will tolerate recommended diet without observed clinical signs of aspiration. RN provided pt with meds:  -pt indep taking 1 at a time  -pt requested to use the water out of a normal cup rather than the tea in her provale cup   -pt with immediate post-swallow coughing with all sips of thin with meds with the normal cup  -SLP encouraged pt to use provale cup  -pt with no s/s of aspiration when taking meds with thin via the provale cup     Thus, SLP continues to recommend use of provale cup with thin liquids. Overt s/s of aspiration with thin via normal cup. SLP and pt discussed pt purchasing another provale up. Pt verbalized understanding. Goal 3. The patient/caregiver will demonstrate understanding of compensatory strategies for improved swallowing safety. SLP edu pt re: rationale of provale cup and importance of using it to present aspiration, safe swallow strategies (small sips, fully upright). Pt verbalized understanding. Goal 4.  The patient will tolerate thin liquids without signs and symptoms of aspiration 10/10 via cup. Pt taking sips of thin via cup when taking pills - immediate post-swallow coughing. SLP continues to recommend provale cup. Speech/Lang/Cog goals:  Short-term Goals  Timeframe for Short-term Goals: 14 days (01/17/22)    Goal 1: Pt will complete recall tasks with 80% acc given min cues for use of compensatory strategies. Education re: external memory strategies to use at home: writing things down, using a calendar, setting a timer. Pt appeared somewhat resistant to recommendations, but overall verbalized understanding. Goal 2: Pt will complete executive function tasks (money, math, time, etc) with 80% acc given min cues. Goal not directly targeted. Goal 3: Pt will complete verbal and visual reasoning tasks with 80% acc given min cues. Goal not directly targeted. Goal 4: Pt will complete problem solving and thought organization tasks with 80% acc given min cues. Naming the Category  -pt given 4 items that belong to the category   -pt completed task with 62% acc indpe, improved to 85% acc given min-mod cues       Goal 5: Pt will complete vocal strengthening exercises for improved vocal function 10/10 given min cues. Goal not directly targeted. Other areas targeted: N/A    Education:   SLP edu pt re: external memory strategies at home, thought org strategies, safe swallow strategies, cont rec for provale cup     Safety Devices: [x] Call light within reach  [x] Chair alarm activated  [] Bed alarm activated  [] Other:  [x] Call light within reach  [x] Chair alarm activated  [] Bed alarm activated  [] Other:    Assessment: Tx session 1: Pt alert and cooperative, agreeable to tx. RN providing pt with meds upon arrival. Pt requested to use normal cup with thin liquid rather than provale cup. Immediate post-swallow coughing. SLP edu pt re: provale cup and encouraged pt to use it with remainder of pills.  Pt then tolerated pills whole with water from provale cup (pills 1 at a time) with no overt s/s of aspiration. Discussed with pt purchasing another provale cup. Pt verbalized agreement. SLP edu pt re: external memory strategies to use at home - pt with poor insight re: rationale for use of these despite education. Pt benefited from min-mod cues to complete a thought organization task. Continue goals above. Tx session 2: Missed minutes: 30- pt placed on a med hold this date by MD.    Plan: Continue as per plan of care. Additional Information:     Barriers toward progress: Confusion and Cognitive deficit  Discharge recommendations:  [] Home independently  [] Home with assistance [x]  24 hour supervision  [] ECF [] Other:  Continued Tx Upon Discharge: ? [x] Yes [] No [] TBD based on progress while on ARU [] Vital Stim indicated [] Other:   Estimated discharge date: 01/21/22    Interventions used this date:  [x] Speech/Language Treatment  [] Instruction in HEP [] Group [x] Dysphagia Treatment [x] Cognitive Treatment   [] Other: Total Time Breakdown / Charges    Time in Time out Total Time / units   Cognitive Tx 0845 0900 15 min / 1 unit   Speech Tx      Dysphagia Tx 0830 0845 15 min / 1 unit       Electronically Signed by    Tx session 1:  Monroe Owen [place] [address] [facility] #[id number]  Speech Language Pathologist    Tx session 2:   Adria Downing. A CCC-SLP  Speech-Language Pathologist  [id number]

## 2022-01-18 NOTE — PROGRESS NOTES
Speech Language Pathology  Attempt Note    Pt initially scheduled with SLP from 1230-100, however pt and pt's  having a discussion with RN manager. SLP rescheduled pt to 1430. In the meantime, pt was placed on a medical hold per MD for fever, chills/aches, not feeling well. Missed mins: [address] [facility] MKeke RODRIGUEZ [facility]-SLP  Speech-Language Pathologist  [id number]

## 2022-01-18 NOTE — PROGRESS NOTES
assistance  Distance: 61 feet, 90 feet  OT  PT Equipment Recommendations  Equipment Needed: No  Other: has needed equpiment  Toilet - Technique: Ambulating  Equipment Used: Drop-arm extra wide bedside commode  Toilet Transfers Comments: BSC over toilet  Assessment        SLP  Current Diet : Regular  Current Liquid Diet : Thin (via provale cup)  Diet Solids Recommendation: Regular  Liquid Consistency Recommendation: Thin    Body mass index is 22.61 kg/m². Rehabilitation Diagnosis:  Neurologic, 3.8, Neuromuscular Disorders, e.g. Critical Illness Myopathy, Other Myopathy     Assessment and Plan:  S/p covid pneumonia with superimposed bacterial pneumonia  - required trach/peg, proning  - s/p remdesivir, tocilizumab  - completed cefepime     CAD  - asa, statin     HTN  - follow BP       DM  - lantus, SSI     Hypothyroidism  - synthroid    Leukocytosis  - resolved  - completed cefepime;  - repeat culture + for low growth enterococcus; completed 1 week macrobid.     Reduced L TVF mobility per FEES  - slp    Dysphagia (pharyngeal), dysphonia  - slp    S/p trach  - stoma care    Bowels: Schedule stool softener. Follow bowel movements. Enema or suppository if needed.      Bladder: Check PVR x 3.   [facility] if PVR > 200ml or if any volume is > 500 ml.      Sleep: Trazodone provided prn.      Follow up appointments: PCP  GENEVA: 1/22 home w/ home health  DME: [id number] [facility] wheeled walker, transport chair    Electronically signed by Paul Mckenna MD on 1/18/2022 at 7:52 AM

## 2022-01-18 NOTE — CONSULTS
INPATIENT PULMONARY CRITICAL CARE CONSULT NOTE      Chief Complaint/Referring Provider:  Patient is being seen at the request of  for a consultation for Fever/increased WBC;questionable CXR results      Presenting HPI: Patient is a 70-year-old female who was admitted to the ARU for debility    As per admitting provider-Ms Damien Mendez is a 78year old female admitted from [address] [place] [address]. She initially presented with shortness of breath and was diagnosed with covid pneumonia. She required intubation and proning; extubated 10/4, reintubated 10/6. She did require tracheostomy and peg placement. She has no baseline oxygen requirement. She endorses exertional dyspnea but denies chest pain.    Patient apparently had COVID-19 pneumonia with acute respiratory failure and was hospitalized at [facility] and patient was on the ventilator for 5 weeks out there subsequently patient also had tracheostomy and PEG placement and patient was not LTAC for many months and in total patient was hospitalized in those set ups for nearly 4-1/2 months as per the patient and family, patient was subsequently decannulated, patient was still weak and patient still has some cognitive fog like picture as per patient's family and patient was transferred to [place] [address] at [facility] for further management, patient also had a PEG tube placed and for some reason it was removed 2 days back, patient does have history of oropharyngeal dysphagia and patient does have history of choking while eating clear liquids, patient has been followed by speech therapist and is undergoing speech evaluations, patient also has had hoarseness of voice and patient was evaluated by ENT and patient was told that she has vocal cord paralysis and patient needs laryngoplasty which according to the patient and family have been postponed to 1 outpatient setting, patient has not been feeling well for last 2 days, patient has been having some abdominal pain and nausea, patient also has been having some coughing with chest congestion, patient does not have any epistaxis, patient does have difficulty in swallowing which is continuing at this time, patient also has been having fever which is new and concerning to the patient and family, patient does not have any chest pain, patient does not have any dysuria or hematuria, patient does not have increasing leg edema, patient was alert and communicative when seen, no other pertinent review of system of concern     Patient Active Problem List    Diagnosis Date Noted    Aspiration into airway 01/18/2022    Oropharyngeal dysphagia 01/18/2022    Vocal cord paralysis 01/18/2022    Personal history of COVID-19 01/18/2022    Chest congestion 01/18/2022    Pulmonary infiltrates 01/18/2022    Hypochromic microcytic anemia 01/18/2022    Hyperglycemia 25/32/8947    Diastolic dysfunction 57/85/0895    Pulmonary HTN ([address] 01/18/2022    Leukocytosis 01/18/2022    Moderate malnutrition ([address] 01/04/2022    Post covid-19 condition, unspecified 01/03/2022    Critical illness myopathy     Critical illness neuropathy ([address]     ARDS (adult respiratory distress syndrome) ([address]     Pneumonia due to Klebsiella pneumoniae ([address]     Electrolyte disorder     Acute hypoxemic respiratory failure ([address]     Pneumonia due to Pseudomonas species ([address]     Hypomagnesemia     Hypokalemia     Gram-negative pneumonia ([address]     Severe protein-calorie malnutrition ([address] 09/30/2021    Hypotension     Acute respiratory failure with hypoxia ([address]     COVID-19     Pneumonia due to COVID-19 virus     CHARLY (acute kidney injury) ([address]     Uncontrolled hypertension     Fever     Suspected COVID-19 virus infection 09/27/2021    Hypoxia 09/27/2021    Orthostatic hypotension 04/26/2019    Type 1 diabetes mellitus without complication ([address] 42/46/4343    Sepsis ([address] 02/02/2019    Memory change 01/18/2018    Acquired hypothyroidism 01/18/2018    Nausea 08/29/2017    New onset seizure ([address]     CAD in native artery     Syncope and collapse 05/09/2016    Convulsion ([address] 05/09/2016    PVC (premature ventricular contraction) 10/09/2015    Palpitations 10/09/2015    S/P cardiac catheterization     Systolic congestive heart failure (HCC)     Chest pain     DM2 (diabetes mellitus, type 2) ([address] 09/15/2014    Tracheobronchomalacia 04/16/2014    Bronchiectasis ([address] 04/16/2014    Pulmonary nodule 04/14/2012    Chronic pain syndrome     Fibromyalgia     DDD (degenerative disc disease), lumbar     Anxiety disorder     Prosthetic joint implant failure ([address]     Vitamin D deficiency 04/28/2011    Shoulder arthritis 03/11/2011    Generalized osteoarthrosis, involving multiple sites 08/24/2010    Essential hypertension 08/24/2010    Displacement of lumbar intervertebral disc without myelopathy 08/23/2010    Hypothyroidism 08/23/2010    Mixed hyperlipidemia 08/23/2010    Anxiety state 08/23/2010       Past Medical History:   Diagnosis Date    Anxiety disorder     Chronic pain syndrome     COVID-19 09/27/2021    DDD (degenerative disc disease), lumbar     Diabetes ([address]     Displacement of lumbar intervertebral disc without myelopathy 08/23/2010    Diverticulitis     Fibromyalgia     Generalized osteoarthrosis, involving multiple sites 08/24/2010    GERD (gastroesophageal reflux disease)     Hypochromic microcytic anemia 1/18/2022    Impacted cerumen 08/23/2010    Influenza A 03/10/2016    Irritable bowel syndrome 08/23/2010    Other and unspecified hyperlipidemia 08/23/2010    Prosthetic joint implant failure ([address]     Screening mammogram 12/08/2006    (Both)Negative    Tracheobronchomalacia     Unspecified asthma(493.90) 08/23/2010    Unspecified essential hypertension 08/24/2010    Unspecified hypothyroidism 08/23/2010        Past Surgical History:   Procedure Laterality Date    ABDOMEN SURGERY      CARDIAC CATHETERIZATION      COLON SURGERY      COLONOSCOPY      normal    HYSTERECTOMY      JOINT REPLACEMENT  10/92    Right; hip     JOINT REPLACEMENT        Left hip  followed by revision 2006    JOINT REPLACEMENT        right     JOINT REPLACEMENT         right replacement.  LUNG SURGERY  2014    tracheobronchomalacia    TRACHEOSTOMY N/A 10/25/2021    TRACHEOTOMY performed by Fernanda Montes DO at [address] N/A 10/25/2021    EGD/PEG W/ANES. ON VENT, COVID + performed by Levy Fagan MD at [facility]        Family History   Problem Relation Age of Onset    Asthma Mother     Heart Failure Father     Cancer Maternal Grandfather         skin cancer on face    Cancer Daughter         skin cancer-BCC    Diabetes Neg Hx     Emphysema Neg Hx     Hypertension Neg Hx         Social History     Tobacco Use    Smoking status: Former Smoker     Packs/day: 1.00     Years: 18.00     Pack years: 18.00     Types: Cigarettes     Quit date: 12/10/1991     Years since quittin.1    Smokeless tobacco: Never Used   Substance Use Topics    Alcohol use: No        Allergies   Allergen Reactions    Prednisone Other (See Comments) and Anaphylaxis     Cannot tolerate oral steriods  Cannot tolerate oral steroids - causes Avascular Necrosis of joints. Cannot tolerate oral steriods    Quinidine Anaphylaxis and Nausea And Vomiting    Sulfa Antibiotics Anaphylaxis and Nausea And Vomiting    Bactrim     Clonidine Hives    Sulfamethoxazole-Trimethoprim Rash and Hives               Physical Exam:  Blood pressure 139/63, pulse 90, temperature 98 °F (36.7 °C), temperature source Oral, resp. rate 18, height 5' 9\" (1.753 m), weight 153 lb 1.6 oz (69.4 kg), SpO2 95 %, not currently breastfeeding.'     Constitutional:  No acute distress while lying in the bed, slight hoarseness of voice.    HENT:  Oropharynx is clear and moist. No RECOMMENDATION:   Recommend follow-up to evaluate for the nodularity as well as the right   suprahilar opacity. This would best be performed with contrast-enhanced CT. XR CHEST PORTABLE   Final Result   Asymmetric airspace opacities are relatively stable from prior comparison   study. Pulmonary edema, recurrent infection or chronic interstitial change   may be considered. CT chest in Sept 2021-  CTA OF THE CHEST 9/27/2021 4:54 am       TECHNIQUE:   CTA of the chest was performed after the administration of intravenous   contrast.  Multiplanar reformatted images are provided for review.  MIP   images are provided for review. Dose modulation, iterative reconstruction,   and/or weight based adjustment of the mA/kV was utilized to reduce the   radiation dose to as low as reasonably achievable.       COMPARISON:   None.       HISTORY:   ORDERING SYSTEM PROVIDED HISTORY: positive d dimer, hypoxia   TECHNOLOGIST PROVIDED HISTORY:   Reason for exam:->positive d dimer, hypoxia   Reason for Exam: sob, hypoxia, elevated ddimer   Acuity: Acute   Type of Exam: Initial   Relevant Medical/Surgical History: asthma, htn, dm, cardiac cath       FINDINGS:   Pulmonary Arteries: Pulmonary arteries are adequately opacified for   evaluation.  No evidence of intraluminal filling defect to suggest pulmonary   embolism.  Main pulmonary artery is normal in caliber.       Mediastinum: 1 mildly enlarged right paratracheal lymph node measuring 1.5 x   1.2 cm.  Surgical clips in the right hilum from previous right upper lobe   lobectomy.  The heart and pericardium demonstrate no acute abnormality. There is no acute abnormality of the thoracic aorta.       Lungs/pleura: Few scattered areas of ground-glass opacity in the periphery of   the left lung.  More prominent opacities in the right lung with developing   consolidation in the right mid chest.  There is no pleural effusion or   pneumothorax. .       Upper Abdomen:  The Organism Enterococcus faecium Abnormal     Urine Culture, Routine 25,000 CFU/ml    Resulting Agency [address] [facility]          Susceptibility     Enterococcus faecium     BACTERIAL SUSCEPTIBILITY PANEL BY AMANDA     ampicillin <=2 mcg/mL Sensitive     nitrofurantoin 64 mcg/mL Intermediate     tetracycline <=1 mcg/mL Sensitive     vancomycin <=0.5 mcg/mL Sensitive               CULTURE, RESPIRATORY Normal respiratory molina absent Abnormal   [facility]   Gram Stain Result 4+ WBC's (Polymorphonuclear)   1+ Gram positive cocci   1+ Gram negative rods  [facility]   Organism Klebsiella aerogenes Abnormal   [address] [facility]   CULTURE, RESPIRATORY Light growth  [address] [facility]          Susceptibility     Klebsiella aerogenes     BACTERIAL SUSCEPTIBILITY PANEL BY AMANDA     amoxicillin-clavulanate >16/8 mcg/mL Resistant     ampicillin >16 mcg/mL Resistant     ceFAZolin >16 mcg/mL Resistant     cefepime <=2 mcg/mL Sensitive     cefTRIAXone 32 mcg/mL Resistant     cefuroxime >16 mcg/mL Resistant     ciprofloxacin <=1 mcg/mL Sensitive     ertapenem <=0.5 mcg/mL Sensitive     gentamicin <=4 mcg/mL Sensitive     meropenem <=1 mcg/mL Sensitive     piperacillin-tazobactam 64 mcg/mL Intermediate     trimethoprim-sulfamethoxazole <=2/38 mcg/mL Sensitive                       Results for Isaac Mullen (MRN [id number]) as of 1/18/2022 18:30   Ref.  Range 1/18/2022 15:15   Color, UA Latest Ref Range: Straw/Yellow  Yellow   Clarity, UA Latest Ref Range: Clear  Clear   Glucose, UA Latest Ref Range: Negative mg/dL Negative   Bilirubin, Urine Latest Ref Range: Negative  Negative   Ketones, Urine Latest Ref Range: Negative mg/dL Negative   Specific Gravity, UA Latest Ref Range: 1.005 - 1.030  1.015   Blood, Urine Latest Ref Range: Negative  Negative   pH, UA Latest Ref Range: 5.0 - 8.0  7.0   Protein, UA Latest Ref Range: Negative mg/dL Negative   Urobilinogen, Urine Latest Ref Range: <2.0 E.U./dL 0.2   Nitrite, Urine Latest Ref Range: Negative  Negative   Leukocyte Esterase, Urine Latest Ref Range: Negative  Negative   Urine Type Unknown NotGiven   Urine Reflex to Culture Unknown Not Indicated   Microscopic Examination Unknown Not Indicated   URINE RT REFLEX TO CULTURE Unknown Rpt     Laryngoscopy results -There was no evidence of concerning masses or lesions of the base of tongue, vallecula, epiglottis, aryepiglottic folds, arytenoids, false vocal folds, true vocal folds, or pyriform sinuses. The right true vocal fold exhibited complete motion however the left true vocal fold was paralyzed in the paramedian position. There is also questionable arytenoid dislocation as it is in the anterior position. The scope was removed. The patient tolerated the procedure without difficulty. There were no complications. Pertinent findings: Left vocal fold paralysis with possible arytenoid dislocation    Echocardiogram:      Summary   The left ventricular systolic function is low normal with an ejection   fraction of 50%. There is hypokinesis of the basal inferior and inferior walls. Normal left ventricular size with mild concentric left ventricular   hypertrophy. Grade I diastolic dysfunction with normal filling pressure. Changes noted from previous echo on 8- in left ventricular function. Mild tricuspid regurgitation. Systolic pulmonic artery pressure (SPAP) is estimated at 46 mmHg consistent   with mild pulmonary hypertension (Right atrial pressure of 3 mmHg).       Sputum c/s 2 weeks back -  SPECIMENT DESCRIPTION  SPUTUM    Special requests  SPUTUM    Gram Stain Result  Group 5:  >25 WBC and <10 epithelial cells/lpf    Gram Stain Result  Moderate Mixed oral molina    Culture  Positive Growth    Culture  Enterobacter cloacae complex    Culture  NORMAL ORAL MOLINA    Culture  (NOTE) Abundant growth of Enterobacter cloacae complex Moderate growth of Normal oral molina Tested at: [address] [facility] [address]    Report status  01/02/2022 FINAL REPORT    Organism  Enterobacter cloacae complex    Resulting Agency [facility]     Susceptibility    Organism Antibiotic Method Susceptibility   Enterobacter cloacae complex Amikacin AMANDA <=16: Sensitive    Comment: SUSCEPTIBLE   Enterobacter cloacae complex Aztreonam AMANDA <=4: Sensitive    Comment: SUSCEPTIBLE   Enterobacter cloacae complex Cefuroxime AMANDA 8: Sensitive    Comment: SUSCEPTIBLE   Enterobacter cloacae complex Ciprofloxacin AMANDA <=0.25: Sensitive    Comment: SUSCEPTIBLE   Enterobacter cloacae complex Ertapenem AMANDA <=0.5: Sensitive    Comment: SUSCEPTIBLE   Enterobacter cloacae complex Gentamicin AMANDA <=2: Sensitive    Comment: SUSCEPTIBLE   Enterobacter cloacae complex Imipenem AMANDA 2: Intermediate    Comment: INTERMEDIATE   Enterobacter cloacae complex Levofloxacin AMANDA <=0.5: Sensitive    Comment: SUSCEPTIBLE   Enterobacter cloacae complex Meropenem AMANDA <=1: Sensitive    Comment: SUSCEPTIBLE   Enterobacter cloacae complex Piperacillin + Tazobactam AMANDA <=8: Sensitive    Comment: SUSCEPTIBLE   Enterobacter cloacae complex Tetracycline AMANDA <=4: Sensitive    Comment: SUSCEPTIBLE   Enterobacter cloacae complex Tobramycin AMANDA <=2: Sensitive    Comment: SUSCEPTIBLE   Enterobacter cloacae complex Moxifloxacin AMANDA <=2: Sensitive    Comment: SUSCEPTIBLE   Enterobacter cloacae complex Trimethoprim + Sulfamethoxazole AMANDA <=0.5/9.5: Sensitive    Comment: SUSCEPTIBLE           Assessment:  Active Problems: Moderate malnutrition (HCC)    Aspiration into airway    Oropharyngeal dysphagia    Vocal cord paralysis    Personal history of COVID-19    Chest congestion    Pulmonary infiltrates    Hypochromic microcytic anemia    Hyperglycemia    Diastolic dysfunction    Pulmonary HTN (HCC)    Leukocytosis  Resolved Problems:    * No resolved hospital problems.  *          Plan:   · Oxygen supplementation to keep saturation reading 9094% only  · Please titrate the oxygen as per the above parameters  · Patient is a hospitalization at [facility] and select LTAC along with interventions and labs reviewed  · Patient had ARDS secondary to 2019 COVID infection and patient had prolonged intubation and mechanical vent support along with that patient had tracheostomy and PEG tube placement-patient has been equalized and also 2 days back the PEG tube was removed as per patient and family  · Clinically patient appears to have oropharyngeal dysphagia along with that patient appears to be aspirating into the airways clinically and patient also has abnormal x-ray chest which may be secondary to some visible infiltrate secondary to the COVID-19 infection but also secondary to aspiration in the airways which may be attributing to patient's clinical status and complexity  · Patient has been evaluated by ENT and was found to have vocal cord paralysis and rhinoplasty has been advised  · Patient is being evaluated and managed by speech therapist and patient undergoes speech exercises every day  · Patient does have COVID fog and is possible that patient may be likely aspirating at times  · Patient has history of Enterobacter cloacae complex infection and has had 2 sputum cultures which were positive for that in the past  · Patient urinalysis done today was negative for any indication of a UTI  · On the basis of previous cultures, will start patient on ciprofloxacin-no IV access available at this time and the bioavailability of ciprofloxacin is reasonable-we will start with oral ciprofloxacin but if patient is not responsive to that then patient may require IV medications patient was discussed with nursing  · Patient and family were also told about the clinical status and the x-ray findings and bronchoscopy is an option both for diagnostic and therapeutic purposes-patient and family wants to defer it for now and can reassess if need be later in the week  · Bronchodilators to continue  · No need for any systemic steroids  · Patient has been started on Lasix and Aldactone for pulmonary hypertension and diastolic dysfunction  · Monitor input output and BMP  · Correct electrolytes on bilevel x-ray basis  · Lantus insulin as below blood glucose monitoring as per primary team  · BGM with SSI  · Synthroid as per TSH as per primary team  · Consider iron supplementation if deemed appropriate  · PUD and DVT prophylaxis as per primary team    Case discussed with patient, family and nursing      Electronically signed by:  Robles Major MD    1/18/2022    6:43 PM.

## 2022-01-18 NOTE — PROGRESS NOTES
Occupational Therapy  Pt on hold for therapy this date 2/2 fever and not feeling well. Pt on hold for the remainder of this date per MD. 60 minutes missed.     Dusty Lilly OTR/L

## 2022-01-18 NOTE — PROGRESS NOTES
Physical Therapy  Facility/Department: [facility]  Daily Treatment Note  NAME: Taras Mcmahan  : 1955  MRN: [id number]    Date of Service: 2022    Discharge Recommendations:  Patient would benefit from continued therapy after discharge,24 hour supervision or assist        Assessment    Pt seen for split session transfer train with FWW with variable assist from SBA to HELEN with FWW with decreased indep and safety awareness noted with fatigue. Pt able to tolerate variable walking distances up to 76 feet with SBA and WC follow on O2 via nc. Pt progressed to 6x4 inch steps with assist of 1 with bilateral rails with cues for  Advancing UE on rails and for step to gait pattern. Pt completing LE therex with cues and set up assist and demo indep in bed mobility. Pt incontinent of urine first session with patient continent during second session. PT will need 24 hour care upond DC largely due to impaired cogintions affect on pt safety with mobility. Patient Diagnosis(es): There were no encounter diagnoses. has a past medical history of Anxiety disorder, Chronic pain syndrome, COVID-19, DDD (degenerative disc disease), lumbar, Diabetes ([address] Displacement of lumbar intervertebral disc without myelopathy, Diverticulitis, Fibromyalgia, Generalized osteoarthrosis, involving multiple sites, GERD (gastroesophageal reflux disease), Impacted cerumen, Influenza A, Irritable bowel syndrome, Other and unspecified hyperlipidemia, Prosthetic joint implant failure ([address] Screening mammogram, Tracheobronchomalacia, Unspecified asthma(493.90), Unspecified essential hypertension, and Unspecified hypothyroidism. has a past surgical history that includes Hysterectomy; joint replacement (10/92); joint replacement (  ); joint replacement (  ); joint replacement (  ); Colonoscopy (); Lung surgery (2014); Cardiac catheterization; Abdomen surgery; Colon surgery;  Upper gastrointestinal endoscopy (N/A, 10/25/2021); and tracheostomy (N/A, 10/25/2021). Restrictions  Restrictions/Precautions  Restrictions/Precautions: Fall Risk,General Precautions  Position Activity Restriction  Other position/activity restrictions: L PICC, Notify physician for pulse less than 50 or greater than 120, respiratory rate less than 12 or greater than 25, oral temperature greater than 101.3 F (38.5 C) , urinary output less than 120 mL in four hours, systolic BP less than 90 or greater than 210, diastolic BP less than 50 or greater than 100. 3L of O2  Subjective   Subjective  Subjective: Pt reports she fx her pelvis this past August 2021 from fall in [place] where she reports her shoulder was injured. Pain Assessment  Pain Assessment: 0-10  Pain Level: 8  Pain Type: Acute pain  Pain Location: Shoulder  Pain Orientation: Right  Pain Descriptors: Aching  Functional Pain Assessment: Prevents or interferes some active activities and ADLs  Vital Signs  Pulse: 90  BP: 139/63  BP Location: Right upper arm  Patient Position: Sitting  Oxygen Therapy  SpO2: 95 %  O2 Device: Nasal cannula  FiO2 : 95 %  O2 Flow Rate (L/min): 3.5 L/min       Orientation  Orientation  Overall Orientation Status: Within Normal Limits  Cognition  Needs reminders for tasks for redirection as pt able to currently follow 1 step instruction but needs redirection for greater than 1 step. Objective    Sup>sit indep. Needs extra time seated at eob due to report of transient dizziness. Pt needed change of clothes when starting therapy due to urinary incontinence with adult undergarment for incontinence soiled and pt not wearing pants or sock. With set up assist pt needed assist to thread LE into pants, pt able to remove soiled undergarment and don new one with threading of LE into adult incontinence brief, set up assist for threading O2 tubing through shirt. PT set up assist only for tiffanie care and washing hands.   PT appears easily overwhelmed tearful and asking for redirection for task with dressing and toileting tasks. Transfers  Sit to Stand: Modified independent (FWW, slow rate)  Stand to sit: Modified independent (slwo rate FWW)  Bed to Chair: Modified independent (slow rate FWW)  Stand Pivot Transfers: Modified independent  Ambulation  Ambulation?: Yes  More Ambulation?: Yes  Ambulation 1  Surface: level tile  Device: Rolling Walker  Other Apparatus: O2  Assistance: Stand by assistance  Quality of Gait: FWW, cues to manage O2 tubing , slow rate, cues for attending to O2 tubing and to walk closer distance to FWW. WC follow needed due to variable fatigue with reported Sob/fatigue limiting O2  Gait Deviations: Slow Marisol; Increased RONAN; Decreased step height;Decreased step length;Shuffles WB heavily on UE   Distance: 76 feet, 60 feet  Comments: PT requires brief seated rest after first walk due to hypoxia to 86% with reboudn within 1 minute with DBE, after second walk pt needed to sit urgently due to sob and LE weakness with patient cued for deep breathing. At end of session pt up to chair on wall mount O2 alarm inplace SpO2 97%, HR 82bpm.  Stairs  # Steps : 6 (x2)  Rails: Bilateral  with bilateral rails with cues for  Advancing UE on rails and for step to gait pattern. Device: No Device (WC at bottom of steps)  Assistance: Contact guard assistance  Comment: cued for 1 step at a time, steadying assist wtih point of contact at gait belt and shoulder. post activity desaturation to 86% wtih rebound to 98% in 1 minute  Neuromuscular Education  Neuromuscular Comments: dyanmic standing balance with pt alternating between standing and  side step to targets on floor  lateral to feet  with pt tolerateing 5 minuts standing before needing seated rest due to so band fatigue wtih spO2 86% with rebound to 94%, HR 85bpm  after 1 minute seated rest               Comment: toileting: indep in pericare, needed min assist and set up for donning pants/undergarments indep to doff. SECOND SESSION:  Pt on wall mount SpO2 3.5 L via nc during session. Subjective:  Pain in right  Shoulder 8/10. Pt reports nausea with pt tearful stating she, \"just doesn't feel good. \" Notified nursing and pt medicated for nausea during session. THEREX:  Needs cues, set up assist and intermittent redirection to task. Ankle pumps resisted pf green x30 BLE   Gluteal isometrics x10 seated  Overhead elevation with deep breathing to improve IC and chest wall movement x5 limited RUE AROM due to shoulder pain   Heel slides green band 2x15 seated BLE  LAQ seated x3# 2x15  Seated abduction x15 BLE     TRANSFERS: With fatigue noted decreased safety with trnasfer requiring cues and SBA   Sit<>stand with HELEN to SBA  from Chair with FWW and SBA from toilet with grab bar in bathroom and FWW as with fatigue pt moving quickly and attempts to sit down without looking for alignment to chair and needs cues. Michelle Colder   Chair>bed FWW set up assist to get walker/SBA . GAIT:  Patient walks in and out of bathroom from chair <>toilet 15 feet x2 SB with cues and assist to manage O2 tubing SBA. O2 sat 88% after toileting with rebound to 95% within 1 minute seated rest.   Gait Deviations: Slow Marisol; Increased RONAN; Decreased step height;Decreased step length;Shuffles WB heavily on UE   Education:   Educated patient in attending to O2 tubing and DBE/PLB during mobility with fading assist as able with mobility with patient verbalizing understanding/needing redirection to tasks intermittently requiring additional education to achieve goals    Disposition:Patient with call light in reach and alarm in place at end of session with patient in bed as pt requested return to bed       at end of session SpO2 seated 98% on 3L via nc and HR 91bpm        Goals  Short term goals  Time Frame for Short term goals: 1/7/2022  Short term goal 1: Pt demo indep bed mobility.  -- 1/08: GOAL PARTIALLY MET MI with HOB elevated and use of BR.  1/10/2022 GOAL met pt demo indep in bed mobility sup<>sit and rolling. Short term goal 2: Pt demo bed<>chair with mod assist of 1 and FWW or SPT. -- 1/08: GOAL MET Lisa/modA with RW  Short term goal 3: Patient demonstrates gait to bathroom 20 feet with assist of 2 and FWW. GOAL MET 1/6/2021 pt demo gait 35 to 60 feet with FWW and assist of 2. Short term goal 4: Patient demonstrates up and down 1 step with bilateral rails and assist of 2. -- 1/08: DNT d/t safety 1/12/2022 GOAL Met  pt demo up and down 1 step bilateral rails CGA. Short term goal 5: Patient demonstrates  indep in DBE and 5 minutes of standing activity with spO2 90% or greater to improve endurance for household walking. -- 1/08: GOAL NOT MET, limited by standing tolerance. Goal partially met patient able to perform dynamic standing balance activity interspersed wtih standing for 5 minutes with spO2 86% post activity on3.5L O2 via nc rebound to O2 in 95% wtihin 1 minute seated rest.  Long term goals  Time Frame for Long term goals : 1/24/2022  Long term goal 1: Patient demonstrates gait 150 feet with SBA and sPO2 90% or greater able to manage O2 tubing on L/R turns on carpet up to 10 feetwith  safe to assist pt. GOAL partially met 1/13/2021 patient demo ability to safely assist pt with FWW gait 5 feet. Long term goal 2: Patient demonstrates up and down 12 steps with SBA and bilateral rails to return to community and home (egress/entry) mobility with  safe to assist pt. Long term goal 3: Patient demonstrates indep bed<>chair, sit<>stand and WC<>car with FWW  Long term goal 4: Patient demonstrates indep WC propulsion 150 feet with L/R turns indep. Long term goal 5: Patient demonstrates 50 feet of walking with L/R turns and carpet HELEN able to indep manage O2 tubing for short distance in home walking. Patient Goals   Patient goals : \"to walk to the bathroom.     Plan    Plan  Times per week: 5/7 days week  Times per day: Daily  Plan weeks: 21 days  Specific instructions for Next Treatment: Progress mobility as tolerated  Current Treatment Recommendations: Strengthening,Neuromuscular Re-education,Home Exercise Program,Cognitive/Perceptual Training,Safety Education & Training,Balance Training,Endurance Training,Patient/Caregiver Education & Training,Functional Mobility Training,Wheelchair Mobility Training,Gait Training,Transfer Training,Stair training,Equipment Evaluation, Education, & procurement  Safety Devices  Type of devices:  All fall risk precautions in place,Nurse notified,Call light within reach,Gait belt,Patient at risk for falls,Left in chair,Chair alarm in place     Therapy Time   Individual Concurrent Group Co-treatment   Time In 0905         Time Out 1005         Minutes 60         Timed Code Treatment Minutes: [address] Time:   Individual Concurrent Group Co-treatment   Time In 9865         Time Out 1105         Minutes 30           Timed Code Treatment Minutes:  30    Total Treatment Minutes:  90      Berny Olsen, PT

## 2022-01-19 LAB
GLUCOSE BLD-MCNC: 119 MG/DL (ref 70–99)
GLUCOSE BLD-MCNC: 140 MG/DL (ref 70–99)
GLUCOSE BLD-MCNC: 143 MG/DL (ref 70–99)
GLUCOSE BLD-MCNC: 99 MG/DL (ref 70–99)
PERFORMED ON: ABNORMAL
PERFORMED ON: NORMAL

## 2022-01-19 PROCEDURE — 6360000002 HC RX W HCPCS: Performed by: PHYSICAL MEDICINE & REHABILITATION

## 2022-01-19 PROCEDURE — 97116 GAIT TRAINING THERAPY: CPT

## 2022-01-19 PROCEDURE — 97530 THERAPEUTIC ACTIVITIES: CPT

## 2022-01-19 PROCEDURE — 6370000000 HC RX 637 (ALT 250 FOR IP): Performed by: INTERNAL MEDICINE

## 2022-01-19 PROCEDURE — 1280000000 HC REHAB R&B

## 2022-01-19 PROCEDURE — 97110 THERAPEUTIC EXERCISES: CPT

## 2022-01-19 PROCEDURE — 99232 SBSQ HOSP IP/OBS MODERATE 35: CPT | Performed by: INTERNAL MEDICINE

## 2022-01-19 PROCEDURE — 97129 THER IVNTJ 1ST 15 MIN: CPT

## 2022-01-19 PROCEDURE — 92526 ORAL FUNCTION THERAPY: CPT

## 2022-01-19 PROCEDURE — 94761 N-INVAS EAR/PLS OXIMETRY MLT: CPT

## 2022-01-19 PROCEDURE — 6370000000 HC RX 637 (ALT 250 FOR IP): Performed by: PHYSICAL MEDICINE & REHABILITATION

## 2022-01-19 PROCEDURE — 94640 AIRWAY INHALATION TREATMENT: CPT

## 2022-01-19 PROCEDURE — 2700000000 HC OXYGEN THERAPY PER DAY

## 2022-01-19 PROCEDURE — 92507 TX SP LANG VOICE COMM INDIV: CPT

## 2022-01-19 PROCEDURE — 97535 SELF CARE MNGMENT TRAINING: CPT

## 2022-01-19 PROCEDURE — 97130 THER IVNTJ EA ADDL 15 MIN: CPT

## 2022-01-19 RX ADMIN — ENOXAPARIN SODIUM 40 MG: 40 INJECTION SUBCUTANEOUS at 07:43

## 2022-01-19 RX ADMIN — Medication 15 ML: at 19:45

## 2022-01-19 RX ADMIN — OXYCODONE 10 MG: 5 TABLET ORAL at 10:00

## 2022-01-19 RX ADMIN — METOCLOPRAMIDE 5 MG: 10 TABLET ORAL at 19:43

## 2022-01-19 RX ADMIN — SPIRONOLACTONE 25 MG: 25 TABLET, FILM COATED ORAL at 07:43

## 2022-01-19 RX ADMIN — ROPINIROLE HYDROCHLORIDE 0.5 MG: 0.5 TABLET, FILM COATED ORAL at 07:43

## 2022-01-19 RX ADMIN — IPRATROPIUM BROMIDE AND ALBUTEROL SULFATE 1 AMPULE: .5; 2.5 SOLUTION RESPIRATORY (INHALATION) at 20:39

## 2022-01-19 RX ADMIN — CIPROFLOXACIN 500 MG: 500 TABLET, FILM COATED ORAL at 07:42

## 2022-01-19 RX ADMIN — Medication 400 MG: at 07:43

## 2022-01-19 RX ADMIN — INSULIN LISPRO 1 UNITS: 100 INJECTION, SOLUTION INTRAVENOUS; SUBCUTANEOUS at 19:49

## 2022-01-19 RX ADMIN — ROPINIROLE HYDROCHLORIDE 0.5 MG: 0.5 TABLET, FILM COATED ORAL at 19:43

## 2022-01-19 RX ADMIN — LORAZEPAM 0.5 MG: 0.5 TABLET ORAL at 10:00

## 2022-01-19 RX ADMIN — CIPROFLOXACIN 500 MG: 500 TABLET, FILM COATED ORAL at 19:44

## 2022-01-19 RX ADMIN — LORAZEPAM 0.5 MG: 0.5 TABLET ORAL at 19:43

## 2022-01-19 RX ADMIN — BUDESONIDE 500 MCG: 0.5 SUSPENSION RESPIRATORY (INHALATION) at 07:23

## 2022-01-19 RX ADMIN — OXYCODONE 10 MG: 5 TABLET ORAL at 19:44

## 2022-01-19 RX ADMIN — METOPROLOL 25 MG: 25 TABLET ORAL at 07:42

## 2022-01-19 RX ADMIN — Medication 15 ML: at 07:42

## 2022-01-19 RX ADMIN — DICYCLOMINE HYDROCHLORIDE 20 MG: 20 TABLET ORAL at 11:34

## 2022-01-19 RX ADMIN — LORAZEPAM 0.5 MG: 0.5 TABLET ORAL at 15:18

## 2022-01-19 RX ADMIN — ROPINIROLE HYDROCHLORIDE 0.5 MG: 0.5 TABLET, FILM COATED ORAL at 15:18

## 2022-01-19 RX ADMIN — METOCLOPRAMIDE 5 MG: 10 TABLET ORAL at 07:42

## 2022-01-19 RX ADMIN — INSULIN LISPRO 2 UNITS: 100 INJECTION, SOLUTION INTRAVENOUS; SUBCUTANEOUS at 16:23

## 2022-01-19 RX ADMIN — LEVOTHYROXINE SODIUM 100 MCG: 0.1 TABLET ORAL at 05:29

## 2022-01-19 RX ADMIN — FUROSEMIDE 40 MG: 40 TABLET ORAL at 07:42

## 2022-01-19 RX ADMIN — TRAZODONE HYDROCHLORIDE 50 MG: 50 TABLET ORAL at 19:48

## 2022-01-19 RX ADMIN — DICYCLOMINE HYDROCHLORIDE 20 MG: 20 TABLET ORAL at 16:23

## 2022-01-19 RX ADMIN — MULTIPLE VITAMINS W/ MINERALS TAB 1 TABLET: TAB at 07:42

## 2022-01-19 RX ADMIN — INSULIN GLARGINE 7 UNITS: 100 INJECTION, SOLUTION SUBCUTANEOUS at 19:49

## 2022-01-19 RX ADMIN — METOPROLOL 25 MG: 25 TABLET ORAL at 19:44

## 2022-01-19 RX ADMIN — OXYCODONE 10 MG: 5 TABLET ORAL at 15:17

## 2022-01-19 RX ADMIN — IPRATROPIUM BROMIDE AND ALBUTEROL SULFATE 1 AMPULE: .5; 2.5 SOLUTION RESPIRATORY (INHALATION) at 07:22

## 2022-01-19 RX ADMIN — DICYCLOMINE HYDROCHLORIDE 20 MG: 20 TABLET ORAL at 19:44

## 2022-01-19 RX ADMIN — DICYCLOMINE HYDROCHLORIDE 20 MG: 20 TABLET ORAL at 05:29

## 2022-01-19 RX ADMIN — BUDESONIDE 500 MCG: 0.5 SUSPENSION RESPIRATORY (INHALATION) at 20:39

## 2022-01-19 RX ADMIN — ASPIRIN 81 MG 81 MG: 81 TABLET ORAL at 07:44

## 2022-01-19 RX ADMIN — OXYCODONE 5 MG: 5 TABLET ORAL at 05:29

## 2022-01-19 ASSESSMENT — PAIN DESCRIPTION - LOCATION
LOCATION: SHOULDER
LOCATION: SHOULDER
LOCATION: SHOULDER;ABDOMEN
LOCATION: SHOULDER

## 2022-01-19 ASSESSMENT — PAIN DESCRIPTION - PAIN TYPE
TYPE: CHRONIC PAIN

## 2022-01-19 ASSESSMENT — PAIN DESCRIPTION - FREQUENCY
FREQUENCY: CONTINUOUS

## 2022-01-19 ASSESSMENT — PAIN DESCRIPTION - PROGRESSION
CLINICAL_PROGRESSION: NOT CHANGED
CLINICAL_PROGRESSION: NOT CHANGED

## 2022-01-19 ASSESSMENT — PAIN DESCRIPTION - ONSET
ONSET: ON-GOING
ONSET: ON-GOING

## 2022-01-19 ASSESSMENT — PAIN DESCRIPTION - ORIENTATION
ORIENTATION: RIGHT

## 2022-01-19 ASSESSMENT — PAIN SCALES - GENERAL
PAINLEVEL_OUTOF10: 5
PAINLEVEL_OUTOF10: 5
PAINLEVEL_OUTOF10: 8
PAINLEVEL_OUTOF10: 8
PAINLEVEL_OUTOF10: 4
PAINLEVEL_OUTOF10: 8

## 2022-01-19 ASSESSMENT — PAIN - FUNCTIONAL ASSESSMENT: PAIN_FUNCTIONAL_ASSESSMENT: PREVENTS OR INTERFERES SOME ACTIVE ACTIVITIES AND ADLS

## 2022-01-19 ASSESSMENT — PAIN DESCRIPTION - DESCRIPTORS
DESCRIPTORS: CONSTANT;ACHING;DISCOMFORT
DESCRIPTORS: ACHING
DESCRIPTORS: CONSTANT;ACHING;DISCOMFORT
DESCRIPTORS: ACHING
DESCRIPTORS: ACHING

## 2022-01-19 NOTE — PROGRESS NOTES
[id number] [facility]  1/19/2022  [id number]    Chief Complaint: weakness  Subjective:   Resting in bed; appreciate pulm input. ROS: No n/v, cp, sob, f/c  Objective:  Patient Vitals for the past 24 hrs:   BP Temp Temp src Pulse Resp SpO2   01/19/22 0730 133/68 97.7 °F (36.5 °C) Oral 84 20 97 %   01/19/22 0725      94 %   01/18/22 2225    76     01/18/22 2218 125/71        01/18/22 1956     18    01/18/22 1941 99/68 97.2 °F (36.2 °C) Oral 58 18 95 %   01/18/22 1715  98 °F (36.7 °C) Oral      01/18/22 1400  100.4 °F (38 °C) Oral      01/18/22 1242  101.5 °F (38.6 °C) Oral        Gen: No distress, pleasant. HEENT: Normocephalic, atraumatic. CV: Regular rate and rhythm. Resp: No respiratory distress. Abd: Soft, nontender, PEG exit site with minimal drainage to dressing in place. Ext: No edema. Neuro: Alert, oriented, appropriately interactive.    Wt Readings from Last 3 Encounters:   01/12/22 153 lb 1.6 oz (69.4 kg)   10/26/21 175 lb 14.4 oz (79.8 kg)   07/15/21 180 lb (81.6 kg)       Laboratory data:   Lab Results   Component Value Date    WBC 15.1 (H) 01/18/2022    HGB 10.5 (L) 01/18/2022    HCT 33.7 (L) 01/18/2022    MCV 76.8 (L) 01/18/2022     01/18/2022       Lab Results   Component Value Date     01/17/2022    K 3.6 01/17/2022    CL 94 01/17/2022    CO2 33 01/17/2022    BUN 9 01/17/2022    CREATININE <0.5 01/17/2022    GLUCOSE 102 01/17/2022    CALCIUM 9.9 01/17/2022        Therapy progress:  PT  Position Activity Restriction  Other position/activity restrictions: L PICC, Notify physician for pulse less than 50 or greater than 120, respiratory rate less than 12 or greater than 25, oral temperature greater than 101.3 F (38.5 C) , urinary output less than 120 mL in four hours, systolic BP less than 90 or greater than 450, diastolic BP less than 50 or greater than 100. 3L of O2  Objective     Sit to Stand: Modified independent (FWW, slow rate)  Stand to sit: Modified independent (slwo rate FWW)  Bed to Chair: Modified independent (slow rate FWW)  Device: Rolling Walker  Other Apparatus: O2  Assistance: Stand by assistance  Distance: 76 feet, 60 feet  OT  PT Equipment Recommendations  Equipment Needed: No  Other: has needed equpiment  Toilet - Technique: Ambulating  Equipment Used: Drop-arm extra wide bedside commode  Toilet Transfers Comments: BSC over toilet  Assessment        SLP  Current Diet : Regular  Current Liquid Diet : Thin (via provale cup)  Diet Solids Recommendation: Regular  Liquid Consistency Recommendation: Thin    Body mass index is 22.61 kg/m². Rehabilitation Diagnosis:  Neurologic, 3.8, Neuromuscular Disorders, e.g. Critical Illness Myopathy, Other Myopathy     Assessment and Plan:  S/p covid pneumonia with superimposed bacterial pneumonia  - required trach/peg, proning  - s/p remdesivir, tocilizumab  - completed cefepime     CAD  - asa, statin     HTN  - follow BP       DM  - lantus, SSI     Hypothyroidism  - synthroid    Leukocytosis  - resolved  - completed cefepime;  - repeat culture + for low growth enterococcus; completed 1 week macrobid.     Reduced L TVF mobility per FEES  - slp    Dysphagia (pharyngeal), dysphonia  - slp    S/p trach  - stoma care    Bowels: Schedule stool softener. Follow bowel movements. Enema or suppository if needed.      Bladder: Check PVR x 3. [address] if PVR > 200ml or if any volume is > 500 ml.      Sleep: Trazodone provided prn.      Follow up appointments: PCP  GENEVA: 1/22 home w/ home health  DME: [address] [facility] wheeled walker, transport chair    Interdisciplinary team conference was held today with entire rehab treatment team including PT, OT, SLP, Dietician, RN, and SW. Discussion focused on progress toward rehab goals and discharge planning. Barriers: weakness, balance, endurance. Total treatment time >35 min with greater than 50% spent in care coordination.      Electronically signed by Paul Mckenna MD on 1/19/2022 at 10:06 AM

## 2022-01-19 NOTE — CONSULTS
PULM/CCM Note    Please see the consult done earlier    [facility] SUSIE Candelaria MD

## 2022-01-19 NOTE — PROGRESS NOTES
Speech Language Pathology  [facility]: ACUTE REHAB UNIT  SPEECH-LANGUAGE PATHOLOGY      [x] Daily  [] Weekly Care Conference Note  [] Discharge    Patient:Esperanza Betancourt      WYK:[birth year]  [facility]:[id number]  Rehab Dx/Hx: Post covid-19 condition, unspecified [U09.9]    Precautions: falls and aspirations  Home situation: lives at home with , dtr, two grandchildren; not an active ; manages own meds; shares household tasks with ,  manages finances   ST Dx: [] Aphasia  [] Dysarthria  [] Apraxia   [x] Oropharyngeal dysphagia [x] Cognitive Impairment  [x] Other: voice tx   Date of Admit: 1/3/2022  Room #: 0151/0151-01    Current functional status (updated daily):         Pt being seen for : [x] Speech/Language Treatment  [x] Dysphagia Treatment [x] Cognitive Treatment  [] Other:  Communication: []WFL  [] Aphasia  [] Dysarthria  [] Apraxia  [] Pragmatic Impairment [] Non-verbal  [] Hearing Loss  [x] Other: dysphonia  Cognition: [] WFL  [x] Mild  [x] Moderate  [] Severe [] Unable to Assess  [] Other:  Memory: [] WFL  [x] Mild  [x] Moderate  [] Severe [] Unable to Assess  [] Other:  Behavior: [x] Alert  [x] Cooperative  [x]  Pleasant  [] Confused  [] Agitated  [] Uncooperative  [] Distractible [] Motivated  [] Self-Limiting [] Anxious  [] Other:  Endurance:  [x] Adequate for participation in SLP sessions  [] Reduced overall  [] Lethargic  [] Other:  Safety: [x] No concerns at this time  [] Reduced insight into deficits  []  Reduced safety awareness [] Not following call light procedures  [] Unable to Assess  [] Other:    Current Diet Order:ADULT DIET; Regular  ADULT ORAL NUTRITION SUPPLEMENT; Lunch, Dinner; Low Calorie/High Protein Oral Supplement  Swallowing Precautions: Alternate solids and liquids;Eat/Feed slowly; No straws;Upright as possible for all oral intake;Remain upright for 30-45 minutes after meals;Small bites/sips    Date: 1/19/2022      Tx session 1  1030 - 1130 Tx session 2  All tx needs met in session 1   Total Timed Code Min 30 0   Total Treatment Minutes 60 60   Individual Treatment Minutes 60 60   Group Treatment Minutes 0 0   Co-Treat Minutes 0 0   Variance/Reason:  0    Pain None reported Pt denied   Pain Intervention [] RN notified  [] Repositioned  [] Intervention offered and patient declined  [x] N/A  [] Other: [] RN notified  [] Repositioned  [] Intervention offered and patient declined  [x] N/A  [] Other:   Subjective     Pt alert and cooperative, agreeable to tx. Pt upright in bedside chair for session. Objective:  Goals     Dysphagia Goals:  Short-term Goals  Timeframe for Short-term Goals: 10 days (01/13/22)    Goal 1: The patient will complete pharyngeal/laryngeal strengthening exercises for improved swallowing function 10/10 given min cues. Pt completed the following exercises given min-mod cues  -effortful swallow: x10  -jaw jut and hold 5 sec: x10  --Isometric tongue tip hold for 5 secs: x10  -resistive jaw opening: x10  -mare maneuver: x10     Goal 2. The patient will tolerate recommended diet without observed clinical signs of aspiration. Thin via cup    -pt with intermittent post-swallow coughing   -pt refusing to use provale cup due to wanting to put tea in it from , rather than the water     Ice chips   -pt with good mastication on ice chips  -occ post-swallow cough       Goal 3. The patient/caregiver will demonstrate understanding of compensatory strategies for improved swallowing safety. SLP edu pt re: small, single sips, use of provale cup. Pt refusing to use provale cup this date due to wanting iced tea in it (her  brings - he was not there yet during session). Goal 4. The patient will tolerate thin liquids without signs and symptoms of aspiration 10/10 via cup.    Thin via cup  -pt with intermittent post-swallow coughing  -continue to recommend use of provale cup     Speech/Lang/Cog goals:  Short-term Goals  Timeframe for Short-term Goals: 14 days (01/17/22)    Goal 1: Pt will complete recall tasks with 80% acc given min cues for use of compensatory strategies. Pt indep used notepad to write down a reminder for her . Goal 2: Pt will complete executive function tasks (money, math, time, etc) with 80% acc given min cues. Yes/No Questions re: Quantity (money, time, measurement)   -e.g. will 3 dimes equal 6 nickels?   -pt completed with 88% acc indep; improved to 100% acc given min cues       Goal 3: Pt will complete verbal and visual reasoning tasks with 80% acc given min cues. Word Deduction Task   -pt given 3 clues that describe an item; asked to name the item being described  -e.g. smell, nostrils, face = nose  -pt completed with 65% acc indep, improved to 95% acc given mod cues       Goal 4: Pt will complete problem solving and thought organization tasks with 80% acc given min cues. Sequencing the steps  -pt provided with 4 steps, asked to sequence in the correct order  -pt completed with 100% acc indep     Goal 5: Pt will complete vocal strengthening exercises for improved vocal function 10/10 given min cues. Cup bubble (put a straw into glass of water and blow for as long as able):   -pt completed 5 trials, averaging 4.1 seconds    Diaphragmatic breathing exercise: x20     Sustained \"ah\"  -pt completed 5 trials, averaging 1.2 seconds    Sustained \"ee\"   -pt completed 5 trials, averaging 1.2 seconds      Other areas targeted: N/A    Education:   SLP edu pt re: recall strategy for writing it down, rationale of swallow and voice exercises, reasoning strategies    Safety Devices: [x] Call light within reach  [x] Chair alarm activated  [] Bed alarm activated  [] Other:  [x] Call light within reach  [x] Chair alarm activated  [] Bed alarm activated  [] Other:     Pt alert and cooperative, agreeable to tx. Pt completed pharyngeal/laryngeal strengthening exercises given min-mod cues.  Pt not using provale cup - refusing this date due to wanting iced tea from her  in it, not water. Pt with intermittent post-swallow coughing with thin cup. Continue to recommend use of provale cup. Pt did write down reminder to ask  to buy another. Pt with continued difficulty sustaining /ah/ and /ee/, but completed along with cup bubble voice exercises. Pt did well completing cognitive tasks this date. Indep sequenced 4-step written tasks. Pt benefited from min cues for quantity related questions (time, money, measurement), but mod cues for a word deduction task using reasoning abilities. Pt will continue to require 24 hr supervision at d/c. Continue goals above. Plan: Continue as per plan of care. Additional Information:     Barriers toward progress: Confusion and Cognitive deficit  Discharge recommendations:  [] Home independently  [] Home with assistance [x]  24 hour supervision  [] ECF [] Other:  Continued Tx Upon Discharge: ? [x] Yes [] No [] TBD based on progress while on ARU [] Vital Stim indicated [] Other:   Estimated discharge date: 01/21/22    Interventions used this date:  [x] Speech/Language Treatment  [] Instruction in HEP [] Group [x] Dysphagia Treatment [x] Cognitive Treatment   [] Other:       Total Time Breakdown / Charges    Time in Time out Total Time / units   Cognitive Tx 1100 1130 30 min / 2 units    Speech Tx 1050 1100 10 min / 1 unit    Dysphagia Tx 1030 1050 20 min / 1 unit        Electronically Signed by    Olvin Flores MA CCC-SLP #[id number]  Speech Language Pathologist

## 2022-01-19 NOTE — PROGRESS NOTES
Physical Therapy  Facility/Department: [facility]  Daily Treatment Note  NAME: Shahab Rose  : 1955  MRN: [id number]    Date of Service: 2022    Discharge Recommendations:  Patient would benefit from continued therapy after discharge,24 hour supervision or assist        Assessment  Patient sen by Dr. Selina Weir 2022 for pulmonology  Consult with  pulmonology  With note evening of 2022. CXR 2022, \"Asymmetric airspace opacities are relatively stable from prior comparison study. Pulmonary edema, recurrent infection or chronic interstitial changemay be considered. \"with patient started on oral antibiotics. Please refer to Pulmonology note dated 2022. Patient seen for pm PT session with patient present with patient in gym on 4L O2 via nc. Pt progressed to max total walking of 279 feet with FW follow for O2 line management and WC follow as pt with range of walking from 59 feet to 78 feet with FWW. Patient  with O2 saturation 94% or greater during session immediate post walking. Pt notes her LE fatigue limits her walking. Patient given dual tasking with walking with visual scanning  For  [employer] on the unit with naming with patient needing minimal cues to complete and recalling seeing 2 pictures of pigeons (correctly) on the unit. Pt demonstrates SBA to HELEN with transfers this date from [facility] or bed bed and multiple (5x) sit<>stand from [facility] with FWW with need for SBA x2 trials as pt was not aligned to chair and attempted to sit before [facility] brakes engaged and patient aligned to chair ( needed vcs for safety) . Pt demonstrated HELEN with WC propulsion with l/r turns 180 feetMODI. Post activity pt requested return to bed  with post activity SpO2 95% and HR 73 bpm supine in bed. Pt requested return to bed due to fatigue with request by patient for  LE exercises in bed.   Recommended timed toileting to patient and family as pt appears unaware of need to empty bladder across sessions with patient's  noting he will take patient frequently when they get home. Patient  offered toileting during session x2 with pt declining. Recommended pt up for all meals sitting in chair to facilitate safety with swallowing with pt's  noting she will be out of bed for home at meals. Patient performs LE therex with set up assist and only need for 1 redirection with task. Patient Diagnosis(es): There were no encounter diagnoses. has a past medical history of Anxiety disorder, Chronic pain syndrome, COVID-19, DDD (degenerative disc disease), lumbar, Diabetes ([address] Displacement of lumbar intervertebral disc without myelopathy, Diverticulitis, Fibromyalgia, Generalized osteoarthrosis, involving multiple sites, GERD (gastroesophageal reflux disease), Hypochromic microcytic anemia, Impacted cerumen, Influenza A, Irritable bowel syndrome, Other and unspecified hyperlipidemia, Prosthetic joint implant failure ([address] Screening mammogram, Tracheobronchomalacia, Unspecified asthma(493.90), Unspecified essential hypertension, and Unspecified hypothyroidism. has a past surgical history that includes Hysterectomy; joint replacement (10/92); joint replacement (  12/92); joint replacement (  6/96); joint replacement (  2006); Colonoscopy (2007); Lung surgery (1/2014); Cardiac catheterization; Abdomen surgery; Colon surgery; Upper gastrointestinal endoscopy (N/A, 10/25/2021); and tracheostomy (N/A, 10/25/2021).     Restrictions  Restrictions/Precautions  Restrictions/Precautions: Fall Risk,General Precautions  Position Activity Restriction  Other position/activity restrictions: Notify physician for pulse less than 50 or greater than 120, respiratory rate less than 12 or greater than 25, oral temperature greater than 101.3 F (38.5 C) , urinary output less than 120 mL in four hours, systolic BP less than 90 or greater than 173, diastolic BP less than 50 or greater than 100. 4L of technique and set up assist requried:   Hip abduction with cues for AROM as comfort determines,   Resisted green band BLE : heel slides 2x15 LLE, 1x15 and 1x7 RLE with right knee pain limiting to 7 reps on last trial.    Resisted BLE ankle DF green band 2x15 BLE   Pt up in bed with call light in reach and bed alarm engaged at end of session on 3.5 L O2 via nc wall mount at end of session. Education:  PT educated in safety for transfers with cues needed as noted above and WC follow with cues for increased swing clearance by flexing BLE at knees as noted. Pt given encouragement and cues across session with  present during session for mobility training and therex. Goals  Short term goals  Time Frame for Short term goals: 1/7/2022  Short term goal 1: Pt demo indep bed mobility. -- 1/08: GOAL PARTIALLY MET MI with HOB elevated and use of BR.  1/10/2022 GOAL met pt demo indep in bed mobility sup<>sit and rolling. Short term goal 2: Pt demo bed<>chair with mod assist of 1 and FWW or SPT. -- 1/08: GOAL MET Lisa/modA with RW  Short term goal 3: Patient demonstrates gait to bathroom 20 feet with assist of 2 and FWW. GOAL MET 1/6/2021 pt demo gait 35 to 60 feet with FWW and assist of 2. Short term goal 4: Patient demonstrates up and down 1 step with bilateral rails and assist of 2. -- 1/08: DNT d/t safety 1/12/2022 GOAL Met  pt demo up and down 1 step bilateral rails CGA. Short term goal 5: Patient demonstrates  indep in DBE and 5 minutes of standing activity with spO2 90% or greater to improve endurance for household walking. -- 1/08: GOAL NOT MET, limited by standing tolerance.   Goal partially met patient able to perform dynamic standing balance activity interspersed wtih standing for 5 minutes with spO2 86% post activity on3.5L O2 via nc rebound to O2 in 95% wtihin 1 minute seated rest.  Long term goals  Time Frame for Long term goals : 1/24/2022  Long term goal 1: Patient demonstrates gait 150 feet

## 2022-01-19 NOTE — PROGRESS NOTES
Tolerance  Activity Tolerance: Patient limited by fatigue;Patient limited by endurance  Activity Tolerance: 96% on 3.5 Lo2 at rest, 69 bpm, 122/64. Patient Diagnosis(es): There were no encounter diagnoses. has a past medical history of Anxiety disorder, Chronic pain syndrome, COVID-19, DDD (degenerative disc disease), lumbar, Diabetes ([address] Displacement of lumbar intervertebral disc without myelopathy, Diverticulitis, Fibromyalgia, Generalized osteoarthrosis, involving multiple sites, GERD (gastroesophageal reflux disease), Hypochromic microcytic anemia, Impacted cerumen, Influenza A, Irritable bowel syndrome, Other and unspecified hyperlipidemia, Prosthetic joint implant failure ([address] Screening mammogram, Tracheobronchomalacia, Unspecified asthma(493.90), Unspecified essential hypertension, and Unspecified hypothyroidism. has a past surgical history that includes Hysterectomy; joint replacement (10/92); joint replacement (  12/92); joint replacement (  6/96); joint replacement (  2006); Colonoscopy (2007); Lung surgery (1/2014); Cardiac catheterization; Abdomen surgery; Colon surgery; Upper gastrointestinal endoscopy (N/A, 10/25/2021); and tracheostomy (N/A, 10/25/2021). Restrictions  Restrictions/Precautions  Restrictions/Precautions: Fall Risk,General Precautions  Position Activity Restriction  Other position/activity restrictions: L PICC, Notify physician for pulse less than 50 or greater than 120, respiratory rate less than 12 or greater than 25, oral temperature greater than 101.3 F (38.5 C) , urinary output less than 120 mL in four hours, systolic BP less than 90 or greater than 422, diastolic BP less than 50 or greater than 100. 3L of O2  Subjective   General  Chart Reviewed: Yes  Response To Previous Treatment: Patient with no complaints from previous session.   Family / Caregiver Present: No  Referring Practitioner: Paul Mckenna MD  Subjective  Subjective: reports 8/10 pain B shoulders  General Comment  Comments: found supine in bed. pleasant and agreeable  Pain Screening  Patient Currently in Pain: Yes  Pain Assessment  Pain Assessment: 0-10  Pain Level: 8  Pain Type: Chronic pain  Pain Location: Shoulder  Pain Orientation: Right  Pain Descriptors: Aching  Non-Pharmaceutical Pain Intervention(s): Ambulation/Increased Activity  Response to Pain Intervention: Patient Satisfied  Vital Signs  Patient Currently in Pain: Yes       Orientation  Orientation  Overall Orientation Status: Within Normal Limits  Cognition      Objective   Bed mobility  Supine to Sit: Modified independent  Sit to Supine: Modified independent  Scooting: Modified independent  Comment: supine <> sit with bed controls and mod ind  Transfers  Sit to Stand: Supervision  Stand to sit: Supervision  Comment: sit to stand EOB to RW x 3 reps with supv. sit to stand chair to RW with supv  Ambulation  Ambulation?: Yes  More Ambulation?: Yes  Ambulation 1  Surface: level tile  Device: Rolling Walker  Other Apparatus: O2 (4Lo2)  Quality of Gait: reciprocal gait pattern with forward trunk flexion, decreased velocity/ step height/length  Distance: 69 ft, 54 ft  Comments: limited by c/o SOB. spo2 decreases to 82%, 83% following bouts of gait respectively, returning to > 90% within 1-2 min seated rest. HR remains 80-85 bpm        Other exercises  Other exercises?: Yes  Other exercises 1: pt completed 2x5 step ups onto 6\" step with BUE support on rails and CGA. pt requires seated rest between bouts due to fatigue, Spo2 measuring 86% between bouts, returns to > 90 % within 1-2 min seated rest.         Goals  Short term goals  Time Frame for Short term goals: 1/7/2022  Short term goal 1: Pt demo indep bed mobility. -- 1/08: GOAL PARTIALLY MET MI with HOB elevated and use of BR.  1/10/2022 GOAL met pt demo indep in bed mobility sup<>sit and rolling. Short term goal 2: Pt demo bed<>chair with mod assist of 1 and FWW or SPT.  -- 1/08: GOAL MET Lisa/modA with RW  Short term goal 3: Patient demonstrates gait to bathroom 20 feet with assist of 2 and FWW. GOAL MET 1/6/2021 pt demo gait 35 to 60 feet with FWW and assist of 2. Short term goal 4: Patient demonstrates up and down 1 step with bilateral rails and assist of 2. -- 1/08: DNT d/t safety 1/12/2022 GOAL Met  pt demo up and down 1 step bilateral rails CGA. Short term goal 5: Patient demonstrates  indep in DBE and 5 minutes of standing activity with spO2 90% or greater to improve endurance for household walking. -- 1/08: GOAL NOT MET, limited by standing tolerance. Goal partially met patient able to perform dynamic standing balance activity interspersed wtih standing for 5 minutes with spO2 86% post activity on3.5L O2 via nc rebound to O2 in 95% wtihin 1 minute seated rest.  Long term goals  Time Frame for Long term goals : 1/24/2022  Long term goal 1: Patient demonstrates gait 150 feet with SBA and sPO2 90% or greater able to manage O2 tubing on L/R turns on carpet up to 10 feetwith  safe to assist pt. GOAL partially met 1/13/2021 patient demo ability to safely assist pt with FWW gait 5 feet. Long term goal 2: Patient demonstrates up and down 12 steps with SBA and bilateral rails to return to community and home (egress/entry) mobility with  safe to assist pt. Long term goal 3: Patient demonstrates indep bed<>chair, sit<>stand and WC<>car with FWW  Long term goal 4: Patient demonstrates indep WC propulsion 150 feet with L/R turns indep. Long term goal 5: Patient demonstrates 50 feet of walking with L/R turns and carpet HELEN able to indep manage O2 tubing for short distance in home walking. Patient Goals   Patient goals : \"to walk to the bathroom.     Plan    Plan  Times per week: 5/7 days week  Times per day: Daily  Plan weeks: 21 days  Specific instructions for Next Treatment: Progress mobility as tolerated  Current Treatment Recommendations: Timoteo Patel Re-education,Home Exercise Program,Cognitive/Perceptual Training,Safety Education & Tod Curb Training,Patient/Caregiver Education & Training,Functional Mobility Training,Wheelchair Mobility Training,Gait Training,Transfer Training,Stair training,Equipment Evaluation, Education, & procurement  Safety Devices  Type of devices:  All fall risk precautions in place,Nurse notified,Call light within reach,Gait belt,Patient at risk for falls,Chair alarm in place,Left in bed,Bed alarm in place     Therapy Time   Individual Concurrent Group Co-treatment   Time In 0800         Time Out 0830         Minutes 30         Timed Code Treatment Minutes: 30 Minutes       Julien Hung, PT

## 2022-01-19 NOTE — PROGRESS NOTES
Occupational Therapy  Facility/Department: [facility]  Daily Treatment Note  NAME: Rosita Kumar  : 1955  MRN: [id number]    Date of Service: 2022    Discharge Recommendations:  24 hour supervision or assist,Home with Home health OT,S Level 1  OT Equipment Recommendations  Equipment Needed: No    Assessment   Performance deficits / Impairments: Decreased functional mobility ; Decreased ADL status; Decreased strength;Decreased safe awareness;Decreased cognition;Decreased endurance;Decreased balance;Decreased coordination;Decreased posture;Decreased fine motor control;Decreased high-level IADLs  Assessment: First Session: Pt agreeable to OT session. Pt performed functional transfers with supervision/SBA with RW and good strength for sit<>stands with supervision. Pt required management of O2 tubing for bathroom mobility but able to manage clothing and ADL tasks without assistance. Pt's O2 sats still dropping with mobility to low 70s but recovering to 90% in ~1 minute. Pt completed static standing at table top up to 4 minutes with good tolerance and balance when reaching out of RONAN. Pt was able to name all animals without cues and O2 sats remained above 90% on 4L at table. Second Session: Pt completed toileting with supervision and O2 sats dropping to low 70s again with mobility in bathroom, recovering to 90%. Pt completed mobility around gym to collect and place cards on vertical velcro board. Pt's O2 sats dropping to 85%, 83%, and 87% respectively on bouts of walking for 1:50, 3:30, and 3:00 respectively. Pt demonstrated good management of O2 tubing with 1 VC during 3 separate tasks. Continue POC. Prognosis: Good  OT Education: OT Role;Plan of Care;ADL Adaptive Strategies;Transfer Training;Energy Conservation;Precautions; Equipment; Family Education  Patient Education: disease specific: goals, PLB, safety during ADLS and t/fs, energy conservation during ADLS, Pt verbalized understanding but will need reinforcement. education on energy conservation during ADLs tasks and increased time required to complete tasks. Barriers to Learning: confusion/anxiety/poor memory  REQUIRES OT FOLLOW UP: Yes  Activity Tolerance  Activity Tolerance: Patient Tolerated treatment well;Patient limited by fatigue;Treatment limited secondary to medical complications (free text)  Activity Tolerance: O2 sats decreasing to low 70s with mobility in room, increasing to 90% on 4L O2 and seated rest break in ~1 minute. O2 sats remaining above 90% with static standing tasks  Safety Devices  Safety Devices in place: Yes  Type of devices: Call light within reach;Gait belt;Nurse notified; Chair alarm in place; Left in chair         Patient Diagnosis(es): There were no encounter diagnoses. has a past medical history of Anxiety disorder, Chronic pain syndrome, COVID-19, DDD (degenerative disc disease), lumbar, Diabetes ([address] Displacement of lumbar intervertebral disc without myelopathy, Diverticulitis, Fibromyalgia, Generalized osteoarthrosis, involving multiple sites, GERD (gastroesophageal reflux disease), Hypochromic microcytic anemia, Impacted cerumen, Influenza A, Irritable bowel syndrome, Other and unspecified hyperlipidemia, Prosthetic joint implant failure ([address] Screening mammogram, Tracheobronchomalacia, Unspecified asthma(493.90), Unspecified essential hypertension, and Unspecified hypothyroidism. has a past surgical history that includes Hysterectomy; joint replacement (10/92); joint replacement (  12/92); joint replacement (  6/96); joint replacement (  2006); Colonoscopy (2007); Lung surgery (1/2014); Cardiac catheterization; Abdomen surgery; Colon surgery; Upper gastrointestinal endoscopy (N/A, 10/25/2021); and tracheostomy (N/A, 10/25/2021).     Restrictions  Restrictions/Precautions  Restrictions/Precautions: Fall Risk,General Precautions  Position Activity Restriction  Other position/activity restrictions: Notify physician for pulse less than 50 or greater than 120, respiratory rate less than 12 or greater than 25, oral temperature greater than 101.3 F (38.5 C) , urinary output less than 120 mL in four hours, systolic BP less than 90 or greater than 063, diastolic BP less than 50 or greater than 100. 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 Pain Assessment  Pain Assessment: 0-10  Pain Level: 8  Pain Type: Chronic pain  Pain Location: Shoulder  Pain Orientation: Right  Pain Descriptors: Aching  Pain Frequency: Continuous  Non-Pharmaceutical Pain Intervention(s): Ambulation/Increased Activity; Emotional support;Repositioned  Response to Pain Intervention: Patient Satisfied  Vital Signs  Temp: 97.7 °F (36.5 °C)  Temp Source: Oral  Pulse: 71  Heart Rate Source: Monitor  BP: 136/76  BP Location: Right upper arm  Patient Position: High fowlers  Patient Currently in Pain: Yes  Oxygen Therapy  SpO2: 97 %  Pulse Oximeter Device Mode: Intermittent  Pulse Oximeter Device Location: Right;Finger  O2 Device: Nasal cannula  O2 Flow Rate (L/min): 4 L/min   Orientation  Orientation  Overall Orientation Status: Within Functional Limits  Objective    ADL  Grooming: Supervision (standing at sink for washing hands and combing hair)  Toileting: Supervision (setup to complete hygiene while seated, supervision in stance for clothing management)        Balance  Sitting Balance: Supervision  Standing Balance: Stand by assistance (with RW)  Standing Balance  Time: 2 minutes, 1:20, 4 minutes  Activity: transfer to/from bathroom, grooming at sink, toileting, standing at table top for matching task  Comment: with RW  Functional Mobility  Functional - Mobility Device: Rolling Walker  Activity: To/from bathroom  Assist Level: Stand by assistance  Toilet Transfers  Toilet - Technique: Ambulating  Equipment Used: Drop-arm extra wide bedside commode  Toilet Transfer: Stand by assistance  Toilet Transfers Comments: BSC over toilet  Bed mobility  Supine to Sit: Modified independent  Transfers  Stand Step Transfers: Stand by assistance  Stand Pivot Transfers: Stand by assistance  Sit to stand: Supervision  Stand to sit: Supervision        Coordination  Gross Motor: good coordination for matching cards while reaching out of RONAN to either side of table, good scanning and matching              Cognition  Overall Cognitive Status: Exceptions  Arousal/Alertness: Appropriate responses to stimuli  Following Commands: Follows one step commands with increased time; Follows one step commands with repetition  Attention Span: Appears intact  Memory: Decreased short term memory;Decreased recall of recent events  Safety Judgement: Decreased awareness of need for assistance;Decreased awareness of need for safety  Problem Solving: Assistance required to generate solutions;Assistance required to identify errors made;Assistance required to correct errors made  Insights: Decreased awareness of deficits  Initiation: Requires cues for some  Sequencing: Requires cues for some                                         Plan   Plan  Times per week: 5/7 days a week  Times per day: Daily  Plan weeks: 3 weeks  Current Treatment Recommendations: Strengthening,Endurance Training,Balance Training,Functional Mobility Training,Safety Education & Training,Equipment Evaluation, Education, & procurement,Self-Care / ADL,Patient/Caregiver Education & Training,Home Management Training,ROM    Goals  Short term goals  Time Frame for Short term goals: 10 days (1/13)  Short term goal 1: Pt will perform at least 3min of standing functional activities with AD PRN. GOAL MET 1/07/22 Pt completed 3 minutes of standing tasks with CGA with RW. Short term goal 2: Pt will perform functional/toilet t/fs with CGA and AD PRN. -GOAL MET 1/12/22 Pt performed toilet transfers with SBA. Short term goal 3: Pt will toilet with CGA and AD PRN. progressing. Pt continues to require assist for pants management. MET 1/17 SBA, AD PRN  Short term goal 4: Pt will LB dress with min A and AE/AD PRN. progressing. Pt continues to require mod A at times for pulling pants over hips. MET 1/17 SBA  Short term goal 5: Pt will total body bathe with min A and AD PRN. GOAL MET 1/10/22 Pt performed total body bathing with min A. Long term goals  Time Frame for Long term goals : 21 days (1/24)  Long term goal 1: Pt will perform at least 5 min of standing functional activities. ongoing 1/17 2-3 mintues  Long term goal 2: Pt will total body dress with mod I.  Long term goal 3: Pt will total body bathe with mod I.  Long term goal 4: Pt will perform a simple IADL task with supervision and AD PRN. Long term goal 5: Pt will toilet with mod I.   Patient Goals   Patient goals : \"to be able to stand up and walk again\"       Therapy Time   Individual Concurrent Group Co-treatment   Time In 1000         Time Out 1030         Minutes 30         Timed Code Treatment Minutes: 30 Minutes    Second Session Therapy Time:   Individual Concurrent Group Co-treatment   Time In 1330         Time Out 1400         Minutes 30           Timed Code Treatment Minutes:  30 Minutes    Total Treatment Minutes:  60 minutes      Haider Dunbar, OT

## 2022-01-20 LAB
ANION GAP SERPL CALCULATED.3IONS-SCNC: 12 MMOL/L (ref 3–16)
BASOPHILS ABSOLUTE: 0 K/UL (ref 0–0.2)
BASOPHILS RELATIVE PERCENT: 0.6 %
BUN BLDV-MCNC: 8 MG/DL (ref 7–20)
CALCIUM SERPL-MCNC: 9.7 MG/DL (ref 8.3–10.6)
CHLORIDE BLD-SCNC: 95 MMOL/L (ref 99–110)
CO2: 31 MMOL/L (ref 21–32)
CREAT SERPL-MCNC: <0.5 MG/DL (ref 0.6–1.2)
EOSINOPHILS ABSOLUTE: 0.3 K/UL (ref 0–0.6)
EOSINOPHILS RELATIVE PERCENT: 4.8 %
GFR AFRICAN AMERICAN: >60
GFR NON-AFRICAN AMERICAN: >60
GLUCOSE BLD-MCNC: 113 MG/DL (ref 70–99)
GLUCOSE BLD-MCNC: 113 MG/DL (ref 70–99)
GLUCOSE BLD-MCNC: 124 MG/DL (ref 70–99)
GLUCOSE BLD-MCNC: 135 MG/DL (ref 70–99)
GLUCOSE BLD-MCNC: 274 MG/DL (ref 70–99)
HCT VFR BLD CALC: 33.6 % (ref 36–48)
HEMOGLOBIN: 10.5 G/DL (ref 12–16)
LYMPHOCYTES ABSOLUTE: 1.1 K/UL (ref 1–5.1)
LYMPHOCYTES RELATIVE PERCENT: 18.8 %
MAGNESIUM: 1.5 MG/DL (ref 1.8–2.4)
MCH RBC QN AUTO: 24.3 PG (ref 26–34)
MCHC RBC AUTO-ENTMCNC: 31.3 G/DL (ref 31–36)
MCV RBC AUTO: 77.6 FL (ref 80–100)
MONOCYTES ABSOLUTE: 0.5 K/UL (ref 0–1.3)
MONOCYTES RELATIVE PERCENT: 8.3 %
NEUTROPHILS ABSOLUTE: 4 K/UL (ref 1.7–7.7)
NEUTROPHILS RELATIVE PERCENT: 67.5 %
PDW BLD-RTO: 20.1 % (ref 12.4–15.4)
PERFORMED ON: ABNORMAL
PLATELET # BLD: 198 K/UL (ref 135–450)
PMV BLD AUTO: 8.2 FL (ref 5–10.5)
POTASSIUM REFLEX MAGNESIUM: 3.5 MMOL/L (ref 3.5–5.1)
RBC # BLD: 4.33 M/UL (ref 4–5.2)
SODIUM BLD-SCNC: 138 MMOL/L (ref 136–145)
WBC # BLD: 6 K/UL (ref 4–11)

## 2022-01-20 PROCEDURE — 6360000002 HC RX W HCPCS: Performed by: PHYSICAL MEDICINE & REHABILITATION

## 2022-01-20 PROCEDURE — 80048 BASIC METABOLIC PNL TOTAL CA: CPT

## 2022-01-20 PROCEDURE — 97530 THERAPEUTIC ACTIVITIES: CPT

## 2022-01-20 PROCEDURE — 2700000000 HC OXYGEN THERAPY PER DAY

## 2022-01-20 PROCEDURE — 6370000000 HC RX 637 (ALT 250 FOR IP): Performed by: INTERNAL MEDICINE

## 2022-01-20 PROCEDURE — 83735 ASSAY OF MAGNESIUM: CPT

## 2022-01-20 PROCEDURE — 97535 SELF CARE MNGMENT TRAINING: CPT

## 2022-01-20 PROCEDURE — 94761 N-INVAS EAR/PLS OXIMETRY MLT: CPT

## 2022-01-20 PROCEDURE — 6370000000 HC RX 637 (ALT 250 FOR IP): Performed by: PHYSICAL MEDICINE & REHABILITATION

## 2022-01-20 PROCEDURE — 36415 COLL VENOUS BLD VENIPUNCTURE: CPT

## 2022-01-20 PROCEDURE — 92526 ORAL FUNCTION THERAPY: CPT

## 2022-01-20 PROCEDURE — 1280000000 HC REHAB R&B

## 2022-01-20 PROCEDURE — 97110 THERAPEUTIC EXERCISES: CPT

## 2022-01-20 PROCEDURE — 85025 COMPLETE CBC W/AUTO DIFF WBC: CPT

## 2022-01-20 PROCEDURE — 97130 THER IVNTJ EA ADDL 15 MIN: CPT

## 2022-01-20 PROCEDURE — 97129 THER IVNTJ 1ST 15 MIN: CPT

## 2022-01-20 PROCEDURE — 94640 AIRWAY INHALATION TREATMENT: CPT

## 2022-01-20 PROCEDURE — 99232 SBSQ HOSP IP/OBS MODERATE 35: CPT | Performed by: INTERNAL MEDICINE

## 2022-01-20 PROCEDURE — 97116 GAIT TRAINING THERAPY: CPT

## 2022-01-20 RX ADMIN — Medication 15 ML: at 09:08

## 2022-01-20 RX ADMIN — MULTIPLE VITAMINS W/ MINERALS TAB 1 TABLET: TAB at 09:06

## 2022-01-20 RX ADMIN — SPIRONOLACTONE 25 MG: 25 TABLET, FILM COATED ORAL at 09:05

## 2022-01-20 RX ADMIN — TRAZODONE HYDROCHLORIDE 50 MG: 50 TABLET ORAL at 21:33

## 2022-01-20 RX ADMIN — LEVOTHYROXINE SODIUM 100 MCG: 0.1 TABLET ORAL at 05:36

## 2022-01-20 RX ADMIN — Medication 15 ML: at 21:36

## 2022-01-20 RX ADMIN — METOCLOPRAMIDE 5 MG: 10 TABLET ORAL at 09:06

## 2022-01-20 RX ADMIN — METOPROLOL 25 MG: 25 TABLET ORAL at 21:32

## 2022-01-20 RX ADMIN — METOPROLOL 25 MG: 25 TABLET ORAL at 09:06

## 2022-01-20 RX ADMIN — FUROSEMIDE 40 MG: 40 TABLET ORAL at 09:05

## 2022-01-20 RX ADMIN — ONDANSETRON 8 MG: 4 TABLET, ORALLY DISINTEGRATING ORAL at 17:01

## 2022-01-20 RX ADMIN — LORAZEPAM 0.5 MG: 0.5 TABLET ORAL at 13:38

## 2022-01-20 RX ADMIN — OXYCODONE 10 MG: 5 TABLET ORAL at 13:38

## 2022-01-20 RX ADMIN — Medication 400 MG: at 09:06

## 2022-01-20 RX ADMIN — CIPROFLOXACIN 500 MG: 500 TABLET, FILM COATED ORAL at 09:05

## 2022-01-20 RX ADMIN — DICYCLOMINE HYDROCHLORIDE 20 MG: 20 TABLET ORAL at 21:32

## 2022-01-20 RX ADMIN — ASPIRIN 81 MG 81 MG: 81 TABLET ORAL at 09:06

## 2022-01-20 RX ADMIN — ENOXAPARIN SODIUM 40 MG: 40 INJECTION SUBCUTANEOUS at 09:09

## 2022-01-20 RX ADMIN — OXYCODONE 10 MG: 5 TABLET ORAL at 21:33

## 2022-01-20 RX ADMIN — LORAZEPAM 0.5 MG: 0.5 TABLET ORAL at 09:05

## 2022-01-20 RX ADMIN — BUDESONIDE 500 MCG: 0.5 SUSPENSION RESPIRATORY (INHALATION) at 19:36

## 2022-01-20 RX ADMIN — ROPINIROLE HYDROCHLORIDE 0.5 MG: 0.5 TABLET, FILM COATED ORAL at 13:38

## 2022-01-20 RX ADMIN — DICYCLOMINE HYDROCHLORIDE 20 MG: 20 TABLET ORAL at 05:36

## 2022-01-20 RX ADMIN — OXYCODONE 10 MG: 5 TABLET ORAL at 02:28

## 2022-01-20 RX ADMIN — DICYCLOMINE HYDROCHLORIDE 20 MG: 20 TABLET ORAL at 17:01

## 2022-01-20 RX ADMIN — OXYCODONE 10 MG: 5 TABLET ORAL at 09:05

## 2022-01-20 RX ADMIN — INSULIN GLARGINE 7 UNITS: 100 INJECTION, SOLUTION SUBCUTANEOUS at 21:30

## 2022-01-20 RX ADMIN — CIPROFLOXACIN 500 MG: 500 TABLET, FILM COATED ORAL at 21:33

## 2022-01-20 RX ADMIN — IPRATROPIUM BROMIDE AND ALBUTEROL SULFATE 1 AMPULE: .5; 2.5 SOLUTION RESPIRATORY (INHALATION) at 08:40

## 2022-01-20 RX ADMIN — METOCLOPRAMIDE 5 MG: 10 TABLET ORAL at 21:32

## 2022-01-20 RX ADMIN — INSULIN LISPRO 6 UNITS: 100 INJECTION, SOLUTION INTRAVENOUS; SUBCUTANEOUS at 17:03

## 2022-01-20 RX ADMIN — ROPINIROLE HYDROCHLORIDE 0.5 MG: 0.5 TABLET, FILM COATED ORAL at 21:33

## 2022-01-20 RX ADMIN — BUDESONIDE 500 MCG: 0.5 SUSPENSION RESPIRATORY (INHALATION) at 08:40

## 2022-01-20 RX ADMIN — IPRATROPIUM BROMIDE AND ALBUTEROL SULFATE 1 AMPULE: .5; 2.5 SOLUTION RESPIRATORY (INHALATION) at 19:30

## 2022-01-20 RX ADMIN — ROPINIROLE HYDROCHLORIDE 0.5 MG: 0.5 TABLET, FILM COATED ORAL at 09:06

## 2022-01-20 ASSESSMENT — PAIN SCALES - GENERAL
PAINLEVEL_OUTOF10: 8
PAINLEVEL_OUTOF10: 9
PAINLEVEL_OUTOF10: 7

## 2022-01-20 ASSESSMENT — PAIN DESCRIPTION - ONSET: ONSET: ON-GOING

## 2022-01-20 ASSESSMENT — PAIN DESCRIPTION - ORIENTATION
ORIENTATION: RIGHT

## 2022-01-20 ASSESSMENT — PAIN DESCRIPTION - LOCATION
LOCATION: SHOULDER

## 2022-01-20 ASSESSMENT — PAIN DESCRIPTION - PAIN TYPE
TYPE: ACUTE PAIN
TYPE: ACUTE PAIN
TYPE: CHRONIC PAIN
TYPE: ACUTE PAIN

## 2022-01-20 ASSESSMENT — PAIN DESCRIPTION - DESCRIPTORS
DESCRIPTORS: CONSTANT;ACHING;NAGGING
DESCRIPTORS: ACHING
DESCRIPTORS: ACHING

## 2022-01-20 ASSESSMENT — PAIN DESCRIPTION - PROGRESSION: CLINICAL_PROGRESSION: NOT CHANGED

## 2022-01-20 ASSESSMENT — PAIN DESCRIPTION - FREQUENCY
FREQUENCY: CONTINUOUS
FREQUENCY: CONTINUOUS

## 2022-01-20 NOTE — PROGRESS NOTES
Speech Language Pathology  [facility]: ACUTE REHAB UNIT  SPEECH-LANGUAGE PATHOLOGY      [x] Daily  [] Weekly Care Conference Note  [] Discharge    Patient:Esperanza Osman      NJF:[id number]  [facility]:[id number]  Rehab Dx/Hx: Post covid-19 condition, unspecified [U09.9]    Precautions: falls and aspirations  Home situation: lives at home with , dtr, two grandchildren; not an active ; manages own meds; shares household tasks with ,  manages finances   ST Dx: [] Aphasia  [] Dysarthria  [] Apraxia   [x] Oropharyngeal dysphagia [x] Cognitive Impairment  [x] Other: voice tx   Date of Admit: 1/3/2022  Room #: 0151/0151-01    Current functional status (updated daily):         Pt being seen for : [x] Speech/Language Treatment  [x] Dysphagia Treatment [x] Cognitive Treatment  [] Other:  Communication: []WFL  [] Aphasia  [] Dysarthria  [] Apraxia  [] Pragmatic Impairment [] Non-verbal  [] Hearing Loss  [x] Other: dysphonia  Cognition: [] WFL  [x] Mild  [x] Moderate  [] Severe [] Unable to Assess  [] Other:  Memory: [] WFL  [x] Mild  [x] Moderate  [] Severe [] Unable to Assess  [] Other:  Behavior: [x] Alert  [x] Cooperative  [x]  Pleasant  [] Confused  [] Agitated  [] Uncooperative  [] Distractible [] Motivated  [] Self-Limiting [] Anxious  [] Other:  Endurance:  [x] Adequate for participation in SLP sessions  [] Reduced overall  [] Lethargic  [] Other:  Safety: [x] No concerns at this time  [] Reduced insight into deficits  []  Reduced safety awareness [] Not following call light procedures  [] Unable to Assess  [] Other:    Current Diet Order:ADULT DIET; Regular  ADULT ORAL NUTRITION SUPPLEMENT; Lunch, Dinner; Low Calorie/High Protein Oral Supplement  Swallowing Precautions: Alternate solids and liquids;Eat/Feed slowly; No straws;Upright as possible for all oral intake;Remain upright for 30-45 minutes after meals;Small bites/sips    Date: 1/20/2022      Tx session 1  1030 - 1130 Tx session 2  All tx needs met in session 1   Total Timed Code Min 45 0   Total Treatment Minutes 60 60   Individual Treatment Minutes 60 60   Group Treatment Minutes 0 0   Co-Treat Minutes 0 0   Variance/Reason:  0    Pain None reported Pt denied   Pain Intervention [] RN notified  [] Repositioned  [] Intervention offered and patient declined  [x] N/A  [] Other: [] RN notified  [] Repositioned  [] Intervention offered and patient declined  [x] N/A  [] Other:   Subjective     Pt alert and cooperative, agreeable to tx. Pt upright in bedside chair for session. Objective:  Goals     Dysphagia Goals:  Short-term Goals  Timeframe for Short-term Goals: 10 days (01/13/22)    Goal 1: The patient will complete pharyngeal/laryngeal strengthening exercises for improved swallowing function 10/10 given min cues. Pt completed the following exercises given min-mod cues  -effortful swallow: x10  -jaw jut and hold 5 sec: x10  --Isometric tongue tip hold for 5 secs: x10  -resistive jaw opening: x10  -mare maneuver: x10     Goal 2. The patient will tolerate recommended diet without observed clinical signs of aspiration. Thin via provale cup   -no immediate post-swallow coughing, wet vocal quality, throat clearing   -pt with occ delayed cough, but pt with same congested cough with and without PO       Goal 3. The patient/caregiver will demonstrate understanding of compensatory strategies for improved swallowing safety. SLP edu pt re: small, single sips, use of provale cup. Pt reported that her  stated it was the other facility that ordered the provale cup. Pt agreeable to order another one in order to prevent aspiration/PNA. Pt wrote down info for 5cc provale cup for pt's daughter to order online. Goal 4. The patient will tolerate thin liquids without signs and symptoms of aspiration 10/10 via cup. Provale cup used only this date. Per pulmonology, pt with \"micro aspiration. \" Thus, SLP recommends strict use of provale cup. Edu to pt who is in agreement. Speech/Lang/Cog goals:  Short-term Goals  Timeframe for Short-term Goals: 14 days (01/17/22)    Goal 1: Pt will complete recall tasks with 80% acc given min cues for use of compensatory strategies. Picture Retention Task  -pt shown a picture scene; SLP reviewed details of picture  -immediate recall: pt recalled details with 100% acc given min cues  -10 minute delay: pt recalled details with 100% acc given min cues    Goal 2: Pt will complete executive function tasks (money, math, time, etc) with 80% acc given min cues. Time Word Problems  -e.g. you can read 4 articles in 30 minutes. How many articles can you read in 90 minutes?  -pt completed task with 80% acc indep, improved to 90% acc given min cues       Goal 3: Pt will complete verbal and visual reasoning tasks with 80% acc given min cues. Increasing word length   -pt given a word; instructed to add letters to beginning or end of word in order to make it a new word  -e.g. no, not, note  -pt completed task with 87% acc indep, improved to 93% acc given min cues     Word Deduction Task  -pt given 4 clues that describe an item; asked to name the item being described  -e.g. legs, cuffs, pockets, belt loops = pants  -pt completed with 93% acc indep, improved to 100% acc given min cues       Goal 4: Pt will complete problem solving and thought organization tasks with 80% acc given min cues. Category Members / Thought Organization  -pt given a category and a set of letters; asked to name an item from the category that begins with the letters  -e.g. colors that begin with B, F, S, and T  -pt completed task with 63% acc indep, improved to 100% acc given mod cues       Goal 5: Pt will complete vocal strengthening exercises for improved vocal function 10/10 given min cues. Goal not directly targeted.        Other areas targeted: N/A    Education:   SLP edu pt re: use of provale cup, risk of aspiration, recall strategies Safety Devices: [x] Call light within reach  [x] Chair alarm activated  [] Bed alarm activated  [] Other:  [x] Call light within reach  [x] Chair alarm activated  [] Bed alarm activated  [] Other:     Pt alert and cooperative, agreeable to tx. Extensive edu to pt re: use of provale cup to present aspiration / PNA. Pt agreeable to purchasing another provale cup - wrote down info for 5cc provale cup for daughter to order online. Pt completed pharyngeal/laryngeal strengthening exercises given min-mod cues. Pt did significantly well on cognitive tasks this date. Pt increased acc to % acc given min cues with recall tasks, executive function tasks, and reasoning tasks. Pt benefited from mod cues to increase acc to 100% with thought org. Pt is motivated to return home. Continue goals above. Plan: Continue as per plan of care. Additional Information:     Barriers toward progress: Confusion and Cognitive deficit  Discharge recommendations:  [] Home independently  [] Home with assistance [x]  24 hour supervision  [] ECF [] Other:  Continued Tx Upon Discharge: ? [x] Yes [] No [] TBD based on progress while on ARU [] Vital Stim indicated [] Other:   Estimated discharge date: 01/21/22    Interventions used this date:  [x] Speech/Language Treatment  [] Instruction in HEP [] Group [x] Dysphagia Treatment [x] Cognitive Treatment   [] Other:       Total Time Breakdown / Charges    Time in Time out Total Time / units   Cognitive Tx 1045 1130 45 min / 3 units    Speech Tx      Dysphagia Tx 1030 1045 15 min / 1 unit       Electronically Signed by    Gena Fofana MA CCC-SLP #[id number]  Speech Language Pathologist

## 2022-01-20 NOTE — PROGRESS NOTES
Physical Therapy  Facility/Department: [facility]  Daily Treatment Note  NAME: Marielena Pierre  : 1955  MRN: [id number]    Date of Service: 2022    Discharge Recommendations:  Patient would benefit from continued therapy after discharge,24 hour supervision or assist        Assessment    Pt seen for split treatment this date Therex for LE strengthen and core strengthen, theract for transfer training with cues for safety and gait/stair training for improved safety and endurance as noted. Patient demonstrated gait with FWW with variable endurance from 15 feet to 85 feet with patient desaturating with shorter distances noted with shallow breathing with patient able to quickly rebound with cues for PLB. Patient tolerated 4 steps with bilateral rails with min assist and SBA to HELEN with transfers as noted with SBA needed at times as pt sits suddenly when fatigued without regard for alignment to chair. Activity Tolerance  Activity Tolerance: Patienf after walking to bathroom, toileting and walking back to bed (15 feetx2 with FWW) SpO2 dropped to 81% shallow breathing noted on 3.5L via nc with pt very sob, cued for PLB with spO2 93%, HR 91 bpm. on 3.5 L O2 vi nc after 15 seconds seated rest wtih cued PLB. Patient Diagnosis(es): There were no encounter diagnoses. has a past medical history of Anxiety disorder, Chronic pain syndrome, COVID-19, DDD (degenerative disc disease), lumbar, Diabetes ([address] Displacement of lumbar intervertebral disc without myelopathy, Diverticulitis, Fibromyalgia, Generalized osteoarthrosis, involving multiple sites, GERD (gastroesophageal reflux disease), Hypochromic microcytic anemia, Impacted cerumen, Influenza A, Irritable bowel syndrome, Other and unspecified hyperlipidemia, Prosthetic joint implant failure ([address] Screening mammogram, Tracheobronchomalacia, Unspecified asthma(493.90), Unspecified essential hypertension, and Unspecified hypothyroidism.    has a past surgical history that includes Hysterectomy; joint replacement (10/92); joint replacement (  12/92); joint replacement (  6/96); joint replacement (  2006); Colonoscopy (2007); Lung surgery (1/2014); Cardiac catheterization; Abdomen surgery; Colon surgery; Upper gastrointestinal endoscopy (N/A, 10/25/2021); and tracheostomy (N/A, 10/25/2021). Restrictions  Restrictions/Precautions  Restrictions/Precautions: Fall Risk,General Precautions  Position Activity Restriction  Other position/activity restrictions: Notify physician for pulse less than 50 or greater than 120, respiratory rate less than 12 or greater than 25, oral temperature greater than 101.3 F (38.5 C) , urinary output less than 120 mL in four hours, systolic BP less than 90 or greater than 673, diastolic BP less than 50 or greater than 100. 4L of O2  Subjective   Pain Screening  Patient Currently in Pain: Yes  Pain Assessment  Pain Assessment: 0-10  Pain Level: 8  Pain Type: Acute pain  Pain Location: Shoulder  Pain Orientation: Right  Pain Descriptors: Aching  Functional Pain Assessment: Prevents or interferes some active activities and ADLs (R shoulder use incraeses pain per patient)  Vital Signs  Pulse: 82  BP: 127/72  BP Location: Right upper arm  Patient Position: Semi fowlers  Level of Consciousness: Alert (0)  Patient Currently in Pain: Yes  Oxygen Therapy  SpO2: 94 %  O2 Device: Nasal cannula  O2 Flow Rate (L/min): 3.5 L/min       Orientation  Orientation  Overall Orientation Status: Within Normal Limits  Cognition  Patient  Needs redirection for greater than 1 step instruction. Objective    Patient in bed on arrival, soiled adult brief, Pt set up assist to don socks, pt indep in remove brief and don new brief, needed assist to thread legs in pants with pt completing rest of task.      Transfers with FWW, intermittent assist for O2 line mamagement  Sit to Stand: Modified independent;Supervision; HELEN from chair, S from [facility] and toilet with grab bar  Stand to sit: Supervision;Modified independent; HELEN from chair, S from [facility] and toilet with grab bar  Bed to Chair: Modified independent;Supervision  Comment: with FWW Supervision to HELEN with mobility as pt sitting suddenly at [facility] with fatigue  Ambulation  Ambulation?: Yes  More Ambulation?: Yes  Ambulation 1  Surface: level tile  Device: Rolling Walker  Other Apparatus: O2  Assistance: Stand by assistance  Quality of Gait: reciprocal gait pattern with forward trunk flexion, decreased velocity/ step height/length  Gait Deviations: Slow Marisol; Increased RONAN; Decreased step height;Decreased step length;Shuffles  Distance: 15 ft x2, 56 feet, 47 feet. Comments: distances walking limited to sob, report of LE weakness, see activity tolerance for desaturation noted post walks to/from bathroom 15 feet x2, with 56 feet SpO2 93%,  bpm immeidate post activity, after 47 feet SpO2 94% HR 97bpm,   Patient  needed WC follow for all walking due to patient with variable fatigue. Educated pt in PLB with  mobility. Education:  :Patient educated in attending to O2 tubing, encouraged in PLB with forceful exhalation as pt tends to have shallow rapid breathing post activity with rapid reocvery with O2 with PLB. Disposition:Patient with call light in reach and alarm in place at end of session with patient in chair on 3.5L O2 via nc SpO2 95%, HR 93bpm.     SECOND SESSION:    Subjective:  Pain Right shoulder 8/10. Pt engaged and smiling throghout tx. THEREX:  cues and set up assist needed  Ankle pumps resisted ankle DF green x30 BLE   Gluteal sets x15   Resisted sit to stand with yellow band x5  Heel slides green 1x30  LAQ pillow under knee 1x30 bLE 3 # ankle weight  Stepping over target x60 seconds each R and Left  with SBA and fWW cues to self adjust stepping rate to fatigue and goal RPE 4/10. After 2 minutes standing time  SpO2 91% HR 81 bpm, pt needed seated rest due to sob.      TRANSFERS:  Sit<>stand with FWW from WC and chair SBA to HELEN unsteady with reported fatigue in LE and pt sudden need to sit   WC propulsion 150 feet with ext x4 post activity SpO2 94% HR 97bpm.   GAIT: SBA with WC follow SpO2 4L vianc. Patient walks  With FWW and WC follow cued for PLB and for swing to pass stance as pt relies heavily on UE for gait with flexion in trunk without cues swing not completely passisng stance BLE, with cues completed. 15 feet x2 with pt with  Shallow breathing ( completed clothing management, pericare indep, washed hands at sink and then sat in [facility]) immediate post activity SpO2 after toileting and walking to [facility] in room, pt couging and shallow rapid breathing with SpO2  72% with rebound withseated rest and  cues for PLB 97%, HR 85 bpm with 1 minue seated rest and cued PLB. 85 feet immediate post activity 98%, HR  95bpm  60 feet immediate post activity SpO2 95%   STAIRS:Up and down 4  x6 inch steps with 2 turns and Bilateral rails, WC at bootm of step, min assist to steady due to tremulous BLE  Post activity SpO2 93%, HR 95bpm.   Education:   Educated patient and provided physicial assist for steps training non altetnating pattern with patient verbalizing understanding and requiring assist and intermittent cues for steps, therex and cues for PLB. Disposition:Patient with call light in reach and alarm in place at end of session with patient in chair on 3.5L O2 via nc SpO2 99%, HR 84bpm.     Goals  Short term goals  Time Frame for Short term goals: 1/7/2022  Short term goal 1: Pt demo indep bed mobility. -- 1/08: GOAL PARTIALLY MET MI with HOB elevated and use of BR.  1/10/2022 GOAL met pt demo indep in bed mobility sup<>sit and rolling. Short term goal 2: Pt demo bed<>chair with mod assist of 1 and FWW or SPT. -- 1/08: GOAL MET Lisa/modA with RW  Short term goal 3: Patient demonstrates gait to bathroom 20 feet with assist of 2 and FWW. GOAL MET 1/6/2021 pt demo gait 35 to 60 feet with FWW and assist of 2.   Short term goal 4: Patient demonstrates up and down 1 step with bilateral rails and assist of 2. -- 1/08: DNT d/t safety 1/12/2022 GOAL Met  pt demo up and down 1 step bilateral rails CGA. Short term goal 5: Patient demonstrates  indep in DBE and 5 minutes of standing activity with spO2 90% or greater to improve endurance for household walking. -- 1/08: GOAL NOT MET, limited by standing tolerance. Goal partially met patient able to perform dynamic standing balance activity interspersed wtih standing for 5 minutes with spO2 86% post activity on3.5L O2 via nc rebound to O2 in 95% wtihin 1 minute seated rest.  Long term goals  Time Frame for Long term goals : 1/24/2022  Long term goal 1: Patient demonstrates gait 150 feet with SBA and sPO2 90% or greater able to manage O2 tubing on L/R turns on carpet up to 10 feetwith  safe to assist pt. GOAL partially met 1/13/2021 patient demo ability to safely assist pt with FWW gait 5 feet. Long term goal 2: Patient demonstrates up and down 12 steps with SBA and bilateral rails to return to community and home (egress/entry) mobility with  safe to assist pt. Long term goal 3: Patient demonstrates indep bed<>chair, sit<>stand and WC<>car with FWW  Long term goal 4: Patient demonstrates indep WC propulsion 150 feet with L/R turns indep. Long term goal 5: Patient demonstrates 50 feet of walking with L/R turns and carpet HELEN able to indep manage O2 tubing for short distance in home walking. Patient Goals   Patient goals : \"to walk to the bathroom.     Plan    Plan  Times per week: 5/7 days week  Times per day: Daily  Plan weeks: 21 days  Specific instructions for Next Treatment: Progress mobility as tolerated  Current Treatment Recommendations: Strengthening,Neuromuscular Re-education,Home Exercise Program,Cognitive/Perceptual Training,Safety Education & Training,Balance Training,Endurance Training,Patient/Caregiver Education & Training,Functional Mobility Training,Wheelchair Mobility Training,Gait Training,Transfer Training,Stair training,Equipment Evaluation, Education, & procurement  Safety Devices  Type of devices:  All fall risk precautions in place,Nurse notified,Call light within reach,Gait belt,Patient at risk for falls,Chair alarm in place,Left in bed,Bed alarm in place     Therapy Time   Individual Concurrent Group Co-treatment   Time In 0800         Time Out 0830         Minutes 30         Timed Code Treatment Minutes: 30 Minutes    Second Session Therapy Time:   Individual Concurrent Group Co-treatment   Time In 0930         Time Out 1030         Minutes 60           Timed Code Treatment Minutes:  60    Total Treatment Minutes:  90      Zoran Boothe, PT

## 2022-01-20 NOTE — PROGRESS NOTES
Occupational Therapy  Facility/Department: [facility] ARU  Daily Treatment Note  NAME: Meseret Lira  : 1955  MRN: [id number]    Date of Service: 2022    Discharge Recommendations:  24 hour supervision or assist,Home with Home health OT,S Level 1  OT Equipment Recommendations  Equipment Needed: No    Assessment   Performance deficits / Impairments: Decreased functional mobility ; Decreased ADL status; Decreased strength;Decreased safe awareness;Decreased cognition;Decreased endurance;Decreased balance;Decreased coordination;Decreased posture;Decreased fine motor control;Decreased high-level IADLs  Assessment: Pt agreeable to OT session. Pt performed functional transfers with SBA and good strength for all sit<>stands. Pt activity tolerance improved this date with O2 sats remaining above 89-90% on 3L for multiple bouts of kitchen mobility but dropping briefly to mid 80s after exiting bathroom, recovering to 90% in ~30 seconds. Pt completed kitchen task with good balance and fair tolerance to stand for 4-5 minutes x3. Pt required min/mod VCs for sequencing of cooking egg, gathering items, and washing dishes. Pt and spouse educated on kitchen safety, PLB, energy conservation in kitchen, and safety in bathroom. Pt and spouse verbalized understanding. Continue POC. Prognosis: Good  OT Education: OT Role;Plan of Care;ADL Adaptive Strategies;Transfer Training;Energy Conservation;Precautions; Equipment; Family Education;IADL Safety  Patient Education: disease specific: goals, PLB, safety during ADLS and t/fs, energy conservation during ADLS and IADLs, Pt verbalized understanding but will need reinforcement.   Barriers to Learning: confusion/anxiety/poor memory  REQUIRES OT FOLLOW UP: Yes  Activity Tolerance  Activity Tolerance: Patient Tolerated treatment well;Patient limited by fatigue;Treatment limited secondary to medical complications (free text)  Activity Tolerance: O2 sats dropping to 89% on 1 stand briefly but returning to 90% in less than 30 seconds, O2 sats remaining above 90% on 3L for remainder of mobility in kitchen. O2 sats dropping briefly to 85% on 3L after exiting bathroom, recovering to 90% in ~30 seconds. Safety Devices  Safety Devices in place: Yes  Type of devices: Call light within reach;Gait belt;Nurse notified; Left in bed         Patient Diagnosis(es): There were no encounter diagnoses. has a past medical history of Anxiety disorder, Chronic pain syndrome, COVID-19, DDD (degenerative disc disease), lumbar, Diabetes ([address] Displacement of lumbar intervertebral disc without myelopathy, Diverticulitis, Fibromyalgia, Generalized osteoarthrosis, involving multiple sites, GERD (gastroesophageal reflux disease), Hypochromic microcytic anemia, Impacted cerumen, Influenza A, Irritable bowel syndrome, Other and unspecified hyperlipidemia, Prosthetic joint implant failure ([facility] [address] Screening mammogram, Tracheobronchomalacia, Unspecified asthma(493.90), Unspecified essential hypertension, and Unspecified hypothyroidism. has a past surgical history that includes Hysterectomy; joint replacement (10/92); joint replacement (  12/92); joint replacement (  6/96); joint replacement (  2006); Colonoscopy (2007); Lung surgery (1/2014); Cardiac catheterization; Abdomen surgery; Colon surgery; Upper gastrointestinal endoscopy (N/A, 10/25/2021); and tracheostomy (N/A, 10/25/2021).     Restrictions  Restrictions/Precautions  Restrictions/Precautions: Fall Risk,General Precautions  Position Activity Restriction  Other position/activity restrictions: Notify physician for pulse less than 50 or greater than 120, respiratory rate less than 12 or greater than 25, oral temperature greater than 101.3 F (38.5 C) , urinary output less than 120 mL in four hours, systolic BP less than 90 or greater than 780, diastolic BP less than 50 or greater than 100. 3-4L of O2  Subjective   General  Chart Reviewed: Yes,Orders,Labs,Progress Notes,Imaging  Patient assessed for rehabilitation services?: Yes  Additional Pertinent Hx: Recent pelvic fractures  Response to previous treatment: Patient with no complaints from previous session  Family / Caregiver Present: Yes (spouse)  Referring Practitioner: Ayla Estrada MD  Diagnosis: Post Covid 19 condition  Subjective  Subjective: Pt in recliner, pleasant, states R shoulder still hurting, agreeable to OT session. Pain Assessment  Pain Assessment: 0-10  Pain Level: 9  Pain Type: Acute pain  Pain Location: Shoulder  Pain Orientation: Right  Pain Descriptors: Aching  Pain Frequency: Continuous  Non-Pharmaceutical Pain Intervention(s): Ambulation/Increased Activity; Emotional support;Repositioned  Response to Pain Intervention: Patient Satisfied  Vital Signs  Pulse: 70  Heart Rate Source: Monitor  BP: (!) 142/78  BP Location: Right upper arm  Patient Position: Sitting;Up in chair  Patient Currently in Pain: Yes  Oxygen Therapy  SpO2: 96 %  Pulse Oximeter Device Mode: Intermittent  Pulse Oximeter Device Location: Right;Finger  O2 Device: Nasal cannula  O2 Flow Rate (L/min): 3 L/min   Orientation  Orientation  Overall Orientation Status: Within Functional Limits  Objective    ADL  Toileting: Supervision  Instrumental ADL's  Instrumental ADLs: Yes  Meal Prep  Meal Prep Level: Walker  Meal Prep Level of Assistance: Minimal assistance  Meal Preparation: good balance, min VCs for safety with hot surfaces, min A to complete cooking egg due to fatigue and SOB of patient and needing rest break  Light Housekeeping  Light Housekeeping Level: Yanet [employer]vel Housekeeping Level of Assistance: Supervision  Light Housekeeping: Pt completed washing dishes at sink in stance with good balance and min VCs for thoroughness and sequencing of task.      Balance  Sitting Balance: Supervision  Standing Balance: Stand by assistance (with RW)  Standing Balance  Time: 4 minutes, 4:30, 4:15, 30 seconds x3  Activity: transfer to/from Training,Balance Training,Functional Mobility Training,Safety Education & Training,Equipment Evaluation, Education, & procurement,Self-Care / ADL,Patient/Caregiver Education & Training,Home Management Training,ROM    Goals  Short term goals  Time Frame for Short term goals: 10 days (1/13)  Short term goal 1: Pt will perform at least 3min of standing functional activities with AD PRN. GOAL MET 1/07/22 Pt completed 3 minutes of standing tasks with CGA with RW. Short term goal 2: Pt will perform functional/toilet t/fs with CGA and AD PRN. -GOAL MET 1/12/22 Pt performed toilet transfers with SBA. Short term goal 3: Pt will toilet with CGA and AD PRN. progressing. Pt continues to require assist for pants management. MET 1/17 SBA, AD PRN  Short term goal 4: Pt will LB dress with min A and AE/AD PRN. progressing. Pt continues to require mod A at times for pulling pants over hips. MET 1/17 SBA  Short term goal 5: Pt will total body bathe with min A and AD PRN. GOAL MET 1/10/22 Pt performed total body bathing with min A. Long term goals  Time Frame for Long term goals : 21 days (1/24)  Long term goal 1: Pt will perform at least 5 min of standing functional activities. ongoing 1/17 2-3 mintues  Long term goal 2: Pt will total body dress with mod I.  Long term goal 3: Pt will total body bathe with mod I.  Long term goal 4: Pt will perform a simple IADL task with supervision and AD PRN. GOAL MET 1/20/22 Pt performed simple IADL task with supervision. Long term goal 5: Pt will toilet with mod I.   Patient Goals   Patient goals : \"to be able to stand up and walk again\"       Therapy Time   Individual Concurrent Group Co-treatment   Time In 1230         Time Out 1330         Minutes 60         Timed Code Treatment Minutes: 16 Nelly Tomlin OT

## 2022-01-20 NOTE — PROGRESS NOTES
Comprehensive Nutrition Assessment    Type and Reason for Visit:  Reassess    Nutrition Recommendations/Plan:   Continue regular diet  Continue Ensure ONS  Monitor po intakes, nutrition adequacy, weights, pertinent labs, BMs    Nutrition Assessment:  Follow up: Pt stable from a nutritional standpoint AEB po intakes % per EMR. Pt reports that her appetite is fair.  has continued to bring in outside food for pt which she accepts well. Pt reports that she has been driking about 1 Ensure total per day. Will continue current diet and ONS. Pt had no questions. Malnutrition Assessment:  Malnutrition Status: Moderate malnutrition    Context:  Acute Illness     Findings of the 6 clinical characteristics of malnutrition:  Energy Intake:  1 - 75% or less of estimated energy requirements for 7 or more days  Weight Loss:  7 - Greater than 7.5% over 3 months       Estimated Daily Nutrient Needs:  Energy (kcal):  9484-1652 kcal; Weight Used for Energy Requirements:  Current (73 kg)     Protein (g):  73-88 g; Weight Used for Protein Requirements:  Current (1.0-1.2 g/kg)        Fluid (ml/day):   1 ml/kcal      Nutrition Related Findings:  Last BM on 1/19. Wounds:  None       Current Nutrition Therapies:    ADULT DIET; Regular  ADULT ORAL NUTRITION SUPPLEMENT; Lunch, Dinner;  Low Calorie/High Protein Oral Supplement    Anthropometric Measures:  · Height: 5' 9\" (175.3 cm)  · Current Body Weight: 153 lb 1.6 oz (69.4 kg)   · Admission Body Weight: 160 lb (72.6 kg)    · Usual Body Weight: 175 lb (79.4 kg) (bed scale on 9/26/21)     · Ideal Body Weight: 145 lbs; % Ideal Body Weight 110.3 %   · BMI: 22.6  · BMI Categories: Underweight (BMI less than 22) age over 72       Nutrition Diagnosis:   · Moderate malnutrition related to inadequate protein-energy intake as evidenced by weight loss 7.5% in 3 months,poor intake prior to admission    Nutrition Interventions:   Food and/or Nutrient Delivery:  Continue Current Diet,Continue Oral Nutrition Supplement  Nutrition Education/Counseling:  No recommendation at this time,Education declined   Coordination of Nutrition Care:  Continue to monitor while inpatient    Goals:  Pt will consume greater than 50% of meals and ONS this admission w/o further weight loss       Nutrition Monitoring and Evaluation:   Behavioral-Environmental Outcomes:  None Identified   Food/Nutrient Intake Outcomes:  Food and Nutrient Intake,Supplement Intake  Physical Signs/Symptoms Outcomes:  Biochemical Data,Constipation,Nutrition Focused Physical Findings,Weight     Discharge Planning:    Continue current diet,Continue Oral Nutrition Supplement     Electronically signed by Isai Strange RD, LD on 1/20/22 at 1:56 PM EST    Contact: [phone]

## 2022-01-20 NOTE — PROGRESS NOTES
INPATIENT PULMONARY CRITICAL CARE PROGRESS NOTE      Reason for visit    Fever/increased WBC;questionable CXR results     SUBJECTIVE: Patient when seen this morning was minimally better as compared to yesterday, patient was still having some coughing but did not have any increasing chest congestion, patient was not have any chest pain or palpitations, patient was afebrile and he medically maintained, patient is oxygen requirements are coming down, patient was on 2-1/2 L of nasal cannula oxygenation with SaO2 of 98%, patient's glycemic control was acceptable, patient has sinus rhythm on the monitor which was ranging from sinus bradycardia to normal sinus rhythm, patient was alert and oriented, patient continues to have some hoarseness of voice,             Physical Exam:  Blood pressure (!) 107/50, pulse 54, temperature 97.4 °F (36.3 °C), temperature source Oral, resp. rate 18, height 5' 9\" (1.753 m), weight 153 lb 1.6 oz (69.4 kg), SpO2 98 %, not currently breastfeeding.'     Constitutional:  No acute distress while lying in the bed, slight hoarseness of voice. HENT:  Oropharynx is clear and moist. No thyromegaly. Eyes:  Conjunctivae are normal. Pupils equal, round, and reactive to light. No scleral icterus. Neck: . No tracheal deviation present. No obvious thyroid mass. Tracheostomy scar present  Cardiovascular: Normal rate, regular rhythm, normal heart sounds. No right ventricular heave. No lower extremity edema. Pulmonary/Chest: No wheezes. Decreased bilateral rales. Minimally decreased chest congestion chest wall is not dull to percussion. No accessory muscle usage or stridor. Decreased breath sound density  Abdominal: Soft. Bowel sounds present. No distension or hernia. No tenderness. ,  Scar of PEG tube present  Musculoskeletal: No cyanosis. No clubbing. No obvious joint deformity. Lymphadenopathy: No cervical or supraclavicular adenopathy. Skin: Skin is warm and dry.  No rash or nodules on the exposed extremities. Psychiatric: Normal mood and affect. Behavior is normal.  No anxiety. Neurologic: Alert, awake and oriented. PERRL. Speech fluent            Results:  CBC:   Recent Labs     01/17/22  0614 01/18/22  1349   WBC 7.7 15.1*   HGB 10.2* 10.5*   HCT 32.6* 33.7*   MCV 77.3* 76.8*    220     BMP:   Recent Labs     01/17/22  0614      K 3.6   CL 94*   CO2 33*   BUN 9   CREATININE <0.5*       UA:  Recent Labs     01/18/22  1515   COLORU Yellow   PHUR 7.0   CLARITYU Clear   SPECGRAV 1.015   LEUKOCYTESUR Negative   UROBILINOGEN 0.2   BILIRUBINUR Negative   BLOODU Negative   GLUCOSEU Negative       Imaging:  I have reviewed radiology images personally. XR CHEST PORTABLE   Final Result   Stable cardiomegaly. Patchy bilateral airspace disease with multifocal small nodular component. Some of this appears to be chronic in therefore could represent fibrosis. However there are no higher quality studies such as PA and lateral views or   recent chest CT. Increased density overlying the upper right hilum possible adenopathy. Prior   surgery is evident which may account for some of this finding. RECOMMENDATION:   Recommend follow-up to evaluate for the nodularity as well as the right   suprahilar opacity. This would best be performed with contrast-enhanced CT. XR CHEST PORTABLE   Final Result   Asymmetric airspace opacities are relatively stable from prior comparison   study. Pulmonary edema, recurrent infection or chronic interstitial change   may be considered.            Echo Complete    Result Date: 1/12/2022  Transthoracic Echocardiography Report (TTE)  Demographics   Patient Name       Yokasta Lemus   Date of Study      01/12/2022        Gender              Female   Patient Number     [id number]        Date of Birth       1955   Visit Number       [id number]         Age                 78 year(s)   Accession Number   [id number]        Room Number [address]       [id number]            Sonographer         Stella Irving, RT   Ordering Physician Pam Ozuna MD  Interpreting        Paulie Hodge,                                       Physician           MD, [address]  Procedure Type of Study   TTE procedure:ECHOCARDIOGRAM COMPLETE 2D W DOPPLER W COLOR. Procedure Date Date: 01/12/2022 Start: 11:47 AM Study Location: [facility] - Echo Lab Technical Quality: Adequate visualization Indications:Dyspnea/SOB. Patient Status: Routine Contrast Medium: Definity. Height: 69 inches Weight: 153 pounds BSA: 1.84 m2 BMI: 22.59 kg/m2 BP: 136/73 mmHg  Conclusions   Summary  The left ventricular systolic function is low normal with an ejection  fraction of 50%. There is hypokinesis of the basal inferior and inferior walls. Normal left ventricular size with mild concentric left ventricular  hypertrophy. Grade I diastolic dysfunction with normal filling pressure. Changes noted from previous echo on 8- in left ventricular function. Mild tricuspid regurgitation. Systolic pulmonic artery pressure (SPAP) is estimated at 46 mmHg consistent  with mild pulmonary hypertension (Right atrial pressure of 3 mmHg). Signature   ------------------------------------------------------------------  Electronically signed by Paulie Hodge MD, [address]  (Interpreting physician) on 01/12/2022 at 01:37 PM  ------------------------------------------------------------------   Findings   Left Ventricle  The left ventricular systolic function is low normal with an ejection  fraction of 50%. There is hypokinesis of the basal inferior and inferior walls. Normal left ventricular size with mild concentric left ventricular  hypertrophy. Grade I diastolic dysfunction with normal filling pressure. Changes noted from previous echo on 8- in left ventricular function.    Mitral Valve  Mild thickening of leaflets of mitral valve. No mitral stenosis. Trivial mitral regurgitation. Left Atrium  The left atrium is normal in size. Aortic Valve  Aortic valve appears sclerotic but opens adequately. No evidence of aortic valve regurgitation. Aorta  The aortic root is normal in size. Right Ventricle  The right ventricle is normal in size and function. TAPSE is measured at 18 mm. S'prime velocity is measured at 13 cm/s. Tricuspid Valve  Tricuspid valve is structurally normal.  Mild tricuspid regurgitation. Systolic pulmonic artery pressure (SPAP) is estimated at 46 mmHg consistent  with mild pulmonary hypertension (Right atrial pressure of 3 mmHg). Right Atrium  The right atrium is normal in size at 8 cm2. Pulmonic Valve  The pulmonic valve is not well visualized. No evidence of pulmonic valve regurgitation. Pericardial Effusion  No pericardial effusion noted. Pleural Effusion  No pleural effusion. Miscellaneous  IVC size is normal (<2.1cm) and collapses > 50% with respiration consistent  with normal RA pressure (3mmHg).   M-Mode/2D Measurements (cm)   LV Diastolic Dimension: 4.2 cm LV Systolic Dimension: 2.8 cm  LV Septum Diastolic: 1.1 cm  LV PW Diastolic: 1.1 cm        AV Cusp Separation: 2 cm                                 LA Dimension: 2.6 cm                                 LA Area: 15.5 cm2                                 LA volume/Index: 37 ml /20 ml/m2  Doppler Measurements   AV Peak Velocity: 128 cm/s  MV Peak E-Wave: 76 cm/s  AV Peak Gradient: 6.55 mmHg MV Peak A-Wave: 87 cm/s                              MV E/A Ratio: 0.87   TR Velocity:319 cm/s  TR Gradient:40.7 mmHg  Estimated RAP:3 mmHg  Estimated RVSP: 46 mmHg  E' Septal Velocity: 5 cm/s  E' Lateral Velocity: 6 cm/s  E/E' ratio: 13.81   Aortic Valve   Peak Velocity: 128 cm/s  Peak Gradient: 6.55 mmHg   Cusp Separation: 2 cm      XR CHEST PORTABLE    Result Date: 1/18/2022  EXAMINATION: ONE XRAY VIEW OF THE CHEST 1/18/2022 1:00 pm Assessment:  Active Problems: Moderate malnutrition (HCC)    Aspiration into airway    Oropharyngeal dysphagia    Vocal cord paralysis    Personal history of COVID-19    Chest congestion    Pulmonary infiltrates    Hypochromic microcytic anemia    Hyperglycemia    Diastolic dysfunction    Pulmonary HTN (HCC)    Leukocytosis  Resolved Problems:    * No resolved hospital problems.  *          Plan:   · Oxygen supplementation to keep saturation reading 90-94% only  · Please titrate the oxygen as per the above parameters  · Patient been evaluated was on 2 and half liters of nasal, oxygen with saturation 98%  · Patient had ARDS secondary to 2019 COVID infection and patient had prolonged intubation and mechanical vent support along with that patient had tracheostomy and PEG tube placement-patient has been equalized and also 2 days back the PEG tube was removed as per patient and family  · Clinically patient appears to have oropharyngeal dysphagia along with that patient appears to be aspirating into the airways clinically and patient also has abnormal x-ray chest which may be secondary to some visible infiltrate secondary to the COVID-19 infection but also secondary to aspiration in the airways which may be attributing to patient's clinical status and complexity  · Patient has been evaluated by ENT and was found to have vocal cord paralysis and rhinoplasty has been advised  · Patient is being evaluated and managed by speech therapist and patient undergoes speech exercises every day  · Patient does have COVID fog and is possible that patient may be likely aspirating at times  · Patient has history of Enterobacter cloacae complex infection and has had 2 sputum cultures which were positive for that in the past  · Patient urinalysis done today was negative for any indication of a UTI  · On the basis of previous cultures, will start patient on ciprofloxacin-no IV access available at this time and the bioavailability of ciprofloxacin is reasonable-we will start with oral ciprofloxacin but if patient is not responsive to that then patient may require IV medications   · Will hold off on any bronchoscopy at this time  · Bronchodilators to continue  · No need for any systemic steroids  · Patient has been started on Lasix and Aldactone for pulmonary hypertension and diastolic dysfunction  · Monitor input output and BMP  · Correct electrolytes on bilevel x-ray basis  · Lantus insulin as below blood glucose monitoring as per primary team  · BGM with SSI  · Synthroid as per TSH as per primary team  · Consider iron supplementation if deemed appropriate  · PUD and DVT prophylaxis as per primary team    Case discussed with speech therapy          Electronically signed by:  Todd Clark MD    1/19/2022    9:44 PM.

## 2022-01-20 NOTE — PROGRESS NOTES
[id number] [facility]  1/20/2022  [id number]    Chief Complaint: weakness  Subjective:   Resting in bed; no issues overnight; no new c/o. ROS: No n/v, cp, sob, f/c  Objective:  Patient Vitals for the past 24 hrs:   BP Temp Temp src Pulse Resp SpO2   01/20/22 0841      96 %   01/20/22 0805 127/72 98 °F (36.7 °C) Oral 82 16 94 %   01/19/22 2041      98 %   01/19/22 1936 (!) 107/50 97.4 °F (36.3 °C) Oral 54 18 94 %   01/19/22 1004 136/76 97.7 °F (36.5 °C) Oral 71  97 %     Gen: No distress, pleasant. HEENT: Normocephalic, atraumatic. CV: Regular rate and rhythm. Resp: No respiratory distress. Abd: Soft, nontender, PEG exit site with minimal drainage to dressing in place. Ext: No edema. Neuro: Alert, oriented, appropriately interactive.    Wt Readings from Last 3 Encounters:   01/12/22 153 lb 1.6 oz (69.4 kg)   10/26/21 175 lb 14.4 oz (79.8 kg)   07/15/21 180 lb (81.6 kg)       Laboratory data:   Lab Results   Component Value Date    WBC 6.0 01/20/2022    HGB 10.5 (L) 01/20/2022    HCT 33.6 (L) 01/20/2022    MCV 77.6 (L) 01/20/2022     01/20/2022       Lab Results   Component Value Date     01/20/2022    K 3.5 01/20/2022    CL 95 01/20/2022    CO2 31 01/20/2022    BUN 8 01/20/2022    CREATININE <0.5 01/20/2022    GLUCOSE 124 01/20/2022    CALCIUM 9.7 01/20/2022        Therapy progress:  PT  Position Activity Restriction  Other position/activity restrictions: Notify physician for pulse less than 50 or greater than 120, respiratory rate less than 12 or greater than 25, oral temperature greater than 101.3 F (38.5 C) , urinary output less than 120 mL in four hours, systolic BP less than 90 or greater than 084, diastolic BP less than 50 or greater than 100. 4L of O2  Objective     Sit to Stand: Modified independent,Supervision  Stand to sit: Supervision,Modified independent  Bed to Chair: Modified independent,Supervision  Device: Rolling Walker  Other Apparatus: O2  Assistance: Stand by assistance  Distance: 15 ft x2, 56 feet, 47 feet. OT  PT Equipment Recommendations  Equipment Needed: No  Other: has needed equpiment  Toilet - Technique: Ambulating  Equipment Used: Drop-arm extra wide bedside commode  Toilet Transfers Comments: BSC over toilet  Assessment        SLP  Current Diet : Regular  Current Liquid Diet : Thin (via provale cup)  Diet Solids Recommendation: Regular  Liquid Consistency Recommendation: Thin    Body mass index is 22.61 kg/m². Rehabilitation Diagnosis:  Neurologic, 3.8, Neuromuscular Disorders, e.g. Critical Illness Myopathy, Other Myopathy     Assessment and Plan:  S/p covid pneumonia with superimposed bacterial pneumonia  - required trach/peg, proning  - s/p remdesivir, tocilizumab  - completed cefepime  - now on cipro per pulm     CAD  - asa, statin     HTN  - follow BP       DM  - lantus, SSI     Hypothyroidism  - synthroid    Leukocytosis  - resolved  - completed cefepime;  - repeat culture + for low growth enterococcus; completed 1 week macrobid.     Reduced L TVF mobility per FEES  - slp    Dysphagia (pharyngeal), dysphonia  - slp    S/p trach  - stoma care    Bowels: Schedule stool softener. Follow bowel movements. Enema or suppository if needed.      Bladder: Check PVR x 3.   [facility] if PVR > 200ml or if any volume is > 500 ml.      Sleep: Trazodone provided prn.      Follow up appointments: PCP  GENEVA: 1/22 home w/ home health  DME: [id number] [facility] wheeled walker, transport chair        Electronically signed by Bria Hernandez MD on 1/20/2022 at 9:36 AM

## 2022-01-21 LAB
GLUCOSE BLD-MCNC: 125 MG/DL (ref 70–99)
GLUCOSE BLD-MCNC: 128 MG/DL (ref 70–99)
GLUCOSE BLD-MCNC: 153 MG/DL (ref 70–99)
GLUCOSE BLD-MCNC: 268 MG/DL (ref 70–99)
PERFORMED ON: ABNORMAL

## 2022-01-21 PROCEDURE — 99232 SBSQ HOSP IP/OBS MODERATE 35: CPT | Performed by: INTERNAL MEDICINE

## 2022-01-21 PROCEDURE — 1280000000 HC REHAB R&B

## 2022-01-21 PROCEDURE — 97530 THERAPEUTIC ACTIVITIES: CPT

## 2022-01-21 PROCEDURE — 97116 GAIT TRAINING THERAPY: CPT

## 2022-01-21 PROCEDURE — 97130 THER IVNTJ EA ADDL 15 MIN: CPT

## 2022-01-21 PROCEDURE — 6370000000 HC RX 637 (ALT 250 FOR IP): Performed by: PHYSICAL MEDICINE & REHABILITATION

## 2022-01-21 PROCEDURE — 6360000002 HC RX W HCPCS: Performed by: PHYSICAL MEDICINE & REHABILITATION

## 2022-01-21 PROCEDURE — 94761 N-INVAS EAR/PLS OXIMETRY MLT: CPT

## 2022-01-21 PROCEDURE — 2700000000 HC OXYGEN THERAPY PER DAY

## 2022-01-21 PROCEDURE — 6370000000 HC RX 637 (ALT 250 FOR IP): Performed by: INTERNAL MEDICINE

## 2022-01-21 PROCEDURE — 97110 THERAPEUTIC EXERCISES: CPT

## 2022-01-21 PROCEDURE — 92507 TX SP LANG VOICE COMM INDIV: CPT

## 2022-01-21 PROCEDURE — 97535 SELF CARE MNGMENT TRAINING: CPT

## 2022-01-21 PROCEDURE — 94640 AIRWAY INHALATION TREATMENT: CPT

## 2022-01-21 PROCEDURE — 92526 ORAL FUNCTION THERAPY: CPT

## 2022-01-21 PROCEDURE — 97129 THER IVNTJ 1ST 15 MIN: CPT

## 2022-01-21 RX ORDER — METOCLOPRAMIDE 5 MG/1
5 TABLET ORAL 2 TIMES DAILY
Qty: 120 TABLET | Refills: 3 | Status: SHIPPED | OUTPATIENT
Start: 2022-01-21

## 2022-01-21 RX ORDER — FUROSEMIDE 40 MG/1
40 TABLET ORAL DAILY
Qty: 60 TABLET | Refills: 3 | Status: SHIPPED | OUTPATIENT
Start: 2022-01-22

## 2022-01-21 RX ORDER — CIPROFLOXACIN 500 MG/1
500 TABLET, FILM COATED ORAL EVERY 12 HOURS SCHEDULED
Qty: 20 TABLET | Refills: 0 | Status: SHIPPED | OUTPATIENT
Start: 2022-01-21 | End: 2022-01-31

## 2022-01-21 RX ORDER — SPIRONOLACTONE 25 MG/1
25 TABLET ORAL DAILY
Qty: 30 TABLET | Refills: 3 | Status: ON HOLD | OUTPATIENT
Start: 2022-01-22 | End: 2022-03-14

## 2022-01-21 RX ORDER — LANOLIN ALCOHOL/MO/W.PET/CERES
400 CREAM (GRAM) TOPICAL DAILY
Qty: 30 TABLET | Refills: 0 | Status: SHIPPED | OUTPATIENT
Start: 2022-01-22

## 2022-01-21 RX ADMIN — INSULIN GLARGINE 7 UNITS: 100 INJECTION, SOLUTION SUBCUTANEOUS at 21:41

## 2022-01-21 RX ADMIN — ROPINIROLE HYDROCHLORIDE 0.5 MG: 0.5 TABLET, FILM COATED ORAL at 21:37

## 2022-01-21 RX ADMIN — IPRATROPIUM BROMIDE AND ALBUTEROL SULFATE 1 AMPULE: .5; 2.5 SOLUTION RESPIRATORY (INHALATION) at 08:27

## 2022-01-21 RX ADMIN — INSULIN LISPRO 2 UNITS: 100 INJECTION, SOLUTION INTRAVENOUS; SUBCUTANEOUS at 12:56

## 2022-01-21 RX ADMIN — Medication 15 ML: at 21:34

## 2022-01-21 RX ADMIN — OXYCODONE 10 MG: 5 TABLET ORAL at 11:13

## 2022-01-21 RX ADMIN — LEVOTHYROXINE SODIUM 100 MCG: 0.1 TABLET ORAL at 05:51

## 2022-01-21 RX ADMIN — OXYCODONE 10 MG: 5 TABLET ORAL at 05:51

## 2022-01-21 RX ADMIN — LORAZEPAM 0.5 MG: 0.5 TABLET ORAL at 15:47

## 2022-01-21 RX ADMIN — ROPINIROLE HYDROCHLORIDE 0.5 MG: 0.5 TABLET, FILM COATED ORAL at 12:56

## 2022-01-21 RX ADMIN — Medication 15 ML: at 08:11

## 2022-01-21 RX ADMIN — BUDESONIDE 500 MCG: 0.5 SUSPENSION RESPIRATORY (INHALATION) at 08:32

## 2022-01-21 RX ADMIN — ENOXAPARIN SODIUM 40 MG: 40 INJECTION SUBCUTANEOUS at 08:09

## 2022-01-21 RX ADMIN — DICYCLOMINE HYDROCHLORIDE 20 MG: 20 TABLET ORAL at 15:47

## 2022-01-21 RX ADMIN — ASPIRIN 81 MG 81 MG: 81 TABLET ORAL at 08:10

## 2022-01-21 RX ADMIN — METOCLOPRAMIDE 5 MG: 10 TABLET ORAL at 21:37

## 2022-01-21 RX ADMIN — IPRATROPIUM BROMIDE AND ALBUTEROL SULFATE 1 AMPULE: .5; 2.5 SOLUTION RESPIRATORY (INHALATION) at 20:51

## 2022-01-21 RX ADMIN — OXYCODONE 10 MG: 5 TABLET ORAL at 15:47

## 2022-01-21 RX ADMIN — DICYCLOMINE HYDROCHLORIDE 20 MG: 20 TABLET ORAL at 11:13

## 2022-01-21 RX ADMIN — BUDESONIDE 500 MCG: 0.5 SUSPENSION RESPIRATORY (INHALATION) at 20:52

## 2022-01-21 RX ADMIN — CIPROFLOXACIN 500 MG: 500 TABLET, FILM COATED ORAL at 08:10

## 2022-01-21 RX ADMIN — DICYCLOMINE HYDROCHLORIDE 20 MG: 20 TABLET ORAL at 21:38

## 2022-01-21 RX ADMIN — MULTIPLE VITAMINS W/ MINERALS TAB 1 TABLET: TAB at 08:10

## 2022-01-21 RX ADMIN — CIPROFLOXACIN 500 MG: 500 TABLET, FILM COATED ORAL at 21:37

## 2022-01-21 RX ADMIN — ROPINIROLE HYDROCHLORIDE 0.5 MG: 0.5 TABLET, FILM COATED ORAL at 08:10

## 2022-01-21 RX ADMIN — Medication 400 MG: at 08:10

## 2022-01-21 RX ADMIN — METOPROLOL 25 MG: 25 TABLET ORAL at 08:10

## 2022-01-21 RX ADMIN — METOPROLOL 25 MG: 25 TABLET ORAL at 21:37

## 2022-01-21 RX ADMIN — METOCLOPRAMIDE 5 MG: 10 TABLET ORAL at 08:10

## 2022-01-21 RX ADMIN — FUROSEMIDE 40 MG: 40 TABLET ORAL at 08:10

## 2022-01-21 RX ADMIN — INSULIN LISPRO 6 UNITS: 100 INJECTION, SOLUTION INTRAVENOUS; SUBCUTANEOUS at 18:22

## 2022-01-21 RX ADMIN — SPIRONOLACTONE 25 MG: 25 TABLET, FILM COATED ORAL at 08:10

## 2022-01-21 RX ADMIN — LORAZEPAM 0.5 MG: 0.5 TABLET ORAL at 08:13

## 2022-01-21 RX ADMIN — DICYCLOMINE HYDROCHLORIDE 20 MG: 20 TABLET ORAL at 05:51

## 2022-01-21 RX ADMIN — TRAZODONE HYDROCHLORIDE 50 MG: 50 TABLET ORAL at 21:37

## 2022-01-21 RX ADMIN — OXYCODONE 10 MG: 5 TABLET ORAL at 21:37

## 2022-01-21 ASSESSMENT — PAIN DESCRIPTION - DESCRIPTORS: DESCRIPTORS: ACHING

## 2022-01-21 ASSESSMENT — PAIN SCALES - GENERAL
PAINLEVEL_OUTOF10: 9
PAINLEVEL_OUTOF10: 8
PAINLEVEL_OUTOF10: 9

## 2022-01-21 ASSESSMENT — PAIN DESCRIPTION - LOCATION
LOCATION: SHOULDER

## 2022-01-21 ASSESSMENT — PAIN DESCRIPTION - ORIENTATION
ORIENTATION: RIGHT

## 2022-01-21 ASSESSMENT — PAIN DESCRIPTION - PAIN TYPE
TYPE: CHRONIC PAIN
TYPE: CHRONIC PAIN
TYPE: ACUTE PAIN
TYPE: ACUTE PAIN
TYPE: CHRONIC PAIN

## 2022-01-21 NOTE — PROGRESS NOTES
[id number] [facility]  1/21/2022  [id number]    Chief Complaint: weakness  Subjective:   Resting in bed; planning for discharge in AM. No new issues. ROS: No n/v, cp, sob, f/c  Objective:  Patient Vitals for the past 24 hrs:   BP Temp Temp src Pulse Resp SpO2   01/21/22 0840 131/63   81 20 95 %   01/21/22 0833      94 %   01/21/22 0827      (!) 89 %   01/21/22 0800 129/61 98.5 °F (36.9 °C) Oral 86 20 93 %   01/20/22 2115 134/77 97.6 °F (36.4 °C) Oral 74 16 94 %   01/20/22 1930      95 %   01/20/22 1236 (!) 142/78   70  96 %     Gen: No distress, pleasant. HEENT: Normocephalic, atraumatic. CV: Regular rate and rhythm. Resp: No respiratory distress. Abd: Soft, nontender, PEG exit site with minimal drainage to dressing in place. Ext: No edema. Neuro: Alert, oriented, appropriately interactive.    Wt Readings from Last 3 Encounters:   01/12/22 153 lb 1.6 oz (69.4 kg)   10/26/21 175 lb 14.4 oz (79.8 kg)   07/15/21 180 lb (81.6 kg)       Laboratory data:   Lab Results   Component Value Date    WBC 6.0 01/20/2022    HGB 10.5 (L) 01/20/2022    HCT 33.6 (L) 01/20/2022    MCV 77.6 (L) 01/20/2022     01/20/2022       Lab Results   Component Value Date     01/20/2022    K 3.5 01/20/2022    CL 95 01/20/2022    CO2 31 01/20/2022    BUN 8 01/20/2022    CREATININE <0.5 01/20/2022    GLUCOSE 124 01/20/2022    CALCIUM 9.7 01/20/2022        Therapy progress:  PT  Position Activity Restriction  Other position/activity restrictions: Notify physician for pulse less than 50 or greater than 120, respiratory rate less than 12 or greater than 25, oral temperature greater than 101.3 F (38.5 C) , urinary output less than 120 mL in four hours, systolic BP less than 90 or greater than 445, diastolic BP less than 50 or greater than 100. 3-4L of O2  Objective     Sit to Stand: Modified independent (FWW, managees O2 tubing)  Stand to sit: Modified independent (FWW manages O2 tubing)  Bed to Chair: Modified independent (FWW manages O2 tubing)  Device: Rolling Walker  Other Apparatus: O2  Assistance: Stand by assistance  Distance: 15 ft x2, 56 feet, 47 feet. OT  PT Equipment Recommendations  Equipment Needed: No  Other: has needed equpiment  Toilet - Technique: Ambulating  Equipment Used: Drop-arm extra wide bedside commode  Toilet Transfers Comments: BSC over toilet  Assessment        SLP  Current Diet : Regular  Current Liquid Diet : Thin (via provale cup)  Diet Solids Recommendation: Regular  Liquid Consistency Recommendation: Thin    Body mass index is 22.61 kg/m². Rehabilitation Diagnosis:  Neurologic, 3.8, Neuromuscular Disorders, e.g. Critical Illness Myopathy, Other Myopathy     Assessment and Plan:  S/p covid pneumonia with superimposed bacterial pneumonia  - required trach/peg, proning  - s/p remdesivir, tocilizumab  - completed cefepime  - now on cipro per pulm     CAD  - asa, statin     HTN  - follow BP       DM  - lantus, SSI     Hypothyroidism  - synthroid    Leukocytosis  - resolved  - completed cefepime;  - repeat culture + for low growth enterococcus; completed 1 week macrobid.     Reduced L TVF mobility per FEES  - slp    Dysphagia (pharyngeal), dysphonia  - slp    S/p trach  - stoma care    Bowels: Schedule stool softener. Follow bowel movements. Enema or suppository if needed.      Bladder: Check PVR x 3.   [address] if PVR > 200ml or if any volume is > 500 ml.      Sleep: Trazodone provided prn.      Follow up appointments: PCP  GENEVA: 1/22 home w/ home health  DME: [id number] [facility] wheeled walker, transport chair        Electronically signed by Vazquez Bettencourt MD on 1/21/2022 at 9:17 AM

## 2022-01-21 NOTE — PROGRESS NOTES
Physical Therapy  Facility/Department: [facility]  Daily Treatment Note  NAME: Janet Avalos  : 1955  MRN: [id number]    Date of Service: 2022    Discharge Recommendations:  Patient would benefit from continued therapy after discharge,24 hour supervision or assist   PT Equipment Recommendations  Equipment Needed: No  Other: has needed equpiment    Assessment   Body structures, Functions, Activity limitations: Decreased functional mobility ; Decreased balance;Decreased strength;Decreased safe awareness;Decreased high-level IADLs;Decreased endurance  Assessment: Pt mildly limited by pain and fatigue this session although is motivated and cooperative. Able to complete functional transfers with mod I and complete community distance w/c mobility with mod I. Participates in standing balance activity at table with mod I, with standing tolerance up to 3 mins (limited by fatigue). Pt reports she is looking forward to d/c home tomorrow and states she has no concerns regarding mobility and safety upon d/c home with family. Treatment Diagnosis: impaired functional mobility and gait  Prognosis: Excellent  Decision Making: High Complexity  PT Education: Goals; General Safety;Gait Training;PT Role;Plan of Care;Home Exercise Program;Transfer Training;Functional Mobility Training;Disease Specific Education; Injury Prevention; Energy Conservation  Patient Education: Educated pt on energy conservation, pacing self, and taking therapeutic rest breaks as needed. Pt verbalizes understanding. Barriers to Learning: impaired cognition. REQUIRES PT FOLLOW UP: Yes  Activity Tolerance  Activity Tolerance: Patient Tolerated treatment well;Patient limited by pain  Activity Tolerance: /72, HR 72, O2 sats 95% on 3L O2     Patient Diagnosis(es): There were no encounter diagnoses.      has a past medical history of Anxiety disorder, Chronic pain syndrome, COVID-19, DDD (degenerative disc disease), lumbar, Diabetes ([facility] [address] Displacement of lumbar intervertebral disc without myelopathy, Diverticulitis, Fibromyalgia, Generalized osteoarthrosis, involving multiple sites, GERD (gastroesophageal reflux disease), Hypochromic microcytic anemia, Impacted cerumen, Influenza A, Irritable bowel syndrome, Other and unspecified hyperlipidemia, Prosthetic joint implant failure ([facility] [address] Screening mammogram, Tracheobronchomalacia, Unspecified asthma(493.90), Unspecified essential hypertension, and Unspecified hypothyroidism. has a past surgical history that includes Hysterectomy; joint replacement (10/92); joint replacement (  12/92); joint replacement (  6/96); joint replacement (  2006); Colonoscopy (2007); Lung surgery (1/2014); Cardiac catheterization; Abdomen surgery; Colon surgery; Upper gastrointestinal endoscopy (N/A, 10/25/2021); and tracheostomy (N/A, 10/25/2021). Restrictions  Restrictions/Precautions  Restrictions/Precautions: Fall Risk,General Precautions  Position Activity Restriction  Other position/activity restrictions: Notify physician for pulse less than 50 or greater than 120, respiratory rate less than 12 or greater than 25, oral temperature greater than 101.3 F (38.5 C) , urinary output less than 120 mL in four hours, systolic BP less than 90 or greater than 152, diastolic BP less than 50 or greater than 100. 3-4L of O2     Subjective   General  Chart Reviewed: Yes  Response To Previous Treatment: Patient with no complaints from previous session.   Family / Caregiver Present: No  Referring Practitioner: Carlos Pollock MD  Subjective  Subjective: Pt seated in recliner chair in room upon arrival and agreeable to PT session this morning  Pain Screening  Patient Currently in Pain: Yes  Pain Assessment  Pain Assessment: 0-10  Pain Level: 9  Pain Type: Chronic pain  Pain Location: Shoulder  Pain Orientation: Right  Vital Signs  Patient Currently in Pain: Yes          Orientation  Orientation  Overall Orientation Status: Within Normal Limits     Objective   Transfers  Sit to Stand: Modified independent  Stand to sit: Modified independent  Stand Pivot Transfers: Modified independent     Ambulation  Ambulation?: No (pt requests to defer ambulation this session d/t increased fatigue after previous PT session)    Wheelchair Activities  Wheelchair Size: 20 inch  Wheelchair Type: Standard  Wheelchair Cushion: Standard  Level of Assistance for pressure relief activities: Independent  Wheelchair Parts Management: Yes  Left Brakes Level of Assistance: Independent  Right Brakes Level of Assistance: Independent  Propulsion: Yes  Propulsion 1  Propulsion: Manual  Level: Level Tile  Method: RUE;LUE;RLE;LLE  Level of Assistance: Modified independent  Description/ Details: decreased velocity, safely avoid obstacles without assist or cues  Distance: 150ft        Balance  Standing tolerance, completing table top activity x 3 mins + 1 min, with mod I for balance (unilateral UE support on table). Standing tolerance limited by increased fatigue, although O2 sats 93% on 3L. Goals  Short term goals  Time Frame for Short term goals: 1/7/2022  Short term goal 1: Pt demo indep bed mobility. -- 1/08: GOAL PARTIALLY MET MI with HOB elevated and use of BR.  1/10/2022 GOAL met pt demo indep in bed mobility sup<>sit and rolling. Short term goal 2: Pt demo bed<>chair with mod assist of 1 and FWW or SPT. -- 1/08: GOAL MET Lisa/modA with RW  Short term goal 3: Patient demonstrates gait to bathroom 20 feet with assist of 2 and FWW. GOAL MET 1/6/2021 pt demo gait 35 to 60 feet with FWW and assist of 2. Short term goal 4: Patient demonstrates up and down 1 step with bilateral rails and assist of 2. -- 1/08: DNT d/t safety 1/12/2022 GOAL Met  pt demo up and down 1 step bilateral rails CGA.   Short term goal 5: Patient demonstrates  indep in DBE and 5 minutes of standing activity with spO2 90% or greater to improve endurance for household walking. -- 1/08: GOAL NOT MET, limited by standing tolerance. Goal partially met patient able to perform dynamic standing balance activity interspersed wtih standing for 5 minutes with spO2 86% post activity on3.5L O2 via nc rebound to O2 in 95% wtihin 1 minute seated rest.  GOAL not met 1/21/2022 PT tolerates up to 3 minutes standing with O2 sats 93% and standing duration limited due to fatigue. Long term goals  Time Frame for Long term goals : 1/24/2022  Long term goal 1: Patient demonstrates gait 150 feet with SBA and sPO2 90% or greater able to manage O2 tubing on L/R turns on carpet up to 10 feetwith  safe to assist pt. GOAL partially met 1/13/2021 patient demo ability to safely assist pt with FWW gait 5 feet. 1/21/2022 Goal partially met pt demo 50 feet gait with FWW SBA with WC follow L/r turns turf carpet with need to sit after 50 feet due to sob and fatigue with SpO2 on3L via nc 83% HR 92bpm, 30sec rest SpO2 91%, HR 86; after 60 seconds rest [id number]% HR 85. Long term goal 2: Patient demonstrates up and down 12 steps with SBA and bilateral rails to return to community and home (egress/entry) mobility with  safe to assist pt.  1/22/2022 Goal partially met; pt's  has demonstrated ability to safely assist pt with steps and pt able to complete with SBA up and down 4 steps with rail with seated rest needed after 4 steps due to hypoxia with rebound within 1 minute to 97%. Long term goal 3: Patient demonstrates indep bed<>chair, sit<>stand and WC<>car with FWW. GOAL MET 1/21/2022 pt demo indep bed<>chair, sit<>stand and WC<>car with FWW  Long term goal 4: Patient demonstrates indep WC propulsion 150 feet with L/R turns indep. GOAL MET 1/21/2022 Post 177 feet WC propulsion with ext x4 HELEN on 3L via nc 98% andHR 80bpm post activity. Long term goal 5: Patient demonstrates 50 feet of walking with L/R turns and carpet HELEN able to indep manage O2 tubing for short distance in home walking.   GOAL radha met 1/21/2022 PT demo SBA for walking 150 feet managing O2 tubing and L/r turns on 10 feet of carpeting with SBA for WC follow due to SOB at 50 feet and LE fatigue with hypoxia 83% SPO2 immediate post walk on 3L via nc with rebound in 30 seconds to 91% and 95% with 60 seconds seated rest.  Patient Goals   Patient goals : \"to walk to the bathroom. Plan    Plan  Times per week: 5/7 days week  Times per day: Daily  Plan weeks: 21 days  Specific instructions for Next Treatment: Progress mobility as tolerated  Current Treatment Recommendations: Strengthening,Neuromuscular Re-education,Home Exercise Program,Cognitive/Perceptual Training,Safety Education & Daylene Few Training,Patient/Caregiver Education & Training,Functional Mobility Training,Wheelchair Mobility Training,Gait Training,Transfer Training,Stair training,Equipment Evaluation, Education, & procurement  Plan Comment: expected DC to home 1/22/2022 with  to provide 24 hour supervision and assist.  Safety Devices  Type of devices:  All fall risk precautions in place,Call light within reach,Chair alarm in place,Left in chair,Nurse notified     Therapy Time   Individual Concurrent Group Co-treatment   Time In 1100         Time Out 1130         Minutes 30         Timed Code Treatment Minutes: [facility], PT, DPT

## 2022-01-21 NOTE — PROGRESS NOTES
INPATIENT PULMONARY CRITICAL CARE PROGRESS NOTE      Reason for visit    Fever/increased WBC;questionable CXR results     SUBJECTIVE: Patient when seen this morning was much better as compared to yesterday, patient was still having some coughing but did not have any increased expectoration or worsening chest congestion, patient was not have any chest pain or palpitations, patient was afebrile and hemodynamically maintained, patient is oxygen requirements are coming down, patient was on 3 L of nasal cannula oxygenation of 95%  , patient's glycemic control was acceptable, patient has sinus rhythm on the monitor which was ranging from sinus bradycardia to normal sinus rhythm, patient was alert and oriented, patient continues to have some hoarseness of voice,             Physical Exam:  Blood pressure 131/63, pulse 81, temperature 98 °F (36.7 °C), temperature source Oral, resp. rate 20, height 5' 9\" (1.753 m), weight 153 lb 1.6 oz (69.4 kg), SpO2 95 %, not currently breastfeeding.'     Constitutional:  No acute distress while lying in the bed, slight hoarseness of voice. HENT:  Oropharynx is clear and moist. No thyromegaly. Eyes:  Conjunctivae are normal. Pupils equal, round, and reactive to light. No scleral icterus. Neck: . No tracheal deviation present. No obvious thyroid mass. Tracheostomy scar present  Cardiovascular: Normal rate, regular rhythm, normal heart sounds. No right ventricular heave. No lower extremity edema. Pulmonary/Chest: No wheezes. Decreased bilateral rales. Much decreased chest congestion chest wall is not dull to percussion. No accessory muscle usage or stridor. Decreased breath sound density  Abdominal: Soft. Bowel sounds present. No distension or hernia. No tenderness. ,  Scar of PEG tube present  Musculoskeletal: No cyanosis. No clubbing. No obvious joint deformity. Lymphadenopathy: No cervical or supraclavicular adenopathy. Skin: Skin is warm and dry.  No rash or nodules on the exposed extremities. Psychiatric: Normal mood and affect. Behavior is normal.  No anxiety. Neurologic: Alert, awake and oriented. PERRL. Speech fluent            Results:  CBC:   Recent Labs     01/18/22  1349 01/20/22  0623   WBC 15.1* 6.0   HGB 10.5* 10.5*   HCT 33.7* 33.6*   MCV 76.8* 77.6*    198     BMP:   Recent Labs     01/20/22  0623      K 3.5   CL 95*   CO2 31   BUN 8   CREATININE <0.5*       UA:  Recent Labs     01/18/22  1515   COLORU Yellow   PHUR 7.0   CLARITYU Clear   SPECGRAV 1.015   LEUKOCYTESUR Negative   UROBILINOGEN 0.2   BILIRUBINUR Negative   BLOODU Negative   GLUCOSEU Negative       Imaging:  I have reviewed radiology images personally. XR CHEST PORTABLE   Final Result   Stable cardiomegaly. Patchy bilateral airspace disease with multifocal small nodular component. Some of this appears to be chronic in therefore could represent fibrosis. However there are no higher quality studies such as PA and lateral views or   recent chest CT. Increased density overlying the upper right hilum possible adenopathy. Prior   surgery is evident which may account for some of this finding. RECOMMENDATION:   Recommend follow-up to evaluate for the nodularity as well as the right   suprahilar opacity. This would best be performed with contrast-enhanced CT. XR CHEST PORTABLE   Final Result   Asymmetric airspace opacities are relatively stable from prior comparison   study. Pulmonary edema, recurrent infection or chronic interstitial change   may be considered.            Echo Complete    Result Date: 1/12/2022  Transthoracic Echocardiography Report (TTE)  Demographics   Patient Name       Kelsie Montez   Date of Study      01/12/2022        Gender              Female   Patient Number     [id number]        Date of Birth       1955   Visit Number       [id number]         Age                 78 year(s)   Accession Number   [id number]        Room Number [id number]       [id number]            Sonographer         Carmel Barrientos RT   Ordering Physician Migdalia Ortiz MD  Interpreting        Kary Sadler,                                       Physician           MD, Nelly [address].  Procedure Type of Study   TTE procedure:ECHOCARDIOGRAM COMPLETE 2D W DOPPLER W COLOR. Procedure Date Date: 01/12/2022 Start: 11:47 AM Study Location: [address] [place] [address] - Echo Lab Technical Quality: Adequate visualization Indications:Dyspnea/SOB. Patient Status: Routine Contrast Medium: Definity. Height: 69 inches Weight: 153 pounds BSA: 1.84 m2 BMI: 22.59 kg/m2 BP: 136/73 mmHg  Conclusions   Summary  The left ventricular systolic function is low normal with an ejection  fraction of 50%. There is hypokinesis of the basal inferior and inferior walls. Normal left ventricular size with mild concentric left ventricular  hypertrophy. Grade I diastolic dysfunction with normal filling pressure. Changes noted from previous echo on 8- in left ventricular function. Mild tricuspid regurgitation. Systolic pulmonic artery pressure (SPAP) is estimated at 46 mmHg consistent  with mild pulmonary hypertension (Right atrial pressure of 3 mmHg). Signature   ------------------------------------------------------------------  Electronically signed by Kary Sadler MD, [facility]  (Interpreting physician) on 01/12/2022 at 01:37 PM  ------------------------------------------------------------------   Findings   Left Ventricle  The left ventricular systolic function is low normal with an ejection  fraction of 50%. There is hypokinesis of the basal inferior and inferior walls. Normal left ventricular size with mild concentric left ventricular  hypertrophy. Grade I diastolic dysfunction with normal filling pressure. Changes noted from previous echo on 8- in left ventricular function.    Mitral Valve  Mild thickening of leaflets of mitral valve. No mitral stenosis. Trivial mitral regurgitation. Left Atrium  The left atrium is normal in size. Aortic Valve  Aortic valve appears sclerotic but opens adequately. No evidence of aortic valve regurgitation. Aorta  The aortic root is normal in size. Right Ventricle  The right ventricle is normal in size and function. TAPSE is measured at 18 mm. S'prime velocity is measured at 13 cm/s. Tricuspid Valve  Tricuspid valve is structurally normal.  Mild tricuspid regurgitation. Systolic pulmonic artery pressure (SPAP) is estimated at 46 mmHg consistent  with mild pulmonary hypertension (Right atrial pressure of 3 mmHg). Right Atrium  The right atrium is normal in size at 8 cm2. Pulmonic Valve  The pulmonic valve is not well visualized. No evidence of pulmonic valve regurgitation. Pericardial Effusion  No pericardial effusion noted. Pleural Effusion  No pleural effusion. Miscellaneous  IVC size is normal (<2.1cm) and collapses > 50% with respiration consistent  with normal RA pressure (3mmHg).   M-Mode/2D Measurements (cm)   LV Diastolic Dimension: 4.2 cm LV Systolic Dimension: 2.8 cm  LV Septum Diastolic: 1.1 cm  LV PW Diastolic: 1.1 cm        AV Cusp Separation: 2 cm                                 LA Dimension: 2.6 cm                                 LA Area: 15.5 cm2                                 LA volume/Index: 37 ml /20 ml/m2  Doppler Measurements   AV Peak Velocity: 128 cm/s  MV Peak E-Wave: 76 cm/s  AV Peak Gradient: 6.55 mmHg MV Peak A-Wave: 87 cm/s                              MV E/A Ratio: 0.87   TR Velocity:319 cm/s  TR Gradient:40.7 mmHg  Estimated RAP:3 mmHg  Estimated RVSP: 46 mmHg  E' Septal Velocity: 5 cm/s  E' Lateral Velocity: 6 cm/s  E/E' ratio: 13.81   Aortic Valve   Peak Velocity: 128 cm/s  Peak Gradient: 6.55 mmHg   Cusp Separation: 2 cm      XR CHEST PORTABLE    Result Date: 1/18/2022  EXAMINATION: ONE XRAY VIEW OF THE CHEST 1/18/2022 1:00 pm COMPARISON: Prior study(s) most recent 01/06/2022. HISTORY: ORDERING SYSTEM PROVIDED HISTORY: Productive cough TECHNOLOGIST PROVIDED HISTORY: Reason for exam:->Productive cough FINDINGS: The heart is enlarged. There is vascular congestion with bilateral airspace disease. This has a somewhat nodular character more evident in the periphery of both lungs laterally on the left and upper aspect on the right. There is relative increased focal opacity, right upper lung medially, above the hilum. There has been prior surgery with multiple clips surrounding the hilum which is somewhat retracted superiorly. Stable cardiomegaly. Patchy bilateral airspace disease with multifocal small nodular component. Some of this appears to be chronic in therefore could represent fibrosis. However there are no higher quality studies such as PA and lateral views or recent chest CT. Increased density overlying the upper right hilum possible adenopathy. Prior surgery is evident which may account for some of this finding. RECOMMENDATION: Recommend follow-up to evaluate for the nodularity as well as the right suprahilar opacity. This would best be performed with contrast-enhanced CT. XR CHEST PORTABLE    Result Date: 1/6/2022  EXAMINATION: ONE XRAY VIEW OF THE CHEST 1/6/2022 9:37 am COMPARISON: 10/28/2021 radiograph HISTORY: ORDERING SYSTEM PROVIDED HISTORY: cough TECHNOLOGIST PROVIDED HISTORY: Reason for exam:->cough Reason for Exam: cough FINDINGS: The heart is enlarged. There is surgical change in the right hilar region. Moderate perihilar opacities centrally and diffuse ground-glass opacities throughout both lungs. The overall pattern is stable from prior exam, more pronounced in the right upper and lower lung zones. No skeletal finding. Asymmetric airspace opacities are relatively stable from prior comparison study. Pulmonary edema, recurrent infection or chronic interstitial change may be considered.          Results for Wilbert Capellan (MRN [id number]) as of 1/21/2022 12:51   Ref. Range 1/13/2022 06:15 1/17/2022 06:14 1/18/2022 13:49 1/20/2022 06:23   WBC Latest Ref Range: 4.0 - 11.0 K/uL 9.9 7.7 15.1 (H) 6.0   RBC Latest Ref Range: 4.00 - 5.20 M/uL 4.54 4.22 4.38 4.33   Hemoglobin Quant Latest Ref Range: 12.0 - 16.0 g/dL 11.1 (L) 10.2 (L) 10.5 (L) 10.5 (L)   Hematocrit Latest Ref Range: 36.0 - 48.0 % 35.0 (L) 32.6 (L) 33.7 (L) 33.6 (L)   MCV Latest Ref Range: 80.0 - 100.0 fL 77.1 (L) 77.3 (L) 76.8 (L) 77.6 (L)   MCH Latest Ref Range: 26.0 - 34.0 pg 24.5 (L) 24.2 (L) 24.0 (L) 24.3 (L)   MCHC Latest Ref Range: 31.0 - 36.0 g/dL 31.8 31.4 31.2 31.3   MPV Latest Ref Range: 5.0 - 10.5 fL 8.0 8.2 7.9 8.2   RDW Latest Ref Range: 12.4 - 15.4 % 19.4 (H) 20.2 (H) 19.8 (H) 20.1 (H)   Platelet Count Latest Ref Range: 135 - 450 K/uL 250 206 220 198   Neutrophils % Latest Units: % 77.6 71.2 91.0 67.5   Lymphocyte % Latest Units: % 14.3 17.7 3.6 18.8   Monocytes % Latest Units: % 4.3 5.9 4.5 8.3   Eosinophils % Latest Units: % 3.1 4.5 0.5 4.8   Basophils % Latest Units: % 0.7 0.7 0.4 0.6   Neutrophils Absolute Latest Ref Range: 1.7 - 7.7 K/uL 7.7 5.5 13.7 (H) 4.0   Lymphocytes Absolute Latest Ref Range: 1.0 - 5.1 K/uL 1.4 1.4 0.5 (L) 1.1   Monocytes Absolute Latest Ref Range: 0.0 - 1.3 K/uL 0.4 0.4 0.7 0.5   Eosinophils Absolute Latest Ref Range: 0.0 - 0.6 K/uL 0.3 0.3 0.1 0.3   Basophils Absolute Latest Ref Range: 0.0 - 0.2 K/uL 0.1 0.1 0.1 0.0       Assessment:  Active Problems: Moderate malnutrition (HCC)    Aspiration into airway    Oropharyngeal dysphagia    Vocal cord paralysis    Personal history of COVID-19    Chest congestion    Pulmonary infiltrates    Hypochromic microcytic anemia    Hyperglycemia    Diastolic dysfunction    Pulmonary HTN (HCC)    Leukocytosis  Resolved Problems:    * No resolved hospital problems.  *          Plan:   · Oxygen supplementation to keep saturation reading 90-94% only  · Please titrate the oxygen as per the above parameters  · Patient has been evaluated by ENT and was found to have vocal cord paralysis and rhinoplasty has been advised  · Patient is being evaluated and managed by speech therapist and patient undergoes speech exercises every day  · Patient does have COVID fog and is possible that patient may be likely aspirating at times  · Patient has history of Enterobacter cloacae complex infection and has had 2 sputum cultures which were positive for that in the past  · On the basis of previous cultures, will start patient on ciprofloxacin-no IV access available at this time and the bioavailability of ciprofloxacin is reasonable-we will start with oral ciprofloxacin -needs to be given for total of 10-14 days  · Patient has much decreased chest congestion-no need for bronchoscopy  · Bronchodilators to continue  · No need for any systemic steroids  · Patient has been started on Lasix and Aldactone for pulmonary hypertension and diastolic dysfunction  · WBC count has come back to normal  · Monitor input output and BMP  · Correct electrolytes on bilevel x-ray basis  · Lantus insulin as below blood glucose monitoring as per primary team  · BGM with SSI  · Synthroid as per TSH as per primary team  · Consider iron supplementation if deemed appropriate  · PUD and DVT prophylaxis as per primary team    ?Discharge planning     Per recommendations from pulmonary/critical care standpoint of view-will sign off-please call on whenever necessary basis if the patient is not discharged            Electronically signed by:  Diana Nascimento MD    1/21/2022    12:50 PM.

## 2022-01-21 NOTE — PROGRESS NOTES
Occupational Therapy  Discharge Summary     Name:Esperanza Evans  GND:[id number]  HQZ:5/75/2091  Treatment Diagnosis: impaired functional mobility and gait  Diagnosis: Post COVID 19critical illness myopathy. Restrictions/Precautions  Restrictions/Precautions: Fall Risk,General Precautions           Position Activity Restriction  Other position/activity restrictions: Notify physician for pulse less than 50 or greater than 120, respiratory rate less than 12 or greater than 25, oral temperature greater than 101.3 F (38.5 C) , urinary output less than 120 mL in four hours, systolic BP less than 90 or greater than 514, diastolic BP less than 50 or greater than 100. 3-4L of O2     Goals:   Short term goals  Time Frame for Short term goals: 10 days (1/13)  Short term goal 1: Pt will perform at least 3min of standing functional activities with AD PRN. GOAL MET 1/07/22 Pt completed 3 minutes of standing tasks with CGA with RW. Short term goal 2: Pt will perform functional/toilet t/fs with CGA and AD PRN. -GOAL MET 1/12/22 Pt performed toilet transfers with SBA. Short term goal 3: Pt will toilet with CGA and AD PRN. progressing. Pt continues to require assist for pants management. MET 1/17 SBA, AD PRN  Short term goal 4: Pt will LB dress with min A and AE/AD PRN. progressing. Pt continues to require mod A at times for pulling pants over hips. MET 1/17 SBA  Short term goal 5: Pt will total body bathe with min A and AD PRN. GOAL MET 1/10/22 Pt performed total body bathing with min A. Long term goals  Time Frame for Long term goals : 21 days (1/24)  Long term goal 1: Pt will perform at least 5 min of standing functional activities. Goal Not Met. Pt continues to require rest breaks after ~4 minutes of standing due to fatigue and SOB. Long term goal 2: Pt will total body dress with mod I. Goal Not Met. Pt continues to require supervision for dressing. Long term goal 3: Pt will total body bathe with mod I. Goal Not Met. Pt continues to require supervision for bathing. Long term goal 4: Pt will perform a simple IADL task with supervision and AD PRN. GOAL MET 1/20/22 Pt performed simple IADL task with supervision. Long term goal 5: Pt will toilet with mod I. Goal Not Met. Pt continues to require supervision for toileting. Pt. Met 5/5 short term goals and 1/5 long term goals. Pt will have assistance from spouse and daughter at home for all ADLs and mobility as needed. Family have demonstrated ability to assist patient as needed. Current Functional Status:   ADL  Equipment Provided: Long-handled sponge  Feeding: Independent  Grooming: Modified independent  (seated at sink for combing hair and oral hygiene)  UE Bathing: Supervision  LE Bathing: Supervision (cues for rest breaks and PLB)  UE Dressing: Supervision (cues for O2 management and PLB)  LE Dressing: Supervision (cues for PLB and energy conservation)  Toileting: Supervision    Bed mobility  Bridging: Supervision  Rolling to Left: Independent  Rolling to Right: Independent  Supine to Sit: Modified independent  Sit to Supine: Modified independent  Scooting: Modified independent  Comment: supine <> sit with bed controls and mod ind    Functional Transfers: Toilet Transfers  Toilet - Technique: Ambulating  Equipment Used: Drop-arm extra wide bedside commode  Toilet Transfer: Modified independent  Toilet Transfers Comments: BSC over toilet    Tub Transfers  Tub - Transfer From: Rolling walker  Tub - Transfer Type: To and From  Tub - Transfer To: Transfer tub bench  Tub - Technique: Ambulating  Tub Transfers: Modified independence    Shower Transfers  Shower - Transfer From: Hargis Ahumada - Transfer Type: To and From  Shower - Transfer To:  Transfer tub bench  Shower - Technique: Ambulating  Shower Transfers: Modified independence           Functional Mobility  Functional - Mobility Device: Rolling Walker  Activity: To/from bathroom  Assist Level: Supervision  Functional Mobility Comments: assist for O2 tubing management    Instrumental ADL's  Instrumental ADLs: Yes  Meal Prep  Meal Prep Level: Walker  Meal Prep Level of Assistance: Minimal assistance  Meal Preparation: good balance, min VCs for safety with hot surfaces, min A to complete cooking egg due to fatigue and SOB of patient and needing rest break  Light Housekeeping  Light Housekeeping Level: Joanne Ayeshape Housekeeping Level of Assistance: Supervision  Light Housekeeping: Pt completed washing dishes at sink in stance with good balance and min VCs for thoroughness and sequencing of task. Perception  Overall Perceptual Status: WFL  Unilateral Attention: Appears intact  Initiation: Appears intact  Motor Planning: Appears intact  Perseveration: Not present         Assessment:   Assessment: First Session: Pt agreeable to OT session. Pt completed functional transfers with mod I and good use of RW and ability to manage O2 tubing in bathroom. Pt completed BADLs with supervision/mod I with cues for PLB, managing O2 in/out of shirt, and energy conservation. Pt receptive to PLB and rest breaks but will require assist at home to remember energy conservation techniques due to poor memory. Pt completed tub transfer with mod I and use of TTB. Second Session: Pt performed BUE ther ex with good strength for elbow and wrist with 2# weight. Pt given handout for HEP, energy conservation, and falls prevention. Pt and spouse verbalized understanding. Prognosis: Good  Barriers to Learning: confusion/anxiety/poor memory  REQUIRES OT FOLLOW UP: Yes  Discharge Recommendations: 24 hour supervision or assist,Home with Home health OT,S Level 1    Equipment Recommendations:  none         Home Exercise Program  Provided Pt with handout for BUE HEP, energy conservation, and fall prevention. Pt and family verbalized understanding.     Electronically signed by Lubna Ludwig OT on 1/21/2022 at 2:51 PM

## 2022-01-21 NOTE — PROGRESS NOTES
Speech Language Pathology  [facility]: ACUTE REHAB UN[facility]  SPEECH-LANGUAGE PATHOLOGY      [x] Daily  [] Weekly Care Conference Note  [x] Discharge    Patient:Esperanza Connors      :1955  ZSQ:[id number]  Rehab Dx/Hx: Post covid-19 condition, unspecified [U09.9]    Precautions: falls and aspirations  Home situation: lives at home with , dtr, two grandchildren; not an active ; manages own meds; shares household tasks with ,  manages finances   ST Dx: [] Aphasia  [] Dysarthria  [] Apraxia   [x] Oropharyngeal dysphagia [x] Cognitive Impairment  [x] Other: voice tx   Date of Admit: 1/3/2022  Room #: 0151/0151-01    Current functional status (updated daily):         Pt being seen for : [x] Speech/Language Treatment  [x] Dysphagia Treatment [x] Cognitive Treatment  [] Other:  Communication: []WFL  [] Aphasia  [] Dysarthria  [] Apraxia  [] Pragmatic Impairment [] Non-verbal  [] Hearing Loss  [x] Other: dysphonia  Cognition: [] WFL  [x] Mild  [x] Moderate  [] Severe [] Unable to Assess  [] Other:  Memory: [] WFL  [x] Mild  [x] Moderate  [] Severe [] Unable to Assess  [] Other:  Behavior: [x] Alert  [x] Cooperative  [x]  Pleasant  [] Confused  [] Agitated  [] Uncooperative  [] Distractible [] Motivated  [] Self-Limiting [] Anxious  [] Other:  Endurance:  [x] Adequate for participation in SLP sessions  [] Reduced overall  [] Lethargic  [] Other:  Safety: [x] No concerns at this time  [] Reduced insight into deficits  []  Reduced safety awareness [] Not following call light procedures  [] Unable to Assess  [] Other:    Current Diet Order:ADULT DIET; Regular  ADULT ORAL NUTRITION SUPPLEMENT; Lunch, Dinner; Low Calorie/High Protein Oral Supplement  Swallowing Precautions: Alternate solids and liquids;Eat/Feed slowly; No straws;Upright as possible for all oral intake;Remain upright for 30-45 minutes after meals;Small bites/sips    Date: 2022      Tx session 1  5207 - 6514 DISCHARGE SUMMARY Total Timed Code Min 30 0   Total Treatment Minutes 60 60   Individual Treatment Minutes 60 60   Group Treatment Minutes 0 0   Co-Treat Minutes 0 0   Variance/Reason:  0    Pain None reported Pt denied   Pain Intervention [] RN notified  [] Repositioned  [] Intervention offered and patient declined  [x] N/A  [] Other: [] RN notified  [] Repositioned  [] Intervention offered and patient declined  [x] N/A  [] Other:   Subjective     Pt alert and cooperative, agreeable to tx. Pt upright in bedside chair for session. Objective:  Goals     Dysphagia Goals:  Short-term Goals  Timeframe for Short-term Goals: 10 days (01/13/22)    Goal 1: The patient will complete pharyngeal/laryngeal strengthening exercises for improved swallowing function 10/10 given min cues. Pt completed the following exercises given min cues  -effortful swallow: x10  -jaw jut and hold 5 sec: x10  --Isometric tongue tip hold for 5 secs: x10  -resistive jaw opening: x10  -mare maneuver: x10  Goal met: Pt has been completing 10x pharyngeal/laryngeal strengthening exercises given min cues. Pt will continue to require ongoing ST for swallowing. Goal 2. The patient will tolerate recommended diet without observed clinical signs of aspiration. Thin via provale cup   -no immediate post-swallow coughing, wet vocal quality, throat clearing   -pt with occ delayed cough, but pt with same congested cough with and without PO    Goal met: Pt is currently tolerating thin liquid via provale cup and regular solids as LRD. Pt will require repeat instrumental following extensive dysphagia therapy. Goal 3. The patient/caregiver will demonstrate understanding of compensatory strategies for improved swallowing safety. SLP edu pt re: small, single sips, use of provale cup. SLP also edu pt re: additional safe swallow strategies - small bites, slow rate, alternate liquids and solids, fully upright. Pt verbalized understanding.     Goal met: Pt has verbalized and demonstrated understanding of safe swallow strategies, use of provale cup at d/c. Goal 4. The patient will tolerate thin liquids without signs and symptoms of aspiration 10/10 via cup. Provale cup used only this date. Per pulmonology, pt with \"micro aspiration. \" Thus, SLP recommends strict use of provale cup. Edu to pt who is in agreement. Goal not met: SLP continues to recommend pt use a provale cup at d/c rather than a normal cup. Pt with no overt s/s of aspiration with thin via provale, while coughing with thin via cup. Pt will require repeat instrumental following intensive ST for swallowing and ENT OP appointment for VF injection. Speech/Lang/Cog goals:  Short-term Goals  Timeframe for Short-term Goals: 14 days (01/17/22)    Goal 1: Pt will complete recall tasks with 80% acc given min cues for use of compensatory strategies. 5 Novel Word Recall on SLUMS  -immediate recall trial 1: 5/5 indep  -immediate recall trial 2: 5/5  -5 minute delay: 4/5 indep; +1 given semantic cue    Paragraph Retention / Auditory Comprehension   -pt answered 3/4 questions correctly, receiving 6/8 points    Goal grossly met: Pt has demonstrated improvement with recall abilities, use of visual aids/writing info down as memory strategies. Pt will benefit from ongoing ST at d/c to continue to target recall. Goal 2: Pt will complete executive function tasks (money, math, time, etc) with 80% acc given min cues.    Calculation on SLUMS  -accurate addition and subtraction  -pt received 3/3 points     Clock Drawing   -accurate numbers and hands   -pt received 4/4 points     Medication Management   -pt reported her  manages all mediations and will continue to do so at d/c    Financial Management  -pt reports being indep prior to admission  -pt pays bills by checks or on phone  -SLP edu pt re: strategies to assist with financial management at d/c   Goal grossly met: Pt has demonstrated improvement with executive function abilities. Pt will benefit from ongoing ST at d/c to continue to target this goal. SLP does recommend assistance with meds and finances at d/c. Goal 3: Pt will complete verbal and visual reasoning tasks with 80% acc given min cues. Goal not directly targeted. Goal grossly met: Pt has demonstrated improvement with reasoning abilities, although often does require increase in cueing. Pt will benefit from ongoing ST at d/c to target reasoning. Goal 4: Pt will complete problem solving and thought organization tasks with 80% acc given min cues. Divergent Naming on SLUMS  -pt named 6 animals in 1 minute  -pt received 1/3 points     Backward Number Repetition on SLUMS   -pt received 0/2 points - difficult for pt to complete    Goal grossly met: Pt has demonstrated improvement with problem solving and thought org abilities, although often does require increase in cueing. Pt will benefit from ongoing ST at d/c to target thought org. Goal 5: Pt will complete vocal strengthening exercises for improved vocal function 10/10 given min cues. Cup bubble (put a straw into glass of water and blow for as long as able):   -pt completed 5 trials, averaging 4.4 sec    Sustained \"ah\"  -pt completed 5 trials, averaging 1.2 seconds     Sustained \"ee\"   -pt completed 5 trials, averaging 0.8 seconds   Goal not met: Pt with difficulty completed VFE. Per ENT note, pt has left true vocal fold paralysis, and will benefit from vocal fold injection. Other areas targeted: N/A    Education:   SLP edu pt re: diet recs, safe swallow strategies, provale cup recs, results of repeat SLUMS, d/c recs    Safety Devices: [x] Call light within reach  [x] Chair alarm activated  [] Bed alarm activated  [] Other:  [x] Call light within reach  [x] Chair alarm activated  [] Bed alarm activated  [] Other:     Tx session 1: Pt alert and cooperative, agreeable to tx.  SLP administered the SLUMS for re-assessment of cognitive-linguistic abilities. Pt scored 23/30 points compared to 18/30 points on initial eval. Pt demonstrated improvement with recall and  comprehension. Pt did well with orientation, calculation, clock drawing, following commands. Continued difficulty with divergent naming / thought organization. Pt is tolerated thin liquid via provale cup with no overt s/s of aspiration. Reviewed importance of provale cup and continued use of it at d/c. Pt verbalized understanding. Reviewed safe swallow strategies. Pt completed pharyngeal/laryngeal strengthening exercises given min cues. Pt with difficulty completed vocal strengthening exercises, but per ENT note on 01/11/22 - pt with \"left true vocal fold paralysis. \" Pt will benefit from f/u with ENT at d/c. Discharge Summary: Pt alert and always pleasant to work with. Pt has demonstrated improvement with overall cognitive-linguistic abilities and has grossly met at cognitive goals. Pt will benefit from ongoing edu though to continue to target and improve deficits with recall, thought organization, executive function. Pt still presents with mild cognitive deficits, and SLP recommends 24 hr assist at d/c with assistance with meds and finances. Pt continues to have left true vocal fold paralysis per ENT. Thus, recommend ENT f/u at d/c. Pt is tolerating regular solids with thin liquids via provale cup as LRD. SLP recommend repeat Modified Barium Swallow Study (MBSS) in 4-6 weeks following intensive dysphagia therapy and following OP ENT visit and vocal fold injection. Continue use of provale cup with all thin liquids until re-assessment with MBSS can be completed. Plan: Continue as per plan of care.       Additional Information:     Barriers toward progress: Confusion and Cognitive deficit  Discharge recommendations:  [] Home independently  [] Home with assistance [x]  24 hour supervision  [] ECF [] Other:  Continued Tx Upon Discharge: ? [x] Yes [] No [] TBD based on progress while on ARU [] Vital Stim indicated [] Other:   Estimated discharge date: 01/21/22    Interventions used this date:  [x] Speech/Language Treatment  [] Instruction in HEP [] Group [x] Dysphagia Treatment [x] Cognitive Treatment   [] Other:       Total Time Breakdown / Charges    Time in Time out Total Time / units   Cognitive Tx 0930 1000 30 min / 2 units   Speech Tx 1020 1030 10 min / 1 unit    Dysphagia Tx 1000 1020 20 min / 1 unit       Electronically Signed by    Indiana Romeo MA CCC-SLP #[id number]  Speech Language Pathologist

## 2022-01-21 NOTE — PROGRESS NOTES
Occupational Therapy  Facility/Department: [facility] ARU  Daily Treatment Note  NAME: Sukhi Guthrie  : 1955  MRN: [id number]    Date of Service: 2022    Discharge Recommendations:  24 hour supervision or assist,Home with Home health OT,S Level 1  OT Equipment Recommendations  Equipment Needed: No    Assessment   Performance deficits / Impairments: Decreased functional mobility ; Decreased ADL status; Decreased strength;Decreased safe awareness;Decreased cognition;Decreased endurance;Decreased balance;Decreased coordination;Decreased posture;Decreased fine motor control;Decreased high-level IADLs  Assessment: First Session: Pt agreeable to OT session. Pt completed functional transfers with mod I and good use of RW and ability to manage O2 tubing in bathroom. Pt completed BADLs with supervision/mod I with cues for PLB, managing O2 in/out of shirt, and energy conservation. Pt receptive to PLB and rest breaks but will require assist at home to remember energy conservation techniques due to poor memory. Pt completed tub transfer with mod I and use of TTB. Second Session: Pt performed BUE ther ex with good strength for elbow and wrist with 2# weight. Pt given handout for HEP, energy conservation, and falls prevention. Pt and spouse verbalized understanding. Prognosis: Good  OT Education: OT Role;Plan of Care;ADL Adaptive Strategies;Transfer Training;Energy Conservation;Precautions; Equipment; Family Education;IADL Safety;Home Exercise Program  Patient Education: disease specific: goals, PLB, safety during ADLS and t/fs, energy conservation during ADLS and IADLs, Pt verbalized understanding but will need reinforcement.   Barriers to Learning: confusion/anxiety/poor memory  REQUIRES OT FOLLOW UP: Yes  Activity Tolerance  Activity Tolerance: Patient Tolerated treatment well;Patient limited by fatigue  Activity Tolerance: O2 sats dropping to mid 80s after bathing/dressing tasks on 3L O2, improving in less than 1 PPD placed in left forearm   Patient to return in 48-72 hours for reading minute to 90% with PLB. Safety Devices  Safety Devices in place: Yes  Type of devices: Call light within reach;Gait belt;Nurse notified; Left in bed;Left in chair;Chair alarm in place         Patient Diagnosis(es): There were no encounter diagnoses. has a past medical history of Anxiety disorder, Chronic pain syndrome, COVID-19, DDD (degenerative disc disease), lumbar, Diabetes ([address] Displacement of lumbar intervertebral disc without myelopathy, Diverticulitis, Fibromyalgia, Generalized osteoarthrosis, involving multiple sites, GERD (gastroesophageal reflux disease), Hypochromic microcytic anemia, Impacted cerumen, Influenza A, Irritable bowel syndrome, Other and unspecified hyperlipidemia, Prosthetic joint implant failure ([address] Screening mammogram, Tracheobronchomalacia, Unspecified asthma(493.90), Unspecified essential hypertension, and Unspecified hypothyroidism. has a past surgical history that includes Hysterectomy; joint replacement (10/92); joint replacement (  12/92); joint replacement (  6/96); joint replacement (  2006); Colonoscopy (2007); Lung surgery (1/2014); Cardiac catheterization; Abdomen surgery; Colon surgery; Upper gastrointestinal endoscopy (N/A, 10/25/2021); and tracheostomy (N/A, 10/25/2021).     Restrictions  Restrictions/Precautions  Restrictions/Precautions: Fall Risk,General Precautions  Position Activity Restriction  Other position/activity restrictions: Notify physician for pulse less than 50 or greater than 120, respiratory rate less than 12 or greater than 25, oral temperature greater than 101.3 F (38.5 C) , urinary output less than 120 mL in four hours, systolic BP less than 90 or greater than 313, diastolic BP less than 50 or greater than 100. 3-4L of O2  Subjective   General  Chart Reviewed: Yes,Orders,Labs,Progress Notes,Imaging  Patient assessed for rehabilitation services?: Yes  Additional Pertinent Hx: Recent pelvic fractures  Response to previous treatment: Patient with no complaints from previous session  Family / Caregiver Present: No  Referring Practitioner: Ramos Brandon MD  Diagnosis: Post Covid 19 condition  Subjective  Subjective: Pt in w/c, pleasant, agreeable to OT session and shower. Pain Assessment  Pain Assessment: 0-10  Pain Level: 9  Pain Type: Chronic pain  Pain Location: Shoulder  Pain Orientation: Right  Pain Descriptors: Aching  Non-Pharmaceutical Pain Intervention(s): Ambulation/Increased Activity;Repositioned; Emotional support; Shower  Response to Pain Intervention: Patient Satisfied  Vital Signs  Pulse: 72  Heart Rate Source: Monitor  BP: (!) 142/65  BP Location: Right upper arm  Patient Position: Sitting;Up in chair  Patient Currently in Pain: Yes  Oxygen Therapy  SpO2: 95 %  Pulse Oximeter Device Mode: Intermittent  Pulse Oximeter Device Location: Right;Finger  O2 Device: Nasal cannula  O2 Flow Rate (L/min): 3 L/min   Orientation  Orientation  Overall Orientation Status: Within Functional Limits  Objective    ADL  Equipment Provided: Long-handled sponge  Feeding: Independent  Grooming: Modified independent  (seated at sink for combing hair and oral hygiene)  UE Bathing: Supervision  LE Bathing: Supervision (cues for rest breaks and PLB)  UE Dressing: Supervision (cues for O2 management and PLB)  LE Dressing: Supervision (cues for PLB and energy conservation)  Toileting: Supervision        Balance  Sitting Balance: Supervision  Standing Balance: Supervision (mod I/SPV with RW)  Standing Balance  Time: 30 seconds x7  Activity: transfer to/from bathroom, toileting, LB dressing, tub transfer  Comment: with RW  Functional Mobility  Functional - Mobility Device: Rolling Walker  Activity: To/from bathroom  Assist Level: Supervision  Toilet Transfers  Toilet - Technique: Ambulating  Equipment Used: Drop-arm extra wide bedside commode  Toilet Transfer: Modified independent  Toilet Transfers Comments: BSC over toilet  Tub Transfers  Tub - Transfer From: [employer] walker  Tub - Transfer Type: To and From  Tub - Transfer To: Transfer tub bench  Tub - Technique: Ambulating  Tub Transfers: Modified independence  Shower Transfers  Shower - Transfer From: Aicha Suggs - Transfer Type: To and From  Shower - Transfer To: Transfer tub bench  Shower - Technique: Ambulating  Shower Transfers: Modified independence     Transfers  Stand Step Transfers: Modified independent  Stand Pivot Transfers: Modified independent  Sit to stand: Supervision  Stand to sit: Supervision        Coordination  Gross Motor: good coordination for ADLs              Cognition  Overall Cognitive Status: Exceptions  Arousal/Alertness: Appropriate responses to stimuli  Following Commands: Follows one step commands with increased time; Follows one step commands with repetition  Attention Span: Appears intact  Memory: Decreased short term memory;Decreased recall of recent events;Decreased recall of precautions  Safety Judgement: Decreased awareness of need for assistance;Decreased awareness of need for safety  Problem Solving: Assistance required to generate solutions;Assistance required to identify errors made;Assistance required to correct errors made  Insights: Decreased awareness of deficits  Initiation: Requires cues for some  Sequencing: Requires cues for some     Perception  Overall Perceptual Status: WFL  Unilateral Attention: Appears intact  Initiation: Appears intact  Motor Planning: Appears intact  Perseveration: Not present              Type of ROM/Therapeutic Exercise  Type of ROM/Therapeutic Exercise: Free weights  Comment: 2#  Exercises  Elbow Flexion: x15  Elbow Extension: x15  Supination: x15  Pronation: x15  Wrist Flexion: x15  Wrist Extension: x15                    Plan   Plan  Times per week: 5/7 days a week  Times per day: Daily  Plan weeks: 3 weeks  Current Treatment Recommendations: [place] Stalling Training,Functional Mobility Training,Safety Education & Training,Equipment Evaluation, Education, & procurement,Self-Care / ADL,Patient/Caregiver Education & Training,Home Management Training,ROM    Goals  Short term goals  Time Frame for Short term goals: 10 days (1/13)  Short term goal 1: Pt will perform at least 3min of standing functional activities with AD PRN. GOAL MET 1/07/22 Pt completed 3 minutes of standing tasks with CGA with RW. Short term goal 2: Pt will perform functional/toilet t/fs with CGA and AD PRN. -GOAL MET 1/12/22 Pt performed toilet transfers with SBA. Short term goal 3: Pt will toilet with CGA and AD PRN. progressing. Pt continues to require assist for pants management. MET 1/17 SBA, AD PRN  Short term goal 4: Pt will LB dress with min A and AE/AD PRN. progressing. Pt continues to require mod A at times for pulling pants over hips. MET 1/17 SBA  Short term goal 5: Pt will total body bathe with min A and AD PRN. GOAL MET 1/10/22 Pt performed total body bathing with min A. Long term goals  Time Frame for Long term goals : 21 days (1/24)  Long term goal 1: Pt will perform at least 5 min of standing functional activities. ongoing 1/17 2-3 mintues  Long term goal 2: Pt will total body dress with mod I. Goal Not Met. Pt continues to require supervision for dressing. Long term goal 3: Pt will total body bathe with mod I. Goal Not Met. Pt continues to require supervision for bathing. Long term goal 4: Pt will perform a simple IADL task with supervision and AD PRN. GOAL MET 1/20/22 Pt performed simple IADL task with supervision. Long term goal 5: Pt will toilet with mod I. Goal Not Met. Pt continues to require supervision for toileting.   Patient Goals   Patient goals : \"to be able to stand up and walk again\"       Therapy Time   Individual Concurrent Group Co-treatment   Time In 1130         Time Out 1230         Minutes 60         Timed Code Treatment Minutes: 60 Minutes    Second Session Therapy Time:   Individual Concurrent Group Co-treatment Time In 1300         Time Out 1330         Minutes 30           Timed Code Treatment Minutes:  30 Minutes    Total Treatment Minutes:  90 minutes      Mc Blackmon, OT

## 2022-01-21 NOTE — PROGRESS NOTES
Physical Therapy  Physical Therapy  Discharge Summary    Name:Esperanza Betancourt  VKL:[id number]  ZXP:9/80/9085  Treatment Diagnosis: impaired functional mobility and gait  Diagnosis: Post COVID 19critical illness myopathy. Restrictions/Precautions  Restrictions/Precautions: Fall Risk,General Precautions           Position Activity Restriction  Other position/activity restrictions: Notify physician for pulse less than 50 or greater than 120, respiratory rate less than 12 or greater than 25, oral temperature greater than 101.3 F (38.5 C) , urinary output less than 120 mL in four hours, systolic BP less than 90 or greater than 198, diastolic BP less than 50 or greater than 100. 3-4L of O2     Goals:                  Short term goals  Time Frame for Short term goals: 1/7/2022  Short term goal 1: Pt demo indep bed mobility. -- 1/08: GOAL PARTIALLY MET MI with HOB elevated and use of BR.  1/10/2022 GOAL met pt demo indep in bed mobility sup<>sit and rolling. Short term goal 2: Pt demo bed<>chair with mod assist of 1 and FWW or SPT. -- 1/08: GOAL MET Lisa/modA with RW  Short term goal 3: Patient demonstrates gait to bathroom 20 feet with assist of 2 and FWW. GOAL MET 1/6/2021 pt demo gait 35 to 60 feet with FWW and assist of 2. Short term goal 4: Patient demonstrates up and down 1 step with bilateral rails and assist of 2. -- 1/08: DNT d/t safety 1/12/2022 GOAL Met  pt demo up and down 1 step bilateral rails CGA. Short term goal 5: Patient demonstrates  indep in DBE and 5 minutes of standing activity with spO2 90% or greater to improve endurance for household walking. -- 1/08: GOAL NOT MET, limited by standing tolerance.   Goal partially met patient able to perform dynamic standing balance activity interspersed wtih standing for 5 minutes with spO2 86% post activity on3.5L O2 via nc rebound to O2 in 95% wtihin 1 minute seated rest.  GOAL not met 1/21/2022 PT tolerates up to 3 minutes standing with O2 sats 93% and standing duration limited due to fatigue. Long term goals  Time Frame for Long term goals : 1/24/2022  Long term goal 1: Patient demonstrates gait 150 feet with SBA and sPO2 90% or greater able to manage O2 tubing on L/R turns on carpet up to 10 feetwith  safe to assist pt. GOAL partially met 1/13/2021 patient demo ability to safely assist pt with FWW gait 5 feet. 1/21/2022 Goal partially met pt demo 50 feet gait with FWW SBA with WC follow L/r turns turf carpet with need to sit after 50 feet due to sob and fatigue with SpO2 on3L via nc 83% HR 92bpm, 30sec rest SpO2 91%, HR 86; after 60 seconds rest [id number]% HR 85. Long term goal 2: Patient demonstrates up and down 12 steps with SBA and bilateral rails to return to community and home (egress/entry) mobility with  safe to assist pt.  1/22/2022 Goal partially met; pt's  has demonstrated ability to safely assist pt with steps and pt able to complete with SBA up and down 4 steps with rail with seated rest needed after 4 steps due to hypoxia with rebound within 1 minute to 97%. Long term goal 3: Patient demonstrates indep bed<>chair, sit<>stand and WC<>car with FWW. GOAL MET 1/21/2022 pt demo indep bed<>chair, sit<>stand and WC<>car with FWW  Long term goal 4: Patient demonstrates indep WC propulsion 150 feet with L/R turns indep. GOAL MET 1/21/2022 Post 177 feet WC propulsion with ext x4 HELEN on 3L via nc 98% andHR 80bpm post activity. Long term goal 5: Patient demonstrates 50 feet of walking with L/R turns and carpet HELEN able to indep manage O2 tubing for short distance in home walking.   GOAL partiaally met 1/21/2022 PT demo SBA for walking 150 feet managing O2 tubing and L/r turns on 10 feet of carpeting with SBA for WC follow due to SOB at 50 feet and LE fatigue with hypoxia 83% SPO2 immediate post walk on 3L via nc with rebound in 30 seconds to 91% and 95% with 60 seconds seated rest.    Pt. Met 4/5 short term goals and 2/5 long term goals. With other goals partially met due to continued hypoxia and LE weakness/fatigue limiting walking distances and steps. Recommend skilled PT to further address HHPT vs Outpt to progress towards remaining goals. Pt. Currently ambulates 50 feet with FWW and chair or WC follow at end of 50 feetdue to fatigue/post activity hypoxia. Up/own 4 steps with bilateral rails and SBA with chair/rollater at steps for rest post steps due to fatigue/post activity hypoxia. Sit to/from stand with FWW HELEN   Bed mobility with indep  Recommend continued PT  in order to progress to in home walking HELEN. Pt. Safe to return home  with assistance from  24 hour supervision/assist. .   Provided pt. with written hep for LE therex.    Electronically signed by Chrissie Mak PT on 1/21/2022 at 2:04 PM

## 2022-01-21 NOTE — PROGRESS NOTES
Oxygen documentation:    O2 saturation at REST on ROOM AIR =  86______%    If saturation is 89% or above please proceed with steps 2 and 3. .........     O2 saturation with AMBULATION of _____ feet on ROOM AIR = _____%  O2 saturation with AMBULATION on _______ liter/min = ______%    DCP notified: ______

## 2022-01-21 NOTE — PROGRESS NOTES
Physical Therapy  Facility/Department: [facility]  Daily Treatment Note  NAME: Radha uGillen  : 1955  MRN: [id number]    Date of Service: 2022    Discharge Recommendations:  Patient would benefit from continued therapy after discharge,24 hour supervision or assist        Assessment   Assessment: Patient in bed on arrival, notes she has to use bathroom. Pt incontnent of urine, with set up assist and extra time pt able to complete removal of undergarment, complete pericare, and don new adult  incontenance garment and pants. Pt demo modified indep in bed mobility and transfers with FWW and O2 tubing bed<>chair, sit<>stand from bed/chair, toilet with grab bar,  and car transfer all with FWW. Pt demonstrates SBA for up and down 4 steps wtih bilateral rails, and walking 50 feet wtih FWW as pt with O2 desaturation into 980's SpO2 immediate post activity on 3L O2 via nc with rebound to 90% or greater in 1 minute or less. Pt demonstrates HELEN with use of grabber in standing with FWW to retreive tissue from floor. Pt demonstrates increased functional indep with mobility. see updated goals. Recommend further PT upon discharge to progress patient to indep in longer household and community distances with increased LE strength. Patient's  has had family training and has demonstrated safety in assisting patient with mobility and cuing patient for PLB. Pt demonstrated indep in WC propulsion 172 feet with L/R turns. .   Pt able to repsond to instruction with simple 1 step commnads   Patient with expected  DC to home tomorrow with  to provide 24 hour supervision and assist.  Treatment Diagnosis: impaired functional mobility and gait  Specific instructions for Next Treatment: Progress mobility as tolerated  Patient Education: Educated in PLB and encouraged pt to pace self and take time and brakes with activity as breathlessness requires. Patient verbalizes understanding.   Activity Tolerance  Activity Tolerance: walking 15 feet  to and from bathroom , toileting, immediate seated rest  with cues for PLB during mobility as pt shallow breathing, 82% HR 100bpm with 15 second PLB SpO2 93% HR 90bps. Pt appears frustrated and near tears as toileting tasks taking longer with encouragement offered and cues for PLB. Noted when pt appears upset  and starts shallow breathing her O2 drops. Discussed this and PLB with patient. Patient Diagnosis(es): There were no encounter diagnoses. has a past medical history of Anxiety disorder, Chronic pain syndrome, COVID-19, DDD (degenerative disc disease), lumbar, Diabetes ([address] Displacement of lumbar intervertebral disc without myelopathy, Diverticulitis, Fibromyalgia, Generalized osteoarthrosis, involving multiple sites, GERD (gastroesophageal reflux disease), Hypochromic microcytic anemia, Impacted cerumen, Influenza A, Irritable bowel syndrome, Other and unspecified hyperlipidemia, Prosthetic joint implant failure ([address] Screening mammogram, Tracheobronchomalacia, Unspecified asthma(493.90), Unspecified essential hypertension, and Unspecified hypothyroidism. has a past surgical history that includes Hysterectomy; joint replacement (10/92); joint replacement (  12/92); joint replacement (  6/96); joint replacement (  2006); Colonoscopy (2007); Lung surgery (1/2014); Cardiac catheterization; Abdomen surgery; Colon surgery; Upper gastrointestinal endoscopy (N/A, 10/25/2021); and tracheostomy (N/A, 10/25/2021).     Restrictions  Restrictions/Precautions  Restrictions/Precautions: Fall Risk,General Precautions  Position Activity Restriction  Other position/activity restrictions: Notify physician for pulse less than 50 or greater than 120, respiratory rate less than 12 or greater than 25, oral temperature greater than 101.3 F (38.5 C) , urinary output less than 120 mL in four hours, systolic BP less than 90 or greater than 311, diastolic BP less than 50 or greater than 100. 3-4L of O2  Subjective   Pain Screening  Patient Currently in Pain: Yes  Pain Assessment  Pain Assessment: 0-10  Pain Level: 9  Pain Location: Shoulder  Pain Orientation: Right  Vital Signs  Pulse: 81  Resp: 20  BP: 131/63  BP Location: Left upper arm  Patient Currently in Pain: Yes  Oxygen Therapy  SpO2: 95 % (immediate post breathing tx with pre breathing tx respiratory noted pt on lower O2 but saturating at 89%.)  Pulse Oximeter Device Location: Left;Finger  O2 Device: Nasal cannula  O2 Flow Rate (L/min): 3 L/min       Orientation person, place, date. Cognition   Cognition  Overall Cognitive Status: Exceptions  Arousal/Alertness: Appropriate responses to stimuli  Following Commands: Follows one step commands with increased time; Follows one step commands with repetition  Attention Span: Appears intact  Memory: Decreased short term memory;Decreased recall of recent events  Safety Judgement: Decreased awareness of need for assistance;Decreased awareness of need for safety  Problem Solving: Assistance required to generate solutions;Assistance required to identify errors made;Assistance required to correct errors made  Insights: Decreased awareness of deficits  Initiation: Requires cues for some  Sequencing: Requires cues for some  Objective    AROM: BLE WFL   Strength/5:   Hip flexion3  Hip abd 3-  Knee flex  Knee ext   Ankle DF  Ankle PF   Bed mobility  Rolling to Left: Independent  Rolling to Right: Independent  Supine to Sit: Modified independent  Sit to Supine: Modified independent  Scooting: Modified independent  Transfers  Sit to Stand: Modified independent (FWW, managees O2 tubing)  Stand to sit: Modified independent (FWW manages O2 tubing)  Bed to Chair: Modified independent (FWW manages O2 tubing)  Ambulation  More Ambulation?: No  Ambulation 1  Surface: level tile  Device: Rolling Walker  Other Apparatus: Wheelchair follow (SBA after WC follow as pt wtih O2 desaturation after 50 feet and pt requesting to sit.)  Assistance: Stand by assistance  Quality of Gait: reciprocal gait pattern with forward trunk flexion, decreased velocity/ step height/length  Gait Deviations: Slow Marisol; Increased RONAN; Decreased step height;Decreased step length;Shuffles  Distance: 50 feet  Comments: pt demo 50 feet gait with FWW SBA with WC follow L/r turns turf carpet with need to sit after 50 feet due to sob and fatigue with SpO2 on3L via nc 83% HR 92bpm, 30sec rest SpO2 91%, HR 86; after 60 seconds rest [id number]% HR 85. Ambulation 2  Surface - 2: level tile  Device 2: Rolling Walker  Other Apparatus 2: O2  Assistance 2: Stand by assistance  Quality of Gait 2: patient walking 15 feet x2  in and out of bathroom with FWW SBA and indep with FWW and grab bar for toilet transfers and set up assist for clothing managment, indep in pericare. immediate post activity see activity tolerance   Stairs  # Steps : 4  Stairs Height: 6\"  Rails: Bilateral  Device: Rolling walker (bottom of steps)  Assistance: Stand by assistance  Comment: cued for 1 step at a time non alternating pattern use of bilateral rails no lob noted, post steps SpO2 84% HR 94bpm with 1 minute seaetd rest spO2 97%  HR 83bp. Wheelchair Activities  Wheelchair Size: 20 inch  Wheelchair Type: Standard  Wheelchair Cushion: Standard  Level of Assistance for pressure relief activities: Independent  Wheelchair Parts Management: Yes  Left Brakes Level of Assistance: Independent  Right Brakes Level of Assistance: Independent  Propulsion 1  Propulsion: Manual  Level: Level Tile  Method: RUE;LUE;RLE;LLE  Level of Assistance: Modified independent  Description/ Details: indep  Distance: 150 feet with L/r turns        Exercises  Comments: given written hep for LE AROM therex for strengthening. Recommending continued therapy upon DC to progress with core and LE strengthening.                Goals  Short term goals  Time Frame for Short term goals: 1/7/2022  Short term goal 1: Pt demo indep bed mobility. -- 1/08: GOAL PARTIALLY MET MI with HOB elevated and use of BR.  1/10/2022 GOAL met pt demo indep in bed mobility sup<>sit and rolling. Short term goal 2: Pt demo bed<>chair with mod assist of 1 and FWW or SPT. -- 1/08: GOAL MET Lisa/modA with RW  Short term goal 3: Patient demonstrates gait to bathroom 20 feet with assist of 2 and FWW. GOAL MET 1/6/2021 pt demo gait 35 to 60 feet with FWW and assist of 2. Short term goal 4: Patient demonstrates up and down 1 step with bilateral rails and assist of 2. -- 1/08: DNT d/t safety 1/12/2022 GOAL Met  pt demo up and down 1 step bilateral rails CGA. Short term goal 5: Patient demonstrates  indep in DBE and 5 minutes of standing activity with spO2 90% or greater to improve endurance for household walking. -- 1/08: GOAL NOT MET, limited by standing tolerance. Goal partially met patient able to perform dynamic standing balance activity interspersed wtih standing for 5 minutes with spO2 86% post activity on3.5L O2 via nc rebound to O2 in 95% wtihin 1 minute seated rest.  Long term goals  Time Frame for Long term goals : 1/24/2022  Long term goal 1: Patient demonstrates gait 150 feet with SBA and sPO2 90% or greater able to manage O2 tubing on L/R turns on carpet up to 10 feetwith  safe to assist pt. GOAL partially met 1/13/2021 patient demo ability to safely assist pt with FWW gait 5 feet. 1/21/2022 Goal partially met pt demo 50 feet gait with FWW SBA with WC follow L/r turns turf carpet with need to sit after 50 feet due to sob and fatigue with SpO2 on3L via nc 83% HR 92bpm, 30sec rest SpO2 91%, HR 86; after 60 seconds rest [id number]% HR 85.   Long term goal 2: Patient demonstrates up and down 12 steps with SBA and bilateral rails to return to community and home (egress/entry) mobility with  safe to assist pt.  1/22/2022 Goal partially met; pt's  has demonstrated ability to safely assist pt with steps and pt able to complete with SBA up and down 4 steps with rail with seated rest needed after 4 steps due to hypoxia with rebound within 1 minute to 97%. Long term goal 3: Patient demonstrates indep bed<>chair, sit<>stand and WC<>car with FWW. GOAL MET 1/21/2022 pt demo indep bed<>chair, sit<>stand and WC<>car with FWW  Long term goal 4: Patient demonstrates indep WC propulsion 150 feet with L/R turns indep. GOAL MET 1/21/2022 Post 177 feet WC propulsion with ext x4 HELEN on 3L via nc 98% andHR 80bpm post activity. Long term goal 5: Patient demonstrates 50 feet of walking with L/R turns and carpet HELEN able to indep manage O2 tubing for short distance in home walking. GOAL partiaally met 1/21/2022 PT demo SBA for walking 150 feet managing O2 tubing and L/r turns on 10 feet of carpeting with SBA for WC follow due to SOB at 50 feet and LE fatigue with hypoxia 83% SPO2 immediate post walk on 3L via nc with rebound in 30 seconds to 91% and 95% with 60 seconds seated rest.  Patient Goals   Patient goals : \"to walk to the bathroom. Plan    Plan  Times per week: 5/7 days week  Times per day: Daily  Plan weeks: 21 days  Specific instructions for Next Treatment: Progress mobility as tolerated  Current Treatment Recommendations: Strengthening,Neuromuscular Re-education,Home Exercise Program,Cognitive/Perceptual Training,Safety Education & Rexanne Favor Training,Patient/Caregiver Education & Training,Functional Mobility Training,Wheelchair Mobility Training,Gait Training,Transfer Training,Stair training,Equipment Evaluation, Education, & procurement  Plan Comment: expected DC to home 1/22/2022 with  to provide 24 hour supervision and assist.  Safety Devices  Type of devices:  All fall risk precautions in place,Nurse notified,Call light within reach,Gait belt,Patient at risk for falls,Chair alarm in place,Left in bed,Bed alarm in place     Therapy Time   Individual Concurrent Group Co-treatment   Time In 0830 Time Out 0930         Minutes 60         Timed Code Treatment Minutes: [address] [place], [place]

## 2022-01-21 NOTE — PROGRESS NOTES
INPATIENT PULMONARY CRITICAL CARE PROGRESS NOTE      Reason for visit    Fever/increased WBC;questionable CXR results     SUBJECTIVE: Patient when seen this morning was minimally better as compared to yesterday, patient was still having some coughing but did not have any increasing chest congestion, patient was not have any chest pain or palpitations, patient was afebrile and hemodynamically maintained, patient is oxygen requirements are coming down, patient was on 2 L of nasal cannula oxygenation of 94%  , patient's glycemic control was acceptable, patient has sinus rhythm on the monitor which was ranging from sinus bradycardia to normal sinus rhythm, patient was alert and oriented, patient continues to have some hoarseness of voice,             Physical Exam:  Blood pressure 134/77, pulse 74, temperature 97.6 °F (36.4 °C), temperature source Oral, resp. rate 16, height 5' 9\" (1.753 m), weight 153 lb 1.6 oz (69.4 kg), SpO2 94 %, not currently breastfeeding.'     Constitutional:  No acute distress while lying in the bed, slight hoarseness of voice. HENT:  Oropharynx is clear and moist. No thyromegaly. Eyes:  Conjunctivae are normal. Pupils equal, round, and reactive to light. No scleral icterus. Neck: . No tracheal deviation present. No obvious thyroid mass. Tracheostomy scar present  Cardiovascular: Normal rate, regular rhythm, normal heart sounds. No right ventricular heave. No lower extremity edema. Pulmonary/Chest: No wheezes. Decreased bilateral rales. Still has some  decreased chest congestion chest wall is not dull to percussion. No accessory muscle usage or stridor. Decreased breath sound density  Abdominal: Soft. Bowel sounds present. No distension or hernia. No tenderness. ,  Scar of PEG tube present  Musculoskeletal: No cyanosis. No clubbing. No obvious joint deformity. Lymphadenopathy: No cervical or supraclavicular adenopathy. Skin: Skin is warm and dry.  No rash or nodules on the exposed extremities. Psychiatric: Normal mood and affect. Behavior is normal.  No anxiety. Neurologic: Alert, awake and oriented. PERRL. Speech fluent            Results:  CBC:   Recent Labs     01/18/22  1349 01/20/22  0623   WBC 15.1* 6.0   HGB 10.5* 10.5*   HCT 33.7* 33.6*   MCV 76.8* 77.6*    198     BMP:   Recent Labs     01/20/22  0623      K 3.5   CL 95*   CO2 31   BUN 8   CREATININE <0.5*       UA:  Recent Labs     01/18/22  1515   COLORU Yellow   PHUR 7.0   CLARITYU Clear   SPECGRAV 1.015   LEUKOCYTESUR Negative   UROBILINOGEN 0.2   BILIRUBINUR Negative   BLOODU Negative   GLUCOSEU Negative       Imaging:  I have reviewed radiology images personally. XR CHEST PORTABLE   Final Result   Stable cardiomegaly. Patchy bilateral airspace disease with multifocal small nodular component. Some of this appears to be chronic in therefore could represent fibrosis. However there are no higher quality studies such as PA and lateral views or   recent chest CT. Increased density overlying the upper right hilum possible adenopathy. Prior   surgery is evident which may account for some of this finding. RECOMMENDATION:   Recommend follow-up to evaluate for the nodularity as well as the right   suprahilar opacity. This would best be performed with contrast-enhanced CT. XR CHEST PORTABLE   Final Result   Asymmetric airspace opacities are relatively stable from prior comparison   study. Pulmonary edema, recurrent infection or chronic interstitial change   may be considered.            Echo Complete    Result Date: 1/12/2022  Transthoracic Echocardiography Report (TTE)  Demographics   Patient Name       Joann Nettle   Date of Study      01/12/2022        Gender              Female   Patient Number     [id number]        Date of Birth       1955   Visit Number       [id number]         Age                 78 year(s)   Accession Number   [id number]        Room Number         [facility] Corporate ID       [id number]            Sonographer         Evan Simon, RT   Ordering Physician Vargas Zamorano MD  Interpreting        Fannie Gonzales,                                       Physician           MD, [address].  Procedure Type of Study   TTE procedure:ECHOCARDIOGRAM COMPLETE 2D W DOPPLER W COLOR. Procedure Date Date: 01/12/2022 Start: 11:47 AM Study Location: [facility] - Echo Lab Technical Quality: Adequate visualization Indications:Dyspnea/SOB. Patient Status: Routine Contrast Medium: Definity. Height: 69 inches Weight: 153 pounds BSA: 1.84 m2 BMI: 22.59 kg/m2 BP: 136/73 mmHg  Conclusions   Summary  The left ventricular systolic function is low normal with an ejection  fraction of 50%. There is hypokinesis of the basal inferior and inferior walls. Normal left ventricular size with mild concentric left ventricular  hypertrophy. Grade I diastolic dysfunction with normal filling pressure. Changes noted from previous echo on 8- in left ventricular function. Mild tricuspid regurgitation. Systolic pulmonic artery pressure (SPAP) is estimated at 46 mmHg consistent  with mild pulmonary hypertension (Right atrial pressure of 3 mmHg). Signature   ------------------------------------------------------------------  Electronically signed by Fannie Gonzales MD, [address] [facility]  (Interpreting physician) on 01/12/2022 at 01:37 PM  ------------------------------------------------------------------   Findings   Left Ventricle  The left ventricular systolic function is low normal with an ejection  fraction of 50%. There is hypokinesis of the basal inferior and inferior walls. Normal left ventricular size with mild concentric left ventricular  hypertrophy. Grade I diastolic dysfunction with normal filling pressure. Changes noted from previous echo on 8- in left ventricular function.    Mitral Valve  Mild thickening of leaflets of mitral valve. No mitral stenosis. Trivial mitral regurgitation. Left Atrium  The left atrium is normal in size. Aortic Valve  Aortic valve appears sclerotic but opens adequately. No evidence of aortic valve regurgitation. Aorta  The aortic root is normal in size. Right Ventricle  The right ventricle is normal in size and function. TAPSE is measured at 18 mm. S'prime velocity is measured at 13 cm/s. Tricuspid Valve  Tricuspid valve is structurally normal.  Mild tricuspid regurgitation. Systolic pulmonic artery pressure (SPAP) is estimated at 46 mmHg consistent  with mild pulmonary hypertension (Right atrial pressure of 3 mmHg). Right Atrium  The right atrium is normal in size at 8 cm2. Pulmonic Valve  The pulmonic valve is not well visualized. No evidence of pulmonic valve regurgitation. Pericardial Effusion  No pericardial effusion noted. Pleural Effusion  No pleural effusion. Miscellaneous  IVC size is normal (<2.1cm) and collapses > 50% with respiration consistent  with normal RA pressure (3mmHg).   M-Mode/2D Measurements (cm)   LV Diastolic Dimension: 4.2 cm LV Systolic Dimension: 2.8 cm  LV Septum Diastolic: 1.1 cm  LV PW Diastolic: 1.1 cm        AV Cusp Separation: 2 cm                                 LA Dimension: 2.6 cm                                 LA Area: 15.5 cm2                                 LA volume/Index: 37 ml /20 ml/m2  Doppler Measurements   AV Peak Velocity: 128 cm/s  MV Peak E-Wave: 76 cm/s  AV Peak Gradient: 6.55 mmHg MV Peak A-Wave: 87 cm/s                              MV E/A Ratio: 0.87   TR Velocity:319 cm/s  TR Gradient:40.7 mmHg  Estimated RAP:3 mmHg  Estimated RVSP: 46 mmHg  E' Septal Velocity: 5 cm/s  E' Lateral Velocity: 6 cm/s  E/E' ratio: 13.81   Aortic Valve   Peak Velocity: 128 cm/s  Peak Gradient: 6.55 mmHg   Cusp Separation: 2 cm      XR CHEST PORTABLE    Result Date: 1/18/2022  EXAMINATION: ONE XRAY VIEW OF THE CHEST 1/18/2022 1:00 pm COMPARISON: Prior study(s) most recent 01/06/2022. HISTORY: ORDERING SYSTEM PROVIDED HISTORY: Productive cough TECHNOLOGIST PROVIDED HISTORY: Reason for exam:->Productive cough FINDINGS: The heart is enlarged. There is vascular congestion with bilateral airspace disease. This has a somewhat nodular character more evident in the periphery of both lungs laterally on the left and upper aspect on the right. There is relative increased focal opacity, right upper lung medially, above the hilum. There has been prior surgery with multiple clips surrounding the hilum which is somewhat retracted superiorly. Stable cardiomegaly. Patchy bilateral airspace disease with multifocal small nodular component. Some of this appears to be chronic in therefore could represent fibrosis. However there are no higher quality studies such as PA and lateral views or recent chest CT. Increased density overlying the upper right hilum possible adenopathy. Prior surgery is evident which may account for some of this finding. RECOMMENDATION: Recommend follow-up to evaluate for the nodularity as well as the right suprahilar opacity. This would best be performed with contrast-enhanced CT. XR CHEST PORTABLE    Result Date: 1/6/2022  EXAMINATION: ONE XRAY VIEW OF THE CHEST 1/6/2022 9:37 am COMPARISON: 10/28/2021 radiograph HISTORY: ORDERING SYSTEM PROVIDED HISTORY: cough TECHNOLOGIST PROVIDED HISTORY: Reason for exam:->cough Reason for Exam: cough FINDINGS: The heart is enlarged. There is surgical change in the right hilar region. Moderate perihilar opacities centrally and diffuse ground-glass opacities throughout both lungs. The overall pattern is stable from prior exam, more pronounced in the right upper and lower lung zones. No skeletal finding. Asymmetric airspace opacities are relatively stable from prior comparison study. Pulmonary edema, recurrent infection or chronic interstitial change may be considered. Assessment:  Active Problems: Moderate malnutrition (HCC)    Aspiration into airway    Oropharyngeal dysphagia    Vocal cord paralysis    Personal history of COVID-19    Chest congestion    Pulmonary infiltrates    Hypochromic microcytic anemia    Hyperglycemia    Diastolic dysfunction    Pulmonary HTN (HCC)    Leukocytosis  Resolved Problems:    * No resolved hospital problems.  *          Plan:   · Oxygen supplementation to keep saturation reading 90-94% only  · Please titrate the oxygen as per the above parameters  · Patient been evaluated was on 2 and half liters of nasal, oxygen with saturation 98%  · Patient had ARDS secondary to 2019 COVID infection and patient had prolonged intubation and mechanical vent support along with that patient had tracheostomy and PEG tube placement-patient has been equalized and also 2 days back the PEG tube was removed as per patient and family  · Clinically patient appears to have oropharyngeal dysphagia along with that patient appears to be aspirating into the airways clinically and patient also has abnormal x-ray chest which may be secondary to some visible infiltrate secondary to the COVID-19 infection but also secondary to aspiration in the airways which may be attributing to patient's clinical status and complexity  · Patient has been evaluated by ENT and was found to have vocal cord paralysis and rhinoplasty has been advised  · Patient is being evaluated and managed by speech therapist and patient undergoes speech exercises every day  · Patient does have COVID fog and is possible that patient may be likely aspirating at times  · Patient has history of Enterobacter cloacae complex infection and has had 2 sputum cultures which were positive for that in the past  · Patient urinalysis done today was negative for any indication of a UTI  · On the basis of previous cultures, will start patient on ciprofloxacin-no IV access available at this time and the bioavailability of ciprofloxacin is reasonable-we will start with oral ciprofloxacin but if patient is not responsive to that then patient may require IV medications   · Patient continues to have persistent chest congestion -was offered therapeutic bronchoscopy but wants does not want to do it   · Bronchodilators to continue  · No need for any systemic steroids  · Patient has been started on Lasix and Aldactone for pulmonary hypertension and diastolic dysfunction  · Monitor input output and BMP  · Correct electrolytes on bilevel x-ray basis  · Lantus insulin as below blood glucose monitoring as per primary team  · BGM with SSI  · Synthroid as per TSH as per primary team  · Consider iron supplementation if deemed appropriate  · PUD and DVT prophylaxis as per primary team        Electronically signed by:  Geo Rodrigues MD    1/20/2022    11:21 PM.

## 2022-01-22 VITALS
WEIGHT: 153.1 LBS | OXYGEN SATURATION: 92 % | BODY MASS INDEX: 22.67 KG/M2 | DIASTOLIC BLOOD PRESSURE: 89 MMHG | HEART RATE: 86 BPM | RESPIRATION RATE: 18 BRPM | TEMPERATURE: 98.8 F | SYSTOLIC BLOOD PRESSURE: 135 MMHG | HEIGHT: 69 IN

## 2022-01-22 LAB
GLUCOSE BLD-MCNC: 117 MG/DL (ref 70–99)
GLUCOSE BLD-MCNC: 142 MG/DL (ref 70–99)
PERFORMED ON: ABNORMAL
PERFORMED ON: ABNORMAL

## 2022-01-22 PROCEDURE — 6370000000 HC RX 637 (ALT 250 FOR IP): Performed by: INTERNAL MEDICINE

## 2022-01-22 PROCEDURE — 94640 AIRWAY INHALATION TREATMENT: CPT

## 2022-01-22 PROCEDURE — 6360000002 HC RX W HCPCS: Performed by: PHYSICAL MEDICINE & REHABILITATION

## 2022-01-22 PROCEDURE — 94761 N-INVAS EAR/PLS OXIMETRY MLT: CPT

## 2022-01-22 PROCEDURE — 6370000000 HC RX 637 (ALT 250 FOR IP): Performed by: PHYSICAL MEDICINE & REHABILITATION

## 2022-01-22 PROCEDURE — 2700000000 HC OXYGEN THERAPY PER DAY

## 2022-01-22 RX ORDER — OXYCODONE HYDROCHLORIDE 5 MG/1
5 TABLET ORAL EVERY 8 HOURS PRN
Qty: 9 TABLET | Refills: 0 | Status: SHIPPED | OUTPATIENT
Start: 2022-01-22 | End: 2022-01-25

## 2022-01-22 RX ORDER — ASPIRIN 81 MG/1
81 TABLET ORAL DAILY
Qty: 30 TABLET | Refills: 0 | Status: ON HOLD | OUTPATIENT
Start: 2022-01-22 | End: 2022-03-18 | Stop reason: HOSPADM

## 2022-01-22 RX ORDER — ROPINIROLE 0.5 MG/1
TABLET, FILM COATED ORAL
Qty: 90 TABLET | Refills: 0 | Status: SHIPPED | OUTPATIENT
Start: 2022-01-22

## 2022-01-22 RX ORDER — ALBUTEROL SULFATE 90 UG/1
2 AEROSOL, METERED RESPIRATORY (INHALATION) 4 TIMES DAILY PRN
Qty: 1 EACH | Refills: 0 | Status: SHIPPED | OUTPATIENT
Start: 2022-01-22

## 2022-01-22 RX ORDER — SIMVASTATIN 40 MG
40 TABLET ORAL NIGHTLY
Qty: 30 TABLET | Refills: 0 | Status: SHIPPED | OUTPATIENT
Start: 2022-01-22

## 2022-01-22 RX ORDER — OMEPRAZOLE 40 MG/1
40 CAPSULE, DELAYED RELEASE ORAL DAILY
Qty: 30 CAPSULE | Refills: 0 | Status: SHIPPED | OUTPATIENT
Start: 2022-01-22

## 2022-01-22 RX ORDER — GLIPIZIDE 10 MG/1
10 TABLET, FILM COATED, EXTENDED RELEASE ORAL 2 TIMES DAILY
Qty: 60 TABLET | Refills: 0 | Status: ON HOLD | OUTPATIENT
Start: 2022-01-22 | End: 2022-03-14

## 2022-01-22 RX ORDER — LEVOTHYROXINE SODIUM 0.1 MG/1
100 TABLET ORAL DAILY
Qty: 30 TABLET | Refills: 0 | Status: SHIPPED | OUTPATIENT
Start: 2022-01-22

## 2022-01-22 RX ADMIN — DICYCLOMINE HYDROCHLORIDE 20 MG: 20 TABLET ORAL at 05:49

## 2022-01-22 RX ADMIN — CIPROFLOXACIN 500 MG: 500 TABLET, FILM COATED ORAL at 10:23

## 2022-01-22 RX ADMIN — OXYCODONE 10 MG: 5 TABLET ORAL at 10:31

## 2022-01-22 RX ADMIN — LEVOTHYROXINE SODIUM 100 MCG: 0.1 TABLET ORAL at 05:49

## 2022-01-22 RX ADMIN — MULTIPLE VITAMINS W/ MINERALS TAB 1 TABLET: TAB at 10:22

## 2022-01-22 RX ADMIN — OXYCODONE 10 MG: 5 TABLET ORAL at 05:48

## 2022-01-22 RX ADMIN — FUROSEMIDE 40 MG: 40 TABLET ORAL at 10:23

## 2022-01-22 RX ADMIN — Medication 400 MG: at 10:23

## 2022-01-22 RX ADMIN — ASPIRIN 81 MG 81 MG: 81 TABLET ORAL at 10:23

## 2022-01-22 RX ADMIN — SPIRONOLACTONE 25 MG: 25 TABLET, FILM COATED ORAL at 10:23

## 2022-01-22 RX ADMIN — ROPINIROLE HYDROCHLORIDE 0.5 MG: 0.5 TABLET, FILM COATED ORAL at 10:22

## 2022-01-22 RX ADMIN — METOCLOPRAMIDE 5 MG: 10 TABLET ORAL at 10:23

## 2022-01-22 RX ADMIN — IPRATROPIUM BROMIDE AND ALBUTEROL SULFATE 1 AMPULE: .5; 2.5 SOLUTION RESPIRATORY (INHALATION) at 08:21

## 2022-01-22 RX ADMIN — DICYCLOMINE HYDROCHLORIDE 20 MG: 20 TABLET ORAL at 10:23

## 2022-01-22 RX ADMIN — METOPROLOL 25 MG: 25 TABLET ORAL at 10:22

## 2022-01-22 RX ADMIN — BUDESONIDE 500 MCG: 0.5 SUSPENSION RESPIRATORY (INHALATION) at 08:22

## 2022-01-22 RX ADMIN — ENOXAPARIN SODIUM 40 MG: 40 INJECTION SUBCUTANEOUS at 10:31

## 2022-01-22 ASSESSMENT — PAIN DESCRIPTION - LOCATION: LOCATION: SHOULDER

## 2022-01-22 ASSESSMENT — PAIN SCALES - GENERAL
PAINLEVEL_OUTOF10: 7
PAINLEVEL_OUTOF10: 8
PAINLEVEL_OUTOF10: 8

## 2022-01-22 ASSESSMENT — PAIN DESCRIPTION - ORIENTATION: ORIENTATION: RIGHT

## 2022-01-22 ASSESSMENT — PAIN DESCRIPTION - PAIN TYPE: TYPE: CHRONIC PAIN

## 2022-01-22 ASSESSMENT — PAIN DESCRIPTION - DESCRIPTORS: DESCRIPTORS: ACHING

## 2022-01-22 ASSESSMENT — PAIN DESCRIPTION - FREQUENCY: FREQUENCY: CONTINUOUS

## 2022-01-22 NOTE — PROGRESS NOTES
Discharge instructions reviewed with patient and spouse. All home medications have been reviewed, questions answered and patient and spouse verbalized understanding. Discharge instructions signed and copies given. Patient discharged home with belongings.

## 2022-01-22 NOTE — CARE COORDINATION
ARU Team Conference       Planned Discharge Date: 01/22/22  Durable medical equipment needed:4WW, possible new home O2    Discharge Plan: Therapy recs for home care. CM will provide list of agencies to family and patient. Patient has f/u with ENT for outpt sx. Patient is active with [facility]>CM-Joaquina. Family and patient agreeable to DCP. CM will continue to support for discharge needs.  Slime Love RN
ARU Team Conference       Planned Discharge Date: 1/22/22  Durable medical equipment needed:Home O2>[employer]    Discharge Plan: Patient to return home with  and home care>[facility]. Quang Marmolejo from [facility] aware of need for home oxygen. Will need walk test to confirm need. CM will continue to support for discharge needs.  Bob Bell RN
ARU day 3: Patient with prolonged ICU stay and transition to LTAC, prior from home with spouse. Spouse is very supportive active in care. Family training scheduled 01/13 at [address] [place] [address]. Per spouse dtr may or grand child may attend training (dtr with recent hernia surgery). SW will ct to support DCP needs. ADDISON Goldberg
CASE MANAGEMENT DISCHARGE SUMMARY      Discharge to: Anticipating discharge on 1/22/22: Home with  and [address] completed: [address] Exemption Notification (HENS) completed: N/A    IMM given: 01/21/22     New Durable Medical Equipment ordered/agency:     [address] [place] [address],First Floor  [phone]    Transportation:    Family/car: Private:     Confirmed discharge plan with:     Patient: yes     Family:  yes/no    Name:Bala Lucas (Spouse)     Contact number:[phone]     Facility/Agency, name:      [address] [place] [address]   [facility]   [address] [place] [address]   [phone]  TYLOR/AVS faxed        RN, name: Nanci Ruiz    Note: Discharging nurse to complete TYLOR, reconcile AVS, and place final copy with patient's discharge packet. RN to ensure that written prescriptions for  Level II medications are sent with patient to the facility as per protocol.
CM spoke with RN in regards to family stating they need wheelchair and bedside commode. No MD orders for either DME. PT notes say no equipment needed but does speak of wheelchair use in education portion of notes. CM unable to get wheelchair delivery for the weekend as considered non emergant nor bedside commode. CM instructed RN to get order for these DME's if appropriate. Can not fill on weekend but can be filled on Monday.
Called to verify PCP willing to sign for home care orders  Spoke with Kimmie Mccauley transferred to dr scherer nurse Laura Yanez, Rob Haq MD    [address]   [phone] Steven Vargas   [phone] [address] Glenn Pereira RN, BSN CTN  [facility] [phone]
Case Management/Follow up:    Chart reviewed for length of stay. Hospital day # 14  Unit: ARU   Diagnosis and current status as per MD progress: Post covid-19 condition, unspecified  Anticipated d/c date: 01/22  Expected plan for discharge:Return home with home health therapy and possible new home O2  Potential barriers:Patients progress  Precert required/date initiated:N/A  Confirmed plan with patient and/or family:Yes,     Comments:PT/OT/Speech, Podiatry, Cardiology and Physician following. [facility] following for discharge services. Patient will need walk test prior to discharge for home oxygen needs, [facility] aware of home oxygen needs for discharge.  will continue to support for discharge needs.  Hollie Restrepo RN
[facility]    DC order noted, all docs needed have been faxed to [facility] for home care services.     Home care to see patient within 24-72 hrs    Sin Quinonez RN, BSN CTN  [facility] [phone]
Home Oxygen order in. CM called Quang Marmolejo from [employer] via telephone with update on Order.  Bob Bell RN
[facility]    Referral received from  to follow for home care services. I will follow for needs, and speak with patient to verify demos. I have submitted for approval, and will update when I'm able.         Claudia Shah RN, BSN CTN  [facility] [phone]
Writer met with patient to discuss disposition of care conference with interdisciplinary team on 01/05/2022              Discussed: Current level of function and anticipated dc and scheduled family training 01/13/20222 at 10a       Recommendations: Ongoing current 24 hour supervision or assist DME TBD     Anticipated discharge date: 01/22/2022     Patient verbalized understanding of recommendations and anticipated discharge date.
assist     Who will be the one to contact for family training: Manojfarzanaosiel Reyes spouse     24 hour care on discharge: Yes    What level does the Patient need to be at to return home: min assist for transfers     Discharge plan: Plan is to return home with spouse    PCP: Dr. Daina Keating at [address] [facility] [address]: [address] [place] [address]     Summary: Patient with extended hospitalization at [id number] [place] [address] then transferred to . [address]. Patient plans to return with support of spouse and family at [facility] on weekly conferences and recs. ADDISON Oliveros    Electronic continuity of care form is on the chart. Case Management (CM) will continue to follow for recommendations from the treatment team. Please notify Case Management if needs or concerns arise.

## 2022-01-22 NOTE — DISCHARGE SUMMARY
Physical Medicine & Rehabilitation  Discharge Summary     Patient Identification:  Maye Schmitt  : 1955  Admit date: 1/3/2022  Discharge date: 2022  Attending provider: Karen Ricketts MD        Primary care provider: Charlette Atkins MD     Discharge Diagnoses:   Patient Active Problem List   Diagnosis    Displacement of lumbar intervertebral disc without myelopathy    Hypothyroidism    Mixed hyperlipidemia    Anxiety state    Generalized osteoarthrosis, involving multiple sites    Essential hypertension    Shoulder arthritis    Vitamin D deficiency    Chronic pain syndrome    Fibromyalgia    DDD (degenerative disc disease), lumbar    Anxiety disorder    Prosthetic joint implant failure ([address]    Pulmonary nodule    Tracheobronchomalacia    Bronchiectasis ([address]    DM2 (diabetes mellitus, type 2) ([facility])    Systolic congestive heart failure ([facility])    Chest pain    S/P cardiac catheterization    PVC (premature ventricular contraction)    Palpitations    Syncope and collapse    Convulsion ([address]    New onset seizure ([address]    CAD in native artery    Nausea    Memory change    Acquired hypothyroidism    Sepsis ([address]    Type 1 diabetes mellitus without complication ([address]    Orthostatic hypotension    Suspected COVID-19 virus infection    Hypoxia    Acute respiratory failure with hypoxia ([address]    COVID-19    Pneumonia due to COVID-19 virus    CHARLY (acute kidney injury) ([address]    Uncontrolled hypertension    Fever    Hypotension    Severe protein-calorie malnutrition (HCC)    Gram-negative pneumonia (HCC)    Pneumonia due to Pseudomonas species (HCC)    Hypomagnesemia    Hypokalemia    Acute hypoxemic respiratory failure (HCC)    ARDS (adult respiratory distress syndrome) ([facility])    Pneumonia due to Klebsiella pneumoniae ([facility])    Electrolyte disorder    Critical illness myopathy    Critical illness neuropathy ([facility])    Post covid-19 condition, unspecified    Moderate malnutrition (HCC)    Aspiration into airway    Oropharyngeal dysphagia    Vocal cord paralysis    Personal history of COVID-19    Chest congestion    Pulmonary infiltrates    Hypochromic microcytic anemia    Hyperglycemia    Diastolic dysfunction    Pulmonary HTN (HCC)    Leukocytosis       Discharge Functional Status:    Physical therapy:  Bed Mobility: Scooting: Modified independent  Transfers: Sit to Stand: Modified independent  Stand to sit: Modified independent  Bed to Chair: Modified independent (FWW manages O2 tubing)  Comment: toileting: indep in pericare, needed min assist and set up for donning pants/undergarments indep to doff., Ambulation 1  Surface: level tile  Device: Rolling Walker  Other Apparatus: Wheelchair follow (SBA after WC follow as pt wtih O2 desaturation after 50 feet and pt requesting to sit.)  Assistance: Stand by assistance  Quality of Gait: reciprocal gait pattern with forward trunk flexion, decreased velocity/ step height/length  Gait Deviations: Slow Marisol,Increased RONAN,Decreased step height,Decreased step length,Shuffles  Distance: 50 feet  Comments: pt demo 50 feet gait with FWW SBA with WC follow L/r turns turf carpet with need to sit after 50 feet due to sob and fatigue with SpO2 on3L via nc 83% HR 92bpm, 30sec rest SpO2 91%, HR 86; after 60 seconds rest [id number]% HR 85., Stairs  # Steps : 4  Stairs Height: 6\"  Rails: Bilateral  Device: Rolling walker (bottom of steps)  Assistance: Stand by assistance  Comment: cued for 1 step at a time non alternating pattern use of bilateral rails no lob noted, post steps SpO2 84% HR 94bpm with 1 minute seaetd rest spO2 97%  HR 83bp. Mobility:  , PT Equipment Recommendations  Equipment Needed: No  Other: has needed equpiment, Assessment: Pt mildly limited by pain and fatigue this session although is motivated and cooperative.  Able to complete functional transfers with mod I and complete community distance w/c mobility with mod I. Participates in standing balance activity at table with mod I, with standing tolerance up to 3 mins (limited by fatigue). Pt reports she is looking forward to d/c home tomorrow and states she has no concerns regarding mobility and safety upon d/c home with family. Occupational therapy:  ,  , Assessment: First Session: Pt agreeable to OT session. Pt completed functional transfers with mod I and good use of RW and ability to manage O2 tubing in bathroom. Pt completed BADLs with supervision/mod I with cues for PLB, managing O2 in/out of shirt, and energy conservation. Pt receptive to PLB and rest breaks but will require assist at home to remember energy conservation techniques due to poor memory. Pt completed tub transfer with mod I and use of TTB. Second Session: Pt performed BUE ther ex with good strength for elbow and wrist with 2# weight. Pt given handout for HEP, energy conservation, and falls prevention. Pt and spouse verbalized understanding. Speech therapy:       Inpatient Rehabilitation Course:   Zeinab Odom is a 78 y.o. female admitted to inpatient rehabilitation on 1/3/2022 s/p COVID Pneumonia requiring intubation, and proning, tracheostomy and PEG tube placement. The patient participated in an aggressive multidisciplinary inpatient rehabilitation program involving 3 hours of therapy per day, at least 5 days per week. She progressed well with therapy. Her  states that he threw all of her home medications away several weeks ago, and hasn't had a refill of her pain medicine since July. Will give one months worth of her home medications and prescribe three days worth of Oxycodone 5 mg, due to the office being closed on Saturday. He was directed to call for refill on Monday.  Her ARU stay was significant for the following:     S/p covid pneumonia with superimposed bacterial pneumonia  - required trach/peg, proning  - s/p remdesivir, tocilizumab  - completed cefepime  - now on cipro per pulm     CAD  - asa, statin     HTN  - follow BP       DM  - lantus, SSI     Hypothyroidism  - synthroid     Leukocytosis  - resolved  - completed cefepime;  - repeat culture + for low growth enterococcus; completed 1 week macrobid.     Reduced L TVF mobility per FEES  - slp     Dysphagia (pharyngeal), dysphonia  - slp     S/p trach  - stoma care     Bowels: Schedule stool softener. Follow bowel movements. Enema or suppository if needed.      Bladder: Check PVR x 3. [place] if PVR > 200ml or if any volume is > 500 ml.      Sleep: Trazodone provided prn. Discharge Exam:  Constitutional: Pleasant, no distress  Head: Normocephalic  Eyes: Conjunctiva noninjected  Pulm: CTA bilat  CV: No murmurs noted  Abd: Soft, nontender  Ext: No edema  Neuro: Alert, fully oriented, appropriate   MSK: No joint abnormalities noted     Significant Diagnostics:   Lab Results   Component Value Date    CREATININE <0.5 (L) 01/20/2022    BUN 8 01/20/2022     01/20/2022    K 3.5 01/20/2022    CL 95 (L) 01/20/2022    CO2 31 01/20/2022       Lab Results   Component Value Date    WBC 6.0 01/20/2022    HGB 10.5 (L) 01/20/2022    HCT 33.6 (L) 01/20/2022    MCV 77.6 (L) 01/20/2022     01/20/2022       Disposition:  Home with Home Care in Stable Condition    Follow-up:  See after visit summary from hospitalization    Discharge Medications:     Medication List      START taking these medications    ciprofloxacin 500 MG tablet  Commonly known as: CIPRO  Take 1 tablet by mouth every 12 hours for 10 days  Notes to patient: Ciprofloxacin (Cipro*)  Use: treat infections  Side effects: dizziness, nausea, diarrhea or headache     furosemide 40 MG tablet  Commonly known as: LASIX  Take 1 tablet by mouth daily  Notes to patient: Lasix- used to get rid of extra water, used to treat high blood pressure.     Side effects:constipation,lack of appetite,abdominal cramps,diarrhea,nausea,vomiting     magnesium oxide 400 (240 Mg) MG tablet  Commonly known as: MAG-OX  Take 1 tablet by mouth daily     metoclopramide 5 MG tablet  Commonly known as: REGLAN  Take 1 tablet by mouth 2 times daily     spironolactone 25 MG tablet  Commonly known as: ALDACTONE  Take 1 tablet by mouth daily        CHANGE how you take these medications    oxyCODONE 5 MG immediate release tablet  Commonly known as: ROXICODONE  Take 1 tablet by mouth every 8 hours as needed for Pain for up to 3 days. What changed:   · how much to take  · when to take this  · reasons to take this        CONTINUE taking these medications    albuterol sulfate  (90 Base) MCG/ACT inhaler  Inhale 2 puffs into the lungs 4 times daily as needed for Wheezing     aspirin 81 MG EC tablet  Take 1 tablet by mouth daily     blood glucose monitor kit and supplies  Dispense whatever insurance will cover. Dx code:E11.9     * blood glucose test strips  Dispense whatever insurance will cover. Pt test twice a day dx:E11.9     * blood glucose test strips strip  Commonly known as: ASCENSIA AUTODISC VI;ONE TOUCH ULTRA TEST VI  1 each by In Vitro route daily As needed.      carboxymethylcellulose PF 1 % Gel ophthalmic gel  Commonly known as: THERATEARS  Place 1 drop into both eyes every 4 hours as needed for Dry Eyes     estradiol 0.1 MG/GM vaginal cream  Commonly known as: ESTRACE     glipiZIDE 10 MG extended release tablet  Commonly known as: GLUCOTROL XL  Take 1 tablet by mouth 2 times daily     ipratropium-albuterol 0.5-2.5 (3) MG/3ML Soln nebulizer solution  Commonly known as: DUONEB  Inhale 3 mLs into the lungs every 4 hours     levothyroxine 100 MCG tablet  Commonly known as: SYNTHROID  Take 1 tablet by mouth Daily     metoprolol tartrate 25 MG tablet  Commonly known as: LOPRESSOR  Take 1 tablet by mouth 2 times daily     MiraLax 17 GM/SCOOP powder  Generic drug: polyethylene glycol     omeprazole 40 MG delayed release capsule  Commonly known as: PRILOSEC  Take 1 capsule by mouth daily     Pulmicort Flexhaler 180 MCG/ACT Aepb inhaler  Generic drug: budesonide     rOPINIRole 0.5 MG tablet  Commonly known as: REQUIP  TAKE 1 TABLET BY MOUTH 3 TIMES DAILY. simvastatin 40 MG tablet  Commonly known as: ZOCOR  Take 1 tablet by mouth nightly     SITagliptin 50 MG tablet  Commonly known as: JANUVIA  Take 1 tablet by mouth daily     therapeutic multivitamin-minerals tablet     tiZANidine 2 MG tablet  Commonly known as: Christa Gill         * This list has 2 medication(s) that are the same as other medications prescribed for you. Read the directions carefully, and ask your doctor or other care provider to review them with you. STOP taking these medications    gabapentin 800 MG tablet  Commonly known as: NEURONTIN     Lancets Misc     losartan 25 MG tablet  Commonly known as: COZAAR     sertraline 50 MG tablet  Commonly known as: ZOLOFT           Where to Get Your Medications      These medications were sent to [address], [facility] [place] [address] [phone] - F [phone]  [address] [facility] [place] [address]    Phone: [phone]   · albuterol sulfate  (90 Base) MCG/ACT inhaler  · aspirin 81 MG EC tablet  · ciprofloxacin 500 MG tablet  · furosemide 40 MG tablet  · glipiZIDE 10 MG extended release tablet  · levothyroxine 100 MCG tablet  · magnesium oxide 400 (240 Mg) MG tablet  · metoclopramide 5 MG tablet  · metoprolol tartrate 25 MG tablet  · omeprazole 40 MG delayed release capsule  · oxyCODONE 5 MG immediate release tablet  · rOPINIRole 0.5 MG tablet  · simvastatin 40 MG tablet  · SITagliptin 50 MG tablet  · spironolactone 25 MG tablet         I spent over 35 minutes on this discharge encounter between counseling, coordination of care, and medication reconciliation.   To comply with [facility] kelly R.II.[id number]: Discharge order placed in advance to facilitate discharge planning with rehab team.     Emil Barboza, APRN - CNP

## 2022-01-31 ENCOUNTER — OFFICE VISIT (OUTPATIENT)
Dept: ENT CLINIC | Age: 67
End: 2022-01-31
Payer: MEDICARE

## 2022-01-31 VITALS
BODY MASS INDEX: 21.86 KG/M2 | HEIGHT: 69 IN | HEART RATE: 66 BPM | SYSTOLIC BLOOD PRESSURE: 128 MMHG | WEIGHT: 147.6 LBS | TEMPERATURE: 97.2 F | DIASTOLIC BLOOD PRESSURE: 77 MMHG

## 2022-01-31 DIAGNOSIS — J38.01 VOCAL FOLD PARALYSIS, LEFT: Primary | ICD-10-CM

## 2022-01-31 PROCEDURE — 1111F DSCHRG MED/CURRENT MED MERGE: CPT | Performed by: STUDENT IN AN ORGANIZED HEALTH CARE EDUCATION/TRAINING PROGRAM

## 2022-01-31 PROCEDURE — G8482 FLU IMMUNIZE ORDER/ADMIN: HCPCS | Performed by: STUDENT IN AN ORGANIZED HEALTH CARE EDUCATION/TRAINING PROGRAM

## 2022-01-31 PROCEDURE — 3017F COLORECTAL CA SCREEN DOC REV: CPT | Performed by: STUDENT IN AN ORGANIZED HEALTH CARE EDUCATION/TRAINING PROGRAM

## 2022-01-31 PROCEDURE — G8420 CALC BMI NORM PARAMETERS: HCPCS | Performed by: STUDENT IN AN ORGANIZED HEALTH CARE EDUCATION/TRAINING PROGRAM

## 2022-01-31 PROCEDURE — 4040F PNEUMOC VAC/ADMIN/RCVD: CPT | Performed by: STUDENT IN AN ORGANIZED HEALTH CARE EDUCATION/TRAINING PROGRAM

## 2022-01-31 PROCEDURE — 99214 OFFICE O/P EST MOD 30 MIN: CPT | Performed by: STUDENT IN AN ORGANIZED HEALTH CARE EDUCATION/TRAINING PROGRAM

## 2022-01-31 PROCEDURE — 1036F TOBACCO NON-USER: CPT | Performed by: STUDENT IN AN ORGANIZED HEALTH CARE EDUCATION/TRAINING PROGRAM

## 2022-01-31 PROCEDURE — G8427 DOCREV CUR MEDS BY ELIG CLIN: HCPCS | Performed by: STUDENT IN AN ORGANIZED HEALTH CARE EDUCATION/TRAINING PROGRAM

## 2022-01-31 PROCEDURE — G8400 PT W/DXA NO RESULTS DOC: HCPCS | Performed by: STUDENT IN AN ORGANIZED HEALTH CARE EDUCATION/TRAINING PROGRAM

## 2022-01-31 PROCEDURE — 1123F ACP DISCUSS/DSCN MKR DOCD: CPT | Performed by: STUDENT IN AN ORGANIZED HEALTH CARE EDUCATION/TRAINING PROGRAM

## 2022-01-31 PROCEDURE — 1090F PRES/ABSN URINE INCON ASSESS: CPT | Performed by: STUDENT IN AN ORGANIZED HEALTH CARE EDUCATION/TRAINING PROGRAM

## 2022-01-31 ASSESSMENT — ENCOUNTER SYMPTOMS
VOMITING: 0
VOICE CHANGE: 1
EYE PAIN: 0
RHINORRHEA: 0
COUGH: 0
SHORTNESS OF BREATH: 1
TROUBLE SWALLOWING: 1
NAUSEA: 0

## 2022-01-31 NOTE — PATIENT INSTRUCTIONS
LARYNGOSCOPY    Post Operative Instructions    [facility] ENT Number (24 hours): [phone]    The Surgery Itself  Laryngoscopy with biopsy or injection involves a brief general anesthesia, typically for less than one hour. Patients may be sedated for several hours after surgery and may remain sleepy for the better part of the day. Nausea and vomiting are occasionally seen, and usually resolve by the evening of surgery - even without additional medications. Almost all patients can go home the day of surgery. After Surgery   You will have a sore throat from the metal instruments used to allow a good view of your voice box for 3-5 days after surgery. Some patients may have sores on the tongue or in the mouth. This is normal and you will be given pain medications for this. If you have sores in the mouth, avoid citrus or acidic foods/drinks since they will burn.  Avoid coughing or frequent throat clearing. Drinking water can help alleviate the urge to clear the throat.  Avoid any heavy lifting (more than 20 lbs), straining, exercise, or sports activities for 2 weeks after surgery.  Avoid alcohol, tobacco products, spicy foods, or eating late at night as these may cause heartburn or stomach reflux and may delay the healing process.  Your voice may be hoarse after surgery from swelling of the vocal cords caused by manipulation.  You do not have to avoid talking unless your surgeon gives you specific instructions. Use your normal voice since whispering is harder on your vocal cords then talking at a regular volume.  If your surgeon advises voice rest, you should do the following:  o You are to have absolute voice rest for 7 days. You may want to purchase a dry erase board to communicate during this time. After that, you may begin to use your voice at a soft spoken level.  o You should only speak loud enough for people to hear you that are within an arms length away. Do not yell or whisper.  You may increase your voice use by 5 minutes/hour each day after the first 7 days of rest.    Medications   Pain medication can be used for pain as prescribed. Pain in the throat and the tongue is normal.   Some patients will be given steroids or acid reducing medications after surgery, take these as directed.  Take all of your routine medications as prescribed, unless told otherwise by your surgeon. Any medications that thin the blood should be avoided unless approved by your surgeon. These include aspirin and aspirin-like products (Advil, Motrin, Excedrin, Naprosyn)    Final Result   Following vocal cord injection, the voice may seem strangled due to swelling for a few days or weeks. This is normal.   If you have a biopsy in surgery, you will usually find out the pathology results when you are seen in the office for follow up.  Many patients benefit from voice therapy after surgery to improve the long term result. Your surgeon will recommend this if you could benefit from it.

## 2022-01-31 NOTE — PROGRESS NOTES
Gloria      Patient Name: [facility] Record Number:  [id number]  Primary Care Physician:  Diogo Evans MD  Date of Consultation: 1/31/2022    Chief Complaint:   Chief Complaint   Patient presents with   Jm Walsh     Here to discuss an injection in her vocal fold. Pt and  states they saw Dr. Oliver Petit in Acute. HISTORY OF PRESENT ILLNESS  Ruth Tam is a(n) 78 y.o. female who present with a left true vocal fold paralysis. She underwent tracheostomy on October 25th 2021 for acute respiratory failure secondary to Covid requiring intubation with inability to wean from the vent. She since has had her trach removed. 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 She is now at home having home therapy. She is currently on antibiotics for pneumonia as she has had issues with aspiration. Her voice is still suboptimal.  She is also requiring nasal oxygen. She does have a remote history of bronchial malacia and has mesh placed in her lungs.   At the time of the tracheostomy there was no evidence of any mesh in the airway and should not be an issue at the time of the direct laryngoscopy with injection      Patient Active Problem List   Diagnosis    Displacement of lumbar intervertebral disc without myelopathy    Hypothyroidism    Mixed hyperlipidemia    Anxiety state    Generalized osteoarthrosis, involving multiple sites    Essential hypertension    Shoulder arthritis    Vitamin D deficiency    Chronic pain syndrome    Fibromyalgia    DDD (degenerative disc disease), lumbar    Anxiety disorder    Prosthetic joint implant failure (HCC)    Pulmonary nodule    Tracheobronchomalacia    Bronchiectasis (HCC)    DM2 (diabetes mellitus, type 2) (HCC)    Systolic congestive heart failure (HCC)    Chest pain    S/P cardiac catheterization    PVC (premature ventricular contraction)    Palpitations    Syncope and collapse    Convulsion (HCC)    New onset seizure ([address]    CAD in native artery    Nausea    Memory change    Acquired hypothyroidism    Sepsis ([address]    Type 1 diabetes mellitus without complication (HCC)    Orthostatic hypotension    Suspected COVID-19 virus infection    Hypoxia    Acute respiratory failure with hypoxia (HCC)    COVID-19    Pneumonia due to COVID-19 virus    CHARLY (acute kidney injury) ([address]    Uncontrolled hypertension    Fever    Hypotension    Severe protein-calorie malnutrition (HCC)    Gram-negative pneumonia (HCC)    Pneumonia due to Pseudomonas species (HCC)    Hypomagnesemia    Hypokalemia    Acute hypoxemic respiratory failure (HCC)    ARDS (adult respiratory distress syndrome) (HCC)    Pneumonia due to Klebsiella pneumoniae (HCC)    Electrolyte disorder    Critical illness myopathy    Critical illness neuropathy ([address]    Post covid-19 condition, unspecified    Moderate malnutrition ([address]    Aspiration into airway    Oropharyngeal dysphagia    Vocal cord paralysis    Personal history of COVID-19    Chest congestion    Pulmonary infiltrates    Hypochromic microcytic anemia    Hyperglycemia    Diastolic dysfunction    Pulmonary HTN ([address]    Leukocytosis     Past Surgical History:   Procedure Laterality Date    ABDOMEN SURGERY      CARDIAC CATHETERIZATION      COLON SURGERY      COLONOSCOPY  2007    normal    HYSTERECTOMY      JOINT REPLACEMENT  10/92    Right; hip     JOINT REPLACEMENT    12/92    Left hip  followed by revision 1/2006    JOINT REPLACEMENT    6/96    right     JOINT REPLACEMENT    2006     right replacement.  LUNG SURGERY  1/2014    tracheobronchomalacia    TRACHEOSTOMY N/A 10/25/2021    TRACHEOTOMY performed by Cheri Hebert DO at [address] N/A 10/25/2021    EGD/PEG W/ANES.  ON VENT, COVID + performed by Wing Alessia MD at [facility] ENDOSCOPY     Family History   Problem Relation Age of Onset    Asthma Mother     Heart Failure Father     Cancer Maternal Grandfather         skin cancer on face    Cancer Daughter         skin cancer-BCC    Diabetes Neg Hx     Emphysema Neg Hx     Hypertension Neg Hx      Social History     Socioeconomic History    Marital status:      Spouse name: Not on file    Number of children: Not on file    Years of education: Not on file    Highest education level: Not on file   Occupational History    Not on file   Tobacco Use    Smoking status: Former Smoker     Packs/day: 1.00     Years: 18.00     Pack years: 18.00     Types: Cigarettes     Quit date: 12/10/1991     Years since quittin.1    Smokeless tobacco: Never Used   Vaping Use    Vaping Use: Never used   Substance and Sexual Activity    Alcohol use: No    Drug use: No    Sexual activity: Never     Partners: Male   Other Topics Concern    Not on file   Social History Narrative    Not on file     Social Determinants of Health     Financial Resource Strain:     Difficulty of Paying Living Expenses: Not on file   Food Insecurity:     Worried About Running Out of Food in the Last Year: Not on file    Narayan of Food in the Last Year: Not on file   Transportation Needs:     Lack of Transportation (Medical): Not on file    Lack of Transportation (Non-Medical):  Not on file   Physical Activity:     Days of Exercise per Week: Not on file    Minutes of Exercise per Session: Not on file   Stress:     Feeling of Stress : Not on file   Social Connections:     Frequency of Communication with Friends and Family: Not on file    Frequency of Social Gatherings with Friends and Family: Not on file    Attends Orthodox Services: Not on file    Active Member of Clubs or Organizations: Not on file    Attends Club or Organization Meetings: Not on file    Marital Status: Not on file   Intimate Partner Violence:     Fear of Current or Ex-Partner: Not on file    Emotionally Abused: Not on file    Physically Abused: Not on file    Sexually Abused: Not on file   Housing Stability:     Unable to Pay for Housing in the Last Year: Not on file    Number of Places Lived in the Last Year: Not on file    Unstable Housing in the Last Year: Not on file       DRUG/FOOD ALLERGIES: Prednisone, Quinidine, Sulfa antibiotics, Bactrim, Clonidine, and Sulfamethoxazole-trimethoprim    CURRENT MEDICATIONS  Prior to Admission medications    Medication Sig Start Date End Date Taking? Authorizing Provider   albuterol sulfate  (90 Base) MCG/ACT inhaler Inhale 2 puffs into the lungs 4 times daily as needed for Wheezing 1/22/22  Yes BRYCE Oh CNP   glipiZIDE (GLUCOTROL XL) 10 MG extended release tablet Take 1 tablet by mouth 2 times daily 1/22/22  Yes BRYCE Oh CNP   SITagliptin (JANUVIA) 50 MG tablet Take 1 tablet by mouth daily 1/22/22  Yes BRYCE Oh CNP   simvastatin (ZOCOR) 40 MG tablet Take 1 tablet by mouth nightly 1/22/22  Yes BRYCE Oh CNP   rOPINIRole (REQUIP) 0.5 MG tablet TAKE 1 TABLET BY MOUTH 3 TIMES DAILY.  1/22/22  Yes BRYCE Oh CNP   metoprolol tartrate (LOPRESSOR) 25 MG tablet Take 1 tablet by mouth 2 times daily 1/22/22  Yes BRYCE Oh CNP   levothyroxine (SYNTHROID) 100 MCG tablet Take 1 tablet by mouth Daily 1/22/22  Yes BRYCE Oh CNP   omeprazole (PRILOSEC) 40 MG delayed release capsule Take 1 capsule by mouth daily 1/22/22  Yes BRYCE Oh CNP   aspirin 81 MG EC tablet Take 1 tablet by mouth daily 1/22/22  Yes BRYCE Oh CNP   spironolactone (ALDACTONE) 25 MG tablet Take 1 tablet by mouth daily 1/22/22  Yes Raymond Wiggins MD   furosemide (LASIX) 40 MG tablet Take 1 tablet by mouth daily 1/22/22  Yes Raymond Wiggins MD   ciprofloxacin (CIPRO) 500 MG tablet Take 1 tablet by mouth every 12 hours for 10 days 1/21/22 1/31/22 Yes Karen Ricketts MD   magnesium oxide (MAG-OX) 400 (240 Mg) MG tablet Take 1 tablet by mouth daily 1/22/22  Yes Karen Ricketts MD   metoclopramide (REGLAN) 5 MG tablet Take 1 tablet by mouth 2 times daily 1/21/22  Yes Karen Ricketts MD   tiZANidine (ZANAFLEX) 2 MG tablet Take 2 mg by mouth every 8 hours as needed 9/16/21  Yes Historical Provider, MD   ipratropium-albuterol (DUONEB) 0.5-2.5 (3) MG/3ML SOLN nebulizer solution Inhale 3 mLs into the lungs every 4 hours 10/28/21  Yes Christoph Hoffman MD   carboxymethylcellulose PF (THERATEARS) 1 % GEL ophthalmic gel Place 1 drop into both eyes every 4 hours as needed for Dry Eyes 10/28/21  Yes Christoph Hoffman MD   budesonide (PULMICORT FLEXHALER) 180 MCG/ACT AEPB inhaler Inhale 1 puff into the lungs 2 times daily Per Ondina Landeros [place] [id number]. Tara Gain   Yes Historical Provider, MD   estradiol (ESTRACE) 0.1 MG/GM vaginal cream Place vaginally See Admin Instructions Insert a pea-sized amount vaginally every day for 7 days, then twice weekly thereafter. Per [place] [id number]. Tara Gain. Last dispensed 5/10/21. Yes Historical Provider, MD   Multiple Vitamins-Minerals (THERAPEUTIC MULTIVITAMIN-MINERALS) tablet Take 1 tablet by mouth daily   Yes Historical Provider, MD   blood glucose monitor strips Dispense whatever insurance will cover. Pt test twice a day dx:E11.9 3/11/19  Yes Van Luna MD   blood glucose test strips (ASCENSIA AUTODISC VI;ONE TOUCH ULTRA TEST VI) strip 1 each by In Vitro route daily As needed. 3/11/19  Yes Van Luna MD   polyethylene glycol (MIRALAX) powder Take 17 g by mouth daily as needed   Yes Historical Provider, MD   Blood Glucose Monitoring Suppl CATHRYN Dispense whatever insurance will cover. Dx code:E11.9 1/11/18  Yes Yesenia Marion MD       REVIEW OF SYSTEMS  The following systems were reviewed and revealed the following in addition to any already discussed in the HPI:    Review of Systems   Constitutional: Negative for fatigue and fever. HENT: Positive for trouble swallowing and voice change. Negative for congestion, ear pain, postnasal drip, rhinorrhea and sneezing. Eyes: Negative for pain and visual disturbance. Respiratory: Positive for shortness of breath. Negative for cough. Cardiovascular: Negative for chest pain. Gastrointestinal: Negative for nausea and vomiting. Endocrine: Negative. Genitourinary: Negative. Musculoskeletal: Negative for neck pain and neck stiffness. Skin: Negative for rash. Neurological: Negative for dizziness and headaches. PHYSICAL EXAM  /77 (Site: Right Upper Arm, Position: Sitting, Cuff Size: Medium Adult)   Pulse 66   Temp 97.2 °F (36.2 °C) (Infrared)   Ht 5' 9\" (1.753 m)   Wt 147 lb 9.6 oz (67 kg)   BMI 21.80 kg/m²     GENERAL: No Acute Distress, Alert and Oriented, no hoarseness  EYES: EOMI, Anti-icteric  NOSE: No epistaxis, nasal mucosa within normal limits, no purulent drainage  EARS: Normal external canal appearance, EAC patent bilaterally  FACE: 1/6 House-Brackmann Scale, symmetric, sensation equal bilaterally  ORAL CAVITY: No masses or lesions palpated, uvula is midline, moist mucous membranes,   NECK: Normal range of motion, no thyromegaly, trachea is midline, no lymphadenopathy, no neck masses, no crepitus  CHEST: Normal respiratory effort, no retractions, breathing comfortably  SKIN: No rashes, normal appearing skin, no evidence of skin lesions/tumors    RADIOLOGY  Summary of findings:      PROCEDURE  Flexible Laryngoscopy     Pre op: Hoarseness. Post op: Left vocal fold paralysis  Procedure : Flexible Laryngoscopy  Surgeon: [employer], DO  Anesthesia: Afrin with 4% lidocaine  Indication: Laryngeal mirror examination was not tolerated due to gag reflex  Description:  After verbal consent was obtained, the scope was passed along the floor of the right naris to the level of the larynx.  There was no evidence of concerning masses or lesions of the base of tongue, vallecula, epiglottis, aryepiglottic folds, arytenoids, false vocal folds, true vocal folds, or pyriform sinuses. The right true vocal fold exhibited complete motion however the left true vocal fold was paralyzed in the paramedian position. There is also questionable arytenoid dislocation as it is in the anterior position. The scope was removed. The patient tolerated the procedure without difficulty. There were no complications. Pertinent findings: Left vocal fold paralysis    ASSESSMENT/PLAN  Chloe Sherwood is a very pleasant 78 y.o. female with     1. Vocal fold paralysis, left  She has a left true vocal fold paralysis. I would like her to see Dr. Valdez Rahman prior to surgery however she is only allotted so much oxygen per month and her  is concerned they will not be able to make that with her other appointments. She is also suffering from aspiration secondary to the paralysis. We will move forward with the left vocal fold injection. Risk benefits and alternatives to the surgery were discussed with the  and patient again. Risks include but not limited to worsening voice, airway compromise, bleeding, infection, and the antecedent risks of general anesthesia. I also discussed the possibility of going to the [facility] for an awake injection which they do not would to have. She will need to be cleared by her primary care physician prior to moving forward with surgery. We will schedule her at her earliest convenience. Postoperative instructions were placed on the chart. Medical Decision Making:   The following items were considered in medical decision making:  Independent review of images  Review / order clinical lab tests  Review / order radiology tests  Decision to obtain old records

## 2022-02-04 ENCOUNTER — APPOINTMENT (OUTPATIENT)
Dept: CT IMAGING | Age: 67
DRG: 389 | End: 2022-02-04
Payer: MEDICARE

## 2022-02-04 ENCOUNTER — APPOINTMENT (OUTPATIENT)
Dept: GENERAL RADIOLOGY | Age: 67
DRG: 389 | End: 2022-02-04
Payer: MEDICARE

## 2022-02-04 ENCOUNTER — HOSPITAL ENCOUNTER (EMERGENCY)
Age: 67
Discharge: HOME | DRG: 389 | End: 2022-02-05
Attending: STUDENT IN AN ORGANIZED HEALTH CARE EDUCATION/TRAINING PROGRAM
Payer: MEDICARE

## 2022-02-04 DIAGNOSIS — K59.09 OTHER CONSTIPATION: Primary | ICD-10-CM

## 2022-02-04 LAB
A/G RATIO: 1.1 (ref 1.1–2.2)
ALBUMIN SERPL-MCNC: 4.2 G/DL (ref 3.4–5)
ALP BLD-CCNC: 126 U/L (ref 40–129)
ALT SERPL-CCNC: 10 U/L (ref 10–40)
ANION GAP SERPL CALCULATED.3IONS-SCNC: 13 MMOL/L (ref 3–16)
AST SERPL-CCNC: 33 U/L (ref 15–37)
BASE EXCESS VENOUS: 4.3 MMOL/L (ref -3–3)
BASOPHILS ABSOLUTE: 0.1 K/UL (ref 0–0.2)
BASOPHILS RELATIVE PERCENT: 0.7 %
BILIRUB SERPL-MCNC: 0.3 MG/DL (ref 0–1)
BUN BLDV-MCNC: 10 MG/DL (ref 7–20)
CALCIUM SERPL-MCNC: 10.2 MG/DL (ref 8.3–10.6)
CARBOXYHEMOGLOBIN: 3.7 % (ref 0–1.5)
CHLORIDE BLD-SCNC: 98 MMOL/L (ref 99–110)
CO2: 28 MMOL/L (ref 21–32)
CREAT SERPL-MCNC: <0.5 MG/DL (ref 0.6–1.2)
EOSINOPHILS ABSOLUTE: 0.1 K/UL (ref 0–0.6)
EOSINOPHILS RELATIVE PERCENT: 1 %
GFR AFRICAN AMERICAN: >60
GFR NON-AFRICAN AMERICAN: >60
GLUCOSE BLD-MCNC: 111 MG/DL (ref 70–99)
HCO3 VENOUS: 29 MMOL/L (ref 23–29)
HCT VFR BLD CALC: 40 % (ref 36–48)
HEMOGLOBIN: 13.1 G/DL (ref 12–16)
LACTIC ACID, SEPSIS: 1.1 MMOL/L (ref 0.4–1.9)
LYMPHOCYTES ABSOLUTE: 1.8 K/UL (ref 1–5.1)
LYMPHOCYTES RELATIVE PERCENT: 20.7 %
MCH RBC QN AUTO: 24.7 PG (ref 26–34)
MCHC RBC AUTO-ENTMCNC: 32.8 G/DL (ref 31–36)
MCV RBC AUTO: 75.2 FL (ref 80–100)
METHEMOGLOBIN VENOUS: 0.3 %
MONOCYTES ABSOLUTE: 0.7 K/UL (ref 0–1.3)
MONOCYTES RELATIVE PERCENT: 7.9 %
NEUTROPHILS ABSOLUTE: 5.9 K/UL (ref 1.7–7.7)
NEUTROPHILS RELATIVE PERCENT: 69.7 %
O2 CONTENT, VEN: 19 VOL %
O2 SAT, VEN: 96 %
O2 THERAPY: ABNORMAL
PCO2, VEN: 43.7 MMHG (ref 40–50)
PDW BLD-RTO: 20.1 % (ref 12.4–15.4)
PH VENOUS: 7.44 (ref 7.35–7.45)
PLATELET # BLD: 246 K/UL (ref 135–450)
PMV BLD AUTO: 8.9 FL (ref 5–10.5)
PO2, VEN: 78.2 MMHG (ref 25–40)
POTASSIUM REFLEX MAGNESIUM: 3.6 MMOL/L (ref 3.5–5.1)
RBC # BLD: 5.33 M/UL (ref 4–5.2)
SODIUM BLD-SCNC: 139 MMOL/L (ref 136–145)
TCO2 CALC VENOUS: 30 MMOL/L
TOTAL PROTEIN: 8.1 G/DL (ref 6.4–8.2)
TROPONIN: <0.01 NG/ML
WBC # BLD: 8.5 K/UL (ref 4–11)

## 2022-02-04 PROCEDURE — 82803 BLOOD GASES ANY COMBINATION: CPT

## 2022-02-04 PROCEDURE — 93005 ELECTROCARDIOGRAM TRACING: CPT | Performed by: STUDENT IN AN ORGANIZED HEALTH CARE EDUCATION/TRAINING PROGRAM

## 2022-02-04 PROCEDURE — 99285 EMERGENCY DEPT VISIT HI MDM: CPT

## 2022-02-04 PROCEDURE — 6360000004 HC RX CONTRAST MEDICATION: Performed by: STUDENT IN AN ORGANIZED HEALTH CARE EDUCATION/TRAINING PROGRAM

## 2022-02-04 PROCEDURE — 83605 ASSAY OF LACTIC ACID: CPT

## 2022-02-04 PROCEDURE — 71045 X-RAY EXAM CHEST 1 VIEW: CPT

## 2022-02-04 PROCEDURE — 96374 THER/PROPH/DIAG INJ IV PUSH: CPT

## 2022-02-04 PROCEDURE — 6360000002 HC RX W HCPCS: Performed by: STUDENT IN AN ORGANIZED HEALTH CARE EDUCATION/TRAINING PROGRAM

## 2022-02-04 PROCEDURE — 85025 COMPLETE CBC W/AUTO DIFF WBC: CPT

## 2022-02-04 PROCEDURE — 80053 COMPREHEN METABOLIC PANEL: CPT

## 2022-02-04 PROCEDURE — 74177 CT ABD & PELVIS W/CONTRAST: CPT

## 2022-02-04 PROCEDURE — 84484 ASSAY OF TROPONIN QUANT: CPT

## 2022-02-04 RX ORDER — MORPHINE SULFATE 4 MG/ML
4 INJECTION, SOLUTION INTRAMUSCULAR; INTRAVENOUS EVERY 30 MIN PRN
Status: DISCONTINUED | OUTPATIENT
Start: 2022-02-04 | End: 2022-02-05 | Stop reason: HOSPADM

## 2022-02-04 RX ORDER — BISACODYL 10 MG
10 SUPPOSITORY, RECTAL RECTAL PRN
Qty: 10 SUPPOSITORY | Refills: 0 | Status: SHIPPED | OUTPATIENT
Start: 2022-02-04 | End: 2022-02-14

## 2022-02-04 RX ORDER — IPRATROPIUM BROMIDE AND ALBUTEROL SULFATE 2.5; .5 MG/3ML; MG/3ML
1 SOLUTION RESPIRATORY (INHALATION) EVERY 4 HOURS
Qty: 360 ML | Refills: 0 | Status: SHIPPED | OUTPATIENT
Start: 2022-02-04

## 2022-02-04 RX ORDER — LACTULOSE 10 G/15ML
20 SOLUTION ORAL DAILY
Qty: 150 ML | Refills: 0 | Status: ON HOLD | OUTPATIENT
Start: 2022-02-04 | End: 2022-02-08 | Stop reason: HOSPADM

## 2022-02-04 RX ORDER — BISACODYL 10 MG
10 SUPPOSITORY, RECTAL RECTAL ONCE
Status: COMPLETED | OUTPATIENT
Start: 2022-02-05 | End: 2022-02-05

## 2022-02-04 RX ADMIN — IOPAMIDOL 75 ML: 755 INJECTION, SOLUTION INTRAVENOUS at 22:04

## 2022-02-04 RX ADMIN — MORPHINE SULFATE 4 MG: 4 INJECTION INTRAVENOUS at 22:28

## 2022-02-04 ASSESSMENT — PAIN SCALES - GENERAL: PAINLEVEL_OUTOF10: 10

## 2022-02-04 NOTE — Clinical Note
Joey Dominguez was seen and treated in our emergency department on 2/4/2022. She may return to work on 02/07/2022. If you have any questions or concerns, please don't hesitate to call.       Harshil Rae, DO

## 2022-02-05 VITALS
RESPIRATION RATE: 16 BRPM | SYSTOLIC BLOOD PRESSURE: 149 MMHG | OXYGEN SATURATION: 99 % | HEART RATE: 102 BPM | BODY MASS INDEX: 21.77 KG/M2 | DIASTOLIC BLOOD PRESSURE: 98 MMHG | HEIGHT: 69 IN | WEIGHT: 147 LBS

## 2022-02-05 LAB
BACTERIA: ABNORMAL /HPF
BILIRUBIN URINE: NEGATIVE
BLOOD, URINE: ABNORMAL
CLARITY: CLEAR
COLOR: YELLOW
EKG ATRIAL RATE: 89 BPM
EKG DIAGNOSIS: NORMAL
EKG P AXIS: 34 DEGREES
EKG P-R INTERVAL: 152 MS
EKG Q-T INTERVAL: 422 MS
EKG QRS DURATION: 96 MS
EKG QTC CALCULATION (BAZETT): 513 MS
EKG R AXIS: -12 DEGREES
EKG T AXIS: 141 DEGREES
EKG VENTRICULAR RATE: 89 BPM
EPITHELIAL CELLS, UA: ABNORMAL /HPF (ref 0–5)
GLUCOSE URINE: 500 MG/DL
KETONES, URINE: NEGATIVE MG/DL
LEUKOCYTE ESTERASE, URINE: ABNORMAL
MICROSCOPIC EXAMINATION: YES
MUCUS: ABNORMAL /LPF
NITRITE, URINE: NEGATIVE
PH UA: 6.5 (ref 5–8)
PROTEIN UA: NEGATIVE MG/DL
RBC UA: ABNORMAL /HPF (ref 0–4)
SPECIFIC GRAVITY UA: <=1.005 (ref 1–1.03)
URINE REFLEX TO CULTURE: YES
URINE TYPE: ABNORMAL
UROBILINOGEN, URINE: 0.2 E.U./DL
WBC UA: ABNORMAL /HPF (ref 0–5)
YEAST: PRESENT /HPF

## 2022-02-05 PROCEDURE — 6370000000 HC RX 637 (ALT 250 FOR IP): Performed by: STUDENT IN AN ORGANIZED HEALTH CARE EDUCATION/TRAINING PROGRAM

## 2022-02-05 PROCEDURE — 93010 ELECTROCARDIOGRAM REPORT: CPT | Performed by: INTERNAL MEDICINE

## 2022-02-05 PROCEDURE — 87086 URINE CULTURE/COLONY COUNT: CPT

## 2022-02-05 PROCEDURE — 81001 URINALYSIS AUTO W/SCOPE: CPT

## 2022-02-05 RX ADMIN — BISACODYL 10 MG: 10 SUPPOSITORY RECTAL at 01:22

## 2022-02-05 NOTE — ED NOTES
Pt discharged per order. Discharge instructions, follow up and medications reviewed with pt, complete understanding voiced. Pt assisted out to private vehicle via [facility].       Kelly Shook RN  02/05/22 2037

## 2022-02-05 NOTE — ED PROVIDER NOTES
[address] ED      CHIEF COMPLAINT  Abdominal Pain (Pt arrives via EMS, pt c/o LLQ abdominal pain. hx of Diverticulitis. Just Dc'd 2 weeks ago for COVID. EMS reports pt in [facility] PVC's and PAC's)       HISTORY OF PRESENT ILLNESS  Celi Cheema is a 78 y.o. female  who presents to the ED complaining of left lower quadrant abdominal pain that started yesterday. She indicates the pain is sharp and severe, rating a 10/10. She was hospitalized 2 weeks ago for Covid, continues to require 2.5 L nasal cannula oxygen at home. Does not feel any worsening shortness of breath. No chest pain. Denies dysuria, hematuria, diarrhea, constipation. No other complaints, modifying factors or associated symptoms. I have reviewed the following from the nursing documentation.     Past Medical History:   Diagnosis Date    Anxiety disorder     Chronic pain syndrome     COVID-19 09/27/2021    DDD (degenerative disc disease), lumbar     Diabetes ([address]     Displacement of lumbar intervertebral disc without myelopathy 08/23/2010    Diverticulitis     Fibromyalgia     Generalized osteoarthrosis, involving multiple sites 08/24/2010    GERD (gastroesophageal reflux disease)     Hypochromic microcytic anemia 1/18/2022    Impacted cerumen 08/23/2010    Influenza A 03/10/2016    Irritable bowel syndrome 08/23/2010    Other and unspecified hyperlipidemia 08/23/2010    Prosthetic joint implant failure ([address]     Screening mammogram 12/08/2006    (Both)Negative    Tracheobronchomalacia     Unspecified asthma(493.90) 08/23/2010    Unspecified essential hypertension 08/24/2010    Unspecified hypothyroidism 08/23/2010     Past Surgical History:   Procedure Laterality Date    ABDOMEN SURGERY      CARDIAC CATHETERIZATION      COLON SURGERY      COLONOSCOPY  2007    normal    HYSTERECTOMY      JOINT REPLACEMENT  10/92    Right; hip     JOINT REPLACEMENT    12/92    Left hip  followed by revision 1/2006    JOINT REPLACEMENT        right     JOINT REPLACEMENT         right replacement.  LUNG SURGERY  2014    tracheobronchomalacia    TRACHEOSTOMY N/A 10/25/2021    TRACHEOTOMY performed by Yoko Strong DO at [facility] N/A 10/25/2021    EGD/PEG W/ANES. ON VENT, COVID + performed by Mylene Worley MD at [facility] ENDOSCOPY     Family History   Problem Relation Age of Onset    Asthma Mother     Heart Failure Father     Cancer Maternal Grandfather         skin cancer on face    Cancer Daughter         skin cancer-BCC    Diabetes Neg Hx     Emphysema Neg Hx     Hypertension Neg Hx      Social History     Socioeconomic History    Marital status:      Spouse name: Not on file    Number of children: Not on file    Years of education: Not on file    Highest education level: Not on file   Occupational History    Not on file   Tobacco Use    Smoking status: Former Smoker     Packs/day: 1.00     Years: 18.00     Pack years: 18.00     Types: Cigarettes     Quit date: 12/10/1991     Years since quittin.1    Smokeless tobacco: Never Used   Vaping Use    Vaping Use: Never used   Substance and Sexual Activity    Alcohol use: No    Drug use: No    Sexual activity: Never     Partners: Male   Other Topics Concern    Not on file   Social History Narrative    Not on file     Social Determinants of Health     Financial Resource Strain:     Difficulty of Paying Living Expenses: Not on file   Food Insecurity:     Worried About [address] in the Last Year: Not on file    [address] [facility] [address] in the Last Year: Not on file   Transportation Needs:     Lack of Transportation (Medical): Not on file    Lack of Transportation (Non-Medical):  Not on file   Physical Activity:     Days of Exercise per Week: Not on file    Minutes of Exercise per Session: Not on file   Stress:     Feeling of Stress : Not on file   Social Connections:     Frequency of Communication with Friends and Family: Not on file    Frequency of Social Gatherings with Friends and Family: Not on file    Attends Scientologist Services: Not on file    Active Member of Clubs or Organizations: Not on file    Attends Club or Organization Meetings: Not on file    Marital Status: Not on file   Intimate Partner Violence:     Fear of Current or Ex-Partner: Not on file    Emotionally Abused: Not on file    Physically Abused: Not on file    Sexually Abused: Not on file   Housing Stability:     Unable to Pay for Housing in the Last Year: Not on file    Number of Rian in the Last Year: Not on file    Unstable Housing in the Last Year: Not on file     Current Facility-Administered Medications   Medication Dose Route Frequency Provider Last Rate Last Admin    morphine sulfate (PF) injection 4 mg  4 mg IntraVENous Q30 Min PRN Maurizio Late, DO         Current Outpatient Medications   Medication Sig Dispense Refill    albuterol sulfate  (90 Base) MCG/ACT inhaler Inhale 2 puffs into the lungs 4 times daily as needed for Wheezing 1 each 0    glipiZIDE (GLUCOTROL XL) 10 MG extended release tablet Take 1 tablet by mouth 2 times daily 60 tablet 0    SITagliptin (JANUVIA) 50 MG tablet Take 1 tablet by mouth daily 30 tablet 0    simvastatin (ZOCOR) 40 MG tablet Take 1 tablet by mouth nightly 30 tablet 0    rOPINIRole (REQUIP) 0.5 MG tablet TAKE 1 TABLET BY MOUTH 3 TIMES DAILY.  90 tablet 0    metoprolol tartrate (LOPRESSOR) 25 MG tablet Take 1 tablet by mouth 2 times daily 60 tablet 0    levothyroxine (SYNTHROID) 100 MCG tablet Take 1 tablet by mouth Daily 30 tablet 0    omeprazole (PRILOSEC) 40 MG delayed release capsule Take 1 capsule by mouth daily 30 capsule 0    aspirin 81 MG EC tablet Take 1 tablet by mouth daily 30 tablet 0    spironolactone (ALDACTONE) 25 MG tablet Take 1 tablet by mouth daily 30 tablet 3    furosemide (LASIX) 40 MG tablet Take 1 tablet by mouth daily 60 negative. PHYSICAL EXAM  BP (!) 143/89   Pulse 92   Resp 18   Ht 5' 9\" (1.753 m)   Wt 147 lb (66.7 kg)   SpO2 99%   BMI 21.71 kg/m²    General: Appears well. Alert  HEENT: Head atraumatic, Eyes normal inspection, PERRL. Normal ENT inspection, Pharynx normal. No signs of dehydration  NECK: Normal inspection  RESPIRATORY: Normal breath sounds. No chest wall tenderness. No respiratory distress  CVS: Heart rate and rhythm regular. No Murmurs  ABDOMEN/GI: Exquisite left lower quadrant tenderness with rebound and voluntary guarding. No distention  BACK: Normal inspection  EXTREMITIES: Non-Tender. Full ROM. Normal appearance. No Pedal edema  NEURO: Alert and oriented. Sensation normal. Motor normal  PSYCH: Mood normal. Affect normal.  SKIN: Color normal. No rash. Warm, Dry    LABS  I have reviewed all labs for this visit.    Results for orders placed or performed during the hospital encounter of 02/04/22   CBC Auto Differential   Result Value Ref Range    WBC 8.5 4.0 - 11.0 K/uL    RBC 5.33 (H) 4.00 - 5.20 M/uL    Hemoglobin 13.1 12.0 - 16.0 g/dL    Hematocrit 40.0 36.0 - 48.0 %    MCV 75.2 (L) 80.0 - 100.0 fL    MCH 24.7 (L) 26.0 - 34.0 pg    MCHC 32.8 31.0 - 36.0 g/dL    RDW 20.1 (H) 12.4 - 15.4 %    Platelets 187 548 - 876 K/uL    MPV 8.9 5.0 - 10.5 fL    Neutrophils % 69.7 %    Lymphocytes % 20.7 %    Monocytes % 7.9 %    Eosinophils % 1.0 %    Basophils % 0.7 %    Neutrophils Absolute 5.9 1.7 - 7.7 K/uL    Lymphocytes Absolute 1.8 1.0 - 5.1 K/uL    Monocytes Absolute 0.7 0.0 - 1.3 K/uL    Eosinophils Absolute 0.1 0.0 - 0.6 K/uL    Basophils Absolute 0.1 0.0 - 0.2 K/uL   EKG 12 Lead   Result Value Ref Range    Ventricular Rate 89 BPM    Atrial Rate 89 BPM    P-R Interval 152 ms    QRS Duration 96 ms    Q-T Interval 422 ms    QTc Calculation (Bazett) 513 ms    P Axis 34 degrees    R Axis -12 degrees    T Axis 141 degrees    Diagnosis       Sinus rhythm with frequent Premature ventricular complexes in a pattern of bigeminyLeft ventricular hypertrophy with repolarization abnormalityProlonged QTAbnormal ECGWhen compared with ECG of 12-JAN-2022 14:19,Premature ventricular complexes are now PresentPremature atrial complexes are no longer PresentST now depressed in Anterior leads       ECG  The Ekg interpreted by me shows  Sinus rhythm with PVCs and bigeminy and a rate of 89 bpm.  Left axis deviation. ST depressions in V4 through V6. No ST elevation. , QRS 96, QTc 513. ecg #2  Sinus rhythm with a rate of 94 bpm.  Normal axis. No acute injury pattern. , QRS 92, QTc 492. RADIOLOGY  CT ABDOMEN PELVIS W IV CONTRAST Additional Contrast? None   Preliminary Result   Findings suggesting hepatic cirrhosis. New ill-defined 20 x 10 mm   hypoattenuating area in the dome of the right hepatic lobe. Given findings   of cirrhosis, recommend MRI of the liver with and without contrast for   further evaluation in the nonacute outpatient setting. Large amount of stool throughout the colon, particularly in the left colon   suggesting constipation. 7 mm right lower lobe solid noncalcified pulmonary nodule that may be new   since 09/27/2021 chest CT. Recommend further evaluation with dedicated chest   CT imaging. Subacute healing right inferior pubic ramus fracture. Bilateral total hip   arthroplasties. XR CHEST PORTABLE   Final Result   Stable cardiomegaly with mild central pulmonary congestion or pulmonary   artery hypertension which is more prominent      Chronic obstructive lung changes with hazy ground-glass interstitial   opacities along the lung bases extending into the left perihilar region and   right upper lobe which could represent atypical pulmonary edema or early   infiltrates from pneumonia.   Recommend short-term follow-up      Postop changes right hilar region which is unchanged      Tiny right pleural effusion which is less prominent           ED COURSE/MDM  Patient seen and transcribed correctly.        Alex Curry, DO  02/05/22 0023       Alex Curry, DO  02/05/22 Judy Ying

## 2022-02-06 ENCOUNTER — HOSPITAL ENCOUNTER (INPATIENT)
Age: 67
LOS: 2 days | Discharge: HOME | DRG: 389 | End: 2022-02-08
Attending: STUDENT IN AN ORGANIZED HEALTH CARE EDUCATION/TRAINING PROGRAM | Admitting: INTERNAL MEDICINE
Payer: MEDICARE

## 2022-02-06 ENCOUNTER — APPOINTMENT (OUTPATIENT)
Dept: CT IMAGING | Age: 67
DRG: 389 | End: 2022-02-06
Payer: MEDICARE

## 2022-02-06 DIAGNOSIS — M19.019 SHOULDER ARTHRITIS: ICD-10-CM

## 2022-02-06 DIAGNOSIS — R10.32 ABDOMINAL PAIN, LEFT LOWER QUADRANT: Primary | ICD-10-CM

## 2022-02-06 DIAGNOSIS — K59.00 CONSTIPATION, UNSPECIFIED CONSTIPATION TYPE: ICD-10-CM

## 2022-02-06 DIAGNOSIS — N30.01 ACUTE CYSTITIS WITH HEMATURIA: ICD-10-CM

## 2022-02-06 PROBLEM — R10.9 ABDOMINAL PAIN: Status: ACTIVE | Noted: 2022-02-06

## 2022-02-06 LAB
A/G RATIO: 0.9 (ref 1.1–2.2)
ALBUMIN SERPL-MCNC: 4.1 G/DL (ref 3.4–5)
ALP BLD-CCNC: 123 U/L (ref 40–129)
ALT SERPL-CCNC: 10 U/L (ref 10–40)
ANION GAP SERPL CALCULATED.3IONS-SCNC: 12 MMOL/L (ref 3–16)
AST SERPL-CCNC: 34 U/L (ref 15–37)
BASOPHILS ABSOLUTE: 0 K/UL (ref 0–0.2)
BASOPHILS RELATIVE PERCENT: 0.4 %
BILIRUB SERPL-MCNC: 0.6 MG/DL (ref 0–1)
BILIRUBIN URINE: NEGATIVE
BLOOD, URINE: ABNORMAL
BUN BLDV-MCNC: 16 MG/DL (ref 7–20)
CALCIUM SERPL-MCNC: 10.3 MG/DL (ref 8.3–10.6)
CHLORIDE BLD-SCNC: 95 MMOL/L (ref 99–110)
CLARITY: CLEAR
CO2: 30 MMOL/L (ref 21–32)
COLOR: YELLOW
CREAT SERPL-MCNC: <0.5 MG/DL (ref 0.6–1.2)
EKG ATRIAL RATE: 94 BPM
EKG DIAGNOSIS: NORMAL
EKG P AXIS: 55 DEGREES
EKG P-R INTERVAL: 206 MS
EKG Q-T INTERVAL: 394 MS
EKG QRS DURATION: 92 MS
EKG QTC CALCULATION (BAZETT): 492 MS
EKG R AXIS: -7 DEGREES
EKG T AXIS: 99 DEGREES
EKG VENTRICULAR RATE: 94 BPM
EOSINOPHILS ABSOLUTE: 0 K/UL (ref 0–0.6)
EOSINOPHILS RELATIVE PERCENT: 0.2 %
GFR AFRICAN AMERICAN: >60
GFR NON-AFRICAN AMERICAN: >60
GLUCOSE BLD-MCNC: 80 MG/DL (ref 70–99)
GLUCOSE BLD-MCNC: 82 MG/DL (ref 70–99)
GLUCOSE URINE: 250 MG/DL
HCT VFR BLD CALC: 42.2 % (ref 36–48)
HEMOGLOBIN: 13.7 G/DL (ref 12–16)
INFLUENZA A: NOT DETECTED
INFLUENZA B: NOT DETECTED
KETONES, URINE: NEGATIVE MG/DL
LEUKOCYTE ESTERASE, URINE: ABNORMAL
LIPASE: 17 U/L (ref 13–60)
LYMPHOCYTES ABSOLUTE: 1.4 K/UL (ref 1–5.1)
LYMPHOCYTES RELATIVE PERCENT: 14.1 %
MCH RBC QN AUTO: 24.7 PG (ref 26–34)
MCHC RBC AUTO-ENTMCNC: 32.4 G/DL (ref 31–36)
MCV RBC AUTO: 76.1 FL (ref 80–100)
MICROSCOPIC EXAMINATION: YES
MONOCYTES ABSOLUTE: 0.6 K/UL (ref 0–1.3)
MONOCYTES RELATIVE PERCENT: 6.2 %
NEUTROPHILS ABSOLUTE: 7.7 K/UL (ref 1.7–7.7)
NEUTROPHILS RELATIVE PERCENT: 79.1 %
NITRITE, URINE: NEGATIVE
PDW BLD-RTO: 19.8 % (ref 12.4–15.4)
PERFORMED ON: NORMAL
PH UA: 6.5 (ref 5–8)
PLATELET # BLD: 224 K/UL (ref 135–450)
PMV BLD AUTO: 8.9 FL (ref 5–10.5)
POTASSIUM REFLEX MAGNESIUM: 3.9 MMOL/L (ref 3.5–5.1)
PROTEIN UA: 100 MG/DL
RBC # BLD: 5.55 M/UL (ref 4–5.2)
RBC UA: ABNORMAL /HPF (ref 0–4)
SARS-COV-2 RNA, RT PCR: NOT DETECTED
SODIUM BLD-SCNC: 137 MMOL/L (ref 136–145)
SPECIFIC GRAVITY UA: 1.02 (ref 1–1.03)
TOTAL PROTEIN: 8.7 G/DL (ref 6.4–8.2)
TROPONIN: <0.01 NG/ML
TSH REFLEX FT4: 4.18 UIU/ML (ref 0.27–4.2)
URINE CULTURE, ROUTINE: NORMAL
URINE REFLEX TO CULTURE: YES
URINE TYPE: ABNORMAL
UROBILINOGEN, URINE: 0.2 E.U./DL
WBC # BLD: 9.8 K/UL (ref 4–11)
WBC UA: >100 /HPF (ref 0–5)
YEAST: PRESENT /HPF

## 2022-02-06 PROCEDURE — 96372 THER/PROPH/DIAG INJ SC/IM: CPT

## 2022-02-06 PROCEDURE — 6370000000 HC RX 637 (ALT 250 FOR IP): Performed by: INTERNAL MEDICINE

## 2022-02-06 PROCEDURE — 85025 COMPLETE CBC W/AUTO DIFF WBC: CPT

## 2022-02-06 PROCEDURE — 83690 ASSAY OF LIPASE: CPT

## 2022-02-06 PROCEDURE — 36415 COLL VENOUS BLD VENIPUNCTURE: CPT

## 2022-02-06 PROCEDURE — 2700000000 HC OXYGEN THERAPY PER DAY

## 2022-02-06 PROCEDURE — 84443 ASSAY THYROID STIM HORMONE: CPT

## 2022-02-06 PROCEDURE — 96375 TX/PRO/DX INJ NEW DRUG ADDON: CPT

## 2022-02-06 PROCEDURE — 94640 AIRWAY INHALATION TREATMENT: CPT

## 2022-02-06 PROCEDURE — 2580000003 HC RX 258: Performed by: INTERNAL MEDICINE

## 2022-02-06 PROCEDURE — 6360000002 HC RX W HCPCS: Performed by: PHYSICIAN ASSISTANT

## 2022-02-06 PROCEDURE — 80053 COMPREHEN METABOLIC PANEL: CPT

## 2022-02-06 PROCEDURE — 74177 CT ABD & PELVIS W/CONTRAST: CPT

## 2022-02-06 PROCEDURE — 87086 URINE CULTURE/COLONY COUNT: CPT

## 2022-02-06 PROCEDURE — 83036 HEMOGLOBIN GLYCOSYLATED A1C: CPT

## 2022-02-06 PROCEDURE — 87636 SARSCOV2 & INF A&B AMP PRB: CPT

## 2022-02-06 PROCEDURE — 93010 ELECTROCARDIOGRAM REPORT: CPT | Performed by: INTERNAL MEDICINE

## 2022-02-06 PROCEDURE — 96365 THER/PROPH/DIAG IV INF INIT: CPT

## 2022-02-06 PROCEDURE — 6370000000 HC RX 637 (ALT 250 FOR IP): Performed by: PHYSICIAN ASSISTANT

## 2022-02-06 PROCEDURE — 2580000003 HC RX 258: Performed by: PHYSICIAN ASSISTANT

## 2022-02-06 PROCEDURE — 6360000002 HC RX W HCPCS: Performed by: INTERNAL MEDICINE

## 2022-02-06 PROCEDURE — 96376 TX/PRO/DX INJ SAME DRUG ADON: CPT

## 2022-02-06 PROCEDURE — 1200000000 HC SEMI PRIVATE

## 2022-02-06 PROCEDURE — 6360000004 HC RX CONTRAST MEDICATION: Performed by: PHYSICIAN ASSISTANT

## 2022-02-06 PROCEDURE — 84484 ASSAY OF TROPONIN QUANT: CPT

## 2022-02-06 PROCEDURE — 93005 ELECTROCARDIOGRAM TRACING: CPT | Performed by: PHYSICIAN ASSISTANT

## 2022-02-06 PROCEDURE — 99285 EMERGENCY DEPT VISIT HI MDM: CPT

## 2022-02-06 PROCEDURE — 87106 FUNGI IDENTIFICATION YEAST: CPT

## 2022-02-06 PROCEDURE — 81001 URINALYSIS AUTO W/SCOPE: CPT

## 2022-02-06 PROCEDURE — 94761 N-INVAS EAR/PLS OXIMETRY MLT: CPT

## 2022-02-06 RX ORDER — POLYETHYLENE GLYCOL 3350 17 G/17G
17 POWDER, FOR SOLUTION ORAL DAILY PRN
Status: DISCONTINUED | OUTPATIENT
Start: 2022-02-06 | End: 2022-02-08 | Stop reason: HOSPADM

## 2022-02-06 RX ORDER — GLIPIZIDE 5 MG/1
2.5 TABLET ORAL
Status: DISCONTINUED | OUTPATIENT
Start: 2022-02-07 | End: 2022-02-08 | Stop reason: HOSPADM

## 2022-02-06 RX ORDER — ASPIRIN 81 MG/1
81 TABLET ORAL DAILY
Status: DISCONTINUED | OUTPATIENT
Start: 2022-02-07 | End: 2022-02-08 | Stop reason: HOSPADM

## 2022-02-06 RX ORDER — BISACODYL 10 MG
10 SUPPOSITORY, RECTAL RECTAL PRN
Status: DISCONTINUED | OUTPATIENT
Start: 2022-02-06 | End: 2022-02-08 | Stop reason: HOSPADM

## 2022-02-06 RX ORDER — DEXTROSE MONOHYDRATE 50 MG/ML
100 INJECTION, SOLUTION INTRAVENOUS PRN
Status: DISCONTINUED | OUTPATIENT
Start: 2022-02-06 | End: 2022-02-08 | Stop reason: HOSPADM

## 2022-02-06 RX ORDER — IPRATROPIUM BROMIDE AND ALBUTEROL SULFATE 2.5; .5 MG/3ML; MG/3ML
3 SOLUTION RESPIRATORY (INHALATION) EVERY 4 HOURS
Status: DISCONTINUED | OUTPATIENT
Start: 2022-02-06 | End: 2022-02-06

## 2022-02-06 RX ORDER — ROPINIROLE 0.25 MG/1
0.5 TABLET, FILM COATED ORAL 3 TIMES DAILY
Status: DISCONTINUED | OUTPATIENT
Start: 2022-02-06 | End: 2022-02-08 | Stop reason: HOSPADM

## 2022-02-06 RX ORDER — ACETAMINOPHEN 650 MG/1
650 SUPPOSITORY RECTAL EVERY 6 HOURS PRN
Status: DISCONTINUED | OUTPATIENT
Start: 2022-02-06 | End: 2022-02-08 | Stop reason: HOSPADM

## 2022-02-06 RX ORDER — ACETAMINOPHEN 325 MG/1
650 TABLET ORAL EVERY 6 HOURS PRN
Status: DISCONTINUED | OUTPATIENT
Start: 2022-02-06 | End: 2022-02-08 | Stop reason: HOSPADM

## 2022-02-06 RX ORDER — SODIUM CHLORIDE 9 MG/ML
INJECTION, SOLUTION INTRAVENOUS CONTINUOUS
Status: DISCONTINUED | OUTPATIENT
Start: 2022-02-06 | End: 2022-02-08

## 2022-02-06 RX ORDER — SODIUM CHLORIDE 9 MG/ML
25 INJECTION, SOLUTION INTRAVENOUS PRN
Status: DISCONTINUED | OUTPATIENT
Start: 2022-02-06 | End: 2022-02-08 | Stop reason: HOSPADM

## 2022-02-06 RX ORDER — POTASSIUM CHLORIDE 20 MEQ/1
40 TABLET, EXTENDED RELEASE ORAL PRN
Status: DISCONTINUED | OUTPATIENT
Start: 2022-02-06 | End: 2022-02-08 | Stop reason: HOSPADM

## 2022-02-06 RX ORDER — SODIUM CHLORIDE 0.9 % (FLUSH) 0.9 %
5-40 SYRINGE (ML) INJECTION EVERY 12 HOURS SCHEDULED
Status: DISCONTINUED | OUTPATIENT
Start: 2022-02-06 | End: 2022-02-08 | Stop reason: HOSPADM

## 2022-02-06 RX ORDER — NICOTINE POLACRILEX 4 MG
15 LOZENGE BUCCAL PRN
Status: DISCONTINUED | OUTPATIENT
Start: 2022-02-06 | End: 2022-02-08 | Stop reason: HOSPADM

## 2022-02-06 RX ORDER — ONDANSETRON 2 MG/ML
4 INJECTION INTRAMUSCULAR; INTRAVENOUS EVERY 6 HOURS PRN
Status: DISCONTINUED | OUTPATIENT
Start: 2022-02-06 | End: 2022-02-08 | Stop reason: HOSPADM

## 2022-02-06 RX ORDER — MORPHINE SULFATE 4 MG/ML
4 INJECTION, SOLUTION INTRAMUSCULAR; INTRAVENOUS ONCE
Status: COMPLETED | OUTPATIENT
Start: 2022-02-06 | End: 2022-02-06

## 2022-02-06 RX ORDER — ONDANSETRON 2 MG/ML
4 INJECTION INTRAMUSCULAR; INTRAVENOUS EVERY 6 HOURS PRN
Status: DISCONTINUED | OUTPATIENT
Start: 2022-02-06 | End: 2022-02-06 | Stop reason: SDUPTHER

## 2022-02-06 RX ORDER — SPIRONOLACTONE 25 MG/1
25 TABLET ORAL DAILY
Status: DISCONTINUED | OUTPATIENT
Start: 2022-02-07 | End: 2022-02-08 | Stop reason: HOSPADM

## 2022-02-06 RX ORDER — LEVOTHYROXINE SODIUM 0.1 MG/1
100 TABLET ORAL DAILY
Status: DISCONTINUED | OUTPATIENT
Start: 2022-02-07 | End: 2022-02-08 | Stop reason: HOSPADM

## 2022-02-06 RX ORDER — PANTOPRAZOLE SODIUM 40 MG/1
40 TABLET, DELAYED RELEASE ORAL
Status: DISCONTINUED | OUTPATIENT
Start: 2022-02-07 | End: 2022-02-08 | Stop reason: HOSPADM

## 2022-02-06 RX ORDER — CARBOXYMETHYLCELLULOSE SODIUM 10 MG/ML
1 GEL OPHTHALMIC EVERY 4 HOURS PRN
Status: DISCONTINUED | OUTPATIENT
Start: 2022-02-06 | End: 2022-02-08 | Stop reason: HOSPADM

## 2022-02-06 RX ORDER — DICYCLOMINE HYDROCHLORIDE 10 MG/ML
20 INJECTION INTRAMUSCULAR ONCE
Status: COMPLETED | OUTPATIENT
Start: 2022-02-06 | End: 2022-02-06

## 2022-02-06 RX ORDER — ONDANSETRON 4 MG/1
4 TABLET, ORALLY DISINTEGRATING ORAL EVERY 8 HOURS PRN
Status: DISCONTINUED | OUTPATIENT
Start: 2022-02-06 | End: 2022-02-08 | Stop reason: HOSPADM

## 2022-02-06 RX ORDER — LACTULOSE 10 G/15ML
30 SOLUTION ORAL 3 TIMES DAILY
Status: DISCONTINUED | OUTPATIENT
Start: 2022-02-06 | End: 2022-02-08

## 2022-02-06 RX ORDER — FLUCONAZOLE 100 MG/1
150 TABLET ORAL ONCE
Status: COMPLETED | OUTPATIENT
Start: 2022-02-06 | End: 2022-02-06

## 2022-02-06 RX ORDER — MAGNESIUM SULFATE 1 G/100ML
1000 INJECTION INTRAVENOUS PRN
Status: DISCONTINUED | OUTPATIENT
Start: 2022-02-06 | End: 2022-02-08 | Stop reason: HOSPADM

## 2022-02-06 RX ORDER — METOCLOPRAMIDE 10 MG/1
5 TABLET ORAL 2 TIMES DAILY
Status: DISCONTINUED | OUTPATIENT
Start: 2022-02-06 | End: 2022-02-08 | Stop reason: HOSPADM

## 2022-02-06 RX ORDER — IPRATROPIUM BROMIDE AND ALBUTEROL SULFATE 2.5; .5 MG/3ML; MG/3ML
3 SOLUTION RESPIRATORY (INHALATION) 2 TIMES DAILY
Status: DISCONTINUED | OUTPATIENT
Start: 2022-02-07 | End: 2022-02-08 | Stop reason: HOSPADM

## 2022-02-06 RX ORDER — ATORVASTATIN CALCIUM 10 MG/1
10 TABLET, FILM COATED ORAL DAILY
Status: DISCONTINUED | OUTPATIENT
Start: 2022-02-07 | End: 2022-02-08 | Stop reason: HOSPADM

## 2022-02-06 RX ORDER — M-VIT,TX,IRON,MINS/CALC/FOLIC 27MG-0.4MG
1 TABLET ORAL DAILY
Status: DISCONTINUED | OUTPATIENT
Start: 2022-02-07 | End: 2022-02-08 | Stop reason: HOSPADM

## 2022-02-06 RX ORDER — SODIUM CHLORIDE 0.9 % (FLUSH) 0.9 %
5-40 SYRINGE (ML) INJECTION PRN
Status: DISCONTINUED | OUTPATIENT
Start: 2022-02-06 | End: 2022-02-08 | Stop reason: HOSPADM

## 2022-02-06 RX ORDER — DEXTROSE MONOHYDRATE 25 G/50ML
12.5 INJECTION, SOLUTION INTRAVENOUS PRN
Status: DISCONTINUED | OUTPATIENT
Start: 2022-02-06 | End: 2022-02-07 | Stop reason: CLARIF

## 2022-02-06 RX ORDER — POLYETHYLENE GLYCOL 3350 17 G/17G
17 POWDER, FOR SOLUTION ORAL 2 TIMES DAILY
Status: DISCONTINUED | OUTPATIENT
Start: 2022-02-06 | End: 2022-02-08

## 2022-02-06 RX ORDER — POTASSIUM CHLORIDE 7.45 MG/ML
10 INJECTION INTRAVENOUS PRN
Status: DISCONTINUED | OUTPATIENT
Start: 2022-02-06 | End: 2022-02-08 | Stop reason: HOSPADM

## 2022-02-06 RX ORDER — FLUTICASONE PROPIONATE 110 UG/1
1 AEROSOL, METERED RESPIRATORY (INHALATION) 2 TIMES DAILY
Status: DISCONTINUED | OUTPATIENT
Start: 2022-02-06 | End: 2022-02-08 | Stop reason: HOSPADM

## 2022-02-06 RX ORDER — FUROSEMIDE 40 MG/1
40 TABLET ORAL DAILY
Status: DISCONTINUED | OUTPATIENT
Start: 2022-02-07 | End: 2022-02-08 | Stop reason: HOSPADM

## 2022-02-06 RX ADMIN — MORPHINE SULFATE 4 MG: 4 INJECTION INTRAVENOUS at 13:58

## 2022-02-06 RX ADMIN — SODIUM CHLORIDE: 9 INJECTION, SOLUTION INTRAVENOUS at 23:08

## 2022-02-06 RX ADMIN — POLYETHYLENE GLYCOL (3350) 17 G: 17 POWDER, FOR SOLUTION ORAL at 23:11

## 2022-02-06 RX ADMIN — HYDROMORPHONE HYDROCHLORIDE 1 MG: 1 INJECTION, SOLUTION INTRAMUSCULAR; INTRAVENOUS; SUBCUTANEOUS at 18:53

## 2022-02-06 RX ADMIN — ONDANSETRON HYDROCHLORIDE 4 MG: 2 INJECTION, SOLUTION INTRAMUSCULAR; INTRAVENOUS at 11:39

## 2022-02-06 RX ADMIN — Medication 1 PUFF: at 23:17

## 2022-02-06 RX ADMIN — IOPAMIDOL 75 ML: 755 INJECTION, SOLUTION INTRAVENOUS at 13:11

## 2022-02-06 RX ADMIN — METOCLOPRAMIDE 5 MG: 10 TABLET ORAL at 23:10

## 2022-02-06 RX ADMIN — FLUCONAZOLE 150 MG: 100 TABLET ORAL at 14:37

## 2022-02-06 RX ADMIN — CEFTRIAXONE SODIUM 1000 MG: 1 INJECTION, POWDER, FOR SOLUTION INTRAMUSCULAR; INTRAVENOUS at 14:37

## 2022-02-06 RX ADMIN — MORPHINE SULFATE 4 MG: 4 INJECTION INTRAVENOUS at 11:39

## 2022-02-06 RX ADMIN — HYDROMORPHONE HYDROCHLORIDE 0.5 MG: 1 INJECTION, SOLUTION INTRAMUSCULAR; INTRAVENOUS; SUBCUTANEOUS at 18:02

## 2022-02-06 RX ADMIN — ROPINIROLE HYDROCHLORIDE 0.5 MG: 0.25 TABLET, FILM COATED ORAL at 23:17

## 2022-02-06 RX ADMIN — Medication 10 ML: at 23:11

## 2022-02-06 RX ADMIN — HYDROMORPHONE HYDROCHLORIDE 0.5 MG: 1 INJECTION, SOLUTION INTRAMUSCULAR; INTRAVENOUS; SUBCUTANEOUS at 21:38

## 2022-02-06 RX ADMIN — IPRATROPIUM BROMIDE AND ALBUTEROL SULFATE 3 ML: 2.5; .5 SOLUTION RESPIRATORY (INHALATION) at 23:17

## 2022-02-06 RX ADMIN — LACTULOSE 30 G: 10 SOLUTION ORAL at 23:11

## 2022-02-06 RX ADMIN — DICYCLOMINE HYDROCHLORIDE 20 MG: 20 INJECTION, SOLUTION INTRAMUSCULAR at 17:10

## 2022-02-06 RX ADMIN — METOPROLOL TARTRATE 25 MG: 25 TABLET, FILM COATED ORAL at 23:11

## 2022-02-06 RX ADMIN — MINERAL OIL 330 ML: 1000 LIQUID ORAL at 15:59

## 2022-02-06 ASSESSMENT — PAIN SCALES - GENERAL
PAINLEVEL_OUTOF10: 7
PAINLEVEL_OUTOF10: 9
PAINLEVEL_OUTOF10: 5
PAINLEVEL_OUTOF10: 9
PAINLEVEL_OUTOF10: 5
PAINLEVEL_OUTOF10: 10
PAINLEVEL_OUTOF10: 10

## 2022-02-06 ASSESSMENT — PAIN DESCRIPTION - PAIN TYPE
TYPE: ACUTE PAIN

## 2022-02-06 ASSESSMENT — PAIN DESCRIPTION - LOCATION
LOCATION: ABDOMEN
LOCATION: ABDOMEN
LOCATION: BUTTOCKS;RECTUM
LOCATION: ABDOMEN

## 2022-02-06 ASSESSMENT — ENCOUNTER SYMPTOMS
RESPIRATORY NEGATIVE: 1
CONSTIPATION: 1
ABDOMINAL PAIN: 1

## 2022-02-06 ASSESSMENT — PAIN DESCRIPTION - DESCRIPTORS
DESCRIPTORS: ACHING;BURNING;SHARP
DESCRIPTORS: ACHING

## 2022-02-06 ASSESSMENT — PAIN DESCRIPTION - FREQUENCY
FREQUENCY: CONTINUOUS
FREQUENCY: CONTINUOUS

## 2022-02-06 NOTE — ED NOTES
Patient asking for pain medication, provider notified.      Abbie Hurley RN  02/06/22 [place], RN  02/06/22 [id number]

## 2022-02-06 NOTE — ED NOTES
Patient still in pain, requesting more medication. Provider notified.      Jacob Newman RN  02/06/22 [id number]

## 2022-02-06 NOTE — ED NOTES
[address] served hospitalist per JHONNY Mendez     [employer]  02/06/22 [phone]

## 2022-02-06 NOTE — ED NOTES
Bed: 11  Expected date:   Expected time:   Means of arrival:   Comments:  PAMELA Baugh  02/06/22 3365

## 2022-02-06 NOTE — ED PROVIDER NOTES
I independently examined and evaluated Hailey Gomez. In brief, patient is a 59-year-old female, presenting with concerns for constipation left lower quadrant abdominal pain patient and family member states that this has been an ongoing issue for her. Symptoms have been present for the past several days, was seen previously and was diagnosed with constipation and started on stool softeners and suppositories. She has been able to have some small hard bowel movements but reports continued abdominal pain. Denies any other complaints or concerns. Focused exam revealed patient appears uncomfortable. Lungs clear to auscultation bilaterally. Abdomen with significant tenderness palpation, primarily left lower quadrant with some associated guarding.     Labs Reviewed   CBC WITH AUTO DIFFERENTIAL - Abnormal; Notable for the following components:       Result Value    RBC 5.55 (*)     MCV 76.1 (*)     MCH 24.7 (*)     RDW 19.8 (*)     All other components within normal limits    Narrative:     Performed at:  [facility],  [employer] [facility]   Phone [phone]   COMPREHENSIVE METABOLIC PANEL W/ REFLEX TO MG FOR LOW K - Abnormal; Notable for the following components:    Chloride 95 (*)     CREATININE <0.5 (*)     Total Protein 8.7 (*)     Albumin/Globulin Ratio 0.9 (*)     All other components within normal limits    Narrative:     Performed at:  [facility],  [employer] [facility]   Phone [phone]   URINE RT REFLEX TO CULTURE - Abnormal; Notable for the following components:    Glucose, Ur 250 (*)     Blood, Urine MODERATE (*)     Protein,  (*)     Leukocyte Esterase, Urine MODERATE (*)     All other components within normal limits    Narrative:     Performed at:  [place] [id number],  [employer] [facility]   Phone [phone]   [address] [place] [address] as detailed above. Patient seen in conjunction with URSULA Flaco, please refer to her note for further details of the patient's work-up and treatment. Rectal exam was performed, there was a stool ball which was removed. CT did not show any acute concerning abnormalities in the left lower quadrant, did show some constipation. After rectal exam, patient continued to have intractable pain, also had some rectal bleeding that started here in the department. Given intractable abdominal pain, will be hospitalized for further work-up and treatment of her condition. All diagnostic, treatment, and disposition decisions were made by myself in conjunction with the advanced practice provider. For all further details of the patient's emergency department visit, please see the advanced practice provider's documentation. 1. Abdominal pain, left lower quadrant    2. Constipation, unspecified constipation type    3. Acute cystitis with hematuria      Comment: Please note this report has been produced using speech recognition software and may contain errors related to that system including errors in grammar, punctuation, and spelling, as well as words and phrases that may be inappropriate. If there are any questions or concerns please feel free to contact the dictating provider for clarification.        Lemont Cheadle, MD  02/06/22 1337

## 2022-02-06 NOTE — ED NOTES
[id number]- call was returned by Dr. Danial Barr and spoke to [facility] 58  02/06/22 7136

## 2022-02-06 NOTE — ED PROVIDER NOTES
I did not perform a face-to-face evaluation the patient. I did interpret the patient's EKG as noted below: The Ekg interpreted by me shows  normal sinus rhythm with a rate of 95  Axis is   Left axis deviation  QTc is  within an acceptable range  Intervals and Durations are unremarkable.       ST Segments: nonspecific changes  No significant change from prior EKG dated 2/4/22            Vasquez Castano MD  02/06/22 6815

## 2022-02-06 NOTE — ED PROVIDER NOTES
[address] ED  EMERGENCY DEPARTMENT ENCOUNTER        Pt Name: Carlie Cherry  MRN: [id number]  [place] 1955  Date of evaluation: 2/6/2022  Provider: Shannon Ramesh PA-C  PCP: Isa Holm MD  Note Started: 11:12 AM EST        I have seen and evaluated this patient with my supervising physician Mayra Gomez MD.    [facility]       Chief Complaint   Patient presents with    Abdominal Pain     Patient reports ABD pain, recently D/C'd for bowel obstruction. HISTORY OF PRESENT ILLNESS   (Location, Timing/Onset, Context/Setting, Quality, Duration, Modifying Factors, Severity, Associated Signs and Symptoms)  Note limiting factors. Chief Complaint: abdominal pain     Carlie Cherry is a 78 y.o. female past medical history of hypertension, hypothyroidism, irritable bowel syndrome hyperlipidemia chronic pain syndrome diabetes fibromyalgia GERD brought in today by EMS from home with complaints of lower abdominal pain. She was recently seen and diagnosed with constipation. She was told to increase her MiraLAX and also given a prescription for lactulose and Dulcolax suppository. She states she has had no improvement of symptoms. She has been able to have a bowel movement but reports continued abdominal pain. Denies any rectal bleeding. Denies nausea or vomiting. Denies any fevers or chills. Onset of symptoms have been ongoing times several days. Duration symptoms have been persistent since onset. Context includes lower abdominal pain. She denies chest pain or shortness of breath. She does require oxygen 3 L at baseline at home since being diagnosed with COVID sometime ago. This is her baseline. No aggravating or alleviating complaints. Otherwise denies any other symptoms. Nothing seems to make symptoms better or worse. Nursing Notes were all reviewed and agreed with or any disagreements were addressed in the HPI.     REVIEW OF SYSTEMS    (2-9 systems for level 4, 10 or more for level 5)     Review of Systems   Constitutional: Negative. Respiratory: Negative. Cardiovascular: Negative. Gastrointestinal: Positive for abdominal pain and constipation. Genitourinary: Negative. Musculoskeletal: Negative. Skin: Negative. Neurological: Negative. Positives and Pertinent negatives as per HPI. Except as noted above in the ROS, all other systems were reviewed and negative. PAST MEDICAL HISTORY     Past Medical History:   Diagnosis Date    Anxiety disorder     Chronic pain syndrome     COVID-19 09/27/2021    DDD (degenerative disc disease), lumbar     [facility] [address]     Displacement of lumbar intervertebral disc without myelopathy 08/23/2010    Diverticulitis     Fibromyalgia     Generalized osteoarthrosis, involving multiple sites 08/24/2010    GERD (gastroesophageal reflux disease)     Hypochromic microcytic anemia 1/18/2022    Impacted cerumen 08/23/2010    Influenza A 03/10/2016    Irritable bowel syndrome 08/23/2010    Other and unspecified hyperlipidemia 08/23/2010    Prosthetic joint implant failure ([facility] [address]     Screening mammogram 12/08/2006    (Both)Negative    Tracheobronchomalacia     Unspecified asthma(493.90) 08/23/2010    Unspecified essential hypertension 08/24/2010    Unspecified hypothyroidism 08/23/2010         SURGICAL HISTORY     Past Surgical History:   Procedure Laterality Date    ABDOMEN SURGERY      CARDIAC CATHETERIZATION      COLON SURGERY      COLONOSCOPY  2007    normal    HYSTERECTOMY      JOINT REPLACEMENT  10/92    Right; hip     JOINT REPLACEMENT    12/92    Left hip  followed by revision 1/2006    JOINT REPLACEMENT    6/96    right     JOINT REPLACEMENT    2006     right replacement.  LUNG SURGERY  1/2014    tracheobronchomalacia    TRACHEOSTOMY N/A 10/25/2021    TRACHEOTOMY performed by Justin Benitez DO at [address] N/A 10/25/2021    EGD/PEG W/ANES. ON VENT, COVID + performed by Melvin Helm MD at [address]       Previous Medications    ALBUTEROL SULFATE  (90 BASE) MCG/ACT INHALER    Inhale 2 puffs into the lungs 4 times daily as needed for Wheezing    ASPIRIN 81 MG EC TABLET    Take 1 tablet by mouth daily    BISACODYL (BISAC-EVAC) 10 MG SUPPOSITORY    Place 1 suppository rectally as needed for Constipation    BLOOD GLUCOSE MONITOR STRIPS    Dispense whatever insurance will cover. Pt test twice a day dx:E11.9    BLOOD GLUCOSE MONITORING SUPPL CATHRYN    Dispense whatever insurance will cover. Dx code:E11.9    BLOOD GLUCOSE TEST STRIPS (ASCENSIA AUTODISC VI;ONE TOUCH ULTRA TEST VI) STRIP    1 each by In Vitro route daily As needed. BUDESONIDE (PULMICORT FLEXHALER) 180 MCG/ACT AEPB INHALER    Inhale 1 puff into the lungs 2 times daily Per Jennifer Monte at [facility]. [employer]    CARBOXYMETHYLCELLULOSE PF (THERATEARS) 1 % GEL OPHTHALMIC GEL    Place 1 drop into both eyes every 4 hours as needed for Dry Eyes    ESTRADIOL (ESTRACE) 0.1 MG/GM VAGINAL CREAM    Place vaginally See Admin Instructions Insert a pea-sized amount vaginally every day for 7 days, then twice weekly thereafter. Per Jennifer Monte at [facility]. [employer]. Last dispensed 5/10/21.     FUROSEMIDE (LASIX) 40 MG TABLET    Take 1 tablet by mouth daily    GLIPIZIDE (GLUCOTROL XL) 10 MG EXTENDED RELEASE TABLET    Take 1 tablet by mouth 2 times daily    IPRATROPIUM-ALBUTEROL (DUONEB) 0.5-2.5 (3) MG/3ML SOLN NEBULIZER SOLUTION    Inhale 3 mLs into the lungs every 4 hours    IPRATROPIUM-ALBUTEROL (DUONEB) 0.5-2.5 (3) MG/3ML SOLN NEBULIZER SOLUTION    Inhale 3 mLs into the lungs every 4 hours    LACTULOSE (CHRONULAC) 10 GM/15ML SOLUTION    Take 30 mLs by mouth daily for 5 days    LEVOTHYROXINE (SYNTHROID) 100 MCG TABLET    Take 1 tablet by mouth Daily    MAGNESIUM OXIDE (MAG-OX) 400 (240 MG) MG TABLET    Take 1 tablet by mouth daily    METOCLOPRAMIDE (REGLAN) 5 MG TABLET    Take 1 tablet by mouth 2 times daily    METOPROLOL TARTRATE (LOPRESSOR) 25 MG TABLET    Take 1 tablet by mouth 2 times daily    MULTIPLE VITAMINS-MINERALS (THERAPEUTIC MULTIVITAMIN-MINERALS) TABLET    Take 1 tablet by mouth daily    OMEPRAZOLE (PRILOSEC) 40 MG DELAYED RELEASE CAPSULE    Take 1 capsule by mouth daily    POLYETHYLENE GLYCOL (MIRALAX) POWDER    Take 17 g by mouth daily as needed    ROPINIROLE (REQUIP) 0.5 MG TABLET    TAKE 1 TABLET BY MOUTH 3 TIMES DAILY. SIMVASTATIN (ZOCOR) 40 MG TABLET    Take 1 tablet by mouth nightly    SITAGLIPTIN (JANUVIA) 50 MG TABLET    Take 1 tablet by mouth daily    SPIRONOLACTONE (ALDACTONE) 25 MG TABLET    Take 1 tablet by mouth daily    TIZANIDINE (ZANAFLEX) 2 MG TABLET    Take 2 mg by mouth every 8 hours as needed         ALLERGIES     Prednisone, Quinidine, Sulfa antibiotics, Bactrim, Clonidine, and Sulfamethoxazole-trimethoprim    FAMILYHISTORY       Family History   Problem Relation Age of Onset    Asthma Mother     Heart Failure Father     Cancer Maternal Grandfather         skin cancer on face    Cancer Daughter         skin cancer-BCC    Diabetes Neg Hx     Emphysema Neg Hx     Hypertension Neg Hx           SOCIAL HISTORY       Social History     Tobacco Use    Smoking status: Former Smoker     Packs/day: 1.00     Years: 18.00     Pack years: 18.00     Types: Cigarettes     Quit date: 12/10/1991     Years since quittin.1    Smokeless tobacco: Never Used   Vaping Use    Vaping Use: Never used   Substance Use Topics    Alcohol use: No    Drug use: No       SCREENINGS             PHYSICAL EXAM    (up to 7 for level 4, 8 or more for level 5)     ED Triage Vitals [22 1059]   BP Temp Temp Source Pulse Resp SpO2 Height Weight   121/78 98.2 °F (36.8 °C) Oral 95 20 100 % 5' 9\" (1.753 m) 140 lb (63.5 kg)       Physical Exam  Vitals and nursing note reviewed. Exam conducted with a chaperone present. Constitutional:       General: She is awake.  She is not in acute distress. Appearance: Normal appearance. She is well-developed. She is not ill-appearing, toxic-appearing or diaphoretic. HENT:      Head: Normocephalic and atraumatic. Nose: Nose normal.   Eyes:      General:         Right eye: No discharge. Left eye: No discharge. Cardiovascular:      Rate and Rhythm: Normal rate and regular rhythm. Pulses:           Radial pulses are 2+ on the right side and 2+ on the left side. Heart sounds: Normal heart sounds. No murmur heard. No gallop. Pulmonary:      Effort: Pulmonary effort is normal. No respiratory distress. Breath sounds: Normal breath sounds. No decreased breath sounds, wheezing, rhonchi or rales. Chest:      Chest wall: No tenderness. Abdominal:      General: Abdomen is flat. Bowel sounds are normal.      Palpations: Abdomen is soft. Tenderness: There is abdominal tenderness in the right lower quadrant, suprapubic area and left lower quadrant. There is no right CVA tenderness, left CVA tenderness, guarding or rebound. Negative signs include Amor's sign and McBurney's sign. Genitourinary:     Comments: Rectal exam revealed stool ball which was impacted and removed on exam.   Musculoskeletal:         General: No deformity. Normal range of motion. Cervical back: Normal range of motion and neck supple. Right lower leg: No edema. Left lower leg: No edema. Skin:     General: Skin is warm and dry. Neurological:      Mental Status: She is alert and oriented to person, place, and time. Psychiatric:         Behavior: Behavior normal. Behavior is cooperative.          DIAGNOSTIC RESULTS   LABS:    Labs Reviewed   CBC WITH AUTO DIFFERENTIAL - Abnormal; Notable for the following components:       Result Value    RBC 5.55 (*)     MCV 76.1 (*)     MCH 24.7 (*)     RDW 19.8 (*)     All other components within normal limits    Narrative:     Performed at:  [facility]  [address],  [place], [facility]   Phone [phone]   COMPREHENSIVE METABOLIC PANEL W/ REFLEX TO MG FOR LOW K - Abnormal; Notable for the following components:    Chloride 95 (*)     CREATININE <0.5 (*)     Total Protein 8.7 (*)     Albumin/Globulin Ratio 0.9 (*)     All other components within normal limits    Narrative:     Performed at:  [facility],  [place] [place] [facility]   Phone [phone]   URINE RT REFLEX TO CULTURE - Abnormal; Notable for the following components:    Glucose, Ur 250 (*)     Blood, Urine MODERATE (*)     Protein,  (*)     Leukocyte Esterase, Urine MODERATE (*)     All other components within normal limits    Narrative:     Performed at:  [facility],  [place], [facility]   Phone [phone]   MICROSCOPIC URINALYSIS - Abnormal; Notable for the following components:    WBC, UA >100 (*)     RBC, UA see below (*)     Yeast, UA Present (*)     All other components within normal limits    Narrative:     Performed at:  [facility],  [place], [place]   Phone [phone]   CULTURE, URINE   LIPASE    Narrative:     Performed at:  [facility],  [place], [place]   Phone [phone]   TROPONIN    Narrative:     Performed at:  [facility]) [facility],  [place], [facility]   Phone [phone]       When ordered only abnormal lab results are displayed. All other labs were within normal range or not returned as of this dictation. EKG: When ordered, EKG's are interpreted by the Emergency Department Physician in the absence of a cardiologist.  Please see their note for interpretation of EKG.     RADIOLOGY:   Non-plain film images such as CT, Ultrasound and MRI are read by the radiologist. Plain radiographic images are visualized and preliminarily interpreted by the ED Provider with the below findings:        Interpretation per the Radiologist below, if available at the time of this note:    CT ABDOMEN PELVIS W IV CONTRAST Additional Contrast? None   Final Result      1. Pelvis is obscured by artifact from bilateral hip prostheses. 2.  Markedly distended gallbladder which is otherwise unremarkable in   appearance and similar to the prior study which may represent chronic   hydrops. No evidence of any bile duct dilation. 3.  A normal appearing appendix is noted. 4.  Rectal and lower sigmoid constipation and to lesser degree mild   constipation scattered elsewhere within the colon. Otherwise nonspecific   bowel gas pattern. CT ABDOMEN PELVIS W IV CONTRAST Additional Contrast? None    Result Date: 2/5/2022  EXAMINATION: CT OF THE ABDOMEN AND PELVIS WITH CONTRAST 2/4/2022 10:05 pm TECHNIQUE: CT of the abdomen and pelvis was performed with the administration of intravenous contrast. Multiplanar reformatted images are provided for review. Dose modulation, iterative reconstruction, and/or weight based adjustment of the mA/kV was utilized to reduce the radiation dose to as low as reasonably achievable. COMPARISON: 11/11/2020, 09/27/2021 HISTORY: ORDERING SYSTEM PROVIDED HISTORY: LLQ ab pain with rebound TECHNOLOGIST PROVIDED HISTORY: Additional Contrast?->None Reason for exam:->LLQ ab pain with rebound Decision Support Exception - unselect if not a suspected or confirmed emergency medical condition->Emergency Medical Condition (MA) Reason for Exam: abdo pain, LLQ abdo pain FINDINGS: Lower Chest: There is cardiomegaly. There is mild bibasilar bronchiectasis and emphysematous changes. There is a solid noncalcified right lower lobe 7 mm pulmonary nodule new since prior 09/27/2021, chest CT. Organs:  There is an ill-defined hypoattenuating area measuring 20 x 10 mm in the dome of the right hepatic lobe series 2, image 19, not seen on prior CT imaging. There is nodular contour of the liver with enlargement of the caudate and left hepatic lobe suggesting hepatic cirrhosis. Portal vein is patent. Gallbladder is distended without specific CT evidence of acute cholecystitis. There is mild pancreatic atrophy. No acute abnormality of the pancreas. No evidence of focal splenic mass. No focal adrenal nodule. The kidneys enhance symmetrically without evidence of hydronephrosis. 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 GI/Bowel: Stomach and duodenal sweep demonstrate no acute abnormality. There is no evidence of bowel obstruction. No evidence of abnormal bowel wall thickening or distension. The appendix is visualized and is unremarkable. No evidence of acute appendicitis. There is a large amount of stool in the sigmoid colon and rectum as well as the rest of the colon suggesting constipation. There is evidence of prior sigmoid colonic surgery and anastomosis without acute abnormality. Pelvis: Bladder demonstrates no acute abnormality. Pelvis is obscured by beam hardening artifact. Peritoneum/Retroperitoneum: No evidence of ascites or free air. No evidence of lymphadenopathy. Aorta is normal in caliber. Moderate atherosclerotic calcifications of the aorta and branch vessels. Bones/Soft Tissues: Subacute healing right inferior pubic ramus fracture. Bilateral total hip arthroplasties are noted. Age related degenerative changes of the visualized osseous structures without focal destructive lesion. Multilevel disc and facet degenerative changes throughout the spine. Findings suggesting hepatic cirrhosis. New ill-defined 20 x 10 mm hypoattenuating area in the dome of the right hepatic lobe.   Given findings of cirrhosis, recommend MRI of the liver with and without contrast for further evaluation in the nonacute outpatient setting. Large amount of stool throughout the colon, particularly in the left colon suggesting constipation. 7 mm right lower lobe solid noncalcified pulmonary nodule that may be new since 09/27/2021 chest CT. Recommend further evaluation with dedicated chest CT imaging. Subacute healing right inferior pubic ramus fracture. Bilateral total hip arthroplasties. XR CHEST PORTABLE    Result Date: 2/4/2022  EXAMINATION: ONE XRAY VIEW OF THE CHEST 2/4/2022 9:13 pm COMPARISON: 01/18/2022 HISTORY: ORDERING SYSTEM PROVIDED HISTORY: ab pain TECHNOLOGIST PROVIDED HISTORY: Reason for exam:->ab pain Reason for Exam: SOB, abdo pain FINDINGS: The heart is mildly enlarged and unchanged. The pulmonary vessels are engorged centrally more prominent. There postop changes along the right hilar region extending into the apex which is unchanged. There are hazy interstitial and ground-glass opacities along the right upper lobe and left mid lung extending into the lung bases. There is minimal blunting of the right costophrenic sulcus which is decreased. There is moderate biapical pleural thickening and scarring and probable underlying chronic interstitial lung changes. Stable cardiomegaly with mild central pulmonary congestion or pulmonary artery hypertension which is more prominent Chronic obstructive lung changes with hazy ground-glass interstitial opacities along the lung bases extending into the left perihilar region and right upper lobe which could represent atypical pulmonary edema or early infiltrates from pneumonia.   Recommend short-term follow-up Postop changes right hilar region which is unchanged Tiny right pleural effusion which is less prominent           PROCEDURES   Unless otherwise noted below, none     Procedures    CRITICAL CARE TIME       CONSULTS:  IP CONSULT TO HOSPITALIST      EMERGENCY DEPARTMENT COURSE and DIFFERENTIAL DIAGNOSIS/MDM:   Vitals:    Vitals: 02/06/22 1319 02/06/22 1332 02/06/22 1502 02/06/22 1532   BP: (!) 143/80 131/60 (!) 145/63 (!) 152/78   Pulse: 103 98 97 102   Resp: 18 15 23 23   Temp:       TempSrc:       SpO2: 100% 100% 100% 100%   Weight:       Height:           Patient was given the following medications:  Medications   ondansetron (ZOFRAN) injection 4 mg (4 mg IntraVENous Given 2/6/22 1139)   cefTRIAXone (ROCEPHIN) 1000 mg IVPB in 50 mL D5W minibag (0 mg IntraVENous Stopped 2/6/22 1559)   morphine sulfate (PF) injection 4 mg (4 mg IntraVENous Given 2/6/22 1139)   iopamidol (ISOVUE-370) 76 % injection 75 mL (75 mLs IntraVENous Given 2/6/22 1311)   morphine sulfate (PF) injection 4 mg (4 mg IntraVENous Given 2/6/22 1358)   SMOG Enema (330 mLs Rectal Given 2/6/22 1559)   fluconazole (DIFLUCAN) tablet 150 mg (150 mg Oral Given 2/6/22 1437)   dicyclomine (BENTYL) injection 20 mg (20 mg IntraMUSCular Given 2/6/22 1710)           Patient brought in today by EMS from home with complaints of lower abdominal pain. She was seen several days ago diagnosed with constipation given MiraLAX and suppository at home. She reports some relief of constipation but reports continued abdominal pain. On exam she appears uncomfortable and in pain she is afebrile breathing on room air satting at 100%. Nontoxic. No acute respiratory distress. Old labs and records reviewed. Patient seen and evaluated by myself as well as my attending. CBC shows no white count. Hemoglobin of 13.7. No electrolyte abnormalities. Kidney function unremarkable. Liver function unremarkable. Lipase of 17. Troponin less than 0.01. EKG reviewed by my attending see note.     CT abdomen and pelvis reveals pelvis is obscured by artifact from bilateral hip prosthesis markedly distended gallbladder which is otherwise unremarkable in appearance and similar to prior study which may represent chronic hydrops no evidence of bile duct dilation normal-appearing appendix rectal and lower sigmoid constipation and to a lesser degree mild constipation scattered elsewhere in the colon otherwise nonspecific gas pattern. Smog enema will be given. Urine reveals moderate amount of blood with moderate leukocytes over 100 WBCs. Plan at this time will be to admit for intractable abdominal pain. Patient given several rounds of pain medicine here. She was given the smog enema and had small amount of stool output. She did have some rectal bleeding shortly after receiving the smog enema. Patient given additional rounds of pain medicine here. Plan at this time will be to admit for intractable pain. I spoke to the hospitalist who did accept this admission. Patient stable at time of admission. FINAL IMPRESSION      1. Abdominal pain, left lower quadrant    2. Constipation, unspecified constipation type    3. Acute cystitis with hematuria          DISPOSITION/PLAN   DISPOSITION Decision To Admit 02/06/2022 05:38:09 PM      PATIENT REFERRED TO:  No follow-up provider specified.     DISCHARGE MEDICATIONS:  New Prescriptions    No medications on file       DISCONTINUED MEDICATIONS:  Discontinued Medications    No medications on file              (Please note that portions of this note were completed with a voice recognition program.  Efforts were made to edit the dictations but occasionally words are mis-transcribed.)    Mian Zaragoza PA-C (electronically signed)            Mian Zaragoza PA-C  02/06/22 1383

## 2022-02-07 ENCOUNTER — APPOINTMENT (OUTPATIENT)
Dept: GENERAL RADIOLOGY | Age: 67
DRG: 389 | End: 2022-02-07
Payer: MEDICARE

## 2022-02-07 LAB
ANION GAP SERPL CALCULATED.3IONS-SCNC: 12 MMOL/L (ref 3–16)
BASOPHILS ABSOLUTE: 0.1 K/UL (ref 0–0.2)
BASOPHILS RELATIVE PERCENT: 0.6 %
BUN BLDV-MCNC: 12 MG/DL (ref 7–20)
CALCIUM SERPL-MCNC: 9.3 MG/DL (ref 8.3–10.6)
CHLORIDE BLD-SCNC: 97 MMOL/L (ref 99–110)
CO2: 29 MMOL/L (ref 21–32)
CREAT SERPL-MCNC: <0.5 MG/DL (ref 0.6–1.2)
EKG ATRIAL RATE: 95 BPM
EKG DIAGNOSIS: NORMAL
EKG P-R INTERVAL: 180 MS
EKG Q-T INTERVAL: 368 MS
EKG QRS DURATION: 100 MS
EKG QTC CALCULATION (BAZETT): 462 MS
EKG R AXIS: -22 DEGREES
EKG T AXIS: 139 DEGREES
EKG VENTRICULAR RATE: 95 BPM
EOSINOPHILS ABSOLUTE: 0 K/UL (ref 0–0.6)
EOSINOPHILS RELATIVE PERCENT: 0.5 %
ESTIMATED AVERAGE GLUCOSE: 137 MG/DL
GFR AFRICAN AMERICAN: >60
GFR NON-AFRICAN AMERICAN: >60
GLUCOSE BLD-MCNC: 78 MG/DL (ref 70–99)
GLUCOSE BLD-MCNC: 89 MG/DL (ref 70–99)
GLUCOSE BLD-MCNC: 90 MG/DL (ref 70–99)
GLUCOSE BLD-MCNC: 96 MG/DL (ref 70–99)
GLUCOSE BLD-MCNC: 98 MG/DL (ref 70–99)
HBA1C MFR BLD: 6.4 %
HCT VFR BLD CALC: 35.3 % (ref 36–48)
HEMOGLOBIN: 11.8 G/DL (ref 12–16)
LYMPHOCYTES ABSOLUTE: 1.4 K/UL (ref 1–5.1)
LYMPHOCYTES RELATIVE PERCENT: 15.2 %
MAGNESIUM: 1.9 MG/DL (ref 1.8–2.4)
MCH RBC QN AUTO: 24.9 PG (ref 26–34)
MCHC RBC AUTO-ENTMCNC: 33.4 G/DL (ref 31–36)
MCV RBC AUTO: 74.8 FL (ref 80–100)
MONOCYTES ABSOLUTE: 0.9 K/UL (ref 0–1.3)
MONOCYTES RELATIVE PERCENT: 9.1 %
NEUTROPHILS ABSOLUTE: 7.1 K/UL (ref 1.7–7.7)
NEUTROPHILS RELATIVE PERCENT: 74.6 %
ORGANISM: ABNORMAL
PDW BLD-RTO: 19.4 % (ref 12.4–15.4)
PERFORMED ON: NORMAL
PLATELET # BLD: 206 K/UL (ref 135–450)
PMV BLD AUTO: 9.1 FL (ref 5–10.5)
POTASSIUM REFLEX MAGNESIUM: 3 MMOL/L (ref 3.5–5.1)
RBC # BLD: 4.72 M/UL (ref 4–5.2)
SODIUM BLD-SCNC: 138 MMOL/L (ref 136–145)
T4 FREE: 1.3 NG/DL (ref 0.9–1.8)
TSH REFLEX: 4.8 UIU/ML (ref 0.27–4.2)
URINE CULTURE, ROUTINE: ABNORMAL
URINE CULTURE, ROUTINE: ABNORMAL
WBC # BLD: 9.6 K/UL (ref 4–11)

## 2022-02-07 PROCEDURE — 80048 BASIC METABOLIC PNL TOTAL CA: CPT

## 2022-02-07 PROCEDURE — 36415 COLL VENOUS BLD VENIPUNCTURE: CPT

## 2022-02-07 PROCEDURE — 94640 AIRWAY INHALATION TREATMENT: CPT

## 2022-02-07 PROCEDURE — 6370000000 HC RX 637 (ALT 250 FOR IP): Performed by: INTERNAL MEDICINE

## 2022-02-07 PROCEDURE — 83735 ASSAY OF MAGNESIUM: CPT

## 2022-02-07 PROCEDURE — 84439 ASSAY OF FREE THYROXINE: CPT

## 2022-02-07 PROCEDURE — 6360000002 HC RX W HCPCS: Performed by: INTERNAL MEDICINE

## 2022-02-07 PROCEDURE — 84443 ASSAY THYROID STIM HORMONE: CPT

## 2022-02-07 PROCEDURE — 2580000003 HC RX 258: Performed by: INTERNAL MEDICINE

## 2022-02-07 PROCEDURE — 94761 N-INVAS EAR/PLS OXIMETRY MLT: CPT

## 2022-02-07 PROCEDURE — 99222 1ST HOSP IP/OBS MODERATE 55: CPT | Performed by: INTERNAL MEDICINE

## 2022-02-07 PROCEDURE — 2700000000 HC OXYGEN THERAPY PER DAY

## 2022-02-07 PROCEDURE — 1200000000 HC SEMI PRIVATE

## 2022-02-07 PROCEDURE — 93010 ELECTROCARDIOGRAM REPORT: CPT | Performed by: INTERNAL MEDICINE

## 2022-02-07 PROCEDURE — 74018 RADEX ABDOMEN 1 VIEW: CPT

## 2022-02-07 PROCEDURE — 85025 COMPLETE CBC W/AUTO DIFF WBC: CPT

## 2022-02-07 RX ORDER — ALOGLIPTIN 12.5 MG/1
12.5 TABLET, FILM COATED ORAL DAILY
Status: DISCONTINUED | OUTPATIENT
Start: 2022-02-07 | End: 2022-02-08 | Stop reason: HOSPADM

## 2022-02-07 RX ADMIN — HYDROMORPHONE HYDROCHLORIDE 0.5 MG: 1 INJECTION, SOLUTION INTRAMUSCULAR; INTRAVENOUS; SUBCUTANEOUS at 22:08

## 2022-02-07 RX ADMIN — GLIPIZIDE 2.5 MG: 5 TABLET ORAL at 08:32

## 2022-02-07 RX ADMIN — Medication 1 TABLET: at 08:32

## 2022-02-07 RX ADMIN — METOPROLOL TARTRATE 25 MG: 25 TABLET, FILM COATED ORAL at 21:03

## 2022-02-07 RX ADMIN — ASPIRIN 81 MG: 81 TABLET, COATED ORAL at 08:32

## 2022-02-07 RX ADMIN — BISACODYL 10 MG: 10 SUPPOSITORY RECTAL at 11:47

## 2022-02-07 RX ADMIN — METOCLOPRAMIDE 5 MG: 10 TABLET ORAL at 08:32

## 2022-02-07 RX ADMIN — POLYETHYLENE GLYCOL (3350) 17 G: 17 POWDER, FOR SOLUTION ORAL at 08:33

## 2022-02-07 RX ADMIN — METOPROLOL TARTRATE 25 MG: 25 TABLET, FILM COATED ORAL at 08:32

## 2022-02-07 RX ADMIN — HYDROMORPHONE HYDROCHLORIDE 0.5 MG: 1 INJECTION, SOLUTION INTRAMUSCULAR; INTRAVENOUS; SUBCUTANEOUS at 15:01

## 2022-02-07 RX ADMIN — POTASSIUM CHLORIDE 10 MEQ: 7.46 INJECTION, SOLUTION INTRAVENOUS at 08:48

## 2022-02-07 RX ADMIN — HYDROMORPHONE HYDROCHLORIDE 0.5 MG: 1 INJECTION, SOLUTION INTRAMUSCULAR; INTRAVENOUS; SUBCUTANEOUS at 11:47

## 2022-02-07 RX ADMIN — Medication 1 PUFF: at 08:41

## 2022-02-07 RX ADMIN — POLYETHYLENE GLYCOL (3350) 17 G: 17 POWDER, FOR SOLUTION ORAL at 20:59

## 2022-02-07 RX ADMIN — HYDROMORPHONE HYDROCHLORIDE 0.5 MG: 1 INJECTION, SOLUTION INTRAMUSCULAR; INTRAVENOUS; SUBCUTANEOUS at 05:34

## 2022-02-07 RX ADMIN — MAGNESIUM GLUCONATE 500 MG ORAL TABLET 400 MG: 500 TABLET ORAL at 08:32

## 2022-02-07 RX ADMIN — IPRATROPIUM BROMIDE AND ALBUTEROL SULFATE 3 ML: 2.5; .5 SOLUTION RESPIRATORY (INHALATION) at 08:40

## 2022-02-07 RX ADMIN — Medication 1 PUFF: at 19:19

## 2022-02-07 RX ADMIN — PANTOPRAZOLE SODIUM 40 MG: 40 TABLET, DELAYED RELEASE ORAL at 05:34

## 2022-02-07 RX ADMIN — METOCLOPRAMIDE 5 MG: 10 TABLET ORAL at 21:03

## 2022-02-07 RX ADMIN — IPRATROPIUM BROMIDE AND ALBUTEROL SULFATE 3 ML: 2.5; .5 SOLUTION RESPIRATORY (INHALATION) at 19:19

## 2022-02-07 RX ADMIN — ALOGLIPTIN 12.5 MG: 12.5 TABLET, FILM COATED ORAL at 08:33

## 2022-02-07 RX ADMIN — ROPINIROLE HYDROCHLORIDE 0.5 MG: 0.25 TABLET, FILM COATED ORAL at 08:32

## 2022-02-07 RX ADMIN — ROPINIROLE HYDROCHLORIDE 0.5 MG: 0.25 TABLET, FILM COATED ORAL at 15:04

## 2022-02-07 RX ADMIN — HYDROMORPHONE HYDROCHLORIDE 0.5 MG: 1 INJECTION, SOLUTION INTRAMUSCULAR; INTRAVENOUS; SUBCUTANEOUS at 08:30

## 2022-02-07 RX ADMIN — Medication 10 ML: at 21:03

## 2022-02-07 RX ADMIN — ENOXAPARIN SODIUM 40 MG: 100 INJECTION SUBCUTANEOUS at 08:32

## 2022-02-07 RX ADMIN — LACTULOSE 30 G: 10 SOLUTION ORAL at 08:32

## 2022-02-07 RX ADMIN — LACTULOSE 30 G: 10 SOLUTION ORAL at 15:04

## 2022-02-07 RX ADMIN — POTASSIUM CHLORIDE 10 MEQ: 7.46 INJECTION, SOLUTION INTRAVENOUS at 10:29

## 2022-02-07 RX ADMIN — SPIRONOLACTONE 25 MG: 25 TABLET ORAL at 08:33

## 2022-02-07 RX ADMIN — POTASSIUM CHLORIDE 10 MEQ: 7.46 INJECTION, SOLUTION INTRAVENOUS at 16:30

## 2022-02-07 RX ADMIN — ROPINIROLE HYDROCHLORIDE 0.5 MG: 0.25 TABLET, FILM COATED ORAL at 21:03

## 2022-02-07 RX ADMIN — FUROSEMIDE 40 MG: 40 TABLET ORAL at 08:33

## 2022-02-07 RX ADMIN — LACTULOSE 30 G: 10 SOLUTION ORAL at 20:59

## 2022-02-07 RX ADMIN — LEVOTHYROXINE SODIUM 100 MCG: 100 TABLET ORAL at 05:34

## 2022-02-07 RX ADMIN — CEFTRIAXONE SODIUM 1000 MG: 1 INJECTION, POWDER, FOR SOLUTION INTRAMUSCULAR; INTRAVENOUS at 15:05

## 2022-02-07 RX ADMIN — POTASSIUM CHLORIDE 10 MEQ: 7.46 INJECTION, SOLUTION INTRAVENOUS at 13:52

## 2022-02-07 RX ADMIN — HYDROMORPHONE HYDROCHLORIDE 0.5 MG: 1 INJECTION, SOLUTION INTRAMUSCULAR; INTRAVENOUS; SUBCUTANEOUS at 01:53

## 2022-02-07 RX ADMIN — ATORVASTATIN CALCIUM 10 MG: 10 TABLET, FILM COATED ORAL at 08:32

## 2022-02-07 RX ADMIN — HYDROMORPHONE HYDROCHLORIDE 0.5 MG: 1 INJECTION, SOLUTION INTRAMUSCULAR; INTRAVENOUS; SUBCUTANEOUS at 18:25

## 2022-02-07 RX ADMIN — SODIUM CHLORIDE: 9 INJECTION, SOLUTION INTRAVENOUS at 11:52

## 2022-02-07 ASSESSMENT — PAIN DESCRIPTION - LOCATION: LOCATION: BUTTOCKS;RECTUM

## 2022-02-07 ASSESSMENT — PAIN SCALES - GENERAL
PAINLEVEL_OUTOF10: 9
PAINLEVEL_OUTOF10: 5
PAINLEVEL_OUTOF10: 10
PAINLEVEL_OUTOF10: 9
PAINLEVEL_OUTOF10: 8
PAINLEVEL_OUTOF10: 6
PAINLEVEL_OUTOF10: 10
PAINLEVEL_OUTOF10: 4
PAINLEVEL_OUTOF10: 6

## 2022-02-07 ASSESSMENT — PAIN DESCRIPTION - PAIN TYPE: TYPE: ACUTE PAIN

## 2022-02-07 ASSESSMENT — PAIN DESCRIPTION - DESCRIPTORS: DESCRIPTORS: ACHING;BURNING;SHARP

## 2022-02-07 ASSESSMENT — PAIN DESCRIPTION - FREQUENCY: FREQUENCY: CONTINUOUS

## 2022-02-07 NOTE — FLOWSHEET NOTE
02/07/22 0829   Vital Signs   Temp 97.6 °F (36.4 °C)   Temp Source Oral   Pulse 90   Heart Rate Source Monitor   Resp 16   /67   BP Location Left upper arm   Patient Position Semi fowlers   Level of Consciousness Alert (0)   MEWS Score 1   Patient Currently in Pain Yes   Pain Assessment   Pain Assessment 0-10   Pain Level 10   Oxygen Therapy   SpO2 100 %   O2 Device None (Room air)   Assessment complete and morning medications administered

## 2022-02-07 NOTE — PLAN OF CARE
Problem: Skin Integrity:  Goal: Will show no infection signs and symptoms  Description: Will show no infection signs and symptoms  Outcome: Ongoing  Goal: Absence of new skin breakdown  Description: Absence of new skin breakdown  Outcome: Ongoing     Problem: SAFETY  Goal: Free from accidental physical injury  Outcome: Ongoing     Problem: DAILY CARE  Goal: Daily care needs are met  Outcome: Ongoing     Problem: PAIN  Goal: Patient's pain/discomfort is manageable  Outcome: Ongoing     Problem: SKIN INTEGRITY  Goal: Skin integrity is maintained or improved  Outcome: Ongoing     Problem: KNOWLEDGE DEFICIT  Goal: Patient/S.O. demonstrates understanding of disease process, treatment plan, medications, and discharge instructions.   Outcome: Ongoing     Problem: DISCHARGE BARRIERS  Goal: Patient's continuum of care needs are met  Outcome: Ongoing

## 2022-02-07 NOTE — PROGRESS NOTES
Inpatient Physical Therapy Evaluation and Treatment    Unit: [facility]  Date:  2/8/2022  Patient Name:    Corinna Hernandez  Admitting diagnosis:  Abdominal pain, left lower quadrant [R10.32]  Abdominal pain [R10.9]  Acute cystitis with hematuria [N30.01]  Constipation, unspecified constipation type [K59.00]  Admit Date:  2/6/2022  Precautions/Restrictions/WB Status/ Lines/ Wounds/ Oxygen: Fall risk, Bed/chair alarm and Lines -IV and Supplemental O2 (2L/min)    Treatment Time: 1051 - 1152  Treatment Number:  1   Timed Code Treatment Minutes: 51 minutes  Total Treatment Minutes:  61  minutes    Patient Goals for Therapy: \" Go home \"          Discharge Recommendations: Home 24 hr assist and with home PT   DME needs for discharge: Needs Met       Therapy recommendation for EMS Transport: can transport by wheelchair    Therapy recommendations for staff:   Assist of 1 with use of rolling walker (RW) and gait belt for all transfers and ambulation to/from [facility]  to/from [facility]    History of Present Illness: H & P as per Sukhdev Del Valle MD's note dated 2/7/2022  The patient is a 78 y.o. female hypertension, hypothyroidism, irritable bowel syndrome, hyperlipidemia, chronic pain syndrome, diabetes fibromyalgia, GERD, hx of COVID with trach and peg tube, and  critical illness myopathy who presents to [facility] with complaints of abdominal pain. History obtained from the patient and review of EMR. Pt is noted to be a poor historian, she stated her  helps her at home and does her medication for her. She stated that she was in ED on Friday for the same complaints and she was sent home. Pt is on oxygen s/p COVID, 2.5 liters at home. SMOG enema was attempted in ED without success. Per nurse stated that she is impacted and some loose stool around the impacted stool.   There was some stool removed in ED during rectal exam. Pt complain of diffuse abdominal  pain, she is unable to tell me when her last BM was or how often she has BM. In ED CT of abdomen showed distended gallbladder, similar to prior study, normal appendix, rectal and lower sigmoid constipation to lesser degree mild constipation scattered elsewhere with in the colon. Pt given SMOG enema in ED. Pt admitted for further care, GI consulted this am.     Home Health S4 Level Recommendation:  Level 3 Safety  AM-PAC Mobility Score    AM-PAC Inpatient Mobility Raw Score : 19       Preadmission Environment    Pt. Roxi Lu family (spouse and daughter, 2 grandchildren-16and 24years old, assist from  available 24/7)  Home environment:    one story home  Steps to enter first floor: 1 step to enter with no handrail  Steps to second floor: N/A  Bathroom: tub/shower unit, comfort height toilet, grab bars and tub transfer bench  Equipment owned: RW, rollator, wc (manual), SPC, LH sponge, pulse oximeter, O2 at 2.5L 24 hours/day and reacher     Preadmission Status:  Pt. Able to drive: No-has not driven in over a year but maintains a license;  drives to appointments  Pt Fully independent with ADLs: No- stays close and provides supervision  Pt. Required assistance from family for: Independent prior to [facility] but  now assisting with cooking, cleaning, laundry  Pt. independent for transfers and gait and walked with [employer] prior to [facility], has been using rollator since long illness with [address] [place] [address] in place s/p ARU stay,   History of falls No    Pain   No    Cognition    A&O x4   Able to follow 2 step commands    Subjective  Patient lying supine in bed with no family present. Pt agreeable to this PT eval & tx. Upper Extremity ROM/Strength  Please see OT evaluation.       Lower Extremity ROM / Strength   AROM WFL: Yes  ROM limitations: N/A    Strength Assessment (measured on a 0-5 scale):  R LE   Quad   4-   Ant Tib  5   Hamstring 4-   Iliopsoas 4-  L LE  Quad   4-   Ant Tib  5   Hamstring 4-   Iliopsoas 4-    Lower Extremity Sensation WFL    Lower Extremity Proprioception:   WFL    Coordination and Tone  WFL    Balance  Sitting:  Good - ; SBA  Comments:     Standing: Fair ; CGA  Comments: ~ 3 min    Bed Mobility   Supine to Sit:    Min A   Sit to Supine:   CGA  Rolling:   SBA  Scooting in sitting: CGA  Scooting in supine: Mod A  and 2 persons    Transfer Training     Sit to stand:   CGA  Stand to sit:   CGA  Bed to Chair:   CGA with use of gait belt and rolling walker (RW)    Gait gait completed as indicated below  Distance:      12 ft  Deviations (firm surface/linoleum):  decreased lorna  Assistive Device Used:    gait belt and rolling walker (RW)  Level of Assist:    CGA  Comment: Patient was limited in walking distance due to fatigue. Stair Training deferred, pt unsafe/ not appropriate to complete stairs at this time    Activity Tolerance   Pt completed therapy session with SOB noted with ambulation, recovered to baseline withing 1 min. EOB:  /57  HR 84  SpO2 89-97% on 2L     After ambulation in room:  SpO2 87% on 2L, recovered within 1 min to 94% with seated rest break and PLB  HR 88    Positioning Needs   Pt in bed, alarm set, positioned in proper neutral alignment and pressure relief provided. Call light provided and all needs within reach    Exercises Initiated  all completed bilaterally unless indicated  Ankle Pumps x 10 reps  Clamshells x 5 reps    Other  None. Patient/Family Education   Pt educated on role of inpatient PT, POC, importance of continued activity, DC recommendations, safety awareness, transfer techniques, pursed lip breathing, energy conservation, pacing activity and calling for assist with mobility. Assessment  Pt seen for Physical Therapy evaluation in acute care setting. Pt demonstrated decreased Activity tolerance, Balance, ROM, Safety and Strength as well as decreased independence with Ambulation, Bed Mobility  and Transfers.      Recommending Home 24 hr assist and with home PT upon discharge as patient functioning below baseline level and would benefit from continued therapy services    Goals : To be met in 3 visits:  1). Independent with LE Ex x 10 reps    To be met in 6 visits:  1). Supine to/from sit: Supervision  2). Sit to/from stand: SBA  3). Bed to chair: SBA  4). Gait: Ambulate 50 ft.  with  SBA and use of LRAD (least restrictive assistive device)  5). Tolerate B LE exercises 3 sets of 10-15 reps  6). Ascend/descend 1 steps with SBA with use of no handrail and LRAD (least restrictive assistive device)    Rehabilitation Potential: Good  Strengths for achieving goals include:   Pt motivated, PLOF, Family Support and Pt cooperative   Barriers to achieving goals include:    Weakness    Plan    To be seen 3-5 x / week  while in acute care setting for therapeutic exercises, bed mobility, transfers, progressive gait training, balance training, and family/patient education. Signature: Bruno Winston MS PT, # [id number]    If patient discharges from this facility prior to next visit, this note will serve as the Discharge Summary.

## 2022-02-07 NOTE — PLAN OF CARE
Intractable abd pain  severe constipation   UTI    SMOG enema in ED- pt started having rectal bleeding

## 2022-02-07 NOTE — PROGRESS NOTES
02/06/22 2300   RT Protocol   History Pulmonary Disease 2   Respiratory pattern 0   Breath sounds 2   Cough 2   Indications for Bronchodilator Therapy Decreased or absent breath sounds   Bronchodilator Assessment Score 6   RT Inhaler-Nebulizer Bronchodilator Protocol Note    There is a bronchodilator order in the chart from a provider indicating to follow the RT Bronchodilator Protocol and there is an Initiate RT Inhaler-Nebulizer Bronchodilator Protocol order as well (see protocol at bottom of note). CXR Findings:  No results found. The findings from the last RT Protocol Assessment were as follows:   History Pulmonary Disease: Chronic pulmonary disease  Respiratory Pattern: Regular pattern and RR 12-20 bpm  Breath Sounds: Slightly diminished and/or crackles  Cough: Weak, productive  Indication for Bronchodilator Therapy: Decreased or absent breath sounds  Bronchodilator Assessment Score: 6    Aerosolized bronchodilator medication orders have been revised according to the RT Inhaler-Nebulizer Bronchodilator Protocol below. Respiratory Therapist to perform RT Therapy Protocol Assessment initially then follow the protocol. Repeat RT Therapy Protocol Assessment PRN for score 0-3 or on second treatment, BID, and PRN for scores above 3. No Indications  adjust the frequency to every 6 hours PRN wheezing or bronchospasm, if no treatments needed after 48 hours then discontinue using Per Protocol order mode. If indication present, adjust the RT bronchodilator orders based on the Bronchodilator Assessment Score as indicated below. Use Inhaler orders unless patient has one or more of the following: on home nebulizer, not able to hold breath for 10 seconds, is not alert and oriented, cannot activate and use MDI correctly, or respiratory rate 25 breaths per minute or more, then use the equivalent nebulizer order(s) with same Frequency and PRN reasons based on the score.   If a patient is on this medication at home then do not decrease Frequency below that used at home. 0-3  enter or revise RT bronchodilator order(s) to equivalent RT Bronchodilator order with Frequency of every 4 hours PRN for wheezing or increased work of breathing using Per Protocol order mode. 4-6  enter or revise RT Bronchodilator order(s) to two equivalent RT bronchodilator orders with one order with BID Frequency and one order with Frequency of every 4 hours PRN wheezing or increased work of breathing using Per Protocol order mode. 7-10  enter or revise RT Bronchodilator order(s) to two equivalent RT bronchodilator orders with one order with TID Frequency and one order with Frequency of every 4 hours PRN wheezing or increased work of breathing using Per Protocol order mode. 11-13  enter or revise RT Bronchodilator order(s) to one equivalent RT bronchodilator order with QID Frequency and an Albuterol order with Frequency of every 4 hours PRN wheezing or increased work of breathing using Per Protocol order mode. Greater than 13  enter or revise RT Bronchodilator order(s) to one equivalent RT bronchodilator order with every 4 hours Frequency and an Albuterol order with Frequency of every 2 hours PRN wheezing or increased work of breathing using Per Protocol order mode.        Electronically signed by Clemente Stone RCP on 2/6/2022 at 11:32 PM

## 2022-02-07 NOTE — ED NOTES
Verbal report given to 2 west RN, out of ER in stable condition on stretcher.      Jacob Newman RN  02/06/22 2046

## 2022-02-07 NOTE — CONSULTS
Gastroenterology Consult Note    Patient:   Dread Mcnair   :    1955   Facility:   [address]  Referring/PCP: Marva Eaton MD  Date:     2022  Consultant:   Bashir Rivero PA-C      Chief Complaint   Patient presents with    Abdominal Pain     Patient reports ABD pain, recently D/C'd for bowel obstruction. History of Present illness     78year old female with a history of HTN, DM, hypothyroidism, anxiety, chronic pain syndrome, DDD, fibromyalgia, GERD, OA, and COVID with trach/PEG tube presented to the ED with abdominal pain. She developed stomach pain five days ago. She admits to constant, throbbing LLQ abdominal pain. She normally passed BMs daily, but cannot recall when her last BM was. She admits to early satiety and weight los of 40 lbs over the last few months. She denies nausea, vomiting, dysphagia, heartburn, cramping, bloating, rectal bleeding, or melena. Her last colonoscopy in 2020 showed diverticulosis. Her last EGD in 2019 showed Mcintyre's esophagus and benign hyperplastic gastric polyp. GI was consulted for evaluation of constipation. CT abd/pelvis showed rectal and lower sigmoid constipation. Rectal exam in the ED showed stool ball s/p removal. SMOG enema was attempted in the ED with rectal bleeding.      Past Medical History:   Diagnosis Date    Anxiety disorder     Chronic pain syndrome     COVID-19 2021    DDD (degenerative disc disease), lumbar     Diabetes ([facility] [address]     Displacement of lumbar intervertebral disc without myelopathy 2010    Diverticulitis     Fibromyalgia     Generalized osteoarthrosis, involving multiple sites 2010    GERD (gastroesophageal reflux disease)     Hypochromic microcytic anemia 2022    Impacted cerumen 2010    Influenza A 03/10/2016    Irritable bowel syndrome 2010    Other and unspecified hyperlipidemia 2010    Prosthetic joint implant failure ([address]     Screening mammogram 2006    (Both)Negative    Tracheobronchomalacia     Unspecified asthma(493.90) 2010    Unspecified essential hypertension 2010    Unspecified hypothyroidism 2010     Past Surgical History:   Procedure Laterality Date    ABDOMEN SURGERY      CARDIAC CATHETERIZATION      COLON SURGERY      COLONOSCOPY      normal    HYSTERECTOMY      JOINT REPLACEMENT  10/92    Right; hip     JOINT REPLACEMENT        Left hip  followed by revision 2006    JOINT REPLACEMENT        right     JOINT REPLACEMENT         right replacement.  LUNG SURGERY  2014    tracheobronchomalacia    TRACHEOSTOMY N/A 10/25/2021    TRACHEOTOMY performed by David Macedo DO at [address] [place] [address] N/A 10/25/2021    EGD/PEG W/ANES. ON VENT, COVID + performed by Gabi Burnett MD at [facility]. [address]:   Social History     Tobacco Use    Smoking status: Former Smoker     Packs/day: 1.00     Years: 18.00     Pack years: 18.00     Types: Cigarettes     Quit date: 12/10/1991     Years since quittin.1    Smokeless tobacco: Never Used   Substance Use Topics    Alcohol use: No     Family:   Family History   Problem Relation Age of Onset    Asthma Mother     Heart Failure Father     Cancer Maternal Grandfather         skin cancer on face    Cancer Daughter         skin cancer-BCC    Diabetes Neg Hx     Emphysema Neg Hx     Hypertension Neg Hx      No current facility-administered medications on file prior to encounter.      Current Outpatient Medications on File Prior to Encounter   Medication Sig Dispense Refill    bisacodyl (BISAC-EVAC) 10 MG suppository Place 1 suppository rectally as needed for Constipation 10 suppository 0    lactulose (CHRONULAC) 10 GM/15ML solution Take 30 mLs by mouth daily for 5 days 150 mL 0    ipratropium-albuterol (DUONEB) 0.5-2.5 (3) MG/3ML SOLN nebulizer solution Inhale 3 mLs into the lungs every 4 hours 360 mL 0    albuterol sulfate  (90 Base) MCG/ACT inhaler Inhale 2 puffs into the lungs 4 times daily as needed for Wheezing 1 each 0    glipiZIDE (GLUCOTROL XL) 10 MG extended release tablet Take 1 tablet by mouth 2 times daily 60 tablet 0    SITagliptin (JANUVIA) 50 MG tablet Take 1 tablet by mouth daily 30 tablet 0    simvastatin (ZOCOR) 40 MG tablet Take 1 tablet by mouth nightly 30 tablet 0    rOPINIRole (REQUIP) 0.5 MG tablet TAKE 1 TABLET BY MOUTH 3 TIMES DAILY. 90 tablet 0    metoprolol tartrate (LOPRESSOR) 25 MG tablet Take 1 tablet by mouth 2 times daily 60 tablet 0    levothyroxine (SYNTHROID) 100 MCG tablet Take 1 tablet by mouth Daily 30 tablet 0    omeprazole (PRILOSEC) 40 MG delayed release capsule Take 1 capsule by mouth daily 30 capsule 0    aspirin 81 MG EC tablet Take 1 tablet by mouth daily 30 tablet 0    spironolactone (ALDACTONE) 25 MG tablet Take 1 tablet by mouth daily 30 tablet 3    furosemide (LASIX) 40 MG tablet Take 1 tablet by mouth daily 60 tablet 3    magnesium oxide (MAG-OX) 400 (240 Mg) MG tablet Take 1 tablet by mouth daily 30 tablet 0    metoclopramide (REGLAN) 5 MG tablet Take 1 tablet by mouth 2 times daily 120 tablet 3    tiZANidine (ZANAFLEX) 2 MG tablet Take 2 mg by mouth every 8 hours as needed      ipratropium-albuterol (DUONEB) 0.5-2.5 (3) MG/3ML SOLN nebulizer solution Inhale 3 mLs into the lungs every 4 hours      carboxymethylcellulose PF (THERATEARS) 1 % GEL ophthalmic gel Place 1 drop into both eyes every 4 hours as needed for Dry Eyes      budesonide (PULMICORT FLEXHALER) 180 MCG/ACT AEPB inhaler Inhale 1 puff into the lungs 2 times daily Per Yasmine [facility]. [employer]      estradiol (ESTRACE) 0.1 MG/GM vaginal cream Place vaginally See Admin Instructions Insert a pea-sized amount vaginally every day for 7 days, then twice weekly thereafter. Per Norma Delgado [facility]. Abby Gee. Last dispensed 5/10/21.       Multiple Vitamins-Minerals (THERAPEUTIC MULTIVITAMIN-MINERALS) tablet Take 1 tablet by mouth daily      blood glucose monitor strips Dispense whatever insurance will cover. Pt test twice a day dx:E11.9 200 strip 3    blood glucose test strips (ASCENSIA AUTODISC VI;ONE TOUCH ULTRA TEST VI) strip 1 each by In Vitro route daily As needed. 100 each 3    polyethylene glycol (MIRALAX) powder Take 17 g by mouth daily as needed      Blood Glucose Monitoring Suppl CATHRYN Dispense whatever insurance will cover. Dx code:E11.9 1 Device 0      Infusions:    dextrose      sodium chloride 75 mL/hr at 02/07/22 1152    sodium chloride       PRN Medications: dextrose bolus (hypoglycemia) **OR** dextrose bolus (hypoglycemia), carboxymethylcellulose PF, bisacodyl, glucose, glucagon (rDNA), dextrose, sodium chloride flush, sodium chloride, potassium chloride **OR** potassium alternative oral replacement **OR** potassium chloride, magnesium sulfate, ondansetron **OR** ondansetron, polyethylene glycol, acetaminophen **OR** acetaminophen, HYDROmorphone  Allergies: Allergies   Allergen Reactions    Prednisone Other (See Comments) and Anaphylaxis     Cannot tolerate oral steriods  Cannot tolerate oral steroids - causes Avascular Necrosis of joints.   Cannot tolerate oral steriods    Quinidine Anaphylaxis and Nausea And Vomiting    Sulfa Antibiotics Anaphylaxis and Nausea And Vomiting    Bactrim     Clonidine Hives    Sulfamethoxazole-Trimethoprim Rash and Hives       ROS:   Constitutional: negative for chills, fevers and sweats  Eyes: negative for cataracts, icterus and redness  Ears, nose, mouth, throat, and face: negative for epistaxis, hearing loss and sore throat  Respiratory: negative for cough, hemoptysis and sputum  Cardiovascular: negative for chest pain, dyspnea and lower extremity edema  Gastrointestinal: as per HPI  Genitourinary:negative for dysuria, frequency and hematuria  Neurological: negative for coordination problems, dizziness and gait problems  Behavioral/Psych: negative for anxiety, depression and mood swings    Physical Exam   /74   Pulse 77   Temp 97.6 °F (36.4 °C) (Oral)   Resp 16   Ht 5' 9\" (1.753 m)   Wt 143 lb 9 oz (65.1 kg)   SpO2 100%   BMI 21.20 kg/m²       General appearance: alert, appears stated age, cooperative and mild distress  Head: Normocephalic, without obvious abnormality, atraumatic  Eyes: conjunctivae/corneas clear. PERRL, EOM's intact. Fundi benign. Neck: no adenopathy and supple, symmetrical, trachea midline  Lungs: clear to auscultation bilaterally  Heart: regular rate and rhythm, S1, S2 normal, no murmur, click, rub or gallop  Abdomen: normal findings: no masses palpable and symmetric and abnormal findings:  distended, hypoactive bowel sounds, obese and tenderness moderate in the LLQ  Extremities: extremities normal, atraumatic, no cyanosis or edema    Lab and Imaging Review   Labs:  CBC:   Recent Labs     02/04/22 2120 02/06/22 1118 02/07/22  0522   WBC 8.5 9.8 9.6   HGB 13.1 13.7 11.8*   HCT 40.0 42.2 35.3*   MCV 75.2* 76.1* 74.8*    224 206     BMP:   Recent Labs     02/04/22 2120 02/06/22 1118 02/07/22  0522    137 138   K 3.6 3.9 3.0*   CL 98* 95* 97*   CO2 28 30 29   BUN 10 16 12   CREATININE <0.5* <0.5* <0.5*     LIVER PROFILE:   Recent Labs     02/04/22 2120 02/06/22  1118   AST 33 34   ALT 10 10   LIPASE  --  17.0   PROT 8.1 8.7*   BILITOT 0.3 0.6   ALKPHOS 126 123         IMAGING:  CT ABDOMEN PELVIS W IV CONTRAST Additional Contrast? None   Final Result      1. Pelvis is obscured by artifact from bilateral hip prostheses. 2.  Markedly distended gallbladder which is otherwise unremarkable in   appearance and similar to the prior study which may represent chronic   hydrops. No evidence of any bile duct dilation. 3.  A normal appearing appendix is noted.       4.  Rectal and lower sigmoid constipation and to lesser degree mild   constipation scattered elsewhere within the colon. Otherwise nonspecific   bowel gas pattern. XR ABDOMEN (KUB) (SINGLE AP VIEW)    (Results Pending)     Attending Supervising [de-identified] Attestation Statement  The patient is a 78 y.o. female. I have performed a history and physical examination of the patient. I discussed the case with my physician assistant Harleen Alvarenga PA-C    I reviewed the patient's Past Medical History, Past Surgical History, Medications, and Allergies. Physical Exam:  Vitals:    02/07/22 0156 02/07/22 0443 02/07/22 0829 02/07/22 1434   BP: (!) 154/84  108/67 130/74   Pulse: 100  90 77   Resp: 16  16 16   Temp: 96.8 °F (36 °C)  97.6 °F (36.4 °C) 97.6 °F (36.4 °C)   TempSrc: Oral  Oral Oral   SpO2: 99%  100% 100%   Weight:  143 lb 9 oz (65.1 kg)     Height:           Physical Examination: General appearance - in mild to moderate distress  Mental status - alert, oriented to person, place, and time  Eyes - pupils equal and reactive, extraocular eye movements intact  Neck - supple, no significant adenopathy  Chest - clear to auscultation, no wheezes, rales or rhonchi, symmetric air entry  Heart - normal rate, regular rhythm, normal S1, S2, no murmurs, rubs, clicks or gallops  Abdomen - soft, nontender, nondistended, no masses or organomegaly  Extremities - no pedal edema noted        Assessment:     78year old female with a history of HTN, DM, hypothyroidism, anxiety, chronic pain syndrome, DDD, fibromyalgia, GERD, OA, and COVID with trach/PEG tube admitted with abdominal pain and severe constipation. Plan:   1. Continue supportive care  2. Monitor and document output  3. Observe for signs of bleeding  4. Minimize narcotics as able  5. Recommend PO pain control   6. Continue bowel regimen with Lactulose TID and Miralax BID  7. Dulcolax suppository once  8. Check KUB  9. Continue low fiber diet as tolerated  10. Encourage PT/OT  11.  Will follow     Kemi Atkinson PA-C  4:38 PM 2/7/2022                      66 year old female with a history of DM, HTN, HLD, GERD, hypothyroidism, anxiety, chronic pain syndrome, DDD, fibromyalgia, OA, and COVID related respiratory failure requiring trach/PEG tube in 10/21 now admitted with abdominal pain and severe constipation/obstipation likely narcotic induced    Continue supportive care. Miralax and Lactulose. Check KUB. Will consider repeat enema based on KUB. Can also consider a dose of relistor.  Will follow    Mariza Velasco MD          [phone]  Valeriy Guajardo

## 2022-02-07 NOTE — PROGRESS NOTES
Patient admitted to room 215 from ER. Patient oriented to room, call light, bed rails, phone, lights and bathroom. Patient instructed about the schedule of the day including: vital sign frequency, lab draws, possible tests, frequency of MD and staff rounds, daily weights, I &O's and prescribed diet. Bed alarm in place. Bed locked, in lowest position, side rails up 2/4, call light within reach. Recliner Assessment:     Patient is not able to demonstrated the ability to move from a reclining position to an upright position within the recliner. however patient is alert, oriented and able to provide informed consent       4 Eyes Skin Assessment     The patient is being assess for   Admission    I agree that 2 RN's have performed a thorough Head to Toe Skin Assessment on the patient. ALL assessment sites listed below have been assessed. Areas assessed for pressure by both nurses:   [x]   Head, Face, and Ears   [x]   Shoulders, Back, and Chest, Abdomen  [x]   Arms, Elbows, and Hands   [x]   Coccyx, Sacrum, and Ischium  [x]   Legs, Feet, and Heels    Scattered bruising, large yellow bruises to lower abdomen. Nonblanchable redness to coccyx. Redness / excoriation to tiffanie area. Skin Assessed Under all Medical Devices by both nurses:  O2 device tubing              All Mepilex Borders were peeled back and area peeked at by both nurses:  Yes  Please list where Mepilex Borders are located:  coccyx             **SHARE this note so that the co-signing nurse is able to place an eSignature**    Co-signer eSignature: Electronically signed by Teresita Irvin RN on 2/7/22 at 3:27 AM EST    Does the Patient have Skin Breakdown related to pressure?   Yes [facility] WOUND CARE was Initiated documentation include the Tiffanie-wound, Wound Assessment, Measurements, Dressing Treatment, Drainage, and Color\",            Josafat Prevention initiated:  Yes   Wound Care Orders initiated:  No      [facility] nurse consulted for Pressure Injury (Stage 3,4, Unstageable, DTI, NWPT, Complex wounds)and New or Established Ostomies:  NA      Primary Nurse eSignature: Electronically signed by Delfino Reid RN on 2/7/22 at 3:08 AM EST

## 2022-02-07 NOTE — PROGRESS NOTES
Patient was incontinent of a very large bowel movement. Had a large, dark brown very hard stool and an extra large amount of liquid light brown stool. Patient was cleaned up and new brief applied. Patient states that she feels like she needs to go more. Instructed her to let us know when she feels she has like gone again and we will change her again. Patient states understanding.

## 2022-02-07 NOTE — PROGRESS NOTES
Occupational/Physical Therapy  Orders received, chart reviewed. Attempted to see patient this pm with RN approval.  Patient and family member refused therapy evaluations for today, state pain is too severe (10/10) and patient not up to it at this time. RN notified of patient status. Will reattempt 2/8/22.   Fred Julian, OTR/L [id number]  Darek Colon, MS PT, # [id number]

## 2022-02-07 NOTE — PLAN OF CARE
Problem: Skin Integrity:  Goal: Absence of new skin breakdown  Description: Absence of new skin breakdown  Outcome: Ongoing     Problem: SAFETY  Goal: Free from accidental physical injury  Outcome: Ongoing     Problem: DAILY CARE  Goal: Daily care needs are met  Outcome: Ongoing     Problem: PAIN  Goal: Patient's pain/discomfort is manageable  Outcome: Ongoing     Problem: SKIN INTEGRITY  Goal: Skin integrity is maintained or improved  Outcome: Ongoing

## 2022-02-07 NOTE — PROGRESS NOTES
Patient had another very large, hard bowel movement. A smala amount of liquid stool followed. Patient states that her pain is finally starting to subside.

## 2022-02-07 NOTE — H&P
Hospital Medicine History & Physical      PCP: Kaylee Dobbs MD    Date of Admission: 2/6/2022    Date of Service: Pt seen/examined on 02/07/22     Chief Complaint:    Chief Complaint   Patient presents with    Abdominal Pain     Patient reports ABD pain, recently D/C'd for bowel obstruction. History Of Present Illness: The patient is a 78 y.o. female hypertension, hypothyroidism, irritable bowel syndrome, hyperlipidemia, chronic pain syndrome, diabetes fibromyalgia, GERD, hx of COVID with trach and peg tube, and  critical illness myopathy who presents to [facility] with complaints of abdominal pain. History obtained from the patient and review of EMR. Pt is noted to be a poor historian, she stated her  helps her at home and does her medication for her. She stated that she was in ED on Friday for the same complaints and she was sent home. Pt is on oxygen s/p COVID, 2.5 liters at home. SMOG enema was attempted in ED without success. Per nurse stated that she is impacted and some loose stool around the impacted stool. There was some stool removed in ED during rectal exam. Pt complain of diffuse abdominal  pain, she is unable to tell me when her last BM was or how often she has BM. In ED CT of abdomen showed distended gallbladder, similar to prior study, normal appendix, rectal and lower sigmoid constipation to lesser degree mild constipation scattered elsewhere with in the colon. Pt given SMOG enema in ED.   Pt admitted for further care, GI consulted this am.       Past Medical History:        Diagnosis Date    Anxiety disorder     Chronic pain syndrome     COVID-19 09/27/2021    DDD (degenerative disc disease), lumbar     Diabetes ([facility] [address]     Displacement of lumbar intervertebral disc without myelopathy 08/23/2010    Diverticulitis     Fibromyalgia     Generalized osteoarthrosis, involving multiple sites 08/24/2010    GERD (gastroesophageal reflux disease)     Hypochromic microcytic anemia 1/18/2022    Impacted cerumen 08/23/2010    Influenza A 03/10/2016    Irritable bowel syndrome 08/23/2010    Other and unspecified hyperlipidemia 08/23/2010    Prosthetic joint implant failure ([facility] [address]     Screening mammogram 12/08/2006    (Both)Negative    Tracheobronchomalacia     Unspecified asthma(493.90) 08/23/2010    Unspecified essential hypertension 08/24/2010    Unspecified hypothyroidism 08/23/2010       Past Surgical History:        Procedure Laterality Date    ABDOMEN SURGERY      CARDIAC CATHETERIZATION      COLON SURGERY      COLONOSCOPY  2007    normal    HYSTERECTOMY      JOINT REPLACEMENT  10/92    Right; hip     JOINT REPLACEMENT    12/92    Left hip  followed by revision 1/2006    JOINT REPLACEMENT    6/96    right     JOINT REPLACEMENT    2006     right replacement.  LUNG SURGERY  1/2014    tracheobronchomalacia    TRACHEOSTOMY N/A 10/25/2021    TRACHEOTOMY performed by Danisha Garcia DO at [address] N/A 10/25/2021    EGD/PEG W/ANES. ON VENT, COVID + performed by Vee Pringle MD at [facility]       Medications Prior to Admission:    Prior to Admission medications    Medication Sig Start Date End Date Taking?  Authorizing Provider   bisacodyl (BISAC-EVAC) 10 MG suppository Place 1 suppository rectally as needed for Constipation 2/4/22 2/14/22  Addison Early DO   lactulose [facility]) 10 GM/15ML solution Take 30 mLs by mouth daily for 5 days 2/4/22 2/9/22  Addison Early DO   ipratropium-albuterol (DUONEB) 0.5-2.5 (3) MG/3ML SOLN nebulizer solution Inhale 3 mLs into the lungs every 4 hours 2/4/22   Addison Early DO   albuterol sulfate  (90 Base) MCG/ACT inhaler Inhale 2 puffs into the lungs 4 times daily as needed for Wheezing 1/22/22   BRYCE Flor CNP   glipiZIDE (GLUCOTROL XL) 10 MG extended release tablet Take 1 tablet by mouth 2 times daily 1/22/22   BRYCE Flor - LAKSHMI   SITagliptin (JANUVIA) 50 MG tablet Take 1 tablet by mouth daily 1/22/22   BRYCE Barrett CNP   simvastatin (ZOCOR) 40 MG tablet Take 1 tablet by mouth nightly 1/22/22   BRYCE Barrett CNP   rOPINIRole (REQUIP) 0.5 MG tablet TAKE 1 TABLET BY MOUTH 3 TIMES DAILY. 1/22/22   BRYCE Barrett CNP   metoprolol tartrate (LOPRESSOR) 25 MG tablet Take 1 tablet by mouth 2 times daily 1/22/22   BRYCE Barrett CNP   levothyroxine (SYNTHROID) 100 MCG tablet Take 1 tablet by mouth Daily 1/22/22   BRYCE Barrett CNP   omeprazole (PRILOSEC) 40 MG delayed release capsule Take 1 capsule by mouth daily 1/22/22   BRYCE Barrett CNP   aspirin 81 MG EC tablet Take 1 tablet by mouth daily 1/22/22   BRYCE Barrett CNP   spironolactone (ALDACTONE) 25 MG tablet Take 1 tablet by mouth daily 1/22/22   Myra Vazquez MD   furosemide (LASIX) 40 MG tablet Take 1 tablet by mouth daily 1/22/22   Myra Vazquez MD   magnesium oxide (MAG-OX) 400 (240 Mg) MG tablet Take 1 tablet by mouth daily 1/22/22   Myra Vazquez MD   metoclopramide (REGLAN) 5 MG tablet Take 1 tablet by mouth 2 times daily 1/21/22   Myra Vazquez MD   tiZANidine (ZANAFLEX) 2 MG tablet Take 2 mg by mouth every 8 hours as needed 9/16/21   Historical Provider, MD   ipratropium-albuterol (DUONEB) 0.5-2.5 (3) MG/3ML SOLN nebulizer solution Inhale 3 mLs into the lungs every 4 hours 10/28/21   Louis Price MD   carboxymethylcellulose PF (THERATEARS) 1 % GEL ophthalmic gel Place 1 drop into both eyes every 4 hours as needed for Dry Eyes 10/28/21   Louis Price MD   budesonide (PULMICORT FLEXHALER) 180 MCG/ACT AEPB inhaler Inhale 1 puff into the lungs 2 times daily Per [facility] [place]. Yasmany Schafer    Historical Provider, MD   estradiol (ESTRACE) 0.1 MG/GM vaginal cream Place vaginally See Admin Instructions Insert a pea-sized amount vaginally every day for 7 days, then twice weekly thereafter. Per [facility] [place].  Jordan Cesar Roberta. Last dispensed 5/10/21. Historical Provider, MD   Multiple Vitamins-Minerals (THERAPEUTIC MULTIVITAMIN-MINERALS) tablet Take 1 tablet by mouth daily    Historical Provider, MD   blood glucose monitor strips Dispense whatever insurance will cover. Pt test twice a day dx:E11.9 3/11/19   Roberto Skelton MD   blood glucose test strips (ASCENSIA AUTODISC VI;ONE TOUCH ULTRA TEST VI) strip 1 each by In Vitro route daily As needed. 3/11/19   Roberto Skelton MD   polyethylene glycol (MIRALAX) powder Take 17 g by mouth daily as needed    Historical Provider, MD   Blood Glucose Monitoring Suppl CATHRYN Dispense whatever insurance will cover. Dx code:E11.9 18   Gloria Bell MD       Allergies:  Prednisone, Quinidine, Sulfa antibiotics, Bactrim, Clonidine, and Sulfamethoxazole-trimethoprim    Social History:  The patient currently lives home with      TOBACCO:   reports that she quit smoking about 30 years ago. Her smoking use included cigarettes. She has a 18.00 pack-year smoking history. She has never used smokeless tobacco.  ETOH:   reports no history of alcohol use.       Family History:   Positive as follows:        Problem Relation Age of Onset    Asthma Mother     Heart Failure Father     Cancer Maternal Grandfather         skin cancer on face    Cancer Daughter         skin cancer-BCC    Diabetes Neg Hx     Emphysema Neg Hx     Hypertension Neg Hx        REVIEW OF SYSTEMS:       Constitutional: Negative for fever   HENT: Negative for sore throat   Eyes: Negative for redness   Respiratory: Negative  for dyspnea, cough   Cardiovascular: Negative for chest pain   Gastrointestinal: +abdominal pain, constipation   Genitourinary: Negative for hematuria   Musculoskeletal: Negative for arthralgias   Skin: Negative for rash   Neurological: Negative for syncope   Hematological: Negative for adenopathy   Psychiatric/Behavorial: Negative for anxiety    PHYSICAL EXAM:    /67   Pulse 90   Temp 97.6 °F (36.4 °C) (Oral)   Resp 16   Ht 5' 9\" (1.753 m)   Wt 143 lb 9 oz (65.1 kg)   SpO2 100%   BMI 21.20 kg/m²     Gen: No distress. Alert. Eyes: PERRL. No sclera icterus. No conjunctival injection. ENT: No discharge. Pharynx clear. Trach stoma healing well  Neck: No JVD. Trachea midline. Resp: No accessory muscle use. No crackles. No wheezes. No rhonchi. CV: Regular rate. Regular rhythm. No murmur. No rub. No edema. GI: diffuse tenderness. Non-distended. No masses. No organomegaly. Normal bowel sounds. No hernia. +peg tube scar  - scattered ecchymosis on abdomen   Skin: Warm and dry. No nodule on exposed extremities. No rash on exposed extremities. M/S: No cyanosis. No joint deformity. No clubbing. Neuro: Awake. Grossly nonfocal    Psych: Oriented x 2. No anxiety or agitation. Boston Carrizales MD have reviewed the chart on Rom[facility] Atkins and personally interviewed and examined patient, reviewed the data (labs and imaging) and after discussion with my PA formulated the plan. Agree with note with the following edits. HPI: A 65-year-old female with a history of hypertension, hypothyroidism, irritable syndrome, chronic pain syndrome, history of COVID trach and PEG and criticalness myopathy presented emergency room with abdominal pain. CT abdomen showed constipation. I reviewed the patient's Past Medical History, Past Surgical History, Medications, and Allergies. Physical exam:    /67   Pulse 90   Temp 97.6 °F (36.4 °C) (Oral)   Resp 16   Ht 5' 9\" (1.753 m)   Wt 143 lb 9 oz (65.1 kg)   SpO2 100%   BMI 21.20 kg/m²     Gen: No distress. Alert. Eyes: PERRL. No sclera icterus. No conjunctival injection. ENT: No discharge. Pharynx clear. Neck: Trachea midline. Normal thyroid. Resp: No accessory muscle use. No crackles. No wheezes. No rhonchi. No dullness on percussion. CV: Regular rate. Regular rhythm. No murmur or rub. No edema.    GI: Diffusely tender, non-distended. No masses. No organomegaly. Normal bowel sounds. No hernia. Skin: Warm and dry. No nodule on exposed extremities. No rash on exposed extremities. Lymph: No cervical LAD. No supraclavicular LAD. M/S: No cyanosis. No joint deformity. No clubbing. Neuro: Awake. Moves all 4 extremities, non focal  Psych: Oriented  To place . No anxiety or agitation. LOGAN Moran.      CBC:   Recent Labs     02/04/22 2120 02/06/22 1118 02/07/22 0522   WBC 8.5 9.8 9.6   HGB 13.1 13.7 11.8*   HCT 40.0 42.2 35.3*   MCV 75.2* 76.1* 74.8*    224 206     BMP:   Recent Labs     02/04/22 2120 02/06/22 1118 02/07/22 0522    137 138   K 3.6 3.9 3.0*   CL 98* 95* 97*   CO2 28 30 29   BUN 10 16 12   CREATININE <0.5* <0.5* <0.5*     LIVER PROFILE:   Recent Labs     02/04/22 2120 02/06/22 1118   AST 33 34   ALT 10 10   LIPASE  --  17.0   BILITOT 0.3 0.6   ALKPHOS 126 123     PT/INR: No results for input(s): PROTIME, INR in the last 72 hours. APTT: No results for input(s): APTT in the last 72 hours. UA:  Recent Labs     02/05/22 0112 02/05/22 0112 02/06/22  1346   COLORU Yellow   < > Yellow   PHUR 6.5   < > 6.5   WBCUA 21-50*   < > >100*   RBCUA 3-4   < > see below*   MUCUS 1+*  --   --    YEAST Present*   < > Present*   BACTERIA Rare*  --   --    CLARITYU Clear   < > Clear   SPECGRAV <=1.005   < > 1.020   LEUKOCYTESUR TRACE*   < > MODERATE*   UROBILINOGEN 0.2   < > 0.2   BILIRUBINUR Negative   < > Negative   BLOODU SMALL*   < > MODERATE*   GLUCOSEU 500*   < > 250*    < > = values in this interval not displayed.             CARDIAC ENZYMES  Recent Labs     02/04/22  2120 02/06/22  1118   TROPONINI <0.01 <0.01       U/A:    Lab Results   Component Value Date    NITRITE neg 11/30/2018    COLORU Yellow 02/06/2022    WBCUA >100 02/06/2022    RBCUA see below 02/06/2022    MUCUS 1+ 02/05/2022    BACTERIA Rare 02/05/2022    CLARITYU Clear 02/06/2022    SPECGRAV 1.020 02/06/2022    LEUKOCYTESUR MODERATE 02/06/2022    BLOODU MODERATE 02/06/2022    GLUCOSEU 250 02/06/2022    GLUCOSEU 250 05/11/2011       CULTURES   SARS-CoV-2 RNA, RT PCR NOT DETECTED     INFLUENZA A NOT DETECTED     INFLUENZA B NOT DETECTED     Urine Culture: ordered     EKG:    Sinus rhythm with sinus arrhythmia with frequent Premature ventricular complexes and Fusion complexesLeft ventricular hypertrophy with repolarization abnormalityAbnormal ECGexcept for PVC no significant changes since 2.4.22Confirmed by Kanu Sexton MD, [address] ([id number]) on 2/7/2022 5:43:52 AM    RADIOLOGY  CT ABDOMEN PELVIS W IV CONTRAST Additional Contrast? None   Final Result      1. Pelvis is obscured by artifact from bilateral hip prostheses. 2.  Markedly distended gallbladder which is otherwise unremarkable in   appearance and similar to the prior study which may represent chronic   hydrops. No evidence of any bile duct dilation. 3.  A normal appearing appendix is noted. 4.  Rectal and lower sigmoid constipation and to lesser degree mild   constipation scattered elsewhere within the colon. Otherwise nonspecific   bowel gas pattern. Echo 1/12/2022  The left ventricular systolic function is low normal with an ejection   fraction of 50%. There is hypokinesis of the basal inferior and inferior walls. Normal left ventricular size with mild concentric left ventricular   hypertrophy. Grade I diastolic dysfunction with normal filling pressure. Changes noted from previous echo on 8- in left ventricular function. Mild tricuspid regurgitation. Systolic pulmonic artery pressure (SPAP) is estimated at 46 mmHg consistent   with mild pulmonary hypertension (Right atrial pressure of 3 mmHg). Active Problems:    Abdominal pain  Resolved Problems:    * No resolved hospital problems.  *        ASSESSMENT/PLAN:  Abdominal Pain  Severe Constipation  - CT as above  - given SMOG enema   -Continued  Lactulose TID, miralax BID  - check TSH  - GI consult placed, discussed with GI   - give Dulcolax suppository  now, discussed with nursing    Rectal bleeding  - noted after SMOG enema given in ED  - monitor H/H  - No further bleeding     UTI  - urine culture pending  - continue Rocephin     Hypokalemia  - 3.0  - PRN replacement scale ordered     Chronic Pain  - follows with pain clinic  - on [place] at home which can be contributing to constipation   - on Neurontin and Tizanidine     HTN  - controlled continue home meds: Lasix and aldactone     Diabetes Type 2  - continue home meds  - SSI ordered  - carb control diet       Hypothyroidism  - home dose of synthroid 100 mcg   - check TSH    HLD  - Continue statin      GERD  - Continue PPI        S/P COVID   Chronic respiratory failure  - required trach and peg  - both removed  - pt is on 2.5 liters of oxygen at home      CAD  - continue ASA and statin     Previous CT on 2/4 suggested Cirrhosis   - GI following    DVT Prophylaxis: Lovenox   Diet: ADULT DIET; Full Liquid  Code Status: Full Code      Frederickkade Tirado, APRN - CNP 02/07/22     Agree wit martha Moran M.D.

## 2022-02-08 ENCOUNTER — APPOINTMENT (OUTPATIENT)
Dept: GENERAL RADIOLOGY | Age: 67
DRG: 389 | End: 2022-02-08
Payer: MEDICARE

## 2022-02-08 VITALS
OXYGEN SATURATION: 95 % | DIASTOLIC BLOOD PRESSURE: 65 MMHG | TEMPERATURE: 98.6 F | RESPIRATION RATE: 18 BRPM | HEIGHT: 69 IN | HEART RATE: 80 BPM | WEIGHT: 143.56 LBS | SYSTOLIC BLOOD PRESSURE: 117 MMHG | BODY MASS INDEX: 21.26 KG/M2

## 2022-02-08 LAB
ANION GAP SERPL CALCULATED.3IONS-SCNC: 10 MMOL/L (ref 3–16)
BUN BLDV-MCNC: 6 MG/DL (ref 7–20)
CALCIUM SERPL-MCNC: 8.9 MG/DL (ref 8.3–10.6)
CHLORIDE BLD-SCNC: 102 MMOL/L (ref 99–110)
CO2: 26 MMOL/L (ref 21–32)
CREAT SERPL-MCNC: <0.5 MG/DL (ref 0.6–1.2)
GFR AFRICAN AMERICAN: >60
GFR NON-AFRICAN AMERICAN: >60
GLUCOSE BLD-MCNC: 73 MG/DL (ref 70–99)
GLUCOSE BLD-MCNC: 96 MG/DL (ref 70–99)
GLUCOSE BLD-MCNC: 97 MG/DL (ref 70–99)
HCT VFR BLD CALC: 32.8 % (ref 36–48)
HEMOGLOBIN: 10.5 G/DL (ref 12–16)
MAGNESIUM: 1.8 MG/DL (ref 1.8–2.4)
PERFORMED ON: NORMAL
PERFORMED ON: NORMAL
POTASSIUM REFLEX MAGNESIUM: 2.6 MMOL/L (ref 3.5–5.1)
SODIUM BLD-SCNC: 138 MMOL/L (ref 136–145)

## 2022-02-08 PROCEDURE — 97535 SELF CARE MNGMENT TRAINING: CPT

## 2022-02-08 PROCEDURE — 6360000002 HC RX W HCPCS: Performed by: INTERNAL MEDICINE

## 2022-02-08 PROCEDURE — 85018 HEMOGLOBIN: CPT

## 2022-02-08 PROCEDURE — 97166 OT EVAL MOD COMPLEX 45 MIN: CPT

## 2022-02-08 PROCEDURE — 99239 HOSP IP/OBS DSCHRG MGMT >30: CPT | Performed by: INTERNAL MEDICINE

## 2022-02-08 PROCEDURE — 36415 COLL VENOUS BLD VENIPUNCTURE: CPT

## 2022-02-08 PROCEDURE — 97530 THERAPEUTIC ACTIVITIES: CPT

## 2022-02-08 PROCEDURE — 80048 BASIC METABOLIC PNL TOTAL CA: CPT

## 2022-02-08 PROCEDURE — 6370000000 HC RX 637 (ALT 250 FOR IP): Performed by: INTERNAL MEDICINE

## 2022-02-08 PROCEDURE — 74018 RADEX ABDOMEN 1 VIEW: CPT

## 2022-02-08 PROCEDURE — 2580000003 HC RX 258: Performed by: INTERNAL MEDICINE

## 2022-02-08 PROCEDURE — 94640 AIRWAY INHALATION TREATMENT: CPT

## 2022-02-08 PROCEDURE — 2700000000 HC OXYGEN THERAPY PER DAY

## 2022-02-08 PROCEDURE — 83735 ASSAY OF MAGNESIUM: CPT

## 2022-02-08 PROCEDURE — 94761 N-INVAS EAR/PLS OXIMETRY MLT: CPT

## 2022-02-08 PROCEDURE — 97161 PT EVAL LOW COMPLEX 20 MIN: CPT

## 2022-02-08 PROCEDURE — 97116 GAIT TRAINING THERAPY: CPT

## 2022-02-08 PROCEDURE — 85014 HEMATOCRIT: CPT

## 2022-02-08 PROCEDURE — 97110 THERAPEUTIC EXERCISES: CPT

## 2022-02-08 RX ORDER — POTASSIUM CHLORIDE 20 MEQ/1
40 TABLET, EXTENDED RELEASE ORAL ONCE
Status: COMPLETED | OUTPATIENT
Start: 2022-02-08 | End: 2022-02-08

## 2022-02-08 RX ORDER — POTASSIUM CHLORIDE 20 MEQ/1
20 TABLET, EXTENDED RELEASE ORAL ONCE
Status: COMPLETED | OUTPATIENT
Start: 2022-02-08 | End: 2022-02-08

## 2022-02-08 RX ORDER — HYDROCODONE BITARTRATE AND ACETAMINOPHEN 5; 325 MG/1; MG/1
1 TABLET ORAL EVERY 6 HOURS PRN
Status: DISCONTINUED | OUTPATIENT
Start: 2022-02-08 | End: 2022-02-08 | Stop reason: HOSPADM

## 2022-02-08 RX ORDER — POLYETHYLENE GLYCOL 3350 17 G/17G
17 POWDER, FOR SOLUTION ORAL DAILY
Status: DISCONTINUED | OUTPATIENT
Start: 2022-02-09 | End: 2022-02-08 | Stop reason: HOSPADM

## 2022-02-08 RX ORDER — HYDROCODONE BITARTRATE AND ACETAMINOPHEN 5; 325 MG/1; MG/1
1 TABLET ORAL EVERY 6 HOURS PRN
Qty: 1 TABLET | Refills: 0
Start: 2022-02-08 | End: 2022-02-09

## 2022-02-08 RX ADMIN — POTASSIUM CHLORIDE 10 MEQ: 7.46 INJECTION, SOLUTION INTRAVENOUS at 09:13

## 2022-02-08 RX ADMIN — IPRATROPIUM BROMIDE AND ALBUTEROL SULFATE 3 ML: 2.5; .5 SOLUTION RESPIRATORY (INHALATION) at 07:34

## 2022-02-08 RX ADMIN — ROPINIROLE HYDROCHLORIDE 0.5 MG: 0.25 TABLET, FILM COATED ORAL at 09:16

## 2022-02-08 RX ADMIN — Medication 1 PUFF: at 07:42

## 2022-02-08 RX ADMIN — LACTULOSE 30 G: 10 SOLUTION ORAL at 09:15

## 2022-02-08 RX ADMIN — ENOXAPARIN SODIUM 40 MG: 100 INJECTION SUBCUTANEOUS at 09:18

## 2022-02-08 RX ADMIN — SPIRONOLACTONE 25 MG: 25 TABLET ORAL at 09:16

## 2022-02-08 RX ADMIN — LEVOTHYROXINE SODIUM 100 MCG: 100 TABLET ORAL at 06:21

## 2022-02-08 RX ADMIN — POTASSIUM CHLORIDE 40 MEQ: 1500 TABLET, EXTENDED RELEASE ORAL at 14:07

## 2022-02-08 RX ADMIN — PANTOPRAZOLE SODIUM 40 MG: 40 TABLET, DELAYED RELEASE ORAL at 06:21

## 2022-02-08 RX ADMIN — HYDROMORPHONE HYDROCHLORIDE 0.5 MG: 1 INJECTION, SOLUTION INTRAMUSCULAR; INTRAVENOUS; SUBCUTANEOUS at 02:06

## 2022-02-08 RX ADMIN — METOCLOPRAMIDE 5 MG: 10 TABLET ORAL at 09:16

## 2022-02-08 RX ADMIN — FUROSEMIDE 40 MG: 40 TABLET ORAL at 09:16

## 2022-02-08 RX ADMIN — METOPROLOL TARTRATE 25 MG: 25 TABLET, FILM COATED ORAL at 09:16

## 2022-02-08 RX ADMIN — POTASSIUM CHLORIDE 10 MEQ: 7.46 INJECTION, SOLUTION INTRAVENOUS at 07:33

## 2022-02-08 RX ADMIN — ATORVASTATIN CALCIUM 10 MG: 10 TABLET, FILM COATED ORAL at 09:16

## 2022-02-08 RX ADMIN — POTASSIUM CHLORIDE 10 MEQ: 7.46 INJECTION, SOLUTION INTRAVENOUS at 11:00

## 2022-02-08 RX ADMIN — POLYETHYLENE GLYCOL (3350) 17 G: 17 POWDER, FOR SOLUTION ORAL at 09:17

## 2022-02-08 RX ADMIN — ALOGLIPTIN 12.5 MG: 12.5 TABLET, FILM COATED ORAL at 09:16

## 2022-02-08 RX ADMIN — GLIPIZIDE 2.5 MG: 5 TABLET ORAL at 06:21

## 2022-02-08 RX ADMIN — HYDROMORPHONE HYDROCHLORIDE 0.5 MG: 1 INJECTION, SOLUTION INTRAMUSCULAR; INTRAVENOUS; SUBCUTANEOUS at 06:21

## 2022-02-08 RX ADMIN — Medication 1 TABLET: at 09:16

## 2022-02-08 RX ADMIN — HYDROCODONE BITARTRATE AND ACETAMINOPHEN 1 TABLET: 5; 325 TABLET ORAL at 10:21

## 2022-02-08 RX ADMIN — SODIUM CHLORIDE: 9 INJECTION, SOLUTION INTRAVENOUS at 02:10

## 2022-02-08 RX ADMIN — ASPIRIN 81 MG: 81 TABLET, COATED ORAL at 09:16

## 2022-02-08 RX ADMIN — POTASSIUM CHLORIDE 20 MEQ: 1500 TABLET, EXTENDED RELEASE ORAL at 14:59

## 2022-02-08 RX ADMIN — MAGNESIUM GLUCONATE 500 MG ORAL TABLET 400 MG: 500 TABLET ORAL at 09:17

## 2022-02-08 ASSESSMENT — PAIN SCALES - GENERAL
PAINLEVEL_OUTOF10: 8
PAINLEVEL_OUTOF10: 7
PAINLEVEL_OUTOF10: 9
PAINLEVEL_OUTOF10: 7
PAINLEVEL_OUTOF10: 5

## 2022-02-08 NOTE — PROGRESS NOTES
Inpatient Occupational Therapy  Evaluation and Treatment    Unit: [facility]  Date:  2/8/2022  Patient Name:    Analilia Ramesh  Admitting diagnosis:  Abdominal pain, left lower quadrant [R10.32]  Abdominal pain [R10.9]  Acute cystitis with hematuria [N30.01]  Constipation, unspecified constipation type [K59.00]  Admit Date:  2/6/2022  Precautions/Restrictions/WB Status/ Lines/ Wounds/ Oxygen: Fall risk, Bed/chair alarm and Lines -IV and Supplemental O2 (2L)    Treatment Time:  1036-3363  Treatment Number: 1   Timed code treatment minutes 53 minutes   Total Treatment minutes:   63   minutes    Patient Goals for Therapy:  \" go home \"      Discharge Recommendations: Home 24 hr assist, with home PT and with home OT   DME needs for discharge: Needs Met       Therapy recommendations for staff:   Assist of 1 with use of rolling walker (RW) and gait belt for all transfers to/from [facility]  to/from chair    History of Present Illness: per Dr Monserrat Brown H&P 2/7/22:  \"The patient is a 78 y.o. female hypertension, hypothyroidism, irritable bowel syndrome, hyperlipidemia, chronic pain syndrome, diabetes fibromyalgia, GERD, hx of COVID with trach and peg tube, and  critical illness myopathy who presents to [facility] with complaints of abdominal pain. History obtained from the patient and review of EMR. Pt is noted to be a poor historian, she stated her  helps her at home and does her medication for her. She stated that she was in ED on Friday for the same complaints and she was sent home. Pt is on oxygen s/p COVID, 2.5 liters at home. SMOG enema was attempted in ED without success. Per nurse stated that she is impacted and some loose stool around the impacted stool.   There was some stool removed in ED during rectal exam. Pt complain of diffuse abdominal  pain, she is unable to tell me when her last BM was or how often she has BM.     In ED CT of abdomen showed distended gallbladder, similar to prior study, normal appendix, rectal and lower sigmoid constipation to lesser degree mild constipation scattered elsewhere with in the colon. Pt given SMOG enema in ED. Pt admitted for further care, GI consulted this am.\"    Home Health S4 Level Recommendation:  Level 1 Standard  AM-PAC Score: AM-PAC Inpatient Daily Activity Raw Score: 15    Preadmission Environment    Pt. Lives with family (spouse and daughter, 2 grandchildren-16and 24years old, assist from  available 24/7)  Home environment:    one story home  Steps to enter first floor: 1 step to enter with no handrail  Steps to second floor: N/A  Bathroom: tub/shower unit, comfort height toilet, grab bars and tub transfer bench  Equipment owned: RW, rollator, wc (manual), SPC, LH sponge, pulse oximeter, O2 at 2.5L 24 hours/day and reacher     Preadmission Status:  Pt. Able to drive: No-has not driven in over a year but maintains a license;  drives to appointments  Pt Fully independent with ADLs: No- stays close and provides supervision  Pt. Required assistance from family for: Independent prior to [facility] but  now assisting with cooking, cleaning, laundry  Pt. independent for transfers and gait and walked with Jarett Hinojosa prior to [facility], has been using rollator since long illness with [facility] in place s/p ARU stay, OT/PT/RN/HHA  History of falls No    Pain  No  Rating:NA  Location:intermittent abdominal discomfort  Pain Medicine Status: No request made      Cognition    A&O x4   Able to follow 2 step commands    Subjective  Patient lying supine in bed with no family present. Pt agreeable to this OT eval & tx.      Upper Extremity ROM:    WFL    Upper Extremity Strength:    Strength Assessment (measured on a 0-5 scale):   3/5 proximal RUE, 4/5 proximal LUE  4/5 distal bilaterally but lacks endurance       Upper Extremity Sensation    WFL    Upper Extremity Proprioception:  [facility]    Coordination and Tone  [facility]    Balance  Functional Sitting Balance:  WFL  Functional Standing Balance:Impaired-SBA with RW    Bed mobility:    Supine to sit:   Min A   Sit to supine:   CGA  Rolling:    SBA  Scooting in sitting:  CGA  Scooting to head of bed:   Min A     Bridging:   SBA    Transfers:    Sit to stand:  CGA  Stand to sit:  CGA  Bed to chair:   CGA with RW, gait belt  Standard toilet: Not Tested  Bed to [facility]:  Not Tested     Completed functional mobility in room toward [facility], then requested to sit in chair as \"it's too late\" - had already had loose stool in Depends. Dressing:      UE:   Not Tested  LE:    Mod A change Depends x2 due to ongoing loose stool    Bathing:    UE:  Not Tested  LE:  Max A cleanse periarea after stool incontinence    Eating:   Not Tested    Toileting: Max A change/clean area after stool incontinence x2, RN aware    Activity Tolerance   Pt completed therapy session with SOB noted with exertion, easily fatigued   EOB:  /57  HR 84  SpO2 89-97% on 2L    After functional mobility in room:  SpO2 87% on 2L, recovered within 1 min to 94% with seated rest break and PLB  HR 88    Positioning Needs:   Pt in bed, alarm set, positioned in proper neutral alignment and pressure relief provided. Call light provided and all needs within reach    Exercise / Activities Initiated: all completed bilaterally unless indicated  BUE AROM-encouraged continue as HEP    Patient/Family Education:   Role of OT  Recommendations for DC  Energy conservation techniques-reviewed PLB and \"soup breathing\"  Safe RW use/hand placement    Assessment of Deficits: Pt seen for Occupational therapy evaluation in acute care setting. Pt demonstrated decreased Activity tolerance, ADLs, IADLs, Bed mobility, Strength and Transfers. Pt functioning below baseline and will likely benefit from skilled occupational therapy services to maximize safety and independence. Goal(s) : To be met in 3 Visits:  1).  Bed to toilet/BSC: Modified Independent    To be met in 5 Visits:  1). Supine to/from Sit:  Modified Independent  2). Upper Body Bathing:   SBA  3). Lower Body Bathing:   Min A   4). Upper Body Dressing:  SBA  5). Lower Body Dressing:  Min A   6). Pt to demonstrate UE exs x 15 reps with minimal cues    Rehabilitation Potential:  Good for goals listed above. Strengths for achieving goals include: Pt motivated, PLOF, Family Support and Pt cooperative  Barriers to achieving goals include:  Complexity of condition and Weakness     Plan: To be seen 3-5 x/wk while in acute care setting for therapeutic exercises, bed mobility, transfers, dressing, bathing, family/patient education, ADL/IADL retraining, energy conservation training.      Madhu Heart, OTR/L 6360            If patient discharges from this facility prior to next visit, this note will serve as the Discharge Summary

## 2022-02-08 NOTE — CARE COORDINATION
[facility]  Notified by [facility] pt at [facility]. Pt is active with [facility] and services are on hold due to admit. Liaison to follow for [facility]. needs until discharge. Spoke with Tom Thomas. Pt discharging home today with [facility]. Notified [facility]. Faxed referral with orders to [facility].       Electronically signed by Lachelle Rodriguez RN on 2/8/2022 at 8:41 AM

## 2022-02-08 NOTE — PROGRESS NOTES
RT Inhaler-Nebulizer Bronchodilator Protocol Note    There is a bronchodilator order in the chart from a provider indicating to follow the RT Bronchodilator Protocol and there is an Initiate RT Inhaler-Nebulizer Bronchodilator Protocol order as well (see protocol at bottom of note). CXR Findings:  No results found. The findings from the last RT Protocol Assessment were as follows:   History Pulmonary Disease: (P) Chronic pulmonary disease  Respiratory Pattern: (P) Regular pattern and RR 12-20 bpm  Breath Sounds: (P) Inspiratory and expiratory or bilateral wheezing and/or rhonchi  Cough: (P) Strong, spontaneous, non-productive  Indication for Bronchodilator Therapy: (P) Decreased or absent breath sounds  Bronchodilator Assessment Score: (P) 8    Aerosolized bronchodilator medication orders have been revised according to the RT Inhaler-Nebulizer Bronchodilator Protocol below. Respiratory Therapist to perform RT Therapy Protocol Assessment initially then follow the protocol. Repeat RT Therapy Protocol Assessment PRN for score 0-3 or on second treatment, BID, and PRN for scores above 3. No Indications  adjust the frequency to every 6 hours PRN wheezing or bronchospasm, if no treatments needed after 48 hours then discontinue using Per Protocol order mode. If indication present, adjust the RT bronchodilator orders based on the Bronchodilator Assessment Score as indicated below. Use Inhaler orders unless patient has one or more of the following: on home nebulizer, not able to hold breath for 10 seconds, is not alert and oriented, cannot activate and use MDI correctly, or respiratory rate 25 breaths per minute or more, then use the equivalent nebulizer order(s) with same Frequency and PRN reasons based on the score. If a patient is on this medication at home then do not decrease Frequency below that used at home.     0-3  enter or revise RT bronchodilator order(s) to equivalent RT Bronchodilator order with Frequency of every 4 hours PRN for wheezing or increased work of breathing using Per Protocol order mode. 4-6  enter or revise RT Bronchodilator order(s) to two equivalent RT bronchodilator orders with one order with BID Frequency and one order with Frequency of every 4 hours PRN wheezing or increased work of breathing using Per Protocol order mode. 7-10  enter or revise RT Bronchodilator order(s) to two equivalent RT bronchodilator orders with one order with TID Frequency and one order with Frequency of every 4 hours PRN wheezing or increased work of breathing using Per Protocol order mode. 11-13  enter or revise RT Bronchodilator order(s) to one equivalent RT bronchodilator order with QID Frequency and an Albuterol order with Frequency of every 4 hours PRN wheezing or increased work of breathing using Per Protocol order mode. Greater than 13  enter or revise RT Bronchodilator order(s) to one equivalent RT bronchodilator order with every 4 hours Frequency and an Albuterol order with Frequency of every 2 hours PRN wheezing or increased work of breathing using Per Protocol order mode.      R    Electronically signed by Chuyita Durham RCP on 2/8/2022 at 7:40 AM

## 2022-02-08 NOTE — PLAN OF CARE
Problem: Skin Integrity:  Goal: Will show no infection signs and symptoms  Description: Will show no infection signs and symptoms  Outcome: Completed     Problem: Skin Integrity:  Goal: Absence of new skin breakdown  Description: Absence of new skin breakdown  Outcome: Completed     Problem: SAFETY  Goal: Free from accidental physical injury  Outcome: Completed     Problem: DAILY CARE  Goal: Daily care needs are met  Outcome: Completed     Problem: PAIN  Goal: Patient's pain/discomfort is manageable  Outcome: Completed     Problem: SKIN INTEGRITY  Goal: Skin integrity is maintained or improved  Outcome: Completed     Problem: KNOWLEDGE DEFICIT  Goal: Patient/S.O. demonstrates understanding of disease process, treatment plan, medications, and discharge instructions.   Outcome: Completed     Problem: DISCHARGE BARRIERS  Goal: Patient's continuum of care needs are met  Outcome: Completed     Problem: Pain:  Goal: Pain level will decrease  Description: Pain level will decrease  Outcome: Completed     Problem: Pain:  Goal: Control of acute pain  Description: Control of acute pain  Outcome: Completed     Problem: Pain:  Goal: Control of chronic pain  Description: Control of chronic pain  Outcome: Completed     Problem: Falls - Risk of:  Goal: Will remain free from falls  Description: Will remain free from falls  Outcome: Completed     Problem: Falls - Risk of:  Goal: Absence of physical injury  Description: Absence of physical injury  Outcome: Completed

## 2022-02-08 NOTE — CARE COORDINATION
DISCHARGE ORDER  Date/Time 2022 3:17 PM  Completed by: Josiane Mcdermott RN, Case Management    Patient Name: Minerva Crystal      : 1955  Admitting Diagnosis: Abdominal pain, left lower quadrant [R10.32]  Abdominal pain [R10.9]  Acute cystitis with hematuria [N30.01]  Constipation, unspecified constipation type [K59.00]      Admit order Date and Status: IP 2022  (verify MD's last order for status of admission)      Noted discharge order. If applicable PT/OT recommendation at Discharge:     Discharge Recommendations: Home 24 hr assist and with home PT   DME needs for discharge: Needs Met     Confirmed discharge plan with patient and spouse at bedside  Discharge Plan: Plans to DC home in care of spouse today. He is here at bedside to pick her up. Pt will DC with [facility] w/ [facility]. Orders and TYLOR/AVS in Epic. Reviewed chart. Role of discharge planner explained and patient verbalized understanding. Discharge order is noted. Has Home O2 in place on admit:  No  Informed of need to bring portable home O2 tank on day of discharge for nursing to connect prior to leaving:   Not Indicated  Verbalized agreement/Understanding:   Not Indicated  Pt is being d/c'd to home today. Pt's O2 sats are 100% on RA. Discharge timeout done with [facility]. All discharge needs and concerns addressed.

## 2022-02-08 NOTE — DISCHARGE INSTR - COC
Continuity of Care Form    Patient Name: Ruddy Warren   :  1955  MRN:  [id number]    Admit date:  2022  Discharge date:  2022    Code Status Order: Full Code   Advance Directives:      Admitting Physician:  Woo Contreras MD  PCP: Jose Li MD    Discharging Nurse: [facility] Unit/Room#: 0215/0215-02  Discharging Unit Phone Number: [phone]    Emergency Contact:   Extended Emergency Contact Information  Primary Emergency Contact: Bala Lucas  Address: [address], [address] Phone: [phone]  Mobile Phone: [phone]  Relation: Spouse   needed? No  Secondary Emergency Contact: Linda Mckeon  [place] Phone: [phone]  Relation: Child   needed? No    Past Surgical History:  Past Surgical History:   Procedure Laterality Date    ABDOMEN SURGERY      CARDIAC CATHETERIZATION      COLON SURGERY      COLONOSCOPY      normal    HYSTERECTOMY      JOINT REPLACEMENT  10/92    Right; hip     JOINT REPLACEMENT        Left hip  followed by revision 2006    JOINT REPLACEMENT        right     JOINT REPLACEMENT         right replacement. LUNG SURGERY  2014    tracheobronchomalacia    TRACHEOSTOMY N/A 10/25/2021    TRACHEOTOMY performed by Lacie Lentz DO at [facility] 10/25/2021    EGD/PEG W/ANES.  ON VENT, COVID + performed by Mayte Colindres MD at [address] [facility]       Immunization History:   Immunization History   Administered Date(s) Administered    Influenza Virus Vaccine 09/10/2013, 10/21/2013, 10/21/2014, 2015, 2016, 10/18/2017, 10/18/2018    Influenza Whole 10/13/2014    Influenza, High Dose (Fluzone 65 yrs and older) 2019, 2019, 10/06/2020    Influenza, High-dose, Quadv, 65 yrs +, IM (Fluzone) 10/06/2020    Influenza, Intradermal, Quadrivalent, Preservative Free 2016, 10/18/2017 Influenza, Quadv, IM, PF (6 mo and older Fluzone, Flulaval, Fluarix, and 3 yrs and older Afluria) 10/18/2018, 10/28/2021    Influenza, Quadv, adjuvanted, 65 yrs +, IM, PF (Fluad) 11/03/2020    Pneumococcal Conjugate 13-valent (Mlyhxuk03) 09/17/2015    Pneumococcal Conjugate Vaccine 10/21/2013, 10/13/2014, 11/14/2019    Pneumococcal Polysaccharide (Mhhyczxxc29) 10/21/2013, 10/13/2014, 10/13/2014, 11/14/2019    Tdap (Boostrix, Adacel) 04/29/2019       Active Problems:  Patient Active Problem List   Diagnosis Code    Displacement of lumbar intervertebral disc without myelopathy M51.26    Hypothyroidism E03.9    Mixed hyperlipidemia E78.2    Anxiety state F41.1    Generalized osteoarthrosis, involving multiple sites M15.9    Primary hypertension I10    Shoulder arthritis M19.019    Vitamin D deficiency E55.9    Chronic pain syndrome G89.4    Fibromyalgia M79.7    DDD (degenerative disc disease), lumbar M51.36    Anxiety disorder F41.9    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    Pulmonary nodule R91.1    Tracheobronchomalacia J39.8    Bronchiectasis ([facility]) J47.9    DM2 (diabetes mellitus, type 2) ([facility]) U02.1    Systolic congestive heart failure ([facility]) I50.20    Chest pain R07.9    S/P cardiac catheterization Z98.890    PVC (premature ventricular contraction) I49.3    Palpitations R00.2    Syncope and collapse R55    Convulsion ([facility]) R56.9    New onset seizure ([facility] R56.9    CAD in native artery I25.10    Nausea R11.0    Memory change R41. 3    Acquired hypothyroidism E03.9    Sepsis ([facility]) A41.9    Type 1 diabetes mellitus without complication ([facility]) X65.0    Orthostatic hypotension I95.1    Suspected COVID-19 virus infection Z20.822    Hypoxia R09.02    Acute respiratory failure with hypoxia ([facility]) J96.01    COVID-19 U07.1    Pneumonia due to COVID-19 virus U07.1, J12.82    CHARLY (acute kidney injury) ([facility] N17.9    Uncontrolled hypertension I10    Fever R50.9    Hypotension I95.9    Severe protein-calorie malnutrition ([facility] E43    Gram-negative pneumonia ([facility] J15.6    Pneumonia due to Pseudomonas species ([facility] J15.1    Hypomagnesemia E83.42    Hypokalemia E87.6    Acute hypoxemic respiratory failure ([facility]) J96.01    ARDS (adult respiratory distress syndrome) ([facility]) J80    Pneumonia due to Klebsiella pneumoniae ([facility] J15.0    Electrolyte disorder E87.8    Critical illness myopathy G72.81    Critical illness neuropathy ([facility]) G62.81    Post covid-19 condition, unspecified U09.9    Moderate malnutrition ([facility]) E44.0    Aspiration into airway T17.908A    Oropharyngeal dysphagia R13.12    Vocal cord paralysis J38.00    Personal history of COVID-19 Z86.16    Chest congestion R09.89    Pulmonary infiltrates R91.8    Hypochromic microcytic anemia D50.9    Hyperglycemia C35.2    Diastolic dysfunction J00.49    Pulmonary HTN ([facility]) I27.20    Leukocytosis D72.829    Abdominal pain R10.9    Abdominal pain, left lower quadrant R10.32    Constipation K59.00    Acute cystitis with hematuria N30.01       Isolation/Infection:   Isolation            No Isolation          Patient Infection Status       Infection Onset Added Last Indicated Last Indicated By Review Planned Expiration Resolved Resolved By    None active    Resolved    COVID-19 (Rule Out) 22 COVID-19 & Influenza Combo (Ordered)   22 Rule-Out Test Resulted    COVID-19 (Rule Out) 22 COVID-19, Rapid (Ordered)   22 Rule-Out Test Resulted    COVID-19 21 COVID-19, Rapid   21     COVID-19 (Rule Out) 21 COVID-19, Rapid (Ordered)   21 Rule-Out Test Resulted            Nurse Assessment:  Last Vital Signs: /65   Pulse 80   Temp 98.6 °F (37 °C) (Oral)   Resp 18   Ht 5' 9\" (1.753 m)   Wt 143 lb 9 oz (65.1 kg)   SpO2 95%   BMI 21.20 kg/m²     Last documented pain score (0-10 scale): Pain Level: 5  Last Weight:   Wt Readings from Last 1 Encounters: 02/07/22 143 lb 9 oz (65.1 kg)     Mental Status:  oriented and alert    IV Access:  - None    Nursing Mobility/ADLs:  Walking   Assisted  Transfer  Assisted  Bathing  Assisted  Dressing  Assisted  Toileting  Assisted  Feeding  Assisted  Med Admin  Independent  Med Delivery   whole    Wound Care Documentation and Therapy:  Wound 10/19/21 Sacrum Inner Purple discoloration with 2 small blisters to coccyx (Active)   Number of days: 112        Elimination:  Continence: Bowel: Yes  Bladder: Yes  Urinary Catheter: None   Colostomy/Ileostomy/Ileal Conduit: No       Date of Last BM: DEMETRICE    Intake/Output Summary (Last 24 hours) at 2/8/2022 1435  Last data filed at 2/7/2022 2103  Gross per 24 hour   Intake 10 ml   Output --   Net 10 ml     I/O last 3 completed shifts: In: 250 [P.O.:240; I.V.:10]  Out: -     Safety Concerns:     None    Impairments/Disabilities:      None    Nutrition Therapy:  Current Nutrition Therapy:   - Oral Diet:  General    Routes of Feeding: Oral  Liquids: Thin Liquids  Daily Fluid Restriction: no  Last Modified Barium Swallow with Video (Video Swallowing Test): not done    Treatments at the Time of Hospital Discharge:   Respiratory Treatments:   Oxygen Therapy:  is not on home oxygen therapy. Ventilator:    - No ventilator support    Rehab Therapies: Physical Therapy and Occupational Therapy  Weight Bearing Status/Restrictions: No weight bearing restirctions  Other Medical Equipment (for information only, NOT a DME order):     Other Treatments:     Patient's personal belongings (please select all that are sent with patient):  Liz    RN SIGNATURE:  Electronically signed by Vianey Nascimento RN on 2/8/22 at 3:32 PM EST    CASE MANAGEMENT/SOCIAL WORK SECTION    Inpatient Status Date: 02/06/2022    Readmission Risk Assessment Score:  Readmission Risk              Risk of Unplanned Readmission:  33           Discharging to Facility/ Agency   Name: [facility]  Address:  Phone: [phone]  Fax: [phone]    Dialysis Facility (if applicable)   Name:  Address:  Dialysis Schedule:  Phone:  Fax:    / signature: Electronically signed by Lisa Curry RN on 2/8/22 at 3:13 PM EST    PHYSICIAN SECTION    Prognosis: {Prognosis:[id number]}    Condition at Discharge: Tianna Adkins Patient Condition:[id number]}    Rehab Potential (if transferring to Rehab): {Prognosis:[id number]}    Recommended Labs or Other Treatments After Discharge: SN, PT/OT    Physician Certification: I certify the above information and transfer of Hazel Molina  is necessary for the continuing treatment of the diagnosis listed and that she requires Home Care for more than 30 days.      Update Admission H&P: Changes in H&P as follows - ***    PHYSICIAN SIGNATURE: Dr. Joey Cordova MD/ Electronically signed by Lisa Curry RN on 2/8/22 at 3:14 PM EST

## 2022-02-08 NOTE — PLAN OF CARE
Problem: Skin Integrity:  Goal: Will show no infection signs and symptoms  Description: Will show no infection signs and symptoms  2/7/2022 2330 by Carmelo Levin RN  Outcome: Ongoing  2/7/2022 1654 by Devin Page RN  Outcome: Ongoing  Goal: Absence of new skin breakdown  Description: Absence of new skin breakdown  2/7/2022 2330 by Carmelo Levin RN  Outcome: Ongoing  2/7/2022 1654 by Devin Page RN  Outcome: Ongoing     Problem: SAFETY  Goal: Free from accidental physical injury  2/7/2022 2330 by Carmelo Levin RN  Outcome: Ongoing  2/7/2022 1654 by Devin Page RN  Outcome: Ongoing     Problem: DAILY CARE  Goal: Daily care needs are met  2/7/2022 2330 by Carmelo Levin RN  Outcome: Ongoing  2/7/2022 1654 by Devin Page RN  Outcome: Ongoing     Problem: PAIN  Goal: Patient's pain/discomfort is manageable  2/7/2022 2330 by Carmelo Levin RN  Outcome: Ongoing  2/7/2022 1654 by Devin Page RN  Outcome: Ongoing     Problem: SKIN INTEGRITY  Goal: Skin integrity is maintained or improved  2/7/2022 2330 by Carmelo Levin RN  Outcome: Ongoing  2/7/2022 1654 by Devin Page RN  Outcome: Ongoing     Problem: KNOWLEDGE DEFICIT  Goal: Patient/S.O. demonstrates understanding of disease process, treatment plan, medications, and discharge instructions.   2/7/2022 2330 by Carmelo Levin RN  Outcome: Ongoing  2/7/2022 1654 by Devin Page RN  Outcome: Ongoing     Problem: DISCHARGE BARRIERS  Goal: Patient's continuum of care needs are met  2/7/2022 2330 by Carmelo Levin RN  Outcome: Ongoing  2/7/2022 1654 by Devin Page RN  Outcome: Ongoing     Problem: Pain:  Goal: Pain level will decrease  Description: Pain level will decrease  2/7/2022 2330 by Carmelo Levin RN  Outcome: Ongoing  2/7/2022 1654 by Devin Page RN  Outcome: Ongoing  Goal: Control of acute pain  Description: Control of acute pain  2/7/2022 2330 by Lynnell Ollie, RN  Outcome: Ongoing  2/7/2022 1654 by Devin Page RN  Outcome: Ongoing  Goal: Control of chronic pain  Description: Control of chronic pain  2/7/2022 2330 by Leretha Angelucci, RN  Outcome: Ongoing  2/7/2022 1654 by Cammy Vasquez RN  Outcome: Ongoing     Problem: Falls - Risk of:  Goal: Will remain free from falls  Description: Will remain free from falls  2/7/2022 2330 by Leretha Angelucci, RN  Outcome: Ongoing  2/7/2022 1654 by Cammy Vasquez RN  Outcome: Ongoing  Goal: Absence of physical injury  Description: Absence of physical injury  2/7/2022 2330 by Leretha Angelucci, RN  Outcome: Ongoing  2/7/2022 1654 by Cammy Vasquez RN  Outcome: Ongoing

## 2022-02-08 NOTE — PROGRESS NOTES
Patient had a very large liquid bowel movement. No solids. Patient cleaned and new brief put on. Groin and tiffanie area very red.  Zinc ointment applied

## 2022-02-08 NOTE — PROGRESS NOTES
PROGRESS NOTE  S:66 yrs Patient  admitted on 2/6/2022 with Abdominal pain, left lower quadrant [R10.32]  Abdominal pain [R10.9]  Acute cystitis with hematuria [N30.01]  Constipation, unspecified constipation type [K59.00] . Today she complains of multiple BMs. She passed 10x BMs yesterday, and 4x BMs this AM. She is hungry. Her LLQ abdominal pain is improving. Exam:   Vitals:    02/08/22 0734   BP:    Pulse:    Resp: 18   Temp:    SpO2: 95%      General appearance: alert, appears stated age, cooperative and no distress  HEENT: Neck supple with midline trachea  Neck: no adenopathy and supple, symmetrical, trachea midline  Lungs: clear to auscultation bilaterally  Heart: regular rate and rhythm, S1, S2 normal, no murmur, click, rub or gallop  Abdomen: normal findings: bowel sounds normal, no masses palpable and symmetric and abnormal findings:  distended, obese and tenderness mild in the LLQ  Extremities: extremities normal, atraumatic, no cyanosis or edema     Medications: Reviewed    Labs:  CBC:   Recent Labs     02/06/22  1118 02/07/22  0522   WBC 9.8 9.6   HGB 13.7 11.8*   HCT 42.2 35.3*   MCV 76.1* 74.8*    206     BMP:   Recent Labs     02/06/22  1118 02/07/22  0522 02/08/22  0529    138 138   K 3.9 3.0* 2.6*   CL 95* 97* 102   CO2 30 29 26   BUN 16 12 6*   CREATININE <0.5* <0.5* <0.5*     LIVER PROFILE:   Recent Labs     02/06/22  1118   AST 34   ALT 10   LIPASE 17.0   PROT 8.7*   BILITOT 0.6   ALKPHOS 123     PT/INR: No results for input(s): INR in the last 72 hours. Invalid input(s): PT      IMAGING:  XR ABDOMEN (KUB) (SINGLE AP VIEW) - 2/7/2022  Impression   Questionable diffuse mild ileus.  Recommend short-term follow-up. XR ABDOMEN (KUB) (SINGLE AP VIEW) - 2/8/2022  Impression   Indeterminate intestinal gas pattern with no grossly dilated loops of small   bowel identified.        Impression:  78year old female with a history of HTN, DM, hypothyroidism, anxiety, chronic pain syndrome, DDD, fibromyalgia, GERD, OA, and COVID with trach/PEG tube in 10/2021 admitted with abdominal pain and severe constipation c/b ileus - improving. Recommendation:  1. Continue supportive care  2. Monitor and document output  3. Observe for signs of bleeding  4. Minimize narcotics as able  5. Recommend PO pain control   6. Continue bowel regimen with Miralax qd  7. Discontinue Lactulose   8. Repeat KUB  9. Continue full liquid diet as tolerated   10. Slowly advance to low fiber diet as tolerated  11. Encourage PT/OT  12. Ok to d/c from GI standpoint      Connor Bennett PA-C  11:45 AM 2/8/2022                      I reviewed and agree with the findings and plan documented in her note with the appropriate changes made to it.     Electronically signed by Genie Mccall MD on 2/8/22 at 7:28 PM EST          (O) [phone]  [phone]

## 2022-02-14 ENCOUNTER — TELEPHONE (OUTPATIENT)
Dept: CASE MANAGEMENT | Age: 67
End: 2022-02-14

## 2022-02-14 ENCOUNTER — HOSPITAL ENCOUNTER (OUTPATIENT)
Age: 67
Discharge: HOME | End: 2022-02-14
Payer: MEDICARE

## 2022-02-14 LAB
ALBUMIN SERPL-MCNC: 4 G/DL (ref 3.4–5)
ANION GAP SERPL CALCULATED.3IONS-SCNC: 12 MMOL/L (ref 3–16)
BUN BLDV-MCNC: 5 MG/DL (ref 7–20)
CALCIUM SERPL-MCNC: 9.7 MG/DL (ref 8.3–10.6)
CHLORIDE BLD-SCNC: 95 MMOL/L (ref 99–110)
CO2: 30 MMOL/L (ref 21–32)
CREAT SERPL-MCNC: 0.7 MG/DL (ref 0.6–1.2)
GFR AFRICAN AMERICAN: >60
GFR NON-AFRICAN AMERICAN: >60
GLUCOSE BLD-MCNC: 228 MG/DL (ref 70–99)
PHOSPHORUS: 3.4 MG/DL (ref 2.5–4.9)
POTASSIUM SERPL-SCNC: 2.7 MMOL/L (ref 3.5–5.1)
SODIUM BLD-SCNC: 137 MMOL/L (ref 136–145)

## 2022-02-14 PROCEDURE — 36415 COLL VENOUS BLD VENIPUNCTURE: CPT

## 2022-02-14 PROCEDURE — 80069 RENAL FUNCTION PANEL: CPT

## 2022-02-14 NOTE — TELEPHONE ENCOUNTER
Imaging report CT ABD PELVIS 2/4/2022 with F/U imaging recommendations routed to PCP Janae Nunez MD with [facility].     Thank you,  Emma Vaughan RN  [facility] Lung Navigator  [phone]

## 2022-02-23 NOTE — DISCHARGE SUMMARY
Name:  Hong Figueroa  Room:  0215/0215-02  MRN:    [id number]    Discharge Summary      This discharge summary is in conjunction with a complete physical exam done on the day of discharge. Discharging Physician: Dayday Whitmore MD      Admit: 2/6/2022  Discharge:  2/8/2022    Diagnoses this Admission    Active Problems:    Primary hypertension    Abdominal pain    Abdominal pain, left lower quadrant    Constipation    Acute cystitis with hematuria  Resolved Problems:    * No resolved hospital problems. *          Procedures (Please Review Full Report for Details)      Consults    IP CONSULT TO HOSPITALIST  IP CONSULT TO GI  IP CONSULT TO HOME CARE NEEDS      HPI:    The patient is a 78 y.o. female hypertension, hypothyroidism, irritable bowel syndrome, hyperlipidemia, chronic pain syndrome, diabetes fibromyalgia, GERD, hx of COVID with trach and peg tube, and  critical illness myopathy who presents to [facility] with complaints of abdominal pain. History obtained from the patient and review of EMR. Pt is noted to be a poor historian, she stated her  helps her at home and does her medication for her. She stated that she was in ED on Friday for the same complaints and she was sent home. Pt is on oxygen s/p COVID, 2.5 liters at home. SMOG enema was attempted in ED without success. Per nurse stated that she is impacted and some loose stool around the impacted stool. There was some stool removed in ED during rectal exam. Pt complain of diffuse abdominal  pain, she is unable to tell me when her last BM was or how often she has BM.     In ED CT of abdomen showed distended gallbladder, similar to prior study, normal appendix, rectal and lower sigmoid constipation to lesser degree mild constipation scattered elsewhere with in the colon. Pt given SMOG enema in ED.   Pt admitted for further care, GI consulted this am.       Physical Exam at Discharge:  /65   Pulse 80   Temp 98.6 °F (37 °C) (Oral)   Resp 18   Ht 5' 9\" (1.753 m)   Wt 143 lb 9 oz (65.1 kg)   SpO2 95%   BMI 21.20 kg/m²       Gen: No distress. Alert. Eyes: PERRL. No sclera icterus. No conjunctival injection. ENT: No discharge. Pharynx clear. Trach stoma healing well  Neck: No JVD. Trachea midline. Resp: No accessory muscle use. No crackles. No wheezes. No rhonchi. CV: Regular rate. Regular rhythm. No murmur. No rub. No edema. GI: diffuse tenderness. Non-distended. No masses. No organomegaly. Normal bowel sounds. No hernia. +peg tube scar  - scattered ecchymosis on abdomen   Skin: Warm and dry. No nodule on exposed extremities. No rash on exposed extremities. M/S: No cyanosis. No joint deformity. No clubbing. Neuro: Awake. Grossly nonfocal    Psych: Oriented x 2. No anxiety or agitation. Hospital Course      Abdominal Pain  Severe Constipation  - CT as above  - given SMOG enema   -Continued  Lactulose TID, miralax BID  - check TSH  - GI consult placed, discussed with GI   - give Dulcolax suppository  now, discussed with nursing  Had multiple BMs  abd pain better      Rectal bleeding  - noted after SMOG enema given in ED  - monitor H/H- stable.   - No further bleeding      No UTI  - urine culture pending  - continue Rocephin   Culture negative- dc abx      Hypokalemia  - 3.0  - PRN replacement scale ordered      Chronic Pain  - follows with pain clinic  - on [place] at home which can be contributing to constipation   - on Neurontin and Tizanidine      HTN  - controlled continue home meds: Lasix and aldactone      Diabetes Type 2  - continue home meds  - SSI ordered  - carb control diet         Hypothyroidism  - home dose of synthroid 100 mcg   - check TSH     HLD  - Continue statin       GERD  - Continue PPI          S/P COVID   Chronic respiratory failure  - required trach and peg  - both removed  - pt is on 2.5 liters of oxygen at home        CAD  - continue ASA and statin      Previous CT on 2/4 suggested Cirrhosis - GI following      XR ABDOMEN (KUB) (SINGLE AP VIEW)   Final Result   Indeterminate intestinal gas pattern with no grossly dilated loops of small   bowel identified. XR ABDOMEN (KUB) (SINGLE AP VIEW)   Final Result   Questionable diffuse mild ileus. Recommend short-term follow-up. CT ABDOMEN PELVIS W IV CONTRAST Additional Contrast? None   Final Result      1. Pelvis is obscured by artifact from bilateral hip prostheses. 2.  Markedly distended gallbladder which is otherwise unremarkable in   appearance and similar to the prior study which may represent chronic   hydrops. No evidence of any bile duct dilation. 3.  A normal appearing appendix is noted. 4.  Rectal and lower sigmoid constipation and to lesser degree mild   constipation scattered elsewhere within the colon. Otherwise nonspecific   bowel gas pattern. Discharge Medications     Medication List      CONTINUE taking these medications    albuterol sulfate  (90 Base) MCG/ACT inhaler  Inhale 2 puffs into the lungs 4 times daily as needed for Wheezing     aspirin 81 MG EC tablet  Take 1 tablet by mouth daily     blood glucose monitor kit and supplies  Dispense whatever insurance will cover. Dx code:E11.9     * blood glucose test strips  Dispense whatever insurance will cover. Pt test twice a day dx:E11.9     * blood glucose test strips strip  Commonly known as: ASCENSIA AUTODISC VI;ONE TOUCH ULTRA TEST VI  1 each by In Vitro route daily As needed.      carboxymethylcellulose PF 1 % Gel ophthalmic gel  Commonly known as: THERATEARS  Place 1 drop into both eyes every 4 hours as needed for Dry Eyes     estradiol 0.1 MG/GM vaginal cream  Commonly known as: ESTRACE     furosemide 40 MG tablet  Commonly known as: LASIX  Take 1 tablet by mouth daily     glipiZIDE 10 MG extended release tablet  Commonly known as: GLUCOTROL XL  Take 1 tablet by mouth 2 times daily     * ipratropium-albuterol 0.5-2.5 (3) MG/3ML Soln nebulizer solution  Commonly known as: DUONEB  Inhale 3 mLs into the lungs every 4 hours     * ipratropium-albuterol 0.5-2.5 (3) MG/3ML Soln nebulizer solution  Commonly known as: DUONEB  Inhale 3 mLs into the lungs every 4 hours     levothyroxine 100 MCG tablet  Commonly known as: SYNTHROID  Take 1 tablet by mouth Daily     magnesium oxide 400 (240 Mg) MG tablet  Commonly known as: MAG-OX  Take 1 tablet by mouth daily     metoclopramide 5 MG tablet  Commonly known as: REGLAN  Take 1 tablet by mouth 2 times daily     metoprolol tartrate 25 MG tablet  Commonly known as: LOPRESSOR  Take 1 tablet by mouth 2 times daily     MiraLax 17 GM/SCOOP powder  Generic drug: polyethylene glycol     omeprazole 40 MG delayed release capsule  Commonly known as: PRILOSEC  Take 1 capsule by mouth daily     Pulmicort Flexhaler 180 MCG/ACT Aepb inhaler  Generic drug: budesonide     rOPINIRole 0.5 MG tablet  Commonly known as: REQUIP  TAKE 1 TABLET BY MOUTH 3 TIMES DAILY. simvastatin 40 MG tablet  Commonly known as: ZOCOR  Take 1 tablet by mouth nightly     SITagliptin 50 MG tablet  Commonly known as: JANUVIA  Take 1 tablet by mouth daily     spironolactone 25 MG tablet  Commonly known as: ALDACTONE  Take 1 tablet by mouth daily     therapeutic multivitamin-minerals tablet     tiZANidine 2 MG tablet  Commonly known as: Valentino Starcher         * This list has 4 medication(s) that are the same as other medications prescribed for you. Read the directions carefully, and ask your doctor or other care provider to review them with you. STOP taking these medications    lactulose 10 GM/15ML solution  Commonly known as: USB Prom[employer]        ASK your doctor about these medications    bisacodyl 10 MG suppository  Commonly known as: Bisac-Evac  Place 1 suppository rectally as needed for Constipation  Ask about: Should I take this medication?      HYDROcodone-acetaminophen 5-325 MG per tablet  Commonly known as: NORCO  Take 1 tablet by mouth every

## 2022-03-14 ENCOUNTER — APPOINTMENT (OUTPATIENT)
Dept: GENERAL RADIOLOGY | Age: 67
DRG: 467 | End: 2022-03-14
Payer: MEDICARE

## 2022-03-14 ENCOUNTER — APPOINTMENT (OUTPATIENT)
Dept: CT IMAGING | Age: 67
DRG: 467 | End: 2022-03-14
Payer: MEDICARE

## 2022-03-14 ENCOUNTER — HOSPITAL ENCOUNTER (INPATIENT)
Age: 67
LOS: 8 days | Discharge: HOME | DRG: 467 | End: 2022-03-22
Attending: STUDENT IN AN ORGANIZED HEALTH CARE EDUCATION/TRAINING PROGRAM | Admitting: INTERNAL MEDICINE
Payer: MEDICARE

## 2022-03-14 DIAGNOSIS — M97.9XXA PERIPROSTHETIC FRACTURE AROUND INTERNAL PROSTHETIC JOINT, INITIAL ENCOUNTER: Primary | ICD-10-CM

## 2022-03-14 DIAGNOSIS — I95.9 HYPOTENSION, UNSPECIFIED HYPOTENSION TYPE: ICD-10-CM

## 2022-03-14 DIAGNOSIS — R55 SYNCOPE AND COLLAPSE: ICD-10-CM

## 2022-03-14 PROBLEM — J96.11 CHRONIC RESPIRATORY FAILURE WITH HYPOXIA (HCC): Status: ACTIVE | Noted: 2022-03-14

## 2022-03-14 PROBLEM — K21.9 GERD (GASTROESOPHAGEAL REFLUX DISEASE): Status: ACTIVE | Noted: 2022-03-14

## 2022-03-14 PROBLEM — Z96.649 PERI-PROSTHETIC FRACTURE OF FEMUR AT TIP OF PROSTHESIS: Status: ACTIVE | Noted: 2022-03-14

## 2022-03-14 PROBLEM — M97.8XXA PERI-PROSTHETIC FRACTURE OF FEMUR AT TIP OF PROSTHESIS: Status: ACTIVE | Noted: 2022-03-14

## 2022-03-14 LAB
A/G RATIO: 1.2 (ref 1.1–2.2)
ALBUMIN SERPL-MCNC: 3.7 G/DL (ref 3.4–5)
ALP BLD-CCNC: 136 U/L (ref 40–129)
ALT SERPL-CCNC: 15 U/L (ref 10–40)
ANION GAP SERPL CALCULATED.3IONS-SCNC: 12 MMOL/L (ref 3–16)
AST SERPL-CCNC: 35 U/L (ref 15–37)
BASE EXCESS VENOUS: -0.8 MMOL/L (ref -3–3)
BASOPHILS ABSOLUTE: 0 K/UL (ref 0–0.2)
BASOPHILS RELATIVE PERCENT: 0.4 %
BILIRUB SERPL-MCNC: 0.3 MG/DL (ref 0–1)
BUN BLDV-MCNC: 9 MG/DL (ref 7–20)
CALCIUM SERPL-MCNC: 9.1 MG/DL (ref 8.3–10.6)
CARBOXYHEMOGLOBIN: 2.4 % (ref 0–1.5)
CHLORIDE BLD-SCNC: 100 MMOL/L (ref 99–110)
CO2: 24 MMOL/L (ref 21–32)
CREAT SERPL-MCNC: 0.6 MG/DL (ref 0.6–1.2)
EKG ATRIAL RATE: 58 BPM
EKG DIAGNOSIS: NORMAL
EKG P AXIS: 19 DEGREES
EKG P-R INTERVAL: 162 MS
EKG Q-T INTERVAL: 486 MS
EKG QRS DURATION: 86 MS
EKG QTC CALCULATION (BAZETT): 477 MS
EKG R AXIS: -13 DEGREES
EKG T AXIS: 27 DEGREES
EKG VENTRICULAR RATE: 58 BPM
EOSINOPHILS ABSOLUTE: 0.3 K/UL (ref 0–0.6)
EOSINOPHILS RELATIVE PERCENT: 4.7 %
GFR AFRICAN AMERICAN: >60
GFR NON-AFRICAN AMERICAN: >60
GLUCOSE BLD-MCNC: 101 MG/DL (ref 70–99)
GLUCOSE BLD-MCNC: 139 MG/DL (ref 70–99)
GLUCOSE BLD-MCNC: 152 MG/DL (ref 70–99)
HCO3 VENOUS: 22.2 MMOL/L (ref 23–29)
HCT VFR BLD CALC: 32.9 % (ref 36–48)
HEMOGLOBIN: 10.6 G/DL (ref 12–16)
IMMATURE RETIC FRACT: 0.4 (ref 0.21–0.37)
LACTATE DEHYDROGENASE: <10 U/L (ref 100–190)
LYMPHOCYTES ABSOLUTE: 1 K/UL (ref 1–5.1)
LYMPHOCYTES RELATIVE PERCENT: 13.3 %
MCH RBC QN AUTO: 25.5 PG (ref 26–34)
MCHC RBC AUTO-ENTMCNC: 32.3 G/DL (ref 31–36)
MCV RBC AUTO: 79 FL (ref 80–100)
METHEMOGLOBIN VENOUS: 0.3 %
MONOCYTES ABSOLUTE: 0.5 K/UL (ref 0–1.3)
MONOCYTES RELATIVE PERCENT: 7.2 %
NEUTROPHILS ABSOLUTE: 5.5 K/UL (ref 1.7–7.7)
NEUTROPHILS RELATIVE PERCENT: 74.4 %
O2 SAT, VEN: 95 %
O2 THERAPY: ABNORMAL
PCO2, VEN: 31.4 MMHG (ref 40–50)
PDW BLD-RTO: 20.5 % (ref 12.4–15.4)
PERFORMED ON: ABNORMAL
PERFORMED ON: ABNORMAL
PH VENOUS: 7.47 (ref 7.35–7.45)
PLATELET # BLD: 159 K/UL (ref 135–450)
PMV BLD AUTO: 9.4 FL (ref 5–10.5)
PO2, VEN: 77.6 MMHG (ref 25–40)
POTASSIUM REFLEX MAGNESIUM: 4.4 MMOL/L (ref 3.5–5.1)
RBC # BLD: 4.17 M/UL (ref 4–5.2)
RETICULOCYTE ABSOLUTE COUNT: 0.03 M/UL (ref 0.02–0.1)
RETICULOCYTE COUNT PCT: 0.74 % (ref 0.5–2.18)
SODIUM BLD-SCNC: 136 MMOL/L (ref 136–145)
SPECIMEN STATUS: NORMAL
TCO2 CALC VENOUS: 23 MMOL/L
TOTAL PROTEIN: 6.7 G/DL (ref 6.4–8.2)
TROPONIN: <0.01 NG/ML
WBC # BLD: 7.4 K/UL (ref 4–11)

## 2022-03-14 PROCEDURE — 80053 COMPREHEN METABOLIC PANEL: CPT

## 2022-03-14 PROCEDURE — 72125 CT NECK SPINE W/O DYE: CPT

## 2022-03-14 PROCEDURE — 2580000003 HC RX 258: Performed by: INTERNAL MEDICINE

## 2022-03-14 PROCEDURE — 94640 AIRWAY INHALATION TREATMENT: CPT

## 2022-03-14 PROCEDURE — 84484 ASSAY OF TROPONIN QUANT: CPT

## 2022-03-14 PROCEDURE — 93005 ELECTROCARDIOGRAM TRACING: CPT | Performed by: STUDENT IN AN ORGANIZED HEALTH CARE EDUCATION/TRAINING PROGRAM

## 2022-03-14 PROCEDURE — 71045 X-RAY EXAM CHEST 1 VIEW: CPT

## 2022-03-14 PROCEDURE — 70450 CT HEAD/BRAIN W/O DYE: CPT

## 2022-03-14 PROCEDURE — 1200000000 HC SEMI PRIVATE

## 2022-03-14 PROCEDURE — 86900 BLOOD TYPING SEROLOGIC ABO: CPT

## 2022-03-14 PROCEDURE — 86850 RBC ANTIBODY SCREEN: CPT

## 2022-03-14 PROCEDURE — 83615 LACTATE (LD) (LDH) ENZYME: CPT

## 2022-03-14 PROCEDURE — 82803 BLOOD GASES ANY COMBINATION: CPT

## 2022-03-14 PROCEDURE — 93010 ELECTROCARDIOGRAM REPORT: CPT | Performed by: INTERNAL MEDICINE

## 2022-03-14 PROCEDURE — 6370000000 HC RX 637 (ALT 250 FOR IP): Performed by: INTERNAL MEDICINE

## 2022-03-14 PROCEDURE — 6370000000 HC RX 637 (ALT 250 FOR IP): Performed by: STUDENT IN AN ORGANIZED HEALTH CARE EDUCATION/TRAINING PROGRAM

## 2022-03-14 PROCEDURE — 6360000002 HC RX W HCPCS: Performed by: INTERNAL MEDICINE

## 2022-03-14 PROCEDURE — 94761 N-INVAS EAR/PLS OXIMETRY MLT: CPT

## 2022-03-14 PROCEDURE — 2700000000 HC OXYGEN THERAPY PER DAY

## 2022-03-14 PROCEDURE — 85045 AUTOMATED RETICULOCYTE COUNT: CPT

## 2022-03-14 PROCEDURE — 73700 CT LOWER EXTREMITY W/O DYE: CPT

## 2022-03-14 PROCEDURE — 85025 COMPLETE CBC W/AUTO DIFF WBC: CPT

## 2022-03-14 PROCEDURE — 86923 COMPATIBILITY TEST ELECTRIC: CPT

## 2022-03-14 PROCEDURE — 6360000002 HC RX W HCPCS: Performed by: STUDENT IN AN ORGANIZED HEALTH CARE EDUCATION/TRAINING PROGRAM

## 2022-03-14 PROCEDURE — 99285 EMERGENCY DEPT VISIT HI MDM: CPT

## 2022-03-14 PROCEDURE — 86901 BLOOD TYPING SEROLOGIC RH(D): CPT

## 2022-03-14 PROCEDURE — 73560 X-RAY EXAM OF KNEE 1 OR 2: CPT

## 2022-03-14 PROCEDURE — 73610 X-RAY EXAM OF ANKLE: CPT

## 2022-03-14 PROCEDURE — 6370000000 HC RX 637 (ALT 250 FOR IP): Performed by: ORTHOPAEDIC SURGERY

## 2022-03-14 PROCEDURE — 96375 TX/PRO/DX INJ NEW DRUG ADDON: CPT

## 2022-03-14 PROCEDURE — P9016 RBC LEUKOCYTES REDUCED: HCPCS

## 2022-03-14 PROCEDURE — 96361 HYDRATE IV INFUSION ADD-ON: CPT

## 2022-03-14 PROCEDURE — 2580000003 HC RX 258: Performed by: STUDENT IN AN ORGANIZED HEALTH CARE EDUCATION/TRAINING PROGRAM

## 2022-03-14 PROCEDURE — 96374 THER/PROPH/DIAG INJ IV PUSH: CPT

## 2022-03-14 RX ORDER — ONDANSETRON 2 MG/ML
4 INJECTION INTRAMUSCULAR; INTRAVENOUS EVERY 6 HOURS PRN
Status: DISCONTINUED | OUTPATIENT
Start: 2022-03-14 | End: 2022-03-17 | Stop reason: SDUPTHER

## 2022-03-14 RX ORDER — SODIUM CHLORIDE 9 MG/ML
25 INJECTION, SOLUTION INTRAVENOUS PRN
Status: DISCONTINUED | OUTPATIENT
Start: 2022-03-14 | End: 2022-03-17 | Stop reason: SDUPTHER

## 2022-03-14 RX ORDER — 0.9 % SODIUM CHLORIDE 0.9 %
1000 INTRAVENOUS SOLUTION INTRAVENOUS ONCE
Status: COMPLETED | OUTPATIENT
Start: 2022-03-14 | End: 2022-03-14

## 2022-03-14 RX ORDER — HYDROCODONE BITARTRATE AND ACETAMINOPHEN 5; 325 MG/1; MG/1
2 TABLET ORAL ONCE
Status: COMPLETED | OUTPATIENT
Start: 2022-03-14 | End: 2022-03-14

## 2022-03-14 RX ORDER — IPRATROPIUM BROMIDE AND ALBUTEROL SULFATE 2.5; .5 MG/3ML; MG/3ML
1 SOLUTION RESPIRATORY (INHALATION) EVERY 4 HOURS PRN
Status: DISCONTINUED | OUTPATIENT
Start: 2022-03-14 | End: 2022-03-22 | Stop reason: HOSPADM

## 2022-03-14 RX ORDER — ALBUTEROL SULFATE 90 UG/1
2 AEROSOL, METERED RESPIRATORY (INHALATION) 2 TIMES DAILY
Status: DISCONTINUED | OUTPATIENT
Start: 2022-03-14 | End: 2022-03-22 | Stop reason: HOSPADM

## 2022-03-14 RX ORDER — HYDROCODONE BITARTRATE AND ACETAMINOPHEN 7.5; 325 MG/1; MG/1
1 TABLET ORAL 4 TIMES DAILY
Status: ON HOLD | COMMUNITY
End: 2022-03-18 | Stop reason: HOSPADM

## 2022-03-14 RX ORDER — ROPINIROLE 0.5 MG/1
0.5 TABLET, FILM COATED ORAL 3 TIMES DAILY
Status: DISCONTINUED | OUTPATIENT
Start: 2022-03-14 | End: 2022-03-22 | Stop reason: HOSPADM

## 2022-03-14 RX ORDER — ALBUTEROL SULFATE 2.5 MG/3ML
2.5 SOLUTION RESPIRATORY (INHALATION) EVERY 4 HOURS PRN
Status: DISCONTINUED | OUTPATIENT
Start: 2022-03-14 | End: 2022-03-14

## 2022-03-14 RX ORDER — POLYETHYLENE GLYCOL 3350 17 G/17G
17 POWDER, FOR SOLUTION ORAL DAILY
Status: DISCONTINUED | OUTPATIENT
Start: 2022-03-15 | End: 2022-03-22 | Stop reason: HOSPADM

## 2022-03-14 RX ORDER — ATORVASTATIN CALCIUM 10 MG/1
20 TABLET, FILM COATED ORAL DAILY
Status: DISCONTINUED | OUTPATIENT
Start: 2022-03-14 | End: 2022-03-22 | Stop reason: HOSPADM

## 2022-03-14 RX ORDER — POTASSIUM CHLORIDE 7.45 MG/ML
10 INJECTION INTRAVENOUS PRN
Status: DISCONTINUED | OUTPATIENT
Start: 2022-03-14 | End: 2022-03-22 | Stop reason: HOSPADM

## 2022-03-14 RX ORDER — FLUTICASONE PROPIONATE 110 UG/1
2 AEROSOL, METERED RESPIRATORY (INHALATION) 2 TIMES DAILY
Status: DISCONTINUED | OUTPATIENT
Start: 2022-03-14 | End: 2022-03-22 | Stop reason: HOSPADM

## 2022-03-14 RX ORDER — LEVOTHYROXINE SODIUM 0.1 MG/1
100 TABLET ORAL DAILY
Status: DISCONTINUED | OUTPATIENT
Start: 2022-03-15 | End: 2022-03-22 | Stop reason: HOSPADM

## 2022-03-14 RX ORDER — PANTOPRAZOLE SODIUM 40 MG/1
40 TABLET, DELAYED RELEASE ORAL
Status: DISCONTINUED | OUTPATIENT
Start: 2022-03-15 | End: 2022-03-22 | Stop reason: HOSPADM

## 2022-03-14 RX ORDER — NICOTINE POLACRILEX 4 MG
15 LOZENGE BUCCAL PRN
Status: DISCONTINUED | OUTPATIENT
Start: 2022-03-14 | End: 2022-03-22 | Stop reason: HOSPADM

## 2022-03-14 RX ORDER — ACETAMINOPHEN 325 MG/1
650 TABLET ORAL EVERY 6 HOURS PRN
Status: DISCONTINUED | OUTPATIENT
Start: 2022-03-14 | End: 2022-03-22 | Stop reason: HOSPADM

## 2022-03-14 RX ORDER — ONDANSETRON 4 MG/1
4 TABLET, ORALLY DISINTEGRATING ORAL EVERY 8 HOURS PRN
Status: DISCONTINUED | OUTPATIENT
Start: 2022-03-14 | End: 2022-03-17 | Stop reason: SDUPTHER

## 2022-03-14 RX ORDER — OXYCODONE HYDROCHLORIDE 5 MG/1
5 TABLET ORAL ONCE
Status: COMPLETED | OUTPATIENT
Start: 2022-03-14 | End: 2022-03-14

## 2022-03-14 RX ORDER — POTASSIUM CHLORIDE 20 MEQ/1
40 TABLET, EXTENDED RELEASE ORAL PRN
Status: DISCONTINUED | OUTPATIENT
Start: 2022-03-14 | End: 2022-03-22 | Stop reason: HOSPADM

## 2022-03-14 RX ORDER — MAGNESIUM SULFATE 1 G/100ML
1000 INJECTION INTRAVENOUS PRN
Status: DISCONTINUED | OUTPATIENT
Start: 2022-03-14 | End: 2022-03-22 | Stop reason: HOSPADM

## 2022-03-14 RX ORDER — KETOROLAC TROMETHAMINE 30 MG/ML
15 INJECTION, SOLUTION INTRAMUSCULAR; INTRAVENOUS ONCE
Status: COMPLETED | OUTPATIENT
Start: 2022-03-14 | End: 2022-03-14

## 2022-03-14 RX ORDER — SENNA PLUS 8.6 MG/1
1 TABLET ORAL DAILY PRN
Status: DISCONTINUED | OUTPATIENT
Start: 2022-03-14 | End: 2022-03-22 | Stop reason: HOSPADM

## 2022-03-14 RX ORDER — METOCLOPRAMIDE 10 MG/1
5 TABLET ORAL 2 TIMES DAILY
Status: DISCONTINUED | OUTPATIENT
Start: 2022-03-14 | End: 2022-03-22 | Stop reason: HOSPADM

## 2022-03-14 RX ORDER — IPRATROPIUM BROMIDE AND ALBUTEROL SULFATE 2.5; .5 MG/3ML; MG/3ML
1 SOLUTION RESPIRATORY (INHALATION) 4 TIMES DAILY
Status: DISCONTINUED | OUTPATIENT
Start: 2022-03-14 | End: 2022-03-14

## 2022-03-14 RX ORDER — M-VIT,TX,IRON,MINS/CALC/FOLIC 27MG-0.4MG
1 TABLET ORAL DAILY
Status: DISCONTINUED | OUTPATIENT
Start: 2022-03-15 | End: 2022-03-22 | Stop reason: HOSPADM

## 2022-03-14 RX ORDER — SODIUM CHLORIDE, SODIUM LACTATE, POTASSIUM CHLORIDE, CALCIUM CHLORIDE 600; 310; 30; 20 MG/100ML; MG/100ML; MG/100ML; MG/100ML
INJECTION, SOLUTION INTRAVENOUS CONTINUOUS
Status: DISCONTINUED | OUTPATIENT
Start: 2022-03-14 | End: 2022-03-19

## 2022-03-14 RX ORDER — ACETAMINOPHEN 500 MG
1000 TABLET ORAL ONCE
Status: COMPLETED | OUTPATIENT
Start: 2022-03-14 | End: 2022-03-14

## 2022-03-14 RX ORDER — INSULIN GLARGINE 100 [IU]/ML
0.25 INJECTION, SOLUTION SUBCUTANEOUS NIGHTLY
Status: DISCONTINUED | OUTPATIENT
Start: 2022-03-14 | End: 2022-03-22 | Stop reason: HOSPADM

## 2022-03-14 RX ORDER — LANOLIN ALCOHOL/MO/W.PET/CERES
3 CREAM (GRAM) TOPICAL NIGHTLY PRN
Status: DISCONTINUED | OUTPATIENT
Start: 2022-03-14 | End: 2022-03-22 | Stop reason: HOSPADM

## 2022-03-14 RX ORDER — DEXTROSE MONOHYDRATE 25 G/50ML
12.5 INJECTION, SOLUTION INTRAVENOUS PRN
Status: DISCONTINUED | OUTPATIENT
Start: 2022-03-14 | End: 2022-03-14 | Stop reason: CLARIF

## 2022-03-14 RX ORDER — DEXTROSE MONOHYDRATE 50 MG/ML
100 INJECTION, SOLUTION INTRAVENOUS PRN
Status: DISCONTINUED | OUTPATIENT
Start: 2022-03-14 | End: 2022-03-22 | Stop reason: HOSPADM

## 2022-03-14 RX ORDER — ACETAMINOPHEN 650 MG/1
650 SUPPOSITORY RECTAL EVERY 6 HOURS PRN
Status: DISCONTINUED | OUTPATIENT
Start: 2022-03-14 | End: 2022-03-22 | Stop reason: HOSPADM

## 2022-03-14 RX ORDER — TIZANIDINE 4 MG/1
2 TABLET ORAL EVERY 8 HOURS PRN
Status: DISCONTINUED | OUTPATIENT
Start: 2022-03-14 | End: 2022-03-22 | Stop reason: HOSPADM

## 2022-03-14 RX ORDER — SODIUM CHLORIDE 0.9 % (FLUSH) 0.9 %
10 SYRINGE (ML) INJECTION PRN
Status: DISCONTINUED | OUTPATIENT
Start: 2022-03-14 | End: 2022-03-17 | Stop reason: SDUPTHER

## 2022-03-14 RX ORDER — ALBUTEROL SULFATE 90 UG/1
2 AEROSOL, METERED RESPIRATORY (INHALATION) EVERY 4 HOURS PRN
Status: DISCONTINUED | OUTPATIENT
Start: 2022-03-14 | End: 2022-03-22 | Stop reason: HOSPADM

## 2022-03-14 RX ORDER — GABAPENTIN 600 MG/1
600 TABLET ORAL 3 TIMES DAILY
COMMUNITY

## 2022-03-14 RX ADMIN — ACETAMINOPHEN 650 MG: 325 TABLET ORAL at 21:36

## 2022-03-14 RX ADMIN — INSULIN LISPRO 1 UNITS: 100 INJECTION, SOLUTION INTRAVENOUS; SUBCUTANEOUS at 22:54

## 2022-03-14 RX ADMIN — Medication 2 PUFF: at 19:51

## 2022-03-14 RX ADMIN — HYDROCODONE BITARTRATE AND ACETAMINOPHEN 2 TABLET: 5; 325 TABLET ORAL at 11:49

## 2022-03-14 RX ADMIN — HYDROMORPHONE HYDROCHLORIDE 1 MG: 1 INJECTION, SOLUTION INTRAMUSCULAR; INTRAVENOUS; SUBCUTANEOUS at 19:46

## 2022-03-14 RX ADMIN — Medication 2 PUFF: at 19:54

## 2022-03-14 RX ADMIN — ROPINIROLE HYDROCHLORIDE 0.5 MG: 0.5 TABLET, FILM COATED ORAL at 21:36

## 2022-03-14 RX ADMIN — ACETAMINOPHEN 1000 MG: 500 TABLET ORAL at 14:12

## 2022-03-14 RX ADMIN — KETOROLAC TROMETHAMINE 15 MG: 30 INJECTION, SOLUTION INTRAMUSCULAR at 14:11

## 2022-03-14 RX ADMIN — OXYCODONE 5 MG: 5 TABLET ORAL at 15:00

## 2022-03-14 RX ADMIN — ATORVASTATIN CALCIUM 20 MG: 10 TABLET, FILM COATED ORAL at 21:35

## 2022-03-14 RX ADMIN — HYDROMORPHONE HYDROCHLORIDE 1 MG: 1 INJECTION, SOLUTION INTRAMUSCULAR; INTRAVENOUS; SUBCUTANEOUS at 16:07

## 2022-03-14 RX ADMIN — SODIUM CHLORIDE, POTASSIUM CHLORIDE, SODIUM LACTATE AND CALCIUM CHLORIDE: 600; 310; 30; 20 INJECTION, SOLUTION INTRAVENOUS at 18:49

## 2022-03-14 RX ADMIN — INSULIN GLARGINE 18 UNITS: 100 INJECTION, SOLUTION SUBCUTANEOUS at 22:54

## 2022-03-14 RX ADMIN — SODIUM CHLORIDE 1000 ML: 9 INJECTION, SOLUTION INTRAVENOUS at 14:59

## 2022-03-14 RX ADMIN — METOCLOPRAMIDE 5 MG: 10 TABLET ORAL at 21:41

## 2022-03-14 RX ADMIN — SODIUM CHLORIDE 1000 ML: 9 INJECTION, SOLUTION INTRAVENOUS at 13:02

## 2022-03-14 ASSESSMENT — PAIN SCALES - GENERAL
PAINLEVEL_OUTOF10: 10
PAINLEVEL_OUTOF10: 9
PAINLEVEL_OUTOF10: 10
PAINLEVEL_OUTOF10: 6
PAINLEVEL_OUTOF10: 9
PAINLEVEL_OUTOF10: 10
PAINLEVEL_OUTOF10: 9
PAINLEVEL_OUTOF10: 10
PAINLEVEL_OUTOF10: 9
PAINLEVEL_OUTOF10: 10

## 2022-03-14 ASSESSMENT — PAIN DESCRIPTION - PAIN TYPE
TYPE: ACUTE PAIN
TYPE: ACUTE PAIN

## 2022-03-14 ASSESSMENT — PAIN DESCRIPTION - DESCRIPTORS: DESCRIPTORS: CONSTANT;SHARP

## 2022-03-14 ASSESSMENT — PAIN DESCRIPTION - ORIENTATION
ORIENTATION: RIGHT
ORIENTATION: RIGHT

## 2022-03-14 ASSESSMENT — PAIN DESCRIPTION - LOCATION
LOCATION: KNEE
LOCATION: KNEE

## 2022-03-14 ASSESSMENT — PAIN - FUNCTIONAL ASSESSMENT: PAIN_FUNCTIONAL_ASSESSMENT: 0-10

## 2022-03-14 NOTE — PROGRESS NOTES
RESPIRATORY THERAPY ASSESSMENT    Name:  Remy [facility] Record Number:  [id number]  Age: 78 y.o. Gender: female  : 1955  Today's Date:  3/14/2022  Room:      Assessment     Is the patient being admitted for a COPD or Asthma exacerbation? No   (If yes the patient will be seen every 4 hours for the first 24 hours and then reassessed)    Patient Admission Diagnosis      Allergies  Allergies   Allergen Reactions    Prednisone Other (See Comments) and Anaphylaxis     Cannot tolerate oral steriods  Cannot tolerate oral steroids - causes Avascular Necrosis of joints. Cannot tolerate oral steriods    Quinidine Anaphylaxis and Nausea And Vomiting    Sulfa Antibiotics Anaphylaxis and Nausea And Vomiting    Bactrim     Clonidine Hives    Sulfamethoxazole-Trimethoprim Rash and Hives       Minimum Predicted Vital Capacity:     Na            Actual Vital Capacity:      na             Pulmonary History:COPD  Home Oxygen Therapy:  room air  Home Respiratory Therapy:Albuterol/Ipratropium Bromide HHN   Current Respiratory Therapy:  Metaneb, HHN           Respiratory Severity Index(RSI)   Patients with orders for inhalation medications, oxygen, or any therapeutic treatment modality will be placed on Respiratory Protocol. They will be assessed with the first treatment and at least every 72 hours thereafter. The following severity scale will be used to determine frequency of treatment intervention.     Smoking History: Pulmonary Disease or Smoking History, Greater than 15 pack year = 2    Social History  Social History     Tobacco Use    Smoking status: Former Smoker     Packs/day: 1.00     Years: 18.00     Pack years: 18.00     Types: Cigarettes     Quit date: 12/10/1991     Years since quittin.2    Smokeless tobacco: Never Used   Vaping Use    Vaping Use: Never used   Substance Use Topics    Alcohol use: No    Drug use: No       Recent Surgical History: None = 0  Past Surgical History  Past Surgical History:   Procedure Laterality Date    ABDOMEN SURGERY      CARDIAC CATHETERIZATION      COLON SURGERY      COLONOSCOPY  2007    normal    HYSTERECTOMY      JOINT REPLACEMENT  10/92    Right; hip     JOINT REPLACEMENT    12/92    Left hip  followed by revision 1/2006    JOINT REPLACEMENT    6/96    right     JOINT REPLACEMENT    2006     right replacement.  LUNG SURGERY  1/2014    tracheobronchomalacia    TRACHEOSTOMY N/A 10/25/2021    TRACHEOTOMY performed by Roselia Adamson DO at [address] N/A 10/25/2021    EGD/PEG W/ANES. ON VENT, COVID + performed by Lilliana Saldaña MD at [facility]       Level of Consciousness: Alert, Oriented, and Cooperative = 0    Level of Activity: Walking unassisted = 0    Respiratory Pattern: Regular Pattern; RR 8-20 = 0    Breath Sounds: Diminshed bilaterally and/or crackles = 2    Sputum   ,  ,    Cough: Strong, spontaneous, non-productive = 0    Vital Signs   BP (!) 109/49   Pulse 70   Temp 97.7 °F (36.5 °C)   Resp 18   SpO2 99%   SPO2 (COPD values may differ): Greater than or equal to 92% on room air = 0    Peak Flow (asthma only): not applicable = 0    RSI: 0-4 = See once and convert to home regimen or discontinue        Plan       Goals: medication delivery, mobilize retained secretions, volume expansion and improve oxygenation    Patient/caregiver was educated on the proper method of use for Respiratory Care Devices:  Yes      Level of patient/caregiver understanding able to:   ? Verbalize understanding   ? Demonstrate understanding       ? Teach back        ? Needs reinforcement       ? No available caregiver               ? Other:     Response to education:  Excellent     Is patient being placed on Home Treatment Regimen? Yes     Does the patient have everything they need prior to discharge?   Yes     Comments: Continue home therapies    Plan of Care: Ame Bauman    Electronically signed by Sri Garay KAMI Phillips on 3/14/2022 at 5:14 PM    Respiratory Protocol Guidelines     1. Assessment and treatment by Respiratory Therapy will be initiated for medication and therapeutic interventions upon initiation of aerosolized medication. 2. Physician will be contacted for respiratory rate (RR) greater than 35 breaths per minute. Therapy will be held for heart rate (HR) greater than 140 beats per minute, pending direction from physician. 3. Bronchodilators will be administered via Metered Dose Inhaler (MDI) with spacer when the following criteria are met:  a. Alert and cooperative     b. HR < 140 bpm  c. RR < 30 bpm                d. Can demonstrate a 23 second inspiratory hold  4. Bronchodilators will be administered via Hand Held Nebulizer [facility]) to patients when ANY of the following criteria are met  a. Incognizant or uncooperative          b. Patients treated with HHN at Home        c. Unable to demonstrate proper use of MDI with spacer     d. RR > 30 bpm   5. Bronchodilators will be delivered via Metered Dose Inhaler (MDI), HHN, Aerogen to intubated patients on mechanical ventilation. 6. Inhalation medication orders will be delivered and/or substituted as outlined below. Aerosolized Medications Ordering and Administration Guidelines:    1. All Medications will be ordered by a physician, and their frequency and/or modality will be adjusted as defined by the patients Respiratory Severity Index (RSI) score. 2. If the patient does not have documented COPD, consider discontinuing anticholinergics when RSI is less than 9.  3. If the bronchospasm worsens (increased RSI), then the bronchodilator frequency can be increased to a maximum of every 4 hours. If greater than every 4 hours is required, the physician will be contacted. 4. If the bronchospasm improves, the frequency of the bronchodilator can be decreased, based on the patient's RSI, but not less than home treatment regimen frequency.   5. Bronchodilator(s) will be discontinued if patient has a RSI less than 9 and has received no scheduled or as needed treatment for 72  Hrs. Patients Ordered on a Mucolytic Agent:    1. Must always be administered with a bronchodilator. 2. Discontinue if patient experiences worsened bronchospasm, or secretions have lessened to the point that the patient is able to clear them with a cough. Anti-inflammatory and Combination Medications:    1. If the patient lacks prior history of lung disease, is not using inhaled anti-inflammatory medication at home, and lacks wheezing by examination or by history for at least 24 hours, contact physician for possible discontinuation.

## 2022-03-14 NOTE — PLAN OF CARE
Ortho Plan of Care    Spoke with Dr. Shoaib Pineda, orthopedic surgeon who will be accepting the patient's case. CT scan with metal subtraction ordered, Dr. Jessi Najera to review  Likely surgery tomorrow, 3/15/2022, if medically stable  Please admit patient to medicine service, preoperative optimization appreciated by hospital team  Patient will be n.p.o. after midnight. Please hold all anticoagulation until postop  Keep right knee in immobilizer for stabilization  Orders for preop antibiotics and consent placed    Full consult to follow. Please call with any questions    Aretha House PA-C    Staff Addendum:    CT reviewed. Comminuted distal femoral fracture with independent fracture line/displacement involving medial condyle/epicondyle, concerning for a loose femoral component. Options are limited. Ryder implant without available polyethylene components for exchange and PS implant blocking open distal femoral box. Extremely limited bone stock available for ORIF plate fixation. Likely to recommend revision total knee implants with distal femoral replacement. Pending medical clearance. Patient with significant medical comorbid conditions. Will need time to arrange appropriate components/OR availability.  Tentatively plan OR Wednesday 230pm.    Shoaib Pineda MD

## 2022-03-14 NOTE — ED NOTES
1225- Called ortho  RE: periprosthetic femur fracture  1234- Dr. Chavarria Parents called back and spoke with Dr. Manny Mckeon  03/14/22 1231

## 2022-03-14 NOTE — PROGRESS NOTES
4 Eyes Skin Assessment     The patient is being assess for   Admission    I agree that 2 RN's have performed a thorough Head to Toe Skin Assessment on the patient. ALL assessment sites listed below have been assessed. Areas assessed for pressure by both nurses:   []   Head, Face, and Ears   []   Shoulders, Back, and Chest, Abdomen  []   Arms, Elbows, and Hands   []   Coccyx, Sacrum, and Ischium  []   Legs, Feet, and Heels        Right knee brace in place. Right outer ankle swelling. Scattered bruising and abrasions. Patient refused to be turned due to right lower extremity pain. **SHARE this note so that the co-signing nurse is able to place an eSignature**    Co-signer eSignature: {Esignature:[id number]}    Does the Patient have Skin Breakdown related to pressure?   No   Josafat Prevention initiated:  NA   Wound Care Orders initiated:  NA      [facility] nurse consulted for Pressure Injury (Stage 3,4, Unstageable, DTI, NWPT, Complex wounds)and New or Established Ostomies:  NA      Primary Nurse eSignature: Electronically signed by Christel Butler RN on 3/14/22 at 6:23 PM EDT

## 2022-03-14 NOTE — H&P
Hospital Medicine History & Physical      Patient:  Carlos Vital  :   1955  MRN:   [id number]  Date of Service: 22    Chief Complaint   Patient presents with    Leg Pain     right leg pain, multiple knee replacement in past, 50mcg fentanyl, 2L nc baseline, passed out this morning when getting up     Loss of Consciousness     right leg pain started today after passing out, did not hit head, on no blood thinners,        HISTORY OF PRESENT ILLNESS:    Carlos Vital is a 78 y.o. female. She presented to the ER from home by ambulance after a syncopal episode. She stood up and suddenly lost consciousness. Upon awakening she had immediate right knee pain and was unable to bear weight. She is s/p a prolonged hospitalization for COVID-19 which began 2021. She was initially hospitalized at [facility] for roughly a month, then [facility] for roughly two months, and then the [address] [place] [address]  1/3 - . She suffered multiple complications and now has chronic respiratory failure requiring 2-2.5 L/min O2 at all times. She suffered critical illness myopathy but she and her sposue relate her overall strength had improved substantially with rehab. Prior to today's fall she was able to ambulate independently with the help of a FWW. She estimated she could ambulate roughly 100ft on a level surface around her home before having to stop because of dyspnea. Review of Systems:  All pertinent positives and negatives are as noted in the HPI section. All other systems were reviewed and are negative.     Past Medical History:   Diagnosis Date    Anxiety disorder     Chronic pain syndrome     COVID-19 2021    DDD (degenerative disc disease), lumbar     Diabetes [facility]     Displacement of lumbar intervertebral disc without myelopathy 2010    Diverticulitis     Fibromyalgia     Generalized osteoarthrosis, involving multiple sites 2010    GERD (gastroesophageal reflux disease)     Hypochromic microcytic anemia 1/18/2022    Impacted cerumen 08/23/2010    Influenza A 03/10/2016    Irritable bowel syndrome 08/23/2010    Other and unspecified hyperlipidemia 08/23/2010    Prosthetic joint implant failure [facility]     Screening mammogram 12/08/2006    (Both)Negative    Tracheobronchomalacia     Unspecified asthma(493.90) 08/23/2010    Unspecified essential hypertension 08/24/2010    Unspecified hypothyroidism 08/23/2010       Past Surgical History:   Procedure Laterality Date    ABDOMEN SURGERY      CARDIAC CATHETERIZATION      COLON SURGERY      COLONOSCOPY  2007    normal    HYSTERECTOMY      JOINT REPLACEMENT  10/92    Right; hip     JOINT REPLACEMENT    12/92    Left hip  followed by revision 1/2006    JOINT REPLACEMENT    6/96    right     JOINT REPLACEMENT    2006     right replacement.  LUNG SURGERY  1/2014    tracheobronchomalacia    TRACHEOSTOMY N/A 10/25/2021    TRACHEOTOMY performed by Adilson Siddiqui DO at [address] N/A 10/25/2021    EGD/PEG W/ANES. ON VENT, COVID + performed by Jorge Dumont MD at [address]         Prior to Admission medications    Medication Sig Start Date End Date Taking?  Authorizing Provider   ipratropium-albuterol (DUONEB) 0.5-2.5 (3) MG/3ML SOLN nebulizer solution Inhale 3 mLs into the lungs every 4 hours 2/4/22   Waylon Hua DO   albuterol sulfate  (90 Base) MCG/ACT inhaler Inhale 2 puffs into the lungs 4 times daily as needed for Wheezing 1/22/22   BRYCE Patel CNP   glipiZIDE (GLUCOTROL XL) 10 MG extended release tablet Take 1 tablet by mouth 2 times daily 1/22/22   BRYCE Patel CNP   SITagliptin (JANUVIA) 50 MG tablet Take 1 tablet by mouth daily 1/22/22   BRYCE Patel CNP   simvastatin (ZOCOR) 40 MG tablet Take 1 tablet by mouth nightly 1/22/22   BRYCE Patel CNP   rOPINIRole (REQUIP) 0.5 MG tablet TAKE 1 TABLET BY MOUTH 3 TIMES DAILY. 1/22/22   BRYCE Benz CNP   metoprolol tartrate (LOPRESSOR) 25 MG tablet Take 1 tablet by mouth 2 times daily 1/22/22   BRYCE Benz CNP   levothyroxine (SYNTHROID) 100 MCG tablet Take 1 tablet by mouth Daily 1/22/22   BRYCE Benz CNP   omeprazole (PRILOSEC) 40 MG delayed release capsule Take 1 capsule by mouth daily 1/22/22   BRYCE Benz CNP   aspirin 81 MG EC tablet Take 1 tablet by mouth daily 1/22/22   BRYCE Benz CNP   spironolactone (ALDACTONE) 25 MG tablet Take 1 tablet by mouth daily 1/22/22   Saint Skeans, MD   furosemide (LASIX) 40 MG tablet Take 1 tablet by mouth daily 1/22/22   Saint Skeans, MD   magnesium oxide (MAG-OX) 400 (240 Mg) MG tablet Take 1 tablet by mouth daily 1/22/22   Saint Skeans, MD   metoclopramide (REGLAN) 5 MG tablet Take 1 tablet by mouth 2 times daily 1/21/22   Saint Skeans, MD   tiZANidine (ZANAFLEX) 2 MG tablet Take 2 mg by mouth every 8 hours as needed 9/16/21   Historical Provider, MD   ipratropium-albuterol (DUONEB) 0.5-2.5 (3) MG/3ML SOLN nebulizer solution Inhale 3 mLs into the lungs every 4 hours 10/28/21   Jocelyne Perez MD   carboxymethylcellulose PF (THERATEARS) 1 % GEL ophthalmic gel Place 1 drop into both eyes every 4 hours as needed for Dry Eyes 10/28/21   Jocelyne Perez MD   budesonide (PULMICORT FLEXHALER) 180 MCG/ACT AEPB inhaler Inhale 1 puff into the lungs 2 times daily Per [facility]. Apolinar Reyes    Historical Provider, MD   estradiol (ESTRACE) 0.1 MG/GM vaginal cream Place vaginally See Admin Instructions Insert a pea-sized amount vaginally every day for 7 days, then twice weekly thereafter. Per [facility]. Apolinar Reyes. Last dispensed 5/10/21.     Historical Provider, MD   Multiple Vitamins-Minerals (THERAPEUTIC MULTIVITAMIN-MINERALS) tablet Take 1 tablet by mouth daily    Historical Provider, MD   blood glucose monitor strips Dispense whatever insurance will cover. Pt test twice a day dx:E11.9 3/11/19   Ean Hopkins MD   blood glucose test strips (ASCENSIA AUTODISC VI;ONE TOUCH ULTRA TEST VI) strip 1 each by In Vitro route daily As needed. 3/11/19   Ean Hopkins MD   polyethylene glycol (MIRALAX) powder Take 17 g by mouth daily as needed    Historical Provider, MD   Blood Glucose Monitoring Suppl CATHRYN Dispense whatever insurance will cover. Dx code:E11.9 1/11/18   Darrel Mathew MD       Allergies:   Prednisone, Quinidine, Sulfa antibiotics, Bactrim, Clonidine, and Sulfamethoxazole-trimethoprim    Social:   reports that she quit smoking about 30 years ago. Her smoking use included cigarettes. She has a 18.00 pack-year smoking history. She has never used smokeless tobacco.   reports no history of alcohol use. Social History     Substance and Sexual Activity   Drug Use No       Family History   Problem Relation Age of Onset    Asthma Mother     Heart Failure Father     Cancer Maternal Grandfather         skin cancer on face    Cancer Daughter         skin cancer-BCC    Diabetes Neg Hx     Emphysema Neg Hx     Hypertension Neg Hx        PHYSICAL EXAM:  I performed this physical examination. Vitals:  Patient Vitals for the past 24 hrs:   BP Temp Pulse Resp SpO2   03/14/22 1449 109/66  70 24 100 %   03/14/22 1428 (!) 103/50  63 18 100 %   03/14/22 1328 (!) 93/58  68 18 98 %   03/14/22 1301 (!) 88/55  64     03/14/22 1128 120/61 97.7 °F (36.5 °C) 62 18 99 %     2L/min O2    GEN:  Appearance:  Age appropriate female in NAD . Level of Consciousness:  alert . Orientation:  full    HEENT: Sclera anicteric.  no conjunctival chemosis. moist mucus membranes. no specific or diagnostic oral lesions. NECK:  no signs of meningismus. Jugular veins not distended. Carotid pulses  2+.  no cervical lymphadenopathy. no thyromegaly. Healed midline tracheostomy stoma. CV:  regular rhythm. normal S1 & S2.    no murmur. no rub.   no gallop. PULM:  Chest excursion is symmetric. Breath sounds are somewhat coarse throughout. Adventitious sounds: There are bilateral coarse inspiratory crackles, worse on the left, and expiratory wheezes also worst on the left. AB:  Abdominal shape is normal.  Bowel sounds are active. Generally soft to palpation. no tenderness is present. no involuntary guarding. no rebound guarding. EXTR:  Skin is warm. Capillary refill brisk. no specific or pathognomic rash. no clubbing. no pitting edema. no active wound or ulcer. Pulses 2+ x 4  Right knee in immobilizer. LABS:  Lab Results   Component Value Date    WBC 7.4 03/14/2022    HGB 10.6 (L) 03/14/2022    HCT 32.9 (L) 03/14/2022    MCV 79.0 (L) 03/14/2022     03/14/2022     Lab Results   Component Value Date    CREATININE 0.6 03/14/2022    BUN 9 03/14/2022     03/14/2022    K 4.4 03/14/2022     03/14/2022    CO2 24 03/14/2022     Lab Results   Component Value Date    ALT 15 03/14/2022    AST 35 03/14/2022    ALKPHOS 136 (H) 03/14/2022    BILITOT 0.3 03/14/2022     Lab Results   Component Value Date    CKTOTAL 249 (H) 12/22/2016    TROPONINI <0.01 03/14/2022     No results for input(s): PHART, HYN8LPK, PO2ART in the last 72 hours. IMAGING:  XR KNEE RIGHT (1-2 VIEWS)    Result Date: 3/14/2022  EXAMINATION: 2 XRAY VIEWS OF THE RIGHT KNEE 3/14/2022 11:31 am COMPARISON: September 21, 2015 HISTORY: ORDERING SYSTEM PROVIDED HISTORY: pain after fall TECHNOLOGIST PROVIDED HISTORY: Reason for exam:->pain after fall Reason for Exam: pain fall FINDINGS: There is an acute traumatic displaced fracture involving the distal femoral metadiaphysis. Fractures just above the superior aspect of the femoral prosthesis. There is an acute traumatic displaced periprosthetic fracture involving the distal right femoral metadiaphysis.   There is approximately 2 cm posterior displacement of the distal femur in relation to the more proximal femur. No other fractures are identified. Knee joint appears aligned. Acute traumatic displaced periprosthetic fracture involving the distal right femoral metadiaphysis. XR ANKLE RIGHT (MIN 3 VIEWS)    Result Date: 3/14/2022  EXAMINATION: THREE XRAY VIEWS OF THE RIGHT ANKLE 3/14/2022 11:31 am COMPARISON: 08/11/2012. HISTORY: ORDERING SYSTEM PROVIDED HISTORY: pain/swelling TECHNOLOGIST PROVIDED HISTORY: Reason for exam:->pain/swelling Reason for Exam: pain swelling FINDINGS: No acute fracture or dislocation. The ankle mortise and talar dome are maintained. Significant soft tissue swelling laterally at the ankle. The bones are diffusely osteopenic. The posterior portion of the calcaneus is excluded on the lateral view. Osteoarthritis in the midfoot along the dorsal aspect. 5 no acute osseous abnormality of the right foot. Osteopenia. Lateral soft tissue swelling. CT Head WO Contrast    Result Date: 3/14/2022  EXAMINATION: CT OF THE HEAD WITHOUT CONTRAST  3/14/2022 12:28 pm TECHNIQUE: CT of the head was performed without the administration of intravenous contrast. Dose modulation, iterative reconstruction, and/or weight based adjustment of the mA/kV was utilized to reduce the radiation dose to as low as reasonably achievable. COMPARISON: 02/01/2019 HISTORY: ORDERING SYSTEM PROVIDED HISTORY: fall/syncope TECHNOLOGIST PROVIDED HISTORY: Reason for exam:->fall/syncope Has a \"code stroke\" or \"stroke alert\" been called? ->No Decision Support Exception - unselect if not a suspected or confirmed emergency medical condition->Emergency Medical Condition (MA) Reason for Exam: s/p fall   pt denies and complaints in head or neck. pt does have an upper feuur FX Relevant Medical/Surgical History: GERD, HTN. .... FINDINGS: BRAIN/VENTRICLES: There is no acute intracranial hemorrhage, mass effect or midline shift. No abnormal extra-axial fluid collection.   The gray-white differentiation is maintained without evidence of an acute infarct. There is no evidence of hydrocephalus. ORBITS: The visualized portion of the orbits demonstrate no acute abnormality. SINUSES: There is a left mastoid effusion SOFT TISSUES/SKULL:  No acute abnormality of the visualized skull or soft tissues. No acute intracranial abnormality. Left mastoid effusion RECOMMENDATIONS: Unavailable     CT Cervical Spine WO Contrast    Result Date: 3/14/2022  EXAMINATION: CT OF THE CERVICAL SPINE WITHOUT CONTRAST 3/14/2022 12:26 pm TECHNIQUE: CT of the cervical spine was performed without the administration of intravenous contrast. Multiplanar reformatted images are provided for review. Dose modulation, iterative reconstruction, and/or weight based adjustment of the mA/kV was utilized to reduce the radiation dose to as low as reasonably achievable. COMPARISON: None. HISTORY: ORDERING SYSTEM PROVIDED HISTORY: fall TECHNOLOGIST PROVIDED HISTORY: Reason for exam:->fall Decision Support Exception - unselect if not a suspected or confirmed emergency medical condition->Emergency Medical Condition (MA) Reason for Exam: s.p fall earlier today. s/p syncopal episode (+) LOC. pt denies any complaints Relevant Medical/Surgical History: diabetes, GERD HTN FINDINGS: BONES/ALIGNMENT: There is no acute fracture or traumatic malalignment. DEGENERATIVE CHANGES: There is mild to moderate disc space narrowing and endplate osteophyte formation at the C5/6 level. There are small posterior calcified disc protrusions at all levels, likely resulting in an element of central canal stenosis. SOFT TISSUES: There is no prevertebral soft tissue swelling. No acute abnormality of the cervical spine.      CT KNEE RIGHT WO CONTRAST    Result Date: 3/14/2022  EXAMINATION: CT OF THE RIGHT KNEE WITHOUT CONTRAST 3/14/2022 1:29 pm TECHNIQUE: CT of the right knee was performed without the administration of intravenous contrast.  Multiplanar reformatted images are provided for review. Dose modulation, iterative reconstruction, and/or weight based adjustment of the mA/kV was utilized to reduce the radiation dose to as low as reasonably achievable. COMPARISON: Right knee radiograph March 14, 2022 HISTORY ORDERING SYSTEM PROVIDED HISTORY: periprosthetic fracture TECHNOLOGIST PROVIDED HISTORY: Metal artifact subtraction to images please Reason for exam:->periprosthetic fracture Decision Support Exception - unselect if not a suspected or confirmed emergency medical condition->Emergency Medical Condition (MA) Reason for Exam: s/p fall today  fx RT femur. evaluate for ?? surgery. reqiested by [facility] group Relevant Medical/Surgical History: HTN,  GERD, diabetes. ... FINDINGS: Bones: Acute periprosthetic fracture of the distal right femur is centered at the distal metadiaphysis extends to the level of the femoral component of the knee arthroplasty with displacement of fracture fragments along the lateral condyle. Fracture line at the medial condyle is grossly nondisplaced. The major distal fracture fragment is displaced dorsally by up to approximately 1.5 cm at the level of the metadiaphysis. Osseous mineralization is decreased. No additional fractures are identified. No suspicious lytic or sclerotic osseous lesions are evident. Soft Tissue: Soft tissue and intramuscular edema about the fracture site. No subcutaneous gas or radiopaque foreign body identified. Joint: Postoperative changes of right total knee arthroplasty. The hardware is intact. Periprosthetic fracture of the femur as noted above. Small effusion. 1. Acute periprosthetic fracture of the distal right femur centered at the metadiaphysis and extending to the level of the arthroplasty hardware with displacement of major fracture fragment by up to 1.5 cm. Fracture lines are nondisplaced along the medial condyle and mildly comminuted and displaced at the lateral condyle. 2. Osteopenia.      XR CHEST PORTABLE    Result Date: 3/14/2022  EXAMINATION: ONE XRAY VIEW OF THE CHEST 3/14/2022 11:23 am COMPARISON: 02/04/2022. HISTORY: ORDERING SYSTEM PROVIDED HISTORY: syncope TECHNOLOGIST PROVIDED HISTORY: Reason for exam:->syncope Reason for Exam: fall FINDINGS: Lung volumes are diminished. The cardiac silhouette is grossly stable. Patchy ground-glass opacification throughout the lungs, similar in distribution to the previous study, possibly edema or multifocal pneumonia. There is no significant pleural fluid or pneumothorax. Postoperative clips in the right perihilar region. Decreased lung volumes. Stable patchy opacification throughout the lungs, edema versus infiltrate. I directly reviewed all recent imaging studies as well as pertinent prior studies. Radiology reports may or may not be available at the time of my review. EKG:  New and pertinent prior tracings were directly reviewed. My interpretation is as follows:  Sinus bradycardia at HR = 58 bpm.  No Q-waves. Transthoracic Echocardiogram 1/3/2022   The left ventricular systolic function is low normal with an ejection   fraction of 50%. There is hypokinesis of the basal inferior and inferior walls. Normal left ventricular size with mild concentric left ventricular   hypertrophy. Grade I diastolic dysfunction with normal filling pressure. Changes noted from previous echo on 8- in left ventricular function. Mild tricuspid regurgitation. Systolic pulmonic artery pressure (SPAP) is estimated at 46 mmHg consistent   with mild pulmonary hypertension (Right atrial pressure of 3 mmHg). Left Heart Catheterization 9/23/2015  Nonobstructive CAD w/ 30% stenosis in LAD. Normal intracardiac pressures. LVEF 40-45%. Active Hospital Problems    Diagnosis Date Noted    Chronic respiratory failure with hypoxia ([address] [J96.11] 03/14/2022    Ana-prosthetic fracture of femur at tip of prosthesis [M97. [place] 03/14/2022    Personal history of COVID-19 [O09.79] 01/18/2022    Acquired hypothyroidism [E03.9] 01/18/2018    Syncope and collapse [R55] 05/09/2016    DM2 [facility]diabetes mellitus, type 2) [facility] [E11.9] 09/15/2014       ASSESSMENT & PLAN  Right Femur Periprosthetic Fracture  -  Orthopedic surgery assistance appreciated. Leg has been stabilized in a knee immobilizer. Operative fixation anticipated tomorrow. -  Patient's cardiovascular risk factor pattern is c/w low risk for perioperative cardiovascular morbidity and mortality in the context of an urgent, noncardiovascular surgery. No further preoperative cardiovascular risk stratification is warranted on this basis. However, patient's recent bout with COVID-19 and resulting chronic respiratory failure and critical illness myopathy places her at risk for perioperative respiratory failure. Attention to pulmonary toilet is warranted. Patient may be appropriate for postoperative extubation to BiPAP. Will defer to anesthesiology on that point. Chronic Hypoxemic Respiratory Failure  -  Post-COVID pulmonary fibrosis. S/p tracheostomy which has been removed. Baseline 2.5L/min O2 requirement. -  Patient additionally has a background of tracheobronchomalacia and bronchiectasis. -  Continue home inhaled steroid, scheduled duonebs, and prn albuterol nebs. -  Start incentive spirometer q2hwa and BID metanebs as an adjunctive measure to assist with airway clearance. Nonischemic CM, Nonobstructive CAD  -  Historically low normal LVEF.  -  Continue home metoprolol and statin. -  ASA and home antihypertensives and diuretics on hold preoperatively. Diabetes Type 2  -  Hold all oral antidiabetic agents. Start s.c. Insulin regimen based on weight. Hypothyroidism  - Continue home synthroid 100 mcg daily and check TSH. GERD  - Continue PPI and home metoclopramide. Microcytic anemia  -  Chronic mild and stable. Previously d/t chronic inflammation.   Full anemia screening panel added to labs.    Chronic Pain  -  Continue home gabapentin and tizanidine. DVT prophylaxis: SCDs, lovenox  Code Status:  Full  Disposition:  Inpatient for the foreseeable future. Anticipate d/c to ARU or SNF.     Kasia Go MD MD

## 2022-03-14 NOTE — ED PROVIDER NOTES
[facility]  ED      CHIEF COMPLAINT  Leg Pain (right leg pain, multiple knee replacement in past, 50mcg fentanyl, 2L nc baseline, passed out this morning when getting up ) and Loss of Consciousness       HISTORY OF PRESENT ILLNESS  Staci Simpson is a 78 y.o. female  who presents to the ED complaining of right knee and ankle pain after syncopal episode at home. Patient states that she was feeling well this morning, when she stood up to give a hug to a visitor and suddenly lost consciousness. She apparently fell onto her right side as she has pain in the right knee and swelling and ecchymosis over the right ankle. She does endorse decreased sensation of her fourth and fifth toes on the right. Denies any prodromal symptoms. No chest pain or shortness of breath, no lightheadedness. She is currently not feeling lightheaded. Her only complaint currently is the pain in her knee and ankle. No other complaints, modifying factors or associated symptoms. I have reviewed the following from the nursing documentation.     Past Medical History:   Diagnosis Date    Anxiety disorder     Chronic pain syndrome     COVID-19 09/27/2021    DDD (degenerative disc disease), lumbar     Diabetes ([address]     Displacement of lumbar intervertebral disc without myelopathy 08/23/2010    Diverticulitis     Fibromyalgia     Generalized osteoarthrosis, involving multiple sites 08/24/2010    GERD (gastroesophageal reflux disease)     Hypochromic microcytic anemia 1/18/2022    Impacted cerumen 08/23/2010    Influenza A 03/10/2016    Irritable bowel syndrome 08/23/2010    Other and unspecified hyperlipidemia 08/23/2010    Prosthetic joint implant failure ([address]     Screening mammogram 12/08/2006    (Both)Negative    Tracheobronchomalacia     Unspecified asthma(493.90) 08/23/2010    Unspecified essential hypertension 08/24/2010    Unspecified hypothyroidism 08/23/2010     Past Surgical History:   Procedure Laterality Date    ABDOMEN SURGERY      CARDIAC CATHETERIZATION      COLON SURGERY      COLONOSCOPY      normal    HYSTERECTOMY      JOINT REPLACEMENT  10/92    Right; hip     JOINT REPLACEMENT        Left hip  followed by revision 2006    JOINT REPLACEMENT        right     JOINT REPLACEMENT         right replacement.  LUNG SURGERY  2014    tracheobronchomalacia    TRACHEOSTOMY N/A 10/25/2021    TRACHEOTOMY performed by Thierry Perez DO at [address] N/A 10/25/2021    EGD/PEG W/ANES. ON VENT, COVID + performed by Angelo Juarez MD at [facility]     Family History   Problem Relation Age of Onset    Asthma Mother     Heart Failure Father     Cancer Maternal Grandfather         skin cancer on face    Cancer Daughter         skin cancer-BCC    Diabetes Neg Hx     Emphysema Neg Hx     Hypertension Neg Hx      Social History     Socioeconomic History    Marital status:      Spouse name: Not on file    Number of children: Not on file    Years of education: Not on file    Highest education level: Not on file   Occupational History    Not on file   Tobacco Use    Smoking status: Former Smoker     Packs/day: 1.00     Years: 18.00     Pack years: 18.00     Types: Cigarettes     Quit date: 12/10/1991     Years since quittin.2    Smokeless tobacco: Never Used   Vaping Use    Vaping Use: Never used   Substance and Sexual Activity    Alcohol use: No    Drug use: No    Sexual activity: Never     Partners: Male   Other Topics Concern    Not on file   Social History Narrative    Not on file     Social Determinants of Health     Financial Resource Strain:     Difficulty of Paying Living Expenses: Not on file   Food Insecurity:     Worried About [address] in the Last Year: Not on file    [address] in the Last Year: Not on file   Transportation Needs:     Lack of Transportation (Medical):  Not on file  Lack of Transportation (Non-Medical): Not on file   Physical Activity:     Days of Exercise per Week: Not on file    Minutes of Exercise per Session: Not on file   Stress:     Feeling of Stress : Not on file   Social Connections:     Frequency of Communication with Friends and Family: Not on file    Frequency of Social Gatherings with Friends and Family: Not on file    Attends Mu-ism Services: Not on file    Active Member of [address] [place] [address] or Organizations: Not on file    Attends Club or Organization Meetings: Not on file    Marital Status: Not on file   Intimate Partner Violence:     Fear of Current or Ex-Partner: Not on file    Emotionally Abused: Not on file    Physically Abused: Not on file    Sexually Abused: Not on file   Housing Stability:     Unable to Pay for Housing in the Last Year: Not on file    Number of [place] in the Last Year: Not on file    Unstable Housing in the Last Year: Not on file     Current Facility-Administered Medications   Medication Dose Route Frequency Provider Last Rate Last Admin    HYDROcodone-acetaminophen ([facility]) 5-325 MG per tablet 2 tablet  2 tablet Oral Once Natacha Cabrera, DO         Current Outpatient Medications   Medication Sig Dispense Refill    ipratropium-albuterol (DUONEB) 0.5-2.5 (3) MG/3ML SOLN nebulizer solution Inhale 3 mLs into the lungs every 4 hours 360 mL 0    albuterol sulfate  (90 Base) MCG/ACT inhaler Inhale 2 puffs into the lungs 4 times daily as needed for Wheezing 1 each 0    glipiZIDE (GLUCOTROL XL) 10 MG extended release tablet Take 1 tablet by mouth 2 times daily 60 tablet 0    SITagliptin (JANUVIA) 50 MG tablet Take 1 tablet by mouth daily 30 tablet 0    simvastatin (ZOCOR) 40 MG tablet Take 1 tablet by mouth nightly 30 tablet 0    rOPINIRole (REQUIP) 0.5 MG tablet TAKE 1 TABLET BY MOUTH 3 TIMES DAILY.  90 tablet 0    metoprolol tartrate (LOPRESSOR) 25 MG tablet Take 1 tablet by mouth 2 times daily 60 tablet 0    levothyroxine (SYNTHROID) 100 MCG tablet Take 1 tablet by mouth Daily 30 tablet 0    omeprazole (PRILOSEC) 40 MG delayed release capsule Take 1 capsule by mouth daily 30 capsule 0    aspirin 81 MG EC tablet Take 1 tablet by mouth daily 30 tablet 0    spironolactone (ALDACTONE) 25 MG tablet Take 1 tablet by mouth daily 30 tablet 3    furosemide (LASIX) 40 MG tablet Take 1 tablet by mouth daily 60 tablet 3    magnesium oxide (MAG-OX) 400 (240 Mg) MG tablet Take 1 tablet by mouth daily 30 tablet 0    metoclopramide (REGLAN) 5 MG tablet Take 1 tablet by mouth 2 times daily 120 tablet 3    tiZANidine (ZANAFLEX) 2 MG tablet Take 2 mg by mouth every 8 hours as needed      ipratropium-albuterol (DUONEB) 0.5-2.5 (3) MG/3ML SOLN nebulizer solution Inhale 3 mLs into the lungs every 4 hours      carboxymethylcellulose PF (THERATEARS) 1 % GEL ophthalmic gel Place 1 drop into both eyes every 4 hours as needed for Dry Eyes      budesonide (PULMICORT FLEXHALER) 180 MCG/ACT AEPB inhaler Inhale 1 puff into the lungs 2 times daily Per [facility]. [employer]      estradiol (ESTRACE) 0.1 MG/GM vaginal cream Place vaginally See Admin Instructions Insert a pea-sized amount vaginally every day for 7 days, then twice weekly thereafter. Per [facility]. Melanie Calvo. Last dispensed 5/10/21.  Multiple Vitamins-Minerals (THERAPEUTIC MULTIVITAMIN-MINERALS) tablet Take 1 tablet by mouth daily      blood glucose monitor strips Dispense whatever insurance will cover. Pt test twice a day dx:E11.9 200 strip 3    blood glucose test strips (ASCENSIA AUTODISC VI;ONE TOUCH ULTRA TEST VI) strip 1 each by In Vitro route daily As needed. 100 each 3    polyethylene glycol (MIRALAX) powder Take 17 g by mouth daily as needed      Blood Glucose Monitoring Suppl CATHRYN Dispense whatever insurance will cover. Dx code:E11.9 1 Device 0     Allergies   Allergen Reactions    Prednisone Other (See Comments) and Anaphylaxis     Cannot tolerate oral steriods  Cannot tolerate oral steroids - causes Avascular Necrosis of joints. Cannot tolerate oral steriods    Quinidine Anaphylaxis and Nausea And Vomiting    Sulfa Antibiotics Anaphylaxis and Nausea And Vomiting    Bactrim     Clonidine Hives    Sulfamethoxazole-Trimethoprim Rash and Hives       REVIEW OF SYSTEMS  10 systems reviewed, pertinent positives per HPI otherwise noted to be negative. PHYSICAL EXAM  /61   Pulse 62   Temp 97.7 °F (36.5 °C)   Resp 18   SpO2 99%    General: Appears well. Alert  HEENT: Head atraumatic, Eyes normal inspection, PERRL. Normal ENT inspection, Pharynx normal. No signs of dehydration  NECK: Normal inspection  RESPIRATORY: Normal breath sounds. No chest wall tenderness. No respiratory distress  CVS: Heart rate and rhythm regular. No Murmurs  ABDOMEN/GI: Soft, Non-tender, No distention  BACK: Normal inspection  EXTREMITIES: Tenderness along the lateral joint line of the right knee. Severe swelling and ecchymosis over the right lateral malleolus with tenderness to palpation. 1+ pedal pulses bilaterally. Non-Tender. Full ROM. Normal appearance. No Pedal edema  NEURO: Alert and oriented. Sensation normal. Motor normal  PSYCH: Mood normal. Affect normal.  SKIN: Color normal. No rash. Warm, Dry    LABS  I have reviewed all labs for this visit.    Results for orders placed or performed during the hospital encounter of 03/14/22   CBC with Auto Differential   Result Value Ref Range    WBC 7.4 4.0 - 11.0 K/uL    RBC 4.17 4.00 - 5.20 M/uL    Hemoglobin 10.6 (L) 12.0 - 16.0 g/dL    Hematocrit 32.9 (L) 36.0 - 48.0 %    MCV 79.0 (L) 80.0 - 100.0 fL    MCH 25.5 (L) 26.0 - 34.0 pg    MCHC 32.3 31.0 - 36.0 g/dL    RDW 20.5 (H) 12.4 - 15.4 %    Platelets 000 524 - 975 K/uL    MPV 9.4 5.0 - 10.5 fL    Neutrophils % 74.4 %    Lymphocytes % 13.3 %    Monocytes % 7.2 %    Eosinophils % 4.7 %    Basophils % 0.4 %    Neutrophils Absolute 5.5 1.7 - 7.7 K/uL    Lymphocytes Absolute 1.0 1.0 - 5.1 K/uL    Monocytes Absolute 0.5 0.0 - 1.3 K/uL    Eosinophils Absolute 0.3 0.0 - 0.6 K/uL    Basophils Absolute 0.0 0.0 - 0.2 K/uL   Comprehensive Metabolic Panel w/ Reflex to MG   Result Value Ref Range    Sodium 136 136 - 145 mmol/L    Potassium reflex Magnesium 4.4 3.5 - 5.1 mmol/L    Chloride 100 99 - 110 mmol/L    CO2 24 21 - 32 mmol/L    Anion Gap 12 3 - 16    Glucose 139 (H) 70 - 99 mg/dL    BUN 9 7 - 20 mg/dL    CREATININE 0.6 0.6 - 1.2 mg/dL    GFR Non-African American >60 >60    GFR African American >60 >60    Calcium 9.1 8.3 - 10.6 mg/dL    Total Protein 6.7 6.4 - 8.2 g/dL    Albumin 3.7 3.4 - 5.0 g/dL    Albumin/Globulin Ratio 1.2 1.1 - 2.2    Total Bilirubin 0.3 0.0 - 1.0 mg/dL    Alkaline Phosphatase 136 (H) 40 - 129 U/L    ALT 15 10 - 40 U/L    AST 35 15 - 37 U/L   Troponin   Result Value Ref Range    Troponin <0.01 <0.01 ng/mL   Blood Gas, Venous   Result Value Ref Range    pH, Valeriy 7.467 (H) 7.350 - 7.450    pCO2, Valeriy 31.4 (L) 40.0 - 50.0 mmHg    pO2, Valeriy 77.6 (H) 25.0 - 40.0 mmHg    HCO3, Venous 22.2 (L) 23.0 - 29.0 mmol/L    Base Excess, Valeriy -0.8 -3.0 - 3.0 mmol/L    O2 Sat, Valeriy 95 Not Established %    Carboxyhemoglobin 2.4 (H) 0.0 - 1.5 %    MetHgb, Valeriy 0.3 <1.5 %    TC02 (Calc), Valeriy 23 Not Established mmol/L    O2 Therapy Unknown    Sample possible blood bank testing   Result Value Ref Range    Specimen Status TIN    EKG 12 Lead   Result Value Ref Range    Ventricular Rate 58 BPM    Atrial Rate 58 BPM    P-R Interval 162 ms    QRS Duration 86 ms    Q-T Interval 486 ms    QTc Calculation (Bazett) 477 ms    P Axis 19 degrees    R Axis -13 degrees    T Axis 27 degrees    Diagnosis       Sinus bradycardiaVoltage criteria for left ventricular hypertrophyProlonged QTAbnormal ECGWhen compared with ECG of 06-FEB-2022 11:42,Fusion complexes are no longer PresentPremature ventricular complexes are no longer PresentVent.  rate has decreased BY  37 BPMST no longer depressed in Lateral leadsNonspecific T wave abnormality has replaced inverted T waves in Lateral leads       ECG  The Ekg interpreted by me shows  Sinus bradycardia with a rate of 58 bpm.  Left axis deviation. No acute injury pattern. , QRS 86, QTc 477. No significant change from prior EKG dated 2/6/2022    RADIOLOGY  CT KNEE RIGHT WO CONTRAST   Final Result   1. Acute periprosthetic fracture of the distal right femur centered at the   metadiaphysis and extending to the level of the arthroplasty hardware with   displacement of major fracture fragment by up to 1.5 cm. Fracture lines are   nondisplaced along the medial condyle and mildly comminuted and displaced at   the lateral condyle. 2. Osteopenia. CT Head WO Contrast   Final Result   No acute intracranial abnormality. Left mastoid effusion      RECOMMENDATIONS:   Unavailable         CT Cervical Spine WO Contrast   Final Result   No acute abnormality of the cervical spine. XR KNEE RIGHT (1-2 VIEWS)   Final Result   Acute traumatic displaced periprosthetic fracture involving the distal right   femoral metadiaphysis. XR ANKLE RIGHT (MIN 3 VIEWS)   Final Result   5 no acute osseous abnormality of the right foot. Osteopenia. Lateral soft   tissue swelling. XR CHEST PORTABLE   Final Result   Decreased lung volumes. Stable patchy opacification throughout the lungs,   edema versus infiltrate. ED COURSE/MDM  Patient seen and evaluated. Old records reviewed. Labs and imaging reviewed and results discussed with patient. Present with syncopal episode and leg pain. Cardiac work-up obtained as well as x-rays of the right knee and right ankle. Right knee shows periprosthetic fracture. Patient was also hypotensive on arrival, however this improves with 2 L IV fluid. Lab work is reassuring. Orthopedic surgery consulted and will be planning for possible surgery tomorrow. Right leg placed in knee immobilizer.   Discussed with hospitalist who agreed admit the patient to their service. During the patient's ED course, the patient was given:  Medications   ceFAZolin (ANCEF) 2000 mg in dextrose 5 % 100 mL IVPB (has no administration in time range)   HYDROmorphone (DILAUDID) injection 1 mg (1 mg IntraVENous Given 3/14/22 1607)   HYDROcodone-acetaminophen (NORCO) 5-325 MG per tablet 2 tablet (2 tablets Oral Given 3/14/22 1149)   0.9 % sodium chloride bolus (0 mLs IntraVENous Stopped 3/14/22 1614)   acetaminophen (TYLENOL) tablet 1,000 mg (1,000 mg Oral Given 3/14/22 1412)   ketorolac (TORADOL) injection 15 mg (15 mg IntraVENous Given 3/14/22 1411)   0.9 % sodium chloride bolus (1,000 mLs IntraVENous New Bag 3/14/22 1459)   oxyCODONE (ROXICODONE) immediate release tablet 5 mg (5 mg Oral Given 3/14/22 1500)        CLINICAL IMPRESSION  1. Periprosthetic fracture around internal prosthetic joint, initial encounter    2. Syncope and collapse    3. Hypotension, unspecified hypotension type        not currently breastfeeding. DISPOSITION  Simin Wolf was admitted in stable condition. Patient was given scripts for the following medications. I counseled patient how to take these medications. New Prescriptions    No medications on file       Follow-up with:  No follow-up provider specified. DISCLAIMER: This chart was created using Dragon dictation software. Efforts were made by me to ensure accuracy, however some errors may be present due to limitations of this technology and occasionally words are not transcribed correctly.        Vidal Saba,   03/14/22 [id number]

## 2022-03-15 ENCOUNTER — APPOINTMENT (OUTPATIENT)
Dept: GENERAL RADIOLOGY | Age: 67
DRG: 467 | End: 2022-03-15
Payer: MEDICARE

## 2022-03-15 LAB
ABO/RH: NORMAL
ANION GAP SERPL CALCULATED.3IONS-SCNC: 11 MMOL/L (ref 3–16)
ANTIBODY SCREEN: NORMAL
BASOPHILS ABSOLUTE: 0 K/UL (ref 0–0.2)
BASOPHILS RELATIVE PERCENT: 0.5 %
BUN BLDV-MCNC: 7 MG/DL (ref 7–20)
CALCIUM SERPL-MCNC: 8.9 MG/DL (ref 8.3–10.6)
CHLORIDE BLD-SCNC: 103 MMOL/L (ref 99–110)
CO2: 24 MMOL/L (ref 21–32)
CREAT SERPL-MCNC: <0.5 MG/DL (ref 0.6–1.2)
EOSINOPHILS ABSOLUTE: 0.3 K/UL (ref 0–0.6)
EOSINOPHILS RELATIVE PERCENT: 4.2 %
ESTIMATED AVERAGE GLUCOSE: 119.8 MG/DL
FERRITIN: 111.2 NG/ML (ref 15–150)
FOLATE: >20 NG/ML (ref 4.78–24.2)
GFR AFRICAN AMERICAN: >60
GFR NON-AFRICAN AMERICAN: >60
GLUCOSE BLD-MCNC: 105 MG/DL (ref 70–99)
GLUCOSE BLD-MCNC: 106 MG/DL (ref 70–99)
GLUCOSE BLD-MCNC: 113 MG/DL (ref 70–99)
GLUCOSE BLD-MCNC: 131 MG/DL (ref 70–99)
GLUCOSE BLD-MCNC: 164 MG/DL (ref 70–99)
HBA1C MFR BLD: 5.8 %
HCT VFR BLD CALC: 26.4 % (ref 36–48)
HEMOGLOBIN: 8.6 G/DL (ref 12–16)
IRON SATURATION: 9 % (ref 15–50)
IRON: 19 UG/DL (ref 37–145)
LYMPHOCYTES ABSOLUTE: 1.2 K/UL (ref 1–5.1)
LYMPHOCYTES RELATIVE PERCENT: 17 %
MAGNESIUM: 1.5 MG/DL (ref 1.8–2.4)
MCH RBC QN AUTO: 25.7 PG (ref 26–34)
MCHC RBC AUTO-ENTMCNC: 32.5 G/DL (ref 31–36)
MCV RBC AUTO: 79 FL (ref 80–100)
MONOCYTES ABSOLUTE: 0.5 K/UL (ref 0–1.3)
MONOCYTES RELATIVE PERCENT: 7.6 %
NEUTROPHILS ABSOLUTE: 5 K/UL (ref 1.7–7.7)
NEUTROPHILS RELATIVE PERCENT: 70.7 %
PDW BLD-RTO: 20.5 % (ref 12.4–15.4)
PERFORMED ON: ABNORMAL
PLATELET # BLD: 136 K/UL (ref 135–450)
PMV BLD AUTO: 9.3 FL (ref 5–10.5)
POTASSIUM SERPL-SCNC: 3.9 MMOL/L (ref 3.5–5.1)
RBC # BLD: 3.34 M/UL (ref 4–5.2)
SODIUM BLD-SCNC: 138 MMOL/L (ref 136–145)
TOTAL IRON BINDING CAPACITY: 209 UG/DL (ref 260–445)
TSH SERPL DL<=0.05 MIU/L-ACNC: 0.37 UIU/ML (ref 0.27–4.2)
VITAMIN B-12: 914 PG/ML (ref 211–911)
WBC # BLD: 7.1 K/UL (ref 4–11)

## 2022-03-15 PROCEDURE — 94640 AIRWAY INHALATION TREATMENT: CPT

## 2022-03-15 PROCEDURE — 6360000002 HC RX W HCPCS: Performed by: INTERNAL MEDICINE

## 2022-03-15 PROCEDURE — 82728 ASSAY OF FERRITIN: CPT

## 2022-03-15 PROCEDURE — 82746 ASSAY OF FOLIC ACID SERUM: CPT

## 2022-03-15 PROCEDURE — 6370000000 HC RX 637 (ALT 250 FOR IP): Performed by: INTERNAL MEDICINE

## 2022-03-15 PROCEDURE — 83036 HEMOGLOBIN GLYCOSYLATED A1C: CPT

## 2022-03-15 PROCEDURE — 99222 1ST HOSP IP/OBS MODERATE 55: CPT | Performed by: ORTHOPAEDIC SURGERY

## 2022-03-15 PROCEDURE — 83540 ASSAY OF IRON: CPT

## 2022-03-15 PROCEDURE — 80048 BASIC METABOLIC PNL TOTAL CA: CPT

## 2022-03-15 PROCEDURE — 6370000000 HC RX 637 (ALT 250 FOR IP): Performed by: ORTHOPAEDIC SURGERY

## 2022-03-15 PROCEDURE — 2580000003 HC RX 258: Performed by: INTERNAL MEDICINE

## 2022-03-15 PROCEDURE — 36415 COLL VENOUS BLD VENIPUNCTURE: CPT

## 2022-03-15 PROCEDURE — 83735 ASSAY OF MAGNESIUM: CPT

## 2022-03-15 PROCEDURE — 1200000000 HC SEMI PRIVATE

## 2022-03-15 PROCEDURE — 82607 VITAMIN B-12: CPT

## 2022-03-15 PROCEDURE — 83550 IRON BINDING TEST: CPT

## 2022-03-15 PROCEDURE — 84443 ASSAY THYROID STIM HORMONE: CPT

## 2022-03-15 PROCEDURE — 2700000000 HC OXYGEN THERAPY PER DAY

## 2022-03-15 PROCEDURE — 73552 X-RAY EXAM OF FEMUR 2/>: CPT

## 2022-03-15 PROCEDURE — 94761 N-INVAS EAR/PLS OXIMETRY MLT: CPT

## 2022-03-15 PROCEDURE — 85025 COMPLETE CBC W/AUTO DIFF WBC: CPT

## 2022-03-15 RX ORDER — GABAPENTIN 300 MG/1
600 CAPSULE ORAL 3 TIMES DAILY
Status: DISCONTINUED | OUTPATIENT
Start: 2022-03-15 | End: 2022-03-22 | Stop reason: HOSPADM

## 2022-03-15 RX ORDER — HYDROCODONE BITARTRATE AND ACETAMINOPHEN 5; 325 MG/1; MG/1
1 TABLET ORAL EVERY 4 HOURS PRN
Status: DISCONTINUED | OUTPATIENT
Start: 2022-03-15 | End: 2022-03-17

## 2022-03-15 RX ORDER — HYDROCODONE BITARTRATE AND ACETAMINOPHEN 5; 325 MG/1; MG/1
2 TABLET ORAL EVERY 4 HOURS PRN
Status: DISCONTINUED | OUTPATIENT
Start: 2022-03-15 | End: 2022-03-17

## 2022-03-15 RX ADMIN — HYDROCODONE BITARTRATE AND ACETAMINOPHEN 2 TABLET: 5; 325 TABLET ORAL at 22:03

## 2022-03-15 RX ADMIN — HYDROCODONE BITARTRATE AND ACETAMINOPHEN 2 TABLET: 5; 325 TABLET ORAL at 12:42

## 2022-03-15 RX ADMIN — HYDROCODONE BITARTRATE AND ACETAMINOPHEN 2 TABLET: 5; 325 TABLET ORAL at 17:18

## 2022-03-15 RX ADMIN — GABAPENTIN 600 MG: 300 CAPSULE ORAL at 08:36

## 2022-03-15 RX ADMIN — HYDROMORPHONE HYDROCHLORIDE 1 MG: 1 INJECTION, SOLUTION INTRAMUSCULAR; INTRAVENOUS; SUBCUTANEOUS at 18:59

## 2022-03-15 RX ADMIN — INSULIN LISPRO 1 UNITS: 100 INJECTION, SOLUTION INTRAVENOUS; SUBCUTANEOUS at 12:43

## 2022-03-15 RX ADMIN — ROPINIROLE HYDROCHLORIDE 0.5 MG: 0.5 TABLET, FILM COATED ORAL at 22:03

## 2022-03-15 RX ADMIN — ATORVASTATIN CALCIUM 20 MG: 10 TABLET, FILM COATED ORAL at 22:03

## 2022-03-15 RX ADMIN — Medication 2 PUFF: at 07:49

## 2022-03-15 RX ADMIN — Medication 2 PUFF: at 17:45

## 2022-03-15 RX ADMIN — Medication 1 TABLET: at 08:38

## 2022-03-15 RX ADMIN — GABAPENTIN 600 MG: 300 CAPSULE ORAL at 12:42

## 2022-03-15 RX ADMIN — ENOXAPARIN SODIUM 40 MG: 40 INJECTION SUBCUTANEOUS at 08:39

## 2022-03-15 RX ADMIN — HYDROMORPHONE HYDROCHLORIDE 1 MG: 1 INJECTION, SOLUTION INTRAMUSCULAR; INTRAVENOUS; SUBCUTANEOUS at 04:28

## 2022-03-15 RX ADMIN — ROPINIROLE HYDROCHLORIDE 0.5 MG: 0.5 TABLET, FILM COATED ORAL at 08:38

## 2022-03-15 RX ADMIN — HYDROCODONE BITARTRATE AND ACETAMINOPHEN 2 TABLET: 5; 325 TABLET ORAL at 08:37

## 2022-03-15 RX ADMIN — SODIUM CHLORIDE, PRESERVATIVE FREE 10 ML: 5 INJECTION INTRAVENOUS at 22:04

## 2022-03-15 RX ADMIN — HYDROMORPHONE HYDROCHLORIDE 1 MG: 1 INJECTION, SOLUTION INTRAMUSCULAR; INTRAVENOUS; SUBCUTANEOUS at 09:44

## 2022-03-15 RX ADMIN — INSULIN GLARGINE 18 UNITS: 100 INJECTION, SOLUTION SUBCUTANEOUS at 22:12

## 2022-03-15 RX ADMIN — METOPROLOL TARTRATE 25 MG: 25 TABLET, FILM COATED ORAL at 22:03

## 2022-03-15 RX ADMIN — ROPINIROLE HYDROCHLORIDE 0.5 MG: 0.5 TABLET, FILM COATED ORAL at 12:42

## 2022-03-15 RX ADMIN — HYDROMORPHONE HYDROCHLORIDE 1 MG: 1 INJECTION, SOLUTION INTRAMUSCULAR; INTRAVENOUS; SUBCUTANEOUS at 00:03

## 2022-03-15 RX ADMIN — Medication 2 PUFF: at 20:29

## 2022-03-15 RX ADMIN — GABAPENTIN 600 MG: 300 CAPSULE ORAL at 22:03

## 2022-03-15 RX ADMIN — METOPROLOL TARTRATE 25 MG: 25 TABLET, FILM COATED ORAL at 08:38

## 2022-03-15 RX ADMIN — SODIUM CHLORIDE, PRESERVATIVE FREE 10 ML: 5 INJECTION INTRAVENOUS at 00:04

## 2022-03-15 RX ADMIN — INSULIN LISPRO 6 UNITS: 100 INJECTION, SOLUTION INTRAVENOUS; SUBCUTANEOUS at 17:21

## 2022-03-15 RX ADMIN — PANTOPRAZOLE SODIUM 40 MG: 40 TABLET, DELAYED RELEASE ORAL at 08:38

## 2022-03-15 RX ADMIN — ACETAMINOPHEN 650 MG: 325 TABLET ORAL at 05:51

## 2022-03-15 RX ADMIN — SODIUM CHLORIDE, POTASSIUM CHLORIDE, SODIUM LACTATE AND CALCIUM CHLORIDE: 600; 310; 30; 20 INJECTION, SOLUTION INTRAVENOUS at 05:51

## 2022-03-15 RX ADMIN — METOCLOPRAMIDE 5 MG: 10 TABLET ORAL at 08:37

## 2022-03-15 RX ADMIN — INSULIN LISPRO 6 UNITS: 100 INJECTION, SOLUTION INTRAVENOUS; SUBCUTANEOUS at 12:43

## 2022-03-15 RX ADMIN — LEVOTHYROXINE SODIUM 100 MCG: 0.1 TABLET ORAL at 08:37

## 2022-03-15 RX ADMIN — METOCLOPRAMIDE 5 MG: 10 TABLET ORAL at 22:03

## 2022-03-15 ASSESSMENT — PAIN SCALES - GENERAL
PAINLEVEL_OUTOF10: 10
PAINLEVEL_OUTOF10: 8
PAINLEVEL_OUTOF10: 8
PAINLEVEL_OUTOF10: 10
PAINLEVEL_OUTOF10: 10
PAINLEVEL_OUTOF10: 8
PAINLEVEL_OUTOF10: 9

## 2022-03-15 ASSESSMENT — ENCOUNTER SYMPTOMS: COLOR CHANGE: 0

## 2022-03-15 NOTE — PROGRESS NOTES
Hospitalist Progress Note    Date of Admission: 3/14/2022    Chief Complaint: Fall, right femur tiffanie-prosthetic fracture    Hospital Course:  Glenna Dawkins is a 78 y.o. female. She presented to the ER from home by ambulance after a syncopal episode. She stood up and suddenly lost consciousness. Upon awakening she had immediate right knee pain and was unable to bear weight. She is s/p a prolonged hospitalization for COVID-19 which began 9/26/2021. She was initially hospitalized at [place] for roughly a month, then discharged to a [facility] where she stayed for roughly two months, and then the [address] [place] [address]  1/3 - 22/22. She suffered multiple complications and now has chronic respiratory failure requiring 2-2.5 L/min O2 at all times. She suffered critical illness myopathy but she and her sposue relate her overall strength had improved substantially with rehab. Prior to today's fall she was able to ambulate independently with the help of a FWW. She estimated she could ambulate roughly 100ft on a level surface around her home before having to stop because of dyspnea. Subjective: Pain fairly well controlled, at least at rest. No N/V. No CP/SOB. O2 requirement stable from recent baseline. Labs:   Recent Labs     03/14/22  1157 03/15/22  0631   WBC 7.4 7.1   HGB 10.6* 8.6*   HCT 32.9* 26.4*    136     Recent Labs     03/14/22  1157 03/15/22  0631    138   K 4.4 3.9    103   CO2 24 24   BUN 9 7   CREATININE 0.6 <0.5*   CALCIUM 9.1 8.9     Recent Labs     03/14/22  1157   AST 35   ALT 15   BILITOT 0.3   ALKPHOS 136*     No results for input(s): INR in the last 72 hours. Physical Exam Performed:    /62   Pulse 60   Temp 98.7 °F (37.1 °C) (Oral)   Resp 14   Ht 5' 9\" (1.753 m)   Wt 159 lb 9.8 oz (72.4 kg)   SpO2 92%   BMI 23.57 kg/m²     General appearance:  No apparent distress, appears stated age and cooperative.   HEENT:  Pupils equal, round, and reactive to light. Conjunctivae/corneas clear. Neck:  Supple, no jugular venous distention. Trachea midline with full range of motion. Respiratory:  Normal respiratory effort. Clear to auscultation, bilaterally without Rales/Wheezes/Rhonchi. Cardiovascular:  Regular rate and rhythm with normal S1/S2 without murmurs, rubs or gallops. Abdomen:  Soft, non-tender, non-distended with normal bowel sounds. Musculoskelatal:  No clubbing, cyanosis or edema bilaterally. Full range of motion without deformity. Neurologic:  Neurovascularly intact without any focal sensory/motor deficits. Cranial nerves: II-XII intact, grossly non-focal.  Psychiatric:  Alert and oriented, thought content appropriate, normal insight  Skin:  Skin color, texture, turgor normal.  No rashes or lesions. Capillary Refill:  Brisk,< 3 seconds   Peripheral Pulses:  +2 palpable, equal bilaterally       Assessment/Plan:    Active Hospital Problems    Diagnosis     Chronic respiratory failure with hypoxia (HCC) [J96.11]     Ana-prosthetic fracture of femur at tip of prosthesis [M97. 8XXA, Z96.649]     GERD (gastroesophageal reflux disease) [K21.9]     Personal history of COVID-19 [Z86.16]     Microcytic anemia [D50.9]     Acquired hypothyroidism [E03.9]     Nonobstructive atherosclerosis of coronary artery [I25.10]     Syncope and collapse [R55]     Nonischemic cardiomyopathy ([facility] [I42.8]     DM2 (diabetes mellitus, type 2) ([address] [E11.9]     Tracheobronchomalacia [J39.8]      Right Femur Periprosthetic Fracture  -  Orthopedic surgery assistance appreciated. Leg has been stabilized in a knee immobilizer. OR repair planned for 3/16.   -  Patient's cardiovascular risk factor pattern is c/w low risk for perioperative cardiovascular morbidity and mortality in the context of an urgent, noncardiovascular surgery. No further preoperative cardiovascular risk stratification is warranted on this basis.   However, patient's recent bout with COVID-19 and resulting chronic respiratory failure and critical illness myopathy places her at risk for perioperative respiratory failure. Attention to pulmonary toilet is warranted. Patient may be appropriate for postoperative extubation to BiPAP. Will defer to anesthesiology on that point.  - Continue Pain control and supportive care    Chronic Hypoxemic Respiratory Failure  -  Post-COVID pulmonary fibrosis. S/p tracheostomy which has been removed. Baseline 2.5L/min O2 requirement. -  Patient additionally has a background of tracheobronchomalacia and bronchiectasis. -  Continue home inhaled steroid, scheduled duonebs, and prn albuterol nebs. -  Start incentive spirometer q2hwa and BID metanebs as an adjunctive measure to assist with airway clearance. Nonischemic CM, Nonobstructive CAD  -  Historically low normal LVEF.  -  Continue home metoprolol and statin. -  ASA and home antihypertensives and diuretics on hold preoperatively. Diabetes Type 2  -  Hold all oral antidiabetic agents. Start s.c. Insulin regimen based on weight. Hypothyroidism  - Continue home synthroid 100 mcg daily and check TSH. GERD  - Continue PPI and home metoclopramide. Microcytic anemia  -  Chronic mild and stable. Previously d/t chronic inflammation. Full anemia screening panel added to labs. Chronic Pain  -  Continue home gabapentin and tizanidine. DVT prophylaxis: SCDs, lovenox  Diet: ADULT DIET; Regular; 3 carb choices (45 gm/meal);  Low Fat/Low Chol/High Fiber/WILVER  Diet NPO Exceptions are: Sips of Water with Meds, Ice Chips  Code Status: Full Code  PT/OT Eval Status: Post-op    Dispo - 2-3 days post-op    Kristin Johnson MD

## 2022-03-15 NOTE — CONSULTS
ORTHOPAEDIC SURGERY INITIAL EVALUATION NOTE  Chief Complaint   Patient presents with    Leg Pain     right leg pain, multiple knee replacement in past, 50mcg fentanyl, 2L nc baseline, passed out this morning when getting up     Loss of Consciousness     right leg pain started today after passing out, did not hit head, on no blood thinners,       HISTORY OF PRESENT ILLNESS:  65-year-old female with prior history of primary right total knee arthroplasty in the remote past.  She underwent a revision in 2006 for aseptic loosening. She subsequently had an I&D and polyexchange in 2011 for an infection. She had been doing well with regards to this. 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 She indicates that she had a mechanical fall. She is unsure if it was just related to weakness or syncopal episode. She landed directly onto the right knee. She had immediate pain and inability to bear weight. She presented emergency department where x-rays demonstrated a displaced periprosthetic distal femur fracture above a total knee replacement. She rates her pain severe, 10 out of 10. It is well localized to the knee and does not radiate. She denies dysesthesias. She denies prodromal knee pain. She has not had any fevers, chills, or night sweats. She is placed into a knee immobilizer and a CT scan was obtained. Orthopedics was consulted for recommendations. Of note, the patient recently had a prolonged recovery from Covid. She was in the hospital for 4 months.     Past Medical History:   Diagnosis Date    Anxiety disorder     Chronic pain syndrome     COVID-19 09/27/2021    DDD (degenerative disc disease), lumbar     Diabetes ([facility] [address]     Displacement of lumbar intervertebral disc without myelopathy 08/23/2010    Diverticulitis     Fibromyalgia     Generalized osteoarthrosis, involving multiple sites 08/24/2010    GERD (gastroesophageal reflux disease)     Hypochromic microcytic anemia 1/18/2022    Impacted cerumen 08/23/2010    Influenza A 03/10/2016    Irritable bowel syndrome 08/23/2010    Other and unspecified hyperlipidemia 08/23/2010    Prost[facility] joint [facility] failure [facility] [address]     Screening mammogram 12/08/2006    (Both)Negative    Tracheobronchomalacia     Unspecified asthma(493.90) 08/23/2010    Unspecified essential hypertension 08/24/2010    Unspecified hypothyroidism 08/23/2010       Current Facility-Administered Medications   Medication Dose Route Frequency Provider Last Rate Last Admin    gabapentin (NEURONTIN) capsule 600 mg  600 mg Oral TID Jairo Campoverde MD   600 mg at 03/15/22 1242    HYDROcodone-acetaminophen (NORCO) 5-325 MG per tablet 1 tablet  1 tablet Oral Q4H PRN Ashvin Del Valle MD        Or    HYDROcodone-acetaminophen [facility]) 5-325 MG per tablet 2 tablet  2 tablet Oral Q4H PRN Ashvin Del Valle MD   2 tablet at 03/15/22 1242    [START ON 3/16/2022] ceFAZolin (ANCEF) 2000 mg in dextrose 5 % 100 mL IVPB  2,000 mg IntraVENous On Call to [address] [place] [address]        HYDROmorphone (DILAUDID) injection 1 mg  1 mg IntraVENous Q4H PRN Jairo Campoverde MD   1 mg at 03/15/22 0944    fluticasone (FLOVENT HFA) 110 MCG/ACT inhaler 2 puff  2 puff Inhalation BID Jairo Campoverde MD   2 puff at 03/15/22 0749    levothyroxine (SYNTHROID) tablet 100 mcg  100 mcg Oral Daily Jairo Campoverde MD   100 mcg at 03/15/22 0837    metoclopramide (REGLAN) tablet 5 mg  5 mg Oral BID Jairo Campoverde MD   5 mg at 03/15/22 9659    metoprolol tartrate (LOPRESSOR) tablet 25 mg  25 mg Oral BID Jairo Campoverde MD   25 mg at 03/15/22 9446    therapeutic multivitamin-minerals 1 tablet  1 tablet Oral Daily Jario Campoverde MD   1 tablet at 03/15/22 0838    pantoprazole (PROTONIX) tablet 40 mg  40 mg Oral QAM AC Jairo Campoverde MD   40 mg at 03/15/22 0838    polyethylene glycol (GLYCOLAX) packet 17 g  17 g Oral Daily Jairo Campoverde MD  rOPINIRole (REQUIP) tablet 0.5 mg  0.5 mg Oral TID Patricia Bryant MD   0.5 mg at 03/15/22 1242    atorvastatin (LIPITOR) tablet 20 mg  20 mg Oral Daily Patricia Bryant MD   20 mg at 03/14/22 2135    tiZANidine (ZANAFLEX) tablet 2 mg  2 mg Oral Q8H PRN Patricia Bryant MD        glucose (GLUTOSE) 40 % oral gel 15 g  15 g Oral PRN Patricia Bryant MD        glucagon (rDNA) injection 1 mg  1 mg IntraMUSCular PRN Patricia Bryant MD        dextrose 5 % solution  100 mL/hr IntraVENous PRN Patricia Bryant MD        sodium chloride flush 0.9 % injection 10 mL  10 mL IntraVENous PRN Patricia Bryant MD   10 mL at 03/15/22 0004    0.9 % sodium chloride infusion  25 mL IntraVENous PRN Patricia Bryant MD        potassium chloride (KLOR-CON M) extended release tablet 40 mEq  40 mEq Oral PRN Patricia Bryant MD        Or    potassium bicarb-citric acid (EFFER-K) effervescent tablet 40 mEq  40 mEq Oral PRN Patricia Bryant MD        Or    potassium chloride 10 mEq/100 mL IVPB (Peripheral Line)  10 mEq IntraVENous PRN Patricia Bryant MD        magnesium sulfate 1000 mg in dextrose 5% 100 mL IVPB  1,000 mg IntraVENous PRN Patricia Bryant MD        ondansetron (ZOFRAN-ODT) disintegrating tablet 4 mg  4 mg Oral Q8H PRN Patricia Bryant MD        Or    ondansetron [facility]) injection 4 mg  4 mg IntraVENous Q6H PRN Patricia Bryant MD        acetaminophen (TYLENOL) tablet 650 mg  650 mg Oral Q6H PRN Patricia Bryant MD   650 mg at 03/15/22 0551    Or    acetaminophen (TYLENOL) suppository 650 mg  650 mg Rectal Q6H PRN Patricia Bryant MD        [facility]) tablet 8.6 mg  1 tablet Oral Daily PRRAYNA Bryant MD        enoxaparin (LOVENOX) injection 40 mg  40 mg SubCUTAneous Daily Patricia Bryant MD   40 mg at 03/15/22 0839    insulin glargine (LANTUS) injection vial 18 Units  0.25 Units/kg SubCUTAneous Nightly Patricia Bryant MD   18 Units at 03/14/22 5327    insulin lispro (HUMALOG) injection vial 6 Units  0.08 Units/kg SubCUTAneous TID WC Claudette Erb, MD   6 Units at 03/15/22 1243    insulin lispro (HUMALOG) injection vial 0-6 Units  0-6 Units SubCUTAneous TID  Claudette Erb, MD   1 Units at 03/15/22 1243    insulin lispro (HUMALOG) injection vial 0-3 Units  0-3 Units SubCUTAneous Nightly Claudette Erb, MD   1 Units at 03/14/22 1012    melatonin tablet 3 mg  3 mg Oral Nightly PRN Claudette Erb, MD        lactated ringers infusion   IntraVENous Continuous Claudette Erb,  mL/hr at 03/15/22 0551 New Bag at 03/15/22 0551    dextrose bolus (hypoglycemia) 10% 125 mL  125 mL IntraVENous PRN Claudette Erb, MD        Or    dextrose bolus (hypoglycemia) 10% 250 mL  250 mL IntraVENous PRN Claudette Erb, MD        albuterol sulfate  (90 Base) MCG/ACT inhaler 2 puff  2 puff Inhalation BID Giuliana Enrique MD   2 puff at 03/15/22 0749    albuterol sulfate  (90 Base) MCG/ACT inhaler 2 puff  2 puff Inhalation Q4H PRN Giuliana Enrique MD        ipratropium-albuterol (DUONEB) nebulizer solution 1 ampule  1 ampule Inhalation Q4H PRN Giuliana Enrique MD            Past Surgical History:   Procedure Laterality Date    ABDOMEN SURGERY      CARDIAC CATHETERIZATION      COLON SURGERY      COLONOSCOPY  2007    normal    HYSTERECTOMY      JOINT REPLACEMENT  10/92    Right; hip     JOINT REPLACEMENT    12/92    Left hip  followed by revision 1/2006    JOINT REPLACEMENT    6/96    right     JOINT REPLACEMENT    2006     right replacement.  LUNG SURGERY  1/2014    tracheobronchomalacia    TRACHEOSTOMY N/A 10/25/2021    TRACHEOTOMY performed by [employer], DO at [facility] N/A 10/25/2021    EGD/PEG W/ANES.  ON VENT, COVID + performed by Jose Zuniga MD at [facility]       Allergies   Allergen Reactions    Prednisone Other (See Comments) and Anaphylaxis     Cannot tolerate oral steriods  Cannot tolerate oral steroids - causes Avascular Necrosis of joints. Cannot tolerate oral steriods    Quinidine Anaphylaxis and Nausea And Vomiting    Sulfa Antibiotics Anaphylaxis and Nausea And Vomiting    Bactrim     Clonidine Hives    Sulfamethoxazole-Trimethoprim Rash and Hives       Family History   Problem Relation Age of Onset    Asthma Mother     Heart Failure Father     Cancer Maternal Grandfather         skin cancer on face    Cancer Daughter         skin cancer-BCC    Diabetes Neg Hx     Emphysema Neg Hx     Hypertension Neg Hx        Social History     Socioeconomic History    Marital status:      Spouse name: Not on file    Number of children: Not on file    Years of education: Not on file    Highest education level: Not on file   Occupational History    Not on file   Tobacco Use    Smoking status: Former Smoker     Packs/day: 1.00     Years: 18.00     Pack years: 18.00     Types: Cigarettes     Quit date: 12/10/1991     Years since quittin.2    Smokeless tobacco: Never Used   Vaping Use    Vaping Use: Never used   Substance and Sexual Activity    Alcohol use: No    Drug use: No    Sexual activity: Never     Partners: Male   Other Topics Concern    Not on file   Social History Narrative    Not on file     Social Determinants of Health     Financial Resource Strain:     Difficulty of Paying Living Expenses: Not on file   Food Insecurity:     Worried About Running Out of Food in the Last Year: Not on file    Narayan of Food in the Last Year: Not on file   Transportation Needs:     Lack of Transportation (Medical): Not on file    Lack of Transportation (Non-Medical):  Not on file   Physical Activity:     Days of Exercise per Week: Not on file    Minutes of Exercise per Session: Not on file   Stress:     Feeling of Stress : Not on file   Social Connections:     Frequency of Communication with Friends and Family: Not on file    Frequency of Social Gatherings with Friends and Family: Not on file    Attends Taoist Services: Not on file    Active Member of Clubs or Organizations: Not on file    Attends Club or Organization Meetings: Not on file    Marital Status: Not on file   Intimate Partner Violence:     Fear of Current or Ex-Partner: Not on file    Emotionally Abused: Not on file    Physically Abused: Not on file    Sexually Abused: Not on file   Housing Stability:     Unable to Pay for Housing in the Last Year: Not on file    Number of [place] in the Last Year: Not on file    Unstable Housing in the Last Year: Not on file     Review of Systems   Constitutional: Negative for chills and fever. Cardiovascular: Positive for leg swelling. Musculoskeletal: Positive for arthralgias, gait problem and joint swelling. Skin: Negative for color change, pallor, rash and wound. Neurological: Positive for weakness. Negative for numbness. PHYSICAL EXAM  Vitals:    03/15/22 0309 03/15/22 0310 03/15/22 0749 03/15/22 0835   BP:  117/62  133/67   Pulse:  60  91   Resp:  14  16   Temp:  98.7 °F (37.1 °C)  98.6 °F (37 °C)   TempSrc:  Oral  Oral   SpO2: 96% 90% 92% 94%   Weight:       Height:         Gen: awake, alert, oriented  HEENT: atraumatic, normocephalic  Neck: supple  Resp: equal chest rise bilaterally, no audible wheezes, no accessory muscle use. CV: Lower extremities warm and well perfused. Abd: soft, nontender   Focused examination of the right lower extremity:  There are no cuts, open wounds, or abrasions. There is mild swelling about the anterior knee. There is a well-healed surgical scar over the anterior knee. This curvilinear. There is no open areas. No sinus tract. No warmth or erythema to suggest infection. Incision is maturely healed scar. Knee range of motion was not assessed. Sensation is intact to light touch in deep peroneal, superficial peroneal, tibial, sural, and saphenous nerve distributions.   Motor function is intact to EHL, FHL, tibialis anterior, and gastroc. There is brisk capillary refill to the toes and a strong palpable dorsalis pedis pulse. Compartments are soft and compressible. There is no calf tenderness and a negative Homans' sign. LABS  Lab Results   Component Value Date/Time    LABA1C 5.8 03/15/2022 06:31 AM    .7 (H) 09/28/2021 05:21 AM    SRINATH Negative 02/01/2012 02:41 PM    LABALBU 3.7 03/14/2022 11:57 AM    INR 1.47 (H) 09/28/2021 12:05 PM      RADIOGRAPHIC EXAM:  EXAMINATION:   THREE XRAY VIEWS OF THE RIGHT ANKLE       3/14/2022 11:31 am       COMPARISON:   08/11/2012.       HISTORY:   ORDERING SYSTEM PROVIDED HISTORY: pain/swelling   TECHNOLOGIST PROVIDED HISTORY:   Reason for exam:->pain/swelling   Reason for Exam: pain swelling       FINDINGS:   No acute fracture or dislocation.  The ankle mortise and talar dome are   maintained.  Significant soft tissue swelling laterally at the ankle.  The   bones are diffusely osteopenic.  The posterior portion of the calcaneus is   excluded on the lateral view.  Osteoarthritis in the midfoot along the dorsal   aspect.           Impression   5 no acute osseous abnormality of the right foot.  Osteopenia.  Lateral soft   tissue swelling.      EXAMINATION:   2 XRAY VIEWS OF THE RIGHT KNEE       3/14/2022 11:31 am       COMPARISON:   September 21, 2015       HISTORY:   ORDERING SYSTEM PROVIDED HISTORY: pain after fall   TECHNOLOGIST PROVIDED HISTORY:   Reason for exam:->pain after fall   Reason for Exam: pain fall       FINDINGS:   There is an acute traumatic displaced fracture involving the distal femoral   metadiaphysis.  Fractures just above the superior aspect of the femoral   prosthesis. Rogene Dottie is an acute traumatic displaced periprosthetic fracture   involving the distal right femoral metadiaphysis.  There is approximately 2   cm posterior displacement of the distal femur in relation to the more   proximal femur.  No other fractures are identified.  Knee joint appears aligned.           Impression   Acute traumatic displaced periprosthetic fracture involving the distal right   femoral metadiaphysis. EXAMINATION:   CT OF THE RIGHT KNEE WITHOUT CONTRAST 3/14/2022 1:29 pm       TECHNIQUE:   CT of the right knee was performed without the administration of intravenous   contrast.  Multiplanar reformatted images are provided for review. Dose   modulation, iterative reconstruction, and/or weight based adjustment of the   mA/kV was utilized to reduce the radiation dose to as low as reasonably   achievable.       COMPARISON:   Right knee radiograph March 14, 2022       HISTORY   ORDERING SYSTEM PROVIDED HISTORY: periprosthetic fracture   TECHNOLOGIST PROVIDED HISTORY:   Metal artifact subtraction to images please   Reason for exam:->periprosthetic fracture   Decision Support Exception - unselect if not a suspected or confirmed   emergency medical condition->Emergency Medical Condition (MA)   Reason for Exam: s/p fall today  fx RT femur.  evaluate for ?? surgery. reqiested by [facility] group   Relevant Medical/Surgical History: HTN,  GERD, diabetes. .. .       FINDINGS:   Bones: Acute periprosthetic fracture of the distal right femur is centered at   the distal metadiaphysis extends to the level of the femoral component of the   knee arthroplasty with displacement of fracture fragments along the lateral   condyle.  Fracture line at the medial condyle is grossly nondisplaced.  The   major distal fracture fragment is displaced dorsally by up to approximately   1.5 cm at the level of the metadiaphysis.  Osseous mineralization is   decreased.  No additional fractures are identified.  No suspicious lytic or   sclerotic osseous lesions are evident.       Soft Tissue: Soft tissue and intramuscular edema about the fracture site.  No   subcutaneous gas or radiopaque foreign body identified.       Joint: Postoperative changes of right total knee arthroplasty.  The hardware   is intact.  Periprosthetic fracture of the femur as noted above.  Small   effusion.           Impression   1. Acute periprosthetic fracture of the distal right femur centered at the   metadiaphysis and extending to the level of the arthroplasty hardware with   displacement of major fracture fragment by up to 1.5 cm.  Fracture lines are   nondisplaced along the medial condyle and mildly comminuted and displaced at   the lateral condyle. 2. Osteopenia. ASSESSEMENT AND PLAN    78 y.o. female with the following orthopaedic surgery problems:    1. Right periprosthetic distal femur fracture    I reviewed the x-rays and CT scan in detail with the patient and her  today. I indicated that under normal circumstances I would attempt retrograde intramedullary nailing, however with a revision type implant with a tall polyethylene post, this would block the open box for retrograde nailing. Additionally, I did reach out to [employer], which provided me with an implant record. There are no polyethylene components available for replacement for this implant. I reviewed that ORIF with lateral locking plate is likely to fail due to limited bone stock for fixation. I recommended operative intervention in the form of right knee revision with distal femoral replacement. I did indicate to her that the well fixed tibial component would need to be removed in order to have a compatible component. I discussed the risks, benefits, and alternatives to surgical intervention. We will need full-length right femur radiographs for preoperative planning. I described the standard postoperative course which includes knee range of motion as tolerated and weightbearing as tolerated. She will be n.p.o. at midnight in preparation for surgery tomorrow. Tentatively, this is scheduled for 2:30 PM.  She will be n.p.o. at midnight in preparation. Hold anticoagulants. She will provided with oral and IV pain medications in the interim.   All questions were answered the best my ability.     Catherine Arboleda MD

## 2022-03-15 NOTE — PROGRESS NOTES
03/14/22 2001   RT Protocol   History Pulmonary Disease 1   Respiratory pattern 0   Breath sounds 0   Cough 0   Indications for Bronchodilator Therapy On home bronchodilators   Bronchodilator Assessment Score 1 6 yo F with no PMH, here with vomiting x 20, NB/NB since 7 PM. Ate chicken nuggets and mozarella sticks that no one else ate, but they seemed fine. Afebrile. No abdominal pain, no diarrhea. No sick contacts. No recent travel. Exam - Gen - NAD, Head - NCAT, TMs - clear b/l, Pharynx - clear, MMM, Heart - RRR, no m/g/r, Lungs - CTAB, no w/c/r, Abdomen - soft, NT, ND, Skin - No rash, Extremities - FROM, no edema, erythema, ecchymosis, Neuro - CN 2-12 intact, nl strength and sensation, nl gait. Plan - zofran, PO challenge. Dx - vomiting. D/Don home with Rx for zofran. 6 yo F with no PMH, here with vomiting x 20, NB/NB since 7 PM. Ate chicken nuggets and mozarella sticks that no one else ate, but they seemed fine. Afebrile. No abdominal pain, no diarrhea. No sick contacts. No recent travel. Exam - Gen - NAD, Head - NCAT, TMs - clear b/l, Pharynx - clear, MMM, Heart - RRR, no m/g/r, Lungs - CTAB, no w/c/r, Abdomen - soft, NT, ND, Skin - No rash, Extremities - FROM, no edema, erythema, ecchymosis, Neuro - CN 2-12 intact, nl strength and sensation, nl gait. Plan - zofran, PO challenge. Dx - vomiting. Patient tolerated PO. D/Don home with Rx for zofran.

## 2022-03-16 ENCOUNTER — APPOINTMENT (OUTPATIENT)
Dept: GENERAL RADIOLOGY | Age: 67
DRG: 467 | End: 2022-03-16
Payer: MEDICARE

## 2022-03-16 ENCOUNTER — ANESTHESIA (OUTPATIENT)
Dept: OPERATING ROOM | Age: 67
DRG: 467 | End: 2022-03-16
Payer: MEDICARE

## 2022-03-16 ENCOUNTER — ANESTHESIA EVENT (OUTPATIENT)
Dept: OPERATING ROOM | Age: 67
DRG: 467 | End: 2022-03-16
Payer: MEDICARE

## 2022-03-16 VITALS
SYSTOLIC BLOOD PRESSURE: 124 MMHG | RESPIRATION RATE: 1 BRPM | TEMPERATURE: 96.4 F | DIASTOLIC BLOOD PRESSURE: 57 MMHG | OXYGEN SATURATION: 71 %

## 2022-03-16 PROBLEM — M97.9XXA: Status: ACTIVE | Noted: 2022-03-14

## 2022-03-16 LAB
ANION GAP SERPL CALCULATED.3IONS-SCNC: 10 MMOL/L (ref 3–16)
BASOPHILS ABSOLUTE: 0 K/UL (ref 0–0.2)
BASOPHILS RELATIVE PERCENT: 0.7 %
BUN BLDV-MCNC: 8 MG/DL (ref 7–20)
CALCIUM SERPL-MCNC: 9 MG/DL (ref 8.3–10.6)
CHLORIDE BLD-SCNC: 102 MMOL/L (ref 99–110)
CO2: 26 MMOL/L (ref 21–32)
CREAT SERPL-MCNC: <0.5 MG/DL (ref 0.6–1.2)
EOSINOPHILS ABSOLUTE: 0.3 K/UL (ref 0–0.6)
EOSINOPHILS RELATIVE PERCENT: 4.6 %
GFR AFRICAN AMERICAN: >60
GFR NON-AFRICAN AMERICAN: >60
GLUCOSE BLD-MCNC: 104 MG/DL (ref 70–99)
GLUCOSE BLD-MCNC: 144 MG/DL (ref 70–99)
GLUCOSE BLD-MCNC: 81 MG/DL (ref 70–99)
GLUCOSE BLD-MCNC: 83 MG/DL (ref 70–99)
GLUCOSE BLD-MCNC: 84 MG/DL (ref 70–99)
GLUCOSE BLD-MCNC: 97 MG/DL (ref 70–99)
HCT VFR BLD CALC: 17.9 % (ref 36–48)
HCT VFR BLD CALC: 26.2 % (ref 36–48)
HEMOGLOBIN: 5.9 G/DL (ref 12–16)
HEMOGLOBIN: 8.5 G/DL (ref 12–16)
LACTIC ACID: 1.5 MMOL/L (ref 0.4–2)
LYMPHOCYTES ABSOLUTE: 1.4 K/UL (ref 1–5.1)
LYMPHOCYTES RELATIVE PERCENT: 21.7 %
MAGNESIUM: 1.8 MG/DL (ref 1.8–2.4)
MCH RBC QN AUTO: 25.9 PG (ref 26–34)
MCHC RBC AUTO-ENTMCNC: 32.4 G/DL (ref 31–36)
MCV RBC AUTO: 79.7 FL (ref 80–100)
MONOCYTES ABSOLUTE: 0.7 K/UL (ref 0–1.3)
MONOCYTES RELATIVE PERCENT: 10.8 %
NEUTROPHILS ABSOLUTE: 4 K/UL (ref 1.7–7.7)
NEUTROPHILS RELATIVE PERCENT: 62.2 %
PDW BLD-RTO: 20.7 % (ref 12.4–15.4)
PERFORMED ON: ABNORMAL
PERFORMED ON: NORMAL
PLATELET # BLD: 131 K/UL (ref 135–450)
PMV BLD AUTO: 8.4 FL (ref 5–10.5)
POTASSIUM SERPL-SCNC: 4 MMOL/L (ref 3.5–5.1)
RBC # BLD: 3.29 M/UL (ref 4–5.2)
SARS-COV-2, NAAT: NOT DETECTED
SODIUM BLD-SCNC: 138 MMOL/L (ref 136–145)
WBC # BLD: 6.5 K/UL (ref 4–11)

## 2022-03-16 PROCEDURE — 83735 ASSAY OF MAGNESIUM: CPT

## 2022-03-16 PROCEDURE — 6360000002 HC RX W HCPCS: Performed by: ORTHOPAEDIC SURGERY

## 2022-03-16 PROCEDURE — 27487 REVISE/REPLACE KNEE JOINT: CPT | Performed by: ORTHOPAEDIC SURGERY

## 2022-03-16 PROCEDURE — 36415 COLL VENOUS BLD VENIPUNCTURE: CPT

## 2022-03-16 PROCEDURE — 87635 SARS-COV-2 COVID-19 AMP PRB: CPT

## 2022-03-16 PROCEDURE — 3700000001 HC ADD 15 MINUTES (ANESTHESIA): Performed by: ORTHOPAEDIC SURGERY

## 2022-03-16 PROCEDURE — 85025 COMPLETE CBC W/AUTO DIFF WBC: CPT

## 2022-03-16 PROCEDURE — 6370000000 HC RX 637 (ALT 250 FOR IP): Performed by: INTERNAL MEDICINE

## 2022-03-16 PROCEDURE — 27487 REVISE/REPLACE KNEE JOINT: CPT | Performed by: STUDENT IN AN ORGANIZED HEALTH CARE EDUCATION/TRAINING PROGRAM

## 2022-03-16 PROCEDURE — 94761 N-INVAS EAR/PLS OXIMETRY MLT: CPT

## 2022-03-16 PROCEDURE — 6370000000 HC RX 637 (ALT 250 FOR IP): Performed by: ORTHOPAEDIC SURGERY

## 2022-03-16 PROCEDURE — 2720000010 HC SURG SUPPLY STERILE: Performed by: ORTHOPAEDIC SURGERY

## 2022-03-16 PROCEDURE — 73560 X-RAY EXAM OF KNEE 1 OR 2: CPT

## 2022-03-16 PROCEDURE — A4217 STERILE WATER/SALINE, 500 ML: HCPCS | Performed by: ORTHOPAEDIC SURGERY

## 2022-03-16 PROCEDURE — 3209999900 FLUORO FOR SURGICAL PROCEDURES

## 2022-03-16 PROCEDURE — 80048 BASIC METABOLIC PNL TOTAL CA: CPT

## 2022-03-16 PROCEDURE — 2500000003 HC RX 250 WO HCPCS: Performed by: NURSE ANESTHETIST, CERTIFIED REGISTERED

## 2022-03-16 PROCEDURE — 2580000003 HC RX 258: Performed by: ORTHOPAEDIC SURGERY

## 2022-03-16 PROCEDURE — 83605 ASSAY OF LACTIC ACID: CPT

## 2022-03-16 PROCEDURE — 0SPC0JZ REMOVAL OF SYNTHETIC SUBSTITUTE FROM RIGHT KNEE JOINT, OPEN APPROACH: ICD-10-PCS | Performed by: STUDENT IN AN ORGANIZED HEALTH CARE EDUCATION/TRAINING PROGRAM

## 2022-03-16 PROCEDURE — 64447 NJX AA&/STRD FEMORAL NRV IMG: CPT | Performed by: ANESTHESIOLOGY

## 2022-03-16 PROCEDURE — 2700000000 HC OXYGEN THERAPY PER DAY

## 2022-03-16 PROCEDURE — C1713 ANCHOR/SCREW BN/BN,TIS/BN: HCPCS | Performed by: ORTHOPAEDIC SURGERY

## 2022-03-16 PROCEDURE — 94640 AIRWAY INHALATION TREATMENT: CPT

## 2022-03-16 PROCEDURE — 6360000002 HC RX W HCPCS: Performed by: INTERNAL MEDICINE

## 2022-03-16 PROCEDURE — 6360000002 HC RX W HCPCS: Performed by: NURSE ANESTHETIST, CERTIFIED REGISTERED

## 2022-03-16 PROCEDURE — 2500000003 HC RX 250 WO HCPCS: Performed by: ANESTHESIOLOGY

## 2022-03-16 PROCEDURE — 3600000004 HC SURGERY LEVEL 4 BASE: Performed by: ORTHOPAEDIC SURGERY

## 2022-03-16 PROCEDURE — 1200000000 HC SEMI PRIVATE

## 2022-03-16 PROCEDURE — 2580000003 HC RX 258: Performed by: INTERNAL MEDICINE

## 2022-03-16 PROCEDURE — 3700000000 HC ANESTHESIA ATTENDED CARE: Performed by: ORTHOPAEDIC SURGERY

## 2022-03-16 PROCEDURE — 6360000002 HC RX W HCPCS

## 2022-03-16 PROCEDURE — 6360000002 HC RX W HCPCS: Performed by: SPECIALIST/TECHNOLOGIST

## 2022-03-16 PROCEDURE — 85014 HEMATOCRIT: CPT

## 2022-03-16 PROCEDURE — 2500000003 HC RX 250 WO HCPCS: Performed by: ORTHOPAEDIC SURGERY

## 2022-03-16 PROCEDURE — 7100000001 HC PACU RECOVERY - ADDTL 15 MIN: Performed by: ORTHOPAEDIC SURGERY

## 2022-03-16 PROCEDURE — 6360000002 HC RX W HCPCS: Performed by: ANESTHESIOLOGY

## 2022-03-16 PROCEDURE — 2709999900 HC NON-CHARGEABLE SUPPLY: Performed by: ORTHOPAEDIC SURGERY

## 2022-03-16 PROCEDURE — 0SRC0J9 REPLACEMENT OF RIGHT KNEE JOINT WITH SYNTHETIC SUBSTITUTE, CEMENTED, OPEN APPROACH: ICD-10-PCS | Performed by: STUDENT IN AN ORGANIZED HEALTH CARE EDUCATION/TRAINING PROGRAM

## 2022-03-16 PROCEDURE — 7100000000 HC PACU RECOVERY - FIRST 15 MIN: Performed by: ORTHOPAEDIC SURGERY

## 2022-03-16 PROCEDURE — 3600000014 HC SURGERY LEVEL 4 ADDTL 15MIN: Performed by: ORTHOPAEDIC SURGERY

## 2022-03-16 PROCEDURE — 85018 HEMOGLOBIN: CPT

## 2022-03-16 PROCEDURE — C1776 JOINT DEVICE (IMPLANTABLE): HCPCS | Performed by: ORTHOPAEDIC SURGERY

## 2022-03-16 DEVICE — IMPLANTABLE DEVICE: Type: IMPLANTABLE DEVICE | Site: KNEE | Status: FUNCTIONAL

## 2022-03-16 DEVICE — CABLE SURG L635MM DIA1.8MM CO CHROM CERCLAGE CBL RDY: Type: IMPLANTABLE DEVICE | Site: KNEE | Status: FUNCTIONAL

## 2022-03-16 DEVICE — CEMENT BNE 40 GM RADIOPAQUE BA SIMPLEX P: Type: IMPLANTABLE DEVICE | Site: KNEE | Status: FUNCTIONAL

## 2022-03-16 RX ORDER — SODIUM CHLORIDE 9 MG/ML
25 INJECTION, SOLUTION INTRAVENOUS PRN
Status: DISCONTINUED | OUTPATIENT
Start: 2022-03-16 | End: 2022-03-22 | Stop reason: HOSPADM

## 2022-03-16 RX ORDER — FENTANYL CITRATE 50 UG/ML
INJECTION, SOLUTION INTRAMUSCULAR; INTRAVENOUS PRN
Status: DISCONTINUED | OUTPATIENT
Start: 2022-03-16 | End: 2022-03-16 | Stop reason: SDUPTHER

## 2022-03-16 RX ORDER — SODIUM CHLORIDE 9 MG/ML
INJECTION, SOLUTION INTRAVENOUS CONTINUOUS
Status: DISCONTINUED | OUTPATIENT
Start: 2022-03-16 | End: 2022-03-19

## 2022-03-16 RX ORDER — SODIUM CHLORIDE 0.9 % (FLUSH) 0.9 %
5-40 SYRINGE (ML) INJECTION EVERY 12 HOURS SCHEDULED
Status: DISCONTINUED | OUTPATIENT
Start: 2022-03-16 | End: 2022-03-17 | Stop reason: SDUPTHER

## 2022-03-16 RX ORDER — BUPIVACAINE HYDROCHLORIDE 7.5 MG/ML
INJECTION, SOLUTION INTRASPINAL PRN
Status: DISCONTINUED | OUTPATIENT
Start: 2022-03-16 | End: 2022-03-16 | Stop reason: SDUPTHER

## 2022-03-16 RX ORDER — PHENYLEPHRINE HCL IN 0.9% NACL 1 MG/10 ML
SYRINGE (ML) INTRAVENOUS PRN
Status: DISCONTINUED | OUTPATIENT
Start: 2022-03-16 | End: 2022-03-16 | Stop reason: SDUPTHER

## 2022-03-16 RX ORDER — SODIUM CHLORIDE 9 MG/ML
25 INJECTION, SOLUTION INTRAVENOUS PRN
Status: DISCONTINUED | OUTPATIENT
Start: 2022-03-16 | End: 2022-03-16 | Stop reason: HOSPADM

## 2022-03-16 RX ORDER — PROPOFOL 10 MG/ML
INJECTION, EMULSION INTRAVENOUS PRN
Status: DISCONTINUED | OUTPATIENT
Start: 2022-03-16 | End: 2022-03-16 | Stop reason: SDUPTHER

## 2022-03-16 RX ORDER — SODIUM CHLORIDE 0.9 % (FLUSH) 0.9 %
5-40 SYRINGE (ML) INJECTION PRN
Status: DISCONTINUED | OUTPATIENT
Start: 2022-03-16 | End: 2022-03-16 | Stop reason: HOSPADM

## 2022-03-16 RX ORDER — FENTANYL CITRATE 50 UG/ML
25 INJECTION, SOLUTION INTRAMUSCULAR; INTRAVENOUS EVERY 5 MIN PRN
Status: DISCONTINUED | OUTPATIENT
Start: 2022-03-16 | End: 2022-03-16 | Stop reason: HOSPADM

## 2022-03-16 RX ORDER — PROPOFOL 10 MG/ML
INJECTION, EMULSION INTRAVENOUS CONTINUOUS PRN
Status: DISCONTINUED | OUTPATIENT
Start: 2022-03-16 | End: 2022-03-16 | Stop reason: SDUPTHER

## 2022-03-16 RX ORDER — VANCOMYCIN HYDROCHLORIDE 1 G/20ML
INJECTION, POWDER, LYOPHILIZED, FOR SOLUTION INTRAVENOUS PRN
Status: DISCONTINUED | OUTPATIENT
Start: 2022-03-16 | End: 2022-03-16 | Stop reason: ALTCHOICE

## 2022-03-16 RX ORDER — TRANEXAMIC ACID 100 MG/ML
INJECTION, SOLUTION INTRAVENOUS PRN
Status: DISCONTINUED | OUTPATIENT
Start: 2022-03-16 | End: 2022-03-16 | Stop reason: SDUPTHER

## 2022-03-16 RX ORDER — ONDANSETRON 4 MG/1
4 TABLET, ORALLY DISINTEGRATING ORAL EVERY 8 HOURS PRN
Status: DISCONTINUED | OUTPATIENT
Start: 2022-03-16 | End: 2022-03-22 | Stop reason: HOSPADM

## 2022-03-16 RX ORDER — MAGNESIUM HYDROXIDE 1200 MG/15ML
LIQUID ORAL CONTINUOUS PRN
Status: COMPLETED | OUTPATIENT
Start: 2022-03-16 | End: 2022-03-16

## 2022-03-16 RX ORDER — SODIUM CHLORIDE 0.9 % (FLUSH) 0.9 %
5-40 SYRINGE (ML) INJECTION EVERY 12 HOURS SCHEDULED
Status: DISCONTINUED | OUTPATIENT
Start: 2022-03-16 | End: 2022-03-16 | Stop reason: HOSPADM

## 2022-03-16 RX ORDER — BUPIVACAINE HYDROCHLORIDE AND EPINEPHRINE 5; 5 MG/ML; UG/ML
INJECTION, SOLUTION EPIDURAL; INTRACAUDAL; PERINEURAL PRN
Status: DISCONTINUED | OUTPATIENT
Start: 2022-03-16 | End: 2022-03-16 | Stop reason: SDUPTHER

## 2022-03-16 RX ORDER — ONDANSETRON 2 MG/ML
4 INJECTION INTRAMUSCULAR; INTRAVENOUS EVERY 6 HOURS PRN
Status: DISCONTINUED | OUTPATIENT
Start: 2022-03-16 | End: 2022-03-22 | Stop reason: HOSPADM

## 2022-03-16 RX ORDER — MEPERIDINE HYDROCHLORIDE 50 MG/ML
12.5 INJECTION INTRAMUSCULAR; INTRAVENOUS; SUBCUTANEOUS EVERY 5 MIN PRN
Status: DISCONTINUED | OUTPATIENT
Start: 2022-03-16 | End: 2022-03-16 | Stop reason: HOSPADM

## 2022-03-16 RX ORDER — SODIUM CHLORIDE 0.9 % (FLUSH) 0.9 %
5-40 SYRINGE (ML) INJECTION PRN
Status: DISCONTINUED | OUTPATIENT
Start: 2022-03-16 | End: 2022-03-17 | Stop reason: SDUPTHER

## 2022-03-16 RX ORDER — MIDAZOLAM HYDROCHLORIDE 5 MG/ML
INJECTION INTRAMUSCULAR; INTRAVENOUS PRN
Status: DISCONTINUED | OUTPATIENT
Start: 2022-03-16 | End: 2022-03-16 | Stop reason: SDUPTHER

## 2022-03-16 RX ORDER — ACETAMINOPHEN 325 MG/1
650 TABLET ORAL EVERY 6 HOURS
Status: DISCONTINUED | OUTPATIENT
Start: 2022-03-16 | End: 2022-03-22 | Stop reason: HOSPADM

## 2022-03-16 RX ORDER — ONDANSETRON 2 MG/ML
4 INJECTION INTRAMUSCULAR; INTRAVENOUS
Status: DISCONTINUED | OUTPATIENT
Start: 2022-03-16 | End: 2022-03-16 | Stop reason: HOSPADM

## 2022-03-16 RX ORDER — MEPERIDINE HYDROCHLORIDE 50 MG/ML
INJECTION INTRAMUSCULAR; INTRAVENOUS; SUBCUTANEOUS
Status: COMPLETED
Start: 2022-03-16 | End: 2022-03-16

## 2022-03-16 RX ADMIN — HYDROMORPHONE HYDROCHLORIDE 0.5 MG: 1 INJECTION, SOLUTION INTRAMUSCULAR; INTRAVENOUS; SUBCUTANEOUS at 19:05

## 2022-03-16 RX ADMIN — PROPOFOL 30 MCG/KG/MIN: 10 INJECTION, EMULSION INTRAVENOUS at 17:15

## 2022-03-16 RX ADMIN — MEPERIDINE HYDROCHLORIDE 12.5 MG: 50 INJECTION INTRAMUSCULAR; INTRAVENOUS; SUBCUTANEOUS at 18:39

## 2022-03-16 RX ADMIN — Medication 2 PUFF: at 19:56

## 2022-03-16 RX ADMIN — CEFAZOLIN 2000 MG: 10 INJECTION, POWDER, FOR SOLUTION INTRAVENOUS at 15:10

## 2022-03-16 RX ADMIN — HYDROCODONE BITARTRATE AND ACETAMINOPHEN 2 TABLET: 5; 325 TABLET ORAL at 03:01

## 2022-03-16 RX ADMIN — PROPOFOL 30 MG: 10 INJECTION, EMULSION INTRAVENOUS at 15:16

## 2022-03-16 RX ADMIN — LEVOTHYROXINE SODIUM 100 MCG: 0.1 TABLET ORAL at 05:56

## 2022-03-16 RX ADMIN — HYDROCODONE BITARTRATE AND ACETAMINOPHEN 2 TABLET: 5; 325 TABLET ORAL at 11:28

## 2022-03-16 RX ADMIN — Medication 2 PUFF: at 19:58

## 2022-03-16 RX ADMIN — ROPINIROLE HYDROCHLORIDE 0.5 MG: 0.5 TABLET, FILM COATED ORAL at 20:35

## 2022-03-16 RX ADMIN — SODIUM CHLORIDE, PRESERVATIVE FREE 10 ML: 5 INJECTION INTRAVENOUS at 20:35

## 2022-03-16 RX ADMIN — HYDROCODONE BITARTRATE AND ACETAMINOPHEN 2 TABLET: 5; 325 TABLET ORAL at 20:34

## 2022-03-16 RX ADMIN — Medication 40 MCG: at 15:27

## 2022-03-16 RX ADMIN — METOPROLOL TARTRATE 25 MG: 25 TABLET, FILM COATED ORAL at 07:48

## 2022-03-16 RX ADMIN — PROPOFOL 20 MG: 10 INJECTION, EMULSION INTRAVENOUS at 15:18

## 2022-03-16 RX ADMIN — Medication 2 PUFF: at 07:53

## 2022-03-16 RX ADMIN — MEPERIDINE HYDROCHLORIDE 12.5 MG: 50 INJECTION, SOLUTION INTRAMUSCULAR; INTRAVENOUS; SUBCUTANEOUS at 18:39

## 2022-03-16 RX ADMIN — Medication 40 MCG: at 15:23

## 2022-03-16 RX ADMIN — TRANEXAMIC ACID 1000 MG: 100 INJECTION, SOLUTION INTRAVENOUS at 15:26

## 2022-03-16 RX ADMIN — HYDROMORPHONE HYDROCHLORIDE 1 MG: 1 INJECTION, SOLUTION INTRAMUSCULAR; INTRAVENOUS; SUBCUTANEOUS at 00:12

## 2022-03-16 RX ADMIN — BUPIVACAINE HYDROCHLORIDE AND EPINEPHRINE BITARTRATE 10 ML: 5; .005 INJECTION, SOLUTION EPIDURAL; INTRACAUDAL; PERINEURAL at 14:46

## 2022-03-16 RX ADMIN — PROPOFOL 30 MG: 10 INJECTION, EMULSION INTRAVENOUS at 17:15

## 2022-03-16 RX ADMIN — FENTANYL CITRATE 100 MCG: 50 INJECTION INTRAMUSCULAR; INTRAVENOUS at 14:43

## 2022-03-16 RX ADMIN — HYDROMORPHONE HYDROCHLORIDE 1 MG: 1 INJECTION, SOLUTION INTRAMUSCULAR; INTRAVENOUS; SUBCUTANEOUS at 09:48

## 2022-03-16 RX ADMIN — MIDAZOLAM HYDROCHLORIDE 2 MG: 5 INJECTION, SOLUTION INTRAMUSCULAR; INTRAVENOUS at 15:24

## 2022-03-16 RX ADMIN — BUPIVACAINE HYDROCHLORIDE 2 ML: 7.5 INJECTION, SOLUTION SUBARACHNOID at 15:28

## 2022-03-16 RX ADMIN — HYDROMORPHONE HYDROCHLORIDE 1 MG: 1 INJECTION, SOLUTION INTRAMUSCULAR; INTRAVENOUS; SUBCUTANEOUS at 05:53

## 2022-03-16 RX ADMIN — BUPIVACAINE HYDROCHLORIDE AND EPINEPHRINE BITARTRATE 10 ML: 5; .005 INJECTION, SOLUTION EPIDURAL; INTRACAUDAL; PERINEURAL at 14:45

## 2022-03-16 RX ADMIN — SODIUM CHLORIDE, POTASSIUM CHLORIDE, SODIUM LACTATE AND CALCIUM CHLORIDE: 600; 310; 30; 20 INJECTION, SOLUTION INTRAVENOUS at 13:09

## 2022-03-16 RX ADMIN — HYDROMORPHONE HYDROCHLORIDE 1 MG: 1 INJECTION, SOLUTION INTRAMUSCULAR; INTRAVENOUS; SUBCUTANEOUS at 21:35

## 2022-03-16 RX ADMIN — Medication 2 PUFF: at 07:52

## 2022-03-16 RX ADMIN — HYDROMORPHONE HYDROCHLORIDE 0.5 MG: 1 INJECTION, SOLUTION INTRAMUSCULAR; INTRAVENOUS; SUBCUTANEOUS at 18:40

## 2022-03-16 RX ADMIN — Medication 40 MCG: at 15:18

## 2022-03-16 RX ADMIN — HYDROCODONE BITARTRATE AND ACETAMINOPHEN 2 TABLET: 5; 325 TABLET ORAL at 07:48

## 2022-03-16 RX ADMIN — Medication 80 MCG: at 17:21

## 2022-03-16 RX ADMIN — SODIUM CHLORIDE 25 ML: 9 INJECTION, SOLUTION INTRAVENOUS at 23:26

## 2022-03-16 RX ADMIN — MIDAZOLAM HYDROCHLORIDE 2 MG: 5 INJECTION, SOLUTION INTRAMUSCULAR; INTRAVENOUS at 14:43

## 2022-03-16 RX ADMIN — ATORVASTATIN CALCIUM 20 MG: 10 TABLET, FILM COATED ORAL at 20:35

## 2022-03-16 RX ADMIN — BUPIVACAINE HYDROCHLORIDE AND EPINEPHRINE BITARTRATE 5 ML: 5; .005 INJECTION, SOLUTION EPIDURAL; INTRACAUDAL; PERINEURAL at 14:44

## 2022-03-16 RX ADMIN — GABAPENTIN 600 MG: 300 CAPSULE ORAL at 20:35

## 2022-03-16 RX ADMIN — METOCLOPRAMIDE 5 MG: 10 TABLET ORAL at 20:35

## 2022-03-16 RX ADMIN — BUPIVACAINE HYDROCHLORIDE AND EPINEPHRINE BITARTRATE 5 ML: 5; .005 INJECTION, SOLUTION EPIDURAL; INTRACAUDAL; PERINEURAL at 14:47

## 2022-03-16 ASSESSMENT — PULMONARY FUNCTION TESTS
PIF_VALUE: 0

## 2022-03-16 ASSESSMENT — PAIN DESCRIPTION - LOCATION: LOCATION: KNEE

## 2022-03-16 ASSESSMENT — PAIN SCALES - GENERAL
PAINLEVEL_OUTOF10: 9
PAINLEVEL_OUTOF10: 8
PAINLEVEL_OUTOF10: 10
PAINLEVEL_OUTOF10: 8
PAINLEVEL_OUTOF10: 8
PAINLEVEL_OUTOF10: 5
PAINLEVEL_OUTOF10: 5
PAINLEVEL_OUTOF10: 8
PAINLEVEL_OUTOF10: 6
PAINLEVEL_OUTOF10: 9
PAINLEVEL_OUTOF10: 10
PAINLEVEL_OUTOF10: 8
PAINLEVEL_OUTOF10: 8
PAINLEVEL_OUTOF10: 7
PAINLEVEL_OUTOF10: 9

## 2022-03-16 ASSESSMENT — PAIN - FUNCTIONAL ASSESSMENT: PAIN_FUNCTIONAL_ASSESSMENT: 0-10

## 2022-03-16 ASSESSMENT — PAIN DESCRIPTION - DESCRIPTORS: DESCRIPTORS: SHARP

## 2022-03-16 ASSESSMENT — PAIN DESCRIPTION - ORIENTATION: ORIENTATION: RIGHT

## 2022-03-16 ASSESSMENT — PAIN DESCRIPTION - PAIN TYPE: TYPE: ACUTE PAIN

## 2022-03-16 NOTE — ANESTHESIA PRE PROCEDURE
Department of Anesthesiology  Preprocedure Note       Name:  Cristian Villarreal   Age:  78 y.o.  :  1955                                          MRN:  [id number]         Date:  3/16/2022      Surgeon:  Susan Stephenson MD    Procedure:  REVISION RIGHT TOTAL KNEE REPLACEMENT     HPI:  77-year-old female with prior history of primary right total knee arthroplasty in the remote past. She underwent a revision in  for aseptic loosening. She subsequently had an I&D and polyexchange in  for an infection. She had been doing well with regards to this. 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 She indicates that she had a mechanical fall. She is unsure if it was just related to weakness or syncopal episode. She landed directly onto the right knee. She had immediate pain and inability to bear weight. She presented emergency department where x-rays demonstrated a displaced periprosthetic distal femur fracture above a total knee replacement. EK-MAR-2022 11:26:57  Sinus bradycardia 58  Voltage criteria for left ventricular hypertrophy  Prolonged QT  Nonspecific ST and T wave abnormality    ECHO:  The left ventricular systolic function is low normal with an ejection fraction of 50%. There is hypokinesis of the basal inferior and inferior walls. Normal left ventricular size with mild concentric left ventricular hypertrophy. Grade I diastolic dysfunction with normal filling pressure. Changes noted from previous echo on 2017 in left ventricular function. Mild tricuspid regurgitation. Systolic pulmonic artery pressure (SPAP) is estimated at 46 mmHg consistent with mild pulmonary hypertension    Medications prior to admission:   HYDROcodone-acetaminophen (NORCO) 7.5-325 MG per tablet Take 1 tablet by mouth 4 times daily. gabapentin (NEURONTIN) 600 MG tablet Take 600 mg by mouth 3 times daily.    ipratropium-albuterol (DUONEB) nebulizer solution Inhale 3 mLs into the lungs every 4 hours   albuterol sulfate  (90 Base) MCG/ACT inhaler Inhale 2 puffs into the lungs 4 times daily as needed for Wheezing   simvastatin (ZOCOR) 40 MG tablet Take 1 tablet by mouth nightly   rOPINIRole (REQUIP) 0.5 MG tablet TAKE 1 TABLET BY MOUTH 3 TIMES DAILY. metoprolol tartrate (LOPRESSOR) 25 MG tablet Take 1 tablet by mouth 2 times daily   levothyroxine (SYNTHROID) 100 MCG tablet Take 1 tablet by mouth Daily  Patient taking differently: Take 88 mcg by mouth Daily    omeprazole (PRILOSEC) 40 MG delayed release capsule Take 1 capsule by mouth daily   aspirin 81 MG EC tablet Take 1 tablet by mouth daily   furosemide (LASIX) 40 MG tablet Take 1 tablet by mouth daily  Patient taking differently: Take 20 mg by mouth daily    magnesium oxide (MAG-OX) 400 (240 Mg) MG tablet Take 1 tablet by mouth daily   metoclopramide (REGLAN) 5 MG tablet Take 1 tablet by mouth 2 times daily   tiZANidine (ZANAFLEX) 2 MG tablet Take 2 mg by mouth every 8 hours as needed   ipratropium-albuterol (DUONEB) 0.5-2.5 (3) nebulizer solution Inhale 3 mLs into the lungs every 4 hours   budesonide (PULMICORT FLEXHALER) 180 MCG/ACT AEPB inhaler Inhale 1 puff into the lungs 2 times daily Per Yasmine at [place].  [facility] [employer]   Multiple Vitamins-Minerals (THERAPEUTIC MULTIVITAMIN-MINERALS) tablet Take 1 tablet by mouth daily   polyethylene glycol (MIRALAX) powder Take 17 g by mouth daily as needed     Current medications:     gabapentin (NEURONTIN) capsule 600 mg  600 mg Oral TID    HYDROcodone-acetaminophen (NORCO) 5-325 MG   1 tablet Oral Q4H PRN       HYDROcodone-acetaminophen (NORCO) 5-325 MG   2 tablet Oral Q4H PRN    ceFAZolin (ANCEF) 2000 mg in dextrose 5 % 100 mL IVPB  2,000 mg IntraVENous On Call to OR    HYDROmorphone (DILAUDID) injection 1 mg  1 mg IntraVENous Q4H PRN    fluticasone (FLOVENT HFA) 110 MCG/ACT inhaler 2 puff  2 puff Inhalation BID    levothyroxine (SYNTHROID) tablet 100 mcg  100 mcg Oral Daily    metoclopramide (REGLAN) tablet 5 mg  5 mg Oral BID    metoprolol tartrate (LOPRESSOR) tablet 25 mg  25 mg Oral BID    therapeutic multivitamin-minerals 1 tablet  1 tablet Oral Daily    pantoprazole (PROTONIX) tablet 40 mg  40 mg Oral QAM AC    polyethylene glycol (GLYCOLAX) packet 17 g  17 g Oral Daily    rOPINIRole (REQUIP) tablet 0.5 mg  0.5 mg Oral TID    atorvastatin (LIPITOR) tablet 20 mg  20 mg Oral Daily    tiZANidine (ZANAFLEX) tablet 2 mg  2 mg Oral Q8H PRN    glucose (GLUTOSE) 40 % oral gel 15 g  15 g Oral PRN    glucagon (rDNA) injection 1 mg  1 mg IntraMUSCular PRN    dextrose 5 % solution  100 mL/hr IntraVENous PRN    potassium chloride (KLOR-CON M) extended release   40 mEq Oral PRN       potassium bicarb-citric acid (EFFER-K) effervescent tablet   40 mEq Oral PRN       potassium chloride 10 mEq/100 mL IVPB (Peripheral Line)  10 mEq IntraVENous PRN    magnesium sulfate 1000 mg in dextrose 5% 100 mL IVPB  1,000 mg IntraVENous PRN    ondansetron (ZOFRAN-ODT) disintegrating tablet 4 mg  4 mg Oral Q8H PRN       ondansetron (ZOFRAN) injection 4 mg  4 mg IntraVENous Q6H PRN    acetaminophen (TYLENOL) tablet 650 mg  650 mg Oral Q6H PRN       acetaminophen (TYLENOL) suppository 650 mg  650 mg Rectal Q6H PRN    senna (SENOKOT) tablet 8.6 mg  1 tablet Oral Daily PRN    [Held by provider] enoxaparin (LOVENOX) injection 40 mg  40 mg SubCUTAneous Daily    insulin glargine (LANTUS) injection vial 18 Units  0.25 Units/kg SubCUTAneous Nightly    insulin lispro (HUMALOG) injection vial 6 Units  0.08 Units/kg SubCUTAneous TID WC    insulin lispro (HUMALOG) injection vial 0-6 Units  0-6 Units SubCUTAneous TID WC    insulin lispro (HUMALOG) injection vial 0-3 Units  0-3 Units SubCUTAneous Nightly    melatonin tablet 3 mg  3 mg Oral Nightly PRN    lactated ringers infusion   IntraVENous Continuous    dextrose bolus (hypoglycemia) 10% 125 mL  125 mL IntraVENous PRN       dextrose bolus (hypoglycemia) 10% 250 mL  250 mL IntraVENous PRN    albuterol sulfate  (90 Base) MCG/ACT inhaler 2 puff  2 puff Inhalation BID    albuterol sulfate  (90 Base) MCG/ACT inhaler 2 puff  2 puff Inhalation Q4H PRN    ipratropium-albuterol (DUONEB) nebulizer solution 1 ampule  1 ampule Inhalation Q4H PRN     Allergies:     Prednisone Other (See Comments) and Anaphylaxis     Cannot tolerate oral steroids - causes Avascular Necrosis of joints.     Quinidine Anaphylaxis and Nausea And Vomiting    Sulfa Antibiotics Anaphylaxis and Nausea And Vomiting    Bactrim     Clonidine Hives    Sulfamethoxazole-Trimethoprim Rash and Hives     Problem List:     Displacement of lumbar intervertebral disc without myelopathy    Hypothyroidism    Mixed hyperlipidemia    Anxiety state    Generalized osteoarthrosis, involving multiple sites    Primary hypertension    Shoulder arthritis    Vitamin D deficiency    Chronic pain syndrome    Fibromyalgia    DDD (degenerative disc disease), lumbar    Anxiety disorder    Prosthetic joint implant failure (HCC)    Pulmonary nodule    Tracheobronchomalacia    Bronchiectasis (HCC)    DM2 (diabetes mellitus, type 2) (HCC)    Nonischemic cardiomyopathy (HCC)    Systolic congestive heart failure (HCC)    Chest pain    S/P cardiac catheterization    PVC (premature ventricular contraction)    Palpitations    Syncope and collapse    Convulsion (HCC)    New onset seizure (HCC)    Nonobstructive atherosclerosis of coronary artery    Nausea    Memory change    Acquired hypothyroidism    Sepsis (HCC)    Type 1 diabetes mellitus without complication (HCC)    Orthostatic hypotension    Hypoxia    Acute respiratory failure with hypoxia ([address]    COVID-19    Pneumonia due to COVID-19 virus    CHARLY (acute kidney injury) ([address]    Uncontrolled hypertension    Fever    Hypotension    Severe protein-calorie malnutrition (HCC)    Gram-negative pneumonia ([address]    Pneumonia due to Pseudomonas species ([address]    Hypomagnesemia    Hypokalemia    Acute hypoxemic respiratory failure (HCC)    ARDS (adult respiratory distress syndrome) (HCC)    Pneumonia due to Klebsiella pneumoniae (HCC)    Electrolyte disorder    Critical illness myopathy    Critical illness neuropathy ([address]    Post covid-19 condition, unspecified    Moderate malnutrition (HCC)    Aspiration into airway    Oropharyngeal dysphagia    Vocal cord paralysis    Personal history of COVID-19    Chest congestion    Pulmonary infiltrates    Microcytic anemia    Hyperglycemia    Diastolic dysfunction    Pulmonary HTN (HCC)    Leukocytosis    Abdominal pain    Abdominal pain, left lower quadrant    Constipation    Acute cystitis with hematuria    Chronic respiratory failure with hypoxia (HCC)    Ana-prosthetic fracture of femur at tip of prosthesis    GERD (gastroesophageal reflux disease)     Past Medical History:     Anxiety disorder     Chronic pain syndrome     COVID-19 09/27/2021    DDD (degenerative disc disease), lumbar     Diabetes ([address]     Displacement of lumbar intervertebral disc without myelopathy 08/23/2010    Diverticulitis     Fibromyalgia     Generalized osteoarthrosis, involving multiple sites 08/24/2010    GERD (gastroesophageal reflux disease)     Hypochromic microcytic anemia 1/18/2022    Impacted cerumen 08/23/2010    Influenza A 03/10/2016    Irritable bowel syndrome 08/23/2010    Other and unspecified hyperlipidemia 08/23/2010    Prosthetic joint implant failure ([address]     Screening mammogram 12/08/2006    (Both)Negative    Tracheobronchomalacia     Unspecified asthma(493.90) 08/23/2010    Unspecified essential hypertension 08/24/2010    Unspecified hypothyroidism 08/23/2010     Past Surgical History:     ABDOMEN SURGERY      CARDIAC CATHETERIZATION      COLON SURGERY      COLONOSCOPY  2007    normal    HYSTERECTOMY      JOINT REPLACEMENT 10/92    Right; hip     JOINT REPLACEMENT        Left hip  followed by revision 2006    JOINT REPLACEMENT        right     JOINT REPLACEMENT         right replacement.  LUNG SURGERY  2014    tracheobronchomalacia    TRACHEOSTOMY N/A 10/25/2021    TRACHEOTOMY performed by Danna Abraham DO at [address] N/A 10/25/2021    EGD/PEG W/ANES. ON VENT, COVID + performed by Doris Major MD at [address] [place] [address] History:     Smoking status: Former Smoker     Packs/day: 1.00     Years: 18.00     Pack years: 18.00     Types: Cigarettes     Quit date: 12/10/1991     Years since quittin.2    Smokeless tobacco: Never Used   Substance Use Topics    Alcohol use: No                                Counseling given: Not Answered    Vital Signs (Current):     22 0410 22 0517 22 0745 22 0753   BP: 105/69  124/74    Pulse: 82  88    Resp: 16  18 20   Temp: 98.9 °F (37.2 °C)  98.8 °F (37.1 °C)    TempSrc: Oral  Oral    SpO2: 99%  98% 98%     Height: 5' 9\" (1.753 m) (22) Weight: 156 lb 8.4 oz (71 kg) (22)   BMI: 23.11           BP Readings from Last 3 Encounters:   22 124/74   22 117/65   22 (!) 149/98     NPO Status: >8 hrs                      BMI:   Wt Readings from Last 3 Encounters:   22 156 lb 8.4 oz (71 kg)   22 143 lb 9 oz (65.1 kg)   22 147 lb (66.7 kg)     Body mass index is 23.11 kg/m².     CBC:    WBC 6.5 2022    HGB 8.5 2022    HCT 26.2 2022     2022     CMP:     2022    K 4.0 2022    K 4.4 2022     2022    CO2 26 2022    BUN 8 2022    CREATININE <0.5 2022    GFRAA >60 2022    GLUCOSE 104 2022    PROT 6.7 2022    CALCIUM 9.0 2022    BILITOT 0.3 2022    ALKPHOS 136 2022    AST 35 2022    ALT 15 2022     POC Tests:   3/16/2022   11:41    POC Glucose 81     Coags:    PROTIME 16.9 09/28/2021    INR 1.47 09/28/2021     COVID-19 Screening (If Applicable): COVID19 Not Detected 03/16/2022    COVID19 NOT DETECTED 02/06/2022     Anesthesia Evaluation  Patient summary reviewed and Nursing notes reviewed no history of anesthetic complications:   Airway: Mallampati: II  TM distance: >3 FB   Neck ROM: full  Mouth opening: > = 3 FB Dental:          Pulmonary: breath sounds clear to auscultation  (+) pneumonia (COVID 19 that caused patient tyo get a trach- on vent >2 months): resolved,      (-) sleep apnea                           Cardiovascular:  Exercise tolerance: poor (<4 METS),   (+) hypertension:, CHF: diastolic and systolic,     (-) past MI, CABG/stent and dysrhythmias    ECG reviewed  Rhythm: regular    Echocardiogram reviewed                  Neuro/Psych:   (+) neuromuscular disease:, psychiatric history:depression/anxiety    (-) seizures, TIA and CVA            ROS comment: +Fibromyalgia GI/Hepatic/Renal:   (+) GERD: well controlled,      (-) hepatitis, liver disease and no renal disease       Endo/Other:    (+) DiabetesType II DM, , hypothyroidism::., .                 Abdominal:             Vascular:     - DVT and PE. Other Findings:           Anesthesia Plan      regional, spinal and MAC     ASA 3     (FN Block(s) for post-op pain management per surgeon request.  Because of COVID and prolonged vent, will try spinal to avoid intubation/vent)  Induction: intravenous. MIPS: Prophylactic antiemetics administered. Anesthetic plan and risks discussed with patient and spouse. Plan discussed with CRNA.           Maksim Alas MD

## 2022-03-16 NOTE — ANESTHESIA PROCEDURE NOTES
Spinal Block    Patient location during procedure: OR  Reason for block: primary anesthetic  Staffing  Performed: anesthesiologist   Anesthesiologist: Shaquille Morse MD  Preanesthetic Checklist  Completed: patient identified, IV checked, site marked, risks and benefits discussed, surgical consent, monitors and equipment checked, pre-op evaluation, timeout performed, anesthesia consent given, oxygen available and patient being monitored  Spinal Block  Patient position: right lateral decubitus  Prep: ChloraPrep  Patient monitoring: cardiac monitor, continuous pulse ox, continuous capnometry and frequent blood pressure checks  Approach: midline  Location: L4/L5  Guidance: anatomical landmarks  Provider prep: mask and sterile gloves  Local infiltration: lidocaine  Agent: bupivacaine  Adjuvant: epinephrine (10 mcg)  Dose: 2  Dose: 2  Needle  Needle type: Quincke   Needle gauge: 22 G  Needle length: 4 in  Assessment  Events: cerebrospinal fluid  Swirl obtained: Yes  CSF: clear  Attempts: 1  Hemodynamics: stable  Additional Notes  +CSF [place] before/during/after injection

## 2022-03-16 NOTE — OP NOTE
Operative Note      Patient: Maye Schmitt  YOB: 1955  MRN: [id number]    Date of Procedure: 3/16/2022    Pre-Op Diagnosis: RIGHT PERIPROSTHETIC DISTAL FEMUR FRACTURE    Post-Op Diagnosis: Same WITH LOOSE FEMORAL COMPONENT       Procedure(s):  REVISION RIGHT TOTAL KNEE ARTHROPLASTY WITH DISTAL FEMORAL REPLACEMENT    Surgeon(s):  DO Memo Tompkins MD    Assistant:   Surgical Assistant: Vimal Weir    Anesthesia: General    Estimated Blood Loss (mL): 390     Complications: None    Specimens:   * No specimens in log *    Implants:  Implant Name Type Inv. Item Serial No.  Lot No. LRB No. Used Action   CABLE SURG L635MM DIA1. 8MM CO CHROM CERCLAGE CBL RDY - [id number]  CABLE SURG L635MM DIA1. 8MM CO CHROM CERCLAGE CBL RDY  [employer]- [id number] Right 1 Implanted   NEXGEN ROTATING HINGE STEM TIBIAL PLATE SIZE 5 - [id number]  NEXGEN ROTATING HINGE STEM TIBIAL PLATE SIZE 5  [facility] [id number] Right 1 Implanted   NEXGEN STRAIGHT STEM EXT 11MM PRIYANKA X 145MM(100MM) - [id number]  NEXGEN STRAIGHT STEM EXT 11MM PRIYANKA X 145MM(100MM)  [employer] [facility] [id number] Right 1 Implanted   PSN REV TM TIB CENTRAL CONE SZ G - [id number] Knee PSN REV TM TIB CENTRAL CONE SZ [facility]  [employer] [facility] [id number] Right 1 Implanted   CEMENT BNE 40 GM RADIOPAQUE BA SIMPLEX P - [id number]  CEMENT BNE 40 GM RADIOPAQUE BA SIMPLEX P  [employer] [facility]- [id number] Right 4 Implanted   SEGMENTAL TM COLLAR FOR 9-16MM 35MM - [id number]  SEGMENTAL TM COLLAR FOR 9-16MM 35MM  [employer] [facility] [id number] Right 1 Implanted   SEGMENTAL ART SURF SZ B 17MM - [id number]  SEGMENTAL ART SURF SZ B 17MM  [employer] [facility] [id number] Right 1 Implanted   COMPONENT FEM SZ B DST KNEE POLYETH SEG SYS - [id number]  COMPONENT FEM SZ B DST KNEE POLYETH SEG SYS  MELA [facility] [id number] Right 1 Implanted   COMPONENT FEM SEG B RT DSTL KNEE - [id number] COMPONENT FEM SEG B RT DSTL KNEE  [employer] [id number] Right 1 Implanted   SEGMENTAL STR FLUTED STEM 36F642RE - [id number]  SEGMENTAL STR FLUTED STEM [id number]  [employer] [id number] Right 1 Implanted         Drains:   Urethral Catheter Temperature probe 16 fr (Active)   Catheter Indications Perioperative use for selected surgical procedures 03/16/22 0926   Site Assessment Pink;Moist;No urethral drainage 03/16/22 1318   Urine Color Yellow 03/16/22 1318   Urine Appearance Hazy 03/16/22 1318   Urine Odor Malodorous 03/15/22 1718   Output (mL) 225 mL 03/16/22 1318   Tourniquet time: 120 min at 300mmHg on the right thigh. Findings: Heavily comminuted distal femur fracture above a primary femoral implant. There was heavy comminution involving the medial upper condyle with loosening of the femoral component. Tibial component was well fixed. Poor bone quality of the medial tibial plateau related to stress shielding. Detailed Description of Procedure: The patient was positively identified in the preoperative holding area by attending surgeon. Informed consent verified and placed on the chart. The right lower extremity was marked appropriately. The patient was then taken to the operating room by the anesthesia team.  A femoral nerve block was administered preoperatively. A spinal anesthesia was administered as well as light sedation. The patient was then positioned supine with all bony prominences well-padded. A bump was placed on the right hip. A nonsterile tourniquet was placed high on the right thigh but not yet inflated. At this point, the right lower extremity was prepped and draped in standard sterile fashion. A timeout was held to verify the correct patient, operative site, laterality, and procedure. Everyone in the room was in agreement. At this point, the limb was exsanguinated via an Esmarch. The tourniquet was inflated to 300 mmHg.   At this point curvilinear incision over the anterior knee was utilized. Dissection was carried sharply through the skin and subcutaneous tissues down the level of the extensor mechanism. Conservative medial lateral skin flaps were raised. Electrocautery was used to maintain hemostasis. With the extensor mechanism fully exposed, a standard medial parapatellar arthrotomy was made. This was extended into the quadriceps tendon proximally. At this point, a conservative medial release was carried out off of the medial tibia. The infrapatellar fat pad/scarring was excised. Great care was taken to protect the tibial tubercle attachment. The patella was able to be subluxed laterally. At this point, the implant was identified. There was no evidence of gross purulence. The femoral component was able to be pulled free from the distal femoral bone by hand. There is heavy comminution involving the medial epicondyle/condyle. At this point, the fracture site was provisionally reduced using gentle traction. A site approximately 90 mm proximal to the joint line was marked using marking pen. The femoral shaft was cut using a saw at this level. Excess bone was removed taking great care to remain on bone using electrocautery. Great care was taken to protect the neurovascular bundle posteriorly. At this point, attention was turned to the tibial component. Polyethylene implant was removed. Next, a saw was used to exploit the interface between the tibial component and the cement mantle. Narrow osteotomes were used to undermine the tibial baseplate. The bone was noted to be significantly osteoporotic particularly involving the medial tibial plateau. There was just a wafer of bone remaining. That she required total knee extraction device was clamped onto the tibial baseplate. This was able to be tapped out. Much of the cement mantle was able to be removed with the implant.   Next, the cement was removed using combination of osteotome, rongeur, and reverse bob hook. Next, intramedullary reamers were used to ream the intramedullary canal.  This was reamed to a size 12 mm outer diameter. This gave a snug fit in the isthmus. At this point, the trial component was inserted. Ultimately a size 5 rotating hinged tibial baseplate was selected for coverage of the native tibial plateau. Given the significant stress shielding of the implant, it was felt that a cone should be implanted for additional fixation. The large cone was felt to be appropriate to gain appropriate bony contact in the metaphyseal region. Next a cement restrictor was placed. The tibial component was cemented into position taking great care to achieve appropriate version and coverage of the native tibial plateau. The cement was allowed to cure. Great care was taken to remove excess cement. At this point, attention was turned to the femoral component. A prophylactic cable was placed around the diaphysis of the femur. Next the femoral canal was reamed to a size 15 outer diameter. A trial femoral component was inserted. It was felt that the distal femoral cut was slightly oblique and therefore it was freshened up using a freehand technique. The trial component fit in a better position. Next, the C-arm fluoroscopy was brought in to evaluate the position of the tibial implant as well as the intramedullary stems. Being satisfied with this, the tourniquet was let down. Hemostasis was ensured using electrocautery. There is no evidence of vascular injury. The wound was then copiously irrigated with sterile saline solution via pulse lavage. At this point, the femoral component was cemented into place after first inserting a cement restrictor. The cement was allowed to cure. Great care was taken to provide for appropriate revision on the femoral component. Next, the size 17 mm thickness polyethylene component was inserted. The hinged knee implants were length.   The wound was then copiously irrigated with sterile saline solution and closed in a layered fashion using 0 Vicryl for the arthrotomy. This was done in an interrupted fashion. This was reinforced with #1 strata fix. A periarticular injection mixture was distributed about the soft tissues in the. The wound was then closed in a layered fashion using 2-0 Vicryl and staples for the skin. A sterile Mepilex dressing was applied. The patient was sent awakened from light sedation and transitioned back to the hospital bed. She was taken to the postoperative recovery area in apparent stable condition. There were no immediate complication. All sponge and needle counts were correct. Modifier 22 should apply due to significantly increased work in removal of a well fixed revision tibial component and significant stress shielding of the medial tibial plateau. Postoperative plan:  WBAT RLE  Range of motion as tolerated  PT/OT  Pain control  DVT prophylaxis: Aspirin 325 mg by mouth daily to begin postoperative day #1  Ancef IV x24 hours postoperatively. We will transition to oral doxycycline for additional 2 weeks given issues in the past with prosthetic joint infection. Check hemoglobin postoperatively: Anticipate potential for transfusion if hemoglobin 7.0 or below.     Electronically signed by Louis Borrego MD on 3/16/2022 at 6:38 PM

## 2022-03-16 NOTE — PROGRESS NOTES
Patient arrived in PACU at this time and placed on monitor. Report received from Maricruz Pantoja. 18. and Shyla CENTENO. O2 on a 2L via NC. Will continue to monitor.

## 2022-03-16 NOTE — ANESTHESIA PROCEDURE NOTES
Peripheral Block    Patient location during procedure: pre-op  Start time: 3/16/2022 2:43 PM  End time: 3/16/2022 2:48 PM  Staffing  Performed: anesthesiologist   Anesthesiologist: Juan Alberto Mccollum MD  Preanesthetic Checklist  Completed: patient identified, IV checked, site marked, risks and benefits discussed, surgical consent, monitors and equipment checked, pre-op evaluation, timeout performed, anesthesia consent given, oxygen available and patient being monitored  Peripheral Block  Patient position: supine  Patient monitoring: cardiac monitor, continuous pulse ox, frequent blood pressure checks and IV access  Block type: Femoral  Laterality: right  Injection technique: single-shot  Guidance: ultrasound guided  Femoral crease  Provider prep: mask and sterile gloves  Needle  Needle gauge: 22 G  Needle length: 8 cm  Needle localization: ultrasound guidance  Test dose: negative  Assessment  Injection assessment: negative aspiration for heme, no paresthesia on injection and local visualized surrounding nerve on ultrasound  Paresthesia pain: none  Slow fractionated injection: yes  Hemodynamics: stable  Additional Notes  Pt. agrees to risks, benefits and alternatives to block  Block performed at the request of the surgeon for post-operative pain management   Immediately prior to procedure a \"time out\" was called to verify the correct patient, procedure, equipment, support staff. Site/side marked as required  Side: right  Site/Approach: Inguinal Crease  Position: Supine  Sedation: Midazolam 2 mg  +  Fentanyl  100  mcg  IV  Local Anesthetic Dose:  2% Lido  4 ml sc  Aseptic technique: prepped with chlorhexidine  Ultrasound:   yes, see attached image  Local Anesthetic: 0.5% Bupivacaine with Epi 1:200K  Amount: 30 ml  in 5 ml increments after negative aspiration each time. Easy injection w/o resistance and w/o pain/paresthesias. Pt tolerated procedure well. No complications.   Reason for block: post-op pain management and at surgeon's request

## 2022-03-16 NOTE — PROGRESS NOTES
Patient's  updated on patient status and plan of care and sent to the room to await patient. Phone report called to Emil Bhatt. Patient to room via transport with portable telemetry and O2.

## 2022-03-16 NOTE — PROGRESS NOTES
Hospitalist Progress Note    Date of Admission: 3/14/2022    Chief Complaint: Fall, right femur tiffanie-prosthetic fracture    Hospital Course:  Cristian Villarreal is a 78 y.o. female. She presented to the ER from home by ambulance after a syncopal episode. She stood up and suddenly lost consciousness. Upon awakening she had immediate right knee pain and was unable to bear weight. She is s/p a prolonged hospitalization for COVID-19 which began 9/26/2021. She was initially hospitalized at [facility] for roughly a month, then discharged to a [facility] where she stayed for roughly two months, and then the [address] [place] [address]  1/3 - 22/22. She suffered multiple complications and now has chronic respiratory failure requiring 2-2.5 L/min O2 at all times. She suffered critical illness myopathy but she and her sposue relate her overall strength had improved substantially with rehab. Prior to today's fall she was able to ambulate independently with the help of a FWW. She estimated she could ambulate roughly 100ft on a level surface around her home before having to stop because of dyspnea. Subjective: Pain adequately controlled. No CP/SOB. O2 requirement stable from recent baseline. Plan for OR today. Labs:   Recent Labs     03/14/22  1157 03/15/22  0631 03/16/22  0643   WBC 7.4 7.1 6.5   HGB 10.6* 8.6* 8.5*   HCT 32.9* 26.4* 26.2*    136 131*     Recent Labs     03/14/22  1157 03/15/22  0631 03/16/22  0643    138 138   K 4.4 3.9 4.0    103 102   CO2 24 24 26   BUN 9 7 8   CREATININE 0.6 <0.5* <0.5*   CALCIUM 9.1 8.9 9.0     Recent Labs     03/14/22  1157   AST 35   ALT 15   BILITOT 0.3   ALKPHOS 136*     No results for input(s): INR in the last 72 hours.     Physical Exam Performed:    /74   Pulse 88   Temp 98.8 °F (37.1 °C) (Oral)   Resp 20   Ht 5' 9\" (1.753 m)   Wt 156 lb 8.4 oz (71 kg)   SpO2 98%   BMI 23.11 kg/m²     General appearance:  No apparent distress, appears stated age and cooperative. HEENT:  Pupils equal, round, and reactive to light. Conjunctivae/corneas clear. Neck:  Supple, no jugular venous distention. Trachea midline with full range of motion. Respiratory:  Normal respiratory effort. Clear to auscultation, bilaterally without Rales/Wheezes/Rhonchi. Cardiovascular:  Regular rate and rhythm with normal S1/S2 without murmurs, rubs or gallops. Abdomen:  Soft, non-tender, non-distended with normal bowel sounds. Musculoskelatal:  No clubbing, cyanosis or edema bilaterally. Full range of motion without deformity. Neurologic:  Neurovascularly intact without any focal sensory/motor deficits. Cranial nerves: II-XII intact, grossly non-focal.  Psychiatric:  Alert and oriented, thought content appropriate, normal insight  Skin:  Skin color, texture, turgor normal.  No rashes or lesions. Capillary Refill:  Brisk,< 3 seconds   Peripheral Pulses:  +2 palpable, equal bilaterally       Assessment/Plan:    Active Hospital Problems    Diagnosis     Chronic respiratory failure with hypoxia (HCC) [J96.11]     Ana-prosthetic fracture of femur at tip of prosthesis [M97. 8XXA, Z96.649]     GERD (gastroesophageal reflux disease) [K21.9]     Personal history of COVID-19 [Z86.16]     Microcytic anemia [D50.9]     Acquired hypothyroidism [E03.9]     Nonobstructive atherosclerosis of coronary artery [I25.10]     Syncope and collapse [R55]     Nonischemic cardiomyopathy ([facility] [I42.8]     DM2 (diabetes mellitus, type 2) ([facility] [E11.9]     Tracheobronchomalacia [J39.8]      Right Femur Periprosthetic Fracture  -  Orthopedic surgery assistance appreciated. Leg has been stabilized in a knee immobilizer. OR repair planned for 3/16.   -  Patient's cardiovascular risk factor pattern is c/w low risk for perioperative cardiovascular morbidity and mortality in the context of an urgent, noncardiovascular surgery.   No further preoperative cardiovascular risk stratification is warranted on this basis. However, patient's recent bout with COVID-19 and resulting chronic respiratory failure and critical illness myopathy places her at risk for perioperative respiratory failure. Attention to pulmonary toilet is warranted. Patient may be appropriate for postoperative extubation to BiPAP. Will defer to anesthesiology on that point.  - Continue Pain control and supportive care    Acute Blood Loss Anemia, expected:   -In setting of acute fracture, Hb 10.5 to 8.5. Monitor post-operatively and transfuse as needed. Chronic Hypoxemic Respiratory Failure  -  Post-COVID pulmonary fibrosis. S/p tracheostomy which has been removed. Baseline 2.5L/min O2 requirement. -  Patient additionally has a background of tracheobronchomalacia and bronchiectasis. -  Continue home inhaled steroid, scheduled duonebs, and prn albuterol nebs. -  Start incentive spirometer q2hwa and BID metanebs as an adjunctive measure to assist with airway clearance. Nonischemic CM, Nonobstructive CAD  -  Historically low normal LVEF.  -  Continue home metoprolol and statin. -  ASA and home antihypertensives and diuretics on hold preoperatively. Diabetes Type 2  -  Hold all oral antidiabetic agents. Start s.c. Insulin regimen based on weight. Hypothyroidism  - Continue home synthroid 100 mcg daily and check TSH. GERD  - Continue PPI and home metoclopramide. Microcytic anemia  -  Chronic mild and stable. Previously d/t chronic inflammation. Full anemia screening panel added to labs. Chronic Pain  -  Continue home gabapentin and tizanidine.     DVT prophylaxis: SCDs, lovenox  Diet: Diet NPO Exceptions are: Sips of Water with Meds, Ice Chips  Code Status: Full Code  PT/OT Eval Status: Post-op    Dispo - 2-3 days post-op    Torres Harrison MD

## 2022-03-16 NOTE — PROGRESS NOTES
ORTHO NOTE    S: pain controlled. No events overnight no events overnight. Seen and examined in preop holding area. O:  Vitals:    03/16/22 0753 03/16/22 1312 03/16/22 1340 03/16/22 1445   BP:  (!) 136/57 (!) 95/55 (!) 144/48   Pulse:  84 97 80   Resp: 20 20 16 16   Temp:   97 °F (36.1 °C)    TempSrc:   Temporal    SpO2: 98% 98% 100% 100%   Weight:       Height:         RLE:   Focused examination of the right lower extremity:  There are no cuts, open wounds, or abrasions. There is mild swelling about the anterior knee. There is a well-healed surgical scar over the anterior knee. This curvilinear. There is no open areas. No sinus tract. No warmth or erythema to suggest infection. Incision is maturely healed scar. Knee range of motion was not assessed. Sensation is intact to light touch in deep peroneal, superficial peroneal, tibial, sural, and saphenous nerve distributions.  Motor function is intact to EHL, FHL, tibialis anterior, and gastroc.  There is brisk capillary refill to the toes and a strong palpable dorsalis pedis pulse.  Compartments are soft and compressible. There is no calf tenderness and a negative Homans' sign. Labs:  WBC 6.5  Hemoglobin 8.5  Platelets 116    A/P:  Right periprosthetic distal femur fracture    NWB RLE  Knee immobilizer  Ancef IV on call to OR  Pain control  Plan OR today for revision R TKA with distal femoral replacement  Risks, benefits, and alternatives discussed. Informed consent obtained. All questions answered. Surgical site marked.     Derrick Kapoor MD

## 2022-03-16 NOTE — DISCHARGE INSTR - COC
Continuity of Care Form    Patient Name: Janet Avalos   :  1955  MRN:  [id number]    Admit date:  3/14/2022  Discharge date:  3/22/2022    Code Status Order: Full Code   Advance Directives:   [facility] Documentation       Date/Time Healthcare Directive Type of Healthcare Directive Copy in [facility] [address] Agent's Name Healthcare Agent's Phone Number    22 1346 No, patient does not have an advance directive for healthcare treatment -- -- -- -- --            Admitting Physician:  Harley Fry MD  PCP: Cherie Thompson MD    Discharging Nurse: [facility] Unit/Room#: [address] Unit Phone Number: [phone]    Emergency Contact:   Extended Emergency Contact Information  Primary Emergency Contact: Bala Lucas  Address:  [address] [place] [address] [address], [address] Majorie Lennox of [address] Phone: [phone]  Mobile Phone: [phone]  Relation: Spouse   needed? No  Secondary Emergency Contact: Prashanth Pope  [place] Phone: [phone]  Relation: Child   needed? No    Past Surgical History:  Past Surgical History:   Procedure Laterality Date    ABDOMEN SURGERY      CARDIAC CATHETERIZATION      COLON SURGERY      COLONOSCOPY      normal    HYSTERECTOMY      JOINT REPLACEMENT  10/92    Right; hip     JOINT REPLACEMENT        Left hip  followed by revision 2006    JOINT REPLACEMENT        right     JOINT REPLACEMENT         right replacement. LUNG SURGERY  2014    tracheobronchomalacia    TRACHEOSTOMY N/A 10/25/2021    [facility]    EGD/PEG W/ANES.  ON VENT, COVID + performed by Aashish Centeno MD at [address]       Immunization History:   Immunization History   Administered Date(s) Administered    Influenza Virus Vaccine 09/10/2013, 10/21/2013, 10/21/2014, 09/22/2015, 09/22/2016, 10/18/2017, 10/18/2018    Influenza Whole 10/13/2014    Influenza, High Dose (Fluzone 65 yrs and older) 11/14/2019, 11/14/2019, 10/06/2020    Influenza, High-dose, [place], 65 yrs +, IM (Fluzone) 10/06/2020    Influenza, Intradermal, Quadrivalent, Preservative Free 09/22/2016, 10/18/2017    Influenza, Quadv, IM, PF (6 mo and older Fluzone, Flulaval, Fluarix, and 3 yrs and older Afluria) 10/18/2018, 10/28/2021    Influenza, Quadv, adjuvanted, 65 yrs +, IM, PF (Fluad) 11/03/2020    Pneumococcal Conjugate 13-valent ([id number]) 09/17/2015    Pneumococcal Conjugate Vaccine 10/21/2013, 10/13/2014, 11/14/2019    Pneumococcal Polysaccharide ([id number]) 10/21/2013, 10/13/2014, 10/13/2014, 11/14/2019    Tdap (Boostrix, Adacel) 04/29/2019       Active Problems:  Patient Active Problem List   Diagnosis Code    Displacement of lumbar intervertebral disc without myelopathy M51.26    Hypothyroidism E03.9    Mixed hyperlipidemia E78.2    Anxiety state F41.1    Generalized osteoarthrosis, involving multiple sites M15.9    Primary hypertension I10    Shoulder arthritis M19.019    Vitamin D deficiency E55.9    Chronic pain syndrome G89.4    Fibromyalgia M79.7    DDD (degenerative disc disease), lumbar M51.36    Anxiety disorder F41.9    Prosthetic joint implant failure ([address] T84.019A    Pulmonary nodule R91.1    Tracheobronchomalacia J39.8    Bronchiectasis ([address] J47.9    DM2 (diabetes mellitus, type 2) (HCC) E11.9    Nonischemic cardiomyopathy (HCC) U81.5    Systolic congestive heart failure (HCC) I50.20    Chest pain R07.9    S/P cardiac catheterization Z98.890    PVC (premature ventricular contraction) I49.3    Palpitations R00.2    Syncope and collapse R55    Convulsion (HCC) R56.9    New onset seizure ([address] R56.9    Nonobstructive atherosclerosis of coronary artery I25.10    Nausea R11.0    Memory change R41. 3    Acquired hypothyroidism E03.9    Sepsis (HCC) A41.9    Type 1 diabetes mellitus without complication ([facility]) K24.4    Orthostatic hypotension I95.1    Suspected COVID-19 virus infection Z20.822    Hypoxia R09.02    Acute respiratory failure with hypoxia ([facility]) J96.01    COVID-19 U07.1    Pneumonia due to COVID-19 virus U07.1, J12.82    CHARLY (acute kidney injury) ([facility] N17.9    Uncontrolled hypertension I10    Fever R50.9    Hypotension I95.9    Severe protein-calorie malnutrition ([facility]) E43    Gram-negative pneumonia ([facility]) J15.6    Pneumonia due to Pseudomonas species ([facility]) J15.1    Hypomagnesemia E83.42    Hypokalemia E87.6    Acute hypoxemic respiratory failure ([facility]) J96.01    ARDS (adult respiratory distress syndrome) ([facility]) J80    Pneumonia due to Klebsiella pneumoniae ([facility]) J15.0    Electrolyte disorder E87.8    Critical illness myopathy G72.81    Critical illness neuropathy ([facility]) G62.81    Post covid-19 condition, unspecified U09.9    Moderate malnutrition ([facility]) E44.0    Aspiration into airway T17.908A    Oropharyngeal dysphagia R13.12    Vocal cord paralysis J38.00    Personal history of COVID-19 Z86.16    Chest congestion R09.89    Pulmonary infiltrates R91.8    Microcytic anemia D50.9    Hyperglycemia Z66.1    Diastolic dysfunction T32.05    Pulmonary HTN ([facility]) I27.20    Leukocytosis D72.829    Abdominal pain R10.9    Abdominal pain, left lower quadrant R10.32    Constipation K59.00    Acute cystitis with hematuria N30.01    Chronic respiratory failure with hypoxia ([facility]) J96.11    Ana-prosthetic fracture of femur at tip of prosthesis M97. 8XXA, [id number]    GERD (gastroesophageal reflux disease) K21.9       Isolation/Infection:   Isolation            No Isolation          Patient Infection Status       Infection Onset Added Last Indicated Last Indicated By Review Planned Expiration Resolved Resolved By    None active    Resolved    COVID-19 (Rule Out) 02/06/22 02/06/22 02/06/22 COVID-19 & Influenza Combo (Ordered)   02/06/22 Rule-Out Test Resulted    COVID-19 (Rule Out) 01/18/22 01/18/22 01/18/22 COVID-19, Rapid (Ordered)   22 Rule-Out Test Resulted    COVID-19 21 COVID-19, Rapid   21     COVID-19 (Rule Out) 21 COVID-19 Rapid (Ordered)   21 Rule-Out Test Resulted            Nurse Assessment:  Last Vital Signs: BP (!) 144/48   Pulse 80   Temp 97 °F (36.1 °C) (Temporal)   Resp 16   Ht 5' 9\" (1.753 m)   Wt 156 lb 8.4 oz (71 kg)   SpO2 100%   BMI 23.11 kg/m²     Last documented pain score (0-10 scale): Pain Level: 5  Last Weight:   Wt Readings from Last 1 Encounters:   22 156 lb 8.4 oz (71 kg)     Mental Status:  oriented and alert    IV Access:  - None    Nursing Mobility/ADLs:  Walking   Assisted  Transfer  Assisted  Bathing  Assisted  Dressing  Assisted  Toileting  Assisted  Feeding  Independent  Med Admin  Independent  Med Delivery   whole    Wound Care Documentation and Therapy:  Wound 10/19/21 Sacrum Inner Purple discoloration with 2 small blisters to coccyx (Active)   Number of days: 148        Elimination:  Continence: Bowel: Yes  Bladder: No  Urinary Catheter: None   Colostomy/Ileostomy/Ileal Conduit: No       Date of Last BM: 3/21/2022    Intake/Output Summary (Last 24 hours) at 3/16/2022 1509  Last data filed at 3/16/2022 1318  Gross per 24 hour   Intake --   Output 3100 ml   Net -3100 ml     I/O last 3 completed shifts: In: 850.8 [P.O.:60; I.V.:790.8]  Out: 4951 [[id number]]    Safety Concerns:     History of Falls (last 30 days) and At Risk for Falls    Impairments/Disabilities:      None    Nutrition Therapy:  Current Nutrition Therapy:   - Oral Diet:  General    Routes of Feeding: Oral  Liquids: Thin Liquids  Daily Fluid Restriction: no  Last Modified Barium Swallow with Video (Video Swallowing Test): not done    Treatments at the Time of Hospital Discharge:   Respiratory Treatments:   Oxygen Therapy:  is on oxygen at 2 L/min per nasal cannula.   Ventilator:    - No ventilator support    Rehab Therapies: Nurse, HHA, MSW, Physical Therapy and Occupational Therapy  Weight Bearing Status/Restrictions: No weight bearing restrictions  Other Medical Equipment (for information only, NOT a DME order):  walker  Other Treatments: [facility]: [address]  to establish plan of care for patient over 60 day period   Nursing  Initial home SN evaluation visit to occur within 24-48 hours for:  1)  medication management  2)  VS and clinical assessment  3)  S&S chronic disease exacerbation education + when to contact MD/NP  4)  care coordination  Medication Reconciliation during 1st SN visit  PT/OT/Speech   Evaluations in home within 24-48 hours of discharge to include DME and home safety   Frontload therapy 5 days, then 3x a week   OT to evaluate if patient has [address] needs for personal care    evaluation within 24-48 hours to evaluate resources & insurance for potential AL, [place], LTC, and Medicaid options   Palliative Care referral within 5 days of hospital discharge   PCP Visit scheduled within 3 - 7 days of hospital discharge    [place] care Vitals (If patient is agreeable and meets guidelines)      Patient's personal belongings (please select all that are sent with patient):  Glasses    RN SIGNATURE:  Electronically signed by Nilesh Ortiz on 3/22/22 at 9:39 AM EDT    CASE MANAGEMENT/SOCIAL WORK SECTION    Inpatient Status Date: 3/14/22    Readmission Risk Assessment Score:  Readmission Risk              Risk of Unplanned Readmission:  40           Discharging to Facility/ [address]   [address]   [address] [place] [address]   [phone]    Referrals to:  Area on [address] [place] [address]  Phone: [phone]    [facility]:  Phone: [phone] EX: 65    / signature: {Esignature:[id number]}    PHYSICIAN SECTION    Prognosis: Good    Condition at Discharge: Stable    Rehab Potential (if transferring to Rehab): Good    Recommended Follow-up, Labs or Other Treatments After Discharge:    PT/OT-weightbearing as tolerated to right lower extremity, okay for range of motion as tolerated  Follow-up with Dr. Christin Thomas 2 weeks postoperatively. Call [facility] orthopedics at [phone] to schedule appointment or with any questions  Aspirin 325 mg daily by mouth for DVT prophylaxis for 6 weeks post-op  Continue oral doxycycline 100 mg twice daily by mouth until 3/31/2022              Physician Certification: I certify the above information and transfer of Zeinab Odom  is necessary for the continuing treatment of the diagnosis listed and that she requires Home Care for greater 30 days.      Update Admission H&P: No change in H&P    PHYSICIAN SIGNATURE:  Electronically signed by Gokul Valiente MD on 3/22/22 at 9:04 AM EDT

## 2022-03-16 NOTE — CARE COORDINATION
CASE MANAGEMENT INITIAL ASSESSMENT      Reviewed chart and completed assessment with patient: Yes  Family present:  spouse  Explained Case Management role/services. yes    Primary contact information: , Silvana Lange. Health Care Decision Maker :   Primary Decision Maker: Laura Molina Spouse - [phone]    Secondary Decision Maker: Robles Rosario - Child - [phone]          Can this person be reached and be able to respond quickly, such as within a few minutes or hours? Yes  Who would be your back-up decision maker? Name: Mame Mcmanus  Phone Number: [phone]    Admit date/status: IP, 3/14/22  Diagnosis: Syncope and collapse, Periprosthetic fracture around internal prosthetic joint, initial encounter, Hypotension, unspecified hypotension type, Periprosthetic fracture of femur at tip of prosthesis, initial encounter and DX. Is this a Readmission?:  No      Insurance: Medicare Primary, Medicaid secondary. Precert required for SNF: No       3 night stay required: No    Living arrangements, Adls, care needs, prior to admission: Lives in a ranch house with 1SE with spouse, daughter and adult grandchildren. Independent in ADL's and family drives. Normally walks independantly. Durable Medical Equipment at home:  Walker_2WW_Cane__RTS_X_ BSC__Shower Chair__  02__ HHN__ CPAP__  BiPap__  Hospital Bed__ W/C___ Other_____    Services in the home and/or outpatient, prior to admission: Just finished skilled home care through [facility], liked them and would use again. Active with [facility] for home oxygen and has portable tanks. Current PCP: Milady Centeno MD    Transportation needs:  family      PT/OT recs: None seen at this time. Hospital Exemption Notification (HEN): Needed for SNF, not initiated. Barriers to discharge: None     Plan/comments: CM met with pt at bedside with spouse for initial assessment. Pt having revision R TKA with Dr. Areli Dunn. Will follow post-op course and help accordingly. CM following-Lorenza Mchugh RN      ECOC on chart for MD signature AF  AS Right Obturator hernia

## 2022-03-17 ENCOUNTER — APPOINTMENT (OUTPATIENT)
Dept: GENERAL RADIOLOGY | Age: 67
DRG: 467 | End: 2022-03-17
Payer: MEDICARE

## 2022-03-17 LAB
ANION GAP SERPL CALCULATED.3IONS-SCNC: 11 MMOL/L (ref 3–16)
BASOPHILS ABSOLUTE: 0 K/UL (ref 0–0.2)
BASOPHILS RELATIVE PERCENT: 0.5 %
BLOOD BANK DISPENSE STATUS: NORMAL
BLOOD BANK PRODUCT CODE: NORMAL
BPU ID: NORMAL
BUN BLDV-MCNC: 11 MG/DL (ref 7–20)
CALCIUM SERPL-MCNC: 8.3 MG/DL (ref 8.3–10.6)
CHLORIDE BLD-SCNC: 103 MMOL/L (ref 99–110)
CO2: 24 MMOL/L (ref 21–32)
CREAT SERPL-MCNC: 0.6 MG/DL (ref 0.6–1.2)
DESCRIPTION BLOOD BANK: NORMAL
EOSINOPHILS ABSOLUTE: 0.2 K/UL (ref 0–0.6)
EOSINOPHILS RELATIVE PERCENT: 3.6 %
GFR AFRICAN AMERICAN: >60
GFR NON-AFRICAN AMERICAN: >60
GLUCOSE BLD-MCNC: 103 MG/DL (ref 70–99)
GLUCOSE BLD-MCNC: 112 MG/DL (ref 70–99)
GLUCOSE BLD-MCNC: 130 MG/DL (ref 70–99)
GLUCOSE BLD-MCNC: 133 MG/DL (ref 70–99)
GLUCOSE BLD-MCNC: 187 MG/DL (ref 70–99)
HCT VFR BLD CALC: 25.3 % (ref 36–48)
HEMOGLOBIN: 8.3 G/DL (ref 12–16)
LYMPHOCYTES ABSOLUTE: 0.8 K/UL (ref 1–5.1)
LYMPHOCYTES RELATIVE PERCENT: 15 %
MAGNESIUM: 1.7 MG/DL (ref 1.8–2.4)
MCH RBC QN AUTO: 26.7 PG (ref 26–34)
MCHC RBC AUTO-ENTMCNC: 32.9 G/DL (ref 31–36)
MCV RBC AUTO: 81.2 FL (ref 80–100)
MONOCYTES ABSOLUTE: 0.5 K/UL (ref 0–1.3)
MONOCYTES RELATIVE PERCENT: 9.6 %
NEUTROPHILS ABSOLUTE: 3.9 K/UL (ref 1.7–7.7)
NEUTROPHILS RELATIVE PERCENT: 71.3 %
PDW BLD-RTO: 18.7 % (ref 12.4–15.4)
PERFORMED ON: ABNORMAL
PLATELET # BLD: 112 K/UL (ref 135–450)
PMV BLD AUTO: 8.5 FL (ref 5–10.5)
POTASSIUM SERPL-SCNC: 3.8 MMOL/L (ref 3.5–5.1)
RBC # BLD: 3.12 M/UL (ref 4–5.2)
SODIUM BLD-SCNC: 138 MMOL/L (ref 136–145)
WBC # BLD: 5.4 K/UL (ref 4–11)

## 2022-03-17 PROCEDURE — 36430 TRANSFUSION BLD/BLD COMPNT: CPT

## 2022-03-17 PROCEDURE — 6370000000 HC RX 637 (ALT 250 FOR IP): Performed by: ORTHOPAEDIC SURGERY

## 2022-03-17 PROCEDURE — 97166 OT EVAL MOD COMPLEX 45 MIN: CPT

## 2022-03-17 PROCEDURE — 6370000000 HC RX 637 (ALT 250 FOR IP): Performed by: SPECIALIST/TECHNOLOGIST

## 2022-03-17 PROCEDURE — 1200000000 HC SEMI PRIVATE

## 2022-03-17 PROCEDURE — 94640 AIRWAY INHALATION TREATMENT: CPT

## 2022-03-17 PROCEDURE — 36415 COLL VENOUS BLD VENIPUNCTURE: CPT

## 2022-03-17 PROCEDURE — 97530 THERAPEUTIC ACTIVITIES: CPT

## 2022-03-17 PROCEDURE — 94761 N-INVAS EAR/PLS OXIMETRY MLT: CPT

## 2022-03-17 PROCEDURE — 2700000000 HC OXYGEN THERAPY PER DAY

## 2022-03-17 PROCEDURE — 85025 COMPLETE CBC W/AUTO DIFF WBC: CPT

## 2022-03-17 PROCEDURE — 80048 BASIC METABOLIC PNL TOTAL CA: CPT

## 2022-03-17 PROCEDURE — 6360000002 HC RX W HCPCS: Performed by: ORTHOPAEDIC SURGERY

## 2022-03-17 PROCEDURE — 83735 ASSAY OF MAGNESIUM: CPT

## 2022-03-17 PROCEDURE — 97162 PT EVAL MOD COMPLEX 30 MIN: CPT

## 2022-03-17 PROCEDURE — 2580000003 HC RX 258: Performed by: ORTHOPAEDIC SURGERY

## 2022-03-17 PROCEDURE — 73620 X-RAY EXAM OF FOOT: CPT

## 2022-03-17 RX ORDER — SODIUM CHLORIDE 0.9 % (FLUSH) 0.9 %
5-40 SYRINGE (ML) INJECTION PRN
Status: DISCONTINUED | OUTPATIENT
Start: 2022-03-17 | End: 2022-03-22 | Stop reason: HOSPADM

## 2022-03-17 RX ORDER — SODIUM CHLORIDE 9 MG/ML
INJECTION, SOLUTION INTRAVENOUS PRN
Status: DISCONTINUED | OUTPATIENT
Start: 2022-03-17 | End: 2022-03-17

## 2022-03-17 RX ORDER — OXYCODONE HYDROCHLORIDE 5 MG/1
5 TABLET ORAL EVERY 4 HOURS PRN
Status: DISCONTINUED | OUTPATIENT
Start: 2022-03-17 | End: 2022-03-22 | Stop reason: HOSPADM

## 2022-03-17 RX ORDER — DOXYCYCLINE HYCLATE 100 MG
100 TABLET ORAL EVERY 12 HOURS SCHEDULED
Status: DISCONTINUED | OUTPATIENT
Start: 2022-03-17 | End: 2022-03-22 | Stop reason: HOSPADM

## 2022-03-17 RX ORDER — SODIUM CHLORIDE 9 MG/ML
25 INJECTION, SOLUTION INTRAVENOUS PRN
Status: DISCONTINUED | OUTPATIENT
Start: 2022-03-17 | End: 2022-03-22 | Stop reason: HOSPADM

## 2022-03-17 RX ORDER — LIDOCAINE HYDROCHLORIDE 10 MG/ML
5 INJECTION, SOLUTION INFILTRATION; PERINEURAL ONCE
Status: DISCONTINUED | OUTPATIENT
Start: 2022-03-17 | End: 2022-03-22 | Stop reason: HOSPADM

## 2022-03-17 RX ORDER — DOXYCYCLINE HYCLATE 100 MG
100 TABLET ORAL EVERY 12 HOURS SCHEDULED
Status: DISCONTINUED | OUTPATIENT
Start: 2022-03-18 | End: 2022-03-17

## 2022-03-17 RX ORDER — OXYCODONE HYDROCHLORIDE 5 MG/1
10 TABLET ORAL EVERY 4 HOURS PRN
Status: DISCONTINUED | OUTPATIENT
Start: 2022-03-17 | End: 2022-03-22 | Stop reason: HOSPADM

## 2022-03-17 RX ORDER — SODIUM CHLORIDE 0.9 % (FLUSH) 0.9 %
5-40 SYRINGE (ML) INJECTION EVERY 12 HOURS SCHEDULED
Status: DISCONTINUED | OUTPATIENT
Start: 2022-03-17 | End: 2022-03-22 | Stop reason: HOSPADM

## 2022-03-17 RX ADMIN — LEVOTHYROXINE SODIUM 100 MCG: 0.1 TABLET ORAL at 06:38

## 2022-03-17 RX ADMIN — ACETAMINOPHEN 650 MG: 325 TABLET ORAL at 21:40

## 2022-03-17 RX ADMIN — ROPINIROLE HYDROCHLORIDE 0.5 MG: 0.5 TABLET, FILM COATED ORAL at 08:47

## 2022-03-17 RX ADMIN — SODIUM CHLORIDE, POTASSIUM CHLORIDE, SODIUM LACTATE AND CALCIUM CHLORIDE 100 ML/HR: 600; 310; 30; 20 INJECTION, SOLUTION INTRAVENOUS at 00:04

## 2022-03-17 RX ADMIN — GABAPENTIN 600 MG: 300 CAPSULE ORAL at 08:47

## 2022-03-17 RX ADMIN — Medication 2 PUFF: at 08:15

## 2022-03-17 RX ADMIN — SODIUM CHLORIDE, POTASSIUM CHLORIDE, SODIUM LACTATE AND CALCIUM CHLORIDE: 600; 310; 30; 20 INJECTION, SOLUTION INTRAVENOUS at 22:46

## 2022-03-17 RX ADMIN — ROPINIROLE HYDROCHLORIDE 0.5 MG: 0.5 TABLET, FILM COATED ORAL at 21:34

## 2022-03-17 RX ADMIN — SODIUM CHLORIDE, POTASSIUM CHLORIDE, SODIUM LACTATE AND CALCIUM CHLORIDE: 600; 310; 30; 20 INJECTION, SOLUTION INTRAVENOUS at 14:00

## 2022-03-17 RX ADMIN — PANTOPRAZOLE SODIUM 40 MG: 40 TABLET, DELAYED RELEASE ORAL at 06:38

## 2022-03-17 RX ADMIN — ATORVASTATIN CALCIUM 20 MG: 10 TABLET, FILM COATED ORAL at 21:34

## 2022-03-17 RX ADMIN — ACETAMINOPHEN 650 MG: 325 TABLET ORAL at 13:53

## 2022-03-17 RX ADMIN — CEFAZOLIN 2000 MG: 10 INJECTION, POWDER, FOR SOLUTION INTRAVENOUS at 11:47

## 2022-03-17 RX ADMIN — OXYCODONE 10 MG: 5 TABLET ORAL at 13:53

## 2022-03-17 RX ADMIN — ROPINIROLE HYDROCHLORIDE 0.5 MG: 0.5 TABLET, FILM COATED ORAL at 13:53

## 2022-03-17 RX ADMIN — METOCLOPRAMIDE 5 MG: 10 TABLET ORAL at 21:34

## 2022-03-17 RX ADMIN — Medication 1 TABLET: at 08:46

## 2022-03-17 RX ADMIN — OXYCODONE 10 MG: 5 TABLET ORAL at 18:02

## 2022-03-17 RX ADMIN — DOXYCYCLINE HYCLATE 100 MG: 100 TABLET, COATED ORAL at 21:40

## 2022-03-17 RX ADMIN — INSULIN LISPRO 6 UNITS: 100 INJECTION, SOLUTION INTRAVENOUS; SUBCUTANEOUS at 18:04

## 2022-03-17 RX ADMIN — METOCLOPRAMIDE 5 MG: 10 TABLET ORAL at 09:39

## 2022-03-17 RX ADMIN — OXYCODONE 10 MG: 5 TABLET ORAL at 22:45

## 2022-03-17 RX ADMIN — INSULIN GLARGINE 18 UNITS: 100 INJECTION, SOLUTION SUBCUTANEOUS at 21:40

## 2022-03-17 RX ADMIN — Medication 2 PUFF: at 20:10

## 2022-03-17 RX ADMIN — GABAPENTIN 600 MG: 300 CAPSULE ORAL at 21:34

## 2022-03-17 RX ADMIN — GABAPENTIN 600 MG: 300 CAPSULE ORAL at 13:53

## 2022-03-17 RX ADMIN — INSULIN LISPRO 1 UNITS: 100 INJECTION, SOLUTION INTRAVENOUS; SUBCUTANEOUS at 18:04

## 2022-03-17 RX ADMIN — ACETAMINOPHEN 650 MG: 325 TABLET ORAL at 08:48

## 2022-03-17 RX ADMIN — OXYCODONE 10 MG: 5 TABLET ORAL at 09:39

## 2022-03-17 ASSESSMENT — PAIN SCALES - GENERAL
PAINLEVEL_OUTOF10: 9
PAINLEVEL_OUTOF10: 9
PAINLEVEL_OUTOF10: 7
PAINLEVEL_OUTOF10: 8
PAINLEVEL_OUTOF10: 5
PAINLEVEL_OUTOF10: 0
PAINLEVEL_OUTOF10: 6
PAINLEVEL_OUTOF10: 5
PAINLEVEL_OUTOF10: 6
PAINLEVEL_OUTOF10: 9
PAINLEVEL_OUTOF10: 9
PAINLEVEL_OUTOF10: 8
PAINLEVEL_OUTOF10: 5

## 2022-03-17 ASSESSMENT — PAIN SCALES - WONG BAKER: WONGBAKER_NUMERICALRESPONSE: 8

## 2022-03-17 ASSESSMENT — PAIN DESCRIPTION - ORIENTATION: ORIENTATION: RIGHT

## 2022-03-17 NOTE — PROGRESS NOTES
Arrived to place PICC line in pt. When entering room to introduce self and procedure pt stated \"I was told I didn't have to get one anymore. \"  Spoke with bedside RN Caleb Srinivasan and pt labs and BP's have stabilized at this time and the need for PICC could hold off until morning rounds are conducted for central line needs.

## 2022-03-17 NOTE — FLOWSHEET NOTE
Pt refusing for abdi catheter to be discontinued per protocol. Instructed pt/family of importance of discontinuing catheter within 24 hrs, verbalize understanding.

## 2022-03-17 NOTE — PROGRESS NOTES
Occupational Therapy   Occupational Therapy Initial Assessment  Date: 3/17/2022   Patient Name: Staci Simpson  MRN: [id number]     : 1955    Date of Service: 3/17/2022    Discharge Recommendations:  Subacute/Skilled Nursing Facility       Assessment   Performance deficits / Impairments: Decreased functional mobility ; Decreased ADL status; Decreased balance;Decreased cognition;Decreased endurance;Decreased strength;Decreased high-level IADLs    Assessment: Pt is a 69yo female with deficits in the areas listed above after a fall with syncope resulting in a R femure fracture with revision of R TKA (3/16). PTA, pt was receiving assistance with some ADLs at home and had begun to perform simple IADLS after a prolonged hospitalization with COVID 19. Today, pt requiring mod A x2 for functional t/fs with RW and with Irina Pitcher. Pt destating and experiencing increased HR and decreased O2 with activity requiring increased rest, cues for PLB and therapuetic presence to calm pt. Pt performing initial stand pivot t/f with RW to the chair but requiring Irina Pitcher to return to bed. Pt performing initial bed mobility with min+mod Ax2 but d/t decreased endurance and increased pain requiring max Ax2 for returning to supine. pt would continue to benefit fro, skilled OT services to increase endurance, confidence, safety, balance, strength and independence for ADLs and functional mobility. Prognosis: Good  Decision Making: Medium Complexity  OT Education: OT Role;Plan of Care;Transfer Training;Energy Conservation  Patient Education: disease specific: safe t/fs, RW use, WB status, general safety during hospitalization, PLB, use of call light, use of Irina Pitcher. Pt verbalized understanding but may need reinforcement. Barriers to Learning: pain  REQUIRES OT FOLLOW UP: Yes  Activity Tolerance  Activity Tolerance: Treatment limited secondary to medical complications (free text); Patient limited by pain  Activity Tolerance: Initial O2 94% on 2L, /62, HR 99. After activity  and O2 84% but increasing back to >90% and HR decreasing to <120 with rest and PLB and therapuetic presence. Safety Devices  Safety Devices in place: Yes  Type of devices: Left in bed;Bed alarm in place;Call light within reach;Gait belt;Nurse notified           Patient Diagnosis(es): The primary encounter diagnosis was Periprosthetic fracture around internal prosthetic joint, initial encounter. Diagnoses of Syncope and collapse and Hypotension, unspecified hypotension type were also pertinent to this visit. has a past medical history of Anxiety disorder, Chronic pain syndrome, COVID-19, DDD (degenerative disc disease), lumbar, Diabetes ([address] Displacement of lumbar intervertebral disc without myelopathy, Diverticulitis, Fibromyalgia, Generalized osteoarthrosis, involving multiple sites, GERD (gastroesophageal reflux disease), Hypochromic microcytic anemia, Impacted cerumen, Influenza A, Irritable bowel syndrome, Other and unspecified hyperlipidemia, Prosthetic joint implant failure ([address] Screening mammogram, Tracheobronchomalacia, Unspecified asthma(493.90), Unspecified essential hypertension, and Unspecified hypothyroidism. has a past surgical history that includes Hysterectomy; joint replacement (10/92); joint replacement (  12/92); joint replacement (  6/96); joint replacement (  2006); Colonoscopy (2007); Lung surgery (1/2014); Cardiac catheterization; Abdomen surgery; Colon surgery; Upper gastrointestinal endoscopy (N/A, 10/25/2021); tracheostomy (N/A, 10/25/2021); and Revision total knee arthroplasty (Right, 3/16/2022).            Restrictions  Restrictions/Precautions  Restrictions/Precautions: Weight Bearing  Required Braces or Orthoses?:  (Does not need KI for OOB mobility.)  Lower Extremity Weight Bearing Restrictions  Right Lower Extremity Weight Bearing: Weight Bearing As Tolerated  Position Activity Restriction  Other position/activity restrictions: Activity Day of Surgery: 1)  Dangle at edge of bed, progress to stand, and take a few steps with assistive device. 2)  Encourage ankle pumps and quad sets. 3)  Up in bedside chair as tolerated. 4)  Commode privileges with assistance. Subjective   General  Chart Reviewed: Yes  Patient assessed for rehabilitation services?: Yes  Additional Pertinent Hx: Per chart, \"Esperanza Connors is a 78 y.o. female. She presented to the ER from home by ambulance after a syncopal episode. She stood up and suddenly lost consciousness. Upon awakening she had immediate right knee pain and was unable to bear weight. She is s/p a prolonged hospitalization for COVID-19 which began 9/26/2021. She was initially hospitalized at [facility] for roughly a month, then discharged to a [facility] where she stayed for roughly two months, and then the [address] [place] [address]  1/3 - 2/22. She suffered multiple complications and now has chronic respiratory failure requiring 2-2.5 L/min O2 at all times. She suffered critical illness myopathy but she and her sposue relate her overall strength had improved substantially with rehab. Prior to today's fall she was able to ambulate independently with the help of a FWW. She estimated she could ambulate roughly 100ft on a level surface around her home before having to stop because of dyspnea. \"  Response to previous treatment: Patient with no complaints from previous session  Family / Caregiver Present: Yes  Referring Practitioner: Memo Mcdermott MD  Diagnosis: RIGHT PERIPROSTHETIC DISTAL FEMUR FRACTURE; Procedure (3/16):REVISION RIGHT TOTAL KNEE ARTHROPLASTY WITH DISTAL FEMORAL REPLACEMENT  Subjective  Subjective: Pt agreeable to OT eval  General Comment  Comments: RN approved therapy. Patient Currently in Pain: Yes  Pain Assessment  Pain Assessment: Faces  Pain Level: 9  Mcdaniel-Baker Pain Rating: Hurts whole lot (with movement)  Pain Location: Leg;Knee;Hip; Ankle  Pain Orientation: Right  Non-Pharmaceutical Pain Intervention(s): Therapeutic presence;Repositioned; Rest  Response to Pain Intervention: Patient Satisfied  Vital Signs  Temp: 98.9 °F (37.2 °C)  Temp Source: Oral  Pulse: 94  Resp: 18  BP: 116/66  MAP (mmHg): 82  Level of Consciousness: Alert (0)  MEWS Score: 1  Patient Currently in Pain: Yes  Oxygen Therapy  SpO2: 96 %  O2 Device: Nasal cannula  O2 Flow Rate (L/min): 2 L/min  Social/Functional History  Social/Functional History  Lives With: Spouse,Daughter (grandkids)  Type of Home: House  Home Layout: One level  Home Access: Stairs to enter without rails  Entrance Stairs - Number of Steps: 1  Bathroom Shower/Tub: Tub/Shower unit  Bathroom Toilet: Handicap height  Bathroom Equipment: Grab bars in shower,Tub transfer bench  Home Equipment: 4 wheeled walker,Rolling walker,Oxygen (2L O2)  ADL Assistance: Needs assistance  Homemaking Assistance: Independent  Homemaking Responsibilities: Yes (shares with )  Ambulation Assistance: Independent (with rollator)  Transfer Assistance: Independent  Active : No  Occupation: Retired       Objective   Vision: Impaired  Vision Exceptions: Wears glasses at all times  Hearing: Within functional limits    Orientation  Overall Orientation Status: Within Functional Limits     Balance  Sitting Balance: Contact guard assistance  Standing Balance: Dependent/Total (min Ax2 initially, progressing to mod Ax2 with fatigue and pain)  Standing Balance  Time: ~2minutes, 2x~10s  Activity: standing at EOB, stand pivot t/f from EOB to chair, t/f's in [facility] stedy to get from chair to bed. Comment: Pt with increase SOB, decreased O2 and increase HR with OOB mobility. ADL  Toileting: Dependent/Total (abdi)        Bed mobility  Supine to Sit: 2 Person assistance; Moderate assistance;Minimal assistance (HOB elevated, use of bed rail, vc's for sequencing.  Mod+min Ax2)  Sit to Supine: 2 Person assistance;Maximum assistance;Dependent/Total (Max Ax2 d/t increased SOB and HR.)  Scootin Person assistance;Maximal assistance;Dependent/Total  Transfers  Stand Pivot Transfers: Moderate assistance;2 Person assistance  Sit to stand: 2 Person assistance; Moderate assistance  Stand to sit: 2 Person assistance; Moderate assistance  Transfer Comments: with RW and in [facility]  Overall Cognitive Status: Exceptions  Arousal/Alertness: Appropriate responses to stimuli  Following Commands: Follows one step commands with increased time; Follows one step commands with repetition  Attention Span: Difficulty attending to directions; Attends with cues to redirect  Memory: Appears intact  Safety Judgement: Decreased awareness of need for safety  Problem Solving: Assistance required to identify errors made;Assistance required to implement solutions  Insights: Decreased awareness of deficits  Initiation: Requires cues for some  Sequencing: Requires cues for some  Cognition Comment: Pt with increased anxiety with movement. Pain increasing difficulty for pt to maintain attention. Pt requiring cues to initiate breathing to calm. Plan   Plan  Times per week: 4-6x/week  Current Treatment Recommendations: Functional Mobility Training,Strengthening,Balance Training,Safety Education & Training,Equipment Evaluation, Education, & procurement,Self-Care / ADL,Endurance Training    AM-PAC Score        -[facility] Inpatient Daily Activity Raw Score: 13 (22)  AM-PAC Inpatient ADL T-Scale Score : 32.03 (22)  ADL Inpatient CMS 0-100% Score: 63.03 (22)  ADL Inpatient CMS G-Code Modifier : CL (22)    Goals  Short term goals  Time Frame for Short term goals: 1 week (3/24) unless stated otherwise. Short term goal 1: Pt will perform functional t/fs with min A and AD PRN. Short term goal 2: Pt will LB dress with mod A and AD/AE PRN.   Short term goal 3: Pt will perform at least 2 minutes of standing functional activities with CGA and AD PRN. Short term goal 4: Pt will perform BUE ther ex x15 to increase endurance for functional activities (3/22)  Patient Goals   Patient goals : \"to be able to sit up in the chair\" -MET briefly but d/t pain pt t/fed back to bed. Therapy Time   Individual Concurrent Group Co-treatment   Time In 1150         Time Out 1223         Minutes 33         Timed Code Treatment Minutes: 23 Minutes (10minute evaluation)       Serene Guardian, OTR/L  If pt discharges prior to next session, this note will serve as discharge summary. See case management note for discharge disposition.

## 2022-03-17 NOTE — ANESTHESIA POSTPROCEDURE EVALUATION
Department of Anesthesiology  Postprocedure Note    Patient: David Alexis  MRN: [id number]  YOB: 1955  Date of evaluation: 3/16/2022  Time:  8:18 PM     Procedure Summary     Date: 03/16/22 Room / Location: [address] [place] [address]    Anesthesia Start: 1026 Anesthesia Stop: 3827    Procedure: REVISION RIGHT TOTAL KNEE REPLACEMENT                  Parveen Ugalde (Right Knee) Diagnosis: (-)    Surgeons: Annita Fox MD Responsible Provider: Brooks Mcdonnell MD    Anesthesia Type: regional, spinal, MAC ASA Status: 3          Anesthesia Type: regional, spinal, MAC    Abdi Phase I: Abdi Score: 9    Abdi Phase II:      Last vitals: Reviewed and per EMR flowsheets.        Anesthesia Post Evaluation   Anesthetic Problems: no   Cardiovascular System Stable: yes  Respiratory Function: Airway Patent yes  ETT no  Ventilator no  Level of consciousness: awake, alert and oriented  Post-op pain: adequate analgesia  Hydration Adequate: yes  Nausea/Vomiting:no  Other Issues:     Lauretha Jeans, MD

## 2022-03-17 NOTE — PROGRESS NOTES
Department of Orthopedic Surgery  Physician Assistant   Progress Note    Subjective:       Systemic or Specific Complaints:Pain Control and very anxious, tearful. Required 1 unit of blood last night, low BP.      Objective:     Patient Vitals for the past 24 hrs:   BP Temp Temp src Pulse Resp SpO2 Weight   03/17/22 0836 (!) 122/56 98.9 °F (37.2 °C) Oral 103 18 96 %    03/17/22 0819      96 %    03/17/22 0538       163 lb 9.3 oz (74.2 kg)   03/17/22 0344 106/60 98 °F (36.7 °C) Oral 69 15 98 %    03/17/22 0314 104/61 98.1 °F (36.7 °C) Oral 73 16 100 %    03/17/22 0245 (!) 84/49 98 °F (36.7 °C) Oral  17     03/17/22 0159 (!) 83/44 98.2 °F (36.8 °C) Oral 68 19 99 %    03/17/22 0145 (!) 72/42 98 °F (36.7 °C) Oral  19     03/17/22 0130 (!) 84/48 97.9 °F (36.6 °C) Oral 68 19     03/16/22 2345 (!) 70/41         03/16/22 2321 (!) 87/54 98.1 °F (36.7 °C) Oral 86 20 94 %    03/16/22 2245 (!) 68/36         03/16/22 2215 (!) 74/32         03/16/22 2200 (!) 58/39         03/16/22 2137 91/61 97.8 °F (36.6 °C) Axillary 92 19     03/16/22 2033 (!) 118/49 97.8 °F (36.6 °C) Axillary 87 18     03/16/22 1938 125/68 98.2 °F (36.8 °C) Oral 85 17 97 %    03/16/22 1925 (!) 142/67   95 16 100 %    03/16/22 1920    96 18 100 %    03/16/22 1915 (!) 137/58   85 16 95 %    03/16/22 1910 (!) 135/43   89 12 100 %    03/16/22 1905 (!) 145/53   92 12 100 %    03/16/22 1900    89 18 97 %    03/16/22 1855 (!) 126/50   98 22 96 %    03/16/22 1850 135/62   93 23 97 %    03/16/22 1845 133/81   94 14 100 %    03/16/22 1840 (!) 122/97 96.8 °F (36 °C) Temporal 95 14 92 %    03/16/22 1835 (!) 125/36   93 17 (!) 82 %    03/16/22 1830    126 12 90 %    03/16/22 1445 (!) 144/48   80 16 100 %    03/16/22 1340 (!) 95/55 97 °F (36.1 °C) Temporal 97 16 100 %    03/16/22 1312 (!) 136/57   84 20 98 %        General: alert, appears stated age, cooperative and mild distress   Wound: Post-op dressing in place, no drainage present. Knee immobilizer in place   Motion: ROM not tested in affected extremity due to pain, anxiety   DVT Exam: No evidence of DVT seen on physical exam.  No cords or calf tenderness. Additional exam: Pt laying in bed at time of exam, post op dressing in place, knee immobilizer in place  Tender to palpation throughout RLE    Data Review  CBC:   Lab Results   Component Value Date    WBC 5.4 03/17/2022    RBC 3.12 03/17/2022    HGB 8.3 03/17/2022    HCT 25.3 03/17/2022     03/17/2022       Renal:   Lab Results   Component Value Date     03/17/2022    K 3.8 03/17/2022    K 4.4 03/14/2022     03/17/2022    CO2 24 03/17/2022    BUN 11 03/17/2022    CREATININE 0.6 03/17/2022    GLUCOSE 112 03/17/2022    CALCIUM 8.3 03/17/2022            Assessment:     s/p right revision total knee arthoplasty with distal femoral replacement, POD 1. DOS 3/16/22 Dr Beverley Hill. Acute blood loss anemia - expected after surgery. Will monitor Hgb. Was 5.9 last night postop, patient received 1 unit PRBCs, posttransfusion blood draw showed hemoglobin 8.3      Plan:      1:  WBAT to RLE, ok for ROM as tolerated. 2:  Continue Deep venous thrombosis prophylaxisaspirin 325 mg daily. Hold today, 3/17/2022 secondary to postop anemia  3:  Continue Pain Control/scheduled Tylenol, oxycodone as needed. Patient is on Norco as needed every 6 at baseline, will continue to monitor pain control. Well-controlled at this time  4: PT/OT eval pending, case management following for needs  5: First dose of postop completed, second dose Ancef pending. Patient has history of prosthetic joint infection, will need a 14-day dose doxycycline postop for infection prophylaxis ordered to start tomorrow, postop day 2  6:  Discharge likely in 1 to 2 days from Ortho standpoint. Pending medical stability, pain control  7:  We will follow up with Dr. Beverley Hill outpatient 2 weeks postop    [facility]

## 2022-03-17 NOTE — PROGRESS NOTES
Physical Therapy    Facility/Department: [place] [address] - MED SURG/ORTHO  Initial Assessment    NAME: Vinh Brady  : 1955  MRN: [id number]    Date of Service: 3/17/2022    Discharge Recommendations:  Subacute/Skilled Nursing Facility   PT Equipment Recommendations  Equipment Needed: No  Other: defer  If pt is unable to be seen after this session, please let this note serve as discharge summary. Please see case management note for discharge disposition. Thank you. Assessment   Body structures, Functions, Activity limitations: Decreased functional mobility ; Increased pain;Decreased balance;Decreased ADL status; Decreased ROM; Decreased strength;Decreased endurance  Assessment: Pt functioning below baseline after a syncopal episode resulting in R knee revision of TKA and distal fem fx. Pt previously indep with mobility using a rollator after very long stents in SNF and HHPT from [facility]. Today, pt required Ax2 for bed mobility, standing to the walker and stedy. Limted by pain. Pt with too much pain sitting in the chair, pt was assisted back to bed. Recommend SNF upon DC to progress mobility  Treatment Diagnosis: decreased mobility  Prognosis: Good  Decision Making: Medium Complexity  PT Education: Goals; General Safety; Disease Specific Education;PT Role;Plan of Care;Precautions;Transfer Training;Functional Mobility Training;Family Education  Patient Education: Pt expressed understanding on role of PT, precautions and benefits of OOB activity  Barriers to Learning: none  REQUIRES PT FOLLOW UP: Yes  Activity Tolerance  Activity Tolerance: Patient limited by fatigue;Patient limited by pain; Patient limited by endurance       Patient Diagnosis(es): The primary encounter diagnosis was Periprosthetic fracture around internal prosthetic joint, initial encounter. Diagnoses of Syncope and collapse and Hypotension, unspecified hypotension type were also pertinent to this visit.      has a past medical history of Anxiety disorder, Chronic pain syndrome, COVID-19, DDD (degenerative disc disease), lumbar, Diabetes ([address] Displacement of lumbar intervertebral disc without myelopathy, Diverticulitis, Fibromyalgia, Generalized osteoarthrosis, involving multiple sites, GERD (gastroesophageal reflux disease), Hypochromic microcytic anemia, Impacted cerumen, Influenza A, Irritable bowel syndrome, Other and unspecified hyperlipidemia, Prosthetic joint implant failure ([address] Screening mammogram, Tracheobronchomalacia, Unspecified asthma(493.90), Unspecified essential hypertension, and Unspecified hypothyroidism. has a past surgical history that includes Hysterectomy; joint replacement (10/92); joint replacement (  12/92); joint replacement (  6/96); joint replacement (  2006); Colonoscopy (2007); Lung surgery (1/2014); Cardiac catheterization; Abdomen surgery; Colon surgery; Upper gastrointestinal endoscopy (N/A, 10/25/2021); and tracheostomy (N/A, 10/25/2021). Restrictions  Restrictions/Precautions  Restrictions/Precautions: Weight Bearing  Required Braces or Orthoses?:  (Does not need KI for OOB mobility.)  Lower Extremity Weight Bearing Restrictions  Right Lower Extremity Weight Bearing: Weight Bearing As Tolerated  Position Activity Restriction  Other position/activity restrictions: Activity Day of Surgery: 1)  Dangle at edge of bed, progress to stand, and take a few steps with assistive device. 2)  Encourage ankle pumps and quad sets. 3)  Up in bedside chair as tolerated. 4)  Commode privileges with assistance.   Vision/Hearing  Vision: Impaired  Vision Exceptions: Wears glasses at all times  Hearing: Within functional limits     Subjective  General  Chart Reviewed: Yes  Patient assessed for rehabilitation services?: Yes  Response To Previous Treatment: Not applicable  Family / Caregiver Present: Yes  Referring Practitioner: Annita Fox MD  Referral Date : 03/16/22  Diagnosis: RIGHT PERIPROSTHETIC DISTAL FEMUR FRACTURE  Follows Commands: Within Functional Limits  General Comment  Comments: cleared by nursing  Subjective  Subjective: Pt resting in bed. 8/10 R knee pain. RN aware  Pain Screening  Patient Currently in Pain: Yes  Vital Signs  Pulse: 95  BP: 125/62       Orientation  Orientation  Overall Orientation Status: Within Functional Limits  Social/Functional History  Social/Functional History  Lives With: Spouse,Daughter (grandkids)  Type of Home: House  Home Layout: One level  Home Access: Stairs to enter without rails  Entrance Stairs - Number of Steps: 1  Bathroom Shower/Tub: Tub/Shower unit  Bathroom Toilet: Handicap height  Bathroom Equipment: Grab bars in shower,Tub transfer bench  Home Equipment: 4 wheeled walker,Rolling walker,Oxygen (2L O2)  ADL Assistance: Needs assistance  Homemaking Assistance: Independent  Homemaking Responsibilities: Yes (shares with )  Ambulation Assistance: Independent (with rollator)  Transfer Assistance: Independent  Active : No  Occupation: Retired  Cognition        Objective          PROM RLE (degrees)  RLE General PROM: limited by pain  PROM LLE (degrees)  LLE PROM: WFL  Strength RLE  Comment: not tested due to pain  Strength LLE  Strength LLE: WFL  Tone RLE  RLE Tone: Normotonic  Tone LLE  LLE Tone: Normotonic  Motor Control  Gross Motor?: WFL  Coordination  Rapid Alternating Movements: Normal     Bed mobility  Supine to Sit: 2 Person assistance; Moderate assistance;Minimal assistance  Sit to Supine: 2 Person assistance;Maximum assistance;Dependent/Total  Scootin Person assistance;Maximal assistance;Dependent/Total  Transfers  Sit to Stand: 2 Person Assistance;Maximum Assistance  Stand to sit: Maximum Assistance;2 Person Assistance  Stand Pivot Transfers: 2 Person Assistance;Maximum Assistance  Ambulation  Ambulation?: No  WB Status: WBAT R LE     Balance  Posture: Fair  Sitting - Static: Fair  Sitting - Dynamic: Fair  Standing - Static: Poor  Standing - Dynamic: Poor        Plan Plan  Times per week: 7x/week  Times per day: Daily  Current Treatment Recommendations: Strengthening,Balance Training,Functional Mobility Training,Transfer Training,Gait Training,Pain Management,Positioning,Equipment Evaluation, Education, & procurement,Patient/Caregiver Education & Training,Safety Education & Training,Home Exercise Program  Safety Devices  Type of devices:  All fall risk precautions in place,Bed alarm in place,Call light within reach,Gait belt,Patient at risk for falls,Left in bed,Nurse notified  Restraints  Initially in place: No      AM-PAC Score  AM-PAC Inpatient Mobility Raw Score : 9 (03/17/22 1351)  AM-PAC Inpatient T-Scale Score : 30.55 (03/17/22 1351)  Mobility Inpatient CMS 0-100% Score: 81.38 (03/17/22 1351)  Mobility Inpatient CMS G-Code Modifier : CM (03/17/22 1351)          Goals  Short term goals  Time Frame for Short term goals: 3/25  Short term goal 1: Pt will complete bed mobility with Mod A x1  Short term goal 2: Pt will sit to stand wtih Mod A x 1  Short term goal 3: Pt will SPT from bed to chair wtih Mod A x 1  Short term goal 4: Pt will complete B LE Exercises to improve mobility by 3/19  Patient Goals   Patient goals : to get stronger       Therapy Time   Individual Concurrent Group Co-treatment   Time In 1150         Time Out 1223         Minutes 33         Timed Code Treatment Minutes: [address], PT

## 2022-03-17 NOTE — PLAN OF CARE
Pt resting in bed quietly. Bed in lowest position, wheels locked, side rails up X2, non skid socks on. Bed alarm engaged. Pt instructed not to get out of bed on own, to use call light for staff assistance when ambulating or other needs. Pt verbalizes understanding. Call light within reach. Will continue to monitor. Problem: Falls - Risk of:  Goal: Will remain free from falls  Description: Will remain free from falls  3/17/2022 1329 by Vidal Crowe RN  Outcome: Ongoing     Problem: Falls - Risk of:  Goal: Absence of physical injury  Description: Absence of physical injury  3/17/2022 1329 by Vidal Crowe RN  Outcome: Ongoing         Pt rates pain level using 0-10 pain scale. Pain meds administered as ordered per MAR. Pt instructed to use call light for increasing pain or ineffective pain management. Pt verbalizes understanding. Call light within reach. Will continue to monitor.      Problem: Pain:  Goal: Pain level will decrease  Description: Pain level will decrease  3/17/2022 1329 by Vidal Crowe RN  Outcome: Ongoing    Problem: Pain:  Goal: Control of acute pain  Description: Control of acute pain  3/17/2022 1329 by Vidal Crowe RN  Outcome: Ongoing    Problem: Pain:  Goal: Control of chronic pain  Description: Control of chronic pain  3/17/2022 1329 by Vidal Crowe RN  Outcome: Ongoing

## 2022-03-17 NOTE — PROGRESS NOTES
Unable to [employer].  Via Computer/scan   Blood Bank made aware   Clinical Made aware,and  Instructed writer to Document Bld admin Manually and Verify w/ another RN    Blood Administration - Manual Double RN verification -    Patient Blood Type : A Neg  Donor Blood Type : AB Neg  Unit # [id number]  MRN # [id number]  Expiration 4/15/2022 23:59    Started 0145 @ 150 ml per Hour   VS documented

## 2022-03-17 NOTE — CARE COORDINATION
[facility]  Patient is active with General acute hospital, Will follow for discharge to home with orders to resume care.     Reji Luna RN, BSN CTN  General acute hospital [phone]

## 2022-03-17 NOTE — PROGRESS NOTES
I verified blood transfusion with [employer]. Patient was identified, all checks done as she charted. Clinical  informed of manually doing checks.

## 2022-03-17 NOTE — FLOWSHEET NOTE
03/17/22 1029   Encounter Summary   Services provided to: Patient   Referral/Consult From: Multi-disciplinary team   Support System Spouse; Family members   Continue Visiting   (3-17, initial, prayer, support)   Complexity of Encounter Low   Length of Encounter 15 minutes   Spiritual/Adventism   Type Spiritual support   Assessment Calm; Approachable;Coping   Intervention Active listening;Explored feelings, thoughts, concerns;Explored coping resources;Nurtured hope;Prayer;Discussed illness/injury and it's impact   Outcome Engaged in conversation;Expressed feelings/needs/concerns; Shared reminiscences; Receptive   Patient sharing her journey with prolonged illness. We talked about what gives her strength to get through hard times. She states her lavinia is her main support. She is Violeta Chen. Patient shared fond memories. She states she is focusing on the good, not what she can't do anything about. Offered support, prayer and encouraged her use of her lavinia to support her healing.

## 2022-03-17 NOTE — PROGRESS NOTES
Hospitalist Progress Note    Date of Admission: 3/14/2022    Chief Complaint: Fall, right femur tiffanie-prosthetic fracture    Hospital Course:  Marielena Pierre is a 78 y.o. female. She presented to the ER from home by ambulance after a syncopal episode. She stood up and suddenly lost consciousness. Upon awakening she had immediate right knee pain and was unable to bear weight. She is s/p a prolonged hospitalization for COVID-19 which began 9/26/2021. She was initially hospitalized at [facility] for roughly a month, then discharged to a [facility] where she stayed for roughly two months, and then the [address] [place] [address]  1/3 - 22/22. She suffered multiple complications and now has chronic respiratory failure requiring 2-2.5 L/min O2 at all times. She suffered critical illness myopathy but she and her sposue relate her overall strength had improved substantially with rehab. Prior to today's fall she was able to ambulate independently with the help of a FWW. She estimated she could ambulate roughly 100ft on a level surface around her home before having to stop because of dyspnea. Subjective: Pain adequately controlled post op. Required transfusion overnight. No CP/SOB. O2 requirement stable from recent baseline. Labs:   Recent Labs     03/15/22  0631 03/15/22  0631 03/16/22  0643 03/16/22  2235 03/17/22  0434   WBC 7.1  --  6.5  --  5.4   HGB 8.6*   < > 8.5* 5.9* 8.3*   HCT 26.4*   < > 26.2* 17.9* 25.3*     --  131*  --  112*    < > = values in this interval not displayed. Recent Labs     03/15/22  0631 03/16/22  0643 03/17/22  0434    138 138   K 3.9 4.0 3.8    102 103   CO2 24 26 24   BUN 7 8 11   CREATININE <0.5* <0.5* 0.6   CALCIUM 8.9 9.0 8.3     Recent Labs     03/14/22  1157   AST 35   ALT 15   BILITOT 0.3   ALKPHOS 136*     No results for input(s): INR in the last 72 hours.     Physical Exam Performed:    BP (!) 122/56   Pulse 103   Temp 98.9 °F (37.2 °C) (Oral)   Resp 18 Ht 5' 9\" (1.753 m)   Wt 163 lb 9.3 oz (74.2 kg)   SpO2 96%   BMI 24.16 kg/m²     General appearance:  No apparent distress, appears stated age and cooperative. HEENT:  Pupils equal, round, and reactive to light. Conjunctivae/corneas clear. Neck:  Supple, no jugular venous distention. Trachea midline with full range of motion. Respiratory:  Normal respiratory effort. Clear to auscultation, bilaterally without Rales/Wheezes/Rhonchi. Cardiovascular:  Regular rate and rhythm with normal S1/S2 without murmurs, rubs or gallops. Abdomen:  Soft, non-tender, non-distended with normal bowel sounds. Musculoskelatal:  No clubbing, cyanosis or edema bilaterally. Right knee dressings c/d/i. Neurologic:  Neurovascularly intact without any focal sensory/motor deficits. Cranial nerves: II-XII intact, grossly non-focal.  Psychiatric:  Alert and oriented, thought content appropriate, normal insight  Skin:  Skin color, texture, turgor normal.  No rashes or lesions. Capillary Refill:  Brisk,< 3 seconds   Peripheral Pulses:  +2 palpable, equal bilaterally       Assessment/Plan:    Active Hospital Problems    Diagnosis     Chronic respiratory failure with hypoxia (HCC) [J96.11]     Periprosthetic fracture around internal prosthetic joint, initial encounter [C34. 9XXA]     GERD (gastroesophageal reflux disease) [K21.9]     Personal history of COVID-19 [Z86.16]     Microcytic anemia [D50.9]     Acquired hypothyroidism [E03.9]     Nonobstructive atherosclerosis of coronary artery [I25.10]     Syncope and collapse [R55]     Nonischemic cardiomyopathy (HCC) [I42.8]     DM2 (diabetes mellitus, type 2) (HCC) [E11.9]     Tracheobronchomalacia [J39.8]      Right Femur Periprosthetic Fracture  -  Orthopedic surgery assistance appreciated. Leg has been stabilized in a knee immobilizer.  OR repair completed 3/16.   - Continue Pain control and supportive care    Acute Blood Loss Anemia, expected:   -In setting of acute fracture  -Further decline post op requiring transfusion.   -Monitor and transfuse as needed. Chronic Hypoxemic Respiratory Failure  -  Post-COVID pulmonary fibrosis. S/p tracheostomy which has been removed. Baseline 2.5L/min O2 requirement. -  Patient additionally has a background of tracheobronchomalacia and bronchiectasis. -  Continue home inhaled steroid, scheduled duonebs, and prn albuterol nebs. -  Start incentive spirometer q2hwa and BID metanebs as an adjunctive measure to assist with airway clearance. Nonischemic CM, Nonobstructive CAD  -  Historically low normal LVEF.  -  Continue home metoprolol and statin. -  ASA and home antihypertensives and diuretics on hold preoperatively. Diabetes Type 2  -  Hold all oral antidiabetic agents. Start s.c. Insulin regimen based on weight. Hypothyroidism  - Continue home synthroid 100 mcg daily and check TSH. GERD  - Continue PPI and home metoclopramide. Microcytic anemia  -  Chronic mild and stable. Previously d/t chronic inflammation. Full anemia screening panel added to labs. Chronic Pain  -  Continue home gabapentin and tizanidine. DVT prophylaxis: SCDs, lovenox  Diet: ADULT DIET;  Regular  Code Status: Full Code  PT/OT Eval Status: Post-op    Dispo - 2 days post-op    Maya Johnson MD

## 2022-03-18 LAB
ANION GAP SERPL CALCULATED.3IONS-SCNC: 10 MMOL/L (ref 3–16)
BASOPHILS ABSOLUTE: 0 K/UL (ref 0–0.2)
BASOPHILS RELATIVE PERCENT: 0.7 %
BUN BLDV-MCNC: 7 MG/DL (ref 7–20)
CALCIUM SERPL-MCNC: 8.5 MG/DL (ref 8.3–10.6)
CHLORIDE BLD-SCNC: 102 MMOL/L (ref 99–110)
CO2: 24 MMOL/L (ref 21–32)
CREAT SERPL-MCNC: <0.5 MG/DL (ref 0.6–1.2)
EOSINOPHILS ABSOLUTE: 0.1 K/UL (ref 0–0.6)
EOSINOPHILS RELATIVE PERCENT: 2.2 %
GFR AFRICAN AMERICAN: >60
GFR NON-AFRICAN AMERICAN: >60
GLUCOSE BLD-MCNC: 120 MG/DL (ref 70–99)
GLUCOSE BLD-MCNC: 121 MG/DL (ref 70–99)
GLUCOSE BLD-MCNC: 124 MG/DL (ref 70–99)
GLUCOSE BLD-MCNC: 152 MG/DL (ref 70–99)
GLUCOSE BLD-MCNC: 193 MG/DL (ref 70–99)
HCT VFR BLD CALC: 23.7 % (ref 36–48)
HEMOGLOBIN: 7.9 G/DL (ref 12–16)
INR BLD: 1.44 (ref 0.88–1.12)
LYMPHOCYTES ABSOLUTE: 0.8 K/UL (ref 1–5.1)
LYMPHOCYTES RELATIVE PERCENT: 17.3 %
MAGNESIUM: 1.5 MG/DL (ref 1.8–2.4)
MCH RBC QN AUTO: 26.9 PG (ref 26–34)
MCHC RBC AUTO-ENTMCNC: 33.5 G/DL (ref 31–36)
MCV RBC AUTO: 80.4 FL (ref 80–100)
MONOCYTES ABSOLUTE: 0.6 K/UL (ref 0–1.3)
MONOCYTES RELATIVE PERCENT: 13.7 %
NEUTROPHILS ABSOLUTE: 3.1 K/UL (ref 1.7–7.7)
NEUTROPHILS RELATIVE PERCENT: 66.1 %
PDW BLD-RTO: 19.1 % (ref 12.4–15.4)
PERFORMED ON: ABNORMAL
PLATELET # BLD: 116 K/UL (ref 135–450)
PMV BLD AUTO: 8.3 FL (ref 5–10.5)
POTASSIUM SERPL-SCNC: 4.2 MMOL/L (ref 3.5–5.1)
PROTHROMBIN TIME: 16.5 SEC (ref 9.9–12.7)
RBC # BLD: 2.94 M/UL (ref 4–5.2)
REASON FOR REJECTION: NORMAL
REJECTED TEST: NORMAL
SODIUM BLD-SCNC: 136 MMOL/L (ref 136–145)
WBC # BLD: 4.7 K/UL (ref 4–11)

## 2022-03-18 PROCEDURE — 97110 THERAPEUTIC EXERCISES: CPT

## 2022-03-18 PROCEDURE — 6370000000 HC RX 637 (ALT 250 FOR IP): Performed by: ORTHOPAEDIC SURGERY

## 2022-03-18 PROCEDURE — 97530 THERAPEUTIC ACTIVITIES: CPT

## 2022-03-18 PROCEDURE — 80048 BASIC METABOLIC PNL TOTAL CA: CPT

## 2022-03-18 PROCEDURE — 36415 COLL VENOUS BLD VENIPUNCTURE: CPT

## 2022-03-18 PROCEDURE — 6370000000 HC RX 637 (ALT 250 FOR IP): Performed by: SPECIALIST/TECHNOLOGIST

## 2022-03-18 PROCEDURE — 94640 AIRWAY INHALATION TREATMENT: CPT

## 2022-03-18 PROCEDURE — 85025 COMPLETE CBC W/AUTO DIFF WBC: CPT

## 2022-03-18 PROCEDURE — 97116 GAIT TRAINING THERAPY: CPT

## 2022-03-18 PROCEDURE — 1200000000 HC SEMI PRIVATE

## 2022-03-18 PROCEDURE — 94761 N-INVAS EAR/PLS OXIMETRY MLT: CPT

## 2022-03-18 PROCEDURE — 2700000000 HC OXYGEN THERAPY PER DAY

## 2022-03-18 PROCEDURE — 85610 PROTHROMBIN TIME: CPT

## 2022-03-18 PROCEDURE — 83735 ASSAY OF MAGNESIUM: CPT

## 2022-03-18 RX ORDER — SODIUM CHLORIDE 9 MG/ML
25 INJECTION, SOLUTION INTRAVENOUS PRN
Status: DISCONTINUED | OUTPATIENT
Start: 2022-03-18 | End: 2022-03-22 | Stop reason: HOSPADM

## 2022-03-18 RX ORDER — LIDOCAINE HYDROCHLORIDE 10 MG/ML
5 INJECTION, SOLUTION INFILTRATION; PERINEURAL ONCE
Status: DISCONTINUED | OUTPATIENT
Start: 2022-03-18 | End: 2022-03-22 | Stop reason: HOSPADM

## 2022-03-18 RX ORDER — SODIUM CHLORIDE 0.9 % (FLUSH) 0.9 %
5-40 SYRINGE (ML) INJECTION EVERY 12 HOURS SCHEDULED
Status: DISCONTINUED | OUTPATIENT
Start: 2022-03-18 | End: 2022-03-22 | Stop reason: HOSPADM

## 2022-03-18 RX ORDER — OXYCODONE HYDROCHLORIDE 5 MG/1
5-10 TABLET ORAL
Qty: 10 TABLET | Refills: 0 | Status: SHIPPED | OUTPATIENT
Start: 2022-03-18 | End: 2022-03-25

## 2022-03-18 RX ORDER — DOXYCYCLINE HYCLATE 100 MG
100 TABLET ORAL EVERY 12 HOURS SCHEDULED
Qty: 26 TABLET | Refills: 0 | Status: SHIPPED | OUTPATIENT
Start: 2022-03-18 | End: 2022-03-22

## 2022-03-18 RX ORDER — SODIUM CHLORIDE 0.9 % (FLUSH) 0.9 %
5-40 SYRINGE (ML) INJECTION PRN
Status: DISCONTINUED | OUTPATIENT
Start: 2022-03-18 | End: 2022-03-22 | Stop reason: HOSPADM

## 2022-03-18 RX ADMIN — ROPINIROLE HYDROCHLORIDE 0.5 MG: 0.5 TABLET, FILM COATED ORAL at 12:58

## 2022-03-18 RX ADMIN — ACETAMINOPHEN 650 MG: 325 TABLET ORAL at 21:08

## 2022-03-18 RX ADMIN — INSULIN LISPRO 1 UNITS: 100 INJECTION, SOLUTION INTRAVENOUS; SUBCUTANEOUS at 12:55

## 2022-03-18 RX ADMIN — OXYCODONE 10 MG: 5 TABLET ORAL at 08:46

## 2022-03-18 RX ADMIN — INSULIN LISPRO 6 UNITS: 100 INJECTION, SOLUTION INTRAVENOUS; SUBCUTANEOUS at 12:55

## 2022-03-18 RX ADMIN — OXYCODONE 10 MG: 5 TABLET ORAL at 04:20

## 2022-03-18 RX ADMIN — GABAPENTIN 600 MG: 300 CAPSULE ORAL at 12:58

## 2022-03-18 RX ADMIN — INSULIN GLARGINE 18 UNITS: 100 INJECTION, SOLUTION SUBCUTANEOUS at 21:12

## 2022-03-18 RX ADMIN — DOXYCYCLINE HYCLATE 100 MG: 100 TABLET, COATED ORAL at 08:47

## 2022-03-18 RX ADMIN — ACETAMINOPHEN 650 MG: 325 TABLET ORAL at 08:01

## 2022-03-18 RX ADMIN — METOPROLOL TARTRATE 25 MG: 25 TABLET, FILM COATED ORAL at 08:47

## 2022-03-18 RX ADMIN — ROPINIROLE HYDROCHLORIDE 0.5 MG: 0.5 TABLET, FILM COATED ORAL at 21:08

## 2022-03-18 RX ADMIN — Medication 1 TABLET: at 08:47

## 2022-03-18 RX ADMIN — PANTOPRAZOLE SODIUM 40 MG: 40 TABLET, DELAYED RELEASE ORAL at 05:39

## 2022-03-18 RX ADMIN — METOCLOPRAMIDE 5 MG: 10 TABLET ORAL at 08:47

## 2022-03-18 RX ADMIN — Medication 2 PUFF: at 08:05

## 2022-03-18 RX ADMIN — INSULIN LISPRO 1 UNITS: 100 INJECTION, SOLUTION INTRAVENOUS; SUBCUTANEOUS at 21:12

## 2022-03-18 RX ADMIN — METOCLOPRAMIDE 5 MG: 10 TABLET ORAL at 21:08

## 2022-03-18 RX ADMIN — POLYETHYLENE GLYCOL 3350 17 G: 17 POWDER, FOR SOLUTION ORAL at 08:50

## 2022-03-18 RX ADMIN — OXYCODONE 10 MG: 5 TABLET ORAL at 23:24

## 2022-03-18 RX ADMIN — ATORVASTATIN CALCIUM 20 MG: 10 TABLET, FILM COATED ORAL at 21:08

## 2022-03-18 RX ADMIN — DOXYCYCLINE HYCLATE 100 MG: 100 TABLET, COATED ORAL at 21:08

## 2022-03-18 RX ADMIN — LEVOTHYROXINE SODIUM 100 MCG: 0.1 TABLET ORAL at 05:39

## 2022-03-18 RX ADMIN — ACETAMINOPHEN 650 MG: 325 TABLET ORAL at 12:57

## 2022-03-18 RX ADMIN — ASPIRIN 325 MG: 325 TABLET, COATED ORAL at 08:47

## 2022-03-18 RX ADMIN — OXYCODONE 10 MG: 5 TABLET ORAL at 12:54

## 2022-03-18 RX ADMIN — GABAPENTIN 600 MG: 300 CAPSULE ORAL at 21:08

## 2022-03-18 RX ADMIN — OXYCODONE 10 MG: 5 TABLET ORAL at 17:14

## 2022-03-18 RX ADMIN — ROPINIROLE HYDROCHLORIDE 0.5 MG: 0.5 TABLET, FILM COATED ORAL at 08:47

## 2022-03-18 RX ADMIN — GABAPENTIN 600 MG: 300 CAPSULE ORAL at 08:47

## 2022-03-18 ASSESSMENT — PAIN DESCRIPTION - PAIN TYPE: TYPE: ACUTE PAIN

## 2022-03-18 ASSESSMENT — PAIN SCALES - GENERAL
PAINLEVEL_OUTOF10: 9
PAINLEVEL_OUTOF10: 4
PAINLEVEL_OUTOF10: 9
PAINLEVEL_OUTOF10: 8
PAINLEVEL_OUTOF10: 8
PAINLEVEL_OUTOF10: 4
PAINLEVEL_OUTOF10: 9
PAINLEVEL_OUTOF10: 4
PAINLEVEL_OUTOF10: 8

## 2022-03-18 ASSESSMENT — PAIN DESCRIPTION - LOCATION: LOCATION: LEG;KNEE;HIP

## 2022-03-18 ASSESSMENT — PAIN SCALES - WONG BAKER: WONGBAKER_NUMERICALRESPONSE: 8

## 2022-03-18 NOTE — PROGRESS NOTES
Occupational Therapy  Facility/Department: [facility] C5 - MED SURG/ORTHO  Daily Treatment Note  NAME: Isidra White  : 1955  MRN: [id number]    Date of Service: 3/18/2022    Discharge Recommendations:  Subacute/Skilled Nursing Facility       Assessment   Performance deficits / Impairments: Decreased functional mobility ; Decreased ADL status; Decreased balance;Decreased cognition;Decreased endurance;Decreased strength;Decreased high-level IADLs    Assessment: Pt agreeable to therapy. Pt performing functional t/fs with mod A x2 and side stepping at EOB with CGA and RW. Pt limited by decreased O2 stats, increased pain and SOB. Pt performing UB ther ex with decreased O2 stats at EOB but able to maintain O2 stats >90% during ther ex in semifowlers. Pt requiring increased time to complete all activities to increase O2 stats. Pt would continue to benefit from skilled OT services. Continue POC. Prognosis: Good  OT Education: OT Role;Plan of Care;Transfer Training;Energy Conservation;Precautions  Patient Education: disease specific: safe t/fs, car t/fs, RW use, WB status, general safety during hospitalization, PLB, use of call light. Pt verbalized understanding but may need reinforcement. Barriers to Learning: pain  REQUIRES OT FOLLOW UP: Yes  Activity Tolerance  Activity Tolerance: Patient limited by pain;Treatment limited secondary to medical complications (free text)  Activity Tolerance: /69, HR 83, O2 91% on 1.5L; O2 decreasing to 80% with bed mobility and 85% with OOB mobility. Pt O2 recovering to 88% with seated rest and greater than 90% with supine rest. HR WFL throughout session. Safety Devices  Safety Devices in place: Yes  Type of devices: Left in bed;Bed alarm in place;Call light within reach;Gait belt;Nurse notified         Patient Diagnosis(es): The primary encounter diagnosis was Periprosthetic fracture around internal prosthetic joint, initial encounter.  Diagnoses of Syncope and collapse and Hypotension, unspecified hypotension type were also pertinent to this visit. has a past medical history of Anxiety disorder, Chronic pain syndrome, COVID-19, DDD (degenerative disc disease), lumbar, Diabetes ([address] Displacement of lumbar intervertebral disc without myelopathy, Diverticulitis, Fibromyalgia, Generalized osteoarthrosis, involving multiple sites, GERD (gastroesophageal reflux disease), Hypochromic microcytic anemia, Impacted cerumen, Influenza A, Irritable bowel syndrome, Other and unspecified hyperlipidemia, Prosthetic joint implant failure ([facility] [address] Screening mammogram, Tracheobronchomalacia, Unspecified asthma(493.90), Unspecified essential hypertension, and Unspecified hypothyroidism. has a past surgical history that includes Hysterectomy; joint replacement (10/92); joint replacement (  12/92); joint replacement (  6/96); joint replacement (  2006); Colonoscopy (2007); Lung surgery (1/2014); Cardiac catheterization; Abdomen surgery; Colon surgery; Upper gastrointestinal endoscopy (N/A, 10/25/2021); tracheostomy (N/A, 10/25/2021); and Revision total knee arthroplasty (Right, 3/16/2022). Restrictions  Restrictions/Precautions  Restrictions/Precautions: Weight Bearing  Required Braces or Orthoses?:  (Does not need KI for OOB mobility.)  Lower Extremity Weight Bearing Restrictions  Right Lower Extremity Weight Bearing: Weight Bearing As Tolerated  Position Activity Restriction  Other position/activity restrictions: Activity Day of Surgery: 1)  Dangle at edge of bed, progress to stand, and take a few steps with assistive device. 2)  Encourage ankle pumps and quad sets. 3)  Up in bedside chair as tolerated. 4)  Commode privileges with assistance. IV, 1.5L of O2  Subjective   General  Chart Reviewed: Yes  Patient assessed for rehabilitation services?: Yes  Additional Pertinent Hx: Per chart, \"Esperanza Begum is a 78 y.o. female.    She presented to the ER from home by ambulance after a syncopal episode. She stood up and suddenly lost consciousness. Upon awakening she had immediate right knee pain and was unable to bear weight. She is s/p a prolonged hospitalization for COVID-19 which began 9/26/2021. She was initially hospitalized at [facility] for roughly a month, then [facility] where [facility] stayed for roughly two months, [facility]  [facility] - 2/22. She suffered multiple complications and now has chronic respiratory failure requiring 2-2.5 L/min O2 at all times. She suffered critical illness myopathy but she and her sposue relate her overall strength had improved substantially with rehab. Prior to today's fall she was able to ambulate independently with the help of a FWW. She estimated she could ambulate roughly 100ft on a level surface around her home before having to stop because of dyspnea. \"  Response to previous treatment: Patient with no complaints from previous session  Family / Caregiver Present: No  Referring Practitioner: Marianela Marcum MD  Diagnosis: RIGHT PERIPROSTHETIC DISTAL FEMUR FRACTURE; Procedure (3/16):REVISION RIGHT TOTAL KNEE ARTHROPLASTY WITH DISTAL FEMORAL REPLACEMENT  Subjective  Subjective: Pt agreeable to OT  General Comment  Comments: RN approved therapy. Pain Assessment  Pain Level: 4  Pain Type: Acute pain  Pain Location: Leg;Knee;Hip  Non-Pharmaceutical Pain Intervention(s): Therapeutic presence;Repositioned; Ambulation/Increased Activity  Vital Signs  Patient Currently in Pain: Yes   Orientation  Orientation  Overall Orientation Status: Within Functional Limits  Objective    ADL  LE Dressing:  (Pt declining LB dressing this date.)  Toileting: Dependent/Total (abdi)        Balance  Standing Balance: Contact guard assistance (CGAx2 with RW)  Standing Balance  Time: ~3.5 minutes  Activity: standing at EOB, side stepping R at EOB. Comment: Pt with increase SOB and decreased O2 with OOB mobility. Pt's O2 increasing with rest and PLB.   Functional Mobility  Functional - Mobility Device: Rolling Walker  Activity: Other  Assist Level: Contact guard assistance (CGAx2)  Functional Mobility Comments: side stepping up towards HOB  Bed mobility  Supine to Sit: Moderate assistance;2 Person assistance;Contact guard assistance (Mod + CGA x2, HOB elevated.)  Sit to Supine: Maximum assistance; Moderate assistance;2 Person assistance (mod + max Ax2)  Scootin Person assistance;Maximal assistance;Dependent/Total  Transfers  Sit to stand: 2 Person assistance; Moderate assistance  Stand to sit: 2 Person assistance; Moderate assistance  Transfer Comments: with RW                       Cognition  Overall Cognitive Status: Exceptions  Arousal/Alertness: Appropriate responses to stimuli  Following Commands: Follows one step commands with increased time; Follows one step commands with repetition  Attention Span: Attends with cues to redirect  Memory: Appears intact  Safety Judgement: Good awareness of safety precautions  Problem Solving: Assistance required to identify errors made;Assistance required to implement solutions  Insights: Decreased awareness of deficits  Initiation: Requires cues for some  Sequencing: Requires cues for some  Cognition Comment: Pt with increased anxiety with movement. Type of ROM/Therapeutic Exercise  Type of ROM/Therapeutic Exercise: AROM  Comment: BUE ther ex seated EOB or in semifowlers.   Exercises  Shoulder Flexion: x15 in semifowler  Shoulder Extension: x15 in semifowler  Elbow Flexion: x15 seated EOB  Elbow Extension: x15 seated EOB  Grasp/Release: x15 seated EOB  Other: Chest Press x15 in semifowlers,  Over head press x15                    Plan   Plan  Times per week: 4-6x/week  Current Treatment Recommendations: Functional Mobility Training,Strengthening,Balance Training,Safety Education & Training,Equipment Evaluation, Education, & procurement,Self-Care / ADL,Endurance Training    AM-PAC Score        AM-PAC Inpatient Daily Activity Raw Score: 13 (03/18/22 1213)  AM-PAC Inpatient ADL T-Scale Score : 32.03 (03/18/22 1213)  ADL Inpatient CMS 0-100% Score: 63.03 (03/18/22 1213)  ADL Inpatient CMS G-Code Modifier : CL (03/18/22 1213)    Goals  Short term goals  Time Frame for Short term goals: 1 week (3/24) unless stated otherwise. Short term goal 1: Pt will perform functional t/fs with min A and AD PRN. -Ongoing (3/18) mod Ax2  Short term goal 2: Pt will LB dress with mod A and AD/AE PRN. Short term goal 3: Pt will perform at least 2 minutes of standing functional activities with CGA and AD PRN. -Ongoing (3/18) standing for ~3.5 minutes but not completing ADLs  Short term goal 4: Pt will perform BUE ther ex x15 to increase endurance for functional activities (3/22)- Ongoing (3/18) Pt completing x15 but with low O2 and rest breaks. Patient Goals   Patient goals : \"to be able to sit up in the chair\" -MET briefly but d/t pain pt t/fed back to bed. Therapy Time   Individual Concurrent Group Co-treatment   Time In 1115         Time Out 1154         Minutes 39         Timed Code Treatment Minutes: CHEKO Li/L  If pt discharges prior to next session, this note will serve as discharge summary. See case management note for discharge disposition.

## 2022-03-18 NOTE — CARE COORDINATION
CM met with pt and spouse at bedside and had long discussion at bedside to discuss therapy recs for SNF. Pt tearful both and has lots of questions. Pt and spouse both VERY apprehensive about SNF placement as pt has a lot of DME at home. Has WC, BSC, SC raised toilet seat, Rollator, 2WW. Family 24/7 care bedroom first floor one step into house. Really worried and tearful about going to a SNF for rehab. Wanting referral to [facility] referral placed. Writer requesting S3, SN, PT, OT, home health aide LSW. To help coordinate squad transport for ortho f/u appointment as well.  CM following-Lorenza Mchugh RN

## 2022-03-18 NOTE — PROGRESS NOTES
Hospitalist Progress Note    Date of Admission: 3/14/2022    Chief Complaint: Fall, right femur tiffanie-prosthetic fracture    Hospital Course:  Jyothi Falcon is a 78 y.o. female. She presented to the ER from home by ambulance after a syncopal episode. She stood up and suddenly lost consciousness. Upon awakening she had immediate right knee pain and was unable to bear weight. She is s/p a prolonged hospitalization for COVID-19 which began 9/26/2021. She was initially hospitalized at [facility] for roughly a month, then discharged to a [facility] where she stayed for roughly two months, and then the [address] [place] [address]  1/3 - 22/22. She suffered multiple complications and now has chronic respiratory failure requiring 2-2.5 L/min O2 at all times. She suffered critical illness myopathy but she and her sposue relate her overall strength had improved substantially with rehab. Prior to today's fall she was able to ambulate independently with the help of a FWW. She estimated she could ambulate roughly 100ft on a level surface around her home before having to stop because of dyspnea. Subjective: Pain controlled. Still c/o pain/bruising in right ankle; no fracture was noted. Labs:   Recent Labs     03/16/22  0643 03/16/22  0643 03/16/22  2235 03/17/22  0434 03/18/22  0623   WBC 6.5  --   --  5.4 4.7   HGB 8.5*   < > 5.9* 8.3* 7.9*   HCT 26.2*   < > 17.9* 25.3* 23.7*   *  --   --  112* 116*    < > = values in this interval not displayed. Recent Labs     03/16/22  0643 03/17/22  0434 03/18/22  0552    138 136   K 4.0 3.8 4.2    103 102   CO2 26 24 24   BUN 8 11 7   CREATININE <0.5* 0.6 <0.5*   CALCIUM 9.0 8.3 8.5     No results for input(s): AST, ALT, BILIDIR, BILITOT, ALKPHOS in the last 72 hours. No results for input(s): INR in the last 72 hours.     Physical Exam Performed:    BP (!) 144/61   Pulse 100   Temp 97.8 °F (36.6 °C) (Oral)   Resp 18   Ht 5' 9\" (1.753 m)   Wt 163 lb 2.3 oz (74 kg)   SpO2 91%   BMI 24.09 kg/m²     General appearance:  No apparent distress, appears stated age and cooperative. HEENT:  Pupils equal, round, and reactive to light. Conjunctivae/corneas clear. Neck:  Supple, no jugular venous distention. Trachea midline with full range of motion. Respiratory:  Normal respiratory effort. Clear to auscultation, bilaterally without Rales/Wheezes/Rhonchi. Cardiovascular:  Regular rate and rhythm with normal S1/S2 without murmurs, rubs or gallops. Abdomen:  Soft, non-tender, non-distended with normal bowel sounds. Musculoskelatal:  No clubbing, cyanosis or edema bilaterally. Right ankle lateral swelling/bruising. Right knee dressings c/d/i. Neurologic:  Neurovascularly intact without any focal sensory/motor deficits. Cranial nerves: II-XII intact, grossly non-focal.  Psychiatric:  Alert and oriented, thought content appropriate, normal insight  Skin:  Skin color, texture, turgor normal.  No rashes or lesions. Capillary Refill:  Brisk,< 3 seconds   Peripheral Pulses:  +2 palpable, equal bilaterally       Assessment/Plan:    Active Hospital Problems    Diagnosis     Chronic respiratory failure with hypoxia ([facility]) [J96.11]     Periprosthetic fracture around internal prosthetic joint, initial encounter [S82. 9XXA]     GERD (gastroesophageal reflux disease) [K21.9]     Personal history of COVID-19 [Z86.16]     Microcytic anemia [D50.9]     Acquired hypothyroidism [E03.9]     Nonobstructive atherosclerosis of coronary artery [I25.10]     Syncope and collapse [R55]     Nonischemic cardiomyopathy (HCC) [I42.8]     DM2 (diabetes mellitus, type 2) (HCC) [E11.9]     Tracheobronchomalacia [J39.8]      Right Femur Periprosthetic Fracture  -  Orthopedic surgery assistance appreciated. Leg has been stabilized in a knee immobilizer.  OR repair completed 3/16.   - Continue Pain control and supportive care    Acute Blood Loss Anemia, expected:   -In setting of acute fracture  -Had further decline post op requiring transfusion.   -Monitor and transfuse as needed. Right Ankle Pain:   -Injured during initial fall  -No fracture  -Discussed with Ortho; considering Ace wrap/support brace as this is limiting her ability to tolerate therapy   Chronic Hypoxemic Respiratory Failure  -  Post-COVID pulmonary fibrosis. S/p tracheostomy which has been removed. Baseline 2.5L/min O2 requirement. -  Patient additionally has a background of tracheobronchomalacia and bronchiectasis. -  Continue home inhaled steroid, scheduled duonebs, and prn albuterol nebs. -  Start incentive spirometer q2hwa and BID metanebs as an adjunctive measure to assist with airway clearance. Nonischemic CM, Nonobstructive CAD  -  Historically low normal LVEF.  -  Continue home metoprolol and statin. -  ASA and home antihypertensives and diuretics on hold preoperatively. Diabetes Type 2  -  Hold all oral antidiabetic agents. Start s.c. Insulin regimen based on weight. Hypothyroidism  - Continue home synthroid 100 mcg daily and check TSH. GERD  - Continue PPI and home metoclopramide. Microcytic anemia  -  Chronic mild and stable. Previously d/t chronic inflammation. Full anemia screening panel added to labs. Chronic Pain  -  Continue home gabapentin and tizanidine. DVT prophylaxis: SCDs, lovenox  Diet: ADULT DIET;  Regular  Code Status: Full Code  PT/OT Eval Status: Pending    Dispo - 1-2 days, pending placement arrangements     Kristin Johnson MD

## 2022-03-18 NOTE — PROGRESS NOTES
Physical Therapy  Facility/Department: [facility] C5 - MED SURG/ORTHO  Daily Treatment Note  NAME: Marielena Pierre  : 1955  MRN: [id number]    Date of Service: 3/18/2022    Discharge Recommendations:  Subacute/Skilled Nursing Facility   PT Equipment Recommendations  Equipment Needed: No  Other: defer    Assessment   Body structures, Functions, Activity limitations: Decreased functional mobility ; Increased pain;Decreased balance;Decreased ADL status; Decreased ROM; Decreased strength;Decreased endurance  Assessment: Pt functioning below baseline after a syncopal episode resulting in R knee revision of TKA and distal fem fx. Pt previously indep with mobility using a rollator after very long stents in SNF and HHPT from [facility]. Today, pt required Ax2 for bed mobility, standing to the walker and taking 2-3 lateral steps towards HOB. Limted by pain. Recommend SNF upon DC to progress mobility  Treatment Diagnosis: decreased mobility  Prognosis: Good  Decision Making: Medium Complexity  PT Education: Goals; General Safety; Disease Specific Education;PT Role;Plan of Care;Precautions;Transfer Training;Functional Mobility Training;Family Education  Patient Education: Pt expressed understanding on role of PT, precautions and benefits of OOB activity  Barriers to Learning: none  REQUIRES PT FOLLOW UP: Yes  Activity Tolerance  Activity Tolerance: Patient limited by pain;Treatment limited secondary to medical complications (free text)  Activity Tolerance: /69, HR 83, O2 91% on 1.5L; O2 decreasing to 80% with bed mobility and 85% with OOB mobility. Pt O2 recovering to 88% with seated rest and greater than 90% with supine rest. HR WFL throughout session. Patient Diagnosis(es): The primary encounter diagnosis was Periprosthetic fracture around internal prosthetic joint, initial encounter. Diagnoses of Syncope and collapse and Hypotension, unspecified hypotension type were also pertinent to this visit.      has a past medical history of Anxiety disorder, Chronic pain syndrome, COVID-19, DDD (degenerative disc disease), lumbar, Diabetes ([address] Displacement of lumbar intervertebral disc without myelopathy, Diverticulitis, Fibromyalgia, Generalized osteoarthrosis, involving multiple sites, GERD (gastroesophageal reflux disease), Hypochromic microcytic anemia, Impacted cerumen, Influenza A, Irritable bowel syndrome, Other and unspecified hyperlipidemia, Prosthetic joint implant failure ([address] Screening mammogram, Tracheobronchomalacia, Unspecified asthma(493.90), Unspecified essential hypertension, and Unspecified hypothyroidism. has a past surgical history that includes Hysterectomy; joint replacement (10/92); joint replacement (  12/92); joint replacement (  6/96); joint replacement (  2006); Colonoscopy (2007); Lung surgery (1/2014); Cardiac catheterization; Abdomen surgery; Colon surgery; Upper gastrointestinal endoscopy (N/A, 10/25/2021); tracheostomy (N/A, 10/25/2021); and Revision total knee arthroplasty (Right, 3/16/2022). Restrictions  Restrictions/Precautions  Restrictions/Precautions: Weight Bearing  Required Braces or Orthoses?:  (Does not need KI for OOB mobility.)  Lower Extremity Weight Bearing Restrictions  Right Lower Extremity Weight Bearing: Weight Bearing As Tolerated  Position Activity Restriction  Other position/activity restrictions: Activity Day of Surgery: 1)  Dangle at edge of bed, progress to stand, and take a few steps with assistive device. 2)  Encourage ankle pumps and quad sets. 3)  Up in bedside chair as tolerated. 4)  Commode privileges with assistance. IV, 1.5L of O2  Subjective   General  Chart Reviewed: Yes  Response To Previous Treatment: Patient with no complaints from previous session. Family / Caregiver Present: No  Referring Practitioner: Giuliana Enrique MD  Subjective  Subjective: Pt resting in bed. 8/10 R knee pain.  RN aware  General Comment  Comments: cleared by nursing  Pain Screening  Patient Currently in Pain: Yes  Pain Assessment  Pain Assessment: Faces  Mcdaniel-Baker Pain Rating: Hurts whole lot  Non-Pharmaceutical Pain Intervention(s): Ambulation/Increased Activity;Repositioned  Vital Signs  Patient Currently in Pain: Yes       Orientation  Orientation  Overall Orientation Status: Within Functional Limits  Cognition      Objective   Bed mobility  Supine to Sit: Dependent/Total;2 Person assistance (Mod 2)  Sit to Supine: 2 Person assistance;Dependent/Total (mod x 2)  Scootin Person assistance;Maximal assistance;Dependent/Total  Transfers  Sit to Stand: Dependent/Total;2 Person Assistance; Moderate Assistance (RW)  Stand to sit: Dependent/Total;2 Person Assistance; Moderate Assistance  Comment: Pt c/o pain RLE and demos poor ability to bend leg or WB on it.   Ambulation  Ambulation?: No  WB Status: WBAT R LE     Balance  Comments: sitting Balance:sba-cga  Standing balance: Contact guard assistance (CGAx2 with RW)  Exercises  Quad Sets: 10  Heelslides: 10 B , AAROM R  Gluteal Sets: 10  Hip Abduction: 5 B  Knee Short Arc Quad: 10 B  Ankle Pumps: 15 B         Comment: standing at EOB, side stepping to R at EOB 2-3 steps in RW CGA            AM-PAC Score  AM-PAC Inpatient Mobility Raw Score : 9 (22 141)  AM-PAC Inpatient T-Scale Score : 30.55 (22)  Mobility Inpatient CMS 0-100% Score: 81.38 (22)  Mobility Inpatient CMS G-Code Modifier : CM (22)          Goals  Short term goals  Time Frame for Short term goals: 3/25  Short term goal 1: Pt will complete bed mobility with Mod A x1  -3/18 not met  Short term goal 2: Pt will sit to stand wtih Mod A x 1   -3/18 not met  Short term goal 3: Pt will SPT from bed to chair wtih Mod A x 1   -3/18 not met  Short term goal 4: Pt will complete B LE Exercises to improve mobility by 3/19   -3/18 on-going  Patient Goals   Patient goals : to get stronger    Plan    Plan  Times per week: 7x/week  Times per day: Daily  Current Treatment Recommendations: Strengthening,Balance Training,Functional Mobility Training,Transfer Training,Gait Training,Pain Management,Positioning,Equipment Evaluation, Education, & procurement,Patient/Caregiver Education & Training,Safety Education & Training,Home Exercise Program  Safety Devices  Type of devices:  All fall risk precautions in place,Bed alarm in place,Call light within reach,Gait belt,Patient at risk for falls,Left in bed,Nurse notified  Restraints  Initially in place: No     Therapy Time   Individual Concurrent Group Co-treatment   Time In 1112         Time Out 1150         Minutes 38         Timed Code Treatment Minutes: [address]

## 2022-03-18 NOTE — FLOWSHEET NOTE
Contacted Dr. Jori Goodpasture about loss of final IV access and pending PICC line order. OK for PICC line. RN to verify consent for PICC line with pt. DIT PICC team called and told to call the in-house/hospital PICC team although it's close to after hours. PICC team contacted, order placed, and PICC team in hospital to make call for PICC line.

## 2022-03-18 NOTE — CARE COORDINATION
[facility]    Patient active with [facility] for home care services.    I will follow for needs     request:    SN/PT/OT/HHA/MSW  S3    Reji Luna RN, BSN CTN  [facility] [phone]

## 2022-03-18 NOTE — PROGRESS NOTES
Department of Orthopedic Surgery  Physician Assistant   Progress Note    Subjective:       Systemic or Specific Complaints:Pain Control improved today. Not as anxious today. Objective:     Patient Vitals for the past 24 hrs:   BP Temp Temp src Pulse Resp SpO2 Weight   03/18/22 0809      91 %    03/18/22 0748 (!) 144/61   100      03/18/22 0414 (!) 145/74 97.8 °F (36.6 °C) Oral 97 18 97 % 163 lb 2.3 oz (74 kg)   03/17/22 2336 107/62         03/17/22 2335  98.2 °F (36.8 °C) Oral 82 16 96 %    03/17/22 2242 123/69 98 °F (36.7 °C) Oral 85 18     03/17/22 2115 104/61 97.8 °F (36.6 °C) Oral 89 17 96 %    03/17/22 2012      96 %    03/17/22 1351 116/66 98.9 °F (37.2 °C) Oral 94 18 96 %    03/17/22 1348 125/62   95      03/17/22 1204    95  95 %    03/17/22 1200 125/62             General: alert, appears stated age, cooperative and mild distress   Wound: Post-op dressing in place, no drainage present. Knee immobilizer in place   Motion: ROM not tested in affected extremity due to pain, anxiety   DVT Exam: No evidence of DVT seen on physical exam.  No cords or calf tenderness. Additional exam: Pt laying in bed at time of exam, post op dressing in place, knee immobilizer in place  Tender to palpation throughout RLE    Data Review  CBC:   Lab Results   Component Value Date    WBC 4.7 03/18/2022    RBC 2.94 03/18/2022    HGB 7.9 03/18/2022    HCT 23.7 03/18/2022     03/18/2022       Renal:   Lab Results   Component Value Date     03/18/2022    K 4.2 03/18/2022    K 4.4 03/14/2022     03/18/2022    CO2 24 03/18/2022    BUN 7 03/18/2022    CREATININE <0.5 03/18/2022    GLUCOSE 121 03/18/2022    CALCIUM 8.5 03/18/2022            Assessment:     s/p right revision total knee arthoplasty with distal femoral replacement, POD 2. Acute blood loss anemia - expected after surgery. Will monitor Hgb. Plan:      1:  WBAT to RLE, ok for ROM as tolerated.   2: Continue Deep venous thrombosis prophylaxisaspirin 325 mg daily. 3:  Continue Pain Control/scheduled Tylenol, oxycodone as needed. Patient is on Norco as needed every 6 at baseline, will continue to monitor pain control. Well-controlled at this time  4: PT/OT   5: Patient has history of prosthetic joint infection, will need a 14-day dose doxycycline postop for infection prophylaxis ordered to start today  6:  Discharge likely in 1 to 2 days from Ortho standpoint. She is now considering SNF placement so will see how she does with PT and go from there. Not ready to d/c today.   7: We will follow up with Dr. Marianela Marcum outpatient 2 weeks postop    JHONNY Salgado

## 2022-03-19 LAB
ANION GAP SERPL CALCULATED.3IONS-SCNC: 8 MMOL/L (ref 3–16)
BASOPHILS ABSOLUTE: 0 K/UL (ref 0–0.2)
BASOPHILS RELATIVE PERCENT: 0.7 %
BUN BLDV-MCNC: 8 MG/DL (ref 7–20)
CALCIUM SERPL-MCNC: 8.4 MG/DL (ref 8.3–10.6)
CHLORIDE BLD-SCNC: 105 MMOL/L (ref 99–110)
CO2: 28 MMOL/L (ref 21–32)
CREAT SERPL-MCNC: <0.5 MG/DL (ref 0.6–1.2)
EOSINOPHILS ABSOLUTE: 0.2 K/UL (ref 0–0.6)
EOSINOPHILS RELATIVE PERCENT: 4.6 %
GFR AFRICAN AMERICAN: >60
GFR NON-AFRICAN AMERICAN: >60
GLUCOSE BLD-MCNC: 116 MG/DL (ref 70–99)
GLUCOSE BLD-MCNC: 117 MG/DL (ref 70–99)
GLUCOSE BLD-MCNC: 122 MG/DL (ref 70–99)
GLUCOSE BLD-MCNC: 125 MG/DL (ref 70–99)
GLUCOSE BLD-MCNC: 184 MG/DL (ref 70–99)
HCT VFR BLD CALC: 21.9 % (ref 36–48)
HEMOGLOBIN: 7.3 G/DL (ref 12–16)
LYMPHOCYTES ABSOLUTE: 0.8 K/UL (ref 1–5.1)
LYMPHOCYTES RELATIVE PERCENT: 20.4 %
MAGNESIUM: 1.4 MG/DL (ref 1.8–2.4)
MCH RBC QN AUTO: 27 PG (ref 26–34)
MCHC RBC AUTO-ENTMCNC: 33.4 G/DL (ref 31–36)
MCV RBC AUTO: 81 FL (ref 80–100)
MONOCYTES ABSOLUTE: 0.4 K/UL (ref 0–1.3)
MONOCYTES RELATIVE PERCENT: 10.8 %
NEUTROPHILS ABSOLUTE: 2.4 K/UL (ref 1.7–7.7)
NEUTROPHILS RELATIVE PERCENT: 63.5 %
PDW BLD-RTO: 18.7 % (ref 12.4–15.4)
PERFORMED ON: ABNORMAL
PLATELET # BLD: 124 K/UL (ref 135–450)
PMV BLD AUTO: 8.1 FL (ref 5–10.5)
POTASSIUM SERPL-SCNC: 3.9 MMOL/L (ref 3.5–5.1)
RBC # BLD: 2.71 M/UL (ref 4–5.2)
SODIUM BLD-SCNC: 141 MMOL/L (ref 136–145)
WBC # BLD: 3.8 K/UL (ref 4–11)

## 2022-03-19 PROCEDURE — 85025 COMPLETE CBC W/AUTO DIFF WBC: CPT

## 2022-03-19 PROCEDURE — 6370000000 HC RX 637 (ALT 250 FOR IP): Performed by: ORTHOPAEDIC SURGERY

## 2022-03-19 PROCEDURE — 80048 BASIC METABOLIC PNL TOTAL CA: CPT

## 2022-03-19 PROCEDURE — 6360000002 HC RX W HCPCS: Performed by: NURSE PRACTITIONER

## 2022-03-19 PROCEDURE — 1200000000 HC SEMI PRIVATE

## 2022-03-19 PROCEDURE — 97535 SELF CARE MNGMENT TRAINING: CPT

## 2022-03-19 PROCEDURE — 97530 THERAPEUTIC ACTIVITIES: CPT

## 2022-03-19 PROCEDURE — 83735 ASSAY OF MAGNESIUM: CPT

## 2022-03-19 PROCEDURE — 6370000000 HC RX 637 (ALT 250 FOR IP): Performed by: SPECIALIST/TECHNOLOGIST

## 2022-03-19 PROCEDURE — 36415 COLL VENOUS BLD VENIPUNCTURE: CPT

## 2022-03-19 PROCEDURE — 94761 N-INVAS EAR/PLS OXIMETRY MLT: CPT

## 2022-03-19 PROCEDURE — 97110 THERAPEUTIC EXERCISES: CPT

## 2022-03-19 PROCEDURE — 2700000000 HC OXYGEN THERAPY PER DAY

## 2022-03-19 PROCEDURE — 2580000003 HC RX 258: Performed by: INTERNAL MEDICINE

## 2022-03-19 PROCEDURE — 94640 AIRWAY INHALATION TREATMENT: CPT

## 2022-03-19 RX ORDER — MAGNESIUM SULFATE IN WATER 40 MG/ML
4000 INJECTION, SOLUTION INTRAVENOUS ONCE
Status: COMPLETED | OUTPATIENT
Start: 2022-03-19 | End: 2022-03-20

## 2022-03-19 RX ADMIN — SODIUM CHLORIDE 25 ML: 9 INJECTION, SOLUTION INTRAVENOUS at 17:34

## 2022-03-19 RX ADMIN — OXYCODONE 10 MG: 5 TABLET ORAL at 20:07

## 2022-03-19 RX ADMIN — METOCLOPRAMIDE 5 MG: 10 TABLET ORAL at 09:40

## 2022-03-19 RX ADMIN — GABAPENTIN 600 MG: 300 CAPSULE ORAL at 09:46

## 2022-03-19 RX ADMIN — ROPINIROLE HYDROCHLORIDE 0.5 MG: 0.5 TABLET, FILM COATED ORAL at 09:40

## 2022-03-19 RX ADMIN — OXYCODONE 10 MG: 5 TABLET ORAL at 14:32

## 2022-03-19 RX ADMIN — ROPINIROLE HYDROCHLORIDE 0.5 MG: 0.5 TABLET, FILM COATED ORAL at 14:32

## 2022-03-19 RX ADMIN — ASPIRIN 325 MG: 325 TABLET, COATED ORAL at 09:40

## 2022-03-19 RX ADMIN — ACETAMINOPHEN 650 MG: 325 TABLET ORAL at 14:32

## 2022-03-19 RX ADMIN — LEVOTHYROXINE SODIUM 100 MCG: 0.1 TABLET ORAL at 05:05

## 2022-03-19 RX ADMIN — METOPROLOL TARTRATE 25 MG: 25 TABLET, FILM COATED ORAL at 20:08

## 2022-03-19 RX ADMIN — GABAPENTIN 600 MG: 300 CAPSULE ORAL at 14:31

## 2022-03-19 RX ADMIN — INSULIN LISPRO 6 UNITS: 100 INJECTION, SOLUTION INTRAVENOUS; SUBCUTANEOUS at 12:37

## 2022-03-19 RX ADMIN — ACETAMINOPHEN 650 MG: 325 TABLET ORAL at 09:40

## 2022-03-19 RX ADMIN — METOCLOPRAMIDE 5 MG: 10 TABLET ORAL at 20:08

## 2022-03-19 RX ADMIN — INSULIN GLARGINE 18 UNITS: 100 INJECTION, SOLUTION SUBCUTANEOUS at 20:31

## 2022-03-19 RX ADMIN — PANTOPRAZOLE SODIUM 40 MG: 40 TABLET, DELAYED RELEASE ORAL at 05:05

## 2022-03-19 RX ADMIN — INSULIN LISPRO 1 UNITS: 100 INJECTION, SOLUTION INTRAVENOUS; SUBCUTANEOUS at 12:38

## 2022-03-19 RX ADMIN — DOXYCYCLINE HYCLATE 100 MG: 100 TABLET, COATED ORAL at 20:30

## 2022-03-19 RX ADMIN — Medication 2 PUFF: at 20:25

## 2022-03-19 RX ADMIN — ACETAMINOPHEN 650 MG: 325 TABLET ORAL at 20:01

## 2022-03-19 RX ADMIN — DOXYCYCLINE HYCLATE 100 MG: 100 TABLET, COATED ORAL at 09:41

## 2022-03-19 RX ADMIN — Medication 10 ML: at 20:08

## 2022-03-19 RX ADMIN — Medication 2 PUFF: at 20:24

## 2022-03-19 RX ADMIN — Medication 2 PUFF: at 08:48

## 2022-03-19 RX ADMIN — ATORVASTATIN CALCIUM 20 MG: 10 TABLET, FILM COATED ORAL at 20:07

## 2022-03-19 RX ADMIN — Medication 1 TABLET: at 09:40

## 2022-03-19 RX ADMIN — POLYETHYLENE GLYCOL 3350 17 G: 17 POWDER, FOR SOLUTION ORAL at 09:29

## 2022-03-19 RX ADMIN — INSULIN LISPRO 6 UNITS: 100 INJECTION, SOLUTION INTRAVENOUS; SUBCUTANEOUS at 17:30

## 2022-03-19 RX ADMIN — GABAPENTIN 600 MG: 300 CAPSULE ORAL at 20:08

## 2022-03-19 RX ADMIN — TIZANIDINE 2 MG: 4 TABLET ORAL at 09:45

## 2022-03-19 RX ADMIN — ROPINIROLE HYDROCHLORIDE 0.5 MG: 0.5 TABLET, FILM COATED ORAL at 20:08

## 2022-03-19 RX ADMIN — OXYCODONE 10 MG: 5 TABLET ORAL at 09:44

## 2022-03-19 RX ADMIN — OXYCODONE 10 MG: 5 TABLET ORAL at 05:04

## 2022-03-19 RX ADMIN — MAGNESIUM SULFATE HEPTAHYDRATE 4000 MG: 40 INJECTION, SOLUTION INTRAVENOUS at 17:43

## 2022-03-19 RX ADMIN — Medication 10 ML: at 09:47

## 2022-03-19 ASSESSMENT — PAIN SCALES - GENERAL
PAINLEVEL_OUTOF10: 10
PAINLEVEL_OUTOF10: 8
PAINLEVEL_OUTOF10: 9
PAINLEVEL_OUTOF10: 8
PAINLEVEL_OUTOF10: 9
PAINLEVEL_OUTOF10: 9

## 2022-03-19 ASSESSMENT — PAIN DESCRIPTION - ORIENTATION
ORIENTATION: RIGHT
ORIENTATION: RIGHT

## 2022-03-19 ASSESSMENT — PAIN DESCRIPTION - PAIN TYPE
TYPE: SURGICAL PAIN
TYPE: ACUTE PAIN

## 2022-03-19 ASSESSMENT — PAIN DESCRIPTION - LOCATION
LOCATION: HIP;KNEE;LEG
LOCATION: LEG

## 2022-03-19 ASSESSMENT — PAIN SCALES - WONG BAKER: WONGBAKER_NUMERICALRESPONSE: 8

## 2022-03-19 ASSESSMENT — PAIN DESCRIPTION - DESCRIPTORS: DESCRIPTORS: SHARP;SHOOTING

## 2022-03-19 NOTE — PROGRESS NOTES
Order for PICC line per Dr. Jori Goodpasture Pre procedure and timeout done with pt's RN. UnSuccessful insertion of a double lumen PICC line into pt's right basilic vein. PICC line would not drop into SVC. Spoke with RN okay with midline placement for access. Patient only needs fluids and antibx. Double lumen midline left in place. Midline is 15 cm and out externally 0 cm. All lumens flush without resistance and draw back brisk blood return. Midline site CDI with hemostasis maintained and a Stat seal applied to site. Pt instructed to stay in bed and keep arm flat and still for 30 minutes  to promote hemostasis.      Louise Navarro RN given handoff report

## 2022-03-19 NOTE — PROGRESS NOTES
Physical Therapy    Physical therapy attempted to treat this patient. However the patient refused to participate. Patient said, \"I sat up earlier today. I cannot do it now. \" Patient's family member said, \"she just cannot get up right now. \" PT will re-attempt to treat this patient as able. Thank you.      Yumiko Leong PT

## 2022-03-19 NOTE — PROGRESS NOTES
Occupational Therapy  Facility/Department: [facility] C5 - MED SURG/ORTHO  Daily Treatment Note  NAME: Jyothi Falcon  : 1955  MRN: [id number]    Date of Service: 3/19/2022    Discharge Recommendations:  Subacute/Skilled Nursing Facility       Assessment   Performance deficits / Impairments: Decreased functional mobility ; Decreased ADL status; Decreased balance;Decreased cognition;Decreased endurance;Decreased strength;Decreased high-level IADLs  Assessment: Pt with fair tolerance of OT treatment, very limited by pain. Pt requiring mod-max A for bed mobility, declined any transfer training. Pt total A for LB ADLs. Pt continues to function below her baseline and would benefit from SNF at d/c. Prognosis: Good  OT Education: OT Role;Plan of Care;Energy Conservation;Precautions; ADL Adaptive Strategies; Home Exercise Program  Disease Specific Education: Pt educated on weight bearing status, post-op precautions, appropriate DME, and safe mobility with AD. Pt verbalized understanding, would benefit from reinforcement. Barriers to Learning: pain  REQUIRES OT FOLLOW UP: Yes  Activity Tolerance  Activity Tolerance: Patient limited by pain  Activity Tolerance: Vitals: BP= 97/55 semifowlers, 114/63 EOB; HR= 77, SPO2= 94% 2L  Safety Devices  Safety Devices in place: Yes  Type of devices: Call light within reach;Nurse notified; Left in bed;Bed alarm in place         Patient Diagnosis(es): The primary encounter diagnosis was Periprosthetic fracture around internal prosthetic joint, initial encounter. Diagnoses of Syncope and collapse and Hypotension, unspecified hypotension type were also pertinent to this visit.       has a past medical history of Anxiety disorder, Chronic pain syndrome, COVID-19, DDD (degenerative disc disease), lumbar, Diabetes ([facility] [address] Displacement of lumbar intervertebral disc without myelopathy, Diverticulitis, Fibromyalgia, Generalized osteoarthrosis, involving multiple sites, GERD (gastroesophageal reflux disease), Hypochromic microcytic anemia, Impacted cerumen, Influenza A, Irritable bowel syndrome, Other and unspecified hyperlipidemia, Prosthetic joint implant failure ([facility] [address] Screening mammogram, Tracheobronchomalacia, Unspecified asthma(493.90), Unspecified essential hypertension, and Unspecified hypothyroidism. has a past surgical history that includes Hysterectomy; joint replacement (10/92); joint replacement (  12/92); joint replacement (  6/96); joint replacement (  2006); Colonoscopy (2007); Lung surgery (1/2014); Cardiac catheterization; Abdomen surgery; Colon surgery; Upper gastrointestinal endoscopy (N/A, 10/25/2021); tracheostomy (N/A, 10/25/2021); and Revision total knee arthroplasty (Right, 3/16/2022). Restrictions  Restrictions/Precautions  Restrictions/Precautions: Weight Bearing  Required Braces or Orthoses?:  (Does not need KI for OOB mobility.)  Lower Extremity Weight Bearing Restrictions  Right Lower Extremity Weight Bearing: Weight Bearing As Tolerated  Position Activity Restriction  Other position/activity restrictions: Activity Day of Surgery: 1)  Dangle at edge of bed, progress to stand, and take a few steps with assistive device. 2)  Encourage ankle pumps and quad sets. 3)  Up in bedside chair as tolerated. 4)  Commode privileges with assistance. IV, 2L of O2     Subjective   General  Chart Reviewed: Yes  Patient assessed for rehabilitation services?: Yes  Additional Pertinent Hx: Per chart, \"Esperanza Parkinson is a 78 y.o. female. She presented to the ER from home by ambulance after a syncopal episode. She stood up and suddenly lost consciousness. Upon awakening she had immediate right knee pain and was unable to bear weight. She is s/p a prolonged hospitalization for COVID-19 which began 9/26/2021. She was initially hospitalized at [facility] for roughly a month, then [facility] to a [facility] where [facility] stayed for roughly two months, and then the [address] [place] [address]  1/3 - 2/22.   She suffered multiple complications and now has chronic respiratory failure requiring 2-2.5 L/min O2 at all times. She suffered critical illness myopathy but she and her sposue relate her overall strength had improved substantially with rehab. Prior to today's fall she was able to ambulate independently with the help of a FWW. She estimated she could ambulate roughly 100ft on a level surface around her home before having to stop because of dyspnea. \"  Response to previous treatment: Patient with no complaints from previous session  Family / Caregiver Present: No  Referring Practitioner: Jeannine Fontenot MD  Diagnosis: RIGHT PERIPROSTHETIC DISTAL FEMUR FRACTURE; Procedure (3/16):REVISION RIGHT TOTAL KNEE ARTHROPLASTY WITH DISTAL FEMORAL REPLACEMENT    Subjective  Subjective: Pt resting in  bed, agreeable to OT treatment with encouragement. Pain Assessment  Pain Assessment: Faces  Mcdaniel-Baker Pain Rating: Hurts whole lot  Pain Type: Surgical pain  Pain Location: Leg  Pain Orientation: Right  Non-Pharmaceutical Pain Intervention(s): Repositioned  Pre Treatment Pain Screening  Intervention List: Patient able to continue with treatment  Vital Signs  Patient Currently in Pain: Yes     Orientation  Orientation  Overall Orientation Status: Within Functional Limits     Objective    ADL  LE Dressing: Dependent/Total (socks)  Toileting: Dependent/Total (abdi, however bed saturated with urine, pt total A for hygiene, RN notified)     Balance  Sitting Balance: Contact guard assistance (progressing to SBA, EOB 10 mins)  Standing Balance: Unable to assess (pt refused)    Bed mobility  Supine to Sit: Moderate assistance  Sit to Supine: Maximum assistance     Cognition  Overall Cognitive Status: Exceptions  Arousal/Alertness: Appropriate responses to stimuli  Following Commands: Follows one step commands with increased time; Follows one step commands with repetition  Memory: Appears intact  Insights: Decreased awareness of deficits  Cognition Comment: Pt with increased anxiety with movement. Type of ROM/Therapeutic Exercise  Type of ROM/Therapeutic Exercise: AROM  Comment: BUE ther ex seated EOB  Exercises  Elbow Flexion: 20x  Elbow Extension: 20x  Supination: 20x  Pronation: 20x  Wrist Flexion: 20x  Wrist Extension: 20x  Finger Flexion: 20x  Finger Extension: 20x     Plan   Plan  Times per week: 4-6x/week  Current Treatment Recommendations: Functional Mobility Training,Strengthening,Balance Training,Safety Education & Training,Equipment Evaluation, Education, & procurement,Self-Care / ADL,Endurance Training    AM-PAC Score  AM-[facility] Inpatient Daily Activity Raw Score: 13 (03/19/22 1152)  AM-PAC Inpatient ADL T-Scale Score : 32.03 (03/19/22 1152)  ADL Inpatient CMS 0-100% Score: 63.03 (03/19/22 1152)  ADL Inpatient CMS G-Code Modifier : CL (03/19/22 1152)    Goals  Short term goals  Time Frame for Short term goals: 1 week (3/24) unless stated otherwise. Short term goal 1: Pt will perform functional t/fs with min A and AD PRN. -- ongoing 3/19  Short term goal 2: Pt will LB dress with mod A and AD/AE PRN. -- ongoing 3/19  Short term goal 3: Pt will perform at least 2 minutes of standing functional activities with CGA and AD PRN. -- ongoing 3/19  Short term goal 4: Pt will perform BUE ther ex x15 to increase endurance for functional activities (3/22)-- ongoing 3/19/22  Patient Goals   Patient goals : \"to be able to sit up in the chair\" -MET briefly but d/t pain pt t/fed back to bed.        Therapy Time   Individual Concurrent Group Co-treatment   Time In 1030         Time Out 1110         Minutes [address], LOYD/L

## 2022-03-19 NOTE — PROGRESS NOTES
Hospitalist Progress Note      PCP: Virginie Mosqueda MD    Date of Admission: 3/14/2022    Chief Complaint: Fall, right femur tiffanie-prosthetic fracture    Hospital Course: Geneva Henson is a 78 y.o. female. She presented to the ER from home by ambulance after a syncopal episode. She stood up and suddenly lost consciousness. Upon awakening she had immediate right knee pain and was unable to bear weight.     She is s/p a prolonged hospitalization for COVID-19 which began 9/26/2021. She was initially hospitalized at [facility] for roughly a month, then discharged to a [facility] where she stayed for roughly two months, and then the [facility]  1/3 - 22/22. She suffered multiple complications and now has chronic respiratory failure requiring 2-2.5 L/min O2 at all times. She suffered critical illness myopathy but she and her sposue relate her overall strength had improved substantially with rehab. Prior to today's fall she was able to ambulate independently with the help of a FWW. She estimated she could ambulate roughly 100ft on a level surface around her home before having to stop because of dyspnea.     Subjective: Eating breakfast, NAD, VSS      Medications:  Reviewed    Infusion Medications    sodium chloride      sodium chloride      sodium chloride      sodium chloride 25 mL (03/16/22 2326)    sodium chloride      dextrose      lactated ringers 100 mL/hr at 03/17/22 2246     Scheduled Medications    lidocaine 1 % injection  5 mL IntraDERmal Once    sodium chloride flush  5-40 mL IntraVENous 2 times per day    lidocaine 1 % injection  5 mL IntraDERmal Once    sodium chloride flush  5-40 mL IntraVENous 2 times per day    lidocaine 1 % injection  5 mL IntraDERmal Once    sodium chloride flush  5-40 mL IntraVENous 2 times per day    doxycycline hyclate  100 mg Oral 2 times per day    acetaminophen  650 mg Oral Q6H    aspirin  325 mg Oral Daily    gabapentin  600 mg Oral TID   [place] fluticasone  2 puff Inhalation BID    levothyroxine  100 mcg Oral Daily    metoclopramide  5 mg Oral BID    metoprolol tartrate  25 mg Oral BID    therapeutic multivitamin-minerals  1 tablet Oral Daily    pantoprazole  40 mg Oral QAM AC    polyethylene glycol  17 g Oral Daily    rOPINIRole  0.5 mg Oral TID    atorvastatin  20 mg Oral Daily    insulin glargine  0.25 Units/kg SubCUTAneous Nightly    insulin lispro  0.08 Units/kg SubCUTAneous TID WC    insulin lispro  0-6 Units SubCUTAneous TID WC    insulin lispro  0-3 Units SubCUTAneous Nightly    albuterol sulfate HFA  2 puff Inhalation BID     PRN Meds: sodium chloride flush, sodium chloride, sodium chloride flush, sodium chloride, sodium chloride flush, sodium chloride, oxyCODONE **OR** oxyCODONE, sodium chloride, ondansetron **OR** ondansetron, HYDROmorphone, tiZANidine, glucose, glucagon (rDNA), dextrose, potassium chloride **OR** potassium alternative oral replacement **OR** potassium chloride, magnesium sulfate, acetaminophen **OR** acetaminophen, senna, melatonin, dextrose bolus (hypoglycemia) **OR** dextrose bolus (hypoglycemia), albuterol sulfate HFA, ipratropium-albuterol      Intake/Output Summary (Last 24 hours) at 3/19/2022 0800  Last data filed at 3/18/2022 2307  Gross per 24 hour   Intake 10 ml   Output 2875 ml   Net -2865 ml       Physical Exam Performed:    BP (!) 138/58   Pulse 85   Temp 98.3 °F (36.8 °C) (Oral)   Resp 16   Ht 5' 9\" (1.753 m)   Wt 161 lb 2.5 oz (73.1 kg)   SpO2 92%   BMI 23.80 kg/m²     General appearance: No apparent distress, appears stated age and cooperative. HEENT: Pupils equal, round, and reactive to light. Conjunctivae/corneas clear. Neck: Supple, with full range of motion. No jugular venous distention. Trachea midline. Respiratory:  Normal respiratory effort. Clear to auscultation, bilaterally without Rales/Wheezes/Rhonchi.   Cardiovascular: Regular rate and rhythm with normal S1/S2 without murmurs, rubs or gallops. Abdomen: Soft, non-tender, non-distended with normal bowel sounds. Musculoskeletal: R knee dsg CDI, edema  Skin: Skin color, texture, turgor normal.  No rashes or lesions. Neurologic:  Neurovascularly intact without any focal sensory/motor deficits. Cranial nerves: II-XII intact, grossly non-focal.  Psychiatric: Alert and oriented, thought content appropriate, normal insight  Capillary Refill: Brisk,3 seconds, normal   Peripheral Pulses: +2 palpable, equal bilaterally       Labs:   Recent Labs     03/17/22  0434 03/18/22  0623 03/19/22  0608   WBC 5.4 4.7 3.8*   HGB 8.3* 7.9* 7.3*   HCT 25.3* 23.7* 21.9*   * 116* 124*     Recent Labs     03/17/22  0434 03/18/22  0552 03/19/22  0608    136 141   K 3.8 4.2 3.9    102 105   CO2 24 24 28   BUN 11 7 8   CREATININE 0.6 <0.5* <0.5*   CALCIUM 8.3 8.5 8.4     No results for input(s): AST, ALT, BILIDIR, BILITOT, ALKPHOS in the last 72 hours. Recent Labs     03/18/22  1720   INR 1.44*     No results for input(s): [place] in the last 72 hours. Urinalysis:      Lab Results   Component Value Date    NITRU Negative 02/06/2022    WBCUA >100 02/06/2022    BACTERIA Rare 02/05/2022    RBCUA see below 02/06/2022    BLOODU MODERATE 02/06/2022    SPECGRAV 1.020 02/06/2022    GLUCOSEU 250 02/06/2022    GLUCOSEU 250 05/11/2011       Radiology:  XR FOOT RIGHT (2 VIEWS)   Final Result   No acute osseous abnormality of the right foot. Osteopenia. XR KNEE RIGHT (1-2 VIEWS)   Final Result   No unexpected findings following revision of right knee arthroplasty. XR KNEE RIGHT (1-2 VIEWS)   Final Result   Intraprocedural fluoroscopic spot images as above. See separate procedure   report for more information.          FLUORO FOR SURGICAL PROCEDURES   Final Result      XR FEMUR RIGHT (MIN 2 VIEWS)   Final Result   Persistent findings consistent with changes related to periprosthetic   fracture of the right distal femur as described above. CT KNEE RIGHT WO CONTRAST   Final Result   1. Acute periprosthetic fracture of the distal right femur centered at the   metadiaphysis and extending to the level of the arthroplasty hardware with   displacement of major fracture fragment by up to 1.5 cm. Fracture lines are   nondisplaced along the medial condyle and mildly comminuted and displaced at   the lateral condyle. 2. Osteopenia. CT Head WO Contrast   Final Result   No acute intracranial abnormality. Left mastoid effusion      RECOMMENDATIONS:   Unavailable         CT Cervical Spine WO Contrast   Final Result   No acute abnormality of the cervical spine. XR KNEE RIGHT (1-2 VIEWS)   Final Result   Acute traumatic displaced periprosthetic fracture involving the distal right   femoral metadiaphysis. XR ANKLE RIGHT (MIN 3 VIEWS)   Final Result   5 no acute osseous abnormality of the right foot. Osteopenia. Lateral soft   tissue swelling. XR CHEST PORTABLE   Final Result   Decreased lung volumes. Stable patchy opacification throughout the lungs,   edema versus infiltrate. Assessment/Plan:    Active Hospital Problems    Diagnosis     Chronic respiratory failure with hypoxia ([facility]) [J96.11]     Periprosthetic fracture around internal prosthetic joint, initial encounter [M73. 9XXA]     GERD (gastroesophageal reflux disease) [K21.9]     Personal history of COVID-19 [Z86.16]     Microcytic anemia [D50.9]     Acquired hypothyroidism [E03.9]     Nonobstructive atherosclerosis of coronary artery [I25.10]     Syncope and collapse [R55]     Nonischemic cardiomyopathy (HCC) [I42.8]     DM2 (diabetes mellitus, type 2) (HCC) [E11.9]     Tracheobronchomalacia [J39.8]      Right Femur Periprosthetic Fracture  -  Orthopedic surgery assistance appreciated. Leg has been stabilized in a knee immobilizer.  OR repair completed 3/16.   - Continue Pain control and supportive care     Acute Blood Loss Anemia, expected:   -In setting of acute fracture  -Had further decline post op requiring transfusion.   -Monitor and transfuse as needed.   -7.3 today     Right Ankle Pain:   -Injured during initial fall  -No fracture  -Discussed with Ortho; considering Ace wrap/support brace as this is limiting her ability to tolerate therapy     Chronic Hypoxemic Respiratory Failure  -  Post-COVID pulmonary fibrosis. S/p tracheostomy which has been removed. Baseline 2.5L/min O2 requirement. -  Patient additionally has a background of tracheobronchomalacia and bronchiectasis. -  Continue home inhaled steroid, scheduled duonebs, and prn albuterol nebs. -  Start incentive spirometer q2hwa and BID metanebs as an adjunctive measure to assist with airway clearance.     Nonischemic CM, Nonobstructive CAD  -  Historically low normal LVEF.  -  Continue home metoprolol and statin. -  ASA and home antihypertensives and diuretics on hold preoperatively.     Diabetes Type 2  -  Hold all oral antidiabetic agents. Start s.c. Insulin regimen based on weight.     Hypothyroidism  - Continue home synthroid 100 mcg daily TSH 4.8     GERD  - Continue PPI and home metoclopramide.     Microcytic anemia  -  Chronic mild and stable. Previously d/t chronic inflammation. Iron 19, TIBC 209, Iron sat 9, Ferritin 111     Chronic Pain  -  Continue home gabapentin and tizanidine.     DVT Prophylaxis: SCD, ASA  Diet: ADULT DIET;  Regular  Code Status: Full Code    PT/OT Eval Status:     Dispo - 1-2 days pending dc arrangements    BRYCE Celis - CNP

## 2022-03-20 LAB
ANION GAP SERPL CALCULATED.3IONS-SCNC: 10 MMOL/L (ref 3–16)
BASOPHILS ABSOLUTE: 0 K/UL (ref 0–0.2)
BASOPHILS RELATIVE PERCENT: 0.7 %
BUN BLDV-MCNC: 6 MG/DL (ref 7–20)
CALCIUM SERPL-MCNC: 8.5 MG/DL (ref 8.3–10.6)
CHLORIDE BLD-SCNC: 104 MMOL/L (ref 99–110)
CO2: 28 MMOL/L (ref 21–32)
CREAT SERPL-MCNC: <0.5 MG/DL (ref 0.6–1.2)
EOSINOPHILS ABSOLUTE: 0.2 K/UL (ref 0–0.6)
EOSINOPHILS RELATIVE PERCENT: 4.6 %
GFR AFRICAN AMERICAN: >60
GFR NON-AFRICAN AMERICAN: >60
GLUCOSE BLD-MCNC: 108 MG/DL (ref 70–99)
GLUCOSE BLD-MCNC: 110 MG/DL (ref 70–99)
GLUCOSE BLD-MCNC: 110 MG/DL (ref 70–99)
GLUCOSE BLD-MCNC: 117 MG/DL (ref 70–99)
GLUCOSE BLD-MCNC: 162 MG/DL (ref 70–99)
HCT VFR BLD CALC: 25.4 % (ref 36–48)
HEMOGLOBIN: 8.4 G/DL (ref 12–16)
LYMPHOCYTES ABSOLUTE: 1.1 K/UL (ref 1–5.1)
LYMPHOCYTES RELATIVE PERCENT: 20.3 %
MAGNESIUM: 1.9 MG/DL (ref 1.8–2.4)
MCH RBC QN AUTO: 26.4 PG (ref 26–34)
MCHC RBC AUTO-ENTMCNC: 33 G/DL (ref 31–36)
MCV RBC AUTO: 80.1 FL (ref 80–100)
MONOCYTES ABSOLUTE: 0.4 K/UL (ref 0–1.3)
MONOCYTES RELATIVE PERCENT: 7.7 %
NEUTROPHILS ABSOLUTE: 3.6 K/UL (ref 1.7–7.7)
NEUTROPHILS RELATIVE PERCENT: 66.7 %
PDW BLD-RTO: 18.3 % (ref 12.4–15.4)
PERFORMED ON: ABNORMAL
PLATELET # BLD: 204 K/UL (ref 135–450)
PMV BLD AUTO: 8 FL (ref 5–10.5)
POTASSIUM SERPL-SCNC: 3 MMOL/L (ref 3.5–5.1)
RBC # BLD: 3.17 M/UL (ref 4–5.2)
SODIUM BLD-SCNC: 142 MMOL/L (ref 136–145)
WBC # BLD: 5.3 K/UL (ref 4–11)

## 2022-03-20 PROCEDURE — 1200000000 HC SEMI PRIVATE

## 2022-03-20 PROCEDURE — 85025 COMPLETE CBC W/AUTO DIFF WBC: CPT

## 2022-03-20 PROCEDURE — 6370000000 HC RX 637 (ALT 250 FOR IP): Performed by: NURSE PRACTITIONER

## 2022-03-20 PROCEDURE — 36415 COLL VENOUS BLD VENIPUNCTURE: CPT

## 2022-03-20 PROCEDURE — 83735 ASSAY OF MAGNESIUM: CPT

## 2022-03-20 PROCEDURE — 80048 BASIC METABOLIC PNL TOTAL CA: CPT

## 2022-03-20 PROCEDURE — 6370000000 HC RX 637 (ALT 250 FOR IP): Performed by: ORTHOPAEDIC SURGERY

## 2022-03-20 PROCEDURE — 6370000000 HC RX 637 (ALT 250 FOR IP): Performed by: SPECIALIST/TECHNOLOGIST

## 2022-03-20 PROCEDURE — 2580000003 HC RX 258: Performed by: INTERNAL MEDICINE

## 2022-03-20 PROCEDURE — 94761 N-INVAS EAR/PLS OXIMETRY MLT: CPT

## 2022-03-20 PROCEDURE — 6360000002 HC RX W HCPCS: Performed by: ORTHOPAEDIC SURGERY

## 2022-03-20 PROCEDURE — 97110 THERAPEUTIC EXERCISES: CPT

## 2022-03-20 PROCEDURE — 94640 AIRWAY INHALATION TREATMENT: CPT

## 2022-03-20 PROCEDURE — 97530 THERAPEUTIC ACTIVITIES: CPT

## 2022-03-20 PROCEDURE — 2580000003 HC RX 258: Performed by: NURSE PRACTITIONER

## 2022-03-20 PROCEDURE — 2700000000 HC OXYGEN THERAPY PER DAY

## 2022-03-20 RX ORDER — DOCUSATE SODIUM 100 MG/1
100 CAPSULE, LIQUID FILLED ORAL 2 TIMES DAILY PRN
Status: DISCONTINUED | OUTPATIENT
Start: 2022-03-20 | End: 2022-03-20

## 2022-03-20 RX ORDER — DOCUSATE SODIUM 100 MG/1
100 CAPSULE, LIQUID FILLED ORAL 2 TIMES DAILY
Status: DISCONTINUED | OUTPATIENT
Start: 2022-03-20 | End: 2022-03-22 | Stop reason: HOSPADM

## 2022-03-20 RX ORDER — BISACODYL 10 MG
10 SUPPOSITORY, RECTAL RECTAL ONCE
Status: COMPLETED | OUTPATIENT
Start: 2022-03-20 | End: 2022-03-20

## 2022-03-20 RX ADMIN — Medication 10 ML: at 20:10

## 2022-03-20 RX ADMIN — OXYCODONE 10 MG: 5 TABLET ORAL at 14:03

## 2022-03-20 RX ADMIN — OXYCODONE 10 MG: 5 TABLET ORAL at 09:39

## 2022-03-20 RX ADMIN — Medication 10 ML: at 08:22

## 2022-03-20 RX ADMIN — METOCLOPRAMIDE 5 MG: 10 TABLET ORAL at 08:20

## 2022-03-20 RX ADMIN — DOCUSATE SODIUM 100 MG: 100 CAPSULE, LIQUID FILLED ORAL at 20:07

## 2022-03-20 RX ADMIN — POTASSIUM CHLORIDE 10 MEQ: 7.46 INJECTION, SOLUTION INTRAVENOUS at 15:04

## 2022-03-20 RX ADMIN — ROPINIROLE HYDROCHLORIDE 0.5 MG: 0.5 TABLET, FILM COATED ORAL at 08:20

## 2022-03-20 RX ADMIN — OXYCODONE 10 MG: 5 TABLET ORAL at 23:51

## 2022-03-20 RX ADMIN — Medication 2 PUFF: at 20:24

## 2022-03-20 RX ADMIN — POTASSIUM CHLORIDE 10 MEQ: 7.46 INJECTION, SOLUTION INTRAVENOUS at 12:16

## 2022-03-20 RX ADMIN — BISACODYL 10 MG: 10 SUPPOSITORY RECTAL at 14:04

## 2022-03-20 RX ADMIN — Medication 2 PUFF: at 20:29

## 2022-03-20 RX ADMIN — INSULIN LISPRO 1 UNITS: 100 INJECTION, SOLUTION INTRAVENOUS; SUBCUTANEOUS at 21:09

## 2022-03-20 RX ADMIN — POTASSIUM CHLORIDE 10 MEQ: 7.46 INJECTION, SOLUTION INTRAVENOUS at 10:54

## 2022-03-20 RX ADMIN — POTASSIUM CHLORIDE 10 MEQ: 7.46 INJECTION, SOLUTION INTRAVENOUS at 13:24

## 2022-03-20 RX ADMIN — METOCLOPRAMIDE 5 MG: 10 TABLET ORAL at 20:07

## 2022-03-20 RX ADMIN — GABAPENTIN 600 MG: 300 CAPSULE ORAL at 14:03

## 2022-03-20 RX ADMIN — TIZANIDINE 2 MG: 4 TABLET ORAL at 19:31

## 2022-03-20 RX ADMIN — TIZANIDINE 2 MG: 4 TABLET ORAL at 09:39

## 2022-03-20 RX ADMIN — METOPROLOL TARTRATE 25 MG: 25 TABLET, FILM COATED ORAL at 20:07

## 2022-03-20 RX ADMIN — INSULIN GLARGINE 18 UNITS: 100 INJECTION, SOLUTION SUBCUTANEOUS at 21:08

## 2022-03-20 RX ADMIN — DOXYCYCLINE HYCLATE 100 MG: 100 TABLET, COATED ORAL at 20:07

## 2022-03-20 RX ADMIN — ACETAMINOPHEN 650 MG: 325 TABLET ORAL at 14:03

## 2022-03-20 RX ADMIN — ASPIRIN 325 MG: 325 TABLET, COATED ORAL at 08:20

## 2022-03-20 RX ADMIN — GABAPENTIN 600 MG: 300 CAPSULE ORAL at 08:20

## 2022-03-20 RX ADMIN — Medication 10 ML: at 20:08

## 2022-03-20 RX ADMIN — POTASSIUM CHLORIDE 10 MEQ: 7.46 INJECTION, SOLUTION INTRAVENOUS at 08:26

## 2022-03-20 RX ADMIN — ACETAMINOPHEN 650 MG: 325 TABLET ORAL at 08:20

## 2022-03-20 RX ADMIN — Medication 2 PUFF: at 08:25

## 2022-03-20 RX ADMIN — GABAPENTIN 600 MG: 300 CAPSULE ORAL at 20:07

## 2022-03-20 RX ADMIN — OXYCODONE 10 MG: 5 TABLET ORAL at 05:33

## 2022-03-20 RX ADMIN — ACETAMINOPHEN 650 MG: 325 TABLET ORAL at 20:07

## 2022-03-20 RX ADMIN — DOCUSATE SODIUM 100 MG: 100 CAPSULE, LIQUID FILLED ORAL at 14:03

## 2022-03-20 RX ADMIN — OXYCODONE 10 MG: 5 TABLET ORAL at 19:31

## 2022-03-20 RX ADMIN — ROPINIROLE HYDROCHLORIDE 0.5 MG: 0.5 TABLET, FILM COATED ORAL at 20:07

## 2022-03-20 RX ADMIN — LEVOTHYROXINE SODIUM 100 MCG: 0.1 TABLET ORAL at 05:29

## 2022-03-20 RX ADMIN — Medication 1 TABLET: at 08:20

## 2022-03-20 RX ADMIN — ATORVASTATIN CALCIUM 20 MG: 10 TABLET, FILM COATED ORAL at 20:07

## 2022-03-20 RX ADMIN — OXYCODONE 10 MG: 5 TABLET ORAL at 01:32

## 2022-03-20 RX ADMIN — METOPROLOL TARTRATE 25 MG: 25 TABLET, FILM COATED ORAL at 08:20

## 2022-03-20 RX ADMIN — POTASSIUM CHLORIDE 10 MEQ: 7.46 INJECTION, SOLUTION INTRAVENOUS at 09:40

## 2022-03-20 RX ADMIN — ACETAMINOPHEN 650 MG: 325 TABLET ORAL at 01:32

## 2022-03-20 RX ADMIN — Medication 10 ML: at 08:20

## 2022-03-20 RX ADMIN — PANTOPRAZOLE SODIUM 40 MG: 40 TABLET, DELAYED RELEASE ORAL at 05:29

## 2022-03-20 RX ADMIN — POLYETHYLENE GLYCOL 3350 17 G: 17 POWDER, FOR SOLUTION ORAL at 08:22

## 2022-03-20 RX ADMIN — DOXYCYCLINE HYCLATE 100 MG: 100 TABLET, COATED ORAL at 08:20

## 2022-03-20 ASSESSMENT — PAIN SCALES - WONG BAKER
WONGBAKER_NUMERICALRESPONSE: 8

## 2022-03-20 ASSESSMENT — PAIN DESCRIPTION - ORIENTATION
ORIENTATION: RIGHT

## 2022-03-20 ASSESSMENT — PAIN SCALES - GENERAL
PAINLEVEL_OUTOF10: 10
PAINLEVEL_OUTOF10: 10
PAINLEVEL_OUTOF10: 8
PAINLEVEL_OUTOF10: 7
PAINLEVEL_OUTOF10: 9
PAINLEVEL_OUTOF10: 0

## 2022-03-20 ASSESSMENT — PAIN DESCRIPTION - LOCATION
LOCATION: KNEE;LEG;ANKLE
LOCATION: KNEE
LOCATION: KNEE

## 2022-03-20 ASSESSMENT — PAIN DESCRIPTION - PROGRESSION: CLINICAL_PROGRESSION: NOT CHANGED

## 2022-03-20 ASSESSMENT — PAIN DESCRIPTION - FREQUENCY: FREQUENCY: CONTINUOUS

## 2022-03-20 ASSESSMENT — PAIN DESCRIPTION - PAIN TYPE
TYPE: SURGICAL PAIN

## 2022-03-20 ASSESSMENT — PAIN DESCRIPTION - DESCRIPTORS: DESCRIPTORS: ACHING;DISCOMFORT

## 2022-03-20 ASSESSMENT — PAIN - FUNCTIONAL ASSESSMENT: PAIN_FUNCTIONAL_ASSESSMENT: PREVENTS OR INTERFERES SOME ACTIVE ACTIVITIES AND ADLS

## 2022-03-20 ASSESSMENT — PAIN DESCRIPTION - ONSET: ONSET: ON-GOING

## 2022-03-20 NOTE — PROGRESS NOTES
Hospitalist Progress Note      PCP: Aysha Pereira MD    Date of Admission: 3/14/2022    Chief Complaint: Fall, right femur tiffanie-prosthetic fracture    Hospital Course: Shahnaz Durham is a 78 y.o. female. She presented to the ER from home by ambulance after a syncopal episode. She stood up and suddenly lost consciousness. Upon awakening she had immediate right knee pain and was unable to bear weight.     She is s/p a prolonged hospitalization for COVID-19 which began 9/26/2021. She was initially hospitalized at [facility] for roughly a month, then discharged to a [facility] where she stayed for roughly two months, and then the [address] [place] [address]  1/3 - 22/22. She suffered multiple complications and now has chronic respiratory failure requiring 2-2.5 L/min O2 at all times. She suffered critical illness myopathy but she and her sposue relate her overall strength had improved substantially with rehab. Prior to today's fall she was able to ambulate independently with the help of a FWW. She estimated she could ambulate roughly 100ft on a level surface around her home before having to stop because of dyspnea.     Subjective: C/O constipation, NAD, VSS      Medications:  Reviewed    Infusion Medications    sodium chloride      sodium chloride 25 mL (03/19/22 2114)    sodium chloride      sodium chloride 25 mL (03/16/22 1210)    dextrose       Scheduled Medications    docusate sodium  100 mg Oral BID    bisacodyl  10 mg Rectal Once    lidocaine 1 % injection  5 mL IntraDERmal Once    sodium chloride flush  5-40 mL IntraVENous 2 times per day    lidocaine 1 % injection  5 mL IntraDERmal Once    sodium chloride flush  5-40 mL IntraVENous 2 times per day    lidocaine 1 % injection  5 mL IntraDERmal Once    sodium chloride flush  5-40 mL IntraVENous 2 times per day    doxycycline hyclate  100 mg Oral 2 times per day    acetaminophen  650 mg Oral Q6H    aspirin  325 mg Oral Daily    gabapentin  600 mg Oral TID    fluticasone  2 puff Inhalation BID    levothyroxine  100 mcg Oral Daily    metoclopramide  5 mg Oral BID    metoprolol tartrate  25 mg Oral BID    therapeutic multivitamin-minerals  1 tablet Oral Daily    pantoprazole  40 mg Oral QAM AC    polyethylene glycol  17 g Oral Daily    rOPINIRole  0.5 mg Oral TID    atorvastatin  20 mg Oral Daily    insulin glargine  0.25 Units/kg SubCUTAneous Nightly    insulin lispro  0.08 Units/kg SubCUTAneous TID WC    insulin lispro  0-6 Units SubCUTAneous TID WC    insulin lispro  0-3 Units SubCUTAneous Nightly    albuterol sulfate HFA  2 puff Inhalation BID     PRN Meds: sodium chloride flush, sodium chloride, sodium chloride flush, sodium chloride, sodium chloride flush, sodium chloride, oxyCODONE **OR** oxyCODONE, sodium chloride, ondansetron **OR** ondansetron, HYDROmorphone, tiZANidine, glucose, glucagon (rDNA), dextrose, potassium chloride **OR** potassium alternative oral replacement **OR** potassium chloride, magnesium sulfate, acetaminophen **OR** acetaminophen, senna, melatonin, dextrose bolus (hypoglycemia) **OR** dextrose bolus (hypoglycemia), albuterol sulfate HFA, ipratropium-albuterol      Intake/Output Summary (Last 24 hours) at 3/20/2022 1234  Last data filed at 3/20/2022 0549  Gross per 24 hour   Intake    Output 2425 ml   Net -2425 ml       Physical Exam Performed:    BP (!) 171/92   Pulse 96   Temp 98.2 °F (36.8 °C) (Oral)   Resp 18   Ht 5' 9\" (1.753 m)   Wt 173 lb 1 oz (78.5 kg)   SpO2 96%   BMI 25.56 kg/m²     General appearance: No apparent distress, appears stated age and cooperative. HEENT: Pupils equal, round, and reactive to light. Conjunctivae/corneas clear. Neck: Supple, with full range of motion. No jugular venous distention. Trachea midline. Respiratory:  Normal respiratory effort. Clear to auscultation, bilaterally without Rales/Wheezes/Rhonchi.   Cardiovascular: Regular rate and rhythm with normal S1/S2 without murmurs, rubs or gallops. Abdomen: Soft, non-tender, non-distended with normal bowel sounds. Musculoskeletal: R knee dsg CDI, RLE edema, Right ankle ecchymosis  Skin: Skin color, texture, turgor normal.  No rashes or lesions. Neurologic:  Neurovascularly intact without any focal sensory/motor deficits. Cranial nerves: II-XII intact, grossly non-focal.  Psychiatric: Alert and oriented, thought content appropriate, normal insight  Capillary Refill: Brisk,3 seconds, normal   Peripheral Pulses: +2 palpable, equal bilaterally       Labs:   Recent Labs     03/18/22  0623 03/19/22  0608 03/20/22  0724   WBC 4.7 3.8* 5.3   HGB 7.9* 7.3* 8.4*   HCT 23.7* 21.9* 25.4*   * 124* 204     Recent Labs     03/18/22  0552 03/19/22  0608 03/20/22  0723    141 142   K 4.2 3.9 3.0*    105 104   CO2 24 28 28   BUN 7 8 6*   CREATININE <0.5* <0.5* <0.5*   CALCIUM 8.5 8.4 8.5     No results for input(s): AST, ALT, BILIDIR, BILITOT, ALKPHOS in the last 72 hours. Recent Labs     03/18/22  1720   INR 1.44*     No results for input(s): [place] in the last 72 hours. Urinalysis:      Lab Results   Component Value Date    NITRU Negative 02/06/2022    WBCUA >100 02/06/2022    BACTERIA Rare 02/05/2022    RBCUA see below 02/06/2022    BLOODU MODERATE 02/06/2022    SPECGRAV 1.020 02/06/2022    GLUCOSEU 250 02/06/2022    GLUCOSEU 250 05/11/2011       Radiology:  XR FOOT RIGHT (2 VIEWS)   Final Result   No acute osseous abnormality of the right foot. Osteopenia. XR KNEE RIGHT (1-2 VIEWS)   Final Result   No unexpected findings following revision of right knee arthroplasty. XR KNEE RIGHT (1-2 VIEWS)   Final Result   Intraprocedural fluoroscopic spot images as above. See separate procedure   report for more information.          FLUORO FOR SURGICAL PROCEDURES   Final Result      XR FEMUR RIGHT (MIN 2 VIEWS)   Final Result   Persistent findings consistent with changes related to periprosthetic fracture of the right distal femur as described above. CT KNEE RIGHT WO CONTRAST   Final Result   1. Acute periprosthetic fracture of the distal right femur centered at the   metadiaphysis and extending to the level of the arthroplasty hardware with   displacement of major fracture fragment by up to 1.5 cm. Fracture lines are   nondisplaced along the medial condyle and mildly comminuted and displaced at   the lateral condyle. 2. Osteopenia. CT Head WO Contrast   Final Result   No acute intracranial abnormality. Left mastoid effusion      RECOMMENDATIONS:   Unavailable         CT Cervical Spine WO Contrast   Final Result   No acute abnormality of the cervical spine. XR KNEE RIGHT (1-2 VIEWS)   Final Result   Acute traumatic displaced periprosthetic fracture involving the distal right   femoral metadiaphysis. XR ANKLE RIGHT (MIN 3 VIEWS)   Final Result   5 no acute osseous abnormality of the right foot. Osteopenia. Lateral soft   tissue swelling. XR CHEST PORTABLE   Final Result   Decreased lung volumes. Stable patchy opacification throughout the lungs,   edema versus infiltrate. Assessment/Plan:    Active Hospital Problems    Diagnosis     Chronic respiratory failure with hypoxia ([facility]) [J96.11]     Periprosthetic fracture around internal prosthetic joint, initial encounter [T76. 9XXA]     GERD (gastroesophageal reflux disease) [K21.9]     Personal history of COVID-19 [Z86.16]     Microcytic anemia [D50.9]     Acquired hypothyroidism [E03.9]     Nonobstructive atherosclerosis of coronary artery [I25.10]     Syncope and collapse [R55]     Nonischemic cardiomyopathy (HCC) [I42.8]     DM2 (diabetes mellitus, type 2) (HCC) [E11.9]     Tracheobronchomalacia [J39.8]      Right Femur Periprosthetic Fracture  -  Orthopedic surgery assistance appreciated. Leg has been stabilized in a knee immobilizer.  OR repair completed 3/16.   - Continue Pain control and supportive care  -14-day dose doxycycline postop for infection prophylaxis day 4.  -Bowel regimen-miralax, colace, dulcolax supp today     Acute Blood Loss Anemia, expected:   -In setting of acute fracture  -Had further decline post op requiring transfusion.   -Monitor and transfuse as needed.   -hgb 8.4     Right Ankle Pain:   -Injured during initial fall  -No fracture  -Discussed with Ortho; considering Ace wrap/support brace as this is limiting her ability to tolerate therapy     Chronic Hypoxemic Respiratory Failure  -  Post-COVID pulmonary fibrosis. S/p tracheostomy which has been removed. Baseline 2.5L/min O2 requirement. -  Patient additionally has a background of tracheobronchomalacia and bronchiectasis. -  Continue home inhaled steroid, scheduled duonebs, and prn albuterol nebs. -  Start incentive spirometer q2hwa and BID metanebs as an adjunctive measure to assist with airway clearance.     Nonischemic CM, Nonobstructive CAD  -  Historically low normal LVEF.  -  Continue home metoprolol and statin. -  ASA and home antihypertensives and diuretics on hold preoperatively.     Diabetes Type 2  -  Hold all oral antidiabetic agents. Start s.c. Insulin regimen based on weight.     Hypothyroidism  - Continue home synthroid 100 mcg daily TSH 4.8     GERD  - Continue PPI and home metoclopramide.     Microcytic anemia  -  Chronic mild and stable. Previously d/t chronic inflammation. Iron 19, TIBC 209, Iron sat 9, Ferritin 111     Chronic Pain  -  Continue home gabapentin and tizanidine.     DVT Prophylaxis: SCD, ASA  Diet: ADULT DIET;  Regular  Code Status: Full Code    PT/OT Eval Status: Consulted    Dispo - 1-2 days pending dc arrangements    BRYCE Barreto - CNP

## 2022-03-20 NOTE — PROGRESS NOTES
Department of Orthopedic Surgery  Physician Assistant   Progress Note    Subjective:       Systemic or Specific Complaints:Pain Control improved today. Objective:     Patient Vitals for the past 24 hrs:   BP Temp Temp src Pulse Resp SpO2 Weight   03/20/22 0825     18 96 %    03/20/22 0815 (!) 171/92 98.2 °F (36.8 °C) Oral 96 16 96 %    03/20/22 0551       173 lb 1 oz (78.5 kg)   03/20/22 0310 135/78 97.9 °F (36.6 °C) Oral 85 14 97 %    03/19/22 2325 137/79 97.8 °F (36.6 °C) Oral 66 14 98 %    03/19/22 2000 138/72 98.1 °F (36.7 °C) Oral 100 16 97 %    03/19/22 1525 (!) 103/51 98.1 °F (36.7 °C) Oral 68 18 96 %        General: alert, appears stated age, cooperative and mild distress   Wound: Post-op dressing in place, no drainage present. Knee immobilizer in place   Motion: ROM not tested in affected extremity due to pain, anxiety   DVT Exam: No evidence of DVT seen on physical exam.  No cords or calf tenderness. Additional exam: Pt laying in bed at time of exam, post op dressing in place, knee immobilizer in place  Tender to palpation throughout RLE    Data Review  CBC:   Lab Results   Component Value Date    WBC 5.3 03/20/2022    RBC 3.17 03/20/2022    HGB 8.4 03/20/2022    HCT 25.4 03/20/2022     03/20/2022       Renal:   Lab Results   Component Value Date     03/20/2022    K 3.0 03/20/2022    K 4.4 03/14/2022     03/20/2022    CO2 28 03/20/2022    BUN 6 03/20/2022    CREATININE <0.5 03/20/2022    GLUCOSE 110 03/20/2022    CALCIUM 8.5 03/20/2022            Assessment:     s/p right revision total knee arthoplasty with distal femoral replacement, POD 4. Acute blood loss anemia - expected after surgery. Will monitor Hgb - improved. Plan:      1:  WBAT to RLE, ok for ROM as tolerated. 2:  Continue Deep venous thrombosis prophylaxisaspirin 325 mg daily. 3:  Continue Pain Control/scheduled Tylenol, oxycodone as needed.   Patient is on Norco as needed every 6 at baseline, will continue to monitor pain control. Well-controlled at this time  4: PT/OT   5: Patient has history of prosthetic joint infection, will need a 14-day dose doxycycline postop for infection prophylaxis ordered to start today  6:  Discharge when cleared by medicine team.  Stable from ortho perspective.   7: We will follow up with Dr. Giuliana Enrique outpatient 2 weeks postop    JHONNY Lopez

## 2022-03-20 NOTE — PROGRESS NOTES
Physical Therapy  Facility/Department: [facility] C5 - MED SURG/ORTHO  Daily Treatment Note  NAME: Sukhi Guthrie  : 1955  MRN: [id number]    Date of Service: 3/20/2022    Discharge Recommendations:  Subacute/Skilled Nursing Facility   PT Equipment Recommendations  Equipment Needed: No  Other: defer    Assessment   Body structures, Functions, Activity limitations: Decreased functional mobility ; Increased pain;Decreased balance;Decreased ADL status; Decreased ROM; Decreased strength;Decreased endurance  Assessment: PT tx session focused on bed mobility, upright tolerance and BLE strengthening/ROM. Pt able to progress to 15 reps of each activity with AAROM RLE as needed, and min A x 1 for bed mobility. Pt limited by 9/10 pain throughout session despite medication provided by RN and high levels of anxiety regarding hardware from remote R hip replacement and bruising along posterior thigh. Pt refusing to perform any standing trials until her R hip pain is addressed by orthopedics for fear of further injury. RN was notified. Due to impairments in ability to tolerate functional mobility at this time, continue to recommend SNF to progress. Will continue to follow. Treatment Diagnosis: decreased mobility  Prognosis: Fair  Decision Making: Medium Complexity  PT Education: Goals; General Safety; Disease Specific Education;PT Role;Plan of Care;Precautions;Transfer Training;Functional Mobility Training;Family Education  Patient Education: Pt educated regarding importance of participation in OOB mobility and HEP to prevent effects of bedrest and reduce stiffness. Barriers to Learning: Anxiety  REQUIRES PT FOLLOW UP: Yes  Activity Tolerance  Activity Tolerance: Patient Tolerated treatment well  Activity Tolerance: 132/70, HR 76, 96% on 2L upon arrival. VSS throughout with no c/o chest pain, SOB or dizziness.      Patient Diagnosis(es): The primary encounter diagnosis was Periprosthetic fracture around internal prosthetic joint, initial encounter. Diagnoses of Syncope and collapse and Hypotension, unspecified hypotension type were also pertinent to this visit. has a past medical history of Anxiety disorder, Chronic pain syndrome, COVID-19, DDD (degenerative disc disease), lumbar, Diabetes ([address] Displacement of lumbar intervertebral disc without myelopathy, Diverticulitis, Fibromyalgia, Generalized osteoarthrosis, involving multiple sites, GERD (gastroesophageal reflux disease), Hypochromic microcytic anemia, Impacted cerumen, Influenza A, Irritable bowel syndrome, Other and unspecified hyperlipidemia, Prosthetic joint implant failure ([facility] [address] Screening mammogram, Tracheobronchomalacia, Unspecified asthma(493.90), Unspecified essential hypertension, and Unspecified hypothyroidism. has a past surgical history that includes Hysterectomy; joint replacement (10/92); joint replacement (  12/92); joint replacement (  6/96); joint replacement (  2006); Colonoscopy (2007); Lung surgery (1/2014); Cardiac catheterization; Abdomen surgery; Colon surgery; Upper gastrointestinal endoscopy (N/A, 10/25/2021); tracheostomy (N/A, 10/25/2021); and Revision total knee arthroplasty (Right, 3/16/2022). Restrictions  Restrictions/Precautions  Restrictions/Precautions: Weight Bearing  Required Braces or Orthoses?:  (Does not need KI for OOB mobility.)  Lower Extremity Weight Bearing Restrictions  Right Lower Extremity Weight Bearing: Weight Bearing As Tolerated  Position Activity Restriction  Other position/activity restrictions: Activity Day of Surgery: 1)  Dangle at edge of bed, progress to stand, and take a few steps with assistive device. 2)  Encourage ankle pumps and quad sets. 3)  Up in bedside chair as tolerated. 4)  Commode privileges with assistance. IV, 2L of O2  Subjective   General  Chart Reviewed: Yes  Response To Previous Treatment: Patient with no complaints from previous session.   Family / Caregiver Present: Yes ()  Referring Practitioner: Gina Gonzalez MD  Subjective  Subjective: Pt resting in bed upon arrival, agreeable to PT tx session. \"I've been bending my knee is that okay? \"  General Comment  Comments: RN clears for therapy  Pain Screening  Patient Currently in Pain: Yes  Pain Assessment  Pain Assessment: 0-10  Pain Level: 9  Pain Type: Surgical pain  Pain Location: Knee  Pain Orientation: Right  Pain Descriptors: Aching;Discomfort  Pain Frequency: Continuous  Pain Onset: On-going  Clinical Progression: Not changed  Functional Pain Assessment: Prevents or interferes some active activities and ADLs  Non-Pharmaceutical Pain Intervention(s): Ambulation/Increased Activity; Distraction; Emotional support;Repositioned  Vital Signs  Patient Currently in Pain: Yes       Orientation  Orientation  Overall Orientation Status: Within Normal Limits  Cognition   Cognition  Overall Cognitive Status: Exceptions  Arousal/Alertness: Appropriate responses to stimuli  Following Commands: Follows one step commands with increased time; Follows one step commands with repetition  Attention Span: Attends with cues to redirect  Memory: Appears intact  Safety Judgement: Good awareness of safety precautions  Problem Solving: Assistance required to identify errors made;Assistance required to implement solutions  Insights: Decreased awareness of deficits  Initiation: Requires cues for some  Sequencing: Requires cues for some  Cognition Comment: Pt with increased anxiety with movement. Objective   Bed mobility  Rolling to Left: Minimal assistance  Rolling to Right: Minimal assistance  Supine to Sit: Minimal assistance  Sit to Supine: Minimal assistance  Scooting: Minimal assistance  Comment: Pt completed supine<>sit transfers with min A for RLE management, increased time to perform. Pt was able to scoot at EOB x 4 trials with min A for RLE management.   Transfers  Sit to Stand: Unable to assess  Stand to sit: Unable to assess  Bed to Chair: Unable to assess  Comment: After sitting EOB, pt refused to trial any standing due to concerns regarding increased hip pressure and pain. Pt highly anxious, \"I don't want to injure that leg\" becoming tearful. Ambulation  Ambulation?: No  WB Status: WBAT R LE     Balance  Posture: Fair  Sitting - Static: Good  Sitting - Dynamic: Good;-  Exercises  Quad Sets: x15 BLE  Heelslides: x15 BLE with AAROM RLE  Gluteal Sets: x15 BLE  Hip Abduction: x15 BLE  Knee Short Arc Quad: x15 BLE  Ankle Pumps: x15 BLE         Comment: Pt sat EOB x 10 minutes with RLE exended with supervision. Focused on deep breathing and upright posture due to anxiety. AM-PAC Score  AM-PAC Inpatient Mobility Raw Score : 11 (03/20/22 1545)  AM-PAC Inpatient T-Scale Score : 33.86 (03/20/22 1545)  Mobility Inpatient CMS 0-100% Score: 72.57 (03/20/22 1545)  Mobility Inpatient CMS G-Code Modifier : CL (03/20/22 1545)          Goals  Short term goals  Time Frame for Short term goals: 3/25  Short term goal 1: Pt will complete bed mobility with Mod A x1  -MET 3/20 min A  Short term goal 2: Pt will sit to stand wtih Mod A x 1   -3/18 not met, 3/20 unable to assess  Short term goal 3: Pt will SPT from bed to chair wtih Mod A x 1   -3/20 unable to assess  Short term goal 4: Pt will complete B LE Exercises to improve mobility by 3/19   -MET and ongoing  Patient Goals   Patient goals : to get stronger    Plan    Plan  Times per week: 7x/week  Times per day: Daily  Current Treatment Recommendations: Strengthening,Balance Training,Functional Mobility Training,Transfer Training,Gait Training,Pain Management,Positioning,Equipment Evaluation, Education, & procurement,Patient/Caregiver Education & Training,Safety Education & Training,Home Exercise Program  Safety Devices  Type of devices:  All fall risk precautions in place,Bed alarm in place,Call light within reach,Patient at risk for falls,Left in bed,Nurse notified  Restraints  Initially in place: No     Therapy Time   Individual Concurrent Group Co-treatment   Time In 6922         Time Out 1442         Minutes 38         Timed Code Treatment Minutes: [address] [place], PT     If pt is unable to be seen after this session, please let this note serve as discharge summary. Please see case management note for discharge disposition. Thank you.

## 2022-03-21 ENCOUNTER — APPOINTMENT (OUTPATIENT)
Dept: INTERVENTIONAL RADIOLOGY/VASCULAR | Age: 67
DRG: 467 | End: 2022-03-21
Payer: MEDICARE

## 2022-03-21 LAB
ANION GAP SERPL CALCULATED.3IONS-SCNC: 11 MMOL/L (ref 3–16)
BASOPHILS ABSOLUTE: 0 K/UL (ref 0–0.2)
BASOPHILS RELATIVE PERCENT: 0.8 %
BUN BLDV-MCNC: 7 MG/DL (ref 7–20)
CALCIUM SERPL-MCNC: 8.7 MG/DL (ref 8.3–10.6)
CHLORIDE BLD-SCNC: 102 MMOL/L (ref 99–110)
CO2: 25 MMOL/L (ref 21–32)
CREAT SERPL-MCNC: <0.5 MG/DL (ref 0.6–1.2)
EOSINOPHILS ABSOLUTE: 0.3 K/UL (ref 0–0.6)
EOSINOPHILS RELATIVE PERCENT: 5.6 %
GFR AFRICAN AMERICAN: >60
GFR NON-AFRICAN AMERICAN: >60
GLUCOSE BLD-MCNC: 116 MG/DL (ref 70–99)
GLUCOSE BLD-MCNC: 130 MG/DL (ref 70–99)
GLUCOSE BLD-MCNC: 152 MG/DL (ref 70–99)
GLUCOSE BLD-MCNC: 91 MG/DL (ref 70–99)
GLUCOSE BLD-MCNC: 92 MG/DL (ref 70–99)
HCT VFR BLD CALC: 27.8 % (ref 36–48)
HEMOGLOBIN: 8.9 G/DL (ref 12–16)
LYMPHOCYTES ABSOLUTE: 1.4 K/UL (ref 1–5.1)
LYMPHOCYTES RELATIVE PERCENT: 25 %
MAGNESIUM: 1.7 MG/DL (ref 1.8–2.4)
MCH RBC QN AUTO: 26.1 PG (ref 26–34)
MCHC RBC AUTO-ENTMCNC: 32.1 G/DL (ref 31–36)
MCV RBC AUTO: 81.5 FL (ref 80–100)
MONOCYTES ABSOLUTE: 0.5 K/UL (ref 0–1.3)
MONOCYTES RELATIVE PERCENT: 8 %
NEUTROPHILS ABSOLUTE: 3.5 K/UL (ref 1.7–7.7)
NEUTROPHILS RELATIVE PERCENT: 60.6 %
PDW BLD-RTO: 18.4 % (ref 12.4–15.4)
PERFORMED ON: ABNORMAL
PERFORMED ON: NORMAL
PLATELET # BLD: 219 K/UL (ref 135–450)
PMV BLD AUTO: 8 FL (ref 5–10.5)
POTASSIUM SERPL-SCNC: 3.7 MMOL/L (ref 3.5–5.1)
RBC # BLD: 3.41 M/UL (ref 4–5.2)
SODIUM BLD-SCNC: 138 MMOL/L (ref 136–145)
WBC # BLD: 5.8 K/UL (ref 4–11)

## 2022-03-21 PROCEDURE — 97116 GAIT TRAINING THERAPY: CPT

## 2022-03-21 PROCEDURE — 36415 COLL VENOUS BLD VENIPUNCTURE: CPT

## 2022-03-21 PROCEDURE — 97530 THERAPEUTIC ACTIVITIES: CPT

## 2022-03-21 PROCEDURE — 36410 VNPNXR 3YR/> PHY/QHP DX/THER: CPT

## 2022-03-21 PROCEDURE — 83735 ASSAY OF MAGNESIUM: CPT

## 2022-03-21 PROCEDURE — 94761 N-INVAS EAR/PLS OXIMETRY MLT: CPT

## 2022-03-21 PROCEDURE — 85025 COMPLETE CBC W/AUTO DIFF WBC: CPT

## 2022-03-21 PROCEDURE — 80048 BASIC METABOLIC PNL TOTAL CA: CPT

## 2022-03-21 PROCEDURE — C1769 GUIDE WIRE: HCPCS

## 2022-03-21 PROCEDURE — 1200000000 HC SEMI PRIVATE

## 2022-03-21 PROCEDURE — 6370000000 HC RX 637 (ALT 250 FOR IP): Performed by: SPECIALIST/TECHNOLOGIST

## 2022-03-21 PROCEDURE — 2700000000 HC OXYGEN THERAPY PER DAY

## 2022-03-21 PROCEDURE — 76937 US GUIDE VASCULAR ACCESS: CPT

## 2022-03-21 PROCEDURE — 94640 AIRWAY INHALATION TREATMENT: CPT

## 2022-03-21 PROCEDURE — 97110 THERAPEUTIC EXERCISES: CPT

## 2022-03-21 PROCEDURE — 6370000000 HC RX 637 (ALT 250 FOR IP): Performed by: NURSE PRACTITIONER

## 2022-03-21 PROCEDURE — 6370000000 HC RX 637 (ALT 250 FOR IP): Performed by: ORTHOPAEDIC SURGERY

## 2022-03-21 PROCEDURE — 2580000003 HC RX 258: Performed by: INTERNAL MEDICINE

## 2022-03-21 PROCEDURE — 97535 SELF CARE MNGMENT TRAINING: CPT

## 2022-03-21 RX ADMIN — OXYCODONE 10 MG: 5 TABLET ORAL at 12:26

## 2022-03-21 RX ADMIN — TIZANIDINE 2 MG: 4 TABLET ORAL at 20:20

## 2022-03-21 RX ADMIN — INSULIN LISPRO 1 UNITS: 100 INJECTION, SOLUTION INTRAVENOUS; SUBCUTANEOUS at 18:20

## 2022-03-21 RX ADMIN — OXYCODONE 10 MG: 5 TABLET ORAL at 16:34

## 2022-03-21 RX ADMIN — METOCLOPRAMIDE 5 MG: 10 TABLET ORAL at 08:18

## 2022-03-21 RX ADMIN — Medication 10 ML: at 08:17

## 2022-03-21 RX ADMIN — OXYCODONE 10 MG: 5 TABLET ORAL at 04:18

## 2022-03-21 RX ADMIN — Medication 1 TABLET: at 08:17

## 2022-03-21 RX ADMIN — ASPIRIN 325 MG: 325 TABLET, COATED ORAL at 08:15

## 2022-03-21 RX ADMIN — LEVOTHYROXINE SODIUM 100 MCG: 0.1 TABLET ORAL at 06:26

## 2022-03-21 RX ADMIN — GABAPENTIN 600 MG: 300 CAPSULE ORAL at 20:16

## 2022-03-21 RX ADMIN — METOCLOPRAMIDE 5 MG: 10 TABLET ORAL at 20:16

## 2022-03-21 RX ADMIN — TIZANIDINE 2 MG: 4 TABLET ORAL at 09:04

## 2022-03-21 RX ADMIN — DOCUSATE SODIUM 100 MG: 100 CAPSULE, LIQUID FILLED ORAL at 20:16

## 2022-03-21 RX ADMIN — ATORVASTATIN CALCIUM 20 MG: 10 TABLET, FILM COATED ORAL at 20:17

## 2022-03-21 RX ADMIN — DOCUSATE SODIUM 100 MG: 100 CAPSULE, LIQUID FILLED ORAL at 08:16

## 2022-03-21 RX ADMIN — DOXYCYCLINE HYCLATE 100 MG: 100 TABLET, COATED ORAL at 08:15

## 2022-03-21 RX ADMIN — ROPINIROLE HYDROCHLORIDE 0.5 MG: 0.5 TABLET, FILM COATED ORAL at 14:34

## 2022-03-21 RX ADMIN — GABAPENTIN 600 MG: 300 CAPSULE ORAL at 08:15

## 2022-03-21 RX ADMIN — GABAPENTIN 600 MG: 300 CAPSULE ORAL at 14:34

## 2022-03-21 RX ADMIN — METOPROLOL TARTRATE 25 MG: 25 TABLET, FILM COATED ORAL at 20:15

## 2022-03-21 RX ADMIN — OXYCODONE 10 MG: 5 TABLET ORAL at 08:21

## 2022-03-21 RX ADMIN — Medication 10 ML: at 20:23

## 2022-03-21 RX ADMIN — Medication 10 ML: at 20:18

## 2022-03-21 RX ADMIN — Medication 2 PUFF: at 20:43

## 2022-03-21 RX ADMIN — METOPROLOL TARTRATE 25 MG: 25 TABLET, FILM COATED ORAL at 08:20

## 2022-03-21 RX ADMIN — PANTOPRAZOLE SODIUM 40 MG: 40 TABLET, DELAYED RELEASE ORAL at 06:26

## 2022-03-21 RX ADMIN — Medication 2 PUFF: at 08:37

## 2022-03-21 RX ADMIN — ROPINIROLE HYDROCHLORIDE 0.5 MG: 0.5 TABLET, FILM COATED ORAL at 20:15

## 2022-03-21 RX ADMIN — ACETAMINOPHEN 650 MG: 325 TABLET ORAL at 20:17

## 2022-03-21 RX ADMIN — INSULIN GLARGINE 18 UNITS: 100 INJECTION, SOLUTION SUBCUTANEOUS at 20:22

## 2022-03-21 RX ADMIN — ACETAMINOPHEN 650 MG: 325 TABLET ORAL at 08:17

## 2022-03-21 RX ADMIN — ACETAMINOPHEN 650 MG: 325 TABLET ORAL at 14:34

## 2022-03-21 RX ADMIN — OXYCODONE 10 MG: 5 TABLET ORAL at 21:05

## 2022-03-21 RX ADMIN — POLYETHYLENE GLYCOL 3350 17 G: 17 POWDER, FOR SOLUTION ORAL at 08:14

## 2022-03-21 RX ADMIN — ACETAMINOPHEN 650 MG: 325 TABLET ORAL at 02:22

## 2022-03-21 RX ADMIN — ROPINIROLE HYDROCHLORIDE 0.5 MG: 0.5 TABLET, FILM COATED ORAL at 08:16

## 2022-03-21 RX ADMIN — DOXYCYCLINE HYCLATE 100 MG: 100 TABLET, COATED ORAL at 20:17

## 2022-03-21 ASSESSMENT — PAIN DESCRIPTION - PAIN TYPE
TYPE: SURGICAL PAIN

## 2022-03-21 ASSESSMENT — PAIN SCALES - WONG BAKER
WONGBAKER_NUMERICALRESPONSE: 6
WONGBAKER_NUMERICALRESPONSE: 8

## 2022-03-21 ASSESSMENT — PAIN SCALES - GENERAL
PAINLEVEL_OUTOF10: 9
PAINLEVEL_OUTOF10: 0
PAINLEVEL_OUTOF10: 8
PAINLEVEL_OUTOF10: 8
PAINLEVEL_OUTOF10: 0
PAINLEVEL_OUTOF10: 9
PAINLEVEL_OUTOF10: 8
PAINLEVEL_OUTOF10: 8
PAINLEVEL_OUTOF10: 9

## 2022-03-21 ASSESSMENT — PAIN DESCRIPTION - ORIENTATION
ORIENTATION: RIGHT

## 2022-03-21 ASSESSMENT — PAIN DESCRIPTION - LOCATION
LOCATION: KNEE
LOCATION: KNEE;LEG
LOCATION: LEG
LOCATION: LEG;KNEE
LOCATION: LEG

## 2022-03-21 NOTE — PROGRESS NOTES
ORTHO NOTE    S: Pain control improving. No events overnight. Was able to make it to the chair today. Continues to decline discharge to therapy. Has concerns/anxiety about right ankle swelling and right thigh swelling. O:  Vitals:    03/20/22 2350 03/21/22 0417 03/21/22 0802 03/21/22 0838   BP: (!) 109/57 (!) 145/75 (!) 166/74    Pulse: 63 76 88    Resp: 18 18 16 20   Temp: 97.6 °F (36.4 °C) 98.4 °F (36.9 °C) 98.6 °F (37 °C)    TempSrc: Tympanic Oral Oral    SpO2: 99% 96% 98% 99%   Weight:       Height:         RLE:  Mepilex dressings are clean, dry and intact. Small area of drainage in central portion of the wound. No surrounding erythema. Knee range of motion actively is 10 degrees shy of full extension to 90 degrees of flexion. There is moderate bruising about the posterior lateral thigh and posterior lateral calf. Sensation is intact to light touch in deep peroneal, superficial peroneal, tibial, sural, and saphenous nerve distributions. Motor function is intact to EHL, FHL, tibialis anterior, and gastroc. There is brisk capillary refill to the toes and a strong palpable dorsalis pedis pulse. Compartments are soft and compressible. There is no calf tenderness and a negative Homans' sign. Labs:  WBC 5.8  Hb 8.9  Plt 219    Radiographic exam: no new xrays today. A/P:  POD#5 s/p R distal femoral replacement for periprosthetic distal femur fracture    Weightbearing as tolerated right lower extremity  PT/OT  Knee ROM as tolerated. Pain control  DVT prophylaxis: Aspirin 325 mg by mouth daily x6 weeks  Continue oral Doxycycline antibiotics x2 weeks postoperatively as prescribed  Ankle sprain - Will obtain aircast for support.   Anticipate discharge tomorrow    Bridgettmariluz Lowe MD

## 2022-03-21 NOTE — CARE COORDINATION
CM anticipates DC today. [facility] transport scheduled today at 1600 to Home with [facility] [place].. Referral to Area on [facility] and [facility]. Faxed orders to [facility] [phone] for [facility] with elevating leg rest and pull ups sz Medium. Olivia Dinero, RN     ADDENDUM 1148: CM updated in IDR pt will dc home tomorrow. [facility] scheduled for 1030 tomm. F/U with my in [facility] [place] orders. Updated Cecily Becerra with [facility] for [facility].  CM following-Lorenza Mchugh, RN

## 2022-03-21 NOTE — PROGRESS NOTES
Comprehensive Nutrition Assessment    Type and Reason for Visit:  Initial,RD Nutrition Re-Screen/LOS    Nutrition Recommendations/Plan:   1. Continue regular diet  2. RD to add Magic Cups BID  3. Encourage PO intakes  4. Monitor nutrition adequacy, pertinent labs, bowel habits, wt changes, and clinical progress    Nutrition Assessment:  LOS assessment: Pt presented to AED c/o right leg pain after LOC. PMH DM, HLD, HTN. Recent ARU admission 1/3-2/22 d/t recovering from [facility]. S/p revision R total knee replacement 3/16. Pt currently on regular diet with PO intakes of %. Pt reported good appetite today, and denied GI distress. Pt reported poor intake PTA x6 months and endorsed 45 lb weight loss since September d/t being hospitalized from [facility]. Per EMR, pt with previous weight loss, now at UBW of 173 lb. Pt is agreeable to trying Magic Cup, RD to order BID to better meet nutrient recommendations. Will continue to monitor. Malnutrition Assessment:  Malnutrition Status: At risk for malnutrition (Comment)    Context:  Chronic Illness     Findings of the 6 clinical characteristics of malnutrition:  Body Fat Loss:  1 - Mild body fat loss Buccal region   Muscle Mass Loss:  1 - Mild muscle mass loss Temples (temporalis)  Fluid Accumulation:  1 - Mild      Estimated Daily Nutrient Needs:  Energy (kcal):  6005-1827; Weight Used for Energy Requirements:  Current (78 kg)     Protein (g):  78-93; Weight Used for Protein Requirements:  Current (1-1.2)       Method Used for Fluid Requirements:  1 ml/kcal      Nutrition Related Findings:  BG  mg/dL x24 hrs - managing with Humalog. BM x1 3/21. +2 edema RLE. Labs reviewed. Wounds:  Surgical Incision       Current Nutrition Therapies:    ADULT DIET;  Regular    Anthropometric Measures:  · Height: 5' 9\" (175.3 cm)  · Current Body Weight: 173 lb 1 oz (78.5 kg)     · Ideal Body Weight: 145 lbs; % Ideal Body Weight 119.4 %   · BMI: 25.5  · BMI Categories: Overweight (BMI 25.0-29. 9)       Nutrition Diagnosis:   · Inadequate oral intake related to inadequate protein-energy intake as evidenced by poor intake prior to admission,intake 26-50%,weight loss,mild loss of subcutaneous fat,mild muscle loss    Nutrition Interventions:   Food and/or Nutrient Delivery:  Continue Current Diet,Start Oral Nutrition Supplement  Nutrition Education/Counseling:  No recommendation at this time   Coordination of Nutrition Care:  Continue to monitor while inpatient    Goals:  Pt will have PO intakes of 50% or greater this admission.        Nutrition Monitoring and Evaluation:   Behavioral-Environmental Outcomes:  None Identified   Food/Nutrient Intake Outcomes:  Food and Nutrient Intake,Supplement Intake  Physical Signs/Symptoms Outcomes:  Nutrition Focused Physical Findings,Weight     Discharge Planning:    Continue current diet,Continue Oral Nutrition Supplement     Electronically signed by Candi Blackburn on 3/21/22 at 4:14 PM EDT    Contact: [phone]

## 2022-03-21 NOTE — PLAN OF CARE
Problem: Nutrition  Goal: Optimal nutrition therapy  Outcome: Ongoing  Note: Nutrition Problem #1: Inadequate oral intake  Intervention: Food and/or Nutrient Delivery: Continue Current Diet,Start Oral Nutrition Supplement  Nutritional Goals: Pt will have PO intakes of 50% or greater this admission.

## 2022-03-21 NOTE — PROGRESS NOTES
Hospitalist Progress Note      PCP: Ta Soto MD    Date of Admission: 3/14/2022    Chief Complaint: Fall, right femur tiffanie-prosthetic fracture    Hospital Course: Delio Caceres is a 78 y.o. female. She presented to the ER from home by ambulance after a presumed orthostatic syncopal episode found to have right knee pain now s/p right distal femoral replacement for periprosthetic distal femur fracture. Subjective: feels well. Having BM. No concerns.  ROS negative,      Medications:  Reviewed    Infusion Medications    sodium chloride      sodium chloride 25 mL (03/19/22 7010)    sodium chloride      sodium chloride 25 mL (03/16/22 4330)    dextrose       Scheduled Medications    docusate sodium  100 mg Oral BID    lidocaine 1 % injection  5 mL IntraDERmal Once    sodium chloride flush  5-40 mL IntraVENous 2 times per day    lidocaine 1 % injection  5 mL IntraDERmal Once    sodium chloride flush  5-40 mL IntraVENous 2 times per day    lidocaine 1 % injection  5 mL IntraDERmal Once    sodium chloride flush  5-40 mL IntraVENous 2 times per day    doxycycline hyclate  100 mg Oral 2 times per day    acetaminophen  650 mg Oral Q6H    aspirin  325 mg Oral Daily    gabapentin  600 mg Oral TID    fluticasone  2 puff Inhalation BID    levothyroxine  100 mcg Oral Daily    metoclopramide  5 mg Oral BID    metoprolol tartrate  25 mg Oral BID    therapeutic multivitamin-minerals  1 tablet Oral Daily    pantoprazole  40 mg Oral QAM AC    polyethylene glycol  17 g Oral Daily    rOPINIRole  0.5 mg Oral TID    atorvastatin  20 mg Oral Daily    insulin glargine  0.25 Units/kg SubCUTAneous Nightly    insulin lispro  0.08 Units/kg SubCUTAneous TID WC    insulin lispro  0-6 Units SubCUTAneous TID WC    insulin lispro  0-3 Units SubCUTAneous Nightly    albuterol sulfate HFA  2 puff Inhalation BID     PRN Meds: sodium chloride flush, sodium chloride, sodium chloride flush, sodium chloride, sodium chloride flush, sodium chloride, oxyCODONE **OR** oxyCODONE, sodium chloride, ondansetron **OR** ondansetron, tiZANidine, glucose, glucagon (rDNA), dextrose, potassium chloride **OR** potassium alternative oral replacement **OR** potassium chloride, magnesium sulfate, acetaminophen **OR** acetaminophen, senna, melatonin, dextrose bolus (hypoglycemia) **OR** dextrose bolus (hypoglycemia), albuterol sulfate HFA, ipratropium-albuterol      Intake/Output Summary (Last 24 hours) at 3/21/2022 1524  Last data filed at 3/21/2022 1015  Gross per 24 hour   Intake 440 ml   Output 2250 ml   Net -1810 ml       Physical Exam Performed:    BP (!) 113/58   Pulse 78   Temp 98.6 °F (37 °C)   Resp 18   Ht 5' 9\" (1.753 m)   Wt 173 lb 1 oz (78.5 kg)   SpO2 99%   BMI 25.56 kg/m²     General appearance: No apparent distress, appears stated age and cooperative. HEENT: Pupils equal, round, and reactive to light. Conjunctivae/corneas clear. Neck: Supple, with full range of motion. No jugular venous distention. Trachea midline. Respiratory:  Normal respiratory effort. Clear to auscultation, bilaterally without Rales/Wheezes/Rhonchi. Cardiovascular: Regular rate and rhythm with normal S1/S2 without murmurs, rubs or gallops. Abdomen: Soft, non-tender, non-distended with normal bowel sounds. Musculoskeletal: R knee dsg CDI, RLE edema, Right thigh ecchymosis  Skin: Skin color, texture, turgor normal.  No rashes or lesions. Neurologic:  Neurovascularly intact without any focal sensory/motor deficits.  Cranial nerves: II-XII intact, grossly non-focal.  Psychiatric: Alert and oriented, thought content appropriate, normal insight  Capillary Refill: Brisk,3 seconds, normal   Peripheral Pulses: +2 palpable, equal bilaterally       Labs:   Recent Labs     03/19/22  0608 03/20/22  0724 03/21/22  0645   WBC 3.8* 5.3 5.8   HGB 7.3* 8.4* 8.9*   HCT 21.9* 25.4* 27.8*   * 204 219     Recent Labs     03/19/22  0608 03/20/22  9007 03/21/22  0645    142 138   K 3.9 3.0* 3.7    104 102   CO2 28 28 25   BUN 8 6* 7   CREATININE <0.5* <0.5* <0.5*   CALCIUM 8.4 8.5 8.7     No results for input(s): AST, ALT, BILIDIR, BILITOT, ALKPHOS in the last 72 hours. Recent Labs     03/18/22  1720   INR 1.44*     No results for input(s): Erle Keepers in the last 72 hours. Urinalysis:      Lab Results   Component Value Date    NITRU Negative 02/06/2022    WBCUA >100 02/06/2022    BACTERIA Rare 02/05/2022    RBCUA see below 02/06/2022    BLOODU MODERATE 02/06/2022    SPECGRAV 1.020 02/06/2022    GLUCOSEU 250 02/06/2022    GLUCOSEU 250 05/11/2011       Radiology:  IR MIDLINE CATH   Final Result      XR FOOT RIGHT (2 VIEWS)   Final Result   No acute osseous abnormality of the right foot. Osteopenia. XR KNEE RIGHT (1-2 VIEWS)   Final Result   No unexpected findings following revision of right knee arthroplasty. XR KNEE RIGHT (1-2 VIEWS)   Final Result   Intraprocedural fluoroscopic spot images as above. See separate procedure   report for more information. FLUORO FOR SURGICAL PROCEDURES   Final Result      XR FEMUR RIGHT (MIN 2 VIEWS)   Final Result   Persistent findings consistent with changes related to periprosthetic   fracture of the right distal femur as described above. CT KNEE RIGHT WO CONTRAST   Final Result   1. Acute periprosthetic fracture of the distal right femur centered at the   metadiaphysis and extending to the level of the arthroplasty hardware with   displacement of major fracture fragment by up to 1.5 cm. Fracture lines are   nondisplaced along the medial condyle and mildly comminuted and displaced at   the lateral condyle. 2. Osteopenia. CT Head WO Contrast   Final Result   No acute intracranial abnormality. Left mastoid effusion      RECOMMENDATIONS:   Unavailable         CT Cervical Spine WO Contrast   Final Result   No acute abnormality of the cervical spine.          XR KNEE RIGHT (1-2 VIEWS)   Final Result   Acute traumatic displaced periprosthetic fracture involving the distal right   femoral metadiaphysis. XR ANKLE RIGHT (MIN 3 VIEWS)   Final Result   5 no acute osseous abnormality of the right foot. Osteopenia. Lateral soft   tissue swelling. XR CHEST PORTABLE   Final Result   Decreased lung volumes. Stable patchy opacification throughout the lungs,   edema versus infiltrate. Assessment/Plan:    Active Hospital Problems    Diagnosis     Chronic respiratory failure with hypoxia ([facility]) [J96.11]     Periprosthetic fracture around internal prosthetic joint, initial encounter [X30. 9XXA]     GERD (gastroesophageal reflux disease) [K21.9]     Personal history of COVID-19 [Z86.16]     Microcytic anemia [D50.9]     Acquired hypothyroidism [E03.9]     Nonobstructive atherosclerosis of coronary artery [I25.10]     Syncope and collapse [R55]     Nonischemic cardiomyopathy (HCC) [I42.8]     DM2 (diabetes mellitus, type 2) ([facility]) [E11.9]     Tracheobronchomalacia [J39.8]      Right Femur Periprosthetic Fracture  -  Orthopedic surgery assistance appreciated. Leg has been stabilized in a knee immobilizer. OR repair completed 3/16.   - Continue Pain control and supportive care  -14-day dose doxycycline postop for infection prophylaxis (end date 03/30)  -Bowel regimen-miralax, colace, dulcolax supp today     Acute Blood Loss Anemia, expected:   -In setting of acute fracture  -Had further decline post op requiring transfusion.   -Monitor and transfuse as needed.      Right Ankle Pain:   -Injured during initial fall  -No fracture  -Discussed with Ortho; considering Ace wrap/support brace as this is limiting her ability to tolerate therapy     Chronic Hypoxemic Respiratory Failure  -  Post-COVID pulmonary fibrosis. S/p tracheostomy which has been removed. Baseline 2.5L/min O2 requirement.   -  Patient additionally has a background of tracheobronchomalacia and bronchiectasis. -  Continue home inhaled steroid, scheduled duonebs, and prn albuterol nebs. -  continue incentive spirometer q2hwa and BID metanebs as an adjunctive measure to assist with airway clearance.     Nonischemic CM, Nonobstructive CAD  -  Historically low normal LVEF.  -  Continue home metoprolol and statin. -  ASA and home antihypertensives and diuretics on hold preoperatively.     Diabetes Type 2  -  Hold all oral antidiabetic agents while inpt. continue s.c. Insulin regimen based on weight.     Hypothyroidism  - Continue home synthroid 100 mcg daily TSH 4.8     GERD  - Continue PPI and home metoclopramide.     Microcytic anemia  -  Chronic mild and stable. Previously d/t chronic inflammation. Iron 19, TIBC 209, Iron sat 9, Ferritin 111     Chronic Pain  -  Continue home gabapentin and tizanidine.     DVT Prophylaxis: SCD, ASA  Diet: ADULT DIET;  Regular  Code Status: Full Code    PT/OT Eval Status: Consulted    Dispo - 03/22 at 56 am    Daisy Puga MD

## 2022-03-21 NOTE — PROGRESS NOTES
Physical Therapy  Facility/Department: [facility] C5 - MED SURG/ORTHO  Daily Treatment Note  NAME: Cooper Garrett  : 1955  MRN: [id number]    Date of Service: 3/21/2022    Discharge Recommendations:  Subacute/Skilled Nursing Facility   PT Equipment Recommendations  Equipment Needed: No  Other: defer    Assessment   Body structures, Functions, Activity limitations: Decreased functional mobility ; Increased pain;Decreased balance;Decreased ADL status; Decreased ROM; Decreased strength;Decreased endurance  Assessment: Today pt seen as co treat with OT to maximize functional outcomes. Pt was able to progress OOB to chair with RW and A of 2. Pt performed 2 standing trials with Lisa Of 2 for ~1 minute each and performed BLE exs. SHe required multiple rests urbina to URBINA and anxiety with pain. Pt is recommended for con't skilled PT and SNF at D/C. Treatment Diagnosis: decreased mobility  Prognosis: Fair  Decision Making: Medium Complexity  Patient Education: Pt educated regarding importance of participation in OOB mobility and HEP to prevent effects of bedrest and reduce stiffness. Barriers to Learning: Anxiety  REQUIRES PT FOLLOW UP: Yes  Activity Tolerance  Activity Tolerance: Patient Tolerated treatment well  Activity Tolerance: Vitals: BP= 143/76 semifowlers, 83/51 after transfer, 103/65 at EOS in chair; HR= 74, SPO2= 87% after transfer, 96% at rest all on 2L     Patient Diagnosis(es): The primary encounter diagnosis was Periprosthetic fracture around internal prosthetic joint, initial encounter. Diagnoses of Syncope and collapse and Hypotension, unspecified hypotension type were also pertinent to this visit.      has a past medical history of Anxiety disorder, Chronic pain syndrome, COVID-19, DDD (degenerative disc disease), lumbar, Diabetes ([facility] [address] Displacement of lumbar intervertebral disc without myelopathy, Diverticulitis, Fibromyalgia, Generalized osteoarthrosis, involving multiple sites, GERD (gastroesophageal reflux disease), Hypochromic microcytic anemia, Impacted cerumen, Influenza A, Irritable bowel syndrome, Other and unspecified hyperlipidemia, Prosthetic joint implant failure ([facility] [address] Screening mammogram, Tracheobronchomalacia, Unspecified asthma(493.90), Unspecified essential hypertension, and Unspecified hypothyroidism. has a past surgical history that includes Hysterectomy; joint replacement (10/92); joint replacement (  12/92); joint replacement (  6/96); joint replacement (  2006); Colonoscopy (2007); Lung surgery (1/2014); Cardiac catheterization; Abdomen surgery; Colon surgery; Upper gastrointestinal endoscopy (N/A, 10/25/2021); tracheostomy (N/A, 10/25/2021); Revision total knee arthroplasty (Right, 3/16/2022); and IR MIDLINE CATH (3/18/2022). Restrictions  Restrictions/Precautions  Restrictions/Precautions: Weight Bearing  Required Braces or Orthoses?:  (Does not need KI for OOB mobility.)  Lower Extremity Weight Bearing Restrictions  Right Lower Extremity Weight Bearing: Weight Bearing As Tolerated  Position Activity Restriction  Other position/activity restrictions: Activity Day of Surgery: 1)  Dangle at edge of bed, progress to stand, and take a few steps with assistive device. 2) Encourage ankle pumps and quad sets. 3) Up in bedside chair as tolerated. 4) Commode privileges with assistance; 2L of O2     Subjective   General  Chart Reviewed: Yes  Response To Previous Treatment: Patient with no complaints from previous session. Family / Caregiver Present: No  Referring Practitioner: Louis Borrego MD  Subjective  Subjective: Pt resting in bed upon arrival, agreeable to PT tx session.  Reports feeling some better  General Comment  Comments: RN clears for therapy  Pain Screening  Patient Currently in Pain: Yes  Pain Assessment  Pain Assessment: Faces  Mcdaniel-Baker Pain Rating: Hurts even more  Pain Location: Leg;Knee  Pain Orientation: Right  Non-Pharmaceutical Pain Intervention(s): Rest  Vital Signs  Patient Currently in Pain: Yes          Objective   Bed mobility  Supine to Sit: Minimal assistance (increased time and use of rails with HOB elevated)  Sit to Supine: Unable to assess  Transfers  Sit to Stand: Dependent/Total;2 Person Assistance;Minimal Assistance  Stand to sit: Dependent/Total;2 Person Assistance;Minimal Assistance  Bed to Chair: Dependent/Total;2 Person Assistance;Minimal assistance (using walker)  Ambulation  Ambulation?: Yes  WB Status: WBAT R LE  Ambulation 1  Device: Rolling Walker  Assistance: Dependent/Total;2 Person assistance;Minimal assistance  Gait Deviations: Slow Marisol;Decreased step height  Distance: 3'  Comments: Pt anxious, needed extra time. URBINA and O2 sats 87% once seated, pt rebounded to 90% in 1 minute and 95% in 2 minutes.         Exercises  Quad Sets: x15 BLE  Gluteal Sets: x15 BLE  Hip Abduction: x15 BLE  Ankle Pumps: x15 BLE  Comments: pt resisted knee flexion and bends knee only minimally even when seated in chair without KI to ~ 45*        AM-PAC Score  AM-PAC Inpatient Mobility Raw Score : 13 (03/21/22 1427)  AM-PAC Inpatient T-Scale Score : 36.74 (03/21/22 1427)  Mobility Inpatient CMS 0-100% Score: 64.91 (03/21/22 1427)  Mobility Inpatient CMS G-Code Modifier : CL (03/21/22 1427)          Goals  Short term goals  Time Frame for Short term goals: 3/25  Short term goal 1: Pt will complete bed mobility with Mod A x1  -MET 3/20 min A  Short term goal 2: Pt will sit to stand wtih Mod A x 1   -3/21 min Aof 2  Short term goal 3: Pt will SPT from bed to chair wtih Mod A x 1   -3/21 SPT with RW and min AOf 2  Short term goal 4: Pt will complete B LE Exercises to improve mobility by 3/19   -MET and ongoing  Patient Goals   Patient goals : to get stronger    Plan    Plan  Times per week: 7x/week  Times per day: Daily  Current Treatment Recommendations: Strengthening,Balance Training,Functional Mobility Training,Transfer Lawanda Patel Management,Positioning,Equipment Evaluation, Education, & procurement,Patient/Caregiver Education & Training,Safety Education & Training,Home Exercise Program  Safety Devices  Type of devices:  All fall risk precautions in place,Call light within reach,Chair alarm in place,Gait belt,Left in chair,Nurse notified  Restraints  Initially in place: No     Therapy Time   Individual Concurrent Group Co-treatment   Time In 0920         Time Out 0959         Minutes [facility],[address], [address] [facility]

## 2022-03-21 NOTE — PROGRESS NOTES
Occupational Therapy  Facility/Department: [facility] C5 - MED SURG/ORTHO  Daily Treatment Note  NAME: Marielena Pierre  : 1955  MRN: [id number]    Date of Service: 3/21/2022    Discharge Recommendations:  Subacute/Skilled Nursing Facility     Assessment   Performance deficits / Impairments: Decreased functional mobility ; Decreased ADL status; Decreased balance;Decreased cognition;Decreased endurance;Decreased strength;Decreased high-level IADLs;Decreased safe awareness  Assessment: Pt with improved tolerance of OT treatment, pt min A for bed mobility and functional transfers this date with RW. Pt total A for LB dressing, VCs for safety with transfer training. Pt requires rest breaks with all activity due to pain and dyspnea. Pt functioning below her baseline and would benefit from continued skilled OT in SNF setting at d/c, if pt continues to refuse pt will require w/c with elevating leg rests at d/c. Prognosis: Good  OT Education: OT Role;Plan of Care;Energy Conservation;Precautions; ADL Adaptive Strategies  Disease Specific Education: Pt educated on weight bearing status, post-op precautions, appropriate DME, and safe mobility with AD. Pt verbalized understanding. Barriers to Learning: pain  REQUIRES OT FOLLOW UP: Yes  Activity Tolerance  Activity Tolerance: Patient limited by pain  Activity Tolerance: Vitals: BP= 143/76 semifowlers, 83/51 after transfer, 103/65 at EOS in chair; HR= 74, SPO2= 87% after transfer, 96% at rest all on 2L  Safety Devices  Safety Devices in place: Yes  Type of devices: Left in chair;Chair alarm in place;Call light within reach;Nurse notified;Gait belt         Patient Diagnosis(es): The primary encounter diagnosis was Periprosthetic fracture around internal prosthetic joint, initial encounter. Diagnoses of Syncope and collapse and Hypotension, unspecified hypotension type were also pertinent to this visit.       has a past medical history of Anxiety disorder, Chronic pain syndrome, COVID-19, DDD (degenerative disc disease), lumbar, Diabetes ([address] Displacement of lumbar intervertebral disc without myelopathy, Diverticulitis, Fibromyalgia, Generalized osteoarthrosis, involving multiple sites, GERD (gastroesophageal reflux disease), Hypochromic microcytic anemia, Impacted cerumen, Influenza A, Irritable bowel syndrome, Other and unspecified hyperlipidemia, Prosthetic joint implant failure ([address] Screening mammogram, Tracheobronchomalacia, Unspecified asthma(493.90), Unspecified essential hypertension, and Unspecified hypothyroidism. has a past surgical history that includes Hysterectomy; joint replacement (10/92); joint replacement (  12/92); joint replacement (  6/96); joint replacement (  2006); Colonoscopy (2007); Lung surgery (1/2014); Cardiac catheterization; Abdomen surgery; Colon surgery; Upper gastrointestinal endoscopy (N/A, 10/25/2021); tracheostomy (N/A, 10/25/2021); and Revision total knee arthroplasty (Right, 3/16/2022). Restrictions  Restrictions/Precautions  Restrictions/Precautions: Weight Bearing  Required Braces or Orthoses?:  (Does not need KI for OOB mobility.)  Lower Extremity Weight Bearing Restrictions  Right Lower Extremity Weight Bearing: Weight Bearing As Tolerated  Position Activity Restriction  Other position/activity restrictions: Activity Day of Surgery: 1)  Dangle at edge of bed, progress to stand, and take a few steps with assistive device. 2) Encourage ankle pumps and quad sets. 3) Up in bedside chair as tolerated. 4) Commode privileges with assistance; 2L of O2     Subjective   General  Chart Reviewed: Yes  Patient assessed for rehabilitation services?: Yes  Additional Pertinent Hx: Per chart, \"Esperanza Nichols is a 78 y.o. female. She presented to the ER from home by ambulance after a syncopal episode. She stood up and suddenly lost consciousness. Upon awakening she had immediate right knee pain and was unable to bear weight.  She is s/p a prolonged hospitalization for COVID-19 which began 9/26/2021. She was initially hospitalized at [facility] for roughly a month, then discharged [facility] a [facility] where she stayed for roughly two months, and then the [address] [place] [address]  1/3 - 2/22. She suffered multiple complications and now has chronic respiratory failure requiring 2-2.5 L/min O2 at all times. She suffered critical illness myopathy but she and her sposue relate her overall strength had improved substantially with rehab. Prior to today's fall she was able to ambulate independently with the help of a FWW. She estimated she could ambulate roughly 100ft on a level surface around her home before having to stop because of dyspnea. \"  Response to previous treatment: Patient with no complaints from previous session  Family / Caregiver Present: No  Referring Practitioner: Titi Garza MD  Diagnosis: RIGHT PERIPROSTHETIC DISTAL FEMUR FRACTURE; Procedure (3/16):REVISION RIGHT TOTAL KNEE ARTHROPLASTY WITH DISTAL FEMORAL REPLACEMENT    Subjective  Subjective: Pt resting in bed, agreeable to OT treatment.     Pain Assessment  Pain Assessment: Faces  Mcdaniel-Baker Pain Rating: Hurts whole lot  Pain Type: Surgical pain  Pain Location: Leg  Pain Orientation: Right  Non-Pharmaceutical Pain Intervention(s): Repositioned  Pre Treatment Pain Screening  Intervention List: Patient able to continue with treatment  Vital Signs  Patient Currently in Pain: Yes     Orientation  Orientation  Overall Orientation Status: Within Functional Limits     Objective    ADL  Grooming: Setup;Supervision (to comb hair, wash face, and use mouthwash at EOB)  LE Dressing: Dependent/Total (briefs)     Balance  Sitting Balance: Stand by assistance  Standing Balance: Dependent/Total (min A of 1 with RW)  Standing Balance  Activity: bed to chair stand step with RW; static stand to manage briefs    Bed mobility  Supine to Sit: Minimal assistance (to R, increased time, HOB elevated, and use of bedrail) Transfers  Sit to stand: Minimal assistance  Stand to sit: Minimal assistance  Transfer Comments: VCs for safety     Cognition  Safety Judgement: Decreased awareness of need for safety  Cognition Comment: Pt with increased anxiety with movement. Plan   Plan  Times per week: 4-6x/week  Current Treatment Recommendations: Functional Mobility Training,Strengthening,Balance Training,Safety Education & Training,Equipment Evaluation, Education, & procurement,Self-Care / ADL,Endurance Training    AM-PAC Score  AM-PAC Inpatient Daily Activity Raw Score: 14 (03/21/22 1122)  AM-PAC Inpatient ADL T-Scale Score : 33.39 (03/21/22 1122)  ADL Inpatient CMS 0-100% Score: 59.67 (03/21/22 1122)  ADL Inpatient CMS G-Code Modifier : CK (03/21/22 1122)    Goals  Short term goals  Time Frame for Short term goals: 1 week (3/24) unless stated otherwise. Short term goal 1: Pt will perform functional t/fs with min A and AD PRN. -- GOAL MET, pt demos transfers min A 3/21/22  Short term goal 2: Pt will LB dress with mod A and AD/AE PRN. -- ongoing 3/21  Short term goal 3: Pt will perform at least 2 minutes of standing functional activities with CGA and AD PRN. -- ongoing 3/21  Short term goal 4: Pt will perform BUE ther ex x15 to increase endurance for functional activities (3/22)-- ongoing 3/21  Patient Goals   Patient goals : \"to be able to sit up in the chair\" -MET briefly but d/t pain pt t/fed back to bed.        Therapy Time   Individual Concurrent Group Co-treatment   Time In 0920         Time Out 1000         Minutes [address], LOYD/L

## 2022-03-22 VITALS
OXYGEN SATURATION: 100 % | RESPIRATION RATE: 16 BRPM | TEMPERATURE: 98.1 F | HEIGHT: 69 IN | WEIGHT: 173.06 LBS | DIASTOLIC BLOOD PRESSURE: 78 MMHG | SYSTOLIC BLOOD PRESSURE: 162 MMHG | HEART RATE: 84 BPM | BODY MASS INDEX: 25.63 KG/M2

## 2022-03-22 LAB
GLUCOSE BLD-MCNC: 102 MG/DL (ref 70–99)
PERFORMED ON: ABNORMAL

## 2022-03-22 PROCEDURE — 94640 AIRWAY INHALATION TREATMENT: CPT

## 2022-03-22 PROCEDURE — 94761 N-INVAS EAR/PLS OXIMETRY MLT: CPT

## 2022-03-22 PROCEDURE — 6370000000 HC RX 637 (ALT 250 FOR IP): Performed by: SPECIALIST/TECHNOLOGIST

## 2022-03-22 PROCEDURE — 6370000000 HC RX 637 (ALT 250 FOR IP): Performed by: ORTHOPAEDIC SURGERY

## 2022-03-22 PROCEDURE — 2700000000 HC OXYGEN THERAPY PER DAY

## 2022-03-22 RX ORDER — DOXYCYCLINE HYCLATE 100 MG
100 TABLET ORAL EVERY 12 HOURS SCHEDULED
Qty: 18 TABLET | Refills: 0 | Status: SHIPPED | OUTPATIENT
Start: 2022-03-22 | End: 2022-03-31

## 2022-03-22 RX ORDER — DOXYCYCLINE HYCLATE 100 MG
100 TABLET ORAL EVERY 12 HOURS SCHEDULED
Qty: 16 TABLET | Refills: 0 | Status: SHIPPED | OUTPATIENT
Start: 2022-03-22 | End: 2022-03-22

## 2022-03-22 RX ADMIN — Medication 2 PUFF: at 08:01

## 2022-03-22 RX ADMIN — METOPROLOL TARTRATE 25 MG: 25 TABLET, FILM COATED ORAL at 08:09

## 2022-03-22 RX ADMIN — OXYCODONE 10 MG: 5 TABLET ORAL at 05:52

## 2022-03-22 RX ADMIN — ROPINIROLE HYDROCHLORIDE 0.5 MG: 0.5 TABLET, FILM COATED ORAL at 08:09

## 2022-03-22 RX ADMIN — ACETAMINOPHEN 650 MG: 325 TABLET ORAL at 01:35

## 2022-03-22 RX ADMIN — DOXYCYCLINE HYCLATE 100 MG: 100 TABLET, COATED ORAL at 08:08

## 2022-03-22 RX ADMIN — OXYCODONE 10 MG: 5 TABLET ORAL at 01:35

## 2022-03-22 RX ADMIN — ASPIRIN 325 MG: 325 TABLET, COATED ORAL at 08:08

## 2022-03-22 RX ADMIN — PANTOPRAZOLE SODIUM 40 MG: 40 TABLET, DELAYED RELEASE ORAL at 05:52

## 2022-03-22 RX ADMIN — Medication 1 TABLET: at 08:09

## 2022-03-22 RX ADMIN — TIZANIDINE 2 MG: 4 TABLET ORAL at 08:09

## 2022-03-22 RX ADMIN — METOCLOPRAMIDE 5 MG: 10 TABLET ORAL at 08:09

## 2022-03-22 RX ADMIN — LEVOTHYROXINE SODIUM 100 MCG: 0.1 TABLET ORAL at 05:52

## 2022-03-22 RX ADMIN — ACETAMINOPHEN 650 MG: 325 TABLET ORAL at 08:08

## 2022-03-22 RX ADMIN — GABAPENTIN 600 MG: 300 CAPSULE ORAL at 08:09

## 2022-03-22 ASSESSMENT — PAIN SCALES - GENERAL
PAINLEVEL_OUTOF10: 9
PAINLEVEL_OUTOF10: 9
PAINLEVEL_OUTOF10: 7
PAINLEVEL_OUTOF10: 7

## 2022-03-22 NOTE — CARE COORDINATION
CASE MANAGEMENT DISCHARGE SUMMARY      Discharge to: Home with family with [facility]. SN, PT, OT, Aide and LSW. Referrals to [facility] [address] [facility] and [facility]. IMM given: Today    New Durable Medical Equipment ordered/agency: None    Transportation:    Family/car: [employer]   Medical Transport explained to [facility]. Pt/family voice no agency preference. Agency used: [facility] up time: 1030   Ambulance form completed: Yes    Confirmed discharge plan with: Spoke to pt and spouse at bedside. Patient:  yes   Facility/Agency, name:  [facility] faxed- yes, spoke to Joi Mcgraw with [facility].      RN, name: Kirti Carias and Ning Thorpe RN

## 2022-03-22 NOTE — DISCHARGE SUMMARY
Hospital Medicine Discharge Summary    Patient ID: Maye Schmitt      Patient's PCP: Charlette Atkins MD    Admit Date: 3/14/2022     Discharge Date:   03/22/2022    Admitting Provider: Shubham Travis MD     Discharge Provider: Brett Lin MD     Discharge Diagnoses: Active Hospital Problems    Diagnosis     Chronic respiratory failure with hypoxia (HCC) [J96.11]     Periprosthetic fracture around internal prosthetic joint, initial encounter [V86. 9XXA]     GERD (gastroesophageal reflux disease) [K21.9]     Personal history of COVID-19 [Z86.16]     Microcytic anemia [D50.9]     Acquired hypothyroidism [E03.9]     Nonobstructive atherosclerosis of coronary artery [I25.10]     Syncope and collapse [R55]     Nonischemic cardiomyopathy (HCC) [I42.8]     DM2 (diabetes mellitus, type 2) ([facility] [address] [E11.9]     Tracheobronchomalacia [J39.8]        The patient was seen and examined on day of discharge and this discharge summary is in conjunction with any daily progress note from day of discharge. Dennis Castro is a 78 y.o. female.   She presented to the ER from home by ambulance after a presumed orthostatic syncopal episode found to have right knee pain now s/p right distal femoral replacement for periprosthetic distal femur fracture. Right Femur Periprosthetic Fracture  -  Orthopedic surgery assistance appreciated.  Leg has been stabilized in a knee immobilizer.  OR repair completed 3/16.   - Continue Pain control and supportive care (oxycodone prescribed by ortho) recommended adjunctive therapy with acetaminophen 650mg q8hr.  -14-day dose doxycycline postop for infection prophylaxis (end date 03/31)  -Bowel regimen-miralax, colace, dulcolax supp today  - DVT prophylaxis: Aspirin 325 mg by mouth daily x6 weeks     Acute Blood Loss Anemia, expected:   -In setting of acute fracture  -Had further decline post op requiring transfusion.   -Monitor and transfuse as needed.      Right Ankle Pain:   -Injured during initial fall  -No fracture  -Discussed with Ortho; considering Ace wrap/support brace as this is limiting her ability to tolerate therapy      Chronic Hypoxemic Respiratory Failure  -  Post-COVID pulmonary fibrosis.  S/p tracheostomy which has been removed.  Baseline 2.5L/min O2 requirement. -  Patient additionally has a background of tracheobronchomalacia and bronchiectasis. -  Continue home inhaled steroid, scheduled duonebs, and prn albuterol nebs. -  continue incentive spirometer q2hwa and BID metanebs as an adjunctive measure to assist with airway clearance.     Nonischemic CM, Nonobstructive CAD  -  Historically low normal LVEF. -  Continue home metoprolol and statin. -  ASA and home antihypertensives and diuretics on hold preoperatively.     Diabetes Type 2  -  continue s.c. Insulin regimen based on weight while inpt. At home pt to continue jardiance.     Hypothyroidism  - Continue home synthroid 100 mcg daily TSH 4.8     GERD  - Continue PPI and home metoclopramide.     Microcytic anemia  -  Chronic mild and stable. Previously d/t chronic inflammation. Iron 19, TIBC 209, Iron sat 9, Ferritin 111     Chronic Pain  -  Continue home gabapentin and tizanidine. Physical Exam Performed:     BP (!) 162/78   Pulse 84   Temp 98.1 °F (36.7 °C)   Resp 16   Ht 5' 9\" (1.753 m)   Wt 173 lb 1 oz (78.5 kg)   SpO2 100%   BMI 25.56 kg/m²       General appearance: No apparent distress, appears stated age and cooperative. HEENT: Pupils equal, round, and reactive to light. Conjunctivae/corneas clear. Neck: Supple, with full range of motion. No jugular venous distention. Trachea midline. Respiratory:  Normal respiratory effort. Clear to auscultation, bilaterally without Rales/Wheezes/Rhonchi. Cardiovascular: Regular rate and rhythm with normal S1/S2 without murmurs, rubs or gallops.   Abdomen: Soft, non-tender, non-distended with normal bowel sounds. Musculoskeletal: R knee dsg CDI, RLE edema, Right thigh ecchymosis  Skin: Skin color, texture, turgor normal.  No rashes or lesions. Neurologic:  Neurovascularly intact without any focal sensory/motor deficits. Cranial nerves: II-XII intact, grossly non-focal.  Psychiatric: Alert and oriented, thought content appropriate, normal insight  Capillary Refill: Brisk,3 seconds, normal   Peripheral Pulses: +2 palpable, equal bilaterally       Labs: For convenience and continuity at follow-up the following most recent labs are provided:      CBC:    Lab Results   Component Value Date    WBC 5.8 03/21/2022    HGB 8.9 03/21/2022    HCT 27.8 03/21/2022     03/21/2022       Renal:    Lab Results   Component Value Date     03/21/2022    K 3.7 03/21/2022    K 4.4 03/14/2022     03/21/2022    CO2 25 03/21/2022    BUN 7 03/21/2022    CREATININE <0.5 03/21/2022    CALCIUM 8.7 03/21/2022    PHOS 3.4 02/14/2022         Significant Diagnostic Studies    Radiology:   IR MIDLINE CATH   Final Result      XR FOOT RIGHT (2 VIEWS)   Final Result   No acute osseous abnormality of the right foot. Osteopenia. XR KNEE RIGHT (1-2 VIEWS)   Final Result   No unexpected findings following revision of right knee arthroplasty. XR KNEE RIGHT (1-2 VIEWS)   Final Result   Intraprocedural fluoroscopic spot images as above. See separate procedure   report for more information. FLUORO FOR SURGICAL PROCEDURES   Final Result      XR FEMUR RIGHT (MIN 2 VIEWS)   Final Result   Persistent findings consistent with changes related to periprosthetic   fracture of the right distal femur as described above. CT KNEE RIGHT WO CONTRAST   Final Result   1. Acute periprosthetic fracture of the distal right femur centered at the   metadiaphysis and extending to the level of the arthroplasty hardware with   displacement of major fracture fragment by up to 1.5 cm.   Fracture lines are   nondisplaced along the medial condyle and mildly comminuted and displaced at   the lateral condyle. 2. Osteopenia. CT Head WO Contrast   Final Result   No acute intracranial abnormality. Left mastoid effusion      RECOMMENDATIONS:   Unavailable         CT Cervical Spine WO Contrast   Final Result   No acute abnormality of the cervical spine. XR KNEE RIGHT (1-2 VIEWS)   Final Result   Acute traumatic displaced periprosthetic fracture involving the distal right   femoral metadiaphysis. XR ANKLE RIGHT (MIN 3 VIEWS)   Final Result   5 no acute osseous abnormality of the right foot. Osteopenia. Lateral soft   tissue swelling. XR CHEST PORTABLE   Final Result   Decreased lung volumes. Stable patchy opacification throughout the lungs,   edema versus infiltrate. Consults:     IP CONSULT TO ORTHOPEDIC SURGERY  IP CONSULT TO IV TEAM  IP CONSULT TO HOME CARE NEEDS    Disposition:  Home with Maximino Arteaga     Condition at Discharge: Stable    Discharge Instructions/Follow-up:  PCP and orthopedics    Code Status:  Full Code     Activity: activity as tolerated    Diet: regular diet      Discharge Medications:     Current Discharge Medication List           Details   doxycycline hyclate (VIBRA-TABS) 100 MG tablet Take 1 tablet by mouth every 12 hours for 9 days  Qty: 18 tablet, Refills: 0      oxyCODONE (ROXICODONE) 5 MG immediate release tablet Take 1-2 tablets by mouth every 4-6 hours as needed for Pain for up to 7 days. Qty: 10 tablet, Refills: 0    Comments: Reduce doses taken as pain becomes manageable  Associated Diagnoses: Periprosthetic fracture around internal prosthetic joint, initial encounter              Details   aspirin 325 MG EC tablet Take 1 tablet by mouth daily  Qty: 30 tablet, Refills: 3              Details   gabapentin (NEURONTIN) 600 MG tablet Take 600 mg by mouth 3 times daily.       !! ipratropium-albuterol (DUONEB) 0.5-2.5 (3) MG/3ML SOLN nebulizer solution Inhale 3 mLs into the lungs every 4 hours  Qty: 360 mL, Refills: 0      albuterol sulfate  (90 Base) MCG/ACT inhaler Inhale 2 puffs into the lungs 4 times daily as needed for Wheezing  Qty: 1 each, Refills: 0      simvastatin (ZOCOR) 40 MG tablet Take 1 tablet by mouth nightly  Qty: 30 tablet, Refills: 0      rOPINIRole (REQUIP) 0.5 MG tablet TAKE 1 TABLET BY MOUTH 3 TIMES DAILY. Qty: 90 tablet, Refills: 0      metoprolol tartrate (LOPRESSOR) 25 MG tablet Take 1 tablet by mouth 2 times daily  Qty: 60 tablet, Refills: 0      levothyroxine (SYNTHROID) 100 MCG tablet Take 1 tablet by mouth Daily  Qty: 30 tablet, Refills: 0      omeprazole (PRILOSEC) 40 MG delayed release capsule Take 1 capsule by mouth daily  Qty: 30 capsule, Refills: 0      furosemide (LASIX) 40 MG tablet Take 1 tablet by mouth daily  Qty: 60 tablet, Refills: 3      magnesium oxide (MAG-OX) 400 (240 Mg) MG tablet Take 1 tablet by mouth daily  Qty: 30 tablet, Refills: 0      metoclopramide (REGLAN) 5 MG tablet Take 1 tablet by mouth 2 times daily  Qty: 120 tablet, Refills: 3      tiZANidine (ZANAFLEX) 2 MG tablet Take 2 mg by mouth every 8 hours as needed      !! ipratropium-albuterol (DUONEB) 0.5-2.5 (3) MG/3ML SOLN nebulizer solution Inhale 3 mLs into the lungs every 4 hours      budesonide (PULMICORT FLEXHALER) 180 MCG/ACT AEPB inhaler Inhale 1 puff into the lungs 2 times daily Per [facility]. Orab Krcarie      Multiple Vitamins-Minerals (THERAPEUTIC MULTIVITAMIN-MINERALS) tablet Take 1 tablet by mouth daily      polyethylene glycol (MIRALAX) powder Take 17 g by mouth daily as needed      !! blood glucose monitor strips Dispense whatever insurance will cover. Pt test twice a day dx:E11.9  Qty: 200 strip, Refills: 3    Associated Diagnoses: Diabetes mellitus due to underlying condition with diabetic polyneuropathy, without long-term current use of insulin ([address]      ! ! blood glucose test strips (ASCENSIA AUTODISC VI;ONE TOUCH ULTRA TEST VI) strip 1 each by In Vitro route daily As needed. Qty: 100 each, Refills: 3    Associated Diagnoses: Diabetes mellitus due to underlying condition with diabetic polyneuropathy, without long-term current use of insulin (HCC)      Blood Glucose Monitoring Suppl CATHRYN Dispense whatever insurance will cover. Dx code:E11.9  Qty: 1 Device, Refills: 0       !! - Potential duplicate medications found. Please discuss with provider. Time Spent on discharge is more than 45 minutes in the examination, evaluation, counseling and review of medications and discharge plan. Signed:    Jeffry Parada MD   3/22/2022      Thank you Jeevan Valdez MD for the opportunity to be involved in this patient's care. If you have any questions or concerns please feel free to contact me at [phone].

## 2022-03-22 NOTE — DISCHARGE SUMMARY
Department of Orthopedic Surgery  Physician Assistant   Discharge Summary    The Delio Caceres is a 78 y.o. female underwent total knee replacement procedure without complication. Delio Caceres was admitted to the floor following Her recovery in the PACU. Discharge Diagnosis  Revision right Knee Replacement with distal femoral replacement    Current Inpatient Medications    No current facility-administered medications for this encounter. Post-operatively the patients diet was advanced as tolerated and their incision was checked on POD #1. The incision is dressing in place, clean, dry and intact with no signs of infection. The patient remained neurovascularly intact in the lower extremity and had intact pulses distally. Patients calf remained soft and showed no evidence of DVT. The patient was able to move their leg and ankle/foot without any problems post-operatively. Physical therapy and occupational therapy were consulted and began working with the patient post-operatively. The patient progressed with PT/OT as would be expected and continued to improve through their stay. The patients pain was initially controlled with IV medications but we were able to transition to oral pain medications soon after arrival to the floor and their pain remained under good control through their hospital stay. From a medical standpoint the patient remained stable and continued to have the medicine team follow throughout their stay. The patients dressing was changed/incison was checked on day of d/c. The patient will be discharged at this time to Home  with their current diet restrictions and will continue to follow the total knee precautions outlined to them by us and PT/OT. Condition on Discharge: Stable    Plan  Return visit in 2 weeks. .  Patient was instructed on the use of pain medications, the signs and symptoms of infection, and was given our number to call should they have any questions or concerns following discharge. For opioid prescriptions given at discharge the following statement is provided for compliance with [employer] rules. Patient being given increased dosage/quantity of opoid pain medication in excess of [employer] guidelines which noted a 30 MED daily of opioids due to the fact that he/she has undergone major orthopaedic surgery as outlined in rule 4731-11-13. Dosages and further duration of the pain medication will be re-evaluated at her post op visit in 2 weeks. Patient was educated on dosing expectations and limits of prescribing as a result of the new [facility] rules enacted August 31, 2017. Please also note that this is not the initial opoid prescription issued to this patient but a continuation of medication utilized during the hospital admission as noted in the medical record. [place]ARRS report has also been utilized to screen for any abuse history or suspicious activity as outlined in [place]. All efforts have been taken to prevent abuse potential and misuse of opioid medications including education, screening, and close clinical follow up.

## 2022-03-22 NOTE — CARE COORDINATION
[facility]    DC order noted, all docs needed have been faxed to [facility] for home care services.     Home care to see patient within 24-48 hrs    Nemo Hidalgo RN, BSN CTN  [facility] [phone]

## 2022-03-22 NOTE — PLAN OF CARE
Problem: Falls - Risk of:  Goal: Will remain free from falls  Description: Will remain free from falls  Outcome: Completed  Goal: Absence of physical injury  Description: Absence of physical injury  Outcome: Completed     Problem: Pain:  Goal: Pain level will decrease  Description: Pain level will decrease  Outcome: Completed  Goal: Control of acute pain  Description: Control of acute pain  Outcome: Completed  Goal: Control of chronic pain  Description: Control of chronic pain  Outcome: Completed     Problem: Skin Integrity:  Goal: Will show no infection signs and symptoms  Description: Will show no infection signs and symptoms  Outcome: Completed  Goal: Absence of new skin breakdown  Description: Absence of new skin breakdown  Outcome: Completed     Problem: Discharge Planning:  Goal: Discharged to appropriate level of care  Description: Discharged to appropriate level of care  Outcome: Completed     Problem: Mobility - Impaired:  Goal: Mobility will improve  Description: Mobility will improve  Outcome: Completed     Problem: Infection - Surgical Site:  Goal: Will show no infection signs and symptoms  Description: Will show no infection signs and symptoms  Outcome: Completed     Problem: Pain - Acute:  Goal: Pain level will decrease  Description: Pain level will decrease  Outcome: Completed     Problem: Nutrition  Goal: Optimal nutrition therapy  Outcome: Completed

## 2022-03-23 ENCOUNTER — TELEPHONE (OUTPATIENT)
Dept: ORTHOPEDIC SURGERY | Age: 67
End: 2022-03-23

## 2022-03-23 DIAGNOSIS — G89.18 POSTOPERATIVE PAIN: Primary | ICD-10-CM

## 2022-03-23 RX ORDER — OXYCODONE AND ACETAMINOPHEN 10; 325 MG/1; MG/1
1 TABLET ORAL EVERY 4 HOURS PRN
Qty: 42 TABLET | Refills: 0 | Status: SHIPPED | OUTPATIENT
Start: 2022-03-23 | End: 2022-03-30

## 2022-03-23 NOTE — TELEPHONE ENCOUNTER
Prescription Refill     Medication Name:  OXYCODONE   Pharmacy: [facility]  Address: [address] [place] [address] [address] [facility]  Phone: [phone]  Patient Contact Number:  [phone]

## 2022-03-23 NOTE — TELEPHONE ENCOUNTER
Pain medication prescription sent to pharmacy. Dose adjustment necessary due to opioid tolerance. Please inform patient.

## 2022-03-30 ENCOUNTER — OFFICE VISIT (OUTPATIENT)
Dept: ORTHOPEDIC SURGERY | Age: 67
End: 2022-03-30

## 2022-03-30 VITALS — HEIGHT: 69 IN | WEIGHT: 173 LBS | BODY MASS INDEX: 25.62 KG/M2

## 2022-03-30 DIAGNOSIS — M17.11 PRIMARY OSTEOARTHRITIS OF RIGHT KNEE: Primary | ICD-10-CM

## 2022-03-30 DIAGNOSIS — G89.18 POSTOPERATIVE PAIN: ICD-10-CM

## 2022-03-30 PROCEDURE — 99024 POSTOP FOLLOW-UP VISIT: CPT | Performed by: ORTHOPAEDIC SURGERY

## 2022-03-30 RX ORDER — OXYCODONE HYDROCHLORIDE AND ACETAMINOPHEN 5; 325 MG/1; MG/1
1 TABLET ORAL EVERY 6 HOURS PRN
Qty: 28 TABLET | Refills: 0 | Status: SHIPPED | OUTPATIENT
Start: 2022-03-30 | End: 2022-04-06

## 2022-03-30 NOTE — PROGRESS NOTES
She returns for her first postop check about 2-week status post oncologic knee replacement for periprosthetic fracture of her right knee. She has no swelling no signs of infection or DVT. She wears a brace at times but otherwise has just been partial weightbearing on it. Her stitches will be removed. X-rays 2 views of the right knee demonstrate well-positioned oncologic knee replacement with no signs of failure    At this time we will give her a refill of Percocet and have her follow-up with Dr. Melany Shane in a few weeks for postop check.

## 2022-03-31 ENCOUNTER — TELEPHONE (OUTPATIENT)
Dept: ORTHOPEDIC SURGERY | Age: 67
End: 2022-03-31

## 2022-03-31 NOTE — TELEPHONE ENCOUNTER
Spoke with PT/OT from [facility] - states the patient is refusing to work with her, not even getting up to use the restroom.  Therapist is at a loss on what to do patient saw Dr. Mammie Gottron yesterday and told pt that she was told not to move or bend this leg

## 2022-04-14 ENCOUNTER — HOSPITAL ENCOUNTER (OUTPATIENT)
Age: 67
Setting detail: SPECIMEN
Discharge: HOME | End: 2022-04-14
Payer: MEDICARE

## 2022-04-14 LAB
ALBUMIN SERPL-MCNC: 3.8 G/DL (ref 3.4–5)
ALP BLD-CCNC: 163 U/L (ref 40–129)
ALT SERPL-CCNC: 19 U/L (ref 10–40)
ANION GAP SERPL CALCULATED.3IONS-SCNC: 11 MMOL/L (ref 3–16)
AST SERPL-CCNC: 28 U/L (ref 15–37)
BASOPHILS ABSOLUTE: 0.1 K/UL (ref 0–0.2)
BASOPHILS RELATIVE PERCENT: 1.5 %
BILIRUB SERPL-MCNC: 0.3 MG/DL (ref 0–1)
BILIRUBIN DIRECT: <0.2 MG/DL (ref 0–0.3)
BILIRUBIN, INDIRECT: ABNORMAL MG/DL (ref 0–1)
BUN BLDV-MCNC: 9 MG/DL (ref 7–20)
CALCIUM SERPL-MCNC: 10.1 MG/DL (ref 8.3–10.6)
CHLORIDE BLD-SCNC: 100 MMOL/L (ref 99–110)
CO2: 27 MMOL/L (ref 21–32)
CREAT SERPL-MCNC: 0.6 MG/DL (ref 0.6–1.2)
EOSINOPHILS ABSOLUTE: 0.3 K/UL (ref 0–0.6)
EOSINOPHILS RELATIVE PERCENT: 5.4 %
GFR AFRICAN AMERICAN: >60
GFR NON-AFRICAN AMERICAN: >60
GLUCOSE BLD-MCNC: 176 MG/DL (ref 70–99)
HCT VFR BLD CALC: 35.6 % (ref 36–48)
HEMOGLOBIN: 11.6 G/DL (ref 12–16)
INR BLD: 1.04 (ref 0.88–1.12)
LYMPHOCYTES ABSOLUTE: 1.3 K/UL (ref 1–5.1)
LYMPHOCYTES RELATIVE PERCENT: 24 %
MCH RBC QN AUTO: 27.4 PG (ref 26–34)
MCHC RBC AUTO-ENTMCNC: 32.6 G/DL (ref 31–36)
MCV RBC AUTO: 84 FL (ref 80–100)
MONOCYTES ABSOLUTE: 0.5 K/UL (ref 0–1.3)
MONOCYTES RELATIVE PERCENT: 9.9 %
NEUTROPHILS ABSOLUTE: 3.2 K/UL (ref 1.7–7.7)
NEUTROPHILS RELATIVE PERCENT: 59.2 %
PDW BLD-RTO: 19.1 % (ref 12.4–15.4)
PHOSPHORUS: 4.2 MG/DL (ref 2.5–4.9)
PLATELET # BLD: 185 K/UL (ref 135–450)
PMV BLD AUTO: 8.9 FL (ref 5–10.5)
POTASSIUM SERPL-SCNC: 4.6 MMOL/L (ref 3.5–5.1)
PROTHROMBIN TIME: 11.9 SEC (ref 9.9–12.7)
RBC # BLD: 4.23 M/UL (ref 4–5.2)
SODIUM BLD-SCNC: 138 MMOL/L (ref 136–145)
TOTAL PROTEIN: 7 G/DL (ref 6.4–8.2)
WBC # BLD: 5.4 K/UL (ref 4–11)

## 2022-04-14 PROCEDURE — 80076 HEPATIC FUNCTION PANEL: CPT

## 2022-04-14 PROCEDURE — 36415 COLL VENOUS BLD VENIPUNCTURE: CPT

## 2022-04-14 PROCEDURE — 85610 PROTHROMBIN TIME: CPT

## 2022-04-14 PROCEDURE — 85025 COMPLETE CBC W/AUTO DIFF WBC: CPT

## 2022-04-14 PROCEDURE — 80069 RENAL FUNCTION PANEL: CPT

## 2022-04-22 ENCOUNTER — TELEPHONE (OUTPATIENT)
Dept: ORTHOPEDIC SURGERY | Age: 67
End: 2022-04-22

## 2022-04-22 NOTE — TELEPHONE ENCOUNTER
Appointment Request     Patient requesting earlier appointment: Yes  Appointment offered to patient: NO  Patient Contact Number: [phone]    PATIENT CALLING REGARDING GETTING A SOONER APPOINTMENT. PATIENT HAD 2ND POST OP APPOINTMENT SCHEDULE FOR TODAY, Friday, April 22, 2022. AND CANCEL DUE TO NOT FEELING WELL. PATIENT REQUESTING A SOONER [address] [facility] July.     PLEASE CALL BACK AT THE ABOVE NUMBER

## 2022-04-25 ENCOUNTER — OFFICE VISIT (OUTPATIENT)
Dept: ORTHOPEDIC SURGERY | Age: 67
End: 2022-04-25

## 2022-04-25 VITALS — BODY MASS INDEX: 25.62 KG/M2 | HEIGHT: 69 IN | WEIGHT: 173 LBS

## 2022-04-25 DIAGNOSIS — M97.9XXA PERIPROSTHETIC FRACTURE AROUND INTERNAL PROSTHETIC JOINT, INITIAL ENCOUNTER: Primary | ICD-10-CM

## 2022-04-25 PROCEDURE — 99024 POSTOP FOLLOW-UP VISIT: CPT | Performed by: PHYSICIAN ASSISTANT

## 2022-04-25 RX ORDER — HYDROCODONE BITARTRATE AND ACETAMINOPHEN 5; 325 MG/1; MG/1
1 TABLET ORAL EVERY 6 HOURS PRN
COMMUNITY

## 2022-04-25 NOTE — PROGRESS NOTES
Chief Complaint:  Knee Pain (Patient is s/p 5 weeks Right Knee. Patient states that she is starting to walk some and getting feeling better. )      History of Present of Illness:  Patient returns today 5 weeks and her second postop evaluation after oncologic knee replacement for periprosthetic fracture of the right knee. Patient continues to benefit from occupational therapy at home and is mainly in a wheelchair but does transfer with a walker. She states that her pain is well controlled. Examination:  Today's examination of the right knee reveals incision to be healing nicely with Steri-Strips in place. There is no surrounding erythema or induration. There is no drainage noted. Her present range of motion is noted to be -5 degrees of extension to approximately 90 degrees of flexion. Quad strength is noted to be 4/5. Diagnostic Test Findings:  No new x-rays were obtained today but x-rays from 3/30/2022 was reviewed with the patient. Impression:  5 weeks status post oncologic knee replacement. Treatment Plan:  Patient is to continue working on quad strengthening straight leg raising was demonstrated. She is to progressively work on her flexion over the next 4 weeks. Follow-up: 4 weeks at which time repeat clinical examination and x-rays of the right knee will be obtained. The total knee replacement has long stems in the femur and tibia.     Benita Armenta PA-C  Board certified by the [facility] [facility]

## 2022-04-25 NOTE — PROGRESS NOTES
[address]   Cardiac Followup    Referring Provider:  No primary care provider on file. Chief Complaint   Patient presents with    3 Month Follow-Up    Hypertension    Congestive Heart Failure    Coronary Artery Disease    Dizziness     pt had some dizziness since walking in the office    Palpitations     beating hard        History of Present Illness:    Mrs. Evaline Sever is here for follow up for CMP, CAD, HLD, dizziness, SOB. Today she states she has been feeling well overall. She has been having some dizzy spells, no syncope. She checks her blood pressure at home and it is stable. She states she felt her heart pounding today. She has no chest pain. She is tolerating her medications. Patient currently denies any weight gain, edema,  chest pain, shortness of breath, and syncope. Past Medical History:   has a past medical history of Anxiety disorder, Chronic pain syndrome, DDD (degenerative disc disease), lumbar, Diabetes ([address] Displacement of lumbar intervertebral disc without myelopathy, Diverticulitis, Fibromyalgia, Generalized osteoarthrosis, involving multiple sites, GERD (gastroesophageal reflux disease), Impacted cerumen, Influenza A, Irritable bowel syndrome, Other and unspecified hyperlipidemia, Prosthetic joint implant failure ([address] Screening mammogram, Tracheobronchomalacia, Unspecified asthma(493.90), Unspecified essential hypertension, and Unspecified hypothyroidism. Surgical History:   has a past surgical history that includes Hysterectomy; joint replacement (10/92); joint replacement (  12/92); joint replacement (  6/96); joint replacement (  2006); Colonoscopy (2007); Lung surgery (1/2014); and Cardiac catheterization. Social History:   reports that she quit smoking about 27 years ago. Her smoking use included cigarettes. She has a 18.00 pack-year smoking history. She has never used smokeless tobacco. She reports that she does not drink alcohol or use drugs. Impression: Long term (current) use of oral antidiabetic drugs Plan: As above.  A1C 6.3 TABLET BY MOUTH DAILY 10/5/18  Yes Willy Schmitz MD   polyethylene glycol [facility]) powder Take 17 g by mouth daily as needed   Yes Historical Provider, MD   Blood Glucose Monitoring Suppl CATHRYN Dispense whatever insurance will cover. Dx code:E11.9 1/11/18  Yes Willy Schmitz MD   dicyclomine (BENTYL) 10 MG capsule Take 1 capsule by mouth 4 times daily 8/31/17  Yes BRYCE Marcum CNP   gabapentin (NEURONTIN) 800 MG tablet Take 800 mg by mouth 3 times daily 8/2/17  Yes Historical Provider, MD   omeprazole (PRILOSEC) 40 MG delayed release capsule Take 40 mg by mouth 2 times daily 8/2/17  Yes Historical Provider, MD   HYDROcodone-acetaminophen (NORCO) 5-325 MG per tablet Take 1 tablet by mouth every 8 hours as needed for Pain  . Yes Historical Provider, MD   aspirin 81 MG EC tablet Take 1 tablet by mouth daily 9/24/15  Yes Che Christianson MD   tiZANidine (ZANAFLEX) 2 MG tablet Take 2 mg by mouth 3 times daily as needed    Yes Historical Provider, MD        Allergies:  Sulfa antibiotics; Bactrim; Prednisone; and Quinidine     Review of Systems:   · Constitutional: there has been no unanticipated weight loss. There's been no change in energy level, sleep pattern, or activity level. · Eyes: No visual changes or diplopia. No scleral icterus. · ENT: No Headaches, hearing loss or vertigo. No mouth sores or sore throat. · Cardiovascular: Reviewed in HPI  · Respiratory: No cough or wheezing, no sputum production. No hematemesis. · Gastrointestinal: No abdominal pain, appetite loss, blood in stools. No change in bowel or bladder habits. · Genitourinary: No dysuria, trouble voiding, or hematuria. · Musculoskeletal:  No gait disturbance, weakness or joint complaints. · Integumentary: No rash or pruritis. · Neurological: No headache, diplopia, change in muscle strength, numbness or tingling. No change in gait, balance, coordination, mood, affect, memory, mentation, behavior.   · Psychiatric: No anxiety, no depression. · Endocrine: No malaise, fatigue or temperature intolerance. No excessive thirst, fluid intake, or urination. No tremor. · Hematologic/Lymphatic: No abnormal bruising or bleeding, blood clots or swollen lymph nodes. · Allergic/Immunologic: No nasal congestion or hives. Physical Examination:    Vitals:    05/21/19 1300   BP: 102/64   Pulse: 60   SpO2: 98%        Constitutional and General Appearance: NAD   Respiratory:  · Normal excursion and expansion without use of accessory muscles  · Resp Auscultation: Normal breath sounds without dullness  Cardiovascular:  · The apical impulses not displaced  · Heart tones are crisp and normal  · Cervical veins are not engorged  · The carotid upstroke is normal in amplitude and contour without delay or bruit  · Normal S1S2, No S3, No Murmur  · Peripheral pulses are symmetrical and full  · There is no clubbing, cyanosis of the extremities. · No edema  · Femoral Arteries: 2+ and equal  · Pedal Pulses: 2+ and equal   Abdomen:  · No masses or tenderness  · Liver/Spleen: No Abnormalities Noted  Neurological/Psychiatric:  · Alert and oriented in all spheres  · Moves all extremities well  · Exhibits normal gait balance and coordination  · No abnormalities of mood, affect, memory, mentation, or behavior are noted    Cath 9/2015  LM, LCx, RCA <20%  LAD 30%  LV: mild anterior hypokinesis. EF 40-45%  Normal right heart pressures  Possible Takotsubo cardiomyopathy vs transient ischemic event. LV function improving    Echo 9/2015  There is akinesis of the anteroseptal wall. Ejection fraction is visually estimated to be 35 %. Diastolic filling parameters suggests diastolic dysfunction . Slight calcification of the mitral valve noted. Mild mitral regurgitation is present. Moderately dilated left atrium with increased left atrial volume. Trivial tricuspid regurgitation.   Systolic pulmonary artery pressure (SPAP) is normal and estimated at 13   MmHg (RA pressure 3 mmHg). Event monitor 12/2015  Frequent PVCs    Echo 3/2016  - Left ventricle: The cavity size was normal. Wall thickness was  normal. Systolic function was normal. The estimated ejection  fraction was in the range of 50% to 55%. Regional wall motion  abnormalities cannot be excluded. - Aortic valve: Valve area: 2.13cm^2(VTI). Valve area: 2.28cm^2  (Vmax). - Right ventricle: Systolic function was normal by visual  assessment. - Pericardium, extracardiac: A trivial pericardial effusion was  identified. Echo 8/2017   Left ventricular systolic function is normal with the ejection fraction   estimated at 55%.   No regional wall motion abnormalities.   Left ventricular diastolic filling pressure is indeterminate.   Mildly dilated left atrium. Holter Monitor 2/19/19  Brief PAT and PVC's     Assessment:   PVCs - unchanged  Palpitations - stable  CMP - possible Takotsubo 2015. Resolved. CHF - compensated. stable  COPD  SOB - chronic, unchanged  HTN - controlled   DM  HLD - well controlled. 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 Appears to have been dehydration. Bradycardia possible contributing   Dizziness - uncertain etiology. stable  Bradycardia - followed by EP       Plan:  Continue current medications   Check blood pressure at home weekly  Regular exercise and following a healthy diet encouraged   Follow up with NP in 6 months and me in 1 year       Scribe's attestation: This note was scribed in the presence of Dr. Silke Bonds M.D. By Lourdes Giang RN    The scribes docuementation has been prepared under my direction and personally reviewed by me in its entirety. I confirm that the note above accurately reflects all work, treatment, procedures, and medical decision making performed by me. MD Marissa Ledezma.  Arcelia Staley M.D., Yazmin French

## 2022-05-03 ENCOUNTER — TELEPHONE (OUTPATIENT)
Dept: ORTHOPEDIC SURGERY | Age: 67
End: 2022-05-03

## 2022-05-04 ENCOUNTER — OFFICE VISIT (OUTPATIENT)
Dept: ORTHOPEDIC SURGERY | Age: 67
End: 2022-05-04

## 2022-05-04 VITALS — BODY MASS INDEX: 25.62 KG/M2 | HEIGHT: 69 IN | WEIGHT: 173 LBS

## 2022-05-04 DIAGNOSIS — M97.9XXA PERIPROSTHETIC FRACTURE AROUND INTERNAL PROSTHETIC JOINT, INITIAL ENCOUNTER: Primary | ICD-10-CM

## 2022-05-04 DIAGNOSIS — G89.18 POSTOPERATIVE PAIN: ICD-10-CM

## 2022-05-04 PROCEDURE — 99024 POSTOP FOLLOW-UP VISIT: CPT | Performed by: PHYSICIAN ASSISTANT

## 2022-05-04 NOTE — PROGRESS NOTES
Chief Complaint:  Post-Op Check (rt tka revision)      History of Present of Illness:  Patient returns today  weeks and her third postop evaluation after oncologic knee replacement for periprosthetic fracture of the right knee. Patient states that the occupational therapist that comes her home was very worried about her incision which prompted the patient to call because she felt she might be infected. She has had no constitutional changes, increasing pain, and she has had no fevers. Patient continues to benefit from occupational therapy at home and is mainly in a wheelchair but does transfer with a walker. She states that her pain is well controlled. Examination:  Today's examination of the right knee reveals incision to be healing nicely with signs of infection, or cellulitis. There is no surrounding erythema or induration. There is no drainage noted. Her present range of motion is noted to be 0 degrees of extension to approximately 90 degrees of flexion. Quad strength is noted to be 4/5. Diagnostic Test Findings:  No new x-rays were obtained today but x-rays from 3/30/2022 was reviewed with the patient. Impression:  6 weeks status post oncologic knee replacement. Treatment Plan:  Patient is to continue working on quad strengthening straight leg raising was demonstrated. She is to progressively work on her flexion over the next 4 weeks. Follow-up: 3 weeks at which time repeat clinical examination and x-rays of the right knee will be obtained. The total knee replacement has long stems in the femur and tibia.     Cliff Bryant PA-C  Board certified by the [facility] [facility]

## 2022-05-23 ENCOUNTER — OFFICE VISIT (OUTPATIENT)
Dept: ORTHOPEDIC SURGERY | Age: 67
End: 2022-05-23

## 2022-05-23 VITALS — HEIGHT: 69 IN | BODY MASS INDEX: 25.62 KG/M2 | WEIGHT: 173 LBS

## 2022-05-23 DIAGNOSIS — Z96.651 STATUS POST TOTAL RIGHT KNEE REPLACEMENT: Primary | ICD-10-CM

## 2022-05-23 PROCEDURE — 99024 POSTOP FOLLOW-UP VISIT: CPT | Performed by: PHYSICIAN ASSISTANT

## 2023-08-19 NOTE — H&P
Patient: Carlos Vital  [id number]  Date: 1/4/2022      Chief Complaint: covid    History of Present Illness/Hospital Course:  Ms Gabbie Layne is a 78year old female admitted from [address] [place] [address]. She initially presented with shortness of breath and was diagnosed with covid pneumonia. She required intubation and proning; extubated 10/4, reintubated 10/6. She did require tracheostomy and peg placement. She has no baseline oxygen requirement. She endorses exertional dyspnea but denies chest pain. Prior Level of Function:  Independent    Current Level of Function:  Stacy     has a past medical history of Anxiety disorder, Chronic pain syndrome, COVID-19, DDD (degenerative disc disease), lumbar, Diabetes ([address] Displacement of lumbar intervertebral disc without myelopathy, Diverticulitis, Fibromyalgia, Generalized osteoarthrosis, involving multiple sites, GERD (gastroesophageal reflux disease), Impacted cerumen, Influenza A, Irritable bowel syndrome, Other and unspecified hyperlipidemia, Prosthetic joint implant failure ([address] Screening mammogram, Tracheobronchomalacia, Unspecified asthma(493.90), Unspecified essential hypertension, and Unspecified hypothyroidism. has a past surgical history that includes Hysterectomy; joint replacement (10/92); joint replacement (  12/92); joint replacement (  6/96); joint replacement (  2006); Colonoscopy (2007); Lung surgery (1/2014); Cardiac catheterization; Abdomen surgery; Colon surgery; Upper gastrointestinal endoscopy (N/A, 10/25/2021); and tracheostomy (N/A, 10/25/2021). reports that she quit smoking about 30 years ago. Her smoking use included cigarettes. She has a 18.00 pack-year smoking history. She has never used smokeless tobacco. She reports that she does not drink alcohol and does not use drugs. family history includes Asthma in her mother; Cancer in her daughter and maternal grandfather; Heart Failure in her father.     Current Facility-Administered Medications   Medication Dose Route Frequency Provider Last Rate Last Admin    aspirin chewable tablet 81 mg  81 mg Oral Daily Arun Hernandez MD        budesonide (PULMICORT) nebulizer suspension 500 mcg  0.5 mg Nebulization BID Arun Hernandez MD   500 mcg at 01/04/22 0749    cefepime (MAXIPIME) 1000 mg IVPB minibag  1,000 mg IntraVENous Q8H Arun Hernandez MD   Stopped at 01/04/22 0131    chlorhexidine (PERIDEX) 0.12 % solution 15 mL  15 mL Mouth/Throat BID Arun Hernandez MD        dicyclomine (BENTYL) tablet 20 mg  20 mg Oral 4x Daily AC & HS Arun Hernandez MD   20 mg at 01/04/22 0874    furosemide (LASIX) tablet 40 mg  40 mg Oral Daily Arun Hernandez MD        insulin glargine (LANTUS) injection vial 7 Units  7 Units SubCUTAneous Nightly Arun Hernandez MD   7 Units at 01/03/22 2014    insulin lispro (HUMALOG) injection vial 0-12 Units  0-12 Units SubCUTAneous TID WC Arun Hernandez MD   2 Units at 01/03/22 1651    insulin lispro (HUMALOG) injection vial 0-6 Units  0-6 Units SubCUTAneous Nightly Arun Hernandez MD   1 Units at 01/03/22 2013    glucose (GLUTOSE) 40 % oral gel 15 g  15 g Oral PRN Arun Hernandez MD        dextrose 50 % IV solution  12.5 g IntraVENous PRN Arun Hernandez MD        glucagon (rDNA) injection 1 mg  1 mg IntraMUSCular PRN Arun Hernandez MD        dextrose 5 % solution  100 mL/hr IntraVENous PRN Arun Hernandez MD        levothyroxine (SYNTHROID) tablet 100 mcg  100 mcg Oral Daily Arun Hernandez MD   100 mcg at 01/04/22 1288    LORazepam (ATIVAN) tablet 0.5 mg  0.5 mg Oral Q4H PRN Arun Hernandez MD   0.5 mg at 01/04/22 4471    metoclopramide (REGLAN) tablet 5 mg  5 mg Oral BID Arun Hernandez MD   5 mg at 01/03/22 2006    therapeutic multivitamin-minerals 1 tablet  1 tablet Oral Daily Arun Hernandez MD        oxyCODONE (ROXICODONE) immediate release tablet 5 mg  5 mg Oral Q4H PRN Arun Hernandez MD        Or   Vin Velásquez oxyCODONE (ROXICODONE) immediate release tablet 10 mg  10 mg Oral Q4H PRN Tez Larkin MD   10 mg at 01/04/22 8631    rOPINIRole (REQUIP) tablet 0.5 mg  0.5 mg Oral TID Tez Larkin MD   0.5 mg at 01/03/22 2006    spironolactone (ALDACTONE) tablet 25 mg  25 mg Oral Daily Tez Larkin MD        traZODone (DESYREL) tablet 50 mg  50 mg Oral Nightly PRN Tez Larkin MD        ondansetron (ZOFRAN-ODT) disintegrating tablet 8 mg  8 mg Oral Q8H PRN Tez Larkin MD        hydrALAZINE (APRESOLINE) tablet 50 mg  50 mg Oral Q6H PRN Tez Larkin MD        acetaminophen (TYLENOL) tablet 650 mg  650 mg Oral Q4H PRN Tez Larkin MD        enoxaparin (LOVENOX) injection 40 mg  40 mg SubCUTAneous Daily Tez Larkin MD        polyethylene glycol (GLYCOLAX) packet 17 g  17 g Oral Daily PRN Tez Larkin MD        ipratropium-albuterol (DUONEB) nebulizer solution 1 ampule  1 ampule Inhalation BID Tez Larkin MD   1 ampule at 01/04/22 9938       Allergies   Allergen Reactions    Prednisone Other (See Comments) and Anaphylaxis     Cannot tolerate oral steriods  Cannot tolerate oral steroids - causes Avascular Necrosis of joints.   Cannot tolerate oral steriods    Quinidine Anaphylaxis and Nausea And Vomiting    Sulfa Antibiotics Anaphylaxis and Nausea And Vomiting    Bactrim     Clonidine Hives    Sulfamethoxazole-Trimethoprim Rash and Hives       Current Facility-Administered Medications   Medication Dose Route Frequency Provider Last Rate Last Admin    aspirin chewable tablet 81 mg  81 mg Oral Daily Tez Larkin MD        budesonide (PULMICORT) nebulizer suspension 500 mcg  0.5 mg Nebulization BID Tez Larkin MD   500 mcg at 01/04/22 0749    cefepime (MAXIPIME) 1000 mg IVPB minibag  1,000 mg IntraVENous Q8H Tez Larkin MD   Stopped at 01/04/22 0131    chlorhexidine (PERIDEX) 0.12 % solution 15 mL  15 mL Mouth/Throat BID Tez Larkin MD        dicyclomine (BENTYL) tablet 20 mg  20 mg Oral 4x Daily AC & HS Arun Hernandez MD   20 mg at 01/04/22 2418    furosemide (LASIX) tablet 40 mg  40 mg Oral Daily Arun Hernandez MD        insulin glargine (LANTUS) injection vial 7 Units  7 Units SubCUTAneous Nightly Arun Hernandez MD   7 Units at 01/03/22 2014    insulin lispro (HUMALOG) injection vial 0-12 Units  0-12 Units SubCUTAneous TID WC Arun Hernandez MD   2 Units at 01/03/22 1651    insulin lispro (HUMALOG) injection vial 0-6 Units  0-6 Units SubCUTAneous Nightly Arun Hernandez MD   1 Units at 01/03/22 2013    glucose (GLUTOSE) 40 % oral gel 15 g  15 g Oral PRN Arun Hernandez MD        dextrose 50 % IV solution  12.5 g IntraVENous PRN Arun Hernandez MD        glucagon (rDNA) injection 1 mg  1 mg IntraMUSCular PRN Arun Hernandez MD        dextrose 5 % solution  100 mL/hr IntraVENous PRN Arun Hernandez MD        levothyroxine (SYNTHROID) tablet 100 mcg  100 mcg Oral Daily Arun Hernandez MD   100 mcg at 01/04/22 2612    LORazepam (ATIVAN) tablet 0.5 mg  0.5 mg Oral Q4H PRN Arun Hernandez MD   0.5 mg at 01/04/22 0749    metoclopramide (REGLAN) tablet 5 mg  5 mg Oral BID Arun Hernandez MD   5 mg at 01/03/22 2006    therapeutic multivitamin-minerals 1 tablet  1 tablet Oral Daily Arun Hernandez MD        oxyCODONE (ROXICODONE) immediate release tablet 5 mg  5 mg Oral Q4H PRN Arun Hernandez MD        Or   Vin Velásquez oxyCODONE (ROXICODONE) immediate release tablet 10 mg  10 mg Oral Q4H PRN Arun Hernandez MD   10 mg at 01/04/22 6185    rOPINIRole (REQUIP) tablet 0.5 mg  0.5 mg Oral TID Arun Hernandez MD   0.5 mg at 01/03/22 2006    spironolactone (ALDACTONE) tablet 25 mg  25 mg Oral Daily Arun Hernandez MD        traZODone (DESYREL) tablet 50 mg  50 mg Oral Nightly PRN Arun Hernandez MD        ondansetron (ZOFRAN-ODT) disintegrating tablet 8 mg  8 mg Oral Q8H PRN Arun Hernandez MD        hydrALAZINE (APRESOLINE) tablet 50 mg  50 mg Oral Q6H PRN Gibran Mcleod MD        acetaminophen (TYLENOL) tablet 650 mg  650 mg Oral Q4H PRN Gibran Mcleod MD        enoxaparin (LOVENOX) injection 40 mg  40 mg SubCUTAneous Daily Gibran Mcleod MD        polyethylene glycol [facility]) packet 17 g  17 g Oral Daily PRN Gibran Mcleod MD        ipratropium-albuterol (DUONEB) nebulizer solution 1 ampule  1 ampule Inhalation BID Gibran Mcleod MD   1 ampule at 01/04/22 0749         REVIEW OF SYSTEMS:   CONSTITUTIONAL: negative for fevers, chills, diaphoresis, activity change, appetite change, fatigue, night sweats and unexpected weight change. EYES: negative for blurred vision, eye discharge, visual disturbance and icterus. HEENT: negative for hearing loss, tinnitus, ear drainage, sinus pressure, nasal congestion, epistaxis and snoring. RESPIRATORY: Negative for hemoptysis, cough, sputum production. CARDIOVASCULAR: negative for chest pain, palpitations, exertional chest pressure/discomfort, edema, syncope. GASTROINTESTINAL: negative for nausea, vomiting, diarrhea, constipation, blood in stool and abdominal pain. GENITOURINARY: negative for frequency, dysuria, urinary incontinence, decreased urine volume, and hematuria. HEMATOLOGIC/LYMPHATIC: negative for easy bruising, bleeding and lymphadenopathy. ALLERGIC/IMMUNOLOGIC: negative for recurrent infections, angioedema, anaphylaxis and drug reactions. ENDOCRINE: negative for weight changes and diabetic symptoms including polyuria, polydipsia and polyphagia. MUSCULOSKELETAL: negative for pain, joint swelling, decreased range of motion and muscle weakness. NEUROLOGICAL: negative for headaches, slurred speech, unilateral weakness. PSYCHIATRIC/BEHAVIORAL: negative for hallucinations, behavioral problems, confusion and agitation. All pertinent positives are noted in the HPI.     Physical Examination:  Vitals:   Patient Vitals for the past 24 hrs:   BP Temp Temp src Pulse Resp SpO2 Height Weight   01/04/22 0750      90 %     01/04/22 0605       5' 9\" (1.753 m) 160 lb 12.8 oz (72.9 kg)   01/03/22 1945 123/78 98.2 °F (36.8 °C) Oral 82 18 95 %     01/03/22 1615 126/80 99 °F (37.2 °C) Axillary 89 18 98 %       Psych: Stable mood, normal judgement, normal affect   Const: No distress  Eyes: Conjunctiva noninjected, no icterus noted; pupils equal, round, and reactive to light. HENT: Atraumatic, normocephalic; Oral mucosa moist  Neck: Trachea midline, neck supple. No thyromegaly noted. CV: Regular rate and rhythm, no murmur rub or gallop noted  Resp: Lungs clear to auscultation bilaterally, no rales wheezes or ronchi, no retractions. Respirations unlabored. GI: Soft, nontender, nondistended. Normal bowel sounds. No palpable masses. Neuro: Alert, oriented, appropriate. No cranial nerve deficits appreciated. Sensation intact to light touch. Motor examination reveals nonfocal weakness  MSK: No joint abnormalities noted. Ext: No significant edema appreciated. No varicosities. Lab Results   Component Value Date    WBC 11.2 (H) 10/28/2021    HGB 9.5 (L) 10/28/2021    HCT 30.7 (L) 10/28/2021    MCV 78.8 (L) 10/28/2021     10/28/2021     Lab Results   Component Value Date    INR 1.47 (H) 09/28/2021    INR 1.04 08/24/2017    INR 1.28 (H) 12/22/2016    PROTIME 16.9 (H) 09/28/2021    PROTIME 12.0 08/24/2017    PROTIME 14.4 (H) 12/22/2016     Lab Results   Component Value Date    CREATININE <0.5 (L) 10/28/2021    BUN 12 10/28/2021     10/28/2021    K 3.2 (L) 10/28/2021    CL 97 (L) 10/28/2021    CO2 34 (H) 10/28/2021     Lab Results   Component Value Date    ALT 21 10/28/2021    AST 33 10/28/2021    ALKPHOS 210 (H) 10/28/2021    BILITOT 0.7 10/28/2021       The above laboratory data have been reviewed. The above imaging data have been reviewed. The above medical testing have been reviewed. Body mass index is 23.75 kg/m².     Barriers to Discharge: weakness, balance, endurance    POST ADMISSION PHYSICIAN EVALUATION  The patient has agreed to being admitted to our comprehensive inpatient  rehabilitation facility consisting of at least 180 minutes of therapy a day,  5 out of 7 days a week. The patient/family has a good understanding of our discharge process. The  patient has potential to make improvement and is in need of at least two of  the following multidisciplinary therapies including but not limited to  physical, occupational, respiratory, and speech, nutritional services, wound care, and prosthetics and orthotics. Given the patients complex condition  and risk of further medical complications, rehabilitation services cannot be  safely provided at a lower level of care such as a skilled nursing facility. I have compared the patients medical and functional status at the time of the  preadmission screening and the same on this date, and there are no significant changes. By signing this document, I acknowledge that I have personally performed a  full physical examination on this patient within 24 hours of admission to  this inpatient rehabilitation facility and have determined the patient to be  able to tolerate the above course of treatment at an intensive level for a  reasonable period of time. I will be completing a detailed individualized  Plan of Care for this patient by day four of the patients stay based upon the  Preadmission Screen, this Post-Admission Evaluation, and the therapy  evaluations. Rehabilitation Diagnosis:  Neurologic, 3.8, Neuromuscular Disorders, e.g. Critical Illness Myopathy, Other Myopathy    Assessment and Plan:  S/p covid pneumonia with superimposed bacterial pneumonia  - required trach/peg, proning  - s/p remdesivir, tocilizumab  - remains on cefepime    CAD  - asa, statin    HTN  - follow BP      DM  - lantus, SSI    Hypothyroidism  - synthroid    Bowels: Schedule stool softener.  Follow bowel movements. Enema or suppository if needed. Bladder: Check PVR x 3. [facility] if PVR > 200ml or if any volume is > 500 ml. Sleep: Trazodone provided prn. Follow up appointments: PCP    [place].  Thais Durbin MD 1/4/2022, 8:01 AM "something is in my foot I don't know if its a tic or what it is."

## 2023-10-10 NOTE — PROGRESS NOTES
PM assessment complete, VSS. No acute S/S of distress noted at this time. Will continue to monitor. [place] Counseling:  I discussed with the patient the risks of topical clascoterone including but not limited to erythema, scaling, itching, and stinging. Patient voiced their understanding.

## 2025-04-11 NOTE — PROGRESS NOTES
Clinically, this sounds like a pneumonia of the left lung.  I will order a chest xray, antibiotics and an inhaler.  Return in one or two weeks for a recheck and right away if  you feel acutely worse.     Pulmonary & Critical Care Medicine ICU Progress Note    CC: Respiratory failure    Events of Last 24 hours:   Propofol/Versed  off   Fentanyl 150 mcg/hr   Precedex 1.3 mcg/kg/hr   Nimbex off   Levophed off   PEEP 8  FiO2 60%   Plateau pressure 20  PaO2/FiO2 127          Vascular lines: IV: PICC line     MV:   Vent Mode: AC/VC Rate Set: 28 bmp/Vt Ordered: 400 mL/ /FiO2 : (S) 50 % (increased to 60)  Recent Labs     10/03/21  0520 10/04/21  0555   PHART 7.437 7.480*   TUL9WHN 41.2 38.8   PO2ART 86.7 76.3       IV:   midazolam Stopped (10/02/21 1556)    propofol Stopped (10/02/21 1315)    fentaNYL 150 mcg/hr (10/04/21 0125)    norepinephrine Stopped (21 1401)    dexmedetomidine HCl in NaCl 1.3 mcg/kg/hr (10/04/21 0340)    sodium chloride      dextrose      sodium chloride         Vitals:  Blood pressure 132/63, pulse 56, temperature 99.5 °F (37.5 °C), temperature source Axillary, resp. rate 22, height 5' 9\" (1.753 m), weight 180 lb 14.4 oz (82.1 kg), SpO2 92 %, not currently breastfeeding. on AC 28/  Temp  Av.5 °F (36.9 °C)  Min: 98.1 °F (36.7 °C)  Max: 99.5 °F (37.5 °C)    Intake/Output Summary (Last 24 hours) at 10/4/2021 0701  Last data filed at 10/4/2021 0600  Gross per 24 hour   Intake 2491.47 ml   Output 3890 ml   Net -1398.53 ml     PE:  EXAM:   General: ill appearing. Intubated sedated. Eyes: PERRL. No sclera icterus. + conjunctival injection. ENT: ET tube in place  Neck: Trachea midline  Resp: No accessory muscle use. Few crackles. No wheezing. Few rhonchi. No dullness on percussion. CV: Regular rate. Regular rhythm. 1+ LE edema. GI: Proned not able to assess   Skin: Warm and dry. No nodule on exposed extremities. No rash on exposed extremities. Lymph: No cervical LAD. No supraclavicular LAD. M/S: No cyanosis. No joint deformity. No clubbing. Neuro: Intubated. Good cough. Alert and awake. Followed commands.    Psych: Noncommunicative unable to obtain          Scheduled Meds:   piperacillin-tazobactam  3,375 mg IntraVENous Q8H    diazePAM  10 mg Oral TID    chlorhexidine  15 mL Mouth/Throat BID    pantoprazole  40 mg IntraVENous Daily    ipratropium-albuterol  1 ampule Inhalation Q4H    insulin lispro  0-6 Units SubCUTAneous Q4H    levothyroxine  100 mcg Oral Daily    enoxaparin  30 mg SubCUTAneous BID    dexamethasone  6 mg IntraVENous Q24H    lidocaine 1 % injection  5 mL IntraDERmal Once    sodium chloride flush  5-40 mL IntraVENous 2 times per day    aspirin  81 mg Oral Daily    [Held by provider] gabapentin  800 mg Oral TID    [Held by provider] rOPINIRole  0.5 mg Oral TID    sertraline  50 mg Oral Daily    atorvastatin  40 mg Oral Daily    sodium chloride flush  5-40 mL IntraVENous 2 times per day    [Held by provider] oxyCODONE  20 mg Oral 2 times per day       Data:  CBC:   Recent Labs     10/02/21  0318 10/03/21  0429 10/04/21  0350   WBC 4.7 5.6 6.4   HGB 10.7* 10.8* 10.9*   HCT 31.7* 33.5* 33.1*   MCV 77.3* 77.3* 76.7*   * 133* 135     BMP:   Recent Labs     10/02/21  0318 10/03/21  0429 10/04/21  0350    136 136   K 3.9 3.6 4.1    100 100   CO2 27 26 26   BUN 20 27* 28*   CREATININE <0.5* <0.5* <0.5*     LIVER PROFILE:   Recent Labs     10/02/21  0318 10/03/21  0429 10/04/21  0350   AST <5* 60* 61*   ALT <5* 26 33   BILITOT <0.2 0.4 0.3   ALKPHOS 148* 162* 156*       Microbiology:  9/27 COVID-19 detected  9/30 Resp Pseudomonas fluorescensAbnormal       Imaging:  Chest x-ray 10/4 imaging reviewed by me and showed   Satisfactory ETT position  Satisfactory PICC line position   R sided ASD with improving L ASD      CTPA 9/27 imaging was reviewed by me and showed   No evidence of pulmonary embolism. Scattered peripheral opacities in the left lung and more consolidative area   in the right lung concerning for pneumonia.  Previous right thoracotomy and   upper lobectomy.    One mildly enlarged right paratracheal lymph node is present but no priors   for comparison.  Nonenlarged but prominent lymph nodes in the upper abdomen   and juxta diaphragmatic region        ASSESSMENT:  · Acute hypoxemic respiratory failure  · COVID-19 viral pneumonia  · Pseudomonas PNA   · Hypotension-probably sedation related  · Transaminitis  · Fever T-max 101.5  · Acute kidney injury  · CAD  · Chronic pain syndrome - narcotics and Neurontin         PLAN:  Mechanical ventilation as per my orders. The ventilator was adjusted by me at the bedside for unstable, life threatening respiratory failure  Follow ABG and chest x-ray while on the ventilator  SBT trial for possible extubation today  IV precededx for sedation  D/C Versed, Fentanyl and Propofol   Nimbex off   Prone ventilation - off   Fentanyl and Versed PRN, gtt as needed  Valium 10 mg PO TID   Droplet plus isolation   D/C Levophed   IV antibiotics to include Zosyn D#3. Completed 5 days of Ceftriaxone/Zithromax   IV Levophed to keep MAP > 65 mmHg or SBP>90  Follow-up cultures   Dexamethasone 6 mg IV D#8  Completed Remdesivir day #5/5  Post 1 dose of Tocilizumab  Resume her Norvasc and losartan  Hold Narcotics Requip and Neurontin    Repeat Lasix 40 mg IV x 1   Lovenox BID   MRSA prophylaxis: Bactroban  Patient is full code   Silvestre Tafoya [employer] [phone]. I called and updated today. Multiple good questions were answered. Total critical care time caring for this patient with life threatening, unstable organ failure, including direct patient contact, management of life support systems, review of data including imaging and labs, discussions with other team members and physicians is 33 minutes, excluding procedures.

## (undated) DEVICE — SUTURE PERMAHAND SZ 2-0 L18IN NONABSORBABLE BLK L26MM SH C012D

## (undated) DEVICE — SOLUTION IV IRRIG 500ML 0.9% SODIUM CHL 2F7123

## (undated) DEVICE — SUTURE STRATAFIX SYMMETRIC SZ 1 L18IN ABSRB VLT CT1 L36CM SXPP1A404

## (undated) DEVICE — NEEDLE HYPO 20GA L1.5IN YEL POLYPR HUB S STL REG BVL STR

## (undated) DEVICE — 3 BONE CEMENT MIXER: Brand: MIXEVAC

## (undated) DEVICE — SYRINGE MED 10ML LUERLOCK TIP W/O SFTY DISP

## (undated) DEVICE — GOWN,AURORA,NONREINF,RAGLAN,XXL,STERILE: Brand: MEDLINE

## (undated) DEVICE — SUTURE VCRL + SZ 2-0 L27IN ABSRB CLR CT-1 1/2 CIR TAPERCUT VCP259H

## (undated) DEVICE — SUTURE ETHLN SZ 3-0 L30IN NONABSORBABLE BLK FSL L30MM 3/8 1671H

## (undated) DEVICE — GLOVE ORANGE PI 8 1/2   MSG9085

## (undated) DEVICE — Z DISCONTINUED BY MEDLINE USE 2716051 TUBE TRACH SZ 6 L74MM OD10.8MM ID6.4MM CUF W/ DISP INNR

## (undated) DEVICE — GLOVE SURG SZ 75 L12IN FNGR THK79MIL GRN LTX FREE

## (undated) DEVICE — ENDO CARRY-ON PROCEDURE KIT INCLUDES SUCTION TUBING, LUBRICANT, GAUZE, BIOHAZARD STICKER, TRANSPORT PAD AND INTERCEPT BEDSIDE KIT.: Brand: ENDO CARRY-ON PROCEDURE KIT

## (undated) DEVICE — SUTURE MCRYL SZ 2-0 L18IN ABSRB VLT L36MM CT-1 1/2 CIR Y739D

## (undated) DEVICE — RIMMED SPEED PIN 45MM STERILE

## (undated) DEVICE — BONE PREPARATION KIT: Brand: BIOPREP

## (undated) DEVICE — DRILL BIT 3.2MM (1/8'') X 128.0MM

## (undated) DEVICE — HANDPIECE SET WITH HIGH FLOW TIP AND SUCTION TUBE: Brand: INTERPULSE

## (undated) DEVICE — SUTURE MCRYL SZ 3-0 L27IN ABSRB UD L19MM PS-2 3/8 CIR PRIM Y427H

## (undated) DEVICE — SUTURE VCRL SZ 3-0 L54IN ABSRB UD LIGAPAK REEL POLYGLACTIN J285G

## (undated) DEVICE — Device

## (undated) DEVICE — ELECTRODE PT RET AD L9FT HI MOIST COND ADH HYDRGEL CORDED

## (undated) DEVICE — SOLUTION IRRIG 2000ML 0.9% SOD CHL USP UROMATIC PLAS CONT

## (undated) DEVICE — GLOVE ORTHO 7   MSG9470

## (undated) DEVICE — BLADE SURG SAW SAG S STL AGG 905MM LEN 185MM W 127MM THCK

## (undated) DEVICE — HOOD, PEEL-AWAY: Brand: FLYTE

## (undated) DEVICE — SUTURE PROL 2-0 L48IN NONABSORBABLE BLU SH L26MM 1/2 CIR 8533H

## (undated) DEVICE — SUTURE VCRL SZ 0 L36IN ABSRB UD CT-1 L36MM 1/2 CIR TAPR PNT VCP946H

## (undated) DEVICE — CONMED SCOPE SAVER BITE BLOCK, 20X27 MM: Brand: SCOPE SAVER

## (undated) DEVICE — DRESSING FOAM W4XL10IN AG SIL ADH ANTIMIC POSTOP OPTIFOAM

## (undated) DEVICE — SUTURE VCRL + SZ 0 L18IN ABSRB CLR VCP112G

## (undated) DEVICE — PENCIL SMK EVAC ALL IN 1 DSGN ENH VISIBILITY IMPROVED AIR

## (undated) DEVICE — WRAP LEG DEMAYO ST

## (undated) DEVICE — STAPLER EXT SKIN 35 WIDE S STL STPL SQUEEZE HNDL VISISTAT

## (undated) DEVICE — BOWL AND CEMENT CARTRIDGE WITH BREAKAWAY FEMORAL NOZZLE AND MEDIUM PRESSURIZER: Brand: ACM

## (undated) DEVICE — MAJOR SET UP PK

## (undated) DEVICE — SUTURE VCRL SZ 3-0 L18IN ABSRB UD L26MM SH 1/2 CIR J864D

## (undated) DEVICE — APPLICATOR PREP 26ML 0.7% IOD POVACRYLEX 74% ISO ALC ST

## (undated) DEVICE — SMARTGOWN SURGICAL GOWN, LARGE: Brand: CONVERTORS

## (undated) DEVICE — ELECTRODE ECG MONITR FOAM TEAR DROP ADLT RED

## (undated) DEVICE — DRESSING FOAM W4XL12IN AG SIL ADH ANTIMIC POSTOP OPTIFOAM

## (undated) DEVICE — COVER,TABLE,HEAVY DUTY,50"X90",STRL: Brand: MEDLINE